# Patient Record
Sex: MALE | Race: BLACK OR AFRICAN AMERICAN | NOT HISPANIC OR LATINO | Employment: OTHER | URBAN - METROPOLITAN AREA
[De-identification: names, ages, dates, MRNs, and addresses within clinical notes are randomized per-mention and may not be internally consistent; named-entity substitution may affect disease eponyms.]

---

## 2017-01-05 ENCOUNTER — OFFICE VISIT (OUTPATIENT)
Dept: INTERNAL MEDICINE | Facility: CLINIC | Age: 62
End: 2017-01-05
Payer: COMMERCIAL

## 2017-01-05 VITALS
SYSTOLIC BLOOD PRESSURE: 140 MMHG | OXYGEN SATURATION: 99 % | TEMPERATURE: 98 F | BODY MASS INDEX: 31.34 KG/M2 | HEART RATE: 75 BPM | HEIGHT: 70 IN | WEIGHT: 218.94 LBS | DIASTOLIC BLOOD PRESSURE: 77 MMHG

## 2017-01-05 DIAGNOSIS — G40.409 GENERALIZED TONIC-CLONIC SEIZURE: Primary | ICD-10-CM

## 2017-01-05 DIAGNOSIS — E08.22 DIABETES MELLITUS DUE TO UNDERLYING CONDITION WITH STAGE 3 CHRONIC KIDNEY DISEASE, WITH LONG-TERM CURRENT USE OF INSULIN: ICD-10-CM

## 2017-01-05 DIAGNOSIS — I48.0 PAROXYSMAL ATRIAL FIBRILLATION: Chronic | ICD-10-CM

## 2017-01-05 DIAGNOSIS — Z79.4 DIABETES MELLITUS DUE TO UNDERLYING CONDITION WITH STAGE 3 CHRONIC KIDNEY DISEASE, WITH LONG-TERM CURRENT USE OF INSULIN: ICD-10-CM

## 2017-01-05 DIAGNOSIS — Z86.73 HISTORY OF STROKE: ICD-10-CM

## 2017-01-05 DIAGNOSIS — I50.32 CHRONIC DIASTOLIC HEART FAILURE: ICD-10-CM

## 2017-01-05 DIAGNOSIS — N18.30 DIABETES MELLITUS DUE TO UNDERLYING CONDITION WITH STAGE 3 CHRONIC KIDNEY DISEASE, WITH LONG-TERM CURRENT USE OF INSULIN: ICD-10-CM

## 2017-01-05 DIAGNOSIS — N18.30 CKD (CHRONIC KIDNEY DISEASE), STAGE III: Chronic | ICD-10-CM

## 2017-01-05 DIAGNOSIS — I10 HTN (HYPERTENSION), BENIGN: ICD-10-CM

## 2017-01-05 DIAGNOSIS — E89.0 POSTSURGICAL HYPOTHYROIDISM: ICD-10-CM

## 2017-01-05 PROCEDURE — 99999 PR PBB SHADOW E&M-EST. PATIENT-LVL III: CPT | Mod: PBBFAC,,, | Performed by: INTERNAL MEDICINE

## 2017-01-05 PROCEDURE — 1159F MED LIST DOCD IN RCRD: CPT | Mod: S$GLB,,, | Performed by: INTERNAL MEDICINE

## 2017-01-05 PROCEDURE — 3078F DIAST BP <80 MM HG: CPT | Mod: S$GLB,,, | Performed by: INTERNAL MEDICINE

## 2017-01-05 PROCEDURE — 99214 OFFICE O/P EST MOD 30 MIN: CPT | Mod: S$GLB,,, | Performed by: INTERNAL MEDICINE

## 2017-01-05 PROCEDURE — 3077F SYST BP >= 140 MM HG: CPT | Mod: S$GLB,,, | Performed by: INTERNAL MEDICINE

## 2017-01-05 NOTE — PROGRESS NOTES
Subjective:       Patient ID: Alfa Christy III is a 61 y.o. male.    Chief Complaint: Follow-up; Hypertension; and Diabetes    HPI  60 y/o man with h/o CVA (1/2016), DM2, CKD, HTN, hypothyroidism, multiple other medical issues here for follow-up. Last seen by me 10/4/16; prior to this followed with Dr Salas.    Recently hospitalized 11/2016, seizure - today says that he tapered down and stopped taking pradaxa just before this happened. Presented to outside hospital 11/5 with headache, dizziness, and expressive aphasia, +elevated BP as high as 232/100; transferred to [facility] after having seizure. Seen by Neurology while inpatient, started on keppra with loading dose of phenytoin.  CTA of head and neck showed no acute ischemic changes except for old stroke of his left PCA and small, incidental BICA aneursysms.     Seen in [facility] for follow up in November; keppra was decreased to 750mg BID.  At visit with Dr Santiago (Neuro), recommended to continue this dose of keppra. No driving for 6 months.   Has now been discharged from home health; had HH RN as well as PT.   Balance has improved with time / home health PT; tries to walk slowly. Endurance decreased. No chest pain or dyspnea.    Atorvastatin increased to 80mg for HLD and risk modification given h/o CVA.    HFpEF, HTN, paroxysmal atrial fibrillation - Follows with Dr Camacho for Cardiology. On pradaxa.  During hospitalization in [place] for chest pain in 10/2016, switched from lisinopril to telmisartan; now has switched to losartan due to cost. Was also started on lasix at that time.   He feels that he had back pain due to low potassium (though this was not checked) -- seen in [facility] 11/30, recommended to use muscle relaxant, tylenol. Didn't feel that this helped much, but started eating 1-2 bananas daily and feels that this helped his back and legs more than taking methocarbamol.     Has been having more leg swelling in past 3 weeks, noted to have edema to knees bilaterally.  This has improved recently - watching salt in diet more carefully, now using compression stockings but not daily. (OTC, not prescription.)     HTN - currently taking losartan 100mg, carvedilol 12.5mg, lasix 40mg.   Had home health since his admission - reports SBP in the 120s yesterday.      DM2 - diagnosed around 2002.   Medications - lantus 12u qHS, humalog 8u qAC with correction scale with meals  Glucose checks at home - checking 3 times daily, brings in log.   Lowest fasting is 114, ranging up to 202. No lows during the day.   Has f/u with Dr Massey next week.    H/o thyroid cancer in 2009, s/p thyroidectomy, with postsurgical hypothyroidism - on synthroid 200mcg daily. No temperature intolerance, diarrhea/constipation, hair/skin changes, tremor, palpitations, or weight change. TSH slightly elevated while in hospital.    Rec'd prevnar vaccine in [facility] clinic.    Review of Systems   Constitutional: Negative for activity change and fever.   HENT: Negative for congestion and sore throat.    Respiratory: Negative for cough and shortness of breath.    Cardiovascular: Positive for leg swelling. Negative for chest pain and palpitations.   Gastrointestinal: Negative for abdominal pain, diarrhea and nausea.   Endocrine: Negative for cold intolerance and heat intolerance.   Genitourinary: Negative for decreased urine volume, difficulty urinating and frequency.   Musculoskeletal: Positive for back pain (not currently). Negative for arthralgias and gait problem.   Skin: Negative for rash.   Neurological: Negative for weakness and headaches.        Balance problem - improving   Psychiatric/Behavioral: Negative for dysphoric mood.         Past medical history, surgical history, and family medical history reviewed and updated as appropriate.    Medications and allergies reviewed.     Objective:          Vitals:    01/05/17 0902   BP: (!) 140/77   BP Location: Right arm   Patient Position: Sitting   Pulse: 75   Temp: 97.7  "°F (36.5 °C)   TempSrc: Oral   SpO2: 99%   Weight: 99.3 kg (218 lb 14.7 oz)   Height: 5' 10" (1.778 m)     Body mass index is 31.41 kg/(m^2).  Physical Exam   Constitutional: He is oriented to person, place, and time. He appears well-developed and well-nourished. No distress.   HENT:   Head: Normocephalic and atraumatic.   Nose: Nose normal.   Mouth/Throat: Oropharynx is clear and moist.   Eyes: Conjunctivae and EOM are normal. Pupils are equal, round, and reactive to light.   Neck: Normal range of motion. Neck supple.   Cardiovascular: Normal rate, regular rhythm and normal heart sounds.    No murmur heard.  Pulmonary/Chest: Effort normal and breath sounds normal.   Abdominal: Soft. Bowel sounds are normal. He exhibits no distension. There is no tenderness.   Musculoskeletal: Normal range of motion. He exhibits edema (2+ pitting edema to mid-shin bilaterally). He exhibits no tenderness.   Identifies paraspinal muscles in mid-back as location of his usual back pain. +muscle tension, no tenderness.    Lymphadenopathy:     He has no cervical adenopathy.   Neurological: He is alert and oriented to person, place, and time. No cranial nerve deficit.   R peripheral vision deficit   Skin: Skin is warm and dry.   Psychiatric: He has a normal mood and affect.   Vitals reviewed.      Lab Results   Component Value Date    WBC 11.36 2016    HGB 11.1 (L) 2016    HCT 31.0 (L) 2016     2016    CHOL 195 2016    TRIG 99 2016    HDL 52 2016    ALT 32 2016    AST 50 (H) 2016     2016    K 4.3 2016     2016    CREATININE 2.0 (H) 2016    BUN 21 2016    CO2 22 (L) 2016    TSH 6.123 (H) 2016    PSA 7.2 (H) 10/28/2015    INR 1.1 2016    GLUF 106 2008    HGBA1C text 2012     EE) R-EEG, 2016:   IMPRESSION: Normal awake and asleep EEG.  2) R-EEG, 16  IMPRESSION:   This is an abnormal awake and " asleep EEG due to mild generalized slowing seen, suggestive of mild diffuse or multifocal cerebral dysfunction.  No epileptiform activity or electrographic seizures were seen      Assessment:       1. Generalized tonic-clonic seizure    2. HTN (hypertension), benign    3. Chronic diastolic heart failure    4. Paroxysmal atrial fibrillation    5. CKD (chronic kidney disease), stage III    6. Diabetes mellitus due to underlying condition with stage 3 chronic kidney disease, with long-term current use of insulin    7. Sickle cell trait    8. Postsurgical hypothyroidism    9. History of stroke    10. Chronic anticoagulation - pradaxa        Plan:   Alfa was seen today for follow-up, hypertension and diabetes.    Diagnoses and all orders for this visit:    Generalized tonic-clonic seizure - continue current medication, continue to follow with Neurology    HTN (hypertension), benign - above goal today but making diet changes; continue watching salt in diet.     Chronic diastolic heart failure - by his report, symptoms overall improving. Continue working on low-salt diet, continue current meds, and make f/u appt with cardiology    Paroxysmal atrial fibrillation - continue pradaxa; follow with cardiology    CKD (chronic kidney disease), stage III  -     Magnesium; Future    Diabetes mellitus due to underlying condition with stage 3 chronic kidney disease, with long-term current use of insulin - standard A1c not reliable due to anemia related to sickle cell trait    Postsurgical hypothyroidism - TSH already ordered by Dr Massey; reviewed labs, will have TSH rechecked with next labs. No symptoms of hypothyroidism currently.    History of stroke - continue to follow with Neurology, continue pradaxa, statin, risk factor modification    Health maintenance reviewed with patient.   Due for eye exam; will discuss at next visit.    Return in about 2 months (around 3/5/2017) for follow up.    Tyler Jasmine MD  Internal  Medicine [facility]  1/5/2017

## 2017-01-05 NOTE — MR AVS SNAPSHOT
[facility] - Internal Medicine  [address]  [facility] [address]  Phone: [phone]  Fax: [phone]                  Alfa Christy III   2017 9:20 AM   Office Visit    Description:  Male : 1955   Provider:  Tyler Jasmine MD   Department:  [facility] - Internal Medicine           Reason for Visit     Follow-up     Hypertension     Diabetes           Diagnoses this Visit        Comments    Generalized tonic-clonic seizure    -  Primary     HTN (hypertension), benign         Chronic diastolic heart failure         Paroxysmal atrial fibrillation         CKD (chronic kidney disease), stage III         Diabetes mellitus due to underlying condition with stage 3 chronic kidney disease, with long-term current use of insulin         Sickle cell trait         Postsurgical hypothyroidism         History of stroke         Chronic anticoagulation                To Do List           Future Appointments        Provider Department Dept Phone    2017 11:00 AM LAB, [facility] [phone]    2017 2:30 PM Jorge Camacho MD [facility] Cardiology [phone]    2017 8:30 AM Carlitos Massey MD [facility] - Endo/Diab/Metab [phone]    2017 4:30 PM Rachel Gold MD [facility] Nephrology [phone]    3/8/2017 10:40 AM Tyler Jasmine MD [facility] Internal Medicine [phone]      Goals (5 Years of Data)     None      Follow-Up and Disposition     Return in about 2 months (around 3/5/2017) for follow up.      [facility]     [facility] -  Assistance  Registered nurses in the [facility] provide clinical advisement, health education, appointment booking, and other advisory services.  Call for this free service at [phone].             Medications           Message regarding Medications     Verify the changes and/or additions to your medication regime listed below are the same as discussed with your  "clinician today.  If any of these changes or additions are incorrect, please notify your healthcare provider.        STOP taking these medications     methocarbamol (ROBAXIN) 500 MG Tab Take 1 tablet (500 mg total) by mouth every 6 (six) hours as needed.           Verify that the below list of medications is an accurate representation of the medications you are currently taking.  If none reported, the list may be blank. If incorrect, please contact your healthcare provider. Carry this list with you in case of emergency.           Current Medications     atorvastatin (LIPITOR) 80 MG tablet Take 1 tablet (80 mg total) by mouth once daily.    blood sugar diagnostic Strp 1 strip by Misc.(Non-Drug; Combo Route) route 3 (three) times daily. One Touch Verio    carvedilol (COREG) 12.5 MG tablet Take 1 tablet (12.5 mg total) by mouth 2 (two) times daily.    coenzyme Q10 200 mg capsule Take 200 mg by mouth once daily.    dabigatran etexilate (PRADAXA) 150 mg Cap Take 150 mg by mouth 2 (two) times daily. "Do NOT break, chew, or open capsules."    ERGOCALCIFEROL, VITAMIN D2, (VITAMIN D ORAL) Take 1 tablet by mouth every Mon, Wed, Fri.    folic acid (FOLVITE) 1 MG tablet Take 1 mg by mouth once daily.     furosemide (LASIX) 40 MG tablet Take 1 tablet (40 mg total) by mouth once daily.    insulin glargine (LANTUS SOLOSTAR) 100 unit/mL (3 mL) InPn pen Inject 12 Units into the skin every evening.    insulin lispro (HUMALOG KWIKPEN) 100 unit/mL InPn pen Inject 8 Units into the skin 3 (three) times daily before meals. With sliding scale.    levetiracetam (KEPPRA) 750 MG Tab Take 1 tablet (750 mg total) by mouth 2 (two) times daily.    levothyroxine (SYNTHROID) 200 MCG tablet Take 1 tablet (200 mcg total) by mouth once daily.    losartan (COZAAR) 100 MG tablet Take 1 tablet (100 mg total) by mouth once daily.    acetaminophen (TYLENOL) 500 MG tablet Take 1 tablet (500 mg total) by mouth every 6 (six) hours as needed for Pain.    " "EYEBRIGHT ORAL Take 1 tablet by mouth once daily.    SAW/VIT E/SOD JILL/LYC/BETA/PYG (PROSTATE HEALTH ORAL) Take 1 tablet by mouth once daily.            Clinical Reference Information           Vital Signs - Last Recorded  Most recent update: 1/5/2017  9:05 AM by Laura Cobos MA    BP Pulse Temp Ht Wt SpO2    (!) 140/77 (BP Location: Right arm, Patient Position: Sitting) 75 97.7 °F (36.5 °C) (Oral) 5' 10" (1.778 m) 99.3 kg (218 lb 14.7 oz) 99%    BMI                31.41 kg/m2          Blood Pressure          Most Recent Value    BP  (!)  140/77      Allergies as of 1/5/2017     No Known Allergies      Immunizations Administered on Date of Encounter - 1/5/2017     None      Orders Placed During Today's Visit     Future Labs/Procedures Expected by Expires    Magnesium  1/5/2017 (Approximate) 3/6/2018      "

## 2017-01-05 NOTE — Clinical Note
Charis - I saw Mr Christy back last week for follow up after his recent hospitalization and just wanted to check in with you about when he should come in for Urology follow-up next. Looks like his PSA improved but is still elevated on last check. Could you or your staff reach out to let him know when to follow up with your clinic? Thanks! Tyler Jasmine MD

## 2017-01-06 ENCOUNTER — LAB VISIT (OUTPATIENT)
Dept: LAB | Facility: HOSPITAL | Age: 62
End: 2017-01-06
Attending: INTERNAL MEDICINE
Payer: COMMERCIAL

## 2017-01-06 DIAGNOSIS — C73 THYROID CANCER: ICD-10-CM

## 2017-01-06 DIAGNOSIS — E66.9 DIABETES MELLITUS TYPE 2 IN OBESE: ICD-10-CM

## 2017-01-06 DIAGNOSIS — E11.69 DIABETES MELLITUS TYPE 2 IN OBESE: ICD-10-CM

## 2017-01-06 DIAGNOSIS — N18.30 CKD (CHRONIC KIDNEY DISEASE), STAGE III: Chronic | ICD-10-CM

## 2017-01-06 LAB
ALBUMIN SERPL BCP-MCNC: 3.5 G/DL
ALP SERPL-CCNC: 132 U/L
ALT SERPL W/O P-5'-P-CCNC: 20 U/L
ANION GAP SERPL CALC-SCNC: 9 MMOL/L
AST SERPL-CCNC: 32 U/L
BILIRUB SERPL-MCNC: 1.4 MG/DL
BUN SERPL-MCNC: 21 MG/DL
CALCIUM SERPL-MCNC: 8.7 MG/DL
CHLORIDE SERPL-SCNC: 109 MMOL/L
CO2 SERPL-SCNC: 22 MMOL/L
CREAT SERPL-MCNC: 2.1 MG/DL
EST. GFR  (AFRICAN AMERICAN): 37.6 ML/MIN/1.73 M^2
EST. GFR  (NON AFRICAN AMERICAN): 32.5 ML/MIN/1.73 M^2
GLUCOSE SERPL-MCNC: 159 MG/DL
MAGNESIUM SERPL-MCNC: 2.4 MG/DL
POTASSIUM SERPL-SCNC: 5.2 MMOL/L
PROT SERPL-MCNC: 7.5 G/DL
SODIUM SERPL-SCNC: 140 MMOL/L
T4 FREE SERPL-MCNC: 0.9 NG/DL
TSH SERPL DL<=0.005 MIU/L-ACNC: 8.24 UIU/ML

## 2017-01-06 PROCEDURE — 82985 ASSAY OF GLYCATED PROTEIN: CPT

## 2017-01-06 PROCEDURE — 84432 ASSAY OF THYROGLOBULIN: CPT

## 2017-01-06 PROCEDURE — 83036 HEMOGLOBIN GLYCOSYLATED A1C: CPT

## 2017-01-06 PROCEDURE — 84443 ASSAY THYROID STIM HORMONE: CPT

## 2017-01-06 PROCEDURE — 80053 COMPREHEN METABOLIC PANEL: CPT

## 2017-01-06 PROCEDURE — 83735 ASSAY OF MAGNESIUM: CPT

## 2017-01-06 PROCEDURE — 36415 COLL VENOUS BLD VENIPUNCTURE: CPT | Mod: PO

## 2017-01-06 PROCEDURE — 84439 ASSAY OF FREE THYROXINE: CPT

## 2017-01-09 LAB
THRYOGLOBULIN INTERPRETATION: ABNORMAL
THYROGLOB AB SERPL-ACNC: <1.8 IU/ML
THYROGLOB SERPL-MCNC: 0.2 NG/ML

## 2017-01-10 LAB
FRUCTOSAMINE SERPL-SCNC: 302 UMOL /L (ref 0–285)
HEMOGLOBIN A1C REFERRAL TEST: NORMAL

## 2017-01-12 ENCOUNTER — TELEPHONE (OUTPATIENT)
Dept: UROLOGY | Facility: CLINIC | Age: 62
End: 2017-01-12

## 2017-01-13 ENCOUNTER — OFFICE VISIT (OUTPATIENT)
Dept: ENDOCRINOLOGY | Facility: CLINIC | Age: 62
End: 2017-01-13
Payer: COMMERCIAL

## 2017-01-13 VITALS
SYSTOLIC BLOOD PRESSURE: 135 MMHG | WEIGHT: 220.44 LBS | BODY MASS INDEX: 31.56 KG/M2 | HEIGHT: 70 IN | DIASTOLIC BLOOD PRESSURE: 74 MMHG | HEART RATE: 64 BPM

## 2017-01-13 DIAGNOSIS — E08.22 DIABETES MELLITUS DUE TO UNDERLYING CONDITION WITH STAGE 3 CHRONIC KIDNEY DISEASE, WITH LONG-TERM CURRENT USE OF INSULIN: ICD-10-CM

## 2017-01-13 DIAGNOSIS — C73 THYROID CANCER: ICD-10-CM

## 2017-01-13 DIAGNOSIS — D57.3 SICKLE CELL TRAIT: ICD-10-CM

## 2017-01-13 DIAGNOSIS — G40.409 GENERALIZED TONIC-CLONIC SEIZURE: ICD-10-CM

## 2017-01-13 DIAGNOSIS — Z79.4 DIABETES MELLITUS DUE TO UNDERLYING CONDITION WITH STAGE 3 CHRONIC KIDNEY DISEASE, WITH LONG-TERM CURRENT USE OF INSULIN: ICD-10-CM

## 2017-01-13 DIAGNOSIS — E89.0 POSTSURGICAL HYPOTHYROIDISM: Primary | ICD-10-CM

## 2017-01-13 DIAGNOSIS — Z86.73 HISTORY OF STROKE: ICD-10-CM

## 2017-01-13 DIAGNOSIS — E78.00 HYPERCHOLESTEROLEMIA: ICD-10-CM

## 2017-01-13 DIAGNOSIS — I10 HTN (HYPERTENSION), BENIGN: ICD-10-CM

## 2017-01-13 DIAGNOSIS — N18.30 DIABETES MELLITUS DUE TO UNDERLYING CONDITION WITH STAGE 3 CHRONIC KIDNEY DISEASE, WITH LONG-TERM CURRENT USE OF INSULIN: ICD-10-CM

## 2017-01-13 PROCEDURE — 1159F MED LIST DOCD IN RCRD: CPT | Mod: S$GLB,,, | Performed by: INTERNAL MEDICINE

## 2017-01-13 PROCEDURE — 3078F DIAST BP <80 MM HG: CPT | Mod: S$GLB,,, | Performed by: INTERNAL MEDICINE

## 2017-01-13 PROCEDURE — 99999 PR PBB SHADOW E&M-EST. PATIENT-LVL III: CPT | Mod: PBBFAC,,, | Performed by: INTERNAL MEDICINE

## 2017-01-13 PROCEDURE — 3075F SYST BP GE 130 - 139MM HG: CPT | Mod: S$GLB,,, | Performed by: INTERNAL MEDICINE

## 2017-01-13 PROCEDURE — 99214 OFFICE O/P EST MOD 30 MIN: CPT | Mod: S$GLB,,, | Performed by: INTERNAL MEDICINE

## 2017-01-13 NOTE — MR AVS SNAPSHOT
[facility] - Endo/Diab/Metab  [address]  [facility] [address]  Phone: [phone]  Fax: [phone]                  Alfa Christy III   2017 8:30 AM   Office Visit    Description:  Male : 1955   Provider:  Carlitos Massey MD   Department:  [facility] - Endo/Diab/Metab           Reason for Visit     Follow-up           Diagnoses this Visit        Comments    Postsurgical hypothyroidism    -  Primary     Diabetes mellitus due to underlying condition with stage 3 chronic kidney disease, with long-term current use of insulin         HTN (hypertension), benign         Hypercholesterolemia         Sickle cell trait         History of stroke         Generalized tonic-clonic seizure         Thyroid cancer                To Do List           Future Appointments        Provider Department Dept Phone    2017 11:30 AM DIABETES EDUCATOR, INT MED 2 [facility] -  Diabetes Program [phone]    2017 8:00 AM LAB, [facility] [phone]    2017 8:15 AM SPECIMEN, [facility] [phone]    2017 8:00 AM MD [facility] Nephrology [phone]    2017 4:30 PM Rachel Gold MD [facility] Nephrology [phone]      Goals (5 Years of Data)     None      Follow-Up and Disposition     Follow-up and Disposition History      [facility]     [facility] -  Assistance  Registered nurses in the [facility] provide clinical advisement, health education, appointment booking, and other advisory services.  Call for this free service at [phone].             Medications           Message regarding Medications     Verify the changes and/or additions to your medication regime listed below are the same as discussed with your clinician today.  If any of these changes or additions are incorrect, please notify your healthcare provider.             Verify that the below list of  "medications is an accurate representation of the medications you are currently taking.  If none reported, the list may be blank. If incorrect, please contact your healthcare provider. Carry this list with you in case of emergency.           Current Medications     acetaminophen (TYLENOL) 500 MG tablet Take 1 tablet (500 mg total) by mouth every 6 (six) hours as needed for Pain.    atorvastatin (LIPITOR) 80 MG tablet Take 1 tablet (80 mg total) by mouth once daily.    blood sugar diagnostic Strp 1 strip by Misc.(Non-Drug; Combo Route) route 3 (three) times daily. One Touch Verio    carvedilol (COREG) 12.5 MG tablet Take 1 tablet (12.5 mg total) by mouth 2 (two) times daily.    coenzyme Q10 200 mg capsule Take 200 mg by mouth once daily.    dabigatran etexilate (PRADAXA) 150 mg Cap Take 150 mg by mouth 2 (two) times daily. "Do NOT break, chew, or open capsules."    ERGOCALCIFEROL, VITAMIN D2, (VITAMIN D ORAL) Take 1 tablet by mouth every Mon, Wed, Fri.    EYEBRIGHT ORAL Take 1 tablet by mouth once daily.    folic acid (FOLVITE) 1 MG tablet Take 1 mg by mouth once daily.     furosemide (LASIX) 40 MG tablet Take 1 tablet (40 mg total) by mouth once daily.    insulin glargine (LANTUS SOLOSTAR) 100 unit/mL (3 mL) InPn pen Inject 12 Units into the skin every evening.    insulin lispro (HUMALOG KWIKPEN) 100 unit/mL InPn pen Inject 8 Units into the skin 3 (three) times daily before meals. With sliding scale.    levetiracetam (KEPPRA) 750 MG Tab Take 1 tablet (750 mg total) by mouth 2 (two) times daily.    levothyroxine (SYNTHROID) 200 MCG tablet Take 1 tablet (200 mcg total) by mouth once daily.    losartan (COZAAR) 100 MG tablet Take 1 tablet (100 mg total) by mouth once daily.    SAW/VIT E/SOD JILL/LYC/BETA/PYG (PROSTATE HEALTH ORAL) Take 1 tablet by mouth once daily.            Clinical Reference Information           Vital Signs - Last Recorded  Most recent update: 1/13/2017  9:03 AM by Olivia Alicea LPN    BP Pulse Ht " "Wt BMI    135/74 64 5' 10" (1.778 m) 100 kg (220 lb 7.4 oz) 31.63 kg/m2      Blood Pressure          Most Recent Value    BP  135/74      Allergies as of 1/13/2017     No Known Allergies      Immunizations Administered on Date of Encounter - 1/13/2017     None      Orders Placed During Today's Visit      Normal Orders This Visit    Ambulatory consult to Diabetic Education     Future Labs/Procedures Expected by Expires    FRUCTOSAMINE  1/13/2017 3/14/2018    Lipid panel  1/13/2017 1/13/2018    TSH  1/13/2017 1/13/2018      Instructions    Thyroid cancer and multifocal small and treated with surg and I131  TSH high and TG 0.2  Want to lower the TSH but think not waiting sufficient period between taking thyroid and taking food and other pills  Wait one hour  Repeat TSH in 2 month    Diabetes type 2   Lantus increased to 13  Nov 10 8 8 plus correction  Needs more activity  Diabetes education    CVA and loss of vision looking to the right  CVA    HTN controlled   Recheck chol         "

## 2017-01-13 NOTE — PATIENT INSTRUCTIONS
Thyroid cancer and multifocal small and treated with surg and I131  TSH high and TG 0.2  Want to lower the TSH but think not waiting sufficient period between taking thyroid and taking food and other pills  Wait one hour  Repeat TSH in 2 month    Diabetes type 2   Lantus increased to 13  Nov 10 8 8 plus correction  Needs more activity  Diabetes education    CVA and loss of vision looking to the right  CVA    HTN controlled   Recheck chol

## 2017-01-13 NOTE — PROGRESS NOTES
Subjective:      Patient ID: Alfa Christy III is a 61 y.o. male.    Chief Complaint:  Follow-up      History of Present Illness  Last seen Jan 2016  Since that time CVA with peripheral vison loss right  Then had seizure and now on meds. Speech worse and memory worse but now back to normal  Had chest pain in [place] after eating  Changed meds    The patient has the following problems:   1. 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 TG low Levothyroxine 200mcg  2. Diabetes type 2, on multiple daily injection program with good control.Sickle cell trait Checking 3x/day     3. Sickle cell trait, complicating the interpretation of the A1c. Has had sickle crisis  4. Erectile dysfunction, not taking Cialis  5. Elevated PSA with prostate biopsy, benign, followed in Urology.   6. Chronic kidney disease,   7. Hypertension, well controlled. BP high today  8. Cholesterol, statin begun because of risk factors.   9. New onset atrial fibrillation after stress test.   The patient has the history of thyroid cancer. He had surgery 08/26/2009.   This was a multifocal lesion. The pathology was reviewed again. The   patient was treated with surgery, radioactive iodine 100 mci. There is some   remaining tissue on the left. A biopsy of this was done 11/03/2011,   demonstrating an insufficient material to make a diagnosis. The patient is   on thyroid suppression with a dose of 200 mcg per day of Synthroid plus 50   mcg. The patient has developed atrial fibrillation on at least one   occasion when he had the stress test. TG has been as high as 0.7 and now 0.1  An iodine scan in 2011 showed some remaining uptake.   Recent US from radiology negative and in Endocrinology questionable   Alfa Christy with diabetes for 14 years. Symptoms:Opthalmology exam 1 months ago. No parasthesias of feet and CTS symptoms.   Glucose meter Type; Verio new   Frequency of  measurements/day 3 times/day   Diet: Number of meals 3 Frequency eating out/week 1 Snacks/day   Insulin See medication list Lantus 20 and Nov 10 with meals.   Oral agents See orders   Hypoglycemia: None recently  Diabetic complications None     Atrial fibrillation on treatment     Gets into trouble when drinking see A1C =14    Review of Systems   Constitutional: Positive for fatigue.   Eyes: Positive for visual disturbance.   Respiratory: Negative for cough and shortness of breath.    Cardiovascular: Negative for chest pain, palpitations and leg swelling.   Gastrointestinal: Negative for constipation and diarrhea.   Musculoskeletal: Negative for arthralgias and back pain.   Neurological: Negative for headaches.   Psychiatric/Behavioral: The patient is nervous/anxious.        Objective:   Physical Exam   Constitutional: He is oriented to person, place, and time. He appears well-developed and well-nourished.   Eyes: EOM are normal. Pupils are equal, round, and reactive to light.   Neck: No thyromegaly present.   Cardiovascular: Normal rate, regular rhythm and normal heart sounds.    No murmur heard.  Pulmonary/Chest: Effort normal and breath sounds normal.   Musculoskeletal: Normal range of motion.   Lymphadenopathy:     He has no cervical adenopathy.   Neurological: He is alert and oriented to person, place, and time. He has normal reflexes.   Comprehensive foot exam  Left and Right Feet  No ulcers  Normal temperature  Vibration mild deficit  Pulses intact     Psychiatric: His behavior is normal. Thought content normal.   Vitals reviewed.      Lab Review:   Results for orders placed or performed in visit on 01/06/17   Comprehensive metabolic panel   Result Value Ref Range    Sodium 140 136 - 145 mmol/L    Potassium 5.2 (H) 3.5 - 5.1 mmol/L    Chloride 109 95 - 110 mmol/L    CO2 22 (L) 23 - 29 mmol/L    Glucose 159 (H) 70 - 110 mg/dL    BUN, Bld 21 8 - 23 mg/dL    Creatinine 2.1 (H) 0.5 - 1.4 mg/dL    Calcium 8.7 8.7  - 10.5 mg/dL    Total Protein 7.5 6.0 - 8.4 g/dL    Albumin 3.5 3.5 - 5.2 g/dL    Total Bilirubin 1.4 (H) 0.1 - 1.0 mg/dL    Alkaline Phosphatase 132 55 - 135 U/L    AST 32 10 - 40 U/L    ALT 20 10 - 44 U/L    Anion Gap 9 8 - 16 mmol/L    eGFR if African American 37.6 (A) >60 mL/min/1.73 m^2    eGFR if non  32.5 (A) >60 mL/min/1.73 m^2   TSH   Result Value Ref Range    TSH 8.237 (H) 0.400 - 4.000 uIU/mL   Thyroglobulin   Result Value Ref Range    Thyroglobulin, Tumor Marker 0.2 (H) ng/mL    Thyroglobulin Antibody Screen <1.8 <4.0 IU/mL    Thyroglobulin Interpretation SEE BELOW    FRUCTOSAMINE   Result Value Ref Range    Fructosamine 302 (H) 0 - 285 umol /L   Magnesium   Result Value Ref Range    Magnesium 2.4 1.6 - 2.6 mg/dL   T4, free   Result Value Ref Range    Free T4 0.90 0.71 - 1.51 ng/dL   Hemoglobin A1C referral test   Result Value Ref Range    Hemoglobin A1c referral test Test Not Performed          Assessment:     Thyroid cancer and multifocal small and treated with surg and I131  TSH high and TG 0.2  Want to lower the TSH but think not waiting sufficient period between taking thyroid and taking food and other pills  Wait one hour  Repeat TSH in 2 month    Diabetes type 2   Lantus increased to 13  Nov 10 8 8 plus correction  Needs more activity  Diabetes education    CVA and loss of vision looking to the right  CVA    HTN controlled   Recheck chol    Plan:     Orders Placed This Encounter   Procedures    Lipid panel     Standing Status:   Future     Standing Expiration Date:   1/13/2018    TSH     Standing Status:   Future     Standing Expiration Date:   1/13/2018    FRUCTOSAMINE     Standing Status:   Future     Standing Expiration Date:   3/14/2018    Ambulatory consult to Diabetic Education     Referral Priority:   Routine     Referral Type:   Consultation     Referral Reason:   Specialty Services Required     Requested Specialty:   Diabetes     Number of Visits Requested:   1

## 2017-01-16 ENCOUNTER — CLINICAL SUPPORT (OUTPATIENT)
Dept: DIABETES | Facility: CLINIC | Age: 62
End: 2017-01-16
Payer: COMMERCIAL

## 2017-01-16 DIAGNOSIS — Z79.4 DIABETES MELLITUS DUE TO UNDERLYING CONDITION WITH STAGE 3 CHRONIC KIDNEY DISEASE, WITH LONG-TERM CURRENT USE OF INSULIN: ICD-10-CM

## 2017-01-16 DIAGNOSIS — E08.22 DIABETES MELLITUS DUE TO UNDERLYING CONDITION WITH STAGE 3 CHRONIC KIDNEY DISEASE, WITH LONG-TERM CURRENT USE OF INSULIN: ICD-10-CM

## 2017-01-16 DIAGNOSIS — N18.30 DIABETES MELLITUS DUE TO UNDERLYING CONDITION WITH STAGE 3 CHRONIC KIDNEY DISEASE, WITH LONG-TERM CURRENT USE OF INSULIN: ICD-10-CM

## 2017-01-16 PROCEDURE — G0109 DIAB MANAGE TRN IND/GROUP: HCPCS | Mod: S$GLB,,, | Performed by: DIETITIAN, REGISTERED

## 2017-01-16 NOTE — PROGRESS NOTES
Diabetes Assessment Group Class     01/16/17 0000   Diabetes Type   Diabetes Type  Type II   Diabetes History   Diabetes Diagnosis >10 years   Nutrition   Meal Planning skipping meals;water;eats out often  (2 large meals daily)   Monitoring    Monitoring (One Touch)   Self Monitoring  SMBG 3 times daily, AC/HS - 132-190 am, 133-296   Blood Glucose Logs No   Exercise    Exercise Type use exercise equipment   Frequency (2 times weekly)   Duration 1 hour   Current Diabetes Treatment    Current Treatment Insulin  (Humalog 10/8/8, Lantus 13 units every night)   Social History   Preferred Learning Method Face to Face;Reading Materials   Primary Support Self;Spouse   Occupation retired   Smoking Status Never a Smoker   Alcohol Use Weekly  (2 beers with football game)   Barriers to Change   Barriers to Change None   Learning Challenges  None   Readiness to Learn    Readiness to Learn  Acceptance   Diabetes Education Visit   Diabetes Education Record Assessment/Progress Post Program/Follow-up  (Verbal pre-test conducted at start of class)   Diabetes Education Assessment/Progress   Acute Complications (preventing, detecting, and treating acute complications) DC;5;CL;W   Chronic Complications (preventing, detecting, and treating chronic complications) DC;5;CL;W   Diabetes Disease Process (diabetes disease process and treatment options) DC;5;CL;W   Nutrition (Incorporating nutritional management into one's lifestyle) DC;CL;5;W   Physical Activity (incorporating physical activity into one's lifestyle) DC;5;CL;W   Medications (states correct name, dose, onset, peak, duration, side effects & timing of meds) DC;5;CL;W   Monitoring (monitoring blood glucose/other parameters &using results) DC;5;CL;W   Goal Setting and Problem Solving (verbalizes behavior change strategies & sets realistic goals) DC;CL   Behavior Change (developing personal strategies to health & behavior change) DC;CL   Psychosocial Issues (deveopling personal  srategies to address psychosocial concerns) DC;CL   Goals   Other Set  (Keep next DM appt)   Start Date 01/16/17   Diabetes Care Plan/Intervention   Education Plan/Intervention In F/U DSMT   Diabetes Self-Management Support Plan F/U Prov;F/U DE   Diabetes Meal Plan   Carbohydrate Per Meal 45-60g   Carbohydrate Per Snack  15-20g   Education Units of Time    Time Spent 90 min     Class presented by Yaa Orr MBA, RD, LDN, CDE  Post test score:100

## 2017-01-24 ENCOUNTER — LAB VISIT (OUTPATIENT)
Dept: LAB | Facility: HOSPITAL | Age: 62
End: 2017-01-24
Attending: INTERNAL MEDICINE
Payer: COMMERCIAL

## 2017-01-24 DIAGNOSIS — N18.30 CKD (CHRONIC KIDNEY DISEASE), STAGE III: Chronic | ICD-10-CM

## 2017-01-24 LAB
25(OH)D3+25(OH)D2 SERPL-MCNC: 30 NG/ML
ALBUMIN SERPL BCP-MCNC: 3.3 G/DL
ANION GAP SERPL CALC-SCNC: 5 MMOL/L
BUN SERPL-MCNC: 19 MG/DL
CALCIUM SERPL-MCNC: 8.5 MG/DL
CHLORIDE SERPL-SCNC: 107 MMOL/L
CO2 SERPL-SCNC: 25 MMOL/L
CREAT SERPL-MCNC: 1.9 MG/DL
EST. GFR  (AFRICAN AMERICAN): 42.4 ML/MIN/1.73 M^2
EST. GFR  (NON AFRICAN AMERICAN): 36.7 ML/MIN/1.73 M^2
GLUCOSE SERPL-MCNC: 177 MG/DL
PHOSPHATE SERPL-MCNC: 3.7 MG/DL
POTASSIUM SERPL-SCNC: 4.7 MMOL/L
PTH-INTACT SERPL-MCNC: 87 PG/ML
SODIUM SERPL-SCNC: 137 MMOL/L

## 2017-01-24 PROCEDURE — 36415 COLL VENOUS BLD VENIPUNCTURE: CPT | Mod: PO

## 2017-01-24 PROCEDURE — 80069 RENAL FUNCTION PANEL: CPT

## 2017-01-24 PROCEDURE — 82306 VITAMIN D 25 HYDROXY: CPT

## 2017-01-24 PROCEDURE — 83970 ASSAY OF PARATHORMONE: CPT

## 2017-01-25 ENCOUNTER — TELEPHONE (OUTPATIENT)
Dept: CARDIOLOGY | Facility: CLINIC | Age: 62
End: 2017-01-25

## 2017-01-25 NOTE — TELEPHONE ENCOUNTER
----- Message from Sunita Rosario MA sent at 1/25/2017 12:01 PM CST -----  Contact: self  Pt needs a refill for Pradaxa,cannot refill  until feb.15. with [facility] . pt. Last visit 7-25-16. Please call [phone].

## 2017-01-25 NOTE — TELEPHONE ENCOUNTER
Left a detailed VM for the pt - does he want a prescription called to a local pharmacy ? May have to pay out of pocket if it is too early. Unable to leave a message on his home phone -

## 2017-01-30 RX ORDER — DABIGATRAN ETEXILATE 150 MG/1
150 CAPSULE ORAL 2 TIMES DAILY
Qty: 180 CAPSULE | Refills: 1 | Status: SHIPPED | OUTPATIENT
Start: 2017-01-30 | End: 2017-02-01 | Stop reason: SDUPTHER

## 2017-01-30 NOTE — TELEPHONE ENCOUNTER
----- Message from Mary Villanueva sent at 1/30/2017 10:42 AM CST -----  Contact: pt [phone]  Pt need a refill on his Pradaxa 150 mg  LOV 7/25/16    Thanks

## 2017-02-01 ENCOUNTER — OFFICE VISIT (OUTPATIENT)
Dept: CARDIOLOGY | Facility: CLINIC | Age: 62
End: 2017-02-01
Payer: COMMERCIAL

## 2017-02-01 ENCOUNTER — OFFICE VISIT (OUTPATIENT)
Dept: NEPHROLOGY | Facility: CLINIC | Age: 62
End: 2017-02-01
Payer: COMMERCIAL

## 2017-02-01 VITALS
WEIGHT: 221.56 LBS | DIASTOLIC BLOOD PRESSURE: 70 MMHG | HEIGHT: 70 IN | SYSTOLIC BLOOD PRESSURE: 171 MMHG | HEART RATE: 54 BPM | BODY MASS INDEX: 31.72 KG/M2

## 2017-02-01 VITALS
SYSTOLIC BLOOD PRESSURE: 134 MMHG | BODY MASS INDEX: 31.21 KG/M2 | OXYGEN SATURATION: 99 % | HEIGHT: 70 IN | WEIGHT: 218 LBS | HEART RATE: 47 BPM | DIASTOLIC BLOOD PRESSURE: 80 MMHG

## 2017-02-01 DIAGNOSIS — Z86.73 HISTORY OF STROKE: ICD-10-CM

## 2017-02-01 DIAGNOSIS — R00.1 BRADYCARDIA: ICD-10-CM

## 2017-02-01 DIAGNOSIS — R60.0 BILATERAL LEG EDEMA: ICD-10-CM

## 2017-02-01 DIAGNOSIS — I50.32 CHRONIC DIASTOLIC HEART FAILURE: ICD-10-CM

## 2017-02-01 DIAGNOSIS — N18.30 DIABETES MELLITUS DUE TO UNDERLYING CONDITION WITH STAGE 3 CHRONIC KIDNEY DISEASE, WITH LONG-TERM CURRENT USE OF INSULIN: ICD-10-CM

## 2017-02-01 DIAGNOSIS — N18.30 CKD (CHRONIC KIDNEY DISEASE), STAGE III: Primary | Chronic | ICD-10-CM

## 2017-02-01 DIAGNOSIS — N18.30 CKD (CHRONIC KIDNEY DISEASE), STAGE III: Chronic | ICD-10-CM

## 2017-02-01 DIAGNOSIS — I10 HTN (HYPERTENSION), BENIGN: ICD-10-CM

## 2017-02-01 DIAGNOSIS — E08.22 DIABETES MELLITUS DUE TO UNDERLYING CONDITION WITH STAGE 3 CHRONIC KIDNEY DISEASE, WITH LONG-TERM CURRENT USE OF INSULIN: ICD-10-CM

## 2017-02-01 DIAGNOSIS — Z79.01 CHRONIC ANTICOAGULATION: Chronic | ICD-10-CM

## 2017-02-01 DIAGNOSIS — I48.0 PAROXYSMAL ATRIAL FIBRILLATION: Primary | Chronic | ICD-10-CM

## 2017-02-01 DIAGNOSIS — Z79.4 DIABETES MELLITUS DUE TO UNDERLYING CONDITION WITH STAGE 3 CHRONIC KIDNEY DISEASE, WITH LONG-TERM CURRENT USE OF INSULIN: ICD-10-CM

## 2017-02-01 DIAGNOSIS — R80.9 PROTEINURIA, UNSPECIFIED TYPE: ICD-10-CM

## 2017-02-01 DIAGNOSIS — E11.40 CONTROLLED TYPE 2 DIABETES WITH NEUROPATHY: ICD-10-CM

## 2017-02-01 DIAGNOSIS — E78.00 HYPERCHOLESTEROLEMIA: ICD-10-CM

## 2017-02-01 PROCEDURE — 3075F SYST BP GE 130 - 139MM HG: CPT | Mod: S$GLB,,, | Performed by: INTERNAL MEDICINE

## 2017-02-01 PROCEDURE — 3079F DIAST BP 80-89 MM HG: CPT | Mod: S$GLB,,, | Performed by: INTERNAL MEDICINE

## 2017-02-01 PROCEDURE — 99214 OFFICE O/P EST MOD 30 MIN: CPT | Mod: S$GLB,,, | Performed by: INTERNAL MEDICINE

## 2017-02-01 PROCEDURE — 3066F NEPHROPATHY DOC TX: CPT | Mod: S$GLB,,, | Performed by: INTERNAL MEDICINE

## 2017-02-01 PROCEDURE — 2022F DILAT RTA XM EVC RTNOPTHY: CPT | Mod: S$GLB,,, | Performed by: INTERNAL MEDICINE

## 2017-02-01 PROCEDURE — 3078F DIAST BP <80 MM HG: CPT | Mod: S$GLB,,, | Performed by: INTERNAL MEDICINE

## 2017-02-01 PROCEDURE — 3046F HEMOGLOBIN A1C LEVEL >9.0%: CPT | Mod: S$GLB,,, | Performed by: INTERNAL MEDICINE

## 2017-02-01 PROCEDURE — 3074F SYST BP LT 130 MM HG: CPT | Mod: S$GLB,,, | Performed by: INTERNAL MEDICINE

## 2017-02-01 PROCEDURE — 93000 ELECTROCARDIOGRAM COMPLETE: CPT | Mod: S$GLB,,, | Performed by: INTERNAL MEDICINE

## 2017-02-01 PROCEDURE — 99999 PR PBB SHADOW E&M-EST. PATIENT-LVL III: CPT | Mod: PBBFAC,,, | Performed by: INTERNAL MEDICINE

## 2017-02-01 RX ORDER — DABIGATRAN ETEXILATE 150 MG/1
150 CAPSULE ORAL 2 TIMES DAILY
Qty: 60 CAPSULE | Refills: 1 | Status: SHIPPED | OUTPATIENT
Start: 2017-02-01 | End: 2017-07-13 | Stop reason: ALTCHOICE

## 2017-02-01 NOTE — MR AVS SNAPSHOT
[facility]  [address]  [facility] [address]  Phone: [phone]  Fax: [phone]                  Alfa Christy III   2017 8:00 AM   Office Visit    Description:  Male : 1955   Provider:  Rachel Gold MD   Department:  [facility]                To Do List           Future Appointments        Provider Department Dept Phone    2017 11:00 AM Josue Galan MD [facility] - Cardiology [phone]    2017 4:30 PM MD Vin Bates [facility] Nephrology [phone]    3/8/2017 10:40 AM Tyler Jasmine MD [facility] - Internal Medicine [phone]    4/10/2017 8:00 AM LAB, [facility] [phone]    2017 11:00 AM DIABETES EDUCATOR, INT MED 1 [facility] [phone]      Goals (5 Years of Data)     None      [facility]     [facility] Nurse Care Line -  Assistance  Registered nurses in the [facility] provide clinical advisement, health education, appointment booking, and other advisory services.  Call for this free service at [phone].             Medications           Message regarding Medications     Verify the changes and/or additions to your medication regime listed below are the same as discussed with your clinician today.  If any of these changes or additions are incorrect, please notify your healthcare provider.             Verify that the below list of medications is an accurate representation of the medications you are currently taking.  If none reported, the list may be blank. If incorrect, please contact your healthcare provider. Carry this list with you in case of emergency.           Current Medications     acetaminophen (TYLENOL) 500 MG tablet Take 1 tablet (500 mg total) by mouth every 6 (six) hours as needed for Pain.    atorvastatin (LIPITOR) 80 MG tablet Take 1 tablet (80 mg total) by mouth once daily.    blood sugar diagnostic Strp 1 strip by  "Misc.(Non-Drug; Combo Route) route 3 (three) times daily. One Touch Verio    carvedilol (COREG) 12.5 MG tablet Take 1 tablet (12.5 mg total) by mouth 2 (two) times daily.    coenzyme Q10 200 mg capsule Take 200 mg by mouth once daily.    dabigatran etexilate (PRADAXA) 150 mg Cap Take 1 capsule (150 mg total) by mouth 2 (two) times daily. "Do NOT break, chew, or open capsules."    ERGOCALCIFEROL, VITAMIN D2, (VITAMIN D ORAL) Take 1 tablet by mouth every Mon, Wed, Fri.    EYEBRIGHT ORAL Take 1 tablet by mouth once daily.    folic acid (FOLVITE) 1 MG tablet Take 1 mg by mouth once daily.     furosemide (LASIX) 40 MG tablet Take 1 tablet (40 mg total) by mouth once daily.    insulin glargine (LANTUS SOLOSTAR) 100 unit/mL (3 mL) InPn pen Inject 12 Units into the skin every evening.    insulin lispro (HUMALOG KWIKPEN) 100 unit/mL InPn pen Inject 8 Units into the skin 3 (three) times daily before meals. With sliding scale.    levetiracetam (KEPPRA) 750 MG Tab Take 1 tablet (750 mg total) by mouth 2 (two) times daily.    levothyroxine (SYNTHROID) 200 MCG tablet Take 1 tablet (200 mcg total) by mouth once daily.    losartan (COZAAR) 100 MG tablet Take 1 tablet (100 mg total) by mouth once daily.    SAW/VIT E/SOD JILL/LYC/BETA/PYG (PROSTATE HEALTH ORAL) Take 1 tablet by mouth once daily.            Clinical Reference Information           Vital Signs - Last Recorded  Most recent update: 2/1/2017  8:19 AM by Randell Mejias MA    BP Pulse Ht Wt SpO2 BMI    134/80 (!) 47 5' 10" (1.778 m) 98.9 kg (218 lb) 99% 31.28 kg/m2      Blood Pressure          Most Recent Value    BP  134/80      Allergies as of 2/1/2017     No Known Allergies      Immunizations Administered on Date of Encounter - 2/1/2017     None      "

## 2017-02-01 NOTE — PROGRESS NOTES
Subjective:       Patient ID: Alfa Christy III is a 61 y.o. Black or  male who presents for follow upof CKD.      HPI     Mr. Christy was seen in follow up of CKD today. He was last seen on 11/2/16. He has CKD III.     He has underlying longstanding diabetes, hypertension, recent CVA, paroxysmal atrial fibrillation, chronic anticoagulation use, hyperlipidemia, sickle cell trait, thyroid cancer, hyperlipidemiaen along with CKD III. He admits to prior uncontrolled nature of his diabetes. He also at some point was taking NSAID like Advil. His prior labs were reviewed for baseline creatinine of around 1.7 to 1.9. Per available labs since 2005 to 2007 he has had elevated creatinine with some episodes of JAELYN.     Pt denies any nausea/ vomiting/ diarrhea/ fever. He has stable appetite. He denies any urinary symptoms at present. Noted his heart rate to be in upper 40's during this visit. On recheck it was 49 per minute. Of note he has not taken his morning dose of Coreg yet. Prior clinic visits noted and occasionally he has heart rate in mid to upper 50's but in the range of 40's. Pt and his wife do not recollect if they check his heart rate at home. He denies any dizziness/ fatigue/ syncope.     Labs from 1/24/17 noted for Na 137, K 4.7, bicarbonate 25, BUN 19, creatinine 1.9, eGFR 42.4, Ca 8.5, phos 3.7, albumin 3.3, vit D 30, PTH 87. Urine PC ratio 0.52, 1 RBC.     Review of Systems   Constitutional: Negative for activity change, appetite change, chills, fatigue and fever.   HENT: Negative for hearing loss and nosebleeds.    Eyes: Negative for pain and discharge.   Respiratory: Negative for cough, shortness of breath and wheezing.    Cardiovascular: Negative for chest pain and leg swelling.   Gastrointestinal: Negative for abdominal pain, diarrhea, nausea and vomiting.   Endocrine: Negative for polydipsia and polyuria.   Genitourinary: Negative for decreased urine volume, dysuria, flank pain, frequency  and hematuria.   Musculoskeletal: Negative for back pain and myalgias.   Skin: Negative for pallor and rash.   Allergic/Immunologic: Negative for environmental allergies.   Neurological: Negative for dizziness and headaches.   Psychiatric/Behavioral: Negative for behavioral problems. The patient is not nervous/anxious.        Objective:      Physical Exam   Constitutional: He is oriented to person, place, and time. He appears well-developed and well-nourished. No distress.   HENT:   Head: Normocephalic and atraumatic.   Mouth/Throat: Oropharynx is clear and moist. No oropharyngeal exudate.   Neck: Normal range of motion. Neck supple. No JVD present.   Cardiovascular: Normal rate and normal heart sounds.    Pulmonary/Chest: Effort normal and breath sounds normal. He has no wheezes. He has no rales.   Abdominal: Soft. Bowel sounds are normal. There is no tenderness.   Musculoskeletal: He exhibits no edema.   Neurological: He is alert and oriented to person, place, and time.   Skin: Skin is warm and dry.   Psychiatric: He has a normal mood and affect.   Vitals reviewed.      Assessment:       1. CKD (chronic kidney disease), stage III    2. Diabetes mellitus due to underlying condition with stage 3 chronic kidney disease, with long-term current use of insulin    3. HTN (hypertension), benign    4. Proteinuria, unspecified type      Plan:     Mr. Christy has longstanding diabetes, hypertension, CVA, paroxysmal atrial fibrillation, chronic anticoagulation use, hyperlipidemia, sickle cell trait, thyroid cancer, hyperlipidemia and has CKD III. CKD is most likely due to longstanding and poorly controlled diabetes, likely also hypertension and prior NSAID use. He has mild proteinuria which could be from diabetic nephropathy.     CKD III  - kidney function at baseline for now  - periodically monitor renal function and assess if any rapid decline  - avoid NSAID/ bactrim/ IV contrast/ gadolinium/ aminoglycoside where  possible  - continue to screen for CKD MBD, anemia of CKD, acid base disorder  - continue losartan  - Lobulation of kidney noted on US  - Continue follow up with urology given enlarged prostate as seen on US    CKD MBD  - continue to trend PTH, phos, Ca, vit D    Proteinuria  - see above  - likely diabetic nephropathy and +/- hypertensive glomerulosclerosis  - continue RAAS blockade     Diabetes type 2 with renal manifestations  - management per endocrine  - avoid metformin since creatinine is above 1.4    Hypertension  - see above, strict low salt diet, home BP monitoring    Sickle cell trait, thyroid cancer, hyperlipidemia, atrial fibrillation management per PCP/ other MDs    His heart rate is low. Pt is asymptomatic. He has stable BP. I discussed this with him and his wife. They are directed to see his PCP or Cardiologist for prompt evaluation of this. Clinic staff is getting in touch with cardiology clinic so that he can be seen shortly. Of note he has paroxysmal A fib, has been on Coreg but denies having taken dose this morning. Most recent TSH is in the range of 8, free T4 WNL.     RTC 4 months  Plan, labs, recommendations were discussed with patient, his questions were answered to his satisfaction.

## 2017-02-01 NOTE — MR AVS SNAPSHOT
"    [facility] - Cardiology  [address]  [facility] [address]  Phone: [phone]                  Alfa Christy III   2017 11:00 AM   Office Visit    Description:  Male : 1955   Provider:  Josue Galan MD   Department:  [facility]           Reason for Visit     Bradycardia     Foot Swelling           Diagnoses this Visit        Comments    Paroxysmal atrial fibrillation    -  Primary     Bradycardia         HTN (hypertension), benign         Chronic diastolic heart failure         Bilateral leg edema         Hypercholesterolemia         Controlled type 2 diabetes with neuropathy         CKD (chronic kidney disease), stage III         Chronic anticoagulation         History of stroke                To Do List           Future Appointments        Provider Department Dept Phone    3/8/2017 10:40 AM Tyler Jasmine MD [facility] - Internal Medicine [phone]    4/10/2017 8:00 AM LAB, [facility] [phone]    2017 11:00 AM DIABETES EDUCATOR, INT MED 1 [facility] -  Diabetes Program [phone]    2017 1:30 PM RODNEY Ventura,ANP-C [facility] - Endocrinology [phone]      Goals (5 Years of Data)     None      Follow-Up and Disposition     Return in about 3 months (around 2017).    Follow-up and Disposition History       These Medications        Disp Refills Start End    dabigatran etexilate (PRADAXA) 150 mg Cap 60 capsule 1 2017     Take 1 capsule (150 mg total) by mouth 2 (two) times daily. "Do NOT break, chew, or open capsules." - Oral    Pharmacy: [id number] - [place] [address] Ph #: [phone]         [facility]     [facility] Care [facility] -  Assistance  Registered nurses in the [facility] provide clinical advisement, health education, appointment booking, and other advisory services.  Call for this free service at " [phone].             Medications           Message regarding Medications     Verify the changes and/or additions to your medication regime listed below are the same as discussed with your clinician today.  If any of these changes or additions are incorrect, please notify your healthcare provider.        STOP taking these medications     SAW/VIT E/SOD JILL/LYC/BETA/PYG (PROSTATE HEALTH ORAL) Take 1 tablet by mouth once daily.     EYEBRIGHT ORAL Take 1 tablet by mouth once daily.           Verify that the below list of medications is an accurate representation of the medications you are currently taking.  If none reported, the list may be blank. If incorrect, please contact your healthcare provider. Carry this list with you in case of emergency.           Current Medications     acetaminophen (TYLENOL) 500 MG tablet Take 1 tablet (500 mg total) by mouth every 6 (six) hours as needed for Pain.    atorvastatin (LIPITOR) 80 MG tablet Take 1 tablet (80 mg total) by mouth once daily.    blood sugar diagnostic Strp 1 strip by Misc.(Non-Drug; Combo Route) route 3 (three) times daily. One Touch Verio    carvedilol (COREG) 12.5 MG tablet Take 1 tablet (12.5 mg total) by mouth 2 (two) times daily.    coenzyme Q10 200 mg capsule Take 200 mg by mouth once daily.    ERGOCALCIFEROL, VITAMIN D2, (VITAMIN D ORAL) Take 1 tablet by mouth every Mon, Wed, Fri.    folic acid (FOLVITE) 1 MG tablet Take 1 mg by mouth once daily.     furosemide (LASIX) 40 MG tablet Take 1 tablet (40 mg total) by mouth once daily.    insulin glargine (LANTUS SOLOSTAR) 100 unit/mL (3 mL) InPn pen Inject 12 Units into the skin every evening.    insulin lispro (HUMALOG KWIKPEN) 100 unit/mL InPn pen Inject 8 Units into the skin 3 (three) times daily before meals. With sliding scale.    levetiracetam (KEPPRA) 750 MG Tab Take 1 tablet (750 mg total) by mouth 2 (two) times daily.    levothyroxine (SYNTHROID) 200 MCG tablet Take 1 tablet (200 mcg total) by  "mouth once daily.    losartan (COZAAR) 100 MG tablet Take 1 tablet (100 mg total) by mouth once daily.    dabigatran etexilate (PRADAXA) 150 mg Cap Take 1 capsule (150 mg total) by mouth 2 (two) times daily. "Do NOT break, chew, or open capsules."    multivitamin capsule Take 1 capsule by mouth once daily. Pt taken every mon, wed, fri           Clinical Reference Information           Vital Signs - Last Recorded  Most recent update: 2/1/2017 10:58 AM by July Dowell MA    BP Pulse Ht Wt BMI    (!) 171/70 (BP Location: Left arm, Patient Position: Sitting, BP Method: Automatic) (!) 54 5' 10" (1.778 m) 100.5 kg (221 lb 9 oz) 31.79 kg/m2      Blood Pressure          Most Recent Value    Right Arm BP - Sitting  177/80    Left Arm BP - Sitting  171/70    BP  (!)  171/70      Allergies as of 2/1/2017     No Known Allergies      Immunizations Administered on Date of Encounter - 2/1/2017     None      Orders Placed During Today's Visit     Future Labs/Procedures Expected by Expires    EKG 12-lead  As directed 2/1/2018      Instructions    Suggest daily use of support hose  .Mediteranian diet recommended in addition to the diabetic diet restrictions  Graded exercise for 30 minutes a day at least 5 days a week suggested.       "

## 2017-02-01 NOTE — PATIENT INSTRUCTIONS
Suggest daily use of support hose  .Mediteranian diet recommended in addition to the diabetic diet restrictions  Graded exercise for 30 minutes a day at least 5 days a week suggested.

## 2017-02-10 ENCOUNTER — HOSPITAL ENCOUNTER (EMERGENCY)
Facility: HOSPITAL | Age: 62
Discharge: HOME | End: 2017-02-10
Attending: EMERGENCY MEDICINE
Payer: COMMERCIAL

## 2017-02-10 VITALS
RESPIRATION RATE: 18 BRPM | HEIGHT: 70 IN | OXYGEN SATURATION: 97 % | WEIGHT: 211 LBS | HEART RATE: 42 BPM | SYSTOLIC BLOOD PRESSURE: 165 MMHG | DIASTOLIC BLOOD PRESSURE: 67 MMHG | BODY MASS INDEX: 30.21 KG/M2 | TEMPERATURE: 98 F

## 2017-02-10 DIAGNOSIS — I10 ASYMPTOMATIC HYPERTENSION: Primary | ICD-10-CM

## 2017-02-10 PROCEDURE — 99283 EMERGENCY DEPT VISIT LOW MDM: CPT

## 2017-02-10 PROCEDURE — 99283 EMERGENCY DEPT VISIT LOW MDM: CPT | Mod: ,,, | Performed by: EMERGENCY MEDICINE

## 2017-02-10 RX ORDER — CARVEDILOL 12.5 MG/1
12.5 TABLET ORAL
Status: DISCONTINUED | OUTPATIENT
Start: 2017-02-10 | End: 2017-02-10

## 2017-02-10 RX ORDER — LOSARTAN POTASSIUM 50 MG/1
100 TABLET ORAL
Status: DISCONTINUED | OUTPATIENT
Start: 2017-02-10 | End: 2017-02-10

## 2017-02-10 NOTE — ED NOTES
Pt took his blood pressure today at home on his machine and got high readings. Pt took his home medications and ate breakfast. Hx of stroke in 2016 and seizure in October 2016. Pt took blood pressure around 11:15am. Pt denies any symptoms. Denies dizziness, weakness, headache, or fatigue

## 2017-02-10 NOTE — DISCHARGE INSTRUCTIONS
Monitor blood pressure daily, keep log for primary care doctor   Return to ED if you develop symptoms  like headache, visual changes or chest pain        Discharge Instructions: Taking Your Blood Pressure  Blood pressure is the force of blood as it moves from the heart through the blood vessels. You can take your own blood pressure reading using a digital monitor. Take readings as often as your healthcare provider instructs. Take your readings each time in the same way, using the same arm. Here are guidelines for taking your blood pressure.  The American Heart Association (AHA) recommends purchasing a blood pressure monitor that is validated and approved by the Association for the Advancement of Medical Instrumentation, the [place] Hypertension Society, and the International Protocol for the Validation of Automated BP Measuring Devices. If the blood pressure monitor is for a senior adult, a pregnant woman, or a child, make certain it is validated for use with such a population. For the most reliable readings, the AHA recommends an automatic, cuff-style, upper arm (bicep) monitor. The readings from finger and wrist monitors are not as reliable as the upper arm monitor.        Step 1. Relax    · Wait at least a half hour after smoking, eating, or exercising. Do not drink coffee, tea, soda, or other caffeinated beverages before checking your blood pressure.   · Sit comfortably at a table. Place the monitor near you.  · Rest for a few minutes before you begin.        Step 2. Wrap the cuff    · Place your arm on the table, palm up. Put your arm in a position that is level with your heart. Wrap the cuff around your upper arm, about an inch above your elbow. Its best to wrap the cuff on bare skin, not over clothing.  · Make sure your cuff fits. If it doesnt wrap around your upper arm, order a larger cuff. A cuff that is too large or too small can result in an inaccurate blood pressure reading.           Step 3. Inflate  the cuff    · Pump the cuff until the scale reads 200. If you have a self-inflating cuff, push the button that starts the pump.  · The cuff will tighten, then loosen.  · The numbers will change. When they stop changing, your blood pressure reading will appear.  · If you get a reading that is too high or too low for you, relax for a few minutes. Then do the test again.    Step 4. Write down the results  · Write down your blood pressure numbers. Trell the date and time. Keep your results in one place, such as a notebook.  · Remove the cuff from your arm. Turn off the machine.  · Take the readings with you to your medical appointments.  · If you start a new blood pressure medicine, or change a blood pressure medicine dose, note the day you started the new drug or dosage on your blood pressure recording sheet. This will help your healthcare provider monitor the effect of medication changes.     Date Last Reviewed: 4/27/2016  © 4503-0511 [employer]. [address] [place] [address], [place] [address]. All rights reserved. This information is not intended as a substitute for professional medical care. Always follow your healthcare professional's instructions.

## 2017-02-10 NOTE — ED NOTES
Spoke with Dr. Ramos regarding patient's repeat BP of 165/67. Cancelled BP meds. HR checked radially, 42 BPM.  MD aware. No new orders.

## 2017-02-10 NOTE — ED PROVIDER NOTES
Encounter Date: 2/10/2017    SCRIBE #1 NOTE: I, Duncan Campbell, am scribing for, and in the presence of,  Mer Ramos MD. I have scribed the entire note.       History     Chief Complaint   Patient presents with    Hypertension     elevated this AM. On BP medications. Denies any current complaints. Took meds this AM     Review of patient's allergies indicates:  No Known Allergies  HPI Comments: Time seen by provider: 12:03 PM    This is a 61 y.o. Male with PMHx of HTN who presents with complaint of elevated BP reading this morning. Pt states he checks his pressure every morning. Yesterday it was around 127/60 and typically runs in the 140-145/60-70 range. Pt is currently asymptomatic denying any fever, chills, HA, CP, SOB, blurry vision, dizziness, light headedness or any other pain. Pt felt fine when going to sleep last night. He has been trying to diet appropriately to manage his weight. Reports compliance with his BP medication at home including this morning. Pt not on dialysis. No further complaints or concerns at this time.       The history is provided by the patient, the spouse and medical records.     Past Medical History   Diagnosis Date    Allergy     BMI 31.0-31.9,adult 11/25/2014    Cerebrovascular disease 7/25/2016    Chronic anticoagulation - pradaxa 11/10/2016    Chronic diastolic heart failure 11/20/2016    CKD (chronic kidney disease), stage III 9/23/2013    Controlled type 2 diabetes with neuropathy 12/16/2014    Diabetes mellitus due to underlying condition with stage 3 chronic kidney disease, without long-term current use of insulin 11/2/2016    Elevated alkaline phosphatase level 8/1/2012    Elevated PSA      negative prostate biopsy 4/11    Erectile dysfunction associated with type 2 diabetes mellitus     Gallstones 3/20/2013    Generalized tonic-clonic seizure 11/2016    HTN (hypertension), benign 8/1/2012    Hypercholesterolemia 8/1/2012    Microcytic anemia 11/27/2013     Paroxysmal atrial fibrillation 9/23/2013    Postsurgical hypothyroidism 11/27/2013    Right homonymous hemianopsia 2/5/2016    Sickle cell trait     Stroke 1/27/2016    Thyroid cancer      multifocal with six lesions, largest 5mm, treated with surgery and radioactive iodine    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/2016     Past Medical History Pertinent Negatives   Diagnosis Date Noted    Arthritis 11/26/2013    Asthma 7/3/2012    Blood transfusion 11/26/2013    Colon polyp 7/3/2012     Past Surgical History   Procedure Laterality Date    Thyroid surgery  8/26/09    Prostate biopsy  4/27/11    Cyst removal       chest    Colonoscopy      Vascular surgery      Skin biopsy      Breast surgery       cyst removal     Family History   Problem Relation Age of Onset    Heart disease Father     Diabetes Father     Hypertension Father     Diabetes Mother     Hypertension Mother     Heart disease Mother     Diabetes Brother     Cancer Sister     Thyroid disease Maternal Aunt     Clotting disorder Neg Hx      Social History   Substance Use Topics    Smoking status: Never Smoker    Smokeless tobacco: Never Used    Alcohol use 0.6 oz/week     1 Shots of liquor per week      Comment: occasional     Review of Systems   Constitutional: Negative for chills and fever.   HENT: Negative for sore throat.    Eyes: Negative for visual disturbance (including blurry vision).   Respiratory: Negative for shortness of breath.    Cardiovascular: Negative for chest pain.   Gastrointestinal: Negative for nausea.   Genitourinary: Negative for dysuria.   Musculoskeletal: Negative for back pain.   Skin: Negative for rash.   Neurological: Negative for dizziness, light-headedness, numbness and headaches.   Hematological: Does not bruise/bleed easily.       Physical Exam   Initial Vitals   BP Pulse Resp Temp SpO2   02/10/17 1144 02/10/17 1144 02/10/17 1144 02/10/17 1144 02/10/17 1144   182/81 72 18 98 °F (36.7 °C) 97 %      Physical Exam    Nursing note and vitals reviewed.  Constitutional: He appears well-developed and well-nourished. He is not diaphoretic. No distress.   HENT:   Head: Normocephalic and atraumatic.   Mouth/Throat: Oropharynx is clear and moist.   Eyes: Conjunctivae are normal.   Neck: Normal range of motion.   Cardiovascular: Normal rate, regular rhythm and normal heart sounds.   No murmur heard.  Pulmonary/Chest: Breath sounds normal. No respiratory distress. He has no wheezes. He has no rales.   Abdominal: He exhibits no distension.   Musculoskeletal: Normal range of motion.   Neurological: He is alert and oriented to person, place, and time. He has normal strength. No cranial nerve deficit or sensory deficit.   Skin: Skin is warm.         ED Course   Procedures  Labs Reviewed - No data to display          Medical Decision Making:   History:   Old Medical Records: I decided to obtain old medical records.  Initial Assessment:   Pt presents for emergent evaluation of asymptomatic hypertension. Pt does have multiple comorbidities but has no sx at this time. He had one elevated BP reading at home this morning.             Scribe Attestation:   Scribe #1: I performed the above scribed service and the documentation accurately describes the services I performed. I attest to the accuracy of the note.    Attending Attestation:           Physician Attestation for Scribe:  Physician Attestation Statement for Scribe #1: I, Mer Ramos MD, reviewed documentation, as scribed by Duncan Campbell in my presence, and it is both accurate and complete.         Attending ED Notes:   12:50 PM: Pt's initial BP reading was slightly elevated in the ER, but repeat pressure was normal. Advised pt and wife to return if he becomes asymptomatic. Otherwise he should continue BP log and f/u with primary care physician.           ED Course     Clinical Impression:   The encounter diagnosis was Asymptomatic hypertension.    Disposition:    Disposition: Discharged  Condition: Stable       Mer Ramos MD  02/10/17 [id number]

## 2017-02-10 NOTE — ED AVS SNAPSHOT
[facility]  [address]  [facility] [address]               Alfa Christy III   2/10/2017 11:53 AM   ED    Description:  Male : 1955   Department:  [facility]           Your Care was Coordinated By:     Provider Role From To    Mer Ramos MD Attending Provider 02/10/17 1154 --      Reason for Visit     Hypertension           Diagnoses this Visit        Comments    Asymptomatic hypertension    -  Primary       ED Disposition     None           To Do List           Follow-up Information     Schedule an appointment as soon as possible for a visit with Tyler Jasmine MD.    Specialty:  Internal Medicine    Why:  For re-evaluation of your symptoms    Contact information:    [address]  [facility] [address]  [phone]        [facility]     [facility] -  Assistance  Registered nurses in the [facility] provide clinical advisement, health education, appointment booking, and other advisory services.  Call for this free service at [phone].             Medications           Message regarding Medications     Verify the changes and/or additions to your medication regime listed below are the same as discussed with your clinician today.  If any of these changes or additions are incorrect, please notify your healthcare provider.             Verify that the below list of medications is an accurate representation of the medications you are currently taking.  If none reported, the list may be blank. If incorrect, please contact your healthcare provider. Carry this list with you in case of emergency.           Current Medications     acetaminophen (TYLENOL) 500 MG tablet Take 1 tablet (500 mg total) by mouth every 6 (six) hours as needed for Pain.    atorvastatin (LIPITOR) 80 MG tablet Take 1 tablet (80 mg total) by mouth once daily.    blood sugar diagnostic Strp 1 strip by Misc.(Non-Drug; Combo  "Route) route 3 (three) times daily. One Touch Verio    carvedilol (COREG) 12.5 MG tablet Take 1 tablet (12.5 mg total) by mouth 2 (two) times daily.    coenzyme Q10 200 mg capsule Take 200 mg by mouth once daily.    dabigatran etexilate (PRADAXA) 150 mg Cap Take 1 capsule (150 mg total) by mouth 2 (two) times daily. "Do NOT break, chew, or open capsules."    ERGOCALCIFEROL, VITAMIN D2, (VITAMIN D ORAL) Take 1 tablet by mouth every Mon, Wed, Fri.    folic acid (FOLVITE) 1 MG tablet Take 1 mg by mouth once daily.     furosemide (LASIX) 40 MG tablet Take 1 tablet (40 mg total) by mouth once daily.    insulin glargine (LANTUS SOLOSTAR) 100 unit/mL (3 mL) InPn pen Inject 12 Units into the skin every evening.    insulin lispro (HUMALOG KWIKPEN) 100 unit/mL InPn pen Inject 8 Units into the skin 3 (three) times daily before meals. With sliding scale.    levetiracetam (KEPPRA) 750 MG Tab Take 1 tablet (750 mg total) by mouth 2 (two) times daily.    levothyroxine (SYNTHROID) 200 MCG tablet Take 1 tablet (200 mcg total) by mouth once daily.    losartan (COZAAR) 100 MG tablet Take 1 tablet (100 mg total) by mouth once daily.    multivitamin capsule Take 1 capsule by mouth once daily. Pt taken every mon, wed, fri           Clinical Reference Information           Your Vitals Were     BP Pulse Temp Resp Height Weight    165/67 42 98 °F (36.7 °C) (Oral) 18 5' 10" (1.778 m) 95.7 kg (211 lb)    SpO2 BMI             97% 30.28 kg/m2         Allergies as of 2/10/2017     No Known Allergies      Immunizations Administered on Date of Encounter - 2/10/2017     None      ED Micro, Lab, POCT     None      ED Imaging Orders     None        Discharge Instructions       Monitor blood pressure daily, keep log for primary care doctor   Return to ED if you develop symptoms  like headache, visual changes or chest pain        Discharge Instructions: Taking Your Blood Pressure  Blood pressure is the force of blood as it moves from the heart through " the blood vessels. You can take your own blood pressure reading using a digital monitor. Take readings as often as your healthcare provider instructs. Take your readings each time in the same way, using the same arm. Here are guidelines for taking your blood pressure.  The American Heart Association (AHA) recommends purchasing a blood pressure monitor that is validated and approved by the Association for the Advancement of Medical Instrumentation, the [place] Hypertension Society, and the International Protocol for the Validation of Automated BP Measuring Devices. If the blood pressure monitor is for a senior adult, a pregnant woman, or a child, make certain it is validated for use with such a population. For the most reliable readings, the AHA recommends an automatic, cuff-style, upper arm (bicep) monitor. The readings from finger and wrist monitors are not as reliable as the upper arm monitor.        Step 1. Relax    · Wait at least a half hour after smoking, eating, or exercising. Do not drink coffee, tea, soda, or other caffeinated beverages before checking your blood pressure.   · Sit comfortably at a table. Place the monitor near you.  · Rest for a few minutes before you begin.        Step 2. Wrap the cuff    · Place your arm on the table, palm up. Put your arm in a position that is level with your heart. Wrap the cuff around your upper arm, about an inch above your elbow. Its best to wrap the cuff on bare skin, not over clothing.  · Make sure your cuff fits. If it doesnt wrap around your upper arm, order a larger cuff. A cuff that is too large or too small can result in an inaccurate blood pressure reading.           Step 3. Inflate the cuff    · Pump the cuff until the scale reads 200. If you have a self-inflating cuff, push the button that starts the pump.  · The cuff will tighten, then loosen.  · The numbers will change. When they stop changing, your blood pressure reading will appear.  · If you get a  reading that is too high or too low for you, relax for a few minutes. Then do the test again.    Step 4. Write down the results  · Write down your blood pressure numbers. Trell the date and time. Keep your results in one place, such as a notebook.  · Remove the cuff from your arm. Turn off the machine.  · Take the readings with you to your medical appointments.  · If you start a new blood pressure medicine, or change a blood pressure medicine dose, note the day you started the new drug or dosage on your blood pressure recording sheet. This will help your healthcare provider monitor the effect of medication changes.     Date Last Reviewed: 4/27/2016 © 2000-2016 [employer]. [address] [place] [address], [place] [address]. All rights reserved. This information is not intended as a substitute for professional medical care. Always follow your healthcare professional's instructions.            Your Scheduled Appointments     Mar 08, 2017 10:40 AM CST   Established Patient Visit with MD [facility] - Internal Medicine ([facility])    [address]  [place] [address]   [phone]            Apr 10, 2017  8:00 AM CDT   Fasting Lab with LAB, [facility] [place] [facility])    [address] [place] [address] [phone]            Apr 17, 2017 11:00 AM CDT   Diabetes Education with DIABETES EDUCATOR, INT MED 1   [facility] IM Diabetes [facility] ([facility])    [address]  [place] [address]   [phone]            Apr 17, 2017  1:30 PM CDT   Established Patient with Navdeep Carroll, APRN,ANP-C   [facility] - Endocrinology ([facility] )    [address] [facility] [address]   [phone]               [facility] complies with applicable Federal civil rights laws and does not discriminate on the basis of race, color, national origin, age, disability, or  sex.        Language Assistance Services     ATTENTION: Language assistance services are available, free of charge. Please call [phone].      ATENCIÓN: Si habla español, tiene a minor disposición servicios gratuitos de asistencia lingüística. Llame al [phone].     CHÚ Ý: N?u b?n nói Ti?ng Vi?t, có các d?ch v? h? tr? ngôn ng? mi?n phí dành cho b?n. G?i s? [phone].

## 2017-03-08 ENCOUNTER — OFFICE VISIT (OUTPATIENT)
Dept: INTERNAL MEDICINE | Facility: CLINIC | Age: 62
End: 2017-03-08
Payer: COMMERCIAL

## 2017-03-08 VITALS
HEIGHT: 70 IN | HEART RATE: 63 BPM | BODY MASS INDEX: 32.22 KG/M2 | TEMPERATURE: 98 F | DIASTOLIC BLOOD PRESSURE: 78 MMHG | SYSTOLIC BLOOD PRESSURE: 125 MMHG | OXYGEN SATURATION: 99 % | WEIGHT: 225.06 LBS

## 2017-03-08 DIAGNOSIS — R60.0 BILATERAL LEG EDEMA: Primary | ICD-10-CM

## 2017-03-08 DIAGNOSIS — E08.22 DIABETES MELLITUS DUE TO UNDERLYING CONDITION WITH STAGE 3 CHRONIC KIDNEY DISEASE, WITH LONG-TERM CURRENT USE OF INSULIN: ICD-10-CM

## 2017-03-08 DIAGNOSIS — I48.0 PAROXYSMAL ATRIAL FIBRILLATION: Chronic | ICD-10-CM

## 2017-03-08 DIAGNOSIS — I10 HTN (HYPERTENSION), BENIGN: ICD-10-CM

## 2017-03-08 DIAGNOSIS — Z85.850 HISTORY OF THYROID CANCER: ICD-10-CM

## 2017-03-08 DIAGNOSIS — N18.30 DIABETES MELLITUS DUE TO UNDERLYING CONDITION WITH STAGE 3 CHRONIC KIDNEY DISEASE, WITH LONG-TERM CURRENT USE OF INSULIN: ICD-10-CM

## 2017-03-08 DIAGNOSIS — Z86.73 HISTORY OF STROKE: ICD-10-CM

## 2017-03-08 DIAGNOSIS — Z79.4 DIABETES MELLITUS DUE TO UNDERLYING CONDITION WITH STAGE 3 CHRONIC KIDNEY DISEASE, WITH LONG-TERM CURRENT USE OF INSULIN: ICD-10-CM

## 2017-03-08 DIAGNOSIS — E89.0 POSTSURGICAL HYPOTHYROIDISM: ICD-10-CM

## 2017-03-08 PROCEDURE — 99999 PR PBB SHADOW E&M-EST. PATIENT-LVL III: CPT | Mod: PBBFAC,,, | Performed by: INTERNAL MEDICINE

## 2017-03-08 PROCEDURE — 3074F SYST BP LT 130 MM HG: CPT | Mod: S$GLB,,, | Performed by: INTERNAL MEDICINE

## 2017-03-08 PROCEDURE — 1160F RVW MEDS BY RX/DR IN RCRD: CPT | Mod: S$GLB,,, | Performed by: INTERNAL MEDICINE

## 2017-03-08 PROCEDURE — 99214 OFFICE O/P EST MOD 30 MIN: CPT | Mod: S$GLB,,, | Performed by: INTERNAL MEDICINE

## 2017-03-08 PROCEDURE — 3078F DIAST BP <80 MM HG: CPT | Mod: S$GLB,,, | Performed by: INTERNAL MEDICINE

## 2017-03-08 NOTE — MR AVS SNAPSHOT
[facility] - Internal Medicine  [address]  [facility] [address]  Phone: [phone]  Fax: [phone]                  Alfa Christy III   3/8/2017 10:40 AM   Office Visit    Description:  Male : 1955   Provider:  Tyler Jasmine MD   Department:  [facility] - Internal Medicine           Reason for Visit     Foot Swelling           Diagnoses this Visit        Comments    Bilateral leg edema    -  Primary            To Do List           Future Appointments        Provider Department Dept Phone    4/10/2017 8:00 AM LAB, [facility] [place] [phone]    2017 11:00 AM DIABETES EDUCATOR, INT MED 1 [facility] Diabetes [facility] [phone]    2017 1:30 PM Navdeep Carroll APRN,ANP-C [facility] Endocrinology [phone]    2017 10:40 AM Tyler Jasmine MD [facility] Internal Medicine [phone]      Goals (5 Years of Data)     None      Follow-Up and Disposition     Return in about 4 months (around 2017) for follow up, coordination of care.      [facility]     [facility] -  Assistance  Registered nurses in the [facility] provide clinical advisement, health education, appointment booking, and other advisory services.  Call for this free service at [phone].             Medications           Message regarding Medications     Verify the changes and/or additions to your medication regime listed below are the same as discussed with your clinician today.  If any of these changes or additions are incorrect, please notify your healthcare provider.             Verify that the below list of medications is an accurate representation of the medications you are currently taking.  If none reported, the list may be blank. If incorrect, please contact your healthcare provider. Carry this list with you in case of emergency.           Current Medications     acetaminophen (TYLENOL) 500 MG tablet Take 1 tablet (500 mg  "total) by mouth every 6 (six) hours as needed for Pain.    atorvastatin (LIPITOR) 80 MG tablet Take 1 tablet (80 mg total) by mouth once daily.    blood sugar diagnostic Strp 1 strip by Misc.(Non-Drug; Combo Route) route 3 (three) times daily. One Touch Verio    carvedilol (COREG) 12.5 MG tablet Take 1 tablet (12.5 mg total) by mouth 2 (two) times daily.    coenzyme Q10 200 mg capsule Take 200 mg by mouth once daily.    dabigatran etexilate (PRADAXA) 150 mg Cap Take 1 capsule (150 mg total) by mouth 2 (two) times daily. "Do NOT break, chew, or open capsules."    ERGOCALCIFEROL, VITAMIN D2, (VITAMIN D ORAL) Take 1 tablet by mouth every Mon, Wed, Fri.    folic acid (FOLVITE) 1 MG tablet Take 1 mg by mouth once daily.     furosemide (LASIX) 40 MG tablet Take 1 tablet (40 mg total) by mouth once daily.    insulin glargine (LANTUS SOLOSTAR) 100 unit/mL (3 mL) InPn pen Inject 12 Units into the skin every evening.    insulin lispro (HUMALOG KWIKPEN) 100 unit/mL InPn pen Inject 8 Units into the skin 3 (three) times daily before meals. With sliding scale.    levetiracetam (KEPPRA) 750 MG Tab Take 1 tablet (750 mg total) by mouth 2 (two) times daily.    levothyroxine (SYNTHROID) 200 MCG tablet Take 1 tablet (200 mcg total) by mouth once daily.    losartan (COZAAR) 100 MG tablet Take 1 tablet (100 mg total) by mouth once daily.    multivitamin capsule Take 1 capsule by mouth once daily. Pt taken every mon, wed, fri           Clinical Reference Information           Your Vitals Were     BP Pulse Temp Height Weight SpO2    125/78 (BP Location: Right arm, Patient Position: Sitting) 63 98.2 °F (36.8 °C) (Oral) 5' 10" (1.778 m) 102.1 kg (225 lb 1.4 oz) 99%    BMI                32.3 kg/m2          Blood Pressure          Most Recent Value    BP  125/78      Allergies as of 3/8/2017     No Known Allergies      Immunizations Administered on Date of Encounter - 3/8/2017     None      Orders Placed During Today's Visit      Normal " Orders This Visit    COMPRESSION STOCKINGS       Language Assistance Services     ATTENTION: Language assistance services are available, free of charge. Please call [phone].      ATENCIÓN: Si habla martyañol, tiene a minor disposición servicios gratuitos de asistencia lingüística. Llame al [phone].     CHÚ Ý: N?u b?n nói Ti?ng Vi?t, có các d?ch v? h? tr? ngôn ng? mi?n phí dành cho b?n. G?i s? [phone].         [facility] complies with applicable Federal civil rights laws and does not discriminate on the basis of race, color, national origin, age, disability, or sex.

## 2017-03-08 NOTE — PROGRESS NOTES
"Subjective:       Patient ID: Alfa Christy III is a 61 y.o. male.    Chief Complaint: Foot Swelling (both)    HPI  62 y/o man with h/o CVA (1/2016), DM2, CKD, HTN, hypothyroidism, paroxysmal atrial fibrillation, chronic diastolic HF, multiple other medical issues here for follow-up.    Primary concern today is foot swelling; this was also noted at his recent visit with Dr Galan and his previous visit with me -- chronic issue, no recent change. He was recommended to use compression stockings regularly; has compression socks at home but isn't wearing them often. Does have reclining chair at home to put feet up.     CVA 1/2016 with seizure 11/2016 - no seizures since last visit. Taking keppra as prescribed.   Feels his balance has improved, able to walk without problem. "I'm not playing basketball or anything, but I feel better."   Continues to have partial loss of vision in R eye.   Due for follow up with Dr Santiago at 6 months.     HFpEF, HTN, paroxysmal atrial fibrillation - had episode of bradycardia recently, now tracking HR at home - this has been in normal range. No dyspnea, chest pain, or palpitations.     DM2 - following with Dr Massey  Fasting -180s, mid-day higher, sometime in low 200s  Has follow up next month with Endocrine.  Follows with Dr Man for eye exam (not [facility] provider) - has this scheduled for next month.     H/o thyroid cancer in 2009, s/p thyroidectomy, with postsurgical hypothyroidism - on synthroid 200mcg daily. No temperature intolerance, diarrhea/constipation, hair/skin changes, tremor, palpitations, or weight change.   Now waiting full hour in between taking thyroid medication and eating.     Review of Systems   Constitutional: Negative for activity change and fever.   HENT: Negative for congestion and sore throat.    Respiratory: Negative for cough and shortness of breath.    Cardiovascular: Positive for leg swelling. Negative for chest pain and palpitations. " "  Gastrointestinal: Negative for abdominal pain, diarrhea and nausea.   Endocrine: Negative for cold intolerance and heat intolerance.   Genitourinary: Negative for decreased urine volume, difficulty urinating and frequency.   Musculoskeletal: Positive for back pain (not currently). Negative for arthralgias and gait problem.   Skin: Negative for rash.   Neurological: Negative for weakness and headaches.        Balance problem - improving   Psychiatric/Behavioral: Negative for dysphoric mood.         Past medical history, surgical history, and family medical history reviewed and updated as appropriate.    Medications and allergies reviewed.     Objective:          Vitals:    03/08/17 1051   BP: 125/78   BP Location: Right arm   Patient Position: Sitting   Pulse: 63   Temp: 98.2 °F (36.8 °C)   TempSrc: Oral   SpO2: 99%   Weight: 102.1 kg (225 lb 1.4 oz)   Height: 5' 10" (1.778 m)     Body mass index is 32.3 kg/(m^2).  Physical Exam   Constitutional: He is oriented to person, place, and time. He appears well-developed and well-nourished. No distress.   HENT:   Head: Normocephalic and atraumatic.   Nose: Nose normal.   Mouth/Throat: Oropharynx is clear and moist.   Eyes: Conjunctivae and EOM are normal. Pupils are equal, round, and reactive to light.   Neck: Normal range of motion. Neck supple.   Cardiovascular: Normal rate, regular rhythm and normal heart sounds.    No murmur heard.  Pulmonary/Chest: Effort normal and breath sounds normal.   Abdominal: Soft. Bowel sounds are normal. He exhibits no distension. There is no tenderness.   Musculoskeletal: Normal range of motion. He exhibits edema (2+ pitting edema to mid-shin bilaterally). He exhibits no tenderness.   Identifies paraspinal muscles in mid-back as location of his usual back pain. +muscle tension, no tenderness.    Lymphadenopathy:     He has no cervical adenopathy.   Neurological: He is alert and oriented to person, place, and time. No cranial nerve deficit. "   R peripheral vision deficit   Skin: Skin is warm and dry.   Loss of hair at lower legs  +hyperpigmentation of skin at anterior shins bilaterally   Psychiatric: He has a normal mood and affect.   Vitals reviewed.      Lab Results   Component Value Date    WBC 11.36 11/07/2016    HGB 11.1 (L) 11/07/2016    HCT 31.0 (L) 11/07/2016     11/07/2016    CHOL 195 11/05/2016    TRIG 99 11/05/2016    HDL 52 11/05/2016    ALT 20 01/06/2017    AST 32 01/06/2017     01/24/2017    K 4.7 01/24/2017     01/24/2017    CREATININE 1.9 (H) 01/24/2017    BUN 19 01/24/2017    CO2 25 01/24/2017    TSH 8.237 (H) 01/06/2017    PSA 7.2 (H) 10/28/2015    INR 1.1 11/05/2016    GLUF 106 11/05/2008    HGBA1C text 07/27/2012       Assessment:       1. Bilateral leg edema    2. History of thyroid cancer    3. Postsurgical hypothyroidism    4. Diabetes mellitus due to underlying condition with stage 3 chronic kidney disease, with long-term current use of insulin    5. HTN (hypertension), benign    6. Paroxysmal atrial fibrillation    7. History of stroke        Plan:   Alfa was seen today for foot swelling.    Diagnoses and all orders for this visit:    Bilateral leg edema - some skin changes consistent with venous stasis. Strongly recommended using compression stockings regularly, get device to help with putting these on if needed. Follow up with cardiology as planned.  -     COMPRESSION STOCKINGS    History of thyroid cancer  Postsurgical hypothyroidism - stable, continue to follow with Endocrine, continue current meds    Diabetes mellitus due to underlying condition with stage 3 chronic kidney disease, with long-term current use of insulin - following with Endocrine, overall stable, no changes to meds today. Reminded him to send in BG logs to Endocrine.  Plan for foot exam next visit.     HTN (hypertension), benign - at goal, continue current meds    Paroxysmal atrial fibrillation - rhythm regular today. On anticoagulation.      History of stroke - on statin, also on pradaxa. Continue to follow with neurology.    Health maintenance reviewed with patient.   He is planning to get eye exam with Dr Man - requested he have this report faxed over to clinic, or bring it with him to next visit.    Return in about 4 months (around 7/8/2017) for follow up, coordination of care.    Tyler Jasmine MD  Internal Medicine  [facility]  3/8/2017

## 2017-03-19 RX ORDER — PEN NEEDLE, DIABETIC 31 GX5/16"
NEEDLE, DISPOSABLE MISCELLANEOUS
Qty: 360 EACH | Refills: 3 | Status: SHIPPED | OUTPATIENT
Start: 2017-03-19 | End: 2018-06-11 | Stop reason: SDUPTHER

## 2017-03-20 PROBLEM — Z85.850 HISTORY OF THYROID CANCER: Status: ACTIVE | Noted: 2017-01-13

## 2017-03-31 ENCOUNTER — OFFICE VISIT (OUTPATIENT)
Dept: FAMILY MEDICINE | Facility: CLINIC | Age: 62
End: 2017-03-31
Payer: COMMERCIAL

## 2017-03-31 ENCOUNTER — HOSPITAL ENCOUNTER (OUTPATIENT)
Dept: RADIOLOGY | Facility: HOSPITAL | Age: 62
Discharge: HOME | End: 2017-03-31
Attending: NURSE PRACTITIONER
Payer: COMMERCIAL

## 2017-03-31 VITALS
BODY MASS INDEX: 31.89 KG/M2 | OXYGEN SATURATION: 97 % | TEMPERATURE: 98 F | DIASTOLIC BLOOD PRESSURE: 78 MMHG | SYSTOLIC BLOOD PRESSURE: 130 MMHG | HEART RATE: 56 BPM | RESPIRATION RATE: 16 BRPM | HEIGHT: 70 IN | WEIGHT: 222.75 LBS

## 2017-03-31 DIAGNOSIS — Z79.4 DIABETES MELLITUS DUE TO UNDERLYING CONDITION WITH STAGE 3 CHRONIC KIDNEY DISEASE, WITH LONG-TERM CURRENT USE OF INSULIN: ICD-10-CM

## 2017-03-31 DIAGNOSIS — J20.9 ACUTE BRONCHITIS, UNSPECIFIED ORGANISM: ICD-10-CM

## 2017-03-31 DIAGNOSIS — R60.9 PITTING EDEMA: ICD-10-CM

## 2017-03-31 DIAGNOSIS — N18.30 DIABETES MELLITUS DUE TO UNDERLYING CONDITION WITH STAGE 3 CHRONIC KIDNEY DISEASE, WITH LONG-TERM CURRENT USE OF INSULIN: ICD-10-CM

## 2017-03-31 DIAGNOSIS — J06.9 UPPER RESPIRATORY TRACT INFECTION, UNSPECIFIED TYPE: ICD-10-CM

## 2017-03-31 DIAGNOSIS — R05.9 COUGH: Primary | ICD-10-CM

## 2017-03-31 DIAGNOSIS — E08.22 DIABETES MELLITUS DUE TO UNDERLYING CONDITION WITH STAGE 3 CHRONIC KIDNEY DISEASE, WITH LONG-TERM CURRENT USE OF INSULIN: ICD-10-CM

## 2017-03-31 DIAGNOSIS — R05.9 COUGH: ICD-10-CM

## 2017-03-31 PROCEDURE — 1160F RVW MEDS BY RX/DR IN RCRD: CPT | Mod: S$GLB,,, | Performed by: NURSE PRACTITIONER

## 2017-03-31 PROCEDURE — 3078F DIAST BP <80 MM HG: CPT | Mod: S$GLB,,, | Performed by: NURSE PRACTITIONER

## 2017-03-31 PROCEDURE — 71020 XR CHEST PA AND LATERAL: CPT | Mod: 26,,, | Performed by: RADIOLOGY

## 2017-03-31 PROCEDURE — 99999 PR PBB SHADOW E&M-EST. PATIENT-LVL V: CPT | Mod: PBBFAC,,, | Performed by: NURSE PRACTITIONER

## 2017-03-31 PROCEDURE — 71020 XR CHEST PA AND LATERAL: CPT | Mod: TC,PO

## 2017-03-31 PROCEDURE — 3075F SYST BP GE 130 - 139MM HG: CPT | Mod: S$GLB,,, | Performed by: NURSE PRACTITIONER

## 2017-03-31 PROCEDURE — 99214 OFFICE O/P EST MOD 30 MIN: CPT | Mod: S$GLB,,, | Performed by: NURSE PRACTITIONER

## 2017-03-31 RX ORDER — DOXYCYCLINE 100 MG/1
100 CAPSULE ORAL EVERY 12 HOURS
Qty: 20 CAPSULE | Refills: 0 | Status: SHIPPED | OUTPATIENT
Start: 2017-03-31 | End: 2017-04-10

## 2017-03-31 RX ORDER — PROMETHAZINE HYDROCHLORIDE AND DEXTROMETHORPHAN HYDROBROMIDE 6.25; 15 MG/5ML; MG/5ML
5 SYRUP ORAL
Qty: 180 ML | Refills: 0 | Status: SHIPPED | OUTPATIENT
Start: 2017-03-31 | End: 2017-04-02

## 2017-03-31 NOTE — PATIENT INSTRUCTIONS
Bronchitis, Antibiotic Treatment (Adult)    Bronchitis is an infection of the air passages (bronchial tubes) in your lungs. It often occurs when you have a cold. This illness is contagious during the first few days and is spread through the air by coughing and sneezing, or by direct contact (touching the sick person and then touching your own eyes, nose, or mouth).  Symptoms of bronchitis include cough with mucus (phlegm) and low-grade fever. Bronchitis usually lasts 7 to 14 days. Mild cases can be treated with simple home remedies. More severe infection is treated with an antibiotic.  Home care  Follow these guidelines when caring for yourself at home:  · If your symptoms are severe, rest at home for the first 2 to 3 days. When you go back to your usual activities, don't let yourself get too tired.  · Do not smoke. Also avoid being exposed to secondhand smoke.  · You may use over-the-counter medicines to control fever or pain, unless another medicine was prescribed. (Note: If you have chronic liver or kidney disease or have ever had a stomach ulcer or gastrointestinal bleeding, talk with your healthcare provider before using these medicines. Also talk to your provider if you are taking medicine to prevent blood clots.) Aspirin should never be given to anyone younger than 18 years of age who is ill with a viral infection or fever. It may cause severe liver or brain damage.  · Your appetite may be poor, so a light diet is fine. Avoid dehydration by drinking 6 to 8 glasses of fluids per day (such as water, soft drinks, sports drinks, juices, tea, or soup). Extra fluids will help loosen secretions in the nose and lungs.  · Over-the-counter cough, cold, and sore-throat medicines will not shorten the length of the illness, but they may be helpful to reduce symptoms. (Note: Do not use decongestants if you have high blood pressure.)  · Finish all antibiotic medicine. Do this even if you are feeling better after only a  few days.  Follow-up care  Follow up with your healthcare provider, or as advised. If you had an X-ray or ECG (electrocardiogram), a specialist will review it. You will be notified of any new findings that may affect your care.  Note: If you are age 65 or older, or if you have a chronic lung disease or condition that affects your immune system, or you smoke, talk to your healthcare provider about having pneumococcal vaccinations and a yearly influenza vaccination (flu shot).  When to seek medical advice  Call your healthcare provider right away if any of these occur:  · Fever of 100.4°F (38°C) or higher  · Coughing up increased amounts of colored sputum  · Weakness, drowsiness, headache, facial pain, ear pain, or a stiff neck  Call 911, or get immediate medical care  Contact emergency services right away if any of these occur.  · Coughing up blood  · Worsening weakness, drowsiness, headache, or stiff neck  · Trouble breathing, wheezing, or pain with breathing  Date Last Reviewed: 9/13/2015 © 2000-2016 [employer]. [address] [place] [address], [place] [address]. All rights reserved. This information is not intended as a substitute for professional medical care. Always follow your healthcare professional's instructions.

## 2017-03-31 NOTE — PROGRESS NOTES
"Subjective:       Patient ID: Alfa Christy III is a 61 y.o. male.    Chief Complaint: Cough (x's 1 week) and Nasal Congestion (x's 1 week)    Cough   This is a new problem. Episode onset: 1 week. The problem has been gradually worsening. The problem occurs every few hours. The cough is productive of brown sputum, productive of purulent sputum and productive of blood-tinged sputum. Associated symptoms include chills, nasal congestion, postnasal drip and rhinorrhea. Pertinent negatives include no chest pain, ear congestion, ear pain, eye redness, fever, headaches, heartburn, hemoptysis, myalgias, rash, sore throat, shortness of breath, sweats, weight loss or wheezing. The symptoms are aggravated by lying down. Treatments tried: cod liver oil. The treatment provided mild relief.     Review of Systems   Constitutional: Positive for chills and fatigue. Negative for activity change, appetite change, fever and weight loss.   HENT: Positive for congestion, postnasal drip, rhinorrhea and sneezing. Negative for ear pain, hearing loss, sinus pressure, sore throat and voice change.    Eyes: Negative for photophobia, pain, redness and itching.   Respiratory: Positive for cough. Negative for hemoptysis, chest tightness, shortness of breath and wheezing.    Cardiovascular: Positive for leg swelling. Negative for chest pain and palpitations.   Gastrointestinal: Negative for abdominal pain, diarrhea, heartburn, nausea and vomiting.   Genitourinary: Negative for dysuria.   Musculoskeletal: Negative for myalgias and neck pain.   Skin: Negative for rash.   Neurological: Negative for dizziness, light-headedness, numbness and headaches.       Objective:       /78 (BP Location: Left arm, Patient Position: Sitting, BP Method: Manual)  Pulse (!) 56  Temp 98.3 °F (36.8 °C) (Oral)   Resp 16  Ht 5' 10" (1.778 m)  Wt 101.1 kg (222 lb 12.4 oz)  SpO2 97%  BMI 31.96 kg/m2    Physical Exam   Constitutional: He is oriented to person, " place, and time. Vital signs are normal. He appears well-developed and well-nourished.   HENT:   Head: Normocephalic and atraumatic.   Right Ear: Tympanic membrane, external ear and ear canal normal.   Left Ear: Tympanic membrane, external ear and ear canal normal.   Nose: Mucosal edema and rhinorrhea present. Right sinus exhibits no maxillary sinus tenderness and no frontal sinus tenderness. Left sinus exhibits no maxillary sinus tenderness and no frontal sinus tenderness.   Mouth/Throat: Uvula is midline, oropharynx is clear and moist and mucous membranes are normal. No oropharyngeal exudate or posterior oropharyngeal erythema.   Eyes: Conjunctivae are normal. Pupils are equal, round, and reactive to light.   Cardiovascular: Normal rate, regular rhythm and normal heart sounds.    Pulmonary/Chest: Effort normal. No respiratory distress. He has no decreased breath sounds. He has no wheezes. He has rhonchi. He has no rales.   Musculoskeletal: Normal range of motion.        Right lower leg: He exhibits edema (pitting edema). He exhibits no tenderness, no bony tenderness, no swelling, no deformity and no laceration.        Left lower leg: He exhibits edema (pitting edema). He exhibits no swelling, no deformity and no laceration.   Lymphadenopathy:     He has cervical adenopathy.   Neurological: He is alert and oriented to person, place, and time.   Skin: Skin is warm, dry and intact. No rash noted.   Psychiatric: He has a normal mood and affect. His behavior is normal.   Nursing note and vitals reviewed.      Assessment:       1. Cough    2. Pitting edema    3. Acute bronchitis, unspecified organism    4. Upper respiratory tract infection, unspecified type    5. Diabetes mellitus due to underlying condition with stage 3 chronic kidney disease, with long-term current use of insulin      Plan:       Alfa was seen today for cough and nasal congestion.    Diagnoses and all orders for this visit:    Cough  -     X-Ray Chest  PA And Lateral; Future  -     doxycycline (VIBRAMYCIN) 100 MG Cap; Take 1 capsule (100 mg total) by mouth every 12 (twelve) hours.  -     promethazine-dextromethorphan (PROMETHAZINE-DM) 6.25-15 mg/5 mL Syrp; Take 5 mLs by mouth every 4 to 6 hours as needed.    Pitting edema  -     X-Ray Chest PA And Lateral; Future    Acute bronchitis, unspecified organism  -     doxycycline (VIBRAMYCIN) 100 MG Cap; Take 1 capsule (100 mg total) by mouth every 12 (twelve) hours.  -     promethazine-dextromethorphan (PROMETHAZINE-DM) 6.25-15 mg/5 mL Syrp; Take 5 mLs by mouth every 4 to 6 hours as needed.    Upper respiratory tract infection, unspecified type  -     doxycycline (VIBRAMYCIN) 100 MG Cap; Take 1 capsule (100 mg total) by mouth every 12 (twelve) hours.  -     promethazine-dextromethorphan (PROMETHAZINE-DM) 6.25-15 mg/5 mL Syrp; Take 5 mLs by mouth every 4 to 6 hours as needed.    Diabetes mellitus due to underlying condition with stage 3 chronic kidney disease, with long-term current use of insulin  Monitor your blood glucose and call if your glucose is remaining elevated above 250.   Continue current medications.     Pt has been given instructions populated from [employer] database and has verbalized understanding of the after visit summary and information contained wherein.     Follow up with a primary care provider. May go to ER for acute shortness of breath, lightheadedness, fever, or any other emergent complaints or changes in condition.    Return if symptoms worsen or fail to improve.

## 2017-03-31 NOTE — MEDICAL/APP STUDENT
"Subjective:       Patient ID: Alfa Christy III is a 61 y.o. male.    Chief Complaint: Cough (x's 1 week) and Nasal Congestion (x's 1 week)    Cough   This is a new problem. Episode onset: 1 week. The problem has been gradually worsening. The problem occurs every few hours. The cough is productive of brown sputum, productive of purulent sputum and productive of blood-tinged sputum. Associated symptoms include chills, nasal congestion, postnasal drip and rhinorrhea. Pertinent negatives include no chest pain, ear congestion, ear pain, eye redness, fever, headaches, heartburn, hemoptysis, myalgias, rash, sore throat, shortness of breath, sweats, weight loss or wheezing. The symptoms are aggravated by lying down. Treatments tried: cod liver oil. The treatment provided mild relief.     Review of Systems   Constitutional: Positive for chills and fatigue. Negative for activity change, appetite change, fever and weight loss.   HENT: Positive for congestion, postnasal drip, rhinorrhea and sneezing. Negative for ear pain, hearing loss, sinus pressure, sore throat and voice change.    Eyes: Negative for photophobia, pain, redness and itching.   Respiratory: Positive for cough. Negative for hemoptysis, chest tightness, shortness of breath and wheezing.    Cardiovascular: Positive for leg swelling. Negative for chest pain and palpitations.   Gastrointestinal: Negative for abdominal pain, diarrhea, heartburn, nausea and vomiting.   Genitourinary: Negative for dysuria.   Musculoskeletal: Negative for myalgias and neck pain.   Skin: Negative for rash.   Neurological: Negative for dizziness, light-headedness, numbness and headaches.       Objective:       /78 (BP Location: Left arm, Patient Position: Sitting, BP Method: Manual)  Pulse (!) 56  Temp 98.3 °F (36.8 °C) (Oral)   Resp 16  Ht 5' 10" (1.778 m)  Wt 101.1 kg (222 lb 12.4 oz)  SpO2 97%  BMI 31.96 kg/m2    Physical Exam   Constitutional: He is oriented to person, " place, and time. Vital signs are normal. He appears well-developed and well-nourished.   HENT:   Head: Normocephalic and atraumatic.   Right Ear: Tympanic membrane, external ear and ear canal normal.   Left Ear: Tympanic membrane, external ear and ear canal normal.   Nose: Mucosal edema and rhinorrhea present. Right sinus exhibits no maxillary sinus tenderness and no frontal sinus tenderness. Left sinus exhibits no maxillary sinus tenderness and no frontal sinus tenderness.   Mouth/Throat: Uvula is midline, oropharynx is clear and moist and mucous membranes are normal. No oropharyngeal exudate or posterior oropharyngeal erythema.   Eyes: Conjunctivae are normal. Pupils are equal, round, and reactive to light.   Cardiovascular: Normal rate, regular rhythm and normal heart sounds.    Pulmonary/Chest: Effort normal. No respiratory distress. He has no decreased breath sounds. He has no wheezes. He has rhonchi. He has no rales.   Musculoskeletal: Normal range of motion.        Right lower leg: He exhibits edema (pitting edema). He exhibits no tenderness, no bony tenderness, no swelling, no deformity and no laceration.        Left lower leg: He exhibits edema (pitting edema). He exhibits no swelling, no deformity and no laceration.   Lymphadenopathy:     He has cervical adenopathy.   Neurological: He is alert and oriented to person, place, and time.   Skin: Skin is warm, dry and intact. No rash noted.   Psychiatric: He has a normal mood and affect. His behavior is normal.   Nursing note and vitals reviewed.      Assessment:       1. Cough    2. Pitting edema    3. Acute bronchitis, unspecified organism    4. Upper respiratory tract infection, unspecified type    5. Diabetes mellitus due to underlying condition with stage 3 chronic kidney disease, with long-term current use of insulin      Plan:       Alfa was seen today for cough and nasal congestion.    Diagnoses and all orders for this visit:    Cough  -     X-Ray Chest  PA And Lateral; Future  -     doxycycline (VIBRAMYCIN) 100 MG Cap; Take 1 capsule (100 mg total) by mouth every 12 (twelve) hours.  -     promethazine-dextromethorphan (PROMETHAZINE-DM) 6.25-15 mg/5 mL Syrp; Take 5 mLs by mouth every 4 to 6 hours as needed.    Pitting edema  -     X-Ray Chest PA And Lateral; Future    Acute bronchitis, unspecified organism  -     doxycycline (VIBRAMYCIN) 100 MG Cap; Take 1 capsule (100 mg total) by mouth every 12 (twelve) hours.  -     promethazine-dextromethorphan (PROMETHAZINE-DM) 6.25-15 mg/5 mL Syrp; Take 5 mLs by mouth every 4 to 6 hours as needed.    Upper respiratory tract infection, unspecified type  -     doxycycline (VIBRAMYCIN) 100 MG Cap; Take 1 capsule (100 mg total) by mouth every 12 (twelve) hours.  -     promethazine-dextromethorphan (PROMETHAZINE-DM) 6.25-15 mg/5 mL Syrp; Take 5 mLs by mouth every 4 to 6 hours as needed.    Diabetes mellitus due to underlying condition with stage 3 chronic kidney disease, with long-term current use of insulin  Monitor your blood glucose and call if your glucose is remaining elevated above 250.   Continue current medications.     Pt has been given instructions populated from [employer] database and has verbalized understanding of the after visit summary and information contained wherein.     Follow up with a primary care provider. May go to ER for acute shortness of breath, lightheadedness, fever, or any other emergent complaints or changes in condition.    Return if symptoms worsen or fail to improve.

## 2017-03-31 NOTE — MR AVS SNAPSHOT
[facility]  [address]  [place] [address]  Phone: [phone]  Fax: [phone]                  Alfa Christy III   3/31/2017 9:30 AM   Office Visit    Description:  Male : 1955   Provider:  Carlie Linder NP   Department:  [facility]           Reason for Visit     Cough     Nasal Congestion           Diagnoses this Visit        Comments    Cough    -  Primary     Pitting edema         Acute bronchitis, unspecified organism         Upper respiratory tract infection, unspecified type         Diabetes mellitus due to underlying condition with stage 3 chronic kidney disease, with long-term current use of insulin                To Do List           Future Appointments        Provider Department Dept Phone    4/10/2017 8:00 AM LAB, [facility] [phone]    2017 11:00 AM DIABETES EDUCATOR, INT MED 1 [facility] - IM Diabetes Program [phone]    2017 1:30 PM RODNEY Ventura,ANP-C [facility] - Endocrinology [phone]    2017 10:40 AM MD Vin Patton [facility] - Internal Medicine [phone]      Goals (5 Years of Data)     None      Follow-Up and Disposition     Return if symptoms worsen or fail to improve.       These Medications        Disp Refills Start End    doxycycline (VIBRAMYCIN) 100 MG Cap 20 capsule 0 3/31/2017 4/10/2017    Take 1 capsule (100 mg total) by mouth every 12 (twelve) hours. - Oral    Pharmacy: [facility] [address] AT [facility] Ph #: [phone]       promethazine-dextromethorphan (PROMETHAZINE-DM) 6.25-15 mg/5 mL Syrp 180 mL 0 3/31/2017 2017    Take 5 mLs by mouth every 4 to 6 hours as needed. - Oral    Pharmacy: [facility] [address] [place], [place] - [address] AT [facility] Ph #: [phone]         [facility]     [facility] On Call Nurse Care Line -   "Assistance  Unless otherwise directed by your provider, please contact [facility], our nurse care line that is available for 24/7 assistance.     Registered nurses in the [facility] provide: appointment scheduling, clinical advisement, health education, and other advisory services.  Call: [phone] (toll free)               Medications           Message regarding Medications     Verify the changes and/or additions to your medication regime listed below are the same as discussed with your clinician today.  If any of these changes or additions are incorrect, please notify your healthcare provider.        START taking these NEW medications        Refills    doxycycline (VIBRAMYCIN) 100 MG Cap 0    Sig: Take 1 capsule (100 mg total) by mouth every 12 (twelve) hours.    Class: Normal    Route: Oral    promethazine-dextromethorphan (PROMETHAZINE-DM) 6.25-15 mg/5 mL Syrp 0    Sig: Take 5 mLs by mouth every 4 to 6 hours as needed.    Class: Normal    Route: Oral           Verify that the below list of medications is an accurate representation of the medications you are currently taking.  If none reported, the list may be blank. If incorrect, please contact your healthcare provider. Carry this list with you in case of emergency.           Current Medications     acetaminophen (TYLENOL) 500 MG tablet Take 1 tablet (500 mg total) by mouth every 6 (six) hours as needed for Pain.    atorvastatin (LIPITOR) 80 MG tablet Take 1 tablet (80 mg total) by mouth once daily.    BD INSULIN PEN NEEDLE UF MINI 31 gauge x 3/16" Ndle To use with insulin pens 4  times daily    blood sugar diagnostic Strp 1 strip by Misc.(Non-Drug; Combo Route) route 3 (three) times daily. One Touch Verio    carvedilol (COREG) 12.5 MG tablet Take 1 tablet (12.5 mg total) by mouth 2 (two) times daily.    coenzyme Q10 200 mg capsule Take 200 mg by mouth once daily.    dabigatran etexilate (PRADAXA) 150 mg Cap Take 1 capsule (150 mg total) by " "mouth 2 (two) times daily. "Do NOT break, chew, or open capsules."    ERGOCALCIFEROL, VITAMIN D2, (VITAMIN D ORAL) Take 1 tablet by mouth every Mon, Wed, Fri.    folic acid (FOLVITE) 1 MG tablet Take 1 mg by mouth once daily.     furosemide (LASIX) 40 MG tablet Take 1 tablet (40 mg total) by mouth once daily.    insulin glargine (LANTUS SOLOSTAR) 100 unit/mL (3 mL) InPn pen Inject 12 Units into the skin every evening.    insulin lispro (HUMALOG KWIKPEN) 100 unit/mL InPn pen Inject 8 Units into the skin 3 (three) times daily before meals. With sliding scale.    levetiracetam (KEPPRA) 750 MG Tab Take 1 tablet (750 mg total) by mouth 2 (two) times daily.    levothyroxine (SYNTHROID) 200 MCG tablet Take 1 tablet (200 mcg total) by mouth once daily.    losartan (COZAAR) 100 MG tablet Take 1 tablet (100 mg total) by mouth once daily.    multivitamin capsule Take 1 capsule by mouth once daily. Pt taken every mon, wed, fri    doxycycline (VIBRAMYCIN) 100 MG Cap Take 1 capsule (100 mg total) by mouth every 12 (twelve) hours.    promethazine-dextromethorphan (PROMETHAZINE-DM) 6.25-15 mg/5 mL Syrp Take 5 mLs by mouth every 4 to 6 hours as needed.           Clinical Reference Information           Your Vitals Were     BP Pulse Temp Resp    130/78 (BP Location: Left arm, Patient Position: Sitting, BP Method: Manual) 56 98.3 °F (36.8 °C) (Oral) 16    Height Weight SpO2 BMI    5' 10" (1.778 m) 101.1 kg (222 lb 12.4 oz) 97% 31.96 kg/m2      Blood Pressure          Most Recent Value    BP  130/78      Allergies as of 3/31/2017     No Known Allergies      Immunizations Administered on Date of Encounter - 3/31/2017     None      Orders Placed During Today's Visit     Future Labs/Procedures Expected by Expires    X-Ray Chest PA And Lateral  3/31/2017 3/31/2018      Instructions      Bronchitis, Antibiotic Treatment (Adult)    Bronchitis is an infection of the air passages (bronchial tubes) in your lungs. It often occurs when you have a " cold. This illness is contagious during the first few days and is spread through the air by coughing and sneezing, or by direct contact (touching the sick person and then touching your own eyes, nose, or mouth).  Symptoms of bronchitis include cough with mucus (phlegm) and low-grade fever. Bronchitis usually lasts 7 to 14 days. Mild cases can be treated with simple home remedies. More severe infection is treated with an antibiotic.  Home care  Follow these guidelines when caring for yourself at home:  · If your symptoms are severe, rest at home for the first 2 to 3 days. When you go back to your usual activities, don't let yourself get too tired.  · Do not smoke. Also avoid being exposed to secondhand smoke.  · You may use over-the-counter medicines to control fever or pain, unless another medicine was prescribed. (Note: If you have chronic liver or kidney disease or have ever had a stomach ulcer or gastrointestinal bleeding, talk with your healthcare provider before using these medicines. Also talk to your provider if you are taking medicine to prevent blood clots.) Aspirin should never be given to anyone younger than 18 years of age who is ill with a viral infection or fever. It may cause severe liver or brain damage.  · Your appetite may be poor, so a light diet is fine. Avoid dehydration by drinking 6 to 8 glasses of fluids per day (such as water, soft drinks, sports drinks, juices, tea, or soup). Extra fluids will help loosen secretions in the nose and lungs.  · Over-the-counter cough, cold, and sore-throat medicines will not shorten the length of the illness, but they may be helpful to reduce symptoms. (Note: Do not use decongestants if you have high blood pressure.)  · Finish all antibiotic medicine. Do this even if you are feeling better after only a few days.  Follow-up care  Follow up with your healthcare provider, or as advised. If you had an X-ray or ECG (electrocardiogram), a specialist will review it.  You will be notified of any new findings that may affect your care.  Note: If you are age 65 or older, or if you have a chronic lung disease or condition that affects your immune system, or you smoke, talk to your healthcare provider about having pneumococcal vaccinations and a yearly influenza vaccination (flu shot).  When to seek medical advice  Call your healthcare provider right away if any of these occur:  · Fever of 100.4°F (38°C) or higher  · Coughing up increased amounts of colored sputum  · Weakness, drowsiness, headache, facial pain, ear pain, or a stiff neck  Call 911, or get immediate medical care  Contact emergency services right away if any of these occur.  · Coughing up blood  · Worsening weakness, drowsiness, headache, or stiff neck  · Trouble breathing, wheezing, or pain with breathing  Date Last Reviewed: 9/13/2015  © 5073-4925 [employer]. [address] [place] [address]. All rights reserved. This information is not intended as a substitute for professional medical care. Always follow your healthcare professional's instructions.             Language Assistance Services     ATTENTION: Language assistance services are available, free of charge. Please call [phone].      ATENCIÓN: Si habla español, tiene a minor disposición servicios gratuitos de asistencia lingüística. Llame al [phone].     CHÚ Ý: N?u b?n nói Ti?ng Vi?t, có các d?ch v? h? tr? ngôn ng? mi?n phí dành cho b?n. G?i s? [phone].         [facility] complies with applicable Federal civil rights laws and does not discriminate on the basis of race, color, national origin, age, disability, or sex.

## 2017-04-02 ENCOUNTER — HOSPITAL ENCOUNTER (EMERGENCY)
Facility: HOSPITAL | Age: 62
Discharge: HOME | End: 2017-04-02
Attending: EMERGENCY MEDICINE
Payer: COMMERCIAL

## 2017-04-02 VITALS
OXYGEN SATURATION: 95 % | BODY MASS INDEX: 31.5 KG/M2 | WEIGHT: 220 LBS | SYSTOLIC BLOOD PRESSURE: 135 MMHG | DIASTOLIC BLOOD PRESSURE: 60 MMHG | TEMPERATURE: 99 F | RESPIRATION RATE: 18 BRPM | HEART RATE: 75 BPM | HEIGHT: 70 IN

## 2017-04-02 DIAGNOSIS — R05.9 COUGH: Primary | ICD-10-CM

## 2017-04-02 LAB — POCT GLUCOSE: 203 MG/DL (ref 70–110)

## 2017-04-02 PROCEDURE — 82962 GLUCOSE BLOOD TEST: CPT

## 2017-04-02 PROCEDURE — 99284 EMERGENCY DEPT VISIT MOD MDM: CPT | Mod: 25

## 2017-04-02 PROCEDURE — 93005 ELECTROCARDIOGRAM TRACING: CPT

## 2017-04-02 PROCEDURE — 93010 ELECTROCARDIOGRAM REPORT: CPT | Mod: ,,, | Performed by: INTERNAL MEDICINE

## 2017-04-02 PROCEDURE — 99285 EMERGENCY DEPT VISIT HI MDM: CPT | Mod: ,,, | Performed by: EMERGENCY MEDICINE

## 2017-04-02 RX ORDER — BENZONATATE 100 MG/1
100 CAPSULE ORAL 3 TIMES DAILY PRN
Qty: 20 CAPSULE | Refills: 0 | Status: SHIPPED | OUTPATIENT
Start: 2017-04-02 | End: 2017-04-12

## 2017-04-02 NOTE — DISCHARGE INSTRUCTIONS
Bronchitis, Viral (Adult)    You have a viral bronchitis. Bronchitis is inflammation and swelling of the lining of the lungs. This is often caused by an infection. Symptoms include a dry, hacking cough that is worse at night. The cough may bring up yellow-green mucus. You may also feel short of breath or wheeze. Other symptoms may include tiredness, chest discomfort, and chills.  Bronchitis that is caused by a virus is not treated with antibiotics. Instead, medicines may be given to help relieve symptoms. Symptoms can last up to 2 weeks, although the cough may last much longer.  This illness is contagious during the first few days and is spread through the air by coughing and sneezing, or by direct contact (touching the sick person and then touching your own eyes, nose, or mouth).  Most viral illnesses resolve within 10 to 14 days with rest and simple home remedies, although they may sometimes last for several weeks.  Home care  · If symptoms are severe, rest at home for the first 2 to 3 days. When you go back to your usual activities, don't let yourself get too tired.  · Do not smoke. Also avoid being exposed to secondhand smoke.  · You may use over-the-counter medicine to control fever or pain, unless another pain medicine was prescribed. (Note: If you have chronic liver or kidney disease or have ever had a stomach ulcer or gastrointestinal bleeding, talk with your healthcare provider before using these medicines. Also talk to your provider if you are taking medicine to prevent blood clots.) Aspirin should never be given to anyone younger than 18 years of age who is ill with a viral infection or fever. It may cause severe liver or brain damage.  · Your appetite may be poor, so a light diet is fine. Avoid dehydration by drinking 6 to 8 glasses of fluids per day (such as water, soft drinks, sports drinks, juices, tea, or soup). Extra fluids will help loosen secretions in the nose and lungs.  · Over-the-counter  cough, cold, and sore-throat medicines will not shorten the length of the illness, but they may help to reduce symptoms. (Note: Do not use decongestants if you have high blood pressure.)  Follow-up care  Follow up with your healthcare provider, or as advised. If you had an X-ray or ECG (electrocardiogram), a specialist will review it. You will be notified of any new findings that may affect your care.  Note: If you are age 65 or older, or if you have a chronic lung disease or condition that affects your immune system, or you smoke, talk to your healthcare provider about having pneumococcal vaccinations and a yearly influenza vaccination (flu shot).  When to seek medical advice  Call your healthcare provider right away if any of these occur:  · Fever of 100.4°F (38°C) or higher  · Coughing up increased amounts of colored sputum  · Weakness, drowsiness, headache, facial pain, ear pain, or a stiff neck  Call 911, or get immediate medical care  Contact emergency services right away if any of these occur:  · Coughing up blood  · Worsening weakness, drowsiness, headache, or stiff neck  · Trouble breathing, wheezing, or pain with breathing  Date Last Reviewed: 9/13/2015  © 3713-7266 [employer]. [address] [place] [address], [place] [address]. All rights reserved. This information is not intended as a substitute for professional medical care. Always follow your healthcare professional's instructions.

## 2017-04-02 NOTE — ED PROVIDER NOTES
Encounter Date: 4/2/2017    SCRIBE #1 NOTE: I, Corrine Carty, am scribing for, and in the presence of,  Dr. Carolina. I have scribed the following portions of the note - the Resident attestation.       History     Chief Complaint   Patient presents with    Weakness     pt presents to ed c/o weakness after starting on a new cough medication. caregiver reprots that he was seen at pcp and xrays were done and  pt was dx with bronchitis,.      Review of patient's allergies indicates:  No Known Allergies  HPI Comments: Mr Christy presents with fatigue. He notes he has had a cough recently and was seen at his PCP a few days ago and diagnosed with acute bronchitis. They started him on doxy and a cough syrup. The first day he took the cough syrup, he had some sweating and he felt tired. He slept 12 hours that day. He did not take the cough syrup today and he felt better today. He denies chest pain, GI symptoms. He endorses cough and SOB with the cough.     The history is provided by the patient.     Past Medical History:   Diagnosis Date    Allergy     BMI 31.0-31.9,adult 11/25/2014    Cerebrovascular disease 7/25/2016    Chronic anticoagulation - pradaxa 11/10/2016    Chronic diastolic heart failure 11/20/2016    CKD (chronic kidney disease), stage III 9/23/2013    Controlled type 2 diabetes with neuropathy 12/16/2014    Diabetes mellitus due to underlying condition with stage 3 chronic kidney disease, without long-term current use of insulin 11/2/2016    Elevated alkaline phosphatase level 8/1/2012    Elevated PSA     negative prostate biopsy 4/11    Erectile dysfunction associated with type 2 diabetes mellitus     Gallstones 3/20/2013    Generalized tonic-clonic seizure 11/2016    HTN (hypertension), benign 8/1/2012    Hypercholesterolemia 8/1/2012    Microcytic anemia 11/27/2013    Paroxysmal atrial fibrillation 9/23/2013    Postsurgical hypothyroidism 11/27/2013    Right homonymous hemianopsia 2/5/2016     Sickle cell trait     Stroke 1/27/2016    Thyroid cancer     multifocal with six lesions, largest 5mm, treated with surgery and radioactive iodine    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/2016     Past Surgical History:   Procedure Laterality Date    BREAST SURGERY      cyst removal    COLONOSCOPY      CYST REMOVAL      chest    PROSTATE BIOPSY  4/27/11    SKIN BIOPSY      THYROID SURGERY  8/26/09    VASCULAR SURGERY       Family History   Problem Relation Age of Onset    Heart disease Father     Diabetes Father     Hypertension Father     Diabetes Mother     Hypertension Mother     Heart disease Mother     Diabetes Brother     Cancer Sister     Thyroid disease Maternal Aunt     Clotting disorder Neg Hx      Social History   Substance Use Topics    Smoking status: Never Smoker    Smokeless tobacco: Never Used    Alcohol use No     Review of Systems   Constitutional: Positive for fatigue. Negative for fever.   HENT: Negative for sore throat.    Respiratory: Positive for cough.    Cardiovascular: Negative for chest pain.   Gastrointestinal: Negative for nausea.   Genitourinary: Negative for dysuria.   Musculoskeletal: Negative for back pain.   Skin: Negative for rash.   Neurological: Negative for weakness.   Hematological: Does not bruise/bleed easily.       Physical Exam   Initial Vitals   BP Pulse Resp Temp SpO2   04/02/17 0126 04/02/17 0126 04/02/17 0126 04/02/17 0126 04/02/17 0126   135/60 75 18 99.3 °F (37.4 °C) 95 %     Physical Exam    Vitals reviewed.  Constitutional: Vital signs are normal. He appears well-developed and well-nourished.   HENT:   Head: Normocephalic and atraumatic.   Eyes: EOM are normal. Pupils are equal, round, and reactive to light. No scleral icterus.   Neck: Normal range of motion. Neck supple. No thyromegaly present. No tracheal deviation present.   Cardiovascular: Normal rate and regular rhythm.   Pulmonary/Chest: Breath sounds normal. No respiratory  distress. He has no wheezes. He has no rhonchi. He has no rales.   Abdominal: Soft.   Musculoskeletal: He exhibits no edema.   Neurological: He is alert and oriented to person, place, and time.   Skin: Skin is warm and dry.         ED Course   Procedures  Labs Reviewed   POCT GLUCOSE - Abnormal; Notable for the following:        Result Value    POCT Glucose 203 (*)     All other components within normal limits   POCT GLUCOSE MONITORING CONTINUOUS     EKG Readings: (Independently Interpreted)   Sinus bradycardia. Otherwise normal. Similar to previous from June 2016.           Medical Decision Making:   History:   Old Medical Records: I decided to obtain old medical records.  Independently Interpreted Test(s):   I have ordered and independently interpreted EKG Reading(s) - see prior notes  Clinical Tests:   Lab Tests: Ordered and Reviewed  Medical Tests: Ordered and Reviewed       APC / Resident Notes:   61M with cough and recent dx of acute bronchitis with normal physical exam, slightly elevated POCT glucose and nl EKG. Differential includes viral syndrome v medication reaction. Will DC cough syrup and start tessalon perles.          Attending Attestation:   Physician Attestation Statement for Resident:  As the supervising MD   Physician Attestation Statement: I have personally seen and examined this patient.   I agree with the above history. -:   As the supervising MD I agree with the above PE.    As the supervising MD I agree with the above treatment, course, plan, and disposition.   -: Pt diagnosed with URI a couple of days ago. Prescribed doxycycline and dextromethorphan. After taking dextromethorphan earlier today he states he slept all day and experienced increased weakness.   I have reviewed and agree with the residents interpretation of the following: lab data and EKG.  I have reviewed the following: old records at this facility and EKG reports.                    ED Course     Clinical Impression:   The  encounter diagnosis was Cough.    Disposition:   Disposition: Discharged  Condition: Stable       Jennifer Arreaga MD  Resident  04/02/17 9783

## 2017-04-02 NOTE — ED TRIAGE NOTES
+ Weakness (pt presents to ed c/o weakness after starting on a new cough medication. caregiver reports that he was seen at pcp and xrays were done and pt was dx with bronchitis,. ) - he report loss of vision to right eye following his cva and memory loss with no other deficits

## 2017-04-02 NOTE — ED AVS SNAPSHOT
[facility]  [address]  [facility] [address]               Alfa Christy III   2017  2:58 AM   ED    Description:  Male : 1955   Department:  [facility]           Your Care was Coordinated By:     Provider Role From To    Eric Carolina MD Attending Provider 17 --    Jennifer Arreaga MD Resident 17 --      Reason for Visit     Weakness           Diagnoses this Visit        Comments    Cough    -  Primary       ED Disposition     None           To Do List           Follow-up Information     Follow up with Tyler Jasmine MD. Call in 2 days.    Specialty:  Internal Medicine    Contact information:    [address]  [facility] [address]  [phone]         These Medications        Disp Refills Start End    benzonatate (TESSALON) 100 MG capsule 20 capsule 0 2017    Take 1 capsule (100 mg total) by mouth 3 (three) times daily as needed for Cough. - Oral    Pharmacy: [facility] - [address] #: [phone]         [facility]     [facility] -  Assistance  Unless otherwise directed by your provider, please contact [facility], our nurse care line that is available for  assistance.     Registered nurses in the [facility] provide: appointment scheduling, clinical advisement, health education, and other advisory services.  Call: [phone] (toll free)               Medications           Message regarding Medications     Verify the changes and/or additions to your medication regime listed below are the same as discussed with your clinician today.  If any of these changes or additions are incorrect, please notify your healthcare provider.        START taking these NEW medications        Refills    benzonatate (TESSALON) 100 MG capsule 0    Sig: Take 1 capsule (100 mg total) by mouth 3 (three) times daily as  "needed for Cough.    Class: Print    Route: Oral      STOP taking these medications     promethazine-dextromethorphan (PROMETHAZINE-DM) 6.25-15 mg/5 mL Syrp Take 5 mLs by mouth every 4 to 6 hours as needed.           Verify that the below list of medications is an accurate representation of the medications you are currently taking.  If none reported, the list may be blank. If incorrect, please contact your healthcare provider. Carry this list with you in case of emergency.           Current Medications     doxycycline (VIBRAMYCIN) 100 MG Cap Take 1 capsule (100 mg total) by mouth every 12 (twelve) hours.    acetaminophen (TYLENOL) 500 MG tablet Take 1 tablet (500 mg total) by mouth every 6 (six) hours as needed for Pain.    atorvastatin (LIPITOR) 80 MG tablet Take 1 tablet (80 mg total) by mouth once daily.    BD INSULIN PEN NEEDLE UF MINI 31 gauge x 3/16" Ndle To use with insulin pens 4  times daily    benzonatate (TESSALON) 100 MG capsule Take 1 capsule (100 mg total) by mouth 3 (three) times daily as needed for Cough.    blood sugar diagnostic Strp 1 strip by Misc.(Non-Drug; Combo Route) route 3 (three) times daily. One Touch Verio    carvedilol (COREG) 12.5 MG tablet Take 1 tablet (12.5 mg total) by mouth 2 (two) times daily.    coenzyme Q10 200 mg capsule Take 200 mg by mouth once daily.    dabigatran etexilate (PRADAXA) 150 mg Cap Take 1 capsule (150 mg total) by mouth 2 (two) times daily. "Do NOT break, chew, or open capsules."    ERGOCALCIFEROL, VITAMIN D2, (VITAMIN D ORAL) Take 1 tablet by mouth every Mon, Wed, Fri.    folic acid (FOLVITE) 1 MG tablet Take 1 mg by mouth once daily.     furosemide (LASIX) 40 MG tablet Take 1 tablet (40 mg total) by mouth once daily.    insulin glargine (LANTUS SOLOSTAR) 100 unit/mL (3 mL) InPn pen Inject 12 Units into the skin every evening.    insulin lispro (HUMALOG KWIKPEN) 100 unit/mL InPn pen Inject 8 Units into the skin 3 (three) times daily before meals. With sliding " "scale.    levetiracetam (KEPPRA) 750 MG Tab Take 1 tablet (750 mg total) by mouth 2 (two) times daily.    levothyroxine (SYNTHROID) 200 MCG tablet Take 1 tablet (200 mcg total) by mouth once daily.    losartan (COZAAR) 100 MG tablet Take 1 tablet (100 mg total) by mouth once daily.    multivitamin capsule Take 1 capsule by mouth once daily. Pt taken every mon, wed, fri           Clinical Reference Information           Your Vitals Were     BP Pulse Temp Resp Height Weight    135/60 (BP Location: Left arm, Patient Position: Sitting) 75 99.3 °F (37.4 °C) (Oral) 18 5' 10" (1.778 m) 99.8 kg (220 lb)    SpO2 BMI             95% 31.57 kg/m2         Allergies as of 4/2/2017     No Known Allergies      Immunizations Administered on Date of Encounter - 4/2/2017     None      ED Micro, Lab, POCT     Start Ordered       Status Ordering Provider    04/02/17 0406 04/02/17 0406  POCT glucose  Once      Final result     04/02/17 0327 04/02/17 0326  POCT glucose  Once      Acknowledged       ED Imaging Orders     None        Discharge Instructions         Bronchitis, Viral (Adult)    You have a viral bronchitis. Bronchitis is inflammation and swelling of the lining of the lungs. This is often caused by an infection. Symptoms include a dry, hacking cough that is worse at night. The cough may bring up yellow-green mucus. You may also feel short of breath or wheeze. Other symptoms may include tiredness, chest discomfort, and chills.  Bronchitis that is caused by a virus is not treated with antibiotics. Instead, medicines may be given to help relieve symptoms. Symptoms can last up to 2 weeks, although the cough may last much longer.  This illness is contagious during the first few days and is spread through the air by coughing and sneezing, or by direct contact (touching the sick person and then touching your own eyes, nose, or mouth).  Most viral illnesses resolve within 10 to 14 days with rest and simple home remedies, although they " may sometimes last for several weeks.  Home care  · If symptoms are severe, rest at home for the first 2 to 3 days. When you go back to your usual activities, don't let yourself get too tired.  · Do not smoke. Also avoid being exposed to secondhand smoke.  · You may use over-the-counter medicine to control fever or pain, unless another pain medicine was prescribed. (Note: If you have chronic liver or kidney disease or have ever had a stomach ulcer or gastrointestinal bleeding, talk with your healthcare provider before using these medicines. Also talk to your provider if you are taking medicine to prevent blood clots.) Aspirin should never be given to anyone younger than 18 years of age who is ill with a viral infection or fever. It may cause severe liver or brain damage.  · Your appetite may be poor, so a light diet is fine. Avoid dehydration by drinking 6 to 8 glasses of fluids per day (such as water, soft drinks, sports drinks, juices, tea, or soup). Extra fluids will help loosen secretions in the nose and lungs.  · Over-the-counter cough, cold, and sore-throat medicines will not shorten the length of the illness, but they may help to reduce symptoms. (Note: Do not use decongestants if you have high blood pressure.)  Follow-up care  Follow up with your healthcare provider, or as advised. If you had an X-ray or ECG (electrocardiogram), a specialist will review it. You will be notified of any new findings that may affect your care.  Note: If you are age 65 or older, or if you have a chronic lung disease or condition that affects your immune system, or you smoke, talk to your healthcare provider about having pneumococcal vaccinations and a yearly influenza vaccination (flu shot).  When to seek medical advice  Call your healthcare provider right away if any of these occur:  · Fever of 100.4°F (38°C) or higher  · Coughing up increased amounts of colored sputum  · Weakness, drowsiness, headache, facial pain, ear pain,  or a stiff neck  Call 911, or get immediate medical care  Contact emergency services right away if any of these occur:  · Coughing up blood  · Worsening weakness, drowsiness, headache, or stiff neck  · Trouble breathing, wheezing, or pain with breathing  Date Last Reviewed: 9/13/2015  © 9509-9437 [employer]. [address] [place] [address]. All rights reserved. This information is not intended as a substitute for professional medical care. Always follow your healthcare professional's instructions.          Your Scheduled Appointments     Apr 10, 2017  8:00 AM CDT   Fasting Lab with JENNY CRANDALL   [facility]    [address]  [place] [id number] [phone]            Apr 17, 2017 11:00 AM CDT   Diabetes Education with DIABETES EDUCATOR, INT MED 1   [facility] ([facility])    [address]  [place] [id number] [phone]            Apr 17, 2017  1:30 PM CDT   Established Patient with Navdeep Carroll, APRN,ANP-C   [facility] ([facility] )    [address] [facility] [id number] [phone]            Jul 11, 2017 10:40 AM CDT   Established Patient Visit with MD Vin Patton - Internal Medicine ([facility])    [address]  [place] [id number] [phone]               [facility] complies with applicable Federal civil rights laws and does not discriminate on the basis of race, color, national origin, age, disability, or sex.        Language Assistance Services     ATTENTION: Language assistance services are available, free of charge. Please call [phone].      ATENCIÓN: Si habla español, tiene a minor disposición servicios gratuitos de asistencia lingüística. Llame al [phone].     CHÚ Ý: N?u b?n nói Ti?ng Vi?t, có các d?ch v? h? tr? ngôn ng?  mi?n phí dành cho b?n. G?i s? [phone].

## 2017-04-10 ENCOUNTER — LAB VISIT (OUTPATIENT)
Dept: LAB | Facility: HOSPITAL | Age: 62
End: 2017-04-10
Attending: INTERNAL MEDICINE
Payer: COMMERCIAL

## 2017-04-10 DIAGNOSIS — E78.00 HYPERCHOLESTEROLEMIA: ICD-10-CM

## 2017-04-10 DIAGNOSIS — E08.22 DIABETES MELLITUS DUE TO UNDERLYING CONDITION WITH STAGE 3 CHRONIC KIDNEY DISEASE, WITH LONG-TERM CURRENT USE OF INSULIN: ICD-10-CM

## 2017-04-10 DIAGNOSIS — N18.30 DIABETES MELLITUS DUE TO UNDERLYING CONDITION WITH STAGE 3 CHRONIC KIDNEY DISEASE, WITH LONG-TERM CURRENT USE OF INSULIN: ICD-10-CM

## 2017-04-10 DIAGNOSIS — E89.0 POSTSURGICAL HYPOTHYROIDISM: ICD-10-CM

## 2017-04-10 DIAGNOSIS — Z79.4 DIABETES MELLITUS DUE TO UNDERLYING CONDITION WITH STAGE 3 CHRONIC KIDNEY DISEASE, WITH LONG-TERM CURRENT USE OF INSULIN: ICD-10-CM

## 2017-04-10 LAB
CHOLEST/HDLC SERPL: 3.7 {RATIO}
HDL/CHOLESTEROL RATIO: 27.2 %
HDLC SERPL-MCNC: 25 MG/DL
HDLC SERPL-MCNC: 92 MG/DL
LDLC SERPL CALC-MCNC: 17.4 MG/DL
NONHDLC SERPL-MCNC: 67 MG/DL
TRIGL SERPL-MCNC: 248 MG/DL
TSH SERPL DL<=0.005 MIU/L-ACNC: 1.33 UIU/ML

## 2017-04-10 PROCEDURE — 82985 ASSAY OF GLYCATED PROTEIN: CPT

## 2017-04-10 PROCEDURE — 84443 ASSAY THYROID STIM HORMONE: CPT

## 2017-04-10 PROCEDURE — 80061 LIPID PANEL: CPT

## 2017-04-14 LAB — FRUCTOSAMINE SERPL-SCNC: 290 UMOL /L (ref 0–285)

## 2017-04-17 ENCOUNTER — CLINICAL SUPPORT (OUTPATIENT)
Dept: DIABETES | Facility: CLINIC | Age: 62
End: 2017-04-17
Payer: COMMERCIAL

## 2017-04-17 ENCOUNTER — OFFICE VISIT (OUTPATIENT)
Dept: ENDOCRINOLOGY | Facility: CLINIC | Age: 62
End: 2017-04-17
Payer: COMMERCIAL

## 2017-04-17 VITALS
HEART RATE: 78 BPM | WEIGHT: 224.13 LBS | BODY MASS INDEX: 32.09 KG/M2 | HEIGHT: 70 IN | DIASTOLIC BLOOD PRESSURE: 72 MMHG | SYSTOLIC BLOOD PRESSURE: 146 MMHG

## 2017-04-17 DIAGNOSIS — Z79.4 DIABETES MELLITUS DUE TO UNDERLYING CONDITION WITH STAGE 3 CHRONIC KIDNEY DISEASE, WITH LONG-TERM CURRENT USE OF INSULIN: ICD-10-CM

## 2017-04-17 DIAGNOSIS — I48.0 PAROXYSMAL ATRIAL FIBRILLATION: Chronic | ICD-10-CM

## 2017-04-17 DIAGNOSIS — D57.3 SICKLE CELL TRAIT: ICD-10-CM

## 2017-04-17 DIAGNOSIS — E89.0 POSTSURGICAL HYPOTHYROIDISM: ICD-10-CM

## 2017-04-17 DIAGNOSIS — N18.30 CKD STAGE 3 DUE TO TYPE 2 DIABETES MELLITUS: Primary | ICD-10-CM

## 2017-04-17 DIAGNOSIS — N18.30 DIABETES MELLITUS DUE TO UNDERLYING CONDITION WITH STAGE 3 CHRONIC KIDNEY DISEASE, WITH LONG-TERM CURRENT USE OF INSULIN: ICD-10-CM

## 2017-04-17 DIAGNOSIS — E08.22 DIABETES MELLITUS DUE TO UNDERLYING CONDITION WITH STAGE 3 CHRONIC KIDNEY DISEASE, WITH LONG-TERM CURRENT USE OF INSULIN: ICD-10-CM

## 2017-04-17 DIAGNOSIS — E78.00 HYPERCHOLESTEROLEMIA: ICD-10-CM

## 2017-04-17 DIAGNOSIS — I10 HTN (HYPERTENSION), BENIGN: ICD-10-CM

## 2017-04-17 DIAGNOSIS — E66.9 OBESITY (BMI 30.0-34.9): ICD-10-CM

## 2017-04-17 DIAGNOSIS — E11.22 CKD STAGE 3 DUE TO TYPE 2 DIABETES MELLITUS: Primary | ICD-10-CM

## 2017-04-17 DIAGNOSIS — N52.1 ERECTILE DYSFUNCTION ASSOCIATED WITH TYPE 2 DIABETES MELLITUS: ICD-10-CM

## 2017-04-17 DIAGNOSIS — E11.69 ERECTILE DYSFUNCTION ASSOCIATED WITH TYPE 2 DIABETES MELLITUS: ICD-10-CM

## 2017-04-17 DIAGNOSIS — E11.40 CONTROLLED TYPE 2 DIABETES WITH NEUROPATHY: ICD-10-CM

## 2017-04-17 PROCEDURE — 99999 PR PBB SHADOW E&M-EST. PATIENT-LVL IV: CPT | Mod: PBBFAC,,, | Performed by: NURSE PRACTITIONER

## 2017-04-17 PROCEDURE — 99214 OFFICE O/P EST MOD 30 MIN: CPT | Mod: S$GLB,,, | Performed by: NURSE PRACTITIONER

## 2017-04-17 PROCEDURE — G0108 DIAB MANAGE TRN  PER INDIV: HCPCS | Mod: S$GLB,,, | Performed by: DIETITIAN, REGISTERED

## 2017-04-17 PROCEDURE — 3077F SYST BP >= 140 MM HG: CPT | Mod: S$GLB,,, | Performed by: NURSE PRACTITIONER

## 2017-04-17 PROCEDURE — 1160F RVW MEDS BY RX/DR IN RCRD: CPT | Mod: S$GLB,,, | Performed by: NURSE PRACTITIONER

## 2017-04-17 PROCEDURE — 3066F NEPHROPATHY DOC TX: CPT | Mod: S$GLB,,, | Performed by: NURSE PRACTITIONER

## 2017-04-17 PROCEDURE — 3078F DIAST BP <80 MM HG: CPT | Mod: S$GLB,,, | Performed by: NURSE PRACTITIONER

## 2017-04-17 RX ORDER — INSULIN LISPRO 100 [IU]/ML
INJECTION, SOLUTION INTRAVENOUS; SUBCUTANEOUS
Qty: 1 BOX | Refills: 4
Start: 2017-04-17 | End: 2017-06-28 | Stop reason: SDUPTHER

## 2017-04-17 RX ORDER — INSULIN GLARGINE 100 [IU]/ML
14 INJECTION, SOLUTION SUBCUTANEOUS NIGHTLY
Start: 2017-04-17 | End: 2017-06-28 | Stop reason: SDUPTHER

## 2017-04-17 NOTE — PROGRESS NOTES
"Diabetes Education  Author: Lois Riojas RD  Date: 4/17/2017    Diabetes Education Visit  Diabetes Education Record Assessment/Progress: Post Program/Follow-up    Diabetes Type  Diabetes Type : Type II         Nutrition  Meal Planning: 3 meals per day, snacks between meal, water (sometimes drinking juice d/t "potassium is low," eats 3 meals and controls portions at meals, but each day eats chocolate after lunch around 2 and after dinner around 6)    Monitoring   Self Monitoring : SMBG AC/HS - did not bring log - reports typically around 150-160s  Blood Glucose Logs: No         Current Diabetes Treatment   Current Treatment: Insulin    Social History  Preferred Learning Method: Face to Face, Reading Materials  Primary Support: Self, Spouse  Smoking Status: Never a Smoker      Barriers to Change  Barriers to Change: Functional limitation (residual limitations d/t stroke)  Learning Challenges : None    Readiness to Learn   Readiness to Learn : Acceptance    Cultural Influences  Cultural Influences: No    Diabetes Education Assessment/Progress    Nutrition (Incorporating nutritional management into one's lifestyle): Discussion, Instructed, Written Materials Provided, Individual Session, Requires Assistance (Shows good understanding of portion sizes at meals but does snack on sweets between meals. Describes himself as a "pierre-holic." Discussed choosing mostly 0-5g CHO snacks for between meals d/t insulin regimen and explained pre-prandial readings may be effected d/t eating CHO between meals. Reviewed appropriate snack choices. Pt also reports sometimes feels hungry 2 hours after eating meal such as baked potato and apple juice. Encouraged balancing meal with source of lean protein, appropriate amounts of CHO, non-starchy vegetables and heart-healthy fats. Provided additional [place] sample meal plan. Is drinking juice d/t "low potassium" - discussed many non-starchy vegetables are high in K+ and are much lower in " CHO than juice. Noted pt had elevated potassium level of 5.2 in January - pt states he has since been placed on a potassium-depleting medication and was instructed by MD to incorporate foods containing K+. Repeat potassium level on 1/24/17 was 4.7.)    Physical Activity (incorporating physical activity into one's lifestyle): Discussion, Instructed, Competent (verbalizes/demonstrates), Written Materials Provided, Individual Session (Reviewed reduced insulin resistance and improvement in BG with physical activity as well as goals. )    Medications (states correct name, dose, onset, peak, duration, side effects & timing of meds): Discussion, Instructed, Written Materials Provided, Individual Session, Requires Assistance (Reviewed Humalog and Lantus dosage and timing - per Dr. Massey's last note, pt to take Humalog 10/8/8 + correction scale. Wife identified the 1:50 scale with target 150. However, pt stated he does not add correction if <200. Reviewed appropriate use of correction scale. Pt has appt with endocrine NP later today. )    Monitoring (monitoring blood glucose/other parameters & using results): Discussion, Instructed, Competent (verbalizes/demonstrates), Written Materials Provided, Individual Session (Did not bring meter or log today. Reviewed SMBG AC/HS, goal BG ranges, keeping log and stressed importance of bringing log for NP to review. )    Goal Setting and Problem Solving (verbalizes behavior change strategies & sets realistic goals): Discussion, Individual Session    Behavior Change (developing personal strategies to health & behavior change): Discussion, Individual Session    Goals  Other: % Met  Met Percentage : 100%    Diabetes Self-Management Support Plan  Diabetes Learning: DM websites  Exercise/Nutrition: websites  Review Status: Patient has selected and agrees to support plan.    Diabetes Care Plan/Intervention  Education Plan/Intervention: Other (Discussed options for follow-up - 1:1 appt with  educator or diabetes support group. Pt did not schedule follow-up at this time. Provided contact information to call when ready.)    Diabetes Meal Plan  Carbohydrate Per Meal: 45-60g  Carbohydrate Per Snack :  (mostly 0-5g CHO )    Education Units of Time   Time Spent: 45 min      Health Maintenance Topics with due status: Not Due       Topic Last Completion Date    Colonoscopy 03/03/2015    Hemoglobin A1c 01/06/2017    Lipid Panel 04/10/2017     Health Maintenance Due   Topic Date Due    Eye Exam  07/22/1963    TETANUS VACCINE  07/22/1971    Pneumococcal PPSV23 (High Risk) (1) 01/06/2017    Foot Exam  05/12/2017

## 2017-04-17 NOTE — MR AVS SNAPSHOT
[facility]  [address]  [facility] [address]  Phone: [phone]                  Alfa Christy III   2017 1:30 PM   Office Visit    Description:  Male : 1955   Provider:  RODNEY Ventura,ANP-C   Department:  [facility]           Reason for Visit     Diabetes Mellitus           Diagnoses this Visit        Comments    CKD stage 3 due to type 2 diabetes mellitus    -  Primary     Erectile dysfunction associated with type 2 diabetes mellitus         HTN (hypertension), benign         Hypercholesterolemia         Postsurgical hypothyroidism         Paroxysmal atrial fibrillation         Obesity (BMI 30.0-34.9)         Sickle cell trait         Controlled type 2 diabetes with neuropathy                To Do List           Future Appointments        Provider Department Dept Phone    5/3/2017 10:20 AM Silva Stout NP [facility] - Cardiology [phone]    2017 9:45 AM SPECIMEN, [facility] [phone]    2017 10:00 AM LAB, [facility] [phone]    2017 10:30 AM MD [facility] - Nephrology [phone]    2017 10:40 AM Tyler Jasmine MD [facility] - Internal Medicine [phone]      Goals (5 Years of Data)     None      Follow-Up and Disposition     Return in about 4 months (around 2017).       These Medications        Disp Refills Start End    insulin glargine (LANTUS SOLOSTAR) 100 unit/mL (3 mL) InPn pen   2017    Inject 14 Units into the skin every evening. - Subcutaneous    Pharmacy: [facility] - [address] Ph #: [phone]       insulin lispro (HUMALOG KWIKPEN) 100 unit/mL InPn pen 1 Box 4 2017     12 units with breakfast, 9 units with lunch, 8 units with dinner plus sliding scale.    Pharmacy: [facility] - [address] Ph #: [phone]        "  [facility] On Call     [facility] - 24/7 Assistance  Unless otherwise directed by your provider, please contact [facility], our nurse care line that is available for 24/7 assistance.     Registered nurses in the [facility] provide: appointment scheduling, clinical advisement, health education, and other advisory services.  Call: [phone] (toll free)               Medications           Message regarding Medications     Verify the changes and/or additions to your medication regime listed below are the same as discussed with your clinician today.  If any of these changes or additions are incorrect, please notify your healthcare provider.        CHANGE how you are taking these medications     Start Taking Instead of    insulin glargine (LANTUS SOLOSTAR) 100 unit/mL (3 mL) InPn pen insulin glargine (LANTUS SOLOSTAR) 100 unit/mL (3 mL) InPn pen    Dosage:  Inject 14 Units into the skin every evening. Dosage:  Inject 12 Units into the skin every evening.    Reason for Change:  Reorder     insulin lispro (HUMALOG KWIKPEN) 100 unit/mL InPn pen insulin lispro (HUMALOG KWIKPEN) 100 unit/mL InPn pen    Dosage:  12 units with breakfast, 9 units with lunch, 8 units with dinner plus sliding scale. Dosage:  Inject 8 Units into the skin 3 (three) times daily before meals. With sliding scale.    Reason for Change:  Reorder            Verify that the below list of medications is an accurate representation of the medications you are currently taking.  If none reported, the list may be blank. If incorrect, please contact your healthcare provider. Carry this list with you in case of emergency.           Current Medications     atorvastatin (LIPITOR) 80 MG tablet Take 1 tablet (80 mg total) by mouth once daily.    BD INSULIN PEN NEEDLE UF MINI 31 gauge x 3/16" Ndle To use with insulin pens 4  times daily    blood sugar diagnostic Strp 1 strip by Misc.(Non-Drug; Combo Route) route 3 (three) times " "daily. One Touch Verio    carvedilol (COREG) 12.5 MG tablet Take 1 tablet (12.5 mg total) by mouth 2 (two) times daily.    coenzyme Q10 200 mg capsule Take 200 mg by mouth once daily.    dabigatran etexilate (PRADAXA) 150 mg Cap Take 1 capsule (150 mg total) by mouth 2 (two) times daily. "Do NOT break, chew, or open capsules."    ERGOCALCIFEROL, VITAMIN D2, (VITAMIN D ORAL) Take 1 tablet by mouth every Mon, Wed, Fri.    folic acid (FOLVITE) 1 MG tablet Take 1 mg by mouth once daily.     furosemide (LASIX) 40 MG tablet Take 1 tablet (40 mg total) by mouth once daily.    insulin glargine (LANTUS SOLOSTAR) 100 unit/mL (3 mL) InPn pen Inject 14 Units into the skin every evening.    insulin lispro (HUMALOG KWIKPEN) 100 unit/mL InPn pen 12 units with breakfast, 9 units with lunch, 8 units with dinner plus sliding scale.    levetiracetam (KEPPRA) 750 MG Tab Take 1 tablet (750 mg total) by mouth 2 (two) times daily.    levothyroxine (SYNTHROID) 200 MCG tablet Take 1 tablet (200 mcg total) by mouth once daily.    losartan (COZAAR) 100 MG tablet Take 1 tablet (100 mg total) by mouth once daily.    multivitamin capsule Take 1 capsule by mouth once daily. Pt taken every mon, wed, fri    acetaminophen (TYLENOL) 500 MG tablet Take 1 tablet (500 mg total) by mouth every 6 (six) hours as needed for Pain.           Clinical Reference Information           Your Vitals Were     BP Pulse Height Weight BMI    146/72 78 5' 10" (1.778 m) 101.6 kg (224 lb 1.6 oz) 32.15 kg/m2      Blood Pressure          Most Recent Value    BP  (!)  146/72      Allergies as of 4/17/2017     Cough Syrup [Guaifenesin]      Immunizations Administered on Date of Encounter - 4/17/2017     None      Orders Placed During Today's Visit     Future Labs/Procedures Expected by Expires    Fructosamine  4/17/2017 4/17/2018    Triglycerides  4/17/2017 4/17/2018      Language Assistance Services     ATTENTION: Language assistance services are available, free of charge. " Please call [phone].      ATENCIÓN: Si habla español, tiene a minor disposición servicios gratuitos de asistencia lingüística. Llame al [phone].     CHÚ Ý: N?u b?n nói Ti?ng Vi?t, có các d?ch v? h? tr? ngôn ng? mi?n phí dành cho b?n. G?i s? [phone].         [facility] complies with applicable Federal civil rights laws and does not discriminate on the basis of race, color, national origin, age, disability, or sex.

## 2017-04-17 NOTE — PROGRESS NOTES
"CC: Patient is here for management of Type 2 diabetes and review of medical conditions as listed in visit diagnosis.      HPI: Mr. Alfa Christy III is a 61 y.o. Black or  male who was diagnosed with Type 2 DM in ~. He was originally on orals, but started insulin in . He was seen in ER for hyperglycemia in ~. (+) FH of DM in multiple family members. Has a history of CVA with diminished peripheral vision and also thyroid cancer, which was treated with surgery and radioactive iodine. Has sickle cell and last crisis in 2016.     Prior patient of Dr. Massey who is new to me.     Arrives today accompanied by spouse.     Monitoring BG 3x/day.  AM: 105-130s with one reading of 166  Lunch: 180s-200s  Evenins    Reports one episode of fatigue, feeling as though he would pass out. Treated and resolved after drinking regular soda, but did not check his BG at that time.     Denies missing any doses of insulin.     Takes Humalog in tandem with meals.     Walks for exercise, in 30 minute intervals.     Rarely skips meals. Has salad at night for dinner. Biggest meals are breakfast and lunch.     PREVIOUS DIABETES MEDICATIONS  Novolog  Metformin    CURRENT DIABETIC MEDS: Lantus 14 units QHS, Humalog 10/9/8 plus correction scale    Last Podiatry Exam: N/A    REVIEW OF SYSTEMS  General: no weakness, fatigue, or weight changes.   Eyes: decreased peripheral vision in right eye; no eye pain or redness; last eye exam=2016 (Dr. Tejas Man)  Cardiovascular: no chest pain, palpitations, edema, or murmurs.   Respiratory: no cough or dyspnea.   GI: no heartburn, nausea, or changes in bowel patterns; good appetite.   Skin: no rashes, dryness, itching, or reactions at insulin injection sites; rotates insulin injection sites.   Neuro: no c/o numbness, tingling, or dizziness.   Endocrine: no polyuria, polydipsia, polyphagia, heat or cold intolerance.     Vital Signs  BP (!) 146/72  Pulse 78  Ht 5' 10" " (1.778 m)  Wt 101.6 kg (224 lb 1.6 oz)  BMI 32.15 kg/m2     Component      Latest Ref Rng & Units 4/10/2017   Fructosamine      0 - 285 umol /L 290 (H)     A1C based on fructosamine ~7.1%     Lab Results   Component Value Date    CREATININE 1.9 (H) 01/24/2017      Lab Results   Component Value Date    TSH 1.334 04/10/2017      Lab Results   Component Value Date    CHOL 92 (L) 04/10/2017    CHOL 195 11/05/2016    CHOL 110 (L) 08/09/2016     Lab Results   Component Value Date    HDL 25 (L) 04/10/2017    HDL 52 11/05/2016    HDL 46 08/09/2016     Lab Results   Component Value Date    LDLCALC 17.4 (L) 04/10/2017    LDLCALC 123.2 11/05/2016    LDLCALC 50.8 (L) 08/09/2016     Lab Results   Component Value Date    TRIG 248 (H) 04/10/2017    TRIG 99 11/05/2016    TRIG 66 08/09/2016     Lab Results   Component Value Date    CHOLHDL 27.2 04/10/2017    CHOLHDL 26.7 11/05/2016    CHOLHDL 41.8 08/09/2016      PHYSICAL EXAMINATION  Constitutional: Appears well, no distress  Neck: Supple, trachea midline.   Respiratory: CTA without wheezes, even and unlabored.  Cardiovascular: RRR; no carotid bruits or murmurs.   Lymph: DP pulses  2+ bilaterally; no edema.   Skin: warm and dry; no injection site reactions, no acanthosis nigracans observed.  Neuro:patient alert and cooperative, normal affect.    Diabetes Foot Exam:   Decreased vibratory response to 128 Hz tuning fork bilaterally.   Protective Sensation (w/ 10 gram monofilament):  Right: Decreased  Left: Decreased    Visual Inspection:  no open wounds, amputations, or deformities. slight interdigital maceration between digits 3-5 on right and left toes.     Pedal Pulses:   Right: Present  Left: Present    Assessment/Plan    1. CKD stage 3 due to type 2 diabetes mellitus   DM stable, but noted with hyperglycemia at lunchtime.   Increase breakfast dose to 12 units, continue 9/8 with lunch plus correction scale starting at 200 (ISF 50)  Continue to monitor at least 3x/day and bring  logs to next visit.   CKD managed by Nephrology--avoid hypoglycemia.    2. Erectile dysfunction associated with type 2 diabetes mellitus: Continue to keep BG stable. Was previously on Cialis at one time.    3. HTN (hypertension), benign: Stable. Continue current medications. On ARB.    4. Hypercholesterolemia: Continue Atorvastatin. Triglycerides elevated. Recheck with next visit.    5. Postsurgical hypothyroidism: history of thyroid cancer with iodine and surgery treatment. Continue Levothyroxine 200 mcg tablet daily.     6. Paroxysmal atrial fibrillation: Can worsen insulin resistance. On Pradaxa.    7. Obesity (BMI 30.0-34.9): Body mass index is 32.15 kg/(m^2). Can worsen insulin resistance. Weight loss can help.    8. Sickle cell trait: Fructosamine versus A1C as A1C is not reliable in sickle cell. He has had a crisis in the past.        FOLLOW UP  Return in about 4 months (around 8/17/2017).     Orders Placed This Encounter   Procedures    Hemoglobin A1c     Standing Status:   Future     Standing Expiration Date:   4/17/2018    Triglycerides     Standing Status:   Future     Standing Expiration Date:   4/17/2018

## 2017-04-17 NOTE — Clinical Note
Yobani Sethi,   Just wanted to give you a heads up about Mr. Christy. He is seeing you today for the first time (was following with Dr. Massey). Dr. Massey instructed him to use correction scale back in Jan - I couldn't find in his note which scale he wanted him to use, but wife pointed out the 1:50 starting at 150. However, Mr. Christy stated he only adds correction if BG is >200. He seems to have a good understanding of using a scale but a little confusion on which scale he was supposed to use.   Thanks, Lois

## 2017-05-03 ENCOUNTER — OFFICE VISIT (OUTPATIENT)
Dept: CARDIOLOGY | Facility: CLINIC | Age: 62
End: 2017-05-03
Payer: COMMERCIAL

## 2017-05-03 VITALS
BODY MASS INDEX: 32.38 KG/M2 | WEIGHT: 226.19 LBS | DIASTOLIC BLOOD PRESSURE: 75 MMHG | OXYGEN SATURATION: 97 % | SYSTOLIC BLOOD PRESSURE: 159 MMHG | HEART RATE: 52 BPM | HEIGHT: 70 IN

## 2017-05-03 DIAGNOSIS — I50.32 CHRONIC DIASTOLIC HEART FAILURE: Primary | ICD-10-CM

## 2017-05-03 DIAGNOSIS — I10 HTN (HYPERTENSION), BENIGN: ICD-10-CM

## 2017-05-03 DIAGNOSIS — R31.9 HEMATURIA: ICD-10-CM

## 2017-05-03 DIAGNOSIS — I67.9 CEREBROVASCULAR DISEASE: ICD-10-CM

## 2017-05-03 DIAGNOSIS — N18.30 CKD (CHRONIC KIDNEY DISEASE), STAGE III: Chronic | ICD-10-CM

## 2017-05-03 DIAGNOSIS — I48.0 PAROXYSMAL ATRIAL FIBRILLATION: Chronic | ICD-10-CM

## 2017-05-03 DIAGNOSIS — E78.00 HYPERCHOLESTEROLEMIA: ICD-10-CM

## 2017-05-03 PROCEDURE — 3077F SYST BP >= 140 MM HG: CPT | Mod: S$GLB,,, | Performed by: NURSE PRACTITIONER

## 2017-05-03 PROCEDURE — 1160F RVW MEDS BY RX/DR IN RCRD: CPT | Mod: S$GLB,,, | Performed by: NURSE PRACTITIONER

## 2017-05-03 PROCEDURE — 99214 OFFICE O/P EST MOD 30 MIN: CPT | Mod: S$GLB,,, | Performed by: NURSE PRACTITIONER

## 2017-05-03 PROCEDURE — 99999 PR PBB SHADOW E&M-EST. PATIENT-LVL III: CPT | Mod: PBBFAC,,, | Performed by: NURSE PRACTITIONER

## 2017-05-03 PROCEDURE — 3078F DIAST BP <80 MM HG: CPT | Mod: S$GLB,,, | Performed by: NURSE PRACTITIONER

## 2017-05-03 RX ORDER — FUROSEMIDE 40 MG/1
40 TABLET ORAL 2 TIMES DAILY
Qty: 90 TABLET | Refills: 4 | Status: SHIPPED | OUTPATIENT
Start: 2017-05-03 | End: 2017-07-13 | Stop reason: SDUPTHER

## 2017-05-03 NOTE — PROGRESS NOTES
"Mr. Christy is a patient of Dr. Camacho and was last seen in [facility] 2/1/2017 with Dr. Galan.      Subjective:   Patient ID:  Alfa Christy III is a 61 y.o. male who presents for follow-up of Atrial Fibrillation (3 month f/u )  .   HPI:    Mr. Christy is a 62yo male with a PMHx of paroxysmal atrial fibrillation, CVA (stroke January 2016), HTN, HLD, DM II, and diastolic dysfunction here for follow-up. He reports that bilateral leg edema he has had for years but has been worsening significantly over the past 4 months that is not relieved with 40mg furosemide daily. He experiences some relief when he uses compression socks but the edema persists. He also says that he does not experience as much relief from elevating his legs as he used to. His weight is up about 8lbs since his last clinic appointment.  Mr. Christy denies chest pain with exertion or at rest, palpitations, SOB, dizziness, syncope, leg edema, claudication, PND, or orthopnea. He has AYALA with significant exertion, but says he can climb two flights of stairs without stopping to catch his breath. He reports that he has not been exercising frequently (once "in a blue moon").  He currently takes pradaxa. He reports hematuria in the mornings for approximately two months. He was seeing urology for elevated PSA but has not reported this symptom to them. In addition to his lasix and pradaxa, he is taking atorvastatin 80mg (LDL 17.4 on 4/10/2017), carvedilol 12.5 BID, and losartan 100mg. He says that his BPs at home range from 120s to 150 systolic. He did visit the ED on 2/10/2017 for asymptomatic hypertension (one reading).  He has DM and is on insulin. He has sickle cell trait and has microcytic anemia.      Recent Cardiac Tests:    2D Echo (1/28/2016):  CONCLUSIONS     1 - Normal left ventricular systolic function (EF 55-60%).     2 - No regional wall motion abnormalities.     3 - Concentric hypertrophy.     4 - Left ventricular diastolic dysfunction.     5 - " "Trivial aortic regurgitation.     6 - Trivial tricuspid regurgitation, PASP 19 mmHg.     7 - No intracardiac source of embolus noted on this exam.  If high clinical suspicion, consider ADA +/- bubble study.     Carotid U/S (1/27/2016):  1. Findings consistent with less than 50% stenosis in the Right internal carotid artery.  2. Findings consistent with less than 50% stenosis in the Left internal carotid artery.  3.  Incidentally noted irregular cardiac rhythm.  Correlate clinically.    Current Outpatient Prescriptions   Medication Sig    acetaminophen (TYLENOL) 500 MG tablet Take 1 tablet (500 mg total) by mouth every 6 (six) hours as needed for Pain.    atorvastatin (LIPITOR) 80 MG tablet Take 1 tablet (80 mg total) by mouth once daily.    BD INSULIN PEN NEEDLE UF MINI 31 gauge x 3/16" Ndle To use with insulin pens 4  times daily    blood sugar diagnostic Strp 1 strip by Misc.(Non-Drug; Combo Route) route 3 (three) times daily. One Touch Verio    carvedilol (COREG) 12.5 MG tablet Take 1 tablet (12.5 mg total) by mouth 2 (two) times daily.    coenzyme Q10 200 mg capsule Take 200 mg by mouth once daily. (Patient taking differently: Take 200 mg by mouth once daily. PT TAKEN EVERY MON, WED, FRI)    dabigatran etexilate (PRADAXA) 150 mg Cap Take 1 capsule (150 mg total) by mouth 2 (two) times daily. "Do NOT break, chew, or open capsules."    ERGOCALCIFEROL, VITAMIN D2, (VITAMIN D ORAL) Take 1 tablet by mouth every Mon, Wed, Fri.    folic acid (FOLVITE) 1 MG tablet Take 1 mg by mouth once daily.     furosemide (LASIX) 40 MG tablet Take 1 tablet (40 mg total) by mouth 2 (two) times daily.    insulin glargine (LANTUS SOLOSTAR) 100 unit/mL (3 mL) InPn pen Inject 14 Units into the skin every evening.    insulin lispro (HUMALOG KWIKPEN) 100 unit/mL InPn pen 12 units with breakfast, 9 units with lunch, 8 units with dinner plus sliding scale.    levetiracetam (KEPPRA) 750 MG Tab Take 1 tablet (750 mg total) by " "mouth 2 (two) times daily.    levothyroxine (SYNTHROID) 200 MCG tablet Take 1 tablet (200 mcg total) by mouth once daily.    losartan (COZAAR) 100 MG tablet Take 1 tablet (100 mg total) by mouth once daily.    multivitamin capsule Take 1 capsule by mouth once daily. Pt taken every mon, wed, fri     No current facility-administered medications for this visit.        Review of Systems   Constitution: Negative for diaphoresis, weakness and malaise/fatigue.   HENT: Negative for headaches.    Eyes: Positive for vision loss in right eye (peripheral vision loss on right side since stroke). Negative for blurred vision.   Cardiovascular: Positive for dyspnea on exertion (with significant exertion) and leg swelling. Negative for chest pain, claudication, irregular heartbeat, orthopnea, palpitations, paroxysmal nocturnal dyspnea and syncope.   Respiratory: Negative for shortness of breath and snoring.    Hematologic/Lymphatic: Negative for adenopathy.   Skin: Negative for rash.   Musculoskeletal: Negative for back pain, muscle weakness and myalgias.   Gastrointestinal: Negative for abdominal pain, constipation, diarrhea, dysphagia and nausea.   Genitourinary: Positive for hematuria and nocturia. Negative for dysuria.   Neurological: Positive for loss of balance and seizures (2016). Negative for numbness.        Followed by neurology   Psychiatric/Behavioral: Negative for altered mental status.   Allergic/Immunologic: Negative for persistent infections.         Objective:     Right Arm BP - Sittin/75 (17 1023)  Left Arm BP - Sittin/75 (17 1023)    BP (!) 159/75 (BP Location: Left arm, Patient Position: Sitting, BP Method: Automatic)  Pulse (!) 52  Ht 5' 10" (1.778 m)  Wt 102.6 kg (226 lb 3.1 oz)  SpO2 97%  BMI 32.46 kg/m2    Physical Exam   Constitutional: He is oriented to person, place, and time. He appears well-developed and well-nourished.   HENT:   Head: Normocephalic.   Nose: Nose " normal.   Eyes: Pupils are equal, round, and reactive to light.   Neck: No JVD present. Carotid bruit is not present.   Cardiovascular: S1 normal, S2 normal and intact distal pulses.  A regularly irregular rhythm present.  No extrasystoles are present. Bradycardia present.  PMI is not displaced.  Exam reveals no gallop and no friction rub.    No murmur heard.  Pulses:       Carotid pulses are 2+ on the right side, and 2+ on the left side.       Radial pulses are 2+ on the right side, and 2+ on the left side.        Dorsalis pedis pulses are 1+ on the right side, and 1+ on the left side.   Trigeminy noted   Pulmonary/Chest: Breath sounds normal. No respiratory distress.   Abdominal: Soft. Bowel sounds are normal. He exhibits no distension. There is no tenderness.   Musculoskeletal: Normal range of motion. He exhibits edema (2 pitting edema).   Neurological: He is alert and oriented to person, place, and time. He is not disoriented.   Skin: Skin is warm and dry. No rash noted.   Psychiatric: He has a normal mood and affect. His speech is normal and behavior is normal.   Nursing note and vitals reviewed.    Lab Results   Component Value Date     01/24/2017    K 4.7 01/24/2017     01/24/2017    CO2 25 01/24/2017    BUN 19 01/24/2017    CREATININE 1.9 (H) 01/24/2017     (H) 01/24/2017    HGBA1C text 07/27/2012    MG 2.4 01/06/2017    AST 32 01/06/2017    ALT 20 01/06/2017    ALBUMIN 3.3 (L) 01/24/2017    PROT 7.5 01/06/2017    BILITOT 1.4 (H) 01/06/2017    WBC 11.36 11/07/2016    HGB 11.1 (L) 11/07/2016    HCT 31.0 (L) 11/07/2016    MCV 78 (L) 11/07/2016     11/07/2016    TSH 1.334 04/10/2017    CHOL 92 (L) 04/10/2017    HDL 25 (L) 04/10/2017    LDLCALC 17.4 (L) 04/10/2017    TRIG 248 (H) 04/10/2017         Recent Labs  Lab 01/27/16 2011 11/05/16  0853   INR 1.1 1.1        Test(s) Reviewed  I have reviewed the following in detail:  [] Stress test   [] Angiography   [] Echocardiogram   [x] Labs    [] Other:         Assessment:         1. Chronic diastolic heart failure. Patient experiencing increasing leg swelling.  Pt on BB, ARB, and lasix. Will double lasix dose. Patient instructed to reduce salt intake.      2. Paroxysmal atrial fibrillation. On pradaxa.     3. HTN (hypertension), benign. Controlled per reports of home pressures.     4. Hypercholesterolemia. On high intensity statin. LDL 17.4 on 4/10/2017.     5. Cerebrovascular disease. Followed by neurology.     6. CKD (chronic kidney disease), stage III. GFR 42.4 and creatinine 1.9 on 1/24/2017.     7. Hematuria. In AM. Will refer to urology.     Plan:     Alfa was seen today for atrial fibrillation.    Diagnoses and all orders for this visit:    Chronic diastolic heart failure  -     Basic metabolic panel; Future; Expected date: 5/31/17    Paroxysmal atrial fibrillation    HTN (hypertension), benign  -     furosemide (LASIX) 40 MG tablet; Take 1 tablet (40 mg total) by mouth 2 (two) times daily.    Hypercholesterolemia    Cerebrovascular disease    CKD (chronic kidney disease), stage III    Hematuria  -     Ambulatory Referral to Urology      Return in about 4 weeks (around 5/31/2017).       Copy of this note is sent to Dr. Camacho.

## 2017-05-03 NOTE — MR AVS SNAPSHOT
[facility]  [address]  [facility] [address]  Phone: [phone]                  Alfa Christy III   5/3/2017 10:20 AM   Office Visit    Description:  Male : 1955   Provider:  Silva Stout NP   Department:  [facility]           Reason for Visit     Atrial Fibrillation           Diagnoses this Visit        Comments    Paroxysmal atrial fibrillation    -  Primary     HTN (hypertension), benign         Hypercholesterolemia         Cerebrovascular disease         Chronic diastolic heart failure         CKD (chronic kidney disease), stage III                To Do List           Future Appointments        Provider Department Dept Phone    2017 9:45 AM SPECIMEN, [facility] [phone]    2017 10:00 AM LAB, [facility] [phone]    2017 10:30 AM Rachel Gold MD [facility] - Nephrology [phone]    2017 10:40 AM Tyler Jasmine MD [facility] - Internal Medicine [phone]      Goals (5 Years of Data)     None      Follow-Up and Disposition     Return in about 4 weeks (around 2017).       These Medications        Disp Refills Start End    furosemide (LASIX) 40 MG tablet 90 tablet 4 5/3/2017     Take 1 tablet (40 mg total) by mouth 2 (two) times daily. - Oral    Pharmacy: [facility] - [address] #: [phone]         [facility]     [facility] Nurse Care Line -  Assistance  Unless otherwise directed by your provider, please contact [facility], our nurse care line that is available for  assistance.     Registered nurses in the [facility] provide: appointment scheduling, clinical advisement, health education, and other advisory services.  Call: [phone] (toll free)               Medications           Message regarding Medications     Verify the changes and/or additions to your medication regime  "listed below are the same as discussed with your clinician today.  If any of these changes or additions are incorrect, please notify your healthcare provider.        CHANGE how you are taking these medications     Start Taking Instead of    furosemide (LASIX) 40 MG tablet furosemide (LASIX) 40 MG tablet    Dosage:  Take 1 tablet (40 mg total) by mouth 2 (two) times daily. Dosage:  Take 1 tablet (40 mg total) by mouth once daily.    Reason for Change:  Reorder            Verify that the below list of medications is an accurate representation of the medications you are currently taking.  If none reported, the list may be blank. If incorrect, please contact your healthcare provider. Carry this list with you in case of emergency.           Current Medications     acetaminophen (TYLENOL) 500 MG tablet Take 1 tablet (500 mg total) by mouth every 6 (six) hours as needed for Pain.    atorvastatin (LIPITOR) 80 MG tablet Take 1 tablet (80 mg total) by mouth once daily.    BD INSULIN PEN NEEDLE UF MINI 31 gauge x 3/16" Ndle To use with insulin pens 4  times daily    blood sugar diagnostic Strp 1 strip by Misc.(Non-Drug; Combo Route) route 3 (three) times daily. One Touch Verio    carvedilol (COREG) 12.5 MG tablet Take 1 tablet (12.5 mg total) by mouth 2 (two) times daily.    coenzyme Q10 200 mg capsule Take 200 mg by mouth once daily.    dabigatran etexilate (PRADAXA) 150 mg Cap Take 1 capsule (150 mg total) by mouth 2 (two) times daily. "Do NOT break, chew, or open capsules."    ERGOCALCIFEROL, VITAMIN D2, (VITAMIN D ORAL) Take 1 tablet by mouth every Mon, Wed, Fri.    folic acid (FOLVITE) 1 MG tablet Take 1 mg by mouth once daily.     furosemide (LASIX) 40 MG tablet Take 1 tablet (40 mg total) by mouth 2 (two) times daily.    insulin glargine (LANTUS SOLOSTAR) 100 unit/mL (3 mL) InPn pen Inject 14 Units into the skin every evening.    insulin lispro (HUMALOG KWIKPEN) 100 unit/mL InPn pen 12 units with breakfast, 9 units with " "lunch, 8 units with dinner plus sliding scale.    levetiracetam (KEPPRA) 750 MG Tab Take 1 tablet (750 mg total) by mouth 2 (two) times daily.    levothyroxine (SYNTHROID) 200 MCG tablet Take 1 tablet (200 mcg total) by mouth once daily.    losartan (COZAAR) 100 MG tablet Take 1 tablet (100 mg total) by mouth once daily.    multivitamin capsule Take 1 capsule by mouth once daily. Pt taken every mon, wed, fri           Clinical Reference Information           Your Vitals Were     BP Pulse Height Weight SpO2 BMI    159/75 (BP Location: Left arm, Patient Position: Sitting, BP Method: Automatic) 52 5' 10" (1.778 m) 102.6 kg (226 lb 3.1 oz) 97% 32.46 kg/m2      Blood Pressure          Most Recent Value    Right Arm BP - Sitting  159/75    Left Arm BP - Sitting  159/75    BP  (!)  159/75      Allergies as of 5/3/2017     Cough Syrup [Guaifenesin]      Immunizations Administered on Date of Encounter - 5/3/2017     None      Orders Placed During Today's Visit     Future Labs/Procedures Expected by Expires    Basic metabolic panel  5/31/2017 7/2/2018      Instructions    Double furosemide dose.  Weigh self daily.  Call clinic with daily weights after 1 week.  Blood test in one month.  Return to clinic in one month.  Low salt diet.  Follow up with urology (Nurys) and neurology (Pamela).  Low-Salt Diet  This diet removes foods that are high in salt. It also limits the amount of salt you use when cooking. It is most often used for people with high blood pressure, edema (fluid retention), and kidney, liver, or heart disease.  Table salt contains the mineral sodium. Your body needs sodium to work normally. But too much sodium can make your health problems worse. Your healthcare provider is recommending a low-salt (also called low-sodium) diet for you. Your total daily allowance of salt is 1,500 to 2,300 milligrams (mg). It is less than 1 teaspoon of table salt. This means you can have only about 500 to 700 mg of sodium at each " meal. People with certain health problems should limit salt intake to the lower end of the recommended range.    When you cook, dont add much salt. If you can cook without using salt, even better. Dont add salt to your food at the table.  When shopping, read food labels. Salt is often called sodium on the label. Choose foods that are salt-free, low salt, or very low salt. Note that foods with reduced salt may not lower your salt intake enough.    Beans, potatoes, and pasta  Ok: Dry beans, split peas, lentils, potatoes, rice, macaroni, pasta, spaghetti without added salt  Avoid: Potato chips, tortilla chips, and similar products  Breads and cereals  Ok: Low-sodium breads, rolls, cereals, and cakes; low-salt crackers, matzo crackers  Avoid: Salted crackers, pretzels, popcorn, Ni[place]an toast, pancakes, muffins  Dairy  Ok: Milk, chocolate milk, hot chocolate mix, low-salt cheeses, and yogurt  Avoid: Processed cheese and cheese spreads; Roquefort, Camembert, and cottage cheese; buttermilk, instant breakfast drink  Desserts  Ok: Ice cream, frozen yogurt, juice bars, gelatin, cookies and pies, sugar, honey, jelly, hard candy  Avoid: Most pies, cakes and cookies prepared or processed with salt; instant pudding  Drinks  Ok: Tea, coffee, fizzy (carbonated) drinks, juices  Avoid: Flavored coffees, electrolyte replacement drinks, sports drinks  Meats  Ok: All fresh meat, fish, poultry, low-salt tuna, eggs, egg substitute  Avoid: Smoked, pickled, brine-cured, or salted meats and fish. This includes pandey, chipped beef, corned beef, hot dogs, deli meats, ham, kosher meats, salt pork, sausage, canned tuna, salted codfish, smoked salmon, herring, sardines, or anchovies.  Seasonings and spices  Ok: Most seasonings are okay. Good substitutes for salt include: fresh herb blends, hot sauce, lemon, garlic, rodriguez, vinegar, dry mustard, parsley, cilantro, horseradish, tomato paste, regular margarine, mayonnaise, unsalted butter, cream  cheese, vegetable oil, cream, low-salt salad dressing and gravy.  Avoid: Regular ketchup, relishes, pickles, soy sauce, teriyaki sauce, Worcestershire sauce, BBQ sauce, tartar sauce, meat tenderizer, chili sauce, regular gravy, regular salad dressing, salted butter  Soups  Ok: Low-salt soups and broths made with allowed foods  Avoid: Bouillon cubes, soups with smoked or salted meats, regular soup and broth  Vegetables  Ok: Most vegetables are okay; also low-salt tomato and vegetable juices  Avoid: Sauerkraut and other brine-soaked vegetables; pickles and other pickled vegetables; tomato juice, olives  Date Last Reviewed: 8/1/2016  © 9283-6063 [email or web address]. [address] [place] [address]. All rights reserved. This information is not intended as a substitute for professional medical care. Always follow your healthcare professional's instructions.             Language Assistance Services     ATTENTION: Language assistance services are available, free of charge. Please call [phone].      ATENCIÓN: Si habla español, tiene a minor disposición servicios gratuitos de asistencia lingüística. Llame al [phone].     HUMBERTO Ý: N?u b?n nói Ti?ng Vi?t, có các d?ch v? h? tr? ngôn ng? mi?n phí dành cho b?n. G?i s? [phone].         [facility] complies with applicable Federal civil rights laws and does not discriminate on the basis of race, color, national origin, age, disability, or sex.

## 2017-05-09 ENCOUNTER — OFFICE VISIT (OUTPATIENT)
Dept: UROLOGY | Facility: CLINIC | Age: 62
End: 2017-05-09
Payer: COMMERCIAL

## 2017-05-09 VITALS
WEIGHT: 222.69 LBS | DIASTOLIC BLOOD PRESSURE: 81 MMHG | HEART RATE: 70 BPM | SYSTOLIC BLOOD PRESSURE: 161 MMHG | BODY MASS INDEX: 31.95 KG/M2

## 2017-05-09 DIAGNOSIS — N40.1 BENIGN NON-NODULAR PROSTATIC HYPERPLASIA WITH LOWER URINARY TRACT SYMPTOMS: ICD-10-CM

## 2017-05-09 DIAGNOSIS — R31.0 GROSS HEMATURIA: Primary | ICD-10-CM

## 2017-05-09 LAB
BILIRUB SERPL-MCNC: NORMAL MG/DL
BLOOD URINE, POC: 50
COLOR, POC UA: YELLOW
GLUCOSE UR QL STRIP: NORMAL
KETONES UR QL STRIP: NORMAL
LEUKOCYTE ESTERASE URINE, POC: NORMAL
NITRITE, POC UA: NORMAL
PH, POC UA: 7
POC RESIDUAL URINE VOLUME: 4 ML (ref 0–100)
PROTEIN, POC: NORMAL
SPECIFIC GRAVITY, POC UA: 1
UROBILINOGEN, POC UA: NORMAL

## 2017-05-09 PROCEDURE — 1160F RVW MEDS BY RX/DR IN RCRD: CPT | Mod: S$GLB,,, | Performed by: NURSE PRACTITIONER

## 2017-05-09 PROCEDURE — 3079F DIAST BP 80-89 MM HG: CPT | Mod: S$GLB,,, | Performed by: NURSE PRACTITIONER

## 2017-05-09 PROCEDURE — 88112 CYTOPATH CELL ENHANCE TECH: CPT | Mod: 26,,, | Performed by: PATHOLOGY

## 2017-05-09 PROCEDURE — 99999 PR PBB SHADOW E&M-EST. PATIENT-LVL V: CPT | Mod: PBBFAC,,, | Performed by: NURSE PRACTITIONER

## 2017-05-09 PROCEDURE — 99214 OFFICE O/P EST MOD 30 MIN: CPT | Mod: 25,S$GLB,, | Performed by: NURSE PRACTITIONER

## 2017-05-09 PROCEDURE — 3077F SYST BP >= 140 MM HG: CPT | Mod: S$GLB,,, | Performed by: NURSE PRACTITIONER

## 2017-05-09 PROCEDURE — 81002 URINALYSIS NONAUTO W/O SCOPE: CPT | Mod: S$GLB,,, | Performed by: NURSE PRACTITIONER

## 2017-05-09 PROCEDURE — 88112 CYTOPATH CELL ENHANCE TECH: CPT | Performed by: PATHOLOGY

## 2017-05-09 PROCEDURE — 51798 US URINE CAPACITY MEASURE: CPT | Mod: S$GLB,,, | Performed by: NURSE PRACTITIONER

## 2017-05-09 RX ORDER — TAMSULOSIN HYDROCHLORIDE 0.4 MG/1
0.4 CAPSULE ORAL DAILY
Qty: 30 CAPSULE | Refills: 11 | Status: SHIPPED | OUTPATIENT
Start: 2017-05-09 | End: 2017-06-01

## 2017-05-09 NOTE — PATIENT INSTRUCTIONS
Hematuria: Possible Causes     Many things can lead to blood in the urine (hematuria). The blood may be seen with the eye. Or it may only be seen when the urine is looked at under a microscope. Some of the most common causes of blood in the urine are listed below. Often, no cause for the blood can be found. This is called idiopathic hematuria.  · Kidney or bladder stones are collections of crystals. They form in the urine. Stones may be found anywhere in the urinary tract. But they form most often in the kidneys or bladder. In addition to blood in the urine, they can cause severe pain.  · BPH stands for benign prostatic hyperplasia. It is enlargement of the prostate gland. It happens as men age. BPH often causes problems with urination. It sometimes causes blood in the urine.  · A urinary tract infection (UTI) is due to bacteria growing in the urinary tract. It can cause blood in the urine. Other symptoms include burning or pain with urination. You may need to urinate often or urgently. You may also have a fever.  · Damage to the urinary tract may cause blood in the urine. This damage may be due to a blow or accident. It may also result from the use of a urinary catheter. Very hard exercise may sometimes irritate the urinary tract and cause bleeding.  · Cancer may occur anywhere in the urinary tract. A tumor may sometimes cause no symptoms other than bleeding.     Other possible causes of bleeding include:  · Prostatitis (infection of the prostate gland)  · Taking anticoagulant medications  · Blockage in the urinary tract  · Disease or inflammation of the kidney  · Cystic diseases of the kidneys  · Sickle cell anemia  · Tumors of the urinary tract  Date Last Reviewed: 9/12/2014  © 8351-7940 [employer]. [address] [place] [address], [place] [address]. All rights reserved. This information is not intended as a substitute for professional medical care. Always follow your healthcare professional's  instructions.

## 2017-05-09 NOTE — MR AVS SNAPSHOT
[facility] 4th Floor  [address]  [facility] [address]  Phone: [phone]                  Alfa Christy III   2017 9:20 AM   Office Visit    Description:  Male : 1955   Provider:  Rosa Andujar NP   Department:  [facility] 4th Floor           Reason for Visit     Hematuria           Diagnoses this Visit        Comments    Gross hematuria    -  Primary            To Do List           Future Appointments        Provider Department Dept Phone    2017 8:00 AM LAB, [facility] [phone]    2017 9:45 AM SPECIMEN, [facility] [phone]    2017 10:00 AM LAB, [facility] [phone]    2017 10:30 AM Rachel Gold MD [facility] [phone]    2017 10:40 AM Tyler Jasmine MD [facility] [phone]      Goals (5 Years of Data)     None      [facility]     [facility] -  Assistance  Unless otherwise directed by your provider, please contact [facility], our nurse care line that is available for  assistance.     Registered nurses in the [facility] provide: appointment scheduling, clinical advisement, health education, and other advisory services.  Call: [phone] (toll free)               Medications           Message regarding Medications     Verify the changes and/or additions to your medication regime listed below are the same as discussed with your clinician today.  If any of these changes or additions are incorrect, please notify your healthcare provider.             Verify that the below list of medications is an accurate representation of the medications you are currently taking.  If none reported, the list may be blank. If incorrect, please contact your healthcare provider. Carry this list with you in case of emergency.           Current Medications      "acetaminophen (TYLENOL) 500 MG tablet Take 1 tablet (500 mg total) by mouth every 6 (six) hours as needed for Pain.    atorvastatin (LIPITOR) 80 MG tablet Take 1 tablet (80 mg total) by mouth once daily.    BD INSULIN PEN NEEDLE UF MINI 31 gauge x 3/16" Ndle To use with insulin pens 4  times daily    blood sugar diagnostic Strp 1 strip by Misc.(Non-Drug; Combo Route) route 3 (three) times daily. One Touch Verio    carvedilol (COREG) 12.5 MG tablet Take 1 tablet (12.5 mg total) by mouth 2 (two) times daily.    coenzyme Q10 200 mg capsule Take 200 mg by mouth once daily.    dabigatran etexilate (PRADAXA) 150 mg Cap Take 1 capsule (150 mg total) by mouth 2 (two) times daily. "Do NOT break, chew, or open capsules."    ERGOCALCIFEROL, VITAMIN D2, (VITAMIN D ORAL) Take 1 tablet by mouth every Mon, Wed, Fri.    folic acid (FOLVITE) 1 MG tablet Take 1 mg by mouth once daily.     furosemide (LASIX) 40 MG tablet Take 1 tablet (40 mg total) by mouth 2 (two) times daily.    insulin glargine (LANTUS SOLOSTAR) 100 unit/mL (3 mL) InPn pen Inject 14 Units into the skin every evening.    insulin lispro (HUMALOG KWIKPEN) 100 unit/mL InPn pen 12 units with breakfast, 9 units with lunch, 8 units with dinner plus sliding scale.    levetiracetam (KEPPRA) 750 MG Tab Take 1 tablet (750 mg total) by mouth 2 (two) times daily.    levothyroxine (SYNTHROID) 200 MCG tablet Take 1 tablet (200 mcg total) by mouth once daily.    losartan (COZAAR) 100 MG tablet Take 1 tablet (100 mg total) by mouth once daily.    multivitamin capsule Take 1 capsule by mouth once daily. Pt taken every mon, wed, fri           Clinical Reference Information           Your Vitals Were     BP Pulse Weight BMI       161/81 70 101 kg (222 lb 10.6 oz) 31.95 kg/m2       Blood Pressure          Most Recent Value    BP  (!)  161/81 [didn ot takebp medication today]      Allergies as of 5/9/2017     Cough Syrup [Guaifenesin]      Immunizations Administered on Date of Encounter " - 5/9/2017     None      Orders Placed During Today's Visit      Normal Orders This Visit    Cytology, urine     POCT Bladder Scan     POCT urine dipstick without microscope     Future Labs/Procedures Expected by Expires    US Retroperitoneal Complete (Kidney and  5/9/2017 5/9/2018      Instructions      Hematuria: Possible Causes     Many things can lead to blood in the urine (hematuria). The blood may be seen with the eye. Or it may only be seen when the urine is looked at under a microscope. Some of the most common causes of blood in the urine are listed below. Often, no cause for the blood can be found. This is called idiopathic hematuria.  · Kidney or bladder stones are collections of crystals. They form in the urine. Stones may be found anywhere in the urinary tract. But they form most often in the kidneys or bladder. In addition to blood in the urine, they can cause severe pain.  · BPH stands for benign prostatic hyperplasia. It is enlargement of the prostate gland. It happens as men age. BPH often causes problems with urination. It sometimes causes blood in the urine.  · A urinary tract infection (UTI) is due to bacteria growing in the urinary tract. It can cause blood in the urine. Other symptoms include burning or pain with urination. You may need to urinate often or urgently. You may also have a fever.  · Damage to the urinary tract may cause blood in the urine. This damage may be due to a blow or accident. It may also result from the use of a urinary catheter. Very hard exercise may sometimes irritate the urinary tract and cause bleeding.  · Cancer may occur anywhere in the urinary tract. A tumor may sometimes cause no symptoms other than bleeding.     Other possible causes of bleeding include:  · Prostatitis (infection of the prostate gland)  · Taking anticoagulant medications  · Blockage in the urinary tract  · Disease or inflammation of the kidney  · Cystic diseases of the kidneys  · Sickle cell  anemia  · Tumors of the urinary tract  Date Last Reviewed: 9/12/2014  © 0110-2235 [employer]. [address] [place] [address], [place] [address]. All rights reserved. This information is not intended as a substitute for professional medical care. Always follow your healthcare professional's instructions.             Language Assistance Services     ATTENTION: Language assistance services are available, free of charge. Please call [phone].      ATENCIÓN: Si habla español, tiene a minor disposición servicios gratuitos de asistencia lingüística. Llame al [phone].     CHÚ Ý: N?u b?n nói Ti?ng Vi?t, có các d?ch v? h? tr? ngôn ng? mi?n phí dành cho b?n. G?i s? [phone].         [facility] 4[facility] complies with applicable Federal civil rights laws and does not discriminate on the basis of race, color, national origin, age, disability, or sex.

## 2017-05-09 NOTE — PROGRESS NOTES
Subjective:       Patient ID: Alfa Christy III is a 61 y.o. male.    Chief Complaint: Hematuria (past couple of weeks. Last seen this morning)      HPI: Alfa Christy III is a 61 y.o. Black or  male with a hx of elevated psa and erectile dysfunction who presents today for evaluation and management of hematuria.  Last clinic visit was with SHARRON Nuno on 10/17/16.  Last seen with Dr. Mejia in 2015.     The patient reports yesterday having one episode of gross hematuria. He noticed it at the end of his stream. Good FOS (weak FOS at night) and complete emptying. No abdominal or flank pain. No hematuria today.  He also saw blood 2 weeks before. It stopped and now returned.   Reports frequency and nocturia 2-3 x. No increase in symptoms since hematuria.   Denies hesitancy, intermittency, or dysuria.   Takes pradaxa for a fib. Noticed frequency since starting pradaxa in 2015. He reports urinating every 45 min -1 hr.   He is no longer taking saw palmetto or urinozinc.   He does not take the viagra bc he is scared of effects.      He has a history of a negative prostate biopsy done in 04/2011 showing chronic inflammation. PSA was 5.4 at that time.     Previous/Current Smoker: no   Radiation therapy to pelvis: no  Chemotherapy: no   Personal/ family history of prostate/ bladder/ kidney cancer: no   Exposure to harmful chemicals: no  History of kidney stones/ prostatitis/ UTI: no       CTRSS 2016- No evidence of nephrolithiasis or obstructive uropathy. Prostatomegaly.    Review of patient's allergies indicates:   Allergen Reactions    Cough syrup [guaifenesin] Other (See Comments)       Current Outpatient Prescriptions   Medication Sig Dispense Refill    acetaminophen (TYLENOL) 500 MG tablet Take 1 tablet (500 mg total) by mouth every 6 (six) hours as needed for Pain.  0    atorvastatin (LIPITOR) 80 MG tablet Take 1 tablet (80 mg total) by mouth once daily. 90 tablet 4    BD INSULIN PEN NEEDLE UF MINI 31  "gauge x 3/16" Ndle To use with insulin pens 4  times daily 360 each 3    blood sugar diagnostic Strp 1 strip by Misc.(Non-Drug; Combo Route) route 3 (three) times daily. One Touch Verio 100 strip 11    carvedilol (COREG) 12.5 MG tablet Take 1 tablet (12.5 mg total) by mouth 2 (two) times daily. 180 tablet 4    coenzyme Q10 200 mg capsule Take 200 mg by mouth once daily. (Patient taking differently: Take 200 mg by mouth once daily. PT TAKEN EVERY MON, WED, FRI)      dabigatran etexilate (PRADAXA) 150 mg Cap Take 1 capsule (150 mg total) by mouth 2 (two) times daily. "Do NOT break, chew, or open capsules." 60 capsule 1    ERGOCALCIFEROL, VITAMIN D2, (VITAMIN D ORAL) Take 1 tablet by mouth every Mon, Wed, Fri.      folic acid (FOLVITE) 1 MG tablet Take 1 mg by mouth once daily.       furosemide (LASIX) 40 MG tablet Take 1 tablet (40 mg total) by mouth 2 (two) times daily. 90 tablet 4    insulin glargine (LANTUS SOLOSTAR) 100 unit/mL (3 mL) InPn pen Inject 14 Units into the skin every evening.      insulin lispro (HUMALOG KWIKPEN) 100 unit/mL InPn pen 12 units with breakfast, 9 units with lunch, 8 units with dinner plus sliding scale. 1 Box 4    levetiracetam (KEPPRA) 750 MG Tab Take 1 tablet (750 mg total) by mouth 2 (two) times daily. 180 tablet 4    levothyroxine (SYNTHROID) 200 MCG tablet Take 1 tablet (200 mcg total) by mouth once daily. 90 tablet 3    losartan (COZAAR) 100 MG tablet Take 1 tablet (100 mg total) by mouth once daily. 90 tablet 3    multivitamin capsule Take 1 capsule by mouth once daily. Pt taken every mon, wed, fri      tamsulosin (FLOMAX) 0.4 mg Cp24 Take 1 capsule (0.4 mg total) by mouth once daily. 30 capsule 11     No current facility-administered medications for this visit.        Past Medical History:   Diagnosis Date    Allergy     BMI 31.0-31.9,adult 11/25/2014    Cerebrovascular disease 7/25/2016    Chronic anticoagulation - pradaxa 11/10/2016    Chronic diastolic heart " failure 11/20/2016    CKD (chronic kidney disease), stage III 9/23/2013    Controlled type 2 diabetes with neuropathy 12/16/2014    Diabetes mellitus due to underlying condition with stage 3 chronic kidney disease, without long-term current use of insulin 11/2/2016    Elevated alkaline phosphatase level 8/1/2012    Elevated PSA     negative prostate biopsy 4/11    Erectile dysfunction associated with type 2 diabetes mellitus     Gallstones 3/20/2013    Generalized tonic-clonic seizure 11/2016    HTN (hypertension), benign 8/1/2012    Hypercholesterolemia 8/1/2012    Microcytic anemia 11/27/2013    Paroxysmal atrial fibrillation 9/23/2013    Postsurgical hypothyroidism 11/27/2013    Right homonymous hemianopsia 2/5/2016    Sickle cell trait     Stroke 1/27/2016    Thyroid cancer     multifocal with six lesions, largest 5mm, treated with surgery and radioactive iodine    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/2016       Past Surgical History:   Procedure Laterality Date    BREAST SURGERY      cyst removal    COLONOSCOPY      CYST REMOVAL      chest    PROSTATE BIOPSY  4/27/11    SKIN BIOPSY      THYROID SURGERY  8/26/09    VASCULAR SURGERY         Family History   Problem Relation Age of Onset    Heart disease Father     Diabetes Father     Hypertension Father     Diabetes Mother     Hypertension Mother     Heart disease Mother     Diabetes Brother     Cancer Sister     Thyroid disease Maternal Aunt     Clotting disorder Neg Hx          Review of Systems     Review of Systems   Constitutional: Negative for chills and fever.   HENT: Negative for facial swelling.    Eyes: Negative for visual disturbance.   Respiratory: Negative for chest tightness and shortness of breath.    Cardiovascular: Negative for chest pain and leg swelling.   Gastrointestinal: Negative for abdominal pain, constipation, nausea and vomiting.   Genitourinary: Positive for frequency and hematuria. Negative for  decreased urine volume, difficulty urinating, discharge, dysuria, enuresis, flank pain, genital sores, penile pain, penile swelling, scrotal swelling, testicular pain and urgency.   Musculoskeletal: Negative for back pain.   Neurological: Negative for dizziness, weakness and headaches.   Psychiatric/Behavioral: Negative for agitation and confusion.           Objective:     Vitals:    05/09/17 0933   BP: (!) 161/81   Pulse: 70     Physical Exam     Physical Exam   Nursing note and vitals reviewed.  Constitutional: He is oriented to person, place, and time. He appears well-developed and well-nourished.   HENT:   Head: Normocephalic.   Eyes: Right eye exhibits no discharge. Left eye exhibits no discharge.   Neck: Normal range of motion. Neck supple.   Cardiovascular: Normal rate.    Pulmonary/Chest: Effort normal.   Abdominal: Soft. He exhibits no distension and no mass. There is no tenderness. There is no rebound and no guarding.   Genitourinary: Rectum normal, testes normal and penis normal. Prostate is enlarged. Prostate is not tender.       Musculoskeletal: Normal range of motion.   Neurological: He is alert and oriented to person, place, and time.   Skin: Skin is warm and dry.     Psychiatric: He has a normal mood and affect. His behavior is normal. Judgment and thought content normal.     PVR today was 4 cc with bladder scanner.  UA today was + blood and negative. Spec grav 1.005 and ph 7.     Lab Results   Component Value Date    CREATININE 1.9 (H) 01/24/2017     Lab Results   Component Value Date    EGFRNONAA 36.7 (A) 01/24/2017     Lab Results   Component Value Date    ESTGFRAFRICA 42.4 (A) 01/24/2017         Assessment:       1. Gross hematuria    2. Benign non-nodular prostatic hyperplasia with lower urinary tract symptoms        Plan:     Alfa was seen today for hematuria.    Diagnoses and all orders for this visit:    Gross hematuria  -     POCT urine dipstick without microscope  -     US Retroperitoneal  Complete (Kidney and; Future  -     Cytology, urine  -     POCT Bladder Scan    Benign non-nodular prostatic hyperplasia with lower urinary tract symptoms  -     tamsulosin (FLOMAX) 0.4 mg Cp24; Take 1 capsule (0.4 mg total) by mouth once daily.      I explained to the patient that the causes of hematuria, whether it be gross hematuria or microhematuria, are many. Fortunately, for patients with microhematuria, the likelihood of finding an underlying  malignancy as the cause of the hematuria is very low at 1-2%. In patients with gross hematuria, the chances of an underlying  malignancy are higher but still low at 15-20%.    Nevertheless, I explained to the patient that the evaluation in both cases consists of upper tract imaging followed by flexible cystoscopy. I described the rationale and procedure for both and answered all questions.    Hematuria work up discussed in detail. We will proceed with Urinalysis, Urine cytology, renal US, RPG, (dt elevated creat) and Cystoscopy. Reviewed CTRSS from 2016.    Discussed side effects, indications, and MOA for flomax. Prescription sent to the pharmacy. Pt verbalized understanding.    I encouraged him or any of him family members to call or email me with questions and/or concerns.    Rosa Andujar NP

## 2017-05-10 DIAGNOSIS — R31.9 HEMATURIA: Primary | ICD-10-CM

## 2017-05-10 RX ORDER — CIPROFLOXACIN 250 MG/1
500 TABLET, FILM COATED ORAL ONCE
Status: CANCELLED | OUTPATIENT
Start: 2017-05-10 | End: 2017-05-10

## 2017-05-10 RX ORDER — LIDOCAINE HYDROCHLORIDE 20 MG/ML
JELLY TOPICAL ONCE
Status: CANCELLED | OUTPATIENT
Start: 2017-05-10 | End: 2017-05-10

## 2017-05-10 NOTE — PATIENT INSTRUCTIONS

## 2017-05-17 ENCOUNTER — HOSPITAL ENCOUNTER (OUTPATIENT)
Dept: RADIOLOGY | Facility: HOSPITAL | Age: 62
Discharge: HOME | End: 2017-05-17
Attending: NURSE PRACTITIONER
Payer: COMMERCIAL

## 2017-05-17 DIAGNOSIS — R31.0 GROSS HEMATURIA: ICD-10-CM

## 2017-05-17 PROCEDURE — 76770 US EXAM ABDO BACK WALL COMP: CPT | Mod: TC

## 2017-05-17 PROCEDURE — 76770 US EXAM ABDO BACK WALL COMP: CPT | Mod: 26,,, | Performed by: RADIOLOGY

## 2017-05-31 ENCOUNTER — LAB VISIT (OUTPATIENT)
Dept: LAB | Facility: HOSPITAL | Age: 62
End: 2017-05-31
Attending: NURSE PRACTITIONER
Payer: COMMERCIAL

## 2017-05-31 DIAGNOSIS — I50.32 CHRONIC DIASTOLIC HEART FAILURE: ICD-10-CM

## 2017-05-31 LAB
ANION GAP SERPL CALC-SCNC: 5 MMOL/L
BUN SERPL-MCNC: 17 MG/DL
CALCIUM SERPL-MCNC: 8.2 MG/DL
CHLORIDE SERPL-SCNC: 108 MMOL/L
CO2 SERPL-SCNC: 24 MMOL/L
CREAT SERPL-MCNC: 1.9 MG/DL
EST. GFR  (AFRICAN AMERICAN): 42.4 ML/MIN/1.73 M^2
EST. GFR  (NON AFRICAN AMERICAN): 36.7 ML/MIN/1.73 M^2
GLUCOSE SERPL-MCNC: 187 MG/DL
POTASSIUM SERPL-SCNC: 4.6 MMOL/L
SODIUM SERPL-SCNC: 137 MMOL/L

## 2017-05-31 PROCEDURE — 80048 BASIC METABOLIC PNL TOTAL CA: CPT

## 2017-05-31 PROCEDURE — 36415 COLL VENOUS BLD VENIPUNCTURE: CPT | Mod: PO

## 2017-05-31 NOTE — PROGRESS NOTES
Mr. Christy is a patient of Dr. Camacho and was last seen in [facility] 5/3/2017.      Subjective:   Patient ID:  Alfa Christy III is a 61 y.o. male who presents for follow-up of Chronic diastolic heart failure (4 weeks fu)  .   HPI:    Mr. Christy is a 62yo male with a PMHx of HFpEF (EF 55% on 1/28/2016), paroxysmal atrial fibrillation, CVA (stroke January 2016), HTN, HLD, and DM II here for follow-up. He reports that his legs are still swollen. He had doubled his lasix dose for one week and then returned to 40mg daily when he did not see a significant weight decrease. He also stopped weighing himself every day, and says his weight has not decreased. He says he does not consistently eat a low-salt diet. Mr. Christy denies chest pain with exertion or at rest, palpitations, SOB, AYALA, dizziness, syncope, claudication, PND, or orthopnea. He goes to the gym twice a week and experiences no limitations in his activity. He currently takes pradaxa. He was referred to urology for hematuria. He is also taking atorvastatin 80mg (LDL 17.4 on 4/10/2017), carvedilol 12.5 BID, and losartan 100mg. He has not taken his BP meds yet today. He says that his BPs at home range from 140s-150s systolic. He has DM and is on insulin. He has sickle cell trait and has microcytic anemia.      Blood pressure:    BP Readings from Last 5 Encounters:   06/01/17 (!) 163/107   05/09/17 (!) 161/81   05/03/17 (!) 159/75   04/17/17 (!) 146/72   04/02/17 135/60     Recent Cardiac Tests:    2D Echo (1/28/2016):  CONCLUSIONS     1 - Normal left ventricular systolic function (EF 55-60%).     2 - No regional wall motion abnormalities.     3 - Concentric hypertrophy.     4 - Left ventricular diastolic dysfunction.     5 - Trivial aortic regurgitation.     6 - Trivial tricuspid regurgitation, PASP 19 mmHg.     7 - No intracardiac source of embolus noted on this exam.  If high clinical suspicion, consider ADA +/- bubble study.     Current Outpatient  "Prescriptions   Medication Sig    acetaminophen (TYLENOL) 500 MG tablet Take 1 tablet (500 mg total) by mouth every 6 (six) hours as needed for Pain.    BD INSULIN PEN NEEDLE UF MINI 31 gauge x 3/16" Ndle To use with insulin pens 4  times daily    blood sugar diagnostic Strp 1 strip by Misc.(Non-Drug; Combo Route) route 3 (three) times daily. One Touch Verio    carvedilol (COREG) 12.5 MG tablet Take 1 tablet (12.5 mg total) by mouth 2 (two) times daily.    coenzyme Q10 200 mg capsule Take 200 mg by mouth once daily. (Patient taking differently: Take 200 mg by mouth once daily. PT TAKEN EVERY MON, WED, FRI)    dabigatran etexilate (PRADAXA) 150 mg Cap Take 1 capsule (150 mg total) by mouth 2 (two) times daily. "Do NOT break, chew, or open capsules."    ERGOCALCIFEROL, VITAMIN D2, (VITAMIN D ORAL) Take 1 tablet by mouth every Mon, Wed, Fri.    folic acid (FOLVITE) 1 MG tablet Take 1 mg by mouth once daily.     furosemide (LASIX) 40 MG tablet Take 1 tablet (40 mg total) by mouth 2 (two) times daily. (Patient taking differently: Take 40 mg by mouth once daily. )    insulin glargine (LANTUS SOLOSTAR) 100 unit/mL (3 mL) InPn pen Inject 14 Units into the skin every evening.    insulin lispro (HUMALOG KWIKPEN) 100 unit/mL InPn pen 12 units with breakfast, 9 units with lunch, 8 units with dinner plus sliding scale.    levetiracetam (KEPPRA) 750 MG Tab Take 1 tablet (750 mg total) by mouth 2 (two) times daily.    levothyroxine (SYNTHROID) 200 MCG tablet Take 1 tablet (200 mcg total) by mouth once daily.    losartan (COZAAR) 100 MG tablet Take 1 tablet (100 mg total) by mouth once daily.    multivitamin capsule Take 1 capsule by mouth once daily. Pt taken every mon, wed, fri    amlodipine (NORVASC) 5 MG tablet Take 1 tablet (5 mg total) by mouth once daily.    atorvastatin (LIPITOR) 40 MG tablet Take 1 tablet (40 mg total) by mouth once daily.     No current facility-administered medications for this visit.  " "      Review of Systems   Constitution: Negative for malaise/fatigue.   HENT: Negative for headaches.    Eyes: Negative for blurred vision.   Cardiovascular: Positive for leg swelling. Negative for chest pain, claudication, dyspnea on exertion, irregular heartbeat, orthopnea, palpitations, paroxysmal nocturnal dyspnea and syncope.   Respiratory: Negative for shortness of breath.    Hematologic/Lymphatic: Positive for bleeding problem.   Skin: Negative for rash.   Musculoskeletal: Negative for myalgias.   Gastrointestinal: Negative for abdominal pain, constipation, diarrhea and nausea.   Genitourinary: Positive for hematuria.   Neurological: Negative for loss of balance and numbness.        Peripheral neuropathy   Psychiatric/Behavioral: Negative for altered mental status.   Allergic/Immunologic: Negative for persistent infections.     Objective:     Right Arm BP - Sittin/77 (17)  Left Arm BP - Sittin/107 (17)    BP (!) 163/107 (BP Location: Left arm, Patient Position: Sitting, BP Method: Automatic)   Pulse (!) 48   Ht 5' 10" (1.778 m)   Wt 101.6 kg (223 lb 15.8 oz)   BMI 32.14 kg/m²     Physical Exam   Constitutional: He is oriented to person, place, and time. He appears well-developed and well-nourished.   HENT:   Head: Normocephalic.   Nose: Nose normal.   Eyes: Pupils are equal, round, and reactive to light.   Neck: Hepatojugular reflux and JVD present. Carotid bruit is not present.   Cardiovascular: Regular rhythm, S1 normal and S2 normal.  Frequent extrasystoles are present. Bradycardia present.  Exam reveals no gallop.    No murmur heard.  Pulses:       Carotid pulses are 2+ on the right side, and 2+ on the left side.       Radial pulses are 2+ on the right side, and 2+ on the left side.        Dorsalis pedis pulses are 1+ on the right side, and 1+ on the left side.   Pulmonary/Chest: Breath sounds normal. No respiratory distress.   Abdominal: Soft. Bowel sounds are " normal. He exhibits no distension. There is no tenderness.   Musculoskeletal: Normal range of motion. He exhibits edema (pitting LLE +2).   Neurological: He is alert and oriented to person, place, and time.   Skin: Skin is warm and dry. No erythema.   Psychiatric: He has a normal mood and affect. His speech is normal and behavior is normal.   Nursing note and vitals reviewed.    Lab Results   Component Value Date     05/31/2017    K 4.6 05/31/2017    MG 2.4 01/06/2017     05/31/2017    CO2 24 05/31/2017    BUN 17 05/31/2017    CREATININE 1.9 (H) 05/31/2017     (H) 05/31/2017    HGBA1C text 07/27/2012    AST 32 01/06/2017    ALT 20 01/06/2017    ALBUMIN 3.3 (L) 01/24/2017    PROT 7.5 01/06/2017    BILITOT 1.4 (H) 01/06/2017    WBC 11.36 11/07/2016    HGB 11.1 (L) 11/07/2016    HCT 31.0 (L) 11/07/2016    MCV 78 (L) 11/07/2016     11/07/2016    TSH 1.334 04/10/2017    CHOL 92 (L) 04/10/2017    HDL 25 (L) 04/10/2017    LDLCALC 17.4 (L) 04/10/2017    TRIG 248 (H) 04/10/2017         Recent Labs  Lab 01/27/16 2011 11/05/16  0853   INR 1.1 1.1        Test(s) Reviewed  I have reviewed the following in detail:  [] Stress test   [] Angiography   [] Echocardiogram   [x] Labs   [] Other:         Assessment:         1. Chronic diastolic heart failure. Patient somewhat volume overloaded on clinical exam. JVD. +2 pitting edema. Discussed increasing lasix dose. On BB, ARB, lasix.     2. Paroxysmal atrial fibrillation. On pradaxa. Discussed importance of medication compliance.     3. HTN (hypertension), benign. Uncontrolled.     4. Hypercholesterolemia. LDL 17.4 on 80mg atorvastatin. Can go down on this dose.     5. History of stroke. On pradaxa and statin.     6. CKD stage 3 due to type 2 diabetes mellitus. Creatinine 1.9 which is stable at this time. On insulin. Follow-up with PCP for DM management.       Plan:     Alfa was seen today for chronic diastolic heart failure.    Diagnoses and all orders for  this visit:    Chronic diastolic heart failure - Increase lasix to 80mg (2 tablets) in morning and 40mg (1 tablet) in the afternoon for 3 days.  THEN reduce to 1 40mg tablet in the morning and 1 40mg tablet in the afternoon.    Paroxysmal atrial fibrillation - Continue current medications.    HTN (hypertension), benign  -     amlodipine (NORVASC) 5 MG tablet; Take 1 tablet (5 mg total) by mouth once daily.  -     Basic metabolic panel; Future; Expected date: 06/08/2017    Hypercholesterolemia  -     atorvastatin (LIPITOR) 40 MG tablet; Take 1 tablet (40 mg total) by mouth once daily.  -     Lipid panel; Future; Expected date: 07/13/2017    History of stroke - Continue current medications.    CKD stage 3 due to type 2 diabetes mellitus - Follow up with PCP/endocrinologist for glucose management.    Return in about 6 weeks (around 7/13/2017).    --------------------------------------------------------------------------    RODNEY Leo, NP-C  Consult Cardiology

## 2017-06-01 ENCOUNTER — OFFICE VISIT (OUTPATIENT)
Dept: CARDIOLOGY | Facility: CLINIC | Age: 62
End: 2017-06-01
Payer: COMMERCIAL

## 2017-06-01 VITALS
HEIGHT: 70 IN | HEART RATE: 48 BPM | DIASTOLIC BLOOD PRESSURE: 107 MMHG | WEIGHT: 224 LBS | BODY MASS INDEX: 32.07 KG/M2 | SYSTOLIC BLOOD PRESSURE: 163 MMHG

## 2017-06-01 DIAGNOSIS — I48.0 PAROXYSMAL ATRIAL FIBRILLATION: Chronic | ICD-10-CM

## 2017-06-01 DIAGNOSIS — Z86.73 HISTORY OF STROKE: ICD-10-CM

## 2017-06-01 DIAGNOSIS — I10 HTN (HYPERTENSION), BENIGN: ICD-10-CM

## 2017-06-01 DIAGNOSIS — E11.22 CKD STAGE 3 DUE TO TYPE 2 DIABETES MELLITUS: ICD-10-CM

## 2017-06-01 DIAGNOSIS — I50.32 CHRONIC DIASTOLIC HEART FAILURE: Primary | ICD-10-CM

## 2017-06-01 DIAGNOSIS — E78.00 HYPERCHOLESTEROLEMIA: ICD-10-CM

## 2017-06-01 DIAGNOSIS — N18.30 CKD STAGE 3 DUE TO TYPE 2 DIABETES MELLITUS: ICD-10-CM

## 2017-06-01 PROCEDURE — 99999 PR PBB SHADOW E&M-EST. PATIENT-LVL III: CPT | Mod: PBBFAC,,, | Performed by: NURSE PRACTITIONER

## 2017-06-01 PROCEDURE — 99214 OFFICE O/P EST MOD 30 MIN: CPT | Mod: S$GLB,,, | Performed by: NURSE PRACTITIONER

## 2017-06-01 PROCEDURE — 3066F NEPHROPATHY DOC TX: CPT | Mod: S$GLB,,, | Performed by: NURSE PRACTITIONER

## 2017-06-01 RX ORDER — AMLODIPINE BESYLATE 5 MG/1
5 TABLET ORAL DAILY
Qty: 30 TABLET | Refills: 11 | Status: ON HOLD | OUTPATIENT
Start: 2017-06-01 | End: 2017-10-11 | Stop reason: DRUGHIGH

## 2017-06-01 RX ORDER — ATORVASTATIN CALCIUM 40 MG/1
40 TABLET, FILM COATED ORAL DAILY
Qty: 90 TABLET | Refills: 3 | Status: SHIPPED | OUTPATIENT
Start: 2017-06-01 | End: 2018-08-06 | Stop reason: SDUPTHER

## 2017-06-01 NOTE — PATIENT INSTRUCTIONS
Increase lasix to 80mg (2 tablets) in morning and 40mg (1 tablet) in the afternoon for 3 days.  THEN reduce to 1 40mg tablet in the morning and 1 40mg tablet in the afternoon.  Start amlodipine 5mg.  Reduce atorvastatin from 80mg daily to 40mg daily.  Low salt diet.  Patient has been encouraged to exercise a minimum of 30 minutes daily, 5 times per week.   Blood test in one week.  Refer to primary care for diabetes management.  Blood test in 6 weeks and return to clinic.

## 2017-06-09 ENCOUNTER — TELEPHONE (OUTPATIENT)
Dept: INTERNAL MEDICINE | Facility: CLINIC | Age: 62
End: 2017-06-09

## 2017-06-09 NOTE — TELEPHONE ENCOUNTER
----- Message from Elayne Valdez sent at 6/9/2017  9:23 AM CDT -----  Contact: self/[phone]  Patient called in regards need to talk with Dr Jasmine, patient was recommend by Cardiology to talk with his PCP. Please call and advise.         Thank you!!!

## 2017-06-16 ENCOUNTER — LAB VISIT (OUTPATIENT)
Dept: LAB | Facility: HOSPITAL | Age: 62
End: 2017-06-16
Attending: INTERNAL MEDICINE
Payer: COMMERCIAL

## 2017-06-16 DIAGNOSIS — E89.0 POSTOPERATIVE HYPOTHYROIDISM: ICD-10-CM

## 2017-06-16 DIAGNOSIS — N18.30 CKD (CHRONIC KIDNEY DISEASE), STAGE III: Chronic | ICD-10-CM

## 2017-06-16 LAB
BACTERIA #/AREA URNS AUTO: ABNORMAL /HPF
BILIRUB UR QL STRIP: NEGATIVE
CLARITY UR REFRACT.AUTO: CLEAR
COLOR UR AUTO: YELLOW
CREAT UR-MCNC: 107 MG/DL
GLUCOSE UR QL STRIP: ABNORMAL
HGB UR QL STRIP: ABNORMAL
HYALINE CASTS UR QL AUTO: 7 /LPF
KETONES UR QL STRIP: NEGATIVE
LEUKOCYTE ESTERASE UR QL STRIP: NEGATIVE
MICROSCOPIC COMMENT: ABNORMAL
NITRITE UR QL STRIP: NEGATIVE
PH UR STRIP: 6 [PH] (ref 5–8)
PROT UR QL STRIP: ABNORMAL
PROT UR-MCNC: 96 MG/DL
PROT/CREAT RATIO, UR: 0.9
RBC #/AREA URNS AUTO: 1 /HPF (ref 0–4)
SP GR UR STRIP: 1.01 (ref 1–1.03)
URN SPEC COLLECT METH UR: ABNORMAL
UROBILINOGEN UR STRIP-ACNC: NEGATIVE EU/DL
WBC #/AREA URNS AUTO: 1 /HPF (ref 0–5)
YEAST UR QL AUTO: ABNORMAL

## 2017-06-16 PROCEDURE — 81001 URINALYSIS AUTO W/SCOPE: CPT

## 2017-06-16 PROCEDURE — 82570 ASSAY OF URINE CREATININE: CPT

## 2017-06-16 RX ORDER — LEVOTHYROXINE SODIUM 200 UG/1
200 TABLET ORAL
Qty: 90 TABLET | Refills: 0 | Status: SHIPPED | OUTPATIENT
Start: 2017-06-16 | End: 2017-08-31 | Stop reason: SDUPTHER

## 2017-06-16 NOTE — TELEPHONE ENCOUNTER
----- Message from Polina Soriano sent at 6/16/2017  9:18 AM CDT -----  Contact: self/[phone]  Type: Orders Request    What orders/ testing are being requested? AC    Is there a future appointment scheduled for the patient with PCP?Y    When?7/11    Comments:Pt sees his cardiologist on 7/13 and was told to contact PCP about getting A1C completed before he sees the cardiologist.    Pls advise thanks

## 2017-06-17 ENCOUNTER — HOSPITAL ENCOUNTER (EMERGENCY)
Facility: HOSPITAL | Age: 62
Discharge: HOME | End: 2017-06-17
Attending: EMERGENCY MEDICINE
Payer: COMMERCIAL

## 2017-06-17 VITALS
HEART RATE: 63 BPM | OXYGEN SATURATION: 99 % | HEIGHT: 70 IN | WEIGHT: 220 LBS | RESPIRATION RATE: 16 BRPM | SYSTOLIC BLOOD PRESSURE: 177 MMHG | DIASTOLIC BLOOD PRESSURE: 80 MMHG | BODY MASS INDEX: 31.5 KG/M2 | TEMPERATURE: 98 F

## 2017-06-17 DIAGNOSIS — M54.2 NECK PAIN, ACUTE: ICD-10-CM

## 2017-06-17 DIAGNOSIS — D57.3 SICKLE-CELL TRAIT: Primary | ICD-10-CM

## 2017-06-17 LAB
ANION GAP SERPL CALC-SCNC: 10 MMOL/L
ANISOCYTOSIS BLD QL SMEAR: ABNORMAL
BASO STIPL BLD QL SMEAR: ABNORMAL
BASOPHILS # BLD AUTO: ABNORMAL K/UL
BASOPHILS NFR BLD: 1 %
BUN SERPL-MCNC: 25 MG/DL
CALCIUM SERPL-MCNC: 9.1 MG/DL
CHLORIDE SERPL-SCNC: 103 MMOL/L
CO2 SERPL-SCNC: 22 MMOL/L
CREAT SERPL-MCNC: 2.2 MG/DL
DACRYOCYTES BLD QL SMEAR: ABNORMAL
DIFFERENTIAL METHOD: ABNORMAL
EOSINOPHIL # BLD AUTO: ABNORMAL K/UL
EOSINOPHIL NFR BLD: 1 %
ERYTHROCYTE [DISTWIDTH] IN BLOOD BY AUTOMATED COUNT: 17.9 %
EST. GFR  (AFRICAN AMERICAN): 35.5 ML/MIN/1.73 M^2
EST. GFR  (NON AFRICAN AMERICAN): 30.7 ML/MIN/1.73 M^2
GIANT PLATELETS BLD QL SMEAR: PRESENT
GLUCOSE SERPL-MCNC: 308 MG/DL
HCT VFR BLD AUTO: 29.9 %
HGB BLD-MCNC: 10.7 G/DL
HYPOCHROMIA BLD QL SMEAR: ABNORMAL
LYMPHOCYTES # BLD AUTO: ABNORMAL K/UL
LYMPHOCYTES NFR BLD: 17 %
MCH RBC QN AUTO: 28.3 PG
MCHC RBC AUTO-ENTMCNC: 35.8 %
MCV RBC AUTO: 79 FL
MONOCYTES # BLD AUTO: ABNORMAL K/UL
MONOCYTES NFR BLD: 14 %
NEUTROPHILS NFR BLD: 66 %
NEUTS BAND NFR BLD MANUAL: 1 %
NRBC BLD-RTO: ABNORMAL /100 WBC
OVALOCYTES BLD QL SMEAR: ABNORMAL
PAPPENHEIMER BOD BLD QL SMEAR: PRESENT
PLATELET # BLD AUTO: 233 K/UL
PLATELET BLD QL SMEAR: ABNORMAL
PMV BLD AUTO: 12.3 FL
POIKILOCYTOSIS BLD QL SMEAR: SLIGHT
POLYCHROMASIA BLD QL SMEAR: ABNORMAL
POTASSIUM SERPL-SCNC: 4.7 MMOL/L
RBC # BLD AUTO: 3.78 M/UL
RETICS/RBC NFR AUTO: 5.9 %
SCHISTOCYTES BLD QL SMEAR: PRESENT
SODIUM SERPL-SCNC: 135 MMOL/L
TARGETS BLD QL SMEAR: ABNORMAL
WBC # BLD AUTO: 14.34 K/UL

## 2017-06-17 PROCEDURE — 80048 BASIC METABOLIC PNL TOTAL CA: CPT

## 2017-06-17 PROCEDURE — 85027 COMPLETE CBC AUTOMATED: CPT

## 2017-06-17 PROCEDURE — 99283 EMERGENCY DEPT VISIT LOW MDM: CPT

## 2017-06-17 PROCEDURE — 99284 EMERGENCY DEPT VISIT MOD MDM: CPT | Mod: ,,, | Performed by: EMERGENCY MEDICINE

## 2017-06-17 PROCEDURE — 85007 BL SMEAR W/DIFF WBC COUNT: CPT

## 2017-06-17 PROCEDURE — 85045 AUTOMATED RETICULOCYTE COUNT: CPT

## 2017-06-17 PROCEDURE — 25000003 PHARM REV CODE 250: Performed by: GENERAL PRACTICE

## 2017-06-17 RX ORDER — HYDROCODONE BITARTRATE AND ACETAMINOPHEN 5; 325 MG/1; MG/1
1 TABLET ORAL
Status: COMPLETED | OUTPATIENT
Start: 2017-06-17 | End: 2017-06-17

## 2017-06-17 RX ADMIN — HYDROCODONE BITARTRATE AND ACETAMINOPHEN 1 TABLET: 5; 325 TABLET ORAL at 05:06

## 2017-06-17 NOTE — ED TRIAGE NOTES
Alfa Christy III, a 61 y.o. male presents to the ED with c/o neck pain. Pt states that he was mowing his grass earlier today, and two hours later he started to have neck pain that radiated to back, arms and legs. Pt denies SoB, chest pain and vomiting. Pt endorses nausea and belching,.     Chief Complaint   Patient presents with    Neck Pain     Right sided neck pain that radiates down into shoulder and arm     Review of patient's allergies indicates:   Allergen Reactions    Cough syrup [guaifenesin] Other (See Comments)     Past Medical History:   Diagnosis Date    Allergy     BMI 31.0-31.9,adult 11/25/2014    Cerebrovascular disease 7/25/2016    Chronic anticoagulation - pradaxa 11/10/2016    Chronic diastolic heart failure 11/20/2016    CKD (chronic kidney disease), stage III 9/23/2013    Controlled type 2 diabetes with neuropathy 12/16/2014    Diabetes mellitus due to underlying condition with stage 3 chronic kidney disease, without long-term current use of insulin 11/2/2016    Elevated alkaline phosphatase level 8/1/2012    Elevated PSA     negative prostate biopsy 4/11    Erectile dysfunction associated with type 2 diabetes mellitus     Gallstones 3/20/2013    Generalized tonic-clonic seizure 11/2016    HTN (hypertension), benign 8/1/2012    Hypercholesterolemia 8/1/2012    Microcytic anemia 11/27/2013    Paroxysmal atrial fibrillation 9/23/2013    Postsurgical hypothyroidism 11/27/2013    Right homonymous hemianopsia 2/5/2016    Sickle cell trait     Stroke 1/27/2016    Thyroid cancer     multifocal with six lesions, largest 5mm, treated with surgery and radioactive iodine    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/2016

## 2017-06-17 NOTE — ED PROVIDER NOTES
Encounter Date: 6/17/2017       History     Chief Complaint   Patient presents with    Neck Pain     Right sided neck pain that radiates down into shoulder and arm     Review of patient's allergies indicates:   Allergen Reactions    Cough syrup [guaifenesin] Other (See Comments)     Mr Christy presents with shoulder and BLE pain. He states he has a history of sickle cell trait and that he gets pain crises every few years. He states he mowed his lawn today and starting at 7 this evening he began to have pain in his bilateral shoulder radiating across the neck and also in the bilateral thighs. This is consistent with previous crises he has had. He did not try any pain medication at home. He does not have any chest pain or SOB. He does not follow with a hematologist, does not have home pain medications, has never been hospitalized, and last had a crises last March. He has a history of CKD, IDDM, HTN.           Past Medical History:   Diagnosis Date    Allergy     BMI 31.0-31.9,adult 11/25/2014    Cerebrovascular disease 7/25/2016    Chronic anticoagulation - pradaxa 11/10/2016    Chronic diastolic heart failure 11/20/2016    CKD (chronic kidney disease), stage III 9/23/2013    Controlled type 2 diabetes with neuropathy 12/16/2014    Diabetes mellitus due to underlying condition with stage 3 chronic kidney disease, without long-term current use of insulin 11/2/2016    Elevated alkaline phosphatase level 8/1/2012    Elevated PSA     negative prostate biopsy 4/11    Erectile dysfunction associated with type 2 diabetes mellitus     Gallstones 3/20/2013    Generalized tonic-clonic seizure 11/2016    HTN (hypertension), benign 8/1/2012    Hypercholesterolemia 8/1/2012    Microcytic anemia 11/27/2013    Paroxysmal atrial fibrillation 9/23/2013    Postsurgical hypothyroidism 11/27/2013    Right homonymous hemianopsia 2/5/2016    Sickle cell trait     Stroke 1/27/2016    Thyroid cancer     multifocal  with six lesions, largest 5mm, treated with surgery and radioactive iodine    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/2016     Past Surgical History:   Procedure Laterality Date    BREAST SURGERY      cyst removal    COLONOSCOPY      CYST REMOVAL      chest    PROSTATE BIOPSY  4/27/11    SKIN BIOPSY      THYROID SURGERY  8/26/09    VASCULAR SURGERY       Family History   Problem Relation Age of Onset    Heart disease Father     Diabetes Father     Hypertension Father     Diabetes Mother     Hypertension Mother     Heart disease Mother     Diabetes Brother     Cancer Sister     Thyroid disease Maternal Aunt     Clotting disorder Neg Hx      Social History   Substance Use Topics    Smoking status: Never Smoker    Smokeless tobacco: Never Used    Alcohol use Yes     Review of Systems   Constitutional: Negative for chills and fatigue.   HENT: Negative for congestion and rhinorrhea.    Eyes: Negative for photophobia.   Respiratory: Negative for cough, choking and shortness of breath.    Cardiovascular: Negative for chest pain.   Gastrointestinal: Negative for abdominal pain and nausea.   Genitourinary: Negative for dysuria and hematuria.   Musculoskeletal: Positive for arthralgias and neck pain.   Skin: Negative for color change.       Physical Exam     Initial Vitals [06/17/17 0248]   BP Pulse Resp Temp SpO2   (!) 147/71 82 16 98 °F (36.7 °C) 98 %     Physical Exam    Nursing note and vitals reviewed.  Constitutional: He appears well-developed and well-nourished. No distress.   HENT:   Head: Normocephalic and atraumatic.   Eyes: EOM are normal. Pupils are equal, round, and reactive to light.   Neck: Normal range of motion.   Cardiovascular: Normal rate and regular rhythm.   Pulmonary/Chest: Breath sounds normal.   Abdominal: Soft. He exhibits no distension.   Musculoskeletal: Normal range of motion.        Right shoulder: He exhibits tenderness. He exhibits normal range of motion, no swelling, no  effusion and no deformity.        Left shoulder: He exhibits tenderness. He exhibits normal range of motion, no swelling and no deformity.        Cervical back: He exhibits tenderness. He exhibits normal range of motion, no bony tenderness, no swelling and no edema.        Right upper leg: He exhibits tenderness. He exhibits no bony tenderness.        Left upper leg: He exhibits tenderness. He exhibits no bony tenderness.   Neurological: He is alert and oriented to person, place, and time.   Skin: Skin is warm and dry. Capillary refill takes less than 2 seconds.         ED Course   Procedures  Labs Reviewed   CBC W/ AUTO DIFFERENTIAL - Abnormal; Notable for the following:        Result Value    WBC 14.34 (*)     RBC 3.78 (*)     Hemoglobin 10.7 (*)     Hematocrit 29.9 (*)     MCV 79 (*)     RDW 17.9 (*)     nRBC 5@L=100 (*)     Lymph% 17.0 (*)     All other components within normal limits   BASIC METABOLIC PANEL - Abnormal; Notable for the following:     Sodium 135 (*)     CO2 22 (*)     Glucose 308 (*)     BUN, Bld 25 (*)     Creatinine 2.2 (*)     eGFR if  35.5 (*)     eGFR if non  30.7 (*)     All other components within normal limits   RETICULOCYTES - Abnormal; Notable for the following:     Retic 5.9 (*)     All other components within normal limits                   APC / Resident Notes:   61M with sickle cell trait presents with sickle cell pain crisis in his shoulders and bilateral thighs since 7pm this evening. No home meds. On exam, with tenderness in bilateral thighs and bilateral shoulders. Differential includes sickle cell crisis, msk pain, acute chest syndrome (although no chest symptoms). Labs shows stable H/H and elevated reticulocytes. Patient reports relief of throbbing pain with norco and is amenable to discharge. Return precautions given.     Jennifer Arreaga MD  PGY-2, Emergency Medicine  5:36 AM 6/17/2017           Attending Attestation:   Physician Attestation  Statement for Resident:  As the supervising MD   Physician Attestation Statement: I have personally seen and examined this patient.   I agree with the above history. -:   As the supervising MD I agree with the above PE.    As the supervising MD I agree with the above treatment, course, plan, and disposition.  I have reviewed and agree with the residents interpretation of the following: lab data.  I have reviewed the following: old records at this facility.                    ED Course     Clinical Impression:   The primary encounter diagnosis was Sickle-cell trait. A diagnosis of Neck pain, acute was also pertinent to this visit.          Jennifer Arreaga MD  Resident  06/17/17 [id number]       Jorge Rae MD  06/22/17 [id number]

## 2017-06-19 ENCOUNTER — HOSPITAL ENCOUNTER (EMERGENCY)
Facility: HOSPITAL | Age: 62
Discharge: HOME | End: 2017-06-19
Attending: EMERGENCY MEDICINE
Payer: COMMERCIAL

## 2017-06-19 VITALS
HEART RATE: 68 BPM | SYSTOLIC BLOOD PRESSURE: 156 MMHG | RESPIRATION RATE: 18 BRPM | TEMPERATURE: 98 F | HEIGHT: 70 IN | WEIGHT: 215 LBS | OXYGEN SATURATION: 97 % | BODY MASS INDEX: 30.78 KG/M2 | DIASTOLIC BLOOD PRESSURE: 87 MMHG

## 2017-06-19 DIAGNOSIS — M54.2 NECK PAIN: Primary | ICD-10-CM

## 2017-06-19 PROCEDURE — 25000003 PHARM REV CODE 250: Performed by: PHYSICIAN ASSISTANT

## 2017-06-19 PROCEDURE — 99283 EMERGENCY DEPT VISIT LOW MDM: CPT | Mod: ,,, | Performed by: PHYSICIAN ASSISTANT

## 2017-06-19 PROCEDURE — 99283 EMERGENCY DEPT VISIT LOW MDM: CPT

## 2017-06-19 RX ORDER — ACETAMINOPHEN 325 MG/1
650 TABLET ORAL
Status: COMPLETED | OUTPATIENT
Start: 2017-06-19 | End: 2017-06-19

## 2017-06-19 RX ADMIN — ACETAMINOPHEN 650 MG: 325 TABLET ORAL at 10:06

## 2017-06-20 NOTE — ED TRIAGE NOTES
"Pt presents to the ED c c/o anterior aspect of his medial neck. Pt states that "feel like someone is in my neck hitting me with a rock in two locations and I cannot turn my head without hurting. If I stay still its manageable".     Pt states that he is a sickle cell carrier and then when he has a crisis, that the after effects are the feelings that he has now. Pt recently treated int he ED  Last week for a crisis.     Pt denies CP/SOA/N/V/D/chills/fever.   "

## 2017-06-20 NOTE — ED PROVIDER NOTES
Encounter Date: 6/19/2017    SCRIBE #1 NOTE: Janneth LÓPEZ PA-C, am scribing for, and in the presence of, Rosalind Contreras.       History     Chief Complaint   Patient presents with    Neck Pain     Was seen on Friday for neck pain. it has not improved.     Review of patient's allergies indicates:   Allergen Reactions    Cough syrup [guaifenesin] Other (See Comments)     Time seen by provider: 10:04 PM    This is a 61 y.o. male with a PMHx of thyroid ca with thyroidectomy, stroke, sickle cell trait, CKD, DM, HTN, and seizures who presents with complaint of neck pain. The patient states he has neck and back pain and neck stiffness. The patient believes this is part of his sickle cell crisis even though his crisis usually presents as pain in his extremities. The patient was seen in our ED 2 days ago for his usual sickle cell pain and presents today for pain that has traveled to his anterior neck.. He states overal, his pains have improved. He denies fever, chills, trouble swallowing, cough, abdominal pain. He took a tylenol this morning with relief in his symptoms until lunch time; he did not continue use. He reports some transient nausea and dizziness today that is now resolved.      The history is provided by the patient.     Past Medical History:   Diagnosis Date    Allergy     BMI 31.0-31.9,adult 11/25/2014    Cerebrovascular disease 7/25/2016    Chronic anticoagulation - pradaxa 11/10/2016    Chronic diastolic heart failure 11/20/2016    CKD (chronic kidney disease), stage III 9/23/2013    Controlled type 2 diabetes with neuropathy 12/16/2014    Diabetes mellitus due to underlying condition with stage 3 chronic kidney disease, without long-term current use of insulin 11/2/2016    Elevated alkaline phosphatase level 8/1/2012    Elevated PSA     negative prostate biopsy 4/11    Erectile dysfunction associated with type 2 diabetes mellitus     Gallstones 3/20/2013    Generalized tonic-clonic seizure  11/2016    HTN (hypertension), benign 8/1/2012    Hypercholesterolemia 8/1/2012    Microcytic anemia 11/27/2013    Paroxysmal atrial fibrillation 9/23/2013    Postsurgical hypothyroidism 11/27/2013    Right homonymous hemianopsia 2/5/2016    Sickle cell trait     Stroke 1/27/2016    Thyroid cancer     multifocal with six lesions, largest 5mm, treated with surgery and radioactive iodine    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/2016     Past Surgical History:   Procedure Laterality Date    BREAST SURGERY      cyst removal    COLONOSCOPY      CYST REMOVAL      chest    PROSTATE BIOPSY  4/27/11    SKIN BIOPSY      THYROID SURGERY  8/26/09    VASCULAR SURGERY       Family History   Problem Relation Age of Onset    Heart disease Father     Diabetes Father     Hypertension Father     Diabetes Mother     Hypertension Mother     Heart disease Mother     Diabetes Brother     Cancer Sister     Thyroid disease Maternal Aunt     Clotting disorder Neg Hx      Social History   Substance Use Topics    Smoking status: Never Smoker    Smokeless tobacco: Never Used    Alcohol use Yes     Review of Systems   Constitutional: Negative for chills and fever.   HENT: Negative for facial swelling, nosebleeds, sore throat and trouble swallowing.    Eyes: Negative for visual disturbance.   Respiratory: Negative for cough and shortness of breath.    Cardiovascular: Negative for chest pain and palpitations.   Gastrointestinal: Positive for nausea. Negative for abdominal distention, abdominal pain, diarrhea and vomiting.   Genitourinary: Negative for difficulty urinating, dysuria, frequency and hematuria.   Musculoskeletal: Positive for back pain, myalgias, neck pain and neck stiffness.   Skin: Negative for rash.   Neurological: Positive for dizziness. Negative for seizures, syncope and speech difficulty.       Physical Exam     Initial Vitals [06/19/17 1850]   BP Pulse Resp Temp SpO2   (!) 200/88 76 18 98.2 °F  "(36.8 °C) 98 %     Physical Exam    Vitals reviewed.  Constitutional: He appears well-developed and well-nourished.   HENT:   Head: Normocephalic and atraumatic.   Nose: Nose normal.   No thyroid or masses palpable (he is s/p thyroidectomy)   Eyes: Conjunctivae and EOM are normal.   Neck: Normal range of motion.   Cardiovascular: Normal rate, regular rhythm and normal heart sounds. Exam reveals no friction rub.    No murmur heard.  Pulmonary/Chest: Breath sounds normal. No respiratory distress. He has no wheezes. He has no rales.   Abdominal: Soft. Bowel sounds are normal. He exhibits no distension. There is no tenderness.   Musculoskeletal: Normal range of motion.        Arms:  Passive ROM and strength intact of neck. No cervical bony tenderness. +generalized TTP of anterior neck, bilateral medial clavicle, and upper sternum without focal bony tenderness. Skin without abnormalities.  FROM and strength of upper extremities.    Neurological: He is alert and oriented to person, place, and time. He has normal strength. No sensory deficit.   Skin: Skin is warm and dry. No erythema.   Psychiatric: He has a normal mood and affect. Thought content normal.         ED Course   Procedures  Labs Reviewed - No data to display          Medical Decision Making:   History:   Old Medical Records: I decided to obtain old medical records.       APC / Resident Notes:   Currently, patient states he has 0/10 pain.  His pain increases to 10/10 with palpation of the affected area.  I do not appreciate any bony tenderness.  I have considered but do not suspect strep throat, cervical fractures/abnormalities, cardiothoracic pathology, meningitis.  His symptoms today are most likely musculoskeletal in nature.  He does not need any labs or imaging at this time.  He has a history of sickle cell trait. He states that he has been having "sickle cell pain crisis" since the age of 5; I will refer to hematology.  He is to continue acetaminophen " since it improves his pain.  Return precaution given.  All questions answered.  He is comfortable with plan and stable for discharge.  I reviewed patient's chart and discussed this case with my supervising JAGJIT Das Attestation:   Scribe #1: I performed the above scribed service and the documentation accurately describes the services I performed. I attest to the accuracy of the note.    Attending Attestation:           Physician Attestation for Scribe:  Physician Attestation Statement for Scribe #1: I, Janneth Obrien PA-C, reviewed documentation, as scribed by Rosalind Contreras in my presence, and it is both accurate and complete.                 ED Course     Clinical Impression:   The encounter diagnosis was Neck pain.    Disposition:   Disposition: Discharged  Condition: Stable       Janneth Obrien PA-C  06/19/17 0208

## 2017-06-20 NOTE — DISCHARGE INSTRUCTIONS
Take acetaminophen/Tylenol as directed for pain relief until your symptoms improve.  Follow up with hematology/oncology.  Follow up with primary care physician.    Future Appointments  Date Time Provider Department Center   6/29/2017 10:30 AM Rachel Gold MD [facility] NEPHRO [place]   7/5/2017 8:30 AM HORACIO, UROLOGY [facility] CYSTO4 [facility]   7/11/2017 10:40 AM Tyler Jasmine MD [facility] IM [place] PCW   7/13/2017 8:30 AM LAB, APPOINTMENT [facility] LAB VNP [facility]   7/13/2017 9:00 AM Silva Stout NP [facility] CARDIO [place]

## 2017-06-21 ENCOUNTER — HOSPITAL ENCOUNTER (EMERGENCY)
Facility: HOSPITAL | Age: 62
Discharge: HOME | End: 2017-06-22
Attending: EMERGENCY MEDICINE
Payer: COMMERCIAL

## 2017-06-21 ENCOUNTER — NURSE TRIAGE (OUTPATIENT)
Dept: ADMINISTRATIVE | Facility: CLINIC | Age: 62
End: 2017-06-21

## 2017-06-21 DIAGNOSIS — E11.65 TYPE 2 DIABETES MELLITUS WITH HYPERGLYCEMIA, WITH LONG-TERM CURRENT USE OF INSULIN: Primary | ICD-10-CM

## 2017-06-21 DIAGNOSIS — Z79.4 TYPE 2 DIABETES MELLITUS WITH HYPERGLYCEMIA, WITH LONG-TERM CURRENT USE OF INSULIN: Primary | ICD-10-CM

## 2017-06-21 DIAGNOSIS — I10 BENIGN ESSENTIAL HYPERTENSION: ICD-10-CM

## 2017-06-21 DIAGNOSIS — E11.10 DKA (DIABETIC KETOACIDOSES): ICD-10-CM

## 2017-06-21 LAB
ALLENS TEST: ABNORMAL
B-OH-BUTYR BLD STRIP-SCNC: 0.1 MMOL/L
HCO3 UR-SCNC: 27.6 MMOL/L (ref 24–28)
PCO2 BLDA: 52 MMHG (ref 35–45)
PH SMN: 7.33 [PH] (ref 7.35–7.45)
PO2 BLDA: 37 MMHG (ref 40–60)
POC BE: 2 MMOL/L
POC SATURATED O2: 65 % (ref 95–100)
POC TCO2: 29 MMOL/L (ref 24–29)
SAMPLE: ABNORMAL
SITE: ABNORMAL

## 2017-06-21 PROCEDURE — 80053 COMPREHEN METABOLIC PANEL: CPT

## 2017-06-21 PROCEDURE — 85027 COMPLETE CBC AUTOMATED: CPT

## 2017-06-21 PROCEDURE — 99285 EMERGENCY DEPT VISIT HI MDM: CPT | Mod: ,,, | Performed by: EMERGENCY MEDICINE

## 2017-06-21 PROCEDURE — 82010 KETONE BODYS QUAN: CPT

## 2017-06-21 PROCEDURE — 82962 GLUCOSE BLOOD TEST: CPT

## 2017-06-21 PROCEDURE — 99284 EMERGENCY DEPT VISIT MOD MDM: CPT

## 2017-06-21 PROCEDURE — 93010 ELECTROCARDIOGRAM REPORT: CPT | Mod: S$GLB,,, | Performed by: INTERNAL MEDICINE

## 2017-06-21 PROCEDURE — 85007 BL SMEAR W/DIFF WBC COUNT: CPT

## 2017-06-21 PROCEDURE — 82803 BLOOD GASES ANY COMBINATION: CPT

## 2017-06-21 PROCEDURE — 93005 ELECTROCARDIOGRAM TRACING: CPT

## 2017-06-21 PROCEDURE — 96361 HYDRATE IV INFUSION ADD-ON: CPT

## 2017-06-21 PROCEDURE — 83605 ASSAY OF LACTIC ACID: CPT

## 2017-06-21 PROCEDURE — 96374 THER/PROPH/DIAG INJ IV PUSH: CPT

## 2017-06-21 PROCEDURE — 99900035 HC TECH TIME PER 15 MIN (STAT)

## 2017-06-21 PROCEDURE — 25000003 PHARM REV CODE 250: Performed by: STUDENT IN AN ORGANIZED HEALTH CARE EDUCATION/TRAINING PROGRAM

## 2017-06-21 RX ADMIN — SODIUM CHLORIDE 1000 ML: 0.9 INJECTION, SOLUTION INTRAVENOUS at 11:06

## 2017-06-22 ENCOUNTER — TELEPHONE (OUTPATIENT)
Dept: INTERNAL MEDICINE | Facility: CLINIC | Age: 62
End: 2017-06-22

## 2017-06-22 VITALS
OXYGEN SATURATION: 100 % | BODY MASS INDEX: 32.21 KG/M2 | HEART RATE: 61 BPM | WEIGHT: 225 LBS | DIASTOLIC BLOOD PRESSURE: 67 MMHG | TEMPERATURE: 98 F | RESPIRATION RATE: 14 BRPM | HEIGHT: 70 IN | SYSTOLIC BLOOD PRESSURE: 149 MMHG

## 2017-06-22 LAB
ALBUMIN SERPL BCP-MCNC: 3.3 G/DL
ALP SERPL-CCNC: 163 U/L
ALT SERPL W/O P-5'-P-CCNC: 25 U/L
ANION GAP SERPL CALC-SCNC: 7 MMOL/L
ANISOCYTOSIS BLD QL SMEAR: ABNORMAL
AST SERPL-CCNC: 26 U/L
BACTERIA #/AREA URNS AUTO: NORMAL /HPF
BASOPHILS # BLD AUTO: 0.04 K/UL
BASOPHILS NFR BLD: 0.4 %
BILIRUB SERPL-MCNC: 1.5 MG/DL
BILIRUB UR QL STRIP: NEGATIVE
BUN SERPL-MCNC: 26 MG/DL
CALCIUM SERPL-MCNC: 8.8 MG/DL
CHLORIDE SERPL-SCNC: 99 MMOL/L
CLARITY UR REFRACT.AUTO: CLEAR
CO2 SERPL-SCNC: 26 MMOL/L
COLOR UR AUTO: ABNORMAL
CREAT SERPL-MCNC: 2.3 MG/DL
DACRYOCYTES BLD QL SMEAR: ABNORMAL
DIFFERENTIAL METHOD: ABNORMAL
EOSINOPHIL # BLD AUTO: 0.3 K/UL
EOSINOPHIL NFR BLD: 3.4 %
ERYTHROCYTE [DISTWIDTH] IN BLOOD BY AUTOMATED COUNT: 17.5 %
EST. GFR  (AFRICAN AMERICAN): 33.7 ML/MIN/1.73 M^2
EST. GFR  (NON AFRICAN AMERICAN): 29.1 ML/MIN/1.73 M^2
GIANT PLATELETS BLD QL SMEAR: PRESENT
GLUCOSE SERPL-MCNC: 594 MG/DL
GLUCOSE UR QL STRIP: ABNORMAL
HCT VFR BLD AUTO: 30 %
HGB BLD-MCNC: 10.9 G/DL
HGB UR QL STRIP: NEGATIVE
HYPOCHROMIA BLD QL SMEAR: ABNORMAL
KETONES UR QL STRIP: NEGATIVE
LACTATE SERPL-SCNC: 1.6 MMOL/L
LEUKOCYTE ESTERASE UR QL STRIP: NEGATIVE
LYMPHOCYTES # BLD AUTO: 2.3 K/UL
LYMPHOCYTES NFR BLD: 23.3 %
MCH RBC QN AUTO: 28.2 PG
MCHC RBC AUTO-ENTMCNC: 36.3 %
MCV RBC AUTO: 78 FL
MICROSCOPIC COMMENT: NORMAL
MONOCYTES # BLD AUTO: 1.3 K/UL
MONOCYTES NFR BLD: 12.9 %
NEUTROPHILS # BLD AUTO: 5.9 K/UL
NEUTROPHILS NFR BLD: 59.7 %
NITRITE UR QL STRIP: NEGATIVE
OVALOCYTES BLD QL SMEAR: ABNORMAL
PAPPENHEIMER BOD BLD QL SMEAR: PRESENT
PH UR STRIP: 6 [PH] (ref 5–8)
PLATELET # BLD AUTO: 253 K/UL
PLATELET BLD QL SMEAR: ABNORMAL
PMV BLD AUTO: 11.7 FL
POCT GLUCOSE: 238 MG/DL (ref 70–110)
POCT GLUCOSE: 263 MG/DL (ref 70–110)
POCT GLUCOSE: >500 MG/DL (ref 70–110)
POIKILOCYTOSIS BLD QL SMEAR: SLIGHT
POLYCHROMASIA BLD QL SMEAR: ABNORMAL
POTASSIUM SERPL-SCNC: 4.7 MMOL/L
PROT SERPL-MCNC: 7.6 G/DL
PROT UR QL STRIP: NEGATIVE
RBC # BLD AUTO: 3.86 M/UL
SCHISTOCYTES BLD QL SMEAR: PRESENT
SODIUM SERPL-SCNC: 132 MMOL/L
SP GR UR STRIP: 1.01 (ref 1–1.03)
TARGETS BLD QL SMEAR: ABNORMAL
URN SPEC COLLECT METH UR: ABNORMAL
UROBILINOGEN UR STRIP-ACNC: NEGATIVE EU/DL
WBC # BLD AUTO: 9.81 K/UL
YEAST UR QL AUTO: NORMAL

## 2017-06-22 PROCEDURE — 25000003 PHARM REV CODE 250: Performed by: STUDENT IN AN ORGANIZED HEALTH CARE EDUCATION/TRAINING PROGRAM

## 2017-06-22 PROCEDURE — 81001 URINALYSIS AUTO W/SCOPE: CPT

## 2017-06-22 PROCEDURE — 63600175 PHARM REV CODE 636 W HCPCS: Performed by: STUDENT IN AN ORGANIZED HEALTH CARE EDUCATION/TRAINING PROGRAM

## 2017-06-22 RX ADMIN — SODIUM CHLORIDE 1000 ML: 0.9 INJECTION, SOLUTION INTRAVENOUS at 01:06

## 2017-06-22 RX ADMIN — INSULIN HUMAN 10 UNITS: 100 INJECTION, SOLUTION PARENTERAL at 12:06

## 2017-06-22 NOTE — TELEPHONE ENCOUNTER
"    Reason for Disposition   Blood glucose > 500 mg/dl (27.5 mmol/l)    Answer Assessment - Initial Assessment Questions  1. BLOOD GLUCOSE: "What is your blood glucose level?"       600     2. ONSET: "When did you check the blood glucose?"      2210 tonight.    3. USUAL RANGE: "What is your glucose level usually?" (e.g., usual fasting morning value, usual evening value)      Usually 200 at night, 180 in the AM    4. KETONES: "Do you check for ketones (urine or blood test strips)?" If yes, ask: "What does the test show now?"       No.    5. TYPE 1 or 2:  "Do you know what type of diabetes you have?"  (e.g., Type 1, Type 2, Gestational; doesn't know)       Type 2    6. INSULIN: "Do you take insulin?" If yes, ask: "Have you missed any shots recently?"      Yes.  He missed shots when he went to the hospital on Saturday and Monday. No missed shots today.    7. DIABETES PILLS: "Do you take any pills for your diabetes?" If yes, ask: "Have you missed taking any pills recently?"      No.    8. OTHER SYMPTOMS: "Do you have any symptoms?" (e.g., fever, frequent urination, difficulty breathing, dizziness, weakness, vomiting)      I feel fine.    9. PREGNANCY: "Is there any chance you are pregnant?" "When was your last menstrual period?"      N/a    Protocols used:  DIABETES - HIGH BLOOD SUGAR-[facility]      Recommended ED now to [facility] for her , Alfa, as his CBG is over 600 by glucometer at home.  He states he feels fine. She will bring him to [facility] on [address], which is where he wants to be taken, per [facility].  Message to RODNEY Solano in endocrine, and Tyler Jasmine MD , pcp.  Please contact caller directly with any additional care advice.      "

## 2017-06-22 NOTE — ED PROVIDER NOTES
"Encounter Date: 6/21/2017    SCRIBE #1 NOTE: I, Rosalind Contreras, am scribing for, and in the presence of,  Dr. Rowell. I have scribed the following portions of the note - the EKG reading and the Resident attestation. Other sections scribed: imaging.       History     Chief Complaint   Patient presents with    Hyperglycemia     Pt reports BG over 600 at home. Pt compliant with meds. BG over 500 in triage.     Patient is a 60yo M with history of Afib on Pradaxa, Chronic Diastolic HF, DM2 with CKD3, who presents with hyperglycemia since this evening. Patient was in their usual state of health until this evening, when he notes he routinely checked his blood sugar and found it to read "elevated". He notes no new symptoms, and notes adherence to his insulin regimen, which includes nightly basal insulin and mealtime insulin.          Review of patient's allergies indicates:   Allergen Reactions    Cough syrup [guaifenesin] Other (See Comments)     Past Medical History:   Diagnosis Date    Allergy     BMI 31.0-31.9,adult 11/25/2014    Cerebrovascular disease 7/25/2016    Chronic anticoagulation - pradaxa 11/10/2016    Chronic diastolic heart failure 11/20/2016    CKD (chronic kidney disease), stage III 9/23/2013    Controlled type 2 diabetes with neuropathy 12/16/2014    Diabetes mellitus due to underlying condition with stage 3 chronic kidney disease, without long-term current use of insulin 11/2/2016    Elevated alkaline phosphatase level 8/1/2012    Elevated PSA     negative prostate biopsy 4/11    Erectile dysfunction associated with type 2 diabetes mellitus     Gallstones 3/20/2013    Generalized tonic-clonic seizure 11/2016    HTN (hypertension), benign 8/1/2012    Hypercholesterolemia 8/1/2012    Microcytic anemia 11/27/2013    Paroxysmal atrial fibrillation 9/23/2013    Postsurgical hypothyroidism 11/27/2013    Right homonymous hemianopsia 2/5/2016    Sickle cell trait     Stroke 1/27/2016    " Thyroid cancer     multifocal with six lesions, largest 5mm, treated with surgery and radioactive iodine    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/2016     Past Surgical History:   Procedure Laterality Date    BREAST SURGERY      cyst removal    COLONOSCOPY      CYST REMOVAL      chest    PROSTATE BIOPSY  4/27/11    SKIN BIOPSY      THYROID SURGERY  8/26/09    VASCULAR SURGERY       Family History   Problem Relation Age of Onset    Heart disease Father     Diabetes Father     Hypertension Father     Diabetes Mother     Hypertension Mother     Heart disease Mother     Diabetes Brother     Cancer Sister     Thyroid disease Maternal Aunt     Clotting disorder Neg Hx      Social History   Substance Use Topics    Smoking status: Never Smoker    Smokeless tobacco: Never Used    Alcohol use Yes     Review of Systems   Constitutional: Negative for chills and fever.   HENT: Negative for congestion, rhinorrhea and sore throat.    Eyes: Negative for visual disturbance.   Respiratory: Negative for cough and shortness of breath.    Cardiovascular: Negative for chest pain.   Gastrointestinal: Negative for abdominal pain, constipation, diarrhea, nausea and vomiting.   Genitourinary: Negative for dysuria and hematuria.   Musculoskeletal: Negative for back pain.   Skin: Negative for rash.   Neurological: Negative for dizziness, syncope and headaches.       Physical Exam     Initial Vitals [06/21/17 2322]   BP Pulse Resp Temp SpO2   (!) 172/79 64 18 97.9 °F (36.6 °C) 100 %     Physical Exam    Nursing note and vitals reviewed.  Constitutional: He appears well-developed and well-nourished. He is not diaphoretic. No distress.   Family at bedside   HENT:   Head: Normocephalic and atraumatic.   Mouth/Throat: No oropharyngeal exudate.   Eyes: Pupils are equal, round, and reactive to light. Right eye exhibits no discharge. Left eye exhibits no discharge. No scleral icterus.   Neck: No tracheal deviation present. No  JVD present.   Cardiovascular: Normal rate, regular rhythm, normal heart sounds and intact distal pulses. Exam reveals no friction rub.    No murmur heard.  Pulmonary/Chest: Breath sounds normal. No respiratory distress. He has no wheezes. He has no rhonchi. He has no rales.   Abdominal: Soft. Bowel sounds are normal. He exhibits no distension. There is no tenderness. There is no guarding.   Obese abdomen     Musculoskeletal: He exhibits no edema or tenderness.   Lymphadenopathy:     He has no cervical adenopathy.   Neurological: He is alert and oriented to person, place, and time.   Moves all extremities and carries on conversation.      Skin: Skin is warm and dry. Capillary refill takes less than 2 seconds.   Psychiatric: He has a normal mood and affect.         ED Course   Procedures  Labs Reviewed   CBC W/ AUTO DIFFERENTIAL - Abnormal; Notable for the following:        Result Value    RBC 3.86 (*)     Hemoglobin 10.9 (*)     Hematocrit 30.0 (*)     MCV 78 (*)     MCHC 36.3 (*)     RDW 17.5 (*)     Mono # 1.3 (*)     All other components within normal limits   COMPREHENSIVE METABOLIC PANEL - Abnormal; Notable for the following:     Sodium 132 (*)     Glucose 594 (*)     BUN, Bld 26 (*)     Creatinine 2.3 (*)     Albumin 3.3 (*)     Total Bilirubin 1.5 (*)     Alkaline Phosphatase 163 (*)     Anion Gap 7 (*)     eGFR if  33.7 (*)     eGFR if non  29.1 (*)     All other components within normal limits   URINALYSIS - Abnormal; Notable for the following:     Glucose, UA 3+ (*)     All other components within normal limits   ISTAT PROCEDURE - Abnormal; Notable for the following:     POC PH 7.332 (*)     POC PCO2 52.0 (*)     POC PO2 37 (*)     POC SATURATED O2 65 (*)     All other components within normal limits   BETA - HYDROXYBUTYRATE, SERUM   LACTIC ACID, PLASMA   URINALYSIS MICROSCOPIC   POCT GLUCOSE MONITORING CONTINUOUS     EKG Readings: (Independently Interpreted)   Normal sinus  rhythm. 70 bpm, no ischemic changes       X-Rays:   Independently Interpreted Readings:   Chest X-Ray: Normal heart, normal lungs     Medical Decision Making:   History:   Old Medical Records: I decided to obtain old medical records.  Independently Interpreted Test(s):   I have ordered and independently interpreted X-rays - see prior notes.  I have ordered and independently interpreted EKG Reading(s) - see prior notes  Clinical Tests:   Lab Tests: Ordered and Reviewed  Radiological Study: Ordered and Reviewed  Medical Tests: Ordered and Reviewed       APC / Resident Notes:   PGY-2 MDM:   -Patient is a 61 y.o. presenting with a chief complaint of hyperglycemia since this evening. Vital signs stable, patient afebrile, non-tachycardic and non-hypoxic.   -I have low suspicion for DKA based on the patient having no other symptoms at this time. However, with sugar that elevated we will check VBG, UA, CMP for evidence of acidosis, ketonuria and AG. Will check UA/CXR for evidence of infection, and EKG for cardiac screening.   -Patient will require blood pressure re-check in 1 week with primary care physician.   -Hydration for hyperglycemia at this time.     Workup ongoing, will continue to re-assess patient and update management as needed.       Luis M Quinteros DO  [facility] EM/IM PGY-2  6/21/2017 11:38 PM       Patient Care Update:  -EKG shows Normal Sinus Rhythm, rate 70. Normal Axis. No acute ST segment changes.   -CXR shows no acute process.   -pH 7.33 and Betahydroxybutyrate normal.   -CMP pending.     Luis M Quinteros DO  [facility] EM/IM PGY-2  6/22/2017 12:19 AM            Scribe Attestation:   Scribe #1: I performed the above scribed service and the documentation accurately describes the services I performed. I attest to the accuracy of the note.    Attending Attestation:   Physician Attestation Statement for Resident:  As the supervising MD   Physician Attestation Statement: I have personally seen and examined this patient.   I  agree with the above history. -: Patient with anjali blood sugar. On exam his abdomen is negative, neuro intact. He is asymptomatic. He has hyperglycemia with no significant signs of acidosis. Will give fluids, insulin, and reassess.    As the supervising MD I agree with the above PE.    As the supervising MD I agree with the above treatment, course, plan, and disposition.          Physician Attestation for Scribe:  Physician Attestation Statement for Scribe #1: I, Dr. Rowell, reviewed documentation, as scribed by Rosalind Contreras in my presence, and it is both accurate and complete.                 ED Course     Clinical Impression:   Hyperglycemia                         Luis M Quinteros MD  Resident  06/22/17 [id number]       Brandon Rowell MD  06/22/17 0571

## 2017-06-22 NOTE — TELEPHONE ENCOUNTER
----- Message from Luz Pastora sent at 6/22/2017 12:16 PM CDT -----  Contact: pt [phone]  Pt need an appointment with the Hematology Dept pt went to the ER on yesterday and was told to call his PCP ,please advise

## 2017-06-28 ENCOUNTER — OFFICE VISIT (OUTPATIENT)
Dept: ENDOCRINOLOGY | Facility: CLINIC | Age: 62
End: 2017-06-28
Payer: COMMERCIAL

## 2017-06-28 VITALS
HEIGHT: 70 IN | SYSTOLIC BLOOD PRESSURE: 122 MMHG | WEIGHT: 221.69 LBS | DIASTOLIC BLOOD PRESSURE: 78 MMHG | BODY MASS INDEX: 31.74 KG/M2 | HEART RATE: 64 BPM

## 2017-06-28 DIAGNOSIS — D57.3 SICKLE CELL TRAIT: ICD-10-CM

## 2017-06-28 DIAGNOSIS — I10 HTN (HYPERTENSION), BENIGN: ICD-10-CM

## 2017-06-28 DIAGNOSIS — E89.0 POSTSURGICAL HYPOTHYROIDISM: ICD-10-CM

## 2017-06-28 DIAGNOSIS — E11.22 CKD STAGE 3 DUE TO TYPE 2 DIABETES MELLITUS: ICD-10-CM

## 2017-06-28 DIAGNOSIS — E66.9 OBESITY (BMI 30.0-34.9): ICD-10-CM

## 2017-06-28 DIAGNOSIS — E11.65 TYPE 2 DIABETES MELLITUS WITH HYPERGLYCEMIA, WITH LONG-TERM CURRENT USE OF INSULIN: Primary | ICD-10-CM

## 2017-06-28 DIAGNOSIS — Z85.850 HISTORY OF THYROID CANCER: ICD-10-CM

## 2017-06-28 DIAGNOSIS — E11.40 CONTROLLED TYPE 2 DIABETES WITH NEUROPATHY: ICD-10-CM

## 2017-06-28 DIAGNOSIS — E16.2 HYPOGLYCEMIA: ICD-10-CM

## 2017-06-28 DIAGNOSIS — I50.32 CHRONIC DIASTOLIC HEART FAILURE: ICD-10-CM

## 2017-06-28 DIAGNOSIS — Z79.4 TYPE 2 DIABETES MELLITUS WITH HYPERGLYCEMIA, WITH LONG-TERM CURRENT USE OF INSULIN: Primary | ICD-10-CM

## 2017-06-28 DIAGNOSIS — N18.30 CKD STAGE 3 DUE TO TYPE 2 DIABETES MELLITUS: ICD-10-CM

## 2017-06-28 DIAGNOSIS — E11.69 ERECTILE DYSFUNCTION ASSOCIATED WITH TYPE 2 DIABETES MELLITUS: ICD-10-CM

## 2017-06-28 DIAGNOSIS — Z79.01 CHRONIC ANTICOAGULATION: Chronic | ICD-10-CM

## 2017-06-28 DIAGNOSIS — N52.1 ERECTILE DYSFUNCTION ASSOCIATED WITH TYPE 2 DIABETES MELLITUS: ICD-10-CM

## 2017-06-28 DIAGNOSIS — R60.0 BILATERAL LEG EDEMA: ICD-10-CM

## 2017-06-28 PROCEDURE — 99999 PR PBB SHADOW E&M-EST. PATIENT-LVL IV: CPT | Mod: PBBFAC,,, | Performed by: NURSE PRACTITIONER

## 2017-06-28 PROCEDURE — 99214 OFFICE O/P EST MOD 30 MIN: CPT | Mod: S$GLB,,, | Performed by: NURSE PRACTITIONER

## 2017-06-28 PROCEDURE — 3066F NEPHROPATHY DOC TX: CPT | Mod: S$GLB,,, | Performed by: NURSE PRACTITIONER

## 2017-06-28 RX ORDER — INSULIN LISPRO 100 [IU]/ML
INJECTION, SOLUTION INTRAVENOUS; SUBCUTANEOUS
Qty: 3 BOX | Refills: 3
Start: 2017-06-28 | End: 2018-01-09 | Stop reason: SDUPTHER

## 2017-06-28 RX ORDER — INSULIN GLARGINE 100 [IU]/ML
16 INJECTION, SOLUTION SUBCUTANEOUS NIGHTLY
Qty: 3 BOX | Refills: 3
Start: 2017-06-28 | End: 2017-10-03

## 2017-06-28 NOTE — PROGRESS NOTES
CC: Patient is here for management of Type 2 diabetes and review of medical conditions as listed in visit diagnosis.      HPI: Mr. Alfa Christy III is a 61 y.o. Black or  male who was diagnosed with Type 2 DM in ~1990s. He was originally on orals, but started insulin in 2010. He was seen in ER for hyperglycemia in ~2010. (+) FH of DM in multiple family members. Has a history of CVA with diminished peripheral vision and also thyroid cancer, which was treated with surgery and radioactive iodine. Has sickle cell and last crisis in 2016.     Prior patient of Dr. Massey, seen by JAYANT Carroll NP early this year.  And is now being seen by me for the first time.  Pt has had frequent ED visits for sickle cell crisis/hypoglycemia, recent episode of hyperglycemia 600s.  Episodes occur after working in the yard, running errands.  Presents with logs today.    Arrives today accompanied by spouse.     Monitoring BG 3x/day.  AM: 181-368  Weeks of 6/2-6/14: 146-313  Lunch not avail   Dinner  Not avail  Lowest reading captured 55 mg/dl     Reports one episode of fatigue, feeling as though he would pass out. Treated and resolved after drinking regular soda, but did not check his BG at that time.     Denies missing any doses of insulin.     Takes Humalog in tandem with meals.     Walks for exercise, in 30 minute intervals.     Rarely skips meals. Has salad at night for dinner. Biggest meals are breakfast and lunch.     PREVIOUS DIABETES MEDICATIONS  Novolog  Metformin    CURRENT DIABETIC MEDS: Lantus 14 units QHS, Humalog 11-13 units plus correction scale    Last Podiatry Exam: N/A    REVIEW OF SYSTEMS  General: no weakness, fatigue, or weight changes.   Eyes: decreased peripheral vision in right eye; no eye pain or redness; last eye exam=june 2017 (mild cataracts in (L) eye- (Dr. Tejas Man)  Cardiovascular: no chest pain, palpitations, +edema, or murmurs.   Respiratory: no cough or + dyspnea upon exertion-improving.  "  GI: no heartburn, nausea, or changes in bowel patterns; good appetite.   Skin: no rashes, dryness, itching, or reactions at insulin injection sites; rotates insulin injection sites.   Neuro: no c/o numbness, tingling, or dizziness.   Endocrine: no polyuria, polydipsia, polyphagia, heat or cold intolerance.     Vital Signs  /78   Pulse 64   Ht 5' 10" (1.778 m)   Wt 100.5 kg (221 lb 10.8 oz)   BMI 31.81 kg/m²        Lab Results   Component Value Date    CREATININE 2.3 (H) 06/21/2017      Lab Results   Component Value Date    TSH 1.334 04/10/2017      Lab Results   Component Value Date    CHOL 92 (L) 04/10/2017    CHOL 195 11/05/2016    CHOL 110 (L) 08/09/2016     Lab Results   Component Value Date    HDL 25 (L) 04/10/2017    HDL 52 11/05/2016    HDL 46 08/09/2016     Lab Results   Component Value Date    LDLCALC 17.4 (L) 04/10/2017    LDLCALC 123.2 11/05/2016    LDLCALC 50.8 (L) 08/09/2016     Lab Results   Component Value Date    TRIG 248 (H) 04/10/2017    TRIG 99 11/05/2016    TRIG 66 08/09/2016     Lab Results   Component Value Date    CHOLHDL 27.2 04/10/2017    CHOLHDL 26.7 11/05/2016    CHOLHDL 41.8 08/09/2016      PHYSICAL EXAMINATION  Constitutional: Appears well, no distress  Neck: Supple, trachea midline.   Respiratory: no wheezes, even and unlabored.  Cardiovascular: RRR; no carotid bruits or murmurs.   Lymph: DP pulses  2+ bilaterally; +trace +1 BLE edema.   Skin: warm and dry; no injection site reactions, no acanthosis nigracans observed.  Neuro:patient alert and cooperative, normal affect.    Diabetes Foot Exam:   Done last visit per JAYANT Carroll DNP    Assessment/Plan  1. Type 2 diabetes mellitus with hyperglycemia, with long-term current use of insulin  Hemoglobin A1c next time  F/u in 3 mos  DE needed for dexcom, nutrition/carbs    needed in 1-2 weeks  Needs new meter > 4 years old  Discussed toujeo/tresiba start, d/c lantus after cgm  Discussed [id number] til after DE/cgm  Change humalog to " 12 units ac w/ low dose correction scale 180-230 +2, etc  If bg is less than 120 or anticipating yard work/activitiy (high)-only give humalog 6 units ac w/ lighter meals as well.    Basal adjustments will be needed after cgm/DE. As of now, change lantus to 16 units qhs, goal for fbg < 150 mg/dl    Issue new meter kit, glucagon kit  Counseling >35 mins        2. HTN (hypertension), benign  Continue med(s), controlled   3. CKD stage 3 due to type 2 diabetes mellitus  Estimated Creatinine Clearance: 39.1 mL/min (based on Cr of 2.3).  stable   4. Obesity (BMI 30.0-34.9)  Body mass index is 31.81 kg/m². may increase insulin resistance.   5. Erectile dysfunction associated with type 2 diabetes mellitus  See above, may need prn med   6. Postsurgical hypothyroidism  Lab Results   Component Value Date    TSH 1.334 04/10/2017     stable   7. History of thyroid cancer  See above    8. Chronic diastolic heart failure  Avoid hypoglycemia   9. Bilateral leg edema  F/u with cards q 3-6 mos    10. Hypoglycemia  Glucose Monitoring Continuous Min 72 Hours needs/ home use-brochure given.       FOLLOW UP  Return in about 3 months (around 9/28/2017).     Orders Placed This Encounter   Procedures    Hemoglobin A1c     Standing Status:   Future     Standing Expiration Date:   8/27/2018    Glucose Monitoring Continuous Min 72 Hours     Standing Status:   Future     Standing Expiration Date:   6/28/2018

## 2017-06-28 NOTE — PATIENT INSTRUCTIONS
Snacks can be an important part of a balanced, healthy meal plan. They allow you to eat more frequently, feeling full and satisfied throughout the day. Also, they allow you to spread carbohydrates evenly, which may stabilize blood sugars.  Plus, snacks are enjoyable!     The amount of carbohydrate needed at snacks varies. Generally, about 15-30 grams of carbohydrate per snack is recommended.  Below you will find some tasty treats.       0-5 gm carb   Crystal Light   Vitamin Water Zero   Herbal tea, unsweetened   2 tsp peanut butter on celery   1./2 cup sugar-free jell-o   1 sugar-free popsicle   ¼ cup blueberries   8oz Blue Demi unsweetened almond milk   5 baby carrots & celery sticks, cucumbers, bell peppers dipped in ¼ cup salsa, 2Tbsp light ranch dressing or 2Tbsp plain Greek yogurt   10 Goldfish crackers   ½ oz low-fat cheese or string cheese   1 closed handful of nuts, unsalted   1 Tbsp of sunflower seeds, unsalted   1 cup Smart Pop popcorn   1 whole grain brown rice cake        15 gm carb   1 small piece of fruit or ½ banana or 1/2 cup lite canned fruit   3 jakob cracker squares   3 cups Smart Pop popcorn, top spray butter, [place]irre lite salt or cinnamon and Truvia   5 Vanilla Wafers   ½ cup low fat, no added sugar ice cream or frozen yogurt (Blue bell, Blue Bunny, Weight Watchers, Skinny Cow)   ½ turkey, ham, or chicken sandwich   ½ c fruit with ½ c Cottage cheese   4-6 unsalted wheat crackers with 1 oz low fat cheese or 1 tbsp peanut butter    30-45 goldfish crackers (depending on flavor)    7-8 Jehovah's witness mini brown rice cakes (caramel, apple cinnamon, chocolate)    12 Jehovah's witness mini brown rice cakes (cheddar, bbq, ranch)    1/3 cup hummus dip with raw veg   1/2 whole wheat syeda, 1Tbsp hummus   Mini Pizza (1/2 whole wheat English muffin, low-fat  cheese, tomato sauce)   100 calorie snack pack (Oreo, Chips Ahoy, Ritz Mix, Baked Cheetos)   4-6 oz. light or Greek Style yogurt  (Staci Butler, Matt, [facility])   ½ cup sugar-free pudding     6 in. wheat tortilla or syeda oven toasted chips (topped with spray butter flavoring, cinnamon, Truvia OR spray butter, garlic powder, chili powder)    18 BBQ Popchips (available at [employer], Whole Foods, Fresh Market)                    Diabetes Support Group Meetings    Date Topics   February 9 Health Promotion/Nutrition   March 9 Taking Care of Your Kidneys   April 13 Taking Care of Your Feet   May 11 Ease Your Mind with Diabetes   June 8 Hurricane and Emergency Preparedness   July 13 Family & Caregiver Support for Diabetes   *August 17  Taking Care of Your Eyes   September 14 Devices & Technology   October 12 Recipes & Treats   November 9 Getting Pumped Up for Diabetes   December 14 Year-End Close Out            Meetings are held in the [facility] (A) of the [facility] located at [address] [place] [address]. Please call [phone] for additional information.    Free service, offered every 2nd Thursday of every month, except in August! Family members and/or friends are welcome as well!  Support group is for patients with type 1 or type 2 diabetes.    From 3:30p to 4:30p

## 2017-06-29 ENCOUNTER — OFFICE VISIT (OUTPATIENT)
Dept: NEPHROLOGY | Facility: CLINIC | Age: 62
End: 2017-06-29
Payer: COMMERCIAL

## 2017-06-29 VITALS
SYSTOLIC BLOOD PRESSURE: 140 MMHG | OXYGEN SATURATION: 98 % | WEIGHT: 223.56 LBS | BODY MASS INDEX: 32.01 KG/M2 | DIASTOLIC BLOOD PRESSURE: 70 MMHG | HEART RATE: 69 BPM | HEIGHT: 70 IN

## 2017-06-29 DIAGNOSIS — E11.22 CKD STAGE 3 DUE TO TYPE 2 DIABETES MELLITUS: Primary | ICD-10-CM

## 2017-06-29 DIAGNOSIS — N18.30 CKD STAGE 3 DUE TO TYPE 2 DIABETES MELLITUS: Primary | ICD-10-CM

## 2017-06-29 DIAGNOSIS — R80.9 PROTEINURIA, UNSPECIFIED TYPE: ICD-10-CM

## 2017-06-29 DIAGNOSIS — E11.65 TYPE 2 DIABETES MELLITUS WITH HYPERGLYCEMIA, UNSPECIFIED LONG TERM INSULIN USE STATUS: ICD-10-CM

## 2017-06-29 DIAGNOSIS — I50.32 CHRONIC DIASTOLIC HEART FAILURE: ICD-10-CM

## 2017-06-29 DIAGNOSIS — N25.81 SECONDARY HYPERPARATHYROIDISM: ICD-10-CM

## 2017-06-29 DIAGNOSIS — I10 HTN (HYPERTENSION), BENIGN: ICD-10-CM

## 2017-06-29 PROCEDURE — 3066F NEPHROPATHY DOC TX: CPT | Mod: S$GLB,,, | Performed by: INTERNAL MEDICINE

## 2017-06-29 PROCEDURE — 99215 OFFICE O/P EST HI 40 MIN: CPT | Mod: S$GLB,,, | Performed by: INTERNAL MEDICINE

## 2017-06-29 PROCEDURE — 99999 PR PBB SHADOW E&M-EST. PATIENT-LVL III: CPT | Mod: PBBFAC,,, | Performed by: INTERNAL MEDICINE

## 2017-06-29 NOTE — PROGRESS NOTES
Subjective:       Patient ID: Alfa Christy III is a 61 y.o. Black or  male who presents for follow upof CKD.      HPI     Mr. Christy was seen in follow up of CKD today. He was last seen on 2/1/17. He has CKD III due to poorly controlled diabetes, hypertension.     He has underlying longstanding diabetes, hypertension, recent CVA, paroxysmal atrial fibrillation, chronic anticoagulation use, hyperlipidemia, sickle cell trait, thyroid cancer, hyperlipidemiaen along with CKD III. He admits to prior uncontrolled nature of his diabetes. He also at some point was taking NSAID like Advil. His prior labs were reviewed for baseline creatinine of around 1.7 to 1.9. Per available labs since 2005 to 2007 he has had elevated creatinine with some episodes of JAELYN.     Most recently issues have been related to his poorly controlled diabetes and episodes of hypoglycemia if he tries to increase his physical activity. His blood sugars were in 300 to 500 range on most recent labs. He has been working closely with endocrine clinic for control his diabetes effectively.     Pt denies any nausea/ vomiting/ diarrhea/ fever. He has stable appetite. He denies any urinary symptoms at present.     Labs from 6/21/17 noted for Na 132, K 4.7, bicarbonate 26, BUN 26, creatinine 2.3, eGFR 33.7, Ca 8.8, albumin 3.3, phos 3.5, PTH 86 recently. Recent urine PC ratio was 0.9, urinalysis showed 3+ glucose, 1 RBC. US kidneys from 5/17 noted for 10.9, 10.4 cm kidney size, enlarged prostate, peripelvic cyst. This US was obtained by urology who has been following him for gross hematuria, BPH. Most recently his cardiologist has increased his diuretic regimen given worsening fluid overload.     Review of Systems   Constitutional: Negative for activity change, appetite change, chills, fatigue and fever.   HENT: Negative for hearing loss and nosebleeds.    Eyes: Negative for pain and discharge.   Respiratory: Positive for shortness of breath.  Negative for cough and wheezing.    Cardiovascular: Positive for leg swelling. Negative for chest pain.   Gastrointestinal: Negative for abdominal pain, diarrhea, nausea and vomiting.   Endocrine: Negative for polydipsia and polyuria.   Genitourinary: Negative for decreased urine volume, dysuria, flank pain, frequency and hematuria.   Musculoskeletal: Negative for back pain and myalgias.   Skin: Negative for pallor and rash.   Allergic/Immunologic: Negative for environmental allergies.   Neurological: Negative for dizziness and headaches.   Psychiatric/Behavioral: Negative for behavioral problems. The patient is not nervous/anxious.        Objective:      Physical Exam   Constitutional: He is oriented to person, place, and time. He appears well-developed and well-nourished. No distress.   HENT:   Head: Normocephalic and atraumatic.   Mouth/Throat: Oropharynx is clear and moist.   Neck: Neck supple.   Cardiovascular: Normal rate and normal heart sounds.    Pulmonary/Chest: Effort normal and breath sounds normal. He has no wheezes. He has no rales.   Abdominal: Soft. Bowel sounds are normal. There is no tenderness.   Musculoskeletal: He exhibits edema.   Neurological: He is alert and oriented to person, place, and time.   Skin: Skin is warm and dry.   Psychiatric: He has a normal mood and affect.   Vitals reviewed.      Assessment:       1. CKD stage 3 due to type 2 diabetes mellitus    2. Proteinuria, unspecified type    3. Type 2 diabetes mellitus with hyperglycemia, unspecified long term insulin use status    4. HTN (hypertension), benign    5. Chronic diastolic heart failure    6. Secondary hyperparathyroidism      Plan:     Mr. Christy has longstanding diabetes, hypertension, CVA, paroxysmal atrial fibrillation, chronic anticoagulation use, hyperlipidemia, sickle cell trait, thyroid cancer, hyperlipidemia and has CKD III. CKD is most likely due to longstanding and poorly controlled diabetes, likely also  hypertension and prior NSAID use. He has proteinuria of less than a gram which could be from diabetic nephropathy.     Pt has very poorly controlled diabetes with recent hyperglycemia crisis with blood glucose of 500, has fluid overload due to which his cardiologist has increased diuretic regimen. Given this his risk of further progression of CKD which is already IIIB is really significant. I had a detailed discussion about this with pt and his wife. Better glycemic control, strict low salt diet, BP goal of less than 130/80 are all very crucial for his CKD. His risk of progression to higher levels of CKD is very high.     CKD III  - labs reviewed and d/w them, mild hyponatremia seen on recent labs was likely due to uncontrolled hyperglycemia  - repeat labs in 6 weeks   - periodically monitor renal function and assess if any rapid decline  - avoid NSAID/ bactrim/ IV contrast/ gadolinium/ aminoglycoside/fleet enema/ PPI where possible  - continue to screen for CKD MBD, anemia of CKD, acid base disorder  - continue losartan for RAAS blockade   - Lobulation of kidney noted on US  - Continue follow up with urology given enlarged prostate and hematuria work up     CKD MBD  - continue to trend PTH, phos, Ca, vit D  - he has mild sec hyperpara    Proteinuria  - see above  - likely diabetic nephropathy and +/- hypertensive glomerulosclerosis  - continue RAAS blockade   - strict low salt diet     Fluid overload  - diuretic regimen per cardiology     Diabetes type 2 with renal manifestations  - management per endocrine  - avoid metformin since creatinine is above 1.4    Hypertension  - see above, strict low salt diet, home BP monitoring    Sickle cell trait, thyroid cancer, hyperlipidemia, atrial fibrillation management per PCP/ other MDs      RTC 6 weeks  Plan, labs, recommendations were discussed with patient, his questions were answered to his satisfaction.

## 2017-07-05 ENCOUNTER — HOSPITAL ENCOUNTER (OUTPATIENT)
Dept: UROLOGY | Facility: HOSPITAL | Age: 62
Discharge: HOME | End: 2017-07-05
Attending: UROLOGY
Payer: COMMERCIAL

## 2017-07-05 VITALS
HEIGHT: 70 IN | WEIGHT: 223.31 LBS | RESPIRATION RATE: 18 BRPM | BODY MASS INDEX: 31.97 KG/M2 | HEART RATE: 69 BPM | SYSTOLIC BLOOD PRESSURE: 193 MMHG | DIASTOLIC BLOOD PRESSURE: 96 MMHG | TEMPERATURE: 98 F

## 2017-07-05 DIAGNOSIS — R97.20 ELEVATED PSA: Primary | ICD-10-CM

## 2017-07-05 DIAGNOSIS — R31.9 HEMATURIA: ICD-10-CM

## 2017-07-05 PROCEDURE — 52000 CYSTOURETHROSCOPY: CPT | Mod: ,,, | Performed by: UROLOGY

## 2017-07-05 PROCEDURE — 52000 CYSTOURETHROSCOPY: CPT

## 2017-07-05 PROCEDURE — 88112 CYTOPATH CELL ENHANCE TECH: CPT | Mod: 26,,, | Performed by: PATHOLOGY

## 2017-07-05 PROCEDURE — 88112 CYTOPATH CELL ENHANCE TECH: CPT | Performed by: PATHOLOGY

## 2017-07-05 RX ORDER — LIDOCAINE HYDROCHLORIDE 20 MG/ML
10 JELLY TOPICAL
Status: DISCONTINUED | OUTPATIENT
Start: 2017-07-05 | End: 2017-10-06

## 2017-07-05 RX ORDER — LIDOCAINE HYDROCHLORIDE 20 MG/ML
JELLY TOPICAL ONCE
Status: COMPLETED | OUTPATIENT
Start: 2017-07-05 | End: 2017-07-05

## 2017-07-05 RX ORDER — CIPROFLOXACIN 500 MG/1
500 TABLET ORAL ONCE
Status: DISCONTINUED | OUTPATIENT
Start: 2017-07-05 | End: 2017-07-05

## 2017-07-05 RX ADMIN — LIDOCAINE HYDROCHLORIDE: 20 JELLY TOPICAL at 08:07

## 2017-07-05 NOTE — PATIENT INSTRUCTIONS
What to Expect After a Cystoscopy  For the next 24-48 hours, you may feel a mild burning when you urinate. This burning is normal and expected. Drink plenty of water to dilute the urine to help relieve the burning sensation. You may also see a small amount of blood in your urine after the procedure.    Unless you are already taking antibiotics, you may be given an antibiotic after the test to prevent infection.    Signs and Symptoms to Report  Call the [facility] at [phone] if you develop any of the following:  · Fever of 101 degrees or higher  · Chills or persistent bleeding  · Inability to urinate

## 2017-07-05 NOTE — PROCEDURES
Procedures: Flexible cystourethroscopy    Pre Procedure Diagnosis:gross hematuria    Post Procedure Diagnosis:BPH, large median lobe    Surgeon: Anahi Mejia MD    Anesthesia: 2% uro-jet lidocaine jelly for local analgesia    Flexible cysto-urethroscopy was performed after consent was obtained.  The risks and benefits were explained.    2% lidocaine urojet was used for local analgesia.  The genitalia was prepped and draped in the sterile fashion with betadine.    The flexible scope was advanced into the urethra and into the bladder.  Bilateral ureteral orifice were difficult to see.   The bladder was completely surveyed in a systematic fashion.   No bladder tumors or lesions were seen.  No strictures were noted.  The prostate showed Significant hypertrophy.  There was Significant median lobe present. 6cm in length.    The patient tolerated the procedure well without complication.    They will follow up in 2 months with psa prior. If still high, will recommend MRI as opposed to biopsy.   His gross hematuria is likely attributed to his BPH on twice daily pradaxa.  He is otherwise asymptomatic and without gross hematuria on once a day pradaxa.   He is having nocturia and urinary frequency, but is on lasix BID. His last dose of lasix is at 5:30pm. I asked him to speak with his cardiologist to see if he can take last lasix dose before 3pm.  Recommended stopping all fluids 2 hours before bedtime.   No obstructive symptoms at all, so therefore I do not recommend meds for BPH.   No post procedure antibiotics given according to guidelines.

## 2017-07-05 NOTE — H&P
Patient ID: Alfa Christy III is a 61 y.o. male.     Chief Complaint: Hematuria gross        HPI: Alfa Christy III is a 61 y.o. Black or  male with a hx of elevated psa and erectile dysfunction who presents today for evaluation and management of hematuria.  Last clinic visit was with SHARRON Nuno on 10/17/16.  Last seen with Dr. Mejia in 2015.     Lab Results   Component Value Date    PSA 7.2 (H) 10/28/2015    PSA 5.62 (H) 12/27/2012    PSA 8.08 (H) 06/26/2012    PSA 9.32 (H) 02/06/2012    PSA 9.4 (H) 10/28/2011    PSA 5.4 (H) 12/09/2010    PSA 6.3 (H) 02/10/2009    PSA 8.5 (H) 11/05/2008    PSA 4.1 (H) 10/13/2008    PSA 2.0 11/22/2005    PSADIAG 9.9 (H) 11/04/2016    PSADIAG 12.2 (H) 10/04/2016    PSADIAG 6.4 (H) 04/18/2016    PSADIAG 5.9 (H) 02/11/2015    PSADIAG 6.8 (H) 12/30/2014    PSADIAG 6.0 (H) 02/06/2014    PSADIAG 5.6 (H) 01/30/2014    PSADIAG 4.7 (H) 12/30/2013    PSATOTAL 8.2 (H) 10/22/2014    PSAFREE 2.55 (H) 10/22/2014    PSAFREEPCT 31.10 10/22/2014          Patient reported gross hematuria on pradaxa. . He noticed it at the end of his stream. Good FOS (weak FOS at night) and complete emptying. No abdominal or flank pain. No hematuria today.  He also saw blood 2 weeks before.   Reports frequency and nocturia 2-3 x. No increase in symptoms since hematuria.   Denies hesitancy, intermittency, or dysuria.   Takes pradaxa for a fib. Noticed frequency since starting pradaxa in 2015. He reports urinating every 45 min -1 hr.   He is no longer taking saw palmetto or urinozinc.   He does not take the viagra bc he is scared of effects.      He has a history of a negative prostate biopsy done in 04/2011 showing chronic inflammation. PSA was 5.4 at that time.      Previous/Current Smoker: no   Radiation therapy to pelvis: no  Chemotherapy: no   Personal/ family history of prostate/ bladder/ kidney cancer: no   Exposure to harmful chemicals: no  History of kidney stones/ prostatitis/ UTI: no  "        CTRSS 2016- No evidence of nephrolithiasis or obstructive uropathy. Prostatomegaly.          Review of patient's allergies indicates:   Allergen Reactions    Cough syrup [guaifenesin] Other (See Comments)         Current Medications          Current Outpatient Prescriptions   Medication Sig Dispense Refill    acetaminophen (TYLENOL) 500 MG tablet Take 1 tablet (500 mg total) by mouth every 6 (six) hours as needed for Pain.   0    atorvastatin (LIPITOR) 80 MG tablet Take 1 tablet (80 mg total) by mouth once daily. 90 tablet 4    BD INSULIN PEN NEEDLE UF MINI 31 gauge x 3/16" Ndle To use with insulin pens 4  times daily 360 each 3    blood sugar diagnostic Strp 1 strip by Misc.(Non-Drug; Combo Route) route 3 (three) times daily. One Touch Verio 100 strip 11    carvedilol (COREG) 12.5 MG tablet Take 1 tablet (12.5 mg total) by mouth 2 (two) times daily. 180 tablet 4    coenzyme Q10 200 mg capsule Take 200 mg by mouth once daily. (Patient taking differently: Take 200 mg by mouth once daily. PT TAKEN EVERY MON, WED, FRI)        dabigatran etexilate (PRADAXA) 150 mg Cap Take 1 capsule (150 mg total) by mouth 2 (two) times daily. "Do NOT break, chew, or open capsules." 60 capsule 1    ERGOCALCIFEROL, VITAMIN D2, (VITAMIN D ORAL) Take 1 tablet by mouth every Mon, Wed, Fri.        folic acid (FOLVITE) 1 MG tablet Take 1 mg by mouth once daily.         furosemide (LASIX) 40 MG tablet Take 1 tablet (40 mg total) by mouth 2 (two) times daily. 90 tablet 4    insulin glargine (LANTUS SOLOSTAR) 100 unit/mL (3 mL) InPn pen Inject 14 Units into the skin every evening.        insulin lispro (HUMALOG KWIKPEN) 100 unit/mL InPn pen 12 units with breakfast, 9 units with lunch, 8 units with dinner plus sliding scale. 1 Box 4    levetiracetam (KEPPRA) 750 MG Tab Take 1 tablet (750 mg total) by mouth 2 (two) times daily. 180 tablet 4    levothyroxine (SYNTHROID) 200 MCG tablet Take 1 tablet (200 mcg total) by mouth " once daily. 90 tablet 3    losartan (COZAAR) 100 MG tablet Take 1 tablet (100 mg total) by mouth once daily. 90 tablet 3    multivitamin capsule Take 1 capsule by mouth once daily. Pt taken every mon, wed, fri        tamsulosin (FLOMAX) 0.4 mg Cp24 Take 1 capsule (0.4 mg total) by mouth once daily. 30 capsule 11      No current facility-administered medications for this visit.                  Past Medical History:   Diagnosis Date    Allergy      BMI 31.0-31.9,adult 11/25/2014    Cerebrovascular disease 7/25/2016    Chronic anticoagulation - pradaxa 11/10/2016    Chronic diastolic heart failure 11/20/2016    CKD (chronic kidney disease), stage III 9/23/2013    Controlled type 2 diabetes with neuropathy 12/16/2014    Diabetes mellitus due to underlying condition with stage 3 chronic kidney disease, without long-term current use of insulin 11/2/2016    Elevated alkaline phosphatase level 8/1/2012    Elevated PSA       negative prostate biopsy 4/11    Erectile dysfunction associated with type 2 diabetes mellitus      Gallstones 3/20/2013    Generalized tonic-clonic seizure 11/2016    HTN (hypertension), benign 8/1/2012    Hypercholesterolemia 8/1/2012    Microcytic anemia 11/27/2013    Paroxysmal atrial fibrillation 9/23/2013    Postsurgical hypothyroidism 11/27/2013    Right homonymous hemianopsia 2/5/2016    Sickle cell trait      Stroke 1/27/2016    Thyroid cancer       multifocal with six lesions, largest 5mm, treated with surgery and radioactive iodine    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/2016               Past Surgical History:   Procedure Laterality Date    BREAST SURGERY         cyst removal    COLONOSCOPY        CYST REMOVAL         chest    PROSTATE BIOPSY   4/27/11    SKIN BIOPSY        THYROID SURGERY   8/26/09    VASCULAR SURGERY                   Family History   Problem Relation Age of Onset    Heart disease Father      Diabetes Father      Hypertension Father       Diabetes Mother      Hypertension Mother      Heart disease Mother      Diabetes Brother      Cancer Sister      Thyroid disease Maternal Aunt      Clotting disorder Neg Hx              Review of Systems      Review of Systems   Constitutional: Negative for chills and fever.   HENT: Negative for facial swelling.    Eyes: Negative for visual disturbance.   Respiratory: Negative for chest tightness and shortness of breath.    Cardiovascular: Negative for chest pain and leg swelling.   Gastrointestinal: Negative for abdominal pain, constipation, nausea and vomiting.   Genitourinary: Positive for frequency and hematuria. Negative for decreased urine volume, difficulty urinating, discharge, dysuria, enuresis, flank pain, genital sores, penile pain, penile swelling, scrotal swelling, testicular pain and urgency.   Musculoskeletal: Negative for back pain.   Neurological: Negative for dizziness, weakness and headaches.   Psychiatric/Behavioral: Negative for agitation and confusion.               Objective:          Vitals:     05/09/17 0933   BP: (!) 161/81   Pulse: 70      Physical Exam      Physical Exam   Nursing note and vitals reviewed.  Constitutional: He is oriented to person, place, and time. He appears well-developed and well-nourished.   HENT:   Head: Normocephalic.   Eyes: Right eye exhibits no discharge. Left eye exhibits no discharge.   Neck: Normal range of motion. Neck supple.   Cardiovascular: Normal rate.    Pulmonary/Chest: Effort normal.   Abdominal: Soft. He exhibits no distension and no mass. There is no tenderness. There is no rebound and no guarding.   Musculoskeletal: Normal range of motion.   Neurological: He is alert and oriented to person, place, and time.   Skin: Skin is warm and dry.   Psychiatric: He has a normal mood and affect. His behavior is normal. Judgment and thought content normal.             Lab Results   Component Value Date     CREATININE 1.9 (H) 01/24/2017             Lab Results   Component Value Date     EGFRNONAA 36.7 (A) 01/24/2017            Lab Results   Component Value Date     ESTGFRAFRICA 42.4 (A) 01/24/2017            Assessment:       1. Gross hematuria    2. Benign non-nodular prostatic hyperplasia with lower urinary tract symptoms        Plan:    cysto today.

## 2017-07-11 ENCOUNTER — OFFICE VISIT (OUTPATIENT)
Dept: INTERNAL MEDICINE | Facility: CLINIC | Age: 62
End: 2017-07-11
Payer: COMMERCIAL

## 2017-07-11 VITALS
BODY MASS INDEX: 32.26 KG/M2 | DIASTOLIC BLOOD PRESSURE: 68 MMHG | HEART RATE: 60 BPM | HEIGHT: 70 IN | TEMPERATURE: 98 F | WEIGHT: 225.31 LBS | SYSTOLIC BLOOD PRESSURE: 126 MMHG | OXYGEN SATURATION: 96 %

## 2017-07-11 DIAGNOSIS — D64.9 MILD ANEMIA: ICD-10-CM

## 2017-07-11 DIAGNOSIS — E89.0 POSTSURGICAL HYPOTHYROIDISM: ICD-10-CM

## 2017-07-11 DIAGNOSIS — I10 HTN (HYPERTENSION), BENIGN: ICD-10-CM

## 2017-07-11 DIAGNOSIS — H53.9 CHANGE IN VISION: ICD-10-CM

## 2017-07-11 DIAGNOSIS — D57.3 SICKLE CELL TRAIT: Primary | ICD-10-CM

## 2017-07-11 DIAGNOSIS — Z79.4 TYPE 2 DIABETES MELLITUS WITH HYPERGLYCEMIA, WITH LONG-TERM CURRENT USE OF INSULIN: ICD-10-CM

## 2017-07-11 DIAGNOSIS — G40.409 GENERALIZED TONIC-CLONIC SEIZURE: ICD-10-CM

## 2017-07-11 DIAGNOSIS — I67.9 CEREBROVASCULAR DISEASE: ICD-10-CM

## 2017-07-11 DIAGNOSIS — N18.30 CKD STAGE 3 DUE TO TYPE 2 DIABETES MELLITUS: ICD-10-CM

## 2017-07-11 DIAGNOSIS — E11.22 CKD STAGE 3 DUE TO TYPE 2 DIABETES MELLITUS: ICD-10-CM

## 2017-07-11 DIAGNOSIS — I50.32 CHRONIC DIASTOLIC HEART FAILURE: ICD-10-CM

## 2017-07-11 DIAGNOSIS — E11.65 TYPE 2 DIABETES MELLITUS WITH HYPERGLYCEMIA, WITH LONG-TERM CURRENT USE OF INSULIN: ICD-10-CM

## 2017-07-11 PROCEDURE — 90732 PPSV23 VACC 2 YRS+ SUBQ/IM: CPT | Mod: S$GLB,,, | Performed by: INTERNAL MEDICINE

## 2017-07-11 PROCEDURE — 99214 OFFICE O/P EST MOD 30 MIN: CPT | Mod: 25,S$GLB,, | Performed by: INTERNAL MEDICINE

## 2017-07-11 PROCEDURE — 99999 PR PBB SHADOW E&M-EST. PATIENT-LVL IV: CPT | Mod: PBBFAC,,, | Performed by: INTERNAL MEDICINE

## 2017-07-11 PROCEDURE — 90471 IMMUNIZATION ADMIN: CPT | Mod: S$GLB,,, | Performed by: INTERNAL MEDICINE

## 2017-07-11 PROCEDURE — 3066F NEPHROPATHY DOC TX: CPT | Mod: S$GLB,,, | Performed by: INTERNAL MEDICINE

## 2017-07-11 RX ORDER — FOLIC ACID 1 MG/1
1 TABLET ORAL DAILY
Qty: 90 TABLET | Refills: 1 | Status: ON HOLD | OUTPATIENT
Start: 2017-07-11 | End: 2017-10-05

## 2017-07-11 NOTE — PROGRESS NOTES
"Subjective:       Patient ID: Alfa Christy III is a 62 y.o. male.    Chief Complaint: Diabetes    HPI  60 y/o man with h/o CVA (1/2016), DM2, CKD, HTN, hypothyroidism, paroxysmal atrial fibrillation, chronic diastolic HF, multiple other medical issues here for follow-up.  Has been to ER multiple times since last visit.    Multiple ER visits in past month for pain, attributes to pain crises related to sickle cell trait, happens more often if he gets dehydrated, especially if sugars are high.   Hasn't seen hematologist, requests referral.    CVA 1/2016 with seizure 11/2016 - no seizures since last visit. Taking keppra as prescribed.   Feels his balance has improved, able to walk without problem. "I'm not playing basketball or anything, but I feel better."   Continues to have partial loss of vision in R eye.   Due for follow up with Dr Santiago at 6 months.      HFpEF, HTN, paroxysmal atrial fibrillation -   No dyspnea, chest pain, or palpitations.   Saw Dr Stout with cardiology 5/2017     DM2 - was following with Dr Massey, now following with Endocrine clinic NP, went for DM Education after last visit, and then saw Azra Martínez on 6/28/17. Reports BG somewhat better controlled but still sometimes in 200s; better when he is exercising regularly.   Follows with Dr Man for eye exam ([facility]) - brings in report which recommends that he see a neuro-ophthalmologist if possible.   Sodium low and glucose high on last labs 6/21/17 from ER.    CKD stage 3 - now following with Dr Gold    H/o thyroid cancer in 2009, s/p thyroidectomy, with postsurgical hypothyroidism - on synthroid 200mcg daily. No temperature intolerance, diarrhea/constipation, hair/skin changes, tremor, palpitations, or weight change.   Now waiting full hour in between taking thyroid medication and eating.     Hematuria, elevated PSA, ED - follows with Urology. Cystoscopy done earlier this month, normal. Urology recommended " "repeat PSA in 2 months.   No blood in urine on most recent check.    Mild microcytic anemia - Hgb stable at 10.7. MCV 79    Scheduled for labs 7/13/17.    Review of Systems   Constitutional: Negative for activity change and fever.   HENT: Negative for congestion and sore throat.    Respiratory: Negative for cough and shortness of breath.    Cardiovascular: Positive for leg swelling. Negative for chest pain and palpitations.   Gastrointestinal: Negative for abdominal pain, diarrhea and nausea.   Endocrine: Negative for cold intolerance and heat intolerance.   Genitourinary: Negative for decreased urine volume, difficulty urinating and frequency.   Musculoskeletal: Negative for arthralgias, back pain (not currently), gait problem and neck pain (not currently).   Skin: Negative for rash.   Neurological: Negative for weakness and headaches.        Balance problem - improving   Psychiatric/Behavioral: Negative for dysphoric mood.         Past medical history, surgical history, and family medical history reviewed and updated as appropriate.    Medications and allergies reviewed.     Objective:          Vitals:    07/11/17 1100   BP: 126/68   BP Location: Left arm   Patient Position: Sitting   Pulse: 60   Temp: 98 °F (36.7 °C)   TempSrc: Oral   SpO2: 96%   Weight: 102.2 kg (225 lb 5 oz)   Height: 5' 10" (1.778 m)     Body mass index is 32.33 kg/m².  Physical Exam   Constitutional: He is oriented to person, place, and time. He appears well-developed and well-nourished. No distress.   HENT:   Head: Normocephalic and atraumatic.   Mouth/Throat: Oropharynx is clear and moist.   Eyes: Conjunctivae and EOM are normal. Pupils are equal, round, and reactive to light.   Neck: Normal range of motion. Neck supple.   Cardiovascular: Normal rate, regular rhythm and normal heart sounds.    No murmur heard.  Pulmonary/Chest: Effort normal and breath sounds normal.   Abdominal: Soft. Bowel sounds are normal. He exhibits no distension. " There is no tenderness.   Musculoskeletal: Normal range of motion. He exhibits edema (2+ pitting edema to mid-shin bilaterally). He exhibits no tenderness.   Lymphadenopathy:     He has no cervical adenopathy.   Neurological: He is alert and oriented to person, place, and time. No cranial nerve deficit.   R peripheral vision deficit   Skin: Skin is warm and dry.   Loss of hair at lower legs  +hyperpigmentation of skin at anterior shins bilaterally   Psychiatric: He has a normal mood and affect.   Vitals reviewed.      Lab Results   Component Value Date    WBC 9.81 06/21/2017    HGB 10.9 (L) 06/21/2017    HCT 30.0 (L) 06/21/2017     06/21/2017    CHOL 92 (L) 04/10/2017    TRIG 248 (H) 04/10/2017    HDL 25 (L) 04/10/2017    ALT 25 06/21/2017    AST 26 06/21/2017     (L) 06/21/2017    K 4.7 06/21/2017    CL 99 06/21/2017    CREATININE 2.3 (H) 06/21/2017    BUN 26 (H) 06/21/2017    CO2 26 06/21/2017    TSH 1.334 04/10/2017    PSA 7.2 (H) 10/28/2015    INR 1.1 11/05/2016    GLUF 106 11/05/2008    HGBA1C text 07/27/2012       Assessment:       1. Sickle cell trait    2. Mild anemia    3. Type 2 diabetes mellitus with hyperglycemia, with long-term current use of insulin    4. Postsurgical hypothyroidism    5. CKD stage 3 due to type 2 diabetes mellitus    6. Chronic diastolic heart failure    7. HTN (hypertension), benign    8. Cerebrovascular disease    9. Generalized tonic-clonic seizure    10. Change in vision        Plan:   Alfa was seen today for diabetes.    Diagnoses and all orders for this visit:    Sickle cell trait - having pain that he attributes to this. Will give new rx for folate. See hematologist.  -     Ambulatory referral to Hematology / Oncology  -     folic acid (FOLVITE) 1 MG tablet; Take 1 tablet (1 mg total) by mouth once daily.    Mild anemia  -     Ambulatory referral to Hematology / Oncology  -     folic acid (FOLVITE) 1 MG tablet; Take 1 tablet (1 mg total) by mouth once  daily.    Type 2 diabetes mellitus with hyperglycemia, with long-term current use of insulin - above goal; encouraged him to check in with DM team for review of glucose logs. Encouraged to work on regular exercise.  -     Pneumococcal Polysaccharide Vaccine (23 Valent) (SQ/IM)    Postsurgical hypothyroidism - now taking medication appropriately. Clinically euthyroid; will check TSH with next labs    CKD stage 3 due to type 2 diabetes mellitus - continue to follow with Dr Gold    Chronic diastolic heart failure  HTN (hypertension), benign - BP at goal today, continue current meds, follow up with Cardiology as planned.    Cerebrovascular disease  -     Ambulatory consult to Ophthalmology  Generalized tonic-clonic seizure  -     Ambulatory consult to Ophthalmology  Change in vision  -     Ambulatory consult to Ophthalmology  Referral placed a recommended by his optometrist. Outside report will be scanned in to [facility].    Health maintenance reviewed with patient. Pneumovax today.  Repeat A1c already ordered by his DM team.    Return in about 4 months (around 11/11/2017) for sickle cell trait, DM2, CKD.    Tyler Jasmine MD  Internal Medicine  [facility]  7/11/2017

## 2017-07-12 ENCOUNTER — TELEPHONE (OUTPATIENT)
Dept: HEMATOLOGY/ONCOLOGY | Facility: CLINIC | Age: 62
End: 2017-07-12

## 2017-07-12 NOTE — TELEPHONE ENCOUNTER
Called patient to schedule referral for hematologist (anemia).  Called both numbers.  Left message to callback.

## 2017-07-13 ENCOUNTER — TELEPHONE (OUTPATIENT)
Dept: CARDIOLOGY | Facility: CLINIC | Age: 62
End: 2017-07-13

## 2017-07-13 ENCOUNTER — LAB VISIT (OUTPATIENT)
Dept: LAB | Facility: HOSPITAL | Age: 62
End: 2017-07-13
Payer: COMMERCIAL

## 2017-07-13 ENCOUNTER — TELEPHONE (OUTPATIENT)
Dept: HEMATOLOGY/ONCOLOGY | Facility: CLINIC | Age: 62
End: 2017-07-13

## 2017-07-13 ENCOUNTER — OFFICE VISIT (OUTPATIENT)
Dept: CARDIOLOGY | Facility: CLINIC | Age: 62
End: 2017-07-13
Payer: COMMERCIAL

## 2017-07-13 VITALS
SYSTOLIC BLOOD PRESSURE: 153 MMHG | HEIGHT: 70 IN | HEART RATE: 52 BPM | DIASTOLIC BLOOD PRESSURE: 79 MMHG | OXYGEN SATURATION: 100 % | WEIGHT: 224.19 LBS | BODY MASS INDEX: 32.1 KG/M2

## 2017-07-13 DIAGNOSIS — I50.32 CHRONIC DIASTOLIC HEART FAILURE: Primary | ICD-10-CM

## 2017-07-13 DIAGNOSIS — I67.9 CEREBROVASCULAR DISEASE: ICD-10-CM

## 2017-07-13 DIAGNOSIS — E78.00 HYPERCHOLESTEROLEMIA: ICD-10-CM

## 2017-07-13 DIAGNOSIS — I10 HTN (HYPERTENSION), BENIGN: ICD-10-CM

## 2017-07-13 DIAGNOSIS — Z79.4 TYPE 2 DIABETES MELLITUS WITH HYPERGLYCEMIA, WITH LONG-TERM CURRENT USE OF INSULIN: ICD-10-CM

## 2017-07-13 DIAGNOSIS — I48.0 PAROXYSMAL ATRIAL FIBRILLATION: Chronic | ICD-10-CM

## 2017-07-13 DIAGNOSIS — E11.22 CKD STAGE 3 DUE TO TYPE 2 DIABETES MELLITUS: ICD-10-CM

## 2017-07-13 DIAGNOSIS — E11.65 TYPE 2 DIABETES MELLITUS WITH HYPERGLYCEMIA, WITH LONG-TERM CURRENT USE OF INSULIN: ICD-10-CM

## 2017-07-13 DIAGNOSIS — N18.30 CKD STAGE 3 DUE TO TYPE 2 DIABETES MELLITUS: ICD-10-CM

## 2017-07-13 LAB
CHOLEST/HDLC SERPL: 2.6 {RATIO}
HDL/CHOLESTEROL RATIO: 38.4 %
HDLC SERPL-MCNC: 112 MG/DL
HDLC SERPL-MCNC: 43 MG/DL
LDLC SERPL CALC-MCNC: 51.8 MG/DL
NONHDLC SERPL-MCNC: 69 MG/DL
TRIGL SERPL-MCNC: 86 MG/DL

## 2017-07-13 PROCEDURE — 3066F NEPHROPATHY DOC TX: CPT | Mod: S$GLB,,, | Performed by: NURSE PRACTITIONER

## 2017-07-13 PROCEDURE — 99214 OFFICE O/P EST MOD 30 MIN: CPT | Mod: S$GLB,,, | Performed by: NURSE PRACTITIONER

## 2017-07-13 PROCEDURE — 80061 LIPID PANEL: CPT

## 2017-07-13 PROCEDURE — 36415 COLL VENOUS BLD VENIPUNCTURE: CPT

## 2017-07-13 PROCEDURE — 99999 PR PBB SHADOW E&M-EST. PATIENT-LVL III: CPT | Mod: PBBFAC,,, | Performed by: NURSE PRACTITIONER

## 2017-07-13 RX ORDER — FUROSEMIDE 40 MG/1
80 TABLET ORAL 2 TIMES DAILY
Qty: 90 TABLET | Refills: 4 | Status: SHIPPED | OUTPATIENT
Start: 2017-07-13 | End: 2017-10-25 | Stop reason: SDUPTHER

## 2017-07-13 NOTE — PROGRESS NOTES
Mr. Christy is a patient of Dr. Camacho and was last seen in [facility] 6/1/2017.      Subjective:   Patient ID:  Alfa Christy III is a 61 y.o. male who presents for follow-up of Atrial Fibrillation  .   HPI:    Mr. Christy is a 60yo male with a PMHx of HFpEF (EF 55% on 1/28/2016), paroxysmal atrial fibrillation, CVA (stroke January 2016), HTN, HLD, and DM II here for follow-up. Since his last visit, he had three visits to the ED:  sickle cell crisis, neck pain, and DKA. Today he feels improved but his legs are still swollen. He denies SOB but says he is not very active. He used to go to the gym and walk on the treadmill 3 days a week but hasn't gone lately because he has been too busy. Mr. Christy denies chest pain with exertion or at rest, palpitations, dizziness, syncope, claudication, PND, or orthopnea.  He still does not weigh himself daily or eat a low salt diet. He is also taking atorvastatin 40mg (LDL 51.8 on 7/13/2017), carvedilol 12.5 BID, and losartan 100mg. He has not taken his BP meds yet today. He says that his BPs average 140/80 at home. He has uncontrolled DM and is on insulin. He has sickle cell trait and has microcytic anemia. His weight is up 1lb from last visit to clinic.     Recent Cardiac Tests:    2D Echo (1/28/2016):  CONCLUSIONS     1 - Normal left ventricular systolic function (EF 55-60%).     2 - No regional wall motion abnormalities.     3 - Concentric hypertrophy.     4 - Left ventricular diastolic dysfunction.     5 - Trivial aortic regurgitation.     6 - Trivial tricuspid regurgitation, PASP 19 mmHg.     7 - No intracardiac source of embolus noted on this exam.  If high clinical suspicion, consider ADA +/- bubble study.     Current Outpatient Prescriptions   Medication Sig    acetaminophen (TYLENOL) 500 MG tablet Take 1 tablet (500 mg total) by mouth every 6 (six) hours as needed for Pain.    amlodipine (NORVASC) 5 MG tablet Take 1 tablet (5 mg total) by mouth once daily.     "atorvastatin (LIPITOR) 40 MG tablet Take 1 tablet (40 mg total) by mouth once daily.    BD INSULIN PEN NEEDLE UF MINI 31 gauge x 3/16" Ndle To use with insulin pens 4  times daily    blood sugar diagnostic Strp 1 strip by Misc.(Non-Drug; Combo Route) route 3 (three) times daily. One Touch Verio    carvedilol (COREG) 12.5 MG tablet Take 1 tablet (12.5 mg total) by mouth 2 (two) times daily.    coenzyme Q10 200 mg capsule Take 200 mg by mouth once daily. (Patient taking differently: Take 200 mg by mouth once daily. PT TAKEN EVERY MON, WED, FRI)    ERGOCALCIFEROL, VITAMIN D2, (VITAMIN D ORAL) Take 1 tablet by mouth every Mon, Wed, Fri.    folic acid (FOLVITE) 1 MG tablet Take 1 tablet (1 mg total) by mouth once daily.    furosemide (LASIX) 40 MG tablet Take 2 tablets (80 mg total) by mouth 2 (two) times daily.    glucagon (human recombinant) inj 1mg/mL kit Inject 1 mL (1 mg total) into the muscle as needed.    insulin detemir (LEVEMIR FLEXPEN) 100 unit/mL (3 mL) SubQ InPn pen Inject 16 Units into the skin every evening.    insulin glargine (LANTUS SOLOSTAR) 100 unit/mL (3 mL) InPn pen Inject 16 Units into the skin every evening.    insulin lispro (HUMALOG KWIKPEN) 100 unit/mL InPn pen Inject 12 units w/ meals, 6 units if eating lighter meal or bg less than 120, scale 180-230 +2, 231-280 +4, 281-330 +6, 331-380 +8.    levetiracetam (KEPPRA) 750 MG Tab Take 1 tablet (750 mg total) by mouth 2 (two) times daily.    levothyroxine (SYNTHROID) 200 MCG tablet Take 1 tablet (200 mcg total) by mouth before breakfast.    losartan (COZAAR) 100 MG tablet Take 1 tablet (100 mg total) by mouth once daily.    multivitamin capsule Take 1 capsule by mouth once daily. Pt taken every mon, wed, fri    apixaban 5 mg Tab Take 1 tablet (5 mg total) by mouth 2 (two) times daily.     Current Facility-Administered Medications   Medication    lidocaine HCl 2% urojet 10 mL       Review of Systems   Constitution: Negative for " "malaise/fatigue.   HENT: Negative for headaches.    Eyes: Negative for blurred vision.   Cardiovascular: Positive for leg swelling. Negative for chest pain, claudication, dyspnea on exertion, irregular heartbeat, orthopnea, palpitations, paroxysmal nocturnal dyspnea and syncope.   Respiratory: Negative for shortness of breath.    Hematologic/Lymphatic: Negative for bleeding problem.   Skin: Negative for rash.   Musculoskeletal: Negative for myalgias.   Gastrointestinal: Negative for abdominal pain, constipation, diarrhea and nausea.   Genitourinary: Negative for hematuria.   Neurological: Negative for loss of balance and numbness.        Peripheral neuropathy   Psychiatric/Behavioral: Negative for altered mental status.   Allergic/Immunologic: Negative for persistent infections.     Objective:     Right Arm BP - Sittin/71 (17 08)  Left Arm BP - Sittin/79 (17 08)    BP (!) 153/79   Pulse (!) 52   Ht 5' 10" (1.778 m)   Wt 101.7 kg (224 lb 3.3 oz)   SpO2 100% Comment: RA  BMI 32.17 kg/m²     Physical Exam   Constitutional: He is oriented to person, place, and time. He appears well-developed and well-nourished.   HENT:   Head: Normocephalic.   Nose: Nose normal.   Eyes: Pupils are equal, round, and reactive to light.   Neck: Hepatojugular reflux and JVD present. Carotid bruit is not present.   Cardiovascular: Regular rhythm, S1 normal and S2 normal.  Frequent extrasystoles are present. Bradycardia present.  Exam reveals no gallop.    No murmur heard.  Pulses:       Carotid pulses are 2+ on the right side, and 2+ on the left side.       Radial pulses are 2+ on the right side, and 2+ on the left side.        Dorsalis pedis pulses are 1+ on the right side, and 1+ on the left side.   Pulmonary/Chest: Breath sounds normal. No respiratory distress.   Abdominal: Soft. Bowel sounds are normal. He exhibits no distension. There is no tenderness.   Musculoskeletal: Normal range of motion. He " exhibits edema (pitting to lower legs +2).   Neurological: He is alert and oriented to person, place, and time.   Skin: Skin is warm and dry. No erythema.   Psychiatric: He has a normal mood and affect. His speech is normal and behavior is normal.   Nursing note and vitals reviewed.    Lab Results   Component Value Date     (L) 06/21/2017    K 4.7 06/21/2017    MG 2.4 01/06/2017    CL 99 06/21/2017    CO2 26 06/21/2017    BUN 26 (H) 06/21/2017    CREATININE 2.3 (H) 06/21/2017     (HH) 06/21/2017    HGBA1C text 07/27/2012    AST 26 06/21/2017    ALT 25 06/21/2017    ALBUMIN 3.3 (L) 06/21/2017    PROT 7.6 06/21/2017    BILITOT 1.5 (H) 06/21/2017    WBC 9.81 06/21/2017    HGB 10.9 (L) 06/21/2017    HCT 30.0 (L) 06/21/2017    MCV 78 (L) 06/21/2017     06/21/2017    TSH 1.334 04/10/2017    CHOL 112 (L) 07/13/2017    HDL 43 07/13/2017    LDLCALC 51.8 (L) 07/13/2017    TRIG 86 07/13/2017         Recent Labs  Lab 01/27/16 2011 11/05/16  0853   INR 1.1 1.1        Test(s) Reviewed  I have reviewed the following in detail:  [] Stress test   [] Angiography   [] Echocardiogram   [x] Labs   [] Other:       Assessment:         1. Chronic diastolic heart failure. Patient remains volume overloaded. Not compliant with low salt diet. Will increase lasix for 3 weeks and strongly emphasize importance of low salt diet and daily weights.      2. Paroxysmal atrial fibrillation. Patient on pradaxa. Will switch to eliquis in context of chronic kidney disease. 5mg BID dose appropriate with creatinine 2.3 but age under 80 and weight > 60kg.     3. Hypercholesterolemia. LDL 51.8 on atorvastatin 40mg. Will continue at current dose.     4. HTN (hypertension), benign. Borderline but patient has not taken his meds today. More controlled per patient report of home measures. Will not make med changes at this time.     5. Cerebrovascular disease. Asymptomatic.     6. Type 2 diabetes mellitus with hyperglycemia, with long-term  current use of insulin. Uncontrolled. Discussed importance of sugar control.      7. CKD stage 3 due to type 2 diabetes mellitus. Creatinine 2.3. Managed by nephrology.       Plan:     Alfa was seen today for atrial fibrillation.    Diagnoses and all orders for this visit:    Chronic diastolic heart failure  -     furosemide (LASIX) 40 MG tablet; Take 2 tablets (80 mg total) by mouth 2 (two) times daily.  -     Basic metabolic panel; Future; Expected date: 08/03/2017    Paroxysmal atrial fibrillation  -     apixaban 5 mg Tab; Take 1 tablet (5 mg total) by mouth 2 (two) times daily.    Hypercholesterolemia    HTN (hypertension), benign    Cerebrovascular disease    Type 2 diabetes mellitus with hyperglycemia, with long-term current use of insulin    CKD stage 3 due to type 2 diabetes mellitus  -     Basic metabolic panel; Future; Expected date: 08/03/2017      Stop pradaxa. Start eliquis 5mg twice daily.   Increase lasix to 80mg (2 tablets) twice daily for 3 weeks.  Low salt diet!!!  Blood test and return to clinic in 3 weeks.    Return in about 3 weeks (around 8/3/2017).    ------------------------------------------------------------------    RODNEY Leo, NP-C  Consult Cardiology

## 2017-07-13 NOTE — PATIENT INSTRUCTIONS
Stop pradaxa. Start eliquis 5mg twice daily.   Increase lasix to 80mg (2 tablets) twice daily for 3 weeks.  Low salt diet!!!  Blood test and return to clinic in 3 weeks.    Low-Salt Diet  This diet removes foods that are high in salt. It also limits the amount of salt you use when cooking. It is most often used for people with high blood pressure, edema (fluid retention), and kidney, liver, or heart disease.  Table salt contains the mineral sodium. Your body needs sodium to work normally. But too much sodium can make your health problems worse. Your healthcare provider is recommending a low-salt (also called low-sodium) diet for you. Your total daily allowance of salt is 1,500 to 2,300 milligrams (mg). It is less than 1 teaspoon of table salt. This means you can have only about 500 to 700 mg of sodium at each meal. People with certain health problems should limit salt intake to the lower end of the recommended range.    When you cook, dont add much salt. If you can cook without using salt, even better. Dont add salt to your food at the table.  When shopping, read food labels. Salt is often called sodium on the label. Choose foods that are salt-free, low salt, or very low salt. Note that foods with reduced salt may not lower your salt intake enough.    Beans, potatoes, and pasta  Ok: Dry beans, split peas, lentils, potatoes, rice, macaroni, pasta, spaghetti without added salt  Avoid: Potato chips, tortilla chips, and similar products  Breads and cereals  Ok: Low-sodium breads, rolls, cereals, and cakes; low-salt crackers, matzo crackers  Avoid: Salted crackers, pretzels, popcorn, Sudanese toast, pancakes, muffins  Dairy  Ok: Milk, chocolate milk, hot chocolate mix, low-salt cheeses, and yogurt  Avoid: Processed cheese and cheese spreads; Roquefort, Camembert, and cottage cheese; buttermilk, instant breakfast drink  Desserts  Ok: Ice cream, frozen yogurt, juice bars, gelatin, cookies and pies, sugar, honey, jelly,  hard candy  Avoid: Most pies, cakes and cookies prepared or processed with salt; instant pudding  Drinks  Ok: Tea, coffee, fizzy (carbonated) drinks, juices  Avoid: Flavored coffees, electrolyte replacement drinks, sports drinks  Meats  Ok: All fresh meat, fish, poultry, low-salt tuna, eggs, egg substitute  Avoid: Smoked, pickled, brine-cured, or salted meats and fish. This includes pandey, chipped beef, corned beef, hot dogs, deli meats, ham, kosher meats, salt pork, sausage, canned tuna, salted codfish, smoked salmon, herring, sardines, or anchovies.  Seasonings and spices  Ok: Most seasonings are okay. Good substitutes for salt include: fresh herb blends, hot sauce, lemon, garlic, rodriguez, vinegar, dry mustard, parsley, cilantro, horseradish, tomato paste, regular margarine, mayonnaise, unsalted butter, cream cheese, vegetable oil, cream, low-salt salad dressing and gravy.  Avoid: Regular ketchup, relishes, pickles, soy sauce, teriyaki sauce, Worcestershire sauce, BBQ sauce, tartar sauce, meat tenderizer, chili sauce, regular gravy, regular salad dressing, salted butter  Soups  Ok: Low-salt soups and broths made with allowed foods  Avoid: Bouillon cubes, soups with smoked or salted meats, regular soup and broth  Vegetables  Ok: Most vegetables are okay; also low-salt tomato and vegetable juices  Avoid: Sauerkraut and other brine-soaked vegetables; pickles and other pickled vegetables; tomato juice, olives  Date Last Reviewed: 8/1/2016 © 2000-2016 [employer]. [address] [place] [address], [place] [address]. All rights reserved. This information is not intended as a substitute for professional medical care. Always follow your healthcare professional's instructions.        Tips for Using Less Salt    Most people with heart problems need to eat less salt (sodium). Reducing the amount of salt you eat may help control your blood pressure. The higher your blood pressure, the greater your risk for heart disease,  stroke, blindness, and kidney problems.  At the store  · Make low-salt choices by reading labels carefully. Look for the total amount of sodium per serving.  · Use more fresh food. Buy more fruits and vegetables. Select lean meats, fish, and poultry.  · Use fewer frozen, canned, and packaged foods which often contain a lot of sodium.  · Use plain frozen vegetables without sauces or toppings. These products are often low- or no-sodium.  · Opt for reduced-sodium or no-salt-added versions of canned vegetables and soups.  In the kitchen  · Don't add salt to food when you're cooking. Season with flavorings such as onion, garlic, pepper, salt-free herbal blends, and lemon or lime juice.  · Use a cookbook containing low-salt recipes. It can give you ideas for tasty meals that are healthy for your heart.  · Sprinkle salt-free herbal blends on vegetables and meat.  · Drain and rinse canned foods, such as canned beans and vegetables, before cooking or eating.  Eating out  · Tell the  you're on a low-salt diet. Ask questions about the menu.  · Order fish, chicken, and meat broiled, baked, poached, or grilled without salt, butter, or breading.  · Use lemon, pepper, and salt-free herb mixes to add flavor.  · Choose plain steamed rice, boiled noodles, and baked or boiled potatoes. Top potatoes with chives and a little sour cream.     Beware! Salt goes by many other names. Limit foods with these words listed as ingredients: salt, sodium, soy sauce, baking soda, baking powder, MSG, monosodium, Na (the chemical symbol for sodium). Some antacids are also high in salt.   Date Last Reviewed: 6/19/2015  © 8477-7639 [employer]. [address] [place] [address], [place] [address]. All rights reserved. This information is not intended as a substitute for professional medical care. Always follow your healthcare professional's instructions.

## 2017-07-13 NOTE — TELEPHONE ENCOUNTER
----- Message from Clarence Babcock sent at 7/13/2017  3:55 PM CDT -----  Contact: [facility]  Please call [facility] at [phone] #1 #2. Ref# [id number]. Need a clarification for Pradaxa 150 mg and Eliquis 5 mg (which medication should patient currently be taking)? Last seen Silva Stout today 07/13/17    Thank you

## 2017-07-14 ENCOUNTER — TELEPHONE (OUTPATIENT)
Dept: HEMATOLOGY/ONCOLOGY | Facility: CLINIC | Age: 62
End: 2017-07-14

## 2017-07-14 ENCOUNTER — TELEPHONE (OUTPATIENT)
Dept: UROLOGY | Facility: CLINIC | Age: 62
End: 2017-07-14

## 2017-07-14 ENCOUNTER — OFFICE VISIT (OUTPATIENT)
Dept: UROLOGY | Facility: CLINIC | Age: 62
End: 2017-07-14
Payer: COMMERCIAL

## 2017-07-14 VITALS
HEIGHT: 70 IN | WEIGHT: 224.88 LBS | DIASTOLIC BLOOD PRESSURE: 72 MMHG | HEART RATE: 59 BPM | SYSTOLIC BLOOD PRESSURE: 135 MMHG | BODY MASS INDEX: 32.19 KG/M2

## 2017-07-14 DIAGNOSIS — I50.32 CHRONIC DIASTOLIC HEART FAILURE: ICD-10-CM

## 2017-07-14 DIAGNOSIS — Z79.4 TYPE 2 DIABETES MELLITUS WITH HYPERGLYCEMIA, WITH LONG-TERM CURRENT USE OF INSULIN: ICD-10-CM

## 2017-07-14 DIAGNOSIS — R89.6 ABNORMAL CYTOLOGY: ICD-10-CM

## 2017-07-14 DIAGNOSIS — E66.9 OBESITY (BMI 30.0-34.9): ICD-10-CM

## 2017-07-14 DIAGNOSIS — R97.20 ELEVATED PSA: ICD-10-CM

## 2017-07-14 DIAGNOSIS — N18.30 CKD STAGE 3 DUE TO TYPE 2 DIABETES MELLITUS: ICD-10-CM

## 2017-07-14 DIAGNOSIS — R31.0 GROSS HEMATURIA: Primary | ICD-10-CM

## 2017-07-14 DIAGNOSIS — E11.69 ERECTILE DYSFUNCTION ASSOCIATED WITH TYPE 2 DIABETES MELLITUS: Primary | ICD-10-CM

## 2017-07-14 DIAGNOSIS — R31.0 GROSS HEMATURIA: ICD-10-CM

## 2017-07-14 DIAGNOSIS — N52.1 ERECTILE DYSFUNCTION ASSOCIATED WITH TYPE 2 DIABETES MELLITUS: Primary | ICD-10-CM

## 2017-07-14 DIAGNOSIS — E11.22 CKD STAGE 3 DUE TO TYPE 2 DIABETES MELLITUS: ICD-10-CM

## 2017-07-14 DIAGNOSIS — E11.65 TYPE 2 DIABETES MELLITUS WITH HYPERGLYCEMIA, WITH LONG-TERM CURRENT USE OF INSULIN: ICD-10-CM

## 2017-07-14 DIAGNOSIS — I48.0 PAROXYSMAL ATRIAL FIBRILLATION: Chronic | ICD-10-CM

## 2017-07-14 LAB
BILIRUB SERPL-MCNC: NORMAL MG/DL
BLOOD URINE, POC: NORMAL
COLOR, POC UA: NORMAL
GLUCOSE UR QL STRIP: NORMAL
KETONES UR QL STRIP: NORMAL
LEUKOCYTE ESTERASE URINE, POC: NORMAL
NITRITE, POC UA: NORMAL
PH, POC UA: 5
PROTEIN, POC: NORMAL
SPECIFIC GRAVITY, POC UA: 1.01
UROBILINOGEN, POC UA: NORMAL

## 2017-07-14 PROCEDURE — 81001 URINALYSIS AUTO W/SCOPE: CPT | Mod: S$GLB,,, | Performed by: UROLOGY

## 2017-07-14 PROCEDURE — 87088 URINE BACTERIA CULTURE: CPT

## 2017-07-14 PROCEDURE — 3066F NEPHROPATHY DOC TX: CPT | Mod: S$GLB,,, | Performed by: UROLOGY

## 2017-07-14 PROCEDURE — 88112 CYTOPATH CELL ENHANCE TECH: CPT | Performed by: PATHOLOGY

## 2017-07-14 PROCEDURE — 99999 PR PBB SHADOW E&M-EST. PATIENT-LVL III: CPT | Mod: PBBFAC,,, | Performed by: UROLOGY

## 2017-07-14 PROCEDURE — 88112 CYTOPATH CELL ENHANCE TECH: CPT | Mod: 26,,, | Performed by: PATHOLOGY

## 2017-07-14 PROCEDURE — 87086 URINE CULTURE/COLONY COUNT: CPT

## 2017-07-14 PROCEDURE — 87077 CULTURE AEROBIC IDENTIFY: CPT

## 2017-07-14 PROCEDURE — 87186 SC STD MICRODIL/AGAR DIL: CPT

## 2017-07-14 PROCEDURE — 99215 OFFICE O/P EST HI 40 MIN: CPT | Mod: 25,S$GLB,, | Performed by: UROLOGY

## 2017-07-14 NOTE — TELEPHONE ENCOUNTER
Hi. I have set up Mr. Christy for a cysto, retrograde procedure. If it is abnormal and I need to do a biopsy, he would need to be off the blood thinners.   Can he come off? Does he need to be bridged?    Thanks for the help.     Anahi Mejia

## 2017-07-14 NOTE — TELEPHONE ENCOUNTER
Rec'd referral from PCP to establish care for sickle cell dx.  Left messages for patient x 3 days but no callback.  Scheduled with Dr Johansen for 8/2.  Mailed appointment notice to pt with message to call if need reschedule.

## 2017-07-14 NOTE — PROGRESS NOTES
Subjective:       Patient ID: Alfa Christy III is a 61 y.o. male.    Chief Complaint: Follow-up      HPI: Alfa Christy III is a 61 y.o. Black or  male with a hx of elevated psa and erectile dysfunction who presents today for evaluation and management of hematuria.  Last clinic visit was with SHARRON Nuno on 10/17/16.  Last seen with Dr. Mejia in 2015.     The patient reports yesterday having one episode of gross hematuria. He noticed it at the end of his stream. Good FOS (weak FOS at night) and complete emptying. No abdominal or flank pain. No hematuria today.  He also saw blood 2 weeks before. It stopped and now returned.   Reports frequency and nocturia 2-3 x. No increase in symptoms since hematuria.   Denies hesitancy, intermittency, or dysuria.   Takes pradaxa for a fib. Noticed frequency since starting pradaxa in 2015. He reports urinating every 45 min -1 hr.   He is no longer taking saw palmetto or urinozinc.   He does not take the viagra bc he is scared of effects.      He has a history of a negative prostate biopsy done in 04/2011 showing chronic inflammation. PSA was 5.4 at that time.     Previous/Current Smoker: no   Radiation therapy to pelvis: no  Chemotherapy: no   Personal/ family history of prostate/ bladder/ kidney cancer: no   Exposure to harmful chemicals: no  History of kidney stones/ prostatitis/ UTI: no       CTRSS 2016- No evidence of nephrolithiasis or obstructive uropathy. Prostatomegaly.    Review of patient's allergies indicates:   Allergen Reactions    Cough syrup [guaifenesin] Other (See Comments)       Current Outpatient Prescriptions   Medication Sig Dispense Refill    acetaminophen (TYLENOL) 500 MG tablet Take 1 tablet (500 mg total) by mouth every 6 (six) hours as needed for Pain.  0    amlodipine (NORVASC) 5 MG tablet Take 1 tablet (5 mg total) by mouth once daily. 30 tablet 11    atorvastatin (LIPITOR) 40 MG tablet Take 1 tablet (40 mg total) by mouth once  "daily. 90 tablet 3    carvedilol (COREG) 12.5 MG tablet Take 1 tablet (12.5 mg total) by mouth 2 (two) times daily. 180 tablet 4    coenzyme Q10 200 mg capsule Take 200 mg by mouth once daily. (Patient taking differently: Take 200 mg by mouth once daily. PT TAKEN EVERY MON, WED, FRI)      ERGOCALCIFEROL, VITAMIN D2, (VITAMIN D ORAL) Take 1 tablet by mouth every Mon, Wed, Fri.      folic acid (FOLVITE) 1 MG tablet Take 1 tablet (1 mg total) by mouth once daily. 90 tablet 1    furosemide (LASIX) 40 MG tablet Take 2 tablets (80 mg total) by mouth 2 (two) times daily. 90 tablet 4    glucagon (human recombinant) inj 1mg/mL kit Inject 1 mL (1 mg total) into the muscle as needed. 1 kit 1    insulin detemir (LEVEMIR FLEXPEN) 100 unit/mL (3 mL) SubQ InPn pen Inject 16 Units into the skin every evening. 3 Box 3    insulin glargine (LANTUS SOLOSTAR) 100 unit/mL (3 mL) InPn pen Inject 16 Units into the skin every evening. 3 Box 3    levetiracetam (KEPPRA) 750 MG Tab Take 1 tablet (750 mg total) by mouth 2 (two) times daily. 180 tablet 4    levothyroxine (SYNTHROID) 200 MCG tablet Take 1 tablet (200 mcg total) by mouth before breakfast. 90 tablet 0    losartan (COZAAR) 100 MG tablet Take 1 tablet (100 mg total) by mouth once daily. 90 tablet 3    multivitamin capsule Take 1 capsule by mouth once daily. Pt taken every mon, wed, fri      apixaban 5 mg Tab Take 1 tablet (5 mg total) by mouth 2 (two) times daily. 60 tablet 11    BD INSULIN PEN NEEDLE UF MINI 31 gauge x 3/16" Ndle To use with insulin pens 4  times daily 360 each 3    blood sugar diagnostic Strp 1 strip by Misc.(Non-Drug; Combo Route) route 3 (three) times daily. One Touch Verio 100 strip 11    insulin lispro (HUMALOG KWIKPEN) 100 unit/mL InPn pen Inject 12 units w/ meals, 6 units if eating lighter meal or bg less than 120, scale 180-230 +2, 231-280 +4, 281-330 +6, 331-380 +8. 3 Box 3     Current Facility-Administered Medications   Medication Dose " Route Frequency Provider Last Rate Last Dose    lidocaine HCl 2% urojet 10 mL  10 mL Urethral 1 time in Clinic/HOD Anahi Mejia MD           Past Medical History:   Diagnosis Date    Allergy     BMI 31.0-31.9,adult 11/25/2014    Cerebrovascular disease 7/25/2016    Chronic anticoagulation - pradaxa 11/10/2016    Chronic diastolic heart failure 11/20/2016    CKD (chronic kidney disease), stage III 9/23/2013    Controlled type 2 diabetes with neuropathy 12/16/2014    Diabetes mellitus due to underlying condition with stage 3 chronic kidney disease, without long-term current use of insulin 11/2/2016    Elevated alkaline phosphatase level 8/1/2012    Elevated PSA     negative prostate biopsy 4/11    Erectile dysfunction associated with type 2 diabetes mellitus     Gallstones 3/20/2013    Generalized tonic-clonic seizure 11/2016    HTN (hypertension), benign 8/1/2012    Hypercholesterolemia 8/1/2012    Microcytic anemia 11/27/2013    Paroxysmal atrial fibrillation 9/23/2013    Postsurgical hypothyroidism 11/27/2013    Right homonymous hemianopsia 2/5/2016    Sickle cell trait     Stroke 1/27/2016    Thyroid cancer     multifocal with six lesions, largest 5mm, treated with surgery and radioactive iodine    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/2016       Past Surgical History:   Procedure Laterality Date    BREAST SURGERY      cyst removal    COLONOSCOPY      CYST REMOVAL      chest    PROSTATE BIOPSY  4/27/11    SKIN BIOPSY      THYROID SURGERY  8/26/09    VASCULAR SURGERY         Family History   Problem Relation Age of Onset    Heart disease Father     Diabetes Father     Hypertension Father     Diabetes Mother     Hypertension Mother     Heart disease Mother     Diabetes Brother     Cancer Sister     Thyroid disease Maternal Aunt     Clotting disorder Neg Hx          Review of Systems     Review of Systems   Constitutional: Negative for chills and fever.   HENT:  Negative for facial swelling.    Eyes: Negative for visual disturbance.   Respiratory: Negative for chest tightness and shortness of breath.    Cardiovascular: Negative for chest pain and leg swelling.   Gastrointestinal: Negative for abdominal pain, constipation, nausea and vomiting.   Genitourinary: Positive for frequency. Negative for decreased urine volume, difficulty urinating, discharge, dysuria, enuresis, flank pain, genital sores, hematuria, penile pain, penile swelling, scrotal swelling, testicular pain and urgency.   Musculoskeletal: Negative for back pain.   Neurological: Negative for dizziness, weakness and headaches.   Psychiatric/Behavioral: Negative for agitation and confusion.           Objective:     Vitals:    07/14/17 0912   BP: 135/72   Pulse: (!) 59     Physical Exam     Physical Exam   Nursing note and vitals reviewed.  Constitutional: He is oriented to person, place, and time. He appears well-developed and well-nourished.   HENT:   Head: Normocephalic.   Eyes: Right eye exhibits no discharge. Left eye exhibits no discharge.   Neck: Normal range of motion. Neck supple.   Cardiovascular: Normal rate.    Pulmonary/Chest: Effort normal. No stridor.   Abdominal: Soft.   Genitourinary: Prostate is enlarged. Prostate is not tender.       Musculoskeletal: Normal range of motion.   Neurological: He is alert and oriented to person, place, and time.   Skin: Skin is warm and dry.     Psychiatric: He has a normal mood and affect. His behavior is normal. Judgment and thought content normal.         Lab Results   Component Value Date    CREATININE 2.3 (H) 06/21/2017     Lab Results   Component Value Date    EGFRNONAA 29.1 (A) 06/21/2017     Lab Results   Component Value Date    ESTGFRAFRICA 33.7 (A) 06/21/2017         Assessment:       1. Erectile dysfunction associated with type 2 diabetes mellitus    2. Elevated PSA    3. CKD stage 3 due to type 2 diabetes mellitus    4. Type 2 diabetes mellitus with  hyperglycemia, with long-term current use of insulin    5. Obesity (BMI 30.0-34.9)    6. Gross hematuria    7. Paroxysmal atrial fibrillation    8. Chronic diastolic heart failure        Plan:   Cystoscopy, bilateral RPG, possible ureteroscopy and biopsy to evaluate hematuria and abnormal cytology (cysto, renal ultrasound fine, but ureters not evaluated). Has CKD, so will not do IV contrast.   Will see if patient can come off of Eliquis or if he needs to be bridged if we had to do a biopsy.  Consent obtained today.     I spent 40 minutes with the patient of which more than half was spent in direct consultation with the patient in regards to our treatment and plan.

## 2017-07-17 ENCOUNTER — PATIENT MESSAGE (OUTPATIENT)
Dept: UROLOGY | Facility: CLINIC | Age: 62
End: 2017-07-17

## 2017-07-17 DIAGNOSIS — N39.0 RECURRENT UTI: Primary | ICD-10-CM

## 2017-07-17 LAB — BACTERIA UR CULT: NORMAL

## 2017-07-17 RX ORDER — CEPHALEXIN 500 MG/1
500 CAPSULE ORAL EVERY 12 HOURS
Qty: 20 CAPSULE | Refills: 0 | Status: SHIPPED | OUTPATIENT
Start: 2017-07-17 | End: 2017-07-27

## 2017-07-17 RX ORDER — CEPHALEXIN 500 MG/1
500 CAPSULE ORAL EVERY 12 HOURS
Qty: 20 CAPSULE | Refills: 0 | Status: SHIPPED | OUTPATIENT
Start: 2017-07-17 | End: 2017-07-17

## 2017-07-17 NOTE — TELEPHONE ENCOUNTER
Can you let patient know I sent keflex for UTI and repeat culture 1 week prior to surgery?    done

## 2017-07-18 ENCOUNTER — PATIENT MESSAGE (OUTPATIENT)
Dept: UROLOGY | Facility: CLINIC | Age: 62
End: 2017-07-18

## 2017-07-18 ENCOUNTER — TELEPHONE (OUTPATIENT)
Dept: UROLOGY | Facility: CLINIC | Age: 62
End: 2017-07-18

## 2017-07-18 NOTE — TELEPHONE ENCOUNTER
Message left for pt and I sent pt a [facility] message about stopping the medication 2 days prior to surgery.

## 2017-07-18 NOTE — TELEPHONE ENCOUNTER
Left message about stopping eliquis 2 days before surgery per his cardiologist.  Cytology still suspicious

## 2017-07-19 ENCOUNTER — CLINICAL SUPPORT (OUTPATIENT)
Dept: DIABETES | Facility: CLINIC | Age: 62
End: 2017-07-19
Payer: COMMERCIAL

## 2017-07-19 DIAGNOSIS — E11.65 TYPE 2 DIABETES MELLITUS WITH HYPERGLYCEMIA, WITH LONG-TERM CURRENT USE OF INSULIN: ICD-10-CM

## 2017-07-19 DIAGNOSIS — Z79.4 TYPE 2 DIABETES MELLITUS WITH HYPERGLYCEMIA, WITH LONG-TERM CURRENT USE OF INSULIN: ICD-10-CM

## 2017-07-19 DIAGNOSIS — E16.2 HYPOGLYCEMIA: ICD-10-CM

## 2017-07-19 PROCEDURE — G0108 DIAB MANAGE TRN  PER INDIV: HCPCS | Mod: S$GLB,,, | Performed by: DIETITIAN, REGISTERED

## 2017-07-19 NOTE — PROGRESS NOTES
"Diabetes Education  Author: Varsha Mckeon RD, CDE  Date: 7/19/2017    Diabetes Education Visit  Diabetes Education Record Assessment/Progress: Initial    Diabetes Type  Diabetes Type : Type II    Nutrition  Meal Planning: 3 meals per day, water, eats out seldom, snacks between meal  Meal Plan 24 Hour Recall - Breakfast: cereal with milk, banana, 6-7 oz OJ, and scrambled eggs  Meal Plan 24 Hour Recall - Lunch:  wife has been cooking - typical [place] hot meal - beans and rice with salad OR meat, starch, veg  Meal Plan 24 Hour Recall - Dinner: similar to lunch  Meal Plan 24 Hour Recall - Snack: sometimes snacks on cookies or small chocolates between meals    Monitoring   Blood Glucose Logs: No (No logs present at visit. Reports SMBG ac/hs. Will fax logs when returns home)              Exercise   Frequency: Never    Current Diabetes Treatment   Current Treatment: Insulin (Taking Lanuts 16 units nightly and Humalog 12 units ac. Pt denies missed doses of meds)    Social History  Preferred Learning Method: Face to Face, Demonstration, Hands On, Reading Materials  Primary Support: Self, Spouse (Wife is very supportive. Does all of the cooking)  Occupation: retired  Smoking Status: Never a Smoker  Alcohol Use: Monthly    Barriers to Change  Barriers to Change: None  Learning Challenges : None    Readiness to Learn   Readiness to Learn : Acceptance    Diabetes Education Assessment/Progress  Acute Complications (preventing, detecting, and treating acute complications): Discussion, Individual Session, Written Materials Provided, Instructed, Competent (verbalizes/demonstrates) (Discussed causes, s/s and proper tx of hypo with "rule of 15". Stressed importance of always SMBG before treating sx of hypo)    Chronic Complications (preventing, detecting, and treating chronic complications): Discussion, Individual Session, Written Materials Provided, Competent (verbalizes/demonstrates), Competent Family/SO (Discussed standards of " care)    Diabetes Disease Process (diabetes disease process and treatment options): Discussion, Individual Session, Written Materials Provided    Nutrition (Incorporating nutritional management into one's lifestyle): Discussion, Individual Session, Written Materials Provided, Instructed, Competent (verbalizes/demonstrates), Competent Family/SO (Instructed on carb sources, timing and spacing of meals and snacks, appropriate carb svgs for meals and snacks, and rec to d/c fruit juice)    Physical Activity (incorporating physical activity into one's lifestyle): Discussion, Individual Session, Written Materials Provided, Instructed, Competent (verbalizes/demonstrates), Competent Family/SO (Discussed goals and benefits. Rec 150 min per wk)    Medications (states correct name, dose, onset, peak, duration, side effects & timing of meds): Discussion, Individual Session, Written Materials Provided, Competent (verbalizes/demonstrates), Competent Family/SO (Discussed timing and MOA of insulin and importance of not skipping meal time doses)    Monitoring (monitoring blood glucose/other parameters & using results): Discussion, Individual Session, Written Materials Provided, Instructed, Competent (verbalizes/demonstrates), Competent Family/SO (Provided BG logs and instructed to continue SMBG ac/hs and to send logs for review)    Goal Setting and Problem Solving (verbalizes behavior change strategies & sets realistic goals): Discussion, Individual Session, Written Materials Provided, Competent (verbalizes/demonstrates), Competent Family/SO    Behavior Change (developing personal strategies to health & behavior change): Discussion, Individual Session, Written Materials Provided, Comnpetent (verbalizes/demonstrates), Competent Family/SO    Psychosocial Issues (developing personal srategies to address psychosocial concerns): Discussion, Individual Session, Written Materials Provided, Competent (verbalizes/demonstrates), Competent  Family/SO    Goals  Healthy Eating: Set (Will limit carbs at meals to rec amts)  Start Date: 07/19/17  Target Date: 10/18/17    Diabetes Care Plan/Intervention  Education Plan/Intervention: Individual Follow-Up DSMT, Endocrine Provider Visit Set Up    Education Units of Time   Time Spent: 60 min      Health Maintenance Topics with due status: Not Due       Topic Last Completion Date    Colonoscopy 03/03/2015    Influenza Vaccine 10/04/2016    Foot Exam 04/17/2017    Eye Exam 07/10/2017    Pneumococcal PPSV23 (High Risk) 07/11/2017    Lipid Panel 07/13/2017     Health Maintenance Due   Topic Date Due    TETANUS VACCINE  07/22/1973    Hemoglobin A1c  07/06/2017

## 2017-07-21 ENCOUNTER — TELEPHONE (OUTPATIENT)
Dept: UROLOGY | Facility: CLINIC | Age: 62
End: 2017-07-21

## 2017-07-21 NOTE — TELEPHONE ENCOUNTER
----- Message from Ara Bui LPN sent at 7/21/2017  9:29 AM CDT -----  Contact: Betty from [facility] pre-service [phone]      ----- Message -----  From: Gela Ulrich  Sent: 7/21/2017   9:17 AM  To: Roberto RUFF Staff    Betty states one of the cpt codes isn't coming up for [facility]. Betty is asking to speak with the nurse to get another code. Please call Betty

## 2017-07-26 ENCOUNTER — TELEPHONE (OUTPATIENT)
Dept: UROLOGY | Facility: CLINIC | Age: 62
End: 2017-07-26

## 2017-07-26 NOTE — TELEPHONE ENCOUNTER
----- Message from Anahi Mejia MD sent at 7/26/2017  1:52 PM CDT -----  Contact: self - [phone]  done  ----- Message -----  From: Ladonna Adams LPN  Sent: 7/26/2017   1:36 PM  To: Anahi Mejia MD        ----- Message -----  From: Ofe Harpreet  Sent: 7/26/2017   1:17 PM  To: Roberto RUFF Staff    Roberto - [facility] pharmacy did not have rx for his procedure - please call patient at [phone]

## 2017-08-01 ENCOUNTER — TELEPHONE (OUTPATIENT)
Dept: HEMATOLOGY/ONCOLOGY | Facility: CLINIC | Age: 62
End: 2017-08-01

## 2017-08-01 NOTE — TELEPHONE ENCOUNTER
LVM for patient regarding concerns of appointment that needs to be reschedule due to the fact of the provider will not be in the clinic. Offered via voicemail a new appointment date and time for Thursday at 1120am. I moved the appointment but will await for patient to call to confirm that he received the message.    ---Sherrie Dover

## 2017-08-01 NOTE — PROGRESS NOTES
CC: Sickle cell anemia    HPI: ,62 is here for hematology consultation for sickle cell trait. He has been diagnosed of the condition when he was 7 years old. He has not been transfusion dependent. He has had several vascular events, including CAD/CHF, CVA, erectile dysfunction, mostly after age 48. It is not clear if these are age related or attributable, in part, to sickle cell trait.He also has DM, HTn and atrial fibrillation.       Review of Systems   Constitutional: Negative for chills, fever, malaise/fatigue and weight loss.   HENT: Negative for hearing loss.    Eyes: Positive for blurred vision. Negative for double vision and photophobia.   Respiratory: Negative for cough, hemoptysis, sputum production and shortness of breath.    Cardiovascular: Negative for chest pain, palpitations, orthopnea and leg swelling.   Gastrointestinal: Negative for abdominal pain, constipation, diarrhea, heartburn, nausea and vomiting.   Genitourinary: Negative for dysuria, hematuria and urgency.   Musculoskeletal: Positive for myalgias.   Skin: Negative for rash.   Neurological: Negative for dizziness, tingling, tremors, speech change, focal weakness and headaches.   Endo/Heme/Allergies: Does not bruise/bleed easily.   Psychiatric/Behavioral: Negative for depression.   All other systems reviewed and are negative.        Past Medical History:   Diagnosis Date    Allergy     BMI 31.0-31.9,adult 11/25/2014    Cerebrovascular disease 7/25/2016    Chronic anticoagulation - pradaxa 11/10/2016    Chronic diastolic heart failure 11/20/2016    CKD (chronic kidney disease), stage III 9/23/2013    Controlled type 2 diabetes with neuropathy 12/16/2014    Diabetes mellitus due to underlying condition with stage 3 chronic kidney disease, without long-term current use of insulin 11/2/2016    Elevated alkaline phosphatase level 8/1/2012    Elevated PSA     negative prostate biopsy 4/11    Erectile dysfunction associated with  type 2 diabetes mellitus     Gallstones 3/20/2013    Generalized tonic-clonic seizure 11/2016    HTN (hypertension), benign 8/1/2012    Hypercholesterolemia 8/1/2012    Microcytic anemia 11/27/2013    Paroxysmal atrial fibrillation 9/23/2013    Postsurgical hypothyroidism 11/27/2013    Right homonymous hemianopsia 2/5/2016    Sickle cell trait     Stroke 1/27/2016    Thyroid cancer     multifocal with six lesions, largest 5mm, treated with surgery and radioactive iodine    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/2016       Past Surgical History:   Procedure Laterality Date    BREAST SURGERY      cyst removal    COLONOSCOPY      CYST REMOVAL      chest    PROSTATE BIOPSY  4/27/11    SKIN BIOPSY      THYROID SURGERY  8/26/09    VASCULAR SURGERY           Family History   Problem Relation Age of Onset    Heart disease Father     Diabetes Father     Hypertension Father     Diabetes Mother     Hypertension Mother     Heart disease Mother     Diabetes Brother     Cancer Sister     Thyroid disease Maternal Aunt     Clotting disorder Neg Hx          Social History     Social History    Marital status:      Spouse name: Rut    Number of children: N/A    Years of education: N/A     Occupational History    Not on file.     Social History Main Topics    Smoking status: Never Smoker    Smokeless tobacco: Never Used    Alcohol use Yes    Drug use: No    Sexual activity: Yes     Partners: Female      Comment:  with 3 kids     Other Topics Concern    Not on file     Social History Narrative     with 3 kids     Not driving due to vision problem from stroke.        Vitals:    08/03/17 1101   BP: 132/62   Pulse: 60   Resp: 18   Temp: 98.2 °F (36.8 °C)   Pain 0    Physical Exam   Constitutional: He is oriented to person, place, and time. He appears well-developed and well-nourished.   HENT:   Head: Normocephalic and atraumatic.   Eyes: Pupils are equal, round, and reactive to  light.   Neck: Normal range of motion.   Cardiovascular: Normal rate, regular rhythm and normal heart sounds.    Pulmonary/Chest: Effort normal and breath sounds normal. No respiratory distress. He has no wheezes.   Abdominal: Soft. He exhibits no distension. There is no tenderness. There is no guarding.   Obese   Musculoskeletal: He exhibits no edema.   Lymphadenopathy:     He has no cervical adenopathy.   Neurological: He is alert and oriented to person, place, and time. Coordination normal.   Skin: Skin is warm.   Psychiatric: He has a normal mood and affect.       Component      Latest Ref Rng & Units 6/21/2017   WBC      3.90 - 12.70 K/uL 9.81   RBC      4.60 - 6.20 M/uL 3.86 (L)   Hemoglobin      14.0 - 18.0 g/dL 10.9 (L)   Hematocrit      40.0 - 54.0 % 30.0 (L)   MCV      82 - 98 fL 78 (L)   MCH      27.0 - 31.0 pg 28.2   MCHC      32.0 - 36.0 % 36.3 (H)   RDW      11.5 - 14.5 % 17.5 (H)   Platelets      150 - 350 K/uL 253   MPV      9.2 - 12.9 fL 11.7   Gran #      1.8 - 7.7 K/uL 5.9   Lymph #      1.0 - 4.8 K/uL 2.3   Mono #      0.3 - 1.0 K/uL 1.3 (H)   Eos #      0.0 - 0.5 K/uL 0.3   Baso #      0.00 - 0.20 K/uL 0.04   Gran%      38.0 - 73.0 % 59.7   Lymph%      18.0 - 48.0 % 23.3   Mono%      4.0 - 15.0 % 12.9   Eosinophil%      0.0 - 8.0 % 3.4   Basophil%      0.0 - 1.9 % 0.4   Platelet Estimate       Appears normal   Aniso       Moderate   Poik       Slight   Poly       Occasional   Hypo       Occasional   Ovalocytes       Occasional   Target Cells       Occasional   Tear Drop Cells       Occasional   Schistocytes       Present   Large/Giant Platelets       Present   Pappenheimer Bodies       Present   Differential Method       Automated   Sodium      136 - 145 mmol/L 132 (L)   Potassium      3.5 - 5.1 mmol/L 4.7   Chloride      95 - 110 mmol/L 99   CO2      23 - 29 mmol/L 26   Glucose      70 - 110 mg/dL 594 (HH)   BUN, Bld      8 - 23 mg/dL 26 (H)   Creatinine      0.5 - 1.4 mg/dL 2.3 (H)   Calcium       8.7 - 10.5 mg/dL 8.8   Total Protein      6.0 - 8.4 g/dL 7.6   Albumin      3.5 - 5.2 g/dL 3.3 (L)   Total Bilirubin      0.1 - 1.0 mg/dL 1.5 (H)   Alkaline Phosphatase      55 - 135 U/L 163 (H)   AST      10 - 40 U/L 26   ALT      10 - 44 U/L 25   Anion Gap      8 - 16 mmol/L 7 (L)   eGFR if African American      >60 mL/min/1.73 m:2 33.7 (A)   eGFR if non African American      >60 mL/min/1.73 m:2 29.1 (A)     5/17/17 US retroperitoneum      Comparison study: Ultrasound 3/24/2016.    The right kidney measures approximately 10.9 x 6.4 x 5.7 cm with a resistive index of 0.74.  Slight increased echogenicity of the renal parenchyma.  Lobulation to the right kidney suggestive of fetal lobulations.    Left kidney measures 10.4 x 6.5 x 6 cm with resistive index of 0.75.  There is a sonolucent structure, possibly representing a peripelvic cyst or a dilated single calyx in the midpole measuring approximately 1.7 cm x 0.9 cm.  Slight increased echogenicity to the renal parenchyma.  There is no hydronephrosis or pelviectasis.    The wall of the urinary bladder is normal.  There is a significantly enlarged prostate that measures 8.3 x 5.9 x 7.1 cm.  This finding was also seen on the previous study with a prostatic volume of 180 cc.    Incidentally within the gallbladder there is an echogenic focus with shadowing representing a gallstone.   Impression         1.  Findings suggestive of medical renal disease with mild elevation of the resistive indices and increased echogenicity of the renal parenchyma.  2.  Enlarged prostate that indents the neck of the urinary bladder.  3.  Findings suggestive of a small peripelvic renal cyst or a mildly dilated single midpole renal calyx.  4.  Cholelithiasis.         Assessment/Plan:    1. Sickle cell trait: He has been diagnosed when he ws a kid. He is on Folic acid. He has mild hyperbilirubinemia and mild microcytic , normo/hyperchromic anemia as of 6/211/7. He has not been transfusion  dependent. He has had several vascular events, including CAD/CHF, CVA, erectile dysfunction, mostly after age 48. It is not clear if these are age related or attributable, in part, to sickle cell trait.He also has DM, HTn and atrial fibrillation.   He has 2 daughters who are disease free. No special precautions/screenings are warranted at this time.  He aguilera snot have hematuria.Medullary carcinoma of kidney is a rare complication in sickle cell trait patients, but usually seen in younger population. He has upcoming cystoscopy as there were a few abnormal cells note don urine cytology in July 2017.   He is anti-coagulated for atrial fibrillation. He follows with urology, endocrinology( for DM and thyroid cancer).         Plan:    1. Follow with urology for cystoscopy  2. Daily Folic acid  3. Avoid strenuous exercise, especially in heat, due to higher risk of rhabdomyolysis  4. Continue anti-coagulation as appropriate; if going off of anti-coagulation, suggest Aspirin 81/162 mg daily (if no bleeding)  5. Follow in hematology clinic in 1 yr

## 2017-08-02 ENCOUNTER — CLINICAL SUPPORT (OUTPATIENT)
Dept: ENDOCRINOLOGY | Facility: CLINIC | Age: 62
End: 2017-08-02
Payer: COMMERCIAL

## 2017-08-02 ENCOUNTER — PATIENT MESSAGE (OUTPATIENT)
Dept: HEMATOLOGY/ONCOLOGY | Facility: CLINIC | Age: 62
End: 2017-08-02

## 2017-08-02 NOTE — PROGRESS NOTES
"DIABETES EDUCATOR NOTE   PLACEMENT OF FREESTYLE ANGELLA PRO SENSOR  CONTINOUS GLUCOSE MONITORING SYSTEM (CGMS)    Patient is here in clinic today for placement of continuous glucose monitoring sensor.      Each patient verified that they were here for CGMS procedure ordered by their provider and that they have a working glucose meter and supplies at home.   Patient will be provided with a Freestyle Angella Sensor and a copy of the Continuous Glucose Monitoring Patient Log to fill out during the study.   A detailed  explanation of Continuous Glucose Monitoring was  provided. Patient informed that this is a blind procedure and that they will not actually see the blood sugar tracing in real time.  Reviewed with patient the [facility] patient education handout called "Your Freestyle Angella Pro sensor: What you need to know" to review self-care during the study to avoid sensor loosening or removal ie... Bathing, Swimming, dressing, and exercising.   Instructed patient to check blood sugar using home glucometer and to record the following on provided patient log sheets:Blood sugar taken at home, Meals and snacks, Activity, and Diabetes medications taken and dosage    Patient was brought to a private location.  Arm for insertion was selected and prepared and allowed to dry. Glucose Sensor Serial Number [id number]  was inserted to back of patient's right upper arm.    The following forms  were given and reviewed in detail with patient and all questions answered.   · Continuous Glucose Monitoring Patient Log #[id number]  · Freestyle [employer] Patient Handout "Your Freestyle Angella Pro Sensor: What you need to know"     Instructions held in a group: Time: 15 min   Insertion of sensor done individually in private:  Time: 5 minutes       "

## 2017-08-03 ENCOUNTER — LAB VISIT (OUTPATIENT)
Dept: LAB | Facility: HOSPITAL | Age: 62
End: 2017-08-03
Attending: NURSE PRACTITIONER
Payer: COMMERCIAL

## 2017-08-03 ENCOUNTER — INITIAL CONSULT (OUTPATIENT)
Dept: HEMATOLOGY/ONCOLOGY | Facility: CLINIC | Age: 62
End: 2017-08-03
Payer: COMMERCIAL

## 2017-08-03 VITALS
DIASTOLIC BLOOD PRESSURE: 62 MMHG | BODY MASS INDEX: 32.22 KG/M2 | RESPIRATION RATE: 18 BRPM | HEIGHT: 70 IN | WEIGHT: 225.06 LBS | HEART RATE: 60 BPM | SYSTOLIC BLOOD PRESSURE: 132 MMHG | OXYGEN SATURATION: 97 % | TEMPERATURE: 98 F

## 2017-08-03 DIAGNOSIS — N18.30 CKD STAGE 3 DUE TO TYPE 2 DIABETES MELLITUS: ICD-10-CM

## 2017-08-03 DIAGNOSIS — E11.22 CKD STAGE 3 DUE TO TYPE 2 DIABETES MELLITUS: ICD-10-CM

## 2017-08-03 DIAGNOSIS — I50.32 CHRONIC DIASTOLIC HEART FAILURE: ICD-10-CM

## 2017-08-03 DIAGNOSIS — Z86.73 HISTORY OF STROKE: ICD-10-CM

## 2017-08-03 DIAGNOSIS — Z85.850 HISTORY OF THYROID CANCER: ICD-10-CM

## 2017-08-03 DIAGNOSIS — D57.3 SICKLE CELL TRAIT: Primary | ICD-10-CM

## 2017-08-03 DIAGNOSIS — D50.9 MICROCYTIC ANEMIA: ICD-10-CM

## 2017-08-03 DIAGNOSIS — N18.30 CKD (CHRONIC KIDNEY DISEASE), STAGE III: Chronic | ICD-10-CM

## 2017-08-03 LAB
ANION GAP SERPL CALC-SCNC: 8 MMOL/L
BUN SERPL-MCNC: 33 MG/DL
CALCIUM SERPL-MCNC: 8.9 MG/DL
CHLORIDE SERPL-SCNC: 103 MMOL/L
CO2 SERPL-SCNC: 26 MMOL/L
CREAT SERPL-MCNC: 2.4 MG/DL
EST. GFR  (AFRICAN AMERICAN): 31.8 ML/MIN/1.73 M^2
EST. GFR  (NON AFRICAN AMERICAN): 27.5 ML/MIN/1.73 M^2
GLUCOSE SERPL-MCNC: 276 MG/DL
POTASSIUM SERPL-SCNC: 4.9 MMOL/L
SODIUM SERPL-SCNC: 137 MMOL/L

## 2017-08-03 PROCEDURE — 36415 COLL VENOUS BLD VENIPUNCTURE: CPT

## 2017-08-03 PROCEDURE — 99999 PR PBB SHADOW E&M-EST. PATIENT-LVL IV: CPT | Mod: PBBFAC,,, | Performed by: INTERNAL MEDICINE

## 2017-08-03 PROCEDURE — 3008F BODY MASS INDEX DOCD: CPT | Mod: S$GLB,,, | Performed by: INTERNAL MEDICINE

## 2017-08-03 PROCEDURE — 80048 BASIC METABOLIC PNL TOTAL CA: CPT

## 2017-08-03 PROCEDURE — 99205 OFFICE O/P NEW HI 60 MIN: CPT | Mod: S$GLB,,, | Performed by: INTERNAL MEDICINE

## 2017-08-03 NOTE — LETTER
August 3, 2017      Tyler Jasmine MD  [address]  [facility] [address]           [facility]ology On[facility]  [address]  [facility] [address]  Phone: [phone]          Patient: Alfa Christy III   MR Number: [id number]   YOB: 1955   Date of Visit: 8/3/2017       Dear Dr. Tyler Jasmine:    Thank you for referring Alfa Christy to me for evaluation. Attached you will find relevant portions of my assessment and plan of care.    If you have questions, please do not hesitate to call me. I look forward to following Alfa Christy along with you.    Sincerely,    Amina Johansen MD    Enclosure  CC:  No Recipients    If you would like to receive this communication electronically, please contact [email or web address] or [phone] to request more information on [facility] [employer] access.    For providers and/or their staff who would like to refer a patient to [facility], please contact us through our one-stop-shop provider referral line, [facility] , at [phone].    If you feel you have received this communication in error or would no longer like to receive these types of communications, please e-mail [email or web address]

## 2017-08-07 ENCOUNTER — OFFICE VISIT (OUTPATIENT)
Dept: CARDIOLOGY | Facility: CLINIC | Age: 62
End: 2017-08-07
Payer: COMMERCIAL

## 2017-08-07 ENCOUNTER — ANESTHESIA EVENT (OUTPATIENT)
Dept: SURGERY | Facility: HOSPITAL | Age: 62
End: 2017-08-07
Payer: COMMERCIAL

## 2017-08-07 VITALS
WEIGHT: 225.06 LBS | DIASTOLIC BLOOD PRESSURE: 64 MMHG | SYSTOLIC BLOOD PRESSURE: 112 MMHG | HEIGHT: 70 IN | HEART RATE: 56 BPM | BODY MASS INDEX: 32.22 KG/M2 | OXYGEN SATURATION: 99 %

## 2017-08-07 DIAGNOSIS — Z79.4 TYPE 2 DIABETES MELLITUS WITH HYPERGLYCEMIA, WITH LONG-TERM CURRENT USE OF INSULIN: ICD-10-CM

## 2017-08-07 DIAGNOSIS — E78.00 HYPERCHOLESTEROLEMIA: ICD-10-CM

## 2017-08-07 DIAGNOSIS — I67.9 CEREBROVASCULAR DISEASE: ICD-10-CM

## 2017-08-07 DIAGNOSIS — I10 HTN (HYPERTENSION), BENIGN: ICD-10-CM

## 2017-08-07 DIAGNOSIS — I48.0 PAROXYSMAL ATRIAL FIBRILLATION: Chronic | ICD-10-CM

## 2017-08-07 DIAGNOSIS — N18.30 CKD (CHRONIC KIDNEY DISEASE), STAGE III: Chronic | ICD-10-CM

## 2017-08-07 DIAGNOSIS — E11.65 TYPE 2 DIABETES MELLITUS WITH HYPERGLYCEMIA, WITH LONG-TERM CURRENT USE OF INSULIN: ICD-10-CM

## 2017-08-07 DIAGNOSIS — Z01.818 PREOPERATIVE TESTING: Primary | ICD-10-CM

## 2017-08-07 DIAGNOSIS — I50.32 CHRONIC DIASTOLIC HEART FAILURE: Primary | ICD-10-CM

## 2017-08-07 PROCEDURE — 99214 OFFICE O/P EST MOD 30 MIN: CPT | Mod: S$GLB,,, | Performed by: NURSE PRACTITIONER

## 2017-08-07 PROCEDURE — 3008F BODY MASS INDEX DOCD: CPT | Mod: S$GLB,,, | Performed by: NURSE PRACTITIONER

## 2017-08-07 PROCEDURE — 99999 PR PBB SHADOW E&M-EST. PATIENT-LVL IV: CPT | Mod: PBBFAC,,, | Performed by: NURSE PRACTITIONER

## 2017-08-07 PROCEDURE — 3066F NEPHROPATHY DOC TX: CPT | Mod: S$GLB,,, | Performed by: NURSE PRACTITIONER

## 2017-08-07 RX ORDER — MAGNESIUM 30 MG
1 TABLET ORAL WEEKLY
Status: ON HOLD | COMMUNITY
End: 2021-01-01

## 2017-08-07 RX ORDER — ERGOCALCIFEROL 1.25 MG/1
50000 CAPSULE ORAL
Status: ON HOLD | COMMUNITY
End: 2017-10-05

## 2017-08-07 NOTE — PROGRESS NOTES
Mr. Christy is a patient of Dr. Camacho and was last seen in [facility] 7/13/2017.      Subjective:   Patient ID:  Alfa Christy III is a 62 y.o. male who presents for follow-up of Chronic diastolic heart failure (3 weeks fu)  .   HPI:    Mr. Christy is a 62yo male with a PMHx of HFpEF (EF 55% on 1/28/2016), paroxysmal atrial fibrillation, CVA (stroke January 2016), HTN, HLD, and DM II here for follow-up. At his last clinic visit, his lasix was increased to 80mg BID for a 3 week diuresis period. His weight is the same but he says that his bilateral leg edema has significantly improved. Mr. Christy denies chest pain with exertion or at rest, palpitations, SOB, AYALA, dizziness, syncope, claudication, PND, or orthopnea. He says he eats a low salt diet.  He is taking amlodipine 5mg, atorvastatin 40mg (LDL 51.8 on 7/13/2017), carvedilol 12.5 BID, and losartan 100mg. He takes eliquis for stroke prophylaxis. He still has hematuria but he says it is intermittent, not significant and he is still being followed by urology. He continues to walk for exercise but has not gone recently due to weather. The patient experiences no limitations in activity tolerance. Creatinine is 2.4 and his baseline is 1.8/1.9. He is followed by nephrology. DM is uncontrolled.     Recent Cardiac Tests:    2D Echo (1/28/2016):  CONCLUSIONS     1 - Normal left ventricular systolic function (EF 55-60%).     2 - No regional wall motion abnormalities.     3 - Concentric hypertrophy.     4 - Left ventricular diastolic dysfunction.     5 - Trivial aortic regurgitation.     6 - Trivial tricuspid regurgitation, PASP 19 mmHg.     7 - No intracardiac source of embolus noted on this exam.  If high clinical suspicion, consider ADA +/- bubble study.     Current Outpatient Prescriptions   Medication Sig    acetaminophen (TYLENOL) 500 MG tablet Take 1 tablet (500 mg total) by mouth every 6 (six) hours as needed for Pain.    amlodipine (NORVASC) 5 MG tablet Take 1  "tablet (5 mg total) by mouth once daily.    apixaban 5 mg Tab Take 1 tablet (5 mg total) by mouth 2 (two) times daily.    atorvastatin (LIPITOR) 40 MG tablet Take 1 tablet (40 mg total) by mouth once daily.    BD INSULIN PEN NEEDLE UF MINI 31 gauge x 3/16" Ndle To use with insulin pens 4  times daily    blood sugar diagnostic Strp 1 strip by Misc.(Non-Drug; Combo Route) route 3 (three) times daily. One Touch Verio    carvedilol (COREG) 12.5 MG tablet Take 1 tablet (12.5 mg total) by mouth 2 (two) times daily.    coenzyme Q10 200 mg capsule Take 200 mg by mouth once daily. (Patient taking differently: Take 200 mg by mouth once daily. PT TAKEN EVERY MON, WED, FRI)    ERGOCALCIFEROL, VITAMIN D2, (VITAMIN D ORAL) Take 1 tablet by mouth every Mon, Wed, Fri.    folic acid (FOLVITE) 1 MG tablet Take 1 tablet (1 mg total) by mouth once daily.    furosemide (LASIX) 40 MG tablet Take 2 tablets (80 mg total) by mouth 2 (two) times daily.    glucagon (human recombinant) inj 1mg/mL kit Inject 1 mL (1 mg total) into the muscle as needed.    insulin detemir (LEVEMIR FLEXPEN) 100 unit/mL (3 mL) SubQ InPn pen Inject 16 Units into the skin every evening.    insulin glargine (LANTUS SOLOSTAR) 100 unit/mL (3 mL) InPn pen Inject 16 Units into the skin every evening.    insulin lispro (HUMALOG KWIKPEN) 100 unit/mL InPn pen Inject 12 units w/ meals, 6 units if eating lighter meal or bg less than 120, scale 180-230 +2, 231-280 +4, 281-330 +6, 331-380 +8.    levetiracetam (KEPPRA) 750 MG Tab Take 1 tablet (750 mg total) by mouth 2 (two) times daily.    levothyroxine (SYNTHROID) 200 MCG tablet Take 1 tablet (200 mcg total) by mouth before breakfast.    losartan (COZAAR) 100 MG tablet Take 1 tablet (100 mg total) by mouth once daily.    multivitamin capsule Take 1 capsule by mouth once daily. Pt taken every mon, wed, fri     Current Facility-Administered Medications   Medication    lidocaine HCl 2% urojet 10 mL       Review " "of Systems   Constitution: Negative for malaise/fatigue.   HENT: Negative for headaches.    Eyes: Negative for blurred vision.   Cardiovascular: Positive for leg swelling. Negative for chest pain, claudication, dyspnea on exertion, irregular heartbeat, orthopnea, palpitations, paroxysmal nocturnal dyspnea and syncope.   Respiratory: Negative for shortness of breath.    Hematologic/Lymphatic: Negative for bleeding problem.   Skin: Negative for rash.   Musculoskeletal: Negative for myalgias.   Gastrointestinal: Negative for abdominal pain, constipation, diarrhea and nausea.   Genitourinary: Positive for hematuria.   Neurological: Negative for loss of balance and numbness.   Psychiatric/Behavioral: Negative for altered mental status.   Allergic/Immunologic: Negative for persistent infections.     Objective:     Right Arm BP - Sittin/57 (17)  Left Arm BP - Sittin/64 (17)    /64 (BP Location: Left arm, Patient Position: Sitting, BP Method: Automatic)   Pulse (!) 56   Ht 5' 10" (1.778 m)   Wt 102.1 kg (225 lb 1.4 oz)   SpO2 99%   BMI 32.30 kg/m²     Physical Exam   Constitutional: He is oriented to person, place, and time. He appears well-developed and well-nourished.   HENT:   Head: Normocephalic.   Nose: Nose normal.   Eyes: Pupils are equal, round, and reactive to light.   Neck: Hepatojugular reflux present. Carotid bruit is not present.   Cardiovascular: Regular rhythm, S1 normal and S2 normal.  Bradycardia present.  Exam reveals no gallop.    No murmur heard.  Pulses:       Carotid pulses are 2+ on the right side, and 2+ on the left side.       Radial pulses are 2+ on the right side, and 2+ on the left side.        Dorsalis pedis pulses are 1+ on the right side, and 1+ on the left side.   Pulmonary/Chest: Breath sounds normal. No respiratory distress.   Abdominal: Soft. Bowel sounds are normal. He exhibits no distension. There is no tenderness.   Musculoskeletal: Normal " range of motion. He exhibits edema (pitting edema to lower legs +1).   Neurological: He is alert and oriented to person, place, and time.   Skin: Skin is warm and dry. No erythema.   Psychiatric: He has a normal mood and affect. His speech is normal and behavior is normal.   Nursing note and vitals reviewed.    Lab Results   Component Value Date     08/03/2017    K 4.9 08/03/2017    MG 2.4 01/06/2017     08/03/2017    CO2 26 08/03/2017    BUN 33 (H) 08/03/2017    CREATININE 2.4 (H) 08/03/2017     (H) 08/03/2017    HGBA1C text 07/27/2012    AST 26 06/21/2017    ALT 25 06/21/2017    ALBUMIN 3.3 (L) 06/21/2017    PROT 7.6 06/21/2017    BILITOT 1.5 (H) 06/21/2017    WBC 9.81 06/21/2017    HGB 10.9 (L) 06/21/2017    HCT 30.0 (L) 06/21/2017    MCV 78 (L) 06/21/2017     06/21/2017    TSH 1.334 04/10/2017    CHOL 112 (L) 07/13/2017    HDL 43 07/13/2017    LDLCALC 51.8 (L) 07/13/2017    TRIG 86 07/13/2017         Recent Labs  Lab 01/27/16 2011 11/05/16  0853   INR 1.1 1.1        Test(s) Reviewed  I have reviewed the following in detail:  [] Stress test   [] Angiography   [] Echocardiogram   [x] Labs   [] Other:         Assessment:         1. Chronic diastolic heart failure. Patient still has fluid on clinical exam. Will keep patient on current diuretic regimen. Creatinine elevated but stable at 2.4. Will recheck labs in 4 weeks to ensure kidney function remains stable. Emphasized importance of a very low salt diet and daily weights. Compression stockings for leg edema.     2. Paroxysmal atrial fibrillation. On apixaban and carvedilol. Denies palpitations.     3. HTN (hypertension), benign. Controlled on current medications.     4. Hypercholesterolemia. On atorvastatin 40mg (LDL 51.8 on 7/13/2017).     5. Cerebrovascular disease. On apixaban for stroke prophylaxis.     6. CKD (chronic kidney disease), stage III. Creatinine 2.4.      7. Type 2 diabetes mellitus with hyperglycemia, with long-term  current use of insulin. Uncontrolled on insulin. Patient followed by nephrology and PCP.     Plan:     Alfa was seen today for chronic diastolic heart failure.    Diagnoses and all orders for this visit:    Chronic diastolic heart failure  -     Basic metabolic panel; Future; Expected date: 09/07/2017    Paroxysmal atrial fibrillation    HTN (hypertension), benign    Hypercholesterolemia    Cerebrovascular disease    CKD (chronic kidney disease), stage III    Type 2 diabetes mellitus with hyperglycemia, with long-term current use of insulin      Continue current medications.  Wear compression stockings.  Low salt diet.  Patient has been encouraged to exercise a minimum of 30 minutes daily, 5 times per week.   Blood test in 4 weeks.  Follow up in 3 months.    Return in about 3 months (around 11/7/2017).    ------------------------------------------------------------------    RODNEY Leo, NP-C  Consult Cardiology

## 2017-08-07 NOTE — ANESTHESIA PREPROCEDURE EVALUATION
Pre Admission Screening  Lily Edwards RN      []Hide copied text  Anesthesia Assessment: Preoperative EQUATION     Planned Procedure: Procedure(s) (LRB):  CYSTOSCOPY WITH RETROGRADE PYELOGRAM (Bilateral)  URETEROSCOPY (Bilateral)  CYSTOSCOPY W/BLADDER BIOPSY,POSSIBLE FULGURATION-BLADDER (N/A)  LITHOTRIPSY-LASER (Bilateral)  BIOPSY-URETER (Bilateral)  DILATATION-BALLOON-URETER (Bilateral)  NEPHROSTOMY (Bilateral)  Requested Anesthesia Type:Monitor Anesthesia Care  Surgeon: Anahi Mejia MD  Service: Urology  Known or anticipated Date of Surgery:8/14/2017     Surgeon notes: reviewed     Electronic QUestionnaire Assessment completed via nurse interview with patient.         NO AQ        Triage considerations:      The patient has no apparent active cardiac condition (No unstable coronary Syndrome such as severe unstable angina or recent [<1 month] myocardial infarction, decompensated CHF, severe valvular   disease or significant arrhythmia)     Previous anesthesia records:GETA, MAC and No problems 3/2015 Airway/Jaw/Neck:  Airway Findings: Mouth Opening: Normal Tongue: Normal  General Airway Assessment: Adult  Mallampati: II        Last PCP note: within 1 month 7/11/17   Subspecialty notes: Cardiology: General, Endocrinology, Hematology/Oncology, Nephrology      Other important co-morbidities: HTN/HLP, A fib, DM2, CKD stage 3, CVA/seizurex1 2016, HX thyroid cancer     Tests already available:  Available tests,  within 3 months . 6/21/17 CBC, CMP and EKG. 8/2016 A1c. 1/2016 echo.                                                Instructions given. (See in Nurse's note)     Optimization:  Anesthesia Preop Clinic Assessment  Indicated-not required for this procedure    Medical Opinion Indicated-will ask cards for Eliquis instr and clearance                                                                          Plan:              Testing:  BMP                                               Consultation:will  ask cards for eliquis and clearance                                               Patient  has previously scheduled Medical Appointment:8/8 endocrine     Navigation: Tests Scheduled. Am of surgery                                  Consults scheduled.                                  Results will be tracked by Preop Clinic.                                         Electronically signed by Lily Edwards RN at 8/7/2017  4:20 PM        Pre-admit on 8/14/2017            Detailed Report        8/8/17-  TOBY Leo RN             Yes he can hold the eliquis 48 hours prior to the procedure.   Thanks,   TOBY Miles RN             I am concerned about his creatinine of 2.4. I put in a message to Dr. Peralta who also saw him yesterday and will get back to you when I hear from her.   Thanks,   Silva      I spoke with Dr. Peralta and she believes he can proceed with the surgery. His eliquis should be held 48 hour prior to the procedure and restarted immediately after. Given his history of stroke, he should be bridged with lovenox or heparin  postoperatively if they dont want to restart his eliquis right away. Because of his diastolic dysfunction his fluid status should be carefully monitored during the procedure.     Thanks and let me know if you need anything else,   Silva                                                                                                              08/07/2017  Alfa Christy III is a 62 y.o., male.    Anesthesia Evaluation    I have reviewed the Patient Summary Reports.    I have reviewed the Nursing Notes.      Review of Systems  Anesthesia Hx:  No problems with previous Anesthesia  History of prior surgery of interest to airway management or planning: Previous anesthesia: MAC  3/2015 scope with MAC.  Procedure performed at an [facility] Facility. Denies Family Hx of Anesthesia complications.   Denies Personal Hx of Anesthesia  complications.   Hematology/Oncology:         -- Anemia: Hematology Comments: H/H 30 & 10  --  Cancer in past history:  Oncology Comments: thyroid     EENT/Dental:EENT/Dental Normal   Cardiovascular:   Hypertension  Denies Angina. hyperlipidemia  Functional Capacity low / < 4 METS  Hypertension , Well Controlled on Rx  Disorder of Cardiac Rhythm, Atrial Fibrillation (on eliquis)    Pulmonary:  Pulmonary Normal  Denies Shortness of breath.  Denies Recent URI.    Renal/:   Chronic Renal Disease  Renal Symptoms/Infections/Stones: hematuria.  Kidney Function/Disease, Chronic Kidney Disease (CKD) , CKD Stage III (GFR 30-59)    Neurological:   CVA Seizures  Seizure Disorder , Most recent seizure occurred >1 year ago  CVA - Cerebrovasular Accident , Most recent CVA was on 1/2016 , has had 1 stroke    Endocrine:   Diabetes Hypothyroidism  Diabetes, Type 2 Diabetes , controlled by oral hypoglycemics, insulin. Typical AM glucose range: 200 , most recent HgA1c value was 5.5 on 8/2016.  Thyroid Disease Hypothyroidism, Thyroid Cancer, past history, no recurrence , s/p previous thyroid surgery, s/p total thyroidectomy.   Metabolic Disorders, Obesity / BMI > 30      Physical Exam  General:  Well nourished, Obesity    Airway/Jaw/Neck:  Airway Findings: Mouth Opening: Normal Tongue: Normal  General Airway Assessment: Adult  Mallampati: IV  Improves to III with phonation.  TM Distance: Normal, at least 6 cm  Jaw/Neck Findings:  Neck ROM: Normal ROM     Eyes/Ears/Nose:  Eyes/Ears/Nose Findings:    Dental:  Dental Findings: In tact   Chest/Lungs:  Chest/Lungs Findings: Normal Respiratory Rate     Heart/Vascular:  Heart Findings: Rate: Normal  Rhythm: Regular Rhythm        Mental Status:  Mental Status Findings:  Cooperative, Alert and Oriented         Anesthesia Plan  Type of Anesthesia, risks & benefits discussed:  Anesthesia Type:  MAC  Patient's Preference: MAC  Intra-op Monitoring Plan:   Intra-op Monitoring Plan Comments:   Post  Op Pain Control Plan:   Post Op Pain Control Plan Comments:   Induction:    Beta Blocker:  Patient is on a Beta-Blocker and has received one dose within the past 24 hours (No further documentation required).       Informed Consent: Patient understands risks and agrees with Anesthesia plan.  Questions answered. Anesthesia consent signed with patient.  ASA Score: 2     Day of Surgery Review of History & Physical:    H&P update referred to the surgeon.         Ready For Surgery From Anesthesia Perspective.

## 2017-08-07 NOTE — PATIENT INSTRUCTIONS
Continue current medications.  Wear compression stockings.  Low salt diet.  Patient has been encouraged to exercise a minimum of 30 minutes daily, 5 times per week.   Blood test in 4 weeks.  Follow up in 3 months.

## 2017-08-07 NOTE — PRE ADMISSION SCREENING
Anesthesia Assessment: Preoperative EQUATION    Planned Procedure: Procedure(s) (LRB):  CYSTOSCOPY WITH RETROGRADE PYELOGRAM (Bilateral)  URETEROSCOPY (Bilateral)  CYSTOSCOPY W/BLADDER BIOPSY,POSSIBLE FULGURATION-BLADDER (N/A)  LITHOTRIPSY-LASER (Bilateral)  BIOPSY-URETER (Bilateral)  DILATATION-BALLOON-URETER (Bilateral)  NEPHROSTOMY (Bilateral)  Requested Anesthesia Type:Monitor Anesthesia Care  Surgeon: Anahi Mejia MD  Service: Urology  Known or anticipated Date of Surgery:8/14/2017    Surgeon notes: reviewed    Electronic QUestionnaire Assessment completed via nurse interview with patient.        NO AQ      Triage considerations:     The patient has no apparent active cardiac condition (No unstable coronary Syndrome such as severe unstable angina or recent [<1 month] myocardial infarction, decompensated CHF, severe valvular   disease or significant arrhythmia)    Previous anesthesia records:GETA, MAC and No problems 3/2015 Airway/Jaw/Neck:  Airway Findings: Mouth Opening: Normal Tongue: Normal  General Airway Assessment: Adult  Mallampati: II       Last PCP note: within 1 month 7/11/17   Subspecialty notes: Cardiology: General, Endocrinology, Hematology/Oncology, Nephrology     Other important co-morbidities: HTN/HLP, A fib, DM2, CKD stage 3, CVA/seizurex1 2016, HX thyroid cancer     Tests already available:  Available tests,  within 3 months . 6/21/17 CBC, CMP and EKG. 8/2016 A1c. 1/2016 echo.            Instructions given. (See in Nurse's note)    Optimization:  Anesthesia Preop Clinic Assessment  Indicated-not required for this procedure    Medical Opinion Indicated-will ask cards for Eliquis instr and clearance             Plan:    Testing:  BMP     Consultation:will ask cards for eliquis and clearance     Patient  has previously scheduled Medical Appointment:8/8 endocrine    Navigation: Tests Scheduled. Am of surgery             Consults scheduled.             Results will be tracked by Preop  Clinic.

## 2017-08-08 ENCOUNTER — CLINICAL SUPPORT (OUTPATIENT)
Dept: ENDOCRINOLOGY | Facility: CLINIC | Age: 62
End: 2017-08-08
Payer: COMMERCIAL

## 2017-08-08 ENCOUNTER — TELEPHONE (OUTPATIENT)
Dept: UROLOGY | Facility: CLINIC | Age: 62
End: 2017-08-08

## 2017-08-08 DIAGNOSIS — R82.71 BACTERIA IN URINE: Primary | ICD-10-CM

## 2017-08-08 DIAGNOSIS — Z79.4 TYPE 2 DIABETES MELLITUS WITH HYPERGLYCEMIA, WITH LONG-TERM CURRENT USE OF INSULIN: ICD-10-CM

## 2017-08-08 DIAGNOSIS — E16.2 HYPOGLYCEMIA: ICD-10-CM

## 2017-08-08 DIAGNOSIS — E11.65 TYPE 2 DIABETES MELLITUS WITH HYPERGLYCEMIA, WITH LONG-TERM CURRENT USE OF INSULIN: ICD-10-CM

## 2017-08-08 PROCEDURE — 95250 CONT GLUC MNTR PHYS/QHP EQP: CPT | Mod: S$GLB,,, | Performed by: INTERNAL MEDICINE

## 2017-08-08 RX ORDER — CEPHALEXIN 500 MG/1
500 CAPSULE ORAL EVERY 12 HOURS
Qty: 14 CAPSULE | Refills: 0 | Status: SHIPPED | OUTPATIENT
Start: 2017-08-08 | End: 2017-08-08 | Stop reason: SDUPTHER

## 2017-08-08 RX ORDER — CEPHALEXIN 500 MG/1
500 CAPSULE ORAL EVERY 12 HOURS
Qty: 14 CAPSULE | Refills: 0 | Status: SHIPPED | OUTPATIENT
Start: 2017-08-08 | End: 2017-08-15

## 2017-08-08 NOTE — TELEPHONE ENCOUNTER
"----- Message from Ladonna Adams LPN sent at 8/8/2017  9:08 AM CDT -----  Contact: self  [phone]  Please advise. I know one was sent on the 17 th. Pt states the pharmacy did not get it. A message was sent to MM on the 27 th about this and she sent it back to me stating "done" I can not find it. Can you tell? If not does he need one ?   ----- Message -----  From: Kisha Carney MA  Sent: 8/7/2017   4:35 PM  To: Roberto RUFF Staff    Knows you will call Tuesday, 8-8-17    States he is calling for the antibiotic that he needs to take before his procedure on Monday, 8-14-17.    Call to Tani in his profile and please call when done.    "

## 2017-08-08 NOTE — TELEPHONE ENCOUNTER
----- Message from Thi Ulrich NP sent at 8/8/2017 11:49 AM CDT -----  I sent to Tani    ----- Message -----  From: Ladonna Adams LPN  Sent: 8/8/2017  11:45 AM  To: TOBY Linda or [facility] ?  ----- Message -----  From: Thi Ulrich NP  Sent: 8/8/2017  11:43 AM  To: GUY Pacheco was resent to Tani.

## 2017-08-09 NOTE — PROGRESS NOTES
DIABETES EDUCATOR NOTE   Return of the Freestyle Giles Pro Sensor and Patient Log.     Patient returned to clinic today to return Glucose Sensor and signed patient log form used in [facility] procedure.     The CGMS Sensor will be scanned and downloaded. All reports will be imported into the patient's electronic medical record.     Endocrine Nurse Practitioner will complete data interpretation and make recommendations; will forward recommendations to the ordering provider for follow up with patient.

## 2017-08-11 ENCOUNTER — LAB VISIT (OUTPATIENT)
Dept: LAB | Facility: HOSPITAL | Age: 62
End: 2017-08-11
Attending: UROLOGY
Payer: COMMERCIAL

## 2017-08-11 ENCOUNTER — TELEPHONE (OUTPATIENT)
Dept: UROLOGY | Facility: CLINIC | Age: 62
End: 2017-08-11

## 2017-08-11 DIAGNOSIS — N39.0 RECURRENT UTI: ICD-10-CM

## 2017-08-11 PROCEDURE — 87086 URINE CULTURE/COLONY COUNT: CPT

## 2017-08-11 NOTE — TELEPHONE ENCOUNTER
Called pt to confirm 10:15am arrival time for procedure. Gave pt NPO instructions and gave pt opportunity to ask questions. Pt verbalized understanding.

## 2017-08-12 LAB — BACTERIA UR CULT: NO GROWTH

## 2017-08-14 ENCOUNTER — ANESTHESIA (OUTPATIENT)
Dept: SURGERY | Facility: HOSPITAL | Age: 62
End: 2017-08-14
Payer: COMMERCIAL

## 2017-08-14 ENCOUNTER — HOSPITAL ENCOUNTER (OUTPATIENT)
Facility: HOSPITAL | Age: 62
Discharge: HOME | End: 2017-08-14
Attending: UROLOGY | Admitting: UROLOGY
Payer: COMMERCIAL

## 2017-08-14 VITALS
HEART RATE: 59 BPM | WEIGHT: 225 LBS | TEMPERATURE: 98 F | RESPIRATION RATE: 16 BRPM | HEIGHT: 70 IN | OXYGEN SATURATION: 100 % | DIASTOLIC BLOOD PRESSURE: 67 MMHG | SYSTOLIC BLOOD PRESSURE: 132 MMHG | BODY MASS INDEX: 32.21 KG/M2

## 2017-08-14 DIAGNOSIS — N13.5 URETERAL OBSTRUCTION: ICD-10-CM

## 2017-08-14 DIAGNOSIS — Z01.818 PREOPERATIVE TESTING: ICD-10-CM

## 2017-08-14 DIAGNOSIS — R31.9 HEMATURIA: ICD-10-CM

## 2017-08-14 LAB
POCT GLUCOSE: 145 MG/DL (ref 70–110)
POCT GLUCOSE: 222 MG/DL (ref 70–110)

## 2017-08-14 PROCEDURE — 25500020 PHARM REV CODE 255: Performed by: UROLOGY

## 2017-08-14 PROCEDURE — D9220A PRA ANESTHESIA: Mod: ANES,,, | Performed by: ANESTHESIOLOGY

## 2017-08-14 PROCEDURE — 74420 UROGRAPHY RTRGR +-KUB: CPT | Mod: 26,,, | Performed by: UROLOGY

## 2017-08-14 PROCEDURE — 71000015 HC POSTOP RECOV 1ST HR: Performed by: UROLOGY

## 2017-08-14 PROCEDURE — 71000039 HC RECOVERY, EACH ADD'L HOUR: Performed by: UROLOGY

## 2017-08-14 PROCEDURE — 82962 GLUCOSE BLOOD TEST: CPT | Mod: 91 | Performed by: UROLOGY

## 2017-08-14 PROCEDURE — 37000008 HC ANESTHESIA 1ST 15 MINUTES: Performed by: UROLOGY

## 2017-08-14 PROCEDURE — 25000003 PHARM REV CODE 250: Performed by: NURSE ANESTHETIST, CERTIFIED REGISTERED

## 2017-08-14 PROCEDURE — 82962 GLUCOSE BLOOD TEST: CPT | Performed by: UROLOGY

## 2017-08-14 PROCEDURE — 63600175 PHARM REV CODE 636 W HCPCS: Performed by: ANESTHESIOLOGY

## 2017-08-14 PROCEDURE — 37000009 HC ANESTHESIA EA ADD 15 MINS: Performed by: UROLOGY

## 2017-08-14 PROCEDURE — 25000003 PHARM REV CODE 250: Performed by: ANESTHESIOLOGY

## 2017-08-14 PROCEDURE — D9220A PRA ANESTHESIA: Mod: CRNA,,, | Performed by: NURSE ANESTHETIST, CERTIFIED REGISTERED

## 2017-08-14 PROCEDURE — C1769 GUIDE WIRE: HCPCS | Performed by: UROLOGY

## 2017-08-14 PROCEDURE — C1758 CATHETER, URETERAL: HCPCS | Performed by: UROLOGY

## 2017-08-14 PROCEDURE — 52005 CYSTO W/URTRL CATHJ: CPT | Mod: ,,, | Performed by: UROLOGY

## 2017-08-14 PROCEDURE — 63600175 PHARM REV CODE 636 W HCPCS: Performed by: NURSE ANESTHETIST, CERTIFIED REGISTERED

## 2017-08-14 PROCEDURE — 71000033 HC RECOVERY, INTIAL HOUR: Performed by: UROLOGY

## 2017-08-14 PROCEDURE — 36000706: Performed by: UROLOGY

## 2017-08-14 PROCEDURE — 36000707: Performed by: UROLOGY

## 2017-08-14 RX ORDER — KETAMINE HCL IN 0.9 % NACL 50 MG/5 ML
SYRINGE (ML) INTRAVENOUS
Status: DISCONTINUED | OUTPATIENT
Start: 2017-08-14 | End: 2017-08-14

## 2017-08-14 RX ORDER — BLOOD-GLUCOSE METER
EACH MISCELLANEOUS
Qty: 100 STRIP | Refills: 11 | Status: SHIPPED | OUTPATIENT
Start: 2017-08-14 | End: 2018-08-28 | Stop reason: SDUPTHER

## 2017-08-14 RX ORDER — LIDOCAINE HCL/PF 100 MG/5ML
SYRINGE (ML) INTRAVENOUS
Status: DISCONTINUED | OUTPATIENT
Start: 2017-08-14 | End: 2017-08-14

## 2017-08-14 RX ORDER — LIDOCAINE HYDROCHLORIDE 10 MG/ML
1 INJECTION, SOLUTION EPIDURAL; INFILTRATION; INTRACAUDAL; PERINEURAL ONCE
Status: COMPLETED | OUTPATIENT
Start: 2017-08-14 | End: 2017-08-14

## 2017-08-14 RX ORDER — HYDROCODONE BITARTRATE AND ACETAMINOPHEN 5; 325 MG/1; MG/1
1 TABLET ORAL EVERY 4 HOURS PRN
Status: DISCONTINUED | OUTPATIENT
Start: 2017-08-14 | End: 2017-08-14 | Stop reason: HOSPADM

## 2017-08-14 RX ORDER — SODIUM CHLORIDE 9 MG/ML
INJECTION, SOLUTION INTRAVENOUS CONTINUOUS
Status: DISCONTINUED | OUTPATIENT
Start: 2017-08-14 | End: 2017-08-14 | Stop reason: HOSPADM

## 2017-08-14 RX ORDER — FENTANYL CITRATE 50 UG/ML
INJECTION, SOLUTION INTRAMUSCULAR; INTRAVENOUS
Status: DISCONTINUED | OUTPATIENT
Start: 2017-08-14 | End: 2017-08-14

## 2017-08-14 RX ORDER — OXYCODONE AND ACETAMINOPHEN 5; 325 MG/1; MG/1
1 TABLET ORAL EVERY 4 HOURS PRN
Qty: 31 TABLET | Refills: 0 | Status: ON HOLD | OUTPATIENT
Start: 2017-08-14 | End: 2017-10-05

## 2017-08-14 RX ORDER — PROPOFOL 10 MG/ML
VIAL (ML) INTRAVENOUS CONTINUOUS PRN
Status: DISCONTINUED | OUTPATIENT
Start: 2017-08-14 | End: 2017-08-14

## 2017-08-14 RX ORDER — MIDAZOLAM HYDROCHLORIDE 1 MG/ML
INJECTION, SOLUTION INTRAMUSCULAR; INTRAVENOUS
Status: DISCONTINUED | OUTPATIENT
Start: 2017-08-14 | End: 2017-08-14

## 2017-08-14 RX ADMIN — Medication 10 MG: at 12:08

## 2017-08-14 RX ADMIN — SODIUM CHLORIDE: 0.9 INJECTION, SOLUTION INTRAVENOUS at 11:08

## 2017-08-14 RX ADMIN — LIDOCAINE HYDROCHLORIDE 40 MG: 20 INJECTION, SOLUTION INTRAVENOUS at 12:08

## 2017-08-14 RX ADMIN — LIDOCAINE HYDROCHLORIDE 10 MG: 10 INJECTION, SOLUTION EPIDURAL; INFILTRATION; INTRACAUDAL; PERINEURAL at 11:08

## 2017-08-14 RX ADMIN — MIDAZOLAM HYDROCHLORIDE 2 MG: 1 INJECTION, SOLUTION INTRAMUSCULAR; INTRAVENOUS at 11:08

## 2017-08-14 RX ADMIN — PROPOFOL 100 MCG/KG/MIN: 10 INJECTION, EMULSION INTRAVENOUS at 12:08

## 2017-08-14 RX ADMIN — FENTANYL CITRATE 50 MCG: 50 INJECTION, SOLUTION INTRAMUSCULAR; INTRAVENOUS at 12:08

## 2017-08-14 RX ADMIN — CEFTRIAXONE 1 G: 1 INJECTION, SOLUTION INTRAVENOUS at 12:08

## 2017-08-14 NOTE — OP NOTE
[facility]  Operative Note    Date: 08/14/2017    Pre-Op Diagnosis:   Patient Active Problem List   Diagnosis    HTN (hypertension), benign    Hypercholesterolemia    Sickle cell trait    Erectile dysfunction associated with type 2 diabetes mellitus    Gallstones    Paroxysmal atrial fibrillation    CKD (chronic kidney disease), stage III    Postsurgical hypothyroidism    Microcytic anemia    Obesity (BMI 30.0-34.9)    History of stroke    Cerebrovascular disease    CKD stage 3 due to type 2 diabetes mellitus    Proteinuria    Generalized tonic-clonic seizure    Chronic anticoagulation - pradaxa    Chronic diastolic heart failure    History of thyroid cancer    Bradycardia    Bilateral leg edema    Type 2 diabetes mellitus with hyperglycemia    Secondary hyperparathyroidism    Elevated PSA    Hematuria       Post-Op Diagnosis: same    Procedure(s) Performed:   1.  Cystoscopy with bilateral retrograde pyelograms  2.  Fluoroscopy < 1 hour    Specimen(s): None    Staff Surgeon: Anahi Mejia MD    Assistant Surgeon: Dorothy Daugherty MD    Anesthesia: Monitored Local Anesthesia with Sedation    Indications: Alfa Christy III is a 62 y.o. male with hematuria and abnormal urine cytology.    Findings:   1.) Large, friable prostate  2.) Right ureter with distal J hooking, no filling defects  3.) Left ureter with tortuosity, no filling defects     Estimated Blood Loss: min    Drains: none    Procedure in Detail:  After risks, benefits and possible complications of cystoscopy were explained, the patient elected to undergo the procedure and informed consent was obtained. All questions were answered in the marian-operative area. The patient was transferred to the cystoscopy suite and placed in the supine position.  SCDs were applied and working.  MAC anesthesia was administered.  Once the patient was adequately sedated, he was placed in the dorsal lithotomy position and prepped and  draped in the usual sterile fashion.  Time out was performed, marian-procedural antibiotics were confirmed.     A rigid cystoscope in a 22 Fr sheath was introduced into the bladder per urethra. This passed easily.  The entire urethra was visualized which showed no masses or strictures.  The right and left ureteral orifices were identified in the normal anatomic position and were seen effluxing clear urine.  Cystoscopy was performed, but was limited by visibility due to blood coming from the friable prostate. No masses or lesions suspicious for malignancy, trabeculations, bladder stones, bladder diverticuli were appreciated.      The right UO was identified and cannulated with a Rutner tip catheter.  A RGP was performed which showed J hooking distally.  This was then repeated on the left side, revealing tortuosity distally.  Filling defects were not appreciated in either ureter.    The bladder was drained, and the patient was removed from lithotomy.     The patient tolerated the procedure well and was transferred to recovery in stable condition.    Disposition:  The patient will follow up with Dr. Mejia in 2 weeks.  The patient was given prescriptions for Percocet.      Dorothy Daugherty MD

## 2017-08-14 NOTE — TRANSFER OF CARE
"Anesthesia Transfer of Care Note    Patient: Alfa Christy III    Procedure(s) Performed: Procedure(s) (LRB):  CYSTOSCOPY WITH RETROGRADE PYELOGRAM (Bilateral)    Patient location: PACU    Anesthesia Type: general    Transport from OR: Transported from OR on 6-10 L/min O2 by face mask with adequate spontaneous ventilation    Post pain: adequate analgesia    Post assessment: no apparent anesthetic complications    Post vital signs: stable    Level of consciousness: sedated    Nausea/Vomiting: no nausea/vomiting    Complications: none    Transfer of care protocol was followed      Last vitals:   Visit Vitals  BP (!) 146/76 (BP Location: Left arm, Patient Position: Lying)   Pulse 96   Temp 36.5 °C (97.7 °F) (Oral)   Resp 16   Ht 5' 10" (1.778 m)   Wt 102.1 kg (225 lb)   SpO2 100%   BMI 32.28 kg/m²     "

## 2017-08-14 NOTE — PROGRESS NOTES
Dr. Simmons with urology stated that ordered BMP was not needed for today and may be discontinued.

## 2017-08-14 NOTE — DISCHARGE SUMMARY
[facility]  Discharge Note  Short Stay    Admit Date: 8/14/2017    Discharge Date and Time: 08/14/2017, 12:51 PM    Attending Physician: Dr. Anahi Mejia MD     Discharge Provider: Dorothy Daugherty MD    Diagnoses:  Active Hospital Problems    Diagnosis  POA    *Hematuria [R31.9]  Yes      Resolved Hospital Problems    Diagnosis Date Resolved POA   No resolved problems to display.       Discharged Condition: good    Hospital Course: Patient was admitted for an outpatient procedure, cystoscopy with retrograde pyelograms, and tolerated the procedure well with no complications.    Final Diagnoses: Same as principal problem.    Disposition: Home or Self Care    Follow up/Patient Instructions:    Medications:  Reconciled Home Medications:   Current Discharge Medication List      START taking these medications    Details   oxycodone-acetaminophen (PERCOCET) 5-325 mg per tablet Take 1 tablet by mouth every 4 (four) hours as needed for Pain.  Qty: 31 tablet, Refills: 0         CONTINUE these medications which have NOT CHANGED    Details   acetaminophen (TYLENOL) 500 MG tablet Take 1 tablet (500 mg total) by mouth every 6 (six) hours as needed for Pain.  Refills: 0    Associated Diagnoses: Lumbar sprain, initial encounter      amlodipine (NORVASC) 5 MG tablet Take 1 tablet (5 mg total) by mouth once daily.  Qty: 30 tablet, Refills: 11    Associated Diagnoses: HTN (hypertension), benign      apixaban 5 mg Tab Take 1 tablet (5 mg total) by mouth 2 (two) times daily.  Qty: 60 tablet, Refills: 11    Associated Diagnoses: Paroxysmal atrial fibrillation      atorvastatin (LIPITOR) 40 MG tablet Take 1 tablet (40 mg total) by mouth once daily.  Qty: 90 tablet, Refills: 3    Associated Diagnoses: Hypercholesterolemia      carvedilol (COREG) 12.5 MG tablet Take 1 tablet (12.5 mg total) by mouth 2 (two) times daily.  Qty: 180 tablet, Refills: 4    Associated Diagnoses: HTN (hypertension), benign      cephALEXin  (KEFLEX) 500 MG capsule Take 1 capsule (500 mg total) by mouth every 12 (twelve) hours.  Qty: 14 capsule, Refills: 0    Associated Diagnoses: Bacteria in urine      coenzyme Q10 200 mg capsule Take 200 mg by mouth once daily.    Associated Diagnoses: Lumbar sprain, initial encounter      !! ergocalciferol (VITAMIN D2) 50,000 unit Cap Take 50,000 Units by mouth every 7 days.      !! ERGOCALCIFEROL, VITAMIN D2, (VITAMIN D ORAL) Take 1 tablet by mouth every Mon, Wed, Fri.      folic acid (FOLVITE) 1 MG tablet Take 1 tablet (1 mg total) by mouth once daily.  Qty: 90 tablet, Refills: 1    Associated Diagnoses: Sickle cell trait; Mild anemia      furosemide (LASIX) 40 MG tablet Take 2 tablets (80 mg total) by mouth 2 (two) times daily.  Qty: 90 tablet, Refills: 4    Associated Diagnoses: Chronic diastolic heart failure      insulin glargine (LANTUS SOLOSTAR) 100 unit/mL (3 mL) InPn pen Inject 16 Units into the skin every evening.  Qty: 3 Box, Refills: 3    Associated Diagnoses: Controlled type 2 diabetes with neuropathy      insulin lispro (HUMALOG KWIKPEN) 100 unit/mL InPn pen Inject 12 units w/ meals, 6 units if eating lighter meal or bg less than 120, scale 180-230 +2, 231-280 +4, 281-330 +6, 331-380 +8.  Qty: 3 Box, Refills: 3    Associated Diagnoses: Controlled type 2 diabetes with neuropathy      levetiracetam (KEPPRA) 750 MG Tab Take 1 tablet (750 mg total) by mouth 2 (two) times daily.  Qty: 180 tablet, Refills: 4    Associated Diagnoses: Generalized tonic-clonic seizure      levothyroxine (SYNTHROID) 200 MCG tablet Take 1 tablet (200 mcg total) by mouth before breakfast.  Qty: 90 tablet, Refills: 0    Associated Diagnoses: Postoperative hypothyroidism      losartan (COZAAR) 100 MG tablet Take 1 tablet (100 mg total) by mouth once daily.  Qty: 90 tablet, Refills: 3    Associated Diagnoses: CKD (chronic kidney disease), stage III      magnesium 30 mg Tab Take by mouth.      multivitamin capsule Take 1 capsule by  "mouth once daily. Pt taken every mon, wed, fri      saw palmetto 80 MG capsule Take 80 mg by mouth once daily.      BD INSULIN PEN NEEDLE UF MINI 31 gauge x 3/16" Ndle To use with insulin pens 4  times daily  Qty: 360 each, Refills: 3      glucagon (human recombinant) inj 1mg/mL kit Inject 1 mL (1 mg total) into the muscle as needed.  Qty: 1 kit, Refills: 1      insulin detemir (LEVEMIR FLEXPEN) 100 unit/mL (3 mL) SubQ InPn pen Inject 16 Units into the skin every evening.  Qty: 3 Box, Refills: 3      ONETOUCH VERIO Strp USE TO TEST BLOOD SUGAR THREE TIMES DAILY  Qty: 100 strip, Refills: 11       !! - Potential duplicate medications found. Please discuss with provider.          Discharge Procedure Orders  Diet general     Activity as tolerated   Order Comments: Do not drive while on pain medication.     Call MD for:  temperature >100.4     Call MD for:  persistent nausea and vomiting     Call MD for:  severe uncontrolled pain     No dressing needed       Follow-up Information     Anahi Mejia MD On 11/3/2017.    Specialty:  Urology  Contact information:  [address]  [facility] [address]  [phone]                   Dorothy Daugherty MD  PGY-2  Urology    "

## 2017-08-14 NOTE — PROGRESS NOTES
Discharge instructions given, patient verbalized understanding. Consents in chart, Vitals stable, no complaints. Pt urinated 250 ml clear dark pink urine spontaneously into urinal.

## 2017-08-14 NOTE — ANESTHESIA RELEASE NOTE
"Anesthesia Release from PACU Note    Patient: Alfa Christy III    Procedure(s) Performed: Procedure(s) (LRB):  CYSTOSCOPY WITH RETROGRADE PYELOGRAM (Bilateral)    Anesthesia type: general    Post pain: Adequate analgesia    Post assessment: no apparent anesthetic complications, tolerated procedure well and no evidence of recall    Last Vitals:   Visit Vitals  BP (!) 152/64 (BP Location: Right arm, Patient Position: Sitting)   Pulse (!) 51   Temp 36.4 °C (97.5 °F) (Skin)   Resp 16   Ht 5' 10" (1.778 m)   Wt 102.1 kg (225 lb)   SpO2 100%   BMI 32.28 kg/m²       Post vital signs: stable    Level of consciousness: awake, alert  and oriented    Nausea/Vomiting: no nausea/no vomiting    Complications: none    Airway Patency: patent    Respiratory: unassisted    Cardiovascular: stable and blood pressure at baseline    Hydration: euvolemic  "

## 2017-08-14 NOTE — DISCHARGE INSTRUCTIONS
ACTIVITY LEVEL:  If you received sedation and/or an anesthetic, you may feel sleepy for several hours. Rest until you feel more  awake. Gradually resume your normal activities.  DIET:  At home, continue with liquids. If there is no nausea, you may eat a soft diet and gradually resume your normal  diet. Drink a lot of liquids until you see your doctor.    DISCOMFORT:  You may experience a small amount of discomfort, burning on urination, and/or slight bleeding into the urine.  Sitting in a warm tub of water will relieve many of these symptoms. If needed, you may take Tylenol (normal  recommended dose) every 4 hours, for discomfort.  MEDICATION:  You will receive instructions for any pain and/or antibiotic prescriptions. Pain medication should be taken only  if needed, and as directed. Antibiotics should be taken as directed until the entire prescription is completed.  ADDITIONAL INFORMATION:____________________________________________________________  _________________________________________________________________________________________  WHEN TO CALL THE DOCTOR:   If you are unable to urinate within six hours after procedure.   Fever over 101ºF (38.4ºC).  RETURN APPOINTMENT:________________________________________________________________  FOR EMERGENCIES:  If any unusual problems or difficulties occur, contact the resident at  [phone] (page ) or at the Clinic office, [phone].

## 2017-08-14 NOTE — H&P
Urology   H&P    Subjective:       Patient ID: Alfa Christy III is a 61 y.o. male.     Chief Complaint: hematuria    HPI: Alfa Christy III is a 61 y.o. Black or  male with a hx of elevated psa and erectile dysfunction who presents for evaluation and management of hematuria.    Last seen with Dr. Mejia 1 mo ago.      He has had 2 episodes of gross hematuria since switching to Eliquis 2 weeks ago.     He noticed it at the end of his stream. Good FOS (weak FOS at night) and complete emptying. No abdominal or flank pain. No hematuria today.    Reports frequency and nocturia has increased from 2-3 x - 3-4x, started Lasix. No increase in symptoms since hematuria. Denies hesitancy, intermittency, or dysuria.   Switched from Pradaxa to Eliquis for a fib 2 weeks ago.     He reports urinating every 45 min -1 hr  He is no longer taking saw palmetto or urinozinc.   He does not take the viagra bc he is scared of effects.      He has a history of a negative prostate biopsy done in 04/2011 showing chronic inflammation. PSA was 5.4 at that time.      Previous/Current Smoker: no   Radiation therapy to pelvis: no  Chemotherapy: no   Personal/ family history of prostate/ bladder/ kidney cancer: no   Exposure to harmful chemicals: no  History of kidney stones/ prostatitis/ UTI: no         CTRSS 2016- No evidence of nephrolithiasis or obstructive uropathy. Prostatomegaly.          Review of patient's allergies indicates:   Allergen Reactions    Cough syrup [guaifenesin] Other (See Comments)         Current Medications          Current Outpatient Prescriptions   Medication Sig Dispense Refill    acetaminophen (TYLENOL) 500 MG tablet Take 1 tablet (500 mg total) by mouth every 6 (six) hours as needed for Pain.   0    amlodipine (NORVASC) 5 MG tablet Take 1 tablet (5 mg total) by mouth once daily. 30 tablet 11    atorvastatin (LIPITOR) 40 MG tablet Take 1 tablet (40 mg total) by mouth once daily. 90 tablet 3     "carvedilol (COREG) 12.5 MG tablet Take 1 tablet (12.5 mg total) by mouth 2 (two) times daily. 180 tablet 4    coenzyme Q10 200 mg capsule Take 200 mg by mouth once daily. (Patient taking differently: Take 200 mg by mouth once daily. PT TAKEN EVERY MON, WED, FRI)        ERGOCALCIFEROL, VITAMIN D2, (VITAMIN D ORAL) Take 1 tablet by mouth every Mon, Wed, Fri.        folic acid (FOLVITE) 1 MG tablet Take 1 tablet (1 mg total) by mouth once daily. 90 tablet 1    furosemide (LASIX) 40 MG tablet Take 2 tablets (80 mg total) by mouth 2 (two) times daily. 90 tablet 4    glucagon (human recombinant) inj 1mg/mL kit Inject 1 mL (1 mg total) into the muscle as needed. 1 kit 1    insulin detemir (LEVEMIR FLEXPEN) 100 unit/mL (3 mL) SubQ InPn pen Inject 16 Units into the skin every evening. 3 Box 3    insulin glargine (LANTUS SOLOSTAR) 100 unit/mL (3 mL) InPn pen Inject 16 Units into the skin every evening. 3 Box 3    levetiracetam (KEPPRA) 750 MG Tab Take 1 tablet (750 mg total) by mouth 2 (two) times daily. 180 tablet 4    levothyroxine (SYNTHROID) 200 MCG tablet Take 1 tablet (200 mcg total) by mouth before breakfast. 90 tablet 0    losartan (COZAAR) 100 MG tablet Take 1 tablet (100 mg total) by mouth once daily. 90 tablet 3    multivitamin capsule Take 1 capsule by mouth once daily. Pt taken every mon, wed, fri        apixaban 5 mg Tab Take 1 tablet (5 mg total) by mouth 2 (two) times daily. 60 tablet 11    BD INSULIN PEN NEEDLE UF MINI 31 gauge x 3/16" Ndle To use with insulin pens 4  times daily 360 each 3    blood sugar diagnostic Strp 1 strip by Misc.(Non-Drug; Combo Route) route 3 (three) times daily. One Touch Verio 100 strip 11    insulin lispro (HUMALOG KWIKPEN) 100 unit/mL InPn pen Inject 12 units w/ meals, 6 units if eating lighter meal or bg less than 120, scale 180-230 +2, 231-280 +4, 281-330 +6, 331-380 +8. 3 Box 3                Current Facility-Administered Medications   Medication Dose Route " Frequency Provider Last Rate Last Dose    lidocaine HCl 2% urojet 10 mL  10 mL Urethral 1 time in Clinic/HOD Anahi Mejia MD                     Past Medical History:   Diagnosis Date    Allergy      BMI 31.0-31.9,adult 11/25/2014    Cerebrovascular disease 7/25/2016    Chronic anticoagulation - pradaxa 11/10/2016    Chronic diastolic heart failure 11/20/2016    CKD (chronic kidney disease), stage III 9/23/2013    Controlled type 2 diabetes with neuropathy 12/16/2014    Diabetes mellitus due to underlying condition with stage 3 chronic kidney disease, without long-term current use of insulin 11/2/2016    Elevated alkaline phosphatase level 8/1/2012    Elevated PSA       negative prostate biopsy 4/11    Erectile dysfunction associated with type 2 diabetes mellitus      Gallstones 3/20/2013    Generalized tonic-clonic seizure 11/2016    HTN (hypertension), benign 8/1/2012    Hypercholesterolemia 8/1/2012    Microcytic anemia 11/27/2013    Paroxysmal atrial fibrillation 9/23/2013    Postsurgical hypothyroidism 11/27/2013    Right homonymous hemianopsia 2/5/2016    Sickle cell trait      Stroke 1/27/2016    Thyroid cancer       multifocal with six lesions, largest 5mm, treated with surgery and radioactive iodine    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/2016               Past Surgical History:   Procedure Laterality Date    BREAST SURGERY         cyst removal    COLONOSCOPY        CYST REMOVAL         chest    PROSTATE BIOPSY   4/27/11    SKIN BIOPSY        THYROID SURGERY   8/26/09    VASCULAR SURGERY                   Family History   Problem Relation Age of Onset    Heart disease Father      Diabetes Father      Hypertension Father      Diabetes Mother      Hypertension Mother      Heart disease Mother      Diabetes Brother      Cancer Sister      Thyroid disease Maternal Aunt      Clotting disorder Neg Hx              Review of Systems      Review of Systems    Constitutional: Negative for chills and fever.   HENT: Negative for facial swelling.    Eyes: Negative for visual disturbance.   Respiratory: Negative for chest tightness and shortness of breath.    Cardiovascular: Negative for chest pain and leg swelling.   Gastrointestinal: Negative for abdominal pain, constipation, nausea and vomiting.   Genitourinary: Positive for frequency. Negative for decreased urine volume, difficulty urinating, discharge, dysuria, enuresis, flank pain, genital sores, hematuria, penile pain, penile swelling, scrotal swelling, testicular pain and urgency.   Musculoskeletal: Negative for back pain.   Neurological: Negative for dizziness, weakness and headaches.   Psychiatric/Behavioral: Negative for agitation and confusion.           Objective:          Vitals:     07/14/17 0912   BP: 135/72   Pulse: (!) 59      Physical Exam      Physical Exam   Nursing note and vitals reviewed.  Constitutional: He is oriented to person, place, and time. He appears well-developed and well-nourished.   HENT:   Head: Normocephalic.   Eyes: Right eye exhibits no discharge. Left eye exhibits no discharge.   Neck: Normal range of motion. Neck supple.   Cardiovascular: Normal rate.    Pulmonary/Chest: Effort normal. No stridor.   Abdominal: Soft.   Musculoskeletal: Normal range of motion.   Neurological: He is alert and oriented to person, place, and time.   Skin: Skin is warm and dry.   Psychiatric: He has a normal mood and affect. His behavior is normal. Judgment and thought content normal.                  Lab Results   Component Value Date     CREATININE 2.3 (H) 06/21/2017            Lab Results   Component Value Date     EGFRNONAA 29.1 (A) 06/21/2017            Lab Results   Component Value Date     ESTGFRAFRICA 33.7 (A) 06/21/2017      UA today: trace RBCs, otherwise negative       Assessment:       1. Erectile dysfunction associated with type 2 diabetes mellitus    2. Elevated PSA    3. CKD stage 3 due to  type 2 diabetes mellitus    4. Type 2 diabetes mellitus with hyperglycemia, with long-term current use of insulin    5. Obesity (BMI 30.0-34.9)    6. Gross hematuria    7. Paroxysmal atrial fibrillation    8. Chronic diastolic heart failure            Plan:   Cystoscopy, bilateral RPG, possible ureteroscopy and biopsy to evaluate hematuria and abnormal cytology (cysto, renal ultrasound fine, but ureters not evaluated). Has CKD, so will not do IV contrast.     Eliquis last dose was Friday, 8/11/2017    Proceed to OR as planned.     Dorothy Daugherty MD  PGY-2  Urology

## 2017-08-14 NOTE — ANESTHESIA POSTPROCEDURE EVALUATION
"Anesthesia Post Evaluation    Patient: Alfa Christy III    Procedure(s) Performed: Procedure(s) (LRB):  CYSTOSCOPY WITH RETROGRADE PYELOGRAM (Bilateral)    Final Anesthesia Type: general  Patient location during evaluation: PACU  Patient participation: Yes- Able to Participate  Level of consciousness: awake and alert  Post-procedure vital signs: reviewed and stable  Pain management: adequate  Airway patency: patent  PONV status at discharge: No PONV  Anesthetic complications: no      Cardiovascular status: stable  Respiratory status: unassisted and spontaneous ventilation  Hydration status: euvolemic  Follow-up not needed.        Visit Vitals  BP (!) 152/64 (BP Location: Right arm, Patient Position: Sitting)   Pulse (!) 51   Temp 36.4 °C (97.5 °F) (Skin)   Resp 16   Ht 5' 10" (1.778 m)   Wt 102.1 kg (225 lb)   SpO2 100%   BMI 32.28 kg/m²       Pain/Lenin Score: Pain Assessment Performed: Yes (8/14/2017 12:53 PM)  Presence of Pain: denies (8/14/2017 12:53 PM)  Lenin Score: 9 (8/14/2017 12:53 PM)      "

## 2017-08-15 ENCOUNTER — PATIENT MESSAGE (OUTPATIENT)
Dept: ENDOCRINOLOGY | Facility: CLINIC | Age: 62
End: 2017-08-15

## 2017-08-30 ENCOUNTER — INITIAL CONSULT (OUTPATIENT)
Dept: OPHTHALMOLOGY | Facility: CLINIC | Age: 62
End: 2017-08-30
Payer: COMMERCIAL

## 2017-08-30 DIAGNOSIS — E11.9 DM TYPE 2 WITHOUT RETINOPATHY: ICD-10-CM

## 2017-08-30 DIAGNOSIS — H53.461 RIGHT HOMONYMOUS HEMIANOPSIA: ICD-10-CM

## 2017-08-30 DIAGNOSIS — I10 ESSENTIAL HYPERTENSION: ICD-10-CM

## 2017-08-30 DIAGNOSIS — H52.7 REFRACTIVE ERROR: ICD-10-CM

## 2017-08-30 DIAGNOSIS — H25.13 NUCLEAR SCLEROSIS, BILATERAL: Primary | ICD-10-CM

## 2017-08-30 DIAGNOSIS — I63.9 CEREBROVASCULAR ACCIDENT (CVA), UNSPECIFIED MECHANISM: ICD-10-CM

## 2017-08-30 PROCEDURE — 99999 PR PBB SHADOW E&M-EST. PATIENT-LVL IV: CPT | Mod: PBBFAC,,, | Performed by: OPHTHALMOLOGY

## 2017-08-30 PROCEDURE — 92004 COMPRE OPH EXAM NEW PT 1/>: CPT | Mod: S$GLB,,, | Performed by: OPHTHALMOLOGY

## 2017-08-30 NOTE — LETTER
August 30, 2017      Tyler Jasmine MD  [address]  [facility] [address]           [place] [facility]  [address]  [place] [address]  Phone: [phone]  Fax: [phone]          Patient: Alfa Christy III   MR Number: [id number]   YOB: 1955   Date of Visit: 8/30/2017       Dear Dr. Tyler Jasmine:    Thank you for referring Alfa Chritsy to me for evaluation. Attached you will find relevant portions of my assessment and plan of care.    If you have questions, please do not hesitate to call me. I look forward to following Alfa Christy along with you.    Sincerely,    Reagan Sanz MD    Enclosure  CC:  No Recipients    If you would like to receive this communication electronically, please contact [email or web address] or [phone] to request more information on [employer] Link access.    For providers and/or their staff who would like to refer a patient to [facility], please contact us through our one-stop-shop provider referral line, [facility] [place], at [phone].    If you feel you have received this communication in error or would no longer like to receive these types of communications, please e-mail [email or web address]

## 2017-08-30 NOTE — PROGRESS NOTES
Subjective:       Patient ID: Alfa Christy III is a 62 y.o. male.    Chief Complaint: Cerebrovascular Accident    HPI  Review of Systems    Objective:      Physical Exam    Assessment:       1. Nuclear sclerosis, bilateral    2. DM type 2 without retinopathy    3. Essential hypertension    4. Cerebrovascular accident (CVA), unspecified mechanism    5. Right homonymous hemianopsia    6. Refractive error        Plan:       Cataracts- Not visually significant.  DM-No NPDR OU.  HTN-No retinopathy OU.  H/o CVA with residual right homonymous hemianopsia-Stable.  RE-Pt does not want MRx.      Control DM & HTN.  RTC 1 yr.

## 2017-08-31 DIAGNOSIS — E89.0 POSTOPERATIVE HYPOTHYROIDISM: ICD-10-CM

## 2017-08-31 RX ORDER — LEVOTHYROXINE SODIUM 200 UG/1
200 TABLET ORAL
Qty: 90 TABLET | Refills: 2 | Status: SHIPPED | OUTPATIENT
Start: 2017-08-31 | End: 2018-06-21 | Stop reason: SDUPTHER

## 2017-09-01 ENCOUNTER — LAB VISIT (OUTPATIENT)
Dept: LAB | Facility: HOSPITAL | Age: 62
End: 2017-09-01
Attending: INTERNAL MEDICINE
Payer: COMMERCIAL

## 2017-09-01 DIAGNOSIS — N18.30 CKD STAGE 3 DUE TO TYPE 2 DIABETES MELLITUS: ICD-10-CM

## 2017-09-01 DIAGNOSIS — I50.32 CHRONIC DIASTOLIC HEART FAILURE: ICD-10-CM

## 2017-09-01 DIAGNOSIS — E11.22 CKD STAGE 3 DUE TO TYPE 2 DIABETES MELLITUS: ICD-10-CM

## 2017-09-01 LAB
ALBUMIN SERPL BCP-MCNC: 3.1 G/DL
ANION GAP SERPL CALC-SCNC: 7 MMOL/L
ANION GAP SERPL CALC-SCNC: 7 MMOL/L
BUN SERPL-MCNC: 25 MG/DL
BUN SERPL-MCNC: 25 MG/DL
CALCIUM SERPL-MCNC: 8.5 MG/DL
CALCIUM SERPL-MCNC: 8.5 MG/DL
CHLORIDE SERPL-SCNC: 107 MMOL/L
CHLORIDE SERPL-SCNC: 107 MMOL/L
CO2 SERPL-SCNC: 24 MMOL/L
CO2 SERPL-SCNC: 24 MMOL/L
CREAT SERPL-MCNC: 2.1 MG/DL
CREAT SERPL-MCNC: 2.1 MG/DL
EST. GFR  (AFRICAN AMERICAN): 37.3 ML/MIN/1.73 M^2
EST. GFR  (AFRICAN AMERICAN): 37.3 ML/MIN/1.73 M^2
EST. GFR  (NON AFRICAN AMERICAN): 32.3 ML/MIN/1.73 M^2
EST. GFR  (NON AFRICAN AMERICAN): 32.3 ML/MIN/1.73 M^2
GLUCOSE SERPL-MCNC: 134 MG/DL
GLUCOSE SERPL-MCNC: 134 MG/DL
PHOSPHATE SERPL-MCNC: 3.6 MG/DL
POTASSIUM SERPL-SCNC: 4.6 MMOL/L
POTASSIUM SERPL-SCNC: 4.6 MMOL/L
PTH-INTACT SERPL-MCNC: 99 PG/ML
SODIUM SERPL-SCNC: 138 MMOL/L
SODIUM SERPL-SCNC: 138 MMOL/L

## 2017-09-01 PROCEDURE — 80069 RENAL FUNCTION PANEL: CPT

## 2017-09-01 PROCEDURE — 83970 ASSAY OF PARATHORMONE: CPT

## 2017-09-01 PROCEDURE — 36415 COLL VENOUS BLD VENIPUNCTURE: CPT | Mod: PO

## 2017-09-14 ENCOUNTER — OFFICE VISIT (OUTPATIENT)
Dept: NEPHROLOGY | Facility: CLINIC | Age: 62
End: 2017-09-14
Payer: COMMERCIAL

## 2017-09-14 VITALS
SYSTOLIC BLOOD PRESSURE: 140 MMHG | WEIGHT: 229.5 LBS | BODY MASS INDEX: 32.86 KG/M2 | HEIGHT: 70 IN | HEART RATE: 54 BPM | DIASTOLIC BLOOD PRESSURE: 80 MMHG | OXYGEN SATURATION: 98 %

## 2017-09-14 DIAGNOSIS — R31.9 HEMATURIA: ICD-10-CM

## 2017-09-14 DIAGNOSIS — N40.0 PROSTATE ENLARGEMENT: ICD-10-CM

## 2017-09-14 DIAGNOSIS — N18.30 CKD STAGE 3 DUE TO TYPE 2 DIABETES MELLITUS: Primary | ICD-10-CM

## 2017-09-14 DIAGNOSIS — N25.81 SECONDARY HYPERPARATHYROIDISM: ICD-10-CM

## 2017-09-14 DIAGNOSIS — E11.22 CKD STAGE 3 DUE TO TYPE 2 DIABETES MELLITUS: Primary | ICD-10-CM

## 2017-09-14 DIAGNOSIS — I10 ESSENTIAL HYPERTENSION: ICD-10-CM

## 2017-09-14 DIAGNOSIS — R80.1 PERSISTENT PROTEINURIA: ICD-10-CM

## 2017-09-14 PROCEDURE — 3079F DIAST BP 80-89 MM HG: CPT | Mod: S$GLB,,, | Performed by: INTERNAL MEDICINE

## 2017-09-14 PROCEDURE — 99999 PR PBB SHADOW E&M-EST. PATIENT-LVL III: CPT | Mod: PBBFAC,,, | Performed by: INTERNAL MEDICINE

## 2017-09-14 PROCEDURE — 3066F NEPHROPATHY DOC TX: CPT | Mod: S$GLB,,, | Performed by: INTERNAL MEDICINE

## 2017-09-14 PROCEDURE — 3077F SYST BP >= 140 MM HG: CPT | Mod: S$GLB,,, | Performed by: INTERNAL MEDICINE

## 2017-09-14 PROCEDURE — 99214 OFFICE O/P EST MOD 30 MIN: CPT | Mod: S$GLB,,, | Performed by: INTERNAL MEDICINE

## 2017-09-14 PROCEDURE — 3008F BODY MASS INDEX DOCD: CPT | Mod: S$GLB,,, | Performed by: INTERNAL MEDICINE

## 2017-09-14 NOTE — PROGRESS NOTES
Subjective:       Patient ID: Alfa Christy III is a 62 y.o. Black or  male who presents for follow upof CKD.      HPI     Mr. Christy was seen in follow up of CKD today. He was last seen on 6/29/17. He has CKD III due to poorly controlled diabetes, hypertension.     He has underlying longstanding diabetes, hypertension, recent CVA, paroxysmal atrial fibrillation, chronic anticoagulation use, hyperlipidemia, sickle cell trait, thyroid cancer, hyperlipidemiaen along with CKD III. He admits to prior uncontrolled nature of his diabetes. He also at some point was taking NSAID like Advil. His prior labs were reviewed for baseline creatinine of around 1.7 to 1.9. Per available labs since 2005 to 2007 he has had elevated creatinine with some episodes of JAELYN.     Most recently issues have been related to his hematuria. He had cystoscopy done by urology on 8/14/17. He has large prostate. He had abnormal cytology per urology. He states he was treated with antibiotic prior to the procedure. He feels now he does not have any blood in the urine. He feels he has been able to urinate and empty bladder without any problems. He denies any dysuria/ cloudy urine/ flank pain.      Pt denies any nausea/ vomiting/ diarrhea/ fever. He has stable appetite.     Labs from 9/1/17 noted for Na 138, K 4.6, bicarbonate 24, BUN 25, creatinine 2.1, eGFR 37.3, Ca 8.5, albumin 3.1, phos 3.6, PTH 99. Recent urine PC ratio was 0.7, 4 RBC. US kidneys from 5/17 noted for 10.9, 10.4 cm kidney size, enlarged prostate, peripelvic cyst. Most recently his cardiologist has increased his diuretic regimen given worsening fluid overload. He has diastolic dysfunction on echo.    Review of Systems   Constitutional: Negative for activity change, appetite change, chills, fatigue and fever.   HENT: Negative for hearing loss and nosebleeds.    Eyes: Negative for pain and discharge.   Respiratory: Negative for cough, shortness of breath and wheezing.     Cardiovascular: Positive for leg swelling. Negative for chest pain.   Gastrointestinal: Negative for abdominal pain, diarrhea, nausea and vomiting.   Endocrine: Negative for polydipsia and polyuria.   Genitourinary: Negative for decreased urine volume, dysuria, flank pain and frequency.   Musculoskeletal: Negative for back pain and myalgias.   Skin: Negative for pallor and rash.   Allergic/Immunologic: Negative for environmental allergies.   Neurological: Negative for dizziness and headaches.   Psychiatric/Behavioral: Negative for behavioral problems. The patient is not nervous/anxious.        Objective:      Physical Exam   Constitutional: He is oriented to person, place, and time. He appears well-developed and well-nourished. No distress.   HENT:   Head: Normocephalic and atraumatic.   Neck: Neck supple.   Cardiovascular: Normal rate and normal heart sounds.    Pulmonary/Chest: Effort normal and breath sounds normal. He has no wheezes. He has no rales.   Abdominal: Soft. There is no tenderness.   Musculoskeletal: He exhibits edema.   Neurological: He is alert and oriented to person, place, and time.   Skin: Skin is warm and dry.   Psychiatric: He has a normal mood and affect.   Vitals reviewed.      Assessment:       1. CKD stage 3 due to type 2 diabetes mellitus    2. Persistent proteinuria    3. Secondary hyperparathyroidism    4. Hematuria    5. Essential hypertension    6. Prostate enlargement      Plan:     Mr. Christy has longstanding diabetes, hypertension, CVA, paroxysmal atrial fibrillation, chronic anticoagulation use, hyperlipidemia, sickle cell trait, thyroid cancer, hyperlipidemia and has CKD III. CKD is most likely due to longstanding and poorly controlled diabetes, likely also hypertension and prior NSAID use. He has proteinuria of less than a gram which could be from diabetic nephropathy.     Pt has very poorly controlled diabetes with episodes of hyperglycemia crisis with blood glucose of 500,  has had fluid overload due to which his cardiologist has increased diuretic regimen. Given this his risk of further progression of CKD which is already IIIB is really significant. I had a detailed discussion about this with pt and his wife. Better glycemic control, strict low salt diet, BP goal of less than 130/80 are all very crucial for his CKD. His risk of progression to higher levels of CKD is very high.     Management of BPH, hematuria and abnormal cytology per urology. Given enlarged prostate, watch for any symptoms of urinary retention. D/w them about avoiding any sleep medicine/ antihistaminic as it can precipitate urinary retention.     CKD III  - labs reviewed and d/w them  - periodically monitor renal function and assess if any rapid decline  - avoid NSAID/ bactrim/ IV contrast/ gadolinium/ aminoglycoside/fleet enema/ PPI where possible  - continue to screen for CKD MBD, anemia of CKD, acid base disorder  - continue losartan for RAAS blockade   - Continue follow up with urology given enlarged prostate and hematuria work up     Follow Mg levels next visit as he has been taking Mg supplements    CKD MBD  - continue to trend PTH, phos, Ca, vit D  - he has mild sec hyperpara    Proteinuria  - see above  - likely diabetic nephropathy and +/- hypertensive glomerulosclerosis  - continue RAAS blockade   - strict low salt diet     Fluid overload  - diuretic regimen per cardiology   - stressed importance of strict low salt diet. Offered referral to see dietician but his wife stated they would like to defer it for now     Diabetes type 2 with renal manifestations  - management per endocrine  - avoid metformin since creatinine is above 1.4    Hypertension  - see above, strict low salt diet, home BP monitoring    Sickle cell trait, thyroid cancer, hyperlipidemia, atrial fibrillation management per PCP/ other MDs      RTC 3 months   Plan, labs, recommendations were discussed with patient and his wife, their questions  were answered to their satisfaction.

## 2017-09-26 ENCOUNTER — LAB VISIT (OUTPATIENT)
Dept: LAB | Facility: HOSPITAL | Age: 62
End: 2017-09-26
Attending: NURSE PRACTITIONER
Payer: COMMERCIAL

## 2017-09-26 DIAGNOSIS — Z79.4 TYPE 2 DIABETES MELLITUS WITH HYPERGLYCEMIA, WITH LONG-TERM CURRENT USE OF INSULIN: ICD-10-CM

## 2017-09-26 DIAGNOSIS — E11.65 TYPE 2 DIABETES MELLITUS WITH HYPERGLYCEMIA, WITH LONG-TERM CURRENT USE OF INSULIN: ICD-10-CM

## 2017-09-26 LAB
C PEPTIDE SERPL-MCNC: 0.76 NG/ML
ESTIMATED AVG GLUCOSE: 114 MG/DL
HBA1C MFR BLD HPLC: 5.6 %

## 2017-09-26 PROCEDURE — 86341 ISLET CELL ANTIBODY: CPT

## 2017-09-26 PROCEDURE — 82985 ASSAY OF GLYCATED PROTEIN: CPT

## 2017-09-26 PROCEDURE — 86341 ISLET CELL ANTIBODY: CPT | Mod: 91

## 2017-09-26 PROCEDURE — 84681 ASSAY OF C-PEPTIDE: CPT

## 2017-09-26 PROCEDURE — 83036 HEMOGLOBIN GLYCOSYLATED A1C: CPT

## 2017-09-26 PROCEDURE — 36415 COLL VENOUS BLD VENIPUNCTURE: CPT | Mod: PO

## 2017-09-29 LAB
FRUCTOSAMINE SERPL-SCNC: 342 UMOL /L (ref 0–285)
GAD65 AB SER-SCNC: 0 NMOL/L
PANC ISLET CELL IGG SER-ACNC: NORMAL

## 2017-10-03 ENCOUNTER — OFFICE VISIT (OUTPATIENT)
Dept: ENDOCRINOLOGY | Facility: CLINIC | Age: 62
DRG: 101 | End: 2017-10-03
Payer: COMMERCIAL

## 2017-10-03 VITALS
WEIGHT: 231 LBS | BODY MASS INDEX: 33.07 KG/M2 | HEIGHT: 70 IN | HEART RATE: 74 BPM | DIASTOLIC BLOOD PRESSURE: 60 MMHG | SYSTOLIC BLOOD PRESSURE: 108 MMHG

## 2017-10-03 DIAGNOSIS — Z86.73 HISTORY OF STROKE: ICD-10-CM

## 2017-10-03 DIAGNOSIS — I10 HTN (HYPERTENSION), BENIGN: ICD-10-CM

## 2017-10-03 DIAGNOSIS — E11.9 DM TYPE 2 WITHOUT RETINOPATHY: ICD-10-CM

## 2017-10-03 DIAGNOSIS — Z79.4 TYPE 2 DIABETES MELLITUS WITH HYPERGLYCEMIA, WITH LONG-TERM CURRENT USE OF INSULIN: Primary | ICD-10-CM

## 2017-10-03 DIAGNOSIS — E11.22 CKD STAGE 3 DUE TO TYPE 2 DIABETES MELLITUS: ICD-10-CM

## 2017-10-03 DIAGNOSIS — E11.69 ERECTILE DYSFUNCTION ASSOCIATED WITH TYPE 2 DIABETES MELLITUS: ICD-10-CM

## 2017-10-03 DIAGNOSIS — E11.65 TYPE 2 DIABETES MELLITUS WITH HYPERGLYCEMIA, WITH LONG-TERM CURRENT USE OF INSULIN: Primary | ICD-10-CM

## 2017-10-03 DIAGNOSIS — Z85.850 HISTORY OF THYROID CANCER: ICD-10-CM

## 2017-10-03 DIAGNOSIS — N18.30 CKD STAGE 3 DUE TO TYPE 2 DIABETES MELLITUS: ICD-10-CM

## 2017-10-03 DIAGNOSIS — I10 ESSENTIAL HYPERTENSION: ICD-10-CM

## 2017-10-03 DIAGNOSIS — N52.1 ERECTILE DYSFUNCTION ASSOCIATED WITH TYPE 2 DIABETES MELLITUS: ICD-10-CM

## 2017-10-03 DIAGNOSIS — E66.9 OBESITY (BMI 30.0-34.9): ICD-10-CM

## 2017-10-03 PROCEDURE — 99999 PR PBB SHADOW E&M-EST. PATIENT-LVL V: CPT | Mod: PBBFAC,,, | Performed by: NURSE PRACTITIONER

## 2017-10-03 PROCEDURE — 99215 OFFICE O/P EST HI 40 MIN: CPT | Mod: S$GLB,,, | Performed by: NURSE PRACTITIONER

## 2017-10-03 NOTE — PROGRESS NOTES
CC: Patient is here for management of Type 2 diabetes and review of medical conditions as listed in visit diagnosis.      HPI: Mr. Alfa Christy III is a 62 y.o. Black or  male who was diagnosed with Type 2 DM in ~1990s. He was originally on orals, but started insulin in 2010. He was seen in ER for hyperglycemia in ~2010. (+) FH of DM in multiple family members. Has a history of CVA with diminished peripheral vision and also thyroid cancer, which was treated with surgery and radioactive iodine. Has sickle cell and last crisis in 2016.     Prior patient of Dr. Massey, seen by JAYANT Carrlol NP this year and now being seen by me for the second time.  Pt was last seen by me for the second time.  Pt has had frequent ED visits for sickle cell crisis (born w/ afib)/hypoglycemia, hyperglycemia 600s (in summer 2017).  Presents with logs today.  Pt reports BLE edema.  Had CGM done in August 2017, a1c 10.6% on download.  Recent fructosamine drawn 342, cpeptide 0.76.   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     Takes Humalog in tandem with meals.     Walks for exercise, in 30 minute intervals.     Rarely skips meals. Has salad at night for dinner. Biggest meals are breakfast and lunch.     PREVIOUS DIABETES MEDICATIONS  Novolog  Metformin    CURRENT DIABETIC MEDS: Lantus 16 units QHS, Humalog 12 units plus correction scale    Last Podiatry Exam: N/A    REVIEW OF SYSTEMS  General: no weakness, fatigue, or weight changes.   Eyes: decreased peripheral vision in right eye; no eye pain or redness; last eye exam=8/2017 (Dr. Tejas Man)  Cardiovascular: no chest pain, palpitations, +edema, or murmurs.   Respiratory: no cough or + dyspnea upon exertion-improving.   GI: no heartburn, nausea, or changes in bowel patterns; good appetite.   Skin: no rashes, dryness, itching, or reactions at insulin injection sites; rotates insulin injection sites.  "  Neuro: no c/o numbness, tingling, or dizziness.   Endocrine: no polyuria, polydipsia, polyphagia, heat or cold intolerance.     Vital Signs  /60 (BP Location: Right arm, Patient Position: Sitting)   Pulse 74   Ht 5' 10" (1.778 m)   Wt 104.8 kg (231 lb)   BMI 33.15 kg/m²        Lab Results   Component Value Date    CREATININE 2.1 (H) 09/01/2017    CREATININE 2.1 (H) 09/01/2017      Lab Results   Component Value Date    TSH 1.334 04/10/2017      Lab Results   Component Value Date    CHOL 112 (L) 07/13/2017    CHOL 92 (L) 04/10/2017    CHOL 195 11/05/2016     Lab Results   Component Value Date    HDL 43 07/13/2017    HDL 25 (L) 04/10/2017    HDL 52 11/05/2016     Lab Results   Component Value Date    LDLCALC 51.8 (L) 07/13/2017    LDLCALC 17.4 (L) 04/10/2017    LDLCALC 123.2 11/05/2016     Lab Results   Component Value Date    TRIG 86 07/13/2017    TRIG 248 (H) 04/10/2017    TRIG 99 11/05/2016     Lab Results   Component Value Date    CHOLHDL 38.4 07/13/2017    CHOLHDL 27.2 04/10/2017    CHOLHDL 26.7 11/05/2016      PHYSICAL EXAMINATION  Constitutional: Appears well, no distress  Neck: Supple, trachea midline.   Respiratory: no wheezes, even and unlabored.  Cardiovascular: RRR; no carotid bruits or murmurs.   Lymph: DP pulses  2+ bilaterally; +trace +1 BLE edema.   Skin: warm and dry; no injection site reactions, no acanthosis nigracans observed.  Neuro:patient alert and cooperative, normal affect.    Diabetes Foot Exam: 2017  Assessment/Plan  1. Type 2 diabetes mellitus with hyperglycemia, with long-term current use of insulin     2. CKD stage 3 due to type 2 diabetes mellitus     3. DM type 2 without retinopathy     4. Essential hypertension     5. Erectile dysfunction associated with type 2 diabetes mellitus     6. HTN (hypertension), benign     7. Obesity (BMI 30.0-34.9)     8. History of stroke     9. History of thyroid cancer       1. F/u with DE on 10/18  Reviewed CGM during visit  Change novolog to " 12/14/14  levemir 16 units at night- discussed boost supplement (how many carbs w/ no insulin coverage)-using this to avoid hypolglycemia.  Offered to switch basal from nightly to daily, pt declined for now  No h/o pancreatitis -start trulicity 0.75 mg weekly  Counseling > 35 mins  2. Stable  3. See above, f/u with retinal specialist, opthalmology  4. Controlled   5. Currently no meds  6. See above  7. Body mass index is 33.15 kg/m². discussed exercise at least 4x a week for 30 mins.  Goal lose 5-10% in next 3-6 mos.  8. Avoid hypoglycemia  9. Had f/u with endocrinology , continue levothyroxine 200 mcg daily    FOLLOW UP  Return in about 3 months (around 1/3/2018).     Orders Placed This Encounter   Procedures    Hemoglobin A1c     Standing Status:   Future     Standing Expiration Date:   12/2/2018

## 2017-10-03 NOTE — PATIENT INSTRUCTIONS
Humalog 12-14-12 w/ scale 180-230 +2, 231-280+4, 281-330+6, 331-380 +8  Levemir 16 units at bedtime  trulicity 0.75 mg weekly    Check before or 2 hours after meal    Next appt 10/18 with dietitian     Snacks can be an important part of a balanced, healthy meal plan. They allow you to eat more frequently, feeling full and satisfied throughout the day. Also, they allow you to spread carbohydrates evenly, which may stabilize blood sugars.  Plus, snacks are enjoyable!     The amount of carbohydrate needed at snacks varies. Generally, about 15-30 grams of carbohydrate per snack is recommended.  Below you will find some tasty treats.       0-5 gm carb   Crystal Light   Vitamin Water Zero   Herbal tea, unsweetened   2 tsp peanut butter on celery   1./2 cup sugar-free jell-o   1 sugar-free popsicle   ¼ cup blueberries   8oz Blue Demi unsweetened almond milk   5 baby carrots & celery sticks, cucumbers, bell peppers dipped in ¼ cup salsa, 2Tbsp light ranch dressing or 2Tbsp plain Greek yogurt   10 Goldfish crackers   ½ oz low-fat cheese or string cheese   1 closed handful of nuts, unsalted   1 Tbsp of sunflower seeds, unsalted   1 cup Smart Pop popcorn   1 whole grain brown rice cake        15 gm carb   1 small piece of fruit or ½ banana or 1/2 cup lite canned fruit   3 jakob cracker squares   3 cups Smart Pop popcorn, top spray butter, [place]re lite salt or cinnamon and Truvia   5 Vanilla Wafers   ½ cup low fat, no added sugar ice cream or frozen yogurt (Blue bell, Blue Bunny, Weight Watchers, Skinny Cow)   ½ turkey, ham, or chicken sandwich   ½ c fruit with ½ c Cottage cheese   4-6 unsalted wheat crackers with 1 oz low fat cheese or 1 tbsp peanut butter    30-45 goldfish crackers (depending on flavor)    7-8 Sikh mini brown rice cakes (caramel, apple cinnamon, chocolate)    12 Sikh mini brown rice cakes (cheddar, bbq, ranch)    1/3 cup hummus dip with raw veg   1/2 whole wheat syeda, 1Tbsp  hummus   Mini Pizza (1/2 whole wheat English muffin, low-fat  cheese, tomato sauce)   100 calorie snack pack (Oreo, Chips Ahoy, Ritz Mix, Baked Cheetos)   4-6 oz. light or Greek Style yogurt (Chobani, Yoplait, Okios, [place]oneyfield)   ½ cup sugar-free pudding     6 in. wheat tortilla or syeda oven toasted chips (topped with spray butter flavoring, cinnamon, Truvia OR spray butter, garlic powder, chili powder)    18 BBQ Popchips (available at [employer], Whole Foods, Fresh Market)                   Diabetes Support Group Meetings    Date Topics   February 9 Health Promotion/Nutrition   March 9 Taking Care of Your Kidneys   April 13 Taking Care of Your Feet   May 11 Ease Your Mind with Diabetes   June 8 Hurricane and Emergency Preparedness   July 13 Family & Caregiver Support for Diabetes   *August 17  Taking Care of Your Eyes   September 14 Devices & Technology   October 12 Recipes & Treats   November 9 Getting Pumped Up for Diabetes   December 14 Year-End Close Out            Meetings are held in the [facility] (A) of the [facility] located at [address] [place] [address]. Please call [phone] for additional information.    Free service, offered every 2nd Thursday of every month, except in August! Family members and/or friends are welcome as well!  Support group is for patients with type 1 or type 2 diabetes.    From 3:30p to 4:30p        Dulaglutide injection  What is this medicine?  DULAGLUTIDE (DOO la GLOO tide) is used to improve blood sugar control in adults with type 2 diabetes. This medicine may be used with other oral diabetes medicines.  How should I use this medicine?  This medicine is for injection under the skin of your upper leg (thigh), stomach area, or upper arm. It is usually given once every week (every 7 days). You will be taught how to prepare and give this medicine. Use exactly as directed. Take your medicine at regular intervals. Do not  take it more often than directed.  If you use this medicine with insulin, you should inject this medicine and the insulin separately. Do not mix them together. Do not give the injections right next to each other. Change (rotate) injection sites with each injection.  It is important that you put your used needles and syringes in a special sharps container. Do not put them in a trash can. If you do not have a sharps container, call your pharmacist or healthcare provider to get one.  A special MedGuide will be given to you by the pharmacist with each prescription and refill. Be sure to read this information carefully each time.  Talk to your pediatrician regarding the use of this medicine in children. Special care may be needed.  What side effects may I notice from receiving this medicine?  Side effects that you should report to your doctor or health care professional as soon as possible:  · allergic reactions like skin rash, itching or hives, swelling of the face, lips, or tongue  · breathing problems  · signs and symptoms of low blood sugar such as feeling anxious, confusion, dizziness, increased hunger, unusually weak or tired, sweating, shakiness, cold, irritable, headache, blurred vision, fast heartbeat, loss of consciousness  · unusual stomach upset or pain  · vomiting  Side effects that usually do not require medical attention (Report these to your doctor or health care professional if they continue or are bothersome.):diarrhea  · heartburn  · loss of appetite  · nausea  · pain, redness, or irritation at site where injected  What may interact with this medicine?  Do not take this medicine with any of the following medications:  · gatifloxacin  Many medications may cause changes in blood sugar, these include:  · alcohol containing beverages  · aspirin and aspirin-like drugs  · chloramphenicol  · chromium  · diuretics  · female hormones, such as estrogens or progestins, birth control pills  · heart  medicines  · isoniazid  · male hormones or anabolic steroids  · medications for weight loss  · medicines for allergies, asthma, cold, or cough  · medicines for mental problems  · medicines called MAO inhibitors - Nardil, Parnate, Marplan, Eldepryl  · niacin  · NSAIDS, such as ibuprofen  · pentamidine  · phenytoin  · probenecid  · quinolone antibiotics such as ciprofloxacin, levofloxacin, ofloxacin  · some herbal dietary supplements  · steroid medicines such as prednisone or cortisone  · thyroid hormonesSome medications can hide the warning symptoms of low blood sugar (hypoglycemia). You may need to monitor your blood sugar more closely if you are taking one of these medications. These include:  · beta-blockers, often used for high blood pressure or heart problems (examples include atenolol, metoprolol, propranolol)  · clonidine  · guanethidine  · reserpine  What if I miss a dose?  If you miss a dose, take it as soon as you can within 3 days after the missed dose. Then take your next dose at your regular weekly time. If it has been longer than 3 days after the missed dose, do not take the missed dose. Take the next dose at your regular time. Do not take double or extra doses. If you have questions about a missed dose, contact your health care provider for advice.  Where should I keep my medicine?  Keep out of the reach of children.  Store this medicine in a refrigerator between 2 and 8 degrees C (36 and 46 degrees F). Do not freeze or use if the medicine has been frozen. Protect from light and excessive heat. Each single-dose pen or prefilled syringe can be kept at room temperature, not to exceed 30 degrees C (86 degrees F) for a total of 14 days, if needed. Store in the carton until use. Throw away any unused medicine after the expiration date.  What should I tell my health care provider before I take this medicine?  They need to know if you have any of these conditions:  · endocrine tumors (MEN 2) or if someone in  your family had these tumors  · history of pancreatitis  · kidney disease  · liver disease  · stomach problems  · thyroid cancer or if someone in your family had thyroid cancer  · an unusual or allergic reaction to dulaglutide, other medicines, foods, dyes, or preservatives  · pregnant or trying to get pregnant  · breast-feeding  What should I watch for while using this medicine?  Visit your doctor or health care professional for regular checks on your progress.  A test called the HbA1C (A1C) will be monitored. This is a simple blood test. It measures your blood sugar control over the last 2 to 3 months. You will receive this test every 3 to 6 months.  Learn how to check your blood sugar. Learn the symptoms of low and high blood sugar and how to manage them.  Always carry a quick-source of sugar with you in case you have symptoms of low blood sugar. Examples include hard sugar candy or glucose tablets. Make sure others know that you can choke if you eat or drink when you develop serious symptoms of low blood sugar, such as seizures or unconsciousness. They must get medical help at once.  Tell your doctor or health care professional if you have high blood sugar. You might need to change the dose of your medicine. If you are sick or exercising more than usual, you might need to change the dose of your medicine.  Do not skip meals. Ask your doctor or health care professional if you should avoid alcohol. Many nonprescription cough and cold products contain sugar or alcohol. These can affect blood sugar.  Wear a medical ID bracelet or chain, and carry a card that describes your disease and details of your medicine and dosage times.  NOTE:This sheet is a summary. It may not cover all possible information. If you have questions about this medicine, talk to your doctor, pharmacist, or health care provider. Copyright© 2017 [employer]

## 2017-10-04 ENCOUNTER — CLINICAL SUPPORT (OUTPATIENT)
Dept: FAMILY MEDICINE | Facility: CLINIC | Age: 62
DRG: 101 | End: 2017-10-04
Payer: COMMERCIAL

## 2017-10-04 ENCOUNTER — HOSPITAL ENCOUNTER (INPATIENT)
Facility: HOSPITAL | Age: 62
LOS: 6 days | Discharge: HOME HEALTH | DRG: 101 | End: 2017-10-11
Attending: EMERGENCY MEDICINE | Admitting: HOSPITALIST
Payer: COMMERCIAL

## 2017-10-04 DIAGNOSIS — Z79.4 TYPE 2 DIABETES MELLITUS WITH STAGE 3 CHRONIC KIDNEY DISEASE, WITH LONG-TERM CURRENT USE OF INSULIN: ICD-10-CM

## 2017-10-04 DIAGNOSIS — R47.01 APHASIA: ICD-10-CM

## 2017-10-04 DIAGNOSIS — N18.30 CKD (CHRONIC KIDNEY DISEASE), STAGE III: Chronic | ICD-10-CM

## 2017-10-04 DIAGNOSIS — I50.32 CHRONIC DIASTOLIC HEART FAILURE: ICD-10-CM

## 2017-10-04 DIAGNOSIS — E78.00 HYPERCHOLESTEROLEMIA: ICD-10-CM

## 2017-10-04 DIAGNOSIS — N18.30 TYPE 2 DIABETES MELLITUS WITH STAGE 3 CHRONIC KIDNEY DISEASE, WITH LONG-TERM CURRENT USE OF INSULIN: ICD-10-CM

## 2017-10-04 DIAGNOSIS — R41.82 ALTERED MENTAL STATUS, UNSPECIFIED ALTERED MENTAL STATUS TYPE: ICD-10-CM

## 2017-10-04 DIAGNOSIS — Z23 FLU VACCINE NEED: Primary | ICD-10-CM

## 2017-10-04 DIAGNOSIS — I10 ESSENTIAL HYPERTENSION: ICD-10-CM

## 2017-10-04 DIAGNOSIS — I63.9 CEREBROVASCULAR ACCIDENT (CVA), UNSPECIFIED MECHANISM: Primary | ICD-10-CM

## 2017-10-04 DIAGNOSIS — G40.109: ICD-10-CM

## 2017-10-04 DIAGNOSIS — E11.22 TYPE 2 DIABETES MELLITUS WITH STAGE 3 CHRONIC KIDNEY DISEASE, WITH LONG-TERM CURRENT USE OF INSULIN: ICD-10-CM

## 2017-10-04 DIAGNOSIS — R41.82 ALTERED MENTAL STATUS: ICD-10-CM

## 2017-10-04 DIAGNOSIS — G40.409 GENERALIZED TONIC-CLONIC SEIZURE: ICD-10-CM

## 2017-10-04 LAB
ALBUMIN SERPL BCP-MCNC: 3.3 G/DL
ALP SERPL-CCNC: 123 U/L
ALT SERPL W/O P-5'-P-CCNC: 19 U/L
AMPHET+METHAMPHET UR QL: NEGATIVE
ANION GAP SERPL CALC-SCNC: 8 MMOL/L
ANISOCYTOSIS BLD QL SMEAR: SLIGHT
AST SERPL-CCNC: 33 U/L
BACTERIA #/AREA URNS AUTO: NORMAL /HPF
BARBITURATES UR QL SCN>200 NG/ML: NEGATIVE
BASOPHILS # BLD AUTO: 0.06 K/UL
BASOPHILS NFR BLD: 0.5 %
BENZODIAZ UR QL SCN>200 NG/ML: NEGATIVE
BILIRUB SERPL-MCNC: 1.1 MG/DL
BILIRUB UR QL STRIP: NEGATIVE
BUN SERPL-MCNC: 21 MG/DL
BZE UR QL SCN: NEGATIVE
CALCIUM SERPL-MCNC: 8.9 MG/DL
CANNABINOIDS UR QL SCN: NEGATIVE
CHLORIDE SERPL-SCNC: 107 MMOL/L
CHOLEST SERPL-MCNC: 118 MG/DL
CHOLEST/HDLC SERPL: 3.2 {RATIO}
CLARITY UR REFRACT.AUTO: CLEAR
CO2 SERPL-SCNC: 25 MMOL/L
COLOR UR AUTO: YELLOW
CREAT SERPL-MCNC: 2 MG/DL (ref 0.5–1.4)
CREAT SERPL-MCNC: 2.1 MG/DL
CREAT UR-MCNC: 112 MG/DL
DACRYOCYTES BLD QL SMEAR: ABNORMAL
DIFFERENTIAL METHOD: ABNORMAL
EOSINOPHIL # BLD AUTO: 0.5 K/UL
EOSINOPHIL NFR BLD: 4.1 %
ERYTHROCYTE [DISTWIDTH] IN BLOOD BY AUTOMATED COUNT: 17.7 %
EST. GFR  (AFRICAN AMERICAN): 37.3 ML/MIN/1.73 M^2
EST. GFR  (NON AFRICAN AMERICAN): 32.3 ML/MIN/1.73 M^2
GIANT PLATELETS BLD QL SMEAR: PRESENT
GLUCOSE SERPL-MCNC: 84 MG/DL
GLUCOSE UR QL STRIP: NEGATIVE
HCT VFR BLD AUTO: 31.5 %
HDLC SERPL-MCNC: 37 MG/DL
HDLC SERPL: 31.4 %
HGB BLD-MCNC: 11.2 G/DL
HGB UR QL STRIP: ABNORMAL
HYALINE CASTS UR QL AUTO: 0 /LPF
HYPOCHROMIA BLD QL SMEAR: ABNORMAL
INR PPP: 1
KETONES UR QL STRIP: NEGATIVE
LDLC SERPL CALC-MCNC: 52.6 MG/DL
LEUKOCYTE ESTERASE UR QL STRIP: NEGATIVE
LYMPHOCYTES # BLD AUTO: 4.2 K/UL
LYMPHOCYTES NFR BLD: 34.6 %
MCH RBC QN AUTO: 28.5 PG
MCHC RBC AUTO-ENTMCNC: 35.6 G/DL
MCV RBC AUTO: 80 FL
METHADONE UR QL SCN>300 NG/ML: NEGATIVE
MICROSCOPIC COMMENT: NORMAL
MONOCYTES # BLD AUTO: 1.5 K/UL
MONOCYTES NFR BLD: 12.6 %
NEUTROPHILS # BLD AUTO: 5.8 K/UL
NEUTROPHILS NFR BLD: 48.2 %
NITRITE UR QL STRIP: NEGATIVE
NONHDLC SERPL-MCNC: 81 MG/DL
OPIATES UR QL SCN: NEGATIVE
OVALOCYTES BLD QL SMEAR: ABNORMAL
PCP UR QL SCN>25 NG/ML: NEGATIVE
PH UR STRIP: 7 [PH] (ref 5–8)
PLATELET # BLD AUTO: 260 K/UL
PLATELET BLD QL SMEAR: ABNORMAL
PMV BLD AUTO: ABNORMAL FL
POC PTINR: 1 (ref 0.9–1.2)
POC PTWBT: 12.5 SEC (ref 9.7–14.3)
POIKILOCYTOSIS BLD QL SMEAR: ABNORMAL
POLYCHROMASIA BLD QL SMEAR: ABNORMAL
POTASSIUM SERPL-SCNC: 3.8 MMOL/L
PROT SERPL-MCNC: 7.2 G/DL
PROT UR QL STRIP: ABNORMAL
PROTHROMBIN TIME: 10.8 SEC
RBC # BLD AUTO: 3.93 M/UL
RBC #/AREA URNS AUTO: 4 /HPF (ref 0–4)
SAMPLE: ABNORMAL
SAMPLE: NORMAL
SCHISTOCYTES BLD QL SMEAR: ABNORMAL
SCHISTOCYTES BLD QL SMEAR: PRESENT
SODIUM SERPL-SCNC: 140 MMOL/L
SP GR UR STRIP: 1.01 (ref 1–1.03)
SPHEROCYTES BLD QL SMEAR: ABNORMAL
TARGETS BLD QL SMEAR: ABNORMAL
TOXICOLOGY INFORMATION: NORMAL
TRIGL SERPL-MCNC: 142 MG/DL
TSH SERPL DL<=0.005 MIU/L-ACNC: 7.91 UIU/ML
URN SPEC COLLECT METH UR: ABNORMAL
UROBILINOGEN UR STRIP-ACNC: NEGATIVE EU/DL
WBC # BLD AUTO: 12 K/UL
WBC #/AREA URNS AUTO: 1 /HPF (ref 0–5)

## 2017-10-04 PROCEDURE — 80061 LIPID PANEL: CPT

## 2017-10-04 PROCEDURE — 90471 IMMUNIZATION ADMIN: CPT | Mod: S$GLB,,, | Performed by: FAMILY MEDICINE

## 2017-10-04 PROCEDURE — 93005 ELECTROCARDIOGRAM TRACING: CPT

## 2017-10-04 PROCEDURE — 82803 BLOOD GASES ANY COMBINATION: CPT

## 2017-10-04 PROCEDURE — 99285 EMERGENCY DEPT VISIT HI MDM: CPT | Mod: ,,, | Performed by: EMERGENCY MEDICINE

## 2017-10-04 PROCEDURE — 84439 ASSAY OF FREE THYROXINE: CPT

## 2017-10-04 PROCEDURE — 82550 ASSAY OF CK (CPK): CPT

## 2017-10-04 PROCEDURE — 93010 ELECTROCARDIOGRAM REPORT: CPT | Mod: ,,, | Performed by: INTERNAL MEDICINE

## 2017-10-04 PROCEDURE — 96360 HYDRATION IV INFUSION INIT: CPT

## 2017-10-04 PROCEDURE — 85610 PROTHROMBIN TIME: CPT

## 2017-10-04 PROCEDURE — 99285 EMERGENCY DEPT VISIT HI MDM: CPT | Mod: 25

## 2017-10-04 PROCEDURE — 99223 1ST HOSP IP/OBS HIGH 75: CPT | Mod: ,,, | Performed by: PSYCHIATRY & NEUROLOGY

## 2017-10-04 PROCEDURE — 90686 IIV4 VACC NO PRSV 0.5 ML IM: CPT | Mod: S$GLB,,, | Performed by: FAMILY MEDICINE

## 2017-10-04 PROCEDURE — 81001 URINALYSIS AUTO W/SCOPE: CPT

## 2017-10-04 PROCEDURE — 84443 ASSAY THYROID STIM HORMONE: CPT

## 2017-10-04 PROCEDURE — 96361 HYDRATE IV INFUSION ADD-ON: CPT

## 2017-10-04 PROCEDURE — 99900035 HC TECH TIME PER 15 MIN (STAT)

## 2017-10-04 PROCEDURE — 80307 DRUG TEST PRSMV CHEM ANLYZR: CPT

## 2017-10-04 PROCEDURE — 82565 ASSAY OF CREATININE: CPT

## 2017-10-04 PROCEDURE — 82962 GLUCOSE BLOOD TEST: CPT

## 2017-10-04 PROCEDURE — 80053 COMPREHEN METABOLIC PANEL: CPT

## 2017-10-04 PROCEDURE — 25000003 PHARM REV CODE 250: Performed by: EMERGENCY MEDICINE

## 2017-10-04 PROCEDURE — 3E0234Z INTRODUCTION OF SERUM, TOXOID AND VACCINE INTO MUSCLE, PERCUTANEOUS APPROACH: ICD-10-PCS | Performed by: FAMILY MEDICINE

## 2017-10-04 PROCEDURE — 80320 DRUG SCREEN QUANTALCOHOLS: CPT

## 2017-10-04 PROCEDURE — 94761 N-INVAS EAR/PLS OXIMETRY MLT: CPT

## 2017-10-04 PROCEDURE — 85025 COMPLETE CBC W/AUTO DIFF WBC: CPT

## 2017-10-04 RX ORDER — MIDAZOLAM HYDROCHLORIDE 1 MG/ML
1 INJECTION INTRAMUSCULAR; INTRAVENOUS ONCE AS NEEDED
Status: ACTIVE | OUTPATIENT
Start: 2017-10-04 | End: 2017-10-04

## 2017-10-04 RX ORDER — HYDRALAZINE HYDROCHLORIDE 20 MG/ML
10 INJECTION INTRAMUSCULAR; INTRAVENOUS
Status: DISCONTINUED | OUTPATIENT
Start: 2017-10-04 | End: 2017-10-05

## 2017-10-04 RX ORDER — SODIUM CHLORIDE 9 MG/ML
1000 INJECTION, SOLUTION INTRAVENOUS
Status: COMPLETED | OUTPATIENT
Start: 2017-10-04 | End: 2017-10-05

## 2017-10-04 RX ORDER — LABETALOL HYDROCHLORIDE 5 MG/ML
20 INJECTION, SOLUTION INTRAVENOUS
Status: DISCONTINUED | OUTPATIENT
Start: 2017-10-04 | End: 2017-10-04

## 2017-10-04 RX ADMIN — SODIUM CHLORIDE 1000 ML: 0.9 INJECTION, SOLUTION INTRAVENOUS at 09:10

## 2017-10-04 NOTE — PROGRESS NOTES
Pt tolerated flu vaccine to right deltoid without difficulty; no adverse reaction noted; VIS given

## 2017-10-05 PROBLEM — R10.9 ABDOMINAL PAIN: Status: ACTIVE | Noted: 2017-10-05

## 2017-10-05 PROBLEM — G40.109: Status: ACTIVE | Noted: 2017-10-05

## 2017-10-05 PROBLEM — E11.9 DM2 (DIABETES MELLITUS, TYPE 2): Status: ACTIVE | Noted: 2017-10-05

## 2017-10-05 PROBLEM — R41.82 ALTERED MENTAL STATUS: Status: ACTIVE | Noted: 2017-10-05

## 2017-10-05 LAB
ALBUMIN SERPL BCP-MCNC: 3.1 G/DL
ALP SERPL-CCNC: 120 U/L
ALT SERPL W/O P-5'-P-CCNC: 21 U/L
ANION GAP SERPL CALC-SCNC: 7 MMOL/L
AST SERPL-CCNC: 34 U/L
BILIRUB SERPL-MCNC: 1.2 MG/DL
BUN SERPL-MCNC: 17 MG/DL
CALCIUM SERPL-MCNC: 8.8 MG/DL
CHLORIDE SERPL-SCNC: 109 MMOL/L
CK SERPL-CCNC: 199 U/L
CO2 SERPL-SCNC: 22 MMOL/L
CREAT SERPL-MCNC: 1.8 MG/DL
EST. GFR  (AFRICAN AMERICAN): 45 ML/MIN/1.73 M^2
EST. GFR  (NON AFRICAN AMERICAN): 38.9 ML/MIN/1.73 M^2
ETHANOL SERPL-MCNC: <10 MG/DL
GLUCOSE SERPL-MCNC: 177 MG/DL
MAGNESIUM SERPL-MCNC: 2.1 MG/DL
PHOSPHATE SERPL-MCNC: 2.8 MG/DL
POCT GLUCOSE: 192 MG/DL (ref 70–110)
POCT GLUCOSE: 192 MG/DL (ref 70–110)
POTASSIUM SERPL-SCNC: 4.7 MMOL/L
PROT SERPL-MCNC: 7.1 G/DL
SODIUM SERPL-SCNC: 138 MMOL/L
T4 FREE SERPL-MCNC: 0.93 NG/DL

## 2017-10-05 PROCEDURE — 99223 1ST HOSP IP/OBS HIGH 75: CPT | Mod: ,,, | Performed by: PSYCHIATRY & NEUROLOGY

## 2017-10-05 PROCEDURE — 25000003 PHARM REV CODE 250: Performed by: EMERGENCY MEDICINE

## 2017-10-05 PROCEDURE — G8996 SWALLOW CURRENT STATUS: HCPCS | Mod: CI

## 2017-10-05 PROCEDURE — 99223 1ST HOSP IP/OBS HIGH 75: CPT | Mod: ,,, | Performed by: HOSPITALIST

## 2017-10-05 PROCEDURE — 11000001 HC ACUTE MED/SURG PRIVATE ROOM

## 2017-10-05 PROCEDURE — 92610 EVALUATE SWALLOWING FUNCTION: CPT

## 2017-10-05 PROCEDURE — G8997 SWALLOW GOAL STATUS: HCPCS | Mod: CH

## 2017-10-05 PROCEDURE — 63600175 PHARM REV CODE 636 W HCPCS

## 2017-10-05 PROCEDURE — 80053 COMPREHEN METABOLIC PANEL: CPT

## 2017-10-05 PROCEDURE — 97162 PT EVAL MOD COMPLEX 30 MIN: CPT

## 2017-10-05 PROCEDURE — 36415 COLL VENOUS BLD VENIPUNCTURE: CPT

## 2017-10-05 PROCEDURE — 97166 OT EVAL MOD COMPLEX 45 MIN: CPT

## 2017-10-05 PROCEDURE — 63600175 PHARM REV CODE 636 W HCPCS: Performed by: HOSPITALIST

## 2017-10-05 PROCEDURE — 25000003 PHARM REV CODE 250: Performed by: STUDENT IN AN ORGANIZED HEALTH CARE EDUCATION/TRAINING PROGRAM

## 2017-10-05 PROCEDURE — 94761 N-INVAS EAR/PLS OXIMETRY MLT: CPT

## 2017-10-05 PROCEDURE — 83735 ASSAY OF MAGNESIUM: CPT

## 2017-10-05 PROCEDURE — 84100 ASSAY OF PHOSPHORUS: CPT

## 2017-10-05 PROCEDURE — 25000003 PHARM REV CODE 250: Performed by: INTERNAL MEDICINE

## 2017-10-05 RX ORDER — LEVETIRACETAM 15 MG/ML
1500 INJECTION INTRAVASCULAR ONCE
Status: DISCONTINUED | OUTPATIENT
Start: 2017-10-05 | End: 2017-10-05

## 2017-10-05 RX ORDER — IBUPROFEN 200 MG
24 TABLET ORAL
Status: DISCONTINUED | OUTPATIENT
Start: 2017-10-05 | End: 2017-10-11 | Stop reason: HOSPADM

## 2017-10-05 RX ORDER — AMLODIPINE BESYLATE 10 MG/1
10 TABLET ORAL DAILY
Status: DISCONTINUED | OUTPATIENT
Start: 2017-10-06 | End: 2017-10-06

## 2017-10-05 RX ORDER — LOSARTAN POTASSIUM 25 MG/1
100 TABLET ORAL DAILY
Status: DISCONTINUED | OUTPATIENT
Start: 2017-10-05 | End: 2017-10-11 | Stop reason: HOSPADM

## 2017-10-05 RX ORDER — LEVETIRACETAM 500 MG/1
1000 TABLET ORAL 2 TIMES DAILY
Status: DISCONTINUED | OUTPATIENT
Start: 2017-10-05 | End: 2017-10-05

## 2017-10-05 RX ORDER — IBUPROFEN 200 MG
16 TABLET ORAL
Status: DISCONTINUED | OUTPATIENT
Start: 2017-10-05 | End: 2017-10-11 | Stop reason: HOSPADM

## 2017-10-05 RX ORDER — AMLODIPINE BESYLATE 5 MG/1
5 TABLET ORAL ONCE
Status: COMPLETED | OUTPATIENT
Start: 2017-10-05 | End: 2017-10-05

## 2017-10-05 RX ORDER — LEVETIRACETAM 750 MG/1
750 TABLET ORAL 2 TIMES DAILY
Status: DISCONTINUED | OUTPATIENT
Start: 2017-10-05 | End: 2017-10-05

## 2017-10-05 RX ORDER — LORAZEPAM 2 MG/ML
2 INJECTION INTRAMUSCULAR ONCE
Status: COMPLETED | OUTPATIENT
Start: 2017-10-05 | End: 2017-10-05

## 2017-10-05 RX ORDER — INSULIN ASPART 100 [IU]/ML
0-5 INJECTION, SOLUTION INTRAVENOUS; SUBCUTANEOUS
Status: DISCONTINUED | OUTPATIENT
Start: 2017-10-05 | End: 2017-10-05

## 2017-10-05 RX ORDER — CARVEDILOL 12.5 MG/1
12.5 TABLET ORAL 2 TIMES DAILY
Status: DISCONTINUED | OUTPATIENT
Start: 2017-10-05 | End: 2017-10-05

## 2017-10-05 RX ORDER — ATORVASTATIN CALCIUM 20 MG/1
40 TABLET, FILM COATED ORAL DAILY
Status: DISCONTINUED | OUTPATIENT
Start: 2017-10-05 | End: 2017-10-11 | Stop reason: HOSPADM

## 2017-10-05 RX ORDER — GLUCAGON 1 MG
1 KIT INJECTION
Status: DISCONTINUED | OUTPATIENT
Start: 2017-10-05 | End: 2017-10-11 | Stop reason: HOSPADM

## 2017-10-05 RX ORDER — INSULIN ASPART 100 [IU]/ML
1-5 INJECTION, SOLUTION INTRAVENOUS; SUBCUTANEOUS
Status: DISCONTINUED | OUTPATIENT
Start: 2017-10-05 | End: 2017-10-11 | Stop reason: HOSPADM

## 2017-10-05 RX ORDER — AMLODIPINE BESYLATE 5 MG/1
5 TABLET ORAL DAILY
Status: DISCONTINUED | OUTPATIENT
Start: 2017-10-05 | End: 2017-10-05

## 2017-10-05 RX ORDER — LEVETIRACETAM 15 MG/ML
1500 INJECTION INTRAVASCULAR ONCE
Status: COMPLETED | OUTPATIENT
Start: 2017-10-05 | End: 2017-10-05

## 2017-10-05 RX ORDER — FOLIC ACID 0.4 MG
1000 TABLET ORAL DAILY
COMMUNITY

## 2017-10-05 RX ORDER — LORAZEPAM 2 MG/ML
INJECTION INTRAMUSCULAR
Status: COMPLETED
Start: 2017-10-05 | End: 2017-10-05

## 2017-10-05 RX ORDER — LEVOTHYROXINE SODIUM 100 UG/1
200 TABLET ORAL
Status: DISCONTINUED | OUTPATIENT
Start: 2017-10-05 | End: 2017-10-11 | Stop reason: HOSPADM

## 2017-10-05 RX ORDER — INSULIN ASPART 100 [IU]/ML
1-10 INJECTION, SOLUTION INTRAVENOUS; SUBCUTANEOUS
Status: DISCONTINUED | OUTPATIENT
Start: 2017-10-05 | End: 2017-10-05

## 2017-10-05 RX ORDER — CHOLECALCIFEROL (VITAMIN D3) 25 MCG
1000 TABLET ORAL DAILY
COMMUNITY

## 2017-10-05 RX ORDER — FUROSEMIDE 40 MG/1
80 TABLET ORAL 2 TIMES DAILY
Status: DISCONTINUED | OUTPATIENT
Start: 2017-10-05 | End: 2017-10-11 | Stop reason: HOSPADM

## 2017-10-05 RX ORDER — AMOXICILLIN 250 MG
1 CAPSULE ORAL 2 TIMES DAILY
Status: DISCONTINUED | OUTPATIENT
Start: 2017-10-05 | End: 2017-10-11 | Stop reason: HOSPADM

## 2017-10-05 RX ORDER — LEVETIRACETAM 10 MG/ML
1000 INJECTION INTRAVASCULAR EVERY 12 HOURS
Status: DISCONTINUED | OUTPATIENT
Start: 2017-10-05 | End: 2017-10-05

## 2017-10-05 RX ORDER — LEVETIRACETAM 500 MG/1
1500 TABLET ORAL 2 TIMES DAILY
Status: DISCONTINUED | OUTPATIENT
Start: 2017-10-05 | End: 2017-10-11 | Stop reason: HOSPADM

## 2017-10-05 RX ORDER — CARVEDILOL 12.5 MG/1
12.5 TABLET ORAL 2 TIMES DAILY
Status: DISCONTINUED | OUTPATIENT
Start: 2017-10-05 | End: 2017-10-07

## 2017-10-05 RX ADMIN — CARVEDILOL 12.5 MG: 12.5 TABLET, FILM COATED ORAL at 09:10

## 2017-10-05 RX ADMIN — APIXABAN 5 MG: 5 TABLET, FILM COATED ORAL at 09:10

## 2017-10-05 RX ADMIN — CARVEDILOL 12.5 MG: 12.5 TABLET, FILM COATED ORAL at 01:10

## 2017-10-05 RX ADMIN — AMLODIPINE BESYLATE 5 MG: 5 TABLET ORAL at 10:10

## 2017-10-05 RX ADMIN — LORAZEPAM 2 MG: 2 INJECTION INTRAMUSCULAR at 05:10

## 2017-10-05 RX ADMIN — STANDARDIZED SENNA CONCENTRATE AND DOCUSATE SODIUM 1 TABLET: 8.6; 5 TABLET, FILM COATED ORAL at 09:10

## 2017-10-05 RX ADMIN — LEVETIRACETAM 1500 MG: 500 TABLET, FILM COATED ORAL at 09:10

## 2017-10-05 RX ADMIN — AMLODIPINE BESYLATE 5 MG: 5 TABLET ORAL at 04:10

## 2017-10-05 RX ADMIN — LOSARTAN POTASSIUM 100 MG: 25 TABLET, FILM COATED ORAL at 01:10

## 2017-10-05 RX ADMIN — LORAZEPAM 2 MG: 2 INJECTION, SOLUTION INTRAMUSCULAR; INTRAVENOUS at 05:10

## 2017-10-05 RX ADMIN — FUROSEMIDE 80 MG: 40 TABLET ORAL at 09:10

## 2017-10-05 RX ADMIN — APIXABAN 5 MG: 5 TABLET, FILM COATED ORAL at 01:10

## 2017-10-05 RX ADMIN — FUROSEMIDE 80 MG: 40 TABLET ORAL at 01:10

## 2017-10-05 RX ADMIN — ATORVASTATIN CALCIUM 40 MG: 20 TABLET, FILM COATED ORAL at 01:10

## 2017-10-05 RX ADMIN — LEVETIRACETAM 1500 MG: 15 INJECTION INTRAVENOUS at 06:10

## 2017-10-05 NOTE — ASSESSMENT & PLAN NOTE
No JVD, and LE edema at baseline per wife  Will continue home coreg 12.5 BID, losartan 100 QD, and lasix 80 BID

## 2017-10-05 NOTE — PT/OT/SLP EVAL
Occupational Therapy  Evaluation    Alfa Christy III   MRN: [id number]   Admitting Diagnosis: Simple partial seizure, consciousness not impaired    OT Date of Treatment: 10/05/17   OT Start Time: 1411  OT Stop Time: 1434  OT Total Time (min): 23 min    Billable Minutes:  Evaluation 23   Co-eval with PT    Diagnosis: Simple partial seizure, consciousness not impaired       Past Medical History:   Diagnosis Date    Allergy     BMI 31.0-31.9,adult 11/25/2014    Cerebrovascular disease 7/25/2016    Chronic anticoagulation - pradaxa 11/10/2016    Chronic diastolic heart failure 11/20/2016    CKD (chronic kidney disease), stage III 9/23/2013    Controlled type 2 diabetes with neuropathy 12/16/2014    Diabetes mellitus due to underlying condition with stage 3 chronic kidney disease, without long-term current use of insulin 11/2/2016    Elevated alkaline phosphatase level 8/1/2012    Elevated PSA     negative prostate biopsy 4/11    Erectile dysfunction associated with type 2 diabetes mellitus     Gallstones 3/20/2013    Generalized tonic-clonic seizure 11/2016    HTN (hypertension), benign 8/1/2012    Hypercholesterolemia 8/1/2012    Microcytic anemia 11/27/2013    Paroxysmal atrial fibrillation 9/23/2013    Postsurgical hypothyroidism 11/27/2013    Right homonymous hemianopsia 2/5/2016    Sickle cell trait     Stroke 1/27/2016    Thyroid cancer     multifocal with six lesions, largest 5mm, treated with surgery and radioactive iodine    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/2016      Past Surgical History:   Procedure Laterality Date    BREAST SURGERY      cyst removal    COLONOSCOPY      CYST REMOVAL      chest    PROSTATE BIOPSY  4/27/11    SKIN BIOPSY      THYROID SURGERY  8/26/09    VASCULAR SURGERY         Referring physician: Evy  Date referred to OT: 10/05/17    General Precautions: Standard, aspiration, fall  Orthopedic Precautions: N/A  Braces: N/A          Patient History:  Living  "Environment  Lives With: child(ron), adult, spouse  Living Arrangements: house  Home Accessibility: stairs within home  Home Layout: Bedroom on 2nd floor  Transportation Available: family or friend will provide  Living Environment Comment:  (Pt reports living with wife and daughter in 2 story house - pt confused and unable to recall information regarding PLOF or DME used except for RW - pt states BR on 2nd floor but has bath on first - wife and daughter able to assist)  Equipment Currently Used at Home: walker, rolling    Prior level of function:   Pt is poor historian - noted with difficulty verbally expressing his history and recalling information.  Pt confirms he had a stroke before but states it only affected his R eye.  Wife not in room to verify information obtained.       Driving License: Yes  Mode of Transportation: Car, Family     Dominant hand: left    Subjective:  Communicated with nurse prior to session.  "I can't remember."  Referring to today's date, his birth date or his daughter's age  Chief Complaint: confusion  Patient/Family stated goals: return to PLOF    Pain/Comfort  Pain Rating 1: 0/10  Pain Rating Post-Intervention 1: 0/10    Objective:  Patient found with: peripheral IV, telemetry    Cognitive Exam:  Oriented to: Person, Place and unable to recall today's date or his birthday or verbally express why he was in the hospital  Follows Commands/attention: Easily distracted, Follows one-step commands with cues and increased time  Communication: expressive aphasia and delayed verbal response to questions  Memory:  Pt noted with difficulty remembering dates and daughter's age  Safety awareness/insight to disability: impaired  Coping skills/emotional control: Appropriate to situation    Visual/perceptual:  Pt reports visual deficit with R eye    Physical Exam:  Postural examination/scapula alignment: Rounded shoulder and Head forward  Skin integrity: Visible skin intact  Edema: None noted in " UEs    Sensation:   Intact    Upper Extremity Range of Motion:  Right Upper Extremity: WFL  Left Upper Extremity: WFL    Upper Extremity Strength:  Right Upper Extremity: WFL  Left Upper Extremity: WFL   Strength: fair    Fine motor coordination:   Pt noted with difficulty managing socks using FM grasps    Gross motor coordination: WFL in range but delayed and uncoordinated movements     Functional Mobility:  Bed Mobility:  Rolling/Turning Right: Stand by assistance, With side rail  Scooting/Bridging: Stand by Assistance  Supine to Sit: Stand by Assistance, WIth side rail (Increased time to coordinate movements - HOB raised)  Sit to Supine: Stand by Assistance, With side rail (increased time - pt requested HOB raised high before laying down)    Transfers:  Sit <> Stand Assistance: Contact Guard Assistance  Sit <> Stand Assistive Device: No Assistive Device    Functional Ambulation: pt took steps to his R with CGA -  Refer to PT eval for further information    Activities of Daily Living:  Feeding Level of Assistance: Supervision, Set-up Assistance    UE Dressing Level of Assistance: Moderate assistance ([facility] gown as robe - pt needed cues to insert correct arm into sleeve and to bring around back)    LE Dressing Level of Assistance: Moderate assistance (donned/doffed socks - increased time to doff socks with multiple attempts - pt needed assist to anthony socks)    Balance:   Static Sit: GOOD-: Takes MODERATE challenges from all directions but inconsistently  Dynamic Sit: FAIR+: Maintains balance through MINIMAL excursions of active trunk motion  Static Stand: POOR+: Needs MINIMAL assist to maintain  Dynamic stand: FAIR: Needs CONTACT GUARD during gait    Therapeutic Activities and Exercises:  · Pt completed ADLs and func mobility activities for tx session as noted above  · Whiteboard updated  · Pt educated on role of OT and POC      AM-PAC 6 CLICK ADL  How much help from another person does this patient  "currently need?  1 = Unable, Total/Dependent Assistance  2 = A lot, Maximum/Moderate Assistance  3 = A little, Minimum/Contact Guard/Supervision  4 = None, Modified [place]/Independent    Putting on and taking off regular lower body clothing? : 2  Bathing (including washing, rinsing, drying)?: 2  Toileting, which includes using toilet, bedpan, or urinal? : 2  Putting on and taking off regular upper body clothing?: 2  Taking care of personal grooming such as brushing teeth?: 3  Eating meals?: 3  Total Score: 14    AM-PAC Raw Score CMS "G-Code Modifier Level of Impairment Assistance   6 % Total / Unable   7 - 9 CM 80 - 100% Maximal Assist   10-14 CL 60 - 80% Moderate Assist   15 - 19 CK 40 - 60% Moderate Assist   20 - 22 CJ 20 - 40% Minimal Assist   23 CI 1-20% SBA / CGA   24 CH 0% Independent/ Mod I       Patient left HOB elevated with all lines intact and call button in reach    Assessment:  Alfa Christy III is a 62 y.o. male with a medical diagnosis of Simple partial seizure, consciousness not impaired and presents with decreased coordination of movements and confusion which effected his ability to complete ADL tasks assigned during his evaluation.  Pt states he was able to perform ADLs prior to this hospital stay but was unsure of equipment used and home environment regarding whether or not he had steps to enter his home.  Pt was agreeable to OT evaluation and will benefit from OT services in order to increase his safety and level of independence with his ADLs and func mobility.  At this time, pt's historical information and PLOF is unclear, but OT recommends SNF in order to meet pt's post acute therapy needs prior to discharge home.  DME needs to be determined upon his progress with therapy prior to discharge home.    Pt evaluation falls under moderate complexity for evaluation coding due to identification of 3-5 performance deficits noted as stated above. Eval required Min/Mod assistance to complete " on this date and detailed assessment(s) were utilized. Moreover, an expanded review of history and occupational profile obtained with additional review of cognitive, physical and psychosocial hx.       Rehab identified problem list/impairments: Rehab identified problem list/impairments: weakness, impaired endurance, impaired self care skills, impaired functional mobilty, gait instability, impaired balance, visual deficits, decreased coordination, decreased safety awareness, impaired coordination    Rehab potential is fair.    Activity tolerance: Fair    Discharge recommendations: Discharge Facility/Level Of Care Needs: nursing facility, skilled     Barriers to discharge: Barriers to Discharge: Inaccessible home environment    Equipment recommendations:  (TBD upon further information obtained and progress)     GOALS:    Occupational Therapy Goals        Problem: Occupational Therapy Goal    Goal Priority Disciplines Outcome Interventions   Occupational Therapy Goal     OT, PT/OT Ongoing (interventions implemented as appropriate)    Description:  Goals to be met by: 10/19/17     Patient will increase functional independence with ADLs by performing:    Feeding with Modified [place].  UE Dressing with Modified [place].  LE Dressing with Minimal Assistance.  Grooming while seated with Modified [place].  Toileting from bedside commode with Contact Guard Assistance for hygiene and clothing management.   Toilet transfer to bedside commode with Contact Guard Assistance.                      PLAN:  Patient to be seen 4 x/week to address the above listed problems via self-care/home management, therapeutic activities, therapeutic exercises, neuromuscular re-education  Plan of Care expires: 11/04/17  Plan of Care reviewed with: patient         Ofe RUFF Denise, OT  10/05/2017

## 2017-10-05 NOTE — HPI
Patient is a 62 y.o. male with significant past medical history of DM II, HTN,  presented to hospital complaining of acute onset aphasia.  The patient was LKN at ~1852.  His wife states she left home and returned at 2000 to find the patient agitated with word finding difficulty.  He had no weakness and was ambulatory at home.  The patient's wife states earlier tonight the patient c/o feeling dizzy with head discomfort that radiated from right to left.  He took 2 tylenol and reported relief of his symptoms at that time.  The patient had a flu shot today and has been having URI symptoms for a few days.  He hasn't taken his losartan since the URI symptoms began, per his wife.  On arrival to the ED, at ~2100, the patient remained w/expressive and receptive aphasia.  He was ambulatory w/assistance.  His blood pressure was  220s/90s.  A CTH was obtained and was negative for acute findings.  On exam the patient is noted to have aphasia, right hemianopsia (baseline from previous stroke), and LLE weakness.  Due to his renal function the patient went for stat MRI/MRA brain.  Images were reviewed as acquired by Elizabeth Recinos, Rosie, and Abhishek who determined the study to be negative for acute findings.  Of note, the patient was able to ambulate without assistance from the ED bed to the MRI bed and was answering questions in MRI.  His aphasia quickly waxes and wanes.  Given negative imaging and the waxing and waning nature of this patient's symptoms he may be better evaluated with a medicine admission.

## 2017-10-05 NOTE — MEDICAL/APP STUDENT
HISTORY & PHYSICAL  Hospital Medicine    [place] [facility]    PRESENTING HISTORY     Chief Complaint/Reason for Admission:  ***    History of Present Illness:   62yoM with PMHx of CVA (with right hemianopsia1/2016), previous seizure (11/2016),DM2, CKD Stage 3, HTN, HLD, pAfib, thyroid cancer (s/p surgery and rad iodine 2009) presents with receptive and expressive aphasia. The patient was LKN at 7:00 pm.  His wife states she left home and returned at 8:00 to find the patient agitated with word finding difficulty not capable of naming objects and progressive difficulty making complete words. He had no weakness and was ambulatory at home.  The patient's wife states earlier tonight the patient c/o feeling dizziness holding on to walls for balance. He took 2 tylenol and reported relief of his symptoms at that time.  The patient had a flu shot today and has been having URI symptoms recently within the past 2 weeks.    Review of Systems:  ROS    Review of Systems   Unable to perform ROS: Mental status change   Constitutional: Negative for chills and fever.   Eyes: Positive for visual disturbance (chronic r. hemianopsia from CVA).   Respiratory: Negative for cough and shortness of breath.    Cardiovascular: Positive for leg swelling (chronic). Negative for chest pain.   Gastrointestinal: Negative for abdominal distention, abdominal pain, diarrhea, nausea and vomiting.   Genitourinary: Positive for hematuria. Negative for dysuria.   Neurological: Positive for dizziness and speech difficulty. Negative for tremors, weakness, numbness and headaches.   Psychiatric/Behavioral: Positive for confusion.     PAST HISTORY:     Past Medical History:   Diagnosis Date    Allergy     BMI 31.0-31.9,adult 11/25/2014    Cerebrovascular disease 7/25/2016    Chronic anticoagulation - pradaxa 11/10/2016    Chronic diastolic heart failure 11/20/2016    CKD (chronic kidney disease), stage III 9/23/2013    Controlled type 2 diabetes with  neuropathy 12/16/2014    Diabetes mellitus due to underlying condition with stage 3 chronic kidney disease, without long-term current use of insulin 11/2/2016    Elevated alkaline phosphatase level 8/1/2012    Elevated PSA     negative prostate biopsy 4/11    Erectile dysfunction associated with type 2 diabetes mellitus     Gallstones 3/20/2013    Generalized tonic-clonic seizure 11/2016    HTN (hypertension), benign 8/1/2012    Hypercholesterolemia 8/1/2012    Microcytic anemia 11/27/2013    Paroxysmal atrial fibrillation 9/23/2013    Postsurgical hypothyroidism 11/27/2013    Right homonymous hemianopsia 2/5/2016    Sickle cell trait     Stroke 1/27/2016    Thyroid cancer     multifocal with six lesions, largest 5mm, treated with surgery and radioactive iodine    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/2016       Past Surgical History:   Procedure Laterality Date    BREAST SURGERY      cyst removal    COLONOSCOPY      CYST REMOVAL      chest    PROSTATE BIOPSY  4/27/11    SKIN BIOPSY      THYROID SURGERY  8/26/09    VASCULAR SURGERY         Family History   Problem Relation Age of Onset    Heart disease Father     Diabetes Father     Hypertension Father     Diabetes Mother     Hypertension Mother     Heart disease Mother     Diabetes Brother     Cancer Sister     Thyroid disease Maternal Aunt     Clotting disorder Neg Hx        Social History     Social History    Marital status:      Spouse name: Rut    Number of children: N/A    Years of education: N/A     Social History Main Topics    Smoking status: Never Smoker    Smokeless tobacco: Never Used    Alcohol use Yes      Comment: occasionally    Drug use: No    Sexual activity: Yes     Partners: Female      Comment:  with 3 kids     Other Topics Concern    None     Social History Narrative     with 3 kids     Not driving due to vision problem from stroke.        MEDICATIONS & ALLERGIES:     No current  "facility-administered medications on file prior to encounter.      Current Outpatient Prescriptions on File Prior to Encounter   Medication Sig Dispense Refill    acetaminophen (TYLENOL) 500 MG tablet Take 1 tablet (500 mg total) by mouth every 6 (six) hours as needed for Pain.  0    amlodipine (NORVASC) 5 MG tablet Take 1 tablet (5 mg total) by mouth once daily. 30 tablet 11    apixaban 5 mg Tab Take 1 tablet (5 mg total) by mouth 2 (two) times daily. 60 tablet 11    atorvastatin (LIPITOR) 40 MG tablet Take 1 tablet (40 mg total) by mouth once daily. 90 tablet 3    BD INSULIN PEN NEEDLE UF MINI 31 gauge x 3/16" Ndle To use with insulin pens 4  times daily 360 each 3    carvedilol (COREG) 12.5 MG tablet Take 1 tablet (12.5 mg total) by mouth 2 (two) times daily. 180 tablet 4    coenzyme Q10 200 mg capsule Take 200 mg by mouth once daily. (Patient taking differently: Take 200 mg by mouth once daily. PT TAKEN EVERY MON, WED, FRI)      ergocalciferol (VITAMIN D2) 50,000 unit Cap Take 50,000 Units by mouth every 7 days.      folic acid (FOLVITE) 1 MG tablet Take 1 tablet (1 mg total) by mouth once daily. 90 tablet 1    furosemide (LASIX) 40 MG tablet Take 2 tablets (80 mg total) by mouth 2 (two) times daily. 90 tablet 4    insulin detemir (LEVEMIR FLEXPEN) 100 unit/mL (3 mL) SubQ InPn pen Inject 16 Units into the skin every evening. 3 Box 3    levetiracetam (KEPPRA) 750 MG Tab Take 1 tablet (750 mg total) by mouth 2 (two) times daily. 180 tablet 4    levothyroxine (SYNTHROID) 200 MCG tablet Take 1 tablet (200 mcg total) by mouth before breakfast. 90 tablet 2    losartan (COZAAR) 100 MG tablet Take 1 tablet (100 mg total) by mouth once daily. 90 tablet 3    multivitamin capsule Take 1 capsule by mouth once daily. Pt taken every mon, wed, fri      dulaglutide (TRULICITY) 0.75 mg/0.5 mL PnIj Inject 0.5 mLs (0.75 mg total) into the skin every 7 days. 4 Syringe 6    glucagon (human recombinant) inj 1mg/mL " kit Inject 1 mL (1 mg total) into the muscle as needed. 1 kit 1    insulin lispro (HUMALOG KWIKPEN) 100 unit/mL InPn pen Inject 12 units w/ meals, 6 units if eating lighter meal or bg less than 120, scale 180-230 +2, 231-280 +4, 281-330 +6, 331-380 +8. 3 Box 3    magnesium 30 mg Tab Take by mouth.      ONETOUCH VERIO Strp USE TO TEST BLOOD SUGAR THREE TIMES DAILY 100 strip 11    oxycodone-acetaminophen (PERCOCET) 5-325 mg per tablet Take 1 tablet by mouth every 4 (four) hours as needed for Pain. 31 tablet 0    saw palmetto 80 MG capsule Take 80 mg by mouth once daily.          Review of patient's allergies indicates:   Allergen Reactions    Cough syrup [guaifenesin] Other (See Comments)       OBJECTIVE:     Vital Signs:  Temp:  [97.7 °F (36.5 °C)-98.5 °F (36.9 °C)] 98.5 °F (36.9 °C)  Pulse:  [50-71] 69  Resp:  [18-19] 18  SpO2:  [98 %-100 %] 98 %  BP: (158-226)/() 158/74  Body mass index is 33 kg/m².     Physical Exam:  Physical Exam   Constitutional: He appears well-developed and well-nourished.   HENT:   Head: Normocephalic and atraumatic.   Eyes: Pupils are equal, round, and reactive to light.   Neck: Normal range of motion. Neck supple.   Cardiovascular: Normal rate, regular rhythm and normal heart sounds.    No murmur heard.  Pulmonary/Chest: Effort normal and breath sounds normal. No stridor. No respiratory distress. He has no wheezes.   Abdominal: He exhibits distension. There is tenderness. There is guarding.   Neurological:   Nasolabial fold flattening on left   Skin: Skin is warm and dry. No rash noted. No erythema.          Patient has difficulty finding words with inappropriate response and intermittent difficulty following simple commands. Some agitation during interview.    Laboratory  Lab Results   Component Value Date    WBC 12.00 10/04/2017    HGB 11.2 (L) 10/04/2017    HCT 31.5 (L) 10/04/2017    MCV 80 (L) 10/04/2017     10/04/2017       Recent Labs  Lab 10/04/17  2227   GLU 84  "     K 3.8      CO2 25   BUN 21   CREATININE 2.1*   CALCIUM 8.9     Lab Results   Component Value Date    INR 1.0 10/04/2017    INR 1.1 11/05/2016    INR 1.1 01/27/2016     Lab Results   Component Value Date    HGBA1C 5.6 09/26/2017     Recent Labs      10/05/17   0451   POCTGLUCOSE  192*       Diagnostic Results:  {Results; Diagnostics:[id number]::"***"}    Imaging  MRI Neck without contrast  No evidence of acute or major vascular distribution infarct or intracranial hemorrhage.    MRI Brain without contrast  Remote left PCA distribution infarct within the left occipital lobe.  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    Xray abdomen   No small bowel obstruction    ASSESSMENT & PLAN:     Current Problems List:  Active Hospital Problems    Diagnosis  POA    *Simple partial seizure, consciousness not impaired [G40.109]  Yes    DM2 (diabetes mellitus, type 2) [E11.9]  Yes    Altered mental status [R41.82]  Yes    DM type 2 without retinopathy [E11.9]  Yes    Essential hypertension [I10]  Yes    Chronic diastolic heart failure [I50.32]  Yes     11/26/2013      Aphasia [R47.01]  Yes    Cerebrovascular accident (CVA) [I63.9]  Yes    CKD (chronic kidney disease), stage III [N18.3]  Yes     Chronic    Hypercholesterolemia [E78.00]  Yes      Resolved Hospital Problems    Diagnosis Date Resolved POA   No resolved problems to display.       HIGH RISK CONDITION(S):  {HIGH RISK CONDITIONS:27680}    Problem Assessment & Treatment Plan:  Aphasia  2mg IV ativan  24hr EEG  Neurology consult  Seizure and Aspiration precautions    Abdominal pain     Patient with generalized involuntary guarding, hernia midline, and distension     DM type 2 without retinopathy     Glucose 82 on admission  NPO until speech eval, then diabetic diet     Stroke  Continue statin and Eliquis per neurology for stroke ppx    Hypercholesterolemia     -Resume home artovastatin 40mg             Signing Physician:  Rosa Hardy    "

## 2017-10-05 NOTE — PLAN OF CARE
10/05/17 1149   Discharge Assessment   Assessment Type Discharge Planning Assessment   Confirmed/corrected address and phone number on facesheet? Yes   Assessment information obtained from? Patient;Caregiver   Communicated expected length of stay with patient/caregiver no   Prior to hospitilization cognitive status: Alert/Oriented   Prior to hospitalization functional status: Independent   Current cognitive status: Risk of Harm to Self/Others   Current Functional Status: Needs Assistance   Facility Arrived From: Home   Lives With spouse;child(ron), adult   Able to Return to Prior Arrangements unable to determine at this time (comments)   Is patient able to care for self after discharge? Unable to determine at this time (comments)   Who are your caregiver(s) and their phone number(s)? Rut Christy (wife) [phone], Jada Christy (daughter) [phone]   Patient's perception of discharge disposition home or selfcare   Readmission Within The Last 30 Days no previous admission in last 30 days   Patient currently being followed by outpatient case management? No   Patient currently receives any other outside agency services? No   Equipment Currently Used at Home glucometer   Do you have any problems affording any of your prescribed medications? No   Is the patient taking medications as prescribed? yes   Does the patient have transportation home? Yes   Transportation Available family or friend will provide;car   Does the patient receive services at the [facility]? No   Discharge Plan A Home with family   Discharge Plan B Home;Home Health   Patient/Family In Agreement With Plan yes       PCP:Tyler Jasmine MD      [facility] - [address]  [address]  [facility] [address]  Phone: [phone] Fax: [phone]    [facility] [address] [place], [place] [address] [address]  [address]  [place] [address]  Phone: [phone] Fax: [phone]    Extended Emergency Contact Information  Primary Emergency Contact: Rut Christy  Address: [address] [place] [address] [facility]  Home Phone: [phone]  Mobile Phone: [phone]  Relation: Spouse  Preferred language: English  Secondary Emergency Contact: Jada Christy   [facility]  Home Phone: [phone]  Mobile Phone: [phone]  Relation: Daughter    Payor: [employer] / Plan: [employer] CHOICE / Product Type: Commercial /     Pt was admitted to the hospital for seizures. Pt is not yet at baseline mentally. No discharge needs at this time CM will continue to monitor.

## 2017-10-05 NOTE — ASSESSMENT & PLAN NOTE
Less likely; patient with expressive and receptive aphasia waxing and waning  Patient with charted evidence of word finding difficulty since CVA in 1/2016  CTH/MRI/MRA without acute findings,showed questionable subtle cortical diffusion restriction throughout the supratentorial brain. Short-term followup is suggested.Remote left PCA distribution infarct within the left occipital lobe.Stable 4 mm right supraclinoid ICA aneurysm.   Evaluated by Vascular Neurology, deferred to medicine  Continue statin and Eliquis per neurology for stroke ppx

## 2017-10-05 NOTE — MEDICAL/APP STUDENT
"[facility]  Neurology  Consult Note    Patient Name: Alfa Christy III  MRN: [id number]  Admission Date: 10/4/2017  Hospital Length of Stay: 0 days  Code Status: Full Code   Attending Provider: James Brooks MD   Consulting Provider: Olegario Phillips  Primary Care Physician: Tyler Jasmine MD  Principal Problem:Simple partial seizure, consciousness not impaired    Consults  Subjective:     Chief Complaint:  Expressive aphasia, consulted due to concern etiology 2/2 to seizures     HPI:   Alfa Christy III is a 62 y.o. male with a past medical history of CVA (with R hemianopsia 1/2016), seizure (11/2016), DM2, CKD stage III, HTN, HLD, paroxysmal atrial fibrillation, thyroid cancer (s/p surgery + radioactive iodine 2009) who initially presented for evaluation of expressive aphasia with associated confusion.    Per patient's wife patient was last at baseline at 1900 last night. Around 2000 wife noticed that the patient was agitated, confused and having progressively worsening difficulty with word finding. At the time the patient was ambulatory and not experiencing any associated weakness or numbness. He was ultimately brought to [facility] [place] evaluation.    While being interviewed by hospital medicine early this morning the patient began experiencing right-sided seizure-like spasms in his face, arm, abdominal muscles and leg with associated muscle rigidity and twitching. Episode lasted a few minutes. No LOC, bowel/bladder incontinence, tongue-biting. Ativan 2mg IV and Keppra 1500mg IV was started after the seizure resolved.    Per the patient's wife today's episode was similar to the seizure he had in 11/2016. During that time the patient experienced a seizure that only lasted "a minute or two." During that episode the patient did not LOC and did not have any bowel/bladder incontinence or tongue biting. He was evaluated at [facility] and subsequently started on Keppra 750mg BID. Per wife the patient has been compliant " with his Keppra and took his regular dose last night.     Currently the patient denies any headache, weakness, numbness, dizziness or lightheadedness. No new seizures. With regards to his aphasia, patient's wife reports some improvement from yesterday but he is still having difficulties expressing himself, following simple commands and is still a little confused.     Past Medical History:   Diagnosis Date    Allergy     BMI 31.0-31.9,adult 11/25/2014    Cerebrovascular disease 7/25/2016    Chronic anticoagulation - pradaxa 11/10/2016    Chronic diastolic heart failure 11/20/2016    CKD (chronic kidney disease), stage III 9/23/2013    Controlled type 2 diabetes with neuropathy 12/16/2014    Diabetes mellitus due to underlying condition with stage 3 chronic kidney disease, without long-term current use of insulin 11/2/2016    Elevated alkaline phosphatase level 8/1/2012    Elevated PSA     negative prostate biopsy 4/11    Erectile dysfunction associated with type 2 diabetes mellitus     Gallstones 3/20/2013    Generalized tonic-clonic seizure 11/2016    HTN (hypertension), benign 8/1/2012    Hypercholesterolemia 8/1/2012    Microcytic anemia 11/27/2013    Paroxysmal atrial fibrillation 9/23/2013    Postsurgical hypothyroidism 11/27/2013    Right homonymous hemianopsia 2/5/2016    Sickle cell trait     Stroke 1/27/2016    Thyroid cancer     multifocal with six lesions, largest 5mm, treated with surgery and radioactive iodine    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/2016       Past Surgical History:   Procedure Laterality Date    BREAST SURGERY      cyst removal    COLONOSCOPY      CYST REMOVAL      chest    PROSTATE BIOPSY  4/27/11    SKIN BIOPSY      THYROID SURGERY  8/26/09    VASCULAR SURGERY         Review of patient's allergies indicates:   Allergen Reactions    Cough syrup [guaifenesin] Other (See Comments)       Current Neurological Medications:   Keppra 750mg BID PO    No  "current facility-administered medications on file prior to encounter.      Current Outpatient Prescriptions on File Prior to Encounter   Medication Sig    acetaminophen (TYLENOL) 500 MG tablet Take 1 tablet (500 mg total) by mouth every 6 (six) hours as needed for Pain.    amlodipine (NORVASC) 5 MG tablet Take 1 tablet (5 mg total) by mouth once daily.    apixaban 5 mg Tab Take 1 tablet (5 mg total) by mouth 2 (two) times daily.    atorvastatin (LIPITOR) 40 MG tablet Take 1 tablet (40 mg total) by mouth once daily.    BD INSULIN PEN NEEDLE UF MINI 31 gauge x 3/16" Ndle To use with insulin pens 4  times daily    carvedilol (COREG) 12.5 MG tablet Take 1 tablet (12.5 mg total) by mouth 2 (two) times daily.    coenzyme Q10 200 mg capsule Take 200 mg by mouth once daily. (Patient taking differently: Take 200 mg by mouth once daily. PT TAKEN EVERY MON, WED, FRI)    ergocalciferol (VITAMIN D2) 50,000 unit Cap Take 50,000 Units by mouth every 7 days.    folic acid (FOLVITE) 1 MG tablet Take 1 tablet (1 mg total) by mouth once daily.    furosemide (LASIX) 40 MG tablet Take 2 tablets (80 mg total) by mouth 2 (two) times daily.    insulin detemir (LEVEMIR FLEXPEN) 100 unit/mL (3 mL) SubQ InPn pen Inject 16 Units into the skin every evening.    levetiracetam (KEPPRA) 750 MG Tab Take 1 tablet (750 mg total) by mouth 2 (two) times daily.    levothyroxine (SYNTHROID) 200 MCG tablet Take 1 tablet (200 mcg total) by mouth before breakfast.    losartan (COZAAR) 100 MG tablet Take 1 tablet (100 mg total) by mouth once daily.    multivitamin capsule Take 1 capsule by mouth once daily. Pt taken every mon, wed, fri    dulaglutide (TRULICITY) 0.75 mg/0.5 mL PnIj Inject 0.5 mLs (0.75 mg total) into the skin every 7 days.    glucagon (human recombinant) inj 1mg/mL kit Inject 1 mL (1 mg total) into the muscle as needed.    insulin lispro (HUMALOG KWIKPEN) 100 unit/mL InPn pen Inject 12 units w/ meals, 6 units if eating " lighter meal or bg less than 120, scale 180-230 +2, 231-280 +4, 281-330 +6, 331-380 +8.    magnesium 30 mg Tab Take by mouth.    ONETOUCH VERIO Strp USE TO TEST BLOOD SUGAR THREE TIMES DAILY    oxycodone-acetaminophen (PERCOCET) 5-325 mg per tablet Take 1 tablet by mouth every 4 (four) hours as needed for Pain.    saw palmetto 80 MG capsule Take 80 mg by mouth once daily.      Family History     Problem Relation (Age of Onset)    Cancer Sister    Diabetes Father, Mother, Brother    Heart disease Father, Mother    Hypertension Father, Mother    Thyroid disease Maternal Aunt        Social History Main Topics    Smoking status: Never Smoker    Smokeless tobacco: Never Used    Alcohol use Yes      Comment: occasionally    Drug use: No    Sexual activity: Yes     Partners: Female      Comment:  with 3 kids     Review of Systems   Constitutional: Negative for chills and fever.   HENT: Negative for congestion, rhinorrhea and sore throat.    Eyes: Negative for photophobia and visual disturbance.   Respiratory: Negative for cough and shortness of breath.    Cardiovascular: Negative for chest pain and palpitations.   Gastrointestinal: Negative for abdominal pain, diarrhea, nausea and vomiting.   Genitourinary: Negative for dysuria.   Musculoskeletal: Negative for myalgias.   Neurological: Positive for seizures (1x, few minutes) and speech difficulty (Difficulty with word finding). Negative for dizziness, tremors, weakness, light-headedness, numbness and headaches.   Psychiatric/Behavioral: Positive for confusion. Negative for agitation, hallucinations and sleep disturbance.     Objective:     Vital Signs (Most Recent):  Temp: 98 °F (36.7 °C) (10/05/17 0800)  Pulse: (!) 53 (10/05/17 1117)  Resp: 20 (10/05/17 0800)  BP: (!) 155/75 (10/05/17 0800)  SpO2: 96 % (10/05/17 0800) Vital Signs (24h Range):  Temp:  [97.7 °F (36.5 °C)-98.5 °F (36.9 °C)] 98 °F (36.7 °C)  Pulse:  [50-71] 53  Resp:  [18-20] 20  SpO2:  [96  %-100 %] 96 %  BP: (155-226)/() 155/75     Weight: 104.3 kg (230 lb)  Body mass index is 33 kg/m².    Physical Exam   Constitutional: He appears well-developed. No distress.   HENT:   Head: Normocephalic.   Mouth/Throat: Oropharynx is clear and moist and mucous membranes are normal.   Neck: Neck supple.   Cardiovascular: Normal rate, regular rhythm and normal heart sounds.    Pulmonary/Chest: Effort normal and breath sounds normal.   Abdominal: Soft. Bowel sounds are normal. There is no tenderness.     Neurological Exam  Mental Status: patient is alert and orient to person and place but not to day, month or year; unable to spell WORLD; exhibits difficulty with word finding words and following commands during exam  Cranial Nerves: grossly in tact; EOMI; PERRL; no facial asymmetry; facial sensation in tact; symmetrical rise of palate; tongue is midline   Motor: 5/5 strength in bilateral UE and LE  Sensory: Grossly in tact in bilateral UE and LE  Reflexes: 2+ bilateral triceps, biceps and brachioradialis; unable to elicit reflexes in LE  Coordination: bilateral ataxia and difficulty with finger-to-nose; difficulty following instructs for heal-to-shin and dysdiadochokinesia   Gait: no gross abnormality observed while patient was getting up to use the restroom        Significant Labs:   CBC:   Recent Labs  Lab 10/04/17  2118   WBC 12.00   HGB 11.2*   HCT 31.5*        CMP:   Recent Labs  Lab 10/04/17  2227 10/05/17  1001   GLU 84 177*    138   K 3.8 4.7    109   CO2 25 22*   BUN 21 17   CREATININE 2.1* 1.8*   CALCIUM 8.9 8.8   MG  --  2.1   PROT 7.2 7.1   ALBUMIN 3.3* 3.1*   BILITOT 1.1* 1.2*   ALKPHOS 123 120   AST 33 34   ALT 19 21   ANIONGAP 8 7*   EGFRNONAA 32.3* 38.9*     TSH: 7.907 (H)  Free T4: 0.93    Urinalysis: Negative    Drug screen panel: Negative     Significant Imaging:     CT head without contrast (10/4/2017) impression:  -No evidence of acute major vascular distribution infarct or  intracranial hemorrhage.   -Stable, remote infarct in the left occipital lobe.  -Chronic microvascular ischemic disease.    MRI/MRA brain, MRA neck without contrast (10/4/2017) impression:  -No evidence of acute or major vascular distribution infarct or intracranial hemorrhage.  -Questionable subtle cortical diffusion restriction throughout the supratentorial brain.  -Remote left PCA distribution infarct within the left occipital lobe.  -Stable 4 mm right supraclinoid ICA aneurysm.    Assessment and Plan:     Active Diagnoses:    Diagnosis Date Noted POA    PRINCIPAL PROBLEM:  Simple partial seizure, consciousness not impaired [G40.109] 10/05/2017 Yes    DM2 (diabetes mellitus, type 2) [E11.9] 10/05/2017 Yes    Altered mental status [R41.82] 10/05/2017 Yes    DM type 2 without retinopathy [E11.9] 08/30/2017 Yes    Essential hypertension [I10] 08/30/2017 Yes    Chronic diastolic heart failure [I50.32] 11/20/2016 Yes    Aphasia [R47.01]  Yes    Cerebrovascular accident (CVA) [I63.9] 07/25/2016 Yes    CKD (chronic kidney disease), stage III [N18.3] 09/23/2013 Yes     Chronic    Hypercholesterolemia [E78.00] 08/01/2012 Yes      Problems Resolved During this Admission:    Diagnosis Date Noted Date Resolved POA       VTE Risk Mitigation         Ordered     apixaban tablet 5 mg  2 times daily     Route:  Oral        10/05/17 0338     High Risk of VTE  Once      10/05/17 0247     Place sequential compression device  Until discontinued      10/05/17 0247     Place DARIAN hose  Until discontinued      10/05/17 0247        Seizure  -1 seizure episode early this AM while being evaluated by medicine team, lasted a few minutes, no LOC,   -1x previous history in November 2016, per wife episode today was similar to previous  -Patient has been on Keppra 750mg since November 2016, per wife has been compliant with medication and took his regular dose yesterday  -Brain imaging shows subtle cortical diffusion restriction  throughout the supratentorial brain, indicating recent seizure  -Electrolytes WNL, no evidence of infection, drug screen panel negative  -Today's episode likely a breakthrough seizure  -EEG not indicated at this time  -Will recommend increasing Keppra to 1500mg  -Continue Neuro checks Q2H  -Continue seizure precautions     Aphasia  -Brain imaging shows no evidence of acute or major vascular distribution infarct or intracranial hemorrhage  -Aphasia is primarily expressive in nature  -No other FND observed on examination  -Per wife this morning patient is better than yesterday  -Recent episode is likely transient and will continue to improve    Olegario Phillips  Medical Student Year 3  Neurology  [facility]

## 2017-10-05 NOTE — SUBJECTIVE & OBJECTIVE
Past Medical History:   Diagnosis Date    Allergy     BMI 31.0-31.9,adult 11/25/2014    Cerebrovascular disease 7/25/2016    Chronic anticoagulation - pradaxa 11/10/2016    Chronic diastolic heart failure 11/20/2016    CKD (chronic kidney disease), stage III 9/23/2013    Controlled type 2 diabetes with neuropathy 12/16/2014    Diabetes mellitus due to underlying condition with stage 3 chronic kidney disease, without long-term current use of insulin 11/2/2016    Elevated alkaline phosphatase level 8/1/2012    Elevated PSA     negative prostate biopsy 4/11    Erectile dysfunction associated with type 2 diabetes mellitus     Gallstones 3/20/2013    Generalized tonic-clonic seizure 11/2016    HTN (hypertension), benign 8/1/2012    Hypercholesterolemia 8/1/2012    Microcytic anemia 11/27/2013    Paroxysmal atrial fibrillation 9/23/2013    Postsurgical hypothyroidism 11/27/2013    Right homonymous hemianopsia 2/5/2016    Sickle cell trait     Stroke 1/27/2016    Thyroid cancer     multifocal with six lesions, largest 5mm, treated with surgery and radioactive iodine    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/2016     Past Surgical History:   Procedure Laterality Date    BREAST SURGERY      cyst removal    COLONOSCOPY      CYST REMOVAL      chest    PROSTATE BIOPSY  4/27/11    SKIN BIOPSY      THYROID SURGERY  8/26/09    VASCULAR SURGERY       Family History   Problem Relation Age of Onset    Heart disease Father     Diabetes Father     Hypertension Father     Diabetes Mother     Hypertension Mother     Heart disease Mother     Diabetes Brother     Cancer Sister     Thyroid disease Maternal Aunt     Clotting disorder Neg Hx      Social History   Substance Use Topics    Smoking status: Never Smoker    Smokeless tobacco: Never Used    Alcohol use Yes      Comment: occasionally     Review of patient's allergies indicates:   Allergen Reactions    Cough syrup [guaifenesin] Other  "(See Comments)     Medications: I have reviewed the current medication administration record.    Current Outpatient Prescriptions:     acetaminophen (TYLENOL) 500 MG tablet, Take 1 tablet (500 mg total) by mouth every 6 (six) hours as needed for Pain., Disp: , Rfl: 0    amlodipine (NORVASC) 5 MG tablet, Take 1 tablet (5 mg total) by mouth once daily., Disp: 30 tablet, Rfl: 11    apixaban 5 mg Tab, Take 1 tablet (5 mg total) by mouth 2 (two) times daily., Disp: 60 tablet, Rfl: 11    atorvastatin (LIPITOR) 40 MG tablet, Take 1 tablet (40 mg total) by mouth once daily., Disp: 90 tablet, Rfl: 3    BD INSULIN PEN NEEDLE UF MINI 31 gauge x 3/16" Ndle, To use with insulin pens 4  times daily, Disp: 360 each, Rfl: 3    carvedilol (COREG) 12.5 MG tablet, Take 1 tablet (12.5 mg total) by mouth 2 (two) times daily., Disp: 180 tablet, Rfl: 4    coenzyme Q10 200 mg capsule, Take 200 mg by mouth once daily. (Patient taking differently: Take 200 mg by mouth once daily. PT TAKEN EVERY MON, WED, FRI), Disp: , Rfl:     ergocalciferol (VITAMIN D2) 50,000 unit Cap, Take 50,000 Units by mouth every 7 days., Disp: , Rfl:     folic acid (FOLVITE) 1 MG tablet, Take 1 tablet (1 mg total) by mouth once daily., Disp: 90 tablet, Rfl: 1    furosemide (LASIX) 40 MG tablet, Take 2 tablets (80 mg total) by mouth 2 (two) times daily., Disp: 90 tablet, Rfl: 4    insulin detemir (LEVEMIR FLEXPEN) 100 unit/mL (3 mL) SubQ InPn pen, Inject 16 Units into the skin every evening., Disp: 3 Box, Rfl: 3    levetiracetam (KEPPRA) 750 MG Tab, Take 1 tablet (750 mg total) by mouth 2 (two) times daily., Disp: 180 tablet, Rfl: 4    levothyroxine (SYNTHROID) 200 MCG tablet, Take 1 tablet (200 mcg total) by mouth before breakfast., Disp: 90 tablet, Rfl: 2    losartan (COZAAR) 100 MG tablet, Take 1 tablet (100 mg total) by mouth once daily., Disp: 90 tablet, Rfl: 3    multivitamin capsule, Take 1 capsule by mouth once daily. Pt taken every mon, wed, " fri, Disp: , Rfl:     dulaglutide (TRULICITY) 0.75 mg/0.5 mL PnIj, Inject 0.5 mLs (0.75 mg total) into the skin every 7 days., Disp: 4 Syringe, Rfl: 6    glucagon (human recombinant) inj 1mg/mL kit, Inject 1 mL (1 mg total) into the muscle as needed., Disp: 1 kit, Rfl: 1    insulin lispro (HUMALOG KWIKPEN) 100 unit/mL InPn pen, Inject 12 units w/ meals, 6 units if eating lighter meal or bg less than 120, scale 180-230 +2, 231-280 +4, 281-330 +6, 331-380 +8., Disp: 3 Box, Rfl: 3    magnesium 30 mg Tab, Take by mouth., Disp: , Rfl:     ONETOUCH VERIO Strp, USE TO TEST BLOOD SUGAR THREE TIMES DAILY, Disp: 100 strip, Rfl: 11    oxycodone-acetaminophen (PERCOCET) 5-325 mg per tablet, Take 1 tablet by mouth every 4 (four) hours as needed for Pain., Disp: 31 tablet, Rfl: 0    saw palmetto 80 MG capsule, Take 80 mg by mouth once daily., Disp: , Rfl:       Review of Systems   Unable to perform ROS: Mental status change   Constitutional: Negative for chills and fever.   HENT: Positive for congestion and hearing loss. Negative for drooling and trouble swallowing.    Eyes: Positive for visual disturbance (baseline right hemianopsia). Negative for discharge and redness.   Respiratory: Positive for cough. Negative for shortness of breath.    Gastrointestinal: Negative for vomiting.   Endocrine: Negative for cold intolerance and heat intolerance.   Genitourinary: Negative for hematuria.   Skin: Negative for rash.   Allergic/Immunologic: Negative for environmental allergies and food allergies.   Neurological: Positive for dizziness, speech difficulty and headaches. Negative for seizures and facial asymmetry.   Hematological: Negative for adenopathy. Does not bruise/bleed easily.   Psychiatric/Behavioral: Positive for agitation.     Objective:     Vital Signs (Most Recent):  Temp: 97.7 °F (36.5 °C) (10/04/17 2055)  Pulse: (!) 53 (10/05/17 0016)  Resp: 19 (10/04/17 2055)  BP: (!) 176/81 (10/05/17 0016)  SpO2: 100 % (10/05/17  0016)    Vital Signs Range (Last 24H):  Temp:  [97.7 °F (36.5 °C)]   Pulse:  [52-63]   Resp:  [19]   BP: (167-226)/()   SpO2:  [98 %-100 %]     Physical Exam   Constitutional: He appears well-developed and well-nourished. No distress.   HENT:   Head: Normocephalic and atraumatic.   Right Ear: External ear normal.   Left Ear: External ear normal.   Nose: Nose normal.   Mouth/Throat: Oropharynx is clear and moist. No oropharyngeal exudate.   Eyes: Conjunctivae and EOM are normal. Pupils are equal, round, and reactive to light. Right eye exhibits no discharge. Left eye exhibits no discharge. No scleral icterus.   Neck: Normal range of motion. Neck supple. No thyromegaly present.   Cardiovascular: Normal rate, regular rhythm and intact distal pulses.    Pulmonary/Chest: Effort normal. No respiratory distress.   Abdominal: Soft. Bowel sounds are normal. He exhibits no distension.   Musculoskeletal: He exhibits edema (+3 pitting BLE).   Lymphadenopathy:     He has no cervical adenopathy.   Neurological: He is alert. GCS eye subscore is 4 - spontaneous. GCS verbal subscore is 4 - confused. GCS motor subscore is 6 - obeys commands.   Skin: Skin is warm and dry. He is not diaphoretic.   Nursing note and vitals reviewed.      Neurological Exam:   LOC: alert, follows requests and mostly  Language: Global aphasia  Speech: No dysarthria  EOM (CN III, IV, VI): Full/intact  Pupils (CN III, IV, VI): PERRL  Facial Movement (CN VII): symmetric facial expression  Tongue (CN XII): to midline    Stroke Team Times:   Date and Time Taken: 10/4/2017 10:35 PM  Last Known Normal Date and Time : 10/4/943831:52  Symptom Onset Date and Time:10/4/2017  Stroke Team Called Date and Time: 10/4/110804:30  Stroke Team Arrived Date and Time: 10/4/857329:35  CT Interpretation Time: 22:05    NIH Stroke Scale:  Interval: baseline (upon arrival/admit)  Level of Consciousness: 0 - alert  LOC Questions: 1 - answers one correctly  LOC Commands: 1 -  performs one correctly  Best Gaze: 0 - normal  Visual: 1 - partial hemianopia (right side, baseline)  Facial Palsy: 0 - normal  Motor Left Arm: 0 - no drift  Motor Right Arm: 0 - no drift  Motor Left Le - drift  Motor Right Le - no drift  Limb Ataxia: 0 - absent  Sensory: 0 - normal  Best Language: 1 - mild to moderate aphasia  Dysarthria: 0 - normal articulation  Extinction and Inattention: 0 - no neglect  NIH Stroke Scale Total: 5  [place] Coma Scale:  Best Eye Response: 4 - spontaneous  Best Motor Response: 6 - obeys commands  Best Verbal Response: 4 - confused  [place] Coma Scale Total: 14  Modified [place] Scale:   Timeline: Prior to symptoms onset  Modified [place] Score: 1 - no significant disability        Laboratory:  CMP:   Recent Labs  Lab 10/04/17  2227   CALCIUM 8.9   ALBUMIN 3.3*   PROT 7.2      K 3.8   CO2 25      BUN 21   CREATININE 2.1*   ALKPHOS 123   ALT 19   AST 33   BILITOT 1.1*     CBC:   Recent Labs  Lab 10/04/17  2118   WBC 12.00   RBC 3.93*   HGB 11.2*   HCT 31.5*      MCV 80*   MCH 28.5   MCHC 35.6     Lipid Panel:   Recent Labs  Lab 10/04/17  2227   CHOL 118*   LDLCALC 52.6*   HDL 37*   TRIG 142     Coagulation:   Recent Labs  Lab 10/04/17  2118   INR 1.0     Hgb A1C: No results for input(s): HGBA1C in the last 168 hours.  TSH:   Recent Labs  Lab 10/04/17  2227   TSH 7.907*       Diagnostic Results:  Brain Imaging: CT Head. Date: 10/4/17  Acute Pathology: None  Location: N/A  Old Vascular Pathology: Infarct  Location: Occipital: left    Cerebrovascular Imaging: MRA Head. Date: 10/05/2017   Intracranial Pathology: None  Location: N/A    Cervical Vascular Imaging: MRA Neck. Date: 10/05/2017   Cervical Vascular Pathology: None  Location: N/A    Cardiac Evaluation: None  EKG/Telemetry: Sinus rhythm, bradycardia

## 2017-10-05 NOTE — ED NOTES
Dr. Rae notified by this charge nurse and aware about floor nurse concern for pt's BP medication in case pt's SBP >180. Dr. Rae states she will look over pt's chart

## 2017-10-05 NOTE — ED PROVIDER NOTES
Encounter Date: 10/4/2017    SCRIBE #1 NOTE: I, Brock Toney, am scribing for, and in the presence of,  Dr. Ritchie. I have scribed the following portions of the note - the EKG reading. Other sections scribed: HPI, ROS, and PE.       History     Chief Complaint   Patient presents with    Dysphagia     Pts family member reports dysphagia. Last seen normal at 1800. Pt also has delayed respones.     Time patient was seen by the provider: 9:12 PM      The patient is a 62 y.o. male with hx of: HTN. DM, CKD, sickle cell trait, and stroke(1/27/16) that presents to the ED with a complaint of acute altered mental status and problems speaking at 18:52 today. Pt's relative reports he began complaining of dizziness and headache 3 hours ago. He then took 2 tylenol with relief of dizziness. Relative states she then left and upon returning found him complaining of short term memory loss 2 hours ago. No nausea, vomiting, chills, or fever.       The history is provided by a relative. The history is limited by the condition of the patient.     Review of patient's allergies indicates:   Allergen Reactions    Cough syrup [guaifenesin] Other (See Comments)     Past Medical History:   Diagnosis Date    Allergy     BMI 31.0-31.9,adult 11/25/2014    Cerebrovascular disease 7/25/2016    Chronic anticoagulation - pradaxa 11/10/2016    Chronic diastolic heart failure 11/20/2016    CKD (chronic kidney disease), stage III 9/23/2013    Controlled type 2 diabetes with neuropathy 12/16/2014    Diabetes mellitus due to underlying condition with stage 3 chronic kidney disease, without long-term current use of insulin 11/2/2016    Elevated alkaline phosphatase level 8/1/2012    Elevated PSA     negative prostate biopsy 4/11    Erectile dysfunction associated with type 2 diabetes mellitus     Gallstones 3/20/2013    Generalized tonic-clonic seizure 11/2016    HTN (hypertension), benign 8/1/2012    Hypercholesterolemia 8/1/2012     Microcytic anemia 11/27/2013    Paroxysmal atrial fibrillation 9/23/2013    Postsurgical hypothyroidism 11/27/2013    Right homonymous hemianopsia 2/5/2016    Sickle cell trait     Stroke 1/27/2016    Thyroid cancer     multifocal with six lesions, largest 5mm, treated with surgery and radioactive iodine    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/2016     Past Surgical History:   Procedure Laterality Date    BREAST SURGERY      cyst removal    COLONOSCOPY      CYST REMOVAL      chest    PROSTATE BIOPSY  4/27/11    SKIN BIOPSY      THYROID SURGERY  8/26/09    VASCULAR SURGERY       Family History   Problem Relation Age of Onset    Heart disease Father     Diabetes Father     Hypertension Father     Diabetes Mother     Hypertension Mother     Heart disease Mother     Diabetes Brother     Cancer Sister     Thyroid disease Maternal Aunt     Clotting disorder Neg Hx      Social History   Substance Use Topics    Smoking status: Never Smoker    Smokeless tobacco: Never Used    Alcohol use Yes      Comment: occasionally     Review of Systems   Unable to perform ROS: Mental status change   Constitutional: Negative for chills and fever.   Gastrointestinal: Negative for nausea and vomiting.   Neurological: Positive for dizziness (resolved).       Physical Exam     Initial Vitals   BP Pulse Resp Temp SpO2   -- -- -- -- --      MAP       --       184/94     53  19  99%RA  36.5   Physical Exam  PHYSICAL EXAM:  Vital signs and nursing assessment noted:    GEN:   NAD, atraumatic, well appearing, nontoxic appearing  NEURO:   Alert, GCS 15, CN II-XII grossly intact, normal gaze, normal vision, no facial palsy, no motor deficits,  No sensory deficits, No limb ataxia, + aphasia, slow  articulation, no neglect, no tremor, abnl gait/coordination, no nystagmus  PSYCH:   no SI/HI, no anxiety, nl mood/affect, nl judgement/thought process  HEENT:  PERRLA, EOMI, moist membranes, nl conjunctiva, no scleral icterus,  no nystagmus, no nodes/nodules, soft, supple, FROM, no trachial deviation  CV:   bradycardia no m/r/g, 2+ radial pulses, <2sec cap refill  RESP:  CTA B, no w/r/r, equal and bilateral chest rise, no respiratory distress  ABD:   soft, Nontender, Nondistended, +BS, no guarding/rebound  BACK:  FROM, no midline tenderness, no paraspinal tenderness  :   Deferred  EXT:   FROM, PATTERSON x 4, no edema, no calf tenderness, no swelling  LYMPH:  no gross adenopathy  SKIN:  Warm, dry, intact, no rashes/lesions or masses, nl color, no pallor    ED Course   Procedures  Labs Reviewed   CBC W/ AUTO DIFFERENTIAL - Abnormal; Notable for the following:        Result Value    RBC 3.93 (*)     Hemoglobin 11.2 (*)     Hematocrit 31.5 (*)     MCV 80 (*)     RDW 17.7 (*)     Mono # 1.5 (*)     All other components within normal limits   URINALYSIS, REFLEX TO URINE CULTURE - Abnormal; Notable for the following:     Protein, UA 2+ (*)     Occult Blood UA 1+ (*)     All other components within normal limits    Narrative:     Preferred Collection Type->Urine, Clean Catch   COMPREHENSIVE METABOLIC PANEL - Abnormal; Notable for the following:     Creatinine 2.1 (*)     Albumin 3.3 (*)     Total Bilirubin 1.1 (*)     eGFR if  37.3 (*)     eGFR if non  32.3 (*)     All other components within normal limits   LIPID PANEL - Abnormal; Notable for the following:     Cholesterol 118 (*)     HDL 37 (*)     LDL Cholesterol 52.6 (*)     All other components within normal limits   ISTAT CREATININE - Abnormal; Notable for the following:     POC Creatinine 2.0 (*)     All other components within normal limits   PROTIME-INR   DRUG SCREEN PANEL, URINE EMERGENCY    Narrative:     Preferred Collection Type->Urine, Clean Catch   URINALYSIS MICROSCOPIC    Narrative:     Preferred Collection Type->Urine, Clean Catch   TSH   POCT GLUCOSE   ISTAT PROCEDURE     EKG Readings: (Independently Interpreted)   2150  Sinus bradycardia heart rate  51, first degree AV block, normal axis, no ST elevations, Q waves in the septal LEADs. Compared to EKG on June 21, 2017 which does not have a first degree AV block           Medical Decision Making:   History:   Old Medical Records: I decided to obtain old medical records.  Initial Assessment:   62M PMHx CVA, HTN, Dm, PAF on ?pradaxa vs eliquis presents with acute dizziness, confusion and difficulty speaking that occurred at 18:52. Dizziness improved at other sx persistent  Differential Diagnosis:   Altered mental status includes but not exclusive to: substance abuse, hyperammonemia, metabolic disturbances,  poisoning, medication side effect, UTI,  dehydration, ICH, closed head injury, unknown psych disorder, Malingering    Independently Interpreted Test(s):   I have ordered and independently interpreted X-rays - see summary below.       <> Summary of X-Ray Reading(s): CTH no acute stroke, no ICH  +left occipital encephalomalacia  I have ordered and independently interpreted EKG Reading(s) - see prior notes  Clinical Tests:   Lab Tests: Ordered and Reviewed  The following lab test(s) were unremarkable: CBC       <> Summary of Lab: Elevated creatinine  Radiological Study: Ordered and Reviewed  Medical Tests: Ordered and Reviewed  ED Management:  Treatment plan includes physical exam, cardiac monitoring, labs, imaging studies, IVF and supportive care.    Patient mildly improved after passage of time.  Discussed tPA with family and patient.  Will hold as patient on anticoagulant  Antihypertensives ordered.  Consider nicardipine  2200 Neurology consulted    Patient and family updated on care    Further plan per inpatient team    Other:   I have discussed this case with another health care provider.       <> Summary of the Discussion: Neurology to evaluate patient bedside      0120 neurology states that do not believe sx are explained by CVA.  Would best be serviced on medicine admission  0130 medicine consult for  admission  Critical care time 31 min    Time spent at the bedside, reviewing test results, discussing the case with staff and/or consult(s), documenting the medical record and time spent with family members discussing treatment and management decisions.  The time involved in the performance of separately reportable procedures was not counted toward critical care time.      Additional MDM:   Stroke: Stroke Code: The patient was felt to be a stroke code and the stroke team was consulted. The stroke consultant recommended: MRI of Brain and MRA of Brain. Consultants: Neurology. guarded The stroke consultant recommended that TPA not be given to this patient.      NIH Stroke Scale:   Level of consciousness = 0 - alert  LOC questions = 0 - answers both correctly  LOC commands = 0 - performs both correctly  Best gaze = 0 - normal  Visual = 0 - no visual loss  Facial palsy = 0 - normal  Motor left arm =  0 - no drift  Motor right arm =  0 - no drift  Motor left leg = 0 - no drift  Motor right leg =  0 - no drift  Limb ataxia = 0 - absent  Sensory = 0 - normal  Best language = 1 - mild to moderate aphasia  Dysarthria = 1 - mild to moderate dysarthria  Extinction and inattention = 0 - no neglect  NIH Stroke Scale Total = 2         Scribe Attestation:   Scribe #1: I performed the above scribed service and the documentation accurately describes the services I performed. I attest to the accuracy of the note.    Attending Attestation:           Physician Attestation for Scribe:  Physician Attestation Statement for Scribe #1: I, DAMIEN Ritchie, reviewed documentation, as scribed by Brock Toney in my presence, and it is both accurate and complete.     Comments:               ED Course      Clinical Impression:   The primary encounter diagnosis was Cerebrovascular accident (CVA), unspecified mechanism. A diagnosis of Altered mental status, unspecified altered mental status type was also pertinent to this visit.    Disposition:    Disposition: Admitted  Condition: Serious                        Terry Ritchie MD  10/04/17 2326       Terry Ritchie MD  10/05/17 0137

## 2017-10-05 NOTE — ASSESSMENT & PLAN NOTE
Patient with generalized involuntary guarding, hernia midline, and distension  Afebrile, no leukocytosis WBC 12 less likely inflammatory  KUB ordered r/o ileus  Consider CT abdomen as patient has vasculopathy risk factors and hernia to r/o ischemia  Monitoring VS

## 2017-10-05 NOTE — PT/OT/SLP EVAL
Physical Therapy  Evaluation    Alfa Christy III   MRN: [id number]   Admitting Diagnosis: Simple partial seizure, consciousness not impaired    PT Received On: 10/05/17  PT Start Time: 1411     PT Stop Time: 1434    PT Total Time (min): 23 min       Billable Minutes:  Evaluation 23 min (Co-eval with OT)    Diagnosis: Simple partial seizure, consciousness not impaired  Expressive aphasia    Past Medical History:   Diagnosis Date    Allergy     BMI 31.0-31.9,adult 11/25/2014    Cerebrovascular disease 7/25/2016    Chronic anticoagulation - pradaxa 11/10/2016    Chronic diastolic heart failure 11/20/2016    CKD (chronic kidney disease), stage III 9/23/2013    Controlled type 2 diabetes with neuropathy 12/16/2014    Diabetes mellitus due to underlying condition with stage 3 chronic kidney disease, without long-term current use of insulin 11/2/2016    Elevated alkaline phosphatase level 8/1/2012    Elevated PSA     negative prostate biopsy 4/11    Erectile dysfunction associated with type 2 diabetes mellitus     Gallstones 3/20/2013    Generalized tonic-clonic seizure 11/2016    HTN (hypertension), benign 8/1/2012    Hypercholesterolemia 8/1/2012    Microcytic anemia 11/27/2013    Paroxysmal atrial fibrillation 9/23/2013    Postsurgical hypothyroidism 11/27/2013    Right homonymous hemianopsia 2/5/2016    Sickle cell trait     Stroke 1/27/2016    Thyroid cancer     multifocal with six lesions, largest 5mm, treated with surgery and radioactive iodine    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/2016      Past Surgical History:   Procedure Laterality Date    BREAST SURGERY      cyst removal    COLONOSCOPY      CYST REMOVAL      chest    PROSTATE BIOPSY  4/27/11    SKIN BIOPSY      THYROID SURGERY  8/26/09    VASCULAR SURGERY         Referring physician: Trell Ratliff MD  Date referred to PT: 10/5/17    General Precautions: Standard, fall, aspiration  Orthopedic Precautions: N/A   Braces: N/A        Do you have any cultural, spiritual, Shinto conflicts, given your current situation?: Per chart pt is Latter-day     Patient History:  Lives With: child(ron), adult, spouse  Living Arrangements: house  Home Accessibility: stairs within home  Living Environment Comment: Pt is unreliable historian 2/2 expressive aphasia and AMS; per pt: pt lives with wife and daughter in [address] with Br/Ba on 2nd floor. Will need to confirm social history and living environement with pt's spouse   DME owned (not currently used): pt reported owning RW (will need to confirm 2/2 pt unreliable historian)     Previous Level of Function:   Per pt: ambulating with RW prior to admit- will need to confirm.     Subjective:  Communicated with RN prior to session. Pt with expressive aphasia and flat affect. Pt agreeable to participate in therapy evaluation.     Chief Complaint: decreased mobility   Patient goals: none stated; pt unable to state     Pain/Comfort  Pain Rating 1: 0/10  Pain Rating Post-Intervention 1: 0/10      Objective:   Patient found with: peripheral IV     Cognitive Exam:  Oriented to: Person and Place    Follows Commands/attention: Follows one-step commands  Communication: expressive aphasia  Safety awareness/insight to disability: impaired    Physical Exam:  Postural examination/scapula alignment: Rounded shoulder    Skin integrity: Visible skin intact  Edema: None noted BLEs    Sensation:   Grossly intact light touch BLEs; pt with delayed reponses and expressive aphasia limiting reliability of exam     Lower Extremity Range of Motion:  Right Lower Extremity: WFL  Left Lower Extremity: WFL    Lower Extremity Strength:  Right Lower Extremity: Deficits: hip flexion 3/5  Left Lower Extremity: Deficits: hip flexion 3+/5     Gross motor coordination: WFL    Functional Mobility:  Bed Mobility:  Scooting/Bridging: Stand by Assistance  Supine to Sit: Stand by Assistance  Sit to Supine: Stand by Assistance (with HOB  elevated)    Transfers:  Sit <> Stand Assistance: Contact Guard Assistance (from EOB x 1 trial)  Sit <> Stand Assistive Device: No Assistive Device    Gait:   Gait Distance: marching in place x 5 reps with min A for balance and weight shifting; 1 step forward and back, 2 side steps to R  with min A; posterior lean in stance   Assistance 1: Minimum assistance  Gait Assistive Device: Hand held assist  Gait Deviation(s): decreased velocity of limb motion, decreased toe-to-floor clearance, decreased weight-shifting ability, backward lean     Balance:   Static Sit: GOOD: Takes MODERATE challenges from all directions  Dynamic Sit: GOOD: Maintains balance through MODERATE excursions of active trunk movement  Static Stand: POOR+: Needs MINIMAL assist to maintain  Dynamic stand: POOR    Therapeutic Activities and Exercises:  Pt educated on role of PT and POC/goals for therapy as well as safety with mobility. Unable to determine pt understanding 2/2 pt with expressive aphasia and AMS. No family present for caregiver education.     AM-PAC 6 CLICK MOBILITY  How much help from another person does this patient currently need?   1 = Unable, Total/Dependent Assistance  2 = A lot, Maximum/Moderate Assistance  3 = A little, Minimum/Contact Guard/Supervision  4 = None, Modified [place]/Independent    Turning over in bed (including adjusting bedclothes, sheets and blankets)?: 3  Sitting down on and standing up from a chair with arms (e.g., wheelchair, bedside commode, etc.): 3  Moving from lying on back to sitting on the side of the bed?: 3  Moving to and from a bed to a chair (including a wheelchair)?: 3  Need to walk in hospital room?: 2  Climbing 3-5 steps with a railing?: 1  Total Score: 15     AM-PAC Raw Score CMS G-Code Modifier Level of Impairment Assistance   6 % Total / Unable   7 - 9 CM 80 - 100% Maximal Assist   10 - 14 CL 60 - 80% Moderate Assist   15 - 19 CK 40 - 60% Moderate Assist   20 - 22 CJ 20 - 40%  Minimal Assist   23 CI 1-20% SBA / CGA   24 CH 0% Independent/ Mod I     Patient left HOB elevated with all lines intact, call button in reach and RN notified.    Assessment:   Alfa Christy III is a 62 y.o. male with a medical diagnosis of Simple partial seizure, consciousness not impaired. Pt presents with expressive aphasia, flat affect, delayed processing of simple commands, impaired standing balance, and gait instability. Will need to confirm pt's PLOF and living environment when caregiver is present/available. Pt would benefit from skilled PT intervention to address below listed deficits, reduce fall risk, and maximize (I) and safety with functional mobility. Recommending pt discharge to SNF for continued therapy pending progress.     Rehab identified problem list/impairments: Rehab identified problem list/impairments: weakness, impaired endurance, impaired functional mobilty, gait instability, impaired self care skills, impaired balance, impaired cognition, decreased safety awareness    Rehab potential is good.    Activity tolerance: Good    Discharge recommendations: Discharge Facility/Level Of Care Needs: nursing facility, skilled     Barriers to discharge: Barriers to Discharge:  (unknown)    Equipment recommendations: Equipment Needed After Discharge:  (TBD)     GOALS:    Physical Therapy Goals        Problem: Physical Therapy Goal    Goal Priority Disciplines Outcome Goal Variances Interventions   Physical Therapy Goal     PT/OT, PT Ongoing (interventions implemented as appropriate)     Description:  Goals to be met by: 10/12/17     Patient will increase functional independence with mobility by performin. Supine <> sit with Modified [place]  2. Sit to stand transfer with Supervision  3. Gait  x 100 feet with Contact Guard Assistance with or without appropriate AD.   4. Ascend/descend 5 stairs with right Handrails Contact Guard Assistance .    5. Lower extremity exercise program x20 reps per  handout, with assistance as needed                      PLAN:    Patient to be seen 4 x/week to address the above listed problems via gait training, therapeutic activities, therapeutic exercises, neuromuscular re-education  Plan of Care expires: 11/04/17  Plan of Care reviewed with: patient        Silvia Roberto PT, DPT   10/5/2017  Pager: [phone]

## 2017-10-05 NOTE — CONSULTS
[facility]  Neurology  Consult Note    Patient Name: Alfa Christy III  MRN: [id number]  Admission Date: 10/4/2017  Hospital Length of Stay: 0 days  Code Status: Full Code   Attending Provider: James Brooks MD   Consulting Provider: Morro Bolanos MD  Primary Care Physician: Tyler Jasmine MD  Principal Problem:Simple partial seizure, consciousness not impaired    Inpatient consult to neurology  Consult performed by: MORRO BOLANOS  Consult ordered by: CECILIA MEJIA         Subjective:     Chief Complaint:  Witnessed simple partial seizure, concern for recent seizures     HPI:   61 yo M with PMHx stroke with residual R hemianopia 01/2016 as well as seizure 11/2016, DM II, CKD III, HTN, HLD, paroxysmal A Fib on apixaban presents to [facility] 10/04/17 due to wife coming home and finding  confused with agitation, word-finding difficulty, and imbalance.  Noted no weakness.  States that he was last seen normal at 7 pm, she went out to run an errand and returned at 8 pm.  Otherwise notes no signs of head trauma, no recent weakness, no medication changes.  He takes levetiracetam 750 mg po bid (started on 1500 mg po bid right after 11/2016 seizure) and is compliant.  Of note, she does mention that patient received a flu shot a few weeks ago and had some congestion/runny nose/cough for past 2 weeks.  No fever/chills.  No recent sleep deprivation, excessive caffeine use, illicit use, EtOH withdrawal, OTC anti-cholinergics.  Imaging negative for acute infarction, but MRI with subtle cortical diffusion restriction throughout supratentorial brain possibly consistent with post-ictal, or seizure-related, restriction diffusion.     Past Medical History:   Diagnosis Date    Allergy     BMI 31.0-31.9,adult 11/25/2014    Cerebrovascular disease 7/25/2016    Chronic anticoagulation - pradaxa 11/10/2016    Chronic diastolic heart failure 11/20/2016    CKD (chronic kidney disease), stage III  "9/23/2013    Controlled type 2 diabetes with neuropathy 12/16/2014    Diabetes mellitus due to underlying condition with stage 3 chronic kidney disease, without long-term current use of insulin 11/2/2016    Elevated alkaline phosphatase level 8/1/2012    Elevated PSA     negative prostate biopsy 4/11    Erectile dysfunction associated with type 2 diabetes mellitus     Gallstones 3/20/2013    Generalized tonic-clonic seizure 11/2016    HTN (hypertension), benign 8/1/2012    Hypercholesterolemia 8/1/2012    Microcytic anemia 11/27/2013    Paroxysmal atrial fibrillation 9/23/2013    Postsurgical hypothyroidism 11/27/2013    Right homonymous hemianopsia 2/5/2016    Sickle cell trait     Stroke 1/27/2016    Thyroid cancer     multifocal with six lesions, largest 5mm, treated with surgery and radioactive iodine    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/2016     Past Surgical History:   Procedure Laterality Date    BREAST SURGERY      cyst removal    COLONOSCOPY      CYST REMOVAL      chest    PROSTATE BIOPSY  4/27/11    SKIN BIOPSY      THYROID SURGERY  8/26/09    VASCULAR SURGERY       Review of patient's allergies indicates:   Allergen Reactions    Cough syrup [guaifenesin] Other (See Comments)     Current Neurological Medications: Levetiracetam 750 mg po bid, apixaban 5 mg po qd    No current facility-administered medications on file prior to encounter.      Current Outpatient Prescriptions on File Prior to Encounter   Medication Sig    acetaminophen (TYLENOL) 500 MG tablet Take 1 tablet (500 mg total) by mouth every 6 (six) hours as needed for Pain.    amlodipine (NORVASC) 5 MG tablet Take 1 tablet (5 mg total) by mouth once daily.    apixaban 5 mg Tab Take 1 tablet (5 mg total) by mouth 2 (two) times daily.    atorvastatin (LIPITOR) 40 MG tablet Take 1 tablet (40 mg total) by mouth once daily.    BD INSULIN PEN NEEDLE UF MINI 31 gauge x 3/16" Ndle To use with insulin pens 4  times daily "    carvedilol (COREG) 12.5 MG tablet Take 1 tablet (12.5 mg total) by mouth 2 (two) times daily.    coenzyme Q10 200 mg capsule Take 200 mg by mouth once daily. (Patient taking differently: Take 200 mg by mouth once daily. PT TAKEN EVERY MON, WED, FRI)    furosemide (LASIX) 40 MG tablet Take 2 tablets (80 mg total) by mouth 2 (two) times daily. (Patient taking differently: Take 80 mg every morning and 40 mg every evening)    insulin detemir (LEVEMIR FLEXPEN) 100 unit/mL (3 mL) SubQ InPn pen Inject 16 Units into the skin every evening.    insulin lispro (HUMALOG KWIKPEN) 100 unit/mL InPn pen Inject 12 units w/ meals, 6 units if eating lighter meal or bg less than 120, scale 180-230 +2, 231-280 +4, 281-330 +6, 331-380 +8.    levetiracetam (KEPPRA) 750 MG Tab Take 1 tablet (750 mg total) by mouth 2 (two) times daily.    levothyroxine (SYNTHROID) 200 MCG tablet Take 1 tablet (200 mcg total) by mouth before breakfast.    losartan (COZAAR) 100 MG tablet Take 1 tablet (100 mg total) by mouth once daily.    magnesium 30 mg Tab Take 1 tablet by mouth every Mon, Wed, Fri.     multivitamin capsule Take 1 capsule by mouth every Mon, Wed, Fri.     ONETOUCH VERIO Strp USE TO TEST BLOOD SUGAR THREE TIMES DAILY    [DISCONTINUED] ergocalciferol (VITAMIN D2) 50,000 unit Cap Take 50,000 Units by mouth every 7 days.    [DISCONTINUED] folic acid (FOLVITE) 1 MG tablet Take 1 tablet (1 mg total) by mouth once daily.    dulaglutide (TRULICITY) 0.75 mg/0.5 mL PnIj Inject 0.5 mLs (0.75 mg total) into the skin every 7 days.    glucagon (human recombinant) inj 1mg/mL kit Inject 1 mL (1 mg total) into the muscle as needed.    [DISCONTINUED] oxycodone-acetaminophen (PERCOCET) 5-325 mg per tablet Take 1 tablet by mouth every 4 (four) hours as needed for Pain.    [DISCONTINUED] saw palmetto 80 MG capsule Take 80 mg by mouth once daily.     Family History     Problem Relation (Age of Onset)    Cancer Sister    Diabetes Father,  Mother, Brother    Heart disease Father, Mother    Hypertension Father, Mother    Thyroid disease Maternal Aunt        Social History Main Topics    Smoking status: Never Smoker    Smokeless tobacco: Never Used    Alcohol use Yes      Comment: occasionally    Drug use: No    Sexual activity: Yes     Partners: Female      Comment:  with 3 kids     Review of Systems   Unable to perform ROS: Mental status change     Objective:     Vital Signs (Most Recent):  Temp: 97.7 °F (36.5 °C) (10/05/17 1241)  Pulse: 78 (10/05/17 1420)  Resp: 16 (10/05/17 1241)  BP: (!) 199/76 (10/05/17 1304)  SpO2: 97 % (10/05/17 1241) Vital Signs (24h Range):  Temp:  [97.7 °F (36.5 °C)-98.5 °F (36.9 °C)] 97.7 °F (36.5 °C)  Pulse:  [50-78] 78  Resp:  [16-20] 16  SpO2:  [96 %-100 %] 97 %  BP: (155-226)/() 199/76     Weight: 104.3 kg (230 lb)  Body mass index is 33 kg/m².    Physical Exam  General:  Well-developed, well-nourished, nad  HEENT:  NCAT, PERRLA, EOMI.  Oropharyngeal membranes non-erythematous/without exudate  Neck:  Supple, no palpable lad, no nuchal rigidity  Resp:  Symmetric expansion, no increased wob, CTAB  CVS:  RRR, no m/r/g.  No LE edema.  Extremities warm, well-perfused.  GI:  Abd soft, non-distended, non-tender to palpation, +BS     Neurologic Exam:  Mental Status:  Awake, alert, oriented to self, location, but not to date.  Perseverates on day of the week when asked about month/year.  Recent, remote recall poor but difficult to evaluate in setting of word-finding difficulty.  Some dysnomia.  Cranial Nerves:  VF deficit in R hemisphere, has to adjust head to see people standing on his R.  PERRLA, EOMI.  Facial movement, sensation intact and symmetric.  Palate raises symmetrically, tongue protrudes midline.  SCM 5/5 bilaterally.  Trapezius 4/5 R, 5/5 L.  Motor:  Normal bulk and tone.  R shoulder abduction 3/5, biceps/triceps 4/5.  R  strength 5/5, L shoulder abduction/biceps/triceps/ strength 5/5.  BLE  hip flexion, knee flexion/extension, dorsiflexion/plantarflexion 5/5.  Sensory:  Intact to light touch, temperature at all extremities without inattention.  Reflexes:  Biceps, brachioradialis 2+ bilaterally.  Patellar 1+ L, areflexic R.  No ankle clonus.  Downgoing toe bilaterally.  Coordination:  Difficulty with commands for FNF, MARIO, HTS--possible component of apraxia.  No resting tremor, no myoclonus.  Some additional difficulty on FNF possibly due to hemianopia.  Gait:  Deferred 2/2 seizure and fall precautions    Significant Labs:     Recent Labs  Lab 10/04/17  2118   WBC 12.00   RBC 3.93*   HGB 11.2*   HCT 31.5*      MCV 80*   MCH 28.5   MCHC 35.6       Recent Labs  Lab 10/05/17  1001   CALCIUM 8.8   PROT 7.1      K 4.7   CO2 22*      BUN 17   CREATININE 1.8*   ALKPHOS 120   ALT 21   AST 34   BILITOT 1.2*     Significant Imaging:   Imaging Results     X-Ray Abdomen AP 1 View (Final result)  Result time 10/05/17 04:05:22    Impression:    No evidence of small bowel obstruction or ileus.             Narrative:    FINDINGS:  Monitoring leads project over the abdomen wall. No dilated loops of small bowel. Mild to moderate fecal loading in the right hemicolon. No supine evidence of free intraperitoneal air. Partially visualized lung bases are unremarkable. Degenerative changes of the lumbar spine noted.                MRA Brain without contrast (Final result)     Abnormal  Result time 10/05/17 01:19:42       MRA Neck without contrast (Final result)     Abnormal  Result time 10/05/17 01:19:19       MRI Brain Without Contrast (Edited Result - FINAL)  Result time 10/05/17 01:19:02    Impression:    No evidence of acute or major vascular distribution infarct or intracranial hemorrhage.  Questionable subtle cortical diffusion restriction throughout the supratentorial brain.  Short-term followup is suggested.  Remote left PCA distribution infarct within the left occipital lobe.  Stable 4 mm right  supraclinoid ICA aneurysm.             Narrative:    MRI BRAIN, MRA HEAD, MRA NECK  CLINICAL INDICATION: 62 year old M with stroke, severe expressive aphasia, LLE weakness.   TECHNIQUE: Multiplanar, multisequence images were obtained of the brain. Three-dimensional time-of-flight technique was used in assessment of the neck and intracranial vasculature.  COMPARISON: CT head 10/4/2017.    FINDINGS:  MRI BRAIN:  The ventricles are normal in size for age, without evidence of hydrocephalus.  There is suggestion of subtle diffuse cortical diffusion restriction in the supratentorial brain.    There is a stable region of encephalomalacia within the left occipital lobe. There is relatively isointense diffusion signal in this region with corresponding hyperintensity on ADC map and T2/FLAIR hyperintensity, compatible with remote infarct. No areas of restricted diffusion signal are seen to suggest new/acute infarct. No areas of susceptibility to suggest hemorrhage. A punctate focus of T1 hyperintensity in the left aspect of the anterior interhemispheric fissure likely represents a dural calcification. Supratentorial patchy T2/FLAIR hyperintensities compatible with chronic microvascular ischemic disease.    No extra-axial blood or fluid collections.  The T2 skull base flow voids are preserved. Bone marrow signal intensity is unremarkable.    MRA HEAD AND NECK:  Common and internal carotid arteries are normal in caliber.  No significant stenosis at either carotid bifurcation. There is a small saccular aneurysm with a 0.4 cm neck arising from the supraclinoid right ICA.  Vertebral arteries are normal in caliber. The vertebrobasilar system appears within normal limits.   The ACAs, MCAs and PCAs demonstrate no evidence of  high-grade stenosis, focal occlusion or intracranial aneurysm.                CT Head Without Contrast (Final result)  Result time 10/04/17 22:05:38    Impression:    No evidence of acute major vascular  distribution infarct or intracranial hemorrhage.   Stable, remote infarct in the left occipital lobe.  Chronic microvascular ischemic disease.             Narrative:    CT HEAD WITHOUT CONTRAST  CLINICAL INDICATION: 62 year old M with possible stroke.  TECHNIQUE: Axial CT images obtained throughout the region of the head without the use of intravenous contrast. Axial, sagittal and coronal reconstructions were performed.  COMPARISON: MRI brain 11/6/2016, CT stroke multiphase 11/5/2016, CT head 11/5/2016.    FINDINGS:  The ventricles are stable. The brain appears unchanged.  The basal cisterns are patent.  There is a stable region of encephalomalacia within the left occipital lobe, compatible with remote left PCA infarct. No evidence of acute major vascular distribution infarct or intracranial hemorrhage. Patchy hypoattenuation within the supratentorial white matter is compatible with chronic microvascular ischemic change.   No new extra-axial blood or fluid collections.  The cranium appears intact.  There is nonspecific calcifications in the subcutaneous tissues of the head.   Mastoid air cells and paranasal sinuses are essentially clear.  The orbits and intraorbital contents are unremarkable.                 Assessment and Plan:     * Simple partial seizure, consciousness not impaired    -Hx, MRI with subtle extensive diffusion restriction concerning for recent seizure  -EEG in progress, will follow up results  -Increase levetiracetam to 1500 mg po bid   -Follow up with Dr. Santiago in clinic on discharge     VTE Risk Mitigation         Ordered     apixaban tablet 5 mg  2 times daily     Route:  Oral        10/05/17 0338     High Risk of VTE  Once      10/05/17 0247     Place sequential compression device  Until discontinued      10/05/17 0247     Place DARIAN hose  Until discontinued      10/05/17 0247        Thank you for your consult. I will follow-up with patient. Please contact us if you have any additional  questions.    Shanti Costello MD  Neurology  [facility]

## 2017-10-05 NOTE — ASSESSMENT & PLAN NOTE
BP elevated to 220s in ED resolved on its on   No acute stroke or MI; EKG without ST changes with sinus bradycardia, 1st degree AV block AR interval 212ms  Re-started home norvasc, coreg, and losartan

## 2017-10-05 NOTE — ASSESSMENT & PLAN NOTE
TIA vs seizures vs Encephalopathy 2/2 to metabolic disturbance   Waxing waning, perhaps worsening of previous baseline   Spastic on R. On eval with team and abdomen tense  Electrolytes wnl  No evidence of UTI  TSH elevated to 7 (hx of sgy and rad iodine), T4 wnl  UDS negative; ordered ethanol level  KUB ordered  2mg IV ativan  24hr EEG  Neurology consult  NPO until swallow evaluation by speech

## 2017-10-05 NOTE — ED TRIAGE NOTES
"Alfa Christy III, a 62 y.o. male presents to the ED with c/o dysphagia. Pts wife reports that pt was last seen normal at 7pm, and returned home at 8pm to find pt confused, and "trying to find his words". Pt has delayed responses, and reported dizziness PTA. Pts wife reports that pt stated, "something is going on in my head". pt took 2 tylenols PTA. Pts wife endorses that pt had flu shot today as well.      Chief Complaint   Patient presents with    Dysphagia     Pts family member reports dysphagia. Last seen normal at 1800. Pt also has delayed respones.     Review of patient's allergies indicates:   Allergen Reactions    Cough syrup [guaifenesin] Other (See Comments)     Past Medical History:   Diagnosis Date    Allergy     BMI 31.0-31.9,adult 11/25/2014    Cerebrovascular disease 7/25/2016    Chronic anticoagulation - pradaxa 11/10/2016    Chronic diastolic heart failure 11/20/2016    CKD (chronic kidney disease), stage III 9/23/2013    Controlled type 2 diabetes with neuropathy 12/16/2014    Diabetes mellitus due to underlying condition with stage 3 chronic kidney disease, without long-term current use of insulin 11/2/2016    Elevated alkaline phosphatase level 8/1/2012    Elevated PSA     negative prostate biopsy 4/11    Erectile dysfunction associated with type 2 diabetes mellitus     Gallstones 3/20/2013    Generalized tonic-clonic seizure 11/2016    HTN (hypertension), benign 8/1/2012    Hypercholesterolemia 8/1/2012    Microcytic anemia 11/27/2013    Paroxysmal atrial fibrillation 9/23/2013    Postsurgical hypothyroidism 11/27/2013    Right homonymous hemianopsia 2/5/2016    Sickle cell trait     Stroke 1/27/2016    Thyroid cancer     multifocal with six lesions, largest 5mm, treated with surgery and radioactive iodine    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/2016     LOC: Patient name and date of birth verified.  The patient is awake, alert, Pt unable to speak in complete " sentences.    APPEARANCE: Patient resting comfortably and in no acute distress, patient is clean and well groomed, patient's clothing is properly fastened.  SKIN: The skin is warm and dry, color consistent with ethnicity, patient has normal skin turgor and moist mucus membranes, skin intact, no breakdown or brusing noted.  MUSCULOSKELETAL: Patient moving all extremities well, no obvious swelling or deformities noted.  RESPIRATORY: Airway is open and patent, respirations are spontaneous, patient has a normal effort and rate, no accessory muscle use noted.  CARDIAC: Patient has a normal rate and rhythm, +2 Peripheral edema noted to lower bilateral extremities.  ABDOMEN: Soft and non tender to palpation, no distention noted. Bowel sounds present in all four quadrants.  NEUROLOGIC: Eyes open spontaneously, behavior appropriate to situation, follows commands, facial expression symmetrical, bilateral hand grasp equal and even, purposeful motor response noted, normal sensation in all extremities when touched with a finger.

## 2017-10-05 NOTE — HPI
62yoM with PMHx of CVA (with right hemianopsia1/2016), previous seizure (11/2016),DM2, CKD Stage 3, HTN, HLD, pAfib, thyroid cancer (s/p surgery and rad iodine 2009) presents with receptive and expressive aphasia. The patient was LKN at 7:00 pm.  His wife states she left home and returned at 8:00 to find the patient agitated with word finding difficulty not capable of naming objects and progressive difficulty making complete words. He had no weakness and was ambulatory at home.  The patient's wife states earlier tonight the patient c/o feeling dizziness holding on to walls for balance. He took 2 tylenol and reported relief of his symptoms at that time.  The patient had a flu shot today and has been having URI symptoms recently within the past 2 weeks.  He hasn't taken his losartan since the URI symptoms began, per his wife. Patient wife states this happened before for 2 hours after seizure like activity last November. She claims patient has no difficulty finding words at home since. However, per chart review neurology patient has had some word finding difficulty since CVA last January. Per wife has not had f/c, sob, chest pain, abdominal pain, diarrhea, or new medicine, sick contacts. Patient was stressed today at noon with the doubling of the water bill at home per wife.     Patient has had intermittent gross hematuria since July and is followed by urology. Hematuria attributed to BPH in setting of chronic anticoagulation. Patient had recent cystoscopy and bilateral RPG, possible ureteroscopy and biopsy to evaluate hematuria and abnormal cytology, no recent note has been left per urology.    Upon arrival to Ed, pt blood pressure systolics up to 220s, CTH was done which was consistent with temporal evolution of remote left PCA territory infarction; No evidence for acute intracranial hemorrhages. Stroke code was called. During this time patient had waxing and waning of symptoms. Neurology recs MRI/MRA which showed  questionable subtle cortical diffusion restriction throughout the supratentorial brain.  Short-term followup is suggested.Remote left PCA distribution infarct within the left occipital lobe.  Stable 4 mm right supraclinoid ICA aneurysm.Given negative imaging and the waxing and waning nature of the patient's symptoms Neurology thought it to be better evaluated with a medicine admission.

## 2017-10-05 NOTE — PLAN OF CARE
Problem: Physical Therapy Goal  Goal: Physical Therapy Goal  Goals to be met by: 10/12/17     Patient will increase functional independence with mobility by performin. Supine <> sit with Modified [place]  2. Sit to stand transfer with Supervision  3. Gait  x 100 feet with Contact Guard Assistance with or without appropriate AD.   4. Ascend/descend 5 stairs with right Handrails Contact Guard Assistance .    5. Lower extremity exercise program x20 reps per handout, with assistance as needed    Outcome: Ongoing (interventions implemented as appropriate)  Pt chart reviewed and PT evaluation completed- see note for details. POC initiated.     Silvia Mejia, PT, DPT   10/5/2017  Pager: [phone]

## 2017-10-05 NOTE — H&P
[facility] Hospital Medicine  History & Physical    Patient Name: Alfa Christy III  MRN: [id number]  Admission Date: 10/4/2017  Attending Physician: Terry Ritchie MD   Primary Care Provider: Tyler Jasmine MD    [facility] Medicine Team: [facility] 2 Nadira Rae MD     Patient information was obtained from spouse/SO, past medical records and ER records.     Subjective:     Principal Problem:Aphasia    Chief Complaint:   Chief Complaint   Patient presents with    Dysphagia     Pts family member reports dysphagia. Last seen normal at 1800. Pt also has delayed respones.        HPI: 62yoM with PMHx of CVA (with right hemianopsia1/2016), previous seizure (11/2016),DM2, CKD Stage 3, HTN, HLD, pAfib, thyroid cancer (s/p surgery and rad iodine 2009) presents with receptive and expressive aphasia. The patient was LKN at 7:00 pm.  His wife states she left home and returned at 8:00 to find the patient agitated with word finding difficulty not capable of naming objects and progressive difficulty making complete words. He had no weakness and was ambulatory at home.  The patient's wife states earlier tonight the patient c/o feeling dizziness holding on to walls for balance. He took 2 tylenol and reported relief of his symptoms at that time.  The patient had a flu shot today and has been having URI symptoms recently within the past 2 weeks.  He hasn't taken his losartan since the URI symptoms began, per his wife. Patient wife states this happened before for 2 hours after seizure like activity last November. She claims patient has no difficulty finding words at home since. However, per chart review neurology patient has had some word finding difficulty since CVA last January. Per wife has not had f/c, sob, chest pain, abdominal pain, diarrhea, or new medicine, sick contacts. Patient was stressed today at noon with the doubling of the water bill at home per wife.     Patient has had intermittent  gross hematuria since July and is followed by urology. Hematuria attributed to BPH in setting of chronic anticoagulation. Patient had recent cystoscopy and bilateral RPG, possible ureteroscopy and biopsy to evaluate hematuria and abnormal cytology, no recent note has been left per urology.    Upon arrival to Ed, pt blood pressure systolics up to 220s, CTH was done which was consistent with temporal evolution of remote left PCA territory infarction; No evidence for acute intracranial hemorrhages. Stroke code was called. During this time patient had waxing and waning of symptoms. Neurology recs MRI/MRA which showed questionable subtle cortical diffusion restriction throughout the supratentorial brain.  Short-term followup is suggested.Remote left PCA distribution infarct within the left occipital lobe.  Stable 4 mm right supraclinoid ICA aneurysm.Given negative imaging and the waxing and waning nature of the patient's symptoms Neurology thought it to be better evaluated with a medicine admission.        Past Medical History:   Diagnosis Date    Allergy     BMI 31.0-31.9,adult 11/25/2014    Cerebrovascular disease 7/25/2016    Chronic anticoagulation - pradaxa 11/10/2016    Chronic diastolic heart failure 11/20/2016    CKD (chronic kidney disease), stage III 9/23/2013    Controlled type 2 diabetes with neuropathy 12/16/2014    Diabetes mellitus due to underlying condition with stage 3 chronic kidney disease, without long-term current use of insulin 11/2/2016    Elevated alkaline phosphatase level 8/1/2012    Elevated PSA     negative prostate biopsy 4/11    Erectile dysfunction associated with type 2 diabetes mellitus     Gallstones 3/20/2013    Generalized tonic-clonic seizure 11/2016    HTN (hypertension), benign 8/1/2012    Hypercholesterolemia 8/1/2012    Microcytic anemia 11/27/2013    Paroxysmal atrial fibrillation 9/23/2013    Postsurgical hypothyroidism 11/27/2013    Right homonymous  "hemianopsia 2/5/2016    Sickle cell trait     Stroke 1/27/2016    Thyroid cancer     multifocal with six lesions, largest 5mm, treated with surgery and radioactive iodine    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/2016       Past Surgical History:   Procedure Laterality Date    BREAST SURGERY      cyst removal    COLONOSCOPY      CYST REMOVAL      chest    PROSTATE BIOPSY  4/27/11    SKIN BIOPSY      THYROID SURGERY  8/26/09    VASCULAR SURGERY         Review of patient's allergies indicates:   Allergen Reactions    Cough syrup [guaifenesin] Other (See Comments)       Current Facility-Administered Medications on File Prior to Encounter   Medication    lidocaine HCl 2% urojet 10 mL     Current Outpatient Prescriptions on File Prior to Encounter   Medication Sig    acetaminophen (TYLENOL) 500 MG tablet Take 1 tablet (500 mg total) by mouth every 6 (six) hours as needed for Pain.    amlodipine (NORVASC) 5 MG tablet Take 1 tablet (5 mg total) by mouth once daily.    apixaban 5 mg Tab Take 1 tablet (5 mg total) by mouth 2 (two) times daily.    atorvastatin (LIPITOR) 40 MG tablet Take 1 tablet (40 mg total) by mouth once daily.    BD INSULIN PEN NEEDLE UF MINI 31 gauge x 3/16" Ndle To use with insulin pens 4  times daily    carvedilol (COREG) 12.5 MG tablet Take 1 tablet (12.5 mg total) by mouth 2 (two) times daily.    coenzyme Q10 200 mg capsule Take 200 mg by mouth once daily. (Patient taking differently: Take 200 mg by mouth once daily. PT TAKEN EVERY MON, WED, FRI)    ergocalciferol (VITAMIN D2) 50,000 unit Cap Take 50,000 Units by mouth every 7 days.    folic acid (FOLVITE) 1 MG tablet Take 1 tablet (1 mg total) by mouth once daily.    furosemide (LASIX) 40 MG tablet Take 2 tablets (80 mg total) by mouth 2 (two) times daily.    insulin detemir (LEVEMIR FLEXPEN) 100 unit/mL (3 mL) SubQ InPn pen Inject 16 Units into the skin every evening.    levetiracetam (KEPPRA) 750 MG Tab Take 1 tablet (750 " mg total) by mouth 2 (two) times daily.    levothyroxine (SYNTHROID) 200 MCG tablet Take 1 tablet (200 mcg total) by mouth before breakfast.    losartan (COZAAR) 100 MG tablet Take 1 tablet (100 mg total) by mouth once daily.    multivitamin capsule Take 1 capsule by mouth once daily. Pt taken every mon, wed, fri    dulaglutide (TRULICITY) 0.75 mg/0.5 mL PnIj Inject 0.5 mLs (0.75 mg total) into the skin every 7 days.    glucagon (human recombinant) inj 1mg/mL kit Inject 1 mL (1 mg total) into the muscle as needed.    insulin lispro (HUMALOG KWIKPEN) 100 unit/mL InPn pen Inject 12 units w/ meals, 6 units if eating lighter meal or bg less than 120, scale 180-230 +2, 231-280 +4, 281-330 +6, 331-380 +8.    magnesium 30 mg Tab Take by mouth.    ONETOUCH VERIO Strp USE TO TEST BLOOD SUGAR THREE TIMES DAILY    oxycodone-acetaminophen (PERCOCET) 5-325 mg per tablet Take 1 tablet by mouth every 4 (four) hours as needed for Pain.    saw palmetto 80 MG capsule Take 80 mg by mouth once daily.     Family History     Problem Relation (Age of Onset)    Cancer Sister    Diabetes Father, Mother, Brother    Heart disease Father, Mother    Hypertension Father, Mother    Thyroid disease Maternal Aunt        Social History Main Topics    Smoking status: Never Smoker    Smokeless tobacco: Never Used    Alcohol use Yes      Comment: occasionally    Drug use: No    Sexual activity: Yes     Partners: Female      Comment:  with 3 kids     Review of Systems   Unable to perform ROS: Mental status change   Constitutional: Negative for chills and fever.   Eyes: Positive for visual disturbance (chronic r. hemianopsia from CVA).   Respiratory: Negative for cough and shortness of breath.    Cardiovascular: Positive for leg swelling (chronic). Negative for chest pain.   Gastrointestinal: Negative for abdominal distention, abdominal pain, diarrhea, nausea and vomiting.   Genitourinary: Positive for hematuria. Negative for  dysuria.   Neurological: Positive for dizziness and speech difficulty. Negative for tremors, weakness, numbness and headaches.   Psychiatric/Behavioral: Positive for confusion.     Objective:     Vital Signs (Most Recent):  Temp: 97.7 °F (36.5 °C) (10/04/17 2055)  Pulse: (!) 52 (10/05/17 0231)  Resp: 19 (10/04/17 2055)  BP: (!) 177/90 (10/05/17 0231)  SpO2: 100 % (10/05/17 0231) Vital Signs (24h Range):  Temp:  [97.7 °F (36.5 °C)] 97.7 °F (36.5 °C)  Pulse:  [51-71] 52  Resp:  [19] 19  SpO2:  [98 %-100 %] 100 %  BP: (159-226)/() 177/90     Weight: 104.3 kg (230 lb)  Body mass index is 33 kg/m².    Physical Exam   HENT:   Head: Normocephalic and atraumatic.   Eyes: Conjunctivae and EOM are normal. Pupils are equal, round, and reactive to light. No scleral icterus.   Neck: No JVD present.   Cardiovascular: Normal rate, regular rhythm, normal heart sounds and intact distal pulses.    Pulmonary/Chest: Effort normal and breath sounds normal. No respiratory distress. He has no wheezes. He has no rales. He exhibits no tenderness.   Abdominal: He exhibits distension. There is guarding.   Distended with midline hernia. Diffuse involuntary guarding most pronounced midline, epigastric area. Patient denies tenderness but appears in pain   Musculoskeletal: He exhibits edema.   2+ pitting edema up to knee   Neurological: He is alert. No sensory deficit.   Patient has difficulty finding words with inappropriate response and intermittent difficulty following simple commands. Some agitation during interview. Strength appears 5/5 throughout   Skin: Skin is warm and dry. He is not diaphoretic.        Significant Labs:   CBC:   Recent Labs  Lab 10/04/17  2118   WBC 12.00   HGB 11.2*   HCT 31.5*        CMP:   Recent Labs  Lab 10/04/17  2227      K 3.8      CO2 25   GLU 84   BUN 21   CREATININE 2.1*   CALCIUM 8.9   PROT 7.2   ALBUMIN 3.3*   BILITOT 1.1*   ALKPHOS 123   AST 33   ALT 19   ANIONGAP 8   EGFRNONAA  32.3*     Lactic Acid: No results for input(s): LACTATE in the last 48 hours.  Lipid Panel:   Recent Labs  Lab 10/04/17  2227   CHOL 118*   HDL 37*   LDLCALC 52.6*   TRIG 142   CHOLHDL 31.4     TSH:   Recent Labs  Lab 10/04/17  2227   TSH 7.907*       Significant Imaging: I have reviewed all pertinent imaging results/findings within the past 24 hours.    Assessment/Plan:     * Aphasia    TIA vs seizures vs Encephalopathy 2/2 to metabolic disturbance   Waxing waning, perhaps worsening of previous baseline   Spastic on R. On eval with team and abdomen tense  Electrolytes wnl  No evidence of UTI  TSH elevated to 7 (hx of sgy and rad iodine), T4 wnl  UDS negative; ordered ethanol level  KUB ordered  2mg IV ativan  24hr EEG  Neuro checks q2 hr  Neurology consult  NPO until swallow evaluation by speech  Seizure and Aspiration precautions          Abdominal pain    Patient with generalized involuntary guarding, hernia midline, and distension  Afebrile, no leukocytosis WBC 12 less likely inflammatory  KUB ordered r/o ileus  Consider CT abdomen as patient has vasculopathy risk factors and hernia to r/o ischemia  Monitoring VS          DM type 2 without retinopathy    Glucose 82 on admission  NPO until speech eval, then diabetic diet  POCT q6hr while NPO then AC/QHS  LDSSI for now            Essential hypertension    BP elevated to 220s in ED resolved on its on   No acute stroke or MI; EKG without ST changes with sinus bradycardia, 1st degree AV block FL interval 212ms  Re-started home norvasc, coreg, and losartan          Chronic diastolic heart failure    No JVD, and LE edema at baseline per wife  Will continue home coreg 12.5 BID, losartan 100 QD, and lasix 80 BID          Cerebrovascular accident (CVA)    Less likely; patient with expressive and receptive aphasia waxing and waning  Patient with charted evidence of word finding difficulty since CVA in 1/2016  CTH/MRI/MRA without acute findings,showed questionable  subtle cortical diffusion restriction throughout the supratentorial brain. Short-term followup is suggested.Remote left PCA distribution infarct within the left occipital lobe.Stable 4 mm right supraclinoid ICA aneurysm.   Evaluated by Vascular Neurology, deferred to medicine  Continue statin and Eliquis per neurology for stroke ppx          Hypercholesterolemia    -Resume home artovastatin 40mg            VTE Risk Mitigation         Ordered     apixaban tablet 5 mg  2 times daily     Route:  Oral        10/05/17 0338     High Risk of VTE  Once      10/05/17 0247     Place sequential compression device  Until discontinued      10/05/17 0247     Place DARIAN hose  Until discontinued      10/05/17 0247             Nadira Rae MD  Department of Hospital Medicine   [facility]

## 2017-10-05 NOTE — SUBJECTIVE & OBJECTIVE
Past Medical History:   Diagnosis Date    Allergy     BMI 31.0-31.9,adult 11/25/2014    Cerebrovascular disease 7/25/2016    Chronic anticoagulation - pradaxa 11/10/2016    Chronic diastolic heart failure 11/20/2016    CKD (chronic kidney disease), stage III 9/23/2013    Controlled type 2 diabetes with neuropathy 12/16/2014    Diabetes mellitus due to underlying condition with stage 3 chronic kidney disease, without long-term current use of insulin 11/2/2016    Elevated alkaline phosphatase level 8/1/2012    Elevated PSA     negative prostate biopsy 4/11    Erectile dysfunction associated with type 2 diabetes mellitus     Gallstones 3/20/2013    Generalized tonic-clonic seizure 11/2016    HTN (hypertension), benign 8/1/2012    Hypercholesterolemia 8/1/2012    Microcytic anemia 11/27/2013    Paroxysmal atrial fibrillation 9/23/2013    Postsurgical hypothyroidism 11/27/2013    Right homonymous hemianopsia 2/5/2016    Sickle cell trait     Stroke 1/27/2016    Thyroid cancer     multifocal with six lesions, largest 5mm, treated with surgery and radioactive iodine    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/2016     Past Surgical History:   Procedure Laterality Date    BREAST SURGERY      cyst removal    COLONOSCOPY      CYST REMOVAL      chest    PROSTATE BIOPSY  4/27/11    SKIN BIOPSY      THYROID SURGERY  8/26/09    VASCULAR SURGERY       Review of patient's allergies indicates:   Allergen Reactions    Cough syrup [guaifenesin] Other (See Comments)     Current Neurological Medications: Levetiracetam 750 mg po bid, apixaban 5 mg po qd    No current facility-administered medications on file prior to encounter.      Current Outpatient Prescriptions on File Prior to Encounter   Medication Sig    acetaminophen (TYLENOL) 500 MG tablet Take 1 tablet (500 mg total) by mouth every 6 (six) hours as needed for Pain.    amlodipine (NORVASC) 5 MG tablet Take 1 tablet (5 mg total) by mouth once  "daily.    apixaban 5 mg Tab Take 1 tablet (5 mg total) by mouth 2 (two) times daily.    atorvastatin (LIPITOR) 40 MG tablet Take 1 tablet (40 mg total) by mouth once daily.    BD INSULIN PEN NEEDLE UF MINI 31 gauge x 3/16" Ndle To use with insulin pens 4  times daily    carvedilol (COREG) 12.5 MG tablet Take 1 tablet (12.5 mg total) by mouth 2 (two) times daily.    coenzyme Q10 200 mg capsule Take 200 mg by mouth once daily. (Patient taking differently: Take 200 mg by mouth once daily. PT TAKEN EVERY MON, WED, FRI)    furosemide (LASIX) 40 MG tablet Take 2 tablets (80 mg total) by mouth 2 (two) times daily. (Patient taking differently: Take 80 mg every morning and 40 mg every evening)    insulin detemir (LEVEMIR FLEXPEN) 100 unit/mL (3 mL) SubQ InPn pen Inject 16 Units into the skin every evening.    insulin lispro (HUMALOG KWIKPEN) 100 unit/mL InPn pen Inject 12 units w/ meals, 6 units if eating lighter meal or bg less than 120, scale 180-230 +2, 231-280 +4, 281-330 +6, 331-380 +8.    levetiracetam (KEPPRA) 750 MG Tab Take 1 tablet (750 mg total) by mouth 2 (two) times daily.    levothyroxine (SYNTHROID) 200 MCG tablet Take 1 tablet (200 mcg total) by mouth before breakfast.    losartan (COZAAR) 100 MG tablet Take 1 tablet (100 mg total) by mouth once daily.    magnesium 30 mg Tab Take 1 tablet by mouth every Mon, Wed, Fri.     multivitamin capsule Take 1 capsule by mouth every Mon, Wed, Fri.     ONETOUCH VERIO Strp USE TO TEST BLOOD SUGAR THREE TIMES DAILY    [DISCONTINUED] ergocalciferol (VITAMIN D2) 50,000 unit Cap Take 50,000 Units by mouth every 7 days.    [DISCONTINUED] folic acid (FOLVITE) 1 MG tablet Take 1 tablet (1 mg total) by mouth once daily.    dulaglutide (TRULICITY) 0.75 mg/0.5 mL PnIj Inject 0.5 mLs (0.75 mg total) into the skin every 7 days.    glucagon (human recombinant) inj 1mg/mL kit Inject 1 mL (1 mg total) into the muscle as needed.    [DISCONTINUED] " oxycodone-acetaminophen (PERCOCET) 5-325 mg per tablet Take 1 tablet by mouth every 4 (four) hours as needed for Pain.    [DISCONTINUED] saw palmetto 80 MG capsule Take 80 mg by mouth once daily.     Family History     Problem Relation (Age of Onset)    Cancer Sister    Diabetes Father, Mother, Brother    Heart disease Father, Mother    Hypertension Father, Mother    Thyroid disease Maternal Aunt        Social History Main Topics    Smoking status: Never Smoker    Smokeless tobacco: Never Used    Alcohol use Yes      Comment: occasionally    Drug use: No    Sexual activity: Yes     Partners: Female      Comment:  with 3 kids     Review of Systems   Unable to perform ROS: Mental status change     Objective:     Vital Signs (Most Recent):  Temp: 97.7 °F (36.5 °C) (10/05/17 1241)  Pulse: 78 (10/05/17 1420)  Resp: 16 (10/05/17 1241)  BP: (!) 199/76 (10/05/17 1304)  SpO2: 97 % (10/05/17 1241) Vital Signs (24h Range):  Temp:  [97.7 °F (36.5 °C)-98.5 °F (36.9 °C)] 97.7 °F (36.5 °C)  Pulse:  [50-78] 78  Resp:  [16-20] 16  SpO2:  [96 %-100 %] 97 %  BP: (155-226)/() 199/76     Weight: 104.3 kg (230 lb)  Body mass index is 33 kg/m².    Physical Exam  General:  Well-developed, well-nourished, nad  HEENT:  NCAT, PERRLA, EOMI.  Oropharyngeal membranes non-erythematous/without exudate  Neck:  Supple, no palpable lad, no nuchal rigidity  Resp:  Symmetric expansion, no increased wob, CTAB  CVS:  RRR, no m/r/g.  No LE edema.  Extremities warm, well-perfused.  GI:  Abd soft, non-distended, non-tender to palpation, +BS     Neurologic Exam:  Mental Status:  Awake, alert, oriented to self, location, but not to date.  Perseverates on day of the week when asked about month/year.  Recent, remote recall poor but difficult to evaluate in setting of word-finding difficulty.  Some dysnomia.  Cranial Nerves:  VF deficit in R hemisphere, has to adjust head to see people standing on his RAMARILIS MAR.  Facial movement,  sensation intact and symmetric.  Palate raises symmetrically, tongue protrudes midline.  SCM 5/5 bilaterally.  Trapezius 4/5 R, 5/5 L.  Motor:  Normal bulk and tone.  R shoulder abduction 3/5, biceps/triceps 4/5.  R  strength 5/5, L shoulder abduction/biceps/triceps/ strength 5/5.  BLE hip flexion, knee flexion/extension, dorsiflexion/plantarflexion 5/5.  Sensory:  Intact to light touch, temperature at all extremities without inattention.  Reflexes:  Biceps, brachioradialis 2+ bilaterally.  Patellar 1+ L, areflexic R.  No ankle clonus.  Downgoing toe bilaterally.  Coordination:  Difficulty with commands for FNF, MARIO, HTS--possible component of apraxia.  No resting tremor, no myoclonus.  Some additional difficulty on FNF possibly due to hemianopia.  Gait:  Deferred 2/2 seizure and fall precautions    Significant Labs:     Recent Labs  Lab 10/04/17  2118   WBC 12.00   RBC 3.93*   HGB 11.2*   HCT 31.5*      MCV 80*   MCH 28.5   MCHC 35.6       Recent Labs  Lab 10/05/17  1001   CALCIUM 8.8   PROT 7.1      K 4.7   CO2 22*      BUN 17   CREATININE 1.8*   ALKPHOS 120   ALT 21   AST 34   BILITOT 1.2*     Significant Imaging:   Imaging Results     X-Ray Abdomen AP 1 View (Final result)  Result time 10/05/17 04:05:22    Impression:    No evidence of small bowel obstruction or ileus.             Narrative:    FINDINGS:  Monitoring leads project over the abdomen wall. No dilated loops of small bowel. Mild to moderate fecal loading in the right hemicolon. No supine evidence of free intraperitoneal air. Partially visualized lung bases are unremarkable. Degenerative changes of the lumbar spine noted.                MRA Brain without contrast (Final result)     Abnormal  Result time 10/05/17 01:19:42       MRA Neck without contrast (Final result)     Abnormal  Result time 10/05/17 01:19:19       MRI Brain Without Contrast (Edited Result - FINAL)  Result time 10/05/17 01:19:02    Impression:    No evidence  of acute or major vascular distribution infarct or intracranial hemorrhage.  Questionable subtle cortical diffusion restriction throughout the supratentorial brain.  Short-term followup is suggested.  Remote left PCA distribution infarct within the left occipital lobe.  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             Narrative:    MRI BRAIN, MRA HEAD, MRA NECK  CLINICAL INDICATION: 62 year old M with stroke, severe expressive aphasia, LLE weakness.   TECHNIQUE: Multiplanar, multisequence images were obtained of the brain. Three-dimensional time-of-flight technique was used in assessment of the neck and intracranial vasculature.  COMPARISON: CT head 10/4/2017.    FINDINGS:  MRI BRAIN:  The ventricles are normal in size for age, without evidence of hydrocephalus.  There is suggestion of subtle diffuse cortical diffusion restriction in the supratentorial brain.    There is a stable region of encephalomalacia within the left occipital lobe. There is relatively isointense diffusion signal in this region with corresponding hyperintensity on ADC map and T2/FLAIR hyperintensity, compatible with remote infarct. No areas of restricted diffusion signal are seen to suggest new/acute infarct. No areas of susceptibility to suggest hemorrhage. A punctate focus of T1 hyperintensity in the left aspect of the anterior interhemispheric fissure likely represents a dural calcification. Supratentorial patchy T2/FLAIR hyperintensities compatible with chronic microvascular ischemic disease.    No extra-axial blood or fluid collections.  The T2 skull base flow voids are preserved. Bone marrow signal intensity is unremarkable.    MRA HEAD AND NECK:  Common and internal carotid arteries are normal in caliber.  No significant stenosis at either carotid bifurcation. There is a small saccular aneurysm with a 0.4 cm neck arising from the supraclinoid right ICA.  Vertebral arteries are normal in caliber. The vertebrobasilar system appears  within normal limits.   The ACAs, MCAs and PCAs demonstrate no evidence of  high-grade stenosis, focal occlusion or intracranial aneurysm.                CT Head Without Contrast (Final result)  Result time 10/04/17 22:05:38    Impression:    No evidence of acute major vascular distribution infarct or intracranial hemorrhage.   Stable, remote infarct in the left occipital lobe.  Chronic microvascular ischemic disease.             Narrative:    CT HEAD WITHOUT CONTRAST  CLINICAL INDICATION: 62 year old M with possible stroke.  TECHNIQUE: Axial CT images obtained throughout the region of the head without the use of intravenous contrast. Axial, sagittal and coronal reconstructions were performed.  COMPARISON: MRI brain 11/6/2016, CT stroke multiphase 11/5/2016, CT head 11/5/2016.    FINDINGS:  The ventricles are stable. The brain appears unchanged.  The basal cisterns are patent.  There is a stable region of encephalomalacia within the left occipital lobe, compatible with remote left PCA infarct. No evidence of acute major vascular distribution infarct or intracranial hemorrhage. Patchy hypoattenuation within the supratentorial white matter is compatible with chronic microvascular ischemic change.   No new extra-axial blood or fluid collections.  The cranium appears intact.  There is nonspecific calcifications in the subcutaneous tissues of the head.   Mastoid air cells and paranasal sinuses are essentially clear.  The orbits and intraorbital contents are unremarkable.

## 2017-10-05 NOTE — PLAN OF CARE
Problem: SLP Goal  Goal: SLP Goal  Speech Language Pathology  Goal expected to be met by 10/12:  1. Pt will tolerate regular consistency diet and thin liquids with no overt s/s aspiration.   Outcome: Ongoing (interventions implemented as appropriate)  Clinical evaluation of swallow complete. Recommend regular consistency diet and thin liquids with strict aspiration problems. Pt appears as if he would benefit from speech/language/cognitive evaluation, second sign orders placed.    MARCELINO Anderson, CCC-SLP  [phone]  10/5/2017

## 2017-10-05 NOTE — HPI
63 yo M with PMHx stroke with residual R hemianopia 01/2016 as well as seizure 11/2016, DM II, CKD III, HTN, HLD, paroxysmal A Fib on apixaban [place] [facility] due to wife coming home and finding  confused with agitation, word-finding difficulty, and imbalance.  Noted no weakness.  States that he was last seen normal at 7 pm, she went out to run an errand and returned at 8 pm.  Otherwise notes no signs of head trauma, no recent weakness, no medication changes.  He takes levetiracetam 750 mg po bid (started on 1500 mg po bid right after 11/2016 seizure) and is compliant.  Of note, she does mention that patient received a flu shot a few weeks ago and had some congestion/runny nose/cough for past 2 weeks.  No fever/chills.  No recent sleep deprivation, excessive caffeine use, illicit use, EtOH withdrawal, OTC anti-cholinergics.  Imaging negative for acute infarction, but MRI with subtle cortical diffusion restriction throughout supratentorial brain possibly consistent with post-ictal, or seizure-related, restriction diffusion.

## 2017-10-05 NOTE — CONSULTS
[facility]  Vascular Neurology  Comprehensive Stroke Center  Consult Note    Inpatient consult to Vascular (Stroke) Neurology  Consult performed by: KATHI CHILDS  Consult ordered by: WADE LUNA  Reason for consult: aphasia        Assessment/Plan:     Aphasia    62 y.o. male with significant past medical history of DM II, HTN, recent hx of URI symptoms and recently switched from Pradaxa to Eliquis presented to hospital complaining of acute onset aphasia.  The patient's aphasia seems to fluctuate.  Imaging negative for acute stroke.    Antiplatelets: On Eliquis  Statins: Continue Atorvastatin            Essential hypertension    -Stroke risk factor.  Maximize medical management.        Hypercholesterolemia    -Stroke risk factor.  Continue Atorvastatin.        DM type 2 without retinopathy    -Stroke risk factor.  Maximize medical management.        Chronic diastolic heart failure    -Stroke risk factor.  Last Echo 2016.            Thrombolysis Candidate? No  1. Contraindications: Current use of direct thrombin inhibitors or direct factor XA inhibitors with elevated sensitive laboratory tests  2. Warnings: Prior anticoagulant use prior to admission (even if INR < 1.7) (3-4.5 hours contraindication)     Interventional Revascularization Candidate?  No; No large vessel occlusion    Research Candidate? No    Discussed with Dr. Recinos  Subjective:     History of Present Illness:  Patient is a 62 y.o. male with significant past medical history of DM II, HTN,  presented to hospital complaining of acute onset aphasia.  The patient was LKN at ~1852.  His wife states she left home and returned at 2000 to find the patient agitated with word finding difficulty.  He had no weakness and was ambulatory at home.  The patient's wife states earlier tonight the patient c/o feeling dizzy with head discomfort that radiated from right to left.  He took 2 tylenol and reported relief of his symptoms at that  time.  The patient had a flu shot today and has been having URI symptoms for a few days.  He hasn't taken his losartan since the URI symptoms began, per his wife.  On arrival to the ED, at ~2100, the patient remained w/expressive and receptive aphasia.  He was ambulatory w/assistance.  His blood pressure was  220s/90s.  A CTH was obtained and was negative for acute findings.  On exam the patient is noted to have aphasia, right hemianopsia (baseline from previous stroke), and LLE weakness.  Due to his renal function the patient went for stat MRI/MRA brain.  Images were reviewed as acquired by Elizabeth Recinos, Rosie, and Abhishek who determined the study to be negative for acute findings.  Of note, the patient was able to ambulate without assistance from the ED bed to the MRI bed and was answering questions in MRI.  His aphasia quickly waxes and wanes.  Given negative imaging and the waxing and waning nature of this patient's symptoms he may be better evaluated with a medicine admission.           Past Medical History:   Diagnosis Date    Allergy     BMI 31.0-31.9,adult 11/25/2014    Cerebrovascular disease 7/25/2016    Chronic anticoagulation - pradaxa 11/10/2016    Chronic diastolic heart failure 11/20/2016    CKD (chronic kidney disease), stage III 9/23/2013    Controlled type 2 diabetes with neuropathy 12/16/2014    Diabetes mellitus due to underlying condition with stage 3 chronic kidney disease, without long-term current use of insulin 11/2/2016    Elevated alkaline phosphatase level 8/1/2012    Elevated PSA     negative prostate biopsy 4/11    Erectile dysfunction associated with type 2 diabetes mellitus     Gallstones 3/20/2013    Generalized tonic-clonic seizure 11/2016    HTN (hypertension), benign 8/1/2012    Hypercholesterolemia 8/1/2012    Microcytic anemia 11/27/2013    Paroxysmal atrial fibrillation 9/23/2013    Postsurgical hypothyroidism 11/27/2013    Right homonymous hemianopsia  "2/5/2016    Sickle cell trait     Stroke 1/27/2016    Thyroid cancer     multifocal with six lesions, largest 5mm, treated with surgery and radioactive iodine    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/2016     Past Surgical History:   Procedure Laterality Date    BREAST SURGERY      cyst removal    COLONOSCOPY      CYST REMOVAL      chest    PROSTATE BIOPSY  4/27/11    SKIN BIOPSY      THYROID SURGERY  8/26/09    VASCULAR SURGERY       Family History   Problem Relation Age of Onset    Heart disease Father     Diabetes Father     Hypertension Father     Diabetes Mother     Hypertension Mother     Heart disease Mother     Diabetes Brother     Cancer Sister     Thyroid disease Maternal Aunt     Clotting disorder Neg Hx      Social History   Substance Use Topics    Smoking status: Never Smoker    Smokeless tobacco: Never Used    Alcohol use Yes      Comment: occasionally     Review of patient's allergies indicates:   Allergen Reactions    Cough syrup [guaifenesin] Other (See Comments)     Medications: I have reviewed the current medication administration record.    Current Outpatient Prescriptions:     acetaminophen (TYLENOL) 500 MG tablet, Take 1 tablet (500 mg total) by mouth every 6 (six) hours as needed for Pain., Disp: , Rfl: 0    amlodipine (NORVASC) 5 MG tablet, Take 1 tablet (5 mg total) by mouth once daily., Disp: 30 tablet, Rfl: 11    apixaban 5 mg Tab, Take 1 tablet (5 mg total) by mouth 2 (two) times daily., Disp: 60 tablet, Rfl: 11    atorvastatin (LIPITOR) 40 MG tablet, Take 1 tablet (40 mg total) by mouth once daily., Disp: 90 tablet, Rfl: 3    BD INSULIN PEN NEEDLE UF MINI 31 gauge x 3/16" Ndle, To use with insulin pens 4  times daily, Disp: 360 each, Rfl: 3    carvedilol (COREG) 12.5 MG tablet, Take 1 tablet (12.5 mg total) by mouth 2 (two) times daily., Disp: 180 tablet, Rfl: 4    coenzyme Q10 200 mg capsule, Take 200 mg by mouth once daily. (Patient taking differently: " Take 200 mg by mouth once daily. PT TAKEN EVERY MON, WED, FRI), Disp: , Rfl:     ergocalciferol (VITAMIN D2) 50,000 unit Cap, Take 50,000 Units by mouth every 7 days., Disp: , Rfl:     folic acid (FOLVITE) 1 MG tablet, Take 1 tablet (1 mg total) by mouth once daily., Disp: 90 tablet, Rfl: 1    furosemide (LASIX) 40 MG tablet, Take 2 tablets (80 mg total) by mouth 2 (two) times daily., Disp: 90 tablet, Rfl: 4    insulin detemir (LEVEMIR FLEXPEN) 100 unit/mL (3 mL) SubQ InPn pen, Inject 16 Units into the skin every evening., Disp: 3 Box, Rfl: 3    levetiracetam (KEPPRA) 750 MG Tab, Take 1 tablet (750 mg total) by mouth 2 (two) times daily., Disp: 180 tablet, Rfl: 4    levothyroxine (SYNTHROID) 200 MCG tablet, Take 1 tablet (200 mcg total) by mouth before breakfast., Disp: 90 tablet, Rfl: 2    losartan (COZAAR) 100 MG tablet, Take 1 tablet (100 mg total) by mouth once daily., Disp: 90 tablet, Rfl: 3    multivitamin capsule, Take 1 capsule by mouth once daily. Pt taken every mon, wed, fri, Disp: , Rfl:     dulaglutide (TRULICITY) 0.75 mg/0.5 mL PnIj, Inject 0.5 mLs (0.75 mg total) into the skin every 7 days., Disp: 4 Syringe, Rfl: 6    glucagon (human recombinant) inj 1mg/mL kit, Inject 1 mL (1 mg total) into the muscle as needed., Disp: 1 kit, Rfl: 1    insulin lispro (HUMALOG KWIKPEN) 100 unit/mL InPn pen, Inject 12 units w/ meals, 6 units if eating lighter meal or bg less than 120, scale 180-230 +2, 231-280 +4, 281-330 +6, 331-380 +8., Disp: 3 Box, Rfl: 3    magnesium 30 mg Tab, Take by mouth., Disp: , Rfl:     ONETOUCH VERIO Strp, USE TO TEST BLOOD SUGAR THREE TIMES DAILY, Disp: 100 strip, Rfl: 11    oxycodone-acetaminophen (PERCOCET) 5-325 mg per tablet, Take 1 tablet by mouth every 4 (four) hours as needed for Pain., Disp: 31 tablet, Rfl: 0    saw palmetto 80 MG capsule, Take 80 mg by mouth once daily., Disp: , Rfl:       Review of Systems   Unable to perform ROS: Mental status change    Constitutional: Negative for chills and fever.   HENT: Positive for congestion and hearing loss. Negative for drooling and trouble swallowing.    Eyes: Positive for visual disturbance (baseline right hemianopsia). Negative for discharge and redness.   Respiratory: Positive for cough. Negative for shortness of breath.    Gastrointestinal: Negative for vomiting.   Endocrine: Negative for cold intolerance and heat intolerance.   Genitourinary: Negative for hematuria.   Skin: Negative for rash.   Allergic/Immunologic: Negative for environmental allergies and food allergies.   Neurological: Positive for dizziness, speech difficulty and headaches. Negative for seizures and facial asymmetry.   Hematological: Negative for adenopathy. Does not bruise/bleed easily.   Psychiatric/Behavioral: Positive for agitation.     Objective:     Vital Signs (Most Recent):  Temp: 97.7 °F (36.5 °C) (10/04/17 2055)  Pulse: (!) 53 (10/05/17 0016)  Resp: 19 (10/04/17 2055)  BP: (!) 176/81 (10/05/17 0016)  SpO2: 100 % (10/05/17 0016)    Vital Signs Range (Last 24H):  Temp:  [97.7 °F (36.5 °C)]   Pulse:  [52-63]   Resp:  [19]   BP: (167-226)/()   SpO2:  [98 %-100 %]     Physical Exam   Constitutional: He appears well-developed and well-nourished. No distress.   HENT:   Head: Normocephalic and atraumatic.   Right Ear: External ear normal.   Left Ear: External ear normal.   Nose: Nose normal.   Mouth/Throat: Oropharynx is clear and moist. No oropharyngeal exudate.   Eyes: Conjunctivae and EOM are normal. Pupils are equal, round, and reactive to light. Right eye exhibits no discharge. Left eye exhibits no discharge. No scleral icterus.   Neck: Normal range of motion. Neck supple. No thyromegaly present.   Cardiovascular: Normal rate, regular rhythm and intact distal pulses.    Pulmonary/Chest: Effort normal. No respiratory distress.   Abdominal: Soft. Bowel sounds are normal. He exhibits no distension.   Musculoskeletal: He exhibits edema  (+3 pitting BLE).   Lymphadenopathy:     He has no cervical adenopathy.   Neurological: He is alert. GCS eye subscore is 4 - spontaneous. GCS verbal subscore is 4 - confused. GCS motor subscore is 6 - obeys commands.   Skin: Skin is warm and dry. He is not diaphoretic.   Nursing note and vitals reviewed.      Neurological Exam:   LOC: alert, follows requests and mostly  Language: Global aphasia  Speech: No dysarthria  EOM (CN III, IV, VI): Full/intact  Pupils (CN III, IV, VI): PERRL  Facial Movement (CN VII): symmetric facial expression  Tongue (CN XII): to midline    Stroke Team Times:   Date and Time Taken: 10/4/2017 10:35 PM  Last Known Normal Date and Time : 10/4/170624:52  Symptom Onset Date and Time:10/4/2017  Stroke Team Called Date and Time: 10/4/451667:30  Stroke Team Arrived Date and Time: 10/4/290199:35  CT Interpretation Time: 22:05    NIH Stroke Scale:  Interval: baseline (upon arrival/admit)  Level of Consciousness: 0 - alert  LOC Questions: 1 - answers one correctly  LOC Commands: 1 - performs one correctly  Best Gaze: 0 - normal  Visual: 1 - partial hemianopia (right side, baseline)  Facial Palsy: 0 - normal  Motor Left Arm: 0 - no drift  Motor Right Arm: 0 - no drift  Motor Left Le - drift  Motor Right Le - no drift  Limb Ataxia: 0 - absent  Sensory: 0 - normal  Best Language: 1 - mild to moderate aphasia  Dysarthria: 0 - normal articulation  Extinction and Inattention: 0 - no neglect  NIH Stroke Scale Total: 5  Brianda Coma Scale:  Best Eye Response: 4 - spontaneous  Best Motor Response: 6 - obeys commands  Best Verbal Response: 4 - confused  Transfer Coma Scale Total: 14  Modified Travis Scale:   Timeline: Prior to symptoms onset  Modified [place] Score: 1 - no significant disability        Laboratory:  CMP:   Recent Labs  Lab 10/04/17  2227   CALCIUM 8.9   ALBUMIN 3.3*   PROT 7.2      K 3.8   CO2 25      BUN 21   CREATININE 2.1*   ALKPHOS 123   ALT 19   AST 33   BILITOT 1.1*      CBC:   Recent Labs  Lab 10/04/17  2118   WBC 12.00   RBC 3.93*   HGB 11.2*   HCT 31.5*      MCV 80*   MCH 28.5   MCHC 35.6     Lipid Panel:   Recent Labs  Lab 10/04/17  2227   CHOL 118*   LDLCALC 52.6*   HDL 37*   TRIG 142     Coagulation:   Recent Labs  Lab 10/04/17  2118   INR 1.0     Hgb A1C: No results for input(s): HGBA1C in the last 168 hours.  TSH:   Recent Labs  Lab 10/04/17  2227   TSH 7.907*       Diagnostic Results:  Brain Imaging: CT Head. Date: 10/4/17  Acute Pathology: None  Location: N/A  Old Vascular Pathology: Infarct  Location: Occipital: left    Cerebrovascular Imaging: MRA Head. Date: 10/05/2017   Intracranial Pathology: None  Location: N/A    Cervical Vascular Imaging: MRA Neck. Date: 10/05/2017   Cervical Vascular Pathology: None  Location: N/A    Cardiac Evaluation: None  EKG/Telemetry: Sinus rhythm, bradycardia      Rajni Ojeda NP  [facility]  Department of Vascular Neurology   [facility]

## 2017-10-05 NOTE — PLAN OF CARE
62yoM with PMHx of CVA (with right hemianopsia1/2016), previous seizure (11/2016),DM2, CKD Stage 3, HTN, HLD, pAfib, thyroid cancer (s/p surgery and rad iodine 2009) presents with receptive and expressive aphasia.    CT and MRI head showed no evidence of acute major vascular distribution infarct or intracranial hemorrhage.     Appreciate PT/OT/ST consult:  PT: 4x/week  OT 4x/week  ST:5x/week. Pt will tolerate regular consistency diet and thin liquids with no overt s/s aspiration.    Appreciate Neurology Consult:  Breakthrough seizure in setting of recent respiratory infection and financial stressors, wife endorses compliance and patient not yet back to baseline.    Recommend load 1g LEV iv followed by increase to 1500mg bid.        Pt was Keppra loaded and increased to 1500mg bid.  Pt on Apixaban and Statin.   Monitoring neurological presentation closely.     Trell Ratliff MD  Internal Medicine PGY-1  [phone]

## 2017-10-05 NOTE — SUBJECTIVE & OBJECTIVE
Past Medical History:   Diagnosis Date    Allergy     BMI 31.0-31.9,adult 11/25/2014    Cerebrovascular disease 7/25/2016    Chronic anticoagulation - pradaxa 11/10/2016    Chronic diastolic heart failure 11/20/2016    CKD (chronic kidney disease), stage III 9/23/2013    Controlled type 2 diabetes with neuropathy 12/16/2014    Diabetes mellitus due to underlying condition with stage 3 chronic kidney disease, without long-term current use of insulin 11/2/2016    Elevated alkaline phosphatase level 8/1/2012    Elevated PSA     negative prostate biopsy 4/11    Erectile dysfunction associated with type 2 diabetes mellitus     Gallstones 3/20/2013    Generalized tonic-clonic seizure 11/2016    HTN (hypertension), benign 8/1/2012    Hypercholesterolemia 8/1/2012    Microcytic anemia 11/27/2013    Paroxysmal atrial fibrillation 9/23/2013    Postsurgical hypothyroidism 11/27/2013    Right homonymous hemianopsia 2/5/2016    Sickle cell trait     Stroke 1/27/2016    Thyroid cancer     multifocal with six lesions, largest 5mm, treated with surgery and radioactive iodine    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/2016       Past Surgical History:   Procedure Laterality Date    BREAST SURGERY      cyst removal    COLONOSCOPY      CYST REMOVAL      chest    PROSTATE BIOPSY  4/27/11    SKIN BIOPSY      THYROID SURGERY  8/26/09    VASCULAR SURGERY         Review of patient's allergies indicates:   Allergen Reactions    Cough syrup [guaifenesin] Other (See Comments)       Current Facility-Administered Medications on File Prior to Encounter   Medication    lidocaine HCl 2% urojet 10 mL     Current Outpatient Prescriptions on File Prior to Encounter   Medication Sig    acetaminophen (TYLENOL) 500 MG tablet Take 1 tablet (500 mg total) by mouth every 6 (six) hours as needed for Pain.    amlodipine (NORVASC) 5 MG tablet Take 1 tablet (5 mg total) by mouth once daily.    apixaban 5 mg Tab Take 1 tablet  "(5 mg total) by mouth 2 (two) times daily.    atorvastatin (LIPITOR) 40 MG tablet Take 1 tablet (40 mg total) by mouth once daily.    BD INSULIN PEN NEEDLE UF MINI 31 gauge x 3/16" Ndle To use with insulin pens 4  times daily    carvedilol (COREG) 12.5 MG tablet Take 1 tablet (12.5 mg total) by mouth 2 (two) times daily.    coenzyme Q10 200 mg capsule Take 200 mg by mouth once daily. (Patient taking differently: Take 200 mg by mouth once daily. PT TAKEN EVERY MON, WED, FRI)    ergocalciferol (VITAMIN D2) 50,000 unit Cap Take 50,000 Units by mouth every 7 days.    folic acid (FOLVITE) 1 MG tablet Take 1 tablet (1 mg total) by mouth once daily.    furosemide (LASIX) 40 MG tablet Take 2 tablets (80 mg total) by mouth 2 (two) times daily.    insulin detemir (LEVEMIR FLEXPEN) 100 unit/mL (3 mL) SubQ InPn pen Inject 16 Units into the skin every evening.    levetiracetam (KEPPRA) 750 MG Tab Take 1 tablet (750 mg total) by mouth 2 (two) times daily.    levothyroxine (SYNTHROID) 200 MCG tablet Take 1 tablet (200 mcg total) by mouth before breakfast.    losartan (COZAAR) 100 MG tablet Take 1 tablet (100 mg total) by mouth once daily.    multivitamin capsule Take 1 capsule by mouth once daily. Pt taken every mon, wed, fri    dulaglutide (TRULICITY) 0.75 mg/0.5 mL PnIj Inject 0.5 mLs (0.75 mg total) into the skin every 7 days.    glucagon (human recombinant) inj 1mg/mL kit Inject 1 mL (1 mg total) into the muscle as needed.    insulin lispro (HUMALOG KWIKPEN) 100 unit/mL InPn pen Inject 12 units w/ meals, 6 units if eating lighter meal or bg less than 120, scale 180-230 +2, 231-280 +4, 281-330 +6, 331-380 +8.    magnesium 30 mg Tab Take by mouth.    ONETOUCH VERIO Strp USE TO TEST BLOOD SUGAR THREE TIMES DAILY    oxycodone-acetaminophen (PERCOCET) 5-325 mg per tablet Take 1 tablet by mouth every 4 (four) hours as needed for Pain.    saw palmetto 80 MG capsule Take 80 mg by mouth once daily.     Family History "     Problem Relation (Age of Onset)    Cancer Sister    Diabetes Father, Mother, Brother    Heart disease Father, Mother    Hypertension Father, Mother    Thyroid disease Maternal Aunt        Social History Main Topics    Smoking status: Never Smoker    Smokeless tobacco: Never Used    Alcohol use Yes      Comment: occasionally    Drug use: No    Sexual activity: Yes     Partners: Female      Comment:  with 3 kids     Review of Systems   Unable to perform ROS: Mental status change   Constitutional: Negative for chills and fever.   Eyes: Positive for visual disturbance (chronic r. hemianopsia from CVA).   Respiratory: Negative for cough and shortness of breath.    Cardiovascular: Positive for leg swelling (chronic). Negative for chest pain.   Gastrointestinal: Negative for abdominal distention, abdominal pain, diarrhea, nausea and vomiting.   Genitourinary: Positive for hematuria. Negative for dysuria.   Neurological: Positive for dizziness and speech difficulty. Negative for tremors, weakness, numbness and headaches.   Psychiatric/Behavioral: Positive for confusion.     Objective:     Vital Signs (Most Recent):  Temp: 97.7 °F (36.5 °C) (10/04/17 2055)  Pulse: (!) 52 (10/05/17 0231)  Resp: 19 (10/04/17 2055)  BP: (!) 177/90 (10/05/17 0231)  SpO2: 100 % (10/05/17 0231) Vital Signs (24h Range):  Temp:  [97.7 °F (36.5 °C)] 97.7 °F (36.5 °C)  Pulse:  [51-71] 52  Resp:  [19] 19  SpO2:  [98 %-100 %] 100 %  BP: (159-226)/() 177/90     Weight: 104.3 kg (230 lb)  Body mass index is 33 kg/m².    Physical Exam   HENT:   Head: Normocephalic and atraumatic.   Eyes: Conjunctivae and EOM are normal. Pupils are equal, round, and reactive to light. No scleral icterus.   Neck: No JVD present.   Cardiovascular: Normal rate, regular rhythm, normal heart sounds and intact distal pulses.    Pulmonary/Chest: Effort normal and breath sounds normal. No respiratory distress. He has no wheezes. He has no rales. He exhibits no  tenderness.   Abdominal: He exhibits distension. There is guarding.   Distended with midline hernia. Diffuse involuntary guarding most pronounced midline, epigastric area. Patient denies tenderness but appears in pain   Musculoskeletal: He exhibits edema.   2+ pitting edema up to knee   Neurological: He is alert. No sensory deficit.   Patient has difficulty finding words with inappropriate response and intermittent difficulty following simple commands. Some agitation during interview. Strength appears 5/5 throughout   Skin: Skin is warm and dry. He is not diaphoretic.        Significant Labs:   CBC:   Recent Labs  Lab 10/04/17  2118   WBC 12.00   HGB 11.2*   HCT 31.5*        CMP:   Recent Labs  Lab 10/04/17  2227      K 3.8      CO2 25   GLU 84   BUN 21   CREATININE 2.1*   CALCIUM 8.9   PROT 7.2   ALBUMIN 3.3*   BILITOT 1.1*   ALKPHOS 123   AST 33   ALT 19   ANIONGAP 8   EGFRNONAA 32.3*     Lactic Acid: No results for input(s): LACTATE in the last 48 hours.  Lipid Panel:   Recent Labs  Lab 10/04/17  2227   CHOL 118*   HDL 37*   LDLCALC 52.6*   TRIG 142   CHOLHDL 31.4     TSH:   Recent Labs  Lab 10/04/17  2227   TSH 7.907*       Significant Imaging: I have reviewed all pertinent imaging results/findings within the past 24 hours.

## 2017-10-05 NOTE — PLAN OF CARE
Problem: Occupational Therapy Goal  Goal: Occupational Therapy Goal  Goals to be met by: 10/19/17     Patient will increase functional independence with ADLs by performing:    Feeding with Modified [place].  UE Dressing with Modified [place].  LE Dressing with Minimal Assistance.  Grooming while seated with Modified [place].  Toileting from bedside commode with Contact Guard Assistance for hygiene and clothing management.   Toilet transfer to bedside commode with Contact Guard Assistance.    Outcome: Ongoing (interventions implemented as appropriate)    Pt was agreeable to OT/PT evaluation.  Goals established to assist pt with returning to OF regarding ADLs and func mobility.  Pt will benefit from skilled OT services in order to increase his level of safety and independence with ADLs and mobility.      Ofe Walker, OT  10/5/2017

## 2017-10-05 NOTE — PT/OT/SLP EVAL
Speech Language Pathology  Clinical Evaluation of Swallow    Alfa Christy III   MRN: [id number]   529/529 B    Admitting Diagnosis: Simple partial seizure, consciousness not impaired    Diet recommendations: Solid Diet Level: Regular  Liquid Diet Level: Thin   · Fully upright position for all PO intake  · Meds whole 1 @ a time with cup sip water  · NO straws  · Slow rate of eating/ drinking  · Small bites/ sips  · 1 bite/ sip @ a time  · Refrain from talking prior to swallow completion     SLP Treatment Date: 10/05/17  Speech Start Time: 1032     Speech Stop Time: 1105     Speech Total (min): 33 min       TREATMENT BILLABLE MINUTES:  Eval Swallow and Oral Function 33    Diagnosis: Simple partial seizure, consciousness not impaired    Past Medical History:   Diagnosis Date    Allergy     BMI 31.0-31.9,adult 11/25/2014    Cerebrovascular disease 7/25/2016    Chronic anticoagulation - pradaxa 11/10/2016    Chronic diastolic heart failure 11/20/2016    CKD (chronic kidney disease), stage III 9/23/2013    Controlled type 2 diabetes with neuropathy 12/16/2014    Diabetes mellitus due to underlying condition with stage 3 chronic kidney disease, without long-term current use of insulin 11/2/2016    Elevated alkaline phosphatase level 8/1/2012    Elevated PSA     negative prostate biopsy 4/11    Erectile dysfunction associated with type 2 diabetes mellitus     Gallstones 3/20/2013    Generalized tonic-clonic seizure 11/2016    HTN (hypertension), benign 8/1/2012    Hypercholesterolemia 8/1/2012    Microcytic anemia 11/27/2013    Paroxysmal atrial fibrillation 9/23/2013    Postsurgical hypothyroidism 11/27/2013    Right homonymous hemianopsia 2/5/2016    Sickle cell trait     Stroke 1/27/2016    Thyroid cancer     multifocal with six lesions, largest 5mm, treated with surgery and radioactive iodine    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/2016     Past Surgical History:   Procedure Laterality Date     "BREAST SURGERY      cyst removal    COLONOSCOPY      CYST REMOVAL      chest    PROSTATE BIOPSY  4/27/11    SKIN BIOPSY      THYROID SURGERY  8/26/09    VASCULAR SURGERY         Has the patient been evaluated by SLP for swallowing? : Yes  Keep patient NPO?: No   General Precautions: Standard, aspiration, fall    Current Respiratory Status:  (Room air)     Prior diet: Per pt's wife, regular consistency diet and thin liquids prior to this hospitalization.     Subjective:  "What are we gonna eat?"    Pain/Comfort  Pain Rating 1: 0/10  Pain Rating Post-Intervention 1: 0/10    Objective:        Oral Musculature Evaluation  Oral Musculature: WFL  Dentition: present and adequate  Mucosal Quality: good  Mandibular Strength and Mobility: WFL  Oral Labial Strength and Mobility: WFL  Lingual Strength and Mobility: WFL  Buccal Strength and Mobility: WFL  Volitional Cough: WFL  Volitional Swallow: Dec'd hyolaryngeal elevation  Voice Prior to PO Intake: Clear, dry     Bedside Swallow Eval:  Consistencies Assessed: tsp water x1, ~8oz cup sips water, nectar thick cup sips water x2 in isolation and x1 as liquid wash, 1/2 tsp pureed vanilla pudding x3, bites of 2 jakob crackers  Oral Phase: WFL  Pharyngeal Phase: Subtle throat clear and delayed cough x1 with cup sip thin water, however unable to determine if related to PO intake vs spontaneous. In addition, cough elicited x1 with nectar thick cup sip x1, yet remain unable to determine if related to PO intake.     Additional Treatment:    Pt asleep upon entry with wife at b/s. Easily awakened with verbal stimuli. HOB raised. Pt appearing to have word finding difficulty throughout session. Per pt's wife, pt with hx of spontaneous coughing/ throat clearing s/p thyroid surgery regardless of PO intake. Upon observation of subtle throat clear and delayed cough with initial cup sips thin water x3, nectar thick cup sups water provided. Additional cough elicited post nectar thick cup " sip as liquid wash following extremely large bite jakob cracker, however felt to be unrelated to PO. Therefore thin cup sips water in isolation re-initiated w/no overt s/s aspiration. Upon observation of tolerance of thin cup sips water as liquid wash following large bites of additional jakob cracker without overt s/s aspiration, previously observed cough/throat clear thought to be unrelated to PO intake. Straw sips not trialed this evaluation 2/2 initial thought of dec'd tolerance of thin cup sips. Pt and wife educated re: SLP POC and aspiration precautions listed above. White board updated. No further questions.     Assessment:  Alfa Christy III is a 62 y.o. male with a medical diagnosis of Simple partial seizure, consciousness not impaired and presents with risk of aspiration and appearance of word finding difficulty.           Discharge recommendations: Discharge Facility/Level Of Care Needs:  (Pending)     Goals:    SLP Goals        Problem: SLP Goal    Goal Priority Disciplines Outcome   SLP Goal     SLP Ongoing (interventions implemented as appropriate)   Description:  Speech Language Pathology  Goal expected to be met by 10/12:  1. Pt will tolerate regular consistency diet and thin liquids with no overt s/s aspiration.                      Plan:   Patient to be seen Therapy Frequency: 5 x/week   Plan of Care expires: 11/03/17  Plan of Care reviewed with: patient, spouse  SLP Follow-up?: Yes            MARCELINO Anderson, CCC-SLP  [phone]  10/5/2017

## 2017-10-05 NOTE — ASSESSMENT & PLAN NOTE
-Hx, MRI with subtle extensive diffusion restriction concerning for recent seizure  -EEG in progress, will follow up results  -Increase levetiracetam to 1500 mg po bid   -Follow up with Dr. Santiago in clinic

## 2017-10-05 NOTE — ASSESSMENT & PLAN NOTE
Glucose 82 on admission  NPO until speech eval, then diabetic diet  POCT q6hr while NPO then AC/QHS  LDSSI for now

## 2017-10-06 LAB
ALBUMIN SERPL BCP-MCNC: 3.2 G/DL
ALP SERPL-CCNC: 124 U/L
ALT SERPL W/O P-5'-P-CCNC: 22 U/L
ANION GAP SERPL CALC-SCNC: 9 MMOL/L
ANISOCYTOSIS BLD QL SMEAR: SLIGHT
AST SERPL-CCNC: 34 U/L
BASOPHILS # BLD AUTO: 0.05 K/UL
BASOPHILS NFR BLD: 0.5 %
BILIRUB SERPL-MCNC: 1.3 MG/DL
BUN SERPL-MCNC: 20 MG/DL
CALCIUM SERPL-MCNC: 8.9 MG/DL
CHLORIDE SERPL-SCNC: 106 MMOL/L
CO2 SERPL-SCNC: 22 MMOL/L
CREAT SERPL-MCNC: 1.8 MG/DL
DIFFERENTIAL METHOD: ABNORMAL
EOSINOPHIL # BLD AUTO: 0.4 K/UL
EOSINOPHIL NFR BLD: 4.3 %
ERYTHROCYTE [DISTWIDTH] IN BLOOD BY AUTOMATED COUNT: 17.6 %
EST. GFR  (AFRICAN AMERICAN): 45 ML/MIN/1.73 M^2
EST. GFR  (NON AFRICAN AMERICAN): 38.9 ML/MIN/1.73 M^2
GLUCOSE SERPL-MCNC: 201 MG/DL
HCT VFR BLD AUTO: 32.7 %
HGB BLD-MCNC: 11.6 G/DL
LYMPHOCYTES # BLD AUTO: 2.6 K/UL
LYMPHOCYTES NFR BLD: 26.8 %
MAGNESIUM SERPL-MCNC: 2 MG/DL
MCH RBC QN AUTO: 27.8 PG
MCHC RBC AUTO-ENTMCNC: 35.5 G/DL
MCV RBC AUTO: 78 FL
MONOCYTES # BLD AUTO: 1.5 K/UL
MONOCYTES NFR BLD: 15.4 %
NEUTROPHILS # BLD AUTO: 5.2 K/UL
NEUTROPHILS NFR BLD: 53 %
PHOSPHATE SERPL-MCNC: 3 MG/DL
PLATELET # BLD AUTO: 210 K/UL
PLATELET BLD QL SMEAR: ABNORMAL
PMV BLD AUTO: ABNORMAL FL
POCT GLUCOSE: 207 MG/DL (ref 70–110)
POCT GLUCOSE: 225 MG/DL (ref 70–110)
POCT GLUCOSE: 230 MG/DL (ref 70–110)
POCT GLUCOSE: 240 MG/DL (ref 70–110)
POLYCHROMASIA BLD QL SMEAR: ABNORMAL
POTASSIUM SERPL-SCNC: 4.2 MMOL/L
PROT SERPL-MCNC: 7.4 G/DL
RBC # BLD AUTO: 4.18 M/UL
SODIUM SERPL-SCNC: 137 MMOL/L
WBC # BLD AUTO: 9.74 K/UL

## 2017-10-06 PROCEDURE — 11000001 HC ACUTE MED/SURG PRIVATE ROOM

## 2017-10-06 PROCEDURE — 25000003 PHARM REV CODE 250: Performed by: EMERGENCY MEDICINE

## 2017-10-06 PROCEDURE — 25000003 PHARM REV CODE 250: Performed by: STUDENT IN AN ORGANIZED HEALTH CARE EDUCATION/TRAINING PROGRAM

## 2017-10-06 PROCEDURE — 84100 ASSAY OF PHOSPHORUS: CPT

## 2017-10-06 PROCEDURE — 63600175 PHARM REV CODE 636 W HCPCS: Performed by: HOSPITALIST

## 2017-10-06 PROCEDURE — 86580 TB INTRADERMAL TEST: CPT | Performed by: HOSPITALIST

## 2017-10-06 PROCEDURE — 85025 COMPLETE CBC W/AUTO DIFF WBC: CPT

## 2017-10-06 PROCEDURE — 92523 SPEECH SOUND LANG COMPREHEN: CPT

## 2017-10-06 PROCEDURE — 63600175 PHARM REV CODE 636 W HCPCS: Performed by: INTERNAL MEDICINE

## 2017-10-06 PROCEDURE — 80053 COMPREHEN METABOLIC PANEL: CPT

## 2017-10-06 PROCEDURE — 99232 SBSQ HOSP IP/OBS MODERATE 35: CPT | Mod: ,,, | Performed by: HOSPITALIST

## 2017-10-06 PROCEDURE — 83735 ASSAY OF MAGNESIUM: CPT

## 2017-10-06 PROCEDURE — 25000003 PHARM REV CODE 250: Performed by: INTERNAL MEDICINE

## 2017-10-06 RX ORDER — AMLODIPINE BESYLATE 10 MG/1
10 TABLET ORAL DAILY
Status: DISCONTINUED | OUTPATIENT
Start: 2017-10-06 | End: 2017-10-11 | Stop reason: HOSPADM

## 2017-10-06 RX ADMIN — LEVETIRACETAM 1500 MG: 500 TABLET, FILM COATED ORAL at 09:10

## 2017-10-06 RX ADMIN — APIXABAN 5 MG: 5 TABLET, FILM COATED ORAL at 10:10

## 2017-10-06 RX ADMIN — CARVEDILOL 12.5 MG: 12.5 TABLET, FILM COATED ORAL at 09:10

## 2017-10-06 RX ADMIN — APIXABAN 5 MG: 5 TABLET, FILM COATED ORAL at 09:10

## 2017-10-06 RX ADMIN — FUROSEMIDE 80 MG: 40 TABLET ORAL at 10:10

## 2017-10-06 RX ADMIN — Medication 5 UNITS: at 02:10

## 2017-10-06 RX ADMIN — LOSARTAN POTASSIUM 100 MG: 25 TABLET, FILM COATED ORAL at 09:10

## 2017-10-06 RX ADMIN — AMLODIPINE BESYLATE 10 MG: 10 TABLET ORAL at 05:10

## 2017-10-06 RX ADMIN — CARVEDILOL 12.5 MG: 12.5 TABLET, FILM COATED ORAL at 10:10

## 2017-10-06 RX ADMIN — STANDARDIZED SENNA CONCENTRATE AND DOCUSATE SODIUM 1 TABLET: 8.6; 5 TABLET, FILM COATED ORAL at 09:10

## 2017-10-06 RX ADMIN — INSULIN ASPART 1 UNITS: 100 INJECTION, SOLUTION INTRAVENOUS; SUBCUTANEOUS at 12:10

## 2017-10-06 RX ADMIN — LEVOTHYROXINE SODIUM 200 MCG: 100 TABLET ORAL at 05:10

## 2017-10-06 RX ADMIN — FUROSEMIDE 80 MG: 40 TABLET ORAL at 09:10

## 2017-10-06 RX ADMIN — STANDARDIZED SENNA CONCENTRATE AND DOCUSATE SODIUM 1 TABLET: 8.6; 5 TABLET, FILM COATED ORAL at 10:10

## 2017-10-06 RX ADMIN — INSULIN ASPART 2 UNITS: 100 INJECTION, SOLUTION INTRAVENOUS; SUBCUTANEOUS at 05:10

## 2017-10-06 RX ADMIN — ATORVASTATIN CALCIUM 40 MG: 20 TABLET, FILM COATED ORAL at 09:10

## 2017-10-06 RX ADMIN — LEVETIRACETAM 1500 MG: 500 TABLET, FILM COATED ORAL at 10:10

## 2017-10-06 RX ADMIN — INSULIN DETEMIR 12 UNITS: 100 INJECTION, SOLUTION SUBCUTANEOUS at 11:10

## 2017-10-06 RX ADMIN — INSULIN ASPART 1 UNITS: 100 INJECTION, SOLUTION INTRAVENOUS; SUBCUTANEOUS at 11:10

## 2017-10-06 RX ADMIN — INSULIN ASPART 2 UNITS: 100 INJECTION, SOLUTION INTRAVENOUS; SUBCUTANEOUS at 08:10

## 2017-10-06 NOTE — HOSPITAL COURSE
Continue neurological workup for suspected seizures as underlying cause of aphasia. Awaiting official vEEG report.

## 2017-10-06 NOTE — ASSESSMENT & PLAN NOTE
Hx of left PCA distribution stroke with right hemianopsia  Evaluated by Vascular Neurology in ED and ruled out acute ishcemia  Continue Eliquis and statin for secondary prevention

## 2017-10-06 NOTE — ASSESSMENT & PLAN NOTE
No symptoms or signs of ADHF  Will continue home coreg 12.5mg po BID, losartan 100mg po QD, and lasix 80mg po BID

## 2017-10-06 NOTE — MEDICAL/APP STUDENT
Progress Note  Hospital Medicine                                                              [place] [facility]    Patient Name: Alfa Christy III  YOB: 1955    Admit Date: 10/4/2017                     LOS: 1    SUBJECTIVE:     Reason for Admission: Witnessed simple partial seizure, concern for recent seizures     HPI:   Mr Alfa Christy is a 62 y.o. male with past medical history of stroke with residual R hemianopia 01/2016 as well as seizure 11/2016, DM II, CKD III, HTN, HLD, paroxysmal A Fib presented to hospital complaining of acute onset aphasia.    Interval history: Patient is oriented to person, place, but not time  Patient is alert and awake, he is able to a complete sentence with some delay. Still struggling to find words. He is able to follow commends and cooperative. He is able to stand with assistance. Denies h/n/v. No b/m      OBJECTIVE:     Vital Signs Range (Last 24H):  Temp:  [96.7 °F (35.9 °C)-98.4 °F (36.9 °C)]   Pulse:  [49-78]   Resp:  [16-20]   BP: (155-199)/(70-92)   SpO2:  [96 %-99 %] Body mass index is 33 kg/m².  Wt Readings from Last 1 Encounters:   10/05/17 2107 104.3 kg (230 lb)   10/04/17 2055 104.3 kg (230 lb)       I & O (Last 24H):  Intake/Output Summary (Last 24 hours) at 10/06/17 0740  Last data filed at 10/06/17 0400   Gross per 24 hour   Intake              910 ml   Output             1450 ml   Net             -540 ml       Physical Exam:  Physical Exam   Constitutional: He appears well-developed and well-nourished. No distress.   HENT:   Head: Normocephalic and atraumatic.   Eyes: Conjunctivae are normal. Pupils are equal, round, and reactive to light. Right eye exhibits no discharge. Left eye exhibits no discharge.   Neck: Normal range of motion.   Cardiovascular: Normal rate, regular rhythm and normal heart sounds.  Exam reveals no gallop and no friction rub.    No murmur heard.  Pulmonary/Chest: Effort normal and breath sounds normal. No stridor. No  respiratory distress. He has no wheezes.   Abdominal: Soft. Bowel sounds are normal. He exhibits distension. He exhibits no mass. There is no tenderness. There is no rebound and no guarding.   Musculoskeletal: He exhibits no tenderness.   MRC 4/5 Bilateral lower extremities    Neurological: He is alert. No cranial nerve deficit.   Not oriented to time  Facial movement, sensation intact and symmetric   Skin: Skin is warm and dry. No rash noted. No erythema.       Diagnostic Results:  Lab Results   Component Value Date    WBC 9.74 10/06/2017    HGB 11.6 (L) 10/06/2017    HCT 32.7 (L) 10/06/2017    MCV 78 (L) 10/06/2017     10/04/2017       Recent Labs  Lab 10/06/17  0532   *      K 4.2      CO2 22*   BUN 20   CREATININE 1.8*   CALCIUM 8.9   MG 2.0     Lab Results   Component Value Date    INR 1.0 10/04/2017    INR 1.1 11/05/2016    INR 1.1 01/27/2016     Lab Results   Component Value Date    HGBA1C 5.6 09/26/2017     Recent Labs      10/05/17   0451  10/05/17   1239  10/06/17   0052   POCTGLUCOSE  192*  192*  240*       Current Facility-Administered Medications:     amlodipine tablet 10 mg, 10 mg, Oral, Daily, Betsy Weldon MD, 10 mg at 10/06/17 0524    apixaban tablet 5 mg, 5 mg, Oral, BID, Terry Ritchie MD, 5 mg at 10/05/17 2100    atorvastatin tablet 40 mg, 40 mg, Oral, Daily, Terry Ritchie MD, 40 mg at 10/05/17 1306    carvedilol tablet 12.5 mg, 12.5 mg, Oral, BID, Trell Ratliff MD, 12.5 mg at 10/05/17 2100    dextrose 50% injection 12.5 g, 12.5 g, Intravenous, PRN, Terry Ritchie MD    dextrose 50% injection 12.5 g, 12.5 g, Intravenous, PRN, Nadira Rae MD    dextrose 50% injection 25 g, 25 g, Intravenous, PRN, Terry Ritchie MD    dextrose 50% injection 25 g, 25 g, Intravenous, PRN, Nadira Rae MD    furosemide tablet 80 mg, 80 mg, Oral, BID, Terry Ritchie MD, 80 mg at 10/05/17 2100    glucagon (human recombinant) injection 1 mg, 1 mg,  Intramuscular, PRN, Terry Ritchie MD    glucagon (human recombinant) injection 1 mg, 1 mg, Intramuscular, PRN, Nadira Rae MD    glucose chewable tablet 16 g, 16 g, Oral, PRN, Terry Ritchie MD    glucose chewable tablet 16 g, 16 g, Oral, PRN, Nadira Rae MD    glucose chewable tablet 24 g, 24 g, Oral, PRN, Terry Ritchie MD    glucose chewable tablet 24 g, 24 g, Oral, PRN, Nadira Rae MD    insulin aspart pen 1-5 Units, 1-5 Units, Subcutaneous, QID (AC + HS) PRN, CATHY Prasad, 1 Units at 10/06/17 0053    levetiracetam tablet 1,500 mg, 1,500 mg, Oral, BID, CATHY Prasad, 1,500 mg at 10/05/17 2100    levothyroxine tablet 200 mcg, 200 mcg, Oral, Before breakfast, Terry Ritchie MD, 200 mcg at 10/06/17 0524    losartan tablet 100 mg, 100 mg, Oral, Daily, Terry Ritchie MD, 100 mg at 10/05/17 1307    senna-docusate 8.6-50 mg per tablet 1 tablet, 1 tablet, Oral, BID, CATHY Prasad, 1 tablet at 10/05/17 2100    ASSESSMENT/PLAN:     Current Problems List:  Active Hospital Problems    Diagnosis  POA    *Aphasia [R47.01]  Yes    Simple partial seizure, consciousness not impaired [G40.109]  Yes    DM2 (diabetes mellitus, type 2) [E11.9]  Yes    Altered mental status [R41.82]  Yes    DM type 2 without retinopathy [E11.9]  Yes    Essential hypertension [I10]  Yes    Chronic diastolic heart failure [I50.32]  Yes     11/26/2013      Cerebrovascular accident (CVA) [I63.9]  Yes    CKD (chronic kidney disease), stage III [N18.3]  Yes     Chronic    Hypercholesterolemia [E78.00]  Yes      Resolved Hospital Problems    Diagnosis Date Resolved POA   No resolved problems to display.       Active Problems, Status & Treatment Plan Addressed Today:    Simple partial seizure, consciousness not impaired  Neurology consult   Recommend load 1g levetiracetam(Keppra) iv followed by increase to 1500mg bid.    Cerebrovascular accident (CVA)  Hx  of left PCA distribution stroke with right hemianopsia  Evaluated by Vascular Neurology in ED and ruled out acute ishcemia  Continue Eliquis and statin for secondary prevention    Essential hypertension  BP elevated on presentation and during hospital stay with SBP in the 200's  Re-started home norvasc, coreg(carvedilol), and losartan  Increase norvasc to 10mg daily    Chronic diastolic heart failure     No symptoms or signs of ADHF  Will continue home coreg 12.5mg po BID, losartan 100mg po QD, and lasix 80mg po BID       Hypercholesterolemia     -Resume home artovastatin 40mg     Thyroid  Levothyroxine 200 mcg    Signing Physician:  Rosa Hardy

## 2017-10-06 NOTE — PHARMACY MED REC
"Admission Medication Reconciliation - Pharmacy Consult Note    The home medication history was taken by Qi Dominguez, Pharmacy Tech.     Based on information gathered and subsequent review by the clinical pharmacist, the items below may need attention.    You may go to "Admission" then "Reconcile Home Medications" tabs to review and/or act upon these items.    No issues noted with the medication reconciliation.      Darvin Lynn, PharmD  [id number]      Patient's prior to admission medication regimen was as follows:  Facility-Administered Medications Prior to Admission   Medication Dose Route Frequency Provider Last Rate Last Dose    [DISCONTINUED] lidocaine HCl 2% urojet 10 mL  10 mL Urethral 1 time in Clinic/HOD Anahi Mejia MD         Prescriptions Prior to Admission   Medication Sig Dispense Refill Last Dose    acetaminophen (TYLENOL) 500 MG tablet Take 1 tablet (500 mg total) by mouth every 6 (six) hours as needed for Pain.  0 10/4/2017    amlodipine (NORVASC) 5 MG tablet Take 1 tablet (5 mg total) by mouth once daily. 30 tablet 11 10/4/2017    apixaban 5 mg Tab Take 1 tablet (5 mg total) by mouth 2 (two) times daily. 60 tablet 11 10/4/2017    atorvastatin (LIPITOR) 40 MG tablet Take 1 tablet (40 mg total) by mouth once daily. 90 tablet 3 10/4/2017    BD INSULIN PEN NEEDLE UF MINI 31 gauge x 3/16" Ndle To use with insulin pens 4  times daily 360 each 3 10/4/2017    carvedilol (COREG) 12.5 MG tablet Take 1 tablet (12.5 mg total) by mouth 2 (two) times daily. 180 tablet 4 10/4/2017    coenzyme Q10 200 mg capsule Take 200 mg by mouth once daily. (Patient taking differently: Take 200 mg by mouth once daily. PT TAKEN EVERY MON, WED, FRI)   10/4/2017    folic acid (FOLVITE) 400 MCG tablet Take 400 mcg by mouth once daily.       furosemide (LASIX) 40 MG tablet Take 2 tablets (80 mg total) by mouth 2 (two) times daily. (Patient taking differently: Take 80 mg every morning and 40 mg every evening) 90 tablet " 4 10/4/2017    insulin detemir (LEVEMIR FLEXPEN) 100 unit/mL (3 mL) SubQ InPn pen Inject 16 Units into the skin every evening. 3 Box 3 10/4/2017    insulin lispro (HUMALOG KWIKPEN) 100 unit/mL InPn pen Inject 12 units w/ meals, 6 units if eating lighter meal or bg less than 120, scale 180-230 +2, 231-280 +4, 281-330 +6, 331-380 +8. 3 Box 3 Taking    levetiracetam (KEPPRA) 750 MG Tab Take 1 tablet (750 mg total) by mouth 2 (two) times daily. 180 tablet 4 10/4/2017    levothyroxine (SYNTHROID) 200 MCG tablet Take 1 tablet (200 mcg total) by mouth before breakfast. 90 tablet 2 10/4/2017    losartan (COZAAR) 100 MG tablet Take 1 tablet (100 mg total) by mouth once daily. 90 tablet 3 10/4/2017    magnesium 30 mg Tab Take 1 tablet by mouth every Mon, Wed, Fri.        multivitamin capsule Take 1 capsule by mouth every Mon, Wed, Fri.    10/4/2017    ONETOUCH VERIO Strp USE TO TEST BLOOD SUGAR THREE TIMES DAILY 100 strip 11 Taking    vitamin D 1000 units Tab Take 1,000 Units by mouth every Mon, Wed, Fri.       dulaglutide (TRULICITY) 0.75 mg/0.5 mL PnIj Inject 0.5 mLs (0.75 mg total) into the skin every 7 days. 4 Syringe 6 Unknown    glucagon (human recombinant) inj 1mg/mL kit Inject 1 mL (1 mg total) into the muscle as needed. 1 kit 1 Unknown         Please add appropriate    SmartPhrase below:

## 2017-10-06 NOTE — PLAN OF CARE
Problem: Patient Care Overview  Goal: Plan of Care Review  Outcome: Ongoing (interventions implemented as appropriate)  Pt AAOX4, BP hypertensive. family at bedside. Pt is resting quietly. No s/s infection, skin breakdown/pressure ulcers - pt moves independently well with assist. Denies pain. IS education complete. SCD's on and functioning. Neuro checks complete. Fall precautions maintained. Will resume care.

## 2017-10-06 NOTE — PROCEDURES
DATE OF SERVICE:  10/06/2017    EEG NUMBER:  [id number]    REFERRING PHYSICIAN:  Dr. Da Silva    This EEG was performed to assess for evidence of underlying epilepsy.    ELECTROENCEPHALOGRAM REPORT    METHODOLOGY:  Electroencephalographic (EEG) recording is recorded with   electrodes placed according to the International 10-20 placement system.  Thirty   two (32) channels of digital signal (sampling rate of 512/sec), including T1   and T2, were simultaneously recorded from the scalp and may include EKG, EMG,   and/or eye monitors.  Recording band pass was 0.1 to 512 Hz.  Digital video   recording of the patient is simultaneously recorded with the EEG.  The patient   is instructed to report clinical symptoms which may occur during the recording   session.  EEG and video recording are stored and archived in digital format.    Activation procedures, which include photic stimulation, hyperventilation and   instructing patients to perform simple tasks, are done in selected patients.    The EEG is displayed on a monitor screen and can be reviewed using different   montages.  Computer assisted-analysis is employed to detect spike and   electrographic seizure activity.   The entire record is submitted for computer   analysis.  The entire recording is visually reviewed, and the times identified   by computer analysis as being spikes or seizures are reviewed again.    Compressed spectral analysis (CSA) is also performed on the activity recorded   from each individual channel.  This is displayed as a power display of   frequencies from 0 to 30 Hz over time.   The CSA is reviewed looking for   asymmetries in power between homologous areas of the scalp, then compared with   the original EEG recording.    [employer] software was also utilized in the review of this study.  This software   suite analyzes the EEG recording in multiple domains.  Coherence and rhythmicity   are computed to identify EEG sections which may contain organized  seizures.    Each channel undergoes analysis to detect the presence of spike and sharp waves   which have special and morphological characteristics of epileptic activity.  The   routine EEG recording is converted from special into frequency domain.  This is   then displayed comparing homologous areas to identify areas of significant   asymmetry.  Algorithm to identify non-cortically generated artifact is used to   separate artifact from the EEG.    EEG FINDINGS:  The recording was obtained with a number of standard bipolar and   referential montages during wakefulness, drowsiness and sleep.  In the alert   state, the posterior background rhythm was a symmetric, well-modulated 9 to 10   Hz alpha rhythm, which reacted symmetrically to eye opening.  Nearly continuous   irregular mixed delta and theta range slowing was noted in the left posterior   temporal as well as center parietooccipital region.  Activation procedures were   not performed.  During drowsiness, the background rhythm waxed and waned and   there were periods of slowing.  During stage II sleep, symmetric V waves and   sleep spindles were noted.  There were no interictal epileptiform abnormalities   and no clinical or electrographic seizures were recorded.    The EKG channel revealed an irregular rhythm.    IMPRESSION:  This is an abnormal EEG during wakefulness, drowsiness and sleep.    Nearly continuous irregular left hemisphere slowing was noted.  The EKG revealed   an irregular rhythm.    CLINICAL CORRELATION:  The patient is a 62-year-old male with a history of   hemianopsia and atrial fibrillation, who is currently maintained on Keppra.    This is an abnormal EEG during wakefulness, drowsiness and sleep.  The presence   of continuous irregular slowing in the left hemisphere, predominantly in the   posterior region, is suggestive of a structural abnormality in this hemisphere   and correlates with history of signal abnormality on brain imaging.   There is no   evidence of an epileptic process on this recording.  No seizures were recorded   during this study.  Of note, the EKG channel revealed an irregular rhythm.      FAK/LEBRON  dd: 10/06/2017 15:19:27 (CDT)  td: 10/06/2017 15:56:02 (CDT)  Doc ID   #[id number]  Job ID #[id number]    CC:

## 2017-10-06 NOTE — SUBJECTIVE & OBJECTIVE
Interval History:   Patient with elevated BP overnight. No recurrent seizures. Still with word finding.    Review of Systems   Constitutional: no fever or chills  ENT: no nasal congestion or sore throat  Respiratory: no cough or shortness of breath  Cardiovascular: no chest pain or palpitations  Gastrointestinal: no nausea or vomiting, no abdominal pain or abdominal distention   Genitourinary: no hematuria or dysuria  Integument/Breast: no rash or pruritis  Hematologic/Lymphatic: no easy bruising or lymphadenopathy  Musculoskeletal: no arthralgias or myalgias  Neurological: no seizures or tremors  Endocrine: no heat or cold intolerance    Objective:     Vital Signs (Most Recent):  Temp: 96.7 °F (35.9 °C) (10/05/17 2107)  Pulse: 64 (10/05/17 2107)  Resp: 16 (10/05/17 2107)  BP: (!) 193/92 (10/05/17 2107)  SpO2: 96 % (10/05/17 2107) Vital Signs (24h Range):  Temp:  [96.7 °F (35.9 °C)-98.5 °F (36.9 °C)] 96.7 °F (35.9 °C)  Pulse:  [50-78] 64  Resp:  [16-20] 16  SpO2:  [96 %-100 %] 96 %  BP: (155-226)/(71-97) 193/92     Weight: 104.3 kg (230 lb)  Body mass index is 33 kg/m².    Intake/Output Summary (Last 24 hours) at 10/05/17 2120  Last data filed at 10/05/17 0606   Gross per 24 hour   Intake                0 ml   Output                0 ml   Net                0 ml      Physical Exam  General: well developed, well nourished, appears to be in NAD  Eyes: conjunctivae/corneas clear. PERRL. EOMI.  Neck: supple, symmetrical, trachea midline, no JVD  Cardiovascular: Heart: regular rate and rhythm, S1, S2 normal, no murmur, click, rub or gallop.  Lungs: clear to auscultation bilaterally and normal respiratory effort  Chest Wall: no tenderness  Abdomen/Rectal: Abdomen: Non distended. + BS. No masses. No TTP.   Extremities: no edema, redness or tenderness in the calves or thighs. Pulses: 2+ and symmetric  Skin: Skin color, texture, turgor normal. No rashes or lesions  Musculoskeletal: full range of motion of joints  Lymph  Nodes: No cervical or supraclavicular adenopathy  Psych/Behavioral: Normal. Alert and oriented    Significant Labs:   BMP:   Recent Labs  Lab 10/05/17  1001   *      K 4.7      CO2 22*   BUN 17   CREATININE 1.8*   CALCIUM 8.8   MG 2.1     CBC:   Recent Labs  Lab 10/04/17  2118   WBC 12.00   HGB 11.2*   HCT 31.5*

## 2017-10-06 NOTE — PROGRESS NOTES
[facility] Hospital Medicine  Progress Note    Patient Name: Alfa Christy III  MRN: [id number]  Patient Class: IP- Inpatient   Admission Date: 10/4/2017  Length of Stay: 1 days  Attending Physician: James Brooks MD  Primary Care Provider: Tyler Jasmine MD    Hospital Medicine Team: [facility] HOSP MED 2 CATHY Durand    Subjective:     Principal Problem:Aphasia    HPI:  62yoM with PMHx of CVA (with right hemianopsia1/2016), previous seizure (11/2016),DM2, CKD Stage 3, HTN, HLD, pAfib, thyroid cancer (s/p surgery and rad iodine 2009) presents with receptive and expressive aphasia. The patient was LKN at 7:00 pm.  His wife states she left home and returned at 8:00 to find the patient agitated with word finding difficulty not capable of naming objects and progressive difficulty making complete words. He had no weakness and was ambulatory at home.  The patient's wife states earlier tonight the patient c/o feeling dizziness holding on to walls for balance. He took 2 tylenol and reported relief of his symptoms at that time.  The patient had a flu shot today and has been having URI symptoms recently within the past 2 weeks.  He hasn't taken his losartan since the URI symptoms began, per his wife. Patient wife states this happened before for 2 hours after seizure like activity last November. She claims patient has no difficulty finding words at home since. However, per chart review neurology patient has had some word finding difficulty since CVA last January. Per wife has not had f/c, sob, chest pain, abdominal pain, diarrhea, or new medicine, sick contacts. Patient was stressed today at noon with the doubling of the water bill at home per wife.     Patient has had intermittent gross hematuria since July and is followed by urology. Hematuria attributed to BPH in setting of chronic anticoagulation. Patient had recent cystoscopy and bilateral RPG, possible ureteroscopy and biopsy to evaluate  hematuria and abnormal cytology, no recent note has been left per urology.    Upon arrival to Ed, pt blood pressure systolics up to 220s, CTH was done which was consistent with temporal evolution of remote left PCA territory infarction; No evidence for acute intracranial hemorrhages. Stroke code was called. During this time patient had waxing and waning of symptoms. Neurology recs MRI/MRA which showed questionable subtle cortical diffusion restriction throughout the supratentorial brain.  Short-term followup is suggested.Remote left PCA distribution infarct within the left occipital lobe.  Stable 4 mm right supraclinoid ICA aneurysm.Given negative imaging and the waxing and waning nature of the patient's symptoms Neurology thought it to be better evaluated with a medicine admission.        Hospital Course:  Neurology recommended increasing home AED. Determine dispo and plan for discharge today.     Interval History:   Patient with elevated BP overnight. No recurrent seizures. Still with word finding.    Review of Systems   Constitutional: no fever or chills  ENT: no nasal congestion or sore throat  Respiratory: no cough or shortness of breath  Cardiovascular: no chest pain or palpitations  Gastrointestinal: no nausea or vomiting, no abdominal pain or abdominal distention   Genitourinary: no hematuria or dysuria  Integument/Breast: no rash or pruritis  Hematologic/Lymphatic: no easy bruising or lymphadenopathy  Musculoskeletal: no arthralgias or myalgias  Neurological: no seizures or tremors  Endocrine: no heat or cold intolerance    Objective:     Vital Signs (Most Recent):  Temp: 96.7 °F (35.9 °C) (10/05/17 2107)  Pulse: 64 (10/05/17 2107)  Resp: 16 (10/05/17 2107)  BP: (!) 193/92 (10/05/17 2107)  SpO2: 96 % (10/05/17 2107) Vital Signs (24h Range):  Temp:  [96.7 °F (35.9 °C)-98.5 °F (36.9 °C)] 96.7 °F (35.9 °C)  Pulse:  [50-78] 64  Resp:  [16-20] 16  SpO2:  [96 %-100 %] 96 %  BP: (155-226)/(71-97) 193/92      Weight: 104.3 kg (230 lb)  Body mass index is 33 kg/m².    Intake/Output Summary (Last 24 hours) at 10/05/17 2120  Last data filed at 10/05/17 0606   Gross per 24 hour   Intake                0 ml   Output                0 ml   Net                0 ml      Physical Exam  General: well developed, well nourished, appears to be in NAD  Eyes: conjunctivae/corneas clear. PERRL. EOMI.  Neck: supple, symmetrical, trachea midline, no JVD  Cardiovascular: Heart: regular rate and rhythm, S1, S2 normal, no murmur, click, rub or gallop.  Lungs: clear to auscultation bilaterally and normal respiratory effort  Chest Wall: no tenderness  Abdomen/Rectal: Abdomen: Non distended. + BS. No masses. No TTP.   Extremities: no edema, redness or tenderness in the calves or thighs. Pulses: 2+ and symmetric  Skin: Skin color, texture, turgor normal. No rashes or lesions  Musculoskeletal: full range of motion of joints  Lymph Nodes: No cervical or supraclavicular adenopathy  Psych/Behavioral: Normal. Alert and oriented    Significant Labs:   BMP:   Recent Labs  Lab 10/05/17  1001   *      K 4.7      CO2 22*   BUN 17   CREATININE 1.8*   CALCIUM 8.8   MG 2.1     CBC:   Recent Labs  Lab 10/04/17  2118   WBC 12.00   HGB 11.2*   HCT 31.5*              Assessment/Plan:      * Aphasia    ddx includes: TIA vs seizures vs metabolic encephalopathy  CT head, MRI and MRA brain with on signs of acute ischemia  TSH elevated to 7 (hx of thyroid ca surgery and rad iodine), T4 wnl  Pending 24hr EEG  Neurology following appreciate assistance.   keppra dosing doubled to 1500mg bid po          Cerebrovascular accident (CVA)    Hx of left PCA distribution stroke with right hemianopsia  Evaluated by Vascular Neurology in ED and ruled out acute ishcemia  Continue Eliquis and statin for secondary prevention        Essential hypertension    BP elevated on presentation and during hospital stay with SBP in the 200's  Re-started home  norvasc, coreg, and losartan  Increase norvasc to 10mg daily        Chronic diastolic heart failure    No symptoms or signs of ADHF  Will continue home coreg 12.5mg po BID, losartan 100mg po QD, and lasix 80mg po BID          CKD (chronic kidney disease), stage III    At Cr baseline of 1.8  Renally dose medications and avoid nephrotoxins          DM type 2 without retinopathy    Glucose 200 this am but was low on admission, hgb a1c was 5.6 in 9/2017  Does detemir 16u qhs will do dose adjusted insulin at 12u qhs  Started on diabetic diet  LDSSI for now            Hypercholesterolemia    -Resume home artovastatin 40mg          Abdominal pain    Patient with generalized involuntary guarding, hernia midline, and distension  Afebrile, no leukocytosis WBC 12 less likely inflammatory  KUB ordered r/o ileus  Consider CT abdomen as patient has vasculopathy risk factors and hernia to r/o ischemia  Monitoring VS            VTE Risk Mitigation         Ordered     apixaban tablet 5 mg  2 times daily     Route:  Oral        10/05/17 0338     High Risk of VTE  Once      10/05/17 0247     Place sequential compression device  Until discontinued      10/05/17 0247     Place DARIAN hose  Until discontinued      10/05/17 0247              CATHY Durand  Department of Hospital Medicine   [facility]

## 2017-10-06 NOTE — PLAN OF CARE
Problem: SLP Goal  Goal: SLP Goal  Updated Speech Language Pathology Goals  Goals expected to be met by 10/13  1. Pt will tolerate regular consistency diet and thin liquids with no overt s/s aspiration.  2. Pt will name common objects/body parts w/ 50% acc given min cues.  3. Pt will complete responsive naming w/ 50% acc given min cues.  4. Pt will follow 2 step directives w/ 60% acc given min cues.  5. Pt will complete further evaluation of writing, visual spatial skills and cognitive-linguistic skills to determine the need for additional goals.      Speech Language Pathology  Goal expected to be met by 10/12:  1. Pt will tolerate regular consistency diet and thin liquids with no overt s/s aspiration.  -Goal ongoing  Outcome: Ongoing (interventions implemented as appropriate)  Speech-Language Cognitive evaluation completed.  Goals updated.  Cont ST per POC.    Shanti Manning CCC-SLP  10/6/2017

## 2017-10-06 NOTE — PT/OT/SLP EVAL
Speech Language Pathology Evaluation    Alfa Christy III   MRN: [id number]   Admitting Diagnosis: Aphasia    Diet recommendations: Solid Diet Level: Regular  Liquid Diet Level: Thin Aspiration precautions    SLP Treatment Date: 10/06/17  Speech Start Time: 1055     Speech Stop Time: 1120     Speech Total (min): 25 min       TREATMENT BILLABLE MINUTES:  Eval 25     Diagnosis: Aphasia      Past Medical History:   Diagnosis Date    Allergy     BMI 31.0-31.9,adult 11/25/2014    Cerebrovascular disease 7/25/2016    Chronic anticoagulation - pradaxa 11/10/2016    Chronic diastolic heart failure 11/20/2016    CKD (chronic kidney disease), stage III 9/23/2013    Controlled type 2 diabetes with neuropathy 12/16/2014    Diabetes mellitus due to underlying condition with stage 3 chronic kidney disease, without long-term current use of insulin 11/2/2016    Elevated alkaline phosphatase level 8/1/2012    Elevated PSA     negative prostate biopsy 4/11    Erectile dysfunction associated with type 2 diabetes mellitus     Gallstones 3/20/2013    Generalized tonic-clonic seizure 11/2016    HTN (hypertension), benign 8/1/2012    Hypercholesterolemia 8/1/2012    Microcytic anemia 11/27/2013    Paroxysmal atrial fibrillation 9/23/2013    Postsurgical hypothyroidism 11/27/2013    Right homonymous hemianopsia 2/5/2016    Sickle cell trait     Stroke 1/27/2016    Thyroid cancer     multifocal with six lesions, largest 5mm, treated with surgery and radioactive iodine    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/2016     Past Surgical History:   Procedure Laterality Date    BREAST SURGERY      cyst removal    COLONOSCOPY      CYST REMOVAL      chest    PROSTATE BIOPSY  4/27/11    SKIN BIOPSY      THYROID SURGERY  8/26/09    VASCULAR SURGERY         Has the patient been evaluated by SLP for swallowing? : Yes  Keep patient NPO?: No   General Precautions: Standard, aspiration, fall    Current Respiratory Status:  (Room  "air)     Social Hx: Patient lives with his .    Occupational/hobbies/homemaking: none stated.    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  I need to talk."  Pt stated  Patient goals: to talk.    Pain/Comfort  Pain Rating 1: 0/10  Pain Rating Post-Intervention 1: 0/10     Objective:   Patient found with: peripheral IV, telemetry    Oral Musculature Evaluation  Oral Musculature: WFL  Dentition: present and adequate  Mucosal Quality: good  Mandibular Strength and Mobility: WFL  Oral Labial Strength and Mobility: WFL  Lingual Strength and Mobility: WFL  Buccal Strength and Mobility: WFL  Volitional Cough: WFL  Volitional Swallow: Dec'd hyolaryngeal elevation  Voice Prior to PO Intake: Clear, dry     Cognitive Status:  Behavioral Observations: alert, appropriate and cooperative-  Memory and Orientation: DNT d/t aphasia  Attention: Decreased for self monitoring.  Problem Solving: DNT d/t aphasia  Pragmatics: flat affect, poor eye contact and initiation problems  Executive Function: mental flexibility, self-correction and self-monitoring    Language: perseveration, delayed and circumlocutious    Auditory Comprehension: Receptive aphasia.  Moderate impairment w/ complex comprehension.  Pt completed simple and complex yes/no questions w/ 100% acc and followed 1 step directives w/ 100% acc.  He followed 2 step directives w/ 50% acc and 3 step w/ 0% acc    Verbal Expression: Expressive aphasia.  Moderate impairment seen w/ naming tasks and in conversation.  Pt completed auto speech tasks w/ 100% acc, body part and object naming w/ 0% acc up to 100% acc given mod phonemic and semantic cues.  He completed responsive naming tasks w/ 25% acc indep up to 50% given mod cues.  He completed concrete convergent naming tasks w/ 0% acc and divergent naming tasks w/ 100% acc given extra time.  Circumlocution and perseveration seen w/ tasks and in conversation.    Motor Speech: WFL    Voice: " WFL    Augmentative Alternative Communication: no    Reading: Severe deficits seen.  Pt was unable to read at the word level.    Writing: DNT    Visual-Spatial: DNT    Additional Treatment:  Education was provided to pt and spouse re: SLP role, eval results, and SLP POC.  They indicated fair understanding and agreed w/ established POC.    FIM:  Social Interaction: 5 Supervision--The patient requires supervision (e.g., monitoring, verbal control, cueing, or coaxing) only under stressful or unfamiliar conditions, but less than 10% of the time.  The patient may require encouragement to initiate participation.   Problem Solvin Maximal Direction--The patient solves routine problems 25 to 49% of the time.  The patient needs direction more than half the time to iniate, plan, or complete simple daily activities, and may need restraint for safety.   Comprehension: 3 Moderate Prompting--The patient understands directions and conversation about basid daily needs 50 to 74% of the time.   Expression: 2 Maximal Prompting--The patient expresses basic daily needs and ideas 25 to 49% of the time.  The patient uses only single words or gestures, and (s)he needs prompting more than half the time.   Memory: 2 Maximal Prompting--The patien recognizes and remembers 25 to 49% of the time, and needs prompting more than half the time.     Assessment:  Alfa Christy III is a 62 y.o. male with a SLP diagnosis of Aphasia. He present with moderate receptive and expressive language deficits at this time.    Do you have any cultural, spiritual, Lutheran conflicts, given your current situation?: none    Discharge recommendations: Discharge Facility/Level Of Care Needs: nursing facility, skilled     Goals:    SLP Goals        Problem: SLP Goal    Goal Priority Disciplines Outcome   SLP Goal     SLP Ongoing (interventions implemented as appropriate)   Description:  Updated Speech Language Pathology Goals  Goals expected to be met by 10/13  1. Pt  will tolerate regular consistency diet and thin liquids with no overt s/s aspiration.  2. Pt will name common objects/body parts w/ 50% acc given min cues.  3. Pt will complete responsive naming w/ 50% acc given min cues.  4. Pt will follow 2 step directives w/ 60% acc given min cues.  5. Pt will complete further evaluation of writing, visual spatial skills and cognitive-linguistic skills to determine the need for additional goals.      Speech Language Pathology  Goal expected to be met by 10/12:  1. Pt will tolerate regular consistency diet and thin liquids with no overt s/s aspiration.  -Goal ongoing                     Plan:   Patient to be seen Therapy Frequency: 5 x/week   Plan of Care expires: 11/03/17  Plan of Care reviewed with: patient, spouse  SLP Follow-up?: Yes              Shanti Manning CCC-SLP  10/06/2017

## 2017-10-07 LAB
ALBUMIN SERPL BCP-MCNC: 3 G/DL
ALP SERPL-CCNC: 120 U/L
ALT SERPL W/O P-5'-P-CCNC: 26 U/L
ANION GAP SERPL CALC-SCNC: 9 MMOL/L
AST SERPL-CCNC: 37 U/L
BASOPHILS # BLD AUTO: 0.05 K/UL
BASOPHILS NFR BLD: 0.6 %
BILIRUB SERPL-MCNC: 1.2 MG/DL
BUN SERPL-MCNC: 25 MG/DL
CALCIUM SERPL-MCNC: 8.6 MG/DL
CHLORIDE SERPL-SCNC: 104 MMOL/L
CO2 SERPL-SCNC: 23 MMOL/L
CREAT SERPL-MCNC: 2 MG/DL
DIFFERENTIAL METHOD: ABNORMAL
EOSINOPHIL # BLD AUTO: 0.3 K/UL
EOSINOPHIL NFR BLD: 3.7 %
ERYTHROCYTE [DISTWIDTH] IN BLOOD BY AUTOMATED COUNT: 17.4 %
EST. GFR  (AFRICAN AMERICAN): 39.6 ML/MIN/1.73 M^2
EST. GFR  (NON AFRICAN AMERICAN): 34.3 ML/MIN/1.73 M^2
GLUCOSE SERPL-MCNC: 201 MG/DL
HCT VFR BLD AUTO: 31.1 %
HGB BLD-MCNC: 11.2 G/DL
LYMPHOCYTES # BLD AUTO: 2.6 K/UL
LYMPHOCYTES NFR BLD: 30.1 %
MAGNESIUM SERPL-MCNC: 1.8 MG/DL
MCH RBC QN AUTO: 27.9 PG
MCHC RBC AUTO-ENTMCNC: 36 G/DL
MCV RBC AUTO: 77 FL
MONOCYTES # BLD AUTO: 1.6 K/UL
MONOCYTES NFR BLD: 17.8 %
NEUTROPHILS # BLD AUTO: 4.1 K/UL
NEUTROPHILS NFR BLD: 47.7 %
PHOSPHATE SERPL-MCNC: 3.7 MG/DL
PLATELET # BLD AUTO: 218 K/UL
PMV BLD AUTO: 12.2 FL
POCT GLUCOSE: 110 MG/DL (ref 70–110)
POCT GLUCOSE: 168 MG/DL (ref 70–110)
POCT GLUCOSE: 199 MG/DL (ref 70–110)
POCT GLUCOSE: 214 MG/DL (ref 70–110)
POTASSIUM SERPL-SCNC: 3.7 MMOL/L
PROT SERPL-MCNC: 7 G/DL
RBC # BLD AUTO: 4.02 M/UL
SODIUM SERPL-SCNC: 136 MMOL/L
WBC # BLD AUTO: 8.69 K/UL

## 2017-10-07 PROCEDURE — 83735 ASSAY OF MAGNESIUM: CPT

## 2017-10-07 PROCEDURE — 97110 THERAPEUTIC EXERCISES: CPT

## 2017-10-07 PROCEDURE — 25000003 PHARM REV CODE 250: Performed by: STUDENT IN AN ORGANIZED HEALTH CARE EDUCATION/TRAINING PROGRAM

## 2017-10-07 PROCEDURE — 97116 GAIT TRAINING THERAPY: CPT

## 2017-10-07 PROCEDURE — 84100 ASSAY OF PHOSPHORUS: CPT

## 2017-10-07 PROCEDURE — 85025 COMPLETE CBC W/AUTO DIFF WBC: CPT

## 2017-10-07 PROCEDURE — 99231 SBSQ HOSP IP/OBS SF/LOW 25: CPT | Mod: ,,, | Performed by: HOSPITALIST

## 2017-10-07 PROCEDURE — 25000003 PHARM REV CODE 250: Performed by: EMERGENCY MEDICINE

## 2017-10-07 PROCEDURE — 11000001 HC ACUTE MED/SURG PRIVATE ROOM

## 2017-10-07 PROCEDURE — 25000003 PHARM REV CODE 250: Performed by: INTERNAL MEDICINE

## 2017-10-07 PROCEDURE — 80053 COMPREHEN METABOLIC PANEL: CPT

## 2017-10-07 PROCEDURE — 97530 THERAPEUTIC ACTIVITIES: CPT

## 2017-10-07 RX ORDER — CARVEDILOL 6.25 MG/1
6.25 TABLET ORAL 2 TIMES DAILY
Status: DISCONTINUED | OUTPATIENT
Start: 2017-10-07 | End: 2017-10-08

## 2017-10-07 RX ORDER — HYDRALAZINE HYDROCHLORIDE 25 MG/1
25 TABLET, FILM COATED ORAL EVERY 8 HOURS
Status: DISCONTINUED | OUTPATIENT
Start: 2017-10-07 | End: 2017-10-11 | Stop reason: HOSPADM

## 2017-10-07 RX ADMIN — FUROSEMIDE 80 MG: 40 TABLET ORAL at 09:10

## 2017-10-07 RX ADMIN — LEVETIRACETAM 1500 MG: 500 TABLET, FILM COATED ORAL at 08:10

## 2017-10-07 RX ADMIN — HYDRALAZINE HYDROCHLORIDE 25 MG: 25 TABLET, FILM COATED ORAL at 09:10

## 2017-10-07 RX ADMIN — AMLODIPINE BESYLATE 10 MG: 10 TABLET ORAL at 08:10

## 2017-10-07 RX ADMIN — ATORVASTATIN CALCIUM 40 MG: 20 TABLET, FILM COATED ORAL at 08:10

## 2017-10-07 RX ADMIN — LOSARTAN POTASSIUM 100 MG: 25 TABLET, FILM COATED ORAL at 08:10

## 2017-10-07 RX ADMIN — APIXABAN 5 MG: 5 TABLET, FILM COATED ORAL at 08:10

## 2017-10-07 RX ADMIN — CARVEDILOL 6.25 MG: 6.25 TABLET, FILM COATED ORAL at 09:10

## 2017-10-07 RX ADMIN — STANDARDIZED SENNA CONCENTRATE AND DOCUSATE SODIUM 1 TABLET: 8.6; 5 TABLET, FILM COATED ORAL at 08:10

## 2017-10-07 RX ADMIN — STANDARDIZED SENNA CONCENTRATE AND DOCUSATE SODIUM 1 TABLET: 8.6; 5 TABLET, FILM COATED ORAL at 09:10

## 2017-10-07 RX ADMIN — APIXABAN 5 MG: 5 TABLET, FILM COATED ORAL at 09:10

## 2017-10-07 RX ADMIN — CARVEDILOL 12.5 MG: 12.5 TABLET, FILM COATED ORAL at 08:10

## 2017-10-07 RX ADMIN — LEVETIRACETAM 1500 MG: 500 TABLET, FILM COATED ORAL at 09:10

## 2017-10-07 RX ADMIN — FUROSEMIDE 80 MG: 40 TABLET ORAL at 08:10

## 2017-10-07 RX ADMIN — LEVOTHYROXINE SODIUM 200 MCG: 100 TABLET ORAL at 06:10

## 2017-10-07 RX ADMIN — INSULIN ASPART 2 UNITS: 100 INJECTION, SOLUTION INTRAVENOUS; SUBCUTANEOUS at 08:10

## 2017-10-07 NOTE — SUBJECTIVE & OBJECTIVE
Interval History:   NAEON. Patient with elevated BP and bradycardia overnight. Will start on Hydralazine today, while decreasing Coreg. No recurrent seizures. Still with word finding. Sitting at the edge of bed eating breakfast. Patient responses are more direct and decernable than when admitted. Muscles of R extremities appeared more relaxed as well.     Review of Systems     Constitutional: no fever or chills  ENT: no nasal congestion or sore throat  Respiratory: no cough or shortness of breath  Cardiovascular: no chest pain or palpitations  Gastrointestinal: no nausea or vomiting, no abdominal pain or abdominal distention   Genitourinary: no hematuria or dysuria  Integument/Breast: no rash or pruritis  Hematologic/Lymphatic: no easy bruising or lymphadenopathy  Musculoskeletal: no arthralgias or myalgias  Neurological: no seizures or tremors  Endocrine: no heat or cold intolerance    Objective:     Vital Signs (Most Recent):  Temp: 97.1 °F (36.2 °C) (10/07/17 1302)  Pulse: 61 (10/07/17 1600)  Resp: 18 (10/07/17 1600)  BP: 124/69 (10/07/17 1600)  SpO2: 98 % (10/07/17 1302) Vital Signs (24h Range):  Temp:  [96 °F (35.6 °C)-98.4 °F (36.9 °C)] 97.1 °F (36.2 °C)  Pulse:  [46-73] 61  Resp:  [16-18] 18  SpO2:  [95 %-98 %] 98 %  BP: (122-182)/(69-89) 124/69     Weight: 104.3 kg (230 lb)  Body mass index is 33 kg/m².  No intake or output data in the 24 hours ending 10/07/17 1843   Physical Exam    General: well developed, well nourished, appears to be in NAD  Eyes: conjunctivae/corneas clear. PERRL. EOMI.  Neck: supple, symmetrical, trachea midline, no JVD  Cardiovascular: Heart: regular rate and rhythm, S1, S2 normal, no murmur, click, rub or gallop.  Lungs: clear to auscultation bilaterally and normal respiratory effort  Chest Wall: no tenderness  Abdomen/Rectal: Abdomen: Non distended. + BS. No masses. No TTP.   Extremities: no edema, redness or tenderness in the calves or thighs. Pulses: 2+ and symmetric  Skin: Skin  color, texture, turgor normal. No rashes or lesions  Musculoskeletal: full range of motion of joints  Lymph Nodes: No cervical or supraclavicular adenopathy  Psych/Behavioral: Normal. Alert and oriented      Neuro Exam (Performed by Dr. Shanti Thakur 10/5/17):  Mental Status:  Awake, alert, oriented to self, location, but not to date.  Perseverates on day of the week when asked about month/year.  Recent, remote recall poor but difficult to evaluate in setting of word-finding difficulty.  Some dysnomia.  Cranial Nerves:  VF deficit in R hemisphere, has to adjust head to see people standing on his R.  PERRLA, EOMI.  Facial movement, sensation intact and symmetric.  Palate raises symmetrically, tongue protrudes midline.  SCM 5/5 bilaterally.  Trapezius 4/5 R, 5/5 L.  Motor:  Normal bulk and tone.  R shoulder abduction 3/5, biceps/triceps 4/5.  R  strength 5/5, L shoulder abduction/biceps/triceps/ strength 5/5.  BLE hip flexion, knee flexion/extension, dorsiflexion/plantarflexion 5/5.  Sensory:  Intact to light touch, temperature at all extremities without inattention.  Reflexes:  Biceps, brachioradialis 2+ bilaterally.  Patellar 1+ L, areflexic R.  No ankle clonus.  Downgoing toe bilaterally.  Coordination:  Difficulty with commands for FNF, MARIO, HTS--possible component of apraxia.  No resting tremor, no myoclonus.  Some additional difficulty on FNF possibly due to hemianopia.  Gait:  Deferred 2/2 seizure and fall precautions           Significant Labs:     Recent Results (from the past 24 hour(s))   POCT glucose    Collection Time: 10/06/17 11:02 PM   Result Value Ref Range    POCT Glucose 230 (H) 70 - 110 mg/dL   CBC with Automated Differential    Collection Time: 10/07/17  4:28 AM   Result Value Ref Range    WBC 8.69 3.90 - 12.70 K/uL    RBC 4.02 (L) 4.60 - 6.20 M/uL    Hemoglobin 11.2 (L) 14.0 - 18.0 g/dL    Hematocrit 31.1 (L) 40.0 - 54.0 %    MCV 77 (L) 82 - 98 fL    MCH 27.9 27.0 - 31.0 pg    MCHC 36.0 32.0  - 36.0 g/dL    RDW 17.4 (H) 11.5 - 14.5 %    Platelets 218 150 - 350 K/uL    MPV 12.2 9.2 - 12.9 fL    Gran # 4.1 1.8 - 7.7 K/uL    Lymph # 2.6 1.0 - 4.8 K/uL    Mono # 1.6 (H) 0.3 - 1.0 K/uL    Eos # 0.3 0.0 - 0.5 K/uL    Baso # 0.05 0.00 - 0.20 K/uL    Gran% 47.7 38.0 - 73.0 %    Lymph% 30.1 18.0 - 48.0 %    Mono% 17.8 (H) 4.0 - 15.0 %    Eosinophil% 3.7 0.0 - 8.0 %    Basophil% 0.6 0.0 - 1.9 %    Differential Method Automated    Comprehensive Metabolic Panel (CMP)    Collection Time: 10/07/17  4:28 AM   Result Value Ref Range    Sodium 136 136 - 145 mmol/L    Potassium 3.7 3.5 - 5.1 mmol/L    Chloride 104 95 - 110 mmol/L    CO2 23 23 - 29 mmol/L    Glucose 201 (H) 70 - 110 mg/dL    BUN, Bld 25 (H) 8 - 23 mg/dL    Creatinine 2.0 (H) 0.5 - 1.4 mg/dL    Calcium 8.6 (L) 8.7 - 10.5 mg/dL    Total Protein 7.0 6.0 - 8.4 g/dL    Albumin 3.0 (L) 3.5 - 5.2 g/dL    Total Bilirubin 1.2 (H) 0.1 - 1.0 mg/dL    Alkaline Phosphatase 120 55 - 135 U/L    AST 37 10 - 40 U/L    ALT 26 10 - 44 U/L    Anion Gap 9 8 - 16 mmol/L    eGFR if African American 39.6 (A) >60 mL/min/1.73 m^2    eGFR if non  34.3 (A) >60 mL/min/1.73 m^2   Magnesium    Collection Time: 10/07/17  4:28 AM   Result Value Ref Range    Magnesium 1.8 1.6 - 2.6 mg/dL   Phosphorus    Collection Time: 10/07/17  4:28 AM   Result Value Ref Range    Phosphorus 3.7 2.7 - 4.5 mg/dL   POCT glucose    Collection Time: 10/07/17  8:13 AM   Result Value Ref Range    POCT Glucose 214 (H) 70 - 110 mg/dL   POCT glucose    Collection Time: 10/07/17  2:54 PM   Result Value Ref Range    POCT Glucose 168 (H) 70 - 110 mg/dL       Significant Imaging:       Imaging Results           X-Ray Abdomen AP 1 View (Final result)  Result time 10/05/17 04:05:22         Impression:     No evidence of small bowel obstruction or ileus.                    Narrative:     FINDINGS:  Monitoring leads project over the abdomen wall. No dilated loops of small bowel. Mild to moderate fecal  loading in the right hemicolon. No supine evidence of free intraperitoneal air. Partially visualized lung bases are unremarkable. Degenerative changes of the lumbar spine noted.                    MRA Brain without contrast (Final result)     Abnormal  Result time 10/05/17 01:19:42         MRA Neck without contrast (Final result)     Abnormal  Result time 10/05/17 01:19:19              MRI Brain Without Contrast (Edited Result - FINAL)  Result time 10/05/17 01:19:02         Impression:     No evidence of acute or major vascular distribution infarct or intracranial hemorrhage.  Questionable subtle cortical diffusion restriction throughout the supratentorial brain.  Short-term followup is suggested.  Remote left PCA distribution infarct within the left occipital lobe.  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                Narrative:     MRI BRAIN, MRA HEAD, MRA NECK  CLINICAL INDICATION: 62 year old M with stroke, severe expressive aphasia, LLE weakness.   TECHNIQUE: Multiplanar, multisequence images were obtained of the brain. Three-dimensional time-of-flight technique was used in assessment of the neck and intracranial vasculature.  COMPARISON: CT head 10/4/2017.     FINDINGS:  MRI BRAIN:  The ventricles are normal in size for age, without evidence of hydrocephalus.  There is suggestion of subtle diffuse cortical diffusion restriction in the supratentorial brain.     There is a stable region of encephalomalacia within the left occipital lobe. There is relatively isointense diffusion signal in this region with corresponding hyperintensity on ADC map and T2/FLAIR hyperintensity, compatible with remote infarct. No areas of restricted diffusion signal are seen to suggest new/acute infarct. No areas of susceptibility to suggest hemorrhage. A punctate focus of T1 hyperintensity in the left aspect of the anterior interhemispheric fissure likely represents a dural calcification. Supratentorial patchy T2/FLAIR  hyperintensities compatible with chronic microvascular ischemic disease.     No extra-axial blood or fluid collections.  The T2 skull base flow voids are preserved. Bone marrow signal intensity is unremarkable.     MRA HEAD AND NECK:  Common and internal carotid arteries are normal in caliber.  No significant stenosis at either carotid bifurcation. There is a small saccular aneurysm with a 0.4 cm neck arising from the supraclinoid right ICA.  Vertebral arteries are normal in caliber. The vertebrobasilar system appears within normal limits.   The ACAs, MCAs and PCAs demonstrate no evidence of  high-grade stenosis, focal occlusion or intracranial aneurysm.                         CT Head Without Contrast (Final result)  Result time 10/04/17 22:05:38         Impression:     No evidence of acute major vascular distribution infarct or intracranial hemorrhage.   Stable, remote infarct in the left occipital lobe.  Chronic microvascular ischemic disease.                    Narrative:     CT HEAD WITHOUT CONTRAST  CLINICAL INDICATION: 62 year old M with possible stroke.  TECHNIQUE: Axial CT images obtained throughout the region of the head without the use of intravenous contrast. Axial, sagittal and coronal reconstructions were performed.  COMPARISON: MRI brain 11/6/2016, CT stroke multiphase 11/5/2016, CT head 11/5/2016.     FINDINGS:  The ventricles are stable. The brain appears unchanged.  The basal cisterns are patent.  There is a stable region of encephalomalacia within the left occipital lobe, compatible with remote left PCA infarct. No evidence of acute major vascular distribution infarct or intracranial hemorrhage. Patchy hypoattenuation within the supratentorial white matter is compatible with chronic microvascular ischemic change.   No new extra-axial blood or fluid collections.  The cranium appears intact.  There is nonspecific calcifications in the subcutaneous tissues of the head.   Mastoid air cells and  paranasal sinuses are essentially clear.  The orbits and intraorbital contents are unremarkable.

## 2017-10-07 NOTE — PROGRESS NOTES
[facility] Hospital Medicine  Progress Note    Patient Name: Alfa Christy III  MRN: [id number]  Patient Class: IP- Inpatient   Admission Date: 10/4/2017  Length of Stay: 2 days  Attending Physician: James Brooks MD  Primary Care Provider: Tyler Jasmine MD    Hospital Medicine Team: [facility] HOSP MED 2 Trell Ratliff MD    Subjective:     Principal Problem:Aphasia    HPI:  62yoM with PMHx of CVA (with right hemianopsia1/2016), previous seizure (11/2016),DM2, CKD Stage 3, HTN, HLD, pAfib, thyroid cancer (s/p surgery and rad iodine 2009) presents with receptive and expressive aphasia. The patient was LKN at 7:00 pm.  His wife states she left home and returned at 8:00 to find the patient agitated with word finding difficulty not capable of naming objects and progressive difficulty making complete words. He had no weakness and was ambulatory at home.  The patient's wife states earlier tonight the patient c/o feeling dizziness holding on to walls for balance. He took 2 tylenol and reported relief of his symptoms at that time.  The patient had a flu shot today and has been having URI symptoms recently within the past 2 weeks.  He hasn't taken his losartan since the URI symptoms began, per his wife. Patient wife states this happened before for 2 hours after seizure like activity last November. She claims patient has no difficulty finding words at home since. However, per chart review neurology patient has had some word finding difficulty since CVA last January. Per wife has not had f/c, sob, chest pain, abdominal pain, diarrhea, or new medicine, sick contacts. Patient was stressed today at noon with the doubling of the water bill at home per wife.     Patient has had intermittent gross hematuria since July and is followed by urology. Hematuria attributed to BPH in setting of chronic anticoagulation. Patient had recent cystoscopy and bilateral RPG, possible ureteroscopy and biopsy to evaluate hematuria and  abnormal cytology, no recent note has been left per urology.    Upon arrival to [facility], pt blood pressure systolics up to 220s, CTH was done which was consistent with temporal evolution of remote left PCA territory infarction; No evidence for acute intracranial hemorrhages. Stroke code was called. During this time patient had waxing and waning of symptoms. Neurology recs MRI/MRA which showed questionable subtle cortical diffusion restriction throughout the supratentorial brain.  Short-term followup is suggested.Remote left PCA distribution infarct within the left occipital lobe.  Stable 4 mm right supraclinoid ICA aneurysm.Given negative imaging and the waxing and waning nature of the patient's symptoms Neurology thought it to be better evaluated with a medicine admission.        Hospital Course:  Continue neurological workup for suspected seizures as underlying cause of aphasia. Awaiting official vEEG report.    Interval History:   NAEON. Patient with elevated BP and bradycardia overnight. Will start on Hydralazine today, while decreasing Coreg. No recurrent seizures. Still with word finding. Sitting at the edge of bed eating breakfast. Patient responses are more direct and decernable than when admitted. Muscles of R extremities appeared more relaxed as well.     Review of Systems     Constitutional: no fever or chills  ENT: no nasal congestion or sore throat  Respiratory: no cough or shortness of breath  Cardiovascular: no chest pain or palpitations  Gastrointestinal: no nausea or vomiting, no abdominal pain or abdominal distention   Genitourinary: no hematuria or dysuria  Integument/Breast: no rash or pruritis  Hematologic/Lymphatic: no easy bruising or lymphadenopathy  Musculoskeletal: no arthralgias or myalgias  Neurological: no seizures or tremors  Endocrine: no heat or cold intolerance    Objective:     Vital Signs (Most Recent):  Temp: 97.1 °F (36.2 °C) (10/07/17 1302)  Pulse: 61 (10/07/17 1600)  Resp: 18  (10/07/17 1600)  BP: 124/69 (10/07/17 1600)  SpO2: 98 % (10/07/17 1302) Vital Signs (24h Range):  Temp:  [96 °F (35.6 °C)-98.4 °F (36.9 °C)] 97.1 °F (36.2 °C)  Pulse:  [46-73] 61  Resp:  [16-18] 18  SpO2:  [95 %-98 %] 98 %  BP: (122-182)/(69-89) 124/69     Weight: 104.3 kg (230 lb)  Body mass index is 33 kg/m².  No intake or output data in the 24 hours ending 10/07/17 1843   Physical Exam    General: well developed, well nourished, appears to be in NAD  Eyes: conjunctivae/corneas clear. PERRL. EOMI.  Neck: supple, symmetrical, trachea midline, no JVD  Cardiovascular: Heart: regular rate and rhythm, S1, S2 normal, no murmur, click, rub or gallop.  Lungs: clear to auscultation bilaterally and normal respiratory effort  Chest Wall: no tenderness  Abdomen/Rectal: Abdomen: Non distended. + BS. No masses. No TTP.   Extremities: no edema, redness or tenderness in the calves or thighs. Pulses: 2+ and symmetric  Skin: Skin color, texture, turgor normal. No rashes or lesions  Musculoskeletal: full range of motion of joints  Lymph Nodes: No cervical or supraclavicular adenopathy  Psych/Behavioral: Normal. Alert and oriented      Neuro Exam (Performed by Dr. Shanti Thakur 10/5/17):  Mental Status:  Awake, alert, oriented to self, location, but not to date.  Perseverates on day of the week when asked about month/year.  Recent, remote recall poor but difficult to evaluate in setting of word-finding difficulty.  Some dysnomia.  Cranial Nerves:  VF deficit in R hemisphere, has to adjust head to see people standing on his R.  PERRLA, EOMI.  Facial movement, sensation intact and symmetric.  Palate raises symmetrically, tongue protrudes midline.  SCM 5/5 bilaterally.  Trapezius 4/5 R, 5/5 L.  Motor:  Normal bulk and tone.  R shoulder abduction 3/5, biceps/triceps 4/5.  R  strength 5/5, L shoulder abduction/biceps/triceps/ strength 5/5.  BLE hip flexion, knee flexion/extension, dorsiflexion/plantarflexion 5/5.  Sensory:  Intact to  light touch, temperature at all extremities without inattention.  Reflexes:  Biceps, brachioradialis 2+ bilaterally.  Patellar 1+ L, areflexic R.  No ankle clonus.  Downgoing toe bilaterally.  Coordination:  Difficulty with commands for FNF, MARIO, HTS--possible component of apraxia.  No resting tremor, no myoclonus.  Some additional difficulty on FNF possibly due to hemianopia.  Gait:  Deferred 2/2 seizure and fall precautions           Significant Labs:     Recent Results (from the past 24 hour(s))   POCT glucose    Collection Time: 10/06/17 11:02 PM   Result Value Ref Range    POCT Glucose 230 (H) 70 - 110 mg/dL   CBC with Automated Differential    Collection Time: 10/07/17  4:28 AM   Result Value Ref Range    WBC 8.69 3.90 - 12.70 K/uL    RBC 4.02 (L) 4.60 - 6.20 M/uL    Hemoglobin 11.2 (L) 14.0 - 18.0 g/dL    Hematocrit 31.1 (L) 40.0 - 54.0 %    MCV 77 (L) 82 - 98 fL    MCH 27.9 27.0 - 31.0 pg    MCHC 36.0 32.0 - 36.0 g/dL    RDW 17.4 (H) 11.5 - 14.5 %    Platelets 218 150 - 350 K/uL    MPV 12.2 9.2 - 12.9 fL    Gran # 4.1 1.8 - 7.7 K/uL    Lymph # 2.6 1.0 - 4.8 K/uL    Mono # 1.6 (H) 0.3 - 1.0 K/uL    Eos # 0.3 0.0 - 0.5 K/uL    Baso # 0.05 0.00 - 0.20 K/uL    Gran% 47.7 38.0 - 73.0 %    Lymph% 30.1 18.0 - 48.0 %    Mono% 17.8 (H) 4.0 - 15.0 %    Eosinophil% 3.7 0.0 - 8.0 %    Basophil% 0.6 0.0 - 1.9 %    Differential Method Automated    Comprehensive Metabolic Panel (CMP)    Collection Time: 10/07/17  4:28 AM   Result Value Ref Range    Sodium 136 136 - 145 mmol/L    Potassium 3.7 3.5 - 5.1 mmol/L    Chloride 104 95 - 110 mmol/L    CO2 23 23 - 29 mmol/L    Glucose 201 (H) 70 - 110 mg/dL    BUN, Bld 25 (H) 8 - 23 mg/dL    Creatinine 2.0 (H) 0.5 - 1.4 mg/dL    Calcium 8.6 (L) 8.7 - 10.5 mg/dL    Total Protein 7.0 6.0 - 8.4 g/dL    Albumin 3.0 (L) 3.5 - 5.2 g/dL    Total Bilirubin 1.2 (H) 0.1 - 1.0 mg/dL    Alkaline Phosphatase 120 55 - 135 U/L    AST 37 10 - 40 U/L    ALT 26 10 - 44 U/L    Anion Gap 9 8 - 16  mmol/L    eGFR if  39.6 (A) >60 mL/min/1.73 m^2    eGFR if non  34.3 (A) >60 mL/min/1.73 m^2   Magnesium    Collection Time: 10/07/17  4:28 AM   Result Value Ref Range    Magnesium 1.8 1.6 - 2.6 mg/dL   Phosphorus    Collection Time: 10/07/17  4:28 AM   Result Value Ref Range    Phosphorus 3.7 2.7 - 4.5 mg/dL   POCT glucose    Collection Time: 10/07/17  8:13 AM   Result Value Ref Range    POCT Glucose 214 (H) 70 - 110 mg/dL   POCT glucose    Collection Time: 10/07/17  2:54 PM   Result Value Ref Range    POCT Glucose 168 (H) 70 - 110 mg/dL       Significant Imaging:       Imaging Results           X-Ray Abdomen AP 1 View (Final result)  Result time 10/05/17 04:05:22         Impression:     No evidence of small bowel obstruction or ileus.                    Narrative:     FINDINGS:  Monitoring leads project over the abdomen wall. No dilated loops of small bowel. Mild to moderate fecal loading in the right hemicolon. No supine evidence of free intraperitoneal air. Partially visualized lung bases are unremarkable. Degenerative changes of the lumbar spine noted.                    MRA Brain without contrast (Final result)     Abnormal  Result time 10/05/17 01:19:42         MRA Neck without contrast (Final result)     Abnormal  Result time 10/05/17 01:19:19              MRI Brain Without Contrast (Edited Result - FINAL)  Result time 10/05/17 01:19:02         Impression:     No evidence of acute or major vascular distribution infarct or intracranial hemorrhage.  Questionable subtle cortical diffusion restriction throughout the supratentorial brain.  Short-term followup is suggested.  Remote left PCA distribution infarct within the left occipital lobe.  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                    Narrative:     MRI BRAIN, MRA HEAD, MRA NECK  CLINICAL INDICATION: 62 year old M with stroke, severe expressive aphasia, LLE weakness.   TECHNIQUE: Multiplanar, multisequence  images were obtained of the brain. Three-dimensional time-of-flight technique was used in assessment of the neck and intracranial vasculature.  COMPARISON: CT head 10/4/2017.     FINDINGS:  MRI BRAIN:  The ventricles are normal in size for age, without evidence of hydrocephalus.  There is suggestion of subtle diffuse cortical diffusion restriction in the supratentorial brain.     There is a stable region of encephalomalacia within the left occipital lobe. There is relatively isointense diffusion signal in this region with corresponding hyperintensity on ADC map and T2/FLAIR hyperintensity, compatible with remote infarct. No areas of restricted diffusion signal are seen to suggest new/acute infarct. No areas of susceptibility to suggest hemorrhage. A punctate focus of T1 hyperintensity in the left aspect of the anterior interhemispheric fissure likely represents a dural calcification. Supratentorial patchy T2/FLAIR hyperintensities compatible with chronic microvascular ischemic disease.     No extra-axial blood or fluid collections.  The T2 skull base flow voids are preserved. Bone marrow signal intensity is unremarkable.     MRA HEAD AND NECK:  Common and internal carotid arteries are normal in caliber.  No significant stenosis at either carotid bifurcation. There is a small saccular aneurysm with a 0.4 cm neck arising from the supraclinoid right ICA.  Vertebral arteries are normal in caliber. The vertebrobasilar system appears within normal limits.   The ACAs, MCAs and PCAs demonstrate no evidence of  high-grade stenosis, focal occlusion or intracranial aneurysm.                         CT Head Without Contrast (Final result)  Result time 10/04/17 22:05:38         Impression:     No evidence of acute major vascular distribution infarct or intracranial hemorrhage.   Stable, remote infarct in the left occipital lobe.  Chronic microvascular ischemic disease.                    Narrative:     CT HEAD WITHOUT  CONTRAST  CLINICAL INDICATION: 62 year old M with possible stroke.  TECHNIQUE: Axial CT images obtained throughout the region of the head without the use of intravenous contrast. Axial, sagittal and coronal reconstructions were performed.  COMPARISON: MRI brain 11/6/2016, CT stroke multiphase 11/5/2016, CT head 11/5/2016.     FINDINGS:  The ventricles are stable. The brain appears unchanged.  The basal cisterns are patent.  There is a stable region of encephalomalacia within the left occipital lobe, compatible with remote left PCA infarct. No evidence of acute major vascular distribution infarct or intracranial hemorrhage. Patchy hypoattenuation within the supratentorial white matter is compatible with chronic microvascular ischemic change.   No new extra-axial blood or fluid collections.  The cranium appears intact.  There is nonspecific calcifications in the subcutaneous tissues of the head.   Mastoid air cells and paranasal sinuses are essentially clear.  The orbits and intraorbital contents are unremarkable.                     24h EEG 10/7/17:  Showed abnormalities associated with previous stroke, however,   no current seizure activity was appreciated.    CLINICAL CORRELATION:    This is an abnormal EEG during wakefulness, drowsiness and sleep.  The presence of continuous irregular slowing in the left hemisphere, predominantly in the posterior region, is suggestive of a structural abnormality in this hemisphere   and correlates with history of signal abnormality on brain imaging.  There is no evidence of an epileptic process on this recording.  No seizures were recorded during this study.  Of note, the EKG channel revealed an irregular rhythm.    Assessment/Plan:      * Aphasia    ddx includes: TIA vs seizures vs metabolic encephalopathy  CT head, MRI and MRA brain with on signs of acute ischemia  TSH elevated to 7 (hx of thyroid ca surgery and rad iodine), T4 wnl  Neurology following appreciate assistance.    keppra dosing doubled to 1500mg bid po  -24h EEG showed abnormalities associated with previous stroke, however, no current seizure activity was appreciated.          Abdominal pain    Patient with generalized involuntary guarding, hernia midline, and distension  Afebrile, no leukocytosis WBC 12 less likely inflammatory  KUB ordered r/o ileus  Consider CT abdomen as patient has vasculopathy risk factors and hernia to r/o ischemia  Monitoring VS          DM type 2 without retinopathy    Glucose 82 on admission, hgb a1c was 5.6 in 9/2017  Started on diabetic diet  LDSSI for now            Essential hypertension    BP elevated on presentation and during hospital stay with SBP in the 200's  Re-started home norvasc, coreg, and losartan and increase norvasc to 10mg daily  Patients BP still high with brandycardia. Pt maxed out on Norvac and Losartan. And on Diuretic already (furosemide).    -Starting PO Hydralazine 25 mg BID   -Decreasing Coreg 12.5 to 6.25 mg          Chronic diastolic heart failure    No symptoms or signs of ADHF  Will continue home coreg 6.25 mg po BID, losartan 100mg po QD, and lasix 80mg po BID          Cerebrovascular accident (CVA)    Hx of left PCA distribution stroke with right hemianopsia  Evaluated by Vascular Neurology in ED and ruled out acute ishcemia  Continue Eliquis and statin for secondary prevention        CKD (chronic kidney disease), stage III    At Cr baseline of 1.8  Renally dose medications and avoid nephrotoxins          Hypercholesterolemia    -Resume home artovastatin 40mg            VTE Risk Mitigation         Ordered     apixaban tablet 5 mg  2 times daily     Route:  Oral        10/05/17 0338     High Risk of VTE  Once      10/05/17 0247     Place sequential compression device  Until discontinued      10/05/17 0247     Place DARIAN hose  Until discontinued      10/05/17 0247              Trell Ratliff MD  Department of Hospital Medicine   [facility]

## 2017-10-07 NOTE — PT/OT/SLP PROGRESS
"Physical Therapy  Treatment    Alfa Christy III   MRN: [id number]   Admitting Diagnosis: Aphasia    PT Received On: 10/07/17  PT Start Time: 0845     PT Stop Time: 0923    PT Total Time (min): 38 min       Billable Minutes:  Gait Training 15 minutes , Therapeutic Activity 13 minutes  and Therapeutic Exercise 10 minutes    Treatment Type: Treatment  PT/PTA: PT     PTA Visit Number: 0       General Precautions: Standard, aspiration, fall  Orthopedic Precautions: N/A   Braces: N/A    Do you have any cultural, spiritual, Jewish conflicts, given your current situation?: Per chart pt is Yarsanism     Subjective:  Communicated with RN prior to session.  Pt agreeable to PT session. Pt's wife present throughout therapy session assisting with chair follow. Pt's wife reports "he's doing better with his memory but still seems foggy."     Pain/Comfort  Pain Rating 1: 0/10  Pain Rating Post-Intervention 1: 0/10    Objective:   Patient found with: telemetry, peripheral IV (IV not connected to monitor)    Functional Mobility:  Bed Mobility: Pt found sitting EOB with wife present     Transfers:  Sit <> Stand Assistance:   · 2 trials of sit to stand from EOB: Minimum Assistance via support of therapist   · 2 trials of sit to stand from BS chair: Contact Guard Assistance using RW   Bed <> Chair Technique: Stand Pivot  Bed <> Chair Assistance: Minimum Assistance  Bed <> Chair Assistive Device:  (support of therapist)    Gait:   Gait Distance: 80'. Pt required verbal cues and assistanec for AD management, upright posture, and balance.   Assistance 1:  (Initially required Min A but progressed to CGA. Chair following for safety)  Gait Assistive Device: Rolling walker  Gait Pattern: swing-to gait  Gait Deviation(s): decreased ryanne, increased time in double stance, decreased velocity of limb motion, decreased step length, decreased stride length    Balance:   Static Sit: FAIR+: Able to take MINIMAL challenges from all " directions  Dynamic Sit: FAIR: Cannot move trunk without losing balance  Static Stand: POOR+: Needs MINIMAL assist to maintain  Dynamic stand: FAIR: Needs CONTACT GUARD during gait     Therapeutic Activities and Exercises:  · RN notified to order RW and BS commode for patient's room   Pt educated pt on incr OOB activity including sitting in bedside chair majority of day. Educated pt on being appropriate to transfer with nsg and PCT; safe to transfer with 1 person assistance and RW; advised to use bedside commode for toileting needs  Updated white board with appropriate PT information; notified nsg     Gait training performed to increase pt's endurance, gait stability, safety, and independence. Pt provided with verbal cueing to improve AD management and postural awareness.     Pt completed the following seated BLE therapeutic exercises x 10 reps: GS, hip marching, LAQ, and AP.  · PT provided demonstration on proper technique to perform exercise. Pt completed exercises appropriately. Pt educated on performing hospital exercise program 3x/day. Written copy provided to patient. Pt verbalized understanding     OSEGUERA BALANCE SCALE     1.SITTING TO STANDING:  (1) Pt needs minimal aid to stand or to stabilize     2. STANDING UNSUPPORTED: (2) Pt able to stand 30 seconds unsupported     3. SITTING WITH BACK UNSUPPORTED BUT FEET SUPPORTED ON FLOOR: (4) Pt able to sit safely and securely 2 minutes     4. STANDING TO SITTING:(2) Pt uses back of legs against chair to control descent     5. TRANSFERS:(1) Pt needs one person to assist    6. STANDING UNSUPPORTED WITH EYES CLOSED:(2) Pt able to stand 3 seconds    7. STANDING UNSUPPORTED WITH FEET TOGETHER:(1) Pt needs help to attain position but able to stand 15 seconds feet together    8. REACHING FORWARD WITH OUTSTRETCHED ARM WHILE STANDING:(1) Pt reaches forward but needs supervision    9.  OBJECT FROM THE FLOOR FROM A STANDING POSITION:(1) Pt unable to  and needs  supervision while trying    10. TURNING TO LOOK BEHIND OVER LEFT AND RIGHT SHOULDERS WHILE STANDING:(1) Pt needs supervision when turning    11. TURN 360 DEGREES:(0) Pt needs assistance while turning    12. PLACING ALTERNATE FOOT ON STEP OR STOOL WHILE STANDING UNSUPPORTED:(0) Pt needs assistance to keep from falling/unable to try    13. STANDING UNSUPPORTED ONE FOOT IN FRONT: (1) Pt aneeds help to step but can hold 15 seconds     14. STANDING ON ONE LEG:(0) Pt unable to try or needs assist to prevent fall    Total Score 17/56    41-56 = low fall risk  21-40 = medium fall risk  0 -20 = high fall risk      AM-PAC 6 CLICK MOBILITY  How much help from another person does this patient currently need?   1 = Unable, Total/Dependent Assistance  2 = A lot, Maximum/Moderate Assistance  3 = A little, Minimum/Contact Guard/Supervision  4 = None, Modified [place]/Independent    Turning over in bed (including adjusting bedclothes, sheets and blankets)?: 3  Sitting down on and standing up from a chair with arms (e.g., wheelchair, bedside commode, etc.): 3  Moving from lying on back to sitting on the side of the bed?: 3  Moving to and from a bed to a chair (including a wheelchair)?: 3  Need to walk in hospital room?: 3  Climbing 3-5 steps with a railing?: 2  Total Score: 17    AM-PAC Raw Score CMS G-Code Modifier Level of Impairment Assistance   6 % Total / Unable   7 - 9 CM 80 - 100% Maximal Assist   10 - 14 CL 60 - 80% Moderate Assist   15 - 19 CK 40 - 60% Moderate Assist   20 - 22 CJ 20 - 40% Minimal Assist   23 CI 1-20% SBA / CGA   24 CH 0% Independent/ Mod I     Patient left up in chair with all lines intact, call button in reach, RN notified and wife present.    Assessment:  Alfa Christy III is a 62 y.o. male with a medical diagnosis of Aphasia and presents with problems listed below. Pt progressing towards goals, but not at PLOF. Pt tolerated session well with no increase in pain/discomfort.  Pt is improving  with therapy evidenced by ambulating 80' with Min A to CGA using RW. OSEGUERA Balance test completed and places patient in high fall risk category. Pt would benefit from continued PT services to improve overall functional mobility. Recommend d/c to Skilled nursing facility to maximize functional independence.    Rehab identified problem list/impairments: Rehab identified problem list/impairments: weakness, impaired endurance, impaired self care skills, impaired functional mobilty, gait instability, impaired balance, decreased coordination, decreased lower extremity function, decreased upper extremity function, decreased safety awareness    Rehab potential is good.    Activity tolerance: Good    Discharge recommendations: Discharge Facility/Level Of Care Needs: nursing facility, skilled (short stay)     Barriers to discharge: Barriers to Discharge: Inaccessible home environment (Increased assistance at this time)    Equipment recommendations: Equipment Needed After Discharge:  (TBD at next level of care)     GOALS:    Physical Therapy Goals        Problem: Physical Therapy Goal    Goal Priority Disciplines Outcome Goal Variances Interventions   Physical Therapy Goal     PT/OT, PT Ongoing (interventions implemented as appropriate)     Description:  Goals to be met by: 10/12/17     Patient will increase functional independence with mobility by performin. Supine <> sit with Modified [place]  2. Sit to stand transfer with Supervision  3. Gait  x 100 feet with Contact Guard Assistance with or without appropriate AD.   4. Ascend/descend 5 stairs with right Handrails Contact Guard Assistance .    5. Lower extremity exercise program x20 reps per handout, with assistance as needed                      PLAN:    Patient to be seen 4 x/week  to address the above listed problems via gait training, therapeutic activities, therapeutic exercises, neuromuscular re-education  Plan of Care expires: 17  Plan of Care  reviewed with: patient, spouse    Maria T Adair, PT  10/07/2017

## 2017-10-07 NOTE — PLAN OF CARE
Problem: Patient Care Overview  Goal: Plan of Care Review  Outcome: Ongoing (interventions implemented as appropriate)  Patient progressing with POC. Pain denied throughout shift. Vital signs stable. Afebrile. Neuro checks done throughout shift. Safety and fall precautions active. SCDs in place. Spouse at bedside. Call light in reach. Will continue to monitor per frequent rounding.

## 2017-10-07 NOTE — PLAN OF CARE
Problem: Physical Therapy Goal  Goal: Physical Therapy Goal  Goals to be met by: 10/12/17     Patient will increase functional independence with mobility by performin. Supine <> sit with Modified [place]  2. Sit to stand transfer with Supervision  3. Gait  x 100 feet with Contact Guard Assistance with or without appropriate AD.   4. Ascend/descend 5 stairs with right Handrails Contact Guard Assistance .    5. Lower extremity exercise program x20 reps per handout, with assistance as needed     Outcome: Ongoing (interventions implemented as appropriate)  Pt progressing towards goals. All goals remain appropriate at this time.    Maria T Adair, SHAHAB, PT  10/7/2017

## 2017-10-07 NOTE — ASSESSMENT & PLAN NOTE
BP elevated on presentation and during hospital stay with SBP in the 200's  Re-started home norvasc, coreg, and losartan and increase norvasc to 10mg daily  Patients BP still high with brandycardia. Pt maxed out on Norvac and Losartan. And on Diuretic already (furosemide).    -Starting PO Hydralazine 25 mg BID   -Decreasing Coreg 12.5 to 6.25 mg

## 2017-10-08 LAB
ANION GAP SERPL CALC-SCNC: 8 MMOL/L
BASOPHILS # BLD AUTO: 0.04 K/UL
BASOPHILS NFR BLD: 0.5 %
BUN SERPL-MCNC: 29 MG/DL
CALCIUM SERPL-MCNC: 8.8 MG/DL
CHLORIDE SERPL-SCNC: 105 MMOL/L
CO2 SERPL-SCNC: 25 MMOL/L
CREAT SERPL-MCNC: 2 MG/DL
DIFFERENTIAL METHOD: ABNORMAL
EOSINOPHIL # BLD AUTO: 0.4 K/UL
EOSINOPHIL NFR BLD: 4.4 %
ERYTHROCYTE [DISTWIDTH] IN BLOOD BY AUTOMATED COUNT: 17.3 %
EST. GFR  (AFRICAN AMERICAN): 39.6 ML/MIN/1.73 M^2
EST. GFR  (NON AFRICAN AMERICAN): 34.3 ML/MIN/1.73 M^2
GLUCOSE SERPL-MCNC: 186 MG/DL
HCT VFR BLD AUTO: 30.5 %
HGB BLD-MCNC: 10.9 G/DL
LYMPHOCYTES # BLD AUTO: 3.3 K/UL
LYMPHOCYTES NFR BLD: 40.1 %
MAGNESIUM SERPL-MCNC: 2 MG/DL
MCH RBC QN AUTO: 27.7 PG
MCHC RBC AUTO-ENTMCNC: 35.7 G/DL
MCV RBC AUTO: 78 FL
MONOCYTES # BLD AUTO: 1.1 K/UL
MONOCYTES NFR BLD: 13.7 %
NEUTROPHILS # BLD AUTO: 3.4 K/UL
NEUTROPHILS NFR BLD: 41.3 %
PHOSPHATE SERPL-MCNC: 3.9 MG/DL
PLATELET # BLD AUTO: 225 K/UL
PMV BLD AUTO: 12.9 FL
POCT GLUCOSE: 177 MG/DL (ref 70–110)
POCT GLUCOSE: 251 MG/DL (ref 70–110)
POCT GLUCOSE: 256 MG/DL (ref 70–110)
POTASSIUM SERPL-SCNC: 3.9 MMOL/L
RBC # BLD AUTO: 3.93 M/UL
SODIUM SERPL-SCNC: 138 MMOL/L
WBC # BLD AUTO: 8.25 K/UL

## 2017-10-08 PROCEDURE — 80048 BASIC METABOLIC PNL TOTAL CA: CPT

## 2017-10-08 PROCEDURE — 83735 ASSAY OF MAGNESIUM: CPT

## 2017-10-08 PROCEDURE — 11000001 HC ACUTE MED/SURG PRIVATE ROOM

## 2017-10-08 PROCEDURE — 84100 ASSAY OF PHOSPHORUS: CPT

## 2017-10-08 PROCEDURE — 25000003 PHARM REV CODE 250: Performed by: STUDENT IN AN ORGANIZED HEALTH CARE EDUCATION/TRAINING PROGRAM

## 2017-10-08 PROCEDURE — 99231 SBSQ HOSP IP/OBS SF/LOW 25: CPT | Mod: ,,, | Performed by: HOSPITALIST

## 2017-10-08 PROCEDURE — 85025 COMPLETE CBC W/AUTO DIFF WBC: CPT

## 2017-10-08 PROCEDURE — 25000003 PHARM REV CODE 250: Performed by: EMERGENCY MEDICINE

## 2017-10-08 PROCEDURE — 25000003 PHARM REV CODE 250: Performed by: INTERNAL MEDICINE

## 2017-10-08 RX ORDER — CARVEDILOL 3.12 MG/1
3.12 TABLET ORAL 2 TIMES DAILY
Status: DISCONTINUED | OUTPATIENT
Start: 2017-10-08 | End: 2017-10-11 | Stop reason: HOSPADM

## 2017-10-08 RX ADMIN — APIXABAN 5 MG: 5 TABLET, FILM COATED ORAL at 08:10

## 2017-10-08 RX ADMIN — STANDARDIZED SENNA CONCENTRATE AND DOCUSATE SODIUM 1 TABLET: 8.6; 5 TABLET, FILM COATED ORAL at 09:10

## 2017-10-08 RX ADMIN — ATORVASTATIN CALCIUM 40 MG: 20 TABLET, FILM COATED ORAL at 08:10

## 2017-10-08 RX ADMIN — AMLODIPINE BESYLATE 10 MG: 10 TABLET ORAL at 08:10

## 2017-10-08 RX ADMIN — HYDRALAZINE HYDROCHLORIDE 25 MG: 25 TABLET, FILM COATED ORAL at 02:10

## 2017-10-08 RX ADMIN — LOSARTAN POTASSIUM 100 MG: 25 TABLET, FILM COATED ORAL at 08:10

## 2017-10-08 RX ADMIN — FUROSEMIDE 80 MG: 40 TABLET ORAL at 09:10

## 2017-10-08 RX ADMIN — APIXABAN 5 MG: 5 TABLET, FILM COATED ORAL at 09:10

## 2017-10-08 RX ADMIN — INSULIN ASPART 3 UNITS: 100 INJECTION, SOLUTION INTRAVENOUS; SUBCUTANEOUS at 05:10

## 2017-10-08 RX ADMIN — FUROSEMIDE 80 MG: 40 TABLET ORAL at 08:10

## 2017-10-08 RX ADMIN — LEVOTHYROXINE SODIUM 200 MCG: 100 TABLET ORAL at 06:10

## 2017-10-08 RX ADMIN — LEVETIRACETAM 1500 MG: 500 TABLET, FILM COATED ORAL at 09:10

## 2017-10-08 RX ADMIN — CARVEDILOL 3.12 MG: 3.12 TABLET, FILM COATED ORAL at 09:10

## 2017-10-08 RX ADMIN — LEVETIRACETAM 1500 MG: 500 TABLET, FILM COATED ORAL at 08:10

## 2017-10-08 RX ADMIN — STANDARDIZED SENNA CONCENTRATE AND DOCUSATE SODIUM 1 TABLET: 8.6; 5 TABLET, FILM COATED ORAL at 08:10

## 2017-10-08 RX ADMIN — CARVEDILOL 6.25 MG: 6.25 TABLET, FILM COATED ORAL at 08:10

## 2017-10-08 RX ADMIN — INSULIN ASPART 3 UNITS: 100 INJECTION, SOLUTION INTRAVENOUS; SUBCUTANEOUS at 11:10

## 2017-10-08 RX ADMIN — HYDRALAZINE HYDROCHLORIDE 25 MG: 25 TABLET, FILM COATED ORAL at 09:10

## 2017-10-08 RX ADMIN — HYDRALAZINE HYDROCHLORIDE 25 MG: 25 TABLET, FILM COATED ORAL at 06:10

## 2017-10-08 NOTE — PROGRESS NOTES
[facility] Hospital Medicine  Progress Note    Patient Name: Alfa Christy III  MRN: [id number]  Patient Class: IP- Inpatient   Admission Date: 10/4/2017  Length of Stay: 3 days  Attending Physician: James Brooks MD  Primary Care Provider: Tyler Jasmine MD    Hospital Medicine Team: [facility] Trell Ratliff MD    Subjective:     Principal Problem:Aphasia    HPI:  62yoM with PMHx of CVA (with right hemianopsia1/2016), previous seizure (11/2016),DM2, CKD Stage 3, HTN, HLD, pAfib, thyroid cancer (s/p surgery and rad iodine 2009) presents with receptive and expressive aphasia. The patient was LKN at 7:00 pm.  His wife states she left home and returned at 8:00 to find the patient agitated with word finding difficulty not capable of naming objects and progressive difficulty making complete words. He had no weakness and was ambulatory at home.  The patient's wife states earlier tonight the patient c/o feeling dizziness holding on to walls for balance. He took 2 tylenol and reported relief of his symptoms at that time.  The patient had a flu shot today and has been having URI symptoms recently within the past 2 weeks.  He hasn't taken his losartan since the URI symptoms began, per his wife. Patient wife states this happened before for 2 hours after seizure like activity last November. She claims patient has no difficulty finding words at home since. However, per chart review neurology patient has had some word finding difficulty since CVA last January. Per wife has not had f/c, sob, chest pain, abdominal pain, diarrhea, or new medicine, sick contacts. Patient was stressed today at noon with the doubling of the water bill at home per wife.     Patient has had intermittent gross hematuria since July and is followed by urology. Hematuria attributed to BPH in setting of chronic anticoagulation. Patient had recent cystoscopy and bilateral RPG, possible ureteroscopy and biopsy to evaluate hematuria and  abnormal cytology, no recent note has been left per urology.    Upon arrival to [facility], pt blood pressure systolics up to 220s, CTH was done which was consistent with temporal evolution of remote left PCA territory infarction; No evidence for acute intracranial hemorrhages. Stroke code was called. During this time patient had waxing and waning of symptoms. Neurology recs MRI/MRA which showed questionable subtle cortical diffusion restriction throughout the supratentorial brain.  Short-term followup is suggested.Remote left PCA distribution infarct within the left occipital lobe.  Stable 4 mm right supraclinoid ICA aneurysm.Given negative imaging and the waxing and waning nature of the patient's symptoms Neurology thought it to be better evaluated with a medicine admission.        Hospital Course:  Continue neurological workup for suspected seizures as underlying cause of aphasia. Awaiting official vEEG report.    Interval History:   NAEON. Patient showing marked improvement. Spoke for 5 minutes and patient spoke fluidly and linearly. Only word searched twice. Pt coordination with R side is also improving. Pt able to tap all fingers to thumb with both hands). Suspect proper PT/OT/ST will improve patients QOL.       Review of Systems     Constitutional: no fever or chills  ENT: no nasal congestion or sore throat  Respiratory: no cough or shortness of breath  Cardiovascular: no chest pain or palpitations  Gastrointestinal: no nausea or vomiting, no abdominal pain or abdominal distention   Genitourinary: no hematuria or dysuria  Integument/Breast: no rash or pruritis  Hematologic/Lymphatic: no easy bruising or lymphadenopathy  Musculoskeletal: no arthralgias or myalgias  Neurological: no seizures or tremors  Endocrine: no heat or cold intolerance    Objective:     Vital Signs (Most Recent):  Temp: 98.6 °F (37 °C) (10/08/17 0453)  Pulse: (!) 56 (10/08/17 0700)  Resp: 16 (10/08/17 0453)  BP: 125/62 (10/08/17 0453)  SpO2: 97 %  (10/08/17 4223) Vital Signs (24h Range):  Temp:  [97.1 °F (36.2 °C)-98.6 °F (37 °C)] 98.6 °F (37 °C)  Pulse:  [50-73] 56  Resp:  [16-18] 16  SpO2:  [97 %-98 %] 97 %  BP: (122-161)/(62-74) 125/62     Weight: 104.3 kg (230 lb)  Body mass index is 33 kg/m².  No intake or output data in the 24 hours ending 10/08/17 0855   Physical Exam    General: well developed, well nourished, appears to be in NAD  Eyes: conjunctivae/corneas clear. PERRL. EOMI.  Neck: supple, symmetrical, trachea midline, no JVD  Cardiovascular: Heart: regular rate and rhythm, S1, S2 normal, no murmur, click, rub or gallop.  Lungs: clear to auscultation bilaterally and normal respiratory effort  Chest Wall: no tenderness  Abdomen/Rectal: Abdomen: Non distended. + BS. No masses. No TTP.   Extremities: no edema, redness or tenderness in the calves or thighs. Pulses: 2+ and symmetric  Skin: Skin color, texture, turgor normal. No rashes or lesions  Musculoskeletal: full range of motion of joints  Lymph Nodes: No cervical or supraclavicular adenopathy  Psych/Behavioral: Normal. Alert and oriented      Neuro Exam (Performed by Dr. Shanti Thakur 10/5/17):  Mental Status:  Awake, alert, oriented to self, location, but not to date.  Perseverates on day of the week when asked about month/year.  Recent, remote recall poor but difficult to evaluate in setting of word-finding difficulty.  Some dysnomia.  Cranial Nerves:  VF deficit in R hemisphere, has to adjust head to see people standing on his R.  PERRLA, EOMI.  Facial movement, sensation intact and symmetric.  Palate raises symmetrically, tongue protrudes midline.  SCM 5/5 bilaterally.  Trapezius 4/5 R, 5/5 L.  Motor:  Normal bulk and tone.  R shoulder abduction 3/5, biceps/triceps 4/5.  R  strength 5/5, L shoulder abduction/biceps/triceps/ strength 5/5.  BLE hip flexion, knee flexion/extension, dorsiflexion/plantarflexion 5/5.  Sensory:  Intact to light touch, temperature at all extremities without  inattention.  Reflexes:  Biceps, brachioradialis 2+ bilaterally.  Patellar 1+ L, areflexic R.  No ankle clonus.  Downgoing toe bilaterally.  Coordination:  Difficulty with commands for FNF, MARIO, HTS--possible component of apraxia.  No resting tremor, no myoclonus.  Some additional difficulty on FNF possibly due to hemianopia.  Gait:  Deferred 2/2 seizure and fall precautions           Significant Labs:     Recent Results (from the past 24 hour(s))   POCT glucose    Collection Time: 10/07/17  2:54 PM   Result Value Ref Range    POCT Glucose 168 (H) 70 - 110 mg/dL   POCT glucose    Collection Time: 10/07/17  9:55 PM   Result Value Ref Range    POCT Glucose 199 (H) 70 - 110 mg/dL   CBC with Automated Differential    Collection Time: 10/08/17  3:53 AM   Result Value Ref Range    WBC 8.25 3.90 - 12.70 K/uL    RBC 3.93 (L) 4.60 - 6.20 M/uL    Hemoglobin 10.9 (L) 14.0 - 18.0 g/dL    Hematocrit 30.5 (L) 40.0 - 54.0 %    MCV 78 (L) 82 - 98 fL    MCH 27.7 27.0 - 31.0 pg    MCHC 35.7 32.0 - 36.0 g/dL    RDW 17.3 (H) 11.5 - 14.5 %    Platelets 225 150 - 350 K/uL    MPV 12.9 9.2 - 12.9 fL    Gran # 3.4 1.8 - 7.7 K/uL    Lymph # 3.3 1.0 - 4.8 K/uL    Mono # 1.1 (H) 0.3 - 1.0 K/uL    Eos # 0.4 0.0 - 0.5 K/uL    Baso # 0.04 0.00 - 0.20 K/uL    Gran% 41.3 38.0 - 73.0 %    Lymph% 40.1 18.0 - 48.0 %    Mono% 13.7 4.0 - 15.0 %    Eosinophil% 4.4 0.0 - 8.0 %    Basophil% 0.5 0.0 - 1.9 %    Differential Method Automated    Magnesium    Collection Time: 10/08/17  3:53 AM   Result Value Ref Range    Magnesium 2.0 1.6 - 2.6 mg/dL   Phosphorus    Collection Time: 10/08/17  3:53 AM   Result Value Ref Range    Phosphorus 3.9 2.7 - 4.5 mg/dL   Basic metabolic panel    Collection Time: 10/08/17  3:53 AM   Result Value Ref Range    Sodium 138 136 - 145 mmol/L    Potassium 3.9 3.5 - 5.1 mmol/L    Chloride 105 95 - 110 mmol/L    CO2 25 23 - 29 mmol/L    Glucose 186 (H) 70 - 110 mg/dL    BUN, Bld 29 (H) 8 - 23 mg/dL    Creatinine 2.0 (H) 0.5 - 1.4  mg/dL    Calcium 8.8 8.7 - 10.5 mg/dL    Anion Gap 8 8 - 16 mmol/L    eGFR if African American 39.6 (A) >60 mL/min/1.73 m^2    eGFR if non  34.3 (A) >60 mL/min/1.73 m^2       Significant Imaging:       Imaging Results           X-Ray Abdomen AP 1 View (Final result)  Result time 10/05/17 04:05:22         Impression:     No evidence of small bowel obstruction or ileus.                    Narrative:     FINDINGS:  Monitoring leads project over the abdomen wall. No dilated loops of small bowel. Mild to moderate fecal loading in the right hemicolon. No supine evidence of free intraperitoneal air. Partially visualized lung bases are unremarkable. Degenerative changes of the lumbar spine noted.                    MRA Brain without contrast (Final result)     Abnormal  Result time 10/05/17 01:19:42         MRA Neck without contrast (Final result)     Abnormal  Result time 10/05/17 01:19:19              MRI Brain Without Contrast (Edited Result - FINAL)  Result time 10/05/17 01:19:02         Impression:     No evidence of acute or major vascular distribution infarct or intracranial hemorrhage.  Questionable subtle cortical diffusion restriction throughout the supratentorial brain.  Short-term followup is suggested.  Remote left PCA distribution infarct within the left occipital lobe.  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                    Narrative:     MRI BRAIN, MRA HEAD, MRA NECK  CLINICAL INDICATION: 62 year old M with stroke, severe expressive aphasia, LLE weakness.   TECHNIQUE: Multiplanar, multisequence images were obtained of the brain. Three-dimensional time-of-flight technique was used in assessment of the neck and intracranial vasculature.  COMPARISON: CT head 10/4/2017.     FINDINGS:  MRI BRAIN:  The ventricles are normal in size for age, without evidence of hydrocephalus.  There is suggestion of subtle diffuse cortical diffusion restriction in the supratentorial brain.     There is a  stable region of encephalomalacia within the left occipital lobe. There is relatively isointense diffusion signal in this region with corresponding hyperintensity on ADC map and T2/FLAIR hyperintensity, compatible with remote infarct. No areas of restricted diffusion signal are seen to suggest new/acute infarct. No areas of susceptibility to suggest hemorrhage. A punctate focus of T1 hyperintensity in the left aspect of the anterior interhemispheric fissure likely represents a dural calcification. Supratentorial patchy T2/FLAIR hyperintensities compatible with chronic microvascular ischemic disease.     No extra-axial blood or fluid collections.  The T2 skull base flow voids are preserved. Bone marrow signal intensity is unremarkable.     MRA HEAD AND NECK:  Common and internal carotid arteries are normal in caliber.  No significant stenosis at either carotid bifurcation. There is a small saccular aneurysm with a 0.4 cm neck arising from the supraclinoid right ICA.  Vertebral arteries are normal in caliber. The vertebrobasilar system appears within normal limits.   The ACAs, MCAs and PCAs demonstrate no evidence of  high-grade stenosis, focal occlusion or intracranial aneurysm.                         CT Head Without Contrast (Final result)  Result time 10/04/17 22:05:38         Impression:     No evidence of acute major vascular distribution infarct or intracranial hemorrhage.   Stable, remote infarct in the left occipital lobe.  Chronic microvascular ischemic disease.                    Narrative:     CT HEAD WITHOUT CONTRAST  CLINICAL INDICATION: 62 year old M with possible stroke.  TECHNIQUE: Axial CT images obtained throughout the region of the head without the use of intravenous contrast. Axial, sagittal and coronal reconstructions were performed.  COMPARISON: MRI brain 11/6/2016, CT stroke multiphase 11/5/2016, CT head 11/5/2016.     FINDINGS:  The ventricles are stable. The brain appears unchanged.  The  basal cisterns are patent.  There is a stable region of encephalomalacia within the left occipital lobe, compatible with remote left PCA infarct. No evidence of acute major vascular distribution infarct or intracranial hemorrhage. Patchy hypoattenuation within the supratentorial white matter is compatible with chronic microvascular ischemic change.   No new extra-axial blood or fluid collections.  The cranium appears intact.  There is nonspecific calcifications in the subcutaneous tissues of the head.   Mastoid air cells and paranasal sinuses are essentially clear.  The orbits and intraorbital contents are unremarkable.                       Assessment/Plan:      * Aphasia    ddx includes: TIA vs seizures vs metabolic encephalopathy  CT head, MRI and MRA brain with on signs of acute ischemia  TSH elevated to 7 (hx of thyroid ca surgery and rad iodine), T4 wnl  Pending 24hr EEG  Neurology following appreciate assistance.   keppra dosing doubled to 1500mg bid po  Patient improving, near baseline  Work with CM tomorrow for SNF placement          Abdominal pain    -Resolved          DM type 2 without retinopathy    Glucose 82 on admission, hgb a1c was 5.6 in 9/2017  Started on diabetic diet  LDSSI for now            Essential hypertension    BP elevated on presentation and during hospital stay with SBP in the 200's  Re-started home norvasc, coreg, and losartan and increase norvasc to 10mg daily  Patients BP still high with brandycardia. Pt maxed out on Norvac and Losartan. And on Diuretic already (furosemide).    -Starting PO Hydralazine 25 mg BID   -Pt still Bradycardic (50), decreasing Coreg again from 6.25 to 3.125 mg   -If BP is high tomorrow, will increase Hydralazine        Chronic diastolic heart failure    No symptoms or signs of ADHF  Will continue home coreg 12.5mg po BID, losartan 100mg po QD, and lasix 80mg po BID          Cerebrovascular accident (CVA)    Hx of left PCA distribution stroke with right  hemianopsia  Evaluated by Vascular Neurology in ED and ruled out acute ishcemia  Continue Eliquis and statin for secondary prevention        CKD (chronic kidney disease), stage III    At Cr baseline of 1.8  Renally dose medications and avoid nephrotoxins          Hypercholesterolemia    -Resume home artovastatin 40mg            VTE Risk Mitigation         Ordered     apixaban tablet 5 mg  2 times daily     Route:  Oral        10/05/17 0338     High Risk of VTE  Once      10/05/17 0247     Place sequential compression device  Until discontinued      10/05/17 0247     Place DARIAN hose  Until discontinued      10/05/17 0247              Trell Ratliff MD  Department of Hospital Medicine   [facility]

## 2017-10-08 NOTE — PLAN OF CARE
Problem: Patient Care Overview  Goal: Plan of Care Review   10/08/17 1827   Coping/Psychosocial   Plan Of Care Reviewed With patient;spouse   Frequent rounds made to assess pain and safety. Fall and seizure precautions maintained. Spouse at bedside, encouraged to call for assistance as needed.

## 2017-10-08 NOTE — SUBJECTIVE & OBJECTIVE
Interval History:   NAEON. Patient showing marked improvement. Spoke for 5 minutes and patient spoke fluidly and linearly. Only word searched twice. Pt coordination with R side is also improving. Pt able to tap all fingers to thumb with both hands). Suspect proper PT/OT/ST will improve patients QOL.       Review of Systems     Constitutional: no fever or chills  ENT: no nasal congestion or sore throat  Respiratory: no cough or shortness of breath  Cardiovascular: no chest pain or palpitations  Gastrointestinal: no nausea or vomiting, no abdominal pain or abdominal distention   Genitourinary: no hematuria or dysuria  Integument/Breast: no rash or pruritis  Hematologic/Lymphatic: no easy bruising or lymphadenopathy  Musculoskeletal: no arthralgias or myalgias  Neurological: no seizures or tremors  Endocrine: no heat or cold intolerance    Objective:     Vital Signs (Most Recent):  Temp: 98.6 °F (37 °C) (10/08/17 0453)  Pulse: (!) 56 (10/08/17 0700)  Resp: 16 (10/08/17 0453)  BP: 125/62 (10/08/17 0453)  SpO2: 97 % (10/08/17 0453) Vital Signs (24h Range):  Temp:  [97.1 °F (36.2 °C)-98.6 °F (37 °C)] 98.6 °F (37 °C)  Pulse:  [50-73] 56  Resp:  [16-18] 16  SpO2:  [97 %-98 %] 97 %  BP: (122-161)/(62-74) 125/62     Weight: 104.3 kg (230 lb)  Body mass index is 33 kg/m².  No intake or output data in the 24 hours ending 10/08/17 0855   Physical Exam    General: well developed, well nourished, appears to be in NAD  Eyes: conjunctivae/corneas clear. PERRL. EOMI.  Neck: supple, symmetrical, trachea midline, no JVD  Cardiovascular: Heart: regular rate and rhythm, S1, S2 normal, no murmur, click, rub or gallop.  Lungs: clear to auscultation bilaterally and normal respiratory effort  Chest Wall: no tenderness  Abdomen/Rectal: Abdomen: Non distended. + BS. No masses. No TTP.   Extremities: no edema, redness or tenderness in the calves or thighs. Pulses: 2+ and symmetric  Skin: Skin color, texture, turgor normal. No rashes or  lesions  Musculoskeletal: full range of motion of joints  Lymph Nodes: No cervical or supraclavicular adenopathy  Psych/Behavioral: Normal. Alert and oriented      Neuro Exam (Performed by Dr. Shanti Thakur 10/5/17):  Mental Status:  Awake, alert, oriented to self, location, but not to date.  Perseverates on day of the week when asked about month/year.  Recent, remote recall poor but difficult to evaluate in setting of word-finding difficulty.  Some dysnomia.  Cranial Nerves:  VF deficit in R hemisphere, has to adjust head to see people standing on his R.  PERRLA, EOMI.  Facial movement, sensation intact and symmetric.  Palate raises symmetrically, tongue protrudes midline.  SCM 5/5 bilaterally.  Trapezius 4/5 R, 5/5 L.  Motor:  Normal bulk and tone.  R shoulder abduction 3/5, biceps/triceps 4/5.  R  strength 5/5, L shoulder abduction/biceps/triceps/ strength 5/5.  BLE hip flexion, knee flexion/extension, dorsiflexion/plantarflexion 5/5.  Sensory:  Intact to light touch, temperature at all extremities without inattention.  Reflexes:  Biceps, brachioradialis 2+ bilaterally.  Patellar 1+ L, areflexic R.  No ankle clonus.  Downgoing toe bilaterally.  Coordination:  Difficulty with commands for FNF, MARIO, HTS--possible component of apraxia.  No resting tremor, no myoclonus.  Some additional difficulty on FNF possibly due to hemianopia.  Gait:  Deferred 2/2 seizure and fall precautions           Significant Labs:     Recent Results (from the past 24 hour(s))   POCT glucose    Collection Time: 10/07/17  2:54 PM   Result Value Ref Range    POCT Glucose 168 (H) 70 - 110 mg/dL   POCT glucose    Collection Time: 10/07/17  9:55 PM   Result Value Ref Range    POCT Glucose 199 (H) 70 - 110 mg/dL   CBC with Automated Differential    Collection Time: 10/08/17  3:53 AM   Result Value Ref Range    WBC 8.25 3.90 - 12.70 K/uL    RBC 3.93 (L) 4.60 - 6.20 M/uL    Hemoglobin 10.9 (L) 14.0 - 18.0 g/dL    Hematocrit 30.5 (L) 40.0 -  54.0 %    MCV 78 (L) 82 - 98 fL    MCH 27.7 27.0 - 31.0 pg    MCHC 35.7 32.0 - 36.0 g/dL    RDW 17.3 (H) 11.5 - 14.5 %    Platelets 225 150 - 350 K/uL    MPV 12.9 9.2 - 12.9 fL    Gran # 3.4 1.8 - 7.7 K/uL    Lymph # 3.3 1.0 - 4.8 K/uL    Mono # 1.1 (H) 0.3 - 1.0 K/uL    Eos # 0.4 0.0 - 0.5 K/uL    Baso # 0.04 0.00 - 0.20 K/uL    Gran% 41.3 38.0 - 73.0 %    Lymph% 40.1 18.0 - 48.0 %    Mono% 13.7 4.0 - 15.0 %    Eosinophil% 4.4 0.0 - 8.0 %    Basophil% 0.5 0.0 - 1.9 %    Differential Method Automated    Magnesium    Collection Time: 10/08/17  3:53 AM   Result Value Ref Range    Magnesium 2.0 1.6 - 2.6 mg/dL   Phosphorus    Collection Time: 10/08/17  3:53 AM   Result Value Ref Range    Phosphorus 3.9 2.7 - 4.5 mg/dL   Basic metabolic panel    Collection Time: 10/08/17  3:53 AM   Result Value Ref Range    Sodium 138 136 - 145 mmol/L    Potassium 3.9 3.5 - 5.1 mmol/L    Chloride 105 95 - 110 mmol/L    CO2 25 23 - 29 mmol/L    Glucose 186 (H) 70 - 110 mg/dL    BUN, Bld 29 (H) 8 - 23 mg/dL    Creatinine 2.0 (H) 0.5 - 1.4 mg/dL    Calcium 8.8 8.7 - 10.5 mg/dL    Anion Gap 8 8 - 16 mmol/L    eGFR if African American 39.6 (A) >60 mL/min/1.73 m^2    eGFR if non  34.3 (A) >60 mL/min/1.73 m^2       Significant Imaging:       Imaging Results           X-Ray Abdomen AP 1 View (Final result)  Result time 10/05/17 04:05:22         Impression:     No evidence of small bowel obstruction or ileus.                    Narrative:     FINDINGS:  Monitoring leads project over the abdomen wall. No dilated loops of small bowel. Mild to moderate fecal loading in the right hemicolon. No supine evidence of free intraperitoneal air. Partially visualized lung bases are unremarkable. Degenerative changes of the lumbar spine noted.                    MRA Brain without contrast (Final result)     Abnormal  Result time 10/05/17 01:19:42         MRA Neck without contrast (Final result)     Abnormal  Result time 10/05/17 01:19:19               MRI Brain Without Contrast (Edited Result - FINAL)  Result time 10/05/17 01:19:02         Impression:     No evidence of acute or major vascular distribution infarct or intracranial hemorrhage.  Questionable subtle cortical diffusion restriction throughout the supratentorial brain.  Short-term followup is suggested.  Remote left PCA distribution infarct within the left occipital lobe.  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                    Narrative:     MRI BRAIN, MRA HEAD, MRA NECK  CLINICAL INDICATION: 62 year old M with stroke, severe expressive aphasia, LLE weakness.   TECHNIQUE: Multiplanar, multisequence images were obtained of the brain. Three-dimensional time-of-flight technique was used in assessment of the neck and intracranial vasculature.  COMPARISON: CT head 10/4/2017.     FINDINGS:  MRI BRAIN:  The ventricles are normal in size for age, without evidence of hydrocephalus.  There is suggestion of subtle diffuse cortical diffusion restriction in the supratentorial brain.     There is a stable region of encephalomalacia within the left occipital lobe. There is relatively isointense diffusion signal in this region with corresponding hyperintensity on ADC map and T2/FLAIR hyperintensity, compatible with remote infarct. No areas of restricted diffusion signal are seen to suggest new/acute infarct. No areas of susceptibility to suggest hemorrhage. A punctate focus of T1 hyperintensity in the left aspect of the anterior interhemispheric fissure likely represents a dural calcification. Supratentorial patchy T2/FLAIR hyperintensities compatible with chronic microvascular ischemic disease.     No extra-axial blood or fluid collections.  The T2 skull base flow voids are preserved. Bone marrow signal intensity is unremarkable.     MRA HEAD AND NECK:  Common and internal carotid arteries are normal in caliber.  No significant stenosis at either carotid bifurcation. There is a small saccular  aneurysm with a 0.4 cm neck arising from the supraclinoid right ICA.  Vertebral arteries are normal in caliber. The vertebrobasilar system appears within normal limits.   The ACAs, MCAs and PCAs demonstrate no evidence of  high-grade stenosis, focal occlusion or intracranial aneurysm.                         CT Head Without Contrast (Final result)  Result time 10/04/17 22:05:38         Impression:     No evidence of acute major vascular distribution infarct or intracranial hemorrhage.   Stable, remote infarct in the left occipital lobe.  Chronic microvascular ischemic disease.                    Narrative:     CT HEAD WITHOUT CONTRAST  CLINICAL INDICATION: 62 year old M with possible stroke.  TECHNIQUE: Axial CT images obtained throughout the region of the head without the use of intravenous contrast. Axial, sagittal and coronal reconstructions were performed.  COMPARISON: MRI brain 11/6/2016, CT stroke multiphase 11/5/2016, CT head 11/5/2016.     FINDINGS:  The ventricles are stable. The brain appears unchanged.  The basal cisterns are patent.  There is a stable region of encephalomalacia within the left occipital lobe, compatible with remote left PCA infarct. No evidence of acute major vascular distribution infarct or intracranial hemorrhage. Patchy hypoattenuation within the supratentorial white matter is compatible with chronic microvascular ischemic change.   No new extra-axial blood or fluid collections.  The cranium appears intact.  There is nonspecific calcifications in the subcutaneous tissues of the head.   Mastoid air cells and paranasal sinuses are essentially clear.  The orbits and intraorbital contents are unremarkable.

## 2017-10-08 NOTE — PLAN OF CARE
Problem: Patient Care Overview  Goal: Plan of Care Review  Outcome: Ongoing (interventions implemented as appropriate)  Patient progressing with POC. Pain denied throughout shift. Vital signs stable. Afebrile. Safety and fall precautions active. SCDs in place. Spouse at bedside. Call light in reach. Will continue to monitor per frequent rounding.

## 2017-10-08 NOTE — ASSESSMENT & PLAN NOTE
ddx includes: TIA vs seizures vs metabolic encephalopathy  CT head, MRI and MRA brain with on signs of acute ischemia  TSH elevated to 7 (hx of thyroid ca surgery and rad iodine), T4 wnl  Pending 24hr EEG  Neurology following appreciate assistance.   keppra dosing doubled to 1500mg bid po  Patient improving, near baseline  Work with CM tomorrow for SNF placement

## 2017-10-08 NOTE — ASSESSMENT & PLAN NOTE
BP elevated on presentation and during hospital stay with SBP in the 200's  Re-started home norvasc, coreg, and losartan and increase norvasc to 10mg daily  Patients BP still high with brandycardia. Pt maxed out on Norvac and Losartan. And on Diuretic already (furosemide).    -Starting PO Hydralazine 25 mg BID   -Pt still Bradycardic (50), decreasing Coreg again from 6.25 to 3.125 mg   -If BP is high tomorrow, will increase Hydralazine

## 2017-10-09 PROBLEM — R10.9 ABDOMINAL PAIN: Status: RESOLVED | Noted: 2017-10-05 | Resolved: 2017-10-09

## 2017-10-09 LAB
POCT GLUCOSE: 124 MG/DL (ref 70–110)
POCT GLUCOSE: 174 MG/DL (ref 70–110)
POCT GLUCOSE: 199 MG/DL (ref 70–110)
POCT GLUCOSE: 212 MG/DL (ref 70–110)

## 2017-10-09 PROCEDURE — 25000003 PHARM REV CODE 250: Performed by: INTERNAL MEDICINE

## 2017-10-09 PROCEDURE — 63600175 PHARM REV CODE 636 W HCPCS: Performed by: INTERNAL MEDICINE

## 2017-10-09 PROCEDURE — 99231 SBSQ HOSP IP/OBS SF/LOW 25: CPT | Mod: ,,, | Performed by: HOSPITALIST

## 2017-10-09 PROCEDURE — 97530 THERAPEUTIC ACTIVITIES: CPT

## 2017-10-09 PROCEDURE — 11000001 HC ACUTE MED/SURG PRIVATE ROOM

## 2017-10-09 PROCEDURE — 25000003 PHARM REV CODE 250: Performed by: STUDENT IN AN ORGANIZED HEALTH CARE EDUCATION/TRAINING PROGRAM

## 2017-10-09 PROCEDURE — 97116 GAIT TRAINING THERAPY: CPT

## 2017-10-09 PROCEDURE — 25000003 PHARM REV CODE 250: Performed by: EMERGENCY MEDICINE

## 2017-10-09 PROCEDURE — 92507 TX SP LANG VOICE COMM INDIV: CPT

## 2017-10-09 PROCEDURE — 97535 SELF CARE MNGMENT TRAINING: CPT

## 2017-10-09 PROCEDURE — 97110 THERAPEUTIC EXERCISES: CPT

## 2017-10-09 RX ORDER — INSULIN ASPART 100 [IU]/ML
5 INJECTION, SOLUTION INTRAVENOUS; SUBCUTANEOUS
Status: DISCONTINUED | OUTPATIENT
Start: 2017-10-09 | End: 2017-10-11 | Stop reason: HOSPADM

## 2017-10-09 RX ADMIN — APIXABAN 5 MG: 5 TABLET, FILM COATED ORAL at 08:10

## 2017-10-09 RX ADMIN — CARVEDILOL 3.12 MG: 3.12 TABLET, FILM COATED ORAL at 08:10

## 2017-10-09 RX ADMIN — HYDRALAZINE HYDROCHLORIDE 25 MG: 25 TABLET, FILM COATED ORAL at 05:10

## 2017-10-09 RX ADMIN — INSULIN ASPART 5 UNITS: 100 INJECTION, SOLUTION INTRAVENOUS; SUBCUTANEOUS at 11:10

## 2017-10-09 RX ADMIN — ATORVASTATIN CALCIUM 40 MG: 20 TABLET, FILM COATED ORAL at 08:10

## 2017-10-09 RX ADMIN — STANDARDIZED SENNA CONCENTRATE AND DOCUSATE SODIUM 1 TABLET: 8.6; 5 TABLET, FILM COATED ORAL at 08:10

## 2017-10-09 RX ADMIN — FUROSEMIDE 80 MG: 40 TABLET ORAL at 08:10

## 2017-10-09 RX ADMIN — HYDRALAZINE HYDROCHLORIDE 25 MG: 25 TABLET, FILM COATED ORAL at 02:10

## 2017-10-09 RX ADMIN — LEVOTHYROXINE SODIUM 200 MCG: 100 TABLET ORAL at 05:10

## 2017-10-09 RX ADMIN — FUROSEMIDE 80 MG: 40 TABLET ORAL at 09:10

## 2017-10-09 RX ADMIN — AMLODIPINE BESYLATE 10 MG: 10 TABLET ORAL at 08:10

## 2017-10-09 RX ADMIN — STANDARDIZED SENNA CONCENTRATE AND DOCUSATE SODIUM 1 TABLET: 8.6; 5 TABLET, FILM COATED ORAL at 09:10

## 2017-10-09 RX ADMIN — LEVETIRACETAM 1500 MG: 500 TABLET, FILM COATED ORAL at 08:10

## 2017-10-09 RX ADMIN — INSULIN ASPART 5 UNITS: 100 INJECTION, SOLUTION INTRAVENOUS; SUBCUTANEOUS at 05:10

## 2017-10-09 RX ADMIN — LOSARTAN POTASSIUM 100 MG: 25 TABLET, FILM COATED ORAL at 08:10

## 2017-10-09 RX ADMIN — APIXABAN 5 MG: 5 TABLET, FILM COATED ORAL at 09:10

## 2017-10-09 RX ADMIN — LEVETIRACETAM 1500 MG: 500 TABLET, FILM COATED ORAL at 09:10

## 2017-10-09 NOTE — PT/OT/SLP PROGRESS
Physical Therapy  Treatment    Alfa Christy III   MRN: [id number]   Admitting Diagnosis: Aphasia    PT Received On: 10/09/17  PT Total Time (min): 39 min  Billable Minutes:  Gait [id number], Therapeutic Activity 9 and Therapeutic Exercise 10    Treatment Type: Treatment  PT/PTA: PTA     PTA Visit Number: 1       General Precautions: Standard, aspiration, aphasia, fall  Orthopedic Precautions: N/A   Braces:      Do you have any cultural, spiritual, Yarsanism conflicts, given your current situation?: Per chart pt is Sikhism     Subjective:  Communicated with RN prior to session.  My vision is blurry at times    Pain/Comfort  Pain Rating 1: 0/10  Pain Rating Post-Intervention 1: 0/10    Objective:   Patient found with: peripheral IV    Functional Mobility:  Bed Mobility:    N/T 2* to pt UIC  Transfers:  Sit <> Stand Assistance: Minimum Assistance, Contact Guard Assistance  Sit <> Stand Assistive Device: Rolling Walker, No Assistive Device  Toilet Transfer Assistance:  (pt urinated while standing with SBA/CGA for safety)    Gait:   Gait Distance: 12 ft x 2 and 90 ft x 2 with rest break between trials. vcs for upright posture,safety, and  AD management  Gait Assistive Device: Rolling walker  Gait Pattern: swing-through gait  Gait Deviation(s): decreased ryanne, increased time in double stance, decreased step length, decreased stride length, decreased velocity of limb motion  Therapeutic Activities and Exercises:   Discussed/educated patient on progress, safety,d/c, PT POC   Patient performed therex X 15 reps seated in bedside chair B LE AROM AP, LAQ, Hip Flexion, Hip Abd/Add   White board updated   Donned an extra gown   AM-PAC 6 CLICK MOBILITY  How much help from another person does this patient currently need?   1 = Unable, Total/Dependent Assistance  2 = A lot, Maximum/Moderate Assistance  3 = A little, Minimum/Contact Guard/Supervision  4 = None, Modified [place]/Independent    Turning over in bed  (including adjusting bedclothes, sheets and blankets)?: 3  Sitting down on and standing up from a chair with arms (e.g., wheelchair, bedside commode, etc.): 3  Moving from lying on back to sitting on the side of the bed?: 3  Moving to and from a bed to a chair (including a wheelchair)?: 3  Need to walk in hospital room?: 3  Climbing 3-5 steps with a railing?: 2  Total Score: 17    AM-PAC Raw Score CMS G-Code Modifier Level of Impairment Assistance   6 % Total / Unable   7 - 9 CM 80 - 100% Maximal Assist   10 - 14 CL 60 - 80% Moderate Assist   15 - 19 CK 40 - 60% Moderate Assist   20 - 22 CJ 20 - 40% Minimal Assist   23 CI 1-20% SBA / CGA   24 CH 0% Independent/ Mod I     Patient left up in chair with all lines intact, call button in reach, nsg notified and family present.    Assessment:  Alfa Christy III is a 62 y.o. male with a medical diagnosis of Aphasia and presents with continued deficits as listed below. Pt  motivated and cooperative with treatment session. Pt Progressing with PT Intervention. Pt Progressing with improving gait distance. Pt would continue to benefit from skilled PT to address overall functional mobility and goals. Goals remain appropriate    Rehab identified problem list/impairments: Rehab identified problem list/impairments: weakness, gait instability, impaired functional mobilty, impaired balance, visual deficits, impaired cognition, decreased safety awareness, decreased lower extremity function, impaired endurance    Rehab potential is good.    Activity tolerance: Good    Discharge recommendations: Discharge Facility/Level Of Care Needs: nursing facility, skilled     Barriers to discharge: Barriers to Discharge: Inaccessible home environment    Equipment recommendations: Equipment Needed After Discharge:  (TBD)     GOALS:    Physical Therapy Goals        Problem: Physical Therapy Goal    Goal Priority Disciplines Outcome Goal Variances Interventions   Physical Therapy Goal      PT/OT, PT Ongoing (interventions implemented as appropriate)     Description:  Goals to be met by: 10/12/17     Patient will increase functional independence with mobility by performin. Supine <> sit with Modified [place]  2. Sit to stand transfer with Supervision  3. Gait  x 100 feet with Contact Guard Assistance with or without appropriate AD.   4. Ascend/descend 5 stairs with right Handrails Contact Guard Assistance .    5. Lower extremity exercise program x20 reps per handout, with assistance as needed                      PLAN:    Patient to be seen 4 x/week  to address the above listed problems via gait training, therapeutic activities, therapeutic exercises, neuromuscular re-education  Plan of Care expires: 17  Plan of Care reviewed with: patient, spouse         Toro Rodriguez, PTA  10/09/2017

## 2017-10-09 NOTE — PLAN OF CARE
Problem: Occupational Therapy Goal  Goal: Occupational Therapy Goal  Goals to be met by: 10/19/17     Patient will increase functional independence with ADLs by performing:    Feeding with Modified [place].  UE Dressing with Modified [place].  LE Dressing with Minimal Assistance.  Grooming while seated with Modified [place].  Toileting from bedside commode with Contact Guard Assistance for hygiene and clothing management.   Toilet transfer to bedside commode with Contact Guard Assistance.  Pt will verbally name 3/3 ADL items with <2 verbal cues in prep for safe engagement with self-care task  Pt will follow 2 step command to complete ADL task with <2 verbal cues or redirection.      Outcome: Ongoing (interventions implemented as appropriate)  Goals reviewed and updated.

## 2017-10-09 NOTE — PLAN OF CARE
Problem: SLP Goal  Goal: SLP Goal  Updated Speech Language Pathology Goals  Goals expected to be met by 10/13  1. Pt will tolerate regular consistency diet and thin liquids with no overt s/s aspiration.  2. Pt will name common objects/body parts w/ 50% acc given min cues.  3. Pt will complete responsive naming w/ 50% acc given min cues.  4. Pt will follow 2 step directives w/ 60% acc given min cues.  5. Pt will complete further evaluation of writing, visual spatial skills and cognitive-linguistic skills to determine the need for additional goals.      Speech Language Pathology  Goal expected to be met by 10/12:  1. Pt will tolerate regular consistency diet and thin liquids with no overt s/s aspiration.  -Goal ongoing   Outcome: Ongoing (interventions implemented as appropriate)  Pt participated well w/ tx tasks.  Cont ST per POC.    Shanti Manning CCC-SLP  10/9/2017

## 2017-10-09 NOTE — SUBJECTIVE & OBJECTIVE
Interval History: NAEON. Pt has no trouble finding words today, but still complains of lack of coordination in his right hand that has changed from this present event.    Review of Systems   Constitutional: Negative for unexpected weight change.   HENT: Negative for hearing loss.    Eyes: Positive for visual disturbance.   Respiratory: Negative for shortness of breath.    Cardiovascular: Negative for chest pain.   Gastrointestinal: Negative for abdominal pain.   Genitourinary: Negative for dysuria.   Musculoskeletal: Negative for arthralgias.   Skin: Negative for rash.   Neurological: Negative for seizures (No current.).   Hematological: Negative for adenopathy.     Objective:     Vital Signs (Most Recent):  Temp: 96.4 °F (35.8 °C) (10/09/17 0734)  Pulse: (!) 59 (10/09/17 0734)  Resp: 16 (10/09/17 0734)  BP: 129/76 (10/09/17 0734)  SpO2: (!) 94 % (10/09/17 0734) Vital Signs (24h Range):  Temp:  [96.4 °F (35.8 °C)-98.9 °F (37.2 °C)] 96.4 °F (35.8 °C)  Pulse:  [51-80] 59  Resp:  [14-20] 16  SpO2:  [94 %-98 %] 94 %  BP: (124-142)/(61-76) 129/76     Weight: 104.3 kg (230 lb)  Body mass index is 33 kg/m².    Intake/Output Summary (Last 24 hours) at 10/09/17 0827  Last data filed at 10/09/17 0422   Gross per 24 hour   Intake             1598 ml   Output             1850 ml   Net             -252 ml      Physical Exam   Constitutional: He appears well-developed and well-nourished.   HENT:   Head: Normocephalic and atraumatic.   Eyes: EOM are normal. Pupils are equal, round, and reactive to light.   Neck: No tracheal deviation present. No thyromegaly present.   Cardiovascular: Normal rate.    No murmur heard.  Pulmonary/Chest: Effort normal and breath sounds normal.   Abdominal: There is no tenderness. No hernia.   Musculoskeletal: He exhibits no edema or tenderness.   Neurological: No cranial nerve deficit. He exhibits normal muscle tone.   Skin: Skin is warm. Rash (Raised dark lesions on pt's back, dark spots diffusely on  pt's back) noted.   Psychiatric: He has a normal mood and affect.   Vitals reviewed.      Significant Labs:   CBC:   Recent Labs  Lab 10/08/17  0353   WBC 8.25   HGB 10.9*   HCT 30.5*        CMP:   Recent Labs  Lab 10/08/17  0353      K 3.9      CO2 25   *   BUN 29*   CREATININE 2.0*   CALCIUM 8.8   ANIONGAP 8   EGFRNONAA 34.3*       Significant Imaging: I have reviewed all pertinent imaging results/findings within the past 24 hours.

## 2017-10-09 NOTE — ASSESSMENT & PLAN NOTE
Patient with generalized involuntary guarding, hernia midline, and distension  Afebrile, no leukocytosis WBC 12 less likely inflammatory  Monitoring VS

## 2017-10-09 NOTE — PT/OT/SLP PROGRESS
"Occupational Therapy  Treatment    Alfa Christy III   MRN: [id number]   Admitting Diagnosis: Aphasia    OT Date of Treatment: 10/09/17   OT Start Time: 1333  OT Stop Time: 1356  OT Total Time (min): 23 min    Billable Minutes:  Therapeutic Activity 23 minutes    General Precautions: Standard, aspiration, aphasia, fall  Orthopedic Precautions: N/A  Braces: N/A     Subjective:  Communicated with nursing prior to session.  "I have trouble saying what I want to say."  Pain/Comfort  Pain Rating 1: 0/10    Objective:  Patient found with: peripheral IV, telemetry (IV clamped during session)     Functional Mobility:  Transfers:   Sit <> Stand Assistance: Contact Guard Assistance  Sit <> Stand Assistive Device: No Assistive Device    -from bedside chair; additional time required     Functional Ambulation: Pt ambulating functional household distances within the room with CGA utilizing no AD however noted with instability during mobility.     Balance:   Static Sit: GOOD-: Takes MODERATE challenges from all directions but inconsistently  Dynamic Sit: GOOD-: Maintains balance through MODERATE excursions of active trunk movement,     Static Stand: FAIR: Maintains without assist but unable to take challenges  Dynamic stand: FAIR: Needs CONTACT GUARD during gait    Therapeutic Activities and Exercises:  Pt found UIC at the start of session and left in chair at the end of the session with the pt's wife in the room during treatment session   Pt engaged in balance activity, retrieving 3/3 ADL items placed throughout the room, strategically requiring pt to follow direction, use trunk flexion, knee flexion, trunk rotation, and compensatory strategies for the R visual loss. Pt noted with poor recall as evidenced by inability to verbalize task command and requesting further explanation of task. After explaining task at hand a second time, pt requiring max vc's to locate 3/3 items. Pt requiring max verbal cues and additional time to read time " "on clock and verbalize. Pt noted with fair balance during retrieval of items with CGA required throughout task.   Pt perseverating throughout session, verbalizing "I have trouble saying what I want to say."   Educated on role of Ot, safety during mobility  Communicated OT POC    AM-PAC 6 CLICK ADL   How much help from another person does this patient currently need?   1 = Unable, Total/Dependent Assistance  2 = A lot, Maximum/Moderate Assistance  3 = A little, Minimum/Contact Guard/Supervision  4 = None, Modified [place]/Independent    Putting on and taking off regular lower body clothing? : 2  Bathing (including washing, rinsing, drying)?: 2  Toileting, which includes using toilet, bedpan, or urinal? : 2  Putting on and taking off regular upper body clothing?: 2  Taking care of personal grooming such as brushing teeth?: 3  Eating meals?: 3  Total Score: 14     AM-PAC Raw Score CMS "G-Code Modifier Level of Impairment Assistance   6 % Total / Unable   7 - 8 CM 80 - 100% Maximal Assist   9-13 CL 60 - 80% Moderate Assist   14 - 19 CK 40 - 60% Moderate Assist   20 - 22 CJ 20 - 40% Minimal Assist   23 CI 1-20% SBA / CGA   24 CH 0% Independent/ Mod I       Patient left up in chair with all lines intact, call button in reach, nursing notified and spouse present    ASSESSMENT:  Alfa Christy III is a 62 y.o. male with a medical diagnosis of Aphasia. Pt with good motivation to engage in therapy at this time and with far insight into condition. Pt p/w impaired cognition (difficulty with recall, decreased problem solving, max vc's for command following), expressive aphasia, impaired functional activity tolerance, impaired standing dynamic balance, R visual deficits, all limiting independence and safety with self-care and functional mobility. Pt would continue to benefit from skilled OT services at this time to ensure increased independence with self-care/mobility.      Rehab identified problem list/impairments: " Rehab identified problem list/impairments: weakness, impaired endurance, impaired self care skills, impaired functional mobilty, impaired balance, visual deficits, impaired cognition, decreased lower extremity function, decreased safety awareness    Rehab potential is fair.    Activity tolerance: Fair    Discharge recommendations: Discharge Facility/Level Of Care Needs: nursing facility, skilled     Barriers to discharge: Barriers to Discharge: Inaccessible home environment    Equipment recommendations:  (TBD at next level of care)     GOALS:    Occupational Therapy Goals        Problem: Occupational Therapy Goal    Goal Priority Disciplines Outcome Interventions   Occupational Therapy Goal     OT, PT/OT Ongoing (interventions implemented as appropriate)    Description:  Goals to be met by: 10/19/17     Patient will increase functional independence with ADLs by performing:    Feeding with Modified [place].  UE Dressing with Modified [place].  LE Dressing with Minimal Assistance.  Grooming while seated with Modified [place].  Toileting from bedside commode with Contact Guard Assistance for hygiene and clothing management.   Toilet transfer to bedside commode with Contact Guard Assistance.  Pt will verbally name 3/3 ADL items with <2 verbal cues in prep for safe engagement with self-care task  Pt will follow 2 step command to complete ADL task with <2 verbal cues or redirection.                        Plan:  Patient to be seen 3 x/week (primarily addressing cognition and balance; overlap with speech and PT, thus appropriate to decreased frequency slightly) to address the above listed problems via self-care/home management, therapeutic activities, therapeutic exercises  Plan of Care expires: 11/04/17  Plan of Care reviewed with: patient, spouse         Pretty Abdiel, OT  10/09/2017

## 2017-10-09 NOTE — PT/OT/SLP PROGRESS
"Speech Language Pathology  Treatment    Alfa Christy III   MRN: [id number]   Admitting Diagnosis: Aphasia    Diet recommendations: Solid Diet Level: Regular  Liquid Diet Level: Thin aspiration precautions    SLP Treatment Date: 10/09/17  Speech Start Time: 1039     Speech Stop Time: 1101     Speech Total (min): 22 min       TREATMENT BILLABLE MINUTES:  Speech Therapy Individual 12 and Seld Care/Home Management Training 10    Has the patient been evaluated by SLP for swallowing? : Yes  Keep patient NPO?: No   General Precautions: Standard, aspiration, aphasia, fall  Current Respiratory Status:  (Room air)       Subjective:  "I know what it is but I just can;t say it."  Pt stated re: object naming    Pain/Comfort  Pain Rating 1: 0/10  Pain Rating Post-Intervention 1: 0/10    Objective:   Patient found with: telemetry, peripheral IV    Pt was awake and alert in NAD upon SLP presentation.  He completed body part naming w/ 70% acc indep up to 100% acc given min-mod cues.  He completed object naming tasks w/ 50% acc indep up to 90% acc given mod cues.  He was unable to read single letters or digits but could id from a F:3 w/ 100% acc indep.  He was able to read single words w/ 66% acc indep up to 100% acc given min cues.  He and his wife were provided education re: SLP role, aphasia, word-finding strategies and SLP POC.  They indicated good understanding of the information provided and agreed w/ established POC.    FIM:                                 Assessment:  Alfa Christy III is a 62 y.o. male with a medical diagnosis of Aphasia and presents with aphasia.    Discharge recommendations: Discharge Facility/Level Of Care Needs: nursing facility, skilled     Goals:    SLP Goals        Problem: SLP Goal    Goal Priority Disciplines Outcome   SLP Goal     SLP Ongoing (interventions implemented as appropriate)   Description:  Updated Speech Language Pathology Goals  Goals expected to be met by 10/13  1. Pt will tolerate regular " consistency diet and thin liquids with no overt s/s aspiration.  2. Pt will name common objects/body parts w/ 50% acc given min cues.  3. Pt will complete responsive naming w/ 50% acc given min cues.  4. Pt will follow 2 step directives w/ 60% acc given min cues.  5. Pt will complete further evaluation of writing, visual spatial skills and cognitive-linguistic skills to determine the need for additional goals.      Speech Language Pathology  Goal expected to be met by 10/12:  1. Pt will tolerate regular consistency diet and thin liquids with no overt s/s aspiration.  -Goal ongoing                     Plan:   Patient to be seen Therapy Frequency: 5 x/week   Plan of Care expires: 11/03/17  Plan of Care reviewed with: patient, spouse  SLP Follow-up?: Yes              Shanti Manning CCC-SLP  10/09/2017

## 2017-10-09 NOTE — PLAN OF CARE
Amber did speak with Shanti at [facility], did fax information to , Amber to follow and call in locet and fax completed pasrr.

## 2017-10-09 NOTE — ASSESSMENT & PLAN NOTE
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

## 2017-10-09 NOTE — PLAN OF CARE
Problem: Patient Care Overview  Goal: Plan of Care Review  Outcome: Ongoing (interventions implemented as appropriate)  Pt alert and oriented. Can follow verbal commands. Tele monitor on. VSS. Neuro checks complete. Seizure and aspiration precautions maintained. No complaints of pain. Wife at bedside. POC reviewed with pt and wife. Call light in reach. Bed in low position. Will monitor.

## 2017-10-09 NOTE — PLAN OF CARE
Sw did upload referrals to [facility] and [facility] for SNF level of care per Pt request. Sw to follow, PPD placed and pasrr completed.

## 2017-10-09 NOTE — PLAN OF CARE
Problem: Patient Care Overview  Goal: Plan of Care Review  Outcome: Ongoing (interventions implemented as appropriate)  Pt alert, oriented - follows verbal commands and is more verbal tonight than previously. BP normotensive. Skin intact, ambulates with walker/stand by assist. IS education complete. SCD's on and functioning. Blood glucose monitoring ordered and carried out. Tele monitor on. Neuro checks complete. Seizure, fall, aspiration precautions maintained. No complaints of pain/n/v. Wife at bedside. Will monitor.

## 2017-10-09 NOTE — ASSESSMENT & PLAN NOTE
- Secondary to seizure activity as seen on MRI, no active Seizure on EEG   - Treat worsening seizure with PO Keppra 1500mg bid  - Patient improving and near baseline function  - Pt still without full coordination, PT/OT/speech is recommending skilled nursing to improve his coordination/strength/speech  - SNF placement in process  - Pt will follow up with neurology to ensure

## 2017-10-09 NOTE — ASSESSMENT & PLAN NOTE
- Pt having labile high BP's and bradycardia- Currently stable  - Pt on Coreg 3.125 mg, losartan 100mg qd, norvasc 10mg and hydralazine 25mg q8  - Will need follow up with PCP

## 2017-10-09 NOTE — ASSESSMENT & PLAN NOTE
- Asymptomatic and stable currently  - Continue to monitor fluid status  - Treat home coreg 3.125mg po BID, losartan 100mg po QD, and lasix 80mg po BID

## 2017-10-09 NOTE — ASSESSMENT & PLAN NOTE
- Glucose stable, hgb a1c was 5.6 in 9/2017  - Started on diabetic diet and detemir 15u qhs, LDSSI TID with meals and qhs  - Continue to monitor

## 2017-10-09 NOTE — MEDICAL/APP STUDENT
Progress Note  Hospital Medicine                                                              Team: [facility]    Patient Name: Alfa Christy III  YOB: 1955    Admit Date: 10/4/2017                     LOS: 4    SUBJECTIVE:     Reason for Admission:  Aphasia after a witnessed simple partial seizure    Mr Alfa Christy is a 62 y.o. male with past medical history of stroke with residual R hemianopia 01/2016 as well as seizure 11/2016, DM II, CKD III, HTN, HLD, paroxysmal A Fib presented to hospital complaining of acute onset aphasia.    Interval history: Patient is oriented to person, place, and time.  Pt is still experiencing difficulty finding words. Patient response and speech delay has improved.  Pt reports improvement on Aphasia and motor weakness. Pt's wife states he is at 62% from baseline. Pt reports having some leg swelling. Pt denies h/n/v/d.       OBJECTIVE:     Vital Signs Range (Last 24H):  Temp:  [96.4 °F (35.8 °C)-98.9 °F (37.2 °C)]   Pulse:  [51-80]   Resp:  [14-20]   BP: (124-142)/(61-76)   SpO2:  [94 %-98 %] Body mass index is 33 kg/m².  Wt Readings from Last 1 Encounters:   10/05/17 2107 104.3 kg (230 lb)   10/04/17 2055 104.3 kg (230 lb)       I & O (Last 24H):  Intake/Output Summary (Last 24 hours) at 10/09/17 0716  Last data filed at 10/09/17 0422   Gross per 24 hour   Intake             1598 ml   Output             1850 ml   Net             -252 ml       Physical Exam:  Physical Exam   Constitutional: He is oriented to person, place, and time. He appears well-developed and well-nourished. No distress.   HENT:   Head: Normocephalic and atraumatic.   Eyes: Conjunctivae are normal. Pupils are equal, round, and reactive to light.   Neck: Normal range of motion. Neck supple.   Cardiovascular: Normal rate, regular rhythm and normal heart sounds.    No murmur heard.  Pulmonary/Chest: Effort normal and breath sounds normal. No respiratory distress. He has no wheezes.   Abdominal: Soft.  Bowel sounds are normal. He exhibits distension. There is no tenderness. There is no rebound and no guarding.   Musculoskeletal: Normal range of motion.   No pitting edema   Neurological: He is alert and oriented to person, place, and time.   Skin: Skin is warm and dry.   Psychiatric: He has a normal mood and affect. Judgment and thought content normal.       Diagnostic Results:  Lab Results   Component Value Date    WBC 8.25 10/08/2017    HGB 10.9 (L) 10/08/2017    HCT 30.5 (L) 10/08/2017    MCV 78 (L) 10/08/2017     10/08/2017     No results for input(s): GLU, NA, K, CL, CO2, BUN, CREATININE, CALCIUM, MG in the last 24 hours.  Lab Results   Component Value Date    INR 1.0 10/04/2017    INR 1.1 11/05/2016    INR 1.1 01/27/2016     Lab Results   Component Value Date    HGBA1C 5.6 09/26/2017     Recent Labs      10/06/17   1713  10/06/17   2302  10/07/17   0813  10/07/17   1454  10/07/17   2155  10/08/17   1148  10/08/17   1704  10/08/17   2259   POCTGLUCOSE  207*  230*  214*  168*  199*  256*  251*  177*       Current Facility-Administered Medications:     amlodipine tablet 10 mg, 10 mg, Oral, Daily, Betsy Weldon MD, 10 mg at 10/08/17 0835    apixaban tablet 5 mg, 5 mg, Oral, BID, Terry Ritchie MD, 5 mg at 10/08/17 2118    atorvastatin tablet 40 mg, 40 mg, Oral, Daily, Terry Ritchie MD, 40 mg at 10/08/17 0834    carvedilol tablet 3.125 mg, 3.125 mg, Oral, BID, Trell Ratliff MD, 3.125 mg at 10/08/17 2118    dextrose 50% injection 12.5 g, 12.5 g, Intravenous, PRN, Terry Ritchie MD    dextrose 50% injection 12.5 g, 12.5 g, Intravenous, PRN, Nadira Rae MD    dextrose 50% injection 25 g, 25 g, Intravenous, PRN, Terry Ritchie MD    dextrose 50% injection 25 g, 25 g, Intravenous, PRN, Nadira Rae MD    furosemide tablet 80 mg, 80 mg, Oral, BID, Terry Ritchie MD, 80 mg at 10/08/17 2118    glucagon (human recombinant) injection 1 mg, 1 mg, Intramuscular, PRN,  Terry Ritchie MD    glucagon (human recombinant) injection 1 mg, 1 mg, Intramuscular, PRN, Nadira Rae MD    glucose chewable tablet 16 g, 16 g, Oral, PRN, Terry Ritchie MD    glucose chewable tablet 16 g, 16 g, Oral, PRN, Nadira Rae MD    glucose chewable tablet 24 g, 24 g, Oral, PRN, Terry Ritchie MD    glucose chewable tablet 24 g, 24 g, Oral, PRN, Nadira Rae MD    hydrALAZINE tablet 25 mg, 25 mg, Oral, Q8H, Trell Ratliff MD, 25 mg at 10/09/17 0534    insulin aspart pen 1-5 Units, 1-5 Units, Subcutaneous, QID (AC + HS) PRN, CATHY Prasad, 3 Units at 10/08/17 1708    insulin detemir pen 15 Units, 15 Units, Subcutaneous, QHS, CATHY Prasad, 15 Units at 10/08/17 2257    levetiracetam tablet 1,500 mg, 1,500 mg, Oral, BID, CATHY Prasad, 1,500 mg at 10/08/17 2118    levothyroxine tablet 200 mcg, 200 mcg, Oral, Before breakfast, Terry Ritchie MD, 200 mcg at 10/09/17 0534    losartan tablet 100 mg, 100 mg, Oral, Daily, Terry Ritchie MD, 100 mg at 10/08/17 0835    senna-docusate 8.6-50 mg per tablet 1 tablet, 1 tablet, Oral, BID, CATHY Prasad, 1 tablet at 10/08/17 2118     ASSESSMENT/PLAN:     Current Problems List:  Active Hospital Problems    Diagnosis  POA    *Aphasia [R47.01]  Yes    Simple partial seizure, consciousness not impaired [G40.109]  Yes    Altered mental status [R41.82]  Yes    Abdominal pain [R10.9]  Yes    DM type 2 without retinopathy [E11.9]  Yes    Essential hypertension [I10]  Yes    Chronic diastolic heart failure [I50.32]  Yes     11/26/2013      Cerebrovascular accident (CVA) [I63.9]  Yes    CKD (chronic kidney disease), stage III [N18.3]  Yes     Chronic    Hypercholesterolemia [E78.00]  Yes      Resolved Hospital Problems    Diagnosis Date Resolved POA   No resolved problems to display.       Active Problems, Status & Treatment Plan Addressed Today:    Aphasia    keppra  dosing doubled to 1500mg bid po  -24h EEG showed abnormalities associated with previous stroke, however, no current seizure activity was appreciated. (10/6)    DM type 2 without retinopathy  Glucose 82 on admission, hgb a1c was 5.6 in 9/2017  Started on diabetic diet  LDSSI for now    HTN              -Starting PO Hydralazine 25 mg BID (10/7)              -Decreasing Coreg 12.5 to 6.25 mg to 3.125 BID (10/8)    Chronic diastolic heart failure     No symptoms or signs of ADHF  Will continue home coreg 6.25 mg po BID, losartan 100mg po QD, and lasix 80mg po BID     Hypercholesterolemia     -Resume home artovastatin 40mg         Signing Physician:  Rosa Hardy

## 2017-10-09 NOTE — PLAN OF CARE
Problem: Physical Therapy Goal  Goal: Physical Therapy Goal  Goals to be met by: 10/12/17     Patient will increase functional independence with mobility by performin. Supine <> sit with Modified [place]  2. Sit to stand transfer with Supervision  3. Gait  x 100 feet with Contact Guard Assistance with or without appropriate AD.   4. Ascend/descend 5 stairs with right Handrails Contact Guard Assistance .    5. Lower extremity exercise program x20 reps per handout, with assistance as needed     Pt progressing towards goals. continue with PT POC.Goals remain appropriate.   Toro Rodriguez PTA  10/9/2017

## 2017-10-09 NOTE — PROGRESS NOTES
[facility] Hospital Medicine  Progress Note    Patient Name: Alfa Christy III  MRN: [id number]  Patient Class: IP- Inpatient   Admission Date: 10/4/2017  Length of Stay: 4 days  Attending Physician: James Brooks MD  Primary Care Provider: Tyler Jasmine MD    Hospital Medicine Team: [facility] [place] [facility] HOSP MED 2 Nixon Gallegos MD    Subjective:     Principal Problem:Aphasia    HPI:  62yoM with PMHx of CVA (with right hemianopsia1/2016), previous seizure (11/2016),DM2, CKD Stage 3, HTN, HLD, pAfib, thyroid cancer (s/p surgery and rad iodine 2009) presents with receptive and expressive aphasia. The patient was LKN at 7:00 pm.  His wife states she left home and returned at 8:00 to find the patient agitated with word finding difficulty not capable of naming objects and progressive difficulty making complete words. He had no weakness and was ambulatory at home.  The patient's wife states earlier tonight the patient c/o feeling dizziness holding on to walls for balance. He took 2 tylenol and reported relief of his symptoms at that time.  The patient had a flu shot today and has been having URI symptoms recently within the past 2 weeks.  He hasn't taken his losartan since the URI symptoms began, per his wife. Patient wife states this happened before for 2 hours after seizure like activity last November. She claims patient has no difficulty finding words at home since. However, per chart review neurology patient has had some word finding difficulty since CVA last January. Per wife has not had f/c, sob, chest pain, abdominal pain, diarrhea, or new medicine, sick contacts. Patient was stressed today at noon with the doubling of the water bill at home per wife.     Patient has had intermittent gross hematuria since July and is followed by urology. Hematuria attributed to BPH in setting of chronic anticoagulation. Patient had recent cystoscopy and bilateral RPG, possible ureteroscopy and biopsy to evaluate hematuria  and abnormal cytology, no recent note has been left per urology.    Upon arrival to Ed, pt blood pressure systolics up to 220s, CTH was done which was consistent with temporal evolution of remote left PCA territory infarction; No evidence for acute intracranial hemorrhages. Stroke code was called. During this time patient had waxing and waning of symptoms. Neurology recs MRI/MRA which showed questionable subtle cortical diffusion restriction throughout the supratentorial brain.  Short-term followup is suggested.Remote left PCA distribution infarct within the left occipital lobe.  Stable 4 mm right supraclinoid ICA aneurysm.Given negative imaging and the waxing and waning nature of the patient's symptoms Neurology thought it to be better evaluated with a medicine admission.        Hospital Course:  Continue neurological workup for suspected seizures as underlying cause of aphasia. Awaiting official vEEG report.    Interval History: NAEON. Pt has no trouble finding words today, but still complains of lack of coordination in his right hand that has changed from this present event.    Review of Systems   Constitutional: Negative for unexpected weight change.   HENT: Negative for hearing loss.    Eyes: Positive for visual disturbance.   Respiratory: Negative for shortness of breath.    Cardiovascular: Negative for chest pain.   Gastrointestinal: Negative for abdominal pain.   Genitourinary: Negative for dysuria.   Musculoskeletal: Negative for arthralgias.   Skin: Negative for rash.   Neurological: Negative for seizures (No current.).   Hematological: Negative for adenopathy.     Objective:     Vital Signs (Most Recent):  Temp: 96.4 °F (35.8 °C) (10/09/17 0734)  Pulse: (!) 59 (10/09/17 0734)  Resp: 16 (10/09/17 0734)  BP: 129/76 (10/09/17 0734)  SpO2: (!) 94 % (10/09/17 0734) Vital Signs (24h Range):  Temp:  [96.4 °F (35.8 °C)-98.9 °F (37.2 °C)] 96.4 °F (35.8 °C)  Pulse:  [51-80] 59  Resp:  [14-20] 16  SpO2:  [94 %-98 %]  94 %  BP: (124-142)/(61-76) 129/76     Weight: 104.3 kg (230 lb)  Body mass index is 33 kg/m².    Intake/Output Summary (Last 24 hours) at 10/09/17 0827  Last data filed at 10/09/17 0422   Gross per 24 hour   Intake             1598 ml   Output             1850 ml   Net             -252 ml      Physical Exam   Constitutional: He appears well-developed and well-nourished.   HENT:   Head: Normocephalic and atraumatic.   Eyes: EOM are normal. Pupils are equal, round, and reactive to light.   Neck: No tracheal deviation present. No thyromegaly present.   Cardiovascular: Normal rate.    No murmur heard.  Pulmonary/Chest: Effort normal and breath sounds normal.   Abdominal: There is no tenderness. No hernia.   Musculoskeletal: He exhibits no edema or tenderness.   Neurological: No cranial nerve deficit. He exhibits normal muscle tone.   Skin: Skin is warm. Rash (Raised dark lesions on pt's back, dark spots diffusely on pt's back) noted.   Psychiatric: He has a normal mood and affect.   Vitals reviewed.      Significant Labs:   CBC:   Recent Labs  Lab 10/08/17  0353   WBC 8.25   HGB 10.9*   HCT 30.5*        CMP:   Recent Labs  Lab 10/08/17  0353      K 3.9      CO2 25   *   BUN 29*   CREATININE 2.0*   CALCIUM 8.8   ANIONGAP 8   EGFRNONAA 34.3*       Significant Imaging: I have reviewed all pertinent imaging results/findings within the past 24 hours.    Assessment/Plan:      * Aphasia    - Secondary to seizure activity as seen on MRI, no active Seizure on EEG   - Treat worsening seizure with PO Keppra 1500mg bid  - Patient improving and near baseline function  - Pt still without full coordination, PT/OT/speech is recommending skilled nursing to improve his coordination/strength/speech  - SNF placement in process  - Pt will follow up with neurology to ensure         Simple partial seizure, consciousness not impaired    - Seizure seen on MRI  - Treated as above (Keppra 1500 BID)          Altered  mental status    - pt had post ictal state after seizure and exhibited confusion  - improving           DM type 2 without retinopathy    - Glucose stable, hgb a1c was 5.6 in 9/2017  - Started on diabetic diet and detemir 15u qhs, LDSSI TID with meals and qhs  - Continue to monitor            Essential hypertension    - Pt having labile high BP's and bradycardia- Currently stable  - Pt on Coreg 3.125 mg, losartan 100mg qd, norvasc 10mg and hydralazine 25mg q8  - Will need follow up with PCP        Chronic diastolic heart failure    - Asymptomatic and stable currently  - Continue to monitor fluid status  - Treat home coreg 3.125mg po BID, losartan 100mg po QD, and lasix 80mg po BID          Cerebrovascular accident (CVA)    - Hx of left PCA distribution stroke with right hemianopsia  - Evaluated by Vascular Neurology in ED and ruled out acute ischemia  - Residual right sided hemianopsia- continue to monitor  - Continue Eliquis and statin for secondary prevention        CKD (chronic kidney disease), stage III    - Cr around baseline 1.8-2.1  - eGFR= 39.6  - Renally dose medications and avoid nephrotoxins          Hypercholesterolemia    -Resume home artovastatin 40mg            VTE Risk Mitigation         Ordered     apixaban tablet 5 mg  2 times daily     Route:  Oral        10/05/17 0338     High Risk of VTE  Once      10/05/17 0247     Place sequential compression device  Until discontinued      10/05/17 0247     Place DARIAN hose  Until discontinued      10/05/17 0247              Nixon Gallegos MD  Department of Hospital Medicine   [facility]

## 2017-10-10 LAB
POCT GLUCOSE: 157 MG/DL (ref 70–110)
POCT GLUCOSE: 157 MG/DL (ref 70–110)
POCT GLUCOSE: 179 MG/DL (ref 70–110)

## 2017-10-10 PROCEDURE — 99232 SBSQ HOSP IP/OBS MODERATE 35: CPT | Mod: ,,, | Performed by: HOSPITALIST

## 2017-10-10 PROCEDURE — 25000003 PHARM REV CODE 250: Performed by: INTERNAL MEDICINE

## 2017-10-10 PROCEDURE — 92507 TX SP LANG VOICE COMM INDIV: CPT

## 2017-10-10 PROCEDURE — 25000003 PHARM REV CODE 250: Performed by: STUDENT IN AN ORGANIZED HEALTH CARE EDUCATION/TRAINING PROGRAM

## 2017-10-10 PROCEDURE — 97116 GAIT TRAINING THERAPY: CPT

## 2017-10-10 PROCEDURE — 25000003 PHARM REV CODE 250: Performed by: EMERGENCY MEDICINE

## 2017-10-10 PROCEDURE — 97530 THERAPEUTIC ACTIVITIES: CPT

## 2017-10-10 PROCEDURE — 11000001 HC ACUTE MED/SURG PRIVATE ROOM

## 2017-10-10 RX ADMIN — STANDARDIZED SENNA CONCENTRATE AND DOCUSATE SODIUM 1 TABLET: 8.6; 5 TABLET, FILM COATED ORAL at 09:10

## 2017-10-10 RX ADMIN — INSULIN ASPART 5 UNITS: 100 INJECTION, SOLUTION INTRAVENOUS; SUBCUTANEOUS at 08:10

## 2017-10-10 RX ADMIN — HYDRALAZINE HYDROCHLORIDE 25 MG: 25 TABLET, FILM COATED ORAL at 02:10

## 2017-10-10 RX ADMIN — HYDRALAZINE HYDROCHLORIDE 25 MG: 25 TABLET, FILM COATED ORAL at 05:10

## 2017-10-10 RX ADMIN — AMLODIPINE BESYLATE 10 MG: 10 TABLET ORAL at 08:10

## 2017-10-10 RX ADMIN — CARVEDILOL 3.12 MG: 3.12 TABLET, FILM COATED ORAL at 09:10

## 2017-10-10 RX ADMIN — INSULIN ASPART 5 UNITS: 100 INJECTION, SOLUTION INTRAVENOUS; SUBCUTANEOUS at 05:10

## 2017-10-10 RX ADMIN — APIXABAN 5 MG: 5 TABLET, FILM COATED ORAL at 09:10

## 2017-10-10 RX ADMIN — LEVETIRACETAM 1500 MG: 500 TABLET, FILM COATED ORAL at 08:10

## 2017-10-10 RX ADMIN — CARVEDILOL 3.12 MG: 3.12 TABLET, FILM COATED ORAL at 08:10

## 2017-10-10 RX ADMIN — LEVOTHYROXINE SODIUM 200 MCG: 100 TABLET ORAL at 05:10

## 2017-10-10 RX ADMIN — STANDARDIZED SENNA CONCENTRATE AND DOCUSATE SODIUM 1 TABLET: 8.6; 5 TABLET, FILM COATED ORAL at 08:10

## 2017-10-10 RX ADMIN — LOSARTAN POTASSIUM 100 MG: 25 TABLET, FILM COATED ORAL at 08:10

## 2017-10-10 RX ADMIN — INSULIN ASPART 5 UNITS: 100 INJECTION, SOLUTION INTRAVENOUS; SUBCUTANEOUS at 11:10

## 2017-10-10 RX ADMIN — FUROSEMIDE 80 MG: 40 TABLET ORAL at 09:10

## 2017-10-10 RX ADMIN — FUROSEMIDE 80 MG: 40 TABLET ORAL at 08:10

## 2017-10-10 RX ADMIN — LEVETIRACETAM 1500 MG: 500 TABLET, FILM COATED ORAL at 09:10

## 2017-10-10 RX ADMIN — ATORVASTATIN CALCIUM 40 MG: 20 TABLET, FILM COATED ORAL at 08:10

## 2017-10-10 RX ADMIN — APIXABAN 5 MG: 5 TABLET, FILM COATED ORAL at 08:10

## 2017-10-10 NOTE — PLAN OF CARE
Sw called [facility]. Spoke with Elizabeth; she believes pt looks good for possible acceptance but they had questions related to insurance that she was uncertain of. SW explained that IM 2 SW would follow up tomorrow.    LB called [facility], but was unable to speak with someone in admissions.    Ana Jung, LITO [id number]

## 2017-10-10 NOTE — ASSESSMENT & PLAN NOTE
- Secondary to seizure activity as seen on MRI, no active Seizure on EEG   - Treat worsening seizure with PO Keppra 1500mg bid  - Patient improving and near baseline function  - Pt still without full coordination, PT/OT/speech is recommending skilled nursing to improve his coordination/strength/speech  - SNF placement in process  - Pt will follow up with neurology to ensure seizure treatment prophylaxis

## 2017-10-10 NOTE — PROGRESS NOTES
[facility] Hospital Medicine  Progress Note    Patient Name: Alfa Christy III  MRN: [id number]  Patient Class: IP- Inpatient   Admission Date: 10/4/2017  Length of Stay: 5 days  Attending Physician: James Brooks MD  Primary Care Provider: Tyler Jasmine MD    Hospital Medicine Team: [facility] MED 2 Nixon Gallegos MD    Subjective:     Principal Problem:Aphasia    HPI:  62yoM with PMHx of CVA (with right hemianopsia1/2016), previous seizure (11/2016),DM2, CKD Stage 3, HTN, HLD, pAfib, thyroid cancer (s/p surgery and rad iodine 2009) presents with receptive and expressive aphasia. The patient was LKN at 7:00 pm.  His wife states she left home and returned at 8:00 to find the patient agitated with word finding difficulty not capable of naming objects and progressive difficulty making complete words. He had no weakness and was ambulatory at home.  The patient's wife states earlier tonight the patient c/o feeling dizziness holding on to walls for balance. He took 2 tylenol and reported relief of his symptoms at that time.  The patient had a flu shot today and has been having URI symptoms recently within the past 2 weeks.  He hasn't taken his losartan since the URI symptoms began, per his wife. Patient wife states this happened before for 2 hours after seizure like activity last November. She claims patient has no difficulty finding words at home since. However, per chart review neurology patient has had some word finding difficulty since CVA last January. Per wife has not had f/c, sob, chest pain, abdominal pain, diarrhea, or new medicine, sick contacts. Patient was stressed today at noon with the doubling of the water bill at home per wife.     Patient has had intermittent gross hematuria since July and is followed by urology. Hematuria attributed to BPH in setting of chronic anticoagulation. Patient had recent cystoscopy and bilateral RPG, possible ureteroscopy and biopsy to evaluate hematuria  and abnormal cytology, no recent note has been left per urology.    Upon arrival to Ed, pt blood pressure systolics up to 220s, CTH was done which was consistent with temporal evolution of remote left PCA territory infarction; No evidence for acute intracranial hemorrhages. Stroke code was called. During this time patient had waxing and waning of symptoms. Neurology recs MRI/MRA which showed questionable subtle cortical diffusion restriction throughout the supratentorial brain.  Short-term followup is suggested.Remote left PCA distribution infarct within the left occipital lobe.  Stable 4 mm right supraclinoid ICA aneurysm.Given negative imaging and the waxing and waning nature of the patient's symptoms Neurology thought it to be better evaluated with a medicine admission.        Hospital Course:  Continue neurological workup for suspected seizures as underlying cause of aphasia. Awaiting official vEEG report.    Interval History: NAEON. Pt has no trouble finding words today, but still complains of lack of coordination in his right hand that has changed from this present event.    Review of Systems   Constitutional: Negative for unexpected weight change.   HENT: Negative for hearing loss.    Eyes: Positive for visual disturbance.   Respiratory: Negative for shortness of breath.    Cardiovascular: Negative for chest pain.   Gastrointestinal: Negative for abdominal pain.   Genitourinary: Negative for dysuria.   Musculoskeletal: Negative for arthralgias.   Skin: Negative for rash.   Neurological: Negative for seizures (No current.).   Hematological: Negative for adenopathy.     Objective:     Vital Signs (Most Recent):  Temp: 96 °F (35.6 °C) (10/10/17 0800)  Pulse: 62 (10/10/17 0800)  Resp: 16 (10/10/17 0800)  BP: 133/71 (10/10/17 0800)  SpO2: 99 % (10/10/17 0800) Vital Signs (24h Range):  Temp:  [96 °F (35.6 °C)-98 °F (36.7 °C)] 96 °F (35.6 °C)  Pulse:  [50-70] 62  Resp:  [16-18] 16  SpO2:  [95 %-99 %] 99 %  BP:  (131-147)/(67-74) 133/71     Weight: 104.3 kg (230 lb)  Body mass index is 33 kg/m².    Intake/Output Summary (Last 24 hours) at 10/10/17 0831  Last data filed at 10/10/17 0200   Gross per 24 hour   Intake              300 ml   Output                0 ml   Net              300 ml      Physical Exam   Constitutional: He appears well-developed and well-nourished.   HENT:   Head: Normocephalic and atraumatic.   Eyes: EOM are normal. Pupils are equal, round, and reactive to light.   Neck: No tracheal deviation present. No thyromegaly present.   Cardiovascular: Normal rate.    No murmur heard.  Pulmonary/Chest: Effort normal and breath sounds normal.   Abdominal: There is no tenderness. No hernia.   Musculoskeletal: He exhibits no edema or tenderness.   Neurological: No cranial nerve deficit. He exhibits normal muscle tone.   Slow with nose to finger touch and alternating hand palm   Skin: Skin is warm. Rash (Raised dark lesions on pt's back, dark spots diffusely on pt's back) noted.   Psychiatric: He has a normal mood and affect.   Vitals reviewed.      Significant Labs:   CBC: No results for input(s): WBC, HGB, HCT, PLT in the last 48 hours.  CMP: No results for input(s): NA, K, CL, CO2, GLU, BUN, CREATININE, CALCIUM, PROT, ALBUMIN, BILITOT, ALKPHOS, AST, ALT, ANIONGAP, EGFRNONAA in the last 48 hours.    Invalid input(s): [place]    Significant Imaging: I have reviewed all pertinent imaging results/findings within the past 24 hours.    Assessment/Plan:      * Aphasia    - Secondary to seizure activity as seen on MRI, no active Seizure on EEG   - Treat worsening seizure with PO Keppra 1500mg bid  - Patient improving and near baseline function  - Pt still without full coordination, PT/OT/speech is recommending skilled nursing to improve his coordination/strength/speech  - SNF placement in process  - Pt will follow up with neurology to ensure seizure treatment prophylaxis        Simple partial seizure, consciousness  not impaired    - Seizure seen on MRI  - Treated as above (Keppra 1500 BID)          Altered mental status    - pt had post ictal state after seizure and exhibited confusion  - improving and stable now          DM type 2 without retinopathy    - Glucose stable, hgb a1c was 5.6 in 9/2017  - Started on diabetic diet and detemir 15u qhs, LDSSI TID with meals and qhs  - Continue to monitor            Essential hypertension    - Pt having labile high BP's and bradycardia- Currently stable  - Pt on Coreg 3.125 mg, losartan 100mg qd, norvasc 10mg and hydralazine 25mg q8  - Will need follow up with PCP        Chronic diastolic heart failure    - Asymptomatic and stable currently  - Continue to monitor fluid status  - Treat home coreg 3.125mg po BID, losartan 100mg po QD, and lasix 80mg po BID          Cerebrovascular accident (CVA)    - Hx of left PCA distribution stroke with right hemianopsia  - Evaluated by Vascular Neurology in ED and ruled out acute ischemia  - Residual right sided hemianopsia- continue to monitor  - Continue Eliquis and statin for secondary prevention        CKD (chronic kidney disease), stage III    - Cr around baseline 1.8-2.1  - eGFR= 39.6  - Renally dose medications and avoid nephrotoxins          Hypercholesterolemia    -Resume home artovastatin 40mg            VTE Risk Mitigation         Ordered     apixaban tablet 5 mg  2 times daily     Route:  Oral        10/05/17 0338     High Risk of VTE  Once      10/05/17 0247     Place sequential compression device  Until discontinued      10/05/17 0247     Place DARIAN hose  Until discontinued      10/05/17 0247              Nixon Gallegos MD  Department of Hospital Medicine   [facility]

## 2017-10-10 NOTE — PT/OT/SLP PROGRESS
Occupational Therapy  Treatment    Alfa Christy III   MRN: [id number]   Admitting Diagnosis: Aphasia    OT Date of Treatment: 10/10/17   OT Start Time: 1402  OT Stop Time: 1447  OT Total Time (min): 45 min    Billable Minutes:  Therapeutic Activity 45 minutes    General Precautions: Standard, aspiration, aphasia, fall  Orthopedic Precautions: N/A  Braces: N/A     Subjective:  Communicated with nursing prior to session. As per nursing, pt ok to be seen for therapy at this time. Pt agreeable to OT treatment session.   Pain/Comfort  Pain Rating 1: 0/10    Objective:  Patient found with: peripheral IV (Iv clamped throughout session)     Functional Mobility:  Bed Mobility:  Supine to Sit:  (with HOb approx at 80 degrees; additional time needed)  Sit to Supine:  (pt left in standing with family )    Transfers:   Sit <> Stand Assistance: Contact Guard Assistance (from EOB with bed in lowest position )  Sit <> Stand Assistive Device: No Assistive Device    Balance:   Static Sit: GOOD-: Takes MODERATE challenges from all directions but inconsistently  Dynamic Sit: GOOD-: Maintains balance through MODERATE excursions of active trunk movement,     Static Stand: FAIR+: Takes MINIMAL challenges from all directions  Dynamic stand: FAIR: Needs CONTACT GUARD during gait    Therapeutic Activities and Exercises:  Pt noted with perseveration throughout session  Pt able to follow single step commands, however requiring max vc's and repetition for task completion  Pt noted with good recall with ability to repeat 3/3 items after approx 10 minutes   Pt noted with difficulty concentrating and dividing attention between conversation with family and task at hand  Pt requiring silence to focus on task in order to complete and process command   Pt noted with good recognition of objects with ability to name toothbrush, toothpaste, and socks without any verbal cues, however with additional time     AM-PAC 6 CLICK ADL   How much help from another  "person does this patient currently need?   1 = Unable, Total/Dependent Assistance  2 = A lot, Maximum/Moderate Assistance  3 = A little, Minimum/Contact Guard/Supervision  4 = None, Modified [place]/Independent    Putting on and taking off regular lower body clothing? : 3  Bathing (including washing, rinsing, drying)?: 3  Toileting, which includes using toilet, bedpan, or urinal? : 3  Putting on and taking off regular upper body clothing?: 4  Taking care of personal grooming such as brushing teeth?: 4  Eating meals?: 4  Total Score: 21     AM-PAC Raw Score CMS "G-Code Modifier Level of Impairment Assistance   6 % Total / Unable   7 - 8 CM 80 - 100% Maximal Assist   9-13 CL 60 - 80% Moderate Assist   14 - 19 CK 40 - 60% Moderate Assist   20 - 22 CJ 20 - 40% Minimal Assist   23 CI 1-20% SBA / CGA   24 CH 0% Independent/ Mod I       Patient left standing in room with family with nursing notified and family and speech therapist present    ASSESSMENT:  Alfa Christy III is a 62 y.o. male with a medical diagnosis of Aphasia. Pt with good motivation to engage in therapy, however with poor insight into condition. Pt p/w impaired sustained attention, perseveration, impaired divided attention, inability to follow 2 step/multi-step commands, inability to effectively communication, all limiting independence and safety with self-care and functional mobility.     Rehab identified problem list/impairments: Rehab identified problem list/impairments: weakness, impaired self care skills, impaired functional mobilty, impaired endurance, impaired balance, impaired cognition, decreased coordination, decreased safety awareness, impaired coordination    Rehab potential is good.    Activity tolerance: Good    Discharge recommendations: Discharge Facility/Level Of Care Needs: nursing facility, skilled     Barriers to discharge: Barriers to Discharge: Inaccessible home environment    Equipment recommendations:  (TBD)     GOALS:    " Occupational Therapy Goals        Problem: Occupational Therapy Goal    Goal Priority Disciplines Outcome Interventions   Occupational Therapy Goal     OT, PT/OT Ongoing (interventions implemented as appropriate)    Description:  Goals to be met by: 10/19/17     Patient will increase functional independence with ADLs by performing:    Feeding with Modified [place].  UE Dressing with Modified [place].  LE Dressing with Minimal Assistance.  Grooming while seated with Modified [place].  Toileting from bedside commode with Contact Guard Assistance for hygiene and clothing management.   Toilet transfer to bedside commode with Contact Guard Assistance.  Pt will verbally name 3/3 ADL items with <2 verbal cues in prep for safe engagement with self-care task  Pt will follow 2 step command to complete ADL task with <2 verbal cues or redirection.                        Plan:  Patient to be seen 3 x/week to address the above listed problems via self-care/home management, therapeutic activities, therapeutic exercises  Plan of Care expires: 11/04/17  Plan of Care reviewed with: patient, spouse, family         [place] OT  10/10/2017

## 2017-10-10 NOTE — SUBJECTIVE & OBJECTIVE
Interval History: NAEON. Pt has no trouble finding words today, but still complains of lack of coordination in his right hand that has changed from this present event.    Review of Systems   Constitutional: Negative for unexpected weight change.   HENT: Negative for hearing loss.    Eyes: Positive for visual disturbance.   Respiratory: Negative for shortness of breath.    Cardiovascular: Negative for chest pain.   Gastrointestinal: Negative for abdominal pain.   Genitourinary: Negative for dysuria.   Musculoskeletal: Negative for arthralgias.   Skin: Negative for rash.   Neurological: Negative for seizures (No current.).   Hematological: Negative for adenopathy.     Objective:     Vital Signs (Most Recent):  Temp: 96 °F (35.6 °C) (10/10/17 0800)  Pulse: 62 (10/10/17 0800)  Resp: 16 (10/10/17 0800)  BP: 133/71 (10/10/17 0800)  SpO2: 99 % (10/10/17 0800) Vital Signs (24h Range):  Temp:  [96 °F (35.6 °C)-98 °F (36.7 °C)] 96 °F (35.6 °C)  Pulse:  [50-70] 62  Resp:  [16-18] 16  SpO2:  [95 %-99 %] 99 %  BP: (131-147)/(67-74) 133/71     Weight: 104.3 kg (230 lb)  Body mass index is 33 kg/m².    Intake/Output Summary (Last 24 hours) at 10/10/17 0831  Last data filed at 10/10/17 0200   Gross per 24 hour   Intake              300 ml   Output                0 ml   Net              300 ml      Physical Exam   Constitutional: He appears well-developed and well-nourished.   HENT:   Head: Normocephalic and atraumatic.   Eyes: EOM are normal. Pupils are equal, round, and reactive to light.   Neck: No tracheal deviation present. No thyromegaly present.   Cardiovascular: Normal rate.    No murmur heard.  Pulmonary/Chest: Effort normal and breath sounds normal.   Abdominal: There is no tenderness. No hernia.   Musculoskeletal: He exhibits no edema or tenderness.   Neurological: No cranial nerve deficit. He exhibits normal muscle tone.   Slow with nose to finger touch and alternating hand palm   Skin: Skin is warm. Rash (Raised dark  lesions on pt's back, dark spots diffusely on pt's back) noted.   Psychiatric: He has a normal mood and affect.   Vitals reviewed.      Significant Labs:   CBC: No results for input(s): WBC, HGB, HCT, PLT in the last 48 hours.  CMP: No results for input(s): NA, K, CL, CO2, GLU, BUN, CREATININE, CALCIUM, PROT, ALBUMIN, BILITOT, ALKPHOS, AST, ALT, ANIONGAP, EGFRNONAA in the last 48 hours.    Invalid input(s): ESTGF[place]    Significant Imaging: I have reviewed all pertinent imaging results/findings within the past 24 hours.

## 2017-10-10 NOTE — PLAN OF CARE
Problem: Occupational Therapy Goal  Goal: Occupational Therapy Goal  Goals to be met by: 10/19/17     Patient will increase functional independence with ADLs by performing:    Feeding with Modified [place].  UE Dressing with Modified [place].  LE Dressing with Minimal Assistance.  Grooming while seated with Modified [place].  Toileting from bedside commode with Contact Guard Assistance for hygiene and clothing management.   Toilet transfer to bedside commode with Contact Guard Assistance.  Pt will verbally name 3/3 ADL items with <2 verbal cues in prep for safe engagement with self-care task  Pt will follow 2 step command to complete ADL task with <2 verbal cues or redirection.       Outcome: Ongoing (interventions implemented as appropriate)  Goals reviewed and remain appropriate.

## 2017-10-10 NOTE — PT/OT/SLP PROGRESS
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      Pain/Comfort  Pain Rating 1: 0/10  Pain Rating Post-Intervention 1: 0/10    Objective:   Patient found with: peripheral IV, telemetry    Functional Mobility:  Bed Mobility:   Rolling/Turning to Left:  (pt. found standing leaning against wall in room with ST)    Transfers:  Sit <> Stand Assistance: Contact Guard Assistance  Sit <> Stand Assistive Device: Rolling Walker  Bed <> Chair Technique: Stand Pivot  Bed <> Chair Assistance: Contact Guard Assistance  Bed <> Chair Assistive Device: Rolling Walker    Gait:   Gait Distance: 480'  Assistance 1: Contact Guard Assistance  Gait Assistive Device: Rolling walker  Gait Pattern: swing-through gait  Gait Deviation(s):  (required assist with RW management at times for safety)    Stairs:      Balance:   Static Sit: FAIR+: Able to take MINIMAL challenges from all directions  Dynamic Sit: FAIR+: Maintains balance through MINIMAL excursions of active trunk motion  Static Stand: FAIR+: Takes MINIMAL challenges from all directions  Dynamic stand: FAIR: Needs CONTACT GUARD during gait     Therapeutic Activities and Exercises:  Assisted pt. to/from bathroom and to/from sink. Pt. able to perform self care with set-up. Discussed pt.'s progress.    AM-PAC 6 CLICK MOBILITY  How much help from another person does this patient currently need?   1 = Unable, Total/Dependent  Assistance  2 = A lot, Maximum/Moderate Assistance  3 = A little, Minimum/Contact Guard/Supervision  4 = None, Modified [place]/Independent    Turning over in bed (including adjusting bedclothes, sheets and blankets)?: 3  Sitting down on and standing up from a chair with arms (e.g., wheelchair, bedside commode, etc.): 3  Moving from lying on back to sitting on the side of the bed?: 3  Moving to and from a bed to a chair (including a wheelchair)?: 3  Need to walk in hospital room?: 3  Climbing 3-5 steps with a railing?: 3  Total Score: 18    AM-PAC Raw Score CMS G-Code Modifier Level of Impairment Assistance   6 % Total / Unable   7 - 9 CM 80 - 100% Maximal Assist   10 - 14 CL 60 - 80% Moderate Assist   15 - 19 CK 40 - 60% Moderate Assist   20 - 22 CJ 20 - 40% Minimal Assist   23 CI 1-20% SBA / CGA   24 CH 0% Independent/ Mod I     Patient left up in chair with all lines intact and call button in reach.    Assessment:  Alfa Christy III is a 62 y.o. male with a medical diagnosis of Aphasia and presents with increased gait distance. Pt. cooperative and tolerated treatment well. Pt. progressing with mobility. Pt. would benefit from continued PT to address functional deficits.    Rehab identified problem list/impairments: Rehab identified problem list/impairments: weakness, impaired endurance, impaired self care skills, impaired functional mobilty, gait instability, impaired balance, decreased safety awareness, impaired cognition    Rehab potential is good.    Activity tolerance: Good    Discharge recommendations: Discharge Facility/Level Of Care Needs: nursing facility, skilled (short stay, may progress to HH/OP PT)     Barriers to discharge: Barriers to Discharge: Inaccessible home environment    Equipment recommendations:       GOALS:    Physical Therapy Goals        Problem: Physical Therapy Goal    Goal Priority Disciplines Outcome Goal Variances Interventions   Physical Therapy Goal     PT/OT, PT  Ongoing (interventions implemented as appropriate)     Description:  Goals to be met by: 10/12/17     Patient will increase functional independence with mobility by performin. Supine <> sit with Modified [place] - Not Met  2. Sit to stand transfer with Supervision - Not Met  3. Gait  x 100 feet with Contact Guard Assistance with or without appropriate AD. - Met       Revised:  Gait  x 300 feet with Supervision with or without AD. - Not Met  4. Ascend/descend 5 stairs with right Handrails Contact Guard Assistance . - Not Met  5. Lower extremity exercise program x20 reps per handout, with assistance as needed - Not Met                        PLAN:    Patient to be seen 4 x/week  to address the above listed problems via gait training, therapeutic activities, therapeutic exercises, neuromuscular re-education  Plan of Care expires: 17  Plan of Care reviewed with: patient, spouse         Eric GOLDBERG Salvador, PT  10/10/2017

## 2017-10-10 NOTE — PLAN OF CARE
Problem: SLP Goal  Goal: SLP Goal  Updated Speech Language Pathology Goals  Goals expected to be met by 10/13  1. Pt will tolerate regular consistency diet and thin liquids with no overt s/s aspiration.  2. Pt will name common objects/body parts w/ 50% acc given min cues.  3. Pt will complete responsive naming w/ 50% acc given min cues.  4. Pt will follow 2 step directives w/ 60% acc given min cues.  5. Pt will complete further evaluation of writing, visual spatial skills and cognitive-linguistic skills to determine the need for additional goals.      Speech Language Pathology  Goal expected to be met by 10/12:  1. Pt will tolerate regular consistency diet and thin liquids with no overt s/s aspiration.  -Goal ongoing   Outcome: Ongoing (interventions implemented as appropriate)  Pt w/ improving receptive and expressive language skills.  Good participation w/ all tasks.  Cont ST per POC.    Shanti Manning CCC-SLP  10/10/2017

## 2017-10-10 NOTE — PLAN OF CARE
10/10/17 1447   Discharge Reassessment   Assessment Type Discharge Planning Reassessment   Provided patient/caregiver education on the expected discharge date and the discharge plan Yes   Do you have any problems affording any of your prescribed medications? No   Discharge Plan A Skilled Nursing Facility   Discharge Plan B Home Health   Can the patient answer the patient profile reliably? Yes, cognitively intact   How does the patient rate their overall health at the present time? Fair   Describe the patient's ability to walk at the present time. Walks with the help of equipment   How often would a person be available to care for the patient? Often   Number of comorbid conditions (as recorded on the chart) Five or more   During the past month, has the patient often been bothered by feeling down, depressed or hopeless? No   During the past month, has the patient often been bothered by little interest or pleasure in doing things? No

## 2017-10-10 NOTE — PT/OT/SLP PROGRESS
"Speech Language Pathology  Treatment    Alfa Christy III   MRN: [id number]   Admitting Diagnosis: Aphasia    Diet recommendations: Solid Diet Level: Regular  Liquid Diet Level: Thin standard aspiration precautions    SLP Treatment Date: 10/10/17  Speech Start Time: 1447     Speech Stop Time: 1510     Speech Total (min): 23 min       TREATMENT BILLABLE MINUTES:  Speech Therapy Individual 23    Has the patient been evaluated by SLP for swallowing? : Yes  Keep patient NPO?: No   General Precautions: Standard, aspiration, aphasia, fall  Current Respiratory Status:  (Room air)       Subjective:  "I feel like it is getting better."  Pt stated re: speech and language skills    Pain/Comfort  Pain Rating 1: 0/10  Pain Rating Post-Intervention 1: 0/10    Objective:   Patient found with: telemetry, peripheral IV    Pt completed responsive naming tasks w/ 100% acc given min cues.  He completed concrete divergent naming tasks w/ 100% acc and abstract divergent naming tasks w/ 83% indep up to 100% acc given cued.  He followed 2 step directives w/ 100% acc indep.  He was able to read single words and short sentences w/ 100% acc given extra time and min cues.  Education was provided to pt and family re: pt progress and SLP poc.  They indicated good understanding.    FIM:                                 Assessment:  Alfa Christy III is a 62 y.o. male with a medical diagnosis of Aphasia and presents with aphasia.    Discharge recommendations: Discharge Facility/Level Of Care Needs: nursing facility, skilled     Goals:    SLP Goals        Problem: SLP Goal    Goal Priority Disciplines Outcome   SLP Goal     SLP Ongoing (interventions implemented as appropriate)   Description:  Updated Speech Language Pathology Goals  Goals expected to be met by 10/13  1. Pt will tolerate regular consistency diet and thin liquids with no overt s/s aspiration.  2. Pt will name common objects/body parts w/ 50% acc given min cues.  3. Pt will complete " responsive naming w/ 50% acc given min cues.  4. Pt will follow 2 step directives w/ 60% acc given min cues.  5. Pt will complete further evaluation of writing, visual spatial skills and cognitive-linguistic skills to determine the need for additional goals.      Speech Language Pathology  Goal expected to be met by 10/12:  1. Pt will tolerate regular consistency diet and thin liquids with no overt s/s aspiration.  -Goal ongoing                     Plan:   Patient to be seen Therapy Frequency: 5 x/week   Plan of Care expires: 11/03/17  Plan of Care reviewed with: patient, spouse, friend  SLP Follow-up?: Yes              Shanti Manning CCC-SLP  10/10/2017

## 2017-10-10 NOTE — PLAN OF CARE
Problem: Physical Therapy Goal  Goal: Physical Therapy Goal  Goals to be met by: 10/12/17     Patient will increase functional independence with mobility by performin. Supine <> sit with Modified [place] - Not Met  2. Sit to stand transfer with Supervision - Not Met  3. Gait  x 100 feet with Contact Guard Assistance with or without appropriate AD. - Met       Revised:  Gait  x 300 feet with Supervision with or without AD. - Not Met  4. Ascend/descend 5 stairs with right Handrails Contact Guard Assistance . - Not Met  5. Lower extremity exercise program x20 reps per handout, with assistance as needed - Not Met      Outcome: Ongoing (interventions implemented as appropriate)  Goal #3 met and revised

## 2017-10-11 VITALS
WEIGHT: 230 LBS | OXYGEN SATURATION: 98 % | HEART RATE: 77 BPM | RESPIRATION RATE: 16 BRPM | DIASTOLIC BLOOD PRESSURE: 79 MMHG | SYSTOLIC BLOOD PRESSURE: 168 MMHG | BODY MASS INDEX: 32.93 KG/M2 | HEIGHT: 70 IN | TEMPERATURE: 98 F

## 2017-10-11 LAB
POCT GLUCOSE: 161 MG/DL (ref 70–110)
POCT GLUCOSE: 184 MG/DL (ref 70–110)
POCT GLUCOSE: <20 MG/DL (ref 70–110)

## 2017-10-11 PROCEDURE — 99239 HOSP IP/OBS DSCHRG MGMT >30: CPT | Mod: ,,, | Performed by: HOSPITALIST

## 2017-10-11 PROCEDURE — 92507 TX SP LANG VOICE COMM INDIV: CPT

## 2017-10-11 PROCEDURE — 25000003 PHARM REV CODE 250: Performed by: EMERGENCY MEDICINE

## 2017-10-11 PROCEDURE — 25000003 PHARM REV CODE 250: Performed by: INTERNAL MEDICINE

## 2017-10-11 PROCEDURE — 25000003 PHARM REV CODE 250: Performed by: STUDENT IN AN ORGANIZED HEALTH CARE EDUCATION/TRAINING PROGRAM

## 2017-10-11 RX ORDER — CARVEDILOL 3.12 MG/1
3.12 TABLET ORAL 2 TIMES DAILY
Qty: 60 TABLET | Refills: 11 | Status: ON HOLD | OUTPATIENT
Start: 2017-10-11 | End: 2018-04-30 | Stop reason: HOSPADM

## 2017-10-11 RX ORDER — NAPROXEN SODIUM 220 MG/1
81 TABLET, FILM COATED ORAL DAILY
Refills: 0 | COMMUNITY
Start: 2017-10-11 | End: 2020-01-01 | Stop reason: SDUPTHER

## 2017-10-11 RX ORDER — AMLODIPINE BESYLATE 10 MG/1
10 TABLET ORAL DAILY
Qty: 30 TABLET | Refills: 11 | Status: SHIPPED | OUTPATIENT
Start: 2017-10-11 | End: 2017-11-21 | Stop reason: SDUPTHER

## 2017-10-11 RX ORDER — LEVETIRACETAM 750 MG/1
1500 TABLET ORAL 2 TIMES DAILY
Qty: 120 TABLET | Refills: 11 | Status: SHIPPED | OUTPATIENT
Start: 2017-10-11 | End: 2019-02-20 | Stop reason: SDUPTHER

## 2017-10-11 RX ORDER — INSULIN ASPART 100 [IU]/ML
5 INJECTION, SOLUTION INTRAVENOUS; SUBCUTANEOUS 3 TIMES DAILY
Qty: 13.5 ML | Refills: 3 | Status: SHIPPED | OUTPATIENT
Start: 2017-10-11 | End: 2017-10-13 | Stop reason: SDUPTHER

## 2017-10-11 RX ORDER — HYDRALAZINE HYDROCHLORIDE 25 MG/1
25 TABLET, FILM COATED ORAL EVERY 8 HOURS
Qty: 90 TABLET | Refills: 11 | Status: SHIPPED | OUTPATIENT
Start: 2017-10-11 | End: 2018-05-31

## 2017-10-11 RX ADMIN — CARVEDILOL 3.12 MG: 3.12 TABLET, FILM COATED ORAL at 08:10

## 2017-10-11 RX ADMIN — APIXABAN 5 MG: 5 TABLET, FILM COATED ORAL at 08:10

## 2017-10-11 RX ADMIN — ATORVASTATIN CALCIUM 40 MG: 20 TABLET, FILM COATED ORAL at 08:10

## 2017-10-11 RX ADMIN — LOSARTAN POTASSIUM 100 MG: 25 TABLET, FILM COATED ORAL at 08:10

## 2017-10-11 RX ADMIN — STANDARDIZED SENNA CONCENTRATE AND DOCUSATE SODIUM 1 TABLET: 8.6; 5 TABLET, FILM COATED ORAL at 08:10

## 2017-10-11 RX ADMIN — LEVETIRACETAM 1500 MG: 500 TABLET, FILM COATED ORAL at 08:10

## 2017-10-11 RX ADMIN — AMLODIPINE BESYLATE 10 MG: 10 TABLET ORAL at 08:10

## 2017-10-11 RX ADMIN — INSULIN ASPART 5 UNITS: 100 INJECTION, SOLUTION INTRAVENOUS; SUBCUTANEOUS at 08:10

## 2017-10-11 RX ADMIN — LEVOTHYROXINE SODIUM 200 MCG: 100 TABLET ORAL at 05:10

## 2017-10-11 RX ADMIN — FUROSEMIDE 80 MG: 40 TABLET ORAL at 08:10

## 2017-10-11 RX ADMIN — HYDRALAZINE HYDROCHLORIDE 25 MG: 25 TABLET, FILM COATED ORAL at 01:10

## 2017-10-11 RX ADMIN — INSULIN ASPART 5 UNITS: 100 INJECTION, SOLUTION INTRAVENOUS; SUBCUTANEOUS at 01:10

## 2017-10-11 NOTE — ASSESSMENT & PLAN NOTE
- Secondary to seizure activity as seen on MRI, no active Seizure on EEG   - Continue PO Keppra 1500mg bid  - Patient improving and near baseline function  - Patient's wife feels comfortable with him going home w/ HH today given his significant improvement  - Pt will follow up with neurology to ensure seizure treatment prophylaxis  - Discharge today with home health

## 2017-10-11 NOTE — PLAN OF CARE
Future Appointments  Date Time Provider Department Center   10/18/2017 10:00 AM Lois Riojas RD [facility] DIABETE [address]   10/31/2017 1:40 PM Tyler Jasmine MD [facility] IM [address] [facility]   1/2/2018 8:00 AM LAB, [facility] LAB [place]   1/9/2018 9:30 AM RODNEY Dejesus, FNP [facility] ENDODIA [address]        10/11/17 1500   Final Note   Assessment Type Final Discharge Note   Discharge Disposition Home-Health   What phone number can be called within the next 1-3 days to see how you are doing after discharge? [phone]   Hospital Follow Up  Appt(s) scheduled? Yes   Right Care Referral Info   Post Acute Recommendation Home-care   Facility Name [facility]

## 2017-10-11 NOTE — PROGRESS NOTES
[facility] Hospital Medicine  Progress Note    Patient Name: Alfa Christy III  MRN: [id number]  Patient Class: IP- Inpatient   Admission Date: 10/4/2017  Length of Stay: 6 days  Attending Physician: James Brooks MD  Primary Care Provider: Tyler Jasmine MD    [facility] Medicine Team: [facility] HOSP MED 2 Teofilo Burch MD    Subjective:     Principal Problem:Aphasia    Interval History:   No acute events overnight.  Patient reports feeling better this morning and his wife states that given his improvement, she would now be able to take care of him at home.    Review of Systems   Constitutional: Negative for chills and fever.   Respiratory: Negative for cough and shortness of breath.    Cardiovascular: Negative for chest pain and palpitations.   Gastrointestinal: Negative for constipation, diarrhea, nausea and vomiting.     Objective:     Vital Signs (Most Recent):  Temp: 97.6 °F (36.4 °C) (10/11/17 1143)  Pulse: 77 (10/11/17 1200)  Resp: 16 (10/11/17 1143)  BP: (!) 168/79 (10/11/17 1143)  SpO2: 98 % (10/11/17 1143) Vital Signs (24h Range):  Temp:  [96.5 °F (35.8 °C)-98 °F (36.7 °C)] 97.6 °F (36.4 °C)  Pulse:  [51-93] 77  Resp:  [15-18] 16  SpO2:  [96 %-99 %] 98 %  BP: (117-168)/(59-79) 168/79     Weight: 104.3 kg (230 lb)  Body mass index is 33 kg/m².    Intake/Output Summary (Last 24 hours) at 10/11/17 1325  Last data filed at 10/11/17 0900   Gross per 24 hour   Intake              360 ml   Output                0 ml   Net              360 ml      Physical Exam   Constitutional: He is oriented to person, place, and time. No distress.   Overweight AAM sitting up in bed in NAD   Cardiovascular: Normal rate, regular rhythm and intact distal pulses.  Exam reveals no gallop and no friction rub.    No murmur heard.  Pulmonary/Chest: Breath sounds normal. No respiratory distress. He has no wheezes. He has no rhonchi. He has no rales.   Abdominal: Soft. Bowel sounds are normal. He exhibits no distension.  There is no tenderness.   Neurological: He is alert and oriented to person, place, and time.       Significant Labs: None    Significant Imaging: I have reviewed all pertinent imaging results/findings within the past 24 hours.    Assessment/Plan:      * Aphasia    - Secondary to seizure activity as seen on MRI, no active Seizure on EEG   - Continue PO Keppra 1500mg bid  - Patient improving and near baseline function  - Patient's wife feels comfortable with him going home w/ HH today given his significant improvement  - Pt will follow up with neurology to ensure seizure treatment prophylaxis  - Discharge today with home health      CKD (chronic kidney disease), stage III    - Cr around baseline 1.8-2.1  - Renally dose medications and avoid nephrotoxins      Altered mental status    - pt had post ictal state after seizure and exhibited confusion  - improving and stable now      Simple partial seizure, consciousness not impaired    - Seizure seen on MRI  - Treated as above (Keppra 1500 BID)      DM type 2 without retinopathy    - Glucose stable, hgb a1c was 5.6 in 9/2017  - Started on diabetic diet and detemir 15u qhs, LDSSI TID with meals and qhs  - Continue to monitor      Essential hypertension    - Pt having labile high BP's and bradycardia- Currently stable  - Pt on Coreg 3.125 mg, losartan 100mg qd, norvasc 10mg and hydralazine 25mg q8  - Will need follow up with PCP      Chronic diastolic heart failure    - Asymptomatic and stable currently  - Continue to monitor fluid status  - Treat home coreg 3.125mg po BID, losartan 100mg po QD, and lasix 80mg po BID      Cerebrovascular accident (CVA)    - Hx of left PCA distribution stroke with right hemianopsia  - Evaluated by Vascular Neurology in ED and ruled out acute ischemia  - Residual right sided hemianopsia- continue to monitor  - Continue Eliquis and statin for secondary prevention      Hypercholesterolemia    -Resume home artovastatin 40mg        VTE Risk  Mitigation         Ordered     apixaban tablet 5 mg  2 times daily     Route:  Oral        10/05/17 0338     High Risk of VTE  Once      10/05/17 0247     Place sequential compression device  Until discontinued      10/05/17 0247     Place DARIAN hose  Until discontinued      10/05/17 0247              Teofilo Burch MD  Department of Hospital Medicine   [facility]

## 2017-10-11 NOTE — PT/OT/SLP PROGRESS
"Speech Language Pathology  Treatment    Alfa Christy III   MRN: [id number]   526/526 A    Admitting Diagnosis: Aphasia    Diet recommendations: Solid Diet Level: Regular  Liquid Diet Level: Thin     SLP Treatment Date: 10/11/17  Speech Start Time: 1142     Speech Stop Time: 1210     Speech Total (min): 28 min       TREATMENT BILLABLE MINUTES:  Speech Therapy Individual 28    Has the patient been evaluated by SLP for swallowing? : Yes  Keep patient NPO?: No   General Precautions: Standard, fall, aphasia, aspiration  Current Respiratory Status:  (room air)       Subjective:  "Memory is messed up." Pt re: cognitive-linguistic changes he's observed s/p CVA.     Pain/Comfort  Pain Rating 1: 0/10  Pain Rating Post-Intervention 1: 0/10    Objective:     Pt awake and alert upon entry, sitting on EOB. Wife at b/s. Pt completed category exclusion task given f/o 3 with 100% ind'ly. He recalled series of 3 related words in reverse order with 100% ind'ly, and he sequenced series of 3 according to given attribute with 100% ind'ly. Upon success with tasks completed thus far, pt reported memory to be primary deficit s/p CVA. During subsequent conversation with SLP, word finding difficulty evident. Pt and wife provided with extensive education re: circumlocution. List of key concepts to aid circumlocution left at b/s. Of note, pt observed to ind'ly walk around room. White board updated. No further questions.   Assessment:  Alfa Christy III is a 62 y.o. male with a medical diagnosis of Aphasia and presents with aphasia.     Discharge recommendations: Discharge Facility/Level Of Care Needs: nursing facility, skilled     Goals:    SLP Goals        Problem: SLP Goal    Goal Priority Disciplines Outcome   SLP Goal     SLP Ongoing (interventions implemented as appropriate)   Description:  Updated Speech Language Pathology Goals  Goals expected to be met by 10/13  1. Pt will tolerate regular consistency diet and thin liquids with no overt s/s " aspiration.  2. Pt will name common objects/body parts w/ 50% acc given min cues.  3. Pt will complete responsive naming w/ 50% acc given min cues.  4. Pt will follow 2 step directives w/ 60% acc given min cues.  5. Pt will complete further evaluation of writing, visual spatial skills and cognitive-linguistic skills to determine the need for additional goals.      Speech Language Pathology  Goal expected to be met by 10/12:  1. Pt will tolerate regular consistency diet and thin liquids with no overt s/s aspiration.  -Goal ongoing                     Plan:   Patient to be seen Therapy Frequency: 5 x/week   Plan of Care expires: 11/03/17  Plan of Care reviewed with: patient, spouse  SLP Follow-up?: Yes            MARCELINO Anderson, CCC-SLP  [phone]  10/11/2017

## 2017-10-11 NOTE — SUBJECTIVE & OBJECTIVE
Interval History:   No acute events overnight.  Patient reports feeling better this morning and his wife states that given his improvement, she would now be able to take care of him at home.    Review of Systems   Constitutional: Negative for chills and fever.   Respiratory: Negative for cough and shortness of breath.    Cardiovascular: Negative for chest pain and palpitations.   Gastrointestinal: Negative for constipation, diarrhea, nausea and vomiting.     Objective:     Vital Signs (Most Recent):  Temp: 97.6 °F (36.4 °C) (10/11/17 1143)  Pulse: 77 (10/11/17 1200)  Resp: 16 (10/11/17 1143)  BP: (!) 168/79 (10/11/17 1143)  SpO2: 98 % (10/11/17 1143) Vital Signs (24h Range):  Temp:  [96.5 °F (35.8 °C)-98 °F (36.7 °C)] 97.6 °F (36.4 °C)  Pulse:  [51-93] 77  Resp:  [15-18] 16  SpO2:  [96 %-99 %] 98 %  BP: (117-168)/(59-79) 168/79     Weight: 104.3 kg (230 lb)  Body mass index is 33 kg/m².    Intake/Output Summary (Last 24 hours) at 10/11/17 1325  Last data filed at 10/11/17 0900   Gross per 24 hour   Intake              360 ml   Output                0 ml   Net              360 ml      Physical Exam   Constitutional: He is oriented to person, place, and time. No distress.   Overweight AAM sitting up in bed in NAD   Cardiovascular: Normal rate, regular rhythm and intact distal pulses.  Exam reveals no gallop and no friction rub.    No murmur heard.  Pulmonary/Chest: Breath sounds normal. No respiratory distress. He has no wheezes. He has no rhonchi. He has no rales.   Abdominal: Soft. Bowel sounds are normal. He exhibits no distension. There is no tenderness.   Neurological: He is alert and oriented to person, place, and time.       Significant Labs: None    Significant Imaging: I have reviewed all pertinent imaging results/findings within the past 24 hours.

## 2017-10-11 NOTE — PLAN OF CARE
Problem: SLP Goal  Goal: SLP Goal  Updated Speech Language Pathology Goals  Goals expected to be met by 10/13  1. Pt will tolerate regular consistency diet and thin liquids with no overt s/s aspiration.  2. Pt will name common objects/body parts w/ 50% acc given min cues.  3. Pt will complete responsive naming w/ 50% acc given min cues.  4. Pt will follow 2 step directives w/ 60% acc given min cues.  5. Pt will complete further evaluation of writing, visual spatial skills and cognitive-linguistic skills to determine the need for additional goals.      Speech Language Pathology  Goal expected to be met by 10/12:  1. Pt will tolerate regular consistency diet and thin liquids with no overt s/s aspiration.  -Goal ongoing   Outcome: Ongoing (interventions implemented as appropriate)  Continue current SLP POC. Goals remain appropriate.     MARCELINO Anderson, CCC-SLP  [phone]  10/11/2017

## 2017-10-11 NOTE — NURSING
Pt discharge. Pt verbalized understanding discharge instructions. Medications reviewed. Pt IV removed with catheter tip intact. No acute complaints at this time. Pt awaiting transport.

## 2017-10-11 NOTE — PLAN OF CARE
"Amber did fax updated PT/OT notes to Shanti at [facility] at . Amber did receive call from Shanti with [facility] stating "Pt's therapy looks fine but insurance process must be defined to accept Pt," per VM. Amber called Shanti at  and left message with Shanti to return Amber's call.   "

## 2017-10-11 NOTE — MEDICAL/APP STUDENT
Progress Note  Hospital Medicine                                                              Team: [facility]    Patient Name: Alfa Christy III  YOB: 1955    Admit Date: 10/4/2017                     LOS: 6    SUBJECTIVE:     Reason for Admission:  Aphasia after a witnessed simple partial seizure     Mr Alfa Christy is a 62 y.o. male with past medical history of stroke with residual R hemianopia 01/2016 as well as seizure 11/2016, DM II, CKD III, HTN, HLD, paroxysmal A Fib presented to hospital complaining of acute onset aphasia.    Interval history: Patient is oriented p/p/t. He is able to go to bathroom without assistance. Wife reports 72% from baseline. Improvement on word finding. Reports having watery eyes. No h/n/v/d/c/abdo pain      OBJECTIVE:     Vital Signs Range (Last 24H):  Temp:  [96 °F (35.6 °C)-98 °F (36.7 °C)]   Pulse:  [51-93]   Resp:  [15-18]   BP: (117-143)/(59-73)   SpO2:  [96 %-99 %] Body mass index is 33 kg/m².  Wt Readings from Last 1 Encounters:   10/05/17 2107 104.3 kg (230 lb)   10/04/17 2055 104.3 kg (230 lb)       I & O (Last 24H):No intake or output data in the 24 hours ending 10/11/17 0726    Physical Exam:  Physical Exam   Constitutional: He is oriented to person, place, and time. He appears well-developed and well-nourished. No distress.   HENT:   Head: Normocephalic and atraumatic.   Eyes: Conjunctivae are normal. Pupils are equal, round, and reactive to light.   Neck: Normal range of motion. Neck supple.   Cardiovascular: Normal rate, regular rhythm and normal heart sounds.    Pulmonary/Chest: Effort normal and breath sounds normal. No stridor. No respiratory distress. He has no wheezes.   Abdominal: Soft. Bowel sounds are normal. He exhibits no distension. There is no tenderness. There is no rebound and no guarding.   Musculoskeletal: Normal range of motion. He exhibits no edema.   Neurological: He is alert and oriented to person, place, and time. No cranial  nerve deficit.   Skin: Skin is warm and dry. No rash noted. No erythema.         Diagnostic Results:  Lab Results   Component Value Date    WBC 8.25 10/08/2017    HGB 10.9 (L) 10/08/2017    HCT 30.5 (L) 10/08/2017    MCV 78 (L) 10/08/2017     10/08/2017     No results for input(s): GLU, NA, K, CL, CO2, BUN, CREATININE, CALCIUM, MG in the last 24 hours.  Lab Results   Component Value Date    INR 1.0 10/04/2017    INR 1.1 11/05/2016    INR 1.1 01/27/2016     Lab Results   Component Value Date    HGBA1C 5.6 09/26/2017     Recent Labs      10/08/17   2259  10/09/17   0811  10/09/17   1151  10/09/17   1713  10/09/17   2147  10/10/17   0835  10/10/17   1145  10/10/17   1700   POCTGLUCOSE  177*  124*  174*  199*  212*  157*  179*  157*       Current Facility-Administered Medications:     amlodipine tablet 10 mg, 10 mg, Oral, Daily, Betsy Weldon MD, 10 mg at 10/10/17 0839    apixaban tablet 5 mg, 5 mg, Oral, BID, Terry Ritchie MD, 5 mg at 10/10/17 2124    atorvastatin tablet 40 mg, 40 mg, Oral, Daily, Terry Ritchie MD, 40 mg at 10/10/17 0838    carvedilol tablet 3.125 mg, 3.125 mg, Oral, BID, Trell Ratliff MD, 3.125 mg at 10/10/17 2123    dextrose 50% injection 12.5 g, 12.5 g, Intravenous, PRN, Terry Ritchie MD    dextrose 50% injection 12.5 g, 12.5 g, Intravenous, PRN, Nadira aRe MD    dextrose 50% injection 25 g, 25 g, Intravenous, PRN, Terry Ritchie MD    dextrose 50% injection 25 g, 25 g, Intravenous, PRN, Nadira Rae MD    furosemide tablet 80 mg, 80 mg, Oral, BID, Terry Ritchie MD, 80 mg at 10/10/17 2124    glucagon (human recombinant) injection 1 mg, 1 mg, Intramuscular, PRN, Terry Ritchie MD    glucagon (human recombinant) injection 1 mg, 1 mg, Intramuscular, PRN, Nadira Rae MD    glucose chewable tablet 16 g, 16 g, Oral, PRN, Terry Ritchie MD    glucose chewable tablet 16 g, 16 g, Oral, PRN, Nadira Rae MD    glucose  chewable tablet 24 g, 24 g, Oral, PRN, Terry Ritchie MD    glucose chewable tablet 24 g, 24 g, Oral, PRN, Nadira Rae MD    hydrALAZINE tablet 25 mg, 25 mg, Oral, Q8H, Trell Ratliff MD, Stopped at 10/10/17 2200    insulin aspart pen 1-5 Units, 1-5 Units, Subcutaneous, QID (AC + HS) PRN, CATHY Prasad, 3 Units at 10/08/17 1708    insulin aspart pen 5 Units, 5 Units, Subcutaneous, TIDWM, CATHY Prasad, 5 Units at 10/10/17 1700    insulin detemir pen 15 Units, 15 Units, Subcutaneous, QHS, CATHY Prasad, 15 Units at 10/10/17 2128    levetiracetam tablet 1,500 mg, 1,500 mg, Oral, BID, CATHY Prasad, 1,500 mg at 10/10/17 2123    levothyroxine tablet 200 mcg, 200 mcg, Oral, Before breakfast, Terry Ritchie MD, 200 mcg at 10/11/17 0542    losartan tablet 100 mg, 100 mg, Oral, Daily, Terry Ritchie MD, 100 mg at 10/10/17 0839    senna-docusate 8.6-50 mg per tablet 1 tablet, 1 tablet, Oral, BID, CATHY Prasad, 1 tablet at 10/10/17 2123    ASSESSMENT/PLAN:     Current Problems List:  Active Hospital Problems    Diagnosis  POA    *Aphasia [R47.01]  Yes    Simple partial seizure, consciousness not impaired [G40.109]  Yes    Altered mental status [R41.82]  Yes    DM type 2 without retinopathy [E11.9]  Yes    Essential hypertension [I10]  Yes    Chronic diastolic heart failure [I50.32]  Yes     11/26/2013      Cerebrovascular accident (CVA) [I63.9]  Yes    CKD (chronic kidney disease), stage III [N18.3]  Yes     Chronic    Hypercholesterolemia [E78.00]  Yes      Resolved Hospital Problems    Diagnosis Date Resolved POA   No resolved problems to display.       Active Problems, Status & Treatment Plan Addressed Today:      Signing Physician:  Rosa Quarles     keppra dosing doubled to 1500mg bid po  -24h EEG showed abnormalities associated with previous stroke, however, no current seizure activity was appreciated.  (10/6)     DM type 2 without retinopathy  Glucose 82 on admission, hgb a1c was 5.6 in 9/2017  Started on diabetic diet  LDSSI for now     HTN              -Starting PO Hydralazine 25 mg BID (10/7)              -Decreasing Coreg 12.5 to 6.25 mg to 3.125 BID (10/8)         Chronic diastolic heart failure     No symptoms or signs of ADHF  Will continue home coreg 6.25 mg po BID, losartan 100mg po QD, and lasix 80mg po BID          Hypercholesterolemia     -Resume home artovastatin 40mg

## 2017-10-11 NOTE — PLAN OF CARE
Call placed to patient's nurse Lelo to inquire if patient would like his wheelchair delivered to the room or to his house. Informed that Mr. Christy would like his wheelchair delivered to his home.    Bethany Koo RN  Ext [phone]

## 2017-10-11 NOTE — PLAN OF CARE
"Sw did receive call Mady with [facility], Pt accepted for admissions possible Friday as [facility] may have a staffing issue. CM referred message to resident of above, [facility]. Bel Dowd, nurse with [facility] informed of Friday's start date, "we are going to try to fit in Pt tomorrow but in case we do not."   "

## 2017-10-11 NOTE — PLAN OF CARE
[facility]    HOME HEALTH ORDERS  FACE TO FACE ENCOUNTER    Patient Name: Alfa Christy III  YOB: 1955    PCP: Tyler Jasmine MD   PCP Address: [address] [place] [address]  PCP Phone Number: [phone]  PCP Fax: [phone]    Encounter Date: 10/11/2017    Admit to Home Health    Diagnoses:  Active Hospital Problems    Diagnosis  POA    *Aphasia [R47.01]  Yes    Simple partial seizure, consciousness not impaired [G40.109]  Yes    Altered mental status [R41.82]  Yes    DM type 2 without retinopathy [E11.9]  Yes    Essential hypertension [I10]  Yes    Chronic diastolic heart failure [I50.32]  Yes     11/26/2013      Cerebrovascular accident (CVA) [I63.9]  Yes    CKD (chronic kidney disease), stage III [N18.3]  Yes     Chronic    Hypercholesterolemia [E78.00]  Yes      Resolved Hospital Problems    Diagnosis Date Resolved POA   No resolved problems to display.       Future Appointments  Date Time Provider Department Center   10/18/2017 10:00 AM Lois Riojas RD [facility] DIABETE [place]   11/7/2017 10:00 AM Tyler Jasmine MD [facility] IM [place] PCW   1/2/2018 8:00 AM LAB, [facility] [place]   1/9/2018 9:30 AM RODNEY Dejesus, FNP [facility] ENDODIA [place]     Follow-up Information     Keara Santiago MD In 2 weeks.    Specialty:  Neurology  Contact information:  [address]  [facility] [address]  [phone]                     I have seen and examined this patient face to face today. My clinical findings that support the need for the home health skilled services and home bound status are the following:  Weakness/numbness causing balance and gait disturbance due to seizure making it taxing to leave home.    Allergies:  Review of patient's allergies indicates:   Allergen Reactions    Cough syrup [guaifenesin] Other (See Comments)       Diet: regular diet    Activities: activity as tolerated    Nursing:   SN to complete comprehensive assessment  including routine vital signs. Instruct on disease process and s/s of complications to report to MD. Review/verify medication list sent home with the patient at time of discharge  and instruct patient/caregiver as needed. Frequency may be adjusted depending on start of care date.    Notify MD if SBP > 160 or < 90; DBP > 90 or < 50; HR > 120 or < 50; Temp > 101      CONSULTS:    Physical Therapy to evaluate and treat. Evaluate for home safety and equipment needs; Establish/upgrade home exercise program. Perform / instruct on therapeutic exercises, gait training, transfer training, and Range of Motion.  Occupational Therapy to evaluate and treat. Evaluate home environment for safety and equipment needs. Perform/Instruct on transfers, ADL training, ROM, and therapeutic exercises.   to evaluate for community resources/long-range planning.  Aide to provide assistance with personal care, ADLs, and vital signs.    MISCELLANEOUS CARE:  N/A    WOUND CARE ORDERS  n/a      Medications: Review discharge medications with patient and family and provide education.      Current Discharge Medication List      START taking these medications    Details   hydrALAZINE (APRESOLINE) 25 MG tablet Take 1 tablet (25 mg total) by mouth every 8 (eight) hours.  Qty: 90 tablet, Refills: 11      insulin aspart (NOVOLOG) 100 unit/mL InPn pen Inject 5 Units into the skin 3 (three) times daily.  Qty: 13.5 mL, Refills: 3      levetiracetam (KEPPRA) 750 MG Tab Take 2 tablets (1,500 mg total) by mouth 2 (two) times daily.  Qty: 120 tablet, Refills: 11         CONTINUE these medications which have CHANGED    Details   amlodipine (NORVASC) 10 MG tablet Take 1 tablet (10 mg total) by mouth once daily.  Qty: 30 tablet, Refills: 11      carvedilol (COREG) 3.125 MG tablet Take 1 tablet (3.125 mg total) by mouth 2 (two) times daily.  Qty: 60 tablet, Refills: 11         CONTINUE these medications which have NOT CHANGED    Details   acetaminophen  "(TYLENOL) 500 MG tablet Take 1 tablet (500 mg total) by mouth every 6 (six) hours as needed for Pain.  Refills: 0    Associated Diagnoses: Lumbar sprain, initial encounter      apixaban 5 mg Tab Take 1 tablet (5 mg total) by mouth 2 (two) times daily.  Qty: 60 tablet, Refills: 11    Associated Diagnoses: Paroxysmal atrial fibrillation      atorvastatin (LIPITOR) 40 MG tablet Take 1 tablet (40 mg total) by mouth once daily.  Qty: 90 tablet, Refills: 3    Associated Diagnoses: Hypercholesterolemia      BD INSULIN PEN NEEDLE UF MINI 31 gauge x 3/16" Ndle To use with insulin pens 4  times daily  Qty: 360 each, Refills: 3      coenzyme Q10 200 mg capsule Take 200 mg by mouth once daily.    Associated Diagnoses: Lumbar sprain, initial encounter      folic acid (FOLVITE) 400 MCG tablet Take 400 mcg by mouth once daily.      furosemide (LASIX) 40 MG tablet Take 2 tablets (80 mg total) by mouth 2 (two) times daily.  Qty: 90 tablet, Refills: 4    Associated Diagnoses: Chronic diastolic heart failure      insulin detemir (LEVEMIR FLEXPEN) 100 unit/mL (3 mL) SubQ InPn pen Inject 16 Units into the skin every evening.  Qty: 3 Box, Refills: 3      insulin lispro (HUMALOG KWIKPEN) 100 unit/mL InPn pen Inject 12 units w/ meals, 6 units if eating lighter meal or bg less than 120, scale 180-230 +2, 231-280 +4, 281-330 +6, 331-380 +8.  Qty: 3 Box, Refills: 3    Associated Diagnoses: Controlled type 2 diabetes with neuropathy      levothyroxine (SYNTHROID) 200 MCG tablet Take 1 tablet (200 mcg total) by mouth before breakfast.  Qty: 90 tablet, Refills: 2    Associated Diagnoses: Postoperative hypothyroidism      losartan (COZAAR) 100 MG tablet Take 1 tablet (100 mg total) by mouth once daily.  Qty: 90 tablet, Refills: 3    Associated Diagnoses: CKD (chronic kidney disease), stage III      magnesium 30 mg Tab Take 1 tablet by mouth every Mon, Wed, Fri.       multivitamin capsule Take 1 capsule by mouth every Mon, Wed, Fri.       ONETOUCH " VERIO Strp USE TO TEST BLOOD SUGAR THREE TIMES DAILY  Qty: 100 strip, Refills: 11      vitamin D 1000 units Tab Take 1,000 Units by mouth every Mon, Wed, Fri.      dulaglutide (TRULICITY) 0.75 mg/0.5 mL PnIj Inject 0.5 mLs (0.75 mg total) into the skin every 7 days.  Qty: 4 Syringe, Refills: 6      glucagon (human recombinant) inj 1mg/mL kit Inject 1 mL (1 mg total) into the muscle as needed.  Qty: 1 kit, Refills: 1             I certify that this patient is confined to his home and needs intermittent skilled nursing care, physical therapy and occupational therapy.

## 2017-10-12 ENCOUNTER — PATIENT OUTREACH (OUTPATIENT)
Dept: ADMINISTRATIVE | Facility: CLINIC | Age: 62
End: 2017-10-12

## 2017-10-12 NOTE — Clinical Note
Azra - see full TCC note in chart, looks like patient has lantus (or detemir per discharge summary?), humalog, and novolog and some confusion about his dosing after recent hospitalization for CVA. Could you reach out to him to help clarify? I'll also be seeing him for hospital follow up soon.  Tyler Jasmine MD

## 2017-10-12 NOTE — PATIENT INSTRUCTIONS
Stroke (Completed)    You have had a mild stroke, or cerebrovascular accident (CVA). This is caused by a loss of blood flow to part of your brain. This can occur when a blood clot forms inside the carotid artery (main artery from the heart to the brain) or inside the heart. When the clot travels to the brain, it can lodge in a blood vessel and block blood flow. The other common cause of stroke is a gradual narrowing of the arteries in the brain due to buildup of fatty deposits (plaque).  Symptoms  Blocked blood flow in different areas of the brain can cause different symptoms. If you have had a stroke before, a new one may be different. A memory aid for the basic signs of a stroke is F.A.S.T.  F.A.S.T.  · F: Face drooping, or numbness on one side. This may be more noticeable when you ask the affected person to smile.  · A: Arm weakness or numbness. The affected person may have trouble using or lifting one side.  · S: Speech difficulty. Speech may be slurred or hard to understand. The affected person may also use the wrong words.  · T: Time to call 911. Time is critical in treating a stroke. Call 911 as soon as you suspect a stroke has happened--even a small one. The sooner treatment is started the better, even if the symptoms go away.  Other common symptoms of a stroke include:  · Having difficulty getting the right words to come out  · Weakness in one leg  · Numbness on one side  · Difficulty walking  · Trouble with coordination  · Trouble with vision  · Headache  · Confusion  · Dizziness  Treatment  After you have had a stroke, you are at risk of having another. Be sure to follow up with your healthcare provider for further evaluation and treatment. If problems are found, your healthcare provider will recommend treatment with medicines and/or procedures.  To reduce your chance of having another stroke, you may be prescribed medicines. These include medicines to prevent blood clots, such as antiplatelet or  anticoagulant medicines.  Home care  · Rest at home and avoid exertion for the next few days.  · If your healthcare provider has prescribed medicines, take them as directed.  Follow-up care  Follow up with your healthcare provider, or as advised. Additional tests may be needed. If you had an X-ray, CT scan, MRI, or ECG (electrocardiogram), it will be reviewed by a specialist. You will be notified of any new findings that will affect your care.  Call 911  Contact emergency services right away if any of these occur:  · Any of your stroke symptoms worsen  · New problems with speech, confusion, vision, walking, coordination, facial droop, or weakness or numbness on one side of your body  · Severe headache, fainting spell, dizziness, or seizure  · Chest pain or shortness of breath  Remember F.A.S.T. (described above). If you notice warning signs and symptoms of stroke, CALL 911 without delay.  Date Last Reviewed: 9/21/2015  © 7201-4183 [employer]. [address] [place] [address], [place] [address]. All rights reserved. This information is not intended as a substitute for professional medical care. Always follow your healthcare professional's instructions.

## 2017-10-12 NOTE — PT/OT/SLP DISCHARGE
Occupational Therapy Discharge Summary    Alfa Christy III  MRN: [id number]   Aphasia   Patient Discharged from acute Occupational Therapy on 10/11/2017.  Please refer to prior OT note dated on 10/10/2017 for functional status.     Assessment:   Patient appropriate for care in another setting.  GOALS:    Occupational Therapy Goals        Problem: Occupational Therapy Goal    Goal Priority Disciplines Outcome Interventions   Occupational Therapy Goal     OT, PT/OT Ongoing (interventions implemented as appropriate)    Description:  Goals to be met by: 10/19/17     Patient will increase functional independence with ADLs by performing:    Feeding with Modified [place].  UE Dressing with Modified [place].  LE Dressing with Minimal Assistance.  Grooming while seated with Modified [place].  Toileting from bedside commode with Contact Guard Assistance for hygiene and clothing management.   Toilet transfer to bedside commode with Contact Guard Assistance.  Pt will verbally name 3/3 ADL items with <2 verbal cues in prep for safe engagement with self-care task  Pt will follow 2 step command to complete ADL task with <2 verbal cues or redirection.                      Reasons for Discontinuation of Therapy Services  Transfer to alternate level of care.      Plan:  Patient Discharged to: Home with Home Health Service.

## 2017-10-13 ENCOUNTER — TELEPHONE (OUTPATIENT)
Dept: ENDOCRINOLOGY | Facility: HOSPITAL | Age: 62
End: 2017-10-13

## 2017-10-13 LAB — POCT GLUCOSE: 197 MG/DL (ref 70–110)

## 2017-10-13 RX ORDER — INSULIN ASPART 100 [IU]/ML
INJECTION, SOLUTION INTRAVENOUS; SUBCUTANEOUS
Qty: 1 BOX | Refills: 6
Start: 2017-10-13 | End: 2018-01-09 | Stop reason: SDUPTHER

## 2017-10-13 NOTE — PT/OT/SLP DISCHARGE
Physical Therapy Discharge Summary    Alfa Christy III  MRN: [id number]   Aphasia   Patient Discharged from acute Physical Therapy on 10/11/2017.  Please refer to prior PT noted date on 10/10/2017 for functional status.     Assessment:   Goals partially met.  GOALS:    Physical Therapy Goals        Problem: Physical Therapy Goal    Goal Priority Disciplines Outcome Goal Variances Interventions   Physical Therapy Goal     PT/OT, PT Ongoing (interventions implemented as appropriate)     Description:  Goals to be met by: 10/12/17     Patient will increase functional independence with mobility by performin. Supine <> sit with Modified [place] - Not Met  2. Sit to stand transfer with Supervision - Not Met  3. Gait  x 100 feet with Contact Guard Assistance with or without appropriate AD. - Met       Revised:  Gait  x 300 feet with Supervision with or without AD. - Not Met  4. Ascend/descend 5 stairs with right Handrails Contact Guard Assistance . - Not Met  5. Lower extremity exercise program x20 reps per handout, with assistance as needed - Not Met                      Reasons for Discontinuation of Therapy Services  Transfer to alternate level of care.      Plan:  Patient Discharged to: Home with Home Health Service.    Eric Franco, PT  10/11/2017

## 2017-10-13 NOTE — TELEPHONE ENCOUNTER
Spoke with pt's wife, clarification w/ MDI, instruction: novolog 5 units w/ breakfast, dinner, 7 units w/ lunch plus scale 180-230+1, 231-280 +2, etc. Change levemir 14 units at night.  Pt's wife verbalized understanding.

## 2017-10-13 NOTE — TELEPHONE ENCOUNTER
Per [facility] hospital call note, patient has some confusion about his insulin regimen after his hospital discharge. Will forward to his diabetes team for clarification.

## 2017-10-16 ENCOUNTER — TELEPHONE (OUTPATIENT)
Dept: INTERNAL MEDICINE | Facility: CLINIC | Age: 62
End: 2017-10-16

## 2017-10-16 NOTE — TELEPHONE ENCOUNTER
----- Message from Cinthya Brown sent at 10/16/2017  3:26 PM CDT -----  Contact: Westley Duarte/ Occupational Therapist [facility]/ [phone]  Pt has had a stroke recently. Westley feels that pt doesn't need occupational therapy but he does need speech therapy. Please call with verbal order and they will send the order over for her signature.

## 2017-10-18 ENCOUNTER — CLINICAL SUPPORT (OUTPATIENT)
Dept: DIABETES | Facility: CLINIC | Age: 62
End: 2017-10-18
Payer: COMMERCIAL

## 2017-10-18 ENCOUNTER — TELEPHONE (OUTPATIENT)
Dept: NEUROLOGY | Facility: CLINIC | Age: 62
End: 2017-10-18

## 2017-10-18 VITALS — BODY MASS INDEX: 31.91 KG/M2 | WEIGHT: 222.38 LBS

## 2017-10-18 PROCEDURE — 99999 PR PBB SHADOW E&M-EST. PATIENT-LVL I: CPT | Mod: PBBFAC,,, | Performed by: DIETITIAN, REGISTERED

## 2017-10-18 PROCEDURE — G0108 DIAB MANAGE TRN  PER INDIV: HCPCS | Mod: S$GLB,,, | Performed by: DIETITIAN, REGISTERED

## 2017-10-18 NOTE — TELEPHONE ENCOUNTER
----- Message from Rnadi Kuo sent at 10/18/2017  3:42 PM CDT -----  Contact: Rut wife [phone]  Rut is calling to schedule a follow up appt for  wife states patient is hearing a high pitched noise in his left ear. Please call

## 2017-10-18 NOTE — TELEPHONE ENCOUNTER
Callback attempted. Unable to leave  currently. Last seen by Dr. Santiago in Dec 2016 for Sz management.

## 2017-10-18 NOTE — PROGRESS NOTES
"Diabetes Education  Author: Lois Riojas RD  Date: 10/18/2017    Diabetes Education Visit  Diabetes Education Record Assessment/Progress: Comprehensive/Group    Diabetes Type  Diabetes Type : Type II         Nutrition  Meal Planning: water, artificial sweeteners, 3 meals per day, snacks between meal  Meal Plan 24 Hour Recall - Breakfast: eggs, turkey pandey/turkey sausage, fruit, cereal/oatmeal/wheat bread, milk, coffee  Meal Plan 24 Hour Recall - Lunch: fish/chicken/ground meat, brown rice/small amt potato/beans, vegetable  Meal Plan 24 Hour Recall - Dinner: similar to lunch  Meal Plan 24 Hour Recall - Snack: not as much, usually between lunch and dinner and sometimes between breakfast and lunch - few chips (handful), or cookies (4)    Monitoring   Self Monitoring : SMBG 3-4 times daily - brought log today, see attached - variability ranging 109-242 AC readings  Blood Glucose Logs: Yes    Exercise   Exercise Type:  (before recent hospitalization, was walking regularly at mall)    Current Diabetes Treatment   Current Treatment: Insulin (Novolog 5 units with B and D and 7 units with L + correction 1:50 target 180, Levemir 14 units every evening)    Social History  Preferred Learning Method: Face to Face, Reading Materials  Primary Support: Self, Spouse (wife attended visit today)    PHQ-2 Total Score: 1                           Barriers to Change  Barriers to Change: None  Learning Challenges : Hearing  Hearing - further explanation:  (Other - reports tinitus since discharge from hospital last week)  Hearing -  present?: No (N/A)    Readiness to Learn   Readiness to Learn : Acceptance    Cultural Influences  Cultural Influences: No    Diabetes Education Assessment/Progress    Diabetes Disease Process (diabetes disease process and treatment options): Discussion, Individual Session, Needs Review, Instructed (Wife asked "is it true diabetes is from the foods we eat?" and "when are they going to cure " "diabetes?" Discussed risk factors for developing diabetes, chronic disease, and diabetes disease progression.)    Nutrition (Incorporating nutritional management into one's lifestyle): Discussion, Instructed, Individual Session, Needs Review, Comprehends Key Points, Written Materials Provided (Reviewed sources of CHO, serving sizes, label reading, and plate method of meal planning. Rec'd 45-60g CHO/meal, 3 meals daily. Is eating high CHO snacks at times - explained this can be contributing to high pre-prandial BG readings and encouraged limiting to mostly 0-5g CHO snacks.)    Physical Activity (incorporating physical activity into one's lifestyle): Discussion, Individual Session, Demonstrates Understanding/Competency (verbalizes/demonstrates) (Wife verbalized not sure if okay to begin physical activity again. Discussed given recent hospitalization, rec'd discussing with his neurologist as may need physical therapy for safety. )    Medications (states correct name, dose, onset, peak, duration, side effects & timing of meds): Discussion, Individual Session, Needs Review, Comprehends Key Points, Instructed (Reviewed timing, dosage, and action time of insulins. Shows appropriate use of correction scale with Humalog and is taking insulin consistently without missing doses. Reports he continues to be able to self-administer insulin injections. Giving injections in top of thigh sometimes. Reviewed only injecting into fatty tissue areas, reviewed proper site selection and rotation.)    Monitoring (monitoring blood glucose/other parameters & using results): Discussion, Instructed, Needs Review, Individual Session, Comprehends Key Points, Written Materials Provided (Encouraged SMBG 4 times daily. Wife verbalized difficulty with SMBG so often and wants to know if there is alt device. Explained personal CGMS but stressed would still require Q12hr fingerstick. Pt verbalized interest in personal CGM. Will discuss w/ " NP.)    Clinical (diabetes and other pertinent medical history): Discussion, Individual Session, Demonstrates Understanding/Competency (verbalizes/demonstrates) (Recently admitted to hospital d/t aphasia, high BP and AMS. During hospitalization, was found to have seizures. Current condition impacting his self-care. Wife is his main care giver. )    Cognitive (knowledge of self-management skills, functional health literacy): Discussion, Individual Session, Instructed, Comprehends Key Points    Psychosocial (emotional response to diabetes): Discussion, Individual Session    Diabetes Distress and Support Systems: Discussion, Individual Session    Behavioral (readiness for change, lifestyle practices, self-care behaviors): Discussion, Individual Session    Goals  Patient has selected goal during today's session: Yes  Healthy Eating: % Met  Met Percentage : 100%         Diabetes Care Plan/Intervention  Education Plan/Intervention: Individual Follow-Up DSMT (~6 week follow-up scheduled)    Diabetes Meal Plan  Restrictions: Low Sodium, Restricted Carbohydrate  Carbohydrate Per Meal: 45-60g  Carbohydrate Per Snack :  (limit to mostly 0-5g CHO snacks)    Education Units of Time   Time Spent: 45 min      Health Maintenance Topics with due status: Not Due       Topic Last Completion Date    Colonoscopy 03/03/2015    Foot Exam 04/17/2017    Pneumococcal PPSV23 (High Risk) 07/11/2017    Eye Exam 08/30/2017    Hemoglobin A1c 09/26/2017    Lipid Panel 10/04/2017     Health Maintenance Due   Topic Date Due    TETANUS VACCINE  07/22/1973

## 2017-10-18 NOTE — Clinical Note
Yobani Oquendo,  Mr. Christy came for DE follow up today. He verbalized interest in Dexcom. Let me know your thoughts and if you are okay with it, I'll submit the paperwork.  Thanks! Lois

## 2017-10-20 ENCOUNTER — TELEPHONE (OUTPATIENT)
Dept: CARDIOLOGY | Facility: CLINIC | Age: 62
End: 2017-10-20

## 2017-10-20 NOTE — TELEPHONE ENCOUNTER
----- Message from Mary Villanueva sent at 10/20/2017  4:40 PM CDT -----  Contact: Rut [phone] pt wife  Pt wife says the pt takes Lasix 40 mg and he use to hear a low ringing, but since the increase the ringing in the left ear has gotten louder.  LOV 8/7/17 Elisha Arteaga

## 2017-10-20 NOTE — TELEPHONE ENCOUNTER
I attempted to speak with the pt's wife and left a vm with call back name and number, to find out the pt's dose of Lasix now. Last note from 8-7-17 had him on 80 mg twice a day for 3 weeks.

## 2017-10-24 ENCOUNTER — TELEPHONE (OUTPATIENT)
Dept: CARDIOLOGY | Facility: CLINIC | Age: 62
End: 2017-10-24

## 2017-10-24 NOTE — TELEPHONE ENCOUNTER
Left voice mail message with [facility] to schedule an appt for the first week of November.Asked pt to please call the office back to schedule an appt.

## 2017-10-24 NOTE — TELEPHONE ENCOUNTER
Silva Stout NP,          LOV:8/7/17.The pt's wife said that he has been taking the lasix 80 mg BID but he has been c/o a ringing to his left ear that seems to fluctuate from a low to a loud ring throughout the day. She did some research to see if any of his meds could be the cause and the lasix does have this as a side effect.Please advise.Thanks,Sahra

## 2017-10-25 ENCOUNTER — TELEPHONE (OUTPATIENT)
Dept: CARDIOLOGY | Facility: CLINIC | Age: 62
End: 2017-10-25

## 2017-10-25 DIAGNOSIS — I50.32 CHRONIC DIASTOLIC HEART FAILURE: ICD-10-CM

## 2017-10-25 RX ORDER — METOLAZONE 2.5 MG/1
2.5 TABLET ORAL DAILY
Qty: 30 TABLET | Refills: 11 | Status: SHIPPED | OUTPATIENT
Start: 2017-10-25 | End: 2017-10-30 | Stop reason: SDUPTHER

## 2017-10-25 RX ORDER — METOLAZONE 2.5 MG/1
2.5 TABLET ORAL DAILY
Qty: 30 TABLET | Refills: 11 | Status: SHIPPED | OUTPATIENT
Start: 2017-10-25 | End: 2017-10-25 | Stop reason: SDUPTHER

## 2017-10-25 RX ORDER — FUROSEMIDE 40 MG/1
40 TABLET ORAL 2 TIMES DAILY
Qty: 90 TABLET | Refills: 4 | Status: SHIPPED | OUTPATIENT
Start: 2017-10-25 | End: 2018-07-23 | Stop reason: SDUPTHER

## 2017-10-25 NOTE — TELEPHONE ENCOUNTER
Discussed tinnitus symptoms with patient. Instructed patient to cut lasix dose to 40mg BID with the addition of metolazone 2.5mg 30 minutes prior to the morning dose. The patient is to follow up in clinic with BMP in one week. Patient verbalized understanding. Case discussed with Dr. Camacho.

## 2017-10-26 ENCOUNTER — TELEPHONE (OUTPATIENT)
Dept: CARDIOLOGY | Facility: CLINIC | Age: 62
End: 2017-10-26

## 2017-10-26 NOTE — TELEPHONE ENCOUNTER
----- Message from Glory Blum sent at 10/26/2017  9:34 AM CDT -----  Contact: Wife of pt called(Rut)  Wife of pt called(Rut), states she was informed that RX Metoprolol will not be medically covered until 11/17. She will like to discuss matter. Also she states she is needing a supply of RX furosemide until mail order arrives. Ph for wife is [phone]. Pt last seen with TOBY Stout on 8/7/17. [facility] on [address].Pharmacy is listed on chart. Thank you

## 2017-10-26 NOTE — TELEPHONE ENCOUNTER
Spoke with the pt's wife - informed her that metoprolol was changed to carvedilol and the pt should not be taking both. Informed her that I would call in 15 day supply of Lasix - dose verified with wife  - 40 mg twice a day to [facility].

## 2017-10-27 ENCOUNTER — TELEPHONE (OUTPATIENT)
Dept: CARDIOLOGY | Facility: CLINIC | Age: 62
End: 2017-10-27

## 2017-10-27 NOTE — TELEPHONE ENCOUNTER
----- Message from Glory Blum sent at 10/27/2017 11:38 AM CDT -----  Contact: [facility](Brodie Roberts)   [facility](Maria M Roberts) called, requesting to discuss pt's recent medication and enrolling pt into a heart failure program. Ph for marina is [phone] ext [phone]. Pt was last seen by TOBY Stout on 8/7/17. Thank you

## 2017-10-27 NOTE — TELEPHONE ENCOUNTER
Pt's wife reports understanding of these instructions and she will bring him in for the BMP 2 hours b4 his appt on Monday.

## 2017-10-27 NOTE — TELEPHONE ENCOUNTER
Silva Stout NP,         LOV:8/7/17.[facility] nurse called to report that the pt quantifies for their heart failure program .They will be sending the pt a scale for daily weights and calling the office if there is weight gain.Also she asked that I call the pt's wife b/c she said that she is concerned about him taking metolazone.Spoke to the wife and she said that her Pharmacy said it can not be filled,not sure why.She also said that her  has lost weight and she did not feel he really needed to take the Metolazone.She requested a sooner appt.Scheduled appt to see you on Monday 10/30/17 at 10 AM.Do you still want him to have a BMP b4 if he has not started to take the metolazone?Please advise.Sahra Arteaga

## 2017-10-30 ENCOUNTER — OFFICE VISIT (OUTPATIENT)
Dept: CARDIOLOGY | Facility: CLINIC | Age: 62
End: 2017-10-30
Payer: COMMERCIAL

## 2017-10-30 ENCOUNTER — LAB VISIT (OUTPATIENT)
Dept: LAB | Facility: HOSPITAL | Age: 62
End: 2017-10-30
Payer: COMMERCIAL

## 2017-10-30 VITALS
WEIGHT: 227.75 LBS | OXYGEN SATURATION: 99 % | SYSTOLIC BLOOD PRESSURE: 137 MMHG | HEIGHT: 70 IN | BODY MASS INDEX: 32.61 KG/M2 | DIASTOLIC BLOOD PRESSURE: 67 MMHG | HEART RATE: 71 BPM

## 2017-10-30 DIAGNOSIS — I50.32 CHRONIC DIASTOLIC HEART FAILURE: ICD-10-CM

## 2017-10-30 DIAGNOSIS — I10 ESSENTIAL HYPERTENSION: ICD-10-CM

## 2017-10-30 DIAGNOSIS — E11.22 TYPE 2 DIABETES MELLITUS WITH STAGE 3 CHRONIC KIDNEY DISEASE, WITH LONG-TERM CURRENT USE OF INSULIN: ICD-10-CM

## 2017-10-30 DIAGNOSIS — Z79.4 TYPE 2 DIABETES MELLITUS WITH STAGE 3 CHRONIC KIDNEY DISEASE, WITH LONG-TERM CURRENT USE OF INSULIN: ICD-10-CM

## 2017-10-30 DIAGNOSIS — N18.30 CKD (CHRONIC KIDNEY DISEASE), STAGE III: Chronic | ICD-10-CM

## 2017-10-30 DIAGNOSIS — N18.30 TYPE 2 DIABETES MELLITUS WITH STAGE 3 CHRONIC KIDNEY DISEASE, WITH LONG-TERM CURRENT USE OF INSULIN: ICD-10-CM

## 2017-10-30 DIAGNOSIS — I50.32 CHRONIC DIASTOLIC HEART FAILURE: Primary | ICD-10-CM

## 2017-10-30 DIAGNOSIS — Z86.73 HISTORY OF STROKE: ICD-10-CM

## 2017-10-30 DIAGNOSIS — E78.00 HYPERCHOLESTEROLEMIA: ICD-10-CM

## 2017-10-30 DIAGNOSIS — I48.0 PAROXYSMAL ATRIAL FIBRILLATION: Chronic | ICD-10-CM

## 2017-10-30 LAB
ANION GAP SERPL CALC-SCNC: 7 MMOL/L
BUN SERPL-MCNC: 38 MG/DL
CALCIUM SERPL-MCNC: 8.8 MG/DL
CHLORIDE SERPL-SCNC: 108 MMOL/L
CO2 SERPL-SCNC: 25 MMOL/L
CREAT SERPL-MCNC: 2.3 MG/DL
EST. GFR  (AFRICAN AMERICAN): 33.4 ML/MIN/1.73 M^2
EST. GFR  (NON AFRICAN AMERICAN): 28.9 ML/MIN/1.73 M^2
GLUCOSE SERPL-MCNC: 187 MG/DL
POTASSIUM SERPL-SCNC: 4.9 MMOL/L
SODIUM SERPL-SCNC: 140 MMOL/L

## 2017-10-30 PROCEDURE — 36415 COLL VENOUS BLD VENIPUNCTURE: CPT

## 2017-10-30 PROCEDURE — 99214 OFFICE O/P EST MOD 30 MIN: CPT | Mod: S$GLB,,, | Performed by: NURSE PRACTITIONER

## 2017-10-30 PROCEDURE — 80048 BASIC METABOLIC PNL TOTAL CA: CPT

## 2017-10-30 PROCEDURE — 99999 PR PBB SHADOW E&M-EST. PATIENT-LVL IV: CPT | Mod: PBBFAC,,, | Performed by: NURSE PRACTITIONER

## 2017-10-30 RX ORDER — METOLAZONE 2.5 MG/1
2.5 TABLET ORAL
Qty: 30 TABLET | Refills: 11 | Status: SHIPPED | OUTPATIENT
Start: 2017-10-30 | End: 2018-03-23

## 2017-10-30 NOTE — PATIENT INSTRUCTIONS
Continue current medications.  Continue to weigh self daily. If you gain 3 lbs from your baseline in one day or 5lbs from baseline in one week, you should call the clinic.   Continue low salt diet.  Continue compression stockings.  Return to clinic in 3 months.

## 2017-10-30 NOTE — PROGRESS NOTES
Mr. Christy is a patient of Dr. Camacho and was last seen in [facility] 8/7/2017.      Subjective:   Patient ID:  Alfa Christy III is a 62 y.o. male who presents for follow-up of Chronic diastolic heart failure (3 months fu, discuss medication)  .   HPI:    Mr. Christy is a 61yo male with HFpEF (EF 55% on 1/28/2016), paroxysmal atrial fibrillation, CVA (stroke January 2016; episode of aphasia 10/2017 with no signs of acute stroke), HTN, HLD, and DM II here for follow-up. He was admitted in the hospital 10/4/2017-10/17/2017 for aphasia, but workup was negative for acute stroke. He has improved but continues to have some expressive aphasia and is in physical therapy.  A week ago he had called to report that left ear tinnitus that he has had since his stroke in January 2016 had worsened significantly. His lasix was decreased from 80mg BID to 40mg BID with metolazone 2.5mg added. He says has not needed the metolazone and his weights have been stable. Since reducing the lasix, his tinnitus has improved but is not yet back to baseline. He continues to have bilateral leg edema which improves overnight for which he uses compression stockings daily. He has some positional light-headedness. Mr. Christy denies chest pain with exertion or at rest, palpitations, SOB, AYALA, syncope, claudication, PND, or orthopnea. He says he eats a low salt diet.  He is taking amlodipine 10mg, atorvastatin 40mg (LDL 51.8 on 7/13/2017), lasix 40mg BID, ASA 81mg, carvedilol 3.125 BID, hydralazine 25mg TID, and losartan 100mg. He takes eliquis for stroke prophylaxis and denies bleeding episodes. His hematuria has resolved. Creatinine is 2.3 this morning. HA1C 5.6.    Recent Cardiac Tests:    2D Echo (1/28/2016):  CONCLUSIONS     1 - Normal left ventricular systolic function (EF 55-60%).     2 - No regional wall motion abnormalities.     3 - Concentric hypertrophy.     4 - Left ventricular diastolic dysfunction.     5 - Trivial aortic  "regurgitation.     6 - Trivial tricuspid regurgitation, PASP 19 mmHg.     7 - No intracardiac source of embolus noted on this exam.  If high clinical suspicion, consider ADA +/- bubble study.     Current Outpatient Prescriptions   Medication Sig    acetaminophen (TYLENOL) 500 MG tablet Take 1 tablet (500 mg total) by mouth every 6 (six) hours as needed for Pain.    amlodipine (NORVASC) 10 MG tablet Take 1 tablet (10 mg total) by mouth once daily.    apixaban 5 mg Tab Take 1 tablet (5 mg total) by mouth 2 (two) times daily.    aspirin 81 MG Chew Take 1 tablet (81 mg total) by mouth once daily.    atorvastatin (LIPITOR) 40 MG tablet Take 1 tablet (40 mg total) by mouth once daily.    BD INSULIN PEN NEEDLE UF MINI 31 gauge x 3/16" Ndle To use with insulin pens 4  times daily    carvedilol (COREG) 3.125 MG tablet Take 1 tablet (3.125 mg total) by mouth 2 (two) times daily.    coenzyme Q10 200 mg capsule Take 200 mg by mouth once daily. (Patient taking differently: Take 200 mg by mouth once daily. PT TAKEN EVERY MON, WED, FRI)    dulaglutide (TRULICITY) 0.75 mg/0.5 mL PnIj Inject 0.5 mLs (0.75 mg total) into the skin every 7 days.    folic acid (FOLVITE) 400 MCG tablet Take 400 mcg by mouth once daily.    furosemide (LASIX) 40 MG tablet Take 1 tablet (40 mg total) by mouth 2 (two) times daily.    glucagon (human recombinant) inj 1mg/mL kit Inject 1 mL (1 mg total) into the muscle as needed.    hydrALAZINE (APRESOLINE) 25 MG tablet Take 1 tablet (25 mg total) by mouth every 8 (eight) hours.    insulin aspart (NOVOLOG) 100 unit/mL InPn pen Inject 5 units w/ breakfast, 7 units w/ lunch, 5 units w/ dinner. Scale 180-230+1,231-280 +2, 281-330+3, 331-380 +4.    insulin detemir (LEVEMIR FLEXPEN) 100 unit/mL (3 mL) SubQ InPn pen Inject 14 Units into the skin every evening.    insulin lispro (HUMALOG KWIKPEN) 100 unit/mL InPn pen Inject 12 units w/ meals, 6 units if eating lighter meal or bg less than 120, scale " "180-230 +2, 231-280 +4, 281-330 +6, 331-380 +8.    levetiracetam (KEPPRA) 750 MG Tab Take 2 tablets (1,500 mg total) by mouth 2 (two) times daily.    levothyroxine (SYNTHROID) 200 MCG tablet Take 1 tablet (200 mcg total) by mouth before breakfast.    losartan (COZAAR) 100 MG tablet Take 1 tablet (100 mg total) by mouth once daily.    magnesium 30 mg Tab Take 1 tablet by mouth every Mon, Wed, Fri.     metOLazone (ZAROXOLYN) 2.5 MG tablet Take 1 tablet (2.5 mg total) by mouth once daily. 30 minutes prior to morning lasix dose only.    multivitamin capsule Take 1 capsule by mouth every Mon, Wed, Fri.     ONETOUCH VERIO Strp USE TO TEST BLOOD SUGAR THREE TIMES DAILY    vitamin D 1000 units Tab Take 1,000 Units by mouth every Mon, Wed, Fri.     No current facility-administered medications for this visit.        Review of Systems   Constitution: Negative for malaise/fatigue.   Eyes: Negative for blurred vision.   Cardiovascular: Positive for leg swelling. Negative for chest pain, claudication, dyspnea on exertion, irregular heartbeat, orthopnea, palpitations, paroxysmal nocturnal dyspnea and syncope.   Respiratory: Negative for shortness of breath.    Hematologic/Lymphatic: Negative for bleeding problem.   Skin: Negative for rash.   Musculoskeletal: Negative for myalgias.   Gastrointestinal: Negative for abdominal pain, constipation, diarrhea and nausea.   Genitourinary: Negative for hematuria.   Neurological: Positive for light-headedness. Negative for headaches, loss of balance and numbness.        Mild expressive aphasia - in PT   Psychiatric/Behavioral: Positive for memory loss. Negative for altered mental status.   Allergic/Immunologic: Negative for persistent infections.         Objective:     Right Arm BP - Sittin/70 (10/30/17 1015)  Left Arm BP - Sittin/67 (10/30/17 1015)    /67 (BP Location: Left arm, Patient Position: Sitting, BP Method: X-Large (Automatic))   Pulse 71   Ht 5' 10" " (1.778 m)   Wt 103.3 kg (227 lb 11.8 oz)   SpO2 99%   BMI 32.68 kg/m²     Physical Exam   Constitutional: He is oriented to person, place, and time. He appears well-developed and well-nourished.   HENT:   Head: Normocephalic.   Nose: Nose normal.   Eyes: Pupils are equal, round, and reactive to light.   Neck: No JVD present. Carotid bruit is not present.   Cardiovascular: Normal rate, regular rhythm, S1 normal and S2 normal.  Exam reveals no gallop.    No murmur heard.  Pulses:       Carotid pulses are 2+ on the right side, and 2+ on the left side.       Radial pulses are 2+ on the right side, and 2+ on the left side.        Dorsalis pedis pulses are 2+ on the right side, and 2+ on the left side.   Pulmonary/Chest: Breath sounds normal. No respiratory distress.   Abdominal: Soft. Bowel sounds are normal. He exhibits no distension. There is no tenderness.   Musculoskeletal: Normal range of motion. He exhibits edema (bilateral pitting edema +1/+2 under compression stockings).   Neurological: He is alert and oriented to person, place, and time.   Skin: Skin is warm and dry. No erythema.   Psychiatric: He has a normal mood and affect. His speech is normal and behavior is normal.   Nursing note and vitals reviewed.    Lab Results   Component Value Date     10/30/2017    K 4.9 10/30/2017    MG 2.0 10/08/2017     10/30/2017    CO2 25 10/30/2017    BUN 38 (H) 10/30/2017    CREATININE 2.3 (H) 10/30/2017     (H) 10/30/2017    HGBA1C 5.6 09/26/2017    AST 37 10/07/2017    ALT 26 10/07/2017    ALBUMIN 3.0 (L) 10/07/2017    PROT 7.0 10/07/2017    BILITOT 1.2 (H) 10/07/2017    WBC 8.25 10/08/2017    HGB 10.9 (L) 10/08/2017    HCT 30.5 (L) 10/08/2017    MCV 78 (L) 10/08/2017     10/08/2017    TSH 7.907 (H) 10/04/2017    CHOL 118 (L) 10/04/2017    HDL 37 (L) 10/04/2017    LDLCALC 52.6 (L) 10/04/2017    TRIG 142 10/04/2017         Recent Labs  Lab 01/27/16 2011 11/05/16  0853 10/04/17  2118   INR 1.1  1.1 1.0        Test(s) Reviewed  I have reviewed the following in detail:  [] Stress test   [] Angiography   [] Echocardiogram   [x] Labs   [] Other:         Assessment:         1. Chronic diastolic heart failure. Weight stable. Patient denies SOB. Leg edema chronic and unchanged. Will continue current lasix dose with emphasis on continued low salt diet and compression stockings for leg edema. Patient to call clinic for weight increases and will discuss taking metolazone PRN.     2. Essential hypertension. Controlled on current medications.      3. Paroxysmal atrial fibrillation. In SR today. On eliquis. No bleeding episodes. On carvedilol.      4. Hypercholesterolemia. LDL at goal on atorvastatin 40mg.      5. CKD (chronic kidney disease), stage III. Creatinine 2.3. Stable and followed by nephrology.     6. Type 2 diabetes mellitus with stage 3 chronic kidney disease, with long-term current use of insulin. Controlled. HA1C 5.8.      7. History of stroke. CVA January 2016 with additional hospital visit for aphasia in 10/2017. In PT. Now on ASA. Patient reports symptom improvement.      Plan:     Alfa was seen today for chronic diastolic heart failure.    Diagnoses and all orders for this visit:    Chronic diastolic heart failure  -     metOLazone (ZAROXOLYN) 2.5 MG tablet; Take 1 tablet (2.5 mg total) by mouth as needed. 30 minutes prior to morning lasix dose only for weight increase over 3lbs in a day or 5 in a week.    Essential hypertension    Paroxysmal atrial fibrillation    Hypercholesterolemia    CKD (chronic kidney disease), stage III    Type 2 diabetes mellitus with stage 3 chronic kidney disease, with long-term current use of insulin    History of stroke      Continue current medications.  Continue to weigh self daily. If you gain 3 lbs from your baseline in one day or 5lbs from baseline in one week, you should call the clinic.   Continue low salt diet.  Continue compression stockings.  Return to clinic in  3 months.     Return in about 3 months (around 1/30/2018).    ------------------------------------------------------------------    RODNEY Leo, NP-C  Consult Cardiology

## 2017-10-31 ENCOUNTER — OFFICE VISIT (OUTPATIENT)
Dept: INTERNAL MEDICINE | Facility: CLINIC | Age: 62
End: 2017-10-31
Payer: COMMERCIAL

## 2017-10-31 VITALS
HEART RATE: 69 BPM | HEIGHT: 70 IN | BODY MASS INDEX: 32.16 KG/M2 | DIASTOLIC BLOOD PRESSURE: 62 MMHG | SYSTOLIC BLOOD PRESSURE: 116 MMHG | WEIGHT: 224.63 LBS

## 2017-10-31 DIAGNOSIS — R00.1 BRADYCARDIA: ICD-10-CM

## 2017-10-31 DIAGNOSIS — E89.0 POSTSURGICAL HYPOTHYROIDISM: ICD-10-CM

## 2017-10-31 DIAGNOSIS — N18.30 CKD (CHRONIC KIDNEY DISEASE), STAGE III: Chronic | ICD-10-CM

## 2017-10-31 DIAGNOSIS — Z86.73 HISTORY OF STROKE: ICD-10-CM

## 2017-10-31 DIAGNOSIS — I48.0 PAROXYSMAL ATRIAL FIBRILLATION: Chronic | ICD-10-CM

## 2017-10-31 DIAGNOSIS — I50.32 CHRONIC DIASTOLIC HEART FAILURE: ICD-10-CM

## 2017-10-31 DIAGNOSIS — I10 ESSENTIAL HYPERTENSION: Primary | ICD-10-CM

## 2017-10-31 PROBLEM — R41.82 ALTERED MENTAL STATUS: Status: RESOLVED | Noted: 2017-10-05 | Resolved: 2017-10-31

## 2017-10-31 PROCEDURE — 99999 PR PBB SHADOW E&M-EST. PATIENT-LVL IV: CPT | Mod: PBBFAC,,, | Performed by: INTERNAL MEDICINE

## 2017-10-31 PROCEDURE — 99214 OFFICE O/P EST MOD 30 MIN: CPT | Mod: S$GLB,,, | Performed by: INTERNAL MEDICINE

## 2017-10-31 NOTE — PATIENT INSTRUCTIONS
Check in with Dr Mejia about when she wants to see you back.    Have your physical therapist contact Dr Jasmine with specific recommendations re: transport wheelchair and 4-footed cane.

## 2017-10-31 NOTE — Clinical Note
Patient lab appt was cancelled - please reschedule this (BMP, TSH) for same day as his upcoming neurology appt and let patient know. Also:  1. patient's DM team was trying to contact him because he refilled his glucagon kit -- they would like to know if he is having low blood sugar and if he used his glucagon kit for this.  2. Haven't heard yet from his physical therapist re: specific recommendations for transport wheelchair and type of 4-footed cane (wide or narrow base). Please find out contact info for his PT, call them to get this info.

## 2017-10-31 NOTE — PROGRESS NOTES
"Subjective:       Patient ID: Alfa Christy III is a 62 y.o. male.    Chief Complaint: Hospital Follow Up    HPI  62 y/o man with h/o CVA (1/2016), DM2, CKD, HTN, hypothyroidism, paroxysmal atrial fibrillation, chronic diastolic HF, multiple other medical issues here for hospital follow up.     Hospital follow up - admitted 10/4-10/11 for acute onset aphasia. Workup for stroke (CT head, MRI/MRA brain, vascular neurology consultation) was negative for acute stroke. During hospitalization, he developed seizure-type activity and was started on keppra. During hospital stay, aphasia improved but did not entirely resolve.   Had EEG done while inpatient  Had labile high blood pressure and bradycardia - coreg was decreased to 3.125mg BID, continued losartan 100mg, norvasc 10mg, hydralazine 25mg q8hr.   Also discharged on lasix 80mg BID for his heart failure - this has been decreased after discharge to 40mg BID after they called his cardiologist with concern for tinnitus. Hasn't needed metolazone recently. Still having some tinnitus though this has improved.   Continued on eliquis and statin given history of stroke in the past.  Recommended to follow up with Neurology as outpatient - has appt.  Was seen by PT/OT, recommended for SNF discharge. Today says they were not able to get placed at SNF for some reason; was sent home with home health. Wife says had PT/OT evaluation at home, was recommended to get a transportable wheelchair to help get from house to car. Would like to get 4-footed cane as well. Also recommended to start speech therapy; this has been ordered and has started.     H/o CVA, recent seizure - patient reports he feels like his speech is back to normal, and he is no longer having trouble with word-finding. Does feel he is having problems with memory still.   Back to keppra 2 tabs daily (dose increased during this hospitalization). Previously dose had been reduced "because it was slowing him down so much."  Has " "f/u with Dr Santiago scheduled 11/15.     DM2 - some confusion re: plan for insulin dosing after discharge, was referred back to endocrine for follow up.  Follows with Azra Martínez for this - levemir 14u qHS, 5u novolog with breakfast and dinner, 7u novolog with lunch + correction scale  Has another visit with DM team coming up soon.  No polyuria. No hypoglycemic episodes.    CKD - Cr up at 2.3, BUN 38 (up from Cr 2.0, BUN 25-29 earlier in the month) on check yesterday by Cardiology. Lasix dose changed after discharge.     Review of Systems   Constitutional: Negative for activity change, fatigue and fever.   HENT: Positive for tinnitus. Negative for congestion and sinus pressure.    Eyes: Negative for visual disturbance.   Respiratory: Negative for cough and shortness of breath.    Cardiovascular: Negative for chest pain, palpitations and leg swelling.   Gastrointestinal: Negative for abdominal pain, blood in stool, diarrhea and nausea.   Endocrine: Negative for polyuria.   Genitourinary: Negative.  Negative for difficulty urinating.   Musculoskeletal: Negative for gait problem.   Skin: Negative for rash.   Neurological: Negative for tremors, seizures, syncope and weakness. Speech difficulty: improved.        As noted   Psychiatric/Behavioral: Negative for confusion. The patient is not nervous/anxious.          Past medical history, surgical history, and family medical history reviewed and updated as appropriate.    Medications and allergies reviewed.     Objective:          Vitals:    10/31/17 1352 10/31/17 1441 10/31/17 1442   BP: 109/67 112/65 116/62   Pulse: 69     Weight: 101.9 kg (224 lb 10.4 oz)     Height: 5' 10" (1.778 m)       Body mass index is 32.23 kg/m².  Physical Exam   Constitutional: He is oriented to person, place, and time. He appears well-developed and well-nourished. No distress.   HENT:   Head: Normocephalic and atraumatic.   Mouth/Throat: Oropharynx is clear and moist.   Eyes: Conjunctivae and " EOM are normal.   Neck: Neck supple.   Cardiovascular: Normal rate, regular rhythm and normal heart sounds.    No murmur heard.  Pulmonary/Chest: Effort normal and breath sounds normal.   Abdominal: Soft. Bowel sounds are normal. He exhibits no distension. There is no tenderness.   Musculoskeletal: Normal range of motion. He exhibits no edema or tenderness.   Lymphadenopathy:     He has no cervical adenopathy.   Neurological: He is alert and oriented to person, place, and time. He has normal strength. No cranial nerve deficit or sensory deficit. Coordination and gait normal.   R peripheral vision deficit - not new  Speech slightly slow, no slurring.    Skin: Skin is warm and dry.   Loss of hair at lower legs  +hyperpigmentation of skin at anterior shins bilaterally   Psychiatric: He has a normal mood and affect.   Vitals reviewed.      Lab Results   Component Value Date    WBC 8.25 10/08/2017    HGB 10.9 (L) 10/08/2017    HCT 30.5 (L) 10/08/2017     10/08/2017    CHOL 118 (L) 10/04/2017    TRIG 142 10/04/2017    HDL 37 (L) 10/04/2017    ALT 26 10/07/2017    AST 37 10/07/2017     10/30/2017    K 4.9 10/30/2017     10/30/2017    CREATININE 2.3 (H) 10/30/2017    BUN 38 (H) 10/30/2017    CO2 25 10/30/2017    TSH 7.907 (H) 10/04/2017    PSA 7.2 (H) 10/28/2015    INR 1.0 10/04/2017    GLUF 106 11/05/2008    HGBA1C 5.6 09/26/2017       Assessment:       1. Essential hypertension    2. Chronic diastolic heart failure    3. Bradycardia    4. Paroxysmal atrial fibrillation    5. History of stroke    6. CKD (chronic kidney disease), stage III    7. Postsurgical hypothyroidism        Plan:   Alfa was seen today for hospital follow up.    Diagnoses and all orders for this visit:    Essential hypertension - low-normal on intake; goal range on recheck. Continue current medications. Follow up with cardiology  -     TSH; Future  -     Basic metabolic panel; Future    Chronic diastolic heart failure - currently  well-compensated, continue current medications, follow up with cardiology    Bradycardia - HR now normal on lower dose of coreg, continue this    Paroxysmal atrial fibrillation - on ASA, eliquis. Continue these, follow with cardiology    History of stroke; recent possible seizure - continue keppra, follow up with neurology as recommended.   Has home PT - recommended they have PT send request for type of cane and wheelchair that would be best for him    CKD (chronic kidney disease), stage III - Cr up; recheck with next labs  -     Basic metabolic panel; Future    Postsurgical hypothyroidism - recheck with next labs, clinically overall euthyroid  -     TSH; Future    Clinic # given for urology - patient / family to call to see when Dr Mejia would like him to follow up.    Health maintenance reviewed with patient.     Return in about 3 months (around 1/31/2018) for follow up.    Tyler Jasmine MD  Internal Medicine  [facility]  10/31/2017

## 2017-11-01 ENCOUNTER — TELEPHONE (OUTPATIENT)
Dept: ENDOCRINOLOGY | Facility: CLINIC | Age: 62
End: 2017-11-01

## 2017-11-01 RX ORDER — GLUCAGON 1 MG
VIAL (EA) INJECTION
Qty: 1 KIT | Refills: 1 | Status: SHIPPED | OUTPATIENT
Start: 2017-11-01 | End: 2020-01-01 | Stop reason: SDUPTHER

## 2017-11-01 NOTE — TELEPHONE ENCOUNTER
Please call patient and ask if he has used his glucagon kit. Rx was sent today for refill. If used, please ask why and what was his BG reading.

## 2017-11-07 ENCOUNTER — TELEPHONE (OUTPATIENT)
Dept: INTERNAL MEDICINE | Facility: CLINIC | Age: 62
End: 2017-11-07

## 2017-11-07 NOTE — TELEPHONE ENCOUNTER
----- Message from Tyler Jasmine MD sent at 11/6/2017  8:13 PM CST -----  Patient lab appt was cancelled - please reschedule this (BMP, TSH) for same day as his upcoming neurology appt and let patient know.  Also:   1. patient's DM team was trying to contact him because he refilled his glucagon kit -- they would like to know if he is having low blood sugar and if he used his glucagon kit for this.   2. Haven't heard yet from his physical therapist re: specific recommendations for transport wheelchair and type of 4-footed cane (wide or narrow base). Please find out contact info for his PT, call them to get this info.

## 2017-11-08 NOTE — TELEPHONE ENCOUNTER
Called pt left Vm requesting return call, pt scheduled for lab appt same day as neurology. Brief Vm left for pt.

## 2017-11-10 ENCOUNTER — TELEPHONE (OUTPATIENT)
Dept: DIABETES | Facility: CLINIC | Age: 62
End: 2017-11-10

## 2017-11-13 ENCOUNTER — TELEPHONE (OUTPATIENT)
Dept: NEUROLOGY | Facility: CLINIC | Age: 62
End: 2017-11-13

## 2017-11-13 ENCOUNTER — TELEPHONE (OUTPATIENT)
Dept: DIABETES | Facility: CLINIC | Age: 62
End: 2017-11-13

## 2017-11-13 ENCOUNTER — TELEPHONE (OUTPATIENT)
Dept: ENDOCRINOLOGY | Facility: CLINIC | Age: 62
End: 2017-11-13

## 2017-11-14 ENCOUNTER — TELEPHONE (OUTPATIENT)
Dept: ENDOCRINOLOGY | Facility: CLINIC | Age: 62
End: 2017-11-14

## 2017-11-14 NOTE — PROGRESS NOTES
[facility]:   FOLLOW UP VISIT    Name: Alfa Christy III  MRN:[id number]   CSN: [id number]  Date of service: 11/14/2017    Last (1st) clinic visit: 12/27/16    Age:62 y.o.   Gender:male     The patient is here today with his wife  History obtained from  The patient and his wife    CHIEF COMPLAINT:  - follow up of seizures    INTERVAL HISTORY (Since last visit):    This is a 62 y.o.  year old male who presents with a chief complaint of seizures, who was last seen by me on 12/27/2016        No hx of non-compliance    Recent admission: patient states that he had gone to exercise that day, followed by having donuts but did not miss any dose of meds.  No other triggers.  Possibly seizure? But no clinical activity seen by family except for word-finding difficulty/confusion.  Last recognized seizure was possibly Apr 2017    Recent admission to [facility] [place], 10/4-11/17:  Neurology notes, 10/5/17:  C 10/04/17 due to wife coming home and finding  confused with agitation, word-finding difficulty, and imbalance.  Noted no weakness.  States that he was last seen normal at 7 pm, she went out to run an errand and returned at 8 pm.  Otherwise notes no signs of head trauma, no recent weakness, no medication changes.  He takes levetiracetam 750 mg po bid (started on 1500 mg po bid right after 11/2016 seizure) and is compliant.  Recommendation:  Breakthrough seizure in setting of recent respiratory infection and financial stressors, wife endorses compliance and patient not yet back to baseline.  Recommend load 1g LEV iv followed by increase to 1500mg bid.  R-EEG, 10/7/17:    IMPRESSION:  This is an abnormal EEG during wakefulness, drowsiness and sleep.    Nearly continuous irregular left hemisphere slowing was noted.  The EKG revealed an irregular rhythm.    Hospital course (per Hospital Medicine notes, 10/11/17): in the ED on presentation, the patient was in keeping of abnormally elevated blood pressure in setting of acute onset  aphasia. Workup for an acute stroke included a CT head with no signs of acute stroke. MRI and MRA brain were negative for acute stroke. Vascular neurology was consulted and ruled out stroke and recommended admission to hospital medicine with improved BP control and a consult to general neurology. During admission to hospital medicine, the patient notably developed right sided jerking movement consistent with seizure. The patient was loaded with IV keppra 1500mg followed by oral twice daily dosing per neurology. The patient was observed on the hospital medicine for aphasia resolution that was gradual but incomplete per patient's wife. The patient had an EEG given personality changes and frequent self-repetition that was negative. PT/OT evaluated the patient and recommended SNF placement that occurred appropriately on 10/11/2017.    Any other relevant history since last visit:   - none    SEIZURE HISTORY:  Notes from last (16) visit:   Information from H&P on 16:  62yo M with multiple medical co-morbidities, presented to an outside facility on 16 with left-sided HA and dizziness, associated with expressive aphasia. He was being transferred to [facility] when had a GTC seizure with right-sided eye deviation, lasting ~ 4 min. Blood glucose was 200 at the time and seizure resolved without any intervention. No hx of associated tongue bite or b/b incontinence. Reports post-ictal confusion but no focal deficits.   CTA head and neck at the time showed no acute ischemic changes.  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  Patient was evaluated by Neurology service while in hospital - advised Levetiracetam 1000mg  q 12h  INTERVAL HISTORY:  since discharge from hospital, no hx of any seizures - compliant with Levetiracteam     Previous evaluation:   EE) R-EEG, 2016:   IMPRESSION: Normal awake and asleep EEG.  2) R-EEG, 16  IMPRESSION:   This is an abnormal  awake and asleep EEG due to mild generalized slowing seen, suggestive of mild diffuse or multifocal cerebral dysfunction.  No epileptiform activity or electrographic seizures were seen    MRI Brain:  Results for orders placed or performed during the hospital encounter of 10/04/17   MRI Brain Without Contrast    Addendum: 10/5/2017          Electronically signed by: DELL VITALE MD  Date:     10/05/17  Time:    01:19       Narrative    MRI BRAIN, MRA HEAD, MRA NECK    CLINICAL INDICATION: 62 year old M with stroke, severe expressive aphasia, LLE weakness.     TECHNIQUE: Multiplanar, multisequence images were obtained of the brain. Three-dimensional time-of-flight technique was used in assessment of the neck and intracranial vasculature.    COMPARISON: CT head 10/4/2017.    FINDINGS:    MRI BRAIN:  The ventricles are normal in size for age, without evidence of hydrocephalus.  There is suggestion of subtle diffuse cortical diffusion restriction in the supratentorial brain.    There is a stable region of encephalomalacia within the left occipital lobe. There is relatively isointense diffusion signal in this region with corresponding hyperintensity on ADC map and T2/FLAIR hyperintensity, compatible with remote infarct. No areas of restricted diffusion signal are seen to suggest new/acute infarct. No areas of susceptibility to suggest hemorrhage. A punctate focus of T1 hyperintensity in the left aspect of the anterior interhemispheric fissure likely represents a dural calcification. Supratentorial patchy T2/FLAIR hyperintensities compatible with chronic microvascular ischemic disease.    No extra-axial blood or fluid collections.    The T2 skull base flow voids are preserved. Bone marrow signal intensity is unremarkable.    MRA HEAD AND NECK:   Common and internal carotid arteries are normal in caliber.  No significant stenosis at either carotid bifurcation. There is a small saccular aneurysm with a 0.4 cm neck arising  from the supraclinoid right ICA.    Vertebral arteries are normal in caliber. The vertebrobasilar system appears within normal limits.     The ACAs, MCAs and PCAs demonstrate no evidence of  high-grade stenosis, focal occlusion or intracranial aneurysm.    Impression    No evidence of acute or major vascular distribution infarct or intracranial hemorrhage.    Questionable subtle cortical diffusion restriction throughout the supratentorial brain.  Short-term followup is suggested.    Remote left PCA distribution infarct within the left occipital lobe.    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  ___________________________________________________  This report has been flagged in the [facility] medical record.  ______________________________________     Electronically signed by resident: EFREM PENALOZA MD  Date:     10/05/17  Time:    00:24            As the supervising and teaching physician, I personally reviewed the images and resident's interpretation and I agree with the findings.          Electronically signed by: DELL VITALE MD  Date:     10/05/17  Time:    01:13    MRA Brain without contrast    Narrative    MRI BRAIN, MRA HEAD, MRA NECK    CLINICAL INDICATION: 62 year old M with stroke, severe expressive aphasia, LLE weakness.     TECHNIQUE: Multiplanar, multisequence images were obtained of the brain. Three-dimensional time-of-flight technique was used in assessment of the neck and intracranial vasculature.    COMPARISON: CT head 10/4/2017.    FINDINGS:    MRI BRAIN:  The ventricles are normal in size for age, without evidence of hydrocephalus.  There is suggestion of subtle diffuse cortical diffusion restriction in the supratentorial brain.    There is a stable region of encephalomalacia within the left occipital lobe. There is relatively isointense diffusion signal in this region with corresponding hyperintensity on ADC map and T2/FLAIR hyperintensity, compatible with remote infarct. No areas of restricted  diffusion signal are seen to suggest new/acute infarct. No areas of susceptibility to suggest hemorrhage. A punctate focus of T1 hyperintensity in the left aspect of the anterior interhemispheric fissure likely represents a dural calcification. Supratentorial patchy T2/FLAIR hyperintensities compatible with chronic microvascular ischemic disease.    No extra-axial blood or fluid collections.    The T2 skull base flow voids are preserved. Bone marrow signal intensity is unremarkable.    MRA HEAD AND NECK:   Common and internal carotid arteries are normal in caliber.  No significant stenosis at either carotid bifurcation. There is a small saccular aneurysm with a 0.4 cm neck arising from the supraclinoid right ICA.    Vertebral arteries are normal in caliber. The vertebrobasilar system appears within normal limits.     The ACAs, MCAs and PCAs demonstrate no evidence of  high-grade stenosis, focal occlusion or intracranial aneurysm.    Impression    No evidence of acute or major vascular distribution infarct or intracranial hemorrhage.    Questionable subtle cortical diffusion restriction throughout the supratentorial brain.  Short-term followup is suggested.    Remote left PCA distribution infarct within the left occipital lobe.    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  ___________________________________________________  This report has been flagged in the Epic medical record  ______________________________________     Electronically signed by resident: EFREM PENALOZA MD  Date:     10/05/17  Time:    00:24            As the supervising and teaching physician, I personally reviewed the images and resident's interpretation and I agree with the findings.          Electronically signed by: DELL VITALE MD  Date:     10/05/17  Time:    01:13         Electronically signed by: DELL VITALE MD  Date:     10/05/17  Time:    01:19    CT Head Without Contrast    Narrative    CT HEAD WITHOUT CONTRAST    CLINICAL INDICATION:  62 year old M with possible stroke.    TECHNIQUE: Axial CT images obtained throughout the region of the head without the use of intravenous contrast. Axial, sagittal and coronal reconstructions were performed.    COMPARISON: MRI brain 11/6/2016, CT stroke multiphase 11/5/2016, CT head 11/5/2016.    FINDINGS:  The ventricles are stable. The brain appears unchanged.  The basal cisterns are patent.  There is a stable region of encephalomalacia within the left occipital lobe, compatible with remote left PCA infarct. No evidence of acute major vascular distribution infarct or intracranial hemorrhage. Patchy hypoattenuation within the supratentorial white matter is compatible with chronic microvascular ischemic change.     No new extra-axial blood or fluid collections.    The cranium appears intact.  There is nonspecific calcifications in the subcutaneous tissues of the head.     Mastoid air cells and paranasal sinuses are essentially clear.  The orbits and intraorbital contents are unremarkable.    Impression       No evidence of acute major vascular distribution infarct or intracranial hemorrhage.     Stable, remote infarct in the left occipital lobe.    Chronic microvascular ischemic disease.        ______________________________________     Electronically signed by resident: EFREM PENALOZA MD  Date:     10/04/17  Time:    21:26            As the supervising and teaching physician, I personally reviewed the images and resident's interpretation and I agree with the findings.          Electronically signed by: DELL VITALE MD  Date:     10/04/17  Time:    22:05    Results for orders placed or performed during the hospital encounter of 11/05/16   MRI Brain W WO Contrast    Narrative    Comparison: CT 11/5/2016, MRI 6/11/2016    Clinical history: Seizure    Technique:    Multiplanar multi-sequence MRI images of the brain were obtained pre-and post the administration of IV Gadavist contrast.        Findings:    Examination is  limited secondary to patient motion, most significantly involving the flair axial and flair coronal sequences, as well as the postcontrast sequences.    There is encephalomalacia within the left PCA territory consistent with remote infarction.  Minimal postcontrast enhancement is noted within this region.  There may be a few punctate foci of T2/flair signal abnormality within the periventricular white matter, may reflect chronic microvascular ischemic change or represent artifact as there is extensive motion on the FLAIR axial images.  Additionally, there is high flair signal material involving the basal cisterns about the brainstem, not confirmed on other pulse sequences, suggesting that this is artifactual related to poor CSF signal saturation rather than proteinaceous or hemorrhagic material.  There is no evidence of intracranial mass, or acute infarction.  The ventricular system is within normal limits of size for age and shows no evidence of hydrocephalus.  There are no significant extra-axial or extracranial abnormalities detected.  The bilateral mesial temporal lobes are symmetric without abnormal signal, sclerosis, or enhancement.    The globes, orbits, pituitary gland, pineal gland and craniocervical junction are normal in configuration. The major vascular flow voids are patent.  Please see CTA performed earlier today for details of the intracranial vasculature.    Impression    Motion limited examination.    On the flair images, high attenuating material about the brainstem/basal cisterns is felt to represent artifact given extensive patient motion likely the result of poor saturation of the CSF signal in the region.  Of note, there is no associated enhancement or meningeal enhancement to suggest that this reflects proteinaceous material of infectious nature.  Blood products felt less likely.  If this finding would correlate with current patient symptomatology, consider either repeating the flair image  once patient is better able to tolerate the examination, or correlate this finding with lumbar puncture.    No evidence of acute infarct.  Remote PCA territory infarct with mild enhancement.    No abnormal temporal lobe enhancement or focal focus to suggest seizure nidus.            Electronically signed by: NERY LO MD  Date:     11/06/16  Time:    01:08       Results for orders placed or performed during the hospital encounter of 10/04/17   MRI Brain Without Contrast    Addendum: 10/5/2017          Electronically signed by: DELL VITALE MD  Date:     10/05/17  Time:    01:19       Narrative    MRI BRAIN, MRA HEAD, MRA NECK    CLINICAL INDICATION: 62 year old M with stroke, severe expressive aphasia, LLE weakness.     TECHNIQUE: Multiplanar, multisequence images were obtained of the brain. Three-dimensional time-of-flight technique was used in assessment of the neck and intracranial vasculature.    COMPARISON: CT head 10/4/2017.    FINDINGS:    MRI BRAIN:  The ventricles are normal in size for age, without evidence of hydrocephalus.  There is suggestion of subtle diffuse cortical diffusion restriction in the supratentorial brain.    There is a stable region of encephalomalacia within the left occipital lobe. There is relatively isointense diffusion signal in this region with corresponding hyperintensity on ADC map and T2/FLAIR hyperintensity, compatible with remote infarct. No areas of restricted diffusion signal are seen to suggest new/acute infarct. No areas of susceptibility to suggest hemorrhage. A punctate focus of T1 hyperintensity in the left aspect of the anterior interhemispheric fissure likely represents a dural calcification. Supratentorial patchy T2/FLAIR hyperintensities compatible with chronic microvascular ischemic disease.    No extra-axial blood or fluid collections.    The T2 skull base flow voids are preserved. Bone marrow signal intensity is unremarkable.    MRA HEAD AND NECK:   Common and  internal carotid arteries are normal in caliber.  No significant stenosis at either carotid bifurcation. There is a small saccular aneurysm with a 0.4 cm neck arising from the supraclinoid right ICA.    Vertebral arteries are normal in caliber. The vertebrobasilar system appears within normal limits.     The ACAs, MCAs and PCAs demonstrate no evidence of  high-grade stenosis, focal occlusion or intracranial aneurysm.    Impression    No evidence of acute or major vascular distribution infarct or intracranial hemorrhage.    Questionable subtle cortical diffusion restriction throughout the supratentorial brain.  Short-term followup is suggested.    Remote left PCA distribution infarct within the left occipital lobe.    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  ___________________________________________________  This report has been flagged in the [facility] medical record.  ______________________________________     Electronically signed by resident: EFREM PENALOZA MD  Date:     10/05/17  Time:    00:24            As the supervising and teaching physician, I personally reviewed the images and resident's interpretation and I agree with the findings.          Electronically signed by: DELL VITALE MD  Date:     10/05/17  Time:    01:13    MRA Brain without contrast    Narrative    MRI BRAIN, MRA HEAD, MRA NECK    CLINICAL INDICATION: 62 year old M with stroke, severe expressive aphasia, LLE weakness.     TECHNIQUE: Multiplanar, multisequence images were obtained of the brain. Three-dimensional time-of-flight technique was used in assessment of the neck and intracranial vasculature.    COMPARISON: CT head 10/4/2017.    FINDINGS:    MRI BRAIN:  The ventricles are normal in size for age, without evidence of hydrocephalus.  There is suggestion of subtle diffuse cortical diffusion restriction in the supratentorial brain.    There is a stable region of encephalomalacia within the left occipital lobe. There is relatively  isointense diffusion signal in this region with corresponding hyperintensity on ADC map and T2/FLAIR hyperintensity, compatible with remote infarct. No areas of restricted diffusion signal are seen to suggest new/acute infarct. No areas of susceptibility to suggest hemorrhage. A punctate focus of T1 hyperintensity in the left aspect of the anterior interhemispheric fissure likely represents a dural calcification. Supratentorial patchy T2/FLAIR hyperintensities compatible with chronic microvascular ischemic disease.    No extra-axial blood or fluid collections.    The T2 skull base flow voids are preserved. Bone marrow signal intensity is unremarkable.    MRA HEAD AND NECK:   Common and internal carotid arteries are normal in caliber.  No significant stenosis at either carotid bifurcation. There is a small saccular aneurysm with a 0.4 cm neck arising from the supraclinoid right ICA.    Vertebral arteries are normal in caliber. The vertebrobasilar system appears within normal limits.     The ACAs, MCAs and PCAs demonstrate no evidence of  high-grade stenosis, focal occlusion or intracranial aneurysm.    Impression    No evidence of acute or major vascular distribution infarct or intracranial hemorrhage.    Questionable subtle cortical diffusion restriction throughout the supratentorial brain.  Short-term followup is suggested.    Remote left PCA distribution infarct within the left occipital lobe.    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  ___________________________________________________  This report has been flagged in the [facility] medical record.  ______________________________________     Electronically signed by resident: EFREM PENALOZA MD  Date:     10/05/17  Time:    00:24            As the supervising and teaching physician, I personally reviewed the images and resident's interpretation and I agree with the findings.          Electronically signed by: DELL VITALE MD  Date:     10/05/17  Time:    01:13          Electronically signed by: DELL VITALE MD  Date:     10/05/17  Time:    01:19    CT Head Without Contrast    Narrative    CT HEAD WITHOUT CONTRAST    CLINICAL INDICATION: 62 year old M with possible stroke.    TECHNIQUE: Axial CT images obtained throughout the region of the head without the use of intravenous contrast. Axial, sagittal and coronal reconstructions were performed.    COMPARISON: MRI brain 11/6/2016, CT stroke multiphase 11/5/2016, CT head 11/5/2016.    FINDINGS:  The ventricles are stable. The brain appears unchanged.  The basal cisterns are patent.  There is a stable region of encephalomalacia within the left occipital lobe, compatible with remote left PCA infarct. No evidence of acute major vascular distribution infarct or intracranial hemorrhage. Patchy hypoattenuation within the supratentorial white matter is compatible with chronic microvascular ischemic change.     No new extra-axial blood or fluid collections.    The cranium appears intact.  There is nonspecific calcifications in the subcutaneous tissues of the head.     Mastoid air cells and paranasal sinuses are essentially clear.  The orbits and intraorbital contents are unremarkable.    Impression       No evidence of acute major vascular distribution infarct or intracranial hemorrhage.     Stable, remote infarct in the left occipital lobe.    Chronic microvascular ischemic disease.        ______________________________________     Electronically signed by resident: EFREM PENALOZA MD  Date:     10/04/17  Time:    21:26            As the supervising and teaching physician, I personally reviewed the images and resident's interpretation and I agree with the findings.          Electronically signed by: DELL VITALE MD  Date:     10/04/17  Time:    22:05    Results for orders placed or performed during the hospital encounter of 11/05/16   MRI Brain W WO Contrast    Narrative    Comparison: CT 11/5/2016, MRI 6/11/2016    Clinical history:  Seizure    Technique:    Multiplanar multi-sequence MRI images of the brain were obtained pre-and post the administration of IV Gadavist contrast.        Findings:    Examination is limited secondary to patient motion, most significantly involving the flair axial and flair coronal sequences, as well as the postcontrast sequences.    There is encephalomalacia within the left PCA territory consistent with remote infarction.  Minimal postcontrast enhancement is noted within this region.  There may be a few punctate foci of T2/flair signal abnormality within the periventricular white matter, may reflect chronic microvascular ischemic change or represent artifact as there is extensive motion on the FLAIR axial images.  Additionally, there is high flair signal material involving the basal cisterns about the brainstem, not confirmed on other pulse sequences, suggesting that this is artifactual related to poor CSF signal saturation rather than proteinaceous or hemorrhagic material.  There is no evidence of intracranial mass, or acute infarction.  The ventricular system is within normal limits of size for age and shows no evidence of hydrocephalus.  There are no significant extra-axial or extracranial abnormalities detected.  The bilateral mesial temporal lobes are symmetric without abnormal signal, sclerosis, or enhancement.    The globes, orbits, pituitary gland, pineal gland and craniocervical junction are normal in configuration. The major vascular flow voids are patent.  Please see CTA performed earlier today for details of the intracranial vasculature.    Impression    Motion limited examination.    On the flair images, high attenuating material about the brainstem/basal cisterns is felt to represent artifact given extensive patient motion likely the result of poor saturation of the CSF signal in the region.  Of note, there is no associated enhancement or meningeal enhancement to suggest that this reflects  "proteinaceous material of infectious nature.  Blood products felt less likely.  If this finding would correlate with current patient symptomatology, consider either repeating the flair image once patient is better able to tolerate the examination, or correlate this finding with lumbar puncture.    No evidence of acute infarct.  Remote PCA territory infarct with mild enhancement.    No abnormal temporal lobe enhancement or focal focus to suggest seizure nidus.            Electronically signed by: NERY LO MD  Date:     11/06/16  Time:    01:08       Additional tests:  1)CT Scan: as noted  2) EEG\Video Monitoring: no  3) PET Scan: no  4) Neuropsychological evaluation: no  5) DEXA Scan: no  6) Others: no     RISK FACTORS FOR SEIZURES:    1. Head Trauma:  No    2. CNS Infections:  No  3. CNS Tumors: No     4. CNS Vascular Disease: hx of prior CVA  5. Febrile Seizures: No    6. Developmental Delay: No       7. Family History of Seizures: No    8. Birth history: unremarkable     Pregnancy/Labor/Delivery: n/a    CURRENT MEDICATIONS:   Current Outpatient Prescriptions   Medication Sig Dispense Refill    acetaminophen (TYLENOL) 500 MG tablet Take 1 tablet (500 mg total) by mouth every 6 (six) hours as needed for Pain.  0    amlodipine (NORVASC) 10 MG tablet Take 1 tablet (10 mg total) by mouth once daily. 30 tablet 11    apixaban 5 mg Tab Take 1 tablet (5 mg total) by mouth 2 (two) times daily. 60 tablet 11    aspirin 81 MG Chew Take 1 tablet (81 mg total) by mouth once daily.  0    atorvastatin (LIPITOR) 40 MG tablet Take 1 tablet (40 mg total) by mouth once daily. 90 tablet 3    BD INSULIN PEN NEEDLE UF MINI 31 gauge x 3/16" Ndle To use with insulin pens 4  times daily 360 each 3    carvedilol (COREG) 3.125 MG tablet Take 1 tablet (3.125 mg total) by mouth 2 (two) times daily. 60 tablet 11    coenzyme Q10 200 mg capsule Take 200 mg by mouth once daily. (Patient taking differently: Take 200 mg by mouth once " daily. PT TAKEN EVERY MON, WED, FRI)      dulaglutide (TRULICITY) 0.75 mg/0.5 mL PnIj Inject 0.5 mLs (0.75 mg total) into the skin every 7 days. 4 Syringe 6    folic acid (FOLVITE) 400 MCG tablet Take 400 mcg by mouth once daily.      furosemide (LASIX) 40 MG tablet Take 1 tablet (40 mg total) by mouth 2 (two) times daily. 90 tablet 4    GLUCAGON EMERGENCY KIT, HUMAN, 1 mg injection INJECT 1 MG INTO THE MUSCLE AS NEEDED 1 kit 1    hydrALAZINE (APRESOLINE) 25 MG tablet Take 1 tablet (25 mg total) by mouth every 8 (eight) hours. 90 tablet 11    insulin aspart (NOVOLOG) 100 unit/mL InPn pen Inject 5 units w/ breakfast, 7 units w/ lunch, 5 units w/ dinner. Scale 180-230+1,231-280 +2, 281-330+3, 331-380 +4. 1 Box 6    insulin detemir (LEVEMIR FLEXPEN) 100 unit/mL (3 mL) SubQ InPn pen Inject 14 Units into the skin every evening. 3 Box 3    insulin lispro (HUMALOG KWIKPEN) 100 unit/mL InPn pen Inject 12 units w/ meals, 6 units if eating lighter meal or bg less than 120, scale 180-230 +2, 231-280 +4, 281-330 +6, 331-380 +8. 3 Box 3    levetiracetam (KEPPRA) 750 MG Tab Take 2 tablets (1,500 mg total) by mouth 2 (two) times daily. 120 tablet 11    levothyroxine (SYNTHROID) 200 MCG tablet Take 1 tablet (200 mcg total) by mouth before breakfast. 90 tablet 2    losartan (COZAAR) 100 MG tablet Take 1 tablet (100 mg total) by mouth once daily. 90 tablet 3    magnesium 30 mg Tab Take 1 tablet by mouth every Mon, Wed, Fri.       metOLazone (ZAROXOLYN) 2.5 MG tablet Take 1 tablet (2.5 mg total) by mouth as needed. 30 minutes prior to morning lasix dose only for weight increase over 3lbs in a day or 5 in a week. 30 tablet 11    multivitamin capsule Take 1 capsule by mouth every Mon, Wed, Fri.       ONEUCH VERPhelps Health USE TO TEST BLOOD SUGAR THREE TIMES DAILY 100 strip 11    vitamin D 1000 units Tab Take 1,000 Units by mouth every Mon, Wed, Fri.       No current facility-administered medications for this visit.          CURRENT ANTI EPILEPTIC MEDICATIONS:   - Levetiracetam 1500mg bid    VAGAL NERVE STIMULATOR: n/a     PRIOR ANTICONVULSANT HISTORY:  none      PAST MEDICAL HISTORY:   Active Ambulatory Problems     Diagnosis Date Noted    Hypercholesterolemia 08/01/2012    Sickle cell trait 08/01/2012    Erectile dysfunction associated with type 2 diabetes mellitus 01/29/2013    Gallstones 03/20/2013    Paroxysmal atrial fibrillation 09/23/2013    CKD (chronic kidney disease), stage III 09/23/2013    Postsurgical hypothyroidism 11/27/2013    Microcytic anemia 11/27/2013    Obesity (BMI 30.0-34.9) 11/25/2014    History of stroke 01/27/2016    Right homonymous hemianopsia 02/05/2016    Cerebrovascular accident (CVA) 07/25/2016    CKD stage 3 due to type 2 diabetes mellitus 11/02/2016    Proteinuria 11/02/2016    Aphasia     Generalized tonic-clonic seizure     Chronic anticoagulation - pradaxa 11/10/2016    Chronic diastolic heart failure 11/20/2016    History of thyroid cancer 01/13/2017    Bradycardia 02/01/2017    Bilateral leg edema 02/01/2017    Type 2 diabetes mellitus with hyperglycemia 06/28/2017    Secondary hyperparathyroidism 06/29/2017    Elevated PSA 07/05/2017    Hematuria 08/14/2017    Essential hypertension 08/30/2017    DM type 2 without retinopathy 08/30/2017    Nuclear sclerosis of both eyes 08/30/2017    Refractive error 08/30/2017    Prostate enlargement 09/14/2017    Simple partial seizure, consciousness not impaired 10/05/2017    DM2 (diabetes mellitus, type 2) 10/05/2017     Resolved Ambulatory Problems     Diagnosis Date Noted    HTN (hypertension), benign 08/01/2012    Elevated alkaline phosphatase level 08/01/2012    Flank pain 03/20/2013    Syncope 11/26/2013    Leukocytosis 11/27/2013    Acute on chronic renal failure 11/27/2013    Controlled type 2 diabetes with neuropathy 12/16/2014    Screening 03/03/2015    Screening 03/03/2015    Visual field loss following  stroke 01/28/2016    Headache 01/28/2016    Fall 06/10/2016    Abdominal pain 10/05/2017    Altered mental status 10/05/2017     Past Medical History:   Diagnosis Date    Allergy     BMI 31.0-31.9,adult 11/25/2014    Cerebrovascular disease 7/25/2016    Chronic anticoagulation - pradaxa 11/10/2016    Chronic diastolic heart failure 11/20/2016    CKD (chronic kidney disease), stage III 9/23/2013    Controlled type 2 diabetes with neuropathy 12/16/2014    Diabetes mellitus due to underlying condition with stage 3 chronic kidney disease, without long-term current use of insulin 11/2/2016    Elevated alkaline phosphatase level 8/1/2012    Elevated PSA     Erectile dysfunction associated with type 2 diabetes mellitus     Gallstones 3/20/2013    Generalized tonic-clonic seizure 11/2016    HTN (hypertension), benign 8/1/2012    Hypercholesterolemia 8/1/2012    Microcytic anemia 11/27/2013    Paroxysmal atrial fibrillation 9/23/2013    Postsurgical hypothyroidism 11/27/2013    Right homonymous hemianopsia 2/5/2016    Sickle cell trait     Stroke 1/27/2016    Thyroid cancer     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/2016      PAST PSYCHIATRIC HISTORY: none    PAST SURGICAL HISTORY including Epilepsy surgery:   Past Surgical History:   Procedure Laterality Date    BREAST SURGERY      cyst removal    COLONOSCOPY      CYST REMOVAL      chest    PROSTATE BIOPSY  4/27/11    SKIN BIOPSY      THYROID SURGERY  8/26/09    VASCULAR SURGERY          FAMILY HISTORY:   Family History   Problem Relation Age of Onset    Heart disease Father     Diabetes Father     Hypertension Father     Diabetes Mother     Hypertension Mother     Heart disease Mother     Diabetes Brother     Cancer Sister     Thyroid disease Maternal Aunt     Clotting disorder Neg Hx          SOCIAL HISTORY:   Social History     Social History    Marital status:      Spouse name: Rut    Number of children: N/A     Years of education: N/A     Occupational History    Not on file.     Social History Main Topics    Smoking status: Never Smoker    Smokeless tobacco: Never Used    Alcohol use Yes      Comment: occasionally    Drug use: No    Sexual activity: Yes     Partners: Female      Comment:  with 3 kids     Other Topics Concern    Not on file     Social History Narrative     with 3 kids     Not driving due to vision problem from stroke.       a) Marital status:                                                     b) Living situation: patient lives with his wife  c) Employed/Unemployed/Other: Disabled     DRIVING HISTORY:  Currently driving: No       LEVEL OF EDUCATION:  -     SUBSTANCE USE: none    ALLERGIES: Cough syrup [guaifenesin]     REVIEW OF SYSTEMS:  Review of Systems   Constitutional: Negative for appetite change and fatigue.   HENT: Negative for dental problem and sore throat.    Eyes: Negative for photophobia and visual disturbance.   Respiratory: Negative for cough and shortness of breath.    Cardiovascular: Negative for chest pain and palpitations.   Gastrointestinal: Negative for nausea and vomiting.   Endocrine: Negative for polydipsia and polyuria.   Genitourinary: Negative for frequency and urgency.   Musculoskeletal: Negative for arthralgias and joint swelling.   Skin: Negative for color change and rash.   Allergic/Immunologic: Negative for immunocompromised state.   All other systems reviewed and are negative.       GENERAL EXAMINATION:  There were no vitals taken for this visit.     GENERAL EXAMINATION:  General Appearance:    This is an average built male who appears well.  HEENT: There are no dysmorphic features  Skin: There are no obvious stigmata for neurocutaneous disorders.  Neck: supple   Cardiovascular: regular rate and rhythm  Lungs: clear  Abdomen: deferred  The spine is non-tender.  Kyphosis:  NoScoliosis: No   Extremities: Warm and well perfused    NEUROLOGICAL  EXAMINATION:  Mental status: Alert and oriented x 4; pleasant and cooperative with exam  Memory: Recent memory intact, Remote memory intact, Age correct, Month correct  Attention and concentration: intact  Fund of knowledge: adequate  Speech: normal  Dysarthria: No   Eyes: PERRL; EOM intact; No nystagmus.The visual pursuits  were smooth with normal saccadic eye movements.   Fundoscopic Exam: deferred  No facial asymmetry. Intact facial sensation bilaterally.  Hearing was intact bilaterally to finger rub  Tongue and palate was in the midline  Intact SCM and trapezii bilaterally     Motor examination:  Normal bulk and tone bilaterally. Power: no pronater drift; 5/5 bilaterally symmetric UE/LE  Abnormal movements: none  Deep tendon reflexes: 2+ bilaterally symmetric UE/LE with flexor plantars  Dysmetria: No     Sensory examination:   Normal, light touch, pin prick, and vibration bilaterally symmetric UE/LE    Gait:  Normal gait and station    IMPRESSION:  The patient's history is consistent with:  Complex partial seizures evolving to generalized tonic-clonic seizures  61yo M with hx of CVA in 1/16 and seizures x 11/16 - prior episodes c/w glucose level alterations  Recent episode suggestive of sz - likely related to hyperglycemia    - check Levetiracetam levels  - continue same dose of LEV for now    Monitor glucose levels closely  Return in 6 months or earlier prn    History of stroke  - CVA in 1/2016    The patient was asked to call me/the clinic 1 week after the test(s) are done to obtain results.    More than 50% of the time with the patient (as well as family/caregiver(s) was spent on face-to-face counseling about:  1. Diagnosis, plans, prognosis, medications and possible side-effects, risks and benefits of treatment, other alternatives to AEDs.  2. Risks related to continued seizures, status epilepticus, SUDEP, driving restrictions and seizure precautions ( no baths but showers are ok, no swimming unsupervised,  no use of heavy machinery, no use of sharp moving objects, avoid heights).   3. Issues related to pregnancy, OCP and breast feeding as it relates to epilepsy (in female patients).  4. The potential of teratogenicity (for female patients) and suicidal risks of anti-epileptic medications.  5.Avoid any activity that compromise patient safety related to seizures.    Questions and concerns raised by the patient and family/care-giver(s) were addressed and they indicated understanding of everything discussed and agreed to plans as above.    Return in 6 months or earlier prean Santiago MD, SONA(), FACNS.  Neurology-Epilepsy.

## 2017-11-15 ENCOUNTER — LAB VISIT (OUTPATIENT)
Dept: LAB | Facility: HOSPITAL | Age: 62
End: 2017-11-15
Attending: INTERNAL MEDICINE
Payer: COMMERCIAL

## 2017-11-15 ENCOUNTER — OFFICE VISIT (OUTPATIENT)
Dept: NEUROLOGY | Facility: CLINIC | Age: 62
End: 2017-11-15
Payer: COMMERCIAL

## 2017-11-15 VITALS
BODY MASS INDEX: 32.03 KG/M2 | SYSTOLIC BLOOD PRESSURE: 158 MMHG | DIASTOLIC BLOOD PRESSURE: 85 MMHG | WEIGHT: 223.75 LBS | HEART RATE: 66 BPM | HEIGHT: 70 IN

## 2017-11-15 DIAGNOSIS — I10 ESSENTIAL HYPERTENSION: ICD-10-CM

## 2017-11-15 DIAGNOSIS — Z86.73 HISTORY OF STROKE: ICD-10-CM

## 2017-11-15 DIAGNOSIS — E89.0 POSTSURGICAL HYPOTHYROIDISM: ICD-10-CM

## 2017-11-15 DIAGNOSIS — N18.30 CKD (CHRONIC KIDNEY DISEASE), STAGE III: Chronic | ICD-10-CM

## 2017-11-15 DIAGNOSIS — G40.209 COMPLEX PARTIAL SEIZURES EVOLVING TO GENERALIZED TONIC-CLONIC SEIZURES: Primary | ICD-10-CM

## 2017-11-15 LAB
ANION GAP SERPL CALC-SCNC: 10 MMOL/L
BUN SERPL-MCNC: 27 MG/DL
CALCIUM SERPL-MCNC: 8.8 MG/DL
CHLORIDE SERPL-SCNC: 108 MMOL/L
CO2 SERPL-SCNC: 23 MMOL/L
CREAT SERPL-MCNC: 2.1 MG/DL
EST. GFR  (AFRICAN AMERICAN): 37.3 ML/MIN/1.73 M^2
EST. GFR  (NON AFRICAN AMERICAN): 32.3 ML/MIN/1.73 M^2
GLUCOSE SERPL-MCNC: 205 MG/DL
POTASSIUM SERPL-SCNC: 4.9 MMOL/L
SODIUM SERPL-SCNC: 141 MMOL/L
TSH SERPL DL<=0.005 MIU/L-ACNC: 2.17 UIU/ML

## 2017-11-15 PROCEDURE — 84443 ASSAY THYROID STIM HORMONE: CPT

## 2017-11-15 PROCEDURE — 99215 OFFICE O/P EST HI 40 MIN: CPT | Mod: S$GLB,,, | Performed by: PSYCHIATRY & NEUROLOGY

## 2017-11-15 PROCEDURE — 36415 COLL VENOUS BLD VENIPUNCTURE: CPT

## 2017-11-15 PROCEDURE — 80048 BASIC METABOLIC PNL TOTAL CA: CPT

## 2017-11-15 PROCEDURE — 99999 PR PBB SHADOW E&M-EST. PATIENT-LVL II: CPT | Mod: PBBFAC,,, | Performed by: PSYCHIATRY & NEUROLOGY

## 2017-11-15 NOTE — ASSESSMENT & PLAN NOTE
61yo M with hx of CVA in 1/16 and seizures x 11/16 - prior episodes c/w glucose level alterations  Recent episode suggestive of sz - likely related to hyperglycemia    - check Levetiracetam levels  - continue same dose of LEV for now    Monitor glucose levels closely  Return in 6 months or earlier prn

## 2017-11-21 RX ORDER — AMLODIPINE BESYLATE 10 MG/1
10 TABLET ORAL DAILY
Qty: 90 TABLET | Refills: 3 | Status: SHIPPED | OUTPATIENT
Start: 2017-11-21 | End: 2018-12-28 | Stop reason: SDUPTHER

## 2017-11-21 NOTE — TELEPHONE ENCOUNTER
----- Message from Jen Mcduffie sent at 11/21/2017  9:45 AM CST -----  Contact: pt's wife  Pt's wife called because the  amlodipine (NORVASC) is the wrong dosage through the [facility] pharmacy.  She stated they have the 5mg dosage and not the 10mg dosage.  He is a pt of [facility] Stout and LOV 10/31/17.  She can be reached @ [phone].  Thanks!!

## 2017-11-27 ENCOUNTER — PATIENT MESSAGE (OUTPATIENT)
Dept: DIABETES | Facility: CLINIC | Age: 62
End: 2017-11-27

## 2017-11-28 ENCOUNTER — DOCUMENTATION ONLY (OUTPATIENT)
Dept: INTERNAL MEDICINE | Facility: CLINIC | Age: 62
End: 2017-11-28

## 2017-11-28 RX ORDER — HYDRALAZINE HYDROCHLORIDE 25 MG/1
25 TABLET, FILM COATED ORAL EVERY 8 HOURS
Qty: 90 TABLET | Refills: 11 | OUTPATIENT
Start: 2017-11-28 | End: 2018-11-28

## 2017-11-28 NOTE — TELEPHONE ENCOUNTER
"----- Message from Uzma Onur sent at 11/28/2017 12:17 PM CST -----  Contact: call py at [phone]  RX request - refill or new RX.  Is this a refill or new RX: refill   RX name and strength: hydrALAZINE (APRESOLINE) 25 MG tablet  Directions:   Is this a 30 day or 90 day RX:  90 day   Pharmacy name and phone # (DON'T enter "on file" or "in chart"): [facility] [id number] [address] AT [address] [phone] (Phone)   Comments:        "

## 2017-11-29 NOTE — PROGRESS NOTES
Rec'd home health summary. Patient discharged 11/16/17 from all home health services, 89% goals met.

## 2017-12-12 DIAGNOSIS — N18.30 CKD (CHRONIC KIDNEY DISEASE), STAGE III: Chronic | ICD-10-CM

## 2017-12-12 RX ORDER — LOSARTAN POTASSIUM 100 MG/1
100 TABLET ORAL DAILY
Qty: 90 TABLET | Refills: 3 | Status: SHIPPED | OUTPATIENT
Start: 2017-12-12 | End: 2019-01-09 | Stop reason: SDUPTHER

## 2017-12-15 ENCOUNTER — TELEPHONE (OUTPATIENT)
Dept: NEUROLOGY | Facility: CLINIC | Age: 62
End: 2017-12-15

## 2017-12-15 NOTE — TELEPHONE ENCOUNTER
Wife said they had to get an emergency fill because new dose of keppra was not sent to [facility]. I called in new order for keppra 750-2poBID w/11 refills per new script

## 2018-01-02 ENCOUNTER — LAB VISIT (OUTPATIENT)
Dept: LAB | Facility: HOSPITAL | Age: 63
End: 2018-01-02
Attending: INTERNAL MEDICINE
Payer: COMMERCIAL

## 2018-01-02 DIAGNOSIS — Z79.4 TYPE 2 DIABETES MELLITUS WITH HYPERGLYCEMIA, WITH LONG-TERM CURRENT USE OF INSULIN: ICD-10-CM

## 2018-01-02 DIAGNOSIS — E11.65 TYPE 2 DIABETES MELLITUS WITH HYPERGLYCEMIA, WITH LONG-TERM CURRENT USE OF INSULIN: ICD-10-CM

## 2018-01-02 DIAGNOSIS — N18.30 CKD STAGE 3 DUE TO TYPE 2 DIABETES MELLITUS: ICD-10-CM

## 2018-01-02 DIAGNOSIS — E11.22 CKD STAGE 3 DUE TO TYPE 2 DIABETES MELLITUS: ICD-10-CM

## 2018-01-02 LAB
25(OH)D3+25(OH)D2 SERPL-MCNC: 33 NG/ML
ALBUMIN SERPL BCP-MCNC: 3.5 G/DL
ANION GAP SERPL CALC-SCNC: 5 MMOL/L
BASOPHILS # BLD AUTO: 0.08 K/UL
BASOPHILS NFR BLD: 0.8 %
BUN SERPL-MCNC: 21 MG/DL
CALCIUM SERPL-MCNC: 8.7 MG/DL
CHLORIDE SERPL-SCNC: 109 MMOL/L
CO2 SERPL-SCNC: 26 MMOL/L
CREAT SERPL-MCNC: 2.1 MG/DL
DIFFERENTIAL METHOD: ABNORMAL
EOSINOPHIL # BLD AUTO: 0.7 K/UL
EOSINOPHIL NFR BLD: 7 %
ERYTHROCYTE [DISTWIDTH] IN BLOOD BY AUTOMATED COUNT: 16.7 %
EST. GFR  (AFRICAN AMERICAN): 37.3 ML/MIN/1.73 M^2
EST. GFR  (NON AFRICAN AMERICAN): 32.3 ML/MIN/1.73 M^2
ESTIMATED AVG GLUCOSE: 126 MG/DL
GLUCOSE SERPL-MCNC: 168 MG/DL
HBA1C MFR BLD HPLC: 6 %
HCT VFR BLD AUTO: 31 %
HGB BLD-MCNC: 10.9 G/DL
IMM GRANULOCYTES # BLD AUTO: 0.04 K/UL
IMM GRANULOCYTES NFR BLD AUTO: 0.4 %
LYMPHOCYTES # BLD AUTO: 2.2 K/UL
LYMPHOCYTES NFR BLD: 22.9 %
MAGNESIUM SERPL-MCNC: 2.2 MG/DL
MCH RBC QN AUTO: 27.4 PG
MCHC RBC AUTO-ENTMCNC: 35.2 G/DL
MCV RBC AUTO: 78 FL
MONOCYTES # BLD AUTO: 1.5 K/UL
MONOCYTES NFR BLD: 15.2 %
NEUTROPHILS # BLD AUTO: 5.2 K/UL
NEUTROPHILS NFR BLD: 53.7 %
NRBC BLD-RTO: 1 /100 WBC
PHOSPHATE SERPL-MCNC: 3.4 MG/DL
PLATELET # BLD AUTO: 211 K/UL
PMV BLD AUTO: 12.9 FL
POTASSIUM SERPL-SCNC: 4.7 MMOL/L
PTH-INTACT SERPL-MCNC: 110 PG/ML
RBC # BLD AUTO: 3.98 M/UL
SODIUM SERPL-SCNC: 140 MMOL/L
WBC # BLD AUTO: 9.63 K/UL

## 2018-01-02 PROCEDURE — 80069 RENAL FUNCTION PANEL: CPT

## 2018-01-02 PROCEDURE — 83036 HEMOGLOBIN GLYCOSYLATED A1C: CPT | Mod: 59

## 2018-01-02 PROCEDURE — 83735 ASSAY OF MAGNESIUM: CPT

## 2018-01-02 PROCEDURE — 82985 ASSAY OF GLYCATED PROTEIN: CPT

## 2018-01-02 PROCEDURE — 36415 COLL VENOUS BLD VENIPUNCTURE: CPT | Mod: PO

## 2018-01-02 PROCEDURE — 83970 ASSAY OF PARATHORMONE: CPT

## 2018-01-02 PROCEDURE — 85025 COMPLETE CBC W/AUTO DIFF WBC: CPT

## 2018-01-02 PROCEDURE — 82306 VITAMIN D 25 HYDROXY: CPT

## 2018-01-05 LAB — FRUCTOSAMINE SERPL-SCNC: 382 UMOL /L (ref 0–285)

## 2018-01-09 ENCOUNTER — OFFICE VISIT (OUTPATIENT)
Dept: ENDOCRINOLOGY | Facility: CLINIC | Age: 63
End: 2018-01-09
Payer: COMMERCIAL

## 2018-01-09 VITALS
HEART RATE: 70 BPM | HEIGHT: 70 IN | BODY MASS INDEX: 32.37 KG/M2 | WEIGHT: 226.13 LBS | DIASTOLIC BLOOD PRESSURE: 62 MMHG | SYSTOLIC BLOOD PRESSURE: 119 MMHG

## 2018-01-09 DIAGNOSIS — N18.30 CKD STAGE 3 DUE TO TYPE 2 DIABETES MELLITUS: ICD-10-CM

## 2018-01-09 DIAGNOSIS — Z79.4 TYPE 2 DIABETES MELLITUS WITH STAGE 3 CHRONIC KIDNEY DISEASE, WITH LONG-TERM CURRENT USE OF INSULIN: ICD-10-CM

## 2018-01-09 DIAGNOSIS — E11.65 TYPE 2 DIABETES MELLITUS WITH HYPERGLYCEMIA, WITH LONG-TERM CURRENT USE OF INSULIN: Primary | ICD-10-CM

## 2018-01-09 DIAGNOSIS — E11.22 TYPE 2 DIABETES MELLITUS WITH STAGE 3 CHRONIC KIDNEY DISEASE, WITH LONG-TERM CURRENT USE OF INSULIN: ICD-10-CM

## 2018-01-09 DIAGNOSIS — E66.9 OBESITY (BMI 30.0-34.9): ICD-10-CM

## 2018-01-09 DIAGNOSIS — H25.13 NUCLEAR SCLEROSIS OF BOTH EYES: ICD-10-CM

## 2018-01-09 DIAGNOSIS — I10 ESSENTIAL HYPERTENSION: ICD-10-CM

## 2018-01-09 DIAGNOSIS — N18.30 TYPE 2 DIABETES MELLITUS WITH STAGE 3 CHRONIC KIDNEY DISEASE, WITH LONG-TERM CURRENT USE OF INSULIN: ICD-10-CM

## 2018-01-09 DIAGNOSIS — E89.0 POSTSURGICAL HYPOTHYROIDISM: ICD-10-CM

## 2018-01-09 DIAGNOSIS — R60.0 BILATERAL LEG EDEMA: ICD-10-CM

## 2018-01-09 DIAGNOSIS — E11.22 CKD STAGE 3 DUE TO TYPE 2 DIABETES MELLITUS: ICD-10-CM

## 2018-01-09 DIAGNOSIS — Z86.73 HISTORY OF STROKE: ICD-10-CM

## 2018-01-09 DIAGNOSIS — Z79.4 TYPE 2 DIABETES MELLITUS WITH HYPERGLYCEMIA, WITH LONG-TERM CURRENT USE OF INSULIN: Primary | ICD-10-CM

## 2018-01-09 DIAGNOSIS — Z85.850 HISTORY OF THYROID CANCER: ICD-10-CM

## 2018-01-09 DIAGNOSIS — I50.32 CHRONIC DIASTOLIC HEART FAILURE: ICD-10-CM

## 2018-01-09 PROCEDURE — 99999 PR PBB SHADOW E&M-EST. PATIENT-LVL V: CPT | Mod: PBBFAC,,, | Performed by: NURSE PRACTITIONER

## 2018-01-09 PROCEDURE — 99214 OFFICE O/P EST MOD 30 MIN: CPT | Mod: S$GLB,,, | Performed by: NURSE PRACTITIONER

## 2018-01-09 RX ORDER — INSULIN ASPART 100 [IU]/ML
INJECTION, SOLUTION INTRAVENOUS; SUBCUTANEOUS
Qty: 1 BOX | Refills: 6
Start: 2018-01-09 | End: 2018-06-19 | Stop reason: SDUPTHER

## 2018-01-09 NOTE — PATIENT INSTRUCTIONS
Novolog 7 units w/ meals plus scale  150-200+1, 201-250+2, 251-300+3, etc  Levemir 14 units at night  trulicity 0.75 mg weekly-tuesdays       Snacks can be an important part of a balanced, healthy meal plan. They allow you to eat more frequently, feeling full and satisfied throughout the day. Also, they allow you to spread carbohydrates evenly, which may stabilize blood sugars.  Plus, snacks are enjoyable!     The amount of carbohydrate needed at snacks varies. Generally, about 15-30 grams of carbohydrate per snack is recommended.  Below you will find some tasty treats.       0-5 gm carb   Crystal Light   Vitamin Water Zero   Herbal tea, unsweetened   2 tsp peanut butter on celery   1./2 cup sugar-free jell-o   1 sugar-free popsicle   ¼ cup blueberries   8oz Blue Demi unsweetened almond milk   5 baby carrots & celery sticks, cucumbers, bell peppers dipped in ¼ cup salsa, 2Tbsp light ranch dressing or 2Tbsp plain Greek yogurt   10 Goldfish crackers   ½ oz low-fat cheese or string cheese   1 closed handful of nuts, unsalted   1 Tbsp of sunflower seeds, unsalted   1 cup Smart Pop popcorn   1 whole grain brown rice cake        15 gm carb   1 small piece of fruit or ½ banana or 1/2 cup lite canned fruit   3 jakob cracker squares   3 cups Smart Pop popcorn, top spray butter, [place] lite salt or cinnamon and Truvia   5 Vanilla Wafers   ½ cup low fat, no added sugar ice cream or frozen yogurt (Blue bell, Blue Bunny, Weight Watchers, Skinny Cow)   ½ turkey, ham, or chicken sandwich   ½ c fruit with ½ c Cottage cheese   4-6 unsalted wheat crackers with 1 oz low fat cheese or 1 tbsp peanut butter    30-45 goldfish crackers (depending on flavor)    7-8 Scientology mini brown rice cakes (caramel, apple cinnamon, chocolate)    12 Scientology mini brown rice cakes (cheddar, bbq, ranch)    1/3 cup hummus dip with raw veg   1/2 whole wheat syeda, 1Tbsp hummus   Mini Pizza (1/2 whole wheat English muffin,  low-fat  cheese, tomato sauce)   100 calorie snack pack (Oreo, Chips Ahoy, Ritz Mix, Baked Cheetos)   4-6 oz. light or Greek Style yogurt (Chobani, Yoplait, Okios, [place]oneyfield)   ½ cup sugar-free pudding     6 in. wheat tortilla or syeda oven toasted chips (topped with spray butter flavoring, cinnamon, Truvia OR spray butter, garlic powder, chili powder)    18 BBQ Popchips (available at [employer], [employer], Fresh Market)                    no weight-bearing restrictions

## 2018-01-09 NOTE — PROGRESS NOTES
CC: Patient is here for management of Type 2 diabetes and review of medical conditions as listed in visit diagnosis.      HPI: Mr. Alfa Christy III is a 62 y.o. Black or  male who was diagnosed with Type 2 DM in ~1990s. He was originally on orals, but started insulin in 2010. He was seen in ER for hyperglycemia in ~2010. (+) FH of DM in multiple family members. Has a history of CVA with diminished peripheral vision and also thyroid cancer, which was treated with surgery and radioactive iodine. Has sickle cell and last crisis in 2016.     Pt was last seen by me in the past 3 mos.  Pt has had frequent ED visits (summer) for sickle cell crisis (born w/ afib), CVA, hypoglycemia, hyperglycemia 600s (in summer 2017).  Since then, less severe hypoglycemia and hypoglycemia.  Fructosamine elevated, BG 100s-low 300s. Tends to eat donuts, higher carb content foods.  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     Takes Humalog in tandem with meals.     Walks for exercise, in 30 minute intervals (3-4 times a week)-----gym member    Rarely skips meals. Has salad at night for dinner. Biggest meals are breakfast and lunch.     PREVIOUS DIABETES MEDICATIONS  Novolog  Metformin    CURRENT DIABETIC MEDS: Lantus 14 units QHS, Novolog 5-7-5 units plus correction scale, has not start trulicity     Last Podiatry Exam: N/A    REVIEW OF SYSTEMS  General: no weakness, fatigue, or weight changes + 4 (gain)  Eyes: decreased peripheral vision in right eye; no eye pain or redness; last eye exam=8/2017 (Dr. Tejas Man)  Cardiovascular: no chest pain, palpitations, +edema, or murmurs.   Respiratory: no cough or no dyspnea    GI: no heartburn, nausea, or changes in bowel patterns; good appetite.   Skin: no rashes, dryness, itching, or reactions at insulin injection sites; rotates insulin injection sites.  "  Neuro: no c/o numbness, tingling, or dizziness.   Endocrine: no polyuria, polydipsia, polyphagia, heat or cold intolerance.     Vital Signs  /62 (BP Location: Right arm, Patient Position: Sitting)   Pulse 70   Ht 5' 10" (1.778 m)   Wt 102.6 kg (226 lb 1.6 oz)   BMI 32.44 kg/m²        Lab Results   Component Value Date    CREATININE 2.1 (H) 01/02/2018      Lab Results   Component Value Date    TSH 2.175 11/15/2017      Lab Results   Component Value Date    CHOL 118 (L) 10/04/2017    CHOL 112 (L) 07/13/2017    CHOL 92 (L) 04/10/2017     Lab Results   Component Value Date    HDL 37 (L) 10/04/2017    HDL 43 07/13/2017    HDL 25 (L) 04/10/2017     Lab Results   Component Value Date    LDLCALC 52.6 (L) 10/04/2017    LDLCALC 51.8 (L) 07/13/2017    LDLCALC 17.4 (L) 04/10/2017     Lab Results   Component Value Date    TRIG 142 10/04/2017    TRIG 86 07/13/2017    TRIG 248 (H) 04/10/2017     Lab Results   Component Value Date    CHOLHDL 31.4 10/04/2017    CHOLHDL 38.4 07/13/2017    CHOLHDL 27.2 04/10/2017      PHYSICAL EXAMINATION  Constitutional: Appears well, no distress  Neck: Supple, trachea midline.   Respiratory: no wheezes, even and unlabored.  Cardiovascular: RRR; no carotid bruits or murmurs.   Lymph: DP pulses  2+ bilaterally; +trace +1 BLE edema.   Skin: warm and dry; no injection site reactions, no acanthosis nigracans observed.  Neuro:patient alert and cooperative, normal affect.    Diabetes Foot Exam: 2017    Assessment/Plan  1. Type 2 diabetes mellitus with hyperglycemia, with long-term current use of insulin  Hemoglobin A1c next time   a1c goal less than 7.5%  a1c above goal, recent fructosamine above goal  Start trulicity 0.75 mg weekly-has rx now  Change novolog to 7 units ac w/ low dose 150-200+1, etc  Continue levemir 14 units at night.  Lab Results   Component Value Date    HGBA1C 6.0 (H) 01/02/2018        2. Type 2 diabetes mellitus with stage 3 chronic kidney disease, with long-term current " use of insulin  See above   3. Postsurgical hypothyroidism  Lab Results   Component Value Date    TSH 2.175 11/15/2017        4. Obesity (BMI 30.0-34.9)  Body mass index is 32.44 kg/m². may increase insulin resistance.    5. Essential hypertension  Controlled, continue med(s)   6. CKD stage 3 due to type 2 diabetes mellitus  stable    7. Nuclear sclerosis of both eyes  F/u with ophthalmology    8. Chronic diastolic heart failure  F/u with cards   9. Bilateral leg edema  See above   10. History of stroke  See above   11. History of thyroid cancer  F/u q 2 years      FOLLOW UP  Return in about 3 months (around 4/9/2018).     Orders Placed This Encounter   Procedures    Hemoglobin A1c     Standing Status:   Future     Standing Expiration Date:   3/10/2019

## 2018-01-30 NOTE — PROGRESS NOTES
Mr. Christy is a patient of Dr. Camacho and was last seen in [facility] Cardiology 10/30/2017.      Subjective:   Patient ID:  Alfa Christy III is a 62 y.o. male who presents for follow-up of Chronic diastolic heart failure (3 months fu)  .   HPI:    Mr. Christy is a 63yo male with HFpEF (EF 55% on 1/28/2016), paroxysmal atrial fibrillation, CVA (stroke January 2016; episode of aphasia 10/2017 with no signs of acute stroke), HTN, HLD, and DM II here for follow-up. He feels well. He continues to have chronic leg edema for which he uses compression stockings.  Mr. Christy denies chest pain with exertion or at rest, palpitations, SOB, AYALA, syncope, claudication, PND, or orthopnea. He says he eats a low salt diet. He says he knows when he eats a higher salt meal that his leg edema worsens but he subsequently controls this with additional exercise and a lower salt intake. He exercises on the treadmill for 30 minutes 4-5 times per week, weight lifting (light weights).     He is taking amlodipine 10mg, atorvastatin 40mg (LDL 51.8 on 7/13/2017), lasix 40mg BID, ASA 81mg, carvedilol 3.125 BID, hydralazine 25mg TID (he takes BID usually), and losartan 100mg. He takes eliquis 5mg BID for stroke prophylaxis and denies bleeding episodes. Creatinine is 2.1 on 1/2/2018 which is baseline. He is followed by Dr. Gold in nephrology. HA1C 6. He has chronic anemia which is stable. Weight is down 2lbs since last clinic visit. He says his BPs at home are under 120 systolic.    Recent Cardiac Tests:    2D Echo (1/28/2016):  CONCLUSIONS     1 - Normal left ventricular systolic function (EF 55-60%).     2 - No regional wall motion abnormalities.     3 - Concentric hypertrophy.     4 - Left ventricular diastolic dysfunction.     5 - Trivial aortic regurgitation.     6 - Trivial tricuspid regurgitation, PASP 19 mmHg.     7 - No intracardiac source of embolus noted on this exam.  If high clinical suspicion, consider ADA +/- bubble study.     Current  "Outpatient Prescriptions   Medication Sig    acetaminophen (TYLENOL) 500 MG tablet Take 1 tablet (500 mg total) by mouth every 6 (six) hours as needed for Pain.    amLODIPine (NORVASC) 10 MG tablet Take 1 tablet (10 mg total) by mouth once daily.    apixaban 5 mg Tab Take 1 tablet (5 mg total) by mouth 2 (two) times daily.    aspirin 81 MG Chew Take 1 tablet (81 mg total) by mouth once daily.    atorvastatin (LIPITOR) 40 MG tablet Take 1 tablet (40 mg total) by mouth once daily.    BD INSULIN PEN NEEDLE UF MINI 31 gauge x 3/16" Ndle To use with insulin pens 4  times daily    carvedilol (COREG) 3.125 MG tablet Take 1 tablet (3.125 mg total) by mouth 2 (two) times daily.    dulaglutide (TRULICITY) 0.75 mg/0.5 mL PnIj Inject 0.5 mLs (0.75 mg total) into the skin every 7 days.    folic acid (FOLVITE) 400 MCG tablet Take 400 mcg by mouth once daily.    furosemide (LASIX) 40 MG tablet Take 1 tablet (40 mg total) by mouth 2 (two) times daily.    GLUCAGON EMERGENCY KIT, HUMAN, 1 mg injection INJECT 1 MG INTO THE MUSCLE AS NEEDED    hydrALAZINE (APRESOLINE) 25 MG tablet Take 1 tablet (25 mg total) by mouth every 8 (eight) hours. (Patient taking differently: Take 25 mg by mouth every 12 (twelve) hours. )    insulin aspart (NOVOLOG) 100 unit/mL InPn pen Inject 7 units w/ meals plus scale 150-200+1, 201-250+2, 251-300+3, 301-350+4.    insulin detemir (LEVEMIR FLEXPEN) 100 unit/mL (3 mL) SubQ InPn pen Inject 14 Units into the skin every evening.    levetiracetam (KEPPRA) 750 MG Tab Take 2 tablets (1,500 mg total) by mouth 2 (two) times daily.    levothyroxine (SYNTHROID) 200 MCG tablet Take 1 tablet (200 mcg total) by mouth before breakfast.    losartan (COZAAR) 100 MG tablet Take 1 tablet (100 mg total) by mouth once daily.    magnesium 30 mg Tab Take 1 tablet by mouth twice a week.     metOLazone (ZAROXOLYN) 2.5 MG tablet Take 1 tablet (2.5 mg total) by mouth as needed. 30 minutes prior to morning lasix dose " "only for weight increase over 3lbs in a day or 5 in a week.    multivitamin capsule Take 1 capsule by mouth every Mon, Wed, Fri.     ONETOUCH VERIO Strp USE TO TEST BLOOD SUGAR THREE TIMES DAILY    vitamin D 1000 units Tab Take 1,000 Units by mouth every Mon, Wed, Fri.     No current facility-administered medications for this visit.        Review of Systems   Constitution: Negative for malaise/fatigue.   Eyes: Negative for blurred vision.   Cardiovascular: Positive for leg swelling. Negative for chest pain, claudication, dyspnea on exertion, irregular heartbeat, orthopnea, palpitations, paroxysmal nocturnal dyspnea and syncope.   Respiratory: Negative for shortness of breath.    Hematologic/Lymphatic: Negative for bleeding problem.   Skin: Negative for rash.   Musculoskeletal: Negative for myalgias.   Gastrointestinal: Negative for abdominal pain, constipation, diarrhea and nausea.   Genitourinary: Negative for hematuria.   Neurological: Negative for headaches, loss of balance and numbness.   Psychiatric/Behavioral: Negative for altered mental status.   Allergic/Immunologic: Negative for persistent infections.     Objective:     Right Arm BP - Sittin/67 (18)  Left Arm BP - Sittin/71 (18)    BP (!) 157/71 (BP Location: Left arm, Patient Position: Sitting, BP Method: X-Large (Automatic))   Pulse (!) 51   Ht 5' 10" (1.778 m)   Wt 102.2 kg (225 lb 5 oz)   SpO2 100%   BMI 32.33 kg/m²     Physical Exam   Constitutional: He is oriented to person, place, and time. He appears well-developed and well-nourished.   HENT:   Head: Normocephalic.   Nose: Nose normal.   Eyes: Pupils are equal, round, and reactive to light.   Neck: No JVD present. Carotid bruit is not present.   Cardiovascular: Normal rate, regular rhythm, S1 normal and S2 normal.   Occasional extrasystoles are present. Exam reveals no gallop.    No murmur heard.  Pulses:       Carotid pulses are 2+ on the right side, and 2+ " on the left side.       Radial pulses are 2+ on the right side, and 2+ on the left side.   Pulmonary/Chest: Breath sounds normal. No respiratory distress.   Abdominal: Soft. Bowel sounds are normal. He exhibits no distension. There is no tenderness.   Musculoskeletal: Normal range of motion. He exhibits edema (bilateral lower leg edema pitting +1/+2).   Neurological: He is alert and oriented to person, place, and time.   Skin: Skin is warm and dry. No erythema.   Psychiatric: He has a normal mood and affect. His speech is normal and behavior is normal.   Nursing note and vitals reviewed.    Lab Results   Component Value Date     01/02/2018    K 4.7 01/02/2018    MG 2.2 01/02/2018     01/02/2018    CO2 26 01/02/2018    BUN 21 01/02/2018    CREATININE 2.1 (H) 01/02/2018     (H) 01/02/2018    HGBA1C 6.0 (H) 01/02/2018    AST 37 10/07/2017    ALT 26 10/07/2017    ALBUMIN 3.5 01/02/2018    PROT 7.0 10/07/2017    BILITOT 1.2 (H) 10/07/2017    WBC 9.63 01/02/2018    HGB 10.9 (L) 01/02/2018    HCT 31.0 (L) 01/02/2018    MCV 78 (L) 01/02/2018     01/02/2018    TSH 2.175 11/15/2017    CHOL 118 (L) 10/04/2017    HDL 37 (L) 10/04/2017    LDLCALC 52.6 (L) 10/04/2017    TRIG 142 10/04/2017         Recent Labs  Lab 01/27/16 2011 11/05/16  0853 10/04/17  2118   INR 1.1 1.1 1.0        Test(s) Reviewed  I have reviewed the following in detail:  [] Stress test   [] Angiography   [] Echocardiogram   [x] Labs   [x] Other:  EKG - sinus bradycardia       Assessment:         1. Chronic diastolic heart failure. Weight stable. No SOB. Chronic leg edema is unchanged (and patient is on amlodipine).       2. Paroxysmal atrial fibrillation. On eliquis and carvedilol.      3. Essential hypertension. Elevated in clinic but patient says it is below 130 systolic at home. Encouraged to take hydralazine TID as prescribed.      4. Hypercholesterolemia. LDL 52 on atorvastatin 40mg.      5. Cerebrovascular accident (CVA),  unspecified mechanism. No new signs of stroke since October 2017 aphasia.      6. CKD (chronic kidney disease), stage III. Creatinine 2.1 at baseline. Followed by nephrology.     7. Type 2 diabetes mellitus with stage 3 chronic kidney disease, with long-term current use of insulin. HA1C 6.     8. History of stroke. On ASA.      9. Obesity (BMI 30.0-34.9). BMI 32.33.     Plan:     Alfa was seen today for chronic diastolic heart failure.    Diagnoses and all orders for this visit:    Chronic diastolic heart failure  -     Lipid panel; Future; Expected date: 08/02/2018  -     Comprehensive metabolic panel; Future; Expected date: 08/02/2018    Paroxysmal atrial fibrillation    Essential hypertension  -     Lipid panel; Future; Expected date: 08/02/2018  -     Comprehensive metabolic panel; Future; Expected date: 08/02/2018    Hypercholesterolemia    Cerebrovascular accident (CVA), unspecified mechanism    CKD (chronic kidney disease), stage III    Type 2 diabetes mellitus with stage 3 chronic kidney disease, with long-term current use of insulin    History of stroke    Obesity (BMI 30.0-34.9)      Continue current medications.  Continue exercise regimen.  Continue low salt diet.  Continue to weigh self daily. If you gain 3 lbs from your baseline in one day or 5lbs from baseline in one week, you should double furosemide (lasix) dose until that weight is lost. Call clinic if weight does not return to baseline after 2-3 days or if urine output decreases despite increased furosemide (lasix).   Fasting blood test and return to clinic in 6 months, or sooner if symptoms change.    Follow-up in about 6 months (around 7/31/2018).    ------------------------------------------------------------------    RODNEY Leo, NP-C  Consult Cardiology

## 2018-01-31 ENCOUNTER — TELEPHONE (OUTPATIENT)
Dept: CARDIOLOGY | Facility: CLINIC | Age: 63
End: 2018-01-31

## 2018-01-31 ENCOUNTER — HOSPITAL ENCOUNTER (OUTPATIENT)
Dept: CARDIOLOGY | Facility: CLINIC | Age: 63
Discharge: HOME | End: 2018-01-31
Payer: COMMERCIAL

## 2018-01-31 ENCOUNTER — OFFICE VISIT (OUTPATIENT)
Dept: CARDIOLOGY | Facility: CLINIC | Age: 63
End: 2018-01-31
Payer: COMMERCIAL

## 2018-01-31 ENCOUNTER — OFFICE VISIT (OUTPATIENT)
Dept: NEPHROLOGY | Facility: CLINIC | Age: 63
End: 2018-01-31
Payer: COMMERCIAL

## 2018-01-31 VITALS
WEIGHT: 225.31 LBS | HEIGHT: 70 IN | DIASTOLIC BLOOD PRESSURE: 71 MMHG | OXYGEN SATURATION: 100 % | HEART RATE: 51 BPM | SYSTOLIC BLOOD PRESSURE: 157 MMHG | BODY MASS INDEX: 32.26 KG/M2

## 2018-01-31 VITALS
BODY MASS INDEX: 32.26 KG/M2 | HEART RATE: 51 BPM | HEIGHT: 70 IN | SYSTOLIC BLOOD PRESSURE: 156 MMHG | OXYGEN SATURATION: 100 % | DIASTOLIC BLOOD PRESSURE: 72 MMHG | WEIGHT: 225.31 LBS

## 2018-01-31 DIAGNOSIS — E11.22 TYPE 2 DIABETES MELLITUS WITH STAGE 3 CHRONIC KIDNEY DISEASE, WITH LONG-TERM CURRENT USE OF INSULIN: ICD-10-CM

## 2018-01-31 DIAGNOSIS — I48.0 PAROXYSMAL ATRIAL FIBRILLATION: Chronic | ICD-10-CM

## 2018-01-31 DIAGNOSIS — N18.30 CKD (CHRONIC KIDNEY DISEASE), STAGE III: Chronic | ICD-10-CM

## 2018-01-31 DIAGNOSIS — Z79.4 TYPE 2 DIABETES MELLITUS WITH STAGE 3 CHRONIC KIDNEY DISEASE, WITH LONG-TERM CURRENT USE OF INSULIN: ICD-10-CM

## 2018-01-31 DIAGNOSIS — E78.00 HYPERCHOLESTEROLEMIA: ICD-10-CM

## 2018-01-31 DIAGNOSIS — E66.9 OBESITY (BMI 30.0-34.9): ICD-10-CM

## 2018-01-31 DIAGNOSIS — Z86.73 HISTORY OF STROKE: ICD-10-CM

## 2018-01-31 DIAGNOSIS — N40.0 PROSTATE ENLARGEMENT: ICD-10-CM

## 2018-01-31 DIAGNOSIS — I10 ESSENTIAL HYPERTENSION: ICD-10-CM

## 2018-01-31 DIAGNOSIS — R00.2 PALPITATION: Primary | ICD-10-CM

## 2018-01-31 DIAGNOSIS — N18.30 CKD STAGE 3 DUE TO TYPE 2 DIABETES MELLITUS: Primary | ICD-10-CM

## 2018-01-31 DIAGNOSIS — E11.22 CKD STAGE 3 DUE TO TYPE 2 DIABETES MELLITUS: Primary | ICD-10-CM

## 2018-01-31 DIAGNOSIS — N25.81 SECONDARY HYPERPARATHYROIDISM: ICD-10-CM

## 2018-01-31 DIAGNOSIS — N18.30 TYPE 2 DIABETES MELLITUS WITH STAGE 3 CHRONIC KIDNEY DISEASE, WITH LONG-TERM CURRENT USE OF INSULIN: ICD-10-CM

## 2018-01-31 DIAGNOSIS — I50.32 CHRONIC DIASTOLIC HEART FAILURE: Primary | ICD-10-CM

## 2018-01-31 DIAGNOSIS — I63.9 CEREBROVASCULAR ACCIDENT (CVA), UNSPECIFIED MECHANISM: ICD-10-CM

## 2018-01-31 DIAGNOSIS — R00.2 PALPITATION: ICD-10-CM

## 2018-01-31 DIAGNOSIS — I12.9 HYPERTENSIVE CKD (CHRONIC KIDNEY DISEASE): ICD-10-CM

## 2018-01-31 PROCEDURE — 99214 OFFICE O/P EST MOD 30 MIN: CPT | Mod: SA,S$GLB,, | Performed by: NURSE PRACTITIONER

## 2018-01-31 PROCEDURE — 99215 OFFICE O/P EST HI 40 MIN: CPT | Mod: S$GLB,,, | Performed by: INTERNAL MEDICINE

## 2018-01-31 PROCEDURE — 99999 PR PBB SHADOW E&M-EST. PATIENT-LVL IV: CPT | Mod: PBBFAC,,, | Performed by: NURSE PRACTITIONER

## 2018-01-31 PROCEDURE — 3008F BODY MASS INDEX DOCD: CPT | Mod: S$GLB,,, | Performed by: INTERNAL MEDICINE

## 2018-01-31 PROCEDURE — 99999 PR PBB SHADOW E&M-EST. PATIENT-LVL III: CPT | Mod: PBBFAC,,, | Performed by: INTERNAL MEDICINE

## 2018-01-31 PROCEDURE — 93000 ELECTROCARDIOGRAM COMPLETE: CPT | Mod: S$GLB,,, | Performed by: INTERNAL MEDICINE

## 2018-01-31 PROCEDURE — 3008F BODY MASS INDEX DOCD: CPT | Mod: S$GLB,,, | Performed by: NURSE PRACTITIONER

## 2018-01-31 NOTE — TELEPHONE ENCOUNTER
----- Message from Elena Bustillo sent at 1/31/2018 10:01 AM CST -----  Regarding: EKG ORDER  Please put in EKG order, thanks. Elena [phone]

## 2018-01-31 NOTE — PROGRESS NOTES
Subjective:       Patient ID: Alfa Christy III is a 62 y.o. Black or  male who presents for follow upof CKD.      HPI     Mr. Christy was seen in follow up of CKD today. He was last seen on 9/14/17. He has CKD III due to poorly controlled diabetes, hypertension.     He has underlying longstanding diabetes, hypertension, recent CVA, paroxysmal atrial fibrillation, chronic anticoagulation use, hyperlipidemia, sickle cell trait, thyroid cancer, hyperlipidemiaen along with CKD III. He admits to prior uncontrolled nature of his diabetes. He also at some point was taking NSAID like Advil. His prior labs were reviewed for baseline creatinine of around 1.7 to 1.9. Per available labs since 2005 to 2007 he has had elevated creatinine with some episodes of JAELYN.     He denies any dysuria/ cloudy urine/ flank pain. Pt denies any nausea/ vomiting/ diarrhea/ fever. He has stable appetite. He has BP in 150 range in the clinic today. But pt and his wife report his BP readings are better at home, mostly in the range of 120 range. Noted his BMI is around 32.    US kidneys from 5/17 noted for 10.9, 10.4 cm kidney size, enlarged prostate, peripelvic cyst. He has diastolic dysfunction on echo.    Renal Function:  Lab Results   Component Value Date     (H) 01/02/2018     (H) 11/15/2017     01/02/2018     11/15/2017    K 4.7 01/02/2018    K 4.9 11/15/2017     01/02/2018     11/15/2017    CO2 26 01/02/2018    CO2 23 11/15/2017    BUN 21 01/02/2018    BUN 27 (H) 11/15/2017    CALCIUM 8.7 01/02/2018    CALCIUM 8.8 11/15/2017    CREATININE 2.1 (H) 01/02/2018    CREATININE 2.1 (H) 11/15/2017    ALBUMIN 3.5 01/02/2018    ALBUMIN 3.0 (L) 10/07/2017    PHOS 3.4 01/02/2018    PHOS 3.9 10/08/2017    ESTGFRAFRICA 37.3 (A) 01/02/2018    ESTGFRAFRICA 37.3 (A) 11/15/2017    EGFRNONAA 32.3 (A) 01/02/2018    EGFRNONAA 32.3 (A) 11/15/2017       Urinalysis:  Lab Results   Component Value Date     APPEARANCEUA Clear 01/02/2018    PHUR 5.0 01/02/2018    SPECGRAV 1.015 01/02/2018    PROTEINUA 2+ (A) 01/02/2018    GLUCUA Negative 01/02/2018    OCCULTUA 1+ (A) 01/02/2018    NITRITE Negative 01/02/2018    LEUKOCYTESUR Negative 01/02/2018       Protein/Creatinine Ratio:  Lab Results   Component Value Date    PROTEINURINE 74 (H) 09/01/2017    CREATRANDUR 112.0 10/04/2017    UTPCR 0.70 (H) 09/01/2017       CBC:  Lab Results   Component Value Date    WBC 9.63 01/02/2018    HGB 10.9 (L) 01/02/2018    HCT 31.0 (L) 01/02/2018       Vit D 33      Review of Systems   Constitutional: Negative for activity change, appetite change, chills, fatigue and fever.   HENT: Negative for hearing loss and nosebleeds.    Eyes: Negative for pain and discharge.   Respiratory: Negative for cough, shortness of breath and wheezing.    Cardiovascular: Positive for leg swelling. Negative for chest pain.   Gastrointestinal: Negative for abdominal pain, diarrhea, nausea and vomiting.   Endocrine: Negative for polydipsia and polyuria.   Genitourinary: Negative for decreased urine volume, dysuria, flank pain and frequency.   Musculoskeletal: Negative for back pain and myalgias.   Skin: Negative for pallor and rash.   Allergic/Immunologic: Negative for environmental allergies.   Neurological: Negative for dizziness and headaches.   Psychiatric/Behavioral: Negative for behavioral problems. The patient is not nervous/anxious.        Objective:      Physical Exam   Constitutional: He is oriented to person, place, and time. He appears well-developed and well-nourished. No distress.   HENT:   Head: Normocephalic and atraumatic.   Neck: Neck supple.   Cardiovascular: Normal rate and normal heart sounds.    Pulmonary/Chest: Effort normal and breath sounds normal. He has no wheezes. He has no rales.   Abdominal: Soft. There is no tenderness.   Musculoskeletal: He exhibits edema.   Neurological: He is alert and oriented to person, place, and time.    Skin: Skin is warm and dry.   Psychiatric: He has a normal mood and affect.   Vitals reviewed.      Assessment:       1. CKD stage 3 due to type 2 diabetes mellitus    2. Secondary hyperparathyroidism    3. Prostate enlargement    4. CKD (chronic kidney disease), stage III    5. Hypertensive CKD (chronic kidney disease)      Plan:     Mr. Christy has longstanding diabetes, hypertension, CVA, paroxysmal atrial fibrillation, chronic anticoagulation use, hyperlipidemia, sickle cell trait, thyroid cancer, hyperlipidemia and has CKD III. CKD is due to longstanding and poorly controlled diabetes, also hypertension and prior NSAID use. He has proteinuria of less than a gram which could be from diabetic nephropathy.     Overall he has slow progression of CKD. This was brought to his attention again. Stressed need for weight loss, keeping ideal body weight given his persistent proteinuria and need for strict low salt diet and better BP control.    Pt has very poorly controlled diabetes with episodes of hyperglycemia crisis with blood glucose of 500, has had fluid overload due to which his cardiologist has increased diuretic regimen. Given this his risk of further progression of CKD which is already IIIB is really significant. I had a detailed discussion about this with pt and his wife. Better glycemic control, strict low salt diet, BP goal of less than 130/80 are all very crucial for his CKD. His risk of progression to higher levels of CKD is very high.     Management of BPH, hematuria and abnormal cytology per urology. Given enlarged prostate, watch for any symptoms of urinary retention. D/w them about avoiding any sleep medicine/ antihistaminic as it can precipitate urinary retention.     CKD IIIB with proteinuria, slow progression  - labs reviewed and d/w them  - periodically monitor renal function and assess if any rapid decline  - avoid NSAID/ bactrim/ IV contrast/ gadolinium/ aminoglycoside/fleet enema/ PPI where  possible  - continue to screen for CKD MBD, anemia of CKD, acid base disorder  - continue losartan for RAAS blockade   - Continue follow up with urology given enlarged prostate and hematuria work up     Follow Mg levels next visit as he has been taking Mg supplements    Secondary hyperparathyroidism  - continue to trend PTH, phos, Ca, vit D  - treat if and when PTH > 200    Proteinuria  - see above  - due to diabetic nephropathy and +/- hypertensive glomerulosclerosis  - continue RAAS blockade   - strict low salt diet   - weight loss by calorie control is advisable    Fluid overload  - diuretic regimen per cardiology   - stressed importance of strict low salt diet. Offered referral to see dietician but his wife stated they would like to defer it for now     Diabetes type 2 with renal manifestations  - management per endocrine  - avoid metformin since creatinine is above 1.4    Hypertension  - see above, strict low salt diet, home BP monitoring    Sickle cell trait, thyroid cancer, hyperlipidemia, atrial fibrillation management per PCP/ other MDs      RTC 3 months   Plan, labs, recommendations were discussed with patient and his wife, their questions were answered to their satisfaction.

## 2018-01-31 NOTE — PATIENT INSTRUCTIONS
Continue current medications.  Continue exercise regimen.  Continue low salt diet.  Continue to weigh self daily. If you gain 3 lbs from your baseline in one day or 5lbs from baseline in one week, you should double furosemide (lasix) dose until that weight is lost. Call clinic if weight does not return to baseline after 2-3 days or if urine output decreases despite increased furosemide (lasix).   Fasting blood test and return to clinic in 6 months, or sooner if symptoms change.

## 2018-02-06 ENCOUNTER — TELEPHONE (OUTPATIENT)
Dept: UROLOGY | Facility: CLINIC | Age: 63
End: 2018-02-06

## 2018-02-06 DIAGNOSIS — N52.01 ERECTILE DYSFUNCTION DUE TO ARTERIAL INSUFFICIENCY: Primary | ICD-10-CM

## 2018-02-06 RX ORDER — SILDENAFIL 100 MG/1
100 TABLET, FILM COATED ORAL
Qty: 6 TABLET | Refills: 12 | Status: SHIPPED | OUTPATIENT
Start: 2018-02-06 | End: 2018-08-23

## 2018-02-06 NOTE — TELEPHONE ENCOUNTER
----- Message from Ladonna Adams LPN sent at 2/6/2018  8:00 AM CST -----  Contact: self  [phone]  Did you get this? I did not .  ----- Message -----  From: Ara Bui LPN  Sent: 2/5/2018   2:41 PM  To: Ladonna Adams LPN        ----- Message -----  From: Kisha Carney MA  Sent: 2/5/2018   2:16 PM  To: Roberto RUFF Staff    States he is asking if [facility] sent a refill request for his Viagra and if they did please send it back.  States if they didn't can you call in a refill.

## 2018-02-06 NOTE — TELEPHONE ENCOUNTER
Patient of Dr. Mejia.   He was last seen by her 07/14/2017.  He requesting refill of Viagra.  Med cart has Viagra and Cialis.  Will refill

## 2018-02-21 ENCOUNTER — CLINICAL SUPPORT (OUTPATIENT)
Dept: DIABETES | Facility: CLINIC | Age: 63
End: 2018-02-21
Payer: COMMERCIAL

## 2018-02-21 DIAGNOSIS — Z79.4 TYPE 2 DIABETES MELLITUS WITH HYPERGLYCEMIA, WITH LONG-TERM CURRENT USE OF INSULIN: ICD-10-CM

## 2018-02-21 DIAGNOSIS — E11.65 TYPE 2 DIABETES MELLITUS WITH HYPERGLYCEMIA, WITH LONG-TERM CURRENT USE OF INSULIN: ICD-10-CM

## 2018-02-21 PROCEDURE — G0108 DIAB MANAGE TRN  PER INDIV: HCPCS | Mod: S$GLB,,, | Performed by: INTERNAL MEDICINE

## 2018-02-21 PROCEDURE — 99999 PR PBB SHADOW E&M-EST. PATIENT-LVL III: CPT | Mod: PBBFAC,,,

## 2018-02-21 NOTE — PROGRESS NOTES
Diabetes Education  Author: Yaa Orr RD, CDE  Date: 2/21/2018    Diabetes Education Visit  Diabetes Education Record Assessment/Progress: Comprehensive/Group (Patient is here for Trulicity training - has not yet started - has had it for a while)    Diabetes Type  Diabetes Type : Type II    Monitoring   Self Monitoring :  (Checks 3 times a day - Insurance did not cover the Dexcom - would like other options to SMBG)    Current Diabetes Treatment   Current Treatment: Injectable, Insulin (Novolog 7 AC plus SS, Levemir 14 HS, Trulicity 0.75 weekly)    Diabetes Education Assessment/Progress    Medications (states correct name, dose, onset, peak, duration, side effects & timing of meds): Instructed, Discussion, Individual Session, Written Materials Provided, Demonstration, Return Demonstration. The patient is here in clinic today to attend an individual appointment for training on once a week Trulicity Pen injections. Patient was given background information on Trulicity and how it works. Covered in detail how to use the Trulicity pen (timing - when to take it, meal planning, storage, and pen replacement). Discussed what to expect: side effects, possible hypoglycemia, and blood sugar control.   Instructed to remove gray base and then place the clear base flat and firmly against the skin, unlock the pen by turning the lock ring, press the green button and hold for 5-10 seconds. Patient advised that a click sound will be heard when the green button is pressed and will hear a second click sound when the dose is complete and the gray part of the pen inside of the pen will be visible. Patient performed successful return demonstration. Spoke to ALYSHA Yañez NP and she advised patient to decrease Novolog to 5 units AC when starting the Trulicity. Patient verbalized understanding.    Monitoring (monitoring blood glucose/other parameters & using results): Instructed, Discussion, Individual Session, Written Materials  Provided. Discussed the option of using the Freestyle Giles System to minimize fingersticks. Patient is interested and would like Rx sent to pharmacy. Rx request sent to ENDO.    Acute Complications (preventing, detecting, and treating acute complications): Instructed, Discussion, Individual Session, Written Materials Provided. Reviewed causes of hypoglycemia including signs, symptoms and treatment options.      Goals  Patient has selected/evaluated goals during today's session: No    Diabetes Care Plan/Intervention  Education Plan/Intervention: Individual Follow-Up DSMT    Education Units of Time   Time Spent: 60 min      Health Maintenance Topics with due status: Not Due       Topic Last Completion Date    Colonoscopy 03/03/2015    Pneumococcal PPSV23 (High Risk) 07/11/2017    Eye Exam 08/30/2017    Lipid Panel 10/04/2017    Hemoglobin A1c 01/02/2018    High Dose Statin 01/31/2018     Health Maintenance Due   Topic Date Due    Foot Exam  04/17/2018

## 2018-03-07 ENCOUNTER — CLINICAL SUPPORT (OUTPATIENT)
Dept: DIABETES | Facility: CLINIC | Age: 63
End: 2018-03-07
Payer: COMMERCIAL

## 2018-03-07 DIAGNOSIS — E11.9 DM TYPE 2 WITHOUT RETINOPATHY: ICD-10-CM

## 2018-03-07 PROCEDURE — G0108 DIAB MANAGE TRN  PER INDIV: HCPCS | Mod: S$GLB,,, | Performed by: INTERNAL MEDICINE

## 2018-03-07 NOTE — PROGRESS NOTES
Diabetes Education  Author: Yaa Orr RD, CDE  Date: 3/7/2018    Diabetes Education Visit  Diabetes Education Record Assessment/Progress: Comprehensive/Group (Patient is here for Freestyle Giles Start)    Diabetes Type  Diabetes Type : Type II    Diabetes Education Assessment/Progress  Monitoring (monitoring blood glucose/other parameters & using results): Instructed, Individual Session, Discussion, Demonstration, Return Demonstration. Instructed patient on use of the Freestyle Giles. Patient inserted sensor in the back of his right arm. Scanned sensor for new sensor start. Advised there is a 12 hour warm-up period before reader/sensor can be used. He should perform fingersticks during the warm-up period. Sensor should be changed out every 10 days.    Goals  Patient has selected/evaluated goals during today's session: No    Diabetes Care Plan/Intervention  Education Plan/Intervention: Individual Follow-Up DSMT    Education Units of Time   Time Spent: 60 min    Health Maintenance was reviewed today with patient. Discussed with patient importance of routine eye exams, foot exams/foot care, blood work (i.e.: A1c, microalbumin, and lipid), dental visits, yearly flu vaccine, and pneumonia vaccine as indicated by PCP. Patient verbalized understanding.     Health Maintenance Topics with due status: Not Due       Topic Last Completion Date    Colonoscopy 03/03/2015    Pneumococcal PPSV23 (High Risk) 07/11/2017    Eye Exam 08/30/2017    Lipid Panel 10/04/2017    Hemoglobin A1c 01/02/2018    High Dose Statin 01/31/2018     Health Maintenance Due   Topic Date Due    Foot Exam  04/17/2018

## 2018-03-17 ENCOUNTER — PATIENT OUTREACH (OUTPATIENT)
Dept: DIABETES | Facility: CLINIC | Age: 63
End: 2018-03-17

## 2018-03-17 NOTE — PROGRESS NOTES
Patient called - had some questions in regards to changing out his Freestyle Giles sensor. All of patient's questions were answered. He will call with further questions or concerns.

## 2018-03-19 ENCOUNTER — PATIENT OUTREACH (OUTPATIENT)
Dept: DIABETES | Facility: CLINIC | Age: 63
End: 2018-03-19

## 2018-03-19 NOTE — PROGRESS NOTES
Patient called regarding questions changing out his Freestyle Giles. Attempted to contact patient - left message to call back if still needs further assistance.

## 2018-03-21 ENCOUNTER — CLINICAL SUPPORT (OUTPATIENT)
Dept: DIABETES | Facility: CLINIC | Age: 63
End: 2018-03-21
Payer: COMMERCIAL

## 2018-03-21 DIAGNOSIS — E11.9 DM TYPE 2 WITHOUT RETINOPATHY: ICD-10-CM

## 2018-03-21 PROCEDURE — G0108 DIAB MANAGE TRN  PER INDIV: HCPCS | Mod: S$GLB,,, | Performed by: INTERNAL MEDICINE

## 2018-03-21 PROCEDURE — 99999 PR PBB SHADOW E&M-EST. PATIENT-LVL III: CPT | Mod: PBBFAC,,,

## 2018-03-21 NOTE — PROGRESS NOTES
Diabetes Education  Author: Yaa Orr RD, CDE  Date: 3/21/2018    Diabetes Education Visit  Diabetes Education Record Assessment/Progress: Comprehensive/Group (Patient came in to review Freestyle Giles insertion - he wanted his spouse to do the insertion so that she can assist at home - initial Freestyle Start was 3/7)    Diabetes Type  Diabetes Type : Type II    Diabetes Education Assessment/Progress  Monitoring (monitoring blood glucose/other parameters & using results): Instructed, Discussion, Individual Session, Written Materials Provided (Instructed spouse on insertion of the Freestyle Giles - she successfully inserted the Giles into patient's left arm. )    Goals  Patient has selected/evaluated goals during today's session: No    Diabetes Care Plan/Intervention  Education Plan/Intervention: Individual Follow-Up DSMT (Patient to call with further questions or concerns or if needs further assistance with the Freestyle Giles)    Education Units of Time   Time Spent: 30 min    Health Maintenance was reviewed today with patient. Discussed with patient importance of routine eye exams, foot exams/foot care, blood work (i.e.: A1c, microalbumin, and lipid), dental visits, yearly flu vaccine, and pneumonia vaccine as indicated by PCP. Patient verbalized understanding.     Health Maintenance Topics with due status: Not Due       Topic Last Completion Date    Colonoscopy 03/03/2015    Pneumococcal PPSV23 (High Risk) 07/11/2017    Eye Exam 08/30/2017    Lipid Panel 10/04/2017    Hemoglobin A1c 01/02/2018    High Dose Statin 01/31/2018     Health Maintenance Due   Topic Date Due    Foot Exam  04/17/2018

## 2018-03-23 ENCOUNTER — HOSPITAL ENCOUNTER (OUTPATIENT)
Facility: HOSPITAL | Age: 63
Discharge: HOME | End: 2018-03-25
Attending: EMERGENCY MEDICINE | Admitting: EMERGENCY MEDICINE
Payer: COMMERCIAL

## 2018-03-23 DIAGNOSIS — E89.0 POSTSURGICAL HYPOTHYROIDISM: ICD-10-CM

## 2018-03-23 DIAGNOSIS — I63.9 STROKE: ICD-10-CM

## 2018-03-23 DIAGNOSIS — N18.30 CKD (CHRONIC KIDNEY DISEASE), STAGE III: Chronic | ICD-10-CM

## 2018-03-23 DIAGNOSIS — G93.40 ENCEPHALOPATHY: ICD-10-CM

## 2018-03-23 DIAGNOSIS — I10 ESSENTIAL HYPERTENSION: ICD-10-CM

## 2018-03-23 DIAGNOSIS — E78.00 HYPERCHOLESTEROLEMIA: ICD-10-CM

## 2018-03-23 DIAGNOSIS — I50.32 CHRONIC DIASTOLIC HEART FAILURE: ICD-10-CM

## 2018-03-23 DIAGNOSIS — Z86.73 HISTORY OF CVA (CEREBROVASCULAR ACCIDENT): ICD-10-CM

## 2018-03-23 DIAGNOSIS — G40.209 COMPLEX PARTIAL SEIZURES EVOLVING TO GENERALIZED TONIC-CLONIC SEIZURES: ICD-10-CM

## 2018-03-23 DIAGNOSIS — N18.30 CKD STAGE 3 DUE TO TYPE 2 DIABETES MELLITUS: ICD-10-CM

## 2018-03-23 DIAGNOSIS — Z79.4 TYPE 2 DIABETES MELLITUS WITH HYPERGLYCEMIA, WITH LONG-TERM CURRENT USE OF INSULIN: ICD-10-CM

## 2018-03-23 DIAGNOSIS — E11.22 CKD STAGE 3 DUE TO TYPE 2 DIABETES MELLITUS: ICD-10-CM

## 2018-03-23 DIAGNOSIS — E11.9 DM2 (DIABETES MELLITUS, TYPE 2): ICD-10-CM

## 2018-03-23 DIAGNOSIS — R47.01 APHASIA: Primary | ICD-10-CM

## 2018-03-23 DIAGNOSIS — E11.65 TYPE 2 DIABETES MELLITUS WITH HYPERGLYCEMIA, WITH LONG-TERM CURRENT USE OF INSULIN: ICD-10-CM

## 2018-03-23 DIAGNOSIS — I48.0 PAROXYSMAL ATRIAL FIBRILLATION: Chronic | ICD-10-CM

## 2018-03-23 DIAGNOSIS — D57.3 SICKLE CELL TRAIT: ICD-10-CM

## 2018-03-23 DIAGNOSIS — R41.82 ALTERED MENTAL STATUS, UNSPECIFIED ALTERED MENTAL STATUS TYPE: ICD-10-CM

## 2018-03-23 DIAGNOSIS — D50.9 MICROCYTIC ANEMIA: ICD-10-CM

## 2018-03-23 PROBLEM — H53.461 HEMIANOPIA OF RIGHT EYE: Status: ACTIVE | Noted: 2018-03-23

## 2018-03-23 PROBLEM — I63.511 ACUTE ISCHEMIC RIGHT MCA STROKE: Status: ACTIVE | Noted: 2018-03-23

## 2018-03-23 LAB
ALBUMIN SERPL BCP-MCNC: 3.4 G/DL
ALP SERPL-CCNC: 112 U/L
ALT SERPL W/O P-5'-P-CCNC: 18 U/L
ANION GAP SERPL CALC-SCNC: 5 MMOL/L
AST SERPL-CCNC: 29 U/L
BACTERIA #/AREA URNS AUTO: NORMAL /HPF
BASOPHILS # BLD AUTO: 0.07 K/UL
BASOPHILS NFR BLD: 0.8 %
BILIRUB SERPL-MCNC: 1.1 MG/DL
BILIRUB UR QL STRIP: NEGATIVE
BUN SERPL-MCNC: 17 MG/DL
CALCIUM SERPL-MCNC: 8.9 MG/DL
CHLORIDE SERPL-SCNC: 109 MMOL/L
CHOLEST SERPL-MCNC: 104 MG/DL
CHOLEST/HDLC SERPL: 2.5 {RATIO}
CLARITY UR REFRACT.AUTO: CLEAR
CO2 SERPL-SCNC: 27 MMOL/L
COLOR UR AUTO: YELLOW
CREAT SERPL-MCNC: 1.8 MG/DL
CREAT SERPL-MCNC: 1.8 MG/DL (ref 0.5–1.4)
DIFFERENTIAL METHOD: ABNORMAL
EOSINOPHIL # BLD AUTO: 0.3 K/UL
EOSINOPHIL NFR BLD: 3.5 %
ERYTHROCYTE [DISTWIDTH] IN BLOOD BY AUTOMATED COUNT: 17.4 %
EST. GFR  (AFRICAN AMERICAN): 45 ML/MIN/1.73 M^2
EST. GFR  (NON AFRICAN AMERICAN): 38.9 ML/MIN/1.73 M^2
GLUCOSE SERPL-MCNC: 130 MG/DL
GLUCOSE UR QL STRIP: ABNORMAL
HCT VFR BLD AUTO: 32.9 %
HDLC SERPL-MCNC: 42 MG/DL
HDLC SERPL: 40.4 %
HGB BLD-MCNC: 11.8 G/DL
HGB UR QL STRIP: NEGATIVE
HYALINE CASTS UR QL AUTO: 0 /LPF
IMM GRANULOCYTES # BLD AUTO: 0.03 K/UL
IMM GRANULOCYTES NFR BLD AUTO: 0.3 %
INR PPP: 1
KETONES UR QL STRIP: NEGATIVE
LACTATE SERPL-SCNC: 1 MMOL/L
LDLC SERPL CALC-MCNC: 47.6 MG/DL
LEUKOCYTE ESTERASE UR QL STRIP: NEGATIVE
LYMPHOCYTES # BLD AUTO: 2.9 K/UL
LYMPHOCYTES NFR BLD: 31.3 %
MCH RBC QN AUTO: 28.6 PG
MCHC RBC AUTO-ENTMCNC: 35.9 G/DL
MCV RBC AUTO: 80 FL
MICROSCOPIC COMMENT: NORMAL
MONOCYTES # BLD AUTO: 1.4 K/UL
MONOCYTES NFR BLD: 15.6 %
NEUTROPHILS # BLD AUTO: 4.4 K/UL
NEUTROPHILS NFR BLD: 48.5 %
NITRITE UR QL STRIP: NEGATIVE
NONHDLC SERPL-MCNC: 62 MG/DL
NRBC BLD-RTO: 1 /100 WBC
PH UR STRIP: 6 [PH] (ref 5–8)
PLATELET # BLD AUTO: 233 K/UL
PMV BLD AUTO: 11.5 FL
POC PTINR: 1.2 (ref 0.9–1.2)
POC PTWBT: 13.9 SEC (ref 9.7–14.3)
POCT GLUCOSE: 108 MG/DL (ref 70–110)
POCT GLUCOSE: 110 MG/DL (ref 70–110)
POCT GLUCOSE: 148 MG/DL (ref 70–110)
POCT GLUCOSE: 97 MG/DL (ref 70–110)
POTASSIUM SERPL-SCNC: 4.8 MMOL/L
PROT SERPL-MCNC: 7 G/DL
PROT UR QL STRIP: ABNORMAL
PROTHROMBIN TIME: 10.6 SEC
RBC # BLD AUTO: 4.12 M/UL
RBC #/AREA URNS AUTO: 0 /HPF (ref 0–4)
SAMPLE: ABNORMAL
SAMPLE: NORMAL
SODIUM SERPL-SCNC: 141 MMOL/L
SP GR UR STRIP: 1.01 (ref 1–1.03)
TRIGL SERPL-MCNC: 72 MG/DL
TSH SERPL DL<=0.005 MIU/L-ACNC: 1.81 UIU/ML
URN SPEC COLLECT METH UR: ABNORMAL
UROBILINOGEN UR STRIP-ACNC: NEGATIVE EU/DL
WBC # BLD AUTO: 9.12 K/UL
WBC #/AREA URNS AUTO: 1 /HPF (ref 0–5)

## 2018-03-23 PROCEDURE — 99285 EMERGENCY DEPT VISIT HI MDM: CPT | Mod: 25

## 2018-03-23 PROCEDURE — G0378 HOSPITAL OBSERVATION PER HR: HCPCS

## 2018-03-23 PROCEDURE — 25000003 PHARM REV CODE 250: Performed by: PHYSICIAN ASSISTANT

## 2018-03-23 PROCEDURE — 80053 COMPREHEN METABOLIC PANEL: CPT

## 2018-03-23 PROCEDURE — 93005 ELECTROCARDIOGRAM TRACING: CPT

## 2018-03-23 PROCEDURE — 99285 EMERGENCY DEPT VISIT HI MDM: CPT | Mod: ,,, | Performed by: EMERGENCY MEDICINE

## 2018-03-23 PROCEDURE — 63600175 PHARM REV CODE 636 W HCPCS: Performed by: EMERGENCY MEDICINE

## 2018-03-23 PROCEDURE — 99284 EMERGENCY DEPT VISIT MOD MDM: CPT | Mod: ,,, | Performed by: PSYCHIATRY & NEUROLOGY

## 2018-03-23 PROCEDURE — 25000003 PHARM REV CODE 250: Performed by: EMERGENCY MEDICINE

## 2018-03-23 PROCEDURE — 81001 URINALYSIS AUTO W/SCOPE: CPT

## 2018-03-23 PROCEDURE — 84443 ASSAY THYROID STIM HORMONE: CPT

## 2018-03-23 PROCEDURE — 80061 LIPID PANEL: CPT

## 2018-03-23 PROCEDURE — 85610 PROTHROMBIN TIME: CPT

## 2018-03-23 PROCEDURE — 85025 COMPLETE CBC W/AUTO DIFF WBC: CPT

## 2018-03-23 PROCEDURE — 82962 GLUCOSE BLOOD TEST: CPT | Mod: 91

## 2018-03-23 PROCEDURE — 80177 DRUG SCRN QUAN LEVETIRACETAM: CPT

## 2018-03-23 PROCEDURE — 96375 TX/PRO/DX INJ NEW DRUG ADDON: CPT

## 2018-03-23 PROCEDURE — 96374 THER/PROPH/DIAG INJ IV PUSH: CPT

## 2018-03-23 PROCEDURE — 93010 ELECTROCARDIOGRAM REPORT: CPT | Mod: ,,, | Performed by: INTERNAL MEDICINE

## 2018-03-23 PROCEDURE — 99219 PR INITIAL OBSERVATION CARE,LEVL II: CPT | Mod: SA,,, | Performed by: PHYSICIAN ASSISTANT

## 2018-03-23 PROCEDURE — 83605 ASSAY OF LACTIC ACID: CPT

## 2018-03-23 RX ORDER — ATORVASTATIN CALCIUM 20 MG/1
40 TABLET, FILM COATED ORAL DAILY
Status: DISCONTINUED | OUTPATIENT
Start: 2018-03-24 | End: 2018-03-25 | Stop reason: HOSPADM

## 2018-03-23 RX ORDER — ACETAMINOPHEN 325 MG/1
650 TABLET ORAL EVERY 6 HOURS PRN
Status: DISCONTINUED | OUTPATIENT
Start: 2018-03-23 | End: 2018-03-25 | Stop reason: HOSPADM

## 2018-03-23 RX ORDER — ONDANSETRON 2 MG/ML
4 INJECTION INTRAMUSCULAR; INTRAVENOUS EVERY 8 HOURS PRN
Status: DISCONTINUED | OUTPATIENT
Start: 2018-03-23 | End: 2018-03-25 | Stop reason: HOSPADM

## 2018-03-23 RX ORDER — INSULIN ASPART 100 [IU]/ML
0-5 INJECTION, SOLUTION INTRAVENOUS; SUBCUTANEOUS
Status: DISCONTINUED | OUTPATIENT
Start: 2018-03-23 | End: 2018-03-25 | Stop reason: HOSPADM

## 2018-03-23 RX ORDER — ONDANSETRON 8 MG/1
8 TABLET, ORALLY DISINTEGRATING ORAL EVERY 8 HOURS PRN
Status: DISCONTINUED | OUTPATIENT
Start: 2018-03-23 | End: 2018-03-25 | Stop reason: HOSPADM

## 2018-03-23 RX ORDER — NAPROXEN SODIUM 220 MG/1
81 TABLET, FILM COATED ORAL DAILY
Status: DISCONTINUED | OUTPATIENT
Start: 2018-03-24 | End: 2018-03-25 | Stop reason: HOSPADM

## 2018-03-23 RX ORDER — AMLODIPINE BESYLATE 10 MG/1
10 TABLET ORAL DAILY
Status: DISCONTINUED | OUTPATIENT
Start: 2018-03-24 | End: 2018-03-25 | Stop reason: HOSPADM

## 2018-03-23 RX ORDER — CHOLECALCIFEROL (VITAMIN D3) 25 MCG
1000 TABLET ORAL
Status: DISCONTINUED | OUTPATIENT
Start: 2018-03-26 | End: 2018-03-25 | Stop reason: HOSPADM

## 2018-03-23 RX ORDER — LOSARTAN POTASSIUM 50 MG/1
100 TABLET ORAL DAILY
Status: DISCONTINUED | OUTPATIENT
Start: 2018-03-24 | End: 2018-03-25 | Stop reason: HOSPADM

## 2018-03-23 RX ORDER — GLUCAGON 1 MG
1 KIT INJECTION
Status: DISCONTINUED | OUTPATIENT
Start: 2018-03-23 | End: 2018-03-25 | Stop reason: HOSPADM

## 2018-03-23 RX ORDER — LORAZEPAM 2 MG/ML
1 INJECTION INTRAMUSCULAR
Status: COMPLETED | OUTPATIENT
Start: 2018-03-23 | End: 2018-03-23

## 2018-03-23 RX ORDER — IBUPROFEN 200 MG
16 TABLET ORAL
Status: DISCONTINUED | OUTPATIENT
Start: 2018-03-23 | End: 2018-03-25 | Stop reason: HOSPADM

## 2018-03-23 RX ORDER — HYDRALAZINE HYDROCHLORIDE 25 MG/1
25 TABLET, FILM COATED ORAL EVERY 8 HOURS
Status: DISCONTINUED | OUTPATIENT
Start: 2018-03-23 | End: 2018-03-25 | Stop reason: HOSPADM

## 2018-03-23 RX ORDER — IBUPROFEN 200 MG
24 TABLET ORAL
Status: DISCONTINUED | OUTPATIENT
Start: 2018-03-23 | End: 2018-03-25 | Stop reason: HOSPADM

## 2018-03-23 RX ORDER — LABETALOL HYDROCHLORIDE 5 MG/ML
20 INJECTION, SOLUTION INTRAVENOUS
Status: COMPLETED | OUTPATIENT
Start: 2018-03-23 | End: 2018-03-23

## 2018-03-23 RX ORDER — FOLIC ACID 1 MG/1
1000 TABLET ORAL DAILY
Status: DISCONTINUED | OUTPATIENT
Start: 2018-03-24 | End: 2018-03-25 | Stop reason: HOSPADM

## 2018-03-23 RX ORDER — LEVETIRACETAM 750 MG/1
1500 TABLET ORAL 2 TIMES DAILY
Status: DISCONTINUED | OUTPATIENT
Start: 2018-03-23 | End: 2018-03-25 | Stop reason: HOSPADM

## 2018-03-23 RX ORDER — LEVOTHYROXINE SODIUM 100 UG/1
200 TABLET ORAL
Status: DISCONTINUED | OUTPATIENT
Start: 2018-03-24 | End: 2018-03-25 | Stop reason: HOSPADM

## 2018-03-23 RX ORDER — SODIUM CHLORIDE 0.9 % (FLUSH) 0.9 %
3 SYRINGE (ML) INJECTION EVERY 8 HOURS
Status: DISCONTINUED | OUTPATIENT
Start: 2018-03-23 | End: 2018-03-25 | Stop reason: HOSPADM

## 2018-03-23 RX ORDER — FUROSEMIDE 40 MG/1
40 TABLET ORAL 2 TIMES DAILY
Status: DISCONTINUED | OUTPATIENT
Start: 2018-03-23 | End: 2018-03-25 | Stop reason: HOSPADM

## 2018-03-23 RX ORDER — CARVEDILOL 3.12 MG/1
3.12 TABLET ORAL 2 TIMES DAILY
Status: DISCONTINUED | OUTPATIENT
Start: 2018-03-23 | End: 2018-03-25 | Stop reason: HOSPADM

## 2018-03-23 RX ADMIN — LABETALOL HYDROCHLORIDE 20 MG: 5 INJECTION, SOLUTION INTRAVENOUS at 02:03

## 2018-03-23 RX ADMIN — APIXABAN 5 MG: 5 TABLET, FILM COATED ORAL at 08:03

## 2018-03-23 RX ADMIN — FUROSEMIDE 40 MG: 40 TABLET ORAL at 08:03

## 2018-03-23 RX ADMIN — LORAZEPAM 1 MG: 2 INJECTION, SOLUTION INTRAMUSCULAR; INTRAVENOUS at 05:03

## 2018-03-23 RX ADMIN — LEVETIRACETAM 1500 MG: 750 TABLET ORAL at 08:03

## 2018-03-23 RX ADMIN — CARVEDILOL 3.12 MG: 3.12 TABLET, FILM COATED ORAL at 08:03

## 2018-03-23 NOTE — SUBJECTIVE & OBJECTIVE
Past Medical History:   Diagnosis Date    Allergy     BMI 31.0-31.9,adult 11/25/2014    Cerebrovascular disease 7/25/2016    Chronic anticoagulation - pradaxa 11/10/2016    Chronic diastolic heart failure 11/20/2016    CKD (chronic kidney disease), stage III 9/23/2013    Controlled type 2 diabetes with neuropathy 12/16/2014    Diabetes mellitus due to underlying condition with stage 3 chronic kidney disease, without long-term current use of insulin 11/2/2016    Elevated alkaline phosphatase level 8/1/2012    Elevated PSA     negative prostate biopsy 4/11    Erectile dysfunction associated with type 2 diabetes mellitus     Gallstones 3/20/2013    Generalized tonic-clonic seizure 11/2016    HTN (hypertension), benign 8/1/2012    Hypercholesterolemia 8/1/2012    Microcytic anemia 11/27/2013    Paroxysmal atrial fibrillation 9/23/2013    Postsurgical hypothyroidism 11/27/2013    Right homonymous hemianopsia 2/5/2016    Sickle cell trait     Stroke 1/27/2016    Thyroid cancer     multifocal with six lesions, largest 5mm, treated with surgery and radioactive iodine    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/2016       Past Surgical History:   Procedure Laterality Date    BREAST SURGERY      cyst removal    COLONOSCOPY      CYST REMOVAL      chest    PROSTATE BIOPSY  4/27/11    SKIN BIOPSY      THYROID SURGERY  8/26/09    VASCULAR SURGERY         Review of patient's allergies indicates:   Allergen Reactions    Cough syrup [guaifenesin] Other (See Comments)       No current facility-administered medications on file prior to encounter.      Current Outpatient Prescriptions on File Prior to Encounter   Medication Sig    acetaminophen (TYLENOL) 500 MG tablet Take 1 tablet (500 mg total) by mouth every 6 (six) hours as needed for Pain.    amLODIPine (NORVASC) 10 MG tablet Take 1 tablet (10 mg total) by mouth once daily.    aspirin 81 MG Chew Take 1 tablet (81 mg total) by mouth once daily.     "atorvastatin (LIPITOR) 40 MG tablet Take 1 tablet (40 mg total) by mouth once daily.    BD INSULIN PEN NEEDLE UF MINI 31 gauge x 3/16" Ndle To use with insulin pens 4  times daily    carvedilol (COREG) 3.125 MG tablet Take 1 tablet (3.125 mg total) by mouth 2 (two) times daily.    dulaglutide (TRULICITY) 0.75 mg/0.5 mL PnIj Inject 0.5 mLs (0.75 mg total) into the skin every 7 days.    flash glucose scanning reader (FREESTYLE ANGELLA READER) Misc 1 each by Misc.(Non-Drug; Combo Route) route once daily.    flash glucose sensor (FREESTYLE ANGELLA SENSOR) Kit 3 each by Misc.(Non-Drug; Combo Route) route every 30 days.    folic acid (FOLVITE) 400 MCG tablet Take 400 mcg by mouth once daily.    furosemide (LASIX) 40 MG tablet Take 1 tablet (40 mg total) by mouth 2 (two) times daily.    GLUCAGON EMERGENCY KIT, HUMAN, 1 mg injection INJECT 1 MG INTO THE MUSCLE AS NEEDED    hydrALAZINE (APRESOLINE) 25 MG tablet Take 1 tablet (25 mg total) by mouth every 8 (eight) hours. (Patient taking differently: Take 25 mg by mouth every 12 (twelve) hours. )    insulin aspart (NOVOLOG) 100 unit/mL InPn pen Inject 7 units w/ meals plus scale 150-200+1, 201-250+2, 251-300+3, 301-350+4.    insulin detemir (LEVEMIR FLEXPEN) 100 unit/mL (3 mL) SubQ InPn pen Inject 14 Units into the skin every evening.    levetiracetam (KEPPRA) 750 MG Tab Take 2 tablets (1,500 mg total) by mouth 2 (two) times daily.    levothyroxine (SYNTHROID) 200 MCG tablet Take 1 tablet (200 mcg total) by mouth before breakfast.    losartan (COZAAR) 100 MG tablet Take 1 tablet (100 mg total) by mouth once daily.    magnesium 30 mg Tab Take 1 tablet by mouth twice a week.     multivitamin capsule Take 1 capsule by mouth every Mon, Wed, Fri.     ONETOUCH VERIO Strp USE TO TEST BLOOD SUGAR THREE TIMES DAILY    sildenafil (VIAGRA) 100 MG tablet Take 1 tablet (100 mg total) by mouth as needed for Erectile Dysfunction.    vitamin D 1000 units Tab Take 1,000 Units by " mouth every Mon, Wed, Fri.    apixaban 5 mg Tab Take 1 tablet (5 mg total) by mouth 2 (two) times daily.    [DISCONTINUED] metOLazone (ZAROXOLYN) 2.5 MG tablet Take 1 tablet (2.5 mg total) by mouth as needed. 30 minutes prior to morning lasix dose only for weight increase over 3lbs in a day or 5 in a week.     Family History     Problem Relation (Age of Onset)    Cancer Sister    Diabetes Father, Mother, Brother    Heart disease Father, Mother    Hypertension Father, Mother    Thyroid disease Maternal Aunt        Social History Main Topics    Smoking status: Never Smoker    Smokeless tobacco: Never Used    Alcohol use Yes      Comment: occasionally    Drug use: No    Sexual activity: Yes     Partners: Female      Comment:  with 3 kids     Review of Systems   Unable to perform ROS: Acuity of condition     Objective:     Vital Signs (Most Recent):  Temp: 98.2 °F (36.8 °C) (03/23/18 1248)  Pulse: 75 (03/23/18 1728)  Resp: 12 (03/23/18 1502)  BP: (!) 163/84 (03/23/18 1728)  SpO2: 98 % (03/23/18 1728) Vital Signs (24h Range):  Temp:  [98.2 °F (36.8 °C)] 98.2 °F (36.8 °C)  Pulse:  [58-81] 75  Resp:  [12-21] 12  SpO2:  [98 %-100 %] 98 %  BP: (156-196)/(71-99) 163/84        There is no height or weight on file to calculate BMI.    Physical Exam   Constitutional: He appears well-developed and well-nourished. No distress.   HENT:   Head: Normocephalic and atraumatic.   Eyes: Conjunctivae and EOM are normal. Right eye exhibits no discharge. Left eye exhibits no discharge. No scleral icterus.   Neck: Normal range of motion. Neck supple. No JVD present.   Cardiovascular: Normal rate, normal heart sounds, intact distal pulses and normal pulses.  An irregularly irregular rhythm present.   Pulmonary/Chest: Effort normal and breath sounds normal. No respiratory distress. He has no wheezes.   Abdominal: Soft. Bowel sounds are normal. He exhibits no distension. There is no tenderness. There is no rebound and no guarding.    Musculoskeletal: He exhibits edema (+1 pitting to BLE). He exhibits no tenderness.   Lymphadenopathy:     He has no cervical adenopathy.   Neurological: He is alert.   Inconsistent exam findings; slow to speak; stated first name with some difficulty; able to produce multiple, more complex words and sentences; able to communicate and move all extremities   Skin: Skin is warm and dry. He is not diaphoretic. No erythema.   Psychiatric: His speech is delayed. He is slowed. Cognition and memory are impaired.         CRANIAL NERVES     CN III, IV, VI   Extraocular motions are normal.        Significant Labs:   A1C:   Recent Labs  Lab 09/26/17  0849 01/02/18  0815   HGBA1C 5.6 6.0*     CBC:   Recent Labs  Lab 03/23/18  1317   WBC 9.12   HGB 11.8*   HCT 32.9*        CMP:   Recent Labs  Lab 03/23/18  1317      K 4.8      CO2 27   *   BUN 17   CREATININE 1.8*   CALCIUM 8.9   PROT 7.0   ALBUMIN 3.4*   BILITOT 1.1*   ALKPHOS 112   AST 29   ALT 18   ANIONGAP 5*   EGFRNONAA 38.9*     Cardiac Markers: No results for input(s): CKMB, MYOGLOBIN, BNP, TROPISTAT in the last 48 hours.  Coagulation:   Recent Labs  Lab 03/23/18  1317   INR 1.0     Lactic Acid:   Recent Labs  Lab 03/23/18  1317   LACTATE 1.0     Lipid Panel:   Recent Labs  Lab 03/23/18  1317   CHOL 104*   HDL 42   LDLCALC 47.6*   TRIG 72   CHOLHDL 40.4     POCT Glucose:   Recent Labs  Lab 03/23/18  1310 03/23/18  1649   POCTGLUCOSE 148* 108     Troponin: No results for input(s): TROPONINI in the last 48 hours.  TSH:   Recent Labs  Lab 03/23/18  1317   TSH 1.806     Urine Studies:   Recent Labs  Lab 03/23/18  1406   COLORU Yellow   APPEARANCEUA Clear   PHUR 6.0   SPECGRAV 1.010   PROTEINUA 2+*   GLUCUA 1+*   KETONESU Negative   BILIRUBINUA Negative   OCCULTUA Negative   NITRITE Negative   UROBILINOGEN Negative   LEUKOCYTESUR Negative   RBCUA 0   WBCUA 1   BACTERIA Rare   HYALINECASTS 0       Significant Imaging: I have reviewed all pertinent  imaging results/findings within the past 24 hours.

## 2018-03-23 NOTE — SUBJECTIVE & OBJECTIVE
Past Medical History:   Diagnosis Date    Allergy     BMI 31.0-31.9,adult 11/25/2014    Cerebrovascular disease 7/25/2016    Chronic anticoagulation - pradaxa 11/10/2016    Chronic diastolic heart failure 11/20/2016    CKD (chronic kidney disease), stage III 9/23/2013    Controlled type 2 diabetes with neuropathy 12/16/2014    Diabetes mellitus due to underlying condition with stage 3 chronic kidney disease, without long-term current use of insulin 11/2/2016    Elevated alkaline phosphatase level 8/1/2012    Elevated PSA     negative prostate biopsy 4/11    Erectile dysfunction associated with type 2 diabetes mellitus     Gallstones 3/20/2013    Generalized tonic-clonic seizure 11/2016    HTN (hypertension), benign 8/1/2012    Hypercholesterolemia 8/1/2012    Microcytic anemia 11/27/2013    Paroxysmal atrial fibrillation 9/23/2013    Postsurgical hypothyroidism 11/27/2013    Right homonymous hemianopsia 2/5/2016    Sickle cell trait     Stroke 1/27/2016    Thyroid cancer     multifocal with six lesions, largest 5mm, treated with surgery and radioactive iodine    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/2016     Past Surgical History:   Procedure Laterality Date    BREAST SURGERY      cyst removal    COLONOSCOPY      CYST REMOVAL      chest    PROSTATE BIOPSY  4/27/11    SKIN BIOPSY      THYROID SURGERY  8/26/09    VASCULAR SURGERY       Family History   Problem Relation Age of Onset    Heart disease Father     Diabetes Father     Hypertension Father     Diabetes Mother     Hypertension Mother     Heart disease Mother     Diabetes Brother     Cancer Sister     Thyroid disease Maternal Aunt     Clotting disorder Neg Hx      Social History   Substance Use Topics    Smoking status: Never Smoker    Smokeless tobacco: Never Used    Alcohol use Yes      Comment: occasionally     Review of patient's allergies indicates:   Allergen Reactions    Cough syrup [guaifenesin] Other  (See Comments)       Medications: I have reviewed the current medication administration record.      (Not in a hospital admission)    Review of Systems   Constitutional: Negative for chills and fever.   HENT: Negative for facial swelling and trouble swallowing.    Respiratory: Negative for shortness of breath.    Cardiovascular: Negative for chest pain.   Gastrointestinal: Negative for nausea and vomiting.   Genitourinary: Negative for difficulty urinating and dysuria.   Musculoskeletal: Negative for arthralgias.   Skin: Negative for color change and rash.   Neurological: Positive for speech difficulty. Negative for facial asymmetry and weakness.   Psychiatric/Behavioral: Negative for behavioral problems and confusion.     Objective:     Vital Signs (Most Recent):  Temp: 98.2 °F (36.8 °C) (03/23/18 1248)  Pulse: 68 (03/23/18 1248)  Resp: 16 (03/23/18 1248)  BP: (!) 156/71 (03/23/18 1248)  SpO2: 98 % (03/23/18 1248)    Vital Signs Range (Last 24H):  Temp:  [98.2 °F (36.8 °C)]   Pulse:  [68]   Resp:  [16]   BP: (156)/(71)   SpO2:  [98 %]     Physical Exam   Constitutional: He appears well-developed.   HENT:   Head: Normocephalic and atraumatic.   Eyes: EOM are normal. Pupils are equal, round, and reactive to light.   Cardiovascular: An irregularly irregular rhythm present.   Pulmonary/Chest: Effort normal.   Abdominal: Soft. There is no tenderness.   Musculoskeletal: Normal range of motion.   Neurological: He is alert.   Skin: Skin is warm.   Psychiatric: He has a normal mood and affect.   Vitals reviewed.      Neurological Exam:   LOC: alert  Attention Span: Good   Language: Mild aphasia  Articulation: Mild Dysarthria  Orientation: Person, Place, Time   Visual Fields: Full  Hemianopsia right (baseline)   EOM (CN III, IV, VI): Full/intact  Pupils (CN II, III): PERRL  Facial Movement (CN VII): Symmetric facial expression    Motor: Arm left  Normal 5/5  Leg left  Normal 5/5  Arm right  Normal 5/5  Leg right Normal  5/5  Cebellar: No evidence of appendicular or axial ataxia  Sensation: Intact to light touch, temperature and vibration      Laboratory:    Diagnostic Results:    Brain imaging:  CT 3/23 - Remote left occipital and posterior left thalamus infarctions are seen, but no acute infarction or hemorrhage identified on today's CT study

## 2018-03-23 NOTE — ASSESSMENT & PLAN NOTE
Alfa Christy III is a 62 year old male with a past medical history of DM, HTN, HLD, Afib anticoagulated with eliquis, previous stroke in 2015, and seizures presented to the ED today for confusion and inability to speak.  On examination the patient is moving all extremities, slow to speak but able to communicate, and has no gaze preference. He has a right hemianopia from his previous stroke. His movements are slow but are equal on both sides showing no signs of weakness. No acute hemorrhage and old L infarct seen on CT. Consider MRI even though neuro exam is unremarkable for any focal motor weakness or sensory deficits making an acute infarct less likely. Patient appears to be encephalopathic due to slow movements on exam and slow responsiveness but no upper or lower extremity weakness noticed. Consider encephalopathic work up. We will sign off. Please let us know if you have any questions.    Antithrombotics for secondary stroke prevention: Anticoagulants: Apixaban 5 mg BID   Statins for secondary stroke prevention and hyperlipidemia, if present:   Statins: Atorvastatin- 40 mg daily  Aggressive risk factor modification: HTN, DM, HLD, Diet, Exercise, Obesity, A-Fib   Rehab efforts: None: Reason: No deficits  Diagnostics ordered/pending: HgbA1C to assess blood glucose levels, Lipid Profile to assess cholesterol levels, MRI head without contrast to assess brain parenchyma  VTE prophylaxis: None: Reason for No Pharmacological VTE Prophylaxis: Currently on anticoagulation  BP parameters: Per primary team

## 2018-03-23 NOTE — ASSESSMENT & PLAN NOTE
- Stable and chronic  - No s/s exacerbation  - Follows with cardiology as outpatient  - Continue home BB, ARB, and diuretics  - Strict I/Os, daily weights

## 2018-03-23 NOTE — ASSESSMENT & PLAN NOTE
- Last A1c 6.0 in 1/2018  - A1c pending  - Cardiac/DM diet  - Low dose SSI given decreased PO intake  - Will monitor BGs and adjust PRN

## 2018-03-23 NOTE — HPI
"Mr. Christy is a 61 yo male with a PMHx of HTN, HLD, DM2, PAF on Eliquis, HFpEF, CKDIII, thyroid cancer s/p surgery and radiation (2009), previous L sided CVA (2016) resulting in R homonymous hemianopsia, and complex partial seizures presenting to the ED with wife at bedside c/o confusion and difficulty speaking. Wife at bedside to provide the history. She states this morning at ~8am patient was getting dressed and was c/o minor dizziness. She noted he had not taken his keppra from the previous night, in addition to all of his "night time medications." Pt was able to take all morning medications. At around 10am, pt was sitting down to "go over his finances and pay bills", she noted something was "off". She asked him his name, which he could provide for her but he could not remember the word for "shoes" when she asked him what he was wearing on his feet. She did not witness convulsions, jerking movements, LOC, tongue biting, or incontinence. Wife states patient was able to ambulate and continue to go about "normal" activities. Wife brought patient to the ED due to concern for aphasia. Of note, wife reports pt is non-compliant with hydralazine and ASA. Per wife, this was a similar presentation to his last admission for asphasia suspected to be secondary to seizures.     In the ED, HDS; noted to be hypertensive to 190s/90s. Stroke code was called; CTH and MRI without evidence of acute infarct. Vascular neurology assessed; low suspicion for acute CVA. Remainder of labs and imaging were unremarkable.  "

## 2018-03-23 NOTE — HPI
Alfa Christy III is a 62 year old male with a past medical history of DM, HTN, HLD, Afib anticoagulated with eliquis, previous stroke in 2015, and seizures presented to the ED today for confusion and inability to speak. The patients wife reports hx due to patients inabiltiy to communicate at this time. She states she found him his morning trying to pay bills and he was having a hard time communicating/completing task such as putting his shoes on. The patient did not take his seizure medication last night. On examination the patient is moving all extremities, slow to speak but able to communicate, and has no gaze preference. He has a right hemianopia from his previous stroke. His movements are slow but are equal on both sides showing no signs of weakness.

## 2018-03-23 NOTE — ED NOTES
Pt placed on continuous cardiac and pulse ox monitoring with blood pressure to cycle every 15 minutes. HTN noted.  VS stable; A fib noted.  Bed locked in lowest position; side rails up and locked x 2; call light, bedside table, and personal belongings within reach.  Pt denies needs or complaints at this time; will continue to monitor pt.

## 2018-03-23 NOTE — ASSESSMENT & PLAN NOTE
- Uncontrolled on admit  - Wife endorses pt noncompliant with some medications  - Resumed home regimen  - Will continue to monitor

## 2018-03-23 NOTE — ED NOTES
LOC: The patient is awake, and response to pain and voice. Pt only oriented to person. Pt is confused.  Speech is slurred and delayed with aphasia.     APPEARANCE: Patient resting comfortably in no acute distress.  Patient is clean and well groomed.    SKIN: The skin is warm and dry; color consistent with ethnicity.  Patient has normal skin turgor and moist mucus membranes.  Skin intact; no breakdown or bruising noted.     MUSCULOSKELETAL: Patient moving upper and lower extremities without difficulty.  Pt has weakened grasps bilaterally upper extremities.      RESPIRATORY: Airway is open and patent. Respirations spontaneous, even, easy, and non-labored.  Patient has a normal effort and rate.  No accessory muscle use noted. Denies cough.     CARDIAC:  A fib noted with a rate in the high 50's to low 60's.  No peripheral edema noted. No complaints of chest pain.      ABDOMEN: Soft and non tender to palpation.  No distention noted.     NEUROLOGIC: SEE NEURO FLOWSHEET

## 2018-03-23 NOTE — ED NOTES
Pt noted  To have HTN Dr. Reese called and made aware. States he will order antihypertension medication for pt.

## 2018-03-23 NOTE — ASSESSMENT & PLAN NOTE
S/p CVA  - Similar presentation for admission 10/2017 for aphasia; suspect to be 2/2 seizures  - Wife reports missed Keppra PM dose; ?noncompliance issues  - Keppra level pending  - Ordered EEG  - Ativan PRN  - Seizure precautions  - Neurology consulted and appreciate assistance

## 2018-03-23 NOTE — ASSESSMENT & PLAN NOTE
"- See above  - CTH and MRI negative for acute infarct  - On appropriate stroke prevention regimen  - Wife endorses noncompliance with ASA and "some" antihypertensives  - Educated on importance of medication compliance  "

## 2018-03-23 NOTE — ED PROVIDER NOTES
Encounter Date: 3/23/2018    SCRIBE #1 NOTE: I, Mauricio Knight, am scribing for, and in the presence of,  Dr. Reese. I have scribed the following portions of the note -       History     Chief Complaint   Patient presents with    Aphasia     Wife reports pt with aphagia and memory loss since 930am.  Pt with hx cva.     History is obtained from the patient's wife.  She said that around 10:30 today she noticed that he just was not acting like his usual self.  He's had a history of a stroke in 2016 and had aphasia at that time and she is saying that he is having some aphasia now.  Last night he did complain somewhat of a left occipital type of headache to her.  She brought him in for evaluation and I was asked to see him at triage.          Review of patient's allergies indicates:   Allergen Reactions    Cough syrup [guaifenesin] Other (See Comments)     Past Medical History:   Diagnosis Date    Allergy     BMI 31.0-31.9,adult 11/25/2014    Cerebrovascular disease 7/25/2016    Chronic anticoagulation - pradaxa 11/10/2016    Chronic diastolic heart failure 11/20/2016    CKD (chronic kidney disease), stage III 9/23/2013    Controlled type 2 diabetes with neuropathy 12/16/2014    Diabetes mellitus due to underlying condition with stage 3 chronic kidney disease, without long-term current use of insulin 11/2/2016    Elevated alkaline phosphatase level 8/1/2012    Elevated PSA     negative prostate biopsy 4/11    Erectile dysfunction associated with type 2 diabetes mellitus     Gallstones 3/20/2013    Generalized tonic-clonic seizure 11/2016    HTN (hypertension), benign 8/1/2012    Hypercholesterolemia 8/1/2012    Microcytic anemia 11/27/2013    Paroxysmal atrial fibrillation 9/23/2013    Postsurgical hypothyroidism 11/27/2013    Right homonymous hemianopsia 2/5/2016    Sickle cell trait     Stroke 1/27/2016    Thyroid cancer     multifocal with six lesions, largest 5mm, treated with surgery and  radioactive iodine    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/2016     Past Surgical History:   Procedure Laterality Date    BREAST SURGERY      cyst removal    COLONOSCOPY      CYST REMOVAL      chest    PROSTATE BIOPSY  4/27/11    SKIN BIOPSY      THYROID SURGERY  8/26/09    VASCULAR SURGERY       Family History   Problem Relation Age of Onset    Heart disease Father     Diabetes Father     Hypertension Father     Diabetes Mother     Hypertension Mother     Heart disease Mother     Diabetes Brother     Cancer Sister     Thyroid disease Maternal Aunt     Clotting disorder Neg Hx      Social History   Substance Use Topics    Smoking status: Never Smoker    Smokeless tobacco: Never Used    Alcohol use Yes      Comment: occasionally     Review of Systems   Unable to perform ROS: Patient nonverbal       Physical Exam     Initial Vitals [03/23/18 1248]   BP Pulse Resp Temp SpO2   (!) 156/71 68 16 98.2 °F (36.8 °C) 98 %      MAP       99.33         Physical Exam    Nursing note and vitals reviewed.  Constitutional: He appears well-developed and well-nourished. He is not diaphoretic. No distress.   HENT:   Head: Normocephalic and atraumatic.   Eyes: EOM are normal.   Neck: Normal range of motion. Neck supple. No JVD present.   Cardiovascular: Normal rate, normal heart sounds and intact distal pulses. An irregularly irregular rhythm present.   Pulmonary/Chest: Breath sounds normal. No stridor. No respiratory distress. He has no wheezes. He has no rhonchi. He has no rales.   Abdominal: Soft. Bowel sounds are normal. There is no tenderness.   Musculoskeletal: Normal range of motion. He exhibits no edema.   Neurological: He is alert.   Pt is aphasic   Skin: Skin is warm and dry. No rash noted.         ED Course   Procedures  Labs Reviewed   CBC W/ AUTO DIFFERENTIAL - Abnormal; Notable for the following:        Result Value    RBC 4.12 (*)     Hemoglobin 11.8 (*)     Hematocrit 32.9 (*)     MCV 80 (*)      RDW 17.4 (*)     Mono # 1.4 (*)     nRBC 1 (*)     Mono% 15.6 (*)     All other components within normal limits   COMPREHENSIVE METABOLIC PANEL - Abnormal; Notable for the following:     Glucose 130 (*)     Creatinine 1.8 (*)     Albumin 3.4 (*)     Total Bilirubin 1.1 (*)     Anion Gap 5 (*)     eGFR if  45.0 (*)     eGFR if non  38.9 (*)     All other components within normal limits   LIPID PANEL - Abnormal; Notable for the following:     Cholesterol 104 (*)     LDL Cholesterol 47.6 (*)     All other components within normal limits   URINALYSIS - Abnormal; Notable for the following:     Protein, UA 2+ (*)     Glucose, UA 1+ (*)     All other components within normal limits   POCT GLUCOSE - Abnormal; Notable for the following:     POCT Glucose 148 (*)     All other components within normal limits   ISTAT CREATININE - Abnormal; Notable for the following:     POC Creatinine 1.8 (*)     All other components within normal limits   PROTIME-INR   TSH   LACTIC ACID, PLASMA   URINALYSIS MICROSCOPIC   LEVETIRACETAM  (KEPPRA) LEVEL   POCT GLUCOSE   ISTAT PROCEDURE             Medical Decision Making:   History:   Old Medical Records: I decided to obtain old medical records.  Differential Diagnosis:   Differential diagnosis includes but is not limited to stroke, acute encephalopathy, postictal state for epilepsy  Independently Interpreted Test(s):   I have ordered and independently interpreted X-rays - see prior notes.  I have ordered and independently interpreted EKG Reading(s) - see prior notes  Clinical Tests:   Lab Tests: Ordered and Reviewed  Radiological Study: Ordered and Reviewed  Medical Tests: Ordered and Reviewed  ED Management:  The patient was seen and examined.  Stroke code was called.  CT as well as MRI of the brain are unremarkable.  More than likely cyst and a prolonged postictal state.  I did send out a Keppra level.  He will be admitted to observation.            Scribe  Attestation:   Scribe #1: I performed the above scribed service and the documentation accurately describes the services I performed. I attest to the accuracy of the note.    Attending Attestation:           Physician Attestation for Scribe:      Comments: I, Dr. Reese, personally performed the services described in this documentation. All medical record entries made by the scribe were at my direction and in my presence.  I have reviewed the chart and agree that the record reflects my personal performance and is accurate and complete. Sy Reese,   4:43 PM 03/23/2018                 Clinical Impression:   The primary encounter diagnosis was Encephalopathy. A diagnosis of Stroke was also pertinent to this visit.                           Sy Reese,   03/23/18 0301

## 2018-03-23 NOTE — CONSULTS
[facility]  Vascular Neurology  Comprehensive Stroke Center  Consult Note    Inpatient consult to Vascular (Stroke) Neurology  Consult performed by: KENN ADRIAN  Consult ordered by: POOJA WHITMORE  Reason for consult: Aphasia        Assessment/Plan:     Patient is a 62 y.o. year old male with:    * Aphasia    Alfa Christy III presented to the ED today for confusion and inability to speak.  On examination the patient is moving all extremities, slow to speak but able to communicate, and has no gaze preference. No acute hemorrhage and old L infarct seen on CT. Consider MRI even though neuro exam is unremarkable for any focal motor weakness or sensory deficits making an acute infarct less likely. Patient on appropriate stroke prevention medications as below. No further recommendations. Patient appears to be encephalopathic due to slow movements on exam and slow responsiveness but no upper or lower extremity weakness noticed.  Consider encephalopathic work up. We will sign off. Please let us know if you have any questions.     Antithrombotics for secondary stroke prevention: Anticoagulants: Apixaban 5 mg BID - home medication  Statins for secondary stroke prevention and hyperlipidemia, if present:   Statins: Atorvastatin- 40 mg daily - home medication  Aggressive risk factor modification: HTN, DM, HLD, Diet, Exercise, Obesity, A-Fib  Rehab efforts: Per primary team  Diagnostics ordered/pending: HgbA1C to assess blood glucose levels, Lipid Profile to assess cholesterol levels, MRI head without contrast to assess brain parenchyma  VTE prophylaxis: None: Reason for No Pharmacological VTE Prophylaxis: Currently on anticoagulation  BP parameters: Per primary team          Hemianopia of right eye    From previous stroke in 2015        Complex partial seizures evolving to generalized tonic-clonic seizures    Currently on Keppra 750 mg BID - patients wife states he did not take nightly dose on 3/22         Type 2 diabetes mellitus with hyperglycemia    Stroke risk factor  Last Hbg A1C 6.0        History of CVA (cerebrovascular accident)    Stroke risk factor          Paroxysmal atrial fibrillation    Stroke risk factor  On apixaban 5 mg BID        Hypercholesterolemia    Stroke risk factor  Atorvastatin 40 mg - home medication  If LDL >70 consider increasing dose              STROKE DOCUMENTATION     Acute Stroke Times   Last Known Normal Date: 03/23/18  Last Known Normal Time: 1030  Symptom Onset Date: 03/23/18  Symptom Onset Time: 1030  Stroke Team Called Date: 03/23/18  Stroke Team Called Time: 1255  Stroke Team Arrival Date: 03/23/18  Stroke Team Arrival Time: 1300  CT Interpretation Time: 1306    NIH Scale:  1a. Level Of Consciousness: 0-->Alert: keenly responsive  1b. LOC Questions: 0-->Answers both questions correctly  1c. LOC Commands: 0-->Performs both tasks correctly  2. Best Gaze: 0-->Normal  3. Visual: 1-->Partial hemianopia (from previous stroke)  4. Facial Palsy: 0-->Normal symmetrical movements  5a. Motor Arm, Left: 0-->No drift: limb holds 90 (or 45) degrees for full 10 secs  5b. Motor Arm, Right: 0-->No drift: limb holds 90 (or 45) degrees for full 10 secs  6a. Motor Leg, Left: 0-->No drift: leg holds 30 degree position for full 5 secs  6b. Motor Leg, Right: 0-->No drift: leg holds 30 degree position for full 5 secs  7. Limb Ataxia: 0-->Absent  8. Sensory: 0-->Normal: no sensory loss  9. Best Language: 1-->Mild-to-moderate aphasia: some obvious loss of fluency or facility of comprehension, without significant limitation on ideas expressed or form of expression. Reduction of speech and/or comprehension, however, makes conversation. . . (see row details)  10. Dysarthria: 1-->Mild-to-moderate dysarthria: patient slurs at least some words and, at worst, can be understood with some difficulty  11. Extinction and Inattention (formerly Neglect): 0-->No abnormality  Total (NIH Stroke Scale):  3    Modified Greer Score: 0  Lignum Coma Scale:15   ABCD2 Score:    [id number] Score:5  HAS -BLED Score:   ICH Score:   Hunt & Judd Classification:       Thrombolysis Candidate? No, Strong suspicion for stroke mimic or alternative diagnosis       Interventional Revascularization Candidate?   Is the patient eligible for mechanical endovascular reperfusion (REILLY)?  No; No large vessel occlusion      Hemorrhagic change of an Ischemic Stroke: Does this patient have an ischemic stroke with hemorrhagic changes? No     Subjective:     History of Present Illness:  Alfa Christy III is a 62 year old male with a past medical history of DM, HTN, HLD, Afib anticoagulated with eliquis, previous stroke in 2015, and seizures presented to the ED today for confusion and inability to speak. The patients wife reports hx due to patients inabiltiy to communicate at this time. She states she found him his morning trying to pay bills and he was having a hard time communicating/completing task such as putting his shoes on. The patient did not take his seizure medication last night. On examination the patient is moving all extremities, slow to speak but able to communicate, and has no gaze preference. He has a right hemianopia from his previous stroke. His movements are slow but are equal on both sides showing no signs of weakness.         Past Medical History:   Diagnosis Date    Allergy     BMI 31.0-31.9,adult 11/25/2014    Cerebrovascular disease 7/25/2016    Chronic anticoagulation - pradaxa 11/10/2016    Chronic diastolic heart failure 11/20/2016    CKD (chronic kidney disease), stage III 9/23/2013    Controlled type 2 diabetes with neuropathy 12/16/2014    Diabetes mellitus due to underlying condition with stage 3 chronic kidney disease, without long-term current use of insulin 11/2/2016    Elevated alkaline phosphatase level 8/1/2012    Elevated PSA     negative prostate biopsy 4/11    Erectile dysfunction associated  with type 2 diabetes mellitus     Gallstones 3/20/2013    Generalized tonic-clonic seizure 11/2016    HTN (hypertension), benign 8/1/2012    Hypercholesterolemia 8/1/2012    Microcytic anemia 11/27/2013    Paroxysmal atrial fibrillation 9/23/2013    Postsurgical hypothyroidism 11/27/2013    Right homonymous hemianopsia 2/5/2016    Sickle cell trait     Stroke 1/27/2016    Thyroid cancer     multifocal with six lesions, largest 5mm, treated with surgery and radioactive iodine    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/2016     Past Surgical History:   Procedure Laterality Date    BREAST SURGERY      cyst removal    COLONOSCOPY      CYST REMOVAL      chest    PROSTATE BIOPSY  4/27/11    SKIN BIOPSY      THYROID SURGERY  8/26/09    VASCULAR SURGERY       Family History   Problem Relation Age of Onset    Heart disease Father     Diabetes Father     Hypertension Father     Diabetes Mother     Hypertension Mother     Heart disease Mother     Diabetes Brother     Cancer Sister     Thyroid disease Maternal Aunt     Clotting disorder Neg Hx      Social History   Substance Use Topics    Smoking status: Never Smoker    Smokeless tobacco: Never Used    Alcohol use Yes      Comment: occasionally     Review of patient's allergies indicates:   Allergen Reactions    Cough syrup [guaifenesin] Other (See Comments)       Medications: I have reviewed the current medication administration record.      (Not in a hospital admission)    Review of Systems   Constitutional: Negative for chills and fever.   HENT: Negative for facial swelling and trouble swallowing.    Respiratory: Negative for shortness of breath.    Cardiovascular: Negative for chest pain.   Gastrointestinal: Negative for nausea and vomiting.   Genitourinary: Negative for difficulty urinating and dysuria.   Musculoskeletal: Negative for arthralgias.   Skin: Negative for color change and rash.   Neurological: Positive for speech difficulty.  Negative for facial asymmetry and weakness.   Psychiatric/Behavioral: Negative for behavioral problems and confusion.     Objective:     Vital Signs (Most Recent):  Temp: 98.2 °F (36.8 °C) (03/23/18 1248)  Pulse: 68 (03/23/18 1248)  Resp: 16 (03/23/18 1248)  BP: (!) 156/71 (03/23/18 1248)  SpO2: 98 % (03/23/18 1248)    Vital Signs Range (Last 24H):  Temp:  [98.2 °F (36.8 °C)]   Pulse:  [68]   Resp:  [16]   BP: (156)/(71)   SpO2:  [98 %]     Physical Exam   Constitutional: He appears well-developed.   HENT:   Head: Normocephalic and atraumatic.   Eyes: EOM are normal. Pupils are equal, round, and reactive to light.   Cardiovascular: An irregularly irregular rhythm present.   Pulmonary/Chest: Effort normal.   Abdominal: Soft. There is no tenderness.   Musculoskeletal: Normal range of motion.   Neurological: He is alert.   Skin: Skin is warm.   Psychiatric: He has a normal mood and affect.   Vitals reviewed.      Neurological Exam:   LOC: alert  Attention Span: Good   Language: Mild aphasia  Articulation: Mild Dysarthria  Orientation: Person, Place, Time   Visual Fields: Full  Hemianopsia right (baseline)   EOM (CN III, IV, VI): Full/intact  Pupils (CN II, III): PERRL  Facial Movement (CN VII): Symmetric facial expression    Motor: Arm left  Normal 5/5  Leg left  Normal 5/5  Arm right  Normal 5/5  Leg right Normal 5/5  Cebellar: No evidence of appendicular or axial ataxia  Sensation: Intact to light touch, temperature and vibration      Laboratory:    Diagnostic Results:    Brain imaging:  CT 3/23 - Remote left occipital and posterior left thalamus infarctions are seen, but no acute infarction or hemorrhage identified on today's CT study          Brie Loja NP  [facility]  Department of Vascular Neurology   [facility]

## 2018-03-23 NOTE — ASSESSMENT & PLAN NOTE
Anemia  - Cr 1.8 on admit; improved from baseline (~2.1)  - Hgb 11.8 on admit; consistent with baseline  - Follows with nephrology  - Trend daily

## 2018-03-23 NOTE — ED TRIAGE NOTES
"Pt was found today by his wife to be having a blank stare. Pt has limited to little to no speech ability currently. Pt is moving all extremities without difficulty. Pt answers questions with difficulty and also experiencing confusion and lack of concentration. Family states the pt was acting fine this morning until about 1030 this morning. At 1030 the family noticed the pt was acting confused when trying to "pay bills". Family states the pt could not correctly identify his shoes on his feet. Family states the last time they saw him acting normal was at 1010.   "

## 2018-03-23 NOTE — ASSESSMENT & PLAN NOTE
?etiology; seizures vs pseudoseizures vs psychogenic   - CTH and MRI negative for acute infarct  - HDS; hypertensive on admit; suspect 2/2 noncompliance  - Labs entirely unremarkable (TSH and lactate WNL)  - No s/s infection; afebrile without leukocytosis (CXR and UA without infection)  - Evaluated by [facility] neuro in the ED who have low suspicion for acute infarct; continue home stroke prevention regimen  - Inconsistent findings on exam with fluctuating aphasia  - Similar presentation for admission 10/2017 for aphasia, suspected to be 2/2 seizures (EEG did not capture seizure activity); of note, aphasia sx noted to fluctuate and in setting of financial stressors  - Wife reports missed Keppra PM dose; ?noncompliance issues  - Ordered EEG  - Ativan PRN  - Seizure precautions  - Neurology consulted and appreciate assistance

## 2018-03-24 PROBLEM — R41.82 ALTERED MENTAL STATE: Status: ACTIVE | Noted: 2018-03-24

## 2018-03-24 LAB
ALBUMIN SERPL BCP-MCNC: 3.1 G/DL
ALP SERPL-CCNC: 94 U/L
ALT SERPL W/O P-5'-P-CCNC: 15 U/L
ANION GAP SERPL CALC-SCNC: 6 MMOL/L
AST SERPL-CCNC: 28 U/L
BASOPHILS # BLD AUTO: 0.05 K/UL
BASOPHILS NFR BLD: 0.5 %
BILIRUB SERPL-MCNC: 1.4 MG/DL
BUN SERPL-MCNC: 16 MG/DL
CALCIUM SERPL-MCNC: 8.6 MG/DL
CHLORIDE SERPL-SCNC: 107 MMOL/L
CO2 SERPL-SCNC: 26 MMOL/L
CREAT SERPL-MCNC: 1.7 MG/DL
DIFFERENTIAL METHOD: ABNORMAL
EOSINOPHIL # BLD AUTO: 0.3 K/UL
EOSINOPHIL NFR BLD: 3.5 %
ERYTHROCYTE [DISTWIDTH] IN BLOOD BY AUTOMATED COUNT: 17.4 %
EST. GFR  (AFRICAN AMERICAN): 48.2 ML/MIN/1.73 M^2
EST. GFR  (NON AFRICAN AMERICAN): 41.7 ML/MIN/1.73 M^2
ESTIMATED AVG GLUCOSE: 94 MG/DL
GLUCOSE SERPL-MCNC: 116 MG/DL
HBA1C MFR BLD HPLC: 4.9 %
HCT VFR BLD AUTO: 31.6 %
HGB BLD-MCNC: 11.4 G/DL
IMM GRANULOCYTES # BLD AUTO: 0.01 K/UL
IMM GRANULOCYTES NFR BLD AUTO: 0.1 %
LYMPHOCYTES # BLD AUTO: 2.6 K/UL
LYMPHOCYTES NFR BLD: 28.3 %
MAGNESIUM SERPL-MCNC: 1.9 MG/DL
MCH RBC QN AUTO: 28.3 PG
MCHC RBC AUTO-ENTMCNC: 36.1 G/DL
MCV RBC AUTO: 78 FL
MONOCYTES # BLD AUTO: 1.3 K/UL
MONOCYTES NFR BLD: 14 %
NEUTROPHILS # BLD AUTO: 4.9 K/UL
NEUTROPHILS NFR BLD: 53.6 %
NRBC BLD-RTO: 1 /100 WBC
PHOSPHATE SERPL-MCNC: 3.3 MG/DL
PLATELET # BLD AUTO: 254 K/UL
PMV BLD AUTO: 11.4 FL
POCT GLUCOSE: 168 MG/DL (ref 70–110)
POCT GLUCOSE: 204 MG/DL (ref 70–110)
POCT GLUCOSE: 234 MG/DL (ref 70–110)
POTASSIUM SERPL-SCNC: 4.1 MMOL/L
PROT SERPL-MCNC: 6.4 G/DL
RBC # BLD AUTO: 4.03 M/UL
SODIUM SERPL-SCNC: 139 MMOL/L
WBC # BLD AUTO: 9.16 K/UL

## 2018-03-24 PROCEDURE — 36415 COLL VENOUS BLD VENIPUNCTURE: CPT

## 2018-03-24 PROCEDURE — G0378 HOSPITAL OBSERVATION PER HR: HCPCS

## 2018-03-24 PROCEDURE — 83036 HEMOGLOBIN GLYCOSYLATED A1C: CPT

## 2018-03-24 PROCEDURE — 84100 ASSAY OF PHOSPHORUS: CPT

## 2018-03-24 PROCEDURE — 99245 OFF/OP CONSLTJ NEW/EST HI 55: CPT | Mod: ,,, | Performed by: PSYCHIATRY & NEUROLOGY

## 2018-03-24 PROCEDURE — G8979 MOBILITY GOAL STATUS: HCPCS | Mod: CI

## 2018-03-24 PROCEDURE — 83735 ASSAY OF MAGNESIUM: CPT

## 2018-03-24 PROCEDURE — 95951 PR EEG MONITORING/VIDEORECORD: CPT | Mod: 26,,, | Performed by: PSYCHIATRY & NEUROLOGY

## 2018-03-24 PROCEDURE — 63600175 PHARM REV CODE 636 W HCPCS: Performed by: PHYSICIAN ASSISTANT

## 2018-03-24 PROCEDURE — 97162 PT EVAL MOD COMPLEX 30 MIN: CPT

## 2018-03-24 PROCEDURE — 85025 COMPLETE CBC W/AUTO DIFF WBC: CPT

## 2018-03-24 PROCEDURE — G8980 MOBILITY D/C STATUS: HCPCS | Mod: CJ

## 2018-03-24 PROCEDURE — A4216 STERILE WATER/SALINE, 10 ML: HCPCS | Performed by: PHYSICIAN ASSISTANT

## 2018-03-24 PROCEDURE — 95951 HC EEG MONITORING/VIDEO RECORD: CPT

## 2018-03-24 PROCEDURE — G8978 MOBILITY CURRENT STATUS: HCPCS | Mod: CJ

## 2018-03-24 PROCEDURE — 99225 PR SUBSEQUENT OBSERVATION CARE,LEVEL II: CPT | Mod: SA,,, | Performed by: PHYSICIAN ASSISTANT

## 2018-03-24 PROCEDURE — 80053 COMPREHEN METABOLIC PANEL: CPT

## 2018-03-24 PROCEDURE — 25000003 PHARM REV CODE 250: Performed by: PHYSICIAN ASSISTANT

## 2018-03-24 RX ADMIN — APIXABAN 5 MG: 5 TABLET, FILM COATED ORAL at 08:03

## 2018-03-24 RX ADMIN — FUROSEMIDE 40 MG: 40 TABLET ORAL at 08:03

## 2018-03-24 RX ADMIN — HYDRALAZINE HYDROCHLORIDE 25 MG: 25 TABLET, FILM COATED ORAL at 02:03

## 2018-03-24 RX ADMIN — LOSARTAN POTASSIUM 100 MG: 50 TABLET, FILM COATED ORAL at 09:03

## 2018-03-24 RX ADMIN — APIXABAN 5 MG: 5 TABLET, FILM COATED ORAL at 09:03

## 2018-03-24 RX ADMIN — AMLODIPINE BESYLATE 10 MG: 10 TABLET ORAL at 09:03

## 2018-03-24 RX ADMIN — Medication 3 ML: at 10:03

## 2018-03-24 RX ADMIN — Medication 3 ML: at 06:03

## 2018-03-24 RX ADMIN — LEVETIRACETAM 1500 MG: 750 TABLET ORAL at 09:03

## 2018-03-24 RX ADMIN — HYDRALAZINE HYDROCHLORIDE 25 MG: 25 TABLET, FILM COATED ORAL at 06:03

## 2018-03-24 RX ADMIN — ASPIRIN 81 MG CHEWABLE TABLET 81 MG: 81 TABLET CHEWABLE at 09:03

## 2018-03-24 RX ADMIN — CARVEDILOL 3.12 MG: 3.12 TABLET, FILM COATED ORAL at 08:03

## 2018-03-24 RX ADMIN — FUROSEMIDE 40 MG: 40 TABLET ORAL at 09:03

## 2018-03-24 RX ADMIN — LEVETIRACETAM 1500 MG: 750 TABLET ORAL at 08:03

## 2018-03-24 RX ADMIN — ATORVASTATIN CALCIUM 40 MG: 20 TABLET, FILM COATED ORAL at 09:03

## 2018-03-24 RX ADMIN — INSULIN ASPART 2 UNITS: 100 INJECTION, SOLUTION INTRAVENOUS; SUBCUTANEOUS at 05:03

## 2018-03-24 RX ADMIN — FOLIC ACID 1000 MCG: 1 TABLET ORAL at 09:03

## 2018-03-24 RX ADMIN — HYDRALAZINE HYDROCHLORIDE 25 MG: 25 TABLET, FILM COATED ORAL at 09:03

## 2018-03-24 RX ADMIN — CARVEDILOL 3.12 MG: 3.12 TABLET, FILM COATED ORAL at 09:03

## 2018-03-24 NOTE — CONSULTS
Full note to follow.      Sudden onset of confusion/aphasia and new cortical features of right sided apraxia in the setting of known old posterior circulation stroke - and slowly improving.  No s/s of infection or metabolic derangements other than sl elevated Cr (now improving).  Favor breakthrough seizure with prolonged post-ictus.  Will get EEG today to rule out subtle NCSE and increase LEV.  Will follow.        Eric Motley MD, MPH  Division of Movement and Memory Disorders  [facility]

## 2018-03-24 NOTE — PT/OT/SLP EVAL
Physical Therapy Evaluation    Patient Name:  Alfa Christy III   MRN:  [id number]    Recommendations:     Discharge Recommendations:  home (pending progress and hospital course)   Discharge Equipment Recommendations: none   Barriers to discharge: Inaccessible home (bedroom and bathroom on 2nd floor of home)    Assessment:     Alfa Christy III is a 62 y.o. male admitted with a medical diagnosis of Aphasia.  He presents with the following impairments/functional limitations:  impaired cognition, impaired functional mobilty, impaired endurance, gait instability, impaired balance, weakness, decreased safety awareness. Pt able to perform functional mobility without physical assist. Ambulating greater the household distance with mild instability noted, requiring SPC for balance assist. Difficulty noted with word-finding with pt often perseverating on his previous response. Pt also occasionally required repeated instructions and re-direction in order to complete tasks. Pt would benefit from skilled acute PT in order to address current deficits and progress functional mobility. Pt will require stair training prior to d/c in order to ensure safety upon return home, as pt's bedroom/bathroom are on 2nd floor of home. Pt declined stair training during evaluation, but was unable to clearly state reason due to word-finding difficulty. Anticipate pt will be able to return home with no further PT needs, pending progress and hospital course.     Rehab Prognosis:  good; patient would benefit from acute skilled PT services to address these deficits and reach maximum level of function.      Recent Surgery: * No surgery found *      Plan:     During this hospitalization, patient to be seen 3 x/week to address the above listed problems via gait training, therapeutic activities, neuromuscular re-education  · Plan of Care Expires:  04/22/18   Plan of Care Reviewed with: patient, spouse    Subjective     Communicated with RN prior to session.   Patient found seated EOB upon PT entry to room, agreeable to evaluation.      Chief Complaint: word-finding difficulty and memory impairments; pt recognizes that this is difficult for him  Patient comments/goals: improve word-finding and memory  Pain/Comfort:  · Pain Rating 1: 0/10    Patients cultural, spiritual, Yarsanism conflicts given the current situation: none noted     Living Environment:  Pt lives with his wife and daughter in a 2SH with 2 BOB; bedroom and bathroom on 2nd floor with 1 flight of stairs and B handrails to get to 2nd floor. PTA pt was mostly (I) with ambulation but occasionally used SPC and RW. Pt with mod-I with ADLs with use of shower chair and grab bars.   Prior to admission, patients level of function was mod-I.  Patient has the following equipment: shower chair, grab bar, cane, straight, walker, rolling.  Upon discharge, patient will have assistance from wife.    Objective:     Patient found with: telemetry     General Precautions: Standard, fall, aspiration, seizure   Orthopedic Precautions:N/A   Braces: N/A     Exams:  · Cognitive Exam:  Patient is oriented to person; difficulty assessing orientation further as pt had difficulty finding words; pt often perseverating on previous response to question; follows one-step commands, occasionally requiring repeated instructions and re-direction   · Communication: presents like expressive aphasia, as pt has difficulty finding words; recognizes that he is often saying the wrong word but has trouble correcting himself  · Gross motor coordination: WFL  · Postural Exam:  Patient presented with the following abnormalities:    · -       Rounded shoulders  · -       Forward head  · Sensation:    · -       Intact  · Skin Integrity/Edema:      · -       Skin integrity: Visible skin intact  · -       Edema: None noted    · RLE ROM: WFL  · RLE Strength: WFL  · LLE ROM: WFL  · LLE Strength: WFL    Functional Mobility:  · Transfers:     · Sit to Stand:   stand by assistance with no AD, x2 reps  · Gait: ~220 ft. with SPC and CGA  · Mild instability, decreased ryanne, decreased step length, impaired weight-shifting ability; no overt LOB   · Stairs: Pt declined stair training during evaluation; pt unable to clearly state reason 2* work-finding difficulty     AM-PAC 6 CLICK MOBILITY  Total Score:20       Therapeutic Activities and Exercises:   Pt and wife educated on role of PT and PT POC. Pt and wife verbalized understanding.     Patient left seated EOB with all lines intact, call button in reach and pt's wife present.    GOALS:    Physical Therapy Goals        Problem: Physical Therapy Goal    Goal Priority Disciplines Outcome Goal Variances Interventions   Physical Therapy Goal     PT/OT, PT Ongoing (interventions implemented as appropriate)     Description:  Goals to be met by: 3/31/18     Patient will increase functional independence with mobility by performin. Supine to sit with [place]  2. Sit to stand transfer with Supervision  3. Gait  x 200 feet with Supervision using LRAD as needed.   4. Ascend/descend 1 flight of stairs with bilateral Handrails Contact Guard Assistance.                       History:     Past Medical History:   Diagnosis Date    Allergy     BMI 31.0-31.9,adult 2014    Cerebrovascular disease 2016    Chronic anticoagulation - pradaxa 11/10/2016    Chronic diastolic heart failure 2016    CKD (chronic kidney disease), stage III 2013    Controlled type 2 diabetes with neuropathy 2014    Diabetes mellitus due to underlying condition with stage 3 chronic kidney disease, without long-term current use of insulin 2016    Elevated alkaline phosphatase level 2012    Elevated PSA     negative prostate biopsy     Erectile dysfunction associated with type 2 diabetes mellitus     Gallstones 3/20/2013    Generalized tonic-clonic seizure 2016    HTN (hypertension), benign 2012     Hypercholesterolemia 8/1/2012    Microcytic anemia 11/27/2013    Paroxysmal atrial fibrillation 9/23/2013    Postsurgical hypothyroidism 11/27/2013    Right homonymous hemianopsia 2/5/2016    Sickle cell trait     Stroke 1/27/2016    Thyroid cancer     multifocal with six lesions, largest 5mm, treated with surgery and radioactive iodine    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/2016       Past Surgical History:   Procedure Laterality Date    BREAST SURGERY      cyst removal    COLONOSCOPY      CYST REMOVAL      chest    PROSTATE BIOPSY  4/27/11    SKIN BIOPSY      THYROID SURGERY  8/26/09    VASCULAR SURGERY         Clinical Decision Making:     History  Co-morbidities and personal factors that may impact the plan of care Examination  Body Structures and Functions, activity limitations and participation restrictions that may impact the plan of care Clinical Presentation   Decision Making/ Complexity Score   Co-morbidities:   [] Time since onset of injury / illness / exacerbation  [x] Status of current condition  [x]Patient's cognitive status and safety concerns    [x] Multiple Medical Problems (see med hx)  Personal Factors:   [] Patient's age  [] Prior Level of function   [] Patient's home situation (environment and family support)  [] Patient's level of motivation  [] Expected progression of patient      HISTORY:(criteria)    [] 74690 - no personal factors/history    [] 05727 - has 1-2 personal factor/comorbidity     [x] 93451 - has >3 personal factor/comorbidity     Body Regions:  [] Objective examination findings  [] Head     []  Neck  [] Trunk   [] Upper Extremity  [] Lower Extremity    Body Systems:  [x] For communication ability, affect, cognition, language, and learning style: the assessment of the ability to make needs known, consciousness, orientation (person, place, and time), expected emotional /behavioral responses, and learning preferences (eg, learning barriers, education  needs)  [x]  For the neuromuscular system: a general assessment of gross coordinated movement (eg, balance, gait, locomotion, transfers, and transitions) and motor function  (motor control and motor learning)  [x] For the musculoskeletal system: the assessment of gross symmetry, gross range of motion, gross strength, height, and weight  [] For the integumentary system: the assessment of pliability(texture), presence of scar formation, skin color, and skin integrity  [] For cardiovascular/pulmonary system: the assessment of heart rate, respiratory rate, blood pressure, and edema     Activity limitations:    [x] Patient's cognitive status and saf ety concerns          [x] Status of current condition      [] Weight bearing restriction  [] Cardiopulmunary Restriction    Participation Restrictions:   [] Goals and goal agreement with the patient     [] Rehab potential (prognosis) and probable outcome      Examination of Body System: (criteria)    [] 21123 - addressing 1-2 elements    [] 74993 - addressing a total of 3 or more elements     [x] 50666 -  Addressing a total of 4 or more elements         Clinical Presentation: (criteria)  Evolving - 66671     On examination of body system using standardized tests and measures patient presents with 4 or more elements from any of the following: body structures and functions, activity limitations, and/or participation restrictions.  Leading to a clinical presentation that is considered evolving with changing characteristics                              Clinical Decision Making  (Eval Complexity):  Moderate - 22491     Time Tracking:     PT Received On: 03/24/18  PT Start Time: 1528     PT Stop Time: 1600  PT Total Time (min): 32 min     Billable Minutes: Evaluation 32      Rosa Aquino, PT, DPT   3/24/2018  [phone]

## 2018-03-24 NOTE — ASSESSMENT & PLAN NOTE
Mr. Alfa Christy is a 61 y/o M with a PMH of Left sided CVA (2016), Seizures, HTN, HLD, DM2, AF on Eliquis, HF, CKDIII, and Thyroid cancer s/p surgery who has been consulted for trouble finding words and confusion .     -- Favor breakthrough seizure with prolonged post-ictus in a setting of missed dose.    -- CTH and MRI negative for acute infarct   -- Hypertensive on admit, suspect 2/2 noncompliance    Plan:   -- Seizure precautions  -- Follow EEG   -- Currently on Keppra 1500 BID   -- No s/s of infection or metabolic derangements other than sl elevated Cr (now improving)  -- Case discussed with Dr Motley   -- Will follow

## 2018-03-24 NOTE — ASSESSMENT & PLAN NOTE
- Last A1c 6.0 in 1/2018  - A1c 4.9  - Cardiac/DM diet  - Low dose SSI given decreased PO intake  - Will monitor BGs and adjust PRN

## 2018-03-24 NOTE — CONSULTS
"[facility]  Neurology  Consult Note    Patient Name: Alfa Christy III  MRN: [id number]  Admission Date: 3/23/2018  Hospital Length of Stay: 0 days  Code Status: Full Code   Attending Provider: Bailee Drummond MD   Consulting Provider: Coco Saba II, MD  Primary Care Physician: Tyler Jasmine MD  Principal Problem:Aphasia    Consults   Subjective:     Chief Complaint:  AMS, trouble finding words      HPI:   Mr. Alfa Christy is a 63 y/o M with a PMH of Left sided CVA (2016), Seizures, HTN, HLD, DM2, AF on Eliquis, HF, CKDIII, and Thyroid cancer s/p surgery who has been consulted for aphasia and confusion. History gathered from Mr. Christy's wife at bedside as well as Mr. Christy. Wife reports that yesterday morning at 8am, Mr. Christy woke up and complained of dizziness. He went downstairs to eat breakfast at which point his wife realized he had not taken his night time medications (including his 750 dosage of Keppra, as well as hydralazine). He explained he was going to work on bills/finances but when she came to check on him, he reported he was unable to focus on numbers and seemed confused. She asked him his name, which he reported to her, but when she asked what he was wearing on his feet, he was unable to tell her "shoes." In the emergency department, he was not able to report his name or birthday. He was speaking slowly, and having difficulty finding words at all. Wife describes it looked as if he was thinking for a long time but unable to get the words out. She also reports that his right arm was shaking uncontrollably in the ED. Mrs. Christy reports that this episode is similar to a previous seizure episode he had in October of 2017. He was confused, unable to find words or speak, and had minor shaking. It was diagnosed as a partial seizure. After his stroke in 2016, later that year, Mr. Christy had his first seizure which was a generalized tonic clonic seizure. His wife reports this " involved full body shaking, and was not similar to this current episode. She denies any abnormal gaze or eye movements.       Today, Mr. Christy reports that he is having difficulty with coordination and movement. He repeatedly moves his right arm at a slow pace and finds it is difficult to initiate movement. He reports his left arm is much better than his right. His speech is slowed. His wife reports she believes he is about 40% of normal. She reports that he takes 750 mg of Keppra BID.      Past Medical History:   Diagnosis Date    Allergy     BMI 31.0-31.9,adult 11/25/2014    Cerebrovascular disease 7/25/2016    Chronic anticoagulation - pradaxa 11/10/2016    Chronic diastolic heart failure 11/20/2016    CKD (chronic kidney disease), stage III 9/23/2013    Controlled type 2 diabetes with neuropathy 12/16/2014    Diabetes mellitus due to underlying condition with stage 3 chronic kidney disease, without long-term current use of insulin 11/2/2016    Elevated alkaline phosphatase level 8/1/2012    Elevated PSA     negative prostate biopsy 4/11    Erectile dysfunction associated with type 2 diabetes mellitus     Gallstones 3/20/2013    Generalized tonic-clonic seizure 11/2016    HTN (hypertension), benign 8/1/2012    Hypercholesterolemia 8/1/2012    Microcytic anemia 11/27/2013    Paroxysmal atrial fibrillation 9/23/2013    Postsurgical hypothyroidism 11/27/2013    Right homonymous hemianopsia 2/5/2016    Sickle cell trait     Stroke 1/27/2016    Thyroid cancer     multifocal with six lesions, largest 5mm, treated with surgery and radioactive iodine    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/2016       Past Surgical History:   Procedure Laterality Date    BREAST SURGERY      cyst removal    COLONOSCOPY      CYST REMOVAL      chest    PROSTATE BIOPSY  4/27/11    SKIN BIOPSY      THYROID SURGERY  8/26/09    VASCULAR SURGERY         Review of patient's allergies indicates:   Allergen  "Reactions    Cough syrup [guaifenesin] Other (See Comments)       Current Neurological Medications:     No current facility-administered medications on file prior to encounter.      Current Outpatient Prescriptions on File Prior to Encounter   Medication Sig    acetaminophen (TYLENOL) 500 MG tablet Take 1 tablet (500 mg total) by mouth every 6 (six) hours as needed for Pain.    amLODIPine (NORVASC) 10 MG tablet Take 1 tablet (10 mg total) by mouth once daily.    aspirin 81 MG Chew Take 1 tablet (81 mg total) by mouth once daily.    atorvastatin (LIPITOR) 40 MG tablet Take 1 tablet (40 mg total) by mouth once daily.    BD INSULIN PEN NEEDLE UF MINI 31 gauge x 3/16" Ndle To use with insulin pens 4  times daily    carvedilol (COREG) 3.125 MG tablet Take 1 tablet (3.125 mg total) by mouth 2 (two) times daily.    dulaglutide (TRULICITY) 0.75 mg/0.5 mL PnIj Inject 0.5 mLs (0.75 mg total) into the skin every 7 days.    flash glucose scanning reader (FREESTYLE ANGELLA READER) Misc 1 each by Misc.(Non-Drug; Combo Route) route once daily.    flash glucose sensor (FREESTYLE ANGELLA SENSOR) Kit 3 each by Misc.(Non-Drug; Combo Route) route every 30 days.    folic acid (FOLVITE) 400 MCG tablet Take 400 mcg by mouth once daily.    furosemide (LASIX) 40 MG tablet Take 1 tablet (40 mg total) by mouth 2 (two) times daily.    GLUCAGON EMERGENCY KIT, HUMAN, 1 mg injection INJECT 1 MG INTO THE MUSCLE AS NEEDED    hydrALAZINE (APRESOLINE) 25 MG tablet Take 1 tablet (25 mg total) by mouth every 8 (eight) hours. (Patient taking differently: Take 25 mg by mouth every 12 (twelve) hours. )    insulin aspart (NOVOLOG) 100 unit/mL InPn pen Inject 7 units w/ meals plus scale 150-200+1, 201-250+2, 251-300+3, 301-350+4.    insulin detemir (LEVEMIR FLEXPEN) 100 unit/mL (3 mL) SubQ InPn pen Inject 14 Units into the skin every evening.    levetiracetam (KEPPRA) 750 MG Tab Take 2 tablets (1,500 mg total) by mouth 2 (two) times daily.    " levothyroxine (SYNTHROID) 200 MCG tablet Take 1 tablet (200 mcg total) by mouth before breakfast.    losartan (COZAAR) 100 MG tablet Take 1 tablet (100 mg total) by mouth once daily.    magnesium 30 mg Tab Take 1 tablet by mouth twice a week.     multivitamin capsule Take 1 capsule by mouth every Mon, Wed, Fri.     ONETOUCH VERIO Strp USE TO TEST BLOOD SUGAR THREE TIMES DAILY    sildenafil (VIAGRA) 100 MG tablet Take 1 tablet (100 mg total) by mouth as needed for Erectile Dysfunction.    vitamin D 1000 units Tab Take 1,000 Units by mouth every Mon, Wed, Fri.    apixaban 5 mg Tab Take 1 tablet (5 mg total) by mouth 2 (two) times daily.     Family History     Problem Relation (Age of Onset)    Cancer Sister    Diabetes Father, Mother, Brother    Heart disease Father, Mother    Hypertension Father, Mother    Thyroid disease Maternal Aunt        Social History Main Topics    Smoking status: Never Smoker    Smokeless tobacco: Never Used    Alcohol use Yes      Comment: occasionally    Drug use: No    Sexual activity: Yes     Partners: Female      Comment:  with 3 kids     Review of Systems   Constitutional: Positive for fatigue. Negative for unexpected weight change.   HENT: Negative for trouble swallowing.    Eyes: Negative for photophobia and pain.   Respiratory: Negative for cough and shortness of breath.    Cardiovascular: Negative for chest pain.   Gastrointestinal: Negative for abdominal pain, constipation, diarrhea, nausea and vomiting.   Genitourinary: Negative for frequency.   Musculoskeletal: Negative for neck pain and neck stiffness.   Neurological: Positive for seizures, speech difficulty and weakness. Negative for dizziness, facial asymmetry, numbness and headaches.   Psychiatric/Behavioral: Negative for agitation and behavioral problems.     Objective:     Vital Signs (Most Recent):  Temp: 97.6 °F (36.4 °C) (03/24/18 1548)  Pulse: 71 (03/24/18 1548)  Resp: 18 (03/24/18 1548)  BP: (!)  "166/79 (03/24/18 1548)  SpO2: (!) 94 % (03/24/18 1548) Vital Signs (24h Range):  Temp:  [97.6 °F (36.4 °C)-98.5 °F (36.9 °C)] 97.6 °F (36.4 °C)  Pulse:  [62-78] 71  Resp:  [17-20] 18  SpO2:  [94 %-99 %] 94 %  BP: (140-180)/(78-92) 166/79     Weight: 102.2 kg (225 lb 5 oz)  Body mass index is 32.33 kg/m².    Physical Exam   Constitutional: He appears well-developed and well-nourished.   HENT:   Head: Normocephalic and atraumatic.   Right Ear: External ear normal.   Eyes: Conjunctivae are normal. Pupils are equal, round, and reactive to light.   Neck: Normal range of motion. Neck supple.   Cardiovascular: Normal rate, regular rhythm and normal heart sounds.    Pulmonary/Chest: Effort normal and breath sounds normal.   Abdominal: Soft. Bowel sounds are normal.   Musculoskeletal: Normal range of motion.   Neurological:   Reflex Scores:       Tricep reflexes are 2+ on the right side and 2+ on the left side.       Bicep reflexes are 2+ on the right side and 2+ on the left side.       Brachioradialis reflexes are 2+ on the right side and 2+ on the left side.       Patellar reflexes are 2+ on the right side and 2+ on the left side.       Achilles reflexes are 2+ on the right side and 2+ on the left side.  Skin: Skin is warm.   Psychiatric: He has a normal mood and affect. His behavior is normal.       NEUROLOGICAL EXAMINATION:     MENTAL STATUS        Oriented to person, place and month/day of week, but not year.   Unable to follow 2 step command ("touch your right ear with your left hand" - he held his right arm outstretched to the side)   Unable to follow 3 step command ("close eyes, stick tongue out, point to ceiling" - needed it repeated 2x, did not point to ceiling).       CRANIAL NERVES     CN III, IV, VI   Pupils are equal, round, and reactive to light.  CN III: no CN III palsy  CN VI: no CN VI palsy  Nystagmus: none   Diplopia: none    CN V   Facial sensation intact.     CN VIII   CN VIII normal.     CN IX, X   CN " IX normal.   CN X normal.     CN XI   CN XI normal.     CN XII   CN XII normal.        Right sided facial weakness (evident when asked to smile). Right sided hemianopia (deficit from previous stroke).       MOTOR EXAM   Muscle bulk: normal  Overall muscle tone: normal    Strength   Right neck flexion: 5/5  Left neck flexion: 5/5  Right neck extension: 5/5  Left neck extension: 5/5  Right deltoid: 5/5  Left deltoid: 5/5  Right biceps: 5/5  Left biceps: 5/5  Right triceps: 5/5  Left triceps: 5/5  Right wrist flexion: 5/5  Left wrist flexion: 5/5  Right wrist extension: 5/5  Left wrist extension: 5/5  Right interossei: 5/5  Left interossei: 5/5  Right abdominals: 5/5  Left abdominals: 5/5  Right iliopsoas: 5/5  Left iliopsoas: 5/5  Right quadriceps: 5/5  Left quadriceps: 5/5  Right hamstrin/5  Left hamstrin/5  Right glutei: 5/5  Left glutei: 5/5  Right anterior tibial: 5/5  Left anterior tibial: 5/5  Right posterior tibial: 5/5  Left posterior tibial: 5/5  Right peroneal: 5/5  Left peroneal: 5/5  Right gastroc: 5/5  Left gastroc: 5/5       Right sided pronator drift.  Little difficulty in initiating movement in right upper extremity      REFLEXES     Reflexes   Right brachioradialis: 2+  Left brachioradialis: 2+  Right biceps: 2+  Left biceps: 2+  Right triceps: 2+  Left triceps: 2+  Right patellar: 2+  Left patellar: 2+  Right achilles: 2+  Left achilles: 2+  Right : 2+  Left : 2+    SENSORY EXAM   Light touch normal.     GAIT AND COORDINATION        Coordination decreased (finger to nose test was significantly slow, but mainly accurate)        Significant Labs:   CBC:   Recent Labs  Lab 18  1317 18  0502   WBC 9.12 9.16   HGB 11.8* 11.4*   HCT 32.9* 31.6*    254     CMP:   Recent Labs  Lab 18  1317 18  0502   * 116*    139   K 4.8 4.1    107   CO2 27 26   BUN 17 16   CREATININE 1.8* 1.7*   CALCIUM 8.9 8.6*   MG  --  1.9   PROT 7.0 6.4   ALBUMIN 3.4*  3.1*   BILITOT 1.1* 1.4*   ALKPHOS 112 94   AST 29 28   ALT 18 15   ANIONGAP 5* 6*   EGFRNONAA 38.9* 41.7*       Significant Imaging: I have reviewed and interpreted all pertinent imaging results/findings within the past 24 hours.       Assessment and Plan:     Altered mental state    Mr. Alfa Christy is a 63 y/o M with a PMH of Left sided CVA (2016), Seizures, HTN, HLD, DM2, AF on Eliquis, HF, CKDIII, and Thyroid cancer s/p surgery who has been consulted for trouble finding words and confusion .     -- Favor breakthrough seizure with prolonged post-ictus in a setting of missed dose.    -- CTH and MRI negative for acute infarct   -- Hypertensive on admit, suspect 2/2 noncompliance    Plan:   -- Seizure precautions  -- Follow EEG   -- Currently on Keppra 1500 BID   -- No s/s of infection or metabolic derangements other than sl elevated Cr (now improving)  -- Case discussed with Dr Motley   -- Will follow             VTE Risk Mitigation         Ordered     apixaban tablet 5 mg  2 times daily     Route:  Oral        03/23/18 1728     IP VTE HIGH RISK PATIENT  Once      03/23/18 1920     Place DARIAN hose  Until discontinued      03/23/18 1920     Place sequential compression device  Until discontinued      03/23/18 1920     Reason for No Pharmacological VTE Prophylaxis  Once      03/23/18 1920          Thank you for your consult. I will follow-up with patient. Please contact us if you have any additional questions.    Coco Saba II, MD  Neurology  [facility]

## 2018-03-24 NOTE — ASSESSMENT & PLAN NOTE
- Uncontrolled on admit  - Wife endorses pt noncompliant with some medications  - BP improved with resumed home regimen  - Will continue to monitor

## 2018-03-24 NOTE — SUBJECTIVE & OBJECTIVE
Past Medical History:   Diagnosis Date    Allergy     BMI 31.0-31.9,adult 11/25/2014    Cerebrovascular disease 7/25/2016    Chronic anticoagulation - pradaxa 11/10/2016    Chronic diastolic heart failure 11/20/2016    CKD (chronic kidney disease), stage III 9/23/2013    Controlled type 2 diabetes with neuropathy 12/16/2014    Diabetes mellitus due to underlying condition with stage 3 chronic kidney disease, without long-term current use of insulin 11/2/2016    Elevated alkaline phosphatase level 8/1/2012    Elevated PSA     negative prostate biopsy 4/11    Erectile dysfunction associated with type 2 diabetes mellitus     Gallstones 3/20/2013    Generalized tonic-clonic seizure 11/2016    HTN (hypertension), benign 8/1/2012    Hypercholesterolemia 8/1/2012    Microcytic anemia 11/27/2013    Paroxysmal atrial fibrillation 9/23/2013    Postsurgical hypothyroidism 11/27/2013    Right homonymous hemianopsia 2/5/2016    Sickle cell trait     Stroke 1/27/2016    Thyroid cancer     multifocal with six lesions, largest 5mm, treated with surgery and radioactive iodine    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/2016       Past Surgical History:   Procedure Laterality Date    BREAST SURGERY      cyst removal    COLONOSCOPY      CYST REMOVAL      chest    PROSTATE BIOPSY  4/27/11    SKIN BIOPSY      THYROID SURGERY  8/26/09    VASCULAR SURGERY         Review of patient's allergies indicates:   Allergen Reactions    Cough syrup [guaifenesin] Other (See Comments)       Current Neurological Medications:     No current facility-administered medications on file prior to encounter.      Current Outpatient Prescriptions on File Prior to Encounter   Medication Sig    acetaminophen (TYLENOL) 500 MG tablet Take 1 tablet (500 mg total) by mouth every 6 (six) hours as needed for Pain.    amLODIPine (NORVASC) 10 MG tablet Take 1 tablet (10 mg total) by mouth once daily.    aspirin 81 MG Chew Take 1 tablet  "(81 mg total) by mouth once daily.    atorvastatin (LIPITOR) 40 MG tablet Take 1 tablet (40 mg total) by mouth once daily.    BD INSULIN PEN NEEDLE UF MINI 31 gauge x 3/16" Ndle To use with insulin pens 4  times daily    carvedilol (COREG) 3.125 MG tablet Take 1 tablet (3.125 mg total) by mouth 2 (two) times daily.    dulaglutide (TRULICITY) 0.75 mg/0.5 mL PnIj Inject 0.5 mLs (0.75 mg total) into the skin every 7 days.    flash glucose scanning reader (FREESTYLE ANGELLA READER) Misc 1 each by Misc.(Non-Drug; Combo Route) route once daily.    flash glucose sensor (FREESTYLE ANGELLA SENSOR) Kit 3 each by Misc.(Non-Drug; Combo Route) route every 30 days.    folic acid (FOLVITE) 400 MCG tablet Take 400 mcg by mouth once daily.    furosemide (LASIX) 40 MG tablet Take 1 tablet (40 mg total) by mouth 2 (two) times daily.    GLUCAGON EMERGENCY KIT, HUMAN, 1 mg injection INJECT 1 MG INTO THE MUSCLE AS NEEDED    hydrALAZINE (APRESOLINE) 25 MG tablet Take 1 tablet (25 mg total) by mouth every 8 (eight) hours. (Patient taking differently: Take 25 mg by mouth every 12 (twelve) hours. )    insulin aspart (NOVOLOG) 100 unit/mL InPn pen Inject 7 units w/ meals plus scale 150-200+1, 201-250+2, 251-300+3, 301-350+4.    insulin detemir (LEVEMIR FLEXPEN) 100 unit/mL (3 mL) SubQ InPn pen Inject 14 Units into the skin every evening.    levetiracetam (KEPPRA) 750 MG Tab Take 2 tablets (1,500 mg total) by mouth 2 (two) times daily.    levothyroxine (SYNTHROID) 200 MCG tablet Take 1 tablet (200 mcg total) by mouth before breakfast.    losartan (COZAAR) 100 MG tablet Take 1 tablet (100 mg total) by mouth once daily.    magnesium 30 mg Tab Take 1 tablet by mouth twice a week.     multivitamin capsule Take 1 capsule by mouth every Mon, Wed, Fri.     ONETOUCH VERIO Strp USE TO TEST BLOOD SUGAR THREE TIMES DAILY    sildenafil (VIAGRA) 100 MG tablet Take 1 tablet (100 mg total) by mouth as needed for Erectile Dysfunction.    " vitamin D 1000 units Tab Take 1,000 Units by mouth every Mon, Wed, Fri.    apixaban 5 mg Tab Take 1 tablet (5 mg total) by mouth 2 (two) times daily.     Family History     Problem Relation (Age of Onset)    Cancer Sister    Diabetes Father, Mother, Brother    Heart disease Father, Mother    Hypertension Father, Mother    Thyroid disease Maternal Aunt        Social History Main Topics    Smoking status: Never Smoker    Smokeless tobacco: Never Used    Alcohol use Yes      Comment: occasionally    Drug use: No    Sexual activity: Yes     Partners: Female      Comment:  with 3 kids     Review of Systems   Constitutional: Positive for fatigue. Negative for unexpected weight change.   HENT: Negative for trouble swallowing.    Eyes: Negative for photophobia and pain.   Respiratory: Negative for cough and shortness of breath.    Cardiovascular: Negative for chest pain.   Gastrointestinal: Negative for abdominal pain, constipation, diarrhea, nausea and vomiting.   Genitourinary: Negative for frequency.   Musculoskeletal: Negative for neck pain and neck stiffness.   Neurological: Positive for seizures, speech difficulty and weakness. Negative for dizziness, facial asymmetry, numbness and headaches.   Psychiatric/Behavioral: Negative for agitation and behavioral problems.     Objective:     Vital Signs (Most Recent):  Temp: 97.6 °F (36.4 °C) (03/24/18 1548)  Pulse: 71 (03/24/18 1548)  Resp: 18 (03/24/18 1548)  BP: (!) 166/79 (03/24/18 1548)  SpO2: (!) 94 % (03/24/18 1548) Vital Signs (24h Range):  Temp:  [97.6 °F (36.4 °C)-98.5 °F (36.9 °C)] 97.6 °F (36.4 °C)  Pulse:  [62-78] 71  Resp:  [17-20] 18  SpO2:  [94 %-99 %] 94 %  BP: (140-180)/(78-92) 166/79     Weight: 102.2 kg (225 lb 5 oz)  Body mass index is 32.33 kg/m².    Physical Exam   Constitutional: He appears well-developed and well-nourished.   HENT:   Head: Normocephalic and atraumatic.   Right Ear: External ear normal.   Eyes: Conjunctivae are normal.  "Pupils are equal, round, and reactive to light.   Neck: Normal range of motion. Neck supple.   Cardiovascular: Normal rate, regular rhythm and normal heart sounds.    Pulmonary/Chest: Effort normal and breath sounds normal.   Abdominal: Soft. Bowel sounds are normal.   Musculoskeletal: Normal range of motion.   Neurological:   Reflex Scores:       Tricep reflexes are 2+ on the right side and 2+ on the left side.       Bicep reflexes are 2+ on the right side and 2+ on the left side.       Brachioradialis reflexes are 2+ on the right side and 2+ on the left side.       Patellar reflexes are 2+ on the right side and 2+ on the left side.       Achilles reflexes are 2+ on the right side and 2+ on the left side.  Skin: Skin is warm.   Psychiatric: He has a normal mood and affect. His behavior is normal.       NEUROLOGICAL EXAMINATION:     MENTAL STATUS        Oriented to person, place and month/day of week, but not year.   Unable to follow 2 step command ("touch your right ear with your left hand" - he held his right arm outstretched to the side)   Unable to follow 3 step command ("close eyes, stick tongue out, point to ceiling" - needed it repeated 2x, did not point to ceiling).       CRANIAL NERVES     CN III, IV, VI   Pupils are equal, round, and reactive to light.  CN III: no CN III palsy  CN VI: no CN VI palsy  Nystagmus: none   Diplopia: none    CN V   Facial sensation intact.     CN VIII   CN VIII normal.     CN IX, X   CN IX normal.   CN X normal.     CN XI   CN XI normal.     CN XII   CN XII normal.        Right sided facial weakness (evident when asked to smile). Right sided hemianopia (deficit from previous stroke).       MOTOR EXAM   Muscle bulk: normal  Overall muscle tone: normal    Strength   Right neck flexion: 5/5  Left neck flexion: 5/5  Right neck extension: 5/5  Left neck extension: 5/5  Right deltoid: 5/5  Left deltoid: 5/5  Right biceps: 5/5  Left biceps: 5/5  Right triceps: 5/5  Left triceps: " 5/5  Right wrist flexion: 5/5  Left wrist flexion: 5/5  Right wrist extension: 5/5  Left wrist extension: 5/5  Right interossei: 5/5  Left interossei: 5/5  Right abdominals: 5/5  Left abdominals: 5/5  Right iliopsoas: 5/5  Left iliopsoas: 5/5  Right quadriceps: 5/  Left quadriceps: 55  Right hamstrin  Left hamstrin  Right glutei: 5/5  Left glutei: 5/5  Right anterior tibial: 5/5  Left anterior tibial: 5/5  Right posterior tibial: 5/5  Left posterior tibial: 5  Right peroneal: 55  Left peroneal: 5  Right gastroc: 5  Left gastroc: 5/       Right sided pronator drift.  Little difficulty in initiating movement in right upper extremity      REFLEXES     Reflexes   Right brachioradialis: 2+  Left brachioradialis: 2+  Right biceps: 2+  Left biceps: 2+  Right triceps: 2+  Left triceps: 2+  Right patellar: 2+  Left patellar: 2+  Right achilles: 2+  Left achilles: 2+  Right : 2+  Left : 2+    SENSORY EXAM   Light touch normal.     GAIT AND COORDINATION        Coordination decreased (finger to nose test was significantly slow, but mainly accurate)        Significant Labs:   CBC:   Recent Labs  Lab 18  1317 18  0502   WBC 9.12 9.16   HGB 11.8* 11.4*   HCT 32.9* 31.6*    254     CMP:   Recent Labs  Lab 18  1317 18  0502   * 116*    139   K 4.8 4.1    107   CO2 27 26   BUN 17 16   CREATININE 1.8* 1.7*   CALCIUM 8.9 8.6*   MG  --  1.9   PROT 7.0 6.4   ALBUMIN 3.4* 3.1*   BILITOT 1.1* 1.4*   ALKPHOS 112 94   AST 29 28   ALT 18 15   ANIONGAP 5* 6*   EGFRNONAA 38.9* 41.7*       Significant Imaging: I have reviewed and interpreted all pertinent imaging results/findings within the past 24 hours.

## 2018-03-24 NOTE — MEDICAL/APP STUDENT
"Subjective:       Patient ID: Alfa Christy III is a 62 y.o. male.    Chief Complaint: Aphasia (Wife reports pt with aphagia and memory loss since 930am.  Pt with hx cva.)    Mr. lAfa Christy is a 63 y/o M with a PMH of Left sided CVA (2016), Seizures, HTN, HLD, DM2, AF on Eliquis, HF, CKDIII, and Thyroid cancer s/p surgery who has been consulted for aphasia and confusion. History gathered from Mr. Christy's wife at bedside as well as Mr. Christy. Wife reports that yesterday morning at 8am, Mr. Christy woke up and complained of dizziness. He went downstairs to eat breakfast at which point his wife realized he had not taken his night time medications (including his 750 dosage of Keppra, as well as hydralazine). He explained he was going to work on bills/finances but when she came to check on him, he reported he was unable to focus on numbers and seemed confused. She asked him his name, which he reported to her, but when she asked what he was wearing on his feet, he was unable to tell her "shoes." In the emergency department, he was not able to report his name or birthday. He was speaking slowly, and having difficulty finding words at all. Wife describes it looked as if he was thinking for a long time but unable to get the words out. She also reports that his right arm was shaking uncontrollably in the ED. Mrs. Christy reports that this episode is similar to a previous seizure episode he had in October of 2017. He was confused, unable to find words or speak, and had minor shaking. It was diagnosed as a partial seizure. After his stroke in 2016, later that year, Mr. Christy had his first seizure which was a generalized tonic clonic seizure. His wife reports this involved full body shaking, and was not similar to this current episode. She denies any abnormal gaze or eye movements.     Today, Mr. Christy reports that he is having difficulty with coordination and movement. He repeatedly moves his right arm at a slow pace " "and finds it is difficult to initiate movement. He reports his left arm is much better than his right. His speech is slowed. His wife reports she believes he is about 40% of normal. She reports that he takes 750 mg of Keppra BID.     Pertinent Physical Exam Findings:  1. Oriented to person, place and month/day of week, but not year.  2. Unable to follow 3 step command ("close eyes, stick tongue out, point to ceiling" - needed it repeated 2x, did not point to ceiling).   3. Unable to follow 2 step command ("touch your right ear with your left hand" - he held his right arm outstretched to the side)  4. Right sided facial weakness (evident when asked to smile). Right sided hemianopia (deficit from previous stroke).   5. Right sided pronator drift.  6. Coordination decreased (finger to nose test was significantly slow, but mainly accurate)  7. Power 5/5. Tone normal. Reflexes intact.       Review of Systems    Objective:      Physical Exam    Assessment:       1. Encephalopathy    2. Stroke        Plan:       ***    "

## 2018-03-24 NOTE — SUBJECTIVE & OBJECTIVE
Interval History: No acute events overnight. This AM, pt sitting upright in bed watching TV with wife at bedside. Improved aphasia. Able to answer all questions. Discussed plan of care.    Review of Systems   Constitutional: Negative for chills, diaphoresis, fatigue and fever.   Respiratory: Negative for cough, shortness of breath and wheezing.    Cardiovascular: Negative for chest pain, palpitations and leg swelling.   Gastrointestinal: Negative for abdominal distention, abdominal pain, diarrhea, nausea and vomiting.   Musculoskeletal: Negative for back pain, gait problem, myalgias and neck pain.   Neurological: Positive for seizures and speech difficulty. Negative for dizziness, syncope, weakness, light-headedness and headaches.   Psychiatric/Behavioral: Negative for agitation, confusion and dysphoric mood. The patient is not nervous/anxious.      Objective:     Vital Signs (Most Recent):  Temp: 98.2 °F (36.8 °C) (03/24/18 1149)  Pulse: 65 (03/24/18 1149)  Resp: 17 (03/24/18 1149)  BP: (!) 140/92 (03/24/18 1149)  SpO2: 96 % (03/24/18 1149) Vital Signs (24h Range):  Temp:  [97.8 °F (36.6 °C)-98.5 °F (36.9 °C)] 98.2 °F (36.8 °C)  Pulse:  [62-78] 65  Resp:  [17-20] 17  SpO2:  [95 %-99 %] 96 %  BP: (140-180)/(78-92) 140/92     Weight: 102.2 kg (225 lb 5 oz)  Body mass index is 32.33 kg/m².    Intake/Output Summary (Last 24 hours) at 03/24/18 1502  Last data filed at 03/24/18 0600   Gross per 24 hour   Intake                0 ml   Output             1300 ml   Net            -1300 ml      Physical Exam   Constitutional: He is oriented to person, place, and time. He appears well-developed and well-nourished. No distress.   Eyes: Right eye exhibits no discharge.   Cardiovascular: Normal rate, normal heart sounds, intact distal pulses and normal pulses.  An irregularly irregular rhythm present.   Pulmonary/Chest: Effort normal and breath sounds normal. No respiratory distress. He has no wheezes.   Abdominal: Soft. Bowel  sounds are normal. He exhibits no distension. There is no tenderness. There is no rebound and no guarding.   Musculoskeletal: He exhibits edema (+1 pitting to BLE). He exhibits no tenderness.   Neurological: He is alert and oriented to person, place, and time.   Minimal difficulty with speech today; improved from yesterday   Skin: Skin is warm and dry. He is not diaphoretic. No erythema.   Psychiatric: He has a normal mood and affect. His speech is normal and behavior is normal. Cognition and memory are normal.       Significant Labs: All pertinent labs within the past 24 hours have been reviewed.    Significant Imaging: I have reviewed all pertinent imaging results/findings within the past 24 hours.

## 2018-03-24 NOTE — HOSPITAL COURSE
Pt admitted to observation for aphasia in setting of hx of CVA and seizures. CTH and MRI negative for acute infarct. Labs entirely unremarkable (TSH and lactate WNL). No s/s infection; afebrile without leukocytosis (CXR and UA without infection). Neurology consulted and suspects breakthrough seizure with prolonged post-ictus. EEG shows slowing consistent with hx of L sided CVA; no epileptic activity. Pt discharged home to follow up with epilepsy as outpatient. Educated on importance of compliance with home medications.

## 2018-03-24 NOTE — PROGRESS NOTES
"[facility] Hospital Medicine  Progress Note    Patient Name: Alfa Christy III  MRN: [id number]  Patient Class: OP- Observation   Admission Date: 3/23/2018  Length of Stay: 0 days  Attending Physician: Bailee Drummond MD  Primary Care Provider: Tyler Jasmine MD    [facility] Medicine Team: [facility] F Dorothy Parks PA-C    Subjective:     Principal Problem:Aphasia    HPI:  Mr. Christy is a 61 yo male with a PMHx of HTN, HLD, DM2, PAF on Eliquis, HFpEF, CKDIII, thyroid cancer s/p surgery and radiation (2009), previous L sided CVA (2016) resulting in R homonymous hemianopsia, and complex partial seizures presenting to the ED with wife at bedside c/o confusion and difficulty speaking. Wife at bedside to provide the history. She states this morning at ~8am patient was getting dressed and was c/o minor dizziness. She noted he had not taken his keppra from the previous night, in addition to all of his "night time medications." Pt was able to take all morning medications. At around 10am, pt was sitting down to "go over his finances and pay bills", she noted something was "off". She asked him his name, which he could provide for her but he could not remember the word for "shoes" when she asked him what he was wearing on his feet. She did not witness convulsions, jerking movements, LOC, tongue biting, or incontinence. Wife states patient was able to ambulate and continue to go about "normal" activities. Wife brought patient to the ED due to concern for aphasia. Of note, wife reports pt is non-compliant with hydralazine and ASA. Per wife, this was a similar presentation to his last admission for asphasia suspected to be secondary to seizures.     In the ED, HDS; noted to be hypertensive to 190s/90s. Stroke code was called; CTH and MRI without evidence of acute infarct. Vascular neurology assessed; low suspicion for acute CVA. Remainder of labs and imaging were unremarkable.    Hospital Course:  Pt " admitted to observation for aphasia in setting of hx of CVA and seizures. CTH and MRI negative for acute infarct. Labs entirely unremarkable (TSH and lactate WNL). No s/s infection; afebrile without leukocytosis (CXR and UA without infection). Neurology consulted and suspects breakthrough seizure with prolonged post-ictus. EEG pending to rule out subtle NCSE and increase LEV.    Interval History: No acute events overnight. This AM, pt sitting upright in bed watching TV with wife at bedside. Improved aphasia. Able to answer all questions. Discussed plan of care.    Review of Systems   Constitutional: Negative for chills, diaphoresis, fatigue and fever.   Respiratory: Negative for cough, shortness of breath and wheezing.    Cardiovascular: Negative for chest pain, palpitations and leg swelling.   Gastrointestinal: Negative for abdominal distention, abdominal pain, diarrhea, nausea and vomiting.   Musculoskeletal: Negative for back pain, gait problem, myalgias and neck pain.   Neurological: Positive for seizures and speech difficulty. Negative for dizziness, syncope, weakness, light-headedness and headaches.   Psychiatric/Behavioral: Negative for agitation, confusion and dysphoric mood. The patient is not nervous/anxious.      Objective:     Vital Signs (Most Recent):  Temp: 98.2 °F (36.8 °C) (03/24/18 1149)  Pulse: 65 (03/24/18 1149)  Resp: 17 (03/24/18 1149)  BP: (!) 140/92 (03/24/18 1149)  SpO2: 96 % (03/24/18 1149) Vital Signs (24h Range):  Temp:  [97.8 °F (36.6 °C)-98.5 °F (36.9 °C)] 98.2 °F (36.8 °C)  Pulse:  [62-78] 65  Resp:  [17-20] 17  SpO2:  [95 %-99 %] 96 %  BP: (140-180)/(78-92) 140/92     Weight: 102.2 kg (225 lb 5 oz)  Body mass index is 32.33 kg/m².    Intake/Output Summary (Last 24 hours) at 03/24/18 1502  Last data filed at 03/24/18 0600   Gross per 24 hour   Intake                0 ml   Output             1300 ml   Net            -1300 ml      Physical Exam   Constitutional: He is oriented to person,  place, and time. He appears well-developed and well-nourished. No distress.   Eyes: Right eye exhibits no discharge.   Cardiovascular: Normal rate, normal heart sounds, intact distal pulses and normal pulses.  An irregularly irregular rhythm present.   Pulmonary/Chest: Effort normal and breath sounds normal. No respiratory distress. He has no wheezes.   Abdominal: Soft. Bowel sounds are normal. He exhibits no distension. There is no tenderness. There is no rebound and no guarding.   Musculoskeletal: He exhibits edema (+1 pitting to BLE). He exhibits no tenderness.   Neurological: He is alert and oriented to person, place, and time.   Minimal difficulty with speech today; improved from yesterday   Skin: Skin is warm and dry. He is not diaphoretic. No erythema.   Psychiatric: He has a normal mood and affect. His speech is normal and behavior is normal. Cognition and memory are normal.       Significant Labs: All pertinent labs within the past 24 hours have been reviewed.    Significant Imaging: I have reviewed all pertinent imaging results/findings within the past 24 hours.    Assessment/Plan:      * Aphasia    ?etiology; seizures vs pseudoseizures vs psychogenic   - CTH and MRI negative for acute infarct  - HDS; hypertensive on admit; suspect 2/2 noncompliance  - Labs entirely unremarkable (TSH and lactate WNL)  - No s/s infection; afebrile without leukocytosis (CXR and UA without infection)  - Evaluated by [facility] neuro in the ED who have low suspicion for acute infarct; continue home stroke prevention regimen  - Inconsistent findings on exam with fluctuating aphasia  - Similar presentation for admission 10/2017 for aphasia, suspected to be 2/2 seizures (EEG did not capture seizure activity); of note, aphasia sx noted to fluctuate and in setting of financial stressors  - Wife reports missed Keppra PM dose; ?noncompliance issues  - Ativan PRN  - Seizure precautions  - Neurology consulted and appreciate assistance  3/24:  "Improved aphasia. Neurology suspects breakthrough seizure with prolonged post-ictus. EEG pending to rule out subtle NCSE and increase LEV.        Complex partial seizures evolving to generalized tonic-clonic seizures    S/p CVA  - Similar presentation for admission 10/2017 for aphasia; suspect to be 2/2 seizures  - Wife reports missed Keppra PM dose; ?noncompliance issues  - Keppra level pending  - Ativan PRN  - Seizure precautions  - Neurology suspects breakthrough seizure with prolonged post-ictus. EEG pending to rule out subtle NCSE and increase LEV.        History of CVA (cerebrovascular accident)    - See above  - CTH and MRI negative for acute infarct  - On appropriate stroke prevention regimen  - Wife endorses noncompliance with ASA and "some" antihypertensives  - Educated on importance of medication compliance        Essential hypertension    - Uncontrolled on admit  - Wife endorses pt noncompliant with some medications  - BP improved with resumed home regimen  - Will continue to monitor        Type 2 diabetes mellitus with hyperglycemia    - Last A1c 6.0 in 1/2018  - A1c 4.9  - Cardiac/DM diet  - Low dose SSI given decreased PO intake  - Will monitor BGs and adjust PRN        CKD (chronic kidney disease), stage III    Anemia  - Cr 1.8 on admit; improved from baseline (~2.1)  - Hgb 11.8 on admit; consistent with baseline  - Follows with nephrology  - Trend daily        Chronic diastolic heart failure    - Stable and chronic  - No s/s exacerbation  - Follows with cardiology as outpatient  - Continue home BB, ARB, and diuretics  - Strict I/Os, daily weights        Paroxysmal atrial fibrillation    - Afib on admission; rate controlled  - Continue home BB and Eliquis  - Telemetry        Hypercholesterolemia    - Continue home ASA and lipitor        Postsurgical hypothyroidism    Hx of thyroid cancer s/p surgery and radiation in 2009  - TSH WNL  - Continue home synthroid           VTE Risk Mitigation         " Ordered     apixaban tablet 5 mg  2 times daily     Route:  Oral        03/23/18 1728     IP VTE HIGH RISK PATIENT  Once      03/23/18 1920     Place DARIAN hose  Until discontinued      03/23/18 1920     Place sequential compression device  Until discontinued      03/23/18 1920     Reason for No Pharmacological VTE Prophylaxis  Once      03/23/18 1920              Dorothy Parks PA-C  Department of Hospital Medicine   [facility]  Discussed with staff: Dr. Drummond

## 2018-03-24 NOTE — HPI
"Mr. Alfa Christy is a 61 y/o M with a PMH of Left sided CVA (2016), Seizures, HTN, HLD, DM2, AF on Eliquis, HF, CKDIII, and Thyroid cancer s/p surgery who has been consulted for aphasia and confusion. History gathered from Mr. Christy's wife at bedside as well as Mr. Christy. Wife reports that yesterday morning at 8am, Mr. Christy woke up and complained of dizziness. He went downstairs to eat breakfast at which point his wife realized he had not taken his night time medications (including his 750 dosage of Keppra, as well as hydralazine). He explained he was going to work on bills/finances but when she came to check on him, he reported he was unable to focus on numbers and seemed confused. She asked him his name, which he reported to her, but when she asked what he was wearing on his feet, he was unable to tell her "shoes." In the emergency department, he was not able to report his name or birthday. He was speaking slowly, and having difficulty finding words at all. Wife describes it looked as if he was thinking for a long time but unable to get the words out. She also reports that his right arm was shaking uncontrollably in the ED. Mrs. Christy reports that this episode is similar to a previous seizure episode he had in October of 2017. He was confused, unable to find words or speak, and had minor shaking. It was diagnosed as a partial seizure. After his stroke in 2016, later that year, Mr. Christy had his first seizure which was a generalized tonic clonic seizure. His wife reports this involved full body shaking, and was not similar to this current episode. She denies any abnormal gaze or eye movements.       Today, Mr. Christy reports that he is having difficulty with coordination and movement. He repeatedly moves his right arm at a slow pace and finds it is difficult to initiate movement. He reports his left arm is much better than his right. His speech is slowed. His wife reports she believes he is about 40% of " normal. She reports that he takes 750 mg of Keppra BID.

## 2018-03-24 NOTE — ASSESSMENT & PLAN NOTE
S/p CVA  - Similar presentation for admission 10/2017 for aphasia; suspect to be 2/2 seizures  - Wife reports missed Keppra PM dose; ?noncompliance issues  - Keppra level pending  - Ativan PRN  - Seizure precautions  - Neurology suspects breakthrough seizure with prolonged post-ictus. EEG pending to rule out subtle NCSE and increase LEV.

## 2018-03-24 NOTE — ASSESSMENT & PLAN NOTE
?etiology; seizures vs pseudoseizures vs psychogenic   - CTH and MRI negative for acute infarct  - HDS; hypertensive on admit; suspect 2/2 noncompliance  - Labs entirely unremarkable (TSH and lactate WNL)  - No s/s infection; afebrile without leukocytosis (CXR and UA without infection)  - Evaluated by [facility] neuro in the ED who have low suspicion for acute infarct; continue home stroke prevention regimen  - Inconsistent findings on exam with fluctuating aphasia  - Similar presentation for admission 10/2017 for aphasia, suspected to be 2/2 seizures (EEG did not capture seizure activity); of note, aphasia sx noted to fluctuate and in setting of financial stressors  - Wife reports missed Keppra PM dose; ?noncompliance issues  - Ativan PRN  - Seizure precautions  - Neurology consulted and appreciate assistance  3/24: Improved aphasia. Neurology suspects breakthrough seizure with prolonged post-ictus. EEG pending to rule out subtle NCSE and increase LEV.

## 2018-03-24 NOTE — PLAN OF CARE
Problem: Physical Therapy Goal  Goal: Physical Therapy Goal  Goals to be met by: 3/31/18     Patient will increase functional independence with mobility by performin. Supine to sit with [place]  2. Sit to stand transfer with Supervision  3. Gait  x 200 feet with Supervision using LRAD as needed.   4. Ascend/descend 1 flight of stairs with bilateral Handrails Contact Guard Assistance.     Outcome: Ongoing (interventions implemented as appropriate)  PT evaluation complete and appropriate goals established.    Rosa Aquino, PT, DPT   3/24/2018  [phone]

## 2018-03-25 VITALS
WEIGHT: 225.31 LBS | HEIGHT: 70 IN | SYSTOLIC BLOOD PRESSURE: 148 MMHG | RESPIRATION RATE: 17 BRPM | OXYGEN SATURATION: 96 % | HEART RATE: 62 BPM | DIASTOLIC BLOOD PRESSURE: 82 MMHG | BODY MASS INDEX: 32.26 KG/M2 | TEMPERATURE: 98 F

## 2018-03-25 LAB
ALBUMIN SERPL BCP-MCNC: 3 G/DL
ALP SERPL-CCNC: 97 U/L
ALT SERPL W/O P-5'-P-CCNC: 18 U/L
ANION GAP SERPL CALC-SCNC: 9 MMOL/L
AST SERPL-CCNC: 32 U/L
BASOPHILS # BLD AUTO: 0.06 K/UL
BASOPHILS NFR BLD: 0.8 %
BILIRUB SERPL-MCNC: 1.5 MG/DL
BUN SERPL-MCNC: 23 MG/DL
CALCIUM SERPL-MCNC: 8.3 MG/DL
CHLORIDE SERPL-SCNC: 107 MMOL/L
CO2 SERPL-SCNC: 22 MMOL/L
CREAT SERPL-MCNC: 1.9 MG/DL
DIFFERENTIAL METHOD: ABNORMAL
EOSINOPHIL # BLD AUTO: 0.4 K/UL
EOSINOPHIL NFR BLD: 4.6 %
ERYTHROCYTE [DISTWIDTH] IN BLOOD BY AUTOMATED COUNT: 16.8 %
EST. GFR  (AFRICAN AMERICAN): 42.1 ML/MIN/1.73 M^2
EST. GFR  (NON AFRICAN AMERICAN): 36.4 ML/MIN/1.73 M^2
GLUCOSE SERPL-MCNC: 137 MG/DL
HCT VFR BLD AUTO: 33 %
HGB BLD-MCNC: 11.6 G/DL
IMM GRANULOCYTES # BLD AUTO: 0.02 K/UL
IMM GRANULOCYTES NFR BLD AUTO: 0.3 %
LYMPHOCYTES # BLD AUTO: 2.6 K/UL
LYMPHOCYTES NFR BLD: 33.6 %
MCH RBC QN AUTO: 28.2 PG
MCHC RBC AUTO-ENTMCNC: 35.2 G/DL
MCV RBC AUTO: 80 FL
MONOCYTES # BLD AUTO: 1.1 K/UL
MONOCYTES NFR BLD: 14.8 %
NEUTROPHILS # BLD AUTO: 3.5 K/UL
NEUTROPHILS NFR BLD: 45.9 %
NRBC BLD-RTO: 1 /100 WBC
PLATELET # BLD AUTO: 247 K/UL
PMV BLD AUTO: 11.2 FL
POCT GLUCOSE: 197 MG/DL (ref 70–110)
POTASSIUM SERPL-SCNC: 4.1 MMOL/L
PROT SERPL-MCNC: 6.5 G/DL
RBC # BLD AUTO: 4.12 M/UL
SODIUM SERPL-SCNC: 138 MMOL/L
WBC # BLD AUTO: 7.62 K/UL

## 2018-03-25 PROCEDURE — G8989 SELF CARE D/C STATUS: HCPCS | Mod: CJ

## 2018-03-25 PROCEDURE — 36415 COLL VENOUS BLD VENIPUNCTURE: CPT

## 2018-03-25 PROCEDURE — G0378 HOSPITAL OBSERVATION PER HR: HCPCS

## 2018-03-25 PROCEDURE — A4216 STERILE WATER/SALINE, 10 ML: HCPCS | Performed by: PHYSICIAN ASSISTANT

## 2018-03-25 PROCEDURE — 97165 OT EVAL LOW COMPLEX 30 MIN: CPT

## 2018-03-25 PROCEDURE — 99217 PR OBSERVATION CARE DISCHARGE: CPT | Mod: SA,,, | Performed by: PHYSICIAN ASSISTANT

## 2018-03-25 PROCEDURE — 99225 PR SUBSEQUENT OBSERVATION CARE,LEVEL II: CPT | Mod: ,,, | Performed by: PSYCHIATRY & NEUROLOGY

## 2018-03-25 PROCEDURE — 85025 COMPLETE CBC W/AUTO DIFF WBC: CPT

## 2018-03-25 PROCEDURE — G8987 SELF CARE CURRENT STATUS: HCPCS | Mod: CJ

## 2018-03-25 PROCEDURE — 80053 COMPREHEN METABOLIC PANEL: CPT

## 2018-03-25 PROCEDURE — G8988 SELF CARE GOAL STATUS: HCPCS | Mod: CJ

## 2018-03-25 PROCEDURE — 25000003 PHARM REV CODE 250: Performed by: PHYSICIAN ASSISTANT

## 2018-03-25 RX ADMIN — Medication 3 ML: at 06:03

## 2018-03-25 RX ADMIN — CARVEDILOL 3.12 MG: 3.12 TABLET, FILM COATED ORAL at 10:03

## 2018-03-25 RX ADMIN — AMLODIPINE BESYLATE 10 MG: 10 TABLET ORAL at 10:03

## 2018-03-25 RX ADMIN — LOSARTAN POTASSIUM 100 MG: 50 TABLET, FILM COATED ORAL at 11:03

## 2018-03-25 RX ADMIN — LEVETIRACETAM 1500 MG: 750 TABLET ORAL at 11:03

## 2018-03-25 RX ADMIN — LEVOTHYROXINE SODIUM 200 MCG: 100 TABLET ORAL at 06:03

## 2018-03-25 RX ADMIN — ATORVASTATIN CALCIUM 40 MG: 20 TABLET, FILM COATED ORAL at 10:03

## 2018-03-25 RX ADMIN — FOLIC ACID 1000 MCG: 1 TABLET ORAL at 10:03

## 2018-03-25 RX ADMIN — FUROSEMIDE 40 MG: 40 TABLET ORAL at 10:03

## 2018-03-25 RX ADMIN — APIXABAN 5 MG: 5 TABLET, FILM COATED ORAL at 10:03

## 2018-03-25 RX ADMIN — ASPIRIN 81 MG CHEWABLE TABLET 81 MG: 81 TABLET CHEWABLE at 10:03

## 2018-03-25 RX ADMIN — HYDRALAZINE HYDROCHLORIDE 25 MG: 25 TABLET, FILM COATED ORAL at 05:03

## 2018-03-25 NOTE — ASSESSMENT & PLAN NOTE
Mr. Alfa Christy is a 61 y/o M with a PMH of Left sided CVA (2016), Seizures, HTN, HLD, DM2, AF on Eliquis, HF, CKDIII, and Thyroid cancer s/p surgery who has been consulted for trouble finding words and confusion . Favor breakthrough seizure with prolonged post-ictus in a setting of missed dose.      IMPROVED DRAMATICALLY X 24 HOURS.     -- CTH and MRI negative for acute infarct   -- Hypertensive on admit, suspect 2/2 noncompliance    Plan:   -- Seizure precautions  -- Follow EEG - if reassuring today, can be discharged on Keppra 1500 BID with follow-up

## 2018-03-25 NOTE — PROGRESS NOTES
"Patient yelling from hallway to "Call Security." (disrupting other patients) Refusing Bed Alarm/ Contact Luiz [phone]- advised en route/ Security arrived/ RTF/ OC- advised patient will be quiet "if close door."  Will continue to monitor.  "

## 2018-03-25 NOTE — ASSESSMENT & PLAN NOTE
- Stable and chronic  - No s/s exacerbation  - Follows with cardiology as outpatient  - Continue home BB, ARB, and diuretics

## 2018-03-25 NOTE — ASSESSMENT & PLAN NOTE
- CTH and MRI negative for acute infarct  - HDS; hypertensive on admit; suspect 2/2 noncompliance  - Labs entirely unremarkable (TSH and lactate WNL)  - No s/s infection; afebrile without leukocytosis (CXR and UA without infection)  - Evaluated by [facility] neuro in the ED who have low suspicion for acute infarct; continue home stroke prevention regimen  - Inconsistent findings on exam with fluctuating aphasia  - Similar presentation for admission 10/2017 for aphasia, suspected to be 2/2 seizures (EEG did not capture seizure activity); of note, aphasia sx noted to fluctuate and in setting of financial stressors  - Wife reports missed Keppra PM dose; ?noncompliance issues  3/24: Improved aphasia. Neurology suspects breakthrough seizure with prolonged post-ictus. EEG pending to rule out subtle NCSE and increase LEV.  3/25: EEG shows slowing consistent with hx of L sided CVA; no epileptic activity  - Pt discharged home to follow up with epilepsy as outpatient. Educated on importance of compliance with home medications.

## 2018-03-25 NOTE — PLAN OF CARE
Problem: Occupational Therapy Goal  Goal: Occupational Therapy Goal  Outcome: Outcome(s) achieved Date Met: 03/25/18  Pt is able to perform all necessary ADLs with little to no assistance and has adequate support at home from his wife. OT to mary/wandy.    LILO Null  3/25/2018  Pager: [phone]

## 2018-03-25 NOTE — SUBJECTIVE & OBJECTIVE
Interval History: No acute events overnight. This AM, pt sitting on couch with wife at bedside. Aphasia resolved. Able to answer all questions. Pt agitated and wants to be discharged. Discussed plan of care.    Review of Systems   Constitutional: Negative for chills, diaphoresis, fatigue and fever.   Respiratory: Negative for cough, shortness of breath and wheezing.    Cardiovascular: Negative for chest pain, palpitations and leg swelling.   Gastrointestinal: Negative for abdominal distention, abdominal pain, diarrhea, nausea and vomiting.   Musculoskeletal: Negative for back pain, gait problem, myalgias and neck pain.   Neurological: Positive for seizures and speech difficulty. Negative for dizziness, syncope, weakness, light-headedness and headaches.   Psychiatric/Behavioral: Negative for agitation, confusion and dysphoric mood. The patient is not nervous/anxious.      Objective:     Vital Signs (Most Recent):  Temp: 98.1 °F (36.7 °C) (03/25/18 0348)  Pulse: 62 (03/25/18 0730)  Resp: 18 (03/25/18 0348)  BP: (!) 159/85 (03/25/18 0348)  SpO2: 96 % (03/25/18 0730) Vital Signs (24h Range):  Temp:  [96.9 °F (36.1 °C)-99 °F (37.2 °C)] 98.1 °F (36.7 °C)  Pulse:  [58-83] 62  Resp:  [17-18] 18  SpO2:  [94 %-96 %] 96 %  BP: (140-159)/(73-92) 159/85     Weight: 102.2 kg (225 lb 5 oz)  Body mass index is 32.33 kg/m².  No intake or output data in the 24 hours ending 03/25/18 1048   Physical Exam   Constitutional: He is oriented to person, place, and time. He appears well-developed and well-nourished. No distress.   Eyes: Right eye exhibits no discharge.   Cardiovascular: Normal rate, normal heart sounds, intact distal pulses and normal pulses.  An irregularly irregular rhythm present.   Pulmonary/Chest: Effort normal and breath sounds normal. No respiratory distress. He has no wheezes.   Abdominal: Soft. Bowel sounds are normal. He exhibits no distension. There is no tenderness. There is no rebound and no guarding.    Musculoskeletal: He exhibits edema. He exhibits no tenderness.   Neurological: He is alert and oriented to person, place, and time.   Aphasia resolved   Skin: Skin is warm and dry. He is not diaphoretic. No erythema.   Psychiatric: He has a normal mood and affect. His speech is normal and behavior is normal. Cognition and memory are normal.       Significant Labs: All pertinent labs within the past 24 hours have been reviewed.    Significant Imaging: I have reviewed all pertinent imaging results/findings within the past 24 hours.

## 2018-03-25 NOTE — ASSESSMENT & PLAN NOTE
- Uncontrolled on admit  - Wife endorses pt noncompliant with some medications  - BP improved with resumed home regimen

## 2018-03-25 NOTE — ASSESSMENT & PLAN NOTE
S/p CVA  - Similar presentation for admission 10/2017 for aphasia; suspect to be 2/2 seizures  - Wife reports missed Keppra PM dose; ?noncompliance issues  - Keppra level pending  - See above

## 2018-03-25 NOTE — SUBJECTIVE & OBJECTIVE
Subjective:     Interval History: Near baseline, wife at the bedside.  Hooked up overnight with EEG.    Current Neurological Medications: keppra 1500 bid (home dose)    Current Facility-Administered Medications   Medication Dose Route Frequency Provider Last Rate Last Dose    acetaminophen tablet 650 mg  650 mg Oral Q6H PRN Dorothy Areaux, PA-C        amLODIPine tablet 10 mg  10 mg Oral Daily Dorothy Areaux, PA-C   10 mg at 03/24/18 0925    apixaban tablet 5 mg  5 mg Oral BID Dorothy Areaux, PA-C   5 mg at 03/24/18 2022    aspirin chewable tablet 81 mg  81 mg Oral Daily Dorothy Areaux, PA-C   81 mg at 03/24/18 0928    atorvastatin tablet 40 mg  40 mg Oral Daily Dorothy Areaux, PA-C   40 mg at 03/24/18 0929    carvedilol tablet 3.125 mg  3.125 mg Oral BID Dorothy Areaux, PA-C   3.125 mg at 03/24/18 2022    dextrose 50% injection 12.5 g  12.5 g Intravenous PRN Dorothy Areaux, PA-C        dextrose 50% injection 25 g  25 g Intravenous PRN Dorothy Areaux, PA-C        folic acid tablet 1,000 mcg  1,000 mcg Oral Daily Dorothy Areaux, PA-C   1,000 mcg at 03/24/18 0927    furosemide tablet 40 mg  40 mg Oral BID Dorothy Areaux, PA-C   40 mg at 03/24/18 2022    glucagon (human recombinant) injection 1 mg  1 mg Intramuscular PRN Dorothy Areaux, PA-C        glucose chewable tablet 16 g  16 g Oral PRN Dorothy Areaux, PA-C        glucose chewable tablet 24 g  24 g Oral PRN Dorothy Areaux, PA-C        hydrALAZINE tablet 25 mg  25 mg Oral Q8H Dorothy Areaux, PA-C   25 mg at 03/25/18 0557    insulin aspart U-100 pen 0-5 Units  0-5 Units Subcutaneous QID (AC + HS) PRN Dorothy Areaux, PA-C   2 Units at 03/24/18 1703    levETIRAcetam tablet 1,500 mg  1,500 mg Oral BID Dorothy Areaux, PA-C   1,500 mg at 03/24/18 2021    levothyroxine tablet 200 mcg  200 mcg Oral Before breakfast Dorothy Areaux, PA-C   200 mcg at 03/25/18 0600    losartan tablet 100 mg  100 mg Oral Daily Dorothy Parks PA-C   100 mg at 03/24/18 0926    ondansetron  disintegrating tablet 8 mg  8 mg Oral Q8H PRN Dorothy Parks PA-C        ondansetron injection 4 mg  4 mg Intravenous Q8H PRN Dorothy Parks PA-C        sodium chloride 0.9% flush 3 mL  3 mL Intravenous Q8H Dorothy Parks PA-C   3 mL at 03/25/18 0600    [START ON 3/26/2018] vitamin D 1000 units tablet 1,000 Units  1,000 Units Oral Every Mon, Wed, Fri Dorothy Parks PA-C           Review of Systems   Constitutional: Positive for fatigue. Negative for unexpected weight change.   HENT: Negative for trouble swallowing.    Eyes: Negative for photophobia and pain.   Respiratory: Negative for cough and shortness of breath.    Cardiovascular: Negative for chest pain.   Gastrointestinal: Negative for abdominal pain, constipation, diarrhea, nausea and vomiting.   Genitourinary: Negative for frequency.   Musculoskeletal: Negative for neck pain and neck stiffness.   Neurological: Positive for seizures, speech difficulty and weakness. Negative for dizziness, facial asymmetry, numbness and headaches.   Psychiatric/Behavioral: Negative for agitation and behavioral problems.     Objective:     Vital Signs (Most Recent):  Temp: 98.1 °F (36.7 °C) (03/25/18 0348)  Pulse: 62 (03/25/18 0730)  Resp: 18 (03/25/18 0348)  BP: (!) 159/85 (03/25/18 0348)  SpO2: 96 % (03/25/18 0730) Vital Signs (24h Range):  Temp:  [96.9 °F (36.1 °C)-99 °F (37.2 °C)] 98.1 °F (36.7 °C)  Pulse:  [58-83] 62  Resp:  [17-18] 18  SpO2:  [94 %-96 %] 96 %  BP: (140-159)/(73-92) 159/85     Weight: 102.2 kg (225 lb 5 oz)  Body mass index is 32.33 kg/m².    Physical Exam   Constitutional: He is oriented to person, place, and time.   Neurological: He is oriented to person, place, and time.       NEUROLOGICAL EXAMINATION:     MENTAL STATUS   Oriented to person, place, and time.        Sitting at bedside, wife in chair.  Frustrated that he is still here.  Mild word finding issues.  Follows commands, no evidence of apraxia.     CRANIAL NERVES   Cranial nerves II through  XII intact.     MOTOR EXAM     Strength   Strength 5/5 except as noted.        4+/5 RUE with mild drift and slow FFM     GAIT AND COORDINATION        Not OOB

## 2018-03-25 NOTE — PROGRESS NOTES
[facility]  Neurology  Progress Note    Patient Name: Alfa Christy III  MRN: [id number]  Admission Date: 3/23/2018  Hospital Length of Stay: 0 days  Code Status: Full Code   Attending Provider: Bailee Drummond MD  Primary Care Physician: Tyler Jasmine MD   Principal Problem:Aphasia      Subjective:     Interval History: Near baseline, wife at the bedside.  Hooked up overnight with EEG.    Current Neurological Medications: keppra 1500 bid (home dose)    Current Facility-Administered Medications   Medication Dose Route Frequency Provider Last Rate Last Dose    acetaminophen tablet 650 mg  650 mg Oral Q6H PRN Dorothy Areaux, PA-C        amLODIPine tablet 10 mg  10 mg Oral Daily Dorothy Areaux, PA-C   10 mg at 03/24/18 0925    apixaban tablet 5 mg  5 mg Oral BID Dorothy Areaux, PA-C   5 mg at 03/24/18 2022    aspirin chewable tablet 81 mg  81 mg Oral Daily Dorothy Areaux, PA-C   81 mg at 03/24/18 0928    atorvastatin tablet 40 mg  40 mg Oral Daily Dortohy Areaux, PA-C   40 mg at 03/24/18 0929    carvedilol tablet 3.125 mg  3.125 mg Oral BID Dorothy Areaux, PA-C   3.125 mg at 03/24/18 2022    dextrose 50% injection 12.5 g  12.5 g Intravenous PRN Dorothy Areaux, PA-C        dextrose 50% injection 25 g  25 g Intravenous PRN Dorothy Areaux, PA-C        folic acid tablet 1,000 mcg  1,000 mcg Oral Daily Dorothy Areaux, PA-C   1,000 mcg at 03/24/18 0927    furosemide tablet 40 mg  40 mg Oral BID Dorothy Areaux, PA-C   40 mg at 03/24/18 2022    glucagon (human recombinant) injection 1 mg  1 mg Intramuscular PRN Dorothy Areaux, PA-C        glucose chewable tablet 16 g  16 g Oral PRN Dorothy Areaux, PA-C        glucose chewable tablet 24 g  24 g Oral PRN Dorothy Areaux, PA-C        hydrALAZINE tablet 25 mg  25 mg Oral Q8H Dorothy Areaux, PA-C   25 mg at 03/25/18 0557    insulin aspart U-100 pen 0-5 Units  0-5 Units Subcutaneous QID (AC + HS) PRN Dorothy Areaux, PA-C   2 Units at 03/24/18 4220     levETIRAcetam tablet 1,500 mg  1,500 mg Oral BID Dorothy Parks PA-C   1,500 mg at 03/24/18 2021    levothyroxine tablet 200 mcg  200 mcg Oral Before breakfast Dorothy Parks PA-C   200 mcg at 03/25/18 0600    losartan tablet 100 mg  100 mg Oral Daily Dorothy Parks PA-C   100 mg at 03/24/18 0926    ondansetron disintegrating tablet 8 mg  8 mg Oral Q8H PRN Dorothy Parks PA-C        ondansetron injection 4 mg  4 mg Intravenous Q8H PRN TONYA Dexter-C        sodium chloride 0.9% flush 3 mL  3 mL Intravenous Q8H TONYA Dexter-C   3 mL at 03/25/18 0600    [START ON 3/26/2018] vitamin D 1000 units tablet 1,000 Units  1,000 Units Oral Every Mon, Wed, Fri Dorothy Parks PA-C           Review of Systems   Constitutional: Positive for fatigue. Negative for unexpected weight change.   HENT: Negative for trouble swallowing.    Eyes: Negative for photophobia and pain.   Respiratory: Negative for cough and shortness of breath.    Cardiovascular: Negative for chest pain.   Gastrointestinal: Negative for abdominal pain, constipation, diarrhea, nausea and vomiting.   Genitourinary: Negative for frequency.   Musculoskeletal: Negative for neck pain and neck stiffness.   Neurological: Positive for seizures, speech difficulty and weakness. Negative for dizziness, facial asymmetry, numbness and headaches.   Psychiatric/Behavioral: Negative for agitation and behavioral problems.     Objective:     Vital Signs (Most Recent):  Temp: 98.1 °F (36.7 °C) (03/25/18 0348)  Pulse: 62 (03/25/18 0730)  Resp: 18 (03/25/18 0348)  BP: (!) 159/85 (03/25/18 0348)  SpO2: 96 % (03/25/18 0730) Vital Signs (24h Range):  Temp:  [96.9 °F (36.1 °C)-99 °F (37.2 °C)] 98.1 °F (36.7 °C)  Pulse:  [58-83] 62  Resp:  [17-18] 18  SpO2:  [94 %-96 %] 96 %  BP: (140-159)/(73-92) 159/85     Weight: 102.2 kg (225 lb 5 oz)  Body mass index is 32.33 kg/m².    Physical Exam   Constitutional: He is oriented to person, place, and time.   Neurological: He is  oriented to person, place, and time.       NEUROLOGICAL EXAMINATION:     MENTAL STATUS   Oriented to person, place, and time.        Sitting at bedside, wife in chair.  Frustrated that he is still here.  Mild word finding issues.  Follows commands, no evidence of apraxia.     CRANIAL NERVES   Cranial nerves II through XII intact.     MOTOR EXAM     Strength   Strength 5/5 except as noted.        4+/5 RUE with mild drift and slow FFM     GAIT AND COORDINATION        Not OOB         Assessment and Plan:     Altered mental state    Mr. Alfa Christy is a 63 y/o M with a PMH of Left sided CVA (2016), Seizures, HTN, HLD, DM2, AF on Eliquis, HF, CKDIII, and Thyroid cancer s/p surgery who has been consulted for trouble finding words and confusion . Favor breakthrough seizure with prolonged post-ictus in a setting of missed dose.      IMPROVED DRAMATICALLY X 24 HOURS.     -- CTH and MRI negative for acute infarct   -- Hypertensive on admit, suspect 2/2 noncompliance    Plan:   -- Seizure precautions  -- Follow EEG - if reassuring today, can be discharged on Keppra 1500 BID with follow-up            VTE Risk Mitigation         Ordered     apixaban tablet 5 mg  2 times daily     Route:  Oral        03/23/18 1728     IP VTE HIGH RISK PATIENT  Once      03/23/18 1920     Place DARIAN hose  Until discontinued      03/23/18 1920     Place sequential compression device  Until discontinued      03/23/18 1920     Reason for No Pharmacological VTE Prophylaxis  Once      03/23/18 1920          Eric Motley MD  Neurology  [facility]

## 2018-03-25 NOTE — PT/OT/SLP EVAL
Occupational Therapy   Evaluation and Discharge Note    Name: Alfa Christy III  MRN: [id number]  Admitting Diagnosis:  Aphasia      Recommendations:     Discharge Recommendations: home  Discharge Equipment Recommendations:  none  Barriers to discharge:  None    History:     Occupational Profile:  Living Environment: Pt lives with wife in a 2SH with bedroom and bathroom upstairs.  Previous level of function: Min A PRN with ADLs; cane ambulation  Roles and Routines: , friend  Equipment Owned:   (all DME in place)  Assistance upon Discharge: available as needed    Past Medical History:   Diagnosis Date    Allergy     BMI 31.0-31.9,adult 11/25/2014    Cerebrovascular disease 7/25/2016    Chronic anticoagulation - pradaxa 11/10/2016    Chronic diastolic heart failure 11/20/2016    CKD (chronic kidney disease), stage III 9/23/2013    Controlled type 2 diabetes with neuropathy 12/16/2014    Diabetes mellitus due to underlying condition with stage 3 chronic kidney disease, without long-term current use of insulin 11/2/2016    Elevated alkaline phosphatase level 8/1/2012    Elevated PSA     negative prostate biopsy 4/11    Erectile dysfunction associated with type 2 diabetes mellitus     Gallstones 3/20/2013    Generalized tonic-clonic seizure 11/2016    HTN (hypertension), benign 8/1/2012    Hypercholesterolemia 8/1/2012    Microcytic anemia 11/27/2013    Paroxysmal atrial fibrillation 9/23/2013    Postsurgical hypothyroidism 11/27/2013    Right homonymous hemianopsia 2/5/2016    Sickle cell trait     Stroke 1/27/2016    Thyroid cancer     multifocal with six lesions, largest 5mm, treated with surgery and radioactive iodine    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/2016       Past Surgical History:   Procedure Laterality Date    BREAST SURGERY      cyst removal    COLONOSCOPY      CYST REMOVAL      chest    PROSTATE BIOPSY  4/27/11    SKIN BIOPSY      THYROID SURGERY  8/26/09    VASCULAR  SURGERY         Subjective     Chief Complaint: being here  Patient/Family stated goals: go home  Communicated with: RN prior to session.  Pain/Comfort:  · Pain Rating 1: 0/10  · Pain Rating Post-Intervention 1: 0/10    Patients cultural, spiritual, Congregational conflicts given the current situation: none stated    Objective:     Patient found with: telemetry    General Precautions: Standard, aspiration, fall, seizure   Orthopedic Precautions:N/A   Braces: N/A     Occupational Performance:    Bed Mobility:    · Not observed    Functional Mobility/Transfers:  · Patient completed Sit <> Stand Transfer with supervision  with  no assistive device   · Patient completed Bed <> Chair Transfer using Stand Pivot technique with supervision with straight cane  · Patient completed Toilet Transfer Stand Pivot technique with supervision with  straight cane  · Functional Mobility: SBA with SPC, occasionally, pt uses B UE support, such as cane and a hand on his wife's shoulder    Activities of Daily Living:  · Grooming: supervision washing hands at the sink  · Bathing: minimum assistance wife assisting with shower (pt using shower chair)  · LB Dressing: supervision doffed socks in stance; then donned flip flops  · Toileting: supervision at toilet    Cognitive/Visual Perceptual:  Cognitive/Psychosocial Skills:     -       Oriented to: Person, Place, Time and Situation   -       Follows Commands/attention:Follows multistep  commands  -       Communication: clear/fluent  -       Memory: No Deficits noted  -       Safety awareness/insight to disability: intact   -       Mood/Affect/Coping skills/emotional control: Agitated  Visual/Perceptual:      -Intact    Physical Exam:  Balance:    -       Good; increased time for tasks requiring increased balance  Postural examination/scapula alignment:    -       No postural abnormalities identified  Dominant hand:    -       R  Upper Extremity Range of Motion:     -       Right Upper Extremity:  "WFL  -       Left Upper Extremity: WFL  Upper Extremity Strength:    -       Right Upper Extremity: WFL  -       Left Upper Extremity: WFL   Strength:    -       Right Upper Extremity: WFL  -       Left Upper Extremity: WFL  Fine Motor Coordination:    -       Intact  Gross motor coordination:   WFL    Patient left on shower chair with all lines intact, call button in reach, RN notified and wife present    [facility] 6 Click:  [facility] Total Score: 21    Treatment & Education:  Pt ed re OT role and POC. Pt is able to perform all necessary ADLs and has support and assistance PRN from his wife.  Education:    Assessment:     Alfa Christy III is a 62 y.o. male with a medical diagnosis of Aphasia. At this time, patient is functioning at their prior level of function and does not require further acute OT services.     Clinical Decision Makin.  OT Low:  "Pt evaluation falls under low complexity for evaluation coding due to performance deficits noted in 1-3 areas as stated above and 0 co-morbities affecting current functional status. Data obtained from problem focused assessments. No modifications or assistance was required for completion of evaluation. Only brief occupational profile and history review completed."     Plan:     During this hospitalization, patient does not require further acute OT services.  Please re-consult if situation changes.    · Plan of Care Reviewed with: patient    This Plan of care has been discussed with the patient who was involved in its development and understands and is in agreement with the identified goals and treatment plan    GOALS:    Occupational Therapy Goals     Not on file          Multidisciplinary Problems (Resolved)        Problem: Occupational Therapy Goal    Goal Priority Disciplines Outcome Interventions   Occupational Therapy Goal   (Resolved)     OT, PT/OT Outcome(s) achieved                    Time Tracking:     OT Date of Treatment: 18  OT Start Time: 1153  OT " Stop Time: 1201  OT Total Time (min): 8 min    Billable Minutes:Evaluation 8 minutes    LILO Null  3/25/2018  Pager: [phone]

## 2018-03-25 NOTE — ASSESSMENT & PLAN NOTE
Anemia  - Cr 1.8 on admit; improved from baseline (~2.1)  - Hgb 11.8 on admit; consistent with baseline  - Follows with nephrology

## 2018-03-26 LAB — LEVETIRACETAM SERPL-MCNC: 43.3 UG/ML (ref 3–60)

## 2018-03-27 NOTE — PROCEDURES
DATE OF PROCEDURE:  03/24/2018      ICU EEG/VIDEO MONITORING REPORT     METHODOLOGY:  Electroencephalographic (EEG) is recorded with electrodes placed   according to the International 10-20 placement system.  Thirty two (32) channels   of digital signal (sampling rate of 512/sec), including T1 and T2, were   simultaneously recorded from the scalp and may include EKG, EMG, and/or eye   monitors.  Recording band pass was 0.1 to 512 Hz.  Digital video recording of   the patient is simultaneously recorded with the EEG.  The patient is instructed   to report clinical symptoms which may occur during the recording session.  EEG   and video recording are stored and archived in digital format.  Activation   procedures, which include photic stimulation, hyperventilation and instructing   patients to perform simple tasks, are done in selected patients.    The EEG is displayed on a monitor screen and can be reviewed using different   montages.  Computer-assisted analysis is employed to detect spike and   electrographic seizure activity.   The entire record is submitted for computer   analysis.  The entire recording is visually reviewed, and the times identified   by computer analysis as being spikes or seizures are reviewed again.      Compressed spectral analysis (CSA) is also performed on the activity recorded   from each individual channel.  This is displayed as a power display of   frequencies from 0 to 30 Hz over time.   The CSA is reviewed looking for   asymmetries in power between homologous areas of the scalp, then compared with   the original EEG recording.      [employer] software was also utilized in the review of this study.  This software   suite analyzes the EEG recording in multiple domains.  Coherence and rhythmicity   are computed to identify EEG sections which may contain organized seizures.    Each channel undergoes analysis to detect the presence of spike and sharp waves   which have special and morphological  characteristics of epileptic activity.  The   routine EEG recording is converted from special into frequency domain.  This is   then displayed comparing homologous areas to identify areas of significant   asymmetry.  Algorithm to identify non-cortically generated artifact is used to   separate artifact from the EEG.      RECORDING TIMES:  Start on 03/24/2018 at 16:52.  Stop on 03/25/2018 at 08:35.  A total of 15 hours and 45 minute of EEG were recorded.    EEG FINGINGS: At baseline, his study reveals a mixed frequency background with a   clear asymmetry with relative slowing seen in the left hemisphere.  The right   hemisphere background is more normal with predominantly alpha and beta activity   seen in a 9 to 10 Hz posterior dominant rhythm emerging often times.  The left   hemisphere does not contain well-formed posterior dominant rhythm and in general   contains slower activity predominantly in the theta range with some admixed   delta and some rhythmic delta at times.  Additionally, there are scattered   interictal epileptiform disturbances seen in the left hemisphere with a broad   frontotemporal distribution.  These are evident both during sleep and during   wakefulness.  The frontotemporal intermittent rhythmic delta activity is also   noted, which could also be an epileptiform disturbance.  Clear seizures were not   seen, however.  Clinical events were not noted.  Stage II sleep was captured in   abundance.    There were no other significant findings.    INTERPRETATION:  Abnormal EEG due to left hemispheric slowing, left   frontotemporal intermittent rhythmic slowing, and left frontotemporal interictal   epileptiform discharges.  No seizures were seen, but the focal abnormality   suggests a structural lesion and the potential for seizures from the left   hemisphere.          /duane [id number] blank(s)        NBB/HN  dd: 03/25/2018 13:07:39 (CDT)  td: 03/25/2018 13:47:26 (CDT)  Doc ID   #[id number]  Job ID  #[id number]    CC:

## 2018-03-28 NOTE — PT/OT/SLP DISCHARGE
Physical Therapy Discharge Summary    Name: Alfa Christy III  MRN: [id number]   Principal Problem: Aphasia     Patient Discharged from acute Physical Therapy on 3/24/18.  Please refer to prior PT noted date on 3/25/18 for functional status.     Assessment:     Patient was discharged unexpectedly.  Information required to complete an accurate discharge summary is unknown.  Refer to therapy initial evaluation and last progress note for initial and most recent functional status and goal achievement.  Recommendations made may be found in medical record.    Objective:     GOALS:    Physical Therapy Goals        Problem: Physical Therapy Goal    Goal Priority Disciplines Outcome Goal Variances Interventions   Physical Therapy Goal     PT/OT, PT Ongoing (interventions implemented as appropriate)     Description:  Goals to be met by: 3/31/18     Patient will increase functional independence with mobility by performin. Supine to sit with [place]  2. Sit to stand transfer with Supervision  3. Gait  x 200 feet with Supervision using LRAD as needed.   4. Ascend/descend 1 flight of stairs with bilateral Handrails Contact Guard Assistance.                       Reasons for Discontinuation of Therapy Services  Transfer to alternate level of care.      Plan:     Patient Discharged to: home.    Rosa Aquino, PT  3/28/2018

## 2018-04-07 ENCOUNTER — NURSE TRIAGE (OUTPATIENT)
Dept: ADMINISTRATIVE | Facility: CLINIC | Age: 63
End: 2018-04-07

## 2018-04-07 NOTE — TELEPHONE ENCOUNTER
"Spouse called re pt c/o headache/pain on left portion of head at 12o'clock. Pt on phone with friend   /76 HR=77     Reason for Disposition   [1] MILD-MODERATE headache AND [2] present > 72 hours    Answer Assessment - Initial Assessment Questions  1. LOCATION: "Where does it hurt?"      HINOJOSA started at 12noon, denies stroke symptoms. Dull HINOJOSA on Left side   Took two tylenols, took BP meds   2. ONSET: "When did the headache start?" (Minutes, hours or days)      12 noon   3. PATTERN: "Does the pain come and go, or has it been constant since it started?"      Steady, Weakening now, hearing ringing in ear- getting louder. CVA 1/2016.   4. SEVERITY: "How bad is the pain?" and "What does it keep you from doing?"  (e.g., Scale 1-10; mild, moderate, or severe)    - MILD (1-3): doesn't interfere with normal activities     - MODERATE (4-7): interferes with normal activities or awakens from sleep     - SEVERE (8-10): excruciating pain, unable to do any normal activities       3  5. RECURRENT SYMPTOM: "Have you ever had headaches before?" If so, ask: "When was the last time?" and "What happened that time?"      Yes, similar with stroke, denies CP, SOB  6. CAUSE: "What do you think is causing the headache?"     Unsure , ate second sandwich yest   7. MIGRAINE: "Have you been diagnosed with migraine headaches?" If so, ask: "Is this headache similar?"      No   8. HEAD INJURY: "Has there been any recent injury to the head?"       No   9. OTHER SYMPTOMS: "Do you have any other symptoms?" (fever, stiff neck, eye pain, sore throat, cold symptoms)     No rash, afeb. No other sx    Protocols used:  HEADACHE-A-  pt states that this headache is resolving. HA not like the one he had with stroke. Denies stroke symptoms. call back with questions.     "

## 2018-04-26 ENCOUNTER — HOSPITAL ENCOUNTER (OUTPATIENT)
Facility: HOSPITAL | Age: 63
Discharge: HOME | End: 2018-04-30
Attending: EMERGENCY MEDICINE | Admitting: HOSPITALIST
Payer: COMMERCIAL

## 2018-04-26 DIAGNOSIS — G40.209 COMPLEX PARTIAL SEIZURES EVOLVING TO GENERALIZED TONIC-CLONIC SEIZURES: ICD-10-CM

## 2018-04-26 DIAGNOSIS — E78.00 HYPERCHOLESTEROLEMIA: ICD-10-CM

## 2018-04-26 DIAGNOSIS — Z79.4 TYPE 2 DIABETES MELLITUS WITH HYPERGLYCEMIA, WITH LONG-TERM CURRENT USE OF INSULIN: ICD-10-CM

## 2018-04-26 DIAGNOSIS — I50.9 ACUTE ON CHRONIC CONGESTIVE HEART FAILURE, UNSPECIFIED HEART FAILURE TYPE: Primary | ICD-10-CM

## 2018-04-26 DIAGNOSIS — R47.1 DYSARTHRIA: ICD-10-CM

## 2018-04-26 DIAGNOSIS — I51.89 DIASTOLIC DYSFUNCTION: ICD-10-CM

## 2018-04-26 DIAGNOSIS — I10 ESSENTIAL HYPERTENSION: ICD-10-CM

## 2018-04-26 DIAGNOSIS — I63.9 STROKE: ICD-10-CM

## 2018-04-26 DIAGNOSIS — I63.9 CEREBROVASCULAR ACCIDENT (CVA), UNSPECIFIED MECHANISM: ICD-10-CM

## 2018-04-26 DIAGNOSIS — Z86.73 HISTORY OF CVA (CEREBROVASCULAR ACCIDENT): ICD-10-CM

## 2018-04-26 DIAGNOSIS — E11.65 TYPE 2 DIABETES MELLITUS WITH HYPERGLYCEMIA, WITH LONG-TERM CURRENT USE OF INSULIN: ICD-10-CM

## 2018-04-26 DIAGNOSIS — E89.0 POSTSURGICAL HYPOTHYROIDISM: ICD-10-CM

## 2018-04-26 DIAGNOSIS — I50.33 ACUTE ON CHRONIC DIASTOLIC CONGESTIVE HEART FAILURE: ICD-10-CM

## 2018-04-26 DIAGNOSIS — I48.0 PAROXYSMAL ATRIAL FIBRILLATION: Chronic | ICD-10-CM

## 2018-04-26 LAB — POCT GLUCOSE: 104 MG/DL (ref 70–110)

## 2018-04-26 PROCEDURE — 83880 ASSAY OF NATRIURETIC PEPTIDE: CPT

## 2018-04-26 PROCEDURE — 85025 COMPLETE CBC W/AUTO DIFF WBC: CPT

## 2018-04-26 PROCEDURE — 85610 PROTHROMBIN TIME: CPT

## 2018-04-26 PROCEDURE — 99285 EMERGENCY DEPT VISIT HI MDM: CPT | Mod: 25

## 2018-04-26 PROCEDURE — 99291 CRITICAL CARE FIRST HOUR: CPT | Mod: 25

## 2018-04-26 PROCEDURE — 84484 ASSAY OF TROPONIN QUANT: CPT

## 2018-04-26 PROCEDURE — 82962 GLUCOSE BLOOD TEST: CPT

## 2018-04-26 PROCEDURE — 80053 COMPREHEN METABOLIC PANEL: CPT

## 2018-04-26 PROCEDURE — 84443 ASSAY THYROID STIM HORMONE: CPT

## 2018-04-26 PROCEDURE — 80061 LIPID PANEL: CPT

## 2018-04-26 PROCEDURE — 80177 DRUG SCRN QUAN LEVETIRACETAM: CPT

## 2018-04-26 PROCEDURE — 99285 EMERGENCY DEPT VISIT HI MDM: CPT | Mod: ,,, | Performed by: EMERGENCY MEDICINE

## 2018-04-26 PROCEDURE — 93010 ELECTROCARDIOGRAM REPORT: CPT | Mod: ,,, | Performed by: INTERNAL MEDICINE

## 2018-04-26 PROCEDURE — 93005 ELECTROCARDIOGRAM TRACING: CPT

## 2018-04-27 ENCOUNTER — TELEPHONE (OUTPATIENT)
Dept: ENDOCRINOLOGY | Facility: CLINIC | Age: 63
End: 2018-04-27

## 2018-04-27 PROBLEM — I50.9 ACUTE ON CHRONIC CONGESTIVE HEART FAILURE: Status: ACTIVE | Noted: 2018-04-27

## 2018-04-27 LAB
ALBUMIN SERPL BCP-MCNC: 3.4 G/DL
ALP SERPL-CCNC: 113 U/L
ALT SERPL W/O P-5'-P-CCNC: 19 U/L
AMMONIA PLAS-SCNC: 45 UMOL/L
ANION GAP SERPL CALC-SCNC: 10 MMOL/L
ANION GAP SERPL CALC-SCNC: 13 MMOL/L
AORTIC VALVE REGURGITATION: NORMAL
AST SERPL-CCNC: 40 U/L
BASOPHILS # BLD AUTO: 0.06 K/UL
BASOPHILS # BLD AUTO: 0.08 K/UL
BASOPHILS NFR BLD: 0.7 %
BASOPHILS NFR BLD: 0.9 %
BILIRUB SERPL-MCNC: 1.1 MG/DL
BNP SERPL-MCNC: 395 PG/ML
BUN SERPL-MCNC: 22 MG/DL
BUN SERPL-MCNC: 24 MG/DL
CALCIUM SERPL-MCNC: 8.2 MG/DL
CALCIUM SERPL-MCNC: 8.7 MG/DL
CHLORIDE SERPL-SCNC: 110 MMOL/L
CHLORIDE SERPL-SCNC: 110 MMOL/L
CHOLEST SERPL-MCNC: 105 MG/DL
CHOLEST/HDLC SERPL: 2.8 {RATIO}
CO2 SERPL-SCNC: 17 MMOL/L
CO2 SERPL-SCNC: 19 MMOL/L
CREAT SERPL-MCNC: 1.8 MG/DL
CREAT SERPL-MCNC: 1.8 MG/DL
DIASTOLIC DYSFUNCTION: NO
DIFFERENTIAL METHOD: ABNORMAL
DIFFERENTIAL METHOD: ABNORMAL
EOSINOPHIL # BLD AUTO: 0.4 K/UL
EOSINOPHIL # BLD AUTO: 0.4 K/UL
EOSINOPHIL NFR BLD: 4.4 %
EOSINOPHIL NFR BLD: 4.7 %
ERYTHROCYTE [DISTWIDTH] IN BLOOD BY AUTOMATED COUNT: 17 %
ERYTHROCYTE [DISTWIDTH] IN BLOOD BY AUTOMATED COUNT: 17.3 %
EST. GFR  (AFRICAN AMERICAN): 45 ML/MIN/1.73 M^2
EST. GFR  (AFRICAN AMERICAN): 45 ML/MIN/1.73 M^2
EST. GFR  (NON AFRICAN AMERICAN): 38.9 ML/MIN/1.73 M^2
EST. GFR  (NON AFRICAN AMERICAN): 38.9 ML/MIN/1.73 M^2
GLUCOSE SERPL-MCNC: 107 MG/DL
GLUCOSE SERPL-MCNC: 142 MG/DL
HCT VFR BLD AUTO: 31.8 %
HCT VFR BLD AUTO: 34.4 %
HDLC SERPL-MCNC: 37 MG/DL
HDLC SERPL: 35.2 %
HGB BLD-MCNC: 11.3 G/DL
HGB BLD-MCNC: 12.2 G/DL
IMM GRANULOCYTES # BLD AUTO: 0.01 K/UL
IMM GRANULOCYTES # BLD AUTO: 0.02 K/UL
IMM GRANULOCYTES NFR BLD AUTO: 0.1 %
IMM GRANULOCYTES NFR BLD AUTO: 0.2 %
INR PPP: 1
LDLC SERPL CALC-MCNC: 48.4 MG/DL
LYMPHOCYTES # BLD AUTO: 2.1 K/UL
LYMPHOCYTES # BLD AUTO: 3.1 K/UL
LYMPHOCYTES NFR BLD: 23.2 %
LYMPHOCYTES NFR BLD: 33.8 %
MAGNESIUM SERPL-MCNC: 2.4 MG/DL
MCH RBC QN AUTO: 28 PG
MCH RBC QN AUTO: 28.3 PG
MCHC RBC AUTO-ENTMCNC: 35.5 G/DL
MCHC RBC AUTO-ENTMCNC: 35.5 G/DL
MCV RBC AUTO: 79 FL
MCV RBC AUTO: 80 FL
MITRAL VALVE REGURGITATION: NORMAL
MONOCYTES # BLD AUTO: 1.4 K/UL
MONOCYTES # BLD AUTO: 1.6 K/UL
MONOCYTES NFR BLD: 15.7 %
MONOCYTES NFR BLD: 17.2 %
NEUTROPHILS # BLD AUTO: 4 K/UL
NEUTROPHILS # BLD AUTO: 5.1 K/UL
NEUTROPHILS NFR BLD: 43.5 %
NEUTROPHILS NFR BLD: 55.6 %
NONHDLC SERPL-MCNC: 68 MG/DL
NRBC BLD-RTO: 1 /100 WBC
NRBC BLD-RTO: 1 /100 WBC
PHOSPHATE SERPL-MCNC: 4.2 MG/DL
PLATELET # BLD AUTO: 189 K/UL
PLATELET # BLD AUTO: 226 K/UL
PMV BLD AUTO: 11.6 FL
PMV BLD AUTO: 11.9 FL
POCT GLUCOSE: 110 MG/DL (ref 70–110)
POCT GLUCOSE: 138 MG/DL (ref 70–110)
POCT GLUCOSE: 141 MG/DL (ref 70–110)
POCT GLUCOSE: 156 MG/DL (ref 70–110)
POCT GLUCOSE: 181 MG/DL (ref 70–110)
POTASSIUM SERPL-SCNC: 4.3 MMOL/L
POTASSIUM SERPL-SCNC: 4.4 MMOL/L
PROT SERPL-MCNC: 7.3 G/DL
PROTHROMBIN TIME: 10.4 SEC
RBC # BLD AUTO: 3.99 M/UL
RBC # BLD AUTO: 4.36 M/UL
RETIRED EF AND QEF - SEE NOTES: 65 (ref 55–65)
SODIUM SERPL-SCNC: 139 MMOL/L
SODIUM SERPL-SCNC: 140 MMOL/L
TRIGL SERPL-MCNC: 98 MG/DL
TROPONIN I SERPL DL<=0.01 NG/ML-MCNC: 0.01 NG/ML
TSH SERPL DL<=0.005 MIU/L-ACNC: 2.15 UIU/ML
WBC # BLD AUTO: 9.09 K/UL
WBC # BLD AUTO: 9.11 K/UL

## 2018-04-27 PROCEDURE — 93306 TTE W/DOPPLER COMPLETE: CPT | Mod: 26,,, | Performed by: INTERNAL MEDICINE

## 2018-04-27 PROCEDURE — 99226 PR SUBSEQUENT OBSERVATION CARE,LEVEL III: CPT | Mod: SA,,, | Performed by: PHYSICIAN ASSISTANT

## 2018-04-27 PROCEDURE — 36415 COLL VENOUS BLD VENIPUNCTURE: CPT

## 2018-04-27 PROCEDURE — 84100 ASSAY OF PHOSPHORUS: CPT

## 2018-04-27 PROCEDURE — 82962 GLUCOSE BLOOD TEST: CPT

## 2018-04-27 PROCEDURE — 83735 ASSAY OF MAGNESIUM: CPT

## 2018-04-27 PROCEDURE — G0378 HOSPITAL OBSERVATION PER HR: HCPCS

## 2018-04-27 PROCEDURE — 63600175 PHARM REV CODE 636 W HCPCS: Performed by: EMERGENCY MEDICINE

## 2018-04-27 PROCEDURE — 80048 BASIC METABOLIC PNL TOTAL CA: CPT

## 2018-04-27 PROCEDURE — 99220 PR INITIAL OBSERVATION CARE,LEVL III: CPT | Mod: SA,,, | Performed by: PHYSICIAN ASSISTANT

## 2018-04-27 PROCEDURE — 82140 ASSAY OF AMMONIA: CPT

## 2018-04-27 PROCEDURE — 63600175 PHARM REV CODE 636 W HCPCS: Performed by: PHYSICIAN ASSISTANT

## 2018-04-27 PROCEDURE — 99220 PR INITIAL OBSERVATION CARE,LEVL III: CPT | Mod: ,,, | Performed by: PSYCHIATRY & NEUROLOGY

## 2018-04-27 PROCEDURE — 25000003 PHARM REV CODE 250: Performed by: PHYSICIAN ASSISTANT

## 2018-04-27 PROCEDURE — 85025 COMPLETE CBC W/AUTO DIFF WBC: CPT

## 2018-04-27 PROCEDURE — 95813 EEG EXTND MNTR 61-119 MIN: CPT | Mod: 26,,, | Performed by: PSYCHIATRY & NEUROLOGY

## 2018-04-27 PROCEDURE — 96374 THER/PROPH/DIAG INJ IV PUSH: CPT | Mod: 59

## 2018-04-27 PROCEDURE — 95813 EEG EXTND MNTR 61-119 MIN: CPT

## 2018-04-27 PROCEDURE — 93306 TTE W/DOPPLER COMPLETE: CPT

## 2018-04-27 RX ORDER — NAPROXEN SODIUM 220 MG/1
81 TABLET, FILM COATED ORAL DAILY
Status: DISCONTINUED | OUTPATIENT
Start: 2018-04-27 | End: 2018-04-30 | Stop reason: HOSPADM

## 2018-04-27 RX ORDER — FUROSEMIDE 10 MG/ML
60 INJECTION INTRAMUSCULAR; INTRAVENOUS 2 TIMES DAILY
Status: DISCONTINUED | OUTPATIENT
Start: 2018-04-27 | End: 2018-04-28

## 2018-04-27 RX ORDER — CARVEDILOL 3.12 MG/1
3.12 TABLET ORAL 2 TIMES DAILY
Status: DISCONTINUED | OUTPATIENT
Start: 2018-04-27 | End: 2018-04-28

## 2018-04-27 RX ORDER — KETOROLAC TROMETHAMINE 30 MG/ML
15 INJECTION, SOLUTION INTRAMUSCULAR; INTRAVENOUS EVERY 6 HOURS PRN
Status: ACTIVE | OUTPATIENT
Start: 2018-04-27 | End: 2018-04-30

## 2018-04-27 RX ORDER — IBUPROFEN 200 MG
16 TABLET ORAL
Status: DISCONTINUED | OUTPATIENT
Start: 2018-04-27 | End: 2018-04-30 | Stop reason: HOSPADM

## 2018-04-27 RX ORDER — LEVETIRACETAM 750 MG/1
1500 TABLET ORAL 2 TIMES DAILY
Status: DISCONTINUED | OUTPATIENT
Start: 2018-04-27 | End: 2018-04-30 | Stop reason: HOSPADM

## 2018-04-27 RX ORDER — AMOXICILLIN 250 MG
1 CAPSULE ORAL 2 TIMES DAILY PRN
Status: DISCONTINUED | OUTPATIENT
Start: 2018-04-27 | End: 2018-04-30 | Stop reason: HOSPADM

## 2018-04-27 RX ORDER — ACETAMINOPHEN 325 MG/1
650 TABLET ORAL EVERY 4 HOURS PRN
Status: DISCONTINUED | OUTPATIENT
Start: 2018-04-27 | End: 2018-04-30 | Stop reason: HOSPADM

## 2018-04-27 RX ORDER — INSULIN ASPART 100 [IU]/ML
5 INJECTION, SOLUTION INTRAVENOUS; SUBCUTANEOUS
Status: DISCONTINUED | OUTPATIENT
Start: 2018-04-27 | End: 2018-04-30 | Stop reason: HOSPADM

## 2018-04-27 RX ORDER — GLUCAGON 1 MG
1 KIT INJECTION
Status: DISCONTINUED | OUTPATIENT
Start: 2018-04-27 | End: 2018-04-30 | Stop reason: HOSPADM

## 2018-04-27 RX ORDER — INSULIN ASPART 100 [IU]/ML
0-5 INJECTION, SOLUTION INTRAVENOUS; SUBCUTANEOUS
Status: DISCONTINUED | OUTPATIENT
Start: 2018-04-27 | End: 2018-04-30 | Stop reason: HOSPADM

## 2018-04-27 RX ORDER — SODIUM CHLORIDE 0.9 % (FLUSH) 0.9 %
5 SYRINGE (ML) INJECTION
Status: DISCONTINUED | OUTPATIENT
Start: 2018-04-27 | End: 2018-04-30 | Stop reason: HOSPADM

## 2018-04-27 RX ORDER — RAMELTEON 8 MG/1
8 TABLET ORAL NIGHTLY PRN
Status: DISCONTINUED | OUTPATIENT
Start: 2018-04-27 | End: 2018-04-30 | Stop reason: HOSPADM

## 2018-04-27 RX ORDER — LEVOTHYROXINE SODIUM 100 UG/1
200 TABLET ORAL
Status: DISCONTINUED | OUTPATIENT
Start: 2018-04-27 | End: 2018-04-30 | Stop reason: HOSPADM

## 2018-04-27 RX ORDER — PROMETHAZINE HYDROCHLORIDE 12.5 MG/1
25 TABLET ORAL EVERY 6 HOURS PRN
Status: DISCONTINUED | OUTPATIENT
Start: 2018-04-27 | End: 2018-04-30 | Stop reason: HOSPADM

## 2018-04-27 RX ORDER — IBUPROFEN 200 MG
24 TABLET ORAL
Status: DISCONTINUED | OUTPATIENT
Start: 2018-04-27 | End: 2018-04-30 | Stop reason: HOSPADM

## 2018-04-27 RX ORDER — LOSARTAN POTASSIUM 50 MG/1
100 TABLET ORAL DAILY
Status: DISCONTINUED | OUTPATIENT
Start: 2018-04-27 | End: 2018-04-30 | Stop reason: HOSPADM

## 2018-04-27 RX ORDER — ONDANSETRON 4 MG/1
4 TABLET, ORALLY DISINTEGRATING ORAL EVERY 8 HOURS PRN
Status: DISCONTINUED | OUTPATIENT
Start: 2018-04-27 | End: 2018-04-30 | Stop reason: HOSPADM

## 2018-04-27 RX ORDER — FOLIC ACID 1 MG/1
1000 TABLET ORAL DAILY
Status: DISCONTINUED | OUTPATIENT
Start: 2018-04-27 | End: 2018-04-30 | Stop reason: HOSPADM

## 2018-04-27 RX ORDER — CHOLECALCIFEROL (VITAMIN D3) 25 MCG
1000 TABLET ORAL
Status: DISCONTINUED | OUTPATIENT
Start: 2018-04-27 | End: 2018-04-30 | Stop reason: HOSPADM

## 2018-04-27 RX ORDER — IPRATROPIUM BROMIDE AND ALBUTEROL SULFATE 2.5; .5 MG/3ML; MG/3ML
3 SOLUTION RESPIRATORY (INHALATION) EVERY 4 HOURS PRN
Status: DISCONTINUED | OUTPATIENT
Start: 2018-04-27 | End: 2018-04-30 | Stop reason: HOSPADM

## 2018-04-27 RX ORDER — FUROSEMIDE 10 MG/ML
80 INJECTION INTRAMUSCULAR; INTRAVENOUS
Status: COMPLETED | OUTPATIENT
Start: 2018-04-27 | End: 2018-04-27

## 2018-04-27 RX ORDER — ATORVASTATIN CALCIUM 20 MG/1
40 TABLET, FILM COATED ORAL DAILY
Status: DISCONTINUED | OUTPATIENT
Start: 2018-04-27 | End: 2018-04-30 | Stop reason: HOSPADM

## 2018-04-27 RX ORDER — FUROSEMIDE 10 MG/ML
40 INJECTION INTRAMUSCULAR; INTRAVENOUS 2 TIMES DAILY
Status: DISCONTINUED | OUTPATIENT
Start: 2018-04-27 | End: 2018-04-27

## 2018-04-27 RX ORDER — AMLODIPINE BESYLATE 10 MG/1
10 TABLET ORAL DAILY
Status: DISCONTINUED | OUTPATIENT
Start: 2018-04-27 | End: 2018-04-30 | Stop reason: HOSPADM

## 2018-04-27 RX ADMIN — LOSARTAN POTASSIUM 100 MG: 50 TABLET, FILM COATED ORAL at 08:04

## 2018-04-27 RX ADMIN — ATORVASTATIN CALCIUM 40 MG: 20 TABLET, FILM COATED ORAL at 08:04

## 2018-04-27 RX ADMIN — LEVETIRACETAM 1500 MG: 750 TABLET ORAL at 08:04

## 2018-04-27 RX ADMIN — INSULIN ASPART 5 UNITS: 100 INJECTION, SOLUTION INTRAVENOUS; SUBCUTANEOUS at 12:04

## 2018-04-27 RX ADMIN — INSULIN ASPART 5 UNITS: 100 INJECTION, SOLUTION INTRAVENOUS; SUBCUTANEOUS at 05:04

## 2018-04-27 RX ADMIN — THERA TABS 1 TABLET: TAB at 08:04

## 2018-04-27 RX ADMIN — CARVEDILOL 3.12 MG: 3.12 TABLET, FILM COATED ORAL at 09:04

## 2018-04-27 RX ADMIN — LEVOTHYROXINE SODIUM 200 MCG: 100 TABLET ORAL at 08:04

## 2018-04-27 RX ADMIN — INSULIN ASPART 5 UNITS: 100 INJECTION, SOLUTION INTRAVENOUS; SUBCUTANEOUS at 08:04

## 2018-04-27 RX ADMIN — CARVEDILOL 3.12 MG: 3.12 TABLET, FILM COATED ORAL at 08:04

## 2018-04-27 RX ADMIN — INSULIN DETEMIR 10 UNITS: 100 INJECTION, SOLUTION SUBCUTANEOUS at 09:04

## 2018-04-27 RX ADMIN — APIXABAN 5 MG: 5 TABLET, FILM COATED ORAL at 08:04

## 2018-04-27 RX ADMIN — AMLODIPINE BESYLATE 10 MG: 10 TABLET ORAL at 08:04

## 2018-04-27 RX ADMIN — ASPIRIN 81 MG 81 MG: 81 TABLET ORAL at 08:04

## 2018-04-27 RX ADMIN — FUROSEMIDE 80 MG: 10 INJECTION, SOLUTION INTRAMUSCULAR; INTRAVENOUS at 03:04

## 2018-04-27 RX ADMIN — FOLIC ACID 1000 MCG: 1 TABLET ORAL at 08:04

## 2018-04-27 RX ADMIN — LEVETIRACETAM 1500 MG: 750 TABLET ORAL at 09:04

## 2018-04-27 RX ADMIN — APIXABAN 5 MG: 5 TABLET, FILM COATED ORAL at 09:04

## 2018-04-27 RX ADMIN — FUROSEMIDE 60 MG: 10 INJECTION, SOLUTION INTRAMUSCULAR; INTRAVENOUS at 09:04

## 2018-04-27 NOTE — ASSESSMENT & PLAN NOTE
2/2 seizure  Typical of patient's normal postictal period, per patient's wife    F/u with Dr. Santiago.  Continue keppra 1500mg bid for now.

## 2018-04-27 NOTE — HPI
63y/o M with pmhx stroke, seizure, afib (on eliquis), DM2, HLD, HTN presents w/ aphasia. Patient was last normal at 10:30 pm.  Per his wife he was doing well then started complaining of feeling dizzy after taking a shower. Denied fever, pains, vomiting.  She noticed he then started having trouble speaking and say words that did not make sense, so came to ED.  Wife denies any B/B incontinence, unilateral weakness, syncope, or tongue-biting.  Patient had similar presentation end of March; at that time TPA was deferred and subsequent neuroimaging negative for CVA. He does have history of seizures on keppra, and his seizures are atypical. Says wasn't having seizures until recently. Patient has had a previous stroke 1/2016 w/ residual L vision deficits only. EEG then abnormal w/ 1/18 CVA as likely foci for seizures. Currently on eliquis for a-fib. Further hisotry limited by patient's mental status.      In ED, [facility] neurology consulted for concern of stroke d/t aphasia.  Sxs likely 2/2 postictal period of complex seizure.   (baseline 100s-200s). CXR shows CM with findings suggestive of pulmonary edema. Gvien one time dose of lasix 80 mg IV.  EKG shows afib HR 69.  CTH with no significant change from previous. Remote left occipital lobe and left thalamic infarctions.

## 2018-04-27 NOTE — HPI
Mr. Christy 61 yo M with afib on Eliquis, complex partial seizures on Keppra 1500mg bid (followed by Dr. Santiago), HTN, HLD, DMII, CKD, CHF, h/o thyroid CA and L PCA AIS 1/2016 who presented with aphasia and no other deficits.  Patient's wife reports typical seizure event this evening, in which patient became progressively confused, demonstrated language perseveration, and then stopped speaking altogether.  He is currently aphasic (nearly mute, but ability to mimic but unable to follow verbal commands); his wife reports that this generally resolves over 1-2 days.  His last event was a few weeks ago, and before that, about six months prior.  She denies any missed doses, and reports recent mild cough, without any other recent symptoms of infection.

## 2018-04-27 NOTE — ASSESSMENT & PLAN NOTE
Presents to ED with aphasia.  Similar presentation to 3/23/18 admission.  Stroke workup with EEG (no finding) and MRI stroke protocol (no acute infarct/hemorrhage). Susepct postictal state.  Last admission took 1.5 days to return to baseline mentation.  - Vascular neurology consulted in ED.  CTH no acute changes.  No acute stroke suspected.  - f/w Dr. Santiago   - No B/B incontinence, no tongue biting, no LOC, no convulsions  - UA ordered.  - seizure precautions  - Continue keppra 1500 mg PO BID  - EEG ordered

## 2018-04-27 NOTE — ASSESSMENT & PLAN NOTE
CVA 1/2016  - residual L eye deficit  - continue ASA, statin  - First seizure 11/2016  - PT/OT consult

## 2018-04-27 NOTE — ASSESSMENT & PLAN NOTE
CVA 1/2016  - residual L eye deficit  - continue ASA, statin  - First seizure 11/2016  - PT/OT consulted

## 2018-04-27 NOTE — TELEPHONE ENCOUNTER
Spoke with the pt wife and ask about the pt that . Pt wife stated that he have a Saurabh but he is under treatment right now. Pt wife will call back in 2 weeks to reschedule appointment.

## 2018-04-27 NOTE — NURSING TRANSFER
Nursing Transfer Note      4/27/2018     Transfer To: [address]    Transfer via stretcher    Transfer with cardiac monitoring    Transported by Transport    Medicines sent: none    Chart send with patient: Yes    Notified: spouse    Patient reassessed at: 04/27/18, 0530 (date, time)    Upon arrival to floor: cardiac monitor applied, patient oriented to room, call bell in reach and bed in lowest position

## 2018-04-27 NOTE — SUBJECTIVE & OBJECTIVE
Interval History:  no acute events overnight, no new complaints.  Wife at bedside and states pt improving.  Wife reports compliance with meds (issue past admissions)    Review of Systems   Unable to perform ROS: Mental status change     Objective:     Vital Signs (Most Recent):  Temp: 97.6 °F (36.4 °C) (04/27/18 0814)  Pulse: 64 (04/27/18 1200)  Resp: 18 (04/27/18 1159)  BP: (!) 159/82 (04/27/18 1159)  SpO2: 98 % (04/27/18 1159) Vital Signs (24h Range):  Temp:  [97.6 °F (36.4 °C)-97.8 °F (36.6 °C)] 97.6 °F (36.4 °C)  Pulse:  [60-92] 64  Resp:  [12-18] 18  SpO2:  [94 %-100 %] 98 %  BP: (148-190)/(79-99) 159/82     Weight: 100.4 kg (221 lb 5.5 oz)  Body mass index is 31.76 kg/m².    Intake/Output Summary (Last 24 hours) at 04/27/18 1503  Last data filed at 04/27/18 0835   Gross per 24 hour   Intake                0 ml   Output              725 ml   Net             -725 ml      Physical Exam   Constitutional: He appears well-developed and well-nourished.   HENT:   Head: Normocephalic and atraumatic.   Eyes: Conjunctivae and EOM are normal.   Neck: Normal range of motion. Neck supple.   Cardiovascular: Normal rate, normal heart sounds and intact distal pulses.  An irregularly irregular rhythm present.   + JVD   Pulmonary/Chest: Effort normal and breath sounds normal.   Abdominal: Soft. Bowel sounds are normal. He exhibits no distension. There is no tenderness.   Musculoskeletal: Normal range of motion. He exhibits edema (2+ BLE edema). He exhibits no tenderness.   Neurological: He is alert.   Aphasia improving  Oriented to person, place and month not year.  Slow to respond.  No pronator drift, no facial droop   Skin: Skin is warm and dry.   Psychiatric: His speech is delayed. He is slowed.   Nursing note and vitals reviewed.      Significant Labs: All pertinent labs within the past 24 hours have been reviewed.    Significant Imaging: I have reviewed all pertinent imaging results/findings within the past 24 hours.

## 2018-04-27 NOTE — ASSESSMENT & PLAN NOTE
Stable  - Repeat Hg A1c  - Home regimen: aspart 7 U TIDWM, Levemir 14 U QHS  - Inpatient regimen: aspart 5 U TID WM, Levemir 10 U QHS  - low dose SSI, acchuchecks ACHS  - diabetic diet  4/27 no hypoglycemia, avoid with PMH

## 2018-04-27 NOTE — ASSESSMENT & PLAN NOTE
Hypertensive on admission  - Continue amlodipine 10 mg PO daily, carvedilol, losartan 100 mg PO daily  - Patient has been taking 1/2 tablet of amlodipine.  Will start back on amlodipine 10 mg in AM.  May need to increase carvedilol or losartan.    4/27 elevated but improving; hold off on further adjustments until 4/28

## 2018-04-27 NOTE — PROCEDURES
EXTENDED  ELECTROENCEPHALOGRAM  REPORT    Alfa Christy III  [id number]  1955    DATE OF SERVICE: 4/27/18    DATE OF ADMISSION: 4/26/2018 11:32 PM    ADMITTING/REQUESTING PROVIDER:  Summer Connelly MD    REASON FOR CONSULT: 61yo M with hx of epilepsy, admitted with seizures    MEDICATIONS:   Current Facility-Administered Medications   Medication    acetaminophen tablet 650 mg    albuterol-ipratropium 2.5mg-0.5mg/3mL nebulizer solution 3 mL    amLODIPine tablet 10 mg    apixaban tablet 5 mg    aspirin chewable tablet 81 mg    atorvastatin tablet 40 mg    carvedilol tablet 3.125 mg    dextrose 50% injection 12.5 g    dextrose 50% injection 25 g    folic acid tablet 1,000 mcg    furosemide injection 60 mg    glucagon (human recombinant) injection 1 mg    glucose chewable tablet 16 g    glucose chewable tablet 24 g    insulin aspart U-100 pen 0-5 Units    insulin aspart U-100 pen 5 Units    insulin detemir U-100 pen 10 Units    ketorolac injection 15 mg    levETIRAcetam tablet 1,500 mg    levothyroxine tablet 200 mcg    losartan tablet 100 mg    multivitamin tablet 1 tablet    ondansetron disintegrating tablet 4 mg    promethazine tablet 25 mg    ramelteon tablet 8 mg    senna-docusate 8.6-50 mg per tablet 1 tablet    sodium chloride 0.9% flush 5 mL    vitamin D 1000 units tablet 1,000 Units     METHODOLOGY   Electroencephalographic (EEG) recording is with electrodes placed according to the International 10-20 placement system.  Thirty two (32) channels of digital signal (sampling rate of 512/sec) including T1 and T2 was simultaneously recorded from the scalp and may include  EKG, EMG, and/or eye monitors.  Recording band pass was 0.1 to 512 hz.  Digital video recording of the patient is simultaneously recorded with the EEG.  The patient is instructed report clinical symptoms which may occur during the recording session.  EEG and video recording is stored and archived in digital format.   Activation procedures which include photic stimulation, hyperventilation and instructing patients to perform simple task are done in selected patients.   The EEG is displayed on a monitor screen and can be reviewed using different montages.  Computer assisted analysis is employed to detect spike and electrographic seizure activity.   The entire record is submitted for computer analysis.  The entire recording is visually reviewed and the times identified by computer analysis as being spikes or seizures are reviewed again.  Compresses spectral analysis (CSA) is also performed on the activity recorded from each individual channel.  This is displayed as a power display of frequencies from 0 to 30 Hz over time.   The CSA is reviewed looking for asymmetries in power between homologous areas of the scalp and then compared with the original EEG recording.     Stoke software was also utilized in the review of this study.  This software suite analyzes the EEG recording in multiple domains.  Coherence and rhythmicity is computed to identify EEG sections which may contain organized seizures.  Each channel undergoes analysis to detect presence of spike and sharp waves which have special and morphological characteristic of epileptic activity.  The routine EEG recording is converted from spacial into frequency domain.  This is then displayed comparing homologous areas to identify areas of significant asymmetry.  Algorithm to identify non-cortically generated artifact is used to separate eye movement, EMG and other artifact from the EEG.      RECORDING TIMES  Start on 4/27/18, 15:12  Stop on 4/27/18, 16:41    A total of 1 hour and 29 minutes of EEG recording was obtained.    EEG FINGINGS  Background activity:   The background rhythm was characterized by alpha (8-9 Hz) activity with a 9 Hz posterior dominant alpha rhythm on the right.   Symmetry and continuity: the background was continuous; asymmetry with continuous left sided  slowing (delta-theta: 4-6 Hz) was seen.    Sleep:    Normal sleep transients including sleep spindles, K complexes, vertex waves and POSTS were seen.    Activation procedures:   Hyperventilation and photic stimulation were not performed   Appropriately responsive to verbal stimulation.    Abnormal activity:   Rare left sided spike/wave activity noted        No electrographic seizures were seen.    Irregular heart rate noted on EKG    IMPRESSION:   This is an abnormal extended (1 hour and 29 minutes) EEG due to:  1) rare left sided epileptiform activity seen, suggestive of underlying epileptiform activity  2) continuous left sided slowing seen, suggestive of underlying structural lesion    Irregular heart rate noted on EKG    CLINICAL CORRELATION IS RECOMMENDED    Keara Santiago MD, SONA(), ASHKAN, SAMIRA.  Neurology-Epilepsy.  [facility].

## 2018-04-27 NOTE — ED PROVIDER NOTES
Encounter Date: 4/26/2018    SCRIBE #1 NOTE: I, Marisela Brown, am scribing for, and in the presence of,  Dr. Ritchie. I have scribed the following portions of the note - the Resident attestation.       History     Chief Complaint   Patient presents with    Aphasia     Patient's wife states that patient started having aphasia at approximently 2230. Patient has history of stroke in Jan 2016. Patient is not answering questions or responding to questions at this time. Dr CARMELO lew to triage.      61y/o M presents w/ aphasia. Patient was last normal at 10:30pm approx 1hr prior to evaluation. Per his wife he was doing well then started complaining of feeling dizzy. Denied fever, pains, vomiting. She noticed he then started having trouble speaking so came to ED. Patient had similar presentation end of March; at that time TPA was deferred and subsequent neuroimaging negative for CVA. He does have history of seizures on keppra, and his seizures are atypical. Says wasn't having seizures until recently. Patient has had a previous stroke 1/2018 w/ residual L vision deficits only. EEG then abnormal w/ 1/18 CVA as likely foci for seizures. Currently on eliquis for a-fib.       The history is provided by the spouse and medical records. The history is limited by the condition of the patient.     Review of patient's allergies indicates:   Allergen Reactions    Cough syrup [guaifenesin] Other (See Comments)     Past Medical History:   Diagnosis Date    Allergy     BMI 31.0-31.9,adult 11/25/2014    Cerebrovascular disease 7/25/2016    Chronic anticoagulation - pradaxa 11/10/2016    Chronic diastolic heart failure 11/20/2016    CKD (chronic kidney disease), stage III 9/23/2013    Controlled type 2 diabetes with neuropathy 12/16/2014    Diabetes mellitus due to underlying condition with stage 3 chronic kidney disease, without long-term current use of insulin 11/2/2016    Elevated alkaline phosphatase level 8/1/2012     Elevated PSA     negative prostate biopsy 4/11    Erectile dysfunction associated with type 2 diabetes mellitus     Gallstones 3/20/2013    Generalized tonic-clonic seizure 11/2016    HTN (hypertension), benign 8/1/2012    Hypercholesterolemia 8/1/2012    Microcytic anemia 11/27/2013    Paroxysmal atrial fibrillation 9/23/2013    Postsurgical hypothyroidism 11/27/2013    Right homonymous hemianopsia 2/5/2016    Sickle cell trait     Stroke 1/27/2016    Thyroid cancer     multifocal with six lesions, largest 5mm, treated with surgery and radioactive iodine    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/2016     Past Surgical History:   Procedure Laterality Date    BREAST SURGERY      cyst removal    COLONOSCOPY      CYST REMOVAL      chest    PROSTATE BIOPSY  4/27/11    SKIN BIOPSY      THYROID SURGERY  8/26/09    VASCULAR SURGERY       Family History   Problem Relation Age of Onset    Heart disease Father     Diabetes Father     Hypertension Father     Diabetes Mother     Hypertension Mother     Heart disease Mother     Diabetes Brother     Cancer Sister     Thyroid disease Maternal Aunt     Clotting disorder Neg Hx      Social History   Substance Use Topics    Smoking status: Never Smoker    Smokeless tobacco: Never Used    Alcohol use Yes      Comment: occasionally     Review of Systems   Unable to perform ROS: Patient nonverbal   Cardiovascular: Negative for chest pain.   Gastrointestinal: Negative for vomiting.   Neurological: Positive for speech difficulty. Negative for syncope.       Physical Exam     Initial Vitals [04/26/18 2326]   BP Pulse Resp Temp SpO2   (!) 173/98 64 18 97.8 °F (36.6 °C) 96 %      MAP       123         Physical Exam    Nursing note and vitals reviewed.  Constitutional: He appears well-developed and well-nourished. He is not diaphoretic. No distress.   HENT:   Head: Normocephalic and atraumatic.   Eyes: EOM are normal. Pupils are equal, round, and reactive to  light.   Neck: Normal range of motion. JVD present.   Cardiovascular: Normal rate, regular rhythm, normal heart sounds and intact distal pulses.   Pulmonary/Chest: No respiratory distress.   Diminished at bases likely 2nd habitus  Doesn't want to lie flat   Abdominal: Soft.   Enlarged, midline defect likely hernia easily reducible   Musculoskeletal: Normal range of motion. He exhibits edema (symmetric edema in BLE). He exhibits no tenderness.   Neurological:   See NIHSS   Skin: Skin is warm and dry.         ED Course   Procedures  Labs Reviewed   CBC W/ AUTO DIFFERENTIAL - Abnormal; Notable for the following:        Result Value    RBC 3.99 (*)     Hemoglobin 11.3 (*)     Hematocrit 31.8 (*)     MCV 80 (*)     RDW 17.0 (*)     Mono # 1.6 (*)     nRBC 1 (*)     Mono% 17.2 (*)     All other components within normal limits   COMPREHENSIVE METABOLIC PANEL - Abnormal; Notable for the following:     CO2 19 (*)     BUN, Bld 24 (*)     Creatinine 1.8 (*)     Calcium 8.2 (*)     Albumin 3.4 (*)     Total Bilirubin 1.1 (*)     eGFR if  45.0 (*)     eGFR if non  38.9 (*)     All other components within normal limits   LIPID PANEL - Abnormal; Notable for the following:     Cholesterol 105 (*)     HDL 37 (*)     LDL Cholesterol 48.4 (*)     All other components within normal limits   PROTIME-INR   TSH   TROPONIN I   LEVETIRACETAM  (KEPPRA) LEVEL   B-TYPE NATRIURETIC PEPTIDE   LEVETIRACETAM  (KEPPRA) LEVEL   B-TYPE NATRIURETIC PEPTIDE   POCT GLUCOSE   POCT GLUCOSE     EKG Readings: (Independently Interpreted)   Initial Reading: No STEMI. Rhythm: Atrial Fibrillation. Heart Rate: 65. ST Segments: Normal ST Segments. T Waves: Normal. Axis: Normal.          Medical Decision Making:   History:   Old Medical Records: I decided to obtain old medical records.  Clinical Tests:   Lab Tests: Ordered and Reviewed  Radiological Study: Ordered and Reviewed  Medical Tests: Ordered and Reviewed  Other:   I have  discussed this case with another health care provider.       <> Summary of the Discussion: Vascular Neurology  PGY-4 MDM: Patient initially presenting as stroke code due to onset of aphasia 1hr PTA. No clear other deficits although NIHSS difficult to perform as patient not always following directions. Appeared more postictal and slowly clearing w/ ED stay. CT w/o new acute findings or bleed. Neuro evaluated and no TPA at this time. Recent similar workup with negative MRI. During evaluation patient while unable to verbalize found to not want to lie flat; wife says he has been taking his diuretics but needs to sleep on 2 pillows. Echo 2 years ago w/ diastolic dysfunction, workup today consistent w/ CHF exacerbation. Not in acute respiratory distress. Given presentation consulted to medicine for observation, monitor for return to baseline, manage CHF symptoms. Pt admitted for further evaluation and treatment.    Jaci Bullock MD  PGY-4 EM 3:05 AM 4/27/2018        Additional MDM:     NIH Stroke Scale:   Interval = baseline (upon arrival/admit)  Level of consciousness = 0 - alert  LOC questions = 2 - answers none correctly  LOC commands = 2 - performs neither correctly  Best gaze = 0 - normal  Visual = 0 - no visual loss (unable to assess)  Facial palsy = 0 - normal (unable to follow command to smile but no asymmetry noted)  Motor left arm =  0 - no drift  Motor right arm =  0 - no drift  Motor left leg = 0 - no drift  Motor right leg =  0 - no drift  Limb ataxia = 0 - absent (unable to assess)  Sensory = 0 - normal (unable to assess)  Best language = 2 - severe aphasia  Dysarthria = 2 - near to unintelligible  Extinction and inattention = 0 - no neglect  NIH Stroke Scale Total = 8         Scribe Attestation:   Scribe #1: I performed the above scribed service and the documentation accurately describes the services I performed. I attest to the accuracy of the note.    Attending Attestation:           Physician  Attestation for Anushkaibe:      Comments: I, Dr. Terry Ritchie, personally performed the services described in this documentation. All medical record entries made by the scribe were at my direction and in my presence.  I have reviewed the chart and agree that the record reflects my personal performance and is accurate and complete. Terry Ritchie MD.  1:50 AM 04/27/2018        As the supervising MD   I have reviewed the case with my resident and agree with the history, review of systems, physical exam, assessment and plan of care as documented by my resident.  ROS as above and as addressed on resident documentation.  All other systems reviewed and are negative.    Physical Exam:  Vital signs reviewed.  Noted elevated BP.  No acute distress.  Dry mucous membranes.  Expressive aphasia prognosis.    Labs and imaging studies reviewed and independently Interpreted:  Unremarkable CBC, elevated creatinine, low bicarb, elevated total bilirubin, unremarkable coags, unremarkable TSH.         ED Course as of Apr 27 0151 Fri Apr 27, 2018   0017 CT head:  Encephalomalacia without evidence of acute stroke.  [NO]   0021 Neurology is cancelling the stroke code as they do not believe it is a CVA.  The most likely diagnosis is a seizure with a postictal state.  [NO]      ED Course User Index  [NO] Terry Ritchie MD   Patient provided a quiet room in which to rest. This may produce a fall in blood pressure =20/10 mmHg in approximately one-third of adults. If this is not effective, antihypertensive drugs may be given.    Goal is to lower the blood pressure to <160/<100 mmHg or to a level that is no more than 25 to 30 percent lower than the baseline blood pressure.  Thus, the short-term blood pressure target may need to be above 160/100 mmHg in patients who present with very high pressures, because cerebral or myocardial ischemia or infarction, or acute kidney injury, can be induced by rapid and aggressive antihypertensive  therapy if the blood pressure falls below the range at which tissue perfusion can be maintained by autoregulation.  In the long-term as an outpatient, the blood pressure can then be reduced further.     Critical care was necessary to treat or prevent imminent or life-threatening deterioration.   Critical care time: 31 min  Time spent at the bedside, reviewing test results, discussing the case with staff and/or consult(s), documenting the medical record and time spent with family members discussing treatment and management decisions.    The time involved in the performance of separately reportable procedures was not counted toward critical care time.      The results and physical exam findings were reviewed with the patient. Pt agrees with assessment, disposition and treatment plan and has no further questions or complaints at this time.    CHERELLE Ritchie M.D. 12:02 AM 4/27/2018  Clinical Impression:   The primary encounter diagnosis was Acute on chronic congestive heart failure, unspecified heart failure type. Diagnoses of Stroke and Dysarthria were also pertinent to this visit.  Remove stroke from clinical impression    Disposition:   Disposition: Admitted  Condition: Fair                        Terry Ritchie MD  04/27/18 0229       Jaci Bullock MD  Resident  04/27/18 3833

## 2018-04-27 NOTE — CONSULTS
[facility]  Vascular Neurology  Comprehensive Stroke Center  Consult Note    Inpatient consult to Vascular (Stroke) Neurology  Consult performed by: MIAH HERRERA  Consult ordered by: RUMA BERNSTEIN        Assessment/Plan:     Patient is a 62 y.o. year old male with:    * Complex partial seizures evolving to generalized tonic-clonic seizures    Patient reports episode today is consistent with patient's typical seizure.  No acute stroke suspected.    Recommend:  - routine infectious workup (UA, CXR, CBC)  - close f/u with Dr. Santiago, given two events typical of patient's seizures within one month    Ok to DC from stroke perspective.  RTC with new/worsening Sx.  Maintain seizure precautions        DM2 (diabetes mellitus, type 2)    Stroke RF  Continue home regimen        Essential hypertension    Stroke RF  Continue home antihypertensive regimen        Aphasia    2/2 seizure  Typical of patient's normal postictal period, per patient's wife    F/u with Dr. Santiago.  Continue keppra 1500mg bid for now.        Postsurgical hypothyroidism    Continue synthroid        Paroxysmal atrial fibrillation    Stroke RF  Continue Eliquis        Hypercholesterolemia    Stroke RF  Continue atorvastatin            STROKE DOCUMENTATION     Acute Stroke Times   Last Known Normal Date: 04/26/18  Symptom Onset Date: 04/26/18  Symptom Onset Time: 2230  Stroke Team Called Date: 04/27/18  Stroke Team Called Time: 0005  Stroke Team Arrival Date: 04/27/18  Stroke Team Arrival Time: 0007    NIH Scale:  1a. Level Of Consciousness: 0-->Alert: keenly responsive  1b. LOC Questions: 0-->Answers both questions correctly  1c. LOC Commands: 0-->Performs both tasks correctly  2. Best Gaze: 0-->Normal  3. Visual: 0-->No visual loss  4. Facial Palsy: 0-->Normal symmetrical movements  5a. Motor Arm, Left: 0-->No drift: limb holds 90 (or 45) degrees for full 10 secs  5b. Motor Arm, Right: 0-->No drift: limb holds 90 (or 45) degrees  for full 10 secs  6a. Motor Leg, Left: 0-->No drift: leg holds 30 degree position for full 5 secs  6b. Motor Leg, Right: 0-->No drift: leg holds 30 degree position for full 5 secs  7. Limb Ataxia: 0-->Absent  8. Sensory: 0-->Normal: no sensory loss  9. Best Language: 0-->No aphasia: normal  10. Dysarthria: 2-->Severe dysarthria: patients speech is so slurred as to be unintelligible in the absence of or out of proportion to any dysphasia, or is mute/anarthric  11. Extinction and Inattention (formerly Neglect): 0-->No abnormality  Total (NIH Stroke Scale): 2    Modified Travis Score: 0  Brianda Coma Scale:11   ABCD2 Score:    [id number] Score:5  HAS -BLED Score:   ICH Score:   Hunt & Judd Classification:       Thrombolysis Candidate? No, Strong suspicion for stroke mimic or alternative diagnosis       Interventional Revascularization Candidate?   Is the patient eligible for mechanical endovascular reperfusion (REILLY)?  No.  Stroke mimic suspected      Hemorrhagic change of an Ischemic Stroke: Does this patient have an ischemic stroke with hemorrhagic changes? No     Subjective:     History of Present Illness:  Mr. Christy 61 yo M with afib on Eliquis, complex partial seizures on Keppra 1500mg bid (followed by Dr. Santiago), HTN, HLD, DMII, CKD, CHF, h/o thyroid CA and L PCA AIS 1/2016 who presented with aphasia and no other deficits.  Patient's wife reports typical seizure event this evening, in which patient became progressively confused, demonstrated language perseveration, and then stopped speaking altogether.  He is currently aphasic (nearly mute, but ability to mimic but unable to follow verbal commands); his wife reports that this generally resolves over 1-2 days.  His last event was a few weeks ago, and before that, about six months prior.  She denies any missed doses, and reports recent mild cough, without any other recent symptoms of infection.          Past Medical History:   Diagnosis Date    Allergy     BMI  31.0-31.9,adult 11/25/2014    Cerebrovascular disease 7/25/2016    Chronic anticoagulation - pradaxa 11/10/2016    Chronic diastolic heart failure 11/20/2016    CKD (chronic kidney disease), stage III 9/23/2013    Controlled type 2 diabetes with neuropathy 12/16/2014    Diabetes mellitus due to underlying condition with stage 3 chronic kidney disease, without long-term current use of insulin 11/2/2016    Elevated alkaline phosphatase level 8/1/2012    Elevated PSA     negative prostate biopsy 4/11    Erectile dysfunction associated with type 2 diabetes mellitus     Gallstones 3/20/2013    Generalized tonic-clonic seizure 11/2016    HTN (hypertension), benign 8/1/2012    Hypercholesterolemia 8/1/2012    Microcytic anemia 11/27/2013    Paroxysmal atrial fibrillation 9/23/2013    Postsurgical hypothyroidism 11/27/2013    Right homonymous hemianopsia 2/5/2016    Sickle cell trait     Stroke 1/27/2016    Thyroid cancer     multifocal with six lesions, largest 5mm, treated with surgery and radioactive iodine    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/2016     Past Surgical History:   Procedure Laterality Date    BREAST SURGERY      cyst removal    COLONOSCOPY      CYST REMOVAL      chest    PROSTATE BIOPSY  4/27/11    SKIN BIOPSY      THYROID SURGERY  8/26/09    VASCULAR SURGERY       Family History   Problem Relation Age of Onset    Heart disease Father     Diabetes Father     Hypertension Father     Diabetes Mother     Hypertension Mother     Heart disease Mother     Diabetes Brother     Cancer Sister     Thyroid disease Maternal Aunt     Clotting disorder Neg Hx      Social History   Substance Use Topics    Smoking status: Never Smoker    Smokeless tobacco: Never Used    Alcohol use Yes      Comment: occasionally     Review of patient's allergies indicates:   Allergen Reactions    Cough syrup [guaifenesin] Other (See Comments)       Medications: I have reviewed the current  medication administration record.      (Not in a hospital admission)    Review of Systems   Respiratory: Positive for cough. Negative for shortness of breath.    Cardiovascular: Negative for chest pain.   Gastrointestinal: Negative for constipation, diarrhea, nausea and vomiting.   Genitourinary: Negative for difficulty urinating, dysuria, frequency and urgency.   Skin: Negative for wound.   Neurological: Positive for speech difficulty. Negative for weakness.   Psychiatric/Behavioral: Negative for agitation and behavioral problems.     Objective:     Vital Signs (Most Recent):  Temp: 97.8 °F (36.6 °C) (04/26/18 2326)  Pulse: 64 (04/26/18 2326)  Resp: 18 (04/26/18 2326)  BP: (!) 173/98 (04/26/18 2326)  SpO2: 96 % (04/26/18 2326)    Vital Signs Range (Last 24H):  Temp:  [97.8 °F (36.6 °C)]   Pulse:  [64]   Resp:  [18]   BP: (173)/(98)   SpO2:  [96 %]     Physical Exam   Constitutional: He appears well-developed and well-nourished. No distress.   HENT:   Head: Normocephalic and atraumatic.   Eyes: EOM are normal.   Pulmonary/Chest: Effort normal.   Abdominal: There is tenderness. There is guarding.   Musculoskeletal: Normal range of motion.   Neurological: He is alert.   Skin: Skin is warm and dry.   Psychiatric: His behavior is normal.   Nursing note and vitals reviewed.      Neurological Exam:   LOC: alert  Attention Span: Good   Language: Expressive aphasia, Receptive aphasia  Articulation: Mute/Anarthric  Orientation: Untestable due to severe aphasia   Visual Fields: Full  EOM (CN III, IV, VI): Full/intact  Tone: Normal tone throughout      Laboratory:  CMP:   Recent Labs  Lab 04/26/18  2355   CALCIUM 8.2*   ALBUMIN 3.4*   PROT 7.3      K 4.4   CO2 19*      BUN 24*   CREATININE 1.8*   ALKPHOS 113   ALT 19   AST 40   BILITOT 1.1*     CBC:   Recent Labs  Lab 04/26/18  2355   WBC 9.11   RBC 3.99*   HGB 11.3*   HCT 31.8*      MCV 80*   MCH 28.3   MCHC 35.5     Lipid Panel:   Recent Labs  Lab  04/26/18  2355   CHOL 105*   LDLCALC 48.4*   HDL 37*   TRIG 98     Coagulation:   Recent Labs  Lab 04/26/18  2355   INR 1.0     Hgb A1C: No results for input(s): HGBA1C in the last 168 hours.  TSH: No results for input(s): TSH in the last 168 hours.    Diagnostic Results:    Brain imaging:  CTH 4/27/18: encephalomalacia in the region of L PCA from prior AIS.  No acute bleed or early ischemic changes seen    Vessel Imaging:  MRA Brain/Neck 10/4/18:  No evidence of acute or major vascular distribution infarct or intracranial hemorrhage.    Questionable subtle cortical diffusion restriction throughout the supratentorial brain.  Short-term followup is suggested.    Remote left PCA distribution infarct within the left occipital lobe.    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      Cardiac Evaluation:   TTE 1/2016:  1 - Normal left ventricular systolic function (EF 55-60%).     2 - No regional wall motion abnormalities.     3 - Concentric hypertrophy.     4 - Left ventricular diastolic dysfunction.     5 - Trivial aortic regurgitation.     6 - Trivial tricuspid regurgitation, PASP 19 mmHg.     7 - No intracardiac source of embolus noted on this exam.  If high clinical suspicion, consider ADA +/- bubble study.       Saloni Neslon MD  Comprehensive Stroke Center  Department of Vascular Neurology   [facility]

## 2018-04-27 NOTE — ASSESSMENT & PLAN NOTE
Hypertensive on admission  - Continue amlodipine 10 mg PO daily, carvedilol, losartan 100 mg PO daily  - Patient has been taking 1/2 tablet of amlodipine.  Will start back on amlodipine 10 mg in AM.  May need to increase carvedilol or losartan.

## 2018-04-27 NOTE — ASSESSMENT & PLAN NOTE
Stable  - Repeat Hg A1c  - Home regimen: aspart 7 U TIDWM, Levemir 14 U QHS  - Inpatient regimen: aspart 5 U TID WM, Levemir 10 U QHS  - low dose SSI, acchuchecks ACHS  - diabetic diet

## 2018-04-27 NOTE — ASSESSMENT & PLAN NOTE
Presents to ED with aphasia.  Similar presentation to 3/23/18 admission.  Stroke workup with EEG (no finding) and MRI stroke protocol (no acute infarct/hemorrhage). Suspect postictal state.  Last admission took 1.5 days to return to baseline mentation.  - Vascular neurology consulted in ED.  CTH no acute changes.  No acute stroke suspected.  - f/w Dr. Santiago   - No B/B incontinence, no tongue biting, no LOC, no convulsions  - UA ordered and pending collection  - seizure precautions  - Continue keppra 1500 mg PO BID; keppra level in process (wife reports compliance).  3rd admission with similar presentation.  Pt did follow up with Dr. Santiago after admission 11/2017 and no change in keppra was made.  - EEG ordered

## 2018-04-27 NOTE — ASSESSMENT & PLAN NOTE
Patient reports episode today is consistent with patient's typical seizure.  No acute stroke suspected.    Recommend:  - routine infectious workup (UA, CXR, CBC)  - close f/u with Dr. Santiago, given two events typical of patient's seizures within one month    Ok to DC from stroke perspective.  RTC with new/worsening Sx.  Maintain seizure precautions

## 2018-04-27 NOTE — TELEPHONE ENCOUNTER
----- Message from Nicole Nunez sent at 4/27/2018  9:54 AM CDT -----  Contact: wife / [phone]  PT is admitted in hospital. He will need to reschedule appointment for today at 11 AM.

## 2018-04-27 NOTE — ASSESSMENT & PLAN NOTE
Last ECHO 1/28/16 shows +DD  CXR shows CM, pulmonary edema.  100% on RA  - hypervolemic on exam  - Home dose lasix 40 mg PO BID.  - Continue BB, ARB  - Lasix 80 mg IV given in ED.  Will start 60 mg IV BID.  - Strict I/O, daily weights, tele

## 2018-04-27 NOTE — PROGRESS NOTES
[facility] Hospital Medicine  Progress Note    Patient Name: Alfa Christy III  MRN: [id number]  Patient Class: OP- Observation   Admission Date: 4/26/2018  Length of Stay: 0 days  Attending Physician: Summer Connelly MD  Primary Care Provider: Tyler Jasmine MD    [facility] Medicine Team: [facility] HOSP MED F Michelle Blankenship PA-C    Subjective:     Principal Problem:Acute on chronic congestive heart failure    HPI:  61y/o M with pmhx stroke, seizure, afib (on eliquis), DM2, HLD, HTN presents w/ aphasia. Patient was last normal at 10:30 pm.  Per his wife he was doing well then started complaining of feeling dizzy after taking a shower. Denied fever, pains, vomiting.  She noticed he then started having trouble speaking and say words that did not make sense, so came to ED.  Wife denies any B/B incontinence, unilateral weakness, syncope, or tongue-biting.  Patient had similar presentation end of March; at that time TPA was deferred and subsequent neuroimaging negative for CVA. He does have history of seizures on keppra, and his seizures are atypical. Says wasn't having seizures until recently. Patient has had a previous stroke 1/2016 w/ residual L vision deficits only. EEG then abnormal w/ 1/18 CVA as likely foci for seizures. Currently on eliquis for a-fib. Further hisotry limited by patient's mental status.      In ED, [facility] neurology consulted for concern of stroke d/t aphasia.  Sxs likely 2/2 postictal period of complex seizure.   (baseline 100s-200s). CXR shows CM with findings suggestive of pulmonary edema.  Gvien one time dose of lasix 80 mg IV.  EKG shows afib HR 69.  CTH with no significant change from previous. Remote left occipital lobe and left thalamic infarctions.     Hospital Course:  Pt admitted due to aphasia.  Similar presentation/admissions 11/2017 and 03/2018 (medication noncompliance an issue).  Vascular neurology consulted in ED.  CTH no acute changes.  No acute stroke  suspected.  Keppra level ordered.  EEG ordered.    Interval History:  no acute events overnight, no new complaints.  Wife at bedside and states pt improving.  Wife reports compliance with meds (issue past admissions)    Review of Systems   Unable to perform ROS: Mental status change     Objective:     Vital Signs (Most Recent):  Temp: 97.6 °F (36.4 °C) (04/27/18 0814)  Pulse: 64 (04/27/18 1200)  Resp: 18 (04/27/18 1159)  BP: (!) 159/82 (04/27/18 1159)  SpO2: 98 % (04/27/18 1159) Vital Signs (24h Range):  Temp:  [97.6 °F (36.4 °C)-97.8 °F (36.6 °C)] 97.6 °F (36.4 °C)  Pulse:  [60-92] 64  Resp:  [12-18] 18  SpO2:  [94 %-100 %] 98 %  BP: (148-190)/(79-99) 159/82     Weight: 100.4 kg (221 lb 5.5 oz)  Body mass index is 31.76 kg/m².    Intake/Output Summary (Last 24 hours) at 04/27/18 1503  Last data filed at 04/27/18 0835   Gross per 24 hour   Intake                0 ml   Output              725 ml   Net             -725 ml      Physical Exam   Constitutional: He appears well-developed and well-nourished.   HENT:   Head: Normocephalic and atraumatic.   Eyes: Conjunctivae and EOM are normal.   Neck: Normal range of motion. Neck supple.   Cardiovascular: Normal rate, normal heart sounds and intact distal pulses.  An irregularly irregular rhythm present.   + JVD   Pulmonary/Chest: Effort normal and breath sounds normal.   Abdominal: Soft. Bowel sounds are normal. He exhibits no distension. There is no tenderness.   Musculoskeletal: Normal range of motion. He exhibits edema (2+ BLE edema). He exhibits no tenderness.   Neurological: He is alert.   Aphasia improving  Oriented to person, place and month not year.  Slow to respond.  No pronator drift, no facial droop   Skin: Skin is warm and dry.   Psychiatric: His speech is delayed. He is slowed.   Nursing note and vitals reviewed.      Significant Labs: All pertinent labs within the past 24 hours have been reviewed.    Significant Imaging: I have reviewed all pertinent  imaging results/findings within the past 24 hours.    Assessment/Plan:      * Acute on chronic congestive heart failure    Last ECHO 1/28/16 shows +DD  CXR shows CM, pulmonary edema.  100% on RA  - hypervolemic on exam  - Home dose lasix 40 mg PO BID.  - Continue BB, ARB  - Lasix 80 mg IV given in ED.  Will start 60 mg IV BID.  4/27 continue furosemide 60 mg IV BID.  - Strict I/O, daily weights, tele        Complex partial seizures evolving to generalized tonic-clonic seizures    Presents to ED with aphasia.  Similar presentation to 3/23/18 admission.  Stroke workup with EEG (no finding) and MRI stroke protocol (no acute infarct/hemorrhage). Suspect postictal state.  Last admission took 1.5 days to return to baseline mentation.  - Vascular neurology consulted in ED.  CTH no acute changes.  No acute stroke suspected.  - f/w Dr. Santiago   - No B/B incontinence, no tongue biting, no LOC, no convulsions  - UA ordered and pending collection  - seizure precautions  - Continue keppra 1500 mg PO BID; keppra level in process (wife reports compliance).  3rd admission with similar presentation.  Pt did follow up with Dr. Santiago after admission 11/2017 and no change in keppra was made.  - EEG ordered        Paroxysmal atrial fibrillation    Stable  - Continue apixaban 5 mg PO BID, coreg 3.125 mg PO BID  - CHADSVASC 5  - on tele        History of CVA (cerebrovascular accident)    CVA 1/2016  - residual L eye deficit  - continue ASA, statin  - First seizure 11/2016  - PT/OT consulted        Type 2 diabetes mellitus with hyperglycemia    Stable  - Repeat Hg A1c  - Home regimen: aspart 7 U TIDWM, Levemir 14 U QHS  - Inpatient regimen: aspart 5 U TID WM, Levemir 10 U QHS  - low dose SSI, acchuchecks ACHS  - diabetic diet  4/27 no hypoglycemia, avoid with PMH        Essential hypertension    Hypertensive on admission  - Continue amlodipine 10 mg PO daily, carvedilol, losartan 100 mg PO daily  - Patient has been taking 1/2 tablet of  amlodipine.  Will start back on amlodipine 10 mg in AM.  May need to increase carvedilol or losartan.    4/27 elevated but improving; hold off on further adjustments until 4/28        Postsurgical hypothyroidism    Stable  - TSH 2.145  - Continue levothyroxine 200 mg PO daily        Hypercholesterolemia    Stable  - Continue atorvastatin 40 mg PO daily          VTE Risk Mitigation         Ordered     apixaban tablet 5 mg  2 times daily      04/27/18 0307     IP VTE HIGH RISK PATIENT  Once      04/27/18 0316     Place sequential compression device  Until discontinued      04/27/18 0316     Place DARIAN hose  Until discontinued      04/27/18 0316     Reason for No Pharmacological VTE Prophylaxis  Once      04/27/18 0316              Michelle Blankenship PA-C  Department of Hospital Medicine   [facility]

## 2018-04-27 NOTE — PLAN OF CARE
04/27/18 1455   Discharge Assessment   Assessment Type Discharge Planning Assessment   Confirmed/corrected address and phone number on facesheet? Yes   Assessment information obtained from? Patient;Caregiver  (spouse, Rut Christy ([phone]))   Expected Length of Stay (days) 3   Communicated expected length of stay with patient/caregiver yes   Prior to hospitilization cognitive status: Alert/Oriented   Prior to hospitalization functional status: Assistive Equipment   Current cognitive status: Alert/Oriented  (hx: aphasia)   Current Functional Status: Needs Assistance   Lives With spouse;child(ron), adult  (spouse, Rut Christy, & adult daughter, Briseida Christy ([phone]))   Able to Return to Prior Arrangements unable to determine at this time (comments)   Is patient able to care for self after discharge? Unable to determine at this time (comments)   Patient's perception of discharge disposition home or selfcare   Readmission Within The Last 30 Days previous discharge plan unsuccessful   If yes, most recent facility name: [facility]   Patient currently being followed by outpatient case management? No   Patient currently receives any other outside agency services? No   Equipment Currently Used at Home glucometer;grab bar;walker, rolling;other (see comments)  (BP machine)   Do you have any problems affording any of your prescribed medications? No   Is the patient taking medications as prescribed? yes   Does the patient have transportation home? Yes   Transportation Available family or friend will provide  (spouse)   Does the patient receive services at the [facility]? No   Discharge Plan A Home with family   Discharge Plan B Home Health   Patient/Family In Agreement With Plan yes   Readmission Questionnaire   At the time of your discharge, did someone talk to you about what your health problems were? Yes   At the time of discharge, did someone talk to you about what to watch out for regarding worsening of  your health problem? Yes   At the time of discharge, did someone talk to you about what to do if you experienced worsening of your health problem? Yes   At the time of discharge, did someone talk to you about which medication to take when you left the hospital and which ones to stop taking? Yes   At the time of discharge, did someone talk to you about when and where to follow up with a doctor after you left the hospital? Yes   What do you believe caused you to be sick enough to be re-admitted? aphasia   How often do you need to have someone help you when you read instructions, pamphlets, or other written material from your doctor or pharmacy? Sometimes   Do you have problems taking your medications as prescribed? No   Do you have any problems affording any of  your prescribed medications? No   Do you have problems obtaining/receiving your medications? No   Does the patient have transportation to healthcare appointments? Yes   Living Arrangements house   Does the patient have family/friends to help with healtcare needs after discharge? yes   Does your caregiver provide all the help you need? Yes   Are you currently feeling confused? Yes   Are you currently having problems thinking? Yes   Are you currently having memory problems? Yes     Dx: CHF, aphasia, & sz  [facility]  Pharm: [facility]  Hosp f/u appts: Dr. Phillips (endo) on 5/7/18 at 0900 & Dr. Santiago (neuro) on 5/15/18 at 1400    EEG in progress

## 2018-04-27 NOTE — H&P
[facility] Hospital Medicine  History & Physical    Patient Name: Alfa Christy III  MRN: [id number]  Admission Date: 4/26/2018  Attending Physician: Summer Connelly MD   Primary Care Provider: Tyler Jasmine MD    [facility] Medicine Team: [facility]P MED F Salinas Fisher PA-C     Patient information was obtained from patient, past medical records and ER records.     Subjective:     Principal Problem:Acute on chronic congestive heart failure    Chief Complaint:   Chief Complaint   Patient presents with    Aphasia     Patient's wife states that patient started having aphasia at approximently 2230. Patient has history of stroke in Jan 2016. Patient is not answering questions or responding to questions at this time. Dr CARMELO lew to triage.         HPI: 63y/o M with pmhx stroke, seizure, afib (on eliquis), DM2, HLD, HTN presents w/ aphasia. Patient was last normal at 10:30 pm.  Per his wife he was doing well then started complaining of feeling dizzy after taking a shower. Denied fever, pains, vomiting.  She noticed he then started having trouble speaking and say words that did not make sense, so came to ED.  Wife denies any B/B incontinence, unilateral weakness, syncope, or tongue-biting.  Patient had similar presentation end of March; at that time TPA was deferred and subsequent neuroimaging negative for CVA. He does have history of seizures on keppra, and his seizures are atypical. Says wasn't having seizures until recently. Patient has had a previous stroke 1/2016 w/ residual L vision deficits only. EEG then abnormal w/ 1/18 CVA as likely foci for seizures. Currently on eliquis for a-fib. Further hisotry limited by patient's mental status.      In ED, [facility] neurology consulted for concern of stroke d/t aphasia.  Sxs likely 2/2 postictal period of complex seizure.   (baseline 100s-200s). CXR shows CM with findings suggestive of pulmonary edema.  Gvien one time dose of lasix 80 mg IV.  EKG shows  afib HR 69.  CTH with no significant change from previous. Remote left occipital lobe and left thalamic infarctions.     Past Medical History:   Diagnosis Date    Allergy     BMI 31.0-31.9,adult 11/25/2014    Cerebrovascular disease 7/25/2016    Chronic anticoagulation - pradaxa 11/10/2016    Chronic diastolic heart failure 11/20/2016    CKD (chronic kidney disease), stage III 9/23/2013    Controlled type 2 diabetes with neuropathy 12/16/2014    Diabetes mellitus due to underlying condition with stage 3 chronic kidney disease, without long-term current use of insulin 11/2/2016    Elevated alkaline phosphatase level 8/1/2012    Elevated PSA     negative prostate biopsy 4/11    Erectile dysfunction associated with type 2 diabetes mellitus     Gallstones 3/20/2013    Generalized tonic-clonic seizure 11/2016    HTN (hypertension), benign 8/1/2012    Hypercholesterolemia 8/1/2012    Microcytic anemia 11/27/2013    Paroxysmal atrial fibrillation 9/23/2013    Postsurgical hypothyroidism 11/27/2013    Right homonymous hemianopsia 2/5/2016    Sickle cell trait     Stroke 1/27/2016    Thyroid cancer     multifocal with six lesions, largest 5mm, treated with surgery and radioactive iodine    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/2016       Past Surgical History:   Procedure Laterality Date    BREAST SURGERY      cyst removal    COLONOSCOPY      CYST REMOVAL      chest    PROSTATE BIOPSY  4/27/11    SKIN BIOPSY      THYROID SURGERY  8/26/09    VASCULAR SURGERY         Review of patient's allergies indicates:   Allergen Reactions    Cough syrup [guaifenesin] Other (See Comments)       No current facility-administered medications on file prior to encounter.      Current Outpatient Prescriptions on File Prior to Encounter   Medication Sig    acetaminophen (TYLENOL) 500 MG tablet Take 1 tablet (500 mg total) by mouth every 6 (six) hours as needed for Pain.    amLODIPine (NORVASC) 10 MG tablet Take  "1 tablet (10 mg total) by mouth once daily.    apixaban 5 mg Tab Take 1 tablet (5 mg total) by mouth 2 (two) times daily.    aspirin 81 MG Chew Take 1 tablet (81 mg total) by mouth once daily.    atorvastatin (LIPITOR) 40 MG tablet Take 1 tablet (40 mg total) by mouth once daily.    BD INSULIN PEN NEEDLE UF MINI 31 gauge x 3/16" Ndle To use with insulin pens 4  times daily    carvedilol (COREG) 3.125 MG tablet Take 1 tablet (3.125 mg total) by mouth 2 (two) times daily.    dulaglutide (TRULICITY) 0.75 mg/0.5 mL PnIj Inject 0.5 mLs (0.75 mg total) into the skin every 7 days.    flash glucose scanning reader (FREESTYLE ANGELLA READER) Misc 1 each by Misc.(Non-Drug; Combo Route) route once daily.    flash glucose sensor (FREESTYLE ANGELLA SENSOR) Kit 3 each by Misc.(Non-Drug; Combo Route) route every 30 days.    folic acid (FOLVITE) 400 MCG tablet Take 400 mcg by mouth once daily.    furosemide (LASIX) 40 MG tablet Take 1 tablet (40 mg total) by mouth 2 (two) times daily.    GLUCAGON EMERGENCY KIT, HUMAN, 1 mg injection INJECT 1 MG INTO THE MUSCLE AS NEEDED    hydrALAZINE (APRESOLINE) 25 MG tablet Take 1 tablet (25 mg total) by mouth every 8 (eight) hours. (Patient taking differently: Take 25 mg by mouth every 12 (twelve) hours. )    insulin aspart (NOVOLOG) 100 unit/mL InPn pen Inject 7 units w/ meals plus scale 150-200+1, 201-250+2, 251-300+3, 301-350+4.    insulin detemir (LEVEMIR FLEXPEN) 100 unit/mL (3 mL) SubQ InPn pen Inject 14 Units into the skin every evening.    levetiracetam (KEPPRA) 750 MG Tab Take 2 tablets (1,500 mg total) by mouth 2 (two) times daily.    levothyroxine (SYNTHROID) 200 MCG tablet Take 1 tablet (200 mcg total) by mouth before breakfast.    losartan (COZAAR) 100 MG tablet Take 1 tablet (100 mg total) by mouth once daily.    magnesium 30 mg Tab Take 1 tablet by mouth twice a week.     multivitamin capsule Take 1 capsule by mouth every Mon, Wed, Fri.     ONETOUCH VERIO Strp USE " "TO TEST BLOOD SUGAR THREE TIMES DAILY    sildenafil (VIAGRA) 100 MG tablet Take 1 tablet (100 mg total) by mouth as needed for Erectile Dysfunction.    vitamin D 1000 units Tab Take 1,000 Units by mouth every Mon, Wed, Fri.     Family History     Problem Relation (Age of Onset)    Cancer Sister    Diabetes Father, Mother, Brother    Heart disease Father, Mother    Hypertension Father, Mother    Thyroid disease Maternal Aunt        Social History Main Topics    Smoking status: Never Smoker    Smokeless tobacco: Never Used    Alcohol use Yes      Comment: occasionally    Drug use: No    Sexual activity: Yes     Partners: Female      Comment:  with 3 kids     Review of Systems   Unable to perform ROS: Mental status change     Objective:     Vital Signs (Most Recent):  Temp: 97.8 °F (36.6 °C) (04/26/18 2326)  Pulse: 65 (04/27/18 0321)  Resp: 15 (04/27/18 0321)  BP: (!) 148/79 (04/27/18 0321)  SpO2: 100 % (04/27/18 0334) Vital Signs (24h Range):  Temp:  [97.8 °F (36.6 °C)] 97.8 °F (36.6 °C)  Pulse:  [64-76] 65  Resp:  [12-18] 15  SpO2:  [94 %-100 %] 100 %  BP: (148-177)/(79-98) 148/79     Weight: 102.2 kg (225 lb 5 oz)  Body mass index is 32.33 kg/m².    Physical Exam   Constitutional: He appears well-developed and well-nourished.   HENT:   Head: Normocephalic and atraumatic.   Eyes: Conjunctivae and EOM are normal.   Neck: Normal range of motion. Neck supple.   Cardiovascular: Normal rate, normal heart sounds and intact distal pulses.  An irregularly irregular rhythm present.   No murmur heard.  + JVD   Pulmonary/Chest: Effort normal. No respiratory distress. He has no wheezes. He has rales (Mild LLL).   Abdominal: Soft. Bowel sounds are normal. He exhibits distension (protuberant belly). There is no tenderness.   Musculoskeletal: Normal range of motion. He exhibits edema (2+ BLE edema). He exhibits no tenderness.   Neurological: He is alert.   Aphasic  Unable to state name- says " I know what it is"  Mild " RUE tremors  Unable to follow certain commands  Can sit up and stand up with some assistance  Difficulty assessing bilateral strength with current mental status  No pronator drift, no facial droop   Skin: Skin is warm and dry.   Psychiatric: His speech is delayed. He is slowed.   Nursing note and vitals reviewed.        CRANIAL NERVES     CN III, IV, VI   Extraocular motions are normal.        Significant Labs:   CBC:   Recent Labs  Lab 04/26/18  2355   WBC 9.11   HGB 11.3*   HCT 31.8*        CMP:   Recent Labs  Lab 04/26/18  2355      K 4.4      CO2 19*      BUN 24*   CREATININE 1.8*   CALCIUM 8.2*   PROT 7.3   ALBUMIN 3.4*   BILITOT 1.1*   ALKPHOS 113   AST 40   ALT 19   ANIONGAP 10   EGFRNONAA 38.9*     TSH:   Recent Labs  Lab 04/26/18  2355   TSH 2.145       Significant Imaging: I have reviewed all pertinent imaging results/findings within the past 24 hours.     Ct Head Without Contrast    Result Date: 4/27/2018  EXAMINATION: CT HEAD WITHOUT CONTRAST CLINICAL HISTORY: Stroke; TECHNIQUE: Low dose axial CT images obtained throughout the head without intravenous contrast. Sagittal and coronal reconstructions were performed. COMPARISON: MRI brain without contrast 03/23/2018; CT head without contrast 03/23/2018 FINDINGS: Intracranial compartment: Ventricles and sulci are stable in size and configuration when compared to prior examinations without evidence of hydrocephalus.  Note is made of mild ex vacuo dilatation of the occipital horn of the left lateral ventricle secondary to adjacent encephalomalacia.  No extra-axial blood or fluid collections. The brain parenchyma demonstrates focal encephalomalacia in the left occipital lobe and thalamus consistent with remote infarction.  No parenchymal mass, hemorrhage, edema or new major vascular distribution infarct. Skull/extracranial contents (limited evaluation): No fracture. Mastoid air cells and paranasal sinuses are essentially clear.     No  acute intracranial abnormality or significant interval detrimental change when compared to examinations 03/23/2018. Remote left occipital lobe and left thalamic infarctions.     X-ray Chest Ap Portable    Result Date: 4/27/2018  EXAMINATION: XR CHEST AP PORTABLE CLINICAL HISTORY: Stroke; TECHNIQUE: Single frontal view of the chest was performed. COMPARISON: Chest radiograph 03/23/2018 FINDINGS: Cardiac monitoring leads overlie the chest.  Cardiomediastinal silhouette is prominent, similar to prior examination.  The lungs are symmetrically expanded bilaterally with increased interstitial and parenchymal attenuation, suggestive of pulmonary edema.  No evidence of focal consolidation, significant pleural fluid or pneumothorax.  Visualized osseous structures demonstrate degenerative change without acute abnormality.     Cardiomegaly with findings suggestive of pulmonary edema.       Assessment/Plan:     * Acute on chronic congestive heart failure    Last ECHO 1/28/16 shows +DD  CXR shows CM, pulmonary edema.  100% on RA  - hypervolemic on exam  - Home dose lasix 40 mg PO BID.  - Continue BB, ARB  - Lasix 80 mg IV given in ED.  Will start 60 mg IV BID.  - Strict I/O, daily weights, tele        Complex partial seizures evolving to generalized tonic-clonic seizures    Presents to ED with aphasia.  Similar presentation to 3/23/18 admission.  Stroke workup with EEG (no finding) and MRI stroke protocol (no acute infarct/hemorrhage). Susepct postictal state.  Last admission took 1.5 days to return to baseline mentation.  - Vascular neurology consulted in ED.  CTH no acute changes.  No acute stroke suspected.  - f/w Dr. Santiago   - No B/B incontinence, no tongue biting, no LOC, no convulsions  - UA ordered.  - seizure precautions  - Continue keppra 1500 mg PO BID  - EEG ordered        Paroxysmal atrial fibrillation    Stable  - Continue apixaban 5 mg PO BID, 3.125 mg PO BID  - CHADSVASC 5  - on tele        History of CVA  (cerebrovascular accident)    CVA 1/2016  - residual L eye deficit  - continue ASA, statin  - First seizure 11/2016  - PT/OT consult        Type 2 diabetes mellitus with hyperglycemia    Stable  - Repeat Hg A1c  - Home regimen: aspart 7 U TIDWM, Levemir 14 U QHS  - Inpatient regimen: aspart 5 U TID WM, Levemir 10 U QHS  - low dose SSI, acchuchecks ACHS  - diabetic diet        Essential hypertension    Hypertensive on admission  - Continue amlodipine 10 mg PO daily, carvedilol, losartan 100 mg PO daily  - Patient has been taking 1/2 tablet of amlodipine.  Will start back on amlodipine 10 mg in AM.  May need to increase carvedilol or losartan.          Postsurgical hypothyroidism    Stable  - TSH 2.145  - Continue levothyroxine 200 mg PO daily        Hypercholesterolemia    Stable  - Continue atorvastatin 40 mg PO daily          VTE Risk Mitigation         Ordered     apixaban tablet 5 mg  2 times daily      04/27/18 0307     IP VTE HIGH RISK PATIENT  Once      04/27/18 0316     Place sequential compression device  Until discontinued      04/27/18 0316     Place DARIAN hose  Until discontinued      04/27/18 0316     Reason for No Pharmacological VTE Prophylaxis  Once      04/27/18 0316             Salinas Fisher PA-C  Department of Hospital Medicine   [facility]

## 2018-04-27 NOTE — SUBJECTIVE & OBJECTIVE
Past Medical History:   Diagnosis Date    Allergy     BMI 31.0-31.9,adult 11/25/2014    Cerebrovascular disease 7/25/2016    Chronic anticoagulation - pradaxa 11/10/2016    Chronic diastolic heart failure 11/20/2016    CKD (chronic kidney disease), stage III 9/23/2013    Controlled type 2 diabetes with neuropathy 12/16/2014    Diabetes mellitus due to underlying condition with stage 3 chronic kidney disease, without long-term current use of insulin 11/2/2016    Elevated alkaline phosphatase level 8/1/2012    Elevated PSA     negative prostate biopsy 4/11    Erectile dysfunction associated with type 2 diabetes mellitus     Gallstones 3/20/2013    Generalized tonic-clonic seizure 11/2016    HTN (hypertension), benign 8/1/2012    Hypercholesterolemia 8/1/2012    Microcytic anemia 11/27/2013    Paroxysmal atrial fibrillation 9/23/2013    Postsurgical hypothyroidism 11/27/2013    Right homonymous hemianopsia 2/5/2016    Sickle cell trait     Stroke 1/27/2016    Thyroid cancer     multifocal with six lesions, largest 5mm, treated with surgery and radioactive iodine    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/2016       Past Surgical History:   Procedure Laterality Date    BREAST SURGERY      cyst removal    COLONOSCOPY      CYST REMOVAL      chest    PROSTATE BIOPSY  4/27/11    SKIN BIOPSY      THYROID SURGERY  8/26/09    VASCULAR SURGERY         Review of patient's allergies indicates:   Allergen Reactions    Cough syrup [guaifenesin] Other (See Comments)       No current facility-administered medications on file prior to encounter.      Current Outpatient Prescriptions on File Prior to Encounter   Medication Sig    acetaminophen (TYLENOL) 500 MG tablet Take 1 tablet (500 mg total) by mouth every 6 (six) hours as needed for Pain.    amLODIPine (NORVASC) 10 MG tablet Take 1 tablet (10 mg total) by mouth once daily.    apixaban 5 mg Tab Take 1 tablet (5 mg total) by mouth 2 (two) times  "daily.    aspirin 81 MG Chew Take 1 tablet (81 mg total) by mouth once daily.    atorvastatin (LIPITOR) 40 MG tablet Take 1 tablet (40 mg total) by mouth once daily.    BD INSULIN PEN NEEDLE UF MINI 31 gauge x 3/16" Ndle To use with insulin pens 4  times daily    carvedilol (COREG) 3.125 MG tablet Take 1 tablet (3.125 mg total) by mouth 2 (two) times daily.    dulaglutide (TRULICITY) 0.75 mg/0.5 mL PnIj Inject 0.5 mLs (0.75 mg total) into the skin every 7 days.    flash glucose scanning reader (FREESTYLE ANGELLA READER) Misc 1 each by Misc.(Non-Drug; Combo Route) route once daily.    flash glucose sensor (FREESTYLE ANGELLA SENSOR) Kit 3 each by Misc.(Non-Drug; Combo Route) route every 30 days.    folic acid (FOLVITE) 400 MCG tablet Take 400 mcg by mouth once daily.    furosemide (LASIX) 40 MG tablet Take 1 tablet (40 mg total) by mouth 2 (two) times daily.    GLUCAGON EMERGENCY KIT, HUMAN, 1 mg injection INJECT 1 MG INTO THE MUSCLE AS NEEDED    hydrALAZINE (APRESOLINE) 25 MG tablet Take 1 tablet (25 mg total) by mouth every 8 (eight) hours. (Patient taking differently: Take 25 mg by mouth every 12 (twelve) hours. )    insulin aspart (NOVOLOG) 100 unit/mL InPn pen Inject 7 units w/ meals plus scale 150-200+1, 201-250+2, 251-300+3, 301-350+4.    insulin detemir (LEVEMIR FLEXPEN) 100 unit/mL (3 mL) SubQ InPn pen Inject 14 Units into the skin every evening.    levetiracetam (KEPPRA) 750 MG Tab Take 2 tablets (1,500 mg total) by mouth 2 (two) times daily.    levothyroxine (SYNTHROID) 200 MCG tablet Take 1 tablet (200 mcg total) by mouth before breakfast.    losartan (COZAAR) 100 MG tablet Take 1 tablet (100 mg total) by mouth once daily.    magnesium 30 mg Tab Take 1 tablet by mouth twice a week.     multivitamin capsule Take 1 capsule by mouth every Mon, Wed, Fri.     ONEUCH VER Str USE TO TEST BLOOD SUGAR THREE TIMES DAILY    sildenafil (VIAGRA) 100 MG tablet Take 1 tablet (100 mg total) by mouth " "as needed for Erectile Dysfunction.    vitamin D 1000 units Tab Take 1,000 Units by mouth every Mon, Wed, Fri.     Family History     Problem Relation (Age of Onset)    Cancer Sister    Diabetes Father, Mother, Brother    Heart disease Father, Mother    Hypertension Father, Mother    Thyroid disease Maternal Aunt        Social History Main Topics    Smoking status: Never Smoker    Smokeless tobacco: Never Used    Alcohol use Yes      Comment: occasionally    Drug use: No    Sexual activity: Yes     Partners: Female      Comment:  with 3 kids     Review of Systems   Unable to perform ROS: Mental status change     Objective:     Vital Signs (Most Recent):  Temp: 97.8 °F (36.6 °C) (04/26/18 2326)  Pulse: 65 (04/27/18 0321)  Resp: 15 (04/27/18 0321)  BP: (!) 148/79 (04/27/18 0321)  SpO2: 100 % (04/27/18 0334) Vital Signs (24h Range):  Temp:  [97.8 °F (36.6 °C)] 97.8 °F (36.6 °C)  Pulse:  [64-76] 65  Resp:  [12-18] 15  SpO2:  [94 %-100 %] 100 %  BP: (148-177)/(79-98) 148/79     Weight: 102.2 kg (225 lb 5 oz)  Body mass index is 32.33 kg/m².    Physical Exam   Constitutional: He appears well-developed and well-nourished.   HENT:   Head: Normocephalic and atraumatic.   Eyes: Conjunctivae and EOM are normal.   Neck: Normal range of motion. Neck supple.   Cardiovascular: Normal rate, normal heart sounds and intact distal pulses.  An irregularly irregular rhythm present.   No murmur heard.  + JVD   Pulmonary/Chest: Effort normal. No respiratory distress. He has no wheezes. He has rales (Mild LLL).   Abdominal: Soft. Bowel sounds are normal. He exhibits distension (protuberant belly). There is no tenderness.   Musculoskeletal: Normal range of motion. He exhibits edema (2+ BLE edema). He exhibits no tenderness.   Neurological: He is alert.   Aphasic  Unable to state name- says " I know what it is"  Mild RUE tremors  Unable to follow certain commands  Can sit up and stand up with some assistance  Difficulty " assessing bilateral strength with current mental status  No pronator drift, no facial droop   Skin: Skin is warm and dry.   Psychiatric: His speech is delayed. He is slowed.   Nursing note and vitals reviewed.        CRANIAL NERVES     CN III, IV, VI   Extraocular motions are normal.        Significant Labs:   CBC:   Recent Labs  Lab 04/26/18  2355   WBC 9.11   HGB 11.3*   HCT 31.8*        CMP:   Recent Labs  Lab 04/26/18  2355      K 4.4      CO2 19*      BUN 24*   CREATININE 1.8*   CALCIUM 8.2*   PROT 7.3   ALBUMIN 3.4*   BILITOT 1.1*   ALKPHOS 113   AST 40   ALT 19   ANIONGAP 10   EGFRNONAA 38.9*     TSH:   Recent Labs  Lab 04/26/18  2355   TSH 2.145       Significant Imaging: I have reviewed all pertinent imaging results/findings within the past 24 hours.     Ct Head Without Contrast    Result Date: 4/27/2018  EXAMINATION: CT HEAD WITHOUT CONTRAST CLINICAL HISTORY: Stroke; TECHNIQUE: Low dose axial CT images obtained throughout the head without intravenous contrast. Sagittal and coronal reconstructions were performed. COMPARISON: MRI brain without contrast 03/23/2018; CT head without contrast 03/23/2018 FINDINGS: Intracranial compartment: Ventricles and sulci are stable in size and configuration when compared to prior examinations without evidence of hydrocephalus.  Note is made of mild ex vacuo dilatation of the occipital horn of the left lateral ventricle secondary to adjacent encephalomalacia.  No extra-axial blood or fluid collections. The brain parenchyma demonstrates focal encephalomalacia in the left occipital lobe and thalamus consistent with remote infarction.  No parenchymal mass, hemorrhage, edema or new major vascular distribution infarct. Skull/extracranial contents (limited evaluation): No fracture. Mastoid air cells and paranasal sinuses are essentially clear.     No acute intracranial abnormality or significant interval detrimental change when compared to examinations  03/23/2018. Remote left occipital lobe and left thalamic infarctions.     X-ray Chest Ap Portable    Result Date: 4/27/2018  EXAMINATION: XR CHEST AP PORTABLE CLINICAL HISTORY: Stroke; TECHNIQUE: Single frontal view of the chest was performed. COMPARISON: Chest radiograph 03/23/2018 FINDINGS: Cardiac monitoring leads overlie the chest.  Cardiomediastinal silhouette is prominent, similar to prior examination.  The lungs are symmetrically expanded bilaterally with increased interstitial and parenchymal attenuation, suggestive of pulmonary edema.  No evidence of focal consolidation, significant pleural fluid or pneumothorax.  Visualized osseous structures demonstrate degenerative change without acute abnormality.     Cardiomegaly with findings suggestive of pulmonary edema.

## 2018-04-27 NOTE — SUBJECTIVE & OBJECTIVE
Past Medical History:   Diagnosis Date    Allergy     BMI 31.0-31.9,adult 11/25/2014    Cerebrovascular disease 7/25/2016    Chronic anticoagulation - pradaxa 11/10/2016    Chronic diastolic heart failure 11/20/2016    CKD (chronic kidney disease), stage III 9/23/2013    Controlled type 2 diabetes with neuropathy 12/16/2014    Diabetes mellitus due to underlying condition with stage 3 chronic kidney disease, without long-term current use of insulin 11/2/2016    Elevated alkaline phosphatase level 8/1/2012    Elevated PSA     negative prostate biopsy 4/11    Erectile dysfunction associated with type 2 diabetes mellitus     Gallstones 3/20/2013    Generalized tonic-clonic seizure 11/2016    HTN (hypertension), benign 8/1/2012    Hypercholesterolemia 8/1/2012    Microcytic anemia 11/27/2013    Paroxysmal atrial fibrillation 9/23/2013    Postsurgical hypothyroidism 11/27/2013    Right homonymous hemianopsia 2/5/2016    Sickle cell trait     Stroke 1/27/2016    Thyroid cancer     multifocal with six lesions, largest 5mm, treated with surgery and radioactive iodine    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/2016     Past Surgical History:   Procedure Laterality Date    BREAST SURGERY      cyst removal    COLONOSCOPY      CYST REMOVAL      chest    PROSTATE BIOPSY  4/27/11    SKIN BIOPSY      THYROID SURGERY  8/26/09    VASCULAR SURGERY       Family History   Problem Relation Age of Onset    Heart disease Father     Diabetes Father     Hypertension Father     Diabetes Mother     Hypertension Mother     Heart disease Mother     Diabetes Brother     Cancer Sister     Thyroid disease Maternal Aunt     Clotting disorder Neg Hx      Social History   Substance Use Topics    Smoking status: Never Smoker    Smokeless tobacco: Never Used    Alcohol use Yes      Comment: occasionally     Review of patient's allergies indicates:   Allergen Reactions    Cough syrup [guaifenesin] Other  (See Comments)       Medications: I have reviewed the current medication administration record.      (Not in a hospital admission)    Review of Systems   Respiratory: Positive for cough. Negative for shortness of breath.    Cardiovascular: Negative for chest pain.   Gastrointestinal: Negative for constipation, diarrhea, nausea and vomiting.   Genitourinary: Negative for difficulty urinating, dysuria, frequency and urgency.   Skin: Negative for wound.   Neurological: Positive for speech difficulty. Negative for weakness.   Psychiatric/Behavioral: Negative for agitation and behavioral problems.     Objective:     Vital Signs (Most Recent):  Temp: 97.8 °F (36.6 °C) (04/26/18 2326)  Pulse: 64 (04/26/18 2326)  Resp: 18 (04/26/18 2326)  BP: (!) 173/98 (04/26/18 2326)  SpO2: 96 % (04/26/18 2326)    Vital Signs Range (Last 24H):  Temp:  [97.8 °F (36.6 °C)]   Pulse:  [64]   Resp:  [18]   BP: (173)/(98)   SpO2:  [96 %]     Physical Exam   Constitutional: He appears well-developed and well-nourished. No distress.   HENT:   Head: Normocephalic and atraumatic.   Eyes: EOM are normal.   Pulmonary/Chest: Effort normal.   Abdominal: There is tenderness. There is guarding.   Musculoskeletal: Normal range of motion.   Neurological: He is alert.   Skin: Skin is warm and dry.   Psychiatric: His behavior is normal.   Nursing note and vitals reviewed.      Neurological Exam:   LOC: alert  Attention Span: Good   Language: Expressive aphasia, Receptive aphasia  Articulation: Mute/Anarthric  Orientation: Untestable due to severe aphasia   Visual Fields: Full  EOM (CN III, IV, VI): Full/intact  Tone: Normal tone throughout      Laboratory:  CMP:   Recent Labs  Lab 04/26/18  2355   CALCIUM 8.2*   ALBUMIN 3.4*   PROT 7.3      K 4.4   CO2 19*      BUN 24*   CREATININE 1.8*   ALKPHOS 113   ALT 19   AST 40   BILITOT 1.1*     CBC:   Recent Labs  Lab 04/26/18  2355   WBC 9.11   RBC 3.99*   HGB 11.3*   HCT 31.8*      MCV 80*   MCH  28.3   MCHC 35.5     Lipid Panel:   Recent Labs  Lab 04/26/18  2355   CHOL 105*   LDLCALC 48.4*   HDL 37*   TRIG 98     Coagulation:   Recent Labs  Lab 04/26/18  2355   INR 1.0     Hgb A1C: No results for input(s): HGBA1C in the last 168 hours.  TSH: No results for input(s): TSH in the last 168 hours.    Diagnostic Results:    Brain imaging:  CTH 4/27/18: encephalomalacia in the region of L PCA from prior AIS.  No acute bleed or early ischemic changes seen    Vessel Imaging:  MRA Brain/Neck 10/4/18:  No evidence of acute or major vascular distribution infarct or intracranial hemorrhage.    Questionable subtle cortical diffusion restriction throughout the supratentorial brain.  Short-term followup is suggested.    Remote left PCA distribution infarct within the left occipital lobe.    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      Cardiac Evaluation:   TTE 1/2016:  1 - Normal left ventricular systolic function (EF 55-60%).     2 - No regional wall motion abnormalities.     3 - Concentric hypertrophy.     4 - Left ventricular diastolic dysfunction.     5 - Trivial aortic regurgitation.     6 - Trivial tricuspid regurgitation, PASP 19 mmHg.     7 - No intracardiac source of embolus noted on this exam.  If high clinical suspicion, consider ADA +/- bubble study.

## 2018-04-27 NOTE — ED TRIAGE NOTES
62 yr old male pt presents to the ed with wife for aphasia x 1  Hr. Pt has a hx cva x 2 years prior to today. Pt is aox 4.pt denies chest pain sob or nausea. Pt is awake alert and oriented x4.

## 2018-04-27 NOTE — ASSESSMENT & PLAN NOTE
Last ECHO 1/28/16 shows +DD  CXR shows CM, pulmonary edema.  100% on RA  - hypervolemic on exam  - Home dose lasix 40 mg PO BID.  - Continue BB, ARB  - Lasix 80 mg IV given in ED.  Will start 60 mg IV BID.  4/27 continue furosemide 60 mg IV BID.  - Strict I/O, daily weights, tele

## 2018-04-27 NOTE — HOSPITAL COURSE
Pt admitted due to aphasia.  Similar presentation/admissions 11/2017 and 03/2018 (medication noncompliance an issue).  Vascular neurology consulted in ED.  CTH no acute changes.  No acute stroke suspected.  Keppra level ordered.  EEG ordered: abnormal extended (1 hour and 29 minutes) EEG due to rare left sided epileptiform activity seen, suggestive of underlying epileptiform activity; continuous left sided slowing seen, suggestive of underlying structural lesion.  Neurology consulted and recommended slow titration on lacosamide--50 mg qd x 1 week, increase lacosamide to 100 mg for 2nd week if pt tolerates.  Continue levetiracetam 1500 mg bid, level 69.4.  Follow up with Dr. Santiago in 2 weeks.  Pt also with acute CHF exacerbation (->395).  Pt treated with IV lasix bid and wt decreased 2 kg.  Day 3-4 transitioned back to home dosing furosemide 40 mg bid.  Seizure precautions at discharge.

## 2018-04-28 LAB
ANION GAP SERPL CALC-SCNC: 6 MMOL/L
BASOPHILS # BLD AUTO: 0.07 K/UL
BASOPHILS NFR BLD: 0.8 %
BUN SERPL-MCNC: 25 MG/DL
CALCIUM SERPL-MCNC: 8.6 MG/DL
CHLORIDE SERPL-SCNC: 109 MMOL/L
CO2 SERPL-SCNC: 24 MMOL/L
CREAT SERPL-MCNC: 2 MG/DL
DIFFERENTIAL METHOD: ABNORMAL
EOSINOPHIL # BLD AUTO: 0.3 K/UL
EOSINOPHIL NFR BLD: 3.7 %
ERYTHROCYTE [DISTWIDTH] IN BLOOD BY AUTOMATED COUNT: 17 %
EST. GFR  (AFRICAN AMERICAN): 39.6 ML/MIN/1.73 M^2
EST. GFR  (NON AFRICAN AMERICAN): 34.3 ML/MIN/1.73 M^2
GLUCOSE SERPL-MCNC: 142 MG/DL
HCT VFR BLD AUTO: 33.4 %
HGB BLD-MCNC: 12.1 G/DL
IMM GRANULOCYTES # BLD AUTO: 0.02 K/UL
IMM GRANULOCYTES NFR BLD AUTO: 0.2 %
LEVETIRACETAM SERPL-MCNC: 69.4 UG/ML (ref 3–60)
LYMPHOCYTES # BLD AUTO: 2 K/UL
LYMPHOCYTES NFR BLD: 23.3 %
MCH RBC QN AUTO: 28.7 PG
MCHC RBC AUTO-ENTMCNC: 36.2 G/DL
MCV RBC AUTO: 79 FL
MONOCYTES # BLD AUTO: 1.5 K/UL
MONOCYTES NFR BLD: 17.4 %
NEUTROPHILS # BLD AUTO: 4.7 K/UL
NEUTROPHILS NFR BLD: 54.6 %
NRBC BLD-RTO: 1 /100 WBC
PLATELET # BLD AUTO: 211 K/UL
PMV BLD AUTO: 12.1 FL
POCT GLUCOSE: 143 MG/DL (ref 70–110)
POCT GLUCOSE: 149 MG/DL (ref 70–110)
POCT GLUCOSE: 150 MG/DL (ref 70–110)
POCT GLUCOSE: 169 MG/DL (ref 70–110)
POTASSIUM SERPL-SCNC: 4 MMOL/L
RBC # BLD AUTO: 4.22 M/UL
SODIUM SERPL-SCNC: 139 MMOL/L
WBC # BLD AUTO: 8.66 K/UL

## 2018-04-28 PROCEDURE — 25000003 PHARM REV CODE 250: Performed by: PHYSICIAN ASSISTANT

## 2018-04-28 PROCEDURE — 63600175 PHARM REV CODE 636 W HCPCS: Performed by: PHYSICIAN ASSISTANT

## 2018-04-28 PROCEDURE — 99226 PR SUBSEQUENT OBSERVATION CARE,LEVEL III: CPT | Mod: SA,,, | Performed by: PHYSICIAN ASSISTANT

## 2018-04-28 PROCEDURE — 36415 COLL VENOUS BLD VENIPUNCTURE: CPT

## 2018-04-28 PROCEDURE — 99219 PR INITIAL OBSERVATION CARE,LEVL II: CPT | Mod: ,,, | Performed by: PSYCHIATRY & NEUROLOGY

## 2018-04-28 PROCEDURE — 80048 BASIC METABOLIC PNL TOTAL CA: CPT

## 2018-04-28 PROCEDURE — 85025 COMPLETE CBC W/AUTO DIFF WBC: CPT

## 2018-04-28 PROCEDURE — G0378 HOSPITAL OBSERVATION PER HR: HCPCS

## 2018-04-28 RX ORDER — CARVEDILOL 6.25 MG/1
6.25 TABLET ORAL 2 TIMES DAILY
Status: DISCONTINUED | OUTPATIENT
Start: 2018-04-28 | End: 2018-04-30 | Stop reason: HOSPADM

## 2018-04-28 RX ORDER — HYDRALAZINE HYDROCHLORIDE 25 MG/1
25 TABLET, FILM COATED ORAL EVERY 6 HOURS PRN
Status: DISCONTINUED | OUTPATIENT
Start: 2018-04-28 | End: 2018-04-30 | Stop reason: HOSPADM

## 2018-04-28 RX ORDER — LACOSAMIDE 50 MG/1
50 TABLET ORAL EVERY 24 HOURS
Status: DISCONTINUED | OUTPATIENT
Start: 2018-04-28 | End: 2018-04-29

## 2018-04-28 RX ORDER — FUROSEMIDE 40 MG/1
40 TABLET ORAL 2 TIMES DAILY
Status: DISCONTINUED | OUTPATIENT
Start: 2018-04-28 | End: 2018-04-30 | Stop reason: HOSPADM

## 2018-04-28 RX ADMIN — INSULIN DETEMIR 10 UNITS: 100 INJECTION, SOLUTION SUBCUTANEOUS at 09:04

## 2018-04-28 RX ADMIN — LEVETIRACETAM 1500 MG: 750 TABLET ORAL at 09:04

## 2018-04-28 RX ADMIN — CARVEDILOL 3.12 MG: 3.12 TABLET, FILM COATED ORAL at 09:04

## 2018-04-28 RX ADMIN — CARVEDILOL 6.25 MG: 6.25 TABLET, FILM COATED ORAL at 09:04

## 2018-04-28 RX ADMIN — LOSARTAN POTASSIUM 100 MG: 50 TABLET, FILM COATED ORAL at 09:04

## 2018-04-28 RX ADMIN — THERA TABS 1 TABLET: TAB at 09:04

## 2018-04-28 RX ADMIN — APIXABAN 5 MG: 5 TABLET, FILM COATED ORAL at 09:04

## 2018-04-28 RX ADMIN — LACOSAMIDE 50 MG: 50 TABLET, FILM COATED ORAL at 06:04

## 2018-04-28 RX ADMIN — INSULIN ASPART 5 UNITS: 100 INJECTION, SOLUTION INTRAVENOUS; SUBCUTANEOUS at 01:04

## 2018-04-28 RX ADMIN — INSULIN ASPART 5 UNITS: 100 INJECTION, SOLUTION INTRAVENOUS; SUBCUTANEOUS at 09:04

## 2018-04-28 RX ADMIN — FOLIC ACID 1000 MCG: 1 TABLET ORAL at 09:04

## 2018-04-28 RX ADMIN — ATORVASTATIN CALCIUM 40 MG: 20 TABLET, FILM COATED ORAL at 09:04

## 2018-04-28 RX ADMIN — FUROSEMIDE 60 MG: 10 INJECTION, SOLUTION INTRAMUSCULAR; INTRAVENOUS at 09:04

## 2018-04-28 RX ADMIN — ASPIRIN 81 MG 81 MG: 81 TABLET ORAL at 09:04

## 2018-04-28 RX ADMIN — INSULIN ASPART 5 UNITS: 100 INJECTION, SOLUTION INTRAVENOUS; SUBCUTANEOUS at 06:04

## 2018-04-28 RX ADMIN — AMLODIPINE BESYLATE 10 MG: 10 TABLET ORAL at 09:04

## 2018-04-28 RX ADMIN — LEVOTHYROXINE SODIUM 200 MCG: 100 TABLET ORAL at 05:04

## 2018-04-28 RX ADMIN — FUROSEMIDE 40 MG: 40 TABLET ORAL at 06:04

## 2018-04-28 NOTE — SUBJECTIVE & OBJECTIVE
Past Medical History:   Diagnosis Date    Allergy     BMI 31.0-31.9,adult 11/25/2014    Cerebrovascular disease 7/25/2016    Chronic anticoagulation - pradaxa 11/10/2016    Chronic diastolic heart failure 11/20/2016    CKD (chronic kidney disease), stage III 9/23/2013    Controlled type 2 diabetes with neuropathy 12/16/2014    Diabetes mellitus due to underlying condition with stage 3 chronic kidney disease, without long-term current use of insulin 11/2/2016    Elevated alkaline phosphatase level 8/1/2012    Elevated PSA     negative prostate biopsy 4/11    Erectile dysfunction associated with type 2 diabetes mellitus     Gallstones 3/20/2013    Generalized tonic-clonic seizure 11/2016    HTN (hypertension), benign 8/1/2012    Hypercholesterolemia 8/1/2012    Microcytic anemia 11/27/2013    Paroxysmal atrial fibrillation 9/23/2013    Postsurgical hypothyroidism 11/27/2013    Right homonymous hemianopsia 2/5/2016    Sickle cell trait     Stroke 1/27/2016    Thyroid cancer     multifocal with six lesions, largest 5mm, treated with surgery and radioactive iodine    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/2016     Past Surgical History:   Procedure Laterality Date    BREAST SURGERY      cyst removal    COLONOSCOPY      CYST REMOVAL      chest    PROSTATE BIOPSY  4/27/11    SKIN BIOPSY      THYROID SURGERY  8/26/09    VASCULAR SURGERY       Review of patient's allergies indicates:   Allergen Reactions    Cough syrup [guaifenesin] Other (See Comments)     Current Neurological Medications: Levetiracetam    No current facility-administered medications on file prior to encounter.      Current Outpatient Prescriptions on File Prior to Encounter   Medication Sig    acetaminophen (TYLENOL) 500 MG tablet Take 1 tablet (500 mg total) by mouth every 6 (six) hours as needed for Pain.    amLODIPine (NORVASC) 10 MG tablet Take 1 tablet (10 mg total) by mouth once daily.    apixaban 5 mg Tab Take 1  "tablet (5 mg total) by mouth 2 (two) times daily.    aspirin 81 MG Chew Take 1 tablet (81 mg total) by mouth once daily.    atorvastatin (LIPITOR) 40 MG tablet Take 1 tablet (40 mg total) by mouth once daily.    BD INSULIN PEN NEEDLE UF MINI 31 gauge x 3/16" Ndle To use with insulin pens 4  times daily    carvedilol (COREG) 3.125 MG tablet Take 1 tablet (3.125 mg total) by mouth 2 (two) times daily.    dulaglutide (TRULICITY) 0.75 mg/0.5 mL PnIj Inject 0.5 mLs (0.75 mg total) into the skin every 7 days.    flash glucose scanning reader (FREESTYLE ANGELLA READER) Misc 1 each by Misc.(Non-Drug; Combo Route) route once daily.    flash glucose sensor (FREESTYLE ANGELLA SENSOR) Kit 3 each by Misc.(Non-Drug; Combo Route) route every 30 days.    folic acid (FOLVITE) 400 MCG tablet Take 400 mcg by mouth once daily.    furosemide (LASIX) 40 MG tablet Take 1 tablet (40 mg total) by mouth 2 (two) times daily.    GLUCAGON EMERGENCY KIT, HUMAN, 1 mg injection INJECT 1 MG INTO THE MUSCLE AS NEEDED    hydrALAZINE (APRESOLINE) 25 MG tablet Take 1 tablet (25 mg total) by mouth every 8 (eight) hours. (Patient taking differently: Take 25 mg by mouth every 12 (twelve) hours. )    insulin aspart (NOVOLOG) 100 unit/mL InPn pen Inject 7 units w/ meals plus scale 150-200+1, 201-250+2, 251-300+3, 301-350+4.    insulin detemir (LEVEMIR FLEXPEN) 100 unit/mL (3 mL) SubQ InPn pen Inject 14 Units into the skin every evening.    levetiracetam (KEPPRA) 750 MG Tab Take 2 tablets (1,500 mg total) by mouth 2 (two) times daily.    levothyroxine (SYNTHROID) 200 MCG tablet Take 1 tablet (200 mcg total) by mouth before breakfast.    losartan (COZAAR) 100 MG tablet Take 1 tablet (100 mg total) by mouth once daily.    magnesium 30 mg Tab Take 1 tablet by mouth twice a week.     multivitamin capsule Take 1 capsule by mouth every Mon, Wed, Fri.     ONEUCH VERIO Strp USE TO TEST BLOOD SUGAR THREE TIMES DAILY    sildenafil (VIAGRA) 100 MG " tablet Take 1 tablet (100 mg total) by mouth as needed for Erectile Dysfunction.    vitamin D 1000 units Tab Take 1,000 Units by mouth every Mon, Wed, Fri.     Family History     Problem Relation (Age of Onset)    Cancer Sister    Diabetes Father, Mother, Brother    Heart disease Father, Mother    Hypertension Father, Mother    Thyroid disease Maternal Aunt        Social History Main Topics    Smoking status: Never Smoker    Smokeless tobacco: Never Used    Alcohol use Yes      Comment: occasionally    Drug use: No    Sexual activity: Yes     Partners: Female      Comment:  with 3 kids     Review of Systems   Constitutional: Negative for chills, fatigue and fever.   HENT: Negative for rhinorrhea and sore throat.    Eyes: Positive for visual disturbance (Baseline R VF deficit).   Respiratory: Negative for cough and shortness of breath.    Gastrointestinal: Negative for diarrhea and nausea.   Genitourinary: Negative for dysuria and frequency.   Musculoskeletal: Negative for arthralgias and myalgias.   Skin: Negative for rash and wound.   Neurological: Positive for speech difficulty. Negative for weakness and numbness.   Psychiatric/Behavioral: Positive for confusion (mild, improving). Negative for agitation.     Objective:     Vital Signs (Most Recent):  Temp: 98.5 °F (36.9 °C) (04/28/18 1133)  Pulse: 68 (04/28/18 1500)  Resp: 18 (04/28/18 1133)  BP: (!) 156/80 (04/28/18 1133)  SpO2: 96 % (04/28/18 1133) Vital Signs (24h Range):  Temp:  [96 °F (35.6 °C)-98.5 °F (36.9 °C)] 98.5 °F (36.9 °C)  Pulse:  [58-85] 68  Resp:  [15-18] 18  SpO2:  [92 %-96 %] 96 %  BP: (145-182)/(68-93) 156/80     Weight: 98.3 kg (216 lb 11.4 oz)  Body mass index is 31.09 kg/m².    Physical Exam  General:  Well-developed, well-nourished, nad  HEENT:  NCAT, PERRLA, EOMI, oropharyngeal membranes non-erythematous/without exudate  Neck:  Supple, normal ROM without nuchal rigidity  Resp:  Symmetric expansion, no increased wob  CVS:  No LE  edema, extremities warm/well-perfused  GI:  Abd soft, non-distended  Psych:  Initially irritable, becomes more pleasant with talking and exam, joking at end of exam  Neurologic Exam:  Mental Status:  AAOx3.  Speech, thought content appropriate.  Able to spell 'world' forward and backward w/ 1 error.   Cranial Nerves:  VFs notable for R VF deficit.  PERRLA, EOMI.  Facial movement intact and symmetric.  Palate raises symmetrically, tongue protrudes midline.  Trapezius 5/5 bilaterally.  Motor:  Normal bulk and tone.  BUE shoulder abduction, biceps/triceps,  strength 5/5.  BLE hip flexors, knee flexion/extension, plantarflexion/dorsiflexion 5/5.  No pronator drift.  Sensory:  Intact to light touch at all extremities without inattention.  Vibratory sensation intact at BUE/BLE digits.  Reflexes:  Biceps, brachioradialis, patellar 2+ and symmetric.  No ankle clonus.  Downgoing toe bilaterally.  Coordination:  FNF, MARIO, HTS intact with no dysmetria/ataxia/dysdiadochokinesia.  No resting tremor or myoclonus.  Gait:  Normal balance with appropriate arm swing, normal posture, no shuffling, no magnetic gait.       Significant Labs:    Recent Labs  Lab 18  0618   WBC 8.66   RBC 4.22*   HGB 12.1*   HCT 33.4*      MCV 79*   MCH 28.7   MCHC 36.2*       Recent Labs  Lab 18  0618   CALCIUM 8.6*      K 4.0   CO2 24      BUN 25*   CREATININE 2.0*     Significant Imagin18 CT head w/o contrast:  No acute intracranial abnormality or significant interval detrimental change when compared to examinations 2018.  Remote left occipital lobe and left thalamic infarctions.

## 2018-04-28 NOTE — HPI
61 yo M with PMHx of remote L occipital and L thalamic strokes w/ residual R VF deficit, seizure disorder followed by Dr. Santiago, A Fib on apixaban, HTN, HLD, DM II (last Hgb A1c 4.9% 03/24/18) [place] [facility] [place] to wife noticing patient having trouble with speech.  She notes that he also had some RUE movement that was involuntary and erratic prior to his speech difficulty. Patient had felt somewhat dizzy prior to onset but had no other aura that he is aware of.  He does note that it usually takes him a couple days to get back to normal and he typically has trouble with speech and confusion for up to 2-3 days after an event.  No incontinence, no post-ictal weakness/soreness, no tongue lacerations.  Patient denies any recent infectious symptoms, no sick contacts, wife confirms medication compliance has been good, and he denies any new medications including products containing benadryl. Labs on admission reassuring--no leukocytosis, no hyponatremia, no hypoglycemia.  Levetiracetam supratherapeutic at 69.4 (resulted today).  Discussed case with Dr. Santiago in addition to staffing with Dr. London.

## 2018-04-28 NOTE — PLAN OF CARE
Problem: Patient Care Overview  Goal: Plan of Care Review  Outcome: Ongoing (interventions implemented as appropriate)  Patient progressing toward all goals. Plan of care discussed with patient, no questions at this time. Oriented patient to room and call bell. Pt still slow to respond verbally but is oriented; No questions/problems noted at this time. Wife at bedside. VS numbers WNL. Will monitor.

## 2018-04-28 NOTE — ASSESSMENT & PLAN NOTE
Hypertensive on admission  - Continue amlodipine 10 mg PO daily, carvedilol, losartan 100 mg PO daily  - Patient has been taking 1/2 tablet of amlodipine.  Will start back on amlodipine 10 mg in AM.  May need to increase carvedilol or losartan.    4/27 elevated but improving; hold off on further adjustments until 4/28 4/28 continue amlodipine 10 mg, losartan 100 mg, increase carvedilol to 6.25 mg bid.  Hydralazine prn.

## 2018-04-28 NOTE — PLAN OF CARE
Problem: Patient Care Overview  Goal: Plan of Care Review  Outcome: Ongoing (interventions implemented as appropriate)  No adverse events during shift, patient sleeping comfortably, continue to monitor

## 2018-04-28 NOTE — PROGRESS NOTES
[facility] Hospital Medicine  Progress Note    Patient Name: Alfa Christy III  MRN: [id number]  Patient Class: OP- Observation   Admission Date: 4/26/2018  Length of Stay: 0 days  Attending Physician: Summer Connelly MD  Primary Care Provider: Tyler Jasmine MD    [facility] Medicine Team: [facility] MED F Michelle Blankenship PA-C    Subjective:     Principal Problem:Acute on chronic congestive heart failure    HPI:  61y/o M with pmhx stroke, seizure, afib (on eliquis), DM2, HLD, HTN presents w/ aphasia. Patient was last normal at 10:30 pm.  Per his wife he was doing well then started complaining of feeling dizzy after taking a shower. Denied fever, pains, vomiting.  She noticed he then started having trouble speaking and say words that did not make sense, so came to ED.  Wife denies any B/B incontinence, unilateral weakness, syncope, or tongue-biting.  Patient had similar presentation end of March; at that time TPA was deferred and subsequent neuroimaging negative for CVA. He does have history of seizures on keppra, and his seizures are atypical. Says wasn't having seizures until recently. Patient has had a previous stroke 1/2016 w/ residual L vision deficits only. EEG then abnormal w/ 1/18 CVA as likely foci for seizures. Currently on eliquis for a-fib. Further hisotry limited by patient's mental status.      In ED, [facility] neurology consulted for concern of stroke d/t aphasia.  Sxs likely 2/2 postictal period of complex seizure.   (baseline 100s-200s). CXR shows CM with findings suggestive of pulmonary edema.  Gvien one time dose of lasix 80 mg IV.  EKG shows afib HR 69.  CTH with no significant change from previous. Remote left occipital lobe and left thalamic infarctions.     Hospital Course:  Pt admitted due to aphasia.  Similar presentation/admissions 11/2017 and 03/2018 (medication noncompliance an issue).  Vascular neurology consulted in ED.  CTH no acute changes.  No acute stroke  suspected.  Keppra level ordered.  EEG ordered: abnormal extended (1 hour and 29 minutes) EEG due to rare left sided epileptiform activity seen, suggestive of underlying epileptiform activity; continuous left sided slowing seen, suggestive of underlying structural lesion.  Neurology consulted.  Pt also with acute CHF exacerbation (->395).  Pt treated with IV lasix bid and wt decreased 2 kg.  Day 3 transitioned back to home dosing furosemide 40 mg bid.    Interval History:  no acute events overnight, no new complaints.  Pt not back to baseline per wife, still a little slow and confused.  Neurology consulted and at the bedside.    Review of Systems   Unable to perform ROS: Mental status change     Objective:     Vital Signs (Most Recent):  Temp: 96 °F (35.6 °C) (04/28/18 0735)  Pulse: 60 (04/28/18 0745)  Resp: 16 (04/28/18 0735)  BP: (!) 182/93 (04/28/18 0735)  SpO2: 95 % (04/28/18 0735) Vital Signs (24h Range):  Temp:  [96 °F (35.6 °C)-98.3 °F (36.8 °C)] 96 °F (35.6 °C)  Pulse:  [58-85] 60  Resp:  [15-18] 16  SpO2:  [92 %-98 %] 95 %  BP: (145-182)/(68-93) 182/93     Weight: 98.3 kg (216 lb 11.4 oz)  Body mass index is 31.09 kg/m².    Intake/Output Summary (Last 24 hours) at 04/28/18 1110  Last data filed at 04/28/18 0700   Gross per 24 hour   Intake              960 ml   Output             1425 ml   Net             -465 ml      Physical Exam   Constitutional: He appears well-developed and well-nourished.   HENT:   Head: Normocephalic and atraumatic.   Eyes: Conjunctivae and EOM are normal.   Neck: Normal range of motion. Neck supple.   Cardiovascular: Normal rate, normal heart sounds and intact distal pulses.  An irregularly irregular rhythm present.   Pulmonary/Chest: Effort normal and breath sounds normal.   Abdominal: Soft. Bowel sounds are normal. He exhibits no distension. There is no tenderness.   Musculoskeletal: Normal range of motion. He exhibits no edema or tenderness.   Neurological: He is alert.    Aphasia improving  Oriented to person, place and month not year.  Slow to respond.  No pronator drift, no facial droop   Skin: Skin is warm and dry.   Psychiatric: His speech is delayed. He is slowed.   Nursing note and vitals reviewed.      Significant Labs: All pertinent labs within the past 24 hours have been reviewed.    Significant Imaging: I have reviewed all pertinent imaging results/findings within the past 24 hours.    Assessment/Plan:      * Acute on chronic congestive heart failure    Last ECHO 1/28/16 shows +DD  CXR shows CM, pulmonary edema.  100% on RA  - hypervolemic on exam  - Home dose lasix 40 mg PO BID.  - Continue BB, ARB  - Lasix 80 mg IV given in ED.  Will start 60 mg IV BID.  4/27 continue furosemide 60 mg IV BID.  - Strict I/O, daily weights, tele  -4/28 down 2 kg will transition back to furosemide 40 mg po bid        Complex partial seizures evolving to generalized tonic-clonic seizures    Presents to ED with aphasia.  Similar presentation to 3/23/18 admission.  Stroke workup with EEG (no finding) and MRI stroke protocol (no acute infarct/hemorrhage). Suspect postictal state.  Last admission took 1.5 days to return to baseline mentation.  - Vascular neurology consulted in ED.  CTH no acute changes.  No acute stroke suspected.  - f/w Dr. Santiago   - No B/B incontinence, no tongue biting, no LOC, no convulsions  - UA ordered and pending collection  - seizure precautions  - Continue keppra 1500 mg PO BID; keppra level in process (wife reports compliance).  3rd admission with similar presentation.  Pt did follow up with Dr. Santiago after admission 11/2017 and no change in keppra was made.  - EEG ordered This is an abnormal extended (1 hour and 29 minutes) EEG due to:  1) rare left sided epileptiform activity seen, suggestive of underlying epileptiform activity  2) continuous left sided slowing seen, suggestive of underlying structural lesion  4/28 Neurology consulted and at the bedside         Paroxysmal atrial fibrillation    Stable  - Continue apixaban 5 mg PO BID, coreg 3.125 mg PO BID  - CHADSVASC 5  - on tele        History of CVA (cerebrovascular accident)    CVA 1/2016  - residual L eye deficit  - continue ASA, statin  - First seizure 11/2016  - PT/OT consulted        Type 2 diabetes mellitus with hyperglycemia    Stable  - Repeat Hg A1c  - Home regimen: aspart 7 U TIDWM, Levemir 14 U QHS  - Inpatient regimen: aspart 5 U TID WM, Levemir 10 U QHS  - low dose SSI, acchuchecks ACHS  - diabetic diet  4/27-4/28 no hypoglycemia, avoid with PMH        Essential hypertension    Hypertensive on admission  - Continue amlodipine 10 mg PO daily, carvedilol, losartan 100 mg PO daily  - Patient has been taking 1/2 tablet of amlodipine.  Will start back on amlodipine 10 mg in AM.  May need to increase carvedilol or losartan.    4/27 elevated but improving; hold off on further adjustments until 4/28 4/28 continue amlodipine 10 mg, losartan 100 mg, increase carvedilol to 6.25 mg bid.  Hydralazine prn.        Postsurgical hypothyroidism    Stable  - TSH 2.145  - Continue levothyroxine 200 mg PO daily        Hypercholesterolemia    Stable  - Continue atorvastatin 40 mg PO daily          VTE Risk Mitigation         Ordered     apixaban tablet 5 mg  2 times daily      04/27/18 0307     IP VTE HIGH RISK PATIENT  Once      04/27/18 0316     Place sequential compression device  Until discontinued      04/27/18 0316     Place DARIAN hose  Until discontinued      04/27/18 0316     Reason for No Pharmacological VTE Prophylaxis  Once      04/27/18 0316              Michelle Blankenship PA-C  Department of Hospital Medicine   [facility]

## 2018-04-28 NOTE — SUBJECTIVE & OBJECTIVE
Interval History:  no acute events overnight, no new complaints.  Pt not back to baseline per wife, still a little slow and confused.  Neurology consulted and at the bedside.    Review of Systems   Unable to perform ROS: Mental status change     Objective:     Vital Signs (Most Recent):  Temp: 96 °F (35.6 °C) (04/28/18 0735)  Pulse: 60 (04/28/18 0745)  Resp: 16 (04/28/18 0735)  BP: (!) 182/93 (04/28/18 0735)  SpO2: 95 % (04/28/18 0735) Vital Signs (24h Range):  Temp:  [96 °F (35.6 °C)-98.3 °F (36.8 °C)] 96 °F (35.6 °C)  Pulse:  [58-85] 60  Resp:  [15-18] 16  SpO2:  [92 %-98 %] 95 %  BP: (145-182)/(68-93) 182/93     Weight: 98.3 kg (216 lb 11.4 oz)  Body mass index is 31.09 kg/m².    Intake/Output Summary (Last 24 hours) at 04/28/18 1110  Last data filed at 04/28/18 0700   Gross per 24 hour   Intake              960 ml   Output             1425 ml   Net             -465 ml      Physical Exam   Constitutional: He appears well-developed and well-nourished.   HENT:   Head: Normocephalic and atraumatic.   Eyes: Conjunctivae and EOM are normal.   Neck: Normal range of motion. Neck supple.   Cardiovascular: Normal rate, normal heart sounds and intact distal pulses.  An irregularly irregular rhythm present.   Pulmonary/Chest: Effort normal and breath sounds normal.   Abdominal: Soft. Bowel sounds are normal. He exhibits no distension. There is no tenderness.   Musculoskeletal: Normal range of motion. He exhibits no edema or tenderness.   Neurological: He is alert.   Aphasia improving  Oriented to person, place and month not year.  Slow to respond.  No pronator drift, no facial droop   Skin: Skin is warm and dry.   Psychiatric: His speech is delayed. He is slowed.   Nursing note and vitals reviewed.      Significant Labs: All pertinent labs within the past 24 hours have been reviewed.    Significant Imaging: I have reviewed all pertinent imaging results/findings within the past 24 hours.

## 2018-04-28 NOTE — CONSULTS
[facility]  Neurology  Consult Note    Patient Name: Alfa Christy III  MRN: [id number]  Admission Date: 4/26/2018  Hospital Length of Stay: 0 days  Code Status: Full Code   Attending Provider: Summer Connelly MD   Consulting Provider: Morro Bolanos MD  Primary Care Physician: Tyler Jasmine MD  Principal Problem:Acute on chronic congestive heart failure    Inpatient consult to Neurology  Consult performed by: MORRO BOLANOS  Consult ordered by: ROCIO RICH         Subjective:     Chief Complaint:  Breakthrough seizure with hx of complex partial seizures after stroke     HPI:   63 yo M with PMHx of remote L occipital and L thalamic strokes w/ residual R VF deficit, seizure disorder followed by Dr. Santiago, A Fib on apixaban, HTN, HLD, DM II (last Hgb A1c 4.9% 03/24/18) [place] [facility] [place] due to wife noticing patient having trouble with speech.  She notes that he also had some RUE movement that was involuntary and erratic prior to his speech difficulty. Patient had felt somewhat dizzy prior to onset but had no other aura that he is aware of.  He does note that it usually takes him a couple days to get back to normal and he typically has trouble with speech and confusion for up to 2-3 days after an event.  No incontinence, no post-ictal weakness/soreness, no tongue lacerations.  Patient denies any recent infectious symptoms, no sick contacts, wife confirms medication compliance has been good, and he denies any new medications including products containing benadryl. Labs on admission reassuring--no leukocytosis, no hyponatremia, no hypoglycemia.  Levetiracetam supratherapeutic at 69.4 (resulted today).  Discussed case with Dr. Santiago in addition to staffing with Dr. London.     Past Medical History:   Diagnosis Date    Allergy     BMI 31.0-31.9,adult 11/25/2014    Cerebrovascular disease 7/25/2016    Chronic anticoagulation - pradaxa 11/10/2016    Chronic diastolic  heart failure 11/20/2016    CKD (chronic kidney disease), stage III 9/23/2013    Controlled type 2 diabetes with neuropathy 12/16/2014    Diabetes mellitus due to underlying condition with stage 3 chronic kidney disease, without long-term current use of insulin 11/2/2016    Elevated alkaline phosphatase level 8/1/2012    Elevated PSA     negative prostate biopsy 4/11    Erectile dysfunction associated with type 2 diabetes mellitus     Gallstones 3/20/2013    Generalized tonic-clonic seizure 11/2016    HTN (hypertension), benign 8/1/2012    Hypercholesterolemia 8/1/2012    Microcytic anemia 11/27/2013    Paroxysmal atrial fibrillation 9/23/2013    Postsurgical hypothyroidism 11/27/2013    Right homonymous hemianopsia 2/5/2016    Sickle cell trait     Stroke 1/27/2016    Thyroid cancer     multifocal with six lesions, largest 5mm, treated with surgery and radioactive iodine    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/2016     Past Surgical History:   Procedure Laterality Date    BREAST SURGERY      cyst removal    COLONOSCOPY      CYST REMOVAL      chest    PROSTATE BIOPSY  4/27/11    SKIN BIOPSY      THYROID SURGERY  8/26/09    VASCULAR SURGERY       Review of patient's allergies indicates:   Allergen Reactions    Cough syrup [guaifenesin] Other (See Comments)     Current Neurological Medications: Levetiracetam    No current facility-administered medications on file prior to encounter.      Current Outpatient Prescriptions on File Prior to Encounter   Medication Sig    acetaminophen (TYLENOL) 500 MG tablet Take 1 tablet (500 mg total) by mouth every 6 (six) hours as needed for Pain.    amLODIPine (NORVASC) 10 MG tablet Take 1 tablet (10 mg total) by mouth once daily.    apixaban 5 mg Tab Take 1 tablet (5 mg total) by mouth 2 (two) times daily.    aspirin 81 MG Chew Take 1 tablet (81 mg total) by mouth once daily.    atorvastatin (LIPITOR) 40 MG tablet Take 1 tablet (40 mg total) by mouth  "once daily.    BD INSULIN PEN NEEDLE UF MINI 31 gauge x 3/16" Ndle To use with insulin pens 4  times daily    carvedilol (COREG) 3.125 MG tablet Take 1 tablet (3.125 mg total) by mouth 2 (two) times daily.    dulaglutide (TRULICITY) 0.75 mg/0.5 mL PnIj Inject 0.5 mLs (0.75 mg total) into the skin every 7 days.    flash glucose scanning reader (FREESTYLE ANGELLA READER) Misc 1 each by Misc.(Non-Drug; Combo Route) route once daily.    flash glucose sensor (FREESTYLE ANGELLA SENSOR) Kit 3 each by Misc.(Non-Drug; Combo Route) route every 30 days.    folic acid (FOLVITE) 400 MCG tablet Take 400 mcg by mouth once daily.    furosemide (LASIX) 40 MG tablet Take 1 tablet (40 mg total) by mouth 2 (two) times daily.    GLUCAGON EMERGENCY KIT, HUMAN, 1 mg injection INJECT 1 MG INTO THE MUSCLE AS NEEDED    hydrALAZINE (APRESOLINE) 25 MG tablet Take 1 tablet (25 mg total) by mouth every 8 (eight) hours. (Patient taking differently: Take 25 mg by mouth every 12 (twelve) hours. )    insulin aspart (NOVOLOG) 100 unit/mL InPn pen Inject 7 units w/ meals plus scale 150-200+1, 201-250+2, 251-300+3, 301-350+4.    insulin detemir (LEVEMIR FLEXPEN) 100 unit/mL (3 mL) SubQ InPn pen Inject 14 Units into the skin every evening.    levetiracetam (KEPPRA) 750 MG Tab Take 2 tablets (1,500 mg total) by mouth 2 (two) times daily.    levothyroxine (SYNTHROID) 200 MCG tablet Take 1 tablet (200 mcg total) by mouth before breakfast.    losartan (COZAAR) 100 MG tablet Take 1 tablet (100 mg total) by mouth once daily.    magnesium 30 mg Tab Take 1 tablet by mouth twice a week.     multivitamin capsule Take 1 capsule by mouth every Mon, Wed, Fri.     ONETOUCH VERIO Strp USE TO TEST BLOOD SUGAR THREE TIMES DAILY    sildenafil (VIAGRA) 100 MG tablet Take 1 tablet (100 mg total) by mouth as needed for Erectile Dysfunction.    vitamin D 1000 units Tab Take 1,000 Units by mouth every Mon, Wed, Fri.     Family History     Problem Relation (Age " of Onset)    Cancer Sister    Diabetes Father, Mother, Brother    Heart disease Father, Mother    Hypertension Father, Mother    Thyroid disease Maternal Aunt        Social History Main Topics    Smoking status: Never Smoker    Smokeless tobacco: Never Used    Alcohol use Yes      Comment: occasionally    Drug use: No    Sexual activity: Yes     Partners: Female      Comment:  with 3 kids     Review of Systems   Constitutional: Negative for chills, fatigue and fever.   HENT: Negative for rhinorrhea and sore throat.    Eyes: Positive for visual disturbance (Baseline R VF deficit).   Respiratory: Negative for cough and shortness of breath.    Gastrointestinal: Negative for diarrhea and nausea.   Genitourinary: Negative for dysuria and frequency.   Musculoskeletal: Negative for arthralgias and myalgias.   Skin: Negative for rash and wound.   Neurological: Positive for speech difficulty. Negative for weakness and numbness.   Psychiatric/Behavioral: Positive for confusion (mild, improving). Negative for agitation.     Objective:     Vital Signs (Most Recent):  Temp: 98.5 °F (36.9 °C) (04/28/18 1133)  Pulse: 68 (04/28/18 1500)  Resp: 18 (04/28/18 1133)  BP: (!) 156/80 (04/28/18 1133)  SpO2: 96 % (04/28/18 1133) Vital Signs (24h Range):  Temp:  [96 °F (35.6 °C)-98.5 °F (36.9 °C)] 98.5 °F (36.9 °C)  Pulse:  [58-85] 68  Resp:  [15-18] 18  SpO2:  [92 %-96 %] 96 %  BP: (145-182)/(68-93) 156/80     Weight: 98.3 kg (216 lb 11.4 oz)  Body mass index is 31.09 kg/m².    Physical Exam  General:  Well-developed, well-nourished, nad  HEENT:  NCAT, PERRLA, EOMI, oropharyngeal membranes non-erythematous/without exudate  Neck:  Supple, normal ROM without nuchal rigidity  Resp:  Symmetric expansion, no increased wob  CVS:  No LE edema, extremities warm/well-perfused  GI:  Abd soft, non-distended  Psych:  Initially irritable, becomes more pleasant with talking and exam, joking at end of exam  Neurologic Exam:  Mental Status:   AAOx3.  Speech, thought content appropriate.  Able to spell 'world' forward and backward w/ 1 error.   Cranial Nerves:  VFs notable for R VF deficit.  PERRLA, EOMI.  Facial movement intact and symmetric.  Palate raises symmetrically, tongue protrudes midline.  Trapezius 5/5 bilaterally.  Motor:  Normal bulk and tone.  BUE shoulder abduction, biceps/triceps,  strength 5/5.  BLE hip flexors, knee flexion/extension, plantarflexion/dorsiflexion 5/5.  No pronator drift.  Sensory:  Intact to light touch at all extremities without inattention.  Vibratory sensation intact at BUE/BLE digits.  Reflexes:  Biceps, brachioradialis, patellar 2+ and symmetric.  No ankle clonus.  Downgoing toe bilaterally.  Coordination:  FNF, MARIO, HTS intact with no dysmetria/ataxia/dysdiadochokinesia.  No resting tremor or myoclonus.  Gait:  Normal balance with appropriate arm swing, normal posture, no shuffling, no magnetic gait.       Significant Labs:    Recent Labs  Lab 18  0618   WBC 8.66   RBC 4.22*   HGB 12.1*   HCT 33.4*      MCV 79*   MCH 28.7   MCHC 36.2*       Recent Labs  Lab 18  0618   CALCIUM 8.6*      K 4.0   CO2 24      BUN 25*   CREATININE 2.0*     Significant Imagin18 CT head w/o contrast:  No acute intracranial abnormality or significant interval detrimental change when compared to examinations 2018.  Remote left occipital lobe and left thalamic infarctions.      Assessment and Plan:     Complex partial seizures evolving to generalized tonic-clonic seizures    -No clear provoking factor leading to breakthrough seizure  -Continue levetiracetam 1500 mg bid, level 69.4   -Watch for oversedation on levetiracetam with CKD   -Levels per Dr. Santiago, currently pt not symptomatic from supratherapeutic level  -Slow titration on lacosamide--50 mg qd x 1 week  -Increase lacosamide to 100 mg for 2nd week if pt tolerates  -Follow up with Dr. Santiago in 2 weeks     VTE Risk Mitigation          Ordered     apixaban tablet 5 mg  2 times daily      04/27/18 0307     IP VTE HIGH RISK PATIENT  Once      04/27/18 0316     Place sequential compression device  Until discontinued      04/27/18 0316     Place DARIAN hose  Until discontinued      04/27/18 0316     Reason for No Pharmacological VTE Prophylaxis  Once      04/27/18 0316        Thank you for your consult. I will follow-up with patient. Please contact us if you have any additional questions.    Shanti Costello MD  Neurology  [facility]

## 2018-04-28 NOTE — ASSESSMENT & PLAN NOTE
Stable  - Repeat Hg A1c  - Home regimen: aspart 7 U TIDWM, Levemir 14 U QHS  - Inpatient regimen: aspart 5 U TID WM, Levemir 10 U QHS  - low dose SSI, acchuchecks ACHS  - diabetic diet  4/27-4/28 no hypoglycemia, avoid with PMH

## 2018-04-28 NOTE — PLAN OF CARE
Problem: Patient Care Overview  Goal: Plan of Care Review  Outcome: Ongoing (interventions implemented as appropriate)  Patient went for ECHO today.BS checked AC, managed with insulin. Plan of care discussed with patient, no questions at this time. Fall pre-cautions in place. Will continue to monitor.

## 2018-04-28 NOTE — ASSESSMENT & PLAN NOTE
-No clear provoking factor leading to breakthrough seizure  -Continue levetiracetam 1500 mg bid, level 69.4   -Watch for oversedation on levetiracetam with CKD   -Levels per Dr. Santiago, currently pt not symptomatic from supratherapeutic level  -Slow titration on lacosamide--50 mg qd x 1 week  -Increase lacosamide to 100 mg for 2nd week if pt tolerates  -Follow up with Dr. Santiago in 2 weeks

## 2018-04-28 NOTE — ASSESSMENT & PLAN NOTE
Last ECHO 1/28/16 shows +DD  CXR shows CM, pulmonary edema.  100% on RA  - hypervolemic on exam  - Home dose lasix 40 mg PO BID.  - Continue BB, ARB  - Lasix 80 mg IV given in ED.  Will start 60 mg IV BID.  4/27 continue furosemide 60 mg IV BID.  - Strict I/O, daily weights, tele  -4/28 down 2 kg will transition back to furosemide 40 mg po bid

## 2018-04-28 NOTE — ASSESSMENT & PLAN NOTE
Presents to ED with aphasia.  Similar presentation to 3/23/18 admission.  Stroke workup with EEG (no finding) and MRI stroke protocol (no acute infarct/hemorrhage). Suspect postictal state.  Last admission took 1.5 days to return to baseline mentation.  - Vascular neurology consulted in ED.  CTH no acute changes.  No acute stroke suspected.  - f/w Dr. Santiago   - No B/B incontinence, no tongue biting, no LOC, no convulsions  - UA ordered and pending collection  - seizure precautions  - Continue keppra 1500 mg PO BID; keppra level in process (wife reports compliance).  3rd admission with similar presentation.  Pt did follow up with Dr. Santiago after admission 11/2017 and no change in keppra was made.  - EEG ordered This is an abnormal extended (1 hour and 29 minutes) EEG due to:  1) rare left sided epileptiform activity seen, suggestive of underlying epileptiform activity  2) continuous left sided slowing seen, suggestive of underlying structural lesion  4/28 Neurology consulted and at the bedside

## 2018-04-29 LAB
ANION GAP SERPL CALC-SCNC: 8 MMOL/L
BASOPHILS # BLD AUTO: 0.08 K/UL
BASOPHILS NFR BLD: 0.9 %
BUN SERPL-MCNC: 27 MG/DL
CALCIUM SERPL-MCNC: 8.9 MG/DL
CHLORIDE SERPL-SCNC: 109 MMOL/L
CO2 SERPL-SCNC: 24 MMOL/L
CREAT SERPL-MCNC: 1.9 MG/DL
DIFFERENTIAL METHOD: ABNORMAL
EOSINOPHIL # BLD AUTO: 0.3 K/UL
EOSINOPHIL NFR BLD: 3.5 %
ERYTHROCYTE [DISTWIDTH] IN BLOOD BY AUTOMATED COUNT: 16.9 %
EST. GFR  (AFRICAN AMERICAN): 42.1 ML/MIN/1.73 M^2
EST. GFR  (NON AFRICAN AMERICAN): 36.4 ML/MIN/1.73 M^2
GLUCOSE SERPL-MCNC: 131 MG/DL
HCT VFR BLD AUTO: 35.5 %
HGB BLD-MCNC: 12.4 G/DL
IMM GRANULOCYTES # BLD AUTO: 0.02 K/UL
IMM GRANULOCYTES NFR BLD AUTO: 0.2 %
LYMPHOCYTES # BLD AUTO: 2.2 K/UL
LYMPHOCYTES NFR BLD: 24.7 %
MCH RBC QN AUTO: 27.6 PG
MCHC RBC AUTO-ENTMCNC: 34.9 G/DL
MCV RBC AUTO: 79 FL
MONOCYTES # BLD AUTO: 1.3 K/UL
MONOCYTES NFR BLD: 14 %
NEUTROPHILS # BLD AUTO: 5.1 K/UL
NEUTROPHILS NFR BLD: 56.7 %
NRBC BLD-RTO: 1 /100 WBC
PLATELET # BLD AUTO: 266 K/UL
PMV BLD AUTO: 12 FL
POCT GLUCOSE: 129 MG/DL (ref 70–110)
POCT GLUCOSE: 133 MG/DL (ref 70–110)
POCT GLUCOSE: 157 MG/DL (ref 70–110)
POCT GLUCOSE: 174 MG/DL (ref 70–110)
POTASSIUM SERPL-SCNC: 4.4 MMOL/L
RBC # BLD AUTO: 4.49 M/UL
SODIUM SERPL-SCNC: 141 MMOL/L
WBC # BLD AUTO: 8.92 K/UL

## 2018-04-29 PROCEDURE — 80048 BASIC METABOLIC PNL TOTAL CA: CPT

## 2018-04-29 PROCEDURE — 99226 PR SUBSEQUENT OBSERVATION CARE,LEVEL III: CPT | Mod: SA,,, | Performed by: PHYSICIAN ASSISTANT

## 2018-04-29 PROCEDURE — 36415 COLL VENOUS BLD VENIPUNCTURE: CPT

## 2018-04-29 PROCEDURE — 85025 COMPLETE CBC W/AUTO DIFF WBC: CPT

## 2018-04-29 PROCEDURE — G0378 HOSPITAL OBSERVATION PER HR: HCPCS

## 2018-04-29 PROCEDURE — 25000003 PHARM REV CODE 250: Performed by: PHYSICIAN ASSISTANT

## 2018-04-29 PROCEDURE — 94761 N-INVAS EAR/PLS OXIMETRY MLT: CPT

## 2018-04-29 RX ORDER — LACOSAMIDE 50 MG/1
50 TABLET ORAL EVERY 12 HOURS
Status: DISCONTINUED | OUTPATIENT
Start: 2018-04-29 | End: 2018-04-30 | Stop reason: HOSPADM

## 2018-04-29 RX ADMIN — CARVEDILOL 6.25 MG: 6.25 TABLET, FILM COATED ORAL at 08:04

## 2018-04-29 RX ADMIN — ATORVASTATIN CALCIUM 40 MG: 20 TABLET, FILM COATED ORAL at 08:04

## 2018-04-29 RX ADMIN — LEVETIRACETAM 1500 MG: 750 TABLET ORAL at 09:04

## 2018-04-29 RX ADMIN — FOLIC ACID 1000 MCG: 1 TABLET ORAL at 09:04

## 2018-04-29 RX ADMIN — THERA TABS 1 TABLET: TAB at 08:04

## 2018-04-29 RX ADMIN — LACOSAMIDE 50 MG: 50 TABLET, FILM COATED ORAL at 09:04

## 2018-04-29 RX ADMIN — FUROSEMIDE 40 MG: 40 TABLET ORAL at 08:04

## 2018-04-29 RX ADMIN — FUROSEMIDE 40 MG: 40 TABLET ORAL at 05:04

## 2018-04-29 RX ADMIN — ASPIRIN 81 MG 81 MG: 81 TABLET ORAL at 08:04

## 2018-04-29 RX ADMIN — LEVETIRACETAM 1500 MG: 750 TABLET ORAL at 08:04

## 2018-04-29 RX ADMIN — INSULIN ASPART 5 UNITS: 100 INJECTION, SOLUTION INTRAVENOUS; SUBCUTANEOUS at 08:04

## 2018-04-29 RX ADMIN — INSULIN ASPART 5 UNITS: 100 INJECTION, SOLUTION INTRAVENOUS; SUBCUTANEOUS at 06:04

## 2018-04-29 RX ADMIN — LACOSAMIDE 50 MG: 50 TABLET, FILM COATED ORAL at 08:04

## 2018-04-29 RX ADMIN — APIXABAN 5 MG: 5 TABLET, FILM COATED ORAL at 09:04

## 2018-04-29 RX ADMIN — LEVOTHYROXINE SODIUM 200 MCG: 100 TABLET ORAL at 06:04

## 2018-04-29 RX ADMIN — INSULIN DETEMIR 10 UNITS: 100 INJECTION, SOLUTION SUBCUTANEOUS at 09:04

## 2018-04-29 RX ADMIN — APIXABAN 5 MG: 5 TABLET, FILM COATED ORAL at 08:04

## 2018-04-29 RX ADMIN — AMLODIPINE BESYLATE 10 MG: 10 TABLET ORAL at 08:04

## 2018-04-29 RX ADMIN — CARVEDILOL 6.25 MG: 6.25 TABLET, FILM COATED ORAL at 09:04

## 2018-04-29 RX ADMIN — LOSARTAN POTASSIUM 100 MG: 50 TABLET, FILM COATED ORAL at 08:04

## 2018-04-29 RX ADMIN — INSULIN ASPART 5 UNITS: 100 INJECTION, SOLUTION INTRAVENOUS; SUBCUTANEOUS at 01:04

## 2018-04-29 NOTE — SUBJECTIVE & OBJECTIVE
"Interval History:  no acute events overnight, no new complaints.  Pt states "I'm 70% better. I'm still having trouble with my memory and recalling events."    Review of Systems   Unable to perform ROS: Mental status change     Objective:     Vital Signs (Most Recent):  Temp: 97.8 °F (36.6 °C) (04/29/18 0815)  Pulse: 75 (04/29/18 0815)  Resp: 13 (04/29/18 0815)  BP: (!) 147/94 (04/29/18 0815)  SpO2: 97 % (04/29/18 0815) Vital Signs (24h Range):  Temp:  [97.8 °F (36.6 °C)-98.5 °F (36.9 °C)] 97.8 °F (36.6 °C)  Pulse:  [58-81] 75  Resp:  [13-18] 13  SpO2:  [93 %-97 %] 97 %  BP: (135-158)/(75-94) 147/94     Weight: 98.3 kg (216 lb 11.4 oz)  Body mass index is 31.09 kg/m².    Intake/Output Summary (Last 24 hours) at 04/29/18 1121  Last data filed at 04/29/18 0830   Gross per 24 hour   Intake              240 ml   Output              525 ml   Net             -285 ml      Physical Exam   Constitutional: He appears well-developed and well-nourished.   HENT:   Head: Normocephalic and atraumatic.   Eyes: Conjunctivae and EOM are normal.   Neck: Normal range of motion. Neck supple.   Cardiovascular: Normal rate, normal heart sounds and intact distal pulses.  An irregularly irregular rhythm present.   Pulmonary/Chest: Effort normal and breath sounds normal.   Abdominal: Soft. Bowel sounds are normal. He exhibits no distension. There is no tenderness.   Musculoskeletal: Normal range of motion. He exhibits no edema or tenderness.   Neurological: He is alert.   Aphasia solved  Oriented to person, place and month not year.  Slow to respond but improved.  Recall 0/3 objects.  No pronator drift, no facial droop   Skin: Skin is warm and dry.   Psychiatric: His speech is delayed. He is slowed.   Nursing note and vitals reviewed.      Significant Labs: All pertinent labs within the past 24 hours have been reviewed.    Significant Imaging: I have reviewed all pertinent imaging results/findings within the past 24 hours.  "

## 2018-04-29 NOTE — ASSESSMENT & PLAN NOTE
Hypertensive on admission  - Continue amlodipine 10 mg PO daily, carvedilol, losartan 100 mg PO daily  - Patient has been taking 1/2 tablet of amlodipine.  Will start back on amlodipine 10 mg in AM.  May need to increase carvedilol or losartan.    4/27 elevated but improving; hold off on further adjustments until 4/28 4/28-4/29 continue amlodipine 10 mg, losartan 100 mg, increase carvedilol to 6.25 mg bid.  Hydralazine prn.

## 2018-04-29 NOTE — ASSESSMENT & PLAN NOTE
Last ECHO 1/28/16 shows +DD  CXR shows CM, pulmonary edema.  100% on RA  - hypervolemic on exam  - Home dose lasix 40 mg PO BID.  - Continue BB, ARB  - Lasix 80 mg IV given in ED.  Will start 60 mg IV BID.  4/27 continue furosemide 60 mg IV BID.  - Strict I/O, daily weights, tele  -4/28-4/29 down 2 kg will transition back to furosemide 40 mg po bid

## 2018-04-29 NOTE — PROGRESS NOTES
[facility] Hospital Medicine  Progress Note    Patient Name: Alfa Christy III  MRN: [id number]  Patient Class: OP- Observation   Admission Date: 4/26/2018  Length of Stay: 0 days  Attending Physician: Summer Connelly MD  Primary Care Provider: Tyler Jasmine MD    [facility] Team: [facility] Michelle Blankenship PA-C    Subjective:     Principal Problem:Complex partial seizures evolving to generalized tonic-clonic seizures    HPI:  63y/o M with pmhx stroke, seizure, afib (on eliquis), DM2, HLD, HTN presents w/ aphasia. Patient was last normal at 10:30 pm.  Per his wife he was doing well then started complaining of feeling dizzy after taking a shower. Denied fever, pains, vomiting.  She noticed he then started having trouble speaking and say words that did not make sense, so came to ED.  Wife denies any B/B incontinence, unilateral weakness, syncope, or tongue-biting.  Patient had similar presentation end of March; at that time TPA was deferred and subsequent neuroimaging negative for CVA. He does have history of seizures on keppra, and his seizures are atypical. Says wasn't having seizures until recently. Patient has had a previous stroke 1/2016 w/ residual L vision deficits only. EEG then abnormal w/ 1/18 CVA as likely foci for seizures. Currently on eliquis for a-fib. Further hisotry limited by patient's mental status.      In ED, [facility] neurology consulted for concern of stroke d/t aphasia.  Sxs likely 2/2 postictal period of complex seizure.   (baseline 100s-200s). CXR shows CM with findings suggestive of pulmonary edema.  Gvien one time dose of lasix 80 mg IV.  EKG shows afib HR 69.  CTH with no significant change from previous. Remote left occipital lobe and left thalamic infarctions.     Hospital Course:  Pt admitted due to aphasia.  Similar presentation/admissions 11/2017 and 03/2018 (medication noncompliance an issue).  Vascular neurology consulted in ED.  CTH no acute  "changes.  No acute stroke suspected.  Keppra level ordered.  EEG ordered: abnormal extended (1 hour and 29 minutes) EEG due to rare left sided epileptiform activity seen, suggestive of underlying epileptiform activity; continuous left sided slowing seen, suggestive of underlying structural lesion.  Neurology consulted and recommended slow titration on lacosamide--50 mg qd x 1 week, increase lacosamide to 100 mg for 2nd week if pt tolerates.  Follow up with Dr. Santiago in 2 weeks.  Pt also with acute CHF exacerbation (->395).  Pt treated with IV lasix bid and wt decreased 2 kg.  Day 3 transitioned back to home dosing furosemide 40 mg bid.    Interval History:  no acute events overnight, no new complaints.  Pt states "I'm 70% better. I'm still having trouble with my memory and recalling events."    Review of Systems   Unable to perform ROS: Mental status change     Objective:     Vital Signs (Most Recent):  Temp: 97.8 °F (36.6 °C) (04/29/18 0815)  Pulse: 75 (04/29/18 0815)  Resp: 13 (04/29/18 0815)  BP: (!) 147/94 (04/29/18 0815)  SpO2: 97 % (04/29/18 0815) Vital Signs (24h Range):  Temp:  [97.8 °F (36.6 °C)-98.5 °F (36.9 °C)] 97.8 °F (36.6 °C)  Pulse:  [58-81] 75  Resp:  [13-18] 13  SpO2:  [93 %-97 %] 97 %  BP: (135-158)/(75-94) 147/94     Weight: 98.3 kg (216 lb 11.4 oz)  Body mass index is 31.09 kg/m².    Intake/Output Summary (Last 24 hours) at 04/29/18 1121  Last data filed at 04/29/18 0830   Gross per 24 hour   Intake              240 ml   Output              525 ml   Net             -285 ml      Physical Exam   Constitutional: He appears well-developed and well-nourished.   HENT:   Head: Normocephalic and atraumatic.   Eyes: Conjunctivae and EOM are normal.   Neck: Normal range of motion. Neck supple.   Cardiovascular: Normal rate, normal heart sounds and intact distal pulses.  An irregularly irregular rhythm present.   Pulmonary/Chest: Effort normal and breath sounds normal.   Abdominal: Soft. Bowel " sounds are normal. He exhibits no distension. There is no tenderness.   Musculoskeletal: Normal range of motion. He exhibits no edema or tenderness.   Neurological: He is alert.   Aphasia solved  Oriented to person, place and month not year.  Slow to respond but improved.  Recall 0/3 objects.  No pronator drift, no facial droop   Skin: Skin is warm and dry.   Psychiatric: His speech is delayed. He is slowed.   Nursing note and vitals reviewed.      Significant Labs: All pertinent labs within the past 24 hours have been reviewed.    Significant Imaging: I have reviewed all pertinent imaging results/findings within the past 24 hours.    Assessment/Plan:      * Complex partial seizures evolving to generalized tonic-clonic seizures    Presents to ED with aphasia.  Similar presentation to 3/23/18 admission.  Stroke workup with EEG (no finding) and MRI stroke protocol (no acute infarct/hemorrhage). Suspect postictal state.  Last admission took 1.5 days to return to baseline mentation.  - Vascular neurology consulted in ED.  CTH no acute changes.  No acute stroke suspected.  - f/w Dr. Santiago   - No B/B incontinence, no tongue biting, no LOC, no convulsions  - UA ordered and pending collection  - seizure precautions  - Continue keppra 1500 mg PO BID; keppra level in process (wife reports compliance).  3rd admission with similar presentation.  Pt did follow up with Dr. Santiago after admission 11/2017 and no change in keppra was made.  - EEG ordered This is an abnormal extended (1 hour and 29 minutes) EEG due to:  1) rare left sided epileptiform activity seen, suggestive of underlying epileptiform activity  2) continuous left sided slowing seen, suggestive of underlying structural lesion  4/28 Neurology consulted and at the bedside  Slow titration on lacosamide--50 mg qd x 1 week  -Increase lacosamide to 100 mg for 2nd week if pt tolerates  -Follow up with Dr. Santiago in 2 weeks        Acute on chronic congestive heart failure     Last ECHO 1/28/16 shows +DD  CXR shows CM, pulmonary edema.  100% on RA  - hypervolemic on exam  - Home dose lasix 40 mg PO BID.  - Continue BB, ARB  - Lasix 80 mg IV given in ED.  Will start 60 mg IV BID.  4/27 continue furosemide 60 mg IV BID.  - Strict I/O, daily weights, tele  -4/28-4/29 down 2 kg will transition back to furosemide 40 mg po bid        Paroxysmal atrial fibrillation    Stable  - Continue apixaban 5 mg PO BID, coreg 3.125 mg PO BID  - CHADSVASC 5  - on tele        History of CVA (cerebrovascular accident)    CVA 1/2016  - residual L eye deficit  - continue ASA, statin  - First seizure 11/2016  - PT/OT consulted        Type 2 diabetes mellitus with hyperglycemia    Stable  - Repeat Hg A1c  - Home regimen: aspart 7 U TIDWM, Levemir 14 U QHS  - Inpatient regimen: aspart 5 U TID WM, Levemir 10 U QHS  - low dose SSI, acchuchecks ACHS  - diabetic diet  4/27-4/29 no hypoglycemia, avoid with PMH        Essential hypertension    Hypertensive on admission  - Continue amlodipine 10 mg PO daily, carvedilol, losartan 100 mg PO daily  - Patient has been taking 1/2 tablet of amlodipine.  Will start back on amlodipine 10 mg in AM.  May need to increase carvedilol or losartan.    4/27 elevated but improving; hold off on further adjustments until 4/28 4/28-4/29 continue amlodipine 10 mg, losartan 100 mg, increase carvedilol to 6.25 mg bid.  Hydralazine prn.        Postsurgical hypothyroidism    Stable  - TSH 2.145  - Continue levothyroxine 200 mg PO daily        Hypercholesterolemia    Stable  - Continue atorvastatin 40 mg PO daily          VTE Risk Mitigation         Ordered     apixaban tablet 5 mg  2 times daily      04/27/18 0307     IP VTE HIGH RISK PATIENT  Once      04/27/18 0316     Place sequential compression device  Until discontinued      04/27/18 0316     Place DARIAN hose  Until discontinued      04/27/18 0316     Reason for No Pharmacological VTE Prophylaxis  Once      04/27/18 0316              Michelle  KIM Blankenship PA-C  Department of [facility]   [facility]

## 2018-04-29 NOTE — PLAN OF CARE
Problem: Patient Care Overview  Goal: Plan of Care Review  Outcome: Ongoing (interventions implemented as appropriate)  Pt AA0x3 and VSS.  Two side rails up, bed locked, call light within reach, No falls noted, Pt free of skin breakdown as the pt moves well independently.  Hourly rounds made and no complaints at this time, will resume with Plan of Care and continue to monitor

## 2018-04-29 NOTE — PLAN OF CARE
Problem: Patient Care Overview  Goal: Individualization & Mutuality  Outcome: Ongoing (interventions implemented as appropriate)  Patient will remain free of injuries and falls.BS checked AC, managed with insulin. Plan of care discussed with patient, wife at bedside . no questions at this time. Fall pre-cautions in place. Will continue to monitor.

## 2018-04-29 NOTE — ASSESSMENT & PLAN NOTE
Presents to ED with aphasia.  Similar presentation to 3/23/18 admission.  Stroke workup with EEG (no finding) and MRI stroke protocol (no acute infarct/hemorrhage). Suspect postictal state.  Last admission took 1.5 days to return to baseline mentation.  - Vascular neurology consulted in ED.  CTH no acute changes.  No acute stroke suspected.  - f/w Dr. Santiago   - No B/B incontinence, no tongue biting, no LOC, no convulsions  - UA ordered and pending collection  - seizure precautions  - Continue keppra 1500 mg PO BID; keppra level in process (wife reports compliance).  3rd admission with similar presentation.  Pt did follow up with Dr. Santiago after admission 11/2017 and no change in keppra was made.  - EEG ordered This is an abnormal extended (1 hour and 29 minutes) EEG due to:  1) rare left sided epileptiform activity seen, suggestive of underlying epileptiform activity  2) continuous left sided slowing seen, suggestive of underlying structural lesion  4/28 Neurology consulted and at the bedside  Slow titration on lacosamide--50 mg qd x 1 week  -Increase lacosamide to 100 mg for 2nd week if pt tolerates  -Follow up with Dr. Santiago in 2 weeks

## 2018-04-30 ENCOUNTER — TELEPHONE (OUTPATIENT)
Dept: NEUROLOGY | Facility: CLINIC | Age: 63
End: 2018-04-30

## 2018-04-30 VITALS
BODY MASS INDEX: 30.83 KG/M2 | SYSTOLIC BLOOD PRESSURE: 128 MMHG | TEMPERATURE: 98 F | OXYGEN SATURATION: 99 % | RESPIRATION RATE: 11 BRPM | WEIGHT: 215.38 LBS | HEART RATE: 75 BPM | DIASTOLIC BLOOD PRESSURE: 76 MMHG | HEIGHT: 70 IN

## 2018-04-30 LAB
ANION GAP SERPL CALC-SCNC: 10 MMOL/L
BASOPHILS # BLD AUTO: 0.08 K/UL
BASOPHILS NFR BLD: 1 %
BUN SERPL-MCNC: 32 MG/DL
CALCIUM SERPL-MCNC: 8.9 MG/DL
CHLORIDE SERPL-SCNC: 109 MMOL/L
CO2 SERPL-SCNC: 22 MMOL/L
CREAT SERPL-MCNC: 2 MG/DL
DIFFERENTIAL METHOD: ABNORMAL
EOSINOPHIL # BLD AUTO: 0.3 K/UL
EOSINOPHIL NFR BLD: 3.6 %
ERYTHROCYTE [DISTWIDTH] IN BLOOD BY AUTOMATED COUNT: 16.7 %
EST. GFR  (AFRICAN AMERICAN): 39.6 ML/MIN/1.73 M^2
EST. GFR  (NON AFRICAN AMERICAN): 34.3 ML/MIN/1.73 M^2
GLUCOSE SERPL-MCNC: 145 MG/DL
HCT VFR BLD AUTO: 35 %
HGB BLD-MCNC: 12.5 G/DL
IMM GRANULOCYTES # BLD AUTO: 0.01 K/UL
IMM GRANULOCYTES NFR BLD AUTO: 0.1 %
LYMPHOCYTES # BLD AUTO: 2.3 K/UL
LYMPHOCYTES NFR BLD: 27.3 %
MCH RBC QN AUTO: 28.5 PG
MCHC RBC AUTO-ENTMCNC: 35.7 G/DL
MCV RBC AUTO: 80 FL
MONOCYTES # BLD AUTO: 1.3 K/UL
MONOCYTES NFR BLD: 15.2 %
NEUTROPHILS # BLD AUTO: 4.4 K/UL
NEUTROPHILS NFR BLD: 52.8 %
NRBC BLD-RTO: 1 /100 WBC
PLATELET # BLD AUTO: 234 K/UL
PMV BLD AUTO: 11.9 FL
POCT GLUCOSE: 121 MG/DL (ref 70–110)
POCT GLUCOSE: 139 MG/DL (ref 70–110)
POTASSIUM SERPL-SCNC: 4.3 MMOL/L
RBC # BLD AUTO: 4.39 M/UL
SODIUM SERPL-SCNC: 141 MMOL/L
WBC # BLD AUTO: 8.34 K/UL

## 2018-04-30 PROCEDURE — 99226 PR SUBSEQUENT OBSERVATION CARE,LEVEL III: CPT | Mod: ,,, | Performed by: PSYCHIATRY & NEUROLOGY

## 2018-04-30 PROCEDURE — 99217 PR OBSERVATION CARE DISCHARGE: CPT | Mod: SA,,, | Performed by: PHYSICIAN ASSISTANT

## 2018-04-30 PROCEDURE — 25000003 PHARM REV CODE 250: Performed by: PHYSICIAN ASSISTANT

## 2018-04-30 PROCEDURE — 36415 COLL VENOUS BLD VENIPUNCTURE: CPT

## 2018-04-30 PROCEDURE — 85025 COMPLETE CBC W/AUTO DIFF WBC: CPT

## 2018-04-30 PROCEDURE — 80048 BASIC METABOLIC PNL TOTAL CA: CPT

## 2018-04-30 PROCEDURE — G0378 HOSPITAL OBSERVATION PER HR: HCPCS

## 2018-04-30 PROCEDURE — 97161 PT EVAL LOW COMPLEX 20 MIN: CPT

## 2018-04-30 RX ORDER — CARVEDILOL 6.25 MG/1
6.25 TABLET ORAL 2 TIMES DAILY
Qty: 180 TABLET | Refills: 0 | Status: SHIPPED | OUTPATIENT
Start: 2018-04-30 | End: 2018-08-06 | Stop reason: SDUPTHER

## 2018-04-30 RX ORDER — LACOSAMIDE 50 MG/1
50 TABLET ORAL EVERY 12 HOURS
Qty: 60 TABLET | Refills: 11 | Status: ON HOLD
Start: 2018-04-30 | End: 2018-06-28

## 2018-04-30 RX ADMIN — LACOSAMIDE 50 MG: 50 TABLET, FILM COATED ORAL at 09:04

## 2018-04-30 RX ADMIN — LEVETIRACETAM 1500 MG: 750 TABLET ORAL at 09:04

## 2018-04-30 RX ADMIN — CARVEDILOL 6.25 MG: 6.25 TABLET, FILM COATED ORAL at 09:04

## 2018-04-30 RX ADMIN — AMLODIPINE BESYLATE 10 MG: 10 TABLET ORAL at 09:04

## 2018-04-30 RX ADMIN — LOSARTAN POTASSIUM 100 MG: 50 TABLET, FILM COATED ORAL at 09:04

## 2018-04-30 RX ADMIN — LEVOTHYROXINE SODIUM 200 MCG: 100 TABLET ORAL at 05:04

## 2018-04-30 RX ADMIN — INSULIN ASPART 5 UNITS: 100 INJECTION, SOLUTION INTRAVENOUS; SUBCUTANEOUS at 08:04

## 2018-04-30 RX ADMIN — FUROSEMIDE 40 MG: 40 TABLET ORAL at 09:04

## 2018-04-30 RX ADMIN — THERA TABS 1 TABLET: TAB at 09:04

## 2018-04-30 RX ADMIN — INSULIN ASPART 5 UNITS: 100 INJECTION, SOLUTION INTRAVENOUS; SUBCUTANEOUS at 12:04

## 2018-04-30 RX ADMIN — APIXABAN 5 MG: 5 TABLET, FILM COATED ORAL at 09:04

## 2018-04-30 RX ADMIN — ATORVASTATIN CALCIUM 40 MG: 20 TABLET, FILM COATED ORAL at 09:04

## 2018-04-30 RX ADMIN — ASPIRIN 81 MG 81 MG: 81 TABLET ORAL at 09:04

## 2018-04-30 RX ADMIN — FOLIC ACID 1000 MCG: 1 TABLET ORAL at 09:04

## 2018-04-30 NOTE — TELEPHONE ENCOUNTER
I called in vimpat 100mg daily per Shanti Costello/Pamela w/5 refills to downstairs pharmacy to bedside deliver today for discharge

## 2018-04-30 NOTE — PT/OT/SLP EVAL
Physical Therapy Evaluation and Discharge Note    Patient Name:  Alfa Christy III   MRN:  [id number]    Recommendations:     Discharge Recommendations:  home   Discharge Equipment Recommendations: none   Barriers to discharge: None    Assessment:     Alfa Christy III is a 62 y.o. male admitted with a medical diagnosis of Complex partial seizures evolving to generalized tonic-clonic seizures. .  At this time, patient is functioning at their prior level of function and does not require further acute PT services. Pt does demonstrate slight instability with ambulation but is able to stabilize independently. Pt is appropriate to return home with assistance from spouse and return to exercise classes when medically appropriate upon acute discharge.    Recent Surgery: * No surgery found *      Plan:     During this hospitalization, patient does not require further acute PT services.  Please re-consult if situation changes.     Plan of Care Reviewed with: patient, spouse    Subjective     Communicated with pt, spouse, and nurse prior to session.  Patient found standing in room with spouse present upon PT entry to room, agreeable to evaluation.      Chief Complaint: slight unsteadiness  Patient comments/goals: to return home  Pain/Comfort:  · Pain Rating 1: 0/10  · Pain Rating Post-Intervention 1: 0/10    Patients cultural, spiritual, Yarsani conflicts given the current situation: none    Living Environment:  Pt lives with spouse in 2SH with BR/BA on 2nd floor with approx 14 steps with BHR. Threshold BOB and tub shower with grab bars.  Prior to admission, patients level of function was independent with use of walker to amb to bathroom at night. Pt attends exercise classes M-F.   Patient has the following equipment: grab bar.  DME owned (not currently used): rolling walker, shower chair and quad cane.  Upon discharge, patient will have assistance from spouse.    Objective:     Patient found with: pulse ox (continuous),  telemetry     General Precautions: Standard, fall   Orthopedic Precautions:N/A   Braces: N/A     Exams:  · Cognitive Exam:  Patient is oriented to Person, Place, Time and Situation and follows 100% of multi step commands   · Fine Motor Coordination:    · -       Intact  · Gross Motor Coordination:  WFL  · Postural Exam:  Patient presented with the following abnormalities:    · -       Rounded shoulders  · -       Forward head  · Sensation:    · -       Intact  · Skin Integrity/Edema:      · -       Skin integrity: Visible skin intact  · -       Edema: None noted BLE  · RUE ROM: WFL  · RUE Strength: WFL  · LUE ROM: WFL  · LUE Strength: WFL  · RLE ROM: WFL  · RLE Strength: WFL   · LLE ROM: WFL  · LLE Strength: WFL    Functional Mobility:  · Transfers:     · Sit to Stand:  independence with no AD  · Gait: 110 feet without AD with supervision with pt demonstrating slight unsteady gait with occasional reaching for handrail in hallway but able to stabilize independently and modifiy by slowing ryanne  · Balance: pt is able to accept mod challenges to standing balance without LOB  · Stairs:  Pt ascended/descended 10 stair(s) with No Assistive Device with right handrail with Supervision using reciprocal step pattern    AM-PAC 6 CLICK MOBILITY  Total Score:23       Therapeutic Activities and Exercises:   PT provided pt and spouse education on:   -role of PT and POC/DC   -importance of OOB activity   -LE exercises to perform independently   -encourage amb in hallway with assistance from spouse/staff      Patient left sitting EOB with call button in reach and spouse present.    GOALS:    Physical Therapy Goals     Not on file          Multidisciplinary Problems (Resolved)        Problem: Physical Therapy Goal    Goal Priority Disciplines Outcome Goal Variances Interventions   Physical Therapy Goal   (Resolved)     PT/OT, PT Outcome(s) achieved     Description:  PT eval complete, no goals set, no skilled need for PT at this  time                    History:     Past Medical History:   Diagnosis Date    Allergy     BMI 31.0-31.9,adult 11/25/2014    Cerebrovascular disease 7/25/2016    Chronic anticoagulation - pradaxa 11/10/2016    Chronic diastolic heart failure 11/20/2016    CKD (chronic kidney disease), stage III 9/23/2013    Controlled type 2 diabetes with neuropathy 12/16/2014    Diabetes mellitus due to underlying condition with stage 3 chronic kidney disease, without long-term current use of insulin 11/2/2016    Elevated alkaline phosphatase level 8/1/2012    Elevated PSA     negative prostate biopsy 4/11    Erectile dysfunction associated with type 2 diabetes mellitus     Gallstones 3/20/2013    Generalized tonic-clonic seizure 11/2016    HTN (hypertension), benign 8/1/2012    Hypercholesterolemia 8/1/2012    Microcytic anemia 11/27/2013    Paroxysmal atrial fibrillation 9/23/2013    Postsurgical hypothyroidism 11/27/2013    Right homonymous hemianopsia 2/5/2016    Sickle cell trait     Stroke 1/27/2016    Thyroid cancer     multifocal with six lesions, largest 5mm, treated with surgery and radioactive iodine    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/2016       Past Surgical History:   Procedure Laterality Date    BREAST SURGERY      cyst removal    COLONOSCOPY      CYST REMOVAL      chest    PROSTATE BIOPSY  4/27/11    SKIN BIOPSY      THYROID SURGERY  8/26/09    VASCULAR SURGERY         Clinical Decision Making:     History  Co-morbidities and personal factors that may impact the plan of care Examination  Body Structures and Functions, activity limitations and participation restrictions that may impact the plan of care Clinical Presentation   Decision Making/ Complexity Score   Co-morbidities:   [] Time since onset of injury / illness / exacerbation  [x] Status of current condition  []Patient's cognitive status and safety concerns    [x] Multiple Medical Problems (see med hx)  Personal Factors:    [] Patient's age  [] Prior Level of function   [] Patient's home situation (environment and family support)  [] Patient's level of motivation  [] Expected progression of patient      HISTORY:(criteria)    [] 38845 - no personal factors/history    [x] 10862 - has 1-2 personal factor/comorbidity     [] 69246 - has >3 personal factor/comorbidity     Body Regions:  [] Objective examination findings  [] Head     []  Neck  [x] Trunk   [x] Upper Extremity  [x] Lower Extremity    Body Systems:  [] For communication ability, affect, cognition, language, and learning style: the assessment of the ability to make needs known, consciousness, orientation (person, place, and time), expected emotional /behavioral responses, and learning preferences (eg, learning barriers, education  needs)  [x] For the neuromuscular system: a general assessment of gross coordinated movement (eg, balance, gait, locomotion, transfers, and transitions) and motor function  (motor control and motor learning)  [] For the musculoskeletal system: the assessment of gross symmetry, gross range of motion, gross strength, height, and weight  [] For the integumentary system: the assessment of pliability(texture), presence of scar formation, skin color, and skin integrity  [] For cardiovascular/pulmonary system: the assessment of heart rate, respiratory rate, blood pressure, and edema     Activity limitations:    [] Patient's cognitive status and saf ety concerns          [] Status of current condition      [] Weight bearing restriction  [] Cardiopulmunary Restriction    Participation Restrictions:   [] Goals and goal agreement with the patient     [] Rehab potential (prognosis) and probable outcome      Examination of Body System: (criteria)    [x] 32213 - addressing 1-2 elements    [] 33794 - addressing a total of 3 or more elements     [] 40585 -  Addressing a total of 4 or more elements         Clinical Presentation: (criteria)  Stable - 05565     On  examination of body system using standardized tests and measures patient presents with 1-2 elements from any of the following: body structures and functions, activity limitations, and/or participation restrictions.  Leading to a clinical presentation that is considered stable and/or uncomplicated                              Clinical Decision Making  (Eval Complexity):  Low- 00611     Time Tracking:     PT Received On: 04/30/18  PT Start Time: 0912     PT Stop Time: 0923  PT Total Time (min): 11 min     Billable Minutes: Evaluation 11      Lois Moon, PT  04/30/2018

## 2018-04-30 NOTE — PROGRESS NOTES
Patient is ready for discharge. Patient stable alert and oriented. IVs removed. No complaints of pain. Discussed discharge plan. Reviewed medications and side effects, appointments, and answered questions with patient and family. Pt is to  Vimpat medication downstairs upon leaving hospital.

## 2018-04-30 NOTE — MEDICAL/APP STUDENT
Subjective:       Patient ID: Alfa Christy III is a 62 y.o. male.    Chief Complaint: Aphasia    HPI   63y/o M with pmhx stroke, seizure, afib (on eliquis), DM2, HLD, HTN presents w/ aphasia. Patient was last normal at 10:30 pm.  Per his wife he was doing well then started complaining of feeling dizzy after taking a shower. Denied fever, pains, vomiting.  She noticed he then started having trouble speaking and say words that did not make sense, so came to ED.  Wife denies any B/B incontinence, unilateral weakness, syncope, or tongue-biting.  Patient had similar presentation end of March; at that time TPA was deferred and subsequent neuroimaging negative for CVA. He does have history of seizures on keppra, and his seizures are atypical. Says wasn't having seizures until recently. Patient has had a previous stroke 1/2016 w/ residual L vision deficits only. EEG then abnormal w/ 1/18 CVA as likely foci for seizures. Currently on eliquis for a-fib. Further hisotry limited by patient's mental status.       NAEON. Patient has been tolerating new medication well. Patient requesting to be discharged.    Past Medical History:   Diagnosis Date    Allergy     BMI 31.0-31.9,adult 11/25/2014    Cerebrovascular disease 7/25/2016    Chronic anticoagulation - pradaxa 11/10/2016    Chronic diastolic heart failure 11/20/2016    CKD (chronic kidney disease), stage III 9/23/2013    Controlled type 2 diabetes with neuropathy 12/16/2014    Diabetes mellitus due to underlying condition with stage 3 chronic kidney disease, without long-term current use of insulin 11/2/2016    Elevated alkaline phosphatase level 8/1/2012    Elevated PSA     negative prostate biopsy 4/11    Erectile dysfunction associated with type 2 diabetes mellitus     Gallstones 3/20/2013    Generalized tonic-clonic seizure 11/2016    HTN (hypertension), benign 8/1/2012    Hypercholesterolemia 8/1/2012    Microcytic anemia 11/27/2013    Paroxysmal atrial  fibrillation 9/23/2013    Postsurgical hypothyroidism 11/27/2013    Right homonymous hemianopsia 2/5/2016    Sickle cell trait     Stroke 1/27/2016    Thyroid cancer     multifocal with six lesions, largest 5mm, treated with surgery and radioactive iodine    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/2016     Past Surgical History:   Procedure Laterality Date    BREAST SURGERY      cyst removal    COLONOSCOPY      CYST REMOVAL      chest    PROSTATE BIOPSY  4/27/11    SKIN BIOPSY      THYROID SURGERY  8/26/09    VASCULAR SURGERY       Social History     Social History    Marital status:      Spouse name: Rut    Number of children: N/A    Years of education: N/A     Occupational History    Not on file.     Social History Main Topics    Smoking status: Never Smoker    Smokeless tobacco: Never Used    Alcohol use Yes      Comment: occasionally    Drug use: No    Sexual activity: Yes     Partners: Female      Comment:  with 3 kids     Other Topics Concern    Not on file     Social History Narrative     with 3 kids     Not driving due to vision problem from stroke.      Review of patient's allergies indicates:   Allergen Reactions    Cough syrup [guaifenesin] Other (See Comments)     Current Neurological Medications: Lacosamide, Keppra    Review of Systems   Constitutional: Negative for chills and fever.   Eyes: Positive for visual disturbance (right visual field (since last stroke)). Negative for photophobia.   Neurological: Positive for seizures and speech difficulty. Negative for headaches.        Tinnitus b/l left worse than right         Objective:       Vitals:    04/30/18 0840 04/30/18 1110   BP: 137/82 117/70   Pulse: 79 65   Resp: 14 13   Temp: 98 °F (36.7 °C)    TempSrc: Oral    SpO2: 95% 98%     Physical Exam   Constitutional: He is oriented to person, place, and time.   Eyes: EOM are normal. Pupils are equal, round, and reactive to light.   Neurological: He is  oriented to person, place, and time. He has normal strength. He has a normal Finger-Nose-Finger Test.   Reflex Scores:       Bicep reflexes are 2+ on the right side and 2+ on the left side.       Brachioradialis reflexes are 2+ on the right side and 2+ on the left side.       Patellar reflexes are 2+ on the right side and 2+ on the left side.       Achilles reflexes are 2+ on the right side and 2+ on the left side.      NEUROLOGICAL EXAMINATION:     MENTAL STATUS   Oriented to person, place, and time.   Registration: recalls 3 of 3 objects. Recall at 5 minutes: recalls 3 of 3 objects.   Attention: normal. Concentration: normal.   Level of consciousness: alert    CRANIAL NERVES     CN II   Right visual field deficit: right homonymous hemianopsia deficit from last stroke.    CN III, IV, VI   Pupils are equal, round, and reactive to light.  Extraocular motions are normal.     CN V   Facial sensation intact.     CN VII   Facial expression full, symmetric.     CN VIII   Hearing: impaired (pt reports trouble hearing, tinnitus B/L with L worse than R)    CN IX, X   CN IX normal.     CN XI   CN XI normal.     CN XII   CN XII normal.     MOTOR EXAM     Strength   Strength 5/5 throughout.     REFLEXES     Reflexes   Right brachioradialis: 2+  Left brachioradialis: 2+  Right biceps: 2+  Left biceps: 2+  Right patellar: 2+  Left patellar: 2+  Right achilles: 2+  Left achilles: 2+  Right plantar: normal  Left plantar: normal  Right ankle clonus: absent  Left ankle clonus: absent    GAIT AND COORDINATION      Coordination   Finger to nose coordination: normal      Assessment:       1. Acute on chronic congestive heart failure, unspecified heart failure type    2. Stroke    3. Dysarthria    4. Diastolic dysfunction    5. Acute on chronic diastolic congestive heart failure    6. Complex partial seizures evolving to generalized tonic-clonic seizures    7. Essential hypertension    8. History of CVA (cerebrovascular accident)    9.  Hypercholesterolemia    10. Paroxysmal atrial fibrillation    11. Postsurgical hypothyroidism    12. Cerebrovascular accident (CVA), unspecified mechanism    13. Type 2 diabetes mellitus with hyperglycemia, with long-term current use of insulin        Plan:       Complex partial seizures evolving to generalized tonic-clonic seizures     -Unclear precipitating factor  -Pt is on Keppra 1500 mg BID  -Pt also started on Lacosamide 50 mg BID, tolerating well, will increase to 100 mg BID  -Pt will follow up in epilepsy clinic

## 2018-04-30 NOTE — ASSESSMENT & PLAN NOTE
Stable  - Repeat Hg A1c  - Home regimen: aspart 7 U TIDWM, Levemir 14 U QHS  - Inpatient regimen: aspart 5 U TID WM, Levemir 10 U QHS  - low dose SSI, acchuchecks ACHS  - diabetic diet  4/27-4/30 no hypoglycemia, avoid with PMH

## 2018-04-30 NOTE — DISCHARGE SUMMARY
[facility] Hospital Medicine  Discharge Summary      Patient Name: Alfa Christy III  MRN: [id number]  Admission Date: 4/26/2018  Hospital Length of Stay: 0 days  Discharge Date and Time:  04/30/2018 12:31 PM  Attending Physician: Summer Connelly MD   Discharging Provider: Michelle Blankenship PA-C  Primary Care Provider: Tyler Jasmine MD  Hospital Medicine Team: [facility] F Michelle Blankenship PA-C    HPI:   61y/o M with pmhx stroke, seizure, afib (on eliquis), DM2, HLD, HTN presents w/ aphasia. Patient was last normal at 10:30 pm.  Per his wife he was doing well then started complaining of feeling dizzy after taking a shower. Denied fever, pains, vomiting.  She noticed he then started having trouble speaking and say words that did not make sense, so came to ED.  Wife denies any B/B incontinence, unilateral weakness, syncope, or tongue-biting.  Patient had similar presentation end of March; at that time TPA was deferred and subsequent neuroimaging negative for CVA. He does have history of seizures on keppra, and his seizures are atypical. Says wasn't having seizures until recently. Patient has had a previous stroke 1/2016 w/ residual L vision deficits only. EEG then abnormal w/ 1/18 CVA as likely foci for seizures. Currently on eliquis for a-fib. Further hisotry limited by patient's mental status.      In ED, [facility] neurology consulted for concern of stroke d/t aphasia.  Sxs likely 2/2 postictal period of complex seizure.   (baseline 100s-200s). CXR shows CM with findings suggestive of pulmonary edema.  Gvien one time dose of lasix 80 mg IV.  EKG shows afib HR 69.  CTH with no significant change from previous. Remote left occipital lobe and left thalamic infarctions.     * No surgery found *      Hospital Course:   Pt admitted due to aphasia.  Similar presentation/admissions 11/2017 and 03/2018 (medication noncompliance an issue).  Vascular neurology consulted in ED.  CTH no acute changes.  No  acute stroke suspected.  Keppra level ordered.  EEG ordered: abnormal extended (1 hour and 29 minutes) EEG due to rare left sided epileptiform activity seen, suggestive of underlying epileptiform activity; continuous left sided slowing seen, suggestive of underlying structural lesion.  Neurology consulted and recommended slow titration on lacosamide--50 mg qd x 1 week, increase lacosamide to 100 mg for 2nd week if pt tolerates.  Continue levetiracetam 1500 mg bid, level 69.4.  Follow up with Dr. Santiago in 2 weeks.  Pt also with acute CHF exacerbation (->395).  Pt treated with IV lasix bid and wt decreased 2 kg.  Day 3-4 transitioned back to home dosing furosemide 40 mg bid.  Seizure precautions at discharge.     Consults:   Consults         Status Ordering Provider     Inpatient consult to Neurology  Once     Provider:  (Not yet assigned)    Completed ROCIO RICH     Inpatient consult to Vascular (Stroke) Neurology  Once     Provider:  (Not yet assigned)    Completed RUMA BERNSTEIN          * Complex partial seizures evolving to generalized tonic-clonic seizures    Presents to ED with aphasia.  Similar presentation to 3/23/18 admission.  Stroke workup with EEG (no finding) and MRI stroke protocol (no acute infarct/hemorrhage). Suspect postictal state.  Last admission took 1.5 days to return to baseline mentation.  - Vascular neurology consulted in ED.  CTH no acute changes.  No acute stroke suspected.  - f/w Dr. Santiago   - No B/B incontinence, no tongue biting, no LOC, no convulsions  - UA ordered and pending collection  - seizure precautions  - Continue keppra 1500 mg PO BID; keppra level in process (wife reports compliance).  3rd admission with similar presentation.  Pt did follow up with Dr. Santiago after admission 11/2017 and no change in keppra was made.  - EEG ordered This is an abnormal extended (1 hour and 29 minutes) EEG due to:  1) rare left sided epileptiform activity seen, suggestive of  underlying epileptiform activity  2) continuous left sided slowing seen, suggestive of underlying structural lesion  4/28 Neurology consulted and at the bedside  Will discharge on 100 mg qd--2 week supply coming from [facility]  -Follow up with Dr. Santiago in 2 weeks        Acute on chronic congestive heart failure    Last ECHO 1/28/16 shows +DD  CXR shows CM, pulmonary edema.  100% on RA  - hypervolemic on exam  - Home dose lasix 40 mg PO BID.  - Continue BB, ARB  - Lasix 80 mg IV given in ED.  Will start 60 mg IV BID.  4/27 continue furosemide 60 mg IV BID.  - Strict I/O, daily weights, tele  -4/28-4/30 down 2 kg will transition back to furosemide 40 mg po bid        Paroxysmal atrial fibrillation    Stable  - Continue apixaban 5 mg PO BID, coreg 3.125 mg PO BID  - CHADSVASC 5  - on tele        History of CVA (cerebrovascular accident)    CVA 1/2016  - residual L eye deficit  - continue ASA, statin  - First seizure 11/2016  - PT/OT consulted        Type 2 diabetes mellitus with hyperglycemia    Stable  - Repeat Hg A1c  - Home regimen: aspart 7 U TIDWM, Levemir 14 U QHS  - Inpatient regimen: aspart 5 U TID WM, Levemir 10 U QHS  - low dose SSI, acchuchecks ACHS  - diabetic diet  4/27-4/30 no hypoglycemia, avoid with PMH        Essential hypertension    Hypertensive on admission  - Continue amlodipine 10 mg PO daily, carvedilol, losartan 100 mg PO daily  - Patient has been taking 1/2 tablet of amlodipine.  Will start back on amlodipine 10 mg in AM.  May need to increase carvedilol or losartan.    4/27 elevated but improving; hold off on further adjustments until 4/28 4/28-4/30 continue amlodipine 10 mg, losartan 100 mg, increase carvedilol to 6.25 mg bid.  Hydralazine prn.        Postsurgical hypothyroidism    Stable  - TSH 2.145  - Continue levothyroxine 200 mg PO daily        Hypercholesterolemia    Stable  - Continue atorvastatin 40 mg PO daily        Dysarthria                Final Active Diagnoses:     Diagnosis Date Noted POA    PRINCIPAL PROBLEM:  Complex partial seizures evolving to generalized tonic-clonic seizures [G40.209] 11/15/2017 Yes    Acute on chronic congestive heart failure [I50.9] 04/27/2018 Yes    Paroxysmal atrial fibrillation [I48.0] 09/23/2013 Yes     Chronic    History of CVA (cerebrovascular accident) [Z86.73] 07/25/2016 Not Applicable    Type 2 diabetes mellitus with hyperglycemia [E11.65] 06/28/2017 Yes    Essential hypertension [I10] 08/30/2017 Yes    Postsurgical hypothyroidism [E89.0] 11/27/2013 Yes    Hypercholesterolemia [E78.00] 08/01/2012 Yes    Dysarthria [R47.1]  Unknown    Stroke [I63.9]  Unknown      Problems Resolved During this Admission:    Diagnosis Date Noted Date Resolved POA       Discharged Condition: good    Disposition: Home or Self Care    Follow Up:  Follow-up Information     Kim Phillips MD On 5/7/2018.    Specialty:  Endocrinology  Why:  at 9:00 AM; Dr. Martínez not available  Contact information:  [address] [address]  [phone]             Keara Santiago MD On 5/15/2018.    Specialty:  Neurology  Why:  at 2:00 pm  Contact information:  [address] [address]  [phone]                 Patient Instructions:     Other restrictions (specify):   Order Comments: No driving         Significant Diagnostic Studies: Labs: All labs within the past 24 hours have been reviewed    Pending Diagnostic Studies:     None         Medications:  Reconciled Home Medications:      Medication List      START taking these medications    lacosamide 50 mg Tab  Commonly known as:  VIMPAT  Take 1 tablet (50 mg total) by mouth every 12 (twelve) hours.        CHANGE how you take these medications    carvedilol 6.25 MG tablet  Commonly known as:  COREG  Take 1 tablet (6.25 mg total) by mouth 2 (two) times daily.  What changed:  · medication strength  · how much to take     hydrALAZINE 25 MG tablet  Commonly known as:  APRESOLINE  Take 1 tablet (25  "mg total) by mouth every 8 (eight) hours.  What changed:  when to take this        CONTINUE taking these medications    acetaminophen 500 MG tablet  Commonly known as:  TYLENOL  Take 1 tablet (500 mg total) by mouth every 6 (six) hours as needed for Pain.     amLODIPine 10 MG tablet  Commonly known as:  NORVASC  Take 1 tablet (10 mg total) by mouth once daily.     apixaban 5 mg Tab  Take 1 tablet (5 mg total) by mouth 2 (two) times daily.     aspirin 81 MG Chew  Take 1 tablet (81 mg total) by mouth once daily.     atorvastatin 40 MG tablet  Commonly known as:  LIPITOR  Take 1 tablet (40 mg total) by mouth once daily.     BD ULTRA-FINE MINI PEN NEEDLE 31 gauge x 3/16" Ndle  Generic drug:  pen needle, diabetic  To use with insulin pens 4  times daily     dulaglutide 0.75 mg/0.5 mL Pnij  Commonly known as:  TRULICITY  Inject 0.5 mLs (0.75 mg total) into the skin every 7 days.     flash glucose scanning reader Misc  Commonly known as:  FREESTYLE ANGELLA READER  1 each by Misc.(Non-Drug; Combo Route) route once daily.     flash glucose sensor Kit  Commonly known as:  FREESTYLE ANGELLA SENSOR  3 each by Misc.(Non-Drug; Combo Route) route every 30 days.     folic acid 400 MCG tablet  Commonly known as:  FOLVITE  Take 400 mcg by mouth once daily.     furosemide 40 MG tablet  Commonly known as:  LASIX  Take 1 tablet (40 mg total) by mouth 2 (two) times daily.     GLUCAGON EMERGENCY KIT (HUMAN) 1 mg Kit  Generic drug:  glucagon (human recombinant)  INJECT 1 MG INTO THE MUSCLE AS NEEDED     insulin aspart U-100 100 unit/mL Inpn pen  Commonly known as:  NovoLOG  Inject 7 units w/ meals plus scale 150-200+1, 201-250+2, 251-300+3, 301-350+4.     insulin detemir U-100 100 unit/mL (3 mL) Inpn pen  Commonly known as:  LEVEMIR FLEXPEN  Inject 14 Units into the skin every evening.     levETIRAcetam 750 MG Tab  Commonly known as:  KEPPRA  Take 2 tablets (1,500 mg total) by mouth 2 (two) times daily.     levothyroxine 200 MCG " tablet  Commonly known as:  SYNTHROID  Take 1 tablet (200 mcg total) by mouth before breakfast.     losartan 100 MG tablet  Commonly known as:  COZAAR  Take 1 tablet (100 mg total) by mouth once daily.     magnesium 30 mg Tab  Take 1 tablet by mouth twice a week.     multivitamin capsule  Take 1 capsule by mouth every Mon, Wed, Fri.     ONETOUCH VERIO Strp  Generic drug:  blood sugar diagnostic  USE TO TEST BLOOD SUGAR THREE TIMES DAILY     sildenafil 100 MG tablet  Commonly known as:  VIAGRA  Take 1 tablet (100 mg total) by mouth as needed for Erectile Dysfunction.     vitamin D 1000 units Tab  Take 1,000 Units by mouth every Mon, Wed, Fri.            Indwelling Lines/Drains at time of discharge:   Lines/Drains/Airways          No matching active lines, drains, or airways          Time spent on the discharge of patient: >30 minutes  Patient was seen and examined on the date of discharge and determined to be suitable for discharge.         Michelle Blankenship PA-C  Department of Hospital Medicine  [facility]

## 2018-04-30 NOTE — ASSESSMENT & PLAN NOTE
Hypertensive on admission  - Continue amlodipine 10 mg PO daily, carvedilol, losartan 100 mg PO daily  - Patient has been taking 1/2 tablet of amlodipine.  Will start back on amlodipine 10 mg in AM.  May need to increase carvedilol or losartan.    4/27 elevated but improving; hold off on further adjustments until 4/28 4/28-4/30 continue amlodipine 10 mg, losartan 100 mg, increase carvedilol to 6.25 mg bid.  Hydralazine prn.

## 2018-04-30 NOTE — PROGRESS NOTES
Called Neuro team, notified that pt is awaiting D/C for prescription and coupons for lacosamide medication. Team to notify the resident on the case.

## 2018-04-30 NOTE — ASSESSMENT & PLAN NOTE
Last ECHO 1/28/16 shows +DD  CXR shows CM, pulmonary edema.  100% on RA  - hypervolemic on exam  - Home dose lasix 40 mg PO BID.  - Continue BB, ARB  - Lasix 80 mg IV given in ED.  Will start 60 mg IV BID.  4/27 continue furosemide 60 mg IV BID.  - Strict I/O, daily weights, tele  -4/28-4/30 down 2 kg will transition back to furosemide 40 mg po bid

## 2018-04-30 NOTE — PROGRESS NOTES
[facility]  Neurology  Progress Note    Patient Name: Alfa Christy III  MRN: [id number]  Admission Date: 4/26/2018  Hospital Length of Stay: 0 days  Code Status: Full Code   Attending Provider: Summer Connelly MD  Primary Care Physician: Tyler Jasmine MD   Principal Problem:Complex partial seizures evolving to generalized tonic-clonic seizures      Subjective:     Interval History: Patient has been doing well, no new events.  He and his wife are agreeable to starting lacosamide, note no side effects apparent since starting the medication in the hospital.  Talked to Epilepsy RN Rut regarding the medication, will arrange for 2 week supply to be ready down at the pharmacy.    Current Neurological Medications: Lacosamide, Levetiracetam.    Current Facility-Administered Medications   Medication Dose Route Frequency Provider Last Rate Last Dose    acetaminophen tablet 650 mg  650 mg Oral Q4H PRN Salinas Fisher PA-C        albuterol-ipratropium 2.5mg-0.5mg/3mL nebulizer solution 3 mL  3 mL Nebulization Q4H PRN Salinas Fisher PA-C        amLODIPine tablet 10 mg  10 mg Oral Daily Salinas Fisher PA-C   10 mg at 04/30/18 0936    apixaban tablet 5 mg  5 mg Oral BID Salinas Fisher PA-C   5 mg at 04/30/18 0936    aspirin chewable tablet 81 mg  81 mg Oral Daily ANDRES CurryC   81 mg at 04/30/18 0936    atorvastatin tablet 40 mg  40 mg Oral Daily Salinas Fisher PA-C   40 mg at 04/30/18 0936    carvedilol tablet 6.25 mg  6.25 mg Oral BID Michelle Blankenship PA-C   6.25 mg at 04/30/18 0936    dextrose 50% injection 12.5 g  12.5 g Intravenous PRN Salinas Fisher PA-C        dextrose 50% injection 25 g  25 g Intravenous PRN Salinas Fisher PA-C        folic acid tablet 1,000 mcg  1,000 mcg Oral Daily Salinas Fisher PA-C   1,000 mcg at 04/30/18 0936    furosemide tablet 40 mg  40 mg Oral BID Michelle Blankenship PA-C   40 mg at 04/30/18 0936    glucagon (human recombinant)  injection 1 mg  1 mg Intramuscular PRN Salinas Fisher PA-C        glucose chewable tablet 16 g  16 g Oral PRN Salinas Fisher PA-C        glucose chewable tablet 24 g  24 g Oral PRN Salinas Fisher PA-C        hydrALAZINE tablet 25 mg  25 mg Oral Q6H PRN Michelle Blankenship PA-C        insulin aspart U-100 pen 0-5 Units  0-5 Units Subcutaneous QID (AC + HS) PRN Salinas Fisher PA-C        insulin aspart U-100 pen 5 Units  5 Units Subcutaneous TIDWM Salinas Fisher PA-C   5 Units at 04/30/18 0839    insulin detemir U-100 pen 10 Units  10 Units Subcutaneous QHS Salinas Fisher PA-C   10 Units at 04/29/18 2142    lacosamide tablet 50 mg  50 mg Oral Q12H Michelle Blankenship PA-C   50 mg at 04/30/18 0936    levETIRAcetam tablet 1,500 mg  1,500 mg Oral BID Salinas Fisher PA-C   1,500 mg at 04/30/18 0936    levothyroxine tablet 200 mcg  200 mcg Oral Before breakfast Salinas Fisher PA-C   200 mcg at 04/30/18 0556    losartan tablet 100 mg  100 mg Oral Daily Salinas Fisher PA-C   100 mg at 04/30/18 0936    multivitamin tablet 1 tablet  1 tablet Oral Daily Salinas Fisher PA-C   1 tablet at 04/30/18 0936    ondansetron disintegrating tablet 4 mg  4 mg Oral Q8H PRN Salinas Fisher PA-C        promethazine tablet 25 mg  25 mg Oral Q6H PRN Salinas Fisher PA-C        ramelteon tablet 8 mg  8 mg Oral Nightly PRN Salinas Fisher PA-C        senna-docusate 8.6-50 mg per tablet 1 tablet  1 tablet Oral BID PRN Salinas Fisher PA-C        sodium chloride 0.9% flush 5 mL  5 mL Intravenous PRN Salinas Fisher PA-C        vitamin D 1000 units tablet 1,000 Units  1,000 Units Oral Every Mon, Wed, Fri Salinas Fisher PA-C         Review of Systems   Constitutional: Negative for chills, fatigue and fever.   HENT: Negative for rhinorrhea and sore throat.    Eyes: Positive for visual disturbance (Baseline R VF deficit).   Respiratory: Negative for cough and shortness of breath.     Gastrointestinal: Negative for diarrhea and nausea.   Genitourinary: Negative for dysuria and frequency.   Musculoskeletal: Negative for arthralgias and myalgias.   Skin: Negative for rash and wound.   Neurological: Positive for speech difficulty. Negative for weakness and numbness.   Psychiatric/Behavioral: Positive for confusion (mild, improving). Negative for agitation.     Objective:     Vital Signs (Most Recent):  Temp: 98 °F (36.7 °C) (04/30/18 0840)  Pulse: 65 (04/30/18 1110)  Resp: 13 (04/30/18 1110)  BP: 117/70 (04/30/18 1110)  SpO2: 98 % (04/30/18 1110) Vital Signs (24h Range):  Temp:  [97.5 °F (36.4 °C)-98 °F (36.7 °C)] 98 °F (36.7 °C)  Pulse:  [58-99] 65  Resp:  [13-20] 13  SpO2:  [95 %-98 %] 98 %  BP: (117-157)/(70-86) 117/70     Weight: 97.7 kg (215 lb 6.2 oz)  Body mass index is 30.91 kg/m².    Physical Exam  General:  Well-developed, well-nourished, nad  HEENT:  NCAT, PERRLA, EOMI, oropharyngeal membranes non-erythematous/without exudate  Neck:  Supple, normal ROM without nuchal rigidity  Resp:  Symmetric expansion, no increased wob  CVS:  No LE edema, extremities warm/well-perfused  GI:  Abd soft, non-distended  Psych:  Initially irritable, becomes more pleasant with talking and exam, joking at end of exam  Neurologic Exam:  Mental Status:  AAOx3.  Speech, thought content appropriate--less word finding difficulty than when seen initially soon after admission.  Cranial Nerves:  VFs notable for R VF deficit. PERRLA, EOMI. Facial movement intact and symmetric. Palate raises symmetrically, tongue protrudes midline. Trapezius 5/5 bilaterally.  Motor:  Normal bulk and tone.  BUE shoulder abduction, biceps/triceps,  strength 5/5.  BLE hip flexors, knee flexion/extension, plantarflexion/dorsiflexion 5/5.  No pronator drift.  Sensory:  Intact to light touch at all extremities without inattention.  Vibratory sensation intact at BUE/BLE digits.  Reflexes:  Biceps, brachioradialis, patellar 2+ and  symmetric.  No ankle clonus.  Downgoing toe bilaterally.  Coordination:  FNF, MARIO, HTS intact with no dysmetria/ataxia/dysdiadochokinesia.  No resting tremor or myoclonus.  Gait:  Normal balance with appropriate arm swing, normal posture, no shuffling, no magnetic gait.       Significant Labs:    Recent Labs  Lab 18  0641   WBC 8.34   RBC 4.39*   HGB 12.5*   HCT 35.0*      MCV 80*   MCH 28.5   MCHC 35.7       Recent Labs  Lab 18  0641   CALCIUM 8.9      K 4.3   CO2 22*      BUN 32*   CREATININE 2.0*     Significant Imagin18 CT head w/o contrast:  No acute intracranial abnormality or significant interval detrimental change when compared to examinations 2018.  Remote left occipital lobe and left thalamic infarctions.      Assessment and Plan:     * Complex partial seizures evolving to generalized tonic-clonic seizures    -No clear provoking factor leading to breakthrough seizure  -Continue levetiracetam 1500 mg bid, level 69.4   -Watch for oversedation on levetiracetam with CKD   -Levels per Dr. Santiago, currently pt not symptomatic from supratherapeutic level  -Will discharge on 100 mg qd--2 week supply coming from [facility]  -Follow up with Dr. Santiago in 2 weeks  -Counseled on seizure precautions, wife's questions answered  -Please call [phone] if any new questions         VTE Risk Mitigation         Ordered     apixaban tablet 5 mg  2 times daily      18 0307     IP VTE HIGH RISK PATIENT  Once      18 0316     Place sequential compression device  Until discontinued      18 0316     Place DARIAN hose  Until discontinued      18 0316     Reason for No Pharmacological VTE Prophylaxis  Once      18 0316        Shanti Costello MD  Neurology  [facility]

## 2018-04-30 NOTE — SUBJECTIVE & OBJECTIVE
Subjective:     Interval History: Patient has been doing well, no new events.  He and his wife are agreeable to starting lacosamide, note no side effects apparent since starting the medication in the hospital.  Talked to Epilepsy RN Rut regarding the medication, will arrange for 2 week supply to be ready down at the pharmacy.    Current Neurological Medications: Lacosamide, Levetiracetam.    Current Facility-Administered Medications   Medication Dose Route Frequency Provider Last Rate Last Dose    acetaminophen tablet 650 mg  650 mg Oral Q4H PRN Salinas Fisher PA-C        albuterol-ipratropium 2.5mg-0.5mg/3mL nebulizer solution 3 mL  3 mL Nebulization Q4H PRN Salinas Fisher PA-C        amLODIPine tablet 10 mg  10 mg Oral Daily Salinas Fisher PA-C   10 mg at 04/30/18 0936    apixaban tablet 5 mg  5 mg Oral BID Salinas Fisher PA-C   5 mg at 04/30/18 0936    aspirin chewable tablet 81 mg  81 mg Oral Daily Salinas Fisher PA-C   81 mg at 04/30/18 0936    atorvastatin tablet 40 mg  40 mg Oral Daily Salinas Fisher PA-C   40 mg at 04/30/18 0936    carvedilol tablet 6.25 mg  6.25 mg Oral BID Michelle Blaknenship PA-C   6.25 mg at 04/30/18 0936    dextrose 50% injection 12.5 g  12.5 g Intravenous PRN Salinas Fisher PA-C        dextrose 50% injection 25 g  25 g Intravenous PRN Salinas Fisher PA-C        folic acid tablet 1,000 mcg  1,000 mcg Oral Daily Salinas Fisher PA-C   1,000 mcg at 04/30/18 0936    furosemide tablet 40 mg  40 mg Oral BID Michelle Blankenship PA-C   40 mg at 04/30/18 0936    glucagon (human recombinant) injection 1 mg  1 mg Intramuscular PRN Salinas Fisher PA-C        glucose chewable tablet 16 g  16 g Oral PRN Salinas Fisher PA-C        glucose chewable tablet 24 g  24 g Oral PRN Salinas Fisher PA-C        hydrALAZINE tablet 25 mg  25 mg Oral Q6H PRN Michelle Blankenship PA-C        insulin aspart U-100 pen 0-5 Units  0-5 Units Subcutaneous QID (AC + HS) PRN  Salinas Fisher PA-C        insulin aspart U-100 pen 5 Units  5 Units Subcutaneous TIDWM Salinas Fisher PA-C   5 Units at 04/30/18 0839    insulin detemir U-100 pen 10 Units  10 Units Subcutaneous QHS Salinas Fisher PA-C   10 Units at 04/29/18 2142    lacosamide tablet 50 mg  50 mg Oral Q12H Michelle Blankenship PA-C   50 mg at 04/30/18 0936    levETIRAcetam tablet 1,500 mg  1,500 mg Oral BID Salinas Fisher PA-C   1,500 mg at 04/30/18 0936    levothyroxine tablet 200 mcg  200 mcg Oral Before breakfast Salinas Fisher PA-C   200 mcg at 04/30/18 0556    losartan tablet 100 mg  100 mg Oral Daily Salinas Fisher PA-C   100 mg at 04/30/18 0936    multivitamin tablet 1 tablet  1 tablet Oral Daily Salinas Fisher PA-C   1 tablet at 04/30/18 0936    ondansetron disintegrating tablet 4 mg  4 mg Oral Q8H PRN Salinas Fisher PA-C        promethazine tablet 25 mg  25 mg Oral Q6H PRN Salinas Fisher PA-C        ramelteon tablet 8 mg  8 mg Oral Nightly PRN Salinas Fisher PA-C        senna-docusate 8.6-50 mg per tablet 1 tablet  1 tablet Oral BID PRN Salinas Fisher PA-C        sodium chloride 0.9% flush 5 mL  5 mL Intravenous PRN Salinas Fisher PA-C        vitamin D 1000 units tablet 1,000 Units  1,000 Units Oral Every Mon, Wed, Fri Salinas Fisher PA-C         Review of Systems   Constitutional: Negative for chills, fatigue and fever.   HENT: Negative for rhinorrhea and sore throat.    Eyes: Positive for visual disturbance (Baseline R VF deficit).   Respiratory: Negative for cough and shortness of breath.    Gastrointestinal: Negative for diarrhea and nausea.   Genitourinary: Negative for dysuria and frequency.   Musculoskeletal: Negative for arthralgias and myalgias.   Skin: Negative for rash and wound.   Neurological: Positive for speech difficulty. Negative for weakness and numbness.   Psychiatric/Behavioral: Positive for confusion (mild, improving). Negative for agitation.      Objective:     Vital Signs (Most Recent):  Temp: 98 °F (36.7 °C) (04/30/18 0840)  Pulse: 65 (04/30/18 1110)  Resp: 13 (04/30/18 1110)  BP: 117/70 (04/30/18 1110)  SpO2: 98 % (04/30/18 1110) Vital Signs (24h Range):  Temp:  [97.5 °F (36.4 °C)-98 °F (36.7 °C)] 98 °F (36.7 °C)  Pulse:  [58-99] 65  Resp:  [13-20] 13  SpO2:  [95 %-98 %] 98 %  BP: (117-157)/(70-86) 117/70     Weight: 97.7 kg (215 lb 6.2 oz)  Body mass index is 30.91 kg/m².    Physical Exam  General:  Well-developed, well-nourished, nad  HEENT:  NCAT, PERRLA, EOMI, oropharyngeal membranes non-erythematous/without exudate  Neck:  Supple, normal ROM without nuchal rigidity  Resp:  Symmetric expansion, no increased wob  CVS:  No LE edema, extremities warm/well-perfused  GI:  Abd soft, non-distended  Psych:  Initially irritable, becomes more pleasant with talking and exam, joking at end of exam  Neurologic Exam:  Mental Status:  AAOx3.  Speech, thought content appropriate--less word finding difficulty than when seen initially soon after admission.  Cranial Nerves:  VFs notable for R VF deficit. PERRLA, EOMI. Facial movement intact and symmetric. Palate raises symmetrically, tongue protrudes midline. Trapezius 5/5 bilaterally.  Motor:  Normal bulk and tone.  BUE shoulder abduction, biceps/triceps,  strength 5/5.  BLE hip flexors, knee flexion/extension, plantarflexion/dorsiflexion 5/5.  No pronator drift.  Sensory:  Intact to light touch at all extremities without inattention.  Vibratory sensation intact at BUE/BLE digits.  Reflexes:  Biceps, brachioradialis, patellar 2+ and symmetric.  No ankle clonus.  Downgoing toe bilaterally.  Coordination:  FNF, MARIO, HTS intact with no dysmetria/ataxia/dysdiadochokinesia.  No resting tremor or myoclonus.  Gait:  Normal balance with appropriate arm swing, normal posture, no shuffling, no magnetic gait.       Significant Labs:    Recent Labs  Lab 04/30/18  0641   WBC 8.34   RBC 4.39*   HGB 12.5*   HCT 35.0*   PLT  234   MCV 80*   MCH 28.5   MCHC 35.7       Recent Labs  Lab 18  0641   CALCIUM 8.9      K 4.3   CO2 22*      BUN 32*   CREATININE 2.0*     Significant Imagin18 CT head w/o contrast:  No acute intracranial abnormality or significant interval detrimental change when compared to examinations 2018.  Remote left occipital lobe and left thalamic infarctions.

## 2018-04-30 NOTE — ASSESSMENT & PLAN NOTE
Presents to ED with aphasia.  Similar presentation to 3/23/18 admission.  Stroke workup with EEG (no finding) and MRI stroke protocol (no acute infarct/hemorrhage). Suspect postictal state.  Last admission took 1.5 days to return to baseline mentation.  - Vascular neurology consulted in ED.  CTH no acute changes.  No acute stroke suspected.  - f/w Dr. Santiago   - No B/B incontinence, no tongue biting, no LOC, no convulsions  - UA ordered and pending collection  - seizure precautions  - Continue keppra 1500 mg PO BID; keppra level in process (wife reports compliance).  3rd admission with similar presentation.  Pt did follow up with Dr. Santiago after admission 11/2017 and no change in keppra was made.  - EEG ordered This is an abnormal extended (1 hour and 29 minutes) EEG due to:  1) rare left sided epileptiform activity seen, suggestive of underlying epileptiform activity  2) continuous left sided slowing seen, suggestive of underlying structural lesion  4/28 Neurology consulted and at the bedside  Will discharge on 100 mg qd--2 week supply coming from [facility]  -Follow up with Dr. Santiago in 2 weeks

## 2018-04-30 NOTE — ASSESSMENT & PLAN NOTE
-No clear provoking factor leading to breakthrough seizure  -Continue levetiracetam 1500 mg bid, level 69.4   -Watch for oversedation on levetiracetam with CKD   -Levels per Dr. Santiago, currently pt not symptomatic from supratherapeutic level  -Will discharge on 100 mg qd--2 week supply coming from [facility]  -Follow up with Dr. Santiago in 2 weeks  -Counseled on seizure precautions, wife's questions answered  -Please call [phone] if any new questions

## 2018-04-30 NOTE — PLAN OF CARE
Problem: Patient Care Overview  Goal: Individualization & Mutuality  Outcome: Ongoing (interventions implemented as appropriate)  Plan of care discussed with patient. Patient is free of fall/trauma/injury. Denies CP, SOB, or pain/discomfort. VSS. AAOx4. BG monitored, supplemental insulin given as per order. All questions addressed. Will continue to monitor

## 2018-05-01 NOTE — PLAN OF CARE
05/01/18 0805   Final Note   Assessment Type Final Discharge Note     Patient discharged home with no needs on 4/30/18.

## 2018-05-07 ENCOUNTER — HOME CARE VISIT (OUTPATIENT)
Dept: NEUROLOGY | Facility: HOSPITAL | Age: 63
End: 2018-05-07

## 2018-05-07 ENCOUNTER — OFFICE VISIT (OUTPATIENT)
Dept: ENDOCRINOLOGY | Facility: CLINIC | Age: 63
End: 2018-05-07
Payer: COMMERCIAL

## 2018-05-07 VITALS
DIASTOLIC BLOOD PRESSURE: 80 MMHG | HEART RATE: 72 BPM | HEIGHT: 70 IN | BODY MASS INDEX: 31.94 KG/M2 | WEIGHT: 223.13 LBS | SYSTOLIC BLOOD PRESSURE: 134 MMHG

## 2018-05-07 DIAGNOSIS — E89.0 POSTSURGICAL HYPOTHYROIDISM: ICD-10-CM

## 2018-05-07 DIAGNOSIS — I10 ESSENTIAL HYPERTENSION: ICD-10-CM

## 2018-05-07 DIAGNOSIS — Z85.850 HISTORY OF THYROID CANCER: Primary | ICD-10-CM

## 2018-05-07 PROCEDURE — 3079F DIAST BP 80-89 MM HG: CPT | Mod: CPTII,S$GLB,, | Performed by: INTERNAL MEDICINE

## 2018-05-07 PROCEDURE — 3008F BODY MASS INDEX DOCD: CPT | Mod: CPTII,S$GLB,, | Performed by: INTERNAL MEDICINE

## 2018-05-07 PROCEDURE — 3075F SYST BP GE 130 - 139MM HG: CPT | Mod: CPTII,S$GLB,, | Performed by: INTERNAL MEDICINE

## 2018-05-07 PROCEDURE — 99214 OFFICE O/P EST MOD 30 MIN: CPT | Mod: S$GLB,,, | Performed by: INTERNAL MEDICINE

## 2018-05-07 PROCEDURE — 99999 PR PBB SHADOW E&M-EST. PATIENT-LVL III: CPT | Mod: PBBFAC,,, | Performed by: INTERNAL MEDICINE

## 2018-05-07 NOTE — PROGRESS NOTES
Subjective:      Patient ID: Alfa Christy III is a 62 y.o. male.    Chief Complaint:  Type 2 Diabetes Mellitus with hyperglycemia      History of Present Illness      The patient has the following problems:   1. 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 TG low Levothyroxine 200mcg  2. Diabetes type 2, on multiple daily injection program with good control.Sickle cell trait Checking 3x/day     3. Sickle cell trait, complicating the interpretation of the A1c. Has had sickle crisis  4. Erectile dysfunction, not taking Cialis  5. Elevated PSA with prostate biopsy, benign, followed in Urology.   6. Chronic kidney disease,   7. Hypertension, well controlled. BP high today  8. Cholesterol, statin begun because of risk factors.   9. New onset atrial fibrillation after stress test.       The patient has the history of thyroid cancer. He had surgery 08/26/2009.   This was a multifocal lesion. The pathology was reviewed again. The   patient was treated with surgery, radioactive iodine 100 mci. There is some   remaining tissue on the left. A biopsy of this was done 11/03/2011,   demonstrating an insufficient material to make a diagnosis. The patient is   on thyroid suppression with a dose of 200 mcg per day of Synthroid    The patient has developed atrial fibrillation on at least one   occasion when he had the stress test. TG has been as high as 0.7       An iodine scan in 2011 showed some remaining uptake.       Alfa Christy with diabetes for >15 years. Symptoms:Opthalmology exam 1 months ago. No parasthesias of feet and CTS symptoms.   Glucose meter Type; Verio new   Frequency of measurements/day 3 times/day   Diet: Number of meals 3 Frequency eating out/week 1 Snacks/day       trulicity weekly   levemir 14 units qhs  novolog 5 units with meals     3 times a check daily     He was admitted in the hospital recently     Hypoglycemia:yes   No  hypoglycemia recently   Lowest 80     He had freestyle but was taken off at hospitalization   Highest 160     Diabetic complications None     Atrial fibrillation on treatment     Has sickle cell trait         Review of Systems   Eyes: Positive for visual disturbance (since first stroke ).   Cardiovascular: Positive for leg swelling.   Endocrine: Negative for polydipsia and polyuria.        + nocturia      Psychiatric/Behavioral: Positive for sleep disturbance.     + numbness     Objective:   Physical Exam   Constitutional: No distress.   Musculoskeletal: He exhibits edema (b/l ).   Skin: He is not diaphoretic.   Vitals reviewed.    No injection site lipodystrophy   Right toe fungus   Sensations intact   Lab Review:   Results for orders placed or performed during the hospital encounter of 04/26/18   CBC W/ AUTO DIFFERENTIAL   Result Value Ref Range    WBC 9.11 3.90 - 12.70 K/uL    RBC 3.99 (L) 4.60 - 6.20 M/uL    Hemoglobin 11.3 (L) 14.0 - 18.0 g/dL    Hematocrit 31.8 (L) 40.0 - 54.0 %    MCV 80 (L) 82 - 98 fL    MCH 28.3 27.0 - 31.0 pg    MCHC 35.5 32.0 - 36.0 g/dL    RDW 17.0 (H) 11.5 - 14.5 %    Platelets 226 150 - 350 K/uL    MPV 11.9 9.2 - 12.9 fL    Immature Granulocytes 0.1 0.0 - 0.5 %    Gran # (ANC) 4.0 1.8 - 7.7 K/uL    Immature Grans (Abs) 0.01 0.00 - 0.04 K/uL    Lymph # 3.1 1.0 - 4.8 K/uL    Mono # 1.6 (H) 0.3 - 1.0 K/uL    Eos # 0.4 0.0 - 0.5 K/uL    Baso # 0.06 0.00 - 0.20 K/uL    nRBC 1 (A) 0 /100 WBC    Gran% 43.5 38.0 - 73.0 %    Lymph% 33.8 18.0 - 48.0 %    Mono% 17.2 (H) 4.0 - 15.0 %    Eosinophil% 4.7 0.0 - 8.0 %    Basophil% 0.7 0.0 - 1.9 %    Differential Method Automated    Comprehensive metabolic panel   Result Value Ref Range    Sodium 139 136 - 145 mmol/L    Potassium 4.4 3.5 - 5.1 mmol/L    Chloride 110 95 - 110 mmol/L    CO2 19 (L) 23 - 29 mmol/L    Glucose 107 70 - 110 mg/dL    BUN, Bld 24 (H) 8 - 23 mg/dL    Creatinine 1.8 (H) 0.5 - 1.4 mg/dL    Calcium 8.2 (L) 8.7 - 10.5 mg/dL    Total  Protein 7.3 6.0 - 8.4 g/dL    Albumin 3.4 (L) 3.5 - 5.2 g/dL    Total Bilirubin 1.1 (H) 0.1 - 1.0 mg/dL    Alkaline Phosphatase 113 55 - 135 U/L    AST 40 10 - 40 U/L    ALT 19 10 - 44 U/L    Anion Gap 10 8 - 16 mmol/L    eGFR if African American 45.0 (A) >60 mL/min/1.73 m^2    eGFR if non  38.9 (A) >60 mL/min/1.73 m^2   Protime-INR   Result Value Ref Range    Prothrombin Time 10.4 9.0 - 12.5 sec    INR 1.0 0.8 - 1.2   TSH   Result Value Ref Range    TSH 2.145 0.400 - 4.000 uIU/mL   LDL - Lipid Panel   Result Value Ref Range    Cholesterol 105 (L) 120 - 199 mg/dL    Triglycerides 98 30 - 150 mg/dL    HDL 37 (L) 40 - 75 mg/dL    LDL Cholesterol 48.4 (L) 63.0 - 159.0 mg/dL    HDL/Chol Ratio 35.2 20.0 - 50.0 %    Total Cholesterol/HDL Ratio 2.8 2.0 - 5.0    Non-HDL Cholesterol 68 mg/dL   Troponin I   Result Value Ref Range    Troponin I 0.011 0.000 - 0.026 ng/mL   Levetiracetam level   Result Value Ref Range    Levetiracetam Lvl 69.4 (H) 3.0 - 60.0 ug/mL   Brain natriuretic peptide   Result Value Ref Range     (H) 0 - 99 pg/mL   Ammonia   Result Value Ref Range    Ammonia 45 10 - 50 umol/L   Basic Metabolic Panel (BMP)   Result Value Ref Range    Sodium 140 136 - 145 mmol/L    Potassium 4.3 3.5 - 5.1 mmol/L    Chloride 110 95 - 110 mmol/L    CO2 17 (L) 23 - 29 mmol/L    Glucose 142 (H) 70 - 110 mg/dL    BUN, Bld 22 8 - 23 mg/dL    Creatinine 1.8 (H) 0.5 - 1.4 mg/dL    Calcium 8.7 8.7 - 10.5 mg/dL    Anion Gap 13 8 - 16 mmol/L    eGFR if African American 45.0 (A) >60 mL/min/1.73 m^2    eGFR if non  38.9 (A) >60 mL/min/1.73 m^2   CBC with Automated Differential   Result Value Ref Range    WBC 9.09 3.90 - 12.70 K/uL    RBC 4.36 (L) 4.60 - 6.20 M/uL    Hemoglobin 12.2 (L) 14.0 - 18.0 g/dL    Hematocrit 34.4 (L) 40.0 - 54.0 %    MCV 79 (L) 82 - 98 fL    MCH 28.0 27.0 - 31.0 pg    MCHC 35.5 32.0 - 36.0 g/dL    RDW 17.3 (H) 11.5 - 14.5 %    Platelets 189 150 - 350 K/uL    MPV 11.6 9.2 -  12.9 fL    Immature Granulocytes 0.2 0.0 - 0.5 %    Gran # (ANC) 5.1 1.8 - 7.7 K/uL    Immature Grans (Abs) 0.02 0.00 - 0.04 K/uL    Lymph # 2.1 1.0 - 4.8 K/uL    Mono # 1.4 (H) 0.3 - 1.0 K/uL    Eos # 0.4 0.0 - 0.5 K/uL    Baso # 0.08 0.00 - 0.20 K/uL    nRBC 1 (A) 0 /100 WBC    Gran% 55.6 38.0 - 73.0 %    Lymph% 23.2 18.0 - 48.0 %    Mono% 15.7 (H) 4.0 - 15.0 %    Eosinophil% 4.4 0.0 - 8.0 %    Basophil% 0.9 0.0 - 1.9 %    Differential Method Automated    Phosphorus   Result Value Ref Range    Phosphorus 4.2 2.7 - 4.5 mg/dL   Magnesium   Result Value Ref Range    Magnesium 2.4 1.6 - 2.6 mg/dL   Basic Metabolic Panel (BMP)   Result Value Ref Range    Sodium 139 136 - 145 mmol/L    Potassium 4.0 3.5 - 5.1 mmol/L    Chloride 109 95 - 110 mmol/L    CO2 24 23 - 29 mmol/L    Glucose 142 (H) 70 - 110 mg/dL    BUN, Bld 25 (H) 8 - 23 mg/dL    Creatinine 2.0 (H) 0.5 - 1.4 mg/dL    Calcium 8.6 (L) 8.7 - 10.5 mg/dL    Anion Gap 6 (L) 8 - 16 mmol/L    eGFR if African American 39.6 (A) >60 mL/min/1.73 m^2    eGFR if non  34.3 (A) >60 mL/min/1.73 m^2   CBC with Automated Differential   Result Value Ref Range    WBC 8.66 3.90 - 12.70 K/uL    RBC 4.22 (L) 4.60 - 6.20 M/uL    Hemoglobin 12.1 (L) 14.0 - 18.0 g/dL    Hematocrit 33.4 (L) 40.0 - 54.0 %    MCV 79 (L) 82 - 98 fL    MCH 28.7 27.0 - 31.0 pg    MCHC 36.2 (H) 32.0 - 36.0 g/dL    RDW 17.0 (H) 11.5 - 14.5 %    Platelets 211 150 - 350 K/uL    MPV 12.1 9.2 - 12.9 fL    Immature Granulocytes 0.2 0.0 - 0.5 %    Gran # (ANC) 4.7 1.8 - 7.7 K/uL    Immature Grans (Abs) 0.02 0.00 - 0.04 K/uL    Lymph # 2.0 1.0 - 4.8 K/uL    Mono # 1.5 (H) 0.3 - 1.0 K/uL    Eos # 0.3 0.0 - 0.5 K/uL    Baso # 0.07 0.00 - 0.20 K/uL    nRBC 1 (A) 0 /100 WBC    Gran% 54.6 38.0 - 73.0 %    Lymph% 23.3 18.0 - 48.0 %    Mono% 17.4 (H) 4.0 - 15.0 %    Eosinophil% 3.7 0.0 - 8.0 %    Basophil% 0.8 0.0 - 1.9 %    Differential Method Automated    Basic Metabolic Panel (BMP)   Result Value Ref Range     Sodium 141 136 - 145 mmol/L    Potassium 4.4 3.5 - 5.1 mmol/L    Chloride 109 95 - 110 mmol/L    CO2 24 23 - 29 mmol/L    Glucose 131 (H) 70 - 110 mg/dL    BUN, Bld 27 (H) 8 - 23 mg/dL    Creatinine 1.9 (H) 0.5 - 1.4 mg/dL    Calcium 8.9 8.7 - 10.5 mg/dL    Anion Gap 8 8 - 16 mmol/L    eGFR if African American 42.1 (A) >60 mL/min/1.73 m^2    eGFR if non  36.4 (A) >60 mL/min/1.73 m^2   CBC with Automated Differential   Result Value Ref Range    WBC 8.92 3.90 - 12.70 K/uL    RBC 4.49 (L) 4.60 - 6.20 M/uL    Hemoglobin 12.4 (L) 14.0 - 18.0 g/dL    Hematocrit 35.5 (L) 40.0 - 54.0 %    MCV 79 (L) 82 - 98 fL    MCH 27.6 27.0 - 31.0 pg    MCHC 34.9 32.0 - 36.0 g/dL    RDW 16.9 (H) 11.5 - 14.5 %    Platelets 266 150 - 350 K/uL    MPV 12.0 9.2 - 12.9 fL    Immature Granulocytes 0.2 0.0 - 0.5 %    Gran # (ANC) 5.1 1.8 - 7.7 K/uL    Immature Grans (Abs) 0.02 0.00 - 0.04 K/uL    Lymph # 2.2 1.0 - 4.8 K/uL    Mono # 1.3 (H) 0.3 - 1.0 K/uL    Eos # 0.3 0.0 - 0.5 K/uL    Baso # 0.08 0.00 - 0.20 K/uL    nRBC 1 (A) 0 /100 WBC    Gran% 56.7 38.0 - 73.0 %    Lymph% 24.7 18.0 - 48.0 %    Mono% 14.0 4.0 - 15.0 %    Eosinophil% 3.5 0.0 - 8.0 %    Basophil% 0.9 0.0 - 1.9 %    Differential Method Automated    Basic Metabolic Panel (BMP)   Result Value Ref Range    Sodium 141 136 - 145 mmol/L    Potassium 4.3 3.5 - 5.1 mmol/L    Chloride 109 95 - 110 mmol/L    CO2 22 (L) 23 - 29 mmol/L    Glucose 145 (H) 70 - 110 mg/dL    BUN, Bld 32 (H) 8 - 23 mg/dL    Creatinine 2.0 (H) 0.5 - 1.4 mg/dL    Calcium 8.9 8.7 - 10.5 mg/dL    Anion Gap 10 8 - 16 mmol/L    eGFR if African American 39.6 (A) >60 mL/min/1.73 m^2    eGFR if non  34.3 (A) >60 mL/min/1.73 m^2   CBC with Automated Differential   Result Value Ref Range    WBC 8.34 3.90 - 12.70 K/uL    RBC 4.39 (L) 4.60 - 6.20 M/uL    Hemoglobin 12.5 (L) 14.0 - 18.0 g/dL    Hematocrit 35.0 (L) 40.0 - 54.0 %    MCV 80 (L) 82 - 98 fL    MCH 28.5 27.0 - 31.0 pg    MCHC  35.7 32.0 - 36.0 g/dL    RDW 16.7 (H) 11.5 - 14.5 %    Platelets 234 150 - 350 K/uL    MPV 11.9 9.2 - 12.9 fL    Immature Granulocytes 0.1 0.0 - 0.5 %    Gran # (ANC) 4.4 1.8 - 7.7 K/uL    Immature Grans (Abs) 0.01 0.00 - 0.04 K/uL    Lymph # 2.3 1.0 - 4.8 K/uL    Mono # 1.3 (H) 0.3 - 1.0 K/uL    Eos # 0.3 0.0 - 0.5 K/uL    Baso # 0.08 0.00 - 0.20 K/uL    nRBC 1 (A) 0 /100 WBC    Gran% 52.8 38.0 - 73.0 %    Lymph% 27.3 18.0 - 48.0 %    Mono% 15.2 (H) 4.0 - 15.0 %    Eosinophil% 3.6 0.0 - 8.0 %    Basophil% 1.0 0.0 - 1.9 %    Differential Method Automated    2D echo with color flow doppler   Result Value Ref Range    EF 65 55 - 65    Mitral Valve Regurgitation TRIVIAL     Diastolic Dysfunction No     Aortic Valve Regurgitation TRIVIAL TO MILD    POCT glucose   Result Value Ref Range    POCT Glucose 104 70 - 110 mg/dL   POCT glucose   Result Value Ref Range    POCT Glucose 141 (H) 70 - 110 mg/dL   POCT glucose   Result Value Ref Range    POCT Glucose 138 (H) 70 - 110 mg/dL   POCT glucose   Result Value Ref Range    POCT Glucose 110 70 - 110 mg/dL   POCT glucose   Result Value Ref Range    POCT Glucose 156 (H) 70 - 110 mg/dL   POCT glucose   Result Value Ref Range    POCT Glucose 181 (H) 70 - 110 mg/dL   POCT glucose   Result Value Ref Range    POCT Glucose 149 (H) 70 - 110 mg/dL   POCT glucose   Result Value Ref Range    POCT Glucose 169 (H) 70 - 110 mg/dL   POCT glucose   Result Value Ref Range    POCT Glucose 143 (H) 70 - 110 mg/dL   POCT glucose   Result Value Ref Range    POCT Glucose 150 (H) 70 - 110 mg/dL   POCT glucose   Result Value Ref Range    POCT Glucose 129 (H) 70 - 110 mg/dL   POCT glucose   Result Value Ref Range    POCT Glucose 133 (H) 70 - 110 mg/dL   POCT glucose   Result Value Ref Range    POCT Glucose 174 (H) 70 - 110 mg/dL   POCT glucose   Result Value Ref Range    POCT Glucose 157 (H) 70 - 110 mg/dL   POCT glucose   Result Value Ref Range    POCT Glucose 139 (H) 70 - 110 mg/dL   POCT glucose    Result Value Ref Range    POCT Glucose 121 (H) 70 - 110 mg/dL         Assessment:     Thyroid cancer and multifocal small and treated with surg and I131  Get Tg today   TSH at goal     Diabetes type 2 well controlled without hypoglycemia   With sickle cell trait     Get fructosamine today - agree with getting CGMS if fructosamine( be carefull monitoring in view of CKD- affecting level of fructosamine) , A1c( sickle cell trait affecting )  and BG log do not match   podiatry referral     CVA and loss of vision looking to the right  CVA    HTN controlled       Plan:     Orders Placed This Encounter   Procedures    Thyroglobulin     Standing Status:   Future     Number of Occurrences:   1     Standing Expiration Date:   5/7/2019    Hemoglobin A1c     Standing Status:   Future     Standing Expiration Date:   5/7/2019    Comprehensive metabolic panel     Standing Status:   Future     Standing Expiration Date:   5/7/2019    FRUCTOSAMINE     Standing Status:   Future     Number of Occurrences:   1     Standing Expiration Date:   7/6/2019    FRUCTOSAMINE     Standing Status:   Future     Standing Expiration Date:   7/6/2019

## 2018-05-14 NOTE — PROGRESS NOTES
[facility]:   FOLLOW UP VISIT    Name: Alfa Christy III  MRN:[id number]   CSN: [id number]    Date of service: 5/15/18  Last clinic visit: 11/15/17  1st clinic visit: 12/27/16    Age:62 y.o.   Gender:male     The patient is here today with his wife   History obtained from the patient and his wife    CHIEF COMPLAINT:  - follow up of seizures    INTERVAL HISTORY (Since last visit):    This is a 62 y.o.  year old male who presents with a chief complaint of seizures, who was last seen by me on 11/15/17    - started on LAC 100mg bid 4/30/18  - also on LEV 1500mg bid  - no further episodes since 4/30  - no other/new complaints; doing well    Admitted during 4/28-30/18; notes from :  HPI:   61y/o M with pmhx stroke, seizure, afib (on eliquis), DM2, HLD, HTN presents w/ aphasia. Patient was last normal at 10:30 pm.  Per his wife he was doing well then started complaining of feeling dizzy after taking a shower. Denied fever, pains, vomiting.  She noticed he then started having trouble speaking and say words that did not make sense, so came to ED.  Wife denies any B/B incontinence, unilateral weakness, syncope, or tongue-biting.  Patient had similar presentation end of March; at that time TPA was deferred and subsequent neuroimaging negative for CVA. He does have history of seizures on keppra, and his seizures are atypical. Says wasn't having seizures until recently. Patient has had a previous stroke 1/2016 w/ residual L vision deficits only. EEG then abnormal w/ 1/18 CVA as likely foci for seizures. Currently on eliquis for a-fib. Further hisotry limited by patient's mental status.       In ED, [facility] neurology consulted for concern of stroke d/t aphasia.  Sxs likely 2/2 postictal period of complex seizure.   (baseline 100s-200s). CXR shows CM with findings suggestive of pulmonary edema.  Gvien one time dose of lasix 80 mg IV.  EKG shows afib HR 69.  CTH with no significant change from previous. Remote left occipital  "lobe and left thalamic infarctions.       Hospital Course:   Pt admitted due to aphasia.  Similar presentation/admissions 11/2017 and 03/2018 (medication noncompliance an issue).  Vascular neurology consulted in ED.  CTH no acute changes.  No acute stroke suspected.  Keppra level ordered.  EEG ordered: abnormal extended (1 hour and 29 minutes) EEG due to rare left sided epileptiform activity seen, suggestive of underlying epileptiform activity; continuous left sided slowing seen, suggestive of underlying structural lesion.  Neurology consulted and recommended slow titration on lacosamide--50 mg qd x 1 week, increase lacosamide to 100 mg for 2nd week if pt tolerates.  Continue levetiracetam 1500 mg bid, level 69.4.  Follow up with Dr. Santiago in 2 weeks.  Pt also with acute CHF exacerbation (->395).  Pt treated with IV lasix bid and wt decreased 2 kg.  Day 3-4 transitioned back to home dosing furosemide 40 mg bid.  Seizure precautions at discharge.      ED visit, 3/23/18: Notes from :  HPI:   Mr. Christy is a 61 yo male with a PMHx of HTN, HLD, DM2, PAF on Eliquis, HFpEF, CKDIII, thyroid cancer s/p surgery and radiation (2009), previous L sided CVA (2016) resulting in R homonymous hemianopsia, and complex partial seizures presenting to the ED with wife at bedside c/o confusion and difficulty speaking. Wife at bedside to provide the history. She states this morning at ~8am patient was getting dressed and was c/o minor dizziness. She noted he had not taken his keppra from the previous night, in addition to all of his "night time medications." Pt was able to take all morning medications. At around 10am, pt was sitting down to "go over his finances and pay bills", she noted something was "off". She asked him his name, which he could provide for her but he could not remember the word for "shoes" when she asked him what he was wearing on his feet. She did not witness convulsions, jerking movements, LOC, tongue " "biting, or incontinence. Wife states patient was able to ambulate and continue to go about "normal" activities. Wife brought patient to the ED due to concern for aphasia. Of note, wife reports pt is non-compliant with hydralazine and ASA. Per wife, this was a similar presentation to his last admission for asphasia suspected to be secondary to seizures.      In the ED, HDS; noted to be hypertensive to 190s/90s. Stroke code was called; CTH and MRI without evidence of acute infarct. Vascular neurology assessed; low suspicion for acute CVA. Remainder of labs and imaging were unremarkable.     Hospital Course:   Pt admitted to observation for aphasia in setting of hx of CVA and seizures. CTH and MRI negative for acute infarct. Labs entirely unremarkable (TSH and lactate WNL). No s/s infection; afebrile without leukocytosis (CXR and UA without infection). Neurology consulted and suspects breakthrough seizure with prolonged post-ictus. EEG shows slowing consistent with hx of L sided CVA; no epileptic activity. Pt discharged home to follow up with epilepsy as outpatient. Educated on importance of compliance with home medications.     A/P at last visit:  63yo M with hx of CVA in 1/16 and seizures x 11/16 - prior episodes c/w glucose level alterations  Recent episode suggestive of sz - likely related to hyperglycemia     - check Levetiracetam levels  - continue same dose of LEV for now     Monitor glucose levels closely  Return in 6 months or earlier prn    Notes from last clinic visit, 11/15/17:  No hx of non-compliance    Recent admission: patient states that he had gone to exercise that day, followed by having donuts but did not miss any dose of meds.  No other triggers.  Possibly seizure? But no clinical activity seen by family except for word-finding difficulty/confusion.  Last recognized seizure was possibly Apr 2017    [place] [facility]:  Neurology notes, 10/5/17:  [facility] [place] due to wife coming home " and finding  confused with agitation, word-finding difficulty, and imbalance.  Noted no weakness.  States that he was last seen normal at 7 pm, she went out to run an errand and returned at 8 pm.  Otherwise notes no signs of head trauma, no recent weakness, no medication changes.  He takes levetiracetam 750 mg po bid (started on 1500 mg po bid right after 11/2016 seizure) and is compliant.  Recommendation:  Breakthrough seizure in setting of recent respiratory infection and financial stressors, wife endorses compliance and patient not yet back to baseline.  Recommend load 1g LEV iv followed by increase to 1500mg bid.  R-EEG, 10/7/17:    IMPRESSION:  This is an abnormal EEG during wakefulness, drowsiness and sleep.    Nearly continuous irregular left hemisphere slowing was noted.  The EKG revealed an irregular rhythm.    Hospital course (per Hospital Medicine notes, 10/11/17): in the ED on presentation, the patient was in keeping of abnormally elevated blood pressure in setting of acute onset aphasia. Workup for an acute stroke included a CT head with no signs of acute stroke. MRI and MRA brain were negative for acute stroke. Vascular neurology was consulted and ruled out stroke and recommended admission to hospital medicine with improved BP control and a consult to general neurology. During admission to hospital medicine, the patient notably developed right sided jerking movement consistent with seizure. The patient was loaded with IV keppra 1500mg followed by oral twice daily dosing per neurology. The patient was observed on the hospital medicine for aphasia resolution that was gradual but incomplete per patient's wife. The patient had an EEG given personality changes and frequent self-repetition that was negative. PT/OT evaluated the patient and recommended SNF placement that occurred appropriately on 10/11/2017.    Any other relevant history since last visit:   - none    SEIZURE HISTORY:  Notes from last  (16) visit:   Information from H&P on 16:  60yo M with multiple medical co-morbidities, presented to an outside facility on 16 with left-sided HA and dizziness, associated with expressive aphasia. He was being [place] [facility] when had a GTC seizure with right-sided eye deviation, lasting ~ 4 min. Blood glucose was 200 at the time and seizure resolved without any intervention. No hx of associated tongue bite or b/b incontinence. Reports post-ictal confusion but no focal deficits.   CTA head and neck at the time showed no acute ischemic changes.  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  Patient was evaluated by Neurology service while in hospital - advised Levetiracetam 1000mg  q 12h  INTERVAL HISTORY:  since discharge from hospital, no hx of any seizures - compliant with Levetiracteam     Previous evaluation:   EE) R-EEG, 2016:   IMPRESSION: Normal awake and asleep EEG.  2) R-EEG, 16  IMPRESSION:   This is an abnormal awake and asleep EEG due to mild generalized slowing seen, suggestive of mild diffuse or multifocal cerebral dysfunction.  No epileptiform activity or electrographic seizures were seen    MRI Brain:  Results for orders placed or performed during the hospital encounter of 10/04/17   MRI Brain Without Contrast    Addendum: 10/5/2017          Electronically signed by: DELL VITALE MD  Date:     10/05/17  Time:    01:19       Narrative    MRI BRAIN, MRA HEAD, MRA NECK    CLINICAL INDICATION: 62 year old M with stroke, severe expressive aphasia, LLE weakness.     TECHNIQUE: Multiplanar, multisequence images were obtained of the brain. Three-dimensional time-of-flight technique was used in assessment of the neck and intracranial vasculature.    COMPARISON: CT head 10/4/2017.    FINDINGS:    MRI BRAIN:  The ventricles are normal in size for age, without evidence of hydrocephalus.  There is suggestion of subtle diffuse cortical  diffusion restriction in the supratentorial brain.    There is a stable region of encephalomalacia within the left occipital lobe. There is relatively isointense diffusion signal in this region with corresponding hyperintensity on ADC map and T2/FLAIR hyperintensity, compatible with remote infarct. No areas of restricted diffusion signal are seen to suggest new/acute infarct. No areas of susceptibility to suggest hemorrhage. A punctate focus of T1 hyperintensity in the left aspect of the anterior interhemispheric fissure likely represents a dural calcification. Supratentorial patchy T2/FLAIR hyperintensities compatible with chronic microvascular ischemic disease.    No extra-axial blood or fluid collections.    The T2 skull base flow voids are preserved. Bone marrow signal intensity is unremarkable.    MRA HEAD AND NECK:   Common and internal carotid arteries are normal in caliber.  No significant stenosis at either carotid bifurcation. There is a small saccular aneurysm with a 0.4 cm neck arising from the supraclinoid right ICA.    Vertebral arteries are normal in caliber. The vertebrobasilar system appears within normal limits.     The ACAs, MCAs and PCAs demonstrate no evidence of  high-grade stenosis, focal occlusion or intracranial aneurysm.    Impression    No evidence of acute or major vascular distribution infarct or intracranial hemorrhage.    Questionable subtle cortical diffusion restriction throughout the supratentorial brain.  Short-term followup is suggested.    Remote left PCA distribution infarct within the left occipital lobe.    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  ___________________________________________________  This report has been flagged in the [facility] medical record.  ______________________________________     Electronically signed by resident: ERFEM PENALOZA MD  Date:     10/05/17  Time:    00:24            As the supervising and teaching physician, I personally reviewed the images  and resident's interpretation and I agree with the findings.          Electronically signed by: DELL VITALE MD  Date:     10/05/17  Time:    01:13    MRA Brain without contrast    Narrative    MRI BRAIN, MRA HEAD, MRA NECK    CLINICAL INDICATION: 62 year old M with stroke, severe expressive aphasia, LLE weakness.     TECHNIQUE: Multiplanar, multisequence images were obtained of the brain. Three-dimensional time-of-flight technique was used in assessment of the neck and intracranial vasculature.    COMPARISON: CT head 10/4/2017.    FINDINGS:    MRI BRAIN:  The ventricles are normal in size for age, without evidence of hydrocephalus.  There is suggestion of subtle diffuse cortical diffusion restriction in the supratentorial brain.    There is a stable region of encephalomalacia within the left occipital lobe. There is relatively isointense diffusion signal in this region with corresponding hyperintensity on ADC map and T2/FLAIR hyperintensity, compatible with remote infarct. No areas of restricted diffusion signal are seen to suggest new/acute infarct. No areas of susceptibility to suggest hemorrhage. A punctate focus of T1 hyperintensity in the left aspect of the anterior interhemispheric fissure likely represents a dural calcification. Supratentorial patchy T2/FLAIR hyperintensities compatible with chronic microvascular ischemic disease.    No extra-axial blood or fluid collections.    The T2 skull base flow voids are preserved. Bone marrow signal intensity is unremarkable.    MRA HEAD AND NECK:   Common and internal carotid arteries are normal in caliber.  No significant stenosis at either carotid bifurcation. There is a small saccular aneurysm with a 0.4 cm neck arising from the supraclinoid right ICA.    Vertebral arteries are normal in caliber. The vertebrobasilar system appears within normal limits.     The ACAs, MCAs and PCAs demonstrate no evidence of  high-grade stenosis, focal occlusion or intracranial  aneurysm.    Impression    No evidence of acute or major vascular distribution infarct or intracranial hemorrhage.    Questionable subtle cortical diffusion restriction throughout the supratentorial brain.  Short-term followup is suggested.    Remote left PCA distribution infarct within the left occipital lobe.    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  ___________________________________________________  This report has been flagged in the [employer] medical record  ______________________________________     Electronically signed by resident: EFREM PENALOZA MD  Date:     10/05/17  Time:    00:24            As the supervising and teaching physician, I personally reviewed the images and resident's interpretation and I agree with the findings.          Electronically signed by: DELL VITALE MD  Date:     10/05/17  Time:    01:13         Electronically signed by: DELL VITALE MD  Date:     10/05/17  Time:    01:19    CT Head Without Contrast    Narrative    CT HEAD WITHOUT CONTRAST    CLINICAL INDICATION: 62 year old M with possible stroke.    TECHNIQUE: Axial CT images obtained throughout the region of the head without the use of intravenous contrast. Axial, sagittal and coronal reconstructions were performed.    COMPARISON: MRI brain 11/6/2016, CT stroke multiphase 11/5/2016, CT head 11/5/2016.    FINDINGS:  The ventricles are stable. The brain appears unchanged.  The basal cisterns are patent.  There is a stable region of encephalomalacia within the left occipital lobe, compatible with remote left PCA infarct. No evidence of acute major vascular distribution infarct or intracranial hemorrhage. Patchy hypoattenuation within the supratentorial white matter is compatible with chronic microvascular ischemic change.     No new extra-axial blood or fluid collections.    The cranium appears intact.  There is nonspecific calcifications in the subcutaneous tissues of the head.     Mastoid air cells and paranasal sinuses are  essentially clear.  The orbits and intraorbital contents are unremarkable.    Impression       No evidence of acute major vascular distribution infarct or intracranial hemorrhage.     Stable, remote infarct in the left occipital lobe.    Chronic microvascular ischemic disease.        ______________________________________     Electronically signed by resident: EFREM PENALOZA MD  Date:     10/04/17  Time:    21:26            As the supervising and teaching physician, I personally reviewed the images and resident's interpretation and I agree with the findings.          Electronically signed by: DELL VITALE MD  Date:     10/04/17  Time:    22:05    Results for orders placed or performed during the hospital encounter of 11/05/16   MRI Brain W WO Contrast    Narrative    Comparison: CT 11/5/2016, MRI 6/11/2016    Clinical history: Seizure    Technique:    Multiplanar multi-sequence MRI images of the brain were obtained pre-and post the administration of IV Gadavist contrast.        Findings:    Examination is limited secondary to patient motion, most significantly involving the flair axial and flair coronal sequences, as well as the postcontrast sequences.    There is encephalomalacia within the left PCA territory consistent with remote infarction.  Minimal postcontrast enhancement is noted within this region.  There may be a few punctate foci of T2/flair signal abnormality within the periventricular white matter, may reflect chronic microvascular ischemic change or represent artifact as there is extensive motion on the FLAIR axial images.  Additionally, there is high flair signal material involving the basal cisterns about the brainstem, not confirmed on other pulse sequences, suggesting that this is artifactual related to poor CSF signal saturation rather than proteinaceous or hemorrhagic material.  There is no evidence of intracranial mass, or acute infarction.  The ventricular system is within normal limits of size for  age and shows no evidence of hydrocephalus.  There are no significant extra-axial or extracranial abnormalities detected.  The bilateral mesial temporal lobes are symmetric without abnormal signal, sclerosis, or enhancement.    The globes, orbits, pituitary gland, pineal gland and craniocervical junction are normal in configuration. The major vascular flow voids are patent.  Please see CTA performed earlier today for details of the intracranial vasculature.    Impression    Motion limited examination.    On the flair images, high attenuating material about the brainstem/basal cisterns is felt to represent artifact given extensive patient motion likely the result of poor saturation of the CSF signal in the region.  Of note, there is no associated enhancement or meningeal enhancement to suggest that this reflects proteinaceous material of infectious nature.  Blood products felt less likely.  If this finding would correlate with current patient symptomatology, consider either repeating the flair image once patient is better able to tolerate the examination, or correlate this finding with lumbar puncture.    No evidence of acute infarct.  Remote PCA territory infarct with mild enhancement.    No abnormal temporal lobe enhancement or focal focus to suggest seizure nidus.            Electronically signed by: NERY LO MD  Date:     11/06/16  Time:    01:08       Results for orders placed or performed during the hospital encounter of 10/04/17   MRI Brain Without Contrast    Addendum: 10/5/2017          Electronically signed by: DELL VITALE MD  Date:     10/05/17  Time:    01:19       Narrative    MRI BRAIN, MRA HEAD, MRA NECK    CLINICAL INDICATION: 62 year old M with stroke, severe expressive aphasia, LLE weakness.     TECHNIQUE: Multiplanar, multisequence images were obtained of the brain. Three-dimensional time-of-flight technique was used in assessment of the neck and intracranial vasculature.    COMPARISON: CT  head 10/4/2017.    FINDINGS:    MRI BRAIN:  The ventricles are normal in size for age, without evidence of hydrocephalus.  There is suggestion of subtle diffuse cortical diffusion restriction in the supratentorial brain.    There is a stable region of encephalomalacia within the left occipital lobe. There is relatively isointense diffusion signal in this region with corresponding hyperintensity on ADC map and T2/FLAIR hyperintensity, compatible with remote infarct. No areas of restricted diffusion signal are seen to suggest new/acute infarct. No areas of susceptibility to suggest hemorrhage. A punctate focus of T1 hyperintensity in the left aspect of the anterior interhemispheric fissure likely represents a dural calcification. Supratentorial patchy T2/FLAIR hyperintensities compatible with chronic microvascular ischemic disease.    No extra-axial blood or fluid collections.    The T2 skull base flow voids are preserved. Bone marrow signal intensity is unremarkable.    MRA HEAD AND NECK:   Common and internal carotid arteries are normal in caliber.  No significant stenosis at either carotid bifurcation. There is a small saccular aneurysm with a 0.4 cm neck arising from the supraclinoid right ICA.    Vertebral arteries are normal in caliber. The vertebrobasilar system appears within normal limits.     The ACAs, MCAs and PCAs demonstrate no evidence of  high-grade stenosis, focal occlusion or intracranial aneurysm.    Impression    No evidence of acute or major vascular distribution infarct or intracranial hemorrhage.    Questionable subtle cortical diffusion restriction throughout the supratentorial brain.  Short-term followup is suggested.    Remote left PCA distribution infarct within the left occipital lobe.    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  ___________________________________________________  This report has been flagged in the [facility] medical record.  ______________________________________      Electronically signed by resident: EFREM PENALOZA MD  Date:     10/05/17  Time:    00:24            As the supervising and teaching physician, I personally reviewed the images and resident's interpretation and I agree with the findings.          Electronically signed by: DELL VITALE MD  Date:     10/05/17  Time:    01:13    MRA Brain without contrast    Narrative    MRI BRAIN, MRA HEAD, MRA NECK    CLINICAL INDICATION: 62 year old M with stroke, severe expressive aphasia, LLE weakness.     TECHNIQUE: Multiplanar, multisequence images were obtained of the brain. Three-dimensional time-of-flight technique was used in assessment of the neck and intracranial vasculature.    COMPARISON: CT head 10/4/2017.    FINDINGS:    MRI BRAIN:  The ventricles are normal in size for age, without evidence of hydrocephalus.  There is suggestion of subtle diffuse cortical diffusion restriction in the supratentorial brain.    There is a stable region of encephalomalacia within the left occipital lobe. There is relatively isointense diffusion signal in this region with corresponding hyperintensity on ADC map and T2/FLAIR hyperintensity, compatible with remote infarct. No areas of restricted diffusion signal are seen to suggest new/acute infarct. No areas of susceptibility to suggest hemorrhage. A punctate focus of T1 hyperintensity in the left aspect of the anterior interhemispheric fissure likely represents a dural calcification. Supratentorial patchy T2/FLAIR hyperintensities compatible with chronic microvascular ischemic disease.    No extra-axial blood or fluid collections.    The T2 skull base flow voids are preserved. Bone marrow signal intensity is unremarkable.    MRA HEAD AND NECK:   Common and internal carotid arteries are normal in caliber.  No significant stenosis at either carotid bifurcation. There is a small saccular aneurysm with a 0.4 cm neck arising from the supraclinoid right ICA.    Vertebral arteries are normal in  caliber. The vertebrobasilar system appears within normal limits.     The ACAs, MCAs and PCAs demonstrate no evidence of  high-grade stenosis, focal occlusion or intracranial aneurysm.    Impression    No evidence of acute or major vascular distribution infarct or intracranial hemorrhage.    Questionable subtle cortical diffusion restriction throughout the supratentorial brain.  Short-term followup is suggested.    Remote left PCA distribution infarct within the left occipital lobe.    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  ___________________________________________________  This report has been flagged in the [facility] medical record.  ______________________________________     Electronically signed by resident: EFREM PENALOZA MD  Date:     10/05/17  Time:    00:24            As the supervising and teaching physician, I personally reviewed the images and resident's interpretation and I agree with the findings.          Electronically signed by: DELL VITALE MD  Date:     10/05/17  Time:    01:13         Electronically signed by: DELL VITALE MD  Date:     10/05/17  Time:    01:19    CT Head Without Contrast    Narrative    CT HEAD WITHOUT CONTRAST    CLINICAL INDICATION: 62 year old M with possible stroke.    TECHNIQUE: Axial CT images obtained throughout the region of the head without the use of intravenous contrast. Axial, sagittal and coronal reconstructions were performed.    COMPARISON: MRI brain 11/6/2016, CT stroke multiphase 11/5/2016, CT head 11/5/2016.    FINDINGS:  The ventricles are stable. The brain appears unchanged.  The basal cisterns are patent.  There is a stable region of encephalomalacia within the left occipital lobe, compatible with remote left PCA infarct. No evidence of acute major vascular distribution infarct or intracranial hemorrhage. Patchy hypoattenuation within the supratentorial white matter is compatible with chronic microvascular ischemic change.     No new extra-axial blood or  fluid collections.    The cranium appears intact.  There is nonspecific calcifications in the subcutaneous tissues of the head.     Mastoid air cells and paranasal sinuses are essentially clear.  The orbits and intraorbital contents are unremarkable.    Impression       No evidence of acute major vascular distribution infarct or intracranial hemorrhage.     Stable, remote infarct in the left occipital lobe.    Chronic microvascular ischemic disease.        ______________________________________     Electronically signed by resident: EFREM PENALOZA MD  Date:     10/04/17  Time:    21:26            As the supervising and teaching physician, I personally reviewed the images and resident's interpretation and I agree with the findings.          Electronically signed by: DELL VITALE MD  Date:     10/04/17  Time:    22:05    Results for orders placed or performed during the hospital encounter of 11/05/16   MRI Brain W WO Contrast    Narrative    Comparison: CT 11/5/2016, MRI 6/11/2016    Clinical history: Seizure    Technique:    Multiplanar multi-sequence MRI images of the brain were obtained pre-and post the administration of IV Gadavist contrast.        Findings:    Examination is limited secondary to patient motion, most significantly involving the flair axial and flair coronal sequences, as well as the postcontrast sequences.    There is encephalomalacia within the left PCA territory consistent with remote infarction.  Minimal postcontrast enhancement is noted within this region.  There may be a few punctate foci of T2/flair signal abnormality within the periventricular white matter, may reflect chronic microvascular ischemic change or represent artifact as there is extensive motion on the FLAIR axial images.  Additionally, there is high flair signal material involving the basal cisterns about the brainstem, not confirmed on other pulse sequences, suggesting that this is artifactual related to poor CSF signal saturation  rather than proteinaceous or hemorrhagic material.  There is no evidence of intracranial mass, or acute infarction.  The ventricular system is within normal limits of size for age and shows no evidence of hydrocephalus.  There are no significant extra-axial or extracranial abnormalities detected.  The bilateral mesial temporal lobes are symmetric without abnormal signal, sclerosis, or enhancement.    The globes, orbits, pituitary gland, pineal gland and craniocervical junction are normal in configuration. The major vascular flow voids are patent.  Please see CTA performed earlier today for details of the intracranial vasculature.    Impression    Motion limited examination.    On the flair images, high attenuating material about the brainstem/basal cisterns is felt to represent artifact given extensive patient motion likely the result of poor saturation of the CSF signal in the region.  Of note, there is no associated enhancement or meningeal enhancement to suggest that this reflects proteinaceous material of infectious nature.  Blood products felt less likely.  If this finding would correlate with current patient symptomatology, consider either repeating the flair image once patient is better able to tolerate the examination, or correlate this finding with lumbar puncture.    No evidence of acute infarct.  Remote PCA territory infarct with mild enhancement.    No abnormal temporal lobe enhancement or focal focus to suggest seizure nidus.            Electronically signed by: NERY LO MD  Date:     11/06/16  Time:    01:08       Additional tests:  1)CT Scan: as noted  2) EEG\Video Monitoring: no  3) PET Scan: no  4) Neuropsychological evaluation: no  5) DEXA Scan: no  6) Others: no     RISK FACTORS FOR SEIZURES:    1. Head Trauma:  No    2. CNS Infections:  No  3. CNS Tumors: No     4. CNS Vascular Disease: hx of prior CVA  5. Febrile Seizures: No    6. Developmental Delay: No       7. Family History of  "Seizures: No    8. Birth history: unremarkable     Pregnancy/Labor/Delivery: n/a    CURRENT MEDICATIONS:   Current Outpatient Prescriptions   Medication Sig Dispense Refill    acetaminophen (TYLENOL) 500 MG tablet Take 1 tablet (500 mg total) by mouth every 6 (six) hours as needed for Pain.  0    amLODIPine (NORVASC) 10 MG tablet Take 1 tablet (10 mg total) by mouth once daily. 90 tablet 3    apixaban 5 mg Tab Take 1 tablet (5 mg total) by mouth 2 (two) times daily. 60 tablet 11    aspirin 81 MG Chew Take 1 tablet (81 mg total) by mouth once daily.  0    atorvastatin (LIPITOR) 40 MG tablet Take 1 tablet (40 mg total) by mouth once daily. 90 tablet 3    BD INSULIN PEN NEEDLE UF MINI 31 gauge x 3/16" Ndle To use with insulin pens 4  times daily 360 each 3    carvedilol (COREG) 6.25 MG tablet Take 1 tablet (6.25 mg total) by mouth 2 (two) times daily. 180 tablet 0    dulaglutide (TRULICITY) 0.75 mg/0.5 mL PnIj Inject 0.5 mLs (0.75 mg total) into the skin every 7 days. 4 Syringe 6    flash glucose scanning reader (FREESTYLE ANGELLA READER) Misc 1 each by Misc.(Non-Drug; Combo Route) route once daily. 1 each 0    flash glucose sensor (FREESTYLE ANGELLA SENSOR) Kit 3 each by Misc.(Non-Drug; Combo Route) route every 30 days. 3 kit 11    folic acid (FOLVITE) 400 MCG tablet Take 400 mcg by mouth once daily.      furosemide (LASIX) 40 MG tablet Take 1 tablet (40 mg total) by mouth 2 (two) times daily. 90 tablet 4    GLUCAGON EMERGENCY KIT, HUMAN, 1 mg injection INJECT 1 MG INTO THE MUSCLE AS NEEDED 1 kit 1    hydrALAZINE (APRESOLINE) 25 MG tablet Take 1 tablet (25 mg total) by mouth every 8 (eight) hours. (Patient taking differently: Take 25 mg by mouth every 12 (twelve) hours. ) 90 tablet 11    insulin aspart (NOVOLOG) 100 unit/mL InPn pen Inject 7 units w/ meals plus scale 150-200+1, 201-250+2, 251-300+3, 301-350+4. (Patient taking differently: 5 Units 3 (three) times daily with meals. Inject 7 units w/ meals " plus scale 150-200+1, 201-250+2, 251-300+3, 301-350+4.) 1 Box 6    insulin detemir (LEVEMIR FLEXPEN) 100 unit/mL (3 mL) SubQ InPn pen Inject 14 Units into the skin every evening. 3 Box 3    lacosamide (VIMPAT) 50 mg Tab Take 1 tablet (50 mg total) by mouth every 12 (twelve) hours. 60 tablet 11    levetiracetam (KEPPRA) 750 MG Tab Take 2 tablets (1,500 mg total) by mouth 2 (two) times daily. 120 tablet 11    levothyroxine (SYNTHROID) 200 MCG tablet Take 1 tablet (200 mcg total) by mouth before breakfast. 90 tablet 2    losartan (COZAAR) 100 MG tablet Take 1 tablet (100 mg total) by mouth once daily. 90 tablet 3    magnesium 30 mg Tab Take 1 tablet by mouth twice a week.       multivitamin capsule Take 1 capsule by mouth every Mon, Wed, Fri.       ONETOUCH VERIO Strp USE TO TEST BLOOD SUGAR THREE TIMES DAILY 100 strip 11    sildenafil (VIAGRA) 100 MG tablet Take 1 tablet (100 mg total) by mouth as needed for Erectile Dysfunction. 6 tablet 12    vitamin D 1000 units Tab Take 1,000 Units by mouth every Mon, Wed, Fri.       No current facility-administered medications for this visit.         CURRENT ANTI EPILEPTIC MEDICATIONS:   - Levetiracetam 1500mg bid    VAGAL NERVE STIMULATOR: n/a     PRIOR ANTICONVULSANT HISTORY:  none      PAST MEDICAL HISTORY:   Active Ambulatory Problems     Diagnosis Date Noted    Hypercholesterolemia 08/01/2012    Sickle cell trait 08/01/2012    Erectile dysfunction associated with type 2 diabetes mellitus 01/29/2013    Gallstones 03/20/2013    Paroxysmal atrial fibrillation 09/23/2013    Postsurgical hypothyroidism 11/27/2013    Microcytic anemia 11/27/2013    Obesity (BMI 30.0-34.9) 11/25/2014    Right homonymous hemianopsia 02/05/2016    History of CVA (cerebrovascular accident) 07/25/2016    CKD stage 3 due to type 2 diabetes mellitus 11/02/2016    Proteinuria 11/02/2016    Aphasia     Generalized tonic-clonic seizure     Chronic anticoagulation - pradaxa  11/10/2016    Chronic diastolic heart failure 11/20/2016    History of thyroid cancer 01/13/2017    Bradycardia 02/01/2017    Bilateral leg edema 02/01/2017    Type 2 diabetes mellitus with hyperglycemia 06/28/2017    Secondary hyperparathyroidism 06/29/2017    Elevated PSA 07/05/2017    Hematuria 08/14/2017    Essential hypertension 08/30/2017    DM type 2 without retinopathy 08/30/2017    Nuclear sclerosis of both eyes 08/30/2017    Refractive error 08/30/2017    Prostate enlargement 09/14/2017    Simple partial seizure, consciousness not impaired 10/05/2017    DM2 (diabetes mellitus, type 2) 10/05/2017    Complex partial seizures evolving to generalized tonic-clonic seizures 11/15/2017    Hypertensive CKD (chronic kidney disease) 01/31/2018    Hemianopia of right eye 03/23/2018    Encephalopathy 03/23/2018    Altered mental state 03/24/2018    Acute on chronic congestive heart failure 04/27/2018    Dysarthria     Stroke      Resolved Ambulatory Problems     Diagnosis Date Noted    HTN (hypertension), benign 08/01/2012    Elevated alkaline phosphatase level 08/01/2012    Flank pain 03/20/2013    Syncope 11/26/2013    Leukocytosis 11/27/2013    Acute on chronic renal failure 11/27/2013    Controlled type 2 diabetes with neuropathy 12/16/2014    Screening 03/03/2015    Screening 03/03/2015    Visual field loss following stroke 01/28/2016    Headache 01/28/2016    Fall 06/10/2016    Abdominal pain 10/05/2017    Altered mental status 10/05/2017     Past Medical History:   Diagnosis Date    Allergy     BMI 31.0-31.9,adult 11/25/2014    Cerebrovascular disease 7/25/2016    Chronic anticoagulation - pradaxa 11/10/2016    Chronic diastolic heart failure 11/20/2016    CKD (chronic kidney disease), stage III 9/23/2013    Controlled type 2 diabetes with neuropathy 12/16/2014    Diabetes mellitus due to underlying condition with stage 3 chronic kidney disease, without long-term  current use of insulin 11/2/2016    Elevated alkaline phosphatase level 8/1/2012    Elevated PSA     Erectile dysfunction associated with type 2 diabetes mellitus     Gallstones 3/20/2013    Generalized tonic-clonic seizure 11/2016    HTN (hypertension), benign 8/1/2012    Hypercholesterolemia 8/1/2012    Microcytic anemia 11/27/2013    Paroxysmal atrial fibrillation 9/23/2013    Postsurgical hypothyroidism 11/27/2013    Right homonymous hemianopsia 2/5/2016    Sickle cell trait     Stroke 1/27/2016    Thyroid cancer     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/2016      PAST PSYCHIATRIC HISTORY: none    PAST SURGICAL HISTORY including Epilepsy surgery:   Past Surgical History:   Procedure Laterality Date    BREAST SURGERY      cyst removal    COLONOSCOPY      CYST REMOVAL      chest    PROSTATE BIOPSY  4/27/11    SKIN BIOPSY      THYROID SURGERY  8/26/09    VASCULAR SURGERY          FAMILY HISTORY:   Family History   Problem Relation Age of Onset    Heart disease Father     Diabetes Father     Hypertension Father     Diabetes Mother     Hypertension Mother     Heart disease Mother     Diabetes Brother     Cancer Sister     Thyroid disease Maternal Aunt     Clotting disorder Neg Hx          SOCIAL HISTORY:   Social History     Social History    Marital status:      Spouse name: Rut    Number of children: N/A    Years of education: N/A     Occupational History    Not on file.     Social History Main Topics    Smoking status: Never Smoker    Smokeless tobacco: Never Used    Alcohol use Yes      Comment: occasionally    Drug use: No    Sexual activity: Yes     Partners: Female      Comment:  with 3 kids     Other Topics Concern    Not on file     Social History Narrative     with 3 kids     Not driving due to vision problem from stroke.       a) Marital status:                                                     b) Living situation: patient lives  with his wife  c) Employed/Unemployed/Other: Disabled     DRIVING HISTORY:  Currently driving: No       LEVEL OF EDUCATION:  -     SUBSTANCE USE: none    ALLERGIES: Cough syrup [guaifenesin]     REVIEW OF SYSTEMS:  Review of Systems   Constitutional: Negative for appetite change and fatigue.   HENT: Negative for dental problem and sore throat.    Eyes: Negative for photophobia and visual disturbance.   Respiratory: Negative for cough and shortness of breath.    Cardiovascular: Negative for chest pain and palpitations.   Gastrointestinal: Negative for nausea and vomiting.   Endocrine: Negative for polydipsia and polyuria.   Genitourinary: Negative for frequency and urgency.   Musculoskeletal: Negative for arthralgias and joint swelling.   Skin: Negative for color change and rash.   Allergic/Immunologic: Negative for immunocompromised state.   All other systems reviewed and are negative.       GENERAL EXAMINATION:  There were no vitals taken for this visit.     GENERAL EXAMINATION:  General Appearance:    This is an average built male who appears well.  HEENT: There are no dysmorphic features  Skin: There are no obvious stigmata for neurocutaneous disorders.  Neck: supple   Cardiovascular: regular rate and rhythm  Lungs: clear  Abdomen: deferred  The spine is non-tender.  Kyphosis:  NoScoliosis: No   Extremities: Warm and well perfused    NEUROLOGICAL EXAMINATION:  Mental status: Alert and oriented x 4; pleasant and cooperative with exam  Memory: Recent memory intact, Remote memory intact, Age correct, Month correct  Attention and concentration: intact  Fund of knowledge: adequate  Speech: normal  Dysarthria: No   Eyes: PERRL; EOM intact; No nystagmus.The visual pursuits  were smooth with normal saccadic eye movements.   Fundoscopic Exam: deferred  No facial asymmetry. Intact facial sensation bilaterally.  Hearing was intact bilaterally to finger rub  Tongue and palate was in the midline  Intact SCM and trapezii  bilaterally     Motor examination:  Normal bulk and tone bilaterally. Power: no pronater drift; 5/5 bilaterally symmetric UE/LE  Abnormal movements: none  Deep tendon reflexes: 2+ bilaterally symmetric UE/LE with flexor plantars  Dysmetria: No     Sensory examination:   Normal, light touch, pin prick, and vibration bilaterally symmetric UE/LE    Gait:  Normal gait and station    IMPRESSION:  The patient's history is consistent with:  Complex partial seizures evolving to generalized tonic-clonic seizures  61yo M with hx of CVA in 1/16 and seizures x 11/16   - some prior episodes c/w glucose level alterations  - recent admission (4/28-30/18) with likely post-ictal state  - stable with no episodes since discharge    Current AEDs:  - Lacosamide 50-100mg (started on 4/30)   - Levetiracetam 1500mg bid    Plan:  - continue titration of Lacosamide to goal 100mg bid  - continue LEV 1500mg bid    Seizure/glucose log  Seizure precautions/restrictions    Plan of care was discussed in detail with patient and his wife.    Encounter for medication titration  - continue titration of Lacosamide to 100mg bid  - continue LEV at current dose    The patient was asked to call me/the clinic 1 week after the test(s) are done to obtain results.    More than 50% of the time with the patient (as well as family/caregiver(s) was spent on face-to-face counseling about:  1. Diagnosis, plans, prognosis, medications and possible side-effects, risks and benefits of treatment, other alternatives to AEDs.  2. Risks related to continued seizures, status epilepticus, SUDEP, driving restrictions and seizure precautions ( no baths but showers are ok, no swimming unsupervised, no use of heavy machinery, no use of sharp moving objects, avoid heights).   3. Issues related to pregnancy, OCP and breast feeding as it relates to epilepsy (in female patients).  4. The potential of teratogenicity (for female patients) and suicidal risks of anti-epileptic  medications.  5.Avoid any activity that compromise patient safety related to seizures.    Questions and concerns raised by the patient and family/care-giver(s) were addressed and they indicated understanding of everything discussed and agreed to plans as above.    Return in 3 months or earlier prn    Keara Santiago MD, SONA(), FACNS.  Neurology-Epilepsy.

## 2018-05-15 ENCOUNTER — OFFICE VISIT (OUTPATIENT)
Dept: NEUROLOGY | Facility: CLINIC | Age: 63
End: 2018-05-15
Payer: COMMERCIAL

## 2018-05-15 VITALS
BODY MASS INDEX: 31.88 KG/M2 | HEIGHT: 70 IN | SYSTOLIC BLOOD PRESSURE: 140 MMHG | DIASTOLIC BLOOD PRESSURE: 83 MMHG | HEART RATE: 74 BPM | WEIGHT: 222.69 LBS

## 2018-05-15 DIAGNOSIS — Z51.81 ENCOUNTER FOR MEDICATION TITRATION: ICD-10-CM

## 2018-05-15 DIAGNOSIS — G40.209 COMPLEX PARTIAL SEIZURES EVOLVING TO GENERALIZED TONIC-CLONIC SEIZURES: Primary | ICD-10-CM

## 2018-05-15 DIAGNOSIS — I50.32 CHRONIC DIASTOLIC HEART FAILURE: ICD-10-CM

## 2018-05-15 PROCEDURE — 3008F BODY MASS INDEX DOCD: CPT | Mod: CPTII,S$GLB,, | Performed by: PSYCHIATRY & NEUROLOGY

## 2018-05-15 PROCEDURE — 99215 OFFICE O/P EST HI 40 MIN: CPT | Mod: S$GLB,,, | Performed by: PSYCHIATRY & NEUROLOGY

## 2018-05-15 PROCEDURE — 3077F SYST BP >= 140 MM HG: CPT | Mod: CPTII,S$GLB,, | Performed by: PSYCHIATRY & NEUROLOGY

## 2018-05-15 PROCEDURE — 3079F DIAST BP 80-89 MM HG: CPT | Mod: CPTII,S$GLB,, | Performed by: PSYCHIATRY & NEUROLOGY

## 2018-05-15 PROCEDURE — 99999 PR PBB SHADOW E&M-EST. PATIENT-LVL III: CPT | Mod: PBBFAC,,, | Performed by: PSYCHIATRY & NEUROLOGY

## 2018-05-15 RX ORDER — FUROSEMIDE 40 MG/1
TABLET ORAL
Qty: 90 TABLET | OUTPATIENT
Start: 2018-05-15

## 2018-05-15 NOTE — LETTER
May 22, 2018      Tyler Jasmine MD  [address]  [facility] [address]           [facility] - Neurology Epilepsy  [address]  [facility] [address]  Phone: [phone]  Fax: [phone]          Patient: Alfa Christy III   MR Number: [id number]   YOB: 1955   Date of Visit: 5/15/2018       Dear Dr. Tyler Jasmine:    Thank you for referring Alfa Christy to me for evaluation. Attached you will find relevant portions of my assessment and plan of care.    If you have questions, please do not hesitate to call me. I look forward to following Alfa Christy along with you.    Sincerely,    Keara Santiago MD    Enclosure  CC:  No Recipients    If you would like to receive this communication electronically, please contact [email or web address] or [phone] to request more information on [employer] access.    For providers and/or their staff who would like to refer a patient to [facility], please contact us through our one-stop-shop provider referral line, [facility], at [phone].    If you feel you have received this communication in error or would no longer like to receive these types of communications, please e-mail [email or web address]

## 2018-05-16 VITALS
SYSTOLIC BLOOD PRESSURE: 140 MMHG | WEIGHT: 222 LBS | BODY MASS INDEX: 31.85 KG/M2 | DIASTOLIC BLOOD PRESSURE: 78 MMHG | OXYGEN SATURATION: 99 % | HEART RATE: 69 BPM

## 2018-05-17 ENCOUNTER — TELEPHONE (OUTPATIENT)
Dept: NEUROLOGY | Facility: CLINIC | Age: 63
End: 2018-05-17

## 2018-05-17 NOTE — TELEPHONE ENCOUNTER
----- Message from Ava Whitley sent at 5/17/2018 11:04 AM CDT -----  Pt medication was not printed out. Pt was suppose to take medication on yesterday. Please send over an emergency medication prescription to [facility] on [address] and [facility]. Send button was not hit so prescription did go anywhere      Pt can be reached at [phone] ty

## 2018-05-17 NOTE — PROGRESS NOTES
MrZhou Christy VS are WNL on today's visit. He denies smoking tobacco or using ETOH Has PMH of stroke, HTN and DM family history of stroke. LHE educated patient and wife on Stroke RF, SM program, and how DM Afib, and HLP effects stroke reoccurance. Patient has seizures, and follows up with epilepsy clinic. Patient lives with wife and is retired.

## 2018-05-21 RX ORDER — LACOSAMIDE 50 MG/1
50 TABLET ORAL EVERY 12 HOURS
Qty: 60 TABLET | Refills: 11 | OUTPATIENT
Start: 2018-05-21 | End: 2019-05-21

## 2018-05-22 PROBLEM — Z51.81 ENCOUNTER FOR MEDICATION TITRATION: Status: ACTIVE | Noted: 2018-05-22

## 2018-05-31 ENCOUNTER — OFFICE VISIT (OUTPATIENT)
Dept: INTERNAL MEDICINE | Facility: CLINIC | Age: 63
End: 2018-05-31
Payer: COMMERCIAL

## 2018-05-31 ENCOUNTER — HOSPITAL ENCOUNTER (EMERGENCY)
Facility: HOSPITAL | Age: 63
Discharge: HOME | End: 2018-05-31
Attending: EMERGENCY MEDICINE
Payer: COMMERCIAL

## 2018-05-31 VITALS
HEIGHT: 70 IN | WEIGHT: 218 LBS | TEMPERATURE: 98 F | DIASTOLIC BLOOD PRESSURE: 88 MMHG | OXYGEN SATURATION: 96 % | RESPIRATION RATE: 18 BRPM | HEART RATE: 64 BPM | SYSTOLIC BLOOD PRESSURE: 151 MMHG | BODY MASS INDEX: 31.21 KG/M2

## 2018-05-31 VITALS
SYSTOLIC BLOOD PRESSURE: 110 MMHG | OXYGEN SATURATION: 97 % | BODY MASS INDEX: 31.41 KG/M2 | DIASTOLIC BLOOD PRESSURE: 82 MMHG | WEIGHT: 218.94 LBS | HEART RATE: 78 BPM

## 2018-05-31 DIAGNOSIS — M79.604 LEG PAIN, DIFFUSE, RIGHT: ICD-10-CM

## 2018-05-31 DIAGNOSIS — E11.22 CKD STAGE 3 DUE TO TYPE 2 DIABETES MELLITUS: Primary | ICD-10-CM

## 2018-05-31 DIAGNOSIS — R06.02 SHORTNESS OF BREATH: ICD-10-CM

## 2018-05-31 DIAGNOSIS — M79.604 RIGHT LEG PAIN: Primary | ICD-10-CM

## 2018-05-31 DIAGNOSIS — N18.30 STAGE 3 CHRONIC KIDNEY DISEASE: ICD-10-CM

## 2018-05-31 DIAGNOSIS — G40.209 COMPLEX PARTIAL SEIZURES EVOLVING TO GENERALIZED TONIC-CLONIC SEIZURES: ICD-10-CM

## 2018-05-31 DIAGNOSIS — N18.30 CKD STAGE 3 DUE TO TYPE 2 DIABETES MELLITUS: Primary | ICD-10-CM

## 2018-05-31 DIAGNOSIS — E11.00 TYPE 2 DIABETES MELLITUS WITH HYPEROSMOLARITY WITHOUT COMA, WITHOUT LONG-TERM CURRENT USE OF INSULIN: ICD-10-CM

## 2018-05-31 DIAGNOSIS — R47.1 DYSARTHRIA: ICD-10-CM

## 2018-05-31 DIAGNOSIS — D57.3 SICKLE CELL TRAIT: ICD-10-CM

## 2018-05-31 LAB
ALBUMIN SERPL BCP-MCNC: 3.4 G/DL
ALP SERPL-CCNC: 113 U/L
ALT SERPL W/O P-5'-P-CCNC: 20 U/L
ANION GAP SERPL CALC-SCNC: 8 MMOL/L
ANISOCYTOSIS BLD QL SMEAR: SLIGHT
APAP SERPL-MCNC: <3 UG/ML
AST SERPL-CCNC: 35 U/L
BASOPHILS # BLD AUTO: 0.06 K/UL
BASOPHILS NFR BLD: 0.6 %
BILIRUB SERPL-MCNC: 1.4 MG/DL
BNP SERPL-MCNC: 473 PG/ML
BUN SERPL-MCNC: 27 MG/DL
CALCIUM SERPL-MCNC: 9.2 MG/DL
CHLORIDE SERPL-SCNC: 105 MMOL/L
CO2 SERPL-SCNC: 22 MMOL/L
CREAT SERPL-MCNC: 2.1 MG/DL
DIFFERENTIAL METHOD: ABNORMAL
EOSINOPHIL # BLD AUTO: 0.1 K/UL
EOSINOPHIL NFR BLD: 1.1 %
ERYTHROCYTE [DISTWIDTH] IN BLOOD BY AUTOMATED COUNT: 17.1 %
EST. GFR  (AFRICAN AMERICAN): 37.3 ML/MIN/1.73 M^2
EST. GFR  (NON AFRICAN AMERICAN): 32.3 ML/MIN/1.73 M^2
GLUCOSE SERPL-MCNC: 284 MG/DL
HCT VFR BLD AUTO: 32.7 %
HGB BLD-MCNC: 11.7 G/DL
HYPOCHROMIA BLD QL SMEAR: ABNORMAL
IMM GRANULOCYTES # BLD AUTO: 0.02 K/UL
IMM GRANULOCYTES NFR BLD AUTO: 0.2 %
INR PPP: 1
LYMPHOCYTES # BLD AUTO: 1.4 K/UL
LYMPHOCYTES NFR BLD: 14.3 %
MCH RBC QN AUTO: 28.3 PG
MCHC RBC AUTO-ENTMCNC: 35.8 G/DL
MCV RBC AUTO: 79 FL
MONOCYTES # BLD AUTO: 1.4 K/UL
MONOCYTES NFR BLD: 14.4 %
NEUTROPHILS # BLD AUTO: 6.7 K/UL
NEUTROPHILS NFR BLD: 69.4 %
NRBC BLD-RTO: 0 /100 WBC
PLATELET # BLD AUTO: 209 K/UL
PLATELET BLD QL SMEAR: ABNORMAL
PMV BLD AUTO: 12.5 FL
POLYCHROMASIA BLD QL SMEAR: ABNORMAL
POTASSIUM SERPL-SCNC: 4.8 MMOL/L
PROT SERPL-MCNC: 7.4 G/DL
PROTHROMBIN TIME: 10.8 SEC
RBC # BLD AUTO: 4.13 M/UL
RETICS/RBC NFR AUTO: 3.2 %
SODIUM SERPL-SCNC: 135 MMOL/L
TARGETS BLD QL SMEAR: ABNORMAL
TROPONIN I SERPL DL<=0.01 NG/ML-MCNC: 0.01 NG/ML
WBC # BLD AUTO: 9.65 K/UL

## 2018-05-31 PROCEDURE — 80329 ANALGESICS NON-OPIOID 1 OR 2: CPT

## 2018-05-31 PROCEDURE — 85045 AUTOMATED RETICULOCYTE COUNT: CPT

## 2018-05-31 PROCEDURE — 3044F HG A1C LEVEL LT 7.0%: CPT | Mod: CPTII,S$GLB,, | Performed by: FAMILY MEDICINE

## 2018-05-31 PROCEDURE — 84484 ASSAY OF TROPONIN QUANT: CPT

## 2018-05-31 PROCEDURE — 85025 COMPLETE CBC W/AUTO DIFF WBC: CPT

## 2018-05-31 PROCEDURE — 3079F DIAST BP 80-89 MM HG: CPT | Mod: CPTII,S$GLB,, | Performed by: FAMILY MEDICINE

## 2018-05-31 PROCEDURE — 93005 ELECTROCARDIOGRAM TRACING: CPT

## 2018-05-31 PROCEDURE — 99284 EMERGENCY DEPT VISIT MOD MDM: CPT | Mod: ,,, | Performed by: PHYSICIAN ASSISTANT

## 2018-05-31 PROCEDURE — 99284 EMERGENCY DEPT VISIT MOD MDM: CPT | Mod: 25

## 2018-05-31 PROCEDURE — 93010 ELECTROCARDIOGRAM REPORT: CPT | Mod: ,,, | Performed by: INTERNAL MEDICINE

## 2018-05-31 PROCEDURE — 99999 PR PBB SHADOW E&M-EST. PATIENT-LVL IV: CPT | Mod: PBBFAC,,, | Performed by: FAMILY MEDICINE

## 2018-05-31 PROCEDURE — 3008F BODY MASS INDEX DOCD: CPT | Mod: CPTII,S$GLB,, | Performed by: FAMILY MEDICINE

## 2018-05-31 PROCEDURE — 83880 ASSAY OF NATRIURETIC PEPTIDE: CPT

## 2018-05-31 PROCEDURE — 3074F SYST BP LT 130 MM HG: CPT | Mod: CPTII,S$GLB,, | Performed by: FAMILY MEDICINE

## 2018-05-31 PROCEDURE — 85610 PROTHROMBIN TIME: CPT

## 2018-05-31 PROCEDURE — 96361 HYDRATE IV INFUSION ADD-ON: CPT

## 2018-05-31 PROCEDURE — 80053 COMPREHEN METABOLIC PANEL: CPT

## 2018-05-31 PROCEDURE — 96360 HYDRATION IV INFUSION INIT: CPT

## 2018-05-31 PROCEDURE — 25000003 PHARM REV CODE 250: Performed by: PHYSICIAN ASSISTANT

## 2018-05-31 PROCEDURE — 99215 OFFICE O/P EST HI 40 MIN: CPT | Mod: S$GLB,,, | Performed by: FAMILY MEDICINE

## 2018-05-31 RX ORDER — MORPHINE SULFATE 15 MG/1
15 TABLET ORAL
Status: COMPLETED | OUTPATIENT
Start: 2018-05-31 | End: 2018-05-31

## 2018-05-31 RX ORDER — MORPHINE SULFATE 4 MG/ML
4 INJECTION, SOLUTION INTRAMUSCULAR; INTRAVENOUS
Status: DISCONTINUED | OUTPATIENT
Start: 2018-05-31 | End: 2018-05-31

## 2018-05-31 RX ADMIN — MORPHINE SULFATE 15 MG: 15 TABLET ORAL at 01:05

## 2018-05-31 RX ADMIN — SODIUM CHLORIDE 1000 ML: 0.9 INJECTION, SOLUTION INTRAVENOUS at 01:05

## 2018-05-31 NOTE — ED PROVIDER NOTES
Encounter Date: 5/31/2018    SCRIBE #1 NOTE: I, Audrey Gao, am scribing for, and in the presence of, Dr. Carolina. the EKG reading and the APC attestation.       History     Chief Complaint   Patient presents with    Sickle Cell Pain Crisis     Pt sent from clinic for right leg pain.  Pt reports he is having pain from the right hip down to leg.  Pt has hx sickle cell.      Patient is a 62 year old male with PMHX of hx of thyroid cancer, CAD on Eliquis 5 mg, Afib, diastolic CHF, HTN, HLD, DM2, CKD stage 3, hypothyroidism, ED, seizure, and hx of CVA 2016. He presents to the ED for right leg pain. Patient referred to ED by primary care- Dr. Wilkerson for further evaluation. He reports having diffuse right leg pain for approximately 4 days. Describes pain as constant and throbbing. Rates pain 8/10. Reports pain controlled with tylenol OTC every 4 hours. Denies recent falls or trauma. Reports last pain crisis approximately 2 years ago. Also, reports increased SOB with exertion. He denies fever,chills, nausea, vomiting, chest pain, abd pain, dysuria, diarrhea, or constipation. He is a non smoker and reports alcohol use.           Review of patient's allergies indicates:   Allergen Reactions    Cough syrup [guaifenesin] Other (See Comments)     Past Medical History:   Diagnosis Date    Allergy     Atrial fibrillation     BMI 31.0-31.9,adult 11/25/2014    Cerebrovascular disease 7/25/2016    CHF (congestive heart failure)     CHF (congestive heart failure)     Chronic anticoagulation - pradaxa 11/10/2016    Chronic diastolic heart failure 11/20/2016    CKD (chronic kidney disease), stage III 9/23/2013    Controlled type 2 diabetes with neuropathy 12/16/2014    Coronary artery disease     Diabetes mellitus due to underlying condition with stage 3 chronic kidney disease, without long-term current use of insulin 11/2/2016    Elevated alkaline phosphatase level 8/1/2012    Elevated PSA     negative prostate  biopsy 4/11    Erectile dysfunction associated with type 2 diabetes mellitus     Gallstones 3/20/2013    Generalized tonic-clonic seizure 11/2016    HTN (hypertension), benign 8/1/2012    Hypercholesterolemia 8/1/2012    Microcytic anemia 11/27/2013    Paroxysmal atrial fibrillation 9/23/2013    Postsurgical hypothyroidism 11/27/2013    Right homonymous hemianopsia 2/5/2016    Sickle cell trait     Stroke 1/27/2016    Thyroid cancer     multifocal with six lesions, largest 5mm, treated with surgery and radioactive iodine    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/2016     Past Surgical History:   Procedure Laterality Date    BREAST SURGERY      cyst removal    COLONOSCOPY      CYST REMOVAL      chest    PROSTATE BIOPSY  4/27/11    SKIN BIOPSY      THYROID SURGERY  8/26/09    VASCULAR SURGERY       Family History   Problem Relation Age of Onset    Heart disease Father     Diabetes Father     Hypertension Father     Diabetes Mother     Hypertension Mother     Heart disease Mother     Diabetes Brother     Cancer Sister     Thyroid disease Maternal Aunt     Clotting disorder Neg Hx      Social History   Substance Use Topics    Smoking status: Never Smoker    Smokeless tobacco: Never Used    Alcohol use Yes      Comment: occasionally, none recently     Review of Systems   Constitutional: Negative for fever.   HENT: Negative for sore throat.    Respiratory: Positive for shortness of breath.    Cardiovascular: Negative for chest pain.   Gastrointestinal: Negative for abdominal pain, nausea and vomiting.   Genitourinary: Negative for dysuria.   Musculoskeletal: Positive for myalgias (right leg pain). Negative for back pain.   Skin: Negative for rash.   Neurological: Negative for weakness.   Hematological: Does not bruise/bleed easily.       Physical Exam     Initial Vitals [05/31/18 1157]   BP Pulse Resp Temp SpO2   (!) 149/82 68 18 98.1 °F (36.7 °C) 96 %      MAP       104.33          Physical Exam    Vitals reviewed.  Constitutional: He appears well-developed and well-nourished.   Patient resting comfortably in NAD on RA.    HENT:   Head: Normocephalic and atraumatic.   Nose: Nose normal.   Eyes: Conjunctivae and EOM are normal. Pupils are equal, round, and reactive to light.   Neck: Normal range of motion.   Cardiovascular: Normal rate and regular rhythm. Exam reveals no friction rub.    No murmur heard.  Pulmonary/Chest: Breath sounds normal. No respiratory distress. He has no wheezes. He has no rales.   Abdominal: Soft. Bowel sounds are normal. He exhibits no distension. There is no tenderness.   Musculoskeletal: Normal range of motion.        Right upper leg: He exhibits tenderness. He exhibits no bony tenderness, no swelling, no edema and no deformity.        Right lower leg: He exhibits no tenderness, no bony tenderness, no swelling and no edema.   Neurological: He is alert and oriented to person, place, and time. He has normal strength. No sensory deficit.   Skin: Skin is warm and dry. No erythema.   Psychiatric: He has a normal mood and affect. Thought content normal.         ED Course   Procedures  Labs Reviewed   RETICULOCYTES - Abnormal; Notable for the following:        Result Value    Retic 3.2 (*)     All other components within normal limits   CBC W/ AUTO DIFFERENTIAL - Abnormal; Notable for the following:     RBC 4.13 (*)     Hemoglobin 11.7 (*)     Hematocrit 32.7 (*)     MCV 79 (*)     RDW 17.1 (*)     Mono # 1.4 (*)     Lymph% 14.3 (*)     All other components within normal limits   COMPREHENSIVE METABOLIC PANEL - Abnormal; Notable for the following:     Sodium 135 (*)     CO2 22 (*)     Glucose 284 (*)     BUN, Bld 27 (*)     Creatinine 2.1 (*)     Albumin 3.4 (*)     Total Bilirubin 1.4 (*)     eGFR if  37.3 (*)     eGFR if non  32.3 (*)     All other components within normal limits   B-TYPE NATRIURETIC PEPTIDE - Abnormal; Notable for the  following:      (*)     All other components within normal limits   ACETAMINOPHEN LEVEL - Abnormal; Notable for the following:     Acetaminophen (Tylenol), Serum <3.0 (*)     All other components within normal limits   TROPONIN I   PROTIME-INR     EKG Readings: (Independently Interpreted)   Initial Reading: No STEMI.     Imaging Results          X-Ray Chest PA And Lateral (Final result)  Result time 05/31/18 13:13:59    Final result by Toro Garcia MD (05/31/18 13:13:59)                 Impression:      See above      Electronically signed by: Toro Garcia MD  Date:    05/31/2018  Time:    13:13             Narrative:    EXAMINATION:  XR CHEST PA AND LATERAL    TECHNIQUE:  PA and lateral views of the chest were performed.    COMPARISON:  Non 04/26/2018 e    FINDINGS:  Cardiomegaly.  The lungs are clear.  No pleural effusion                                   Medical Decision Making:   History:   Old Medical Records: I decided to obtain old medical records.  Clinical Tests:   Lab Tests: Ordered and Reviewed  Radiological Study: Ordered and Reviewed  Medical Tests: Ordered and Reviewed       APC / Resident Notes:   Patient is a 62 year old male with PMHX of hx of thyroid cancer, CAD on Eliquis 5 mg, Afib, diastolic CHF, HTN, HLD, DM2, CKD stage 3, hypothyroidism, ED, seizure, and hx of CVA 2016. He presents to the ED for right leg pain.     Will order labs and imaging. Will order IVF and pain medication. Will continue to monitor.     Differential diagnoses include, but are not limited to: pain crisis, CHF, electrolyte imbalance, sciatica, or muscle strain.     No leukocytosis. Hemodynamically stable. Elevated glucose 284. Elevated BUN 27 and Cr 2.1 (patient with known CKD stage 3) elevated total bili 1.4. retic count elevated 3.2, decreased when compared to previous. Troponin WNL. Minimally elevated . PT-INR 10.8-1.0. Acetaminophen WNL. CXR found to have Cardiomegaly.  The lungs are clear.  No pleural  effusion.     Patient reassessed, reports symptoms resolved with ED management. I have discussed emergency department findings, and plan with the patient. Will discharge home with F/U with hematologist and PCP. Patient verbalizes understanding of plan and agrees. Return precautions given.     I have discussed and reviewed with my supervising physician.           Scribe Attestation:   Scribe #1: I performed the above scribed service and the documentation accurately describes the services I performed. I attest to the accuracy of the note.    Attending Attestation:     Physician Attestation Statement for NP/PA:   I discussed this assessment and plan of this patient with the NP/PA, but I did not personally examine the patient. The face to face encounter was performed by the NP/PA.    Other NP/PA Attestation Additions:    History of Present Illness: I am in agreement with my physician assistant's assessment, treatment, and plan of care.             Clinical Impression:   The primary encounter diagnosis was Right leg pain. Diagnoses of Shortness of breath and Stage 3 chronic kidney disease were also pertinent to this visit.    Disposition:   Disposition: Discharged                        Shahnaz Nielsen PA-C  05/31/18 4100

## 2018-05-31 NOTE — ED TRIAGE NOTES
"Patient states he is here for "pain" Pain to right hip and right leg x 4 days, States sickle cell pain, new onset. Tylenol OTC every 4 hours. Last pain crisis 2016  "

## 2018-05-31 NOTE — ED NOTES
Patient identifiers verified and correct for Mr Christy  C/C: Right leg, right knee pain, sickle cell pain crisis  APPEARANCE: awake and alert in NAD.  SKIN: warm, dry and intact. No breakdown or bruising.  MUSCULOSKELETAL: Patient moving all extremities spontaneously, no obvious swelling or deformities noted. Ambulates independently.  RESPIRATORY:Positive shortness of breath with ambulationRespirations unlabored. No cough  CARDIAC: Denies CP, 2+ distal pulses; no peripheral edema  ABDOMEN: S/ND/NT, Denies nausea  : voids spontaneously, denies difficulty  Neurologic: AAO x 4; follows commands equal strength in all extremities; denies numbness/tingling. Denies dizziness Positive weakness

## 2018-05-31 NOTE — PROGRESS NOTES
Subjective:       Patient ID: Alfa Christy III is a 62 y.o. male.    Chief Complaint: Leg Pain (Right leg 4 days )  Alfa Christy III 62 y.o. male is here for office visit to review care and physical exam, here for acute right leg pain.  Scale of pain getting worse, now reported 8/10.  States has had sickle cell crisis past and this is veyy similar.  No joint pain, no injury reported.  Note dysarthria likely baseline?  Pt not seen by me before.  Apparently has sz d/o, not recently noted.  HPI  Review of Systems   Constitutional: Negative for activity change, appetite change, fatigue, fever and unexpected weight change.   HENT: Negative for congestion, hearing loss, postnasal drip and rhinorrhea.    Eyes: Negative for pain, discharge and visual disturbance.   Respiratory: Negative for cough, choking and shortness of breath.    Cardiovascular: Negative for chest pain, palpitations and leg swelling.   Gastrointestinal: Negative for abdominal pain, diarrhea and vomiting.   Genitourinary: Negative for dysuria, flank pain, hematuria and urgency.   Musculoskeletal: Negative for arthralgias, back pain, joint swelling and neck pain.   Skin: Negative for color change and rash.   Neurological: Negative for dizziness, tremors, syncope, weakness, numbness and headaches.   Psychiatric/Behavioral: Negative for agitation and confusion. The patient is not hyperactive.        Objective:      Physical Exam   Constitutional: He is oriented to person, place, and time. He appears well-developed and well-nourished.   HENT:   Head: Normocephalic.   Eyes: EOM are normal. Pupils are equal, round, and reactive to light.   Neck: Normal range of motion. Neck supple. No thyromegaly present.   Cardiovascular: Normal rate.  Exam reveals no gallop and no friction rub.    No murmur heard.  Pulmonary/Chest: Effort normal. No respiratory distress. He has no wheezes.   Abdominal: Soft. Bowel sounds are normal. He exhibits no mass. There is no  tenderness.   Musculoskeletal: He exhibits no edema or tenderness.   Lymphadenopathy:     He has no cervical adenopathy.   Neurological: He is alert and oriented to person, place, and time. He has normal reflexes. No cranial nerve deficit.   Skin: Skin is warm. No rash noted.   Psychiatric: He has a normal mood and affect. His behavior is normal.       Assessment:       No diagnosis found.    Plan:       Alfa was seen today for leg pain.    Diagnoses and all orders for this visit:    CKD stage 3 due to type 2 diabetes mellitus  - pt states may be dehydrated, folow  Leg pain, diffuse, right  - ER for dx and tx, may need fluids if having sickle cell crisis, called report  Complex partial seizures evolving to generalized tonic-clonic seizures  - Discussed  Dysarthria  - Chart reviewed  Type 2 diabetes mellitus with hyperosmolarity without coma, without long-term current use of insulin  - Chart reviewed  Sickle cell trait  - Duiscussed

## 2018-06-06 ENCOUNTER — HOME CARE VISIT (OUTPATIENT)
Dept: NEUROLOGY | Facility: HOSPITAL | Age: 63
End: 2018-06-06

## 2018-06-11 RX ORDER — PEN NEEDLE, DIABETIC 30 GX3/16"
NEEDLE, DISPOSABLE MISCELLANEOUS
Qty: 360 EACH | Refills: 3 | Status: SHIPPED | OUTPATIENT
Start: 2018-06-11 | End: 2019-01-02 | Stop reason: SDUPTHER

## 2018-06-12 NOTE — ASSESSMENT & PLAN NOTE
Stable  - Repeat Hg A1c  - Home regimen: aspart 7 U TIDWM, Levemir 14 U QHS  - Inpatient regimen: aspart 5 U TID WM, Levemir 10 U QHS  - low dose SSI, acchuchecks ACHS  - diabetic diet  4/27-4/29 no hypoglycemia, avoid with PMH   <<-----Click on this checkbox to enter Pre-Op Dx

## 2018-06-15 ENCOUNTER — HOSPITAL ENCOUNTER (EMERGENCY)
Facility: HOSPITAL | Age: 63
Discharge: HOME | End: 2018-06-15
Attending: EMERGENCY MEDICINE
Payer: COMMERCIAL

## 2018-06-15 VITALS
HEART RATE: 62 BPM | OXYGEN SATURATION: 99 % | WEIGHT: 224 LBS | TEMPERATURE: 98 F | BODY MASS INDEX: 32.07 KG/M2 | RESPIRATION RATE: 18 BRPM | SYSTOLIC BLOOD PRESSURE: 131 MMHG | DIASTOLIC BLOOD PRESSURE: 81 MMHG | HEIGHT: 70 IN

## 2018-06-15 DIAGNOSIS — R33.9 URINARY RETENTION: Primary | ICD-10-CM

## 2018-06-15 LAB
BACTERIA #/AREA URNS AUTO: NORMAL /HPF
BILIRUB UR QL STRIP: NEGATIVE
CLARITY UR REFRACT.AUTO: CLEAR
COLOR UR AUTO: YELLOW
GLUCOSE UR QL STRIP: NEGATIVE
HGB UR QL STRIP: ABNORMAL
HYALINE CASTS UR QL AUTO: 0 /LPF
KETONES UR QL STRIP: NEGATIVE
LEUKOCYTE ESTERASE UR QL STRIP: NEGATIVE
MICROSCOPIC COMMENT: NORMAL
NITRITE UR QL STRIP: NEGATIVE
PH UR STRIP: 6 [PH] (ref 5–8)
PROT UR QL STRIP: ABNORMAL
RBC #/AREA URNS AUTO: 3 /HPF (ref 0–4)
SP GR UR STRIP: 1.01 (ref 1–1.03)
URN SPEC COLLECT METH UR: ABNORMAL
UROBILINOGEN UR STRIP-ACNC: NEGATIVE EU/DL
WBC #/AREA URNS AUTO: 0 /HPF (ref 0–5)

## 2018-06-15 PROCEDURE — 99284 EMERGENCY DEPT VISIT MOD MDM: CPT | Mod: 25

## 2018-06-15 PROCEDURE — 51702 INSERT TEMP BLADDER CATH: CPT

## 2018-06-15 PROCEDURE — 81001 URINALYSIS AUTO W/SCOPE: CPT

## 2018-06-15 PROCEDURE — 99281 EMR DPT VST MAYX REQ PHY/QHP: CPT | Mod: ,,,

## 2018-06-15 NOTE — ED TRIAGE NOTES
Presents to Er with inability to empty his bladder since this am around 0300.  Patient's name and date of birth checked and is correct.  LOC: The patient is awake, alert and aware of environment with an appropriate affect, the patient is oriented x 3 and speaking appropriately.  APPEARANCE: Patient resting comfortably and in no acute distress, patient is clean and well groomed, patient's clothing is properly fastened.  CARDIOVASCULAR:  Heart rate regular and even with peripheral edema noted BLE.  SKIN: The skin is warm and dry, patient has normal skin turgor and moist mucus membranes, skin intact, no breakdown or brusing noted.   MUSKULOSKELETAL: Patient moving all extremities, no obvious swelling or deformities noted.  RESPIRATORY: Airway is open and patent, respirations are spontaneous, patient has a normal effort and rate.

## 2018-06-15 NOTE — ED NOTES
Patient voided about 30ml of urine just prior to coming into intake 2.  Bladder scan reveals 857ml of urine remaining in his bladder after voiding.

## 2018-06-15 NOTE — ED PROVIDER NOTES
Encounter Date: 6/15/2018    SCRIBE #1 NOTE: I, Mauricio Knight, am scribing for, and in the presence of,  Dr. Carolina. I have scribed the following portions of the note - the APC attestation.       History     Chief Complaint   Patient presents with    Male  Problem     dec in urine flow, pain to lower to abd, woke up at 3am with pain     62-year-old male with medical history of CKD, diabetes mellitus type 2, paroxysmal AFib, on long-term anticoagulation, CHF presents to the ED with urinary retention.  Patient reports symptoms have been present since 3:00 a.m. this morning.  He denies fever, chills, hematuria, flank pain, groin pain.  He does report suprapubic pain and distention.          Review of patient's allergies indicates:   Allergen Reactions    Cough syrup [guaifenesin] Other (See Comments)     Past Medical History:   Diagnosis Date    Allergy     Atrial fibrillation     BMI 31.0-31.9,adult 11/25/2014    Cerebrovascular disease 7/25/2016    CHF (congestive heart failure)     CHF (congestive heart failure)     Chronic anticoagulation - pradaxa 11/10/2016    Chronic diastolic heart failure 11/20/2016    CKD (chronic kidney disease), stage III 9/23/2013    Controlled type 2 diabetes with neuropathy 12/16/2014    Coronary artery disease     Diabetes mellitus due to underlying condition with stage 3 chronic kidney disease, without long-term current use of insulin 11/2/2016    Elevated alkaline phosphatase level 8/1/2012    Elevated PSA     negative prostate biopsy 4/11    Erectile dysfunction associated with type 2 diabetes mellitus     Gallstones 3/20/2013    Generalized tonic-clonic seizure 11/2016    HTN (hypertension), benign 8/1/2012    Hypercholesterolemia 8/1/2012    Microcytic anemia 11/27/2013    Paroxysmal atrial fibrillation 9/23/2013    Postsurgical hypothyroidism 11/27/2013    Right homonymous hemianopsia 2/5/2016    Sickle cell trait     Stroke 1/27/2016    Thyroid  cancer     multifocal with six lesions, largest 5mm, treated with surgery and radioactive iodine    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/2016     Past Surgical History:   Procedure Laterality Date    BREAST SURGERY      cyst removal    COLONOSCOPY      CYST REMOVAL      chest    PROSTATE BIOPSY  4/27/11    SKIN BIOPSY      THYROID SURGERY  8/26/09    VASCULAR SURGERY       Family History   Problem Relation Age of Onset    Heart disease Father     Diabetes Father     Hypertension Father     Diabetes Mother     Hypertension Mother     Heart disease Mother     Diabetes Brother     Cancer Sister     Thyroid disease Maternal Aunt     Clotting disorder Neg Hx      Social History   Substance Use Topics    Smoking status: Never Smoker    Smokeless tobacco: Never Used    Alcohol use Yes      Comment: occasionally, none recently     Review of Systems   Constitutional: Negative for chills, diaphoresis, fatigue and fever.   HENT: Negative for congestion.    Eyes: Negative for visual disturbance.   Respiratory: Negative for cough and shortness of breath.    Cardiovascular: Negative for chest pain and leg swelling.   Gastrointestinal: Negative for abdominal pain, nausea and vomiting.   Genitourinary: Positive for difficulty urinating. Negative for dysuria, flank pain, frequency and hematuria.   Musculoskeletal: Negative for myalgias.   Skin: Negative for rash.   Neurological: Negative for syncope, weakness and light-headedness.   Psychiatric/Behavioral: The patient is not nervous/anxious.        Physical Exam     Initial Vitals [06/15/18 0819]   BP Pulse Resp Temp SpO2   131/81 62 18 97.8 °F (36.6 °C) 99 %      MAP       --         Physical Exam    Vitals reviewed.  Constitutional: He appears well-developed and well-nourished. He is not diaphoretic. No distress.   HENT:   Head: Normocephalic and atraumatic.   Nose: Nose normal.   Eyes: Conjunctivae and EOM are normal. Pupils are equal, round, and reactive  to light.   Neck: Normal range of motion. Neck supple. No thyromegaly present.   Cardiovascular: Normal rate, regular rhythm and intact distal pulses.   Pulmonary/Chest: Breath sounds normal. No respiratory distress. He has no wheezes. He exhibits no tenderness.   Abdominal: Soft. Bowel sounds are normal. He exhibits distension. There is tenderness in the suprapubic area. There is no rigidity, no rebound, no guarding, no CVA tenderness, no tenderness at McBurney's point and negative Christiansen's sign. No hernia.       Musculoskeletal: Normal range of motion. He exhibits no edema.   Lymphadenopathy:     He has no cervical adenopathy.   Neurological: He is alert and oriented to person, place, and time. He has normal strength. No sensory deficit.   Skin: Skin is warm and dry. Capillary refill takes less than 2 seconds. No rash noted.   Psychiatric: He has a normal mood and affect.         ED Course   Procedures  Labs Reviewed   URINALYSIS, REFLEX TO URINE CULTURE - Abnormal; Notable for the following:        Result Value    Protein, UA 1+ (*)     Occult Blood UA 1+ (*)     All other components within normal limits    Narrative:     Preferred Collection Type->Urine, Clean Catch   URINALYSIS MICROSCOPIC    Narrative:     Preferred Collection Type->Urine, Clean Catch   URINALYSIS, REFLEX TO URINE CULTURE          No orders to display        Medical Decision Making:   History:   Old Medical Records: I decided to obtain old medical records.  Old Records Summarized: records from clinic visits.  Initial Assessment:   62-year-old male with medical history of CKD, diabetes mellitus type 2, paroxysmal AFib, on long-term anticoagulation, CHF presents to the ED with urinary retention since 3a. C/o suprapubic pain and distention. No guarding, no CvA tenderness. Post void showed 875ml. Immediate relief of pain after insertion of nieves cath.   Differential Diagnosis:   DDx includes but is not limited to UTI, BPH, renal stone,  pyelonephritis.   Clinical Tests:   Lab Tests: Ordered and Reviewed  ED Management:  Will get UA and leave nieves leg bag in place.     UA benign. Patient has much relief since nieves placed. On reassessment, abdomen is soft, non distended, non tender, with no guarding or rebound. Urinary retention most likely secondary to BPH. Will have patient f/u with Dr. Mejia in outpatient urology clinic. Discharged to home in stable condition, return to ED warnings given, follow up and patient care instructions given.      I have discussed the treatment and management of this patient with my supervisory physician, and we agree on the plan of care.               Scribe Attestation:   Scribe #1: I performed the above scribed service and the documentation accurately describes the services I performed. I attest to the accuracy of the note.    Attending Attestation:     Physician Attestation Statement for NP/PA:   I discussed this assessment and plan of this patient with the NP/PA, but I did not personally examine the patient. The face to face encounter was performed by the NP/PA.                     Clinical Impression:   The encounter diagnosis was Urinary retention.      Disposition:   Disposition: Discharged  Condition: Stable                        Pastora Duenas PA-C  06/15/18 1520

## 2018-06-15 NOTE — DISCHARGE INSTRUCTIONS
Please leave catheter in until you follow up with Dr. Roberto DONG.     Our goal in the emergency department is to always give you outstanding care and exceptional service. You may receive a survey by mail or e-mail in the next week regarding your experience in our ED. We would greatly appreciate your completing and returning the survey. Your feedback provides us with a way to recognize our staff who give very good care and it helps us learn how to improve when your experience was below our aspiration of excellence.

## 2018-06-16 ENCOUNTER — NURSE TRIAGE (OUTPATIENT)
Dept: ADMINISTRATIVE | Facility: CLINIC | Age: 63
End: 2018-06-16

## 2018-06-16 NOTE — TELEPHONE ENCOUNTER
"ED 6/15  Pt seen in ED yest- came home with catheter. still having blood in urine     Reason for Disposition   Tea-colored or red-tinged urine, present < 24 hours  (all triage questions negative)    Answer Assessment - Initial Assessment Questions  1. SYMPTOMS: "What symptoms are you concerned about?"     Discomfort with catheter in. Taking eliquis, pink lemonade urine - no clots. Drinking good amt of fluids   2. ONSET:  "When did the symptoms start?"     6/15   3. FEVER: "Is there a fever?" If so, ask: "What is the temperature, how was it measured, and when did it start?"     afeb   4. ABDOMINAL PAIN: "Is there any abdominal pain?" (e.g., Scale 1-10; or mild, moderate, severe)     Only bladder discomfort   5. URINE COLOR: "What color is the urine?"  "Is there blood present in the urine?" (e.g., clear, yellow, cloudy, tea-colored, blood streaks, bright red)     [place] lemonade   6. ONSET: "When was the catheter inserted?"     6/15 1030am   7. OTHER SYMPTOMS: "Do you have any other symptoms?" (e.g., back pain, bad urine odor)    no    Protocols used: ST URINARY CATHETER SYMPTOMS AND [id number]    rec push fluids. Tylenol OTC for discomfort. ED warnings given. call back with questions,   "

## 2018-06-19 NOTE — TELEPHONE ENCOUNTER
Refill request rec'd on insulin - patient follows with endocrinology, was seen recently, will forward to that team

## 2018-06-20 ENCOUNTER — OFFICE VISIT (OUTPATIENT)
Dept: UROLOGY | Facility: CLINIC | Age: 63
End: 2018-06-20
Payer: COMMERCIAL

## 2018-06-20 VITALS — HEART RATE: 76 BPM | HEIGHT: 70 IN | DIASTOLIC BLOOD PRESSURE: 79 MMHG | SYSTOLIC BLOOD PRESSURE: 139 MMHG

## 2018-06-20 DIAGNOSIS — Z46.6 ENCOUNTER FOR FOLEY CATHETER REMOVAL: ICD-10-CM

## 2018-06-20 DIAGNOSIS — Z97.8 FOLEY CATHETER PRESENT: ICD-10-CM

## 2018-06-20 DIAGNOSIS — R33.9 URINARY RETENTION: Primary | ICD-10-CM

## 2018-06-20 DIAGNOSIS — N40.1 BPH WITH OBSTRUCTION/LOWER URINARY TRACT SYMPTOMS: ICD-10-CM

## 2018-06-20 DIAGNOSIS — N13.8 BPH WITH OBSTRUCTION/LOWER URINARY TRACT SYMPTOMS: ICD-10-CM

## 2018-06-20 PROCEDURE — 3078F DIAST BP <80 MM HG: CPT | Mod: CPTII,S$GLB,, | Performed by: NURSE PRACTITIONER

## 2018-06-20 PROCEDURE — 99214 OFFICE O/P EST MOD 30 MIN: CPT | Mod: 25,S$GLB,, | Performed by: NURSE PRACTITIONER

## 2018-06-20 PROCEDURE — 3075F SYST BP GE 130 - 139MM HG: CPT | Mod: CPTII,S$GLB,, | Performed by: NURSE PRACTITIONER

## 2018-06-20 PROCEDURE — 99999 PR PBB SHADOW E&M-EST. PATIENT-LVL III: CPT | Mod: PBBFAC,,, | Performed by: NURSE PRACTITIONER

## 2018-06-20 PROCEDURE — 51700 IRRIGATION OF BLADDER: CPT | Mod: S$GLB,,, | Performed by: NURSE PRACTITIONER

## 2018-06-20 RX ORDER — INSULIN ASPART 100 [IU]/ML
INJECTION, SOLUTION INTRAVENOUS; SUBCUTANEOUS
Qty: 1 BOX | Refills: 6 | Status: SHIPPED | OUTPATIENT
Start: 2018-06-20 | End: 2018-08-07 | Stop reason: SDUPTHER

## 2018-06-20 RX ORDER — TAMSULOSIN HYDROCHLORIDE 0.4 MG/1
0.4 CAPSULE ORAL NIGHTLY
Qty: 30 CAPSULE | Refills: 1 | Status: SHIPPED | OUTPATIENT
Start: 2018-06-20 | End: 2018-06-20 | Stop reason: SDUPTHER

## 2018-06-20 RX ORDER — TAMSULOSIN HYDROCHLORIDE 0.4 MG/1
0.4 CAPSULE ORAL NIGHTLY
Qty: 30 CAPSULE | Refills: 1 | Status: SHIPPED | OUTPATIENT
Start: 2018-06-20 | End: 2018-07-18 | Stop reason: SDUPTHER

## 2018-06-20 NOTE — PATIENT INSTRUCTIONS
Patient was instructed to drink plenty of fluids today.  Instructed patient to call at 3 p.m. to give an update on urine output.  Informed patient to return to clinic or emergency department (if after clinic hours) to have nieves catheter put back in if unable to urinate within 5 hours of nieves catheter removal or starts to experience bladder pressure/pain, decrease flow, straining/difficulty urinating.     When to call your healthcare provider  Call the healthcare provider right away if:  · You have a fever of 101.4°F (38°C) or higher.  · You cant urinate within 5 hours of catheter removal.  · Your abdomen is painful or bloated.  · You have burning pain with urination that lasts for 24 hours.  · You see a lot of blood in the urine (light bleeding for 24 hours is normal).  · It feels like the bladder is not emptying.   Date Last Reviewed: 12/1/2016  © 7040-2308 [employer]. [address] [place] [address], [place] [address]. All rights reserved. This information is not intended as a substitute for professional medical care. Always follow your healthcare professional's instructions.

## 2018-06-20 NOTE — LETTER
June 20, 2018      Tyler Jasmine MD  [address]  [place] [address]           [facility] - Urology 4th Floor  [address]  [place] [address]  Phone: [phone]          Patient: Alfa Christy III   MR Number: [id number]   YOB: 1955   Date of Visit: 6/20/2018       Dear Dr. Tyler Jasmine:    Thank you for referring Alfa Christy to me for evaluation. Attached you will find relevant portions of my assessment and plan of care.    If you have questions, please do not hesitate to call me. I look forward to following Alfa Christy along with you.    Sincerely,    Roxanna Barclay, TOBY    Enclosure  CC:  No Recipients    If you would like to receive this communication electronically, please contact [email or web address] or [phone] to request more information on [employer] Link access.    For providers and/or their staff who would like to refer a patient to [facility], please contact us through our one-stop-shop provider referral line, [facility], at [phone].    If you feel you have received this communication in error or would no longer like to receive these types of communications, please e-mail [email or web address]

## 2018-06-20 NOTE — PROGRESS NOTES
Subjective:       Patient ID: Alfa Christy III is a 62 y.o. male.    Chief Complaint: Urinary Retention, voiding trial       HPI: Alfa Christy III is a 62 y.o. Black or  male who presents today for voiding trial and nieves catheter removal. He is an established patient with [facility] but a new patient to me. His last clinic visit was 7/14/17 with Dr. Mejia    Pt went to ED on 6/15/18 for decreased urine flow and suprapubic pain and distention.   16 fr nieves catheter was inserted and 800 ml of clear yellow urine out.  He was discharged home with catheter.  Urinary retention possibly d/t enlarged prostate.     On previous cysto performed by Dr. Mejia, patient did have BPH.    Today he presents to clinic for voiding trial and nieves catheter removal. He reports hematuria since catheter was inserted. He reports catheter has been draining without difficulty. Denies urgency, bladder spasms, or flank pain. He reports penile pain at tip of penis r/t catheter. Denies f/c/n/v.    Prior to ED visit, he reports nocturia x 5-6 and frequency. He does take lasix BID.   FOS is strong.  Denies intermittent stream, hesitancy, urgency or incontinence.         Review of patient's allergies indicates:   Allergen Reactions    Cough syrup [guaifenesin] Other (See Comments)       Current Outpatient Prescriptions   Medication Sig Dispense Refill    acetaminophen (TYLENOL) 500 MG tablet Take 1 tablet (500 mg total) by mouth every 6 (six) hours as needed for Pain.  0    amLODIPine (NORVASC) 10 MG tablet Take 1 tablet (10 mg total) by mouth once daily. 90 tablet 3    apixaban 5 mg Tab Take 1 tablet (5 mg total) by mouth 2 (two) times daily. 60 tablet 11    aspirin 81 MG Chew Take 1 tablet (81 mg total) by mouth once daily.  0    atorvastatin (LIPITOR) 40 MG tablet Take 1 tablet (40 mg total) by mouth once daily. 90 tablet 3    carvedilol (COREG) 6.25 MG tablet Take 1 tablet (6.25 mg total) by mouth 2 (two) times  "daily. 180 tablet 0    dulaglutide (TRULICITY) 0.75 mg/0.5 mL PnIj Inject 0.5 mLs (0.75 mg total) into the skin every 7 days. 4 Syringe 6    flash glucose sensor (FREESTYLE ANGELLA SENSOR) Kit 3 each by Misc.(Non-Drug; Combo Route) route every 30 days. 3 kit 11    folic acid (FOLVITE) 400 MCG tablet Take 400 mcg by mouth once daily.      FREESTYLE ANGELLA READER Misc USE EVERY DAY AS DIRECTED FOR TEST BLOOD GLUCOSE 1 each 0    furosemide (LASIX) 40 MG tablet Take 1 tablet (40 mg total) by mouth 2 (two) times daily. 90 tablet 4    GLUCAGON EMERGENCY KIT, HUMAN, 1 mg injection INJECT 1 MG INTO THE MUSCLE AS NEEDED 1 kit 1    insulin aspart U-100 (NOVOLOG) 100 unit/mL InPn pen Inject 7 units w/ meals plus scale 150-200+1, 201-250+2, 251-300+3, 301-350+4. 1 Box 6    insulin detemir (LEVEMIR FLEXPEN) 100 unit/mL (3 mL) SubQ InPn pen Inject 14 Units into the skin every evening. 3 Box 3    lacosamide (VIMPAT) 50 mg Tab Take 1 tablet (50 mg total) by mouth every 12 (twelve) hours. (Patient taking differently: Take 50 mg by mouth every 12 (twelve) hours. Vim pat 50mg in am, 100 mg at night) 60 tablet 11    levetiracetam (KEPPRA) 750 MG Tab Take 2 tablets (1,500 mg total) by mouth 2 (two) times daily. 120 tablet 11    levothyroxine (SYNTHROID) 200 MCG tablet Take 1 tablet (200 mcg total) by mouth before breakfast. 90 tablet 2    losartan (COZAAR) 100 MG tablet Take 1 tablet (100 mg total) by mouth once daily. 90 tablet 3    magnesium 30 mg Tab Take 1 tablet by mouth twice a week.       multivitamin capsule Take 1 capsule by mouth every Mon, Wed, Fri.       ONETOUCH VERIO Strp USE TO TEST BLOOD SUGAR THREE TIMES DAILY 100 strip 11    pen needle, diabetic (BD ULTRA-FINE MINI PEN NEEDLE) 31 gauge x 3/16" Ndle To use with insulin pens 4  times daily 360 each 3    sildenafil (VIAGRA) 100 MG tablet Take 1 tablet (100 mg total) by mouth as needed for Erectile Dysfunction. 6 tablet 12    tamsulosin (FLOMAX) 0.4 mg Cp24 " Take 1 capsule (0.4 mg total) by mouth every evening. 30 capsule 1    vitamin D 1000 units Tab Take 1,000 Units by mouth every Mon, Wed, Fri.       No current facility-administered medications for this visit.        Past Medical History:   Diagnosis Date    Allergy     Atrial fibrillation     BMI 31.0-31.9,adult 11/25/2014    Cerebrovascular disease 7/25/2016    CHF (congestive heart failure)     CHF (congestive heart failure)     Chronic anticoagulation - pradaxa 11/10/2016    Chronic diastolic heart failure 11/20/2016    CKD (chronic kidney disease), stage III 9/23/2013    Controlled type 2 diabetes with neuropathy 12/16/2014    Coronary artery disease     Diabetes mellitus due to underlying condition with stage 3 chronic kidney disease, without long-term current use of insulin 11/2/2016    Elevated alkaline phosphatase level 8/1/2012    Elevated PSA     negative prostate biopsy 4/11    Erectile dysfunction associated with type 2 diabetes mellitus     Gallstones 3/20/2013    Generalized tonic-clonic seizure 11/2016    HTN (hypertension), benign 8/1/2012    Hypercholesterolemia 8/1/2012    Microcytic anemia 11/27/2013    Paroxysmal atrial fibrillation 9/23/2013    Postsurgical hypothyroidism 11/27/2013    Right homonymous hemianopsia 2/5/2016    Sickle cell trait     Stroke 1/27/2016    Thyroid cancer     multifocal with six lesions, largest 5mm, treated with surgery and radioactive iodine    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/2016       Past Surgical History:   Procedure Laterality Date    BREAST SURGERY      cyst removal    COLONOSCOPY      CYST REMOVAL      chest    PROSTATE BIOPSY  4/27/11    SKIN BIOPSY      THYROID SURGERY  8/26/09    VASCULAR SURGERY         Family History   Problem Relation Age of Onset    Heart disease Father     Diabetes Father     Hypertension Father     Diabetes Mother     Hypertension Mother     Heart disease Mother     Diabetes Brother      Cancer Sister     Thyroid disease Maternal Aunt     Clotting disorder Neg Hx        Review of Systems   Constitutional: Negative for chills, diaphoresis and fever.   HENT: Negative for congestion and trouble swallowing.    Eyes: Negative for visual disturbance.   Respiratory: Negative for chest tightness and shortness of breath.    Cardiovascular: Negative for chest pain and palpitations.   Gastrointestinal: Negative for constipation, diarrhea, nausea and vomiting.   Genitourinary: Positive for difficulty urinating, hematuria and penile pain. Negative for decreased urine volume, discharge, flank pain, penile swelling, scrotal swelling, testicular pain and urgency.        Nieves catheter draining without difficulty   Musculoskeletal: Negative for gait problem.   Skin: Negative for rash.   Allergic/Immunologic: Negative for immunocompromised state.   Neurological: Negative for dizziness, seizures, syncope, light-headedness and headaches.   Hematological: Negative for adenopathy.   Psychiatric/Behavioral: Negative for confusion. The patient is not nervous/anxious.          All other systems were reviewed and were negative.    Objective:     Vitals:    06/20/18 0905   BP: 139/79   Pulse: 76        Physical Exam   Nursing note and vitals reviewed.  Constitutional: He is oriented to person, place, and time. He appears well-developed and well-nourished.  Non-toxic appearance. He does not have a sickly appearance. He does not appear ill. No distress.   HENT:   Head: Normocephalic and atraumatic.   Eyes: Conjunctivae and EOM are normal.   Neck: Normal range of motion.   Cardiovascular: Normal rate and regular rhythm.    Pulmonary/Chest: Effort normal. No respiratory distress.   Abdominal: Soft. He exhibits no distension.   Genitourinary:   Genitourinary Comments: Indwelling nieves catheter in place   Musculoskeletal: Normal range of motion.   Neurological: He is alert and oriented to person, place, and time.   Skin: Skin  is warm and dry.     Psychiatric: He has a normal mood and affect. His behavior is normal. Judgment and thought content normal.         Lab Results   Component Value Date    CREATININE 2.1 (H) 05/31/2018     Lab Results   Component Value Date    EGFRNONAA 32.3 (A) 05/31/2018     Lab Results   Component Value Date    ESTGFRAFRICA 37.3 (A) 05/31/2018         Assessment:       1. Urinary retention    2. Encounter for Nieves catheter removal    3. BPH with obstruction/lower urinary tract symptoms    4. Nieves catheter present        Plan:     Alfa was seen today for urinary retention.    Diagnoses and all orders for this visit:    Urinary retention  -     Voiding Trial    Encounter for Nieves catheter removal  -     Voiding Trial    BPH with obstruction/lower urinary tract symptoms  -     Voiding Trial  -     tamsulosin (FLOMAX) 0.4 mg Cp24; Take 1 capsule (0.4 mg total) by mouth every evening.    Nieves catheter present  -     Voiding Trial    -Discussed plan with patient and his wife  -Voiding trial performed by Nurse Thomas.  280 ml of sterile water was instilled into bladder. Balloon deflated and nieves catheter was removed. Patient urinated 310 ml without difficulty.  Voiding trial passed.  Patient was instructed to drink plenty of fluids today. Increase water intake.  Instructed patient to call at 3 p.m. to give an update on urine output.  Informed patient to return to clinic or emergency department (if after clinic hours) to have nieves catheter put back in if unable to urinate within 5 hours of nieves catheter removal or starts to experience bladder pressure/pain, decrease flow, straining/difficulty urinating.    Patient voiced understanding  -Start flomax. Discussed side effects, indications, and MOA for flomax. Prescription sent to the pharmacy. Pt verbalized understanding.  -RTC 4 weeks for PVR check, ISAI, assess med, and schedule PSA         I spent 25 minutes with the patient of which more than half was spent in  coordinating the patient's care as well as in direct consultation with the patient in regards to our treatment and plan.

## 2018-06-21 DIAGNOSIS — E89.0 POSTOPERATIVE HYPOTHYROIDISM: ICD-10-CM

## 2018-06-22 ENCOUNTER — NURSE TRIAGE (OUTPATIENT)
Dept: ADMINISTRATIVE | Facility: CLINIC | Age: 63
End: 2018-06-22

## 2018-06-22 ENCOUNTER — HOSPITAL ENCOUNTER (EMERGENCY)
Facility: OTHER | Age: 63
Discharge: HOME | End: 2018-06-22
Attending: EMERGENCY MEDICINE
Payer: COMMERCIAL

## 2018-06-22 VITALS
DIASTOLIC BLOOD PRESSURE: 93 MMHG | RESPIRATION RATE: 16 BRPM | SYSTOLIC BLOOD PRESSURE: 167 MMHG | HEIGHT: 70 IN | TEMPERATURE: 100 F | HEART RATE: 86 BPM | WEIGHT: 225 LBS | OXYGEN SATURATION: 99 % | BODY MASS INDEX: 32.21 KG/M2

## 2018-06-22 DIAGNOSIS — R30.0 DYSURIA: ICD-10-CM

## 2018-06-22 DIAGNOSIS — R31.0 GROSS HEMATURIA: Primary | ICD-10-CM

## 2018-06-22 LAB
BACTERIA #/AREA URNS HPF: ABNORMAL /HPF
BILIRUB UR QL STRIP: NEGATIVE
CLARITY UR: ABNORMAL
COLOR UR: ABNORMAL
GLUCOSE UR QL STRIP: NEGATIVE
HGB UR QL STRIP: ABNORMAL
HYALINE CASTS #/AREA URNS LPF: 0 /LPF
KETONES UR QL STRIP: ABNORMAL
LEUKOCYTE ESTERASE UR QL STRIP: ABNORMAL
MICROSCOPIC COMMENT: ABNORMAL
NITRITE UR QL STRIP: POSITIVE
PH UR STRIP: 7 [PH] (ref 5–8)
PROT UR QL STRIP: ABNORMAL
RBC #/AREA URNS HPF: ABNORMAL /HPF (ref 0–4)
SP GR UR STRIP: 1.01 (ref 1–1.03)
URN SPEC COLLECT METH UR: ABNORMAL
UROBILINOGEN UR STRIP-ACNC: 1 EU/DL
WBC #/AREA URNS HPF: 24 /HPF (ref 0–5)

## 2018-06-22 PROCEDURE — 99283 EMERGENCY DEPT VISIT LOW MDM: CPT

## 2018-06-22 PROCEDURE — 87086 URINE CULTURE/COLONY COUNT: CPT

## 2018-06-22 PROCEDURE — 81000 URINALYSIS NONAUTO W/SCOPE: CPT

## 2018-06-22 PROCEDURE — 25000003 PHARM REV CODE 250: Performed by: EMERGENCY MEDICINE

## 2018-06-22 RX ORDER — ACETAMINOPHEN 325 MG/1
650 TABLET ORAL
Status: COMPLETED | OUTPATIENT
Start: 2018-06-22 | End: 2018-06-22

## 2018-06-22 RX ORDER — LEVOTHYROXINE SODIUM 200 UG/1
TABLET ORAL
Qty: 90 TABLET | Refills: 2 | Status: SHIPPED | OUTPATIENT
Start: 2018-06-22 | End: 2019-04-10 | Stop reason: SDUPTHER

## 2018-06-22 RX ORDER — CEPHALEXIN 500 MG/1
500 CAPSULE ORAL 4 TIMES DAILY
Qty: 20 CAPSULE | Refills: 0 | Status: SHIPPED | OUTPATIENT
Start: 2018-06-22 | End: 2018-06-27

## 2018-06-22 RX ORDER — PHENAZOPYRIDINE HYDROCHLORIDE 100 MG/1
100 TABLET, FILM COATED ORAL
Status: COMPLETED | OUTPATIENT
Start: 2018-06-22 | End: 2018-06-22

## 2018-06-22 RX ORDER — CEPHALEXIN 500 MG/1
500 CAPSULE ORAL
Status: COMPLETED | OUTPATIENT
Start: 2018-06-22 | End: 2018-06-22

## 2018-06-22 RX ORDER — PHENAZOPYRIDINE HYDROCHLORIDE 200 MG/1
200 TABLET, FILM COATED ORAL 3 TIMES DAILY
Qty: 6 TABLET | Refills: 0 | Status: SHIPPED | OUTPATIENT
Start: 2018-06-22 | End: 2018-06-24

## 2018-06-22 RX ADMIN — CEPHALEXIN 500 MG: 500 CAPSULE ORAL at 10:06

## 2018-06-22 RX ADMIN — PHENAZOPYRIDINE HYDROCHLORIDE 100 MG: 100 TABLET ORAL at 10:06

## 2018-06-22 RX ADMIN — ACETAMINOPHEN 650 MG: 325 TABLET, FILM COATED ORAL at 10:06

## 2018-06-23 NOTE — ED NOTES
Lab called stating they dont have urine yet for the add on culture, informed lab UA has no been collected yet. Pt to hold on to add on lab order.

## 2018-06-23 NOTE — ED NOTES
Pt has urinal at bedside and is notified that we need a urine sample. Pt verbalizes understanding, call light is within reach and he is instructed to call when he has produced a sample.

## 2018-06-23 NOTE — ED NOTES
Pt presents to the ER with complaints of burning with urination and pressure to the lower middle part of his abdomen, possible bladder spasms. Pt states that he is also seeing blood in the urine with some clots.

## 2018-06-23 NOTE — ED NOTES
Pt is resting in bed with wife at bedside, pt has no complaints at this time. Pt is reminded of need for urine and urinal remains at bedside. Call light is within reach, will continue to monitor

## 2018-06-23 NOTE — ED PROVIDER NOTES
Encounter Date: 6/22/2018    SCRIBE #1 NOTE: I, Pradeep Quintero, am scribing for, and in the presence of, Dr. Fountain .       History     Chief Complaint   Patient presents with    Urinary Retention     since 2pm. nieves catheter removed monday.      Time seen by provider: 9:32 PM    This is a 62 y.o. male who presents with complaint of hematuria that began today. Per wife, he started noticing blood in his urine twice today, and there were clots. Per wife, he had a nieves catheter removed four days ago. His wife called the nurse's hotline and was advised to present to the ED. Patient states he feels a pressure in his lower abdomen, but he is able to void completely. The last time he urinated was prior to arrival and in the ED. Per wife, she picked up Flomax from the pharmacy but he has not complied with it. He admits chills and dysuria, but denies fevers, headaches, dizziness, cough, rhinorrhea, congestion, sore throat, SOB, chest pain, or N/V/D. Patient is on blood thinners.         The history is provided by the patient and the spouse (wife at bedside).     Review of patient's allergies indicates:   Allergen Reactions    Cough syrup [guaifenesin] Other (See Comments)     Past Medical History:   Diagnosis Date    Allergy     Atrial fibrillation     BMI 31.0-31.9,adult 11/25/2014    Cerebrovascular disease 7/25/2016    CHF (congestive heart failure)     CHF (congestive heart failure)     Chronic anticoagulation - pradaxa 11/10/2016    Chronic diastolic heart failure 11/20/2016    CKD (chronic kidney disease), stage III 9/23/2013    Controlled type 2 diabetes with neuropathy 12/16/2014    Coronary artery disease     Diabetes mellitus due to underlying condition with stage 3 chronic kidney disease, without long-term current use of insulin 11/2/2016    Elevated alkaline phosphatase level 8/1/2012    Elevated PSA     negative prostate biopsy 4/11    Erectile dysfunction associated with type 2 diabetes  mellitus     Gallstones 3/20/2013    Generalized tonic-clonic seizure 11/2016    HTN (hypertension), benign 8/1/2012    Hypercholesterolemia 8/1/2012    Microcytic anemia 11/27/2013    Paroxysmal atrial fibrillation 9/23/2013    Postsurgical hypothyroidism 11/27/2013    Right homonymous hemianopsia 2/5/2016    Sickle cell trait     Stroke 1/27/2016    Thyroid cancer     multifocal with six lesions, largest 5mm, treated with surgery and radioactive iodine    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/2016     Past Surgical History:   Procedure Laterality Date    BREAST SURGERY      cyst removal    COLONOSCOPY      CYST REMOVAL      chest    PROSTATE BIOPSY  4/27/11    SKIN BIOPSY      THYROID SURGERY  8/26/09    VASCULAR SURGERY       Family History   Problem Relation Age of Onset    Heart disease Father     Diabetes Father     Hypertension Father     Diabetes Mother     Hypertension Mother     Heart disease Mother     Diabetes Brother     Cancer Sister     Thyroid disease Maternal Aunt     Clotting disorder Neg Hx      Social History   Substance Use Topics    Smoking status: Never Smoker    Smokeless tobacco: Never Used    Alcohol use Yes      Comment: occasionally, none recently     Review of Systems   Constitutional: Positive for chills. Negative for activity change, appetite change, diaphoresis and fever.   HENT: Negative for congestion, rhinorrhea and sore throat.    Eyes: Negative for photophobia and visual disturbance.   Respiratory: Negative for cough, chest tightness and shortness of breath.    Cardiovascular: Negative for chest pain.   Gastrointestinal: Negative for abdominal pain, nausea and vomiting.   Endocrine: Negative for polydipsia, polyphagia and polyuria.   Genitourinary: Positive for dysuria and hematuria. Negative for difficulty urinating and flank pain.   Musculoskeletal: Negative for back pain and neck pain.   Skin: Negative for pallor.   Neurological: Negative for  dizziness, weakness and headaches.   Psychiatric/Behavioral: Negative for confusion.       Physical Exam     Initial Vitals [06/22/18 2125]   BP Pulse Resp Temp SpO2   (!) 147/75 94 16 (!) 100.7 °F (38.2 °C) 96 %      MAP       --         Physical Exam    Nursing note and vitals reviewed.  Constitutional: He appears well-developed and well-nourished. No distress.   Appear older than stated.   HENT:   Head: Normocephalic and atraumatic.   Eyes: EOM are normal. Pupils are equal, round, and reactive to light.   No conjunctival pallor.    Neck: Normal range of motion. Neck supple.   Cardiovascular: Normal rate, regular rhythm and normal heart sounds.   Pulmonary/Chest: Effort normal and breath sounds normal. No respiratory distress.   Abdominal: Soft. Normal appearance and bowel sounds are normal. There is no tenderness.   Obese abdomen. No suprapubic tenderness.    Genitourinary: Penis normal.   Genitourinary Comments: Dry blood at meatus. Normal, circumcised, male genitalia. No testicular tenderness.    Musculoskeletal: Normal range of motion.   Neurological: He is alert and oriented to person, place, and time. He has normal strength. No cranial nerve deficit or sensory deficit.   Skin: Skin is warm and dry.   Psychiatric: His behavior is normal. Judgment and thought content normal.   Flat affect         ED Course   Procedures  Labs Reviewed   URINALYSIS - Abnormal; Notable for the following:        Result Value    Color, UA Red (*)     Appearance, UA Cloudy (*)     Protein, UA 3+ (*)     Ketones, UA Trace (*)     Occult Blood UA 3+ (*)     Nitrite, UA Positive (*)     Leukocytes, UA 2+ (*)     All other components within normal limits   URINALYSIS MICROSCOPIC - Abnormal; Notable for the following:     WBC, UA 24 (*)     Bacteria, UA Many (*)     All other components within normal limits   CULTURE, URINE   CULTURE, URINE          Imaging Results    None          Medical Decision Making:   Initial Assessment:   Urgent  evaluation of 62-year-old gentleman with history of stroke, and atrial fibrillation-on Eliquis, hypertension, hyperlipidemia, and remote thyroid cancer here with concern for blood in his urine per wife.  Patient was seen at [facility] Emergency Department on the 15th of this month for urinary retention, Starr was placed after post void residual >800cc, ua without uti at that time.  Patient followed in Urology Clinic 2 days ago, with removal of Starr, and passed his voiding trial, sent home with Flomax.  Per epic review, patient did have BPH on prior cystoscopy in 2017.  Patient presents today with urinary hesitancy, dysuria, and clots in his urine.  Patient has been voiding without issue, despite triage complaints of possible urinary retention, but wife and patient were concerned about hesitancy, burning discomfort, and hematuria.  Patient denies fevers or chills, no abdominal pain, no vomiting, though low grade temp present.  Patient has no evidence of acute anemia, though I suspect hematuria is likely posttraumatic, or early UTI given recent instrumentation, exacerbated by anticoagulation use.  Clinical Tests:   Lab Tests: Ordered and Reviewed  ED Management:  Urine notable for 24 WBCs, nitrite positive, 2+ leukocytes, 3+ blood new since prio sample.  Given that patient is able to voluntary void, without abdominal distention to suggest retention, will plan to treat with antibiotics, with follow-up Urology.  Patient in with given strict return precautions if bleeding persists, worsens, or patient has persistent fevers, nausea, vomiting, worsening abdominal pain.            Scribe Attestation:   Scribe #1: I performed the above scribed service and the documentation accurately describes the services I performed. I attest to the accuracy of the note.    Attending Attestation:           Physician Attestation for Scribe:  Physician Attestation Statement for Scribe #1: I, Dr. Fountain, reviewed documentation, as  scribed by Pradeep Quintero  in my presence, and it is both accurate and complete.                    Clinical Impression:     1. Gross hematuria    2. Dysuria            Disposition:   Disposition: Discharged  Condition: Frank Fountain MD  06/22/18 7445

## 2018-06-23 NOTE — TELEPHONE ENCOUNTER
"    Reason for Disposition   [1] Unable to urinate (or only a few drops) > 4 hours AND [2] bladder feels very full (e.g., palpable bladder or strong urge to urinate)    Protocols used: ST URINE - BLOOD IN-A-    Alfa's wife, Rut called to say he has been passing dark red urine/blood when he tries to urinate.  For the last two times, it has been only dribbling, with no solid stream, and it is mostly red blood.  He said his urine was pinkish this morning.  He was seen for retention 06/15, catheter placed, then removed on 06/20 by urology in clinic, Roxanna Barclay.  He has not taken his first dose of Flomax yet.  It has been several hours since he actually voided; urine now is intermittent, and dribbling.  He says he tries to go frequently; his wife states "it dribbles blood and urine all the way to the bathroom, all over the house."  Recommended ED now for evaluation.  She will bring him to [facility], as no wait time.  Message to Tyler Jasmine MD and to Ms Barclay. Please contact caller directly with any additional care advice.    "

## 2018-06-24 LAB — BACTERIA UR CULT: NORMAL

## 2018-06-25 ENCOUNTER — TELEPHONE (OUTPATIENT)
Dept: INTERNAL MEDICINE | Facility: CLINIC | Age: 63
End: 2018-06-25

## 2018-06-25 NOTE — TELEPHONE ENCOUNTER
Spoke to patient's wife, tried to schedule an appointment but was unable to do so. I told the her that we would call her back when Laura returns to make an appt

## 2018-06-25 NOTE — TELEPHONE ENCOUNTER
----- Message from Barbara Edouard sent at 6/25/2018 10:10 AM CDT -----  Contact: PT Wife [phone]  Caller is requesting a sooner appointment. Caller declined first available appointment listed below. Caller will not accept being placed on the wait list and is requesting a message be sent to the provider.    When is the next available appointment:  9/5  Did you offer to schedule the next available appt and put the patient on the wait list?:     What visit type: EP/ ER Follow Up  Symptoms:    Patient preference of timeframe to be scheduled:    What is the reason the patient is requesting a sooner appointment? (insurance terminating, changing jobs):  Pt has been to ER 3 times this month and was told to make an appt with PCP soon.  Would you prefer an answer via [employer]?:  No  Comments:

## 2018-06-27 ENCOUNTER — HOSPITAL ENCOUNTER (OUTPATIENT)
Facility: HOSPITAL | Age: 63
Discharge: HOME | End: 2018-06-28
Attending: EMERGENCY MEDICINE | Admitting: EMERGENCY MEDICINE
Payer: COMMERCIAL

## 2018-06-27 DIAGNOSIS — E11.65 TYPE 2 DIABETES MELLITUS WITH HYPERGLYCEMIA, WITH LONG-TERM CURRENT USE OF INSULIN: ICD-10-CM

## 2018-06-27 DIAGNOSIS — Z86.73 HISTORY OF CVA (CEREBROVASCULAR ACCIDENT): ICD-10-CM

## 2018-06-27 DIAGNOSIS — E66.9 OBESITY (BMI 30.0-34.9): ICD-10-CM

## 2018-06-27 DIAGNOSIS — I48.20 CHRONIC ATRIAL FIBRILLATION: Primary | ICD-10-CM

## 2018-06-27 DIAGNOSIS — I10 ESSENTIAL HYPERTENSION: ICD-10-CM

## 2018-06-27 DIAGNOSIS — Z79.4 TYPE 2 DIABETES MELLITUS WITH HYPERGLYCEMIA, WITH LONG-TERM CURRENT USE OF INSULIN: ICD-10-CM

## 2018-06-27 DIAGNOSIS — E89.0 POSTSURGICAL HYPOTHYROIDISM: ICD-10-CM

## 2018-06-27 DIAGNOSIS — R79.89 ELEVATED D-DIMER: ICD-10-CM

## 2018-06-27 DIAGNOSIS — N18.30 CHRONIC KIDNEY DISEASE, STAGE III (MODERATE): ICD-10-CM

## 2018-06-27 DIAGNOSIS — R07.9 CHEST PAIN: ICD-10-CM

## 2018-06-27 DIAGNOSIS — I48.0 PAROXYSMAL ATRIAL FIBRILLATION: Chronic | ICD-10-CM

## 2018-06-27 LAB
ALBUMIN SERPL BCP-MCNC: 3.1 G/DL
ALP SERPL-CCNC: 110 U/L
ALT SERPL W/O P-5'-P-CCNC: 30 U/L
ANION GAP SERPL CALC-SCNC: 10 MMOL/L
AST SERPL-CCNC: 36 U/L
BASOPHILS # BLD AUTO: 0.11 K/UL
BASOPHILS NFR BLD: 0.9 %
BILIRUB SERPL-MCNC: 0.9 MG/DL
BNP SERPL-MCNC: 457 PG/ML
BUN SERPL-MCNC: 27 MG/DL
CALCIUM SERPL-MCNC: 9 MG/DL
CHLORIDE SERPL-SCNC: 110 MMOL/L
CO2 SERPL-SCNC: 20 MMOL/L
CREAT SERPL-MCNC: 2.1 MG/DL
D DIMER PPP IA.FEU-MCNC: 1.45 MG/L FEU
DIFFERENTIAL METHOD: ABNORMAL
EOSINOPHIL # BLD AUTO: 0.4 K/UL
EOSINOPHIL NFR BLD: 3 %
ERYTHROCYTE [DISTWIDTH] IN BLOOD BY AUTOMATED COUNT: 17.5 %
EST. GFR  (AFRICAN AMERICAN): 37.3 ML/MIN/1.73 M^2
EST. GFR  (NON AFRICAN AMERICAN): 32.3 ML/MIN/1.73 M^2
GLUCOSE SERPL-MCNC: 110 MG/DL
HCT VFR BLD AUTO: 28.3 %
HGB BLD-MCNC: 10.2 G/DL
IMM GRANULOCYTES # BLD AUTO: 0.08 K/UL
IMM GRANULOCYTES NFR BLD AUTO: 0.7 %
LYMPHOCYTES # BLD AUTO: 2.5 K/UL
LYMPHOCYTES NFR BLD: 20.8 %
MAGNESIUM SERPL-MCNC: 2.3 MG/DL
MCH RBC QN AUTO: 27.9 PG
MCHC RBC AUTO-ENTMCNC: 36 G/DL
MCV RBC AUTO: 77 FL
MONOCYTES # BLD AUTO: 1.5 K/UL
MONOCYTES NFR BLD: 13 %
NEUTROPHILS # BLD AUTO: 7.3 K/UL
NEUTROPHILS NFR BLD: 61.6 %
NRBC BLD-RTO: 1 /100 WBC
PHOSPHATE SERPL-MCNC: 3.4 MG/DL
PLATELET # BLD AUTO: 247 K/UL
PMV BLD AUTO: 12.1 FL
POTASSIUM SERPL-SCNC: 4.7 MMOL/L
PROT SERPL-MCNC: 7.7 G/DL
RBC # BLD AUTO: 3.66 M/UL
SODIUM SERPL-SCNC: 140 MMOL/L
TROPONIN I SERPL DL<=0.01 NG/ML-MCNC: 0.01 NG/ML
WBC # BLD AUTO: 11.82 K/UL

## 2018-06-27 PROCEDURE — 80053 COMPREHEN METABOLIC PANEL: CPT

## 2018-06-27 PROCEDURE — 93010 ELECTROCARDIOGRAM REPORT: CPT | Mod: ,,, | Performed by: INTERNAL MEDICINE

## 2018-06-27 PROCEDURE — 25000003 PHARM REV CODE 250: Performed by: STUDENT IN AN ORGANIZED HEALTH CARE EDUCATION/TRAINING PROGRAM

## 2018-06-27 PROCEDURE — 85025 COMPLETE CBC W/AUTO DIFF WBC: CPT

## 2018-06-27 PROCEDURE — G0378 HOSPITAL OBSERVATION PER HR: HCPCS

## 2018-06-27 PROCEDURE — 85379 FIBRIN DEGRADATION QUANT: CPT

## 2018-06-27 PROCEDURE — 51701 INSERT BLADDER CATHETER: CPT

## 2018-06-27 PROCEDURE — 84484 ASSAY OF TROPONIN QUANT: CPT | Mod: 91

## 2018-06-27 PROCEDURE — 83036 HEMOGLOBIN GLYCOSYLATED A1C: CPT

## 2018-06-27 PROCEDURE — 83735 ASSAY OF MAGNESIUM: CPT

## 2018-06-27 PROCEDURE — 51798 US URINE CAPACITY MEASURE: CPT

## 2018-06-27 PROCEDURE — 84100 ASSAY OF PHOSPHORUS: CPT

## 2018-06-27 PROCEDURE — 99284 EMERGENCY DEPT VISIT MOD MDM: CPT | Mod: 25

## 2018-06-27 PROCEDURE — 94761 N-INVAS EAR/PLS OXIMETRY MLT: CPT

## 2018-06-27 PROCEDURE — 99284 EMERGENCY DEPT VISIT MOD MDM: CPT | Mod: ,,, | Performed by: EMERGENCY MEDICINE

## 2018-06-27 PROCEDURE — 83880 ASSAY OF NATRIURETIC PEPTIDE: CPT

## 2018-06-27 RX ORDER — ASPIRIN 325 MG
325 TABLET ORAL
Status: COMPLETED | OUTPATIENT
Start: 2018-06-27 | End: 2018-06-27

## 2018-06-27 RX ADMIN — ASPIRIN 325 MG ORAL TABLET 325 MG: 325 PILL ORAL at 08:06

## 2018-06-28 ENCOUNTER — NURSE TRIAGE (OUTPATIENT)
Dept: ADMINISTRATIVE | Facility: CLINIC | Age: 63
End: 2018-06-28

## 2018-06-28 VITALS
HEART RATE: 90 BPM | TEMPERATURE: 98 F | OXYGEN SATURATION: 96 % | RESPIRATION RATE: 20 BRPM | SYSTOLIC BLOOD PRESSURE: 140 MMHG | HEIGHT: 70 IN | DIASTOLIC BLOOD PRESSURE: 78 MMHG | BODY MASS INDEX: 31.47 KG/M2 | WEIGHT: 219.81 LBS

## 2018-06-28 PROBLEM — R79.89 ELEVATED D-DIMER: Status: ACTIVE | Noted: 2018-06-28

## 2018-06-28 PROBLEM — N18.30 CHRONIC KIDNEY DISEASE, STAGE III (MODERATE): Status: ACTIVE | Noted: 2018-06-28

## 2018-06-28 PROBLEM — N30.01 ACUTE CYSTITIS WITH HEMATURIA: Status: ACTIVE | Noted: 2018-06-28

## 2018-06-28 PROBLEM — Z79.4 TYPE 2 DIABETES MELLITUS WITH HYPERGLYCEMIA, WITH LONG-TERM CURRENT USE OF INSULIN: Status: ACTIVE | Noted: 2017-06-28

## 2018-06-28 LAB
ANION GAP SERPL CALC-SCNC: 7 MMOL/L
AORTIC VALVE REGURGITATION: NORMAL
BACTERIA #/AREA URNS AUTO: ABNORMAL /HPF
BASOPHILS # BLD AUTO: 0.1 K/UL
BASOPHILS NFR BLD: 0.9 %
BILIRUB UR QL STRIP: NEGATIVE
BUN SERPL-MCNC: 23 MG/DL
CALCIUM SERPL-MCNC: 8.5 MG/DL
CHLORIDE SERPL-SCNC: 114 MMOL/L
CHOLEST SERPL-MCNC: 95 MG/DL
CHOLEST/HDLC SERPL: 3.3 {RATIO}
CLARITY UR REFRACT.AUTO: ABNORMAL
CO2 SERPL-SCNC: 20 MMOL/L
COLOR UR AUTO: ABNORMAL
CREAT SERPL-MCNC: 1.8 MG/DL
DIASTOLIC DYSFUNCTION: NO
DIFFERENTIAL METHOD: ABNORMAL
EOSINOPHIL # BLD AUTO: 0.5 K/UL
EOSINOPHIL NFR BLD: 4.2 %
ERYTHROCYTE [DISTWIDTH] IN BLOOD BY AUTOMATED COUNT: 17.2 %
EST. GFR  (AFRICAN AMERICAN): 45 ML/MIN/1.73 M^2
EST. GFR  (NON AFRICAN AMERICAN): 38.9 ML/MIN/1.73 M^2
ESTIMATED AVG GLUCOSE: 108 MG/DL
GLOBAL PERICARDIAL EFFUSION: NORMAL
GLUCOSE SERPL-MCNC: 129 MG/DL
GLUCOSE UR QL STRIP: NEGATIVE
HBA1C MFR BLD HPLC: 5.4 %
HCT VFR BLD AUTO: 25.4 %
HDLC SERPL-MCNC: 29 MG/DL
HDLC SERPL: 30.5 %
HGB BLD-MCNC: 9.2 G/DL
HGB UR QL STRIP: ABNORMAL
HYALINE CASTS UR QL AUTO: 0 /LPF
IMM GRANULOCYTES # BLD AUTO: 0.03 K/UL
IMM GRANULOCYTES NFR BLD AUTO: 0.3 %
KETONES UR QL STRIP: NEGATIVE
LDLC SERPL CALC-MCNC: 47.8 MG/DL
LEUKOCYTE ESTERASE UR QL STRIP: NEGATIVE
LYMPHOCYTES # BLD AUTO: 3 K/UL
LYMPHOCYTES NFR BLD: 28.2 %
MAGNESIUM SERPL-MCNC: 2.1 MG/DL
MCH RBC QN AUTO: 28.1 PG
MCHC RBC AUTO-ENTMCNC: 36.2 G/DL
MCV RBC AUTO: 78 FL
MICROSCOPIC COMMENT: ABNORMAL
MONOCYTES # BLD AUTO: 1.4 K/UL
MONOCYTES NFR BLD: 13.5 %
NEUTROPHILS # BLD AUTO: 5.6 K/UL
NEUTROPHILS NFR BLD: 52.9 %
NITRITE UR QL STRIP: NEGATIVE
NONHDLC SERPL-MCNC: 66 MG/DL
NRBC BLD-RTO: 1 /100 WBC
PH UR STRIP: 6 [PH] (ref 5–8)
PHOSPHATE SERPL-MCNC: 3.1 MG/DL
PLATELET # BLD AUTO: 291 K/UL
PMV BLD AUTO: 11.7 FL
POCT GLUCOSE: 128 MG/DL (ref 70–110)
POCT GLUCOSE: 136 MG/DL (ref 70–110)
POCT GLUCOSE: 162 MG/DL (ref 70–110)
POCT GLUCOSE: 164 MG/DL (ref 70–110)
POTASSIUM SERPL-SCNC: 4.2 MMOL/L
PROT UR QL STRIP: ABNORMAL
RBC # BLD AUTO: 3.27 M/UL
RBC #/AREA URNS AUTO: >100 /HPF (ref 0–4)
RETIRED EF AND QEF - SEE NOTES: 63 (ref 55–65)
SODIUM SERPL-SCNC: 141 MMOL/L
SP GR UR STRIP: 1.01 (ref 1–1.03)
TRIGL SERPL-MCNC: 91 MG/DL
TROPONIN I SERPL DL<=0.01 NG/ML-MCNC: 0.01 NG/ML
TROPONIN I SERPL DL<=0.01 NG/ML-MCNC: <0.006 NG/ML
TSH SERPL DL<=0.005 MIU/L-ACNC: 1.71 UIU/ML
URN SPEC COLLECT METH UR: ABNORMAL
UROBILINOGEN UR STRIP-ACNC: NEGATIVE EU/DL
WBC # BLD AUTO: 10.6 K/UL
WBC #/AREA URNS AUTO: 10 /HPF (ref 0–5)

## 2018-06-28 PROCEDURE — 93351 STRESS TTE COMPLETE: CPT

## 2018-06-28 PROCEDURE — 63600175 PHARM REV CODE 636 W HCPCS: Performed by: NURSE PRACTITIONER

## 2018-06-28 PROCEDURE — 93325 DOPPLER ECHO COLOR FLOW MAPG: CPT | Mod: 26,,, | Performed by: INTERNAL MEDICINE

## 2018-06-28 PROCEDURE — 25000003 PHARM REV CODE 250: Performed by: PHYSICIAN ASSISTANT

## 2018-06-28 PROCEDURE — 84100 ASSAY OF PHOSPHORUS: CPT

## 2018-06-28 PROCEDURE — 80061 LIPID PANEL: CPT

## 2018-06-28 PROCEDURE — 81001 URINALYSIS AUTO W/SCOPE: CPT

## 2018-06-28 PROCEDURE — 82962 GLUCOSE BLOOD TEST: CPT

## 2018-06-28 PROCEDURE — 84484 ASSAY OF TROPONIN QUANT: CPT

## 2018-06-28 PROCEDURE — 80048 BASIC METABOLIC PNL TOTAL CA: CPT

## 2018-06-28 PROCEDURE — 83735 ASSAY OF MAGNESIUM: CPT

## 2018-06-28 PROCEDURE — 85025 COMPLETE CBC W/AUTO DIFF WBC: CPT

## 2018-06-28 PROCEDURE — 93351 STRESS TTE COMPLETE: CPT | Mod: 26,,, | Performed by: INTERNAL MEDICINE

## 2018-06-28 PROCEDURE — G0378 HOSPITAL OBSERVATION PER HR: HCPCS

## 2018-06-28 PROCEDURE — 84443 ASSAY THYROID STIM HORMONE: CPT

## 2018-06-28 PROCEDURE — 99236 HOSP IP/OBS SAME DATE HI 85: CPT | Mod: ,,, | Performed by: NURSE PRACTITIONER

## 2018-06-28 PROCEDURE — 36415 COLL VENOUS BLD VENIPUNCTURE: CPT

## 2018-06-28 PROCEDURE — 93320 DOPPLER ECHO COMPLETE: CPT | Mod: 26,,, | Performed by: INTERNAL MEDICINE

## 2018-06-28 PROCEDURE — 99220 PR INITIAL OBSERVATION CARE,LEVL III: CPT | Mod: ,,, | Performed by: PHYSICIAN ASSISTANT

## 2018-06-28 RX ORDER — NITROGLYCERIN 0.4 MG/1
0.4 TABLET SUBLINGUAL EVERY 5 MIN PRN
Status: DISCONTINUED | OUTPATIENT
Start: 2018-06-28 | End: 2018-06-28 | Stop reason: HOSPADM

## 2018-06-28 RX ORDER — IPRATROPIUM BROMIDE AND ALBUTEROL SULFATE 2.5; .5 MG/3ML; MG/3ML
3 SOLUTION RESPIRATORY (INHALATION) EVERY 4 HOURS PRN
Status: DISCONTINUED | OUTPATIENT
Start: 2018-06-28 | End: 2018-06-28 | Stop reason: HOSPADM

## 2018-06-28 RX ORDER — LACOSAMIDE 50 MG/1
50 TABLET ORAL EVERY 12 HOURS
Start: 2018-06-28 | End: 2019-06-28

## 2018-06-28 RX ORDER — IBUPROFEN 200 MG
16 TABLET ORAL
Status: DISCONTINUED | OUTPATIENT
Start: 2018-06-28 | End: 2018-06-28 | Stop reason: HOSPADM

## 2018-06-28 RX ORDER — ACETAMINOPHEN 500 MG
1000 TABLET ORAL EVERY 8 HOURS PRN
Status: DISCONTINUED | OUTPATIENT
Start: 2018-06-28 | End: 2018-06-28 | Stop reason: HOSPADM

## 2018-06-28 RX ORDER — RAMELTEON 8 MG/1
8 TABLET ORAL NIGHTLY PRN
Status: DISCONTINUED | OUTPATIENT
Start: 2018-06-28 | End: 2018-06-28 | Stop reason: HOSPADM

## 2018-06-28 RX ORDER — AMOXICILLIN 250 MG
1 CAPSULE ORAL 2 TIMES DAILY
Status: DISCONTINUED | OUTPATIENT
Start: 2018-06-28 | End: 2018-06-28 | Stop reason: HOSPADM

## 2018-06-28 RX ORDER — ONDANSETRON 8 MG/1
8 TABLET, ORALLY DISINTEGRATING ORAL EVERY 8 HOURS PRN
Status: DISCONTINUED | OUTPATIENT
Start: 2018-06-28 | End: 2018-06-28 | Stop reason: HOSPADM

## 2018-06-28 RX ORDER — INSULIN ASPART 100 [IU]/ML
8 INJECTION, SOLUTION INTRAVENOUS; SUBCUTANEOUS
Status: DISCONTINUED | OUTPATIENT
Start: 2018-06-28 | End: 2018-06-28

## 2018-06-28 RX ORDER — GLUCAGON 1 MG
1 KIT INJECTION
Status: DISCONTINUED | OUTPATIENT
Start: 2018-06-28 | End: 2018-06-28 | Stop reason: HOSPADM

## 2018-06-28 RX ORDER — LACOSAMIDE 50 MG/1
50 TABLET ORAL DAILY
Status: DISCONTINUED | OUTPATIENT
Start: 2018-06-28 | End: 2018-06-28 | Stop reason: HOSPADM

## 2018-06-28 RX ORDER — IBUPROFEN 200 MG
24 TABLET ORAL
Status: DISCONTINUED | OUTPATIENT
Start: 2018-06-28 | End: 2018-06-28 | Stop reason: HOSPADM

## 2018-06-28 RX ORDER — ACETAMINOPHEN 325 MG/1
650 TABLET ORAL EVERY 8 HOURS PRN
Status: DISCONTINUED | OUTPATIENT
Start: 2018-06-28 | End: 2018-06-28 | Stop reason: HOSPADM

## 2018-06-28 RX ORDER — LOSARTAN POTASSIUM 50 MG/1
100 TABLET ORAL DAILY
Status: DISCONTINUED | OUTPATIENT
Start: 2018-06-28 | End: 2018-06-28 | Stop reason: HOSPADM

## 2018-06-28 RX ORDER — ACETAMINOPHEN 325 MG/1
650 TABLET ORAL EVERY 4 HOURS PRN
Status: DISCONTINUED | OUTPATIENT
Start: 2018-06-28 | End: 2018-06-28 | Stop reason: HOSPADM

## 2018-06-28 RX ORDER — NAPROXEN SODIUM 220 MG/1
81 TABLET, FILM COATED ORAL DAILY
Status: DISCONTINUED | OUTPATIENT
Start: 2018-06-28 | End: 2018-06-28 | Stop reason: HOSPADM

## 2018-06-28 RX ORDER — LEVOTHYROXINE SODIUM 100 UG/1
200 TABLET ORAL
Status: DISCONTINUED | OUTPATIENT
Start: 2018-06-28 | End: 2018-06-28 | Stop reason: HOSPADM

## 2018-06-28 RX ORDER — TAMSULOSIN HYDROCHLORIDE 0.4 MG/1
0.4 CAPSULE ORAL NIGHTLY
Status: DISCONTINUED | OUTPATIENT
Start: 2018-06-28 | End: 2018-06-28 | Stop reason: HOSPADM

## 2018-06-28 RX ORDER — AMLODIPINE BESYLATE 10 MG/1
10 TABLET ORAL DAILY
Status: DISCONTINUED | OUTPATIENT
Start: 2018-06-28 | End: 2018-06-28 | Stop reason: HOSPADM

## 2018-06-28 RX ORDER — CARVEDILOL 6.25 MG/1
6.25 TABLET ORAL 2 TIMES DAILY
Status: DISCONTINUED | OUTPATIENT
Start: 2018-06-29 | End: 2018-06-28 | Stop reason: HOSPADM

## 2018-06-28 RX ORDER — FOLIC ACID 1 MG/1
1000 TABLET ORAL DAILY
Status: DISCONTINUED | OUTPATIENT
Start: 2018-06-28 | End: 2018-06-28 | Stop reason: HOSPADM

## 2018-06-28 RX ORDER — FUROSEMIDE 10 MG/ML
40 INJECTION INTRAMUSCULAR; INTRAVENOUS ONCE
Status: COMPLETED | OUTPATIENT
Start: 2018-06-28 | End: 2018-06-28

## 2018-06-28 RX ORDER — INSULIN ASPART 100 [IU]/ML
7 INJECTION, SOLUTION INTRAVENOUS; SUBCUTANEOUS
Status: DISCONTINUED | OUTPATIENT
Start: 2018-06-28 | End: 2018-06-28 | Stop reason: HOSPADM

## 2018-06-28 RX ORDER — PROCHLORPERAZINE EDISYLATE 5 MG/ML
5 INJECTION INTRAMUSCULAR; INTRAVENOUS EVERY 6 HOURS PRN
Status: DISCONTINUED | OUTPATIENT
Start: 2018-06-28 | End: 2018-06-28 | Stop reason: HOSPADM

## 2018-06-28 RX ORDER — SODIUM CHLORIDE 0.9 % (FLUSH) 0.9 %
5 SYRINGE (ML) INJECTION
Status: DISCONTINUED | OUTPATIENT
Start: 2018-06-28 | End: 2018-06-28 | Stop reason: HOSPADM

## 2018-06-28 RX ORDER — INSULIN ASPART 100 [IU]/ML
0-5 INJECTION, SOLUTION INTRAVENOUS; SUBCUTANEOUS
Status: DISCONTINUED | OUTPATIENT
Start: 2018-06-28 | End: 2018-06-28 | Stop reason: HOSPADM

## 2018-06-28 RX ORDER — FUROSEMIDE 40 MG/1
40 TABLET ORAL 2 TIMES DAILY
Status: DISCONTINUED | OUTPATIENT
Start: 2018-06-28 | End: 2018-06-28

## 2018-06-28 RX ORDER — ATORVASTATIN CALCIUM 20 MG/1
40 TABLET, FILM COATED ORAL DAILY
Status: DISCONTINUED | OUTPATIENT
Start: 2018-06-28 | End: 2018-06-28 | Stop reason: HOSPADM

## 2018-06-28 RX ORDER — CEPHALEXIN 250 MG/1
500 CAPSULE ORAL EVERY 6 HOURS
Status: DISCONTINUED | OUTPATIENT
Start: 2018-06-28 | End: 2018-06-28 | Stop reason: HOSPADM

## 2018-06-28 RX ORDER — LEVETIRACETAM 750 MG/1
1500 TABLET ORAL 2 TIMES DAILY
Status: DISCONTINUED | OUTPATIENT
Start: 2018-06-28 | End: 2018-06-28 | Stop reason: HOSPADM

## 2018-06-28 RX ORDER — LACOSAMIDE 50 MG/1
100 TABLET ORAL NIGHTLY
Status: DISCONTINUED | OUTPATIENT
Start: 2018-06-28 | End: 2018-06-28 | Stop reason: HOSPADM

## 2018-06-28 RX ADMIN — LEVETIRACETAM 1500 MG: 750 TABLET ORAL at 08:06

## 2018-06-28 RX ADMIN — LACOSAMIDE 50 MG: 50 TABLET, FILM COATED ORAL at 08:06

## 2018-06-28 RX ADMIN — ATORVASTATIN CALCIUM 40 MG: 20 TABLET, FILM COATED ORAL at 08:06

## 2018-06-28 RX ADMIN — LOSARTAN POTASSIUM 100 MG: 50 TABLET ORAL at 08:06

## 2018-06-28 RX ADMIN — LEVOTHYROXINE SODIUM 200 MCG: 100 TABLET ORAL at 05:06

## 2018-06-28 RX ADMIN — AMLODIPINE BESYLATE 10 MG: 10 TABLET ORAL at 08:06

## 2018-06-28 RX ADMIN — FUROSEMIDE 40 MG: 40 TABLET ORAL at 08:06

## 2018-06-28 RX ADMIN — FUROSEMIDE 40 MG: 10 INJECTION, SOLUTION INTRAMUSCULAR; INTRAVENOUS at 09:06

## 2018-06-28 RX ADMIN — LACOSAMIDE 100 MG: 50 TABLET, FILM COATED ORAL at 02:06

## 2018-06-28 RX ADMIN — FOLIC ACID 1000 MCG: 1 TABLET ORAL at 08:06

## 2018-06-28 RX ADMIN — APIXABAN 5 MG: 5 TABLET, FILM COATED ORAL at 08:06

## 2018-06-28 RX ADMIN — ASPIRIN 81 MG CHEWABLE TABLET 81 MG: 81 TABLET CHEWABLE at 08:06

## 2018-06-28 RX ADMIN — CEPHALEXIN 500 MG: 250 CAPSULE ORAL at 05:06

## 2018-06-28 RX ADMIN — CEPHALEXIN 500 MG: 250 CAPSULE ORAL at 11:06

## 2018-06-28 NOTE — ASSESSMENT & PLAN NOTE
- Elevated at 1.45. Unclear etiology or significance  - low suspicion of DVT  - Well's Score 0  - Patient compliant with eliquis  - recent sickle cell crisis and UTI

## 2018-06-28 NOTE — ASSESSMENT & PLAN NOTE
- Elevated at 1.45. Unclear etiology or significance  - low suspicion of DVT  - Well's Score 0  - Patient compliant with eliquis

## 2018-06-28 NOTE — NURSING
Pt to floor via stretcher from ER in stable condition, wife at bedside. Pt urinated 250 ml of blood tinged urine. Specimen collected for UA. Pt assessed per flowsheet.m Pt denies chest pain at the present. Telemetry monitor in place. Pt oriented to room. Call light in reach. Fall precautions discussed and maintained

## 2018-06-28 NOTE — ASSESSMENT & PLAN NOTE
- compensated  - , elevated, will give IV lasix x1 dose, notable improvement already in breathing  - Patient denies cough or worsening SOB, no O2 requirements  - CXR shows pulm edema,  Prominence and stable enlargement of cardiomediastinum  - Echo 4/27/2018 shows EF 60-65%  - Continue home meds carvedilol, lasix, and losartan  - needs better b/p control for sure in addition to weight loss

## 2018-06-28 NOTE — HPI
Alfa Christy III is a 62M with CAD, CKD, HLD, chronic Afib on eliquis, HTN, HFpEF, IDDM, hx of CVA, seizures, hypothyroidism, sickle cell trait, BPH, obesity who presents w/ chest pain. Patient states chest pain began around  1300 while watching television. Patient describes the pain as sharp and burning, located at the left sternal border that extends to just below ribs, rated as a 6-7/10. Does not radiate down arm or up neck. Denies changes in pain with eating/drinking, position, deep inspiration, or exertion. He initially believed the pain was 2/2 GERD and walked around in an attempt to alleviate the pain w/o relief. The pain persisted for a few hours before he as brought to the ED. He denies taking any medications to alleviate the pain. He states his pain was alleviated by aspirin 325 mg given in ED. He does not smoke, reports his father had MI but  of CHF in his 50s.    Patient endorses increased dizziness w/ standing/bending since initiation of Flomax, fatigue x1 week, chills, minimally increased edema in BLE, and decreased activity over the last 3 weeks due to recent SS trait crisis and UTI. Patient denies any current CP, change SOB/AYALA, unilateral calf swelling or pain, recent surgery , chest trauma, hemoptysis, malignancy, or fever. He reports compliance with eliquis. Patient has not taken ASA in 4 days due to recent UTI/hematuria.     ED workup remarkable for EKG without new ischemic changes, trop negative x2,  (near baseline), D-Dimer 1.45, CXR without acute change.

## 2018-06-28 NOTE — PLAN OF CARE
Problem: Cardiac: Heart Failure (Adult)  Intervention: Prevent/Manage DVT/VTE Risk  Pt instructed on meds and devices to prevent blood clots. Pt states understands.

## 2018-06-28 NOTE — PROGRESS NOTES
Pt in bed with eyes closed, easily aroused. Pt medicated with scheduled morning meds. Pt denies chest pain. Pt has involuntary tremors/ shakes when moving. Pt reminded of nothing by mouth for Cardiac procedure today. Fall precautions maintained. Call light in reach. No distress noted.

## 2018-06-28 NOTE — ASSESSMENT & PLAN NOTE
- compensated  - , near baseline  - Patient denies cough or worsening SOB, no O2 requirements  - CXR shows slight increases int. Prominence and stable enlargement of cardiomediastinum  - Echo 4/27/2018 shows EF 60-65%  - Continue home meds carvedilol, lasix, and losartan

## 2018-06-28 NOTE — PLAN OF CARE
Problem: Patient Care Overview  Goal: Plan of Care Review  Outcome: Ongoing (interventions implemented as appropriate)  Plan of care discussed with pt, Meds discussed with pt.. Fall precautions maintained. Call light in reach. NPO status discussed with pt due to possible Cardiac testing in the morning

## 2018-06-28 NOTE — PLAN OF CARE
Problem: Cardiac: Heart Failure (Adult)  Goal: Signs and Symptoms of Listed Potential Problems Will be Absent, Minimized or Managed (Cardiac: Heart Failure)  Signs and symptoms of listed potential problems will be absent, minimized or managed by discharge/transition of care (reference Cardiac: Heart Failure (Adult) CPG).   Outcome: Ongoing (interventions implemented as appropriate)  Pt instructed on CHF and ways to manage the diagnosis.

## 2018-06-28 NOTE — ED PROVIDER NOTES
Encounter Date: 6/27/2018       History     Chief Complaint   Patient presents with    Chest Pain     reports left CP rated as 7/10, states that pain does not radiate, denies SOB, reports hx of afib, denies hx of stents or MI      62 years old male patient with hx of Afib, CKD, DM-2 , HTN, CVA 2016 and diastolic heat failure presented with cheat pain started around 6:30, central crushing, constant, 7/10 in severity, non radiating, came in at rest, not related to position or food,associated with shortness of breath, patient feel cold, had chills, no fever. He is on apixaban, was Asprin stopped 4 days ago per wife because he had haematuria which resolved, he has UTI and still on abx, otherwise denies palpitation, fever, and GI symptoms.Patient reported dizziness think it's related to Flomax which was started 5 days ago.           Review of patient's allergies indicates:   Allergen Reactions    Cough syrup [guaifenesin] Other (See Comments)     Past Medical History:   Diagnosis Date    Allergy     Atrial fibrillation     BMI 31.0-31.9,adult 11/25/2014    Cerebrovascular disease 7/25/2016    CHF (congestive heart failure)     CHF (congestive heart failure)     Chronic anticoagulation - pradaxa 11/10/2016    Chronic diastolic heart failure 11/20/2016    CKD (chronic kidney disease), stage III 9/23/2013    Controlled type 2 diabetes with neuropathy 12/16/2014    Coronary artery disease     Diabetes mellitus due to underlying condition with stage 3 chronic kidney disease, without long-term current use of insulin 11/2/2016    Elevated alkaline phosphatase level 8/1/2012    Elevated PSA     negative prostate biopsy 4/11    Erectile dysfunction associated with type 2 diabetes mellitus     Gallstones 3/20/2013    Generalized tonic-clonic seizure 11/2016    HTN (hypertension), benign 8/1/2012    Hypercholesterolemia 8/1/2012    Microcytic anemia 11/27/2013    Paroxysmal atrial fibrillation 9/23/2013     Postsurgical hypothyroidism 11/27/2013    Right homonymous hemianopsia 2/5/2016    Sickle cell trait     Stroke 1/27/2016    Thyroid cancer     multifocal with six lesions, largest 5mm, treated with surgery and radioactive iodine    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/2016     Past Surgical History:   Procedure Laterality Date    BREAST SURGERY      cyst removal    COLONOSCOPY      CYST REMOVAL      chest    PROSTATE BIOPSY  4/27/11    SKIN BIOPSY      THYROID SURGERY  8/26/09    VASCULAR SURGERY       Family History   Problem Relation Age of Onset    Heart disease Father     Diabetes Father     Hypertension Father     Diabetes Mother     Hypertension Mother     Heart disease Mother     Diabetes Brother     Cancer Sister     Thyroid disease Maternal Aunt     Clotting disorder Neg Hx      Social History   Substance Use Topics    Smoking status: Never Smoker    Smokeless tobacco: Never Used    Alcohol use Yes      Comment: occasionally, none recently     Review of Systems   Constitutional: Positive for activity change and chills. Negative for fever.   HENT: Negative for congestion and sore throat.    Eyes: Negative for photophobia and visual disturbance.   Respiratory: Positive for shortness of breath. Negative for cough.    Cardiovascular: Positive for chest pain and leg swelling.   Gastrointestinal: Negative for abdominal distention, nausea and vomiting.   Genitourinary: Positive for difficulty urinating. Negative for flank pain.   Musculoskeletal: Negative for arthralgias and gait problem.   Skin: Negative for color change and rash.   Neurological: Positive for dizziness. Negative for headaches.   Hematological: Negative for adenopathy. Does not bruise/bleed easily.   Psychiatric/Behavioral: Negative for agitation and confusion.       Physical Exam     Initial Vitals [06/27/18 1929]   BP Pulse Resp Temp SpO2   (!) 174/74 78 18 98.9 °F (37.2 °C) 97 %      MAP       --         Physical  Exam    Vitals reviewed.  Constitutional: He appears well-developed and well-nourished. He is not diaphoretic. No distress.   HENT:   Head: Normocephalic and atraumatic.   Mouth/Throat: No oropharyngeal exudate.   Eyes: Conjunctivae are normal. Pupils are equal, round, and reactive to light.   Neck: Neck supple.   Cardiovascular: Normal rate. An irregularly irregular rhythm present.   No murmur heard.  Pulmonary/Chest: Breath sounds normal. No respiratory distress. He has no wheezes.   Abdominal: Soft. Bowel sounds are normal. He exhibits no distension. There is no tenderness.   Musculoskeletal: He exhibits edema (LE +2 ).   Skin: No rash noted. No erythema.   Large mole in left back    Psychiatric: He has a normal mood and affect. Thought content normal.         ED Course   Procedures  Labs Reviewed   CBC W/ AUTO DIFFERENTIAL - Abnormal; Notable for the following:        Result Value    RBC 3.66 (*)     Hemoglobin 10.2 (*)     Hematocrit 28.3 (*)     MCV 77 (*)     RDW 17.5 (*)     Immature Granulocytes 0.7 (*)     Immature Grans (Abs) 0.08 (*)     Mono # 1.5 (*)     nRBC 1 (*)     All other components within normal limits   MAGNESIUM   PHOSPHORUS   COMPREHENSIVE METABOLIC PANEL   TROPONIN I   B-TYPE NATRIURETIC PEPTIDE   D DIMER, QUANTITATIVE     EKG Readings: (Independently Interpreted)   Initial Reading: No STEMI. Rhythm: Atrial Fibrillation. Heart Rate: 82. Ectopy: No Ectopy. Conduction: Normal. ST Segment Depression: V1, V2 and V3. Clinical Impression: Atrial Fibrillation       Imaging Results    None                APC / Resident Notes:   62 years old with several medical condition presented with chest pain, EKG showed AFIB no acute ST changes, first troponin -ve, elevated d-dimer but patient already on apixaban, chest pain improved with ASA, placed in observation.   HEART Score for Major Cardiac Events: 6 points         Attending Attestation:   Physician Attestation Statement for Resident:  As the  supervising MD   Physician Attestation Statement: I have personally seen and examined this patient.    As the supervising MD I agree with the above PE.    As the supervising MD I agree with the above treatment, course, plan, and disposition.   -: Concerning hx for chest pain, Will need obs.                        Clinical Impression:   The primary encounter diagnosis was Chronic atrial fibrillation. A diagnosis of Chest pain was also pertinent to this visit.      Disposition:   Disposition: Placed in Observation  Condition: Stable                        EkCATHY Rosales  Resident  06/28/18 0004       Wild Malave MD  06/28/18 [id number]

## 2018-06-28 NOTE — ASSESSMENT & PLAN NOTE
-EKG shows Afib w/ no ischemic changes, rate 82 BPM  -CHADS2 VA2S score of 7 on anticoag  -Continue Eliquis  - maintain on tele

## 2018-06-28 NOTE — DISCHARGE SUMMARY
[facility] Hospital Medicine  Discharge Summary      Patient Name: Alfa Christy III  MRN: [id number]  Admission Date: 2018  Hospital Length of Stay: 0 days  Discharge Date and Time:  2018 5:24 PM  Attending Physician: Eric Sutton MD   Discharging Provider: Rosa Maria Sy NP  Primary Care Provider: Tyler Jasmine MD  [facility] Medicine Team: [facility]  Rosa Maria Sy NP    HPI:   Alfa Christy III is a 62M with CAD, CKD, HLD, chronic Afib on eliquis, HTN, HFpEF, IDDM, hx of CVA, seizures, hypothyroidism, sickle cell trait, BPH, obesity who presents w/ chest pain. Patient states chest pain began around  1300 while watching television. Patient describes the pain as sharp and burning, located at the left sternal border that extends to just below ribs, rated as a 6-7/10. Does not radiate down arm or up neck. Denies changes in pain with eating/drinking, position, deep inspiration, or exertion. He initially believed the pain was 2/2 GERD and walked around in an attempt to alleviate the pain w/o relief. The pain persisted for a few hours before he as brought to the ED. He denies taking any medications to alleviate the pain. He states his pain was alleviated by aspirin 325 mg given in ED. He does not smoke, reports his father had MI but  of CHF in his 50s.    Patient endorses increased dizziness w/ standing/bending since initiation of Flomax, fatigue x1 week, chills, minimally increased edema in BLE, and decreased activity over the last 3 weeks due to recent SS trait crisis and UTI. Patient denies any current CP, change SOB/AYALA, unilateral calf swelling or pain, recent surgery , chest trauma, hemoptysis, malignancy, or fever. He reports compliance with eliquis. Patient has not taken ASA in 4 days due to recent UTI/hematuria.     ED workup remarkable for EKG without new ischemic changes, trop negative x2,  (near baseline), D-Dimer 1.45, CXR without acute  change.    * No surgery found *      Hospital Course:   Admitted to hospital medicine for chest pain rule out.  H/o negative Stress echo in 2005 and 2011.  Recent echo April 2018 normal EF performed for aphasia episodes/ seizure activity.  Troponin's negative x3, D dimer elevated possibly d/t recent UTI, hgb a1c 5.4/108, TSH 1.7, BNP slightly elevated at 457 with pulm edema cxr and peripheral edema on norvasc and b/p elevated on admit.  Given iv lasix 40mg along with home b/p meds b/p down to 124/87.  His breathing also improved.  He also had a recent bout of sickle cell crisis.      Exercise stress echo; negative for ischemia, needs f/u with htn management, weight control/ loss, MED fit program and also sleep apnea eval underway.     Dispo; home with f/u with pcp for b/p management, Dr. Marie 8/26 and Azra Martínez with alex 8/7     Consults:   Consults         Status Ordering Provider     IP consult to case management  Once     Provider:  (Not yet assigned)    Acknowledged EVIE ROSARIO          * Chest pain    Alfa Christy III is a 62M with CAD, CKD, HLD, chronic Afib on eliquis, HTN, HFpEF, IDDM, hx of CVA, seizures, hypothyroidism, sickle cell trait, BPH, obesity who presents w/ chest pain at rest w/o radiation relieved w/ ASA.    - CXR pulm edema, EKG negative, trop x 3 negative, BNP elevated, CP free on exam, VSS  - Heart Score 4, moderate risk for cardiac event  - s/p exercise stress echo negative for ischemia   - BB resumed after stress, HgbA1c 5.4, lipid panel super low continue statin  - maintain on tele, NTG prn CP        Acute cystitis with hematuria    Gross hematuria, dysuria after nieves placement for urinary retention on 6/22/2018  -UTI treated w/ Cephalexin and pyridium w/ 2 days left on Cephalexin  -Patient states symptoms have improved  -Continue Cephalexin 500 mg q 6 hours  - recheck UA        Elevated d-dimer    - Elevated at 1.45. Unclear etiology or significance  - low suspicion of  DVT  - Well's Score 0  - Patient compliant with eliquis  - recent sickle cell crisis and UTI        Chronic kidney disease, stage III (moderate)    - chronic and stable        Simple partial seizure, consciousness not impaired    -Continue Vimpat and Keppra        Prostate enlargement    -Recent treatment for urinary retention and associated UTI  -Continue flomax        Essential hypertension    - Continue home meds norvasc, losartan, coreg, lasix  Systolic (24hrs), Av , Min:124 , Max:174   Diastolic (24hrs), Av, Min:68, Max:89          Type 2 diabetes mellitus with hyperglycemia, with long-term current use of insulin    - HgbA1c 5.4%  - Home regimen: Trulicity, detemir 14U QHS, aspart 7U TIDWM,  - hospital regimen - resume trulicity, detemir 14U QHS, aspart 7 U TIDWM, low dose SSI  - Diabetic-Cardiac         Chronic diastolic heart failure    - compensated  - , elevated, will give IV lasix x1 dose, notable improvement already in breathing  - Patient denies cough or worsening SOB, no O2 requirements  - CXR shows pulm edema,  Prominence and stable enlargement of cardiomediastinum  - Echo 2018 shows EF 60-65%  - Continue home meds carvedilol, lasix, and losartan  - needs better b/p control for sure in addition to weight loss        Microcytic anemia    -Patient near baseline  -Continue folic acid        Postsurgical hypothyroidism    -Continue synthroid        Paroxysmal atrial fibrillation    -EKG shows Afib w/ no ischemic changes, rate 82 BPM  -CHADS2 VA2S score of 7 on anticoag  -Continue Eliquis  - maintain on tele        Hypercholesterolemia    -Continue home meds ASA and lipitor  -Lipid panel noted          Final Active Diagnoses:    Diagnosis Date Noted POA    PRINCIPAL PROBLEM:  Chest pain [R07.9] 2018 Yes    Chronic kidney disease, stage III (moderate) [N18.3] 2018 Yes    Elevated d-dimer [R79.89] 2018 Yes    Acute cystitis with hematuria [N30.01] 2018 Yes     Simple partial seizure, consciousness not impaired [G40.109] 10/05/2017 Yes    Prostate enlargement [N40.0] 09/14/2017 Yes    Essential hypertension [I10] 08/30/2017 Yes    Type 2 diabetes mellitus with hyperglycemia, with long-term current use of insulin [E11.65, Z79.4] 06/28/2017 Not Applicable    Chronic diastolic heart failure [I50.32] 11/20/2016 Yes    Postsurgical hypothyroidism [E89.0] 11/27/2013 Yes    Microcytic anemia [D50.9] 11/27/2013 Yes    Paroxysmal atrial fibrillation [I48.0] 09/23/2013 Yes     Chronic    Hypercholesterolemia [E78.00] 08/01/2012 Yes      Problems Resolved During this Admission:    Diagnosis Date Noted Date Resolved POA       Discharged Condition: good    Disposition: Home or Self Care    Follow Up:  Follow-up Information     Tyler Jasmine MD.    Specialty:  Internal Medicine  Contact information:  [address]  [place] [address]  [phone]             [facility]    Specialty:  Sleep Medicine  Contact information:  [address]  [facility] [address] [phone]  Additional information:  [facility]   Please park in [facility] Parking Garage.               Patient Instructions:     Ambulatory consult to Sleep Disorders   Referral Priority: Routine Referral Type: Consultation   Requested Specialty: Sleep Medicine    Number of Visits Requested: 1      Ambulatory Referral to Medical Fitness (MEDFIT)   Referral Priority: Routine Referral Type: Consultation   Number of Visits Requested: 1      Diet diabetic     Diet Cardiac     Notify your health care provider if you experience any of the following:  temperature >100.4     Notify your health care provider if you experience any of the following:  persistent nausea and vomiting or diarrhea     Notify your health care provider if you experience any of the following:  severe uncontrolled pain     Notify your health care provider if you experience any of the  following:  redness, tenderness, or signs of infection (pain, swelling, redness, odor or green/yellow discharge around incision site)     Notify your health care provider if you experience any of the following:  difficulty breathing or increased cough     Notify your health care provider if you experience any of the following:  severe persistent headache     Notify your health care provider if you experience any of the following:  worsening rash     Notify your health care provider if you experience any of the following:  persistent dizziness, light-headedness, or visual disturbances     Notify your health care provider if you experience any of the following:  increased confusion or weakness     MyChart Patient Entered [facility]     [facility] MYCHART ASSIGN QUESTIONNAIRE SERIES ([employer])     Activity as tolerated         Significant Diagnostic Studies: Labs:   BMP:   Recent Labs  Lab 06/27/18 2021 06/28/18 0626    129*    141   K 4.7 4.2    114*   CO2 20* 20*   BUN 27* 23   CREATININE 2.1* 1.8*   CALCIUM 9.0 8.5*   MG 2.3 2.1   , CBC   Recent Labs  Lab 06/27/18 2021 06/28/18  0626   WBC 11.82 10.60   HGB 10.2* 9.2*   HCT 28.3* 25.4*    291   , INR   Lab Results   Component Value Date    INR 1.0 05/31/2018    INR 1.0 04/26/2018    INR 1.0 03/23/2018   , Lipid Panel   Lab Results   Component Value Date    CHOL 95 (L) 06/28/2018    HDL 29 (L) 06/28/2018    LDLCALC 47.8 (L) 06/28/2018    TRIG 91 06/28/2018    CHOLHDL 30.5 06/28/2018   , Troponin   Recent Labs  Lab 06/28/18  0626   TROPONINI 0.006    and A1C:   Recent Labs  Lab 01/02/18  0815 03/24/18  0502 06/27/18 2021   HGBA1C 6.0* 4.9 5.4       Pending Diagnostic Studies:     None         Medications:  Reconciled Home Medications:      Medication List      CHANGE how you take these medications    lacosamide 50 mg Tab  Commonly known as:  VIMPAT  Take 1 tablet (50 mg total) by mouth every 12 (twelve) hours. Vim pat 50mg in am, 100  mg at night  What changed:  additional instructions        CONTINUE taking these medications    acetaminophen 500 MG tablet  Commonly known as:  TYLENOL  Take 1 tablet (500 mg total) by mouth every 6 (six) hours as needed for Pain.     amLODIPine 10 MG tablet  Commonly known as:  NORVASC  Take 1 tablet (10 mg total) by mouth once daily.     apixaban 5 mg Tab  Take 1 tablet (5 mg total) by mouth 2 (two) times daily.     aspirin 81 MG Chew  Take 1 tablet (81 mg total) by mouth once daily.     atorvastatin 40 MG tablet  Commonly known as:  LIPITOR  Take 1 tablet (40 mg total) by mouth once daily.     carvedilol 6.25 MG tablet  Commonly known as:  COREG  Take 1 tablet (6.25 mg total) by mouth 2 (two) times daily.     dulaglutide 0.75 mg/0.5 mL Pnij  Commonly known as:  TRULICITY  Inject 0.5 mLs (0.75 mg total) into the skin every 7 days.     flash glucose sensor Kit  Commonly known as:  FREESTYLE ANGELLA SENSOR  3 each by Misc.(Non-Drug; Combo Route) route every 30 days.     folic acid 400 MCG tablet  Commonly known as:  FOLVITE  Take 400 mcg by mouth once daily.     FREESTYLE ANGELLA READER [facility]  Generic drug:  flash glucose scanning reader  USE EVERY DAY AS DIRECTED FOR TEST BLOOD GLUCOSE     furosemide 40 MG tablet  Commonly known as:  LASIX  Take 1 tablet (40 mg total) by mouth 2 (two) times daily.     GLUCAGON EMERGENCY KIT (HUMAN) 1 mg Kit  Generic drug:  glucagon (human recombinant)  INJECT 1 MG INTO THE MUSCLE AS NEEDED     insulin aspart U-100 100 unit/mL Inpn pen  Commonly known as:  NovoLOG  Inject 7 units w/ meals plus scale 150-200+1, 201-250+2, 251-300+3, 301-350+4.     insulin detemir U-100 100 unit/mL (3 mL) Inpn pen  Commonly known as:  LEVEMIR FLEXPEN  Inject 14 Units into the skin every evening.     levETIRAcetam 750 MG Tab  Commonly known as:  KEPPRA  Take 2 tablets (1,500 mg total) by mouth 2 (two) times daily.     levothyroxine 200 MCG tablet  Commonly known as:  SYNTHROID  TAKE 1 TABLET BY MOUTH  "BEFORE BREAKFAST     losartan 100 MG tablet  Commonly known as:  COZAAR  Take 1 tablet (100 mg total) by mouth once daily.     magnesium 30 mg Tab  Take 1 tablet by mouth twice a week.     multivitamin capsule  Take 1 capsule by mouth every Mon, Wed, Fri.     ONETOUCH VERIO Strp  Generic drug:  blood sugar diagnostic  USE TO TEST BLOOD SUGAR THREE TIMES DAILY     pen needle, diabetic 31 gauge x 3/16" Ndle  Commonly known as:  BD ULTRA-FINE MINI PEN NEEDLE  To use with insulin pens 4  times daily     sildenafil 100 MG tablet  Commonly known as:  VIAGRA  Take 1 tablet (100 mg total) by mouth as needed for Erectile Dysfunction.     tamsulosin 0.4 mg Cp24  Commonly known as:  FLOMAX  Take 1 capsule (0.4 mg total) by mouth every evening.     vitamin D 1000 units Tab  Take 1,000 Units by mouth every Mon, Wed, Fri.        ASK your doctor about these medications    cephALEXin 500 MG capsule  Commonly known as:  KEFLEX  Take 1 capsule (500 mg total) by mouth 4 (four) times daily. for 5 days  Ask about: Should I take this medication?            Indwelling Lines/Drains at time of discharge:   Lines/Drains/Airways          No matching active lines, drains, or airways          Time spent on the discharge of patient: 45 minutes  Patient was seen and examined on the date of discharge and determined to be suitable for discharge.         Rosa Maria Sy NP  Department of Hospital Medicine  [facility]  Spectra:  [phone]  Pager: [phone]    "

## 2018-06-28 NOTE — ED NOTES
"Pt reports 5/10 substernal chest pain X1 day. Pt states "It was around an 8 when I left the house". Pt denies taking any medication since the pain began. Pt also reports SOB when chest pain began but denies now. Pt denies N/V/D, fever, abdominal pain, changes with urination or any other complaints.   "

## 2018-06-28 NOTE — H&P
[facility] Medicine  History & Physical    Patient Name: Alfa Christy III  MRN: [id number]  Admission Date: 2018  Attending Physician: Eric Sutton MD   Primary Care Provider: Tyler Jasmine MD    [facility] Medicine Team: [facility] MED  Luis M Daley PA-C     Patient information was obtained from patient, spouse/SO, past medical records and ER records.     Subjective:     Principal Problem:Chest pain    Chief Complaint:   Chief Complaint   Patient presents with    Chest Pain     reports left CP rated as 7/10, states that pain does not radiate, denies SOB, reports hx of afib, denies hx of stents or MI         HPI: Alfa Christy III is a 62M with CAD, CKD, HLD, chronic Afib on eliquis, HTN, HFpEF, IDDM, hx of CVA, seizures, hypothyroidism, sickle cell trait, BPH, obesity who presents w/ chest pain. Patient states chest pain began around  1300 while watching television. Patient describes the pain as sharp and burning, located at the left sternal border that extends to just below ribs, rated as a 6-7/10. Does not radiate down arm or up neck. Denies changes in pain with eating/drinking, position, deep inspiration, or exertion. He initially believed the pain was 2/2 GERD and walked around in an attempt to alleviate the pain w/o relief. The pain persisted for a few hours before he as brought to the ED. He denies taking any medications to alleviate the pain. He states his pain was alleviated by aspirin 325 mg given in ED. He does not smoke, reports his father had MI but  of CHF in his 50s.    Patient endorses increased dizziness w/ standing/bending since initiation of Flomax, fatigue x1 week, chills, minimally increased edema in BLE, and decreased activity over the last 3 weeks due to recent SS trait crisis and UTI. Patient denies any current CP, change SOB/AYALA, unilateral calf swelling or pain, recent surgery , chest trauma, hemoptysis, malignancy, or fever. He reports  compliance with eliquis. Patient has not taken ASA in 4 days due to recent UTI/hematuria.     ED workup remarkable for EKG without new ischemic changes, trop negative x2,  (near baseline), D-Dimer 1.45, CXR without acute change.    Past Medical History:   Diagnosis Date    Allergy     Atrial fibrillation     BMI 31.0-31.9,adult 11/25/2014    Cerebrovascular disease 7/25/2016    CHF (congestive heart failure)     CHF (congestive heart failure)     Chronic anticoagulation - pradaxa 11/10/2016    Chronic diastolic heart failure 11/20/2016    CKD (chronic kidney disease), stage III 9/23/2013    Controlled type 2 diabetes with neuropathy 12/16/2014    Coronary artery disease     Diabetes mellitus due to underlying condition with stage 3 chronic kidney disease, without long-term current use of insulin 11/2/2016    Elevated alkaline phosphatase level 8/1/2012    Elevated PSA     negative prostate biopsy 4/11    Erectile dysfunction associated with type 2 diabetes mellitus     Gallstones 3/20/2013    Generalized tonic-clonic seizure 11/2016    HTN (hypertension), benign 8/1/2012    Hypercholesterolemia 8/1/2012    Microcytic anemia 11/27/2013    Paroxysmal atrial fibrillation 9/23/2013    Postsurgical hypothyroidism 11/27/2013    Right homonymous hemianopsia 2/5/2016    Sickle cell trait     Stroke 1/27/2016    Thyroid cancer     multifocal with six lesions, largest 5mm, treated with surgery and radioactive iodine    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/2016       Past Surgical History:   Procedure Laterality Date    BREAST SURGERY      cyst removal    COLONOSCOPY      CYST REMOVAL      chest    PROSTATE BIOPSY  4/27/11    SKIN BIOPSY      THYROID SURGERY  8/26/09    VASCULAR SURGERY         Review of patient's allergies indicates:   Allergen Reactions    Cough syrup [guaifenesin] Other (See Comments)       No current facility-administered medications on file prior to encounter.       Current Outpatient Prescriptions on File Prior to Encounter   Medication Sig    acetaminophen (TYLENOL) 500 MG tablet Take 1 tablet (500 mg total) by mouth every 6 (six) hours as needed for Pain.    amLODIPine (NORVASC) 10 MG tablet Take 1 tablet (10 mg total) by mouth once daily.    apixaban 5 mg Tab Take 1 tablet (5 mg total) by mouth 2 (two) times daily.    aspirin 81 MG Chew Take 1 tablet (81 mg total) by mouth once daily.    carvedilol (COREG) 6.25 MG tablet Take 1 tablet (6.25 mg total) by mouth 2 (two) times daily.    folic acid (FOLVITE) 400 MCG tablet Take 400 mcg by mouth once daily.    furosemide (LASIX) 40 MG tablet Take 1 tablet (40 mg total) by mouth 2 (two) times daily.    levetiracetam (KEPPRA) 750 MG Tab Take 2 tablets (1,500 mg total) by mouth 2 (two) times daily.    losartan (COZAAR) 100 MG tablet Take 1 tablet (100 mg total) by mouth once daily.    magnesium 30 mg Tab Take 1 tablet by mouth twice a week.     multivitamin capsule Take 1 capsule by mouth every Mon, Wed, Fri.     tamsulosin (FLOMAX) 0.4 mg Cp24 Take 1 capsule (0.4 mg total) by mouth every evening.    vitamin D 1000 units Tab Take 1,000 Units by mouth every Mon, Wed, Fri.    atorvastatin (LIPITOR) 40 MG tablet Take 1 tablet (40 mg total) by mouth once daily.    [] cephALEXin (KEFLEX) 500 MG capsule Take 1 capsule (500 mg total) by mouth 4 (four) times daily. for 5 days    dulaglutide (TRULICITY) 0.75 mg/0.5 mL PnIj Inject 0.5 mLs (0.75 mg total) into the skin every 7 days.    flash glucose sensor (FREESTYLE ANGELLA SENSOR) Kit 3 each by Misc.(Non-Drug; Combo Route) route every 30 days.    FREESTYLE ANGELLA READER Misc USE EVERY DAY AS DIRECTED FOR TEST BLOOD GLUCOSE    GLUCAGON EMERGENCY KIT, HUMAN, 1 mg injection INJECT 1 MG INTO THE MUSCLE AS NEEDED    insulin aspart U-100 (NOVOLOG) 100 unit/mL InPn pen Inject 7 units w/ meals plus scale 150-200+1, 201-250+2, 251-300+3, 301-350+4.    insulin detemir  "(LEVEMIR FLEXPEN) 100 unit/mL (3 mL) SubQ InPn pen Inject 14 Units into the skin every evening.    lacosamide (VIMPAT) 50 mg Tab Take 1 tablet (50 mg total) by mouth every 12 (twelve) hours. (Patient taking differently: Take 50 mg by mouth every 12 (twelve) hours. Vim pat 50mg in am, 100 mg at night)    levothyroxine (SYNTHROID) 200 MCG tablet TAKE 1 TABLET BY MOUTH BEFORE BREAKFAST    ONETOUCH VERIO Strp USE TO TEST BLOOD SUGAR THREE TIMES DAILY    pen needle, diabetic (BD ULTRA-FINE MINI PEN NEEDLE) 31 gauge x 3/16" Ndle To use with insulin pens 4  times daily    sildenafil (VIAGRA) 100 MG tablet Take 1 tablet (100 mg total) by mouth as needed for Erectile Dysfunction.     Family History     Problem Relation (Age of Onset)    Cancer Sister    Diabetes Father, Mother, Brother    Heart disease Father, Mother    Hypertension Father, Mother    Thyroid disease Maternal Aunt        Social History Main Topics    Smoking status: Never Smoker    Smokeless tobacco: Never Used    Alcohol use Yes      Comment: occasionally, none recently    Drug use: No    Sexual activity: Yes     Partners: Female      Comment:  with 3 kids     Review of Systems   Constitutional: Positive for activity change, chills and fatigue. Negative for appetite change, diaphoresis and fever.   HENT: Negative for hearing loss, sore throat and trouble swallowing.    Eyes: Negative for discharge, redness and visual disturbance.   Respiratory: Positive for shortness of breath. Negative for cough, choking, chest tightness and wheezing.    Cardiovascular: Positive for chest pain and leg swelling. Negative for palpitations.   Gastrointestinal: Negative for abdominal distention, abdominal pain, blood in stool, constipation, diarrhea, nausea and vomiting.   Endocrine: Negative for cold intolerance and heat intolerance.   Genitourinary: Positive for dysuria and hematuria. Negative for discharge and flank pain.        Recent UTI, still on Abx tx " w/ Keflex   Musculoskeletal: Negative for arthralgias, joint swelling and myalgias.   Skin: Negative for color change, rash and wound.   Neurological: Positive for dizziness, seizures, weakness and light-headedness. Negative for syncope, numbness and headaches.   Hematological: Does not bruise/bleed easily.   Psychiatric/Behavioral: Negative for agitation, behavioral problems and confusion. The patient is not nervous/anxious.      Objective:     Vital Signs (Most Recent):  Temp: 98 °F (36.7 °C) (06/28/18 0125)  Pulse: (!) 57 (06/28/18 0125)  Resp: 16 (06/28/18 0125)  BP: (!) 141/74 (06/28/18 0125)  SpO2: 98 % (06/28/18 0125) Vital Signs (24h Range):  Temp:  [98 °F (36.7 °C)-98.9 °F (37.2 °C)] 98 °F (36.7 °C)  Pulse:  [57-81] 57  Resp:  [13-18] 16  SpO2:  [97 %-100 %] 98 %  BP: (139-174)/(68-89) 141/74     Weight: 99.7 kg (219 lb 12.8 oz)  Body mass index is 31.54 kg/m².    Physical Exam   Constitutional: He is oriented to person, place, and time. He appears well-developed and well-nourished. No distress.   HENT:   Head: Normocephalic and atraumatic.   Eyes: Pupils are equal, round, and reactive to light.   Neck: Normal range of motion. Neck supple.   Cardiovascular: An irregularly irregular rhythm present.   Pulmonary/Chest: Effort normal and breath sounds normal. No respiratory distress. He has no wheezes.   Abdominal: Bowel sounds are normal. He exhibits no distension. There is no tenderness. There is no guarding.   Musculoskeletal: Normal range of motion. He exhibits edema.   BLE 2+ pitting edema   Lymphadenopathy:     He has no cervical adenopathy.   Neurological: He is alert and oriented to person, place, and time.   Skin: Skin is warm and dry. He is not diaphoretic.   Psychiatric: He has a normal mood and affect. His behavior is normal. Judgment and thought content normal.         CRANIAL NERVES     CN III, IV, VI   Pupils are equal, round, and reactive to light.       Significant Labs:   CBC:   Recent  Labs  Lab 18   WBC 11.82   HGB 10.2*   HCT 28.3*        CMP:   Recent Labs  Lab 18      K 4.7      CO2 20*      BUN 27*   CREATININE 2.1*   CALCIUM 9.0   PROT 7.7   ALBUMIN 3.1*   BILITOT 0.9   ALKPHOS 110   AST 36   ALT 30   ANIONGAP 10   EGFRNONAA 32.3*     Cardiac Markers:   Recent Labs  Lab 18   *     Troponin:   Recent Labs  Lab 18  2341   TROPONINI 0.007 <0.006       Significant Imaging: I have reviewed all pertinent imaging results/findings within the past 24 hours.    Assessment/Plan:     * Chest pain    Alfa Christy III is a 62M with CAD, CKD, HLD, chronic Afib on eliquis, HTN, HFpEF, IDDM, hx of CVA, seizures, hypothyroidism, sickle cell trait, BPH, obesity who presents w/ chest pain at rest w/o radiation relieved w/ ASA.    - CXR negative, EKG negative, trop x 2 negative, BNP elevated but at baseline, CP free on exam, VSS  - Heart Score 4, moderate risk for cardiac event  - NPO for stress test in AM, modality per day team  - BB held, check HgbA1c, lipid panel in am  - maintain on tele, NTG prn CP        Elevated d-dimer    - Elevated at 1.45. Unclear etiology or significance  - low suspicion of DVT  - Well's Score 0  - Patient compliant with eliquis        Acute cystitis with hematuria    Gross hematuria, dysuria after nieves placement for urinary retention on 2018  -UTI treated w/ Cephalexin and pyridium w/ 2 days left on Cephalexin  -Patient states symptoms have improved  -Continue Cephalexin 500 mg q 6 hours  - recheck UA        Chronic kidney disease, stage III (moderate)    - chronic and stable        Simple partial seizure, consciousness not impaired    -Continue Vimpat and Keppra        Prostate enlargement    -Recent treatment for urinary retention and associated UTI  -Continue flomax        Essential hypertension    - Continue home meds norvasc, losartan, coreg, lasix  Systolic (24hrs), Av , Min:139  , Max:174   Diastolic (24hrs), Av, Min:68, Max:89          Type 2 diabetes mellitus with hyperglycemia, with long-term current use of insulin    - HgbA1c 5.4%  - Home regimen: Trulicity, detemir 14U QHS, aspart 7U TIDWM,  - hospital regimen - hold trulicity, detemir 14U QHS, aspart 7 U TIDWM, low dose SSI  - Diabetic-Cardiac diet pending cardiac testing        Chronic diastolic heart failure    - compensated  - , near baseline  - Patient denies cough or worsening SOB, no O2 requirements  - CXR shows slight increases int. Prominence and stable enlargement of cardiomediastinum  - Echo 2018 shows EF 60-65%  - Continue home meds carvedilol, lasix, and losartan        Microcytic anemia    -Patient near baseline  -Continue folic acid        Postsurgical hypothyroidism    -Continue synthroid        Paroxysmal atrial fibrillation    -EKG shows Afib w/ no ischemic changes, rate 82 BPM  -CHADS2 VA2S score of 7 on anticoag  -Continue Eliquis  - maintain on tele        Hypercholesterolemia    -Continue home meds ASA and lipitor  -Lipid panel in AM          VTE Risk Mitigation         Ordered     apixaban tablet 5 mg  2 times daily      18 0019     Place DARIAN hose  Until discontinued      18 0218             Luis M Daley PA-C  Department of Hospital Medicine   [facility]

## 2018-06-28 NOTE — ASSESSMENT & PLAN NOTE
- Continue home meds norvasc, losartan, coreg, lasix  Systolic (24hrs), Av , Min:124 , Max:174   Diastolic (24hrs), Av, Min:68, Max:89

## 2018-06-28 NOTE — PROGRESS NOTES
Shift change rounds made. Pt in bed with no distress. Urine noted to be clear yellow in color not blood tinged like upon admit. Comfort and safety maintained. Call light in reach

## 2018-06-28 NOTE — ASSESSMENT & PLAN NOTE
- HgbA1c 5.4%  - Home regimen: Trulicity, detemir 14U QHS, aspart 7U TIDWM,  - hospital regimen - resume trulicity, detemir 14U QHS, aspart 7 U TIDWM, low dose SSI  - Diabetic-Cardiac

## 2018-06-28 NOTE — ASSESSMENT & PLAN NOTE
- HgbA1c 5.4%  - Home regimen: Trulicity, detemir 14U QHS, aspart 7U TIDWM,  - hospital regimen - hold trulicity, detemir 14U QHS, aspart 7 U TIDWM, low dose SSI  - Diabetic-Cardiac diet pending cardiac testing

## 2018-06-28 NOTE — PLAN OF CARE
06/28/18 1012   Discharge Assessment   Assessment Type Discharge Planning Assessment   Confirmed/corrected address and phone number on facesheet? Yes   Assessment information obtained from? Patient;Caregiver;Medical Record   Expected Length of Stay (days) 2   Communicated expected length of stay with patient/caregiver yes   Prior to hospitilization cognitive status: Alert/Oriented   Prior to hospitalization functional status: Independent   Current cognitive status: Alert/Oriented   Current Functional Status: Independent   Lives With spouse;child(ron), adult   Able to Return to Prior Arrangements yes   Is patient able to care for self after discharge? Yes   Patient's perception of discharge disposition home or selfcare   Readmission Within The Last 30 Days no previous admission in last 30 days   Patient currently being followed by outpatient case management? No   Patient currently receives any other outside agency services? No   Equipment Currently Used at Home cane, straight;walker, rolling;grab bar   Do you have any problems affording any of your prescribed medications? No   Is the patient taking medications as prescribed? yes   Does the patient have transportation home? Yes   Does the patient receive services at the [facility]? No   Discharge Plan A Home with family   Patient/Family In Agreement With Plan yes   Placed in Obs for CP. Lives with wife and daughter. Independent in his ADLs. Plan is to DC home. No DC needs identified.

## 2018-06-28 NOTE — ASSESSMENT & PLAN NOTE
- Continue home meds norvasc, losartan, coreg, lasix  Systolic (24hrs), Av , Min:139 , Max:174   Diastolic (24hrs), Av, Min:68, Max:89

## 2018-06-28 NOTE — ASSESSMENT & PLAN NOTE
Gross hematuria, dysuria after nieves placement for urinary retention on 6/22/2018  -UTI treated w/ Cephalexin and pyridium w/ 2 days left on Cephalexin  -Patient states symptoms have improved  -Continue Cephalexin 500 mg q 6 hours  - recheck UA

## 2018-06-28 NOTE — ASSESSMENT & PLAN NOTE
Alfa Christy III is a 62M with CAD, CKD, HLD, chronic Afib on eliquis, HTN, HFpEF, IDDM, hx of CVA, seizures, hypothyroidism, sickle cell trait, BPH, obesity who presents w/ chest pain at rest w/o radiation relieved w/ ASA.    - CXR negative, EKG negative, trop x 2 negative, BNP elevated but at baseline, CP free on exam, VSS  - Heart Score 4, moderate risk for cardiac event  - NPO for stress test in AM, modality per day team  - BB held, check HgbA1c, lipid panel in am  - maintain on tele, NTG prn CP

## 2018-06-28 NOTE — PLAN OF CARE
Problem: Patient Care Overview  Goal: Plan of Care Review  Outcome: Ongoing (interventions implemented as appropriate)  Pt's VSS, NAD noted. Pt is in atrial fibrillation with controlled rate in 60s-70s on cardiac monitor. Pt's spouse at bedside. Pt denies pain at this time. Pt aware of NPO status d/t impending cardiac stress test. Nonskid socks and teds on pt.

## 2018-06-28 NOTE — SUBJECTIVE & OBJECTIVE
Past Medical History:   Diagnosis Date    Allergy     Atrial fibrillation     BMI 31.0-31.9,adult 11/25/2014    Cerebrovascular disease 7/25/2016    CHF (congestive heart failure)     CHF (congestive heart failure)     Chronic anticoagulation - pradaxa 11/10/2016    Chronic diastolic heart failure 11/20/2016    CKD (chronic kidney disease), stage III 9/23/2013    Controlled type 2 diabetes with neuropathy 12/16/2014    Coronary artery disease     Diabetes mellitus due to underlying condition with stage 3 chronic kidney disease, without long-term current use of insulin 11/2/2016    Elevated alkaline phosphatase level 8/1/2012    Elevated PSA     negative prostate biopsy 4/11    Erectile dysfunction associated with type 2 diabetes mellitus     Gallstones 3/20/2013    Generalized tonic-clonic seizure 11/2016    HTN (hypertension), benign 8/1/2012    Hypercholesterolemia 8/1/2012    Microcytic anemia 11/27/2013    Paroxysmal atrial fibrillation 9/23/2013    Postsurgical hypothyroidism 11/27/2013    Right homonymous hemianopsia 2/5/2016    Sickle cell trait     Stroke 1/27/2016    Thyroid cancer     multifocal with six lesions, largest 5mm, treated with surgery and radioactive iodine    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/2016       Past Surgical History:   Procedure Laterality Date    BREAST SURGERY      cyst removal    COLONOSCOPY      CYST REMOVAL      chest    PROSTATE BIOPSY  4/27/11    SKIN BIOPSY      THYROID SURGERY  8/26/09    VASCULAR SURGERY         Review of patient's allergies indicates:   Allergen Reactions    Cough syrup [guaifenesin] Other (See Comments)       No current facility-administered medications on file prior to encounter.      Current Outpatient Prescriptions on File Prior to Encounter   Medication Sig    acetaminophen (TYLENOL) 500 MG tablet Take 1 tablet (500 mg total) by mouth every 6 (six) hours as needed for Pain.    amLODIPine (NORVASC) 10 MG tablet  Take 1 tablet (10 mg total) by mouth once daily.    apixaban 5 mg Tab Take 1 tablet (5 mg total) by mouth 2 (two) times daily.    aspirin 81 MG Chew Take 1 tablet (81 mg total) by mouth once daily.    carvedilol (COREG) 6.25 MG tablet Take 1 tablet (6.25 mg total) by mouth 2 (two) times daily.    folic acid (FOLVITE) 400 MCG tablet Take 400 mcg by mouth once daily.    furosemide (LASIX) 40 MG tablet Take 1 tablet (40 mg total) by mouth 2 (two) times daily.    levetiracetam (KEPPRA) 750 MG Tab Take 2 tablets (1,500 mg total) by mouth 2 (two) times daily.    losartan (COZAAR) 100 MG tablet Take 1 tablet (100 mg total) by mouth once daily.    magnesium 30 mg Tab Take 1 tablet by mouth twice a week.     multivitamin capsule Take 1 capsule by mouth every Mon, Wed, Fri.     tamsulosin (FLOMAX) 0.4 mg Cp24 Take 1 capsule (0.4 mg total) by mouth every evening.    vitamin D 1000 units Tab Take 1,000 Units by mouth every Mon, Wed, Fri.    atorvastatin (LIPITOR) 40 MG tablet Take 1 tablet (40 mg total) by mouth once daily.    [] cephALEXin (KEFLEX) 500 MG capsule Take 1 capsule (500 mg total) by mouth 4 (four) times daily. for 5 days    dulaglutide (TRULICITY) 0.75 mg/0.5 mL PnIj Inject 0.5 mLs (0.75 mg total) into the skin every 7 days.    flash glucose sensor (FREESTYLE ANGELLA SENSOR) Kit 3 each by Misc.(Non-Drug; Combo Route) route every 30 days.    FREESTYLE ANGELLA READER Misc USE EVERY DAY AS DIRECTED FOR TEST BLOOD GLUCOSE    GLUCAGON EMERGENCY KIT, HUMAN, 1 mg injection INJECT 1 MG INTO THE MUSCLE AS NEEDED    insulin aspart U-100 (NOVOLOG) 100 unit/mL InPn pen Inject 7 units w/ meals plus scale 150-200+1, 201-250+2, 251-300+3, 301-350+4.    insulin detemir (LEVEMIR FLEXPEN) 100 unit/mL (3 mL) SubQ InPn pen Inject 14 Units into the skin every evening.    lacosamide (VIMPAT) 50 mg Tab Take 1 tablet (50 mg total) by mouth every 12 (twelve) hours. (Patient taking differently: Take 50 mg by mouth  "every 12 (twelve) hours. Vim pat 50mg in am, 100 mg at night)    levothyroxine (SYNTHROID) 200 MCG tablet TAKE 1 TABLET BY MOUTH BEFORE BREAKFAST    ONETOUCH VERIO Strp USE TO TEST BLOOD SUGAR THREE TIMES DAILY    pen needle, diabetic (BD ULTRA-FINE MINI PEN NEEDLE) 31 gauge x 3/16" Ndle To use with insulin pens 4  times daily    sildenafil (VIAGRA) 100 MG tablet Take 1 tablet (100 mg total) by mouth as needed for Erectile Dysfunction.     Family History     Problem Relation (Age of Onset)    Cancer Sister    Diabetes Father, Mother, Brother    Heart disease Father, Mother    Hypertension Father, Mother    Thyroid disease Maternal Aunt        Social History Main Topics    Smoking status: Never Smoker    Smokeless tobacco: Never Used    Alcohol use Yes      Comment: occasionally, none recently    Drug use: No    Sexual activity: Yes     Partners: Female      Comment:  with 3 kids     Review of Systems   Constitutional: Positive for activity change, chills and fatigue. Negative for appetite change, diaphoresis and fever.   HENT: Negative for hearing loss, sore throat and trouble swallowing.    Eyes: Negative for discharge, redness and visual disturbance.   Respiratory: Positive for shortness of breath. Negative for cough, choking, chest tightness and wheezing.    Cardiovascular: Positive for chest pain and leg swelling. Negative for palpitations.   Gastrointestinal: Negative for abdominal distention, abdominal pain, blood in stool, constipation, diarrhea, nausea and vomiting.   Endocrine: Negative for cold intolerance and heat intolerance.   Genitourinary: Positive for dysuria and hematuria. Negative for discharge and flank pain.        Recent UTI, still on Abx tx w/ Keflex   Musculoskeletal: Negative for arthralgias, joint swelling and myalgias.   Skin: Negative for color change, rash and wound.   Neurological: Positive for dizziness, seizures, weakness and light-headedness. Negative for syncope, " numbness and headaches.   Hematological: Does not bruise/bleed easily.   Psychiatric/Behavioral: Negative for agitation, behavioral problems and confusion. The patient is not nervous/anxious.      Objective:     Vital Signs (Most Recent):  Temp: 98 °F (36.7 °C) (06/28/18 0125)  Pulse: (!) 57 (06/28/18 0125)  Resp: 16 (06/28/18 0125)  BP: (!) 141/74 (06/28/18 0125)  SpO2: 98 % (06/28/18 0125) Vital Signs (24h Range):  Temp:  [98 °F (36.7 °C)-98.9 °F (37.2 °C)] 98 °F (36.7 °C)  Pulse:  [57-81] 57  Resp:  [13-18] 16  SpO2:  [97 %-100 %] 98 %  BP: (139-174)/(68-89) 141/74     Weight: 99.7 kg (219 lb 12.8 oz)  Body mass index is 31.54 kg/m².    Physical Exam   Constitutional: He is oriented to person, place, and time. He appears well-developed and well-nourished. No distress.   HENT:   Head: Normocephalic and atraumatic.   Eyes: Pupils are equal, round, and reactive to light.   Neck: Normal range of motion. Neck supple.   Cardiovascular: An irregularly irregular rhythm present.   Pulmonary/Chest: Effort normal and breath sounds normal. No respiratory distress. He has no wheezes.   Abdominal: Bowel sounds are normal. He exhibits no distension. There is no tenderness. There is no guarding.   Musculoskeletal: Normal range of motion. He exhibits edema.   BLE 2+ pitting edema   Lymphadenopathy:     He has no cervical adenopathy.   Neurological: He is alert and oriented to person, place, and time.   Skin: Skin is warm and dry. He is not diaphoretic.   Psychiatric: He has a normal mood and affect. His behavior is normal. Judgment and thought content normal.         CRANIAL NERVES     CN III, IV, VI   Pupils are equal, round, and reactive to light.       Significant Labs:   CBC:   Recent Labs  Lab 06/27/18 2021   WBC 11.82   HGB 10.2*   HCT 28.3*        CMP:   Recent Labs  Lab 06/27/18 2021      K 4.7      CO2 20*      BUN 27*   CREATININE 2.1*   CALCIUM 9.0   PROT 7.7   ALBUMIN 3.1*   BILITOT 0.9    ALKPHOS 110   AST 36   ALT 30   ANIONGAP 10   EGFRNONAA 32.3*     Cardiac Markers:   Recent Labs  Lab 06/27/18 2021   *     Troponin:   Recent Labs  Lab 06/27/18 2021 06/27/18  2341   TROPONINI 0.007 <0.006       Significant Imaging: I have reviewed all pertinent imaging results/findings within the past 24 hours.

## 2018-06-28 NOTE — ASSESSMENT & PLAN NOTE
Alfa Christy III is a 62M with CAD, CKD, HLD, chronic Afib on eliquis, HTN, HFpEF, IDDM, hx of CVA, seizures, hypothyroidism, sickle cell trait, BPH, obesity who presents w/ chest pain at rest w/o radiation relieved w/ ASA.    - CXR pulm edema, EKG negative, trop x 3 negative, BNP elevated, CP free on exam, VSS  - Heart Score 4, moderate risk for cardiac event  - s/p exercise stress echo negative for ischemia   - BB resumed after stress, HgbA1c 5.4, lipid panel super low continue statin  - maintain on tele, NTG prn CP

## 2018-06-29 NOTE — PLAN OF CARE
06/29/18 0730   Final Note   Assessment Type Final Discharge Note   Discharge Disposition Home   Hospital Follow Up  Appt(s) scheduled? Yes

## 2018-06-29 NOTE — PROGRESS NOTES
Pt discharged and left unit via wheelchair with transport tech. Pt's VSS, NAD noted. IV removed with catheter intact. Discharge instructions reviewed and pt verbalized understanding.

## 2018-06-29 NOTE — TELEPHONE ENCOUNTER
"    Reason for Disposition   Caller has medication question only, adult not sick, and triager answers question    Answer Assessment - Initial Assessment Questions  1. SYMPTOMS: "Do you have any symptoms?"      Wife concerned that she gave him an extra dose of lasix 40 mg and coreg 6.25 mg when they got home tonight.  He got both of them in the hospital earlier today.    2. SEVERITY: If symptoms are present, ask "Are they mild, moderate or severe?"      na    Protocols used: ST MEDICATION QUESTION CALL-A-AH      "

## 2018-07-16 ENCOUNTER — TELEPHONE (OUTPATIENT)
Dept: NEUROLOGY | Facility: CLINIC | Age: 63
End: 2018-07-16

## 2018-07-16 RX ORDER — LACOSAMIDE 50 MG/1
50 TABLET ORAL EVERY 12 HOURS
Qty: 60 TABLET | Refills: 11 | Status: CANCELLED | OUTPATIENT
Start: 2018-07-16 | End: 2019-07-16

## 2018-07-16 RX ORDER — LACOSAMIDE 50 MG/1
50 TABLET ORAL EVERY 12 HOURS
Qty: 60 TABLET | Refills: 11 | Status: CANCELLED
Start: 2018-07-16 | End: 2019-07-16

## 2018-07-17 DIAGNOSIS — I50.32 CHRONIC DIASTOLIC HEART FAILURE: ICD-10-CM

## 2018-07-18 ENCOUNTER — TELEPHONE (OUTPATIENT)
Dept: NEUROLOGY | Facility: CLINIC | Age: 63
End: 2018-07-18

## 2018-07-18 ENCOUNTER — OFFICE VISIT (OUTPATIENT)
Dept: UROLOGY | Facility: CLINIC | Age: 63
End: 2018-07-18
Payer: MEDICARE

## 2018-07-18 VITALS
HEART RATE: 75 BPM | DIASTOLIC BLOOD PRESSURE: 80 MMHG | BODY MASS INDEX: 31.32 KG/M2 | WEIGHT: 218.25 LBS | SYSTOLIC BLOOD PRESSURE: 130 MMHG

## 2018-07-18 DIAGNOSIS — R33.9 URINARY RETENTION: ICD-10-CM

## 2018-07-18 DIAGNOSIS — N40.1 BPH WITH OBSTRUCTION/LOWER URINARY TRACT SYMPTOMS: Primary | ICD-10-CM

## 2018-07-18 DIAGNOSIS — N13.8 BPH WITH OBSTRUCTION/LOWER URINARY TRACT SYMPTOMS: Primary | ICD-10-CM

## 2018-07-18 DIAGNOSIS — E78.00 HYPERCHOLESTEROLEMIA: ICD-10-CM

## 2018-07-18 PROCEDURE — 99999 PR PBB SHADOW E&M-EST. PATIENT-LVL III: CPT | Mod: PBBFAC,,, | Performed by: NURSE PRACTITIONER

## 2018-07-18 PROCEDURE — 81001 URINALYSIS AUTO W/SCOPE: CPT | Mod: S$GLB,,, | Performed by: NURSE PRACTITIONER

## 2018-07-18 PROCEDURE — 3075F SYST BP GE 130 - 139MM HG: CPT | Mod: CPTII,S$GLB,, | Performed by: NURSE PRACTITIONER

## 2018-07-18 PROCEDURE — 3079F DIAST BP 80-89 MM HG: CPT | Mod: CPTII,S$GLB,, | Performed by: NURSE PRACTITIONER

## 2018-07-18 PROCEDURE — 3008F BODY MASS INDEX DOCD: CPT | Mod: CPTII,S$GLB,, | Performed by: NURSE PRACTITIONER

## 2018-07-18 PROCEDURE — 99214 OFFICE O/P EST MOD 30 MIN: CPT | Mod: 25,S$GLB,, | Performed by: NURSE PRACTITIONER

## 2018-07-18 PROCEDURE — 51798 US URINE CAPACITY MEASURE: CPT | Mod: S$GLB,,, | Performed by: NURSE PRACTITIONER

## 2018-07-18 RX ORDER — TAMSULOSIN HYDROCHLORIDE 0.4 MG/1
0.4 CAPSULE ORAL NIGHTLY
Qty: 30 CAPSULE | Refills: 11 | Status: SHIPPED | OUTPATIENT
Start: 2018-07-18 | End: 2019-09-01 | Stop reason: SDUPTHER

## 2018-07-18 RX ORDER — ATORVASTATIN CALCIUM 40 MG/1
TABLET, FILM COATED ORAL
Qty: 90 TABLET | OUTPATIENT
Start: 2018-07-18

## 2018-07-18 RX ORDER — FUROSEMIDE 40 MG/1
TABLET ORAL
Qty: 30 TABLET | Refills: 0 | OUTPATIENT
Start: 2018-07-18

## 2018-07-18 NOTE — TELEPHONE ENCOUNTER
----- Message from Rut Louie RN sent at 7/18/2018  1:38 PM CDT -----  Need august for sara alvarez/nate

## 2018-07-18 NOTE — PROGRESS NOTES
Subjective:       Patient ID: Alfa Christy III is a 62 y.o. male.    Chief Complaint: f/u urinary retention and PVR check      HPI: Alfa Christy III is a 62 y.o. Black or  male who presents today for f/u urinary retention and PVR check. His last clinic visit was 6/20/18.    Pt went to ED on 6/15/18 for decreased urine flow and suprapubic pain and distention.   16 fr nieves catheter was inserted and 800 ml of clear yellow urine out.  He was discharged home with catheter.  Urinary retention possibly d/t enlarged prostate.   On previous cysto performed by Dr. Mejia, patient did have BPH.  On 6/20/18, VT passed.    Today he presents to clinic for f/u urinary retention and PVR check. Pt stopped taking flomax about 2 weeks ago. He is currently on lasix BID. He reports since starting lasix BID, he has been having nocturia x 5-6 and daytime frequency and urgency. Since having catheter removed and stopping flomax, nocturia increased to 6-7 x per night but no change in daytime frequency and urgency. FOS is strong. Denies intermittent stream, hesitancy, or incontinence. Denies dysuria, hematuria, or flank pain. Denies f/c/n/v.        Review of patient's allergies indicates:   Allergen Reactions    Cough syrup [guaifenesin] Other (See Comments)       Current Outpatient Prescriptions   Medication Sig Dispense Refill    acetaminophen (TYLENOL) 500 MG tablet Take 1 tablet (500 mg total) by mouth every 6 (six) hours as needed for Pain.  0    amLODIPine (NORVASC) 10 MG tablet Take 1 tablet (10 mg total) by mouth once daily. 90 tablet 3    apixaban 5 mg Tab Take 1 tablet (5 mg total) by mouth 2 (two) times daily. 60 tablet 11    aspirin 81 MG Chew Take 1 tablet (81 mg total) by mouth once daily.  0    atorvastatin (LIPITOR) 40 MG tablet Take 1 tablet (40 mg total) by mouth once daily. 90 tablet 3    carvedilol (COREG) 6.25 MG tablet Take 1 tablet (6.25 mg total) by mouth 2 (two) times daily. 180 tablet 0  "   dulaglutide (TRULICITY) 0.75 mg/0.5 mL PnIj Inject 0.5 mLs (0.75 mg total) into the skin every 7 days. 4 Syringe 6    flash glucose sensor (FREESTYLE ANGELLA SENSOR) Kit 3 each by Misc.(Non-Drug; Combo Route) route every 30 days. 3 kit 11    folic acid (FOLVITE) 400 MCG tablet Take 400 mcg by mouth once daily.      FREESTYLE ANGELLA READER Misc USE EVERY DAY AS DIRECTED FOR TEST BLOOD GLUCOSE 1 each 0    furosemide (LASIX) 40 MG tablet Take 1 tablet (40 mg total) by mouth 2 (two) times daily. 90 tablet 4    GLUCAGON EMERGENCY KIT, HUMAN, 1 mg injection INJECT 1 MG INTO THE MUSCLE AS NEEDED 1 kit 1    insulin aspart U-100 (NOVOLOG) 100 unit/mL InPn pen Inject 7 units w/ meals plus scale 150-200+1, 201-250+2, 251-300+3, 301-350+4. 1 Box 6    insulin detemir (LEVEMIR FLEXPEN) 100 unit/mL (3 mL) SubQ InPn pen Inject 14 Units into the skin every evening. 3 Box 3    lacosamide (VIMPAT) 50 mg Tab Take 1 tablet (50 mg total) by mouth every 12 (twelve) hours. Vim pat 50mg in am, 100 mg at night      levetiracetam (KEPPRA) 750 MG Tab Take 2 tablets (1,500 mg total) by mouth 2 (two) times daily. 120 tablet 11    levothyroxine (SYNTHROID) 200 MCG tablet TAKE 1 TABLET BY MOUTH BEFORE BREAKFAST 90 tablet 2    losartan (COZAAR) 100 MG tablet Take 1 tablet (100 mg total) by mouth once daily. 90 tablet 3    magnesium 30 mg Tab Take 1 tablet by mouth twice a week.       multivitamin capsule Take 1 capsule by mouth every Mon, Wed, Fri.       ONETOUCH VERIO Strp USE TO TEST BLOOD SUGAR THREE TIMES DAILY 100 strip 11    pen needle, diabetic (BD ULTRA-FINE MINI PEN NEEDLE) 31 gauge x 3/16" Ndle To use with insulin pens 4  times daily 360 each 3    sildenafil (VIAGRA) 100 MG tablet Take 1 tablet (100 mg total) by mouth as needed for Erectile Dysfunction. 6 tablet 12    tamsulosin (FLOMAX) 0.4 mg Cap Take 1 capsule (0.4 mg total) by mouth every evening. 30 capsule 11    vitamin D 1000 units Tab Take 1,000 Units by mouth " every Mon, Wed, Fri.       No current facility-administered medications for this visit.        Past Medical History:   Diagnosis Date    Allergy     Atrial fibrillation     BMI 31.0-31.9,adult 11/25/2014    Cerebrovascular disease 7/25/2016    CHF (congestive heart failure)     CHF (congestive heart failure)     Chronic anticoagulation - pradaxa 11/10/2016    Chronic diastolic heart failure 11/20/2016    CKD (chronic kidney disease), stage III 9/23/2013    Controlled type 2 diabetes with neuropathy 12/16/2014    Coronary artery disease     Diabetes mellitus due to underlying condition with stage 3 chronic kidney disease, without long-term current use of insulin 11/2/2016    Elevated alkaline phosphatase level 8/1/2012    Elevated PSA     negative prostate biopsy 4/11    Erectile dysfunction associated with type 2 diabetes mellitus     Gallstones 3/20/2013    Generalized tonic-clonic seizure 11/2016    HTN (hypertension), benign 8/1/2012    Hypercholesterolemia 8/1/2012    Microcytic anemia 11/27/2013    Paroxysmal atrial fibrillation 9/23/2013    Postsurgical hypothyroidism 11/27/2013    Right homonymous hemianopsia 2/5/2016    Sickle cell trait     Stroke 1/27/2016    Thyroid cancer     multifocal with six lesions, largest 5mm, treated with surgery and radioactive iodine    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/2016       Past Surgical History:   Procedure Laterality Date    BREAST SURGERY      cyst removal    COLONOSCOPY      CYST REMOVAL      chest    PROSTATE BIOPSY  4/27/11    SKIN BIOPSY      THYROID SURGERY  8/26/09    VASCULAR SURGERY         Family History   Problem Relation Age of Onset    Heart disease Father     Diabetes Father     Hypertension Father     Diabetes Mother     Hypertension Mother     Heart disease Mother     Diabetes Brother     Cancer Sister     Thyroid disease Maternal Aunt     Clotting disorder Neg Hx        Review of Systems    Constitutional: Negative for chills, diaphoresis and fever.   HENT: Negative for congestion and trouble swallowing.    Eyes: Negative for visual disturbance.   Respiratory: Negative for chest tightness and shortness of breath.    Cardiovascular: Negative for chest pain and palpitations.   Gastrointestinal: Negative for constipation, diarrhea, nausea and vomiting.   Genitourinary: Positive for frequency and urgency. Negative for decreased urine volume, difficulty urinating, discharge, dysuria, flank pain, hematuria, penile pain, penile swelling, scrotal swelling and testicular pain.   Musculoskeletal: Negative for gait problem.   Skin: Negative for rash.   Allergic/Immunologic: Negative for immunocompromised state.   Neurological: Negative for dizziness, seizures, syncope, light-headedness and headaches.   Hematological: Negative for adenopathy.   Psychiatric/Behavioral: Negative for confusion. The patient is not nervous/anxious.          All other systems were reviewed and were negative.    Objective:     Vitals:    07/18/18 1044   BP: 130/80   Pulse: 75        Physical Exam   Nursing note and vitals reviewed.  Constitutional: He is oriented to person, place, and time. He appears well-developed and well-nourished.  Non-toxic appearance. He does not have a sickly appearance. He does not appear ill. No distress.   HENT:   Head: Normocephalic.   Eyes: Conjunctivae are normal. Right eye exhibits no discharge. Left eye exhibits no discharge.   Neck: Normal range of motion.   Cardiovascular: Normal rate and regular rhythm.    Pulmonary/Chest: Effort normal. No respiratory distress.   Abdominal: Soft. He exhibits no distension.   Genitourinary:       Musculoskeletal: Normal range of motion.   Neurological: He is alert and oriented to person, place, and time.   Skin: Skin is warm and dry.     Psychiatric: He has a normal mood and affect. His behavior is normal. Judgment and thought content normal.         Lab Results    Component Value Date    CREATININE 1.8 (H) 06/28/2018     Lab Results   Component Value Date    EGFRNONAA 38.9 (A) 06/28/2018     Lab Results   Component Value Date    ESTGFRAFRICA 45.0 (A) 06/28/2018     Urine dipstick in clinic: trace protein, otherwise negative    PVR: done in clinic with bladder scanner by Nurse Juan Alberto immediately after voiding was 7 ml.    Assessment:       1. BPH with obstruction/lower urinary tract symptoms    2. Urinary retention        Plan:     Alfa was seen today for follow-up and dysuria.    Diagnoses and all orders for this visit:    BPH with obstruction/lower urinary tract symptoms  -     tamsulosin (FLOMAX) 0.4 mg Cap; Take 1 capsule (0.4 mg total) by mouth every evening.  -     Prostate Specific Antigen, Diagnostic; Future  -     POCT urinalysis, dipstick or tablet reag  -     POCT Bladder Scan    Urinary retention  -     POCT urinalysis, dipstick or tablet reag  -     POCT Bladder Scan    -Discussed plan with patient and his wife  -Restart flomax. Discussed side effects, indications, and MOA for flomax. Prescription sent to the pharmacy. Pt verbalized understanding.  If BPH symptoms continue to occur after restarting flomax, discussed surgical intervention with patient. He will schedule appt if no change in symptoms to discuss surgical intervention further and set up cysto/TRUS.  -PSA ordered and scheduled for 1 month  -Discussed the in office findings; reassurance with no infection and emptying his bladder.  -Discussed the contributing factors for his LUTS.  Diet modifications: limit salt intake, reducing PM fluids and no caffeine after 3 pm.  Avoid Bladder Irritants: Tea, coffee, caffeine, alcohol, artificial sweeteners, citrus, spicy foods, acidic foods,chocolate, tomato-based foods, smoking  Elevating his feet couple of hours prior to bedtime. Void prior to bed  -F/U in 6-8 weeks to reassess BPH symptoms       I spent 25 minutes with the patient of which more than half was  spent in coordinating the patient's care as well as in direct consultation with the patient in regards to our treatment and plan.

## 2018-07-18 NOTE — PATIENT INSTRUCTIONS
Diet modifications:  Limit salt intake, reducing PM fluids, no caffeine after 3 pm.  Avoid Bladder Irritants: Tea, coffee, caffeine, alcohol, artificial sweeteners, citrus, spicy foods, acidic foods,chocolate, tomato-based foods, smoking  Elevating his feet couple of hours prior to bedtime.         BPH (Enlarged Prostate)  The prostate is a gland at the base of the bladder. As some men get older, the prostate may get bigger in size. This problem is called benign prostatic hyperplasia (BPH). BPH puts pressure on the urethra. This is the tube that carries urine from the bladder to the penis. It may interfere with the flow of urine. It may also keep the bladder from emptying fully.    Symptoms of BPH include trouble starting urination and feeling as though the bladder isnt emptying all the way. It also includes a weak urine stream, dribbling and leaking of urine, and frequent and urgent urination (especially at night). BPH can increase the risk of urinary infections. It can also block off urine flow completely. If this occurs, a thin tube (catheter) may be passed into the bladder to help drain urine.  If symptoms are mild, no treatment may be needed right now. If symptoms are more severe, treatment is likely needed. The goal of treatment is to improve urine flow and reduce symptoms. Treatments can include medicine and procedures. Your healthcare provider will discuss treatment options with you as needed.  Home care  The following guidelines will help you care for yourself at home:  · Urinate as soon as you feel the urge. Don't try to hold your urine.  · Don't limit your fluid intake during the day. Drink 6 to 8 glasses of water or liquids a day. This prevents bacteria from building up in the bladder.  · Avoid drinking fluids after dinner to help reduce urination during the night.  · Avoid medicines that can worsen your symptoms. These include certain cold and allergy medicines and antidepressants. Diuretics used for  high blood pressure can also worsen symptoms. Talk to your doctor about the medicines you take. Other choices may work better for you.  Prostate cancer screening  BPH does not increase the risk of prostate cancer. But because prostate cancer is a common cancer in men, screening is sometimes recommended. This may help detect the cancer in its early stages when treatment is most effective. Factors that can increase the risk of prostate cancer include being -American or having a father or brother who had prostate cancer. A high-fat diet may also increase the risk of prostate cancer. Talk to your healthcare provider to see whether you should be screened for prostate cancer.  Follow-up care  Follow up with your healthcare provider, or as advised  To learn more, go to:  · National Kidney & Urologic Diseases Information Clearinghouse  kidney.niddk.nih.gov, 288.901.2398  When to seek medical advice  Call your healthcare provider right away if any of these occur:  · Fever of 100.4°F (38.0°C) or higher, or as advised  · Unable to pass urine for 8 hours  · Increasing pressure or pain in your bladder (lower abdomen)  · Blood in the urine  · Increasing low back pain, not related to injury  · Symptoms of urinary infection (increased urge to urinate, burning when passing urine, foul-smelling urine)  Date Last Reviewed: 7/1/2016  © 3275-3202 [employer], [employer]. [address] [place] [address], [place] [address]. All rights reserved. This information is not intended as a substitute for professional medical care. Always follow your healthcare professional's instructions.

## 2018-07-21 DIAGNOSIS — I50.32 CHRONIC DIASTOLIC HEART FAILURE: ICD-10-CM

## 2018-07-23 DIAGNOSIS — I50.32 CHRONIC DIASTOLIC HEART FAILURE: ICD-10-CM

## 2018-07-23 RX ORDER — FUROSEMIDE 40 MG/1
40 TABLET ORAL 2 TIMES DAILY
Qty: 180 TABLET | Refills: 1 | Status: SHIPPED | OUTPATIENT
Start: 2018-07-23 | End: 2019-01-03

## 2018-07-23 RX ORDER — FUROSEMIDE 40 MG/1
TABLET ORAL
Qty: 30 TABLET | Refills: 0 | OUTPATIENT
Start: 2018-07-23

## 2018-07-23 NOTE — TELEPHONE ENCOUNTER
----- Message from Shannan Blanc sent at 7/23/2018  9:54 AM CDT -----  Contact: Pt called   Pt need a refill on medication  furosemide (LASIX) 40 MG tablet and send to [facility] [address] [phone] (Phone)[phone] (Fax). Last visit 1/31/18 Silva Stout.Please call pt @ [phone]. Thank you.

## 2018-07-25 ENCOUNTER — NURSE TRIAGE (OUTPATIENT)
Dept: ADMINISTRATIVE | Facility: CLINIC | Age: 63
End: 2018-07-25

## 2018-07-26 ENCOUNTER — TELEPHONE (OUTPATIENT)
Dept: UROLOGY | Facility: CLINIC | Age: 63
End: 2018-07-26

## 2018-07-26 NOTE — TELEPHONE ENCOUNTER
Attempted to contact left message to return call to schedule appointment with urology christina arevalo lpn

## 2018-07-28 ENCOUNTER — HOSPITAL ENCOUNTER (EMERGENCY)
Facility: OTHER | Age: 63
Discharge: HOME | End: 2018-07-28
Attending: EMERGENCY MEDICINE
Payer: MEDICARE

## 2018-07-28 VITALS
SYSTOLIC BLOOD PRESSURE: 145 MMHG | RESPIRATION RATE: 20 BRPM | DIASTOLIC BLOOD PRESSURE: 77 MMHG | TEMPERATURE: 98 F | BODY MASS INDEX: 31.07 KG/M2 | HEART RATE: 77 BPM | WEIGHT: 217 LBS | HEIGHT: 70 IN | OXYGEN SATURATION: 97 %

## 2018-07-28 DIAGNOSIS — N48.89 PENILE PAIN: Primary | ICD-10-CM

## 2018-07-28 LAB
BACTERIA #/AREA URNS HPF: ABNORMAL /HPF
BILIRUB UR QL STRIP: NEGATIVE
CLARITY UR: CLEAR
COLOR UR: YELLOW
GLUCOSE UR QL STRIP: NEGATIVE
HGB UR QL STRIP: ABNORMAL
HYALINE CASTS #/AREA URNS LPF: 0 /LPF
KETONES UR QL STRIP: NEGATIVE
LEUKOCYTE ESTERASE UR QL STRIP: ABNORMAL
MICROSCOPIC COMMENT: ABNORMAL
NITRITE UR QL STRIP: NEGATIVE
PH UR STRIP: 6 [PH] (ref 5–8)
PROT UR QL STRIP: ABNORMAL
RBC #/AREA URNS HPF: 18 /HPF (ref 0–4)
SP GR UR STRIP: 1.01 (ref 1–1.03)
URN SPEC COLLECT METH UR: ABNORMAL
UROBILINOGEN UR STRIP-ACNC: NEGATIVE EU/DL
WBC #/AREA URNS HPF: 3 /HPF (ref 0–5)

## 2018-07-28 PROCEDURE — 99283 EMERGENCY DEPT VISIT LOW MDM: CPT

## 2018-07-28 PROCEDURE — 25000003 PHARM REV CODE 250: Performed by: EMERGENCY MEDICINE

## 2018-07-28 PROCEDURE — 81000 URINALYSIS NONAUTO W/SCOPE: CPT

## 2018-07-28 RX ORDER — LIDOCAINE HYDROCHLORIDE 20 MG/ML
5 SOLUTION OROPHARYNGEAL
Status: COMPLETED | OUTPATIENT
Start: 2018-07-28 | End: 2018-07-28

## 2018-07-28 RX ORDER — LIDOCAINE HYDROCHLORIDE 20 MG/ML
JELLY TOPICAL
Qty: 30 ML | Refills: 0 | Status: SHIPPED | OUTPATIENT
Start: 2018-07-28 | End: 2018-08-23

## 2018-07-28 RX ADMIN — LIDOCAINE HYDROCHLORIDE 5 ML: 20 SOLUTION ORAL; TOPICAL at 04:07

## 2018-07-28 NOTE — ED TRIAGE NOTES
Pt present to the ED with c/o penile pain x. Pt states that he had a nieves catheter removed 5 weeks ago and now he has increasing pain to penile. Pt states that he has difficulty emptying his bladder and urinary frequency. Pt states that he has a burning sensation when urinating. Pt denies abd pain, chill and fever.

## 2018-07-28 NOTE — DISCHARGE INSTRUCTIONS
Dysuria from urethral irritation.  Can try topical lidocaine as needed for pain.  Please follow up with Urology for further evaluation

## 2018-07-28 NOTE — ED PROVIDER NOTES
Encounter Date: 7/28/2018    SCRIBE #1 NOTE: I, Olivia Ramirez, am scribing for, and in the presence of, Dr. Zavaleta.       History     Chief Complaint   Patient presents with    Penis Pain     pt reports having nieves 5 wks ago and pain has been increasing since nieves removeal.      Time seen by provider: 3:45 PM    This is a 63 y.o. male, with history of HTN, DM, CKD, CHF, and stroke, who presents with complaint of penile pain. The 9-10/10 pain is waxing and waning and worsens with urination. Pain began when nieves catheter was inserted over one month ago for urinary retention. Catheter was removed five days later, though pain persisted. He has taken tylenol for pain. He reports passing clots and that blood in urine resolved two weeks ago. He denies fever, abdominal pain, or back pain. His urologist is Dr. Mejia.      The history is provided by the patient and the spouse.     Review of patient's allergies indicates:   Allergen Reactions    Cough syrup [guaifenesin] Other (See Comments)     Past Medical History:   Diagnosis Date    Allergy     Atrial fibrillation     BMI 31.0-31.9,adult 11/25/2014    Cerebrovascular disease 7/25/2016    CHF (congestive heart failure)     CHF (congestive heart failure)     Chronic anticoagulation - pradaxa 11/10/2016    Chronic diastolic heart failure 11/20/2016    CKD (chronic kidney disease), stage III 9/23/2013    Controlled type 2 diabetes with neuropathy 12/16/2014    Coronary artery disease     Diabetes mellitus due to underlying condition with stage 3 chronic kidney disease, without long-term current use of insulin 11/2/2016    Elevated alkaline phosphatase level 8/1/2012    Elevated PSA     negative prostate biopsy 4/11    Erectile dysfunction associated with type 2 diabetes mellitus     Gallstones 3/20/2013    Generalized tonic-clonic seizure 11/2016    HTN (hypertension), benign 8/1/2012    Hypercholesterolemia 8/1/2012    Microcytic anemia  11/27/2013    Paroxysmal atrial fibrillation 9/23/2013    Postsurgical hypothyroidism 11/27/2013    Right homonymous hemianopsia 2/5/2016    Sickle cell trait     Stroke 1/27/2016    Thyroid cancer     multifocal with six lesions, largest 5mm, treated with surgery and radioactive iodine    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/2016     Past Surgical History:   Procedure Laterality Date    BREAST SURGERY      cyst removal    COLONOSCOPY      CYST REMOVAL      chest    PROSTATE BIOPSY  4/27/11    SKIN BIOPSY      THYROID SURGERY  8/26/09    VASCULAR SURGERY       Family History   Problem Relation Age of Onset    Heart disease Father     Diabetes Father     Hypertension Father     Diabetes Mother     Hypertension Mother     Heart disease Mother     Diabetes Brother     Cancer Sister     Thyroid disease Maternal Aunt     Clotting disorder Neg Hx      Social History   Substance Use Topics    Smoking status: Never Smoker    Smokeless tobacco: Never Used    Alcohol use Yes      Comment: occasionally, none recently     Review of Systems   Constitutional: Negative for fever.   HENT: Negative for sore throat.    Respiratory: Negative for shortness of breath.    Cardiovascular: Negative for chest pain.   Gastrointestinal: Negative for abdominal pain and nausea.   Genitourinary: Positive for dysuria, hematuria and penile pain. Negative for discharge, enuresis, genital sores, penile swelling, scrotal swelling and testicular pain.   Musculoskeletal: Negative for back pain and myalgias.   Skin: Negative for rash.   Neurological: Negative for weakness and headaches.   Hematological: Does not bruise/bleed easily.       Physical Exam     Initial Vitals [07/28/18 1534]   BP Pulse Resp Temp SpO2   (!) 145/83 85 18 97.8 °F (36.6 °C) 99 %      MAP       --         Physical Exam    Nursing note and vitals reviewed.  Constitutional: He appears well-developed and well-nourished. He is not diaphoretic. No  distress.   HENT:   Head: Normocephalic and atraumatic.   Eyes: Conjunctivae and EOM are normal.   Neck: Normal range of motion.   Cardiovascular: Normal rate, regular rhythm and normal heart sounds. Exam reveals no gallop and no friction rub.    No murmur heard.  Pulmonary/Chest: Breath sounds normal. No respiratory distress. He has no wheezes. He has no rhonchi. He has no rales.   Abdominal:   No suprapubic tenderness.   Genitourinary:   Genitourinary Comments: No blood at the urethral meatus. No purulent discharge. No epididymal tenderness. No swelling of the testicles.   Musculoskeletal: Normal range of motion. He exhibits no edema or tenderness.   Neurological: He is alert and oriented to person, place, and time.   Skin: Skin is warm and dry.         ED Course   Procedures  Labs Reviewed   URINALYSIS, REFLEX TO URINE CULTURE - Abnormal; Notable for the following:        Result Value    Protein, UA 2+ (*)     Occult Blood UA 3+ (*)     Leukocytes, UA Trace (*)     All other components within normal limits    Narrative:     Preferred Collection Type->Urine, Clean Catch   URINALYSIS MICROSCOPIC - Abnormal; Notable for the following:     RBC, UA 18 (*)     All other components within normal limits    Narrative:     Preferred Collection Type->Urine, Clean Catch          Imaging Results    None          Medical Decision Making:   History:   Old Medical Records: I decided to obtain old medical records.  Initial Assessment:   Urgent evaluation 63-year-old male presenting with penile pain status post removal of Starr several weeks ago.  Differential Diagnosis:   Urethral irritation, urethritis, urethral trauma  Clinical Tests:   Lab Tests: Ordered and Reviewed  ED Management:  UA today with 3+ blood, trace leukocytes, rare bacteria.  Viscous lidocaine applied with symptomatic relief.  Due to the ER requires antibiotics at this time.  Patient stable for discharge to follow up with Urology            Scribe Attestation:    Scribe #1: I performed the above scribed service and the documentation accurately describes the services I performed. I attest to the accuracy of the note.    Attending Attestation:           Physician Attestation for Scribe:  Physician Attestation Statement for Scribe #1: I, Dr. Zavaleta, reviewed documentation, as scribed by Olivia Ramirez in my presence, and it is both accurate and complete.     Comments: I, Dr. Pamella Zavaleta, personally performed the services described in this documentation. All medical record entries made by the scribe were at my direction and in my presence.  I have reviewed the chart and agree that the record reflects my personal performance and is accurate and complete. Pamella Zavaleta MD.                 Clinical Impression:     1. Penile pain          Disposition:   Disposition: Discharged  Condition: Stable                        Pamella Zavaleta MD  07/29/18 9440

## 2018-07-31 ENCOUNTER — LAB VISIT (OUTPATIENT)
Dept: LAB | Facility: HOSPITAL | Age: 63
End: 2018-07-31
Attending: INTERNAL MEDICINE
Payer: MEDICARE

## 2018-07-31 DIAGNOSIS — Z85.850 HISTORY OF THYROID CANCER: ICD-10-CM

## 2018-07-31 DIAGNOSIS — I10 ESSENTIAL HYPERTENSION: ICD-10-CM

## 2018-07-31 DIAGNOSIS — E89.0 POSTSURGICAL HYPOTHYROIDISM: ICD-10-CM

## 2018-07-31 LAB
ALBUMIN SERPL BCP-MCNC: 3.2 G/DL
ALP SERPL-CCNC: 99 U/L
ALT SERPL W/O P-5'-P-CCNC: 21 U/L
ANION GAP SERPL CALC-SCNC: 7 MMOL/L
AST SERPL-CCNC: 25 U/L
BILIRUB SERPL-MCNC: 0.8 MG/DL
BUN SERPL-MCNC: 28 MG/DL
CALCIUM SERPL-MCNC: 8.7 MG/DL
CHLORIDE SERPL-SCNC: 106 MMOL/L
CO2 SERPL-SCNC: 23 MMOL/L
CREAT SERPL-MCNC: 2.2 MG/DL
EST. GFR  (AFRICAN AMERICAN): 35 ML/MIN/1.73 M^2
EST. GFR  (NON AFRICAN AMERICAN): 30.3 ML/MIN/1.73 M^2
ESTIMATED AVG GLUCOSE: 108 MG/DL
GLUCOSE SERPL-MCNC: 159 MG/DL
HBA1C MFR BLD HPLC: 5.4 %
POTASSIUM SERPL-SCNC: 4.7 MMOL/L
PROT SERPL-MCNC: 8 G/DL
SODIUM SERPL-SCNC: 136 MMOL/L

## 2018-07-31 PROCEDURE — 80053 COMPREHEN METABOLIC PANEL: CPT

## 2018-07-31 PROCEDURE — 82985 ASSAY OF GLYCATED PROTEIN: CPT

## 2018-07-31 PROCEDURE — 83036 HEMOGLOBIN GLYCOSYLATED A1C: CPT | Mod: 59

## 2018-08-01 ENCOUNTER — HOSPITAL ENCOUNTER (EMERGENCY)
Facility: OTHER | Age: 63
Discharge: HOME | End: 2018-08-01
Attending: EMERGENCY MEDICINE
Payer: MEDICARE

## 2018-08-01 VITALS
SYSTOLIC BLOOD PRESSURE: 149 MMHG | WEIGHT: 215 LBS | DIASTOLIC BLOOD PRESSURE: 80 MMHG | HEIGHT: 70 IN | HEART RATE: 76 BPM | BODY MASS INDEX: 30.78 KG/M2 | RESPIRATION RATE: 20 BRPM | TEMPERATURE: 99 F | OXYGEN SATURATION: 99 %

## 2018-08-01 DIAGNOSIS — R33.9 URINARY RETENTION: Primary | ICD-10-CM

## 2018-08-01 LAB
BACTERIA #/AREA URNS HPF: ABNORMAL /HPF
BILIRUB UR QL STRIP: NEGATIVE
CLARITY UR: CLEAR
COLOR UR: YELLOW
FRUCTOSAMINE SERPL-SCNC: 398 UMOL /L (ref 151–300)
GLUCOSE UR QL STRIP: NEGATIVE
HGB UR QL STRIP: ABNORMAL
HYALINE CASTS #/AREA URNS LPF: 0 /LPF
KETONES UR QL STRIP: NEGATIVE
LEUKOCYTE ESTERASE UR QL STRIP: NEGATIVE
MICROSCOPIC COMMENT: ABNORMAL
NITRITE UR QL STRIP: NEGATIVE
PH UR STRIP: 6 [PH] (ref 5–8)
PROT UR QL STRIP: ABNORMAL
RBC #/AREA URNS HPF: 12 /HPF (ref 0–4)
SP GR UR STRIP: 1.01 (ref 1–1.03)
URN SPEC COLLECT METH UR: ABNORMAL
UROBILINOGEN UR STRIP-ACNC: NEGATIVE EU/DL
WBC #/AREA URNS HPF: 0 /HPF (ref 0–5)

## 2018-08-01 PROCEDURE — 99284 EMERGENCY DEPT VISIT MOD MDM: CPT | Mod: 25

## 2018-08-01 PROCEDURE — 51702 INSERT TEMP BLADDER CATH: CPT

## 2018-08-01 PROCEDURE — 81000 URINALYSIS NONAUTO W/SCOPE: CPT

## 2018-08-01 NOTE — ED NOTES
"Pt presents with urinary retention, onset 4 hours ago. Pt states only passing drop, + urgency. Pt states hx of urinary retention. Pt denies blood in urine. Pt denies NVD, fevers. Last bowel movement yesterday, states " it wasn't full". Pt denies blood in stool. Pt walked slowly with small steps to exam room.  Pt is well appearing, pt in NAD.   "

## 2018-08-01 NOTE — ED PROVIDER NOTES
Encounter Date: 8/1/2018    SCRIBE #1 NOTE: I, Olivia Ramirez, am scribing for, and in the presence of, Dr. Hardin.       History     Chief Complaint   Patient presents with    Urinary Retention     x 4 hrs, gtts only     Time seen by provider: 4:49 AM    This is a 63 y.o. male, with history of HTN, DM, CKD, CHF, and thyroid CA, who presents with complaint of urinary retention today. He denies fever, abdominal pain, N/V/D, back pain, dysuria, or hematuria. He has had to be catheterized in the past. He has an appointment scheduled with Dr. Mejia (urology) tomorrow.      The history is provided by the patient.     Review of patient's allergies indicates:   Allergen Reactions    Cough syrup [guaifenesin] Other (See Comments)     Past Medical History:   Diagnosis Date    Allergy     Atrial fibrillation     BMI 31.0-31.9,adult 11/25/2014    Cerebrovascular disease 7/25/2016    CHF (congestive heart failure)     CHF (congestive heart failure)     Chronic anticoagulation - pradaxa 11/10/2016    Chronic diastolic heart failure 11/20/2016    CKD (chronic kidney disease), stage III 9/23/2013    Controlled type 2 diabetes with neuropathy 12/16/2014    Coronary artery disease     Diabetes mellitus due to underlying condition with stage 3 chronic kidney disease, without long-term current use of insulin 11/2/2016    Elevated alkaline phosphatase level 8/1/2012    Elevated PSA     negative prostate biopsy 4/11    Erectile dysfunction associated with type 2 diabetes mellitus     Gallstones 3/20/2013    Generalized tonic-clonic seizure 11/2016    HTN (hypertension), benign 8/1/2012    Hypercholesterolemia 8/1/2012    Microcytic anemia 11/27/2013    Paroxysmal atrial fibrillation 9/23/2013    Postsurgical hypothyroidism 11/27/2013    Right homonymous hemianopsia 2/5/2016    Sickle cell trait     Stroke 1/27/2016    Thyroid cancer     multifocal with six lesions, largest 5mm, treated with surgery and  radioactive iodine    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/2016     Past Surgical History:   Procedure Laterality Date    BREAST SURGERY      cyst removal    COLONOSCOPY      CYST REMOVAL      chest    PROSTATE BIOPSY  4/27/11    SKIN BIOPSY      THYROID SURGERY  8/26/09    VASCULAR SURGERY       Family History   Problem Relation Age of Onset    Heart disease Father     Diabetes Father     Hypertension Father     Diabetes Mother     Hypertension Mother     Heart disease Mother     Diabetes Brother     Cancer Sister     Thyroid disease Maternal Aunt     Clotting disorder Neg Hx      Social History   Substance Use Topics    Smoking status: Never Smoker    Smokeless tobacco: Never Used    Alcohol use Yes      Comment: occasionally, none recently     Review of Systems   Constitutional: Negative for chills and fever.   HENT: Negative for sore throat.    Respiratory: Negative for shortness of breath.    Cardiovascular: Negative for chest pain.   Gastrointestinal: Negative for abdominal pain, diarrhea, nausea and vomiting.   Genitourinary: Positive for difficulty urinating. Negative for dysuria and hematuria.   Musculoskeletal: Negative for back pain and myalgias.   Skin: Negative for rash.   Neurological: Negative for weakness.   Hematological: Does not bruise/bleed easily.       Physical Exam     Initial Vitals [08/01/18 0428]   BP Pulse Resp Temp SpO2   (!) 179/85 79 20 97.9 °F (36.6 °C) 96 %      MAP       --         Physical Exam    Nursing note and vitals reviewed.  Constitutional: He appears well-developed and well-nourished. He is not diaphoretic. No distress.   HENT:   Head: Normocephalic and atraumatic.   Eyes: Conjunctivae and EOM are normal. No scleral icterus.   Neck: Normal range of motion. Neck supple.   Cardiovascular: Normal rate, regular rhythm and normal heart sounds. Exam reveals no gallop and no friction rub.    No murmur heard.  Pulmonary/Chest: Breath sounds normal. No  respiratory distress. He has no wheezes. He has no rhonchi. He has no rales.   Abdominal: Soft. Bowel sounds are normal. He exhibits no distension. There is no tenderness. There is no rebound and no guarding.   Genitourinary:   Genitourinary Comments: Starr in place. Draining clear yellow fluid.   Musculoskeletal: Normal range of motion. He exhibits no edema or tenderness.   Neurological: He is alert and oriented to person, place, and time.   Skin: Skin is warm and dry. No rash and no abscess noted. No erythema. No pallor.   Psychiatric: He has a normal mood and affect. His behavior is normal. Judgment and thought content normal.         ED Course   Procedures  Labs Reviewed   URINALYSIS, REFLEX TO URINE CULTURE          Imaging Results    None          Medical Decision Making:   Clinical Tests:   Lab Tests: Ordered and Reviewed  ED Management:  Well-appearing patient with a history of large prostate and subsequent urinary tension presents complaining of urinary retention.  Started yesterday.  He has had to have a Starr catheter in the past.  Fully easily placed by nursing.  No sign of infection in the urine.  Switched to a leg bag.  Already has urology follow-up tomorrow.  Counseled to return here if worse in the meantime.    I did have an extensive talk regarding signs to return for and need for follow up. Patient expressed understanding and will monitor symptoms closely and follow-up as needed.    JOE Hardin M.D.  08/01/2018  5:34 AM              Scribe Attestation:   Scribe #1: I performed the above scribed service and the documentation accurately describes the services I performed. I attest to the accuracy of the note.    Attending Attestation:           Physician Attestation for Scribe:  Physician Attestation Statement for Scribe #1: I, Dr. Hardin, reviewed documentation, as scribed by Olivia Ramirez in my presence, and it is both accurate and complete.                    Clinical Impression:     1.  Urinary retention                                 Manuel Hardin MD  08/01/18 0539

## 2018-08-01 NOTE — ED NOTES
NEURO: Pt AAO x 4. Behavior and speech appropriate to situation.   CARDIAC: pt denies chest pain  RESPIRATORY: Respirations even and unlabored. Pt denies SOB  MUSCULOSKELETAL: Active ROM noted to extremities  GASTRO: abdomen rotund

## 2018-08-02 ENCOUNTER — OFFICE VISIT (OUTPATIENT)
Dept: UROLOGY | Facility: CLINIC | Age: 63
End: 2018-08-02
Payer: MEDICARE

## 2018-08-02 ENCOUNTER — PATIENT MESSAGE (OUTPATIENT)
Dept: UROLOGY | Facility: CLINIC | Age: 63
End: 2018-08-02

## 2018-08-02 VITALS — HEART RATE: 68 BPM | SYSTOLIC BLOOD PRESSURE: 123 MMHG | DIASTOLIC BLOOD PRESSURE: 63 MMHG

## 2018-08-02 DIAGNOSIS — N40.1 BPH WITH OBSTRUCTION/LOWER URINARY TRACT SYMPTOMS: Primary | ICD-10-CM

## 2018-08-02 DIAGNOSIS — N13.8 BPH WITH OBSTRUCTION/LOWER URINARY TRACT SYMPTOMS: Primary | ICD-10-CM

## 2018-08-02 DIAGNOSIS — Z97.8 FOLEY CATHETER PRESENT: ICD-10-CM

## 2018-08-02 DIAGNOSIS — R33.9 URINARY RETENTION: ICD-10-CM

## 2018-08-02 DIAGNOSIS — N32.89 BLADDER SPASMS: ICD-10-CM

## 2018-08-02 PROCEDURE — 3074F SYST BP LT 130 MM HG: CPT | Mod: CPTII,S$GLB,, | Performed by: NURSE PRACTITIONER

## 2018-08-02 PROCEDURE — 3078F DIAST BP <80 MM HG: CPT | Mod: CPTII,S$GLB,, | Performed by: NURSE PRACTITIONER

## 2018-08-02 PROCEDURE — 99214 OFFICE O/P EST MOD 30 MIN: CPT | Mod: S$GLB,,, | Performed by: NURSE PRACTITIONER

## 2018-08-02 PROCEDURE — 99999 PR PBB SHADOW E&M-EST. PATIENT-LVL III: CPT | Mod: PBBFAC,,, | Performed by: NURSE PRACTITIONER

## 2018-08-02 RX ORDER — OXYBUTYNIN CHLORIDE 5 MG/1
5 TABLET ORAL 2 TIMES DAILY
Qty: 14 TABLET | Refills: 0 | Status: SHIPPED | OUTPATIENT
Start: 2018-08-02 | End: 2018-08-14

## 2018-08-02 NOTE — PROGRESS NOTES
Subjective:       Patient ID: Alfa Christy III is a 63 y.o. male.    Chief Complaint: Urinary Retention      HPI: Alfa Christy III is a 63 y.o. Black or  male who presents today for evaluation and management of urinary retention. His last clinic visit was 7/18/18.    Pt went to ED on 6/15/18 for decreased urine flow and suprapubic pain and distention.   16 fr nieves catheter was inserted and 800 ml of clear yellow urine out.  He was discharged home with catheter.  Urinary retention possibly d/t enlarged prostate.   On previous cysto performed by Dr. Mejia, patient did have BPH.  On 6/20/18, VT passed.  On 7/18/18 PVR 7 ml, no infection and emptying his bladder    He was seen at ED 7/28/18 for penis pain (dysuria, hematuria and penile pain). He was given lidocaine and discharged home.  He was seen at ED 8/1/18 for inability to urinate. Nieves catheter was placed and 700 ml clear yellow urine drained. He was discharged home with nieves catheter.    Today he reports bladder spasms and burning from nieves catheter. He reports catheter has been draining without difficulty. He reports bladder spasms and urgency with catheter in place. Reports hematuria without blood clots in drainage bag. He was having prostate pain and dysuria prior to catheter insertion per patient. He was taking flomax BID since last clinic visit.     He reports nocturia x 5 and daytime frequency and urgency even with flomax BID. He is interested in prostate surgery to reduce prostate. Denies f/c/n/v.       Review of patient's allergies indicates:   Allergen Reactions    Cough syrup [guaifenesin] Other (See Comments)       Current Outpatient Prescriptions   Medication Sig Dispense Refill    acetaminophen (TYLENOL) 500 MG tablet Take 1 tablet (500 mg total) by mouth every 6 (six) hours as needed for Pain.  0    amLODIPine (NORVASC) 10 MG tablet Take 1 tablet (10 mg total) by mouth once daily. 90 tablet 3    apixaban 5 mg Tab Take 1  tablet (5 mg total) by mouth 2 (two) times daily. 60 tablet 11    aspirin 81 MG Chew Take 1 tablet (81 mg total) by mouth once daily.  0    atorvastatin (LIPITOR) 40 MG tablet Take 1 tablet (40 mg total) by mouth once daily. 90 tablet 3    carvedilol (COREG) 6.25 MG tablet Take 1 tablet (6.25 mg total) by mouth 2 (two) times daily. 180 tablet 0    dulaglutide (TRULICITY) 0.75 mg/0.5 mL PnIj Inject 0.5 mLs (0.75 mg total) into the skin every 7 days. 4 Syringe 6    flash glucose sensor (FREESTYLE ANGELLA SENSOR) Kit 3 each by Misc.(Non-Drug; Combo Route) route every 30 days. 3 kit 11    folic acid (FOLVITE) 400 MCG tablet Take 400 mcg by mouth once daily.      FREESTYLE ANGELLA READER Misc USE EVERY DAY AS DIRECTED FOR TEST BLOOD GLUCOSE 1 each 0    furosemide (LASIX) 40 MG tablet Take 1 tablet (40 mg total) by mouth 2 (two) times daily. 180 tablet 1    GLUCAGON EMERGENCY KIT, HUMAN, 1 mg injection INJECT 1 MG INTO THE MUSCLE AS NEEDED 1 kit 1    insulin aspart U-100 (NOVOLOG) 100 unit/mL InPn pen Inject 7 units w/ meals plus scale 150-200+1, 201-250+2, 251-300+3, 301-350+4. 1 Box 6    insulin detemir (LEVEMIR FLEXPEN) 100 unit/mL (3 mL) SubQ InPn pen Inject 14 Units into the skin every evening. 3 Box 3    lacosamide (VIMPAT) 50 mg Tab Take 1 tablet (50 mg total) by mouth every 12 (twelve) hours. Vim pat 50mg in am, 100 mg at night      levetiracetam (KEPPRA) 750 MG Tab Take 2 tablets (1,500 mg total) by mouth 2 (two) times daily. 120 tablet 11    levothyroxine (SYNTHROID) 200 MCG tablet TAKE 1 TABLET BY MOUTH BEFORE BREAKFAST 90 tablet 2    lidocaine HCL 2% (XYLOCAINE) 2 % jelly Apply topically as needed. Apply small amount topically as needed 30 mL 0    losartan (COZAAR) 100 MG tablet Take 1 tablet (100 mg total) by mouth once daily. 90 tablet 3    magnesium 30 mg Tab Take 1 tablet by mouth twice a week.       multivitamin capsule Take 1 capsule by mouth every Mon, Wed, Fri.       ONETOUCH VERIO Strp  "USE TO TEST BLOOD SUGAR THREE TIMES DAILY 100 strip 11    oxybutynin (DITROPAN) 5 MG Tab Take 1 tablet (5 mg total) by mouth 2 (two) times daily. 14 tablet 0    pen needle, diabetic (BD ULTRA-FINE MINI PEN NEEDLE) 31 gauge x 3/16" Ndle To use with insulin pens 4  times daily 360 each 3    sildenafil (VIAGRA) 100 MG tablet Take 1 tablet (100 mg total) by mouth as needed for Erectile Dysfunction. 6 tablet 12    tamsulosin (FLOMAX) 0.4 mg Cap Take 1 capsule (0.4 mg total) by mouth every evening. 30 capsule 11    vitamin D 1000 units Tab Take 1,000 Units by mouth every Mon, Wed, Fri.       No current facility-administered medications for this visit.        Past Medical History:   Diagnosis Date    Allergy     Atrial fibrillation     BMI 31.0-31.9,adult 11/25/2014    Cerebrovascular disease 7/25/2016    CHF (congestive heart failure)     CHF (congestive heart failure)     Chronic anticoagulation - pradaxa 11/10/2016    Chronic diastolic heart failure 11/20/2016    CKD (chronic kidney disease), stage III 9/23/2013    Controlled type 2 diabetes with neuropathy 12/16/2014    Coronary artery disease     Diabetes mellitus due to underlying condition with stage 3 chronic kidney disease, without long-term current use of insulin 11/2/2016    Elevated alkaline phosphatase level 8/1/2012    Elevated PSA     negative prostate biopsy 4/11    Erectile dysfunction associated with type 2 diabetes mellitus     Gallstones 3/20/2013    Generalized tonic-clonic seizure 11/2016    HTN (hypertension), benign 8/1/2012    Hypercholesterolemia 8/1/2012    Microcytic anemia 11/27/2013    Paroxysmal atrial fibrillation 9/23/2013    Postsurgical hypothyroidism 11/27/2013    Right homonymous hemianopsia 2/5/2016    Sickle cell trait     Stroke 1/27/2016    Thyroid cancer     multifocal with six lesions, largest 5mm, treated with surgery and radioactive iodine    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/2016       Past " Surgical History:   Procedure Laterality Date    BREAST SURGERY      cyst removal    COLONOSCOPY      CYST REMOVAL      chest    PROSTATE BIOPSY  4/27/11    SKIN BIOPSY      THYROID SURGERY  8/26/09    VASCULAR SURGERY         Family History   Problem Relation Age of Onset    Heart disease Father     Diabetes Father     Hypertension Father     Diabetes Mother     Hypertension Mother     Heart disease Mother     Diabetes Brother     Cancer Sister     Thyroid disease Maternal Aunt     Clotting disorder Neg Hx        Review of Systems   Constitutional: Negative for chills, diaphoresis and fever.   HENT: Negative for congestion and trouble swallowing.    Eyes: Negative for visual disturbance.   Respiratory: Negative for chest tightness and shortness of breath.    Cardiovascular: Negative for chest pain and palpitations.   Gastrointestinal: Negative for nausea and vomiting.   Genitourinary: Positive for difficulty urinating, dysuria, frequency, hematuria, penile pain and urgency. Negative for discharge, flank pain, penile swelling, scrotal swelling and testicular pain.   Musculoskeletal: Negative for back pain.   Skin: Negative for rash.   Allergic/Immunologic: Negative for immunocompromised state.   Neurological: Negative for seizures, syncope and headaches.   Hematological: Negative for adenopathy.   Psychiatric/Behavioral: Negative for confusion.         All other systems were reviewed and were negative.    Objective:     Vitals:    08/02/18 1249   BP: 123/63   Pulse: 68        Physical Exam   Nursing note and vitals reviewed.  Constitutional: He is oriented to person, place, and time. He appears well-developed and well-nourished.  Non-toxic appearance. He does not have a sickly appearance. He does not appear ill. No distress.   HENT:   Head: Normocephalic.   Eyes: Conjunctivae are normal.   Neck: Normal range of motion.   Cardiovascular: Normal rate and regular rhythm.    Pulmonary/Chest: Effort  normal. No respiratory distress.   Abdominal: Soft. He exhibits no distension. There is no CVA tenderness.   Genitourinary:   Genitourinary Comments: Indwelling nieves catheter in place   Musculoskeletal: Normal range of motion.   Neurological: He is alert and oriented to person, place, and time.   Skin: Skin is warm and dry.     Psychiatric: He has a normal mood and affect. His behavior is normal. Judgment and thought content normal.         Lab Results   Component Value Date    CREATININE 2.2 (H) 07/31/2018     Lab Results   Component Value Date    EGFRNONAA 30.3 (A) 07/31/2018     Lab Results   Component Value Date    ESTGFRAFRICA 35.0 (A) 07/31/2018         Assessment:       1. BPH with obstruction/lower urinary tract symptoms    2. Nieves catheter present    3. Urinary retention    4. Bladder spasms        Plan:     Alfa was seen today for urinary retention.    Diagnoses and all orders for this visit:    BPH with obstruction/lower urinary tract symptoms    Nieves catheter present  -     oxybutynin (DITROPAN) 5 MG Tab; Take 1 tablet (5 mg total) by mouth 2 (two) times daily.    Urinary retention    Bladder spasms  -     oxybutynin (DITROPAN) 5 MG Tab; Take 1 tablet (5 mg total) by mouth 2 (two) times daily.      -Discussed plan of care with patient  -Oxybutynin prescribed for bladder spasms while catheter in place. Stop taking 2-3 days prior to scheduled VT.  Discussed side effects, indications, and MOA for oxybutynin. Prescription sent to the pharmacy. Pt verbalized understanding.  -Increase water intake. Avoid Bladder Irritants: Tea, coffee, caffeine, alcohol, artificial sweeteners, citrus, spicy foods, acidic foods,chocolate, tomato-based foods, smoking  -Continue flomax BID  -Will consult with Dr. Mejia regarding surgical options and procedures prior. Pt had cysto 7/2017. Possible repeat cysto and TRUS.  -F/U VT as scheduled       I spent 25 minutes with the patient of which more than half was spent in  coordinating the patient's care as well as in direct consultation with the patient in regards to our treatment and plan.

## 2018-08-04 DIAGNOSIS — I48.0 PAROXYSMAL ATRIAL FIBRILLATION: Chronic | ICD-10-CM

## 2018-08-04 RX ORDER — APIXABAN 5 MG/1
TABLET, FILM COATED ORAL
Qty: 60 TABLET | Refills: 0 | Status: CANCELLED | OUTPATIENT
Start: 2018-08-04

## 2018-08-06 ENCOUNTER — OFFICE VISIT (OUTPATIENT)
Dept: CARDIOLOGY | Facility: CLINIC | Age: 63
End: 2018-08-06
Payer: MEDICARE

## 2018-08-06 VITALS
HEIGHT: 70 IN | BODY MASS INDEX: 31.43 KG/M2 | SYSTOLIC BLOOD PRESSURE: 115 MMHG | HEART RATE: 70 BPM | WEIGHT: 219.56 LBS | DIASTOLIC BLOOD PRESSURE: 63 MMHG

## 2018-08-06 DIAGNOSIS — E78.00 HYPERCHOLESTEROLEMIA: ICD-10-CM

## 2018-08-06 DIAGNOSIS — Z79.01 CHRONIC ANTICOAGULATION: Chronic | ICD-10-CM

## 2018-08-06 DIAGNOSIS — Z79.4 TYPE 2 DIABETES MELLITUS WITH STAGE 3 CHRONIC KIDNEY DISEASE, WITH LONG-TERM CURRENT USE OF INSULIN: ICD-10-CM

## 2018-08-06 DIAGNOSIS — I50.32 CHRONIC DIASTOLIC HEART FAILURE: Primary | ICD-10-CM

## 2018-08-06 DIAGNOSIS — D50.9 MICROCYTIC ANEMIA: ICD-10-CM

## 2018-08-06 DIAGNOSIS — I48.0 PAROXYSMAL ATRIAL FIBRILLATION: Chronic | ICD-10-CM

## 2018-08-06 DIAGNOSIS — E66.9 OBESITY (BMI 30.0-34.9): ICD-10-CM

## 2018-08-06 DIAGNOSIS — R00.1 BRADYCARDIA: ICD-10-CM

## 2018-08-06 DIAGNOSIS — E11.22 TYPE 2 DIABETES MELLITUS WITH STAGE 3 CHRONIC KIDNEY DISEASE, WITH LONG-TERM CURRENT USE OF INSULIN: ICD-10-CM

## 2018-08-06 DIAGNOSIS — N18.30 TYPE 2 DIABETES MELLITUS WITH STAGE 3 CHRONIC KIDNEY DISEASE, WITH LONG-TERM CURRENT USE OF INSULIN: ICD-10-CM

## 2018-08-06 DIAGNOSIS — I10 ESSENTIAL HYPERTENSION: ICD-10-CM

## 2018-08-06 PROCEDURE — 3044F HG A1C LEVEL LT 7.0%: CPT | Mod: CPTII,S$GLB,, | Performed by: NURSE PRACTITIONER

## 2018-08-06 PROCEDURE — 99999 PR PBB SHADOW E&M-EST. PATIENT-LVL III: CPT | Mod: PBBFAC,,, | Performed by: NURSE PRACTITIONER

## 2018-08-06 PROCEDURE — 99214 OFFICE O/P EST MOD 30 MIN: CPT | Mod: S$GLB,,, | Performed by: NURSE PRACTITIONER

## 2018-08-06 PROCEDURE — 99499 UNLISTED E&M SERVICE: CPT | Mod: S$GLB,,, | Performed by: NURSE PRACTITIONER

## 2018-08-06 PROCEDURE — 3074F SYST BP LT 130 MM HG: CPT | Mod: CPTII,S$GLB,, | Performed by: NURSE PRACTITIONER

## 2018-08-06 PROCEDURE — 3008F BODY MASS INDEX DOCD: CPT | Mod: CPTII,S$GLB,, | Performed by: NURSE PRACTITIONER

## 2018-08-06 PROCEDURE — 3078F DIAST BP <80 MM HG: CPT | Mod: CPTII,S$GLB,, | Performed by: NURSE PRACTITIONER

## 2018-08-06 RX ORDER — CARVEDILOL 6.25 MG/1
6.25 TABLET ORAL 2 TIMES DAILY
Qty: 180 TABLET | Refills: 3 | Status: SHIPPED | OUTPATIENT
Start: 2018-08-06 | End: 2019-01-17

## 2018-08-06 RX ORDER — ATORVASTATIN CALCIUM 40 MG/1
40 TABLET, FILM COATED ORAL DAILY
Qty: 90 TABLET | Refills: 3 | Status: SHIPPED | OUTPATIENT
Start: 2018-08-06 | End: 2019-09-03 | Stop reason: SDUPTHER

## 2018-08-06 NOTE — Clinical Note
Yobani Gold, It looks like you had wanted Mr. Christy to f/u with you in the spring.  I told him to make an appointment with you since his H/H is pretty low and he looks like he maybe iron deficient.  Thank you, Shanti

## 2018-08-06 NOTE — PROGRESS NOTES
CC: Patient is here for management of Type 2 diabetes and review of medical conditions as listed in visit diagnosis.      HPI: Mr. Alfa Christy III is a 63 y.o. Black or  male who was diagnosed with Type 2 DM in ~1990s. He was originally on orals, but started insulin in 2010. He was seen in ER for hyperglycemia in ~2010. (+) FH of DM in multiple family members. Has a history of CVA with diminished peripheral vision and also thyroid cancer, which was treated with surgery and radioactive iodine. Has sickle cell and last crisis in 2016.   Seen by Dr. Phillips in May 2018 and by me in Jan 2018.  Pt is being seen by me again today.  Lab Results   Component Value Date    HGBA1C 5.4 07/31/2018       Pt has had frequent visits in the past for sicke cell crisis, afib, CVA, hypoglycemia, hyperglycemia 600s (summer 2017).    Recently in the past 6 weeks, pt has had frequent ER visits for urinary retention, penile pain, afib.    Currently has a nieves catheter for 14 days, previously was on antibiotics.    He is currently awaiting, procedure for prostate, on flomax.    Exercising in the past 3 mos, recently has had difficulty with urinary issues.    Arrives in w/c with wife.     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 On levothyroxine 200 mcg daily.   Lab Results   Component Value Date    TSH 1.713 06/28/2018       Denies missing any doses of insulin.     Takes Humalog in tandem with meals.     Walks for exercise, in 30 minute intervals (3-4 times a week)-----gym member    Rarely skips meals. Has salad at night for dinner. Biggest meals are breakfast and lunch.     PREVIOUS DIABETES MEDICATIONS  Novolog  Metformin    CURRENT DIABETIC MEDS: Lantus 14 units QHS, Novolog 7 units plus correction scale, trulicity 0.75 mg weekly    Last Podiatry Exam: N/A    REVIEW OF SYSTEMS  General: no weakness, fatigue, or weight fluctuations 2-5  "lbs  Eyes: decreased peripheral vision in right eye; no eye pain or redness; last eye exam=6/2018 (Dr. Tejas Man)  Cardiovascular: no chest pain, palpitations, +edema, or murmurs.   Respiratory: no cough or no dyspnea    GI/: no heartburn, nausea, or changes in bowel patterns; good appetite. +urinary retention-pain, has nieves, bladder spasms  Skin: no rashes, dryness, itching, or reactions at insulin injection sites; rotates insulin injection sites.   Neuro: no c/o numbness, tingling, or dizziness.   Endocrine: no polyuria, polydipsia, polyphagia, heat or cold intolerance.     Vital Signs  /77 (BP Location: Left arm, Patient Position: Sitting)   Pulse 80   Ht 5' 10" (1.778 m)   Wt 100.3 kg (221 lb 1.9 oz)   BMI 31.73 kg/m²        Lab Results   Component Value Date    CREATININE 2.2 (H) 07/31/2018      Lab Results   Component Value Date    TSH 1.713 06/28/2018      Lab Results   Component Value Date    CHOL 102 (L) 07/31/2018    CHOL 95 (L) 06/28/2018    CHOL 105 (L) 04/26/2018     Lab Results   Component Value Date    HDL 35 (L) 07/31/2018    HDL 29 (L) 06/28/2018    HDL 37 (L) 04/26/2018     Lab Results   Component Value Date    LDLCALC 52.4 (L) 07/31/2018    LDLCALC 47.8 (L) 06/28/2018    LDLCALC 48.4 (L) 04/26/2018     Lab Results   Component Value Date    TRIG 73 07/31/2018    TRIG 91 06/28/2018    TRIG 98 04/26/2018     Lab Results   Component Value Date    CHOLHDL 34.3 07/31/2018    CHOLHDL 30.5 06/28/2018    CHOLHDL 35.2 04/26/2018      PHYSICAL EXAMINATION  Constitutional: Appears well, no distress  Neck: Supple, trachea midline.   Respiratory: no wheezes, even and unlabored.  Cardiovascular: RRR; no carotid bruits or murmurs.   Lymph: DP pulses  2+ bilaterally; +trace +1 BLE edema.   Skin: warm and dry; no injection site reactions, no acanthosis nigracans observed.  Neuro:patient alert and cooperative, normal affect.    Diabetes Foot Exam:   Appropriate footwear    Posterior tibialis: "   Right:Present  Left: Present      Assessment/Plan  1. Type 2 diabetes mellitus with stage 3 chronic kidney disease, with long-term current use of insulin  Hemoglobin A1c next time  F/u in 4 mos  a1c goal less than 7%  a1c below goal, no hypoglycemia   Decrease lantus to 12 units qhs  Continue novolog 7 units ac, if light meal 4 units w/ scale 150-200+1, etc  Continue trulicity 0.75 mg weekly  Send rx     Ambulatory referral to Podiatry-annual   2. Chronic kidney disease, stage III (moderate)  Stable  Continue to monitor   3. Chronic diastolic heart failure  Avoid hypoglycemia and insulin stacking   F/u with cards prn   4. DM type 2 without retinopathy  F/u with ophthalmology   5. Essential hypertension  Controlled, continue med(s)   6. History of CVA (cerebrovascular accident)  Avoid hypoglycemia, f/u with pcp   7. History of thyroid cancer  Levothyroxine 200 mcg daily  Continue on empty stomach, water only  Lab Results   Component Value Date    TSH 1.713 06/28/2018            FOLLOW UP  Follow-up in about 4 months (around 12/7/2018).     Orders Placed This Encounter   Procedures    Hemoglobin A1c     Standing Status:   Future     Standing Expiration Date:   10/5/2019    Ambulatory referral to Podiatry     Referral Priority:   Routine     Referral Type:   Consultation     Referral Reason:   Specialty Services Required     Requested Specialty:   Podiatry     Number of Visits Requested:   1

## 2018-08-06 NOTE — PROGRESS NOTES
Mr. Christy is a patient of Dr. Camacho and was last seen in [facility] Cardiology Visit 1/31/18.    Subjective:   Patient ID:  Alfa Christy III is a 63 y.o. male who presents for follow-up of Hypertension and Leg Swelling (both legs and ankles)    Problems:  HFpEF (EF 55% on 1/28/2016)   paroxysmal atrial fibrillation   CVA (stroke January 2016; episode of aphasia 10/2017 with no signs of acute stroke)   Carotid artery disease, <50% bilaterally in 2016  HTN   HLD  DM II    HPI  Mr. Christy is in clinic today for routine follow up.  Patient denies chest pain with exertion or at rest, palpitations, SOB, AYALA, dizziness, syncope, edema, orthopnea, PND, or claudication.  Reports no routine exercise.  He is treated with low dose ASA and high intensity statin.  Patient is taking atorvastatin 40mg and LDL is 52.  Patient is taking apixaban 5mg BID for thromboembolic prophylaxis.  Denies bleeding gums, epistaxis, hematuria, and hematochezia.  Reports following a low salt diet. He is not currently weighing himself.     Review of Systems   Constitution: Positive for weight gain. Negative for decreased appetite, diaphoresis, weakness, malaise/fatigue and weight loss.   Eyes: Negative for visual disturbance.   Cardiovascular: Positive for leg swelling. Negative for chest pain, claudication, dyspnea on exertion, irregular heartbeat, near-syncope, orthopnea, palpitations, paroxysmal nocturnal dyspnea and syncope.        Denies chest pressure   Respiratory: Negative for cough, hemoptysis, shortness of breath, sleep disturbances due to breathing and snoring.    Endocrine: Negative for cold intolerance and heat intolerance.   Hematologic/Lymphatic: Negative for bleeding problem. Does not bruise/bleed easily.   Musculoskeletal: Negative for myalgias.   Gastrointestinal: Negative for bloating, abdominal pain, anorexia, change in bowel habit, constipation, diarrhea, nausea and vomiting.   Neurological: Negative for difficulty with  concentration, disturbances in coordination, excessive daytime sleepiness, dizziness, headaches, light-headedness, loss of balance and numbness.   Psychiatric/Behavioral: The patient does not have insomnia.        Allergies and current medications updated and reviewed:  Review of patient's allergies indicates:   Allergen Reactions    Cough syrup [guaifenesin] Other (See Comments)     Current Outpatient Prescriptions   Medication Sig    acetaminophen (TYLENOL) 500 MG tablet Take 1 tablet (500 mg total) by mouth every 6 (six) hours as needed for Pain.    amLODIPine (NORVASC) 10 MG tablet Take 1 tablet (10 mg total) by mouth once daily.    apixaban 5 mg Tab Take 1 tablet (5 mg total) by mouth 2 (two) times daily.    aspirin 81 MG Chew Take 1 tablet (81 mg total) by mouth once daily.    carvedilol (COREG) 6.25 MG tablet Take 1 tablet (6.25 mg total) by mouth 2 (two) times daily.    dulaglutide (TRULICITY) 0.75 mg/0.5 mL PnIj Inject 0.5 mLs (0.75 mg total) into the skin every 7 days.    flash glucose sensor (FREESTYLE ANGELLA SENSOR) Kit 3 each by Misc.(Non-Drug; Combo Route) route every 30 days.    folic acid (FOLVITE) 400 MCG tablet Take 400 mcg by mouth once daily.    FREESTYLE ANGELLA READER Misc USE EVERY DAY AS DIRECTED FOR TEST BLOOD GLUCOSE    furosemide (LASIX) 40 MG tablet Take 1 tablet (40 mg total) by mouth 2 (two) times daily.    GLUCAGON EMERGENCY KIT, HUMAN, 1 mg injection INJECT 1 MG INTO THE MUSCLE AS NEEDED    insulin aspart U-100 (NOVOLOG) 100 unit/mL InPn pen Inject 7 units w/ meals plus scale 150-200+1, 201-250+2, 251-300+3, 301-350+4.    insulin detemir (LEVEMIR FLEXPEN) 100 unit/mL (3 mL) SubQ InPn pen Inject 14 Units into the skin every evening.    lacosamide (VIMPAT) 50 mg Tab Take 1 tablet (50 mg total) by mouth every 12 (twelve) hours. Vim pat 50mg in am, 100 mg at night    levetiracetam (KEPPRA) 750 MG Tab Take 2 tablets (1,500 mg total) by mouth 2 (two) times daily.     "levothyroxine (SYNTHROID) 200 MCG tablet TAKE 1 TABLET BY MOUTH BEFORE BREAKFAST    lidocaine HCL 2% (XYLOCAINE) 2 % jelly Apply topically as needed. Apply small amount topically as needed    losartan (COZAAR) 100 MG tablet Take 1 tablet (100 mg total) by mouth once daily.    magnesium 30 mg Tab Take 1 tablet by mouth twice a week.     multivitamin capsule Take 1 capsule by mouth every Mon, Wed, Fri.     ONETOUCH VERIO Strp USE TO TEST BLOOD SUGAR THREE TIMES DAILY    oxybutynin (DITROPAN) 5 MG Tab Take 1 tablet (5 mg total) by mouth 2 (two) times daily.    pen needle, diabetic (BD ULTRA-FINE MINI PEN NEEDLE) 31 gauge x 3/16" Ndle To use with insulin pens 4  times daily    sildenafil (VIAGRA) 100 MG tablet Take 1 tablet (100 mg total) by mouth as needed for Erectile Dysfunction.    tamsulosin (FLOMAX) 0.4 mg Cap Take 1 capsule (0.4 mg total) by mouth every evening.    vitamin D 1000 units Tab Take 1,000 Units by mouth every Mon, Wed, Fri.    atorvastatin (LIPITOR) 40 MG tablet Take 1 tablet (40 mg total) by mouth once daily.     No current facility-administered medications for this visit.      Objective:     Right Arm BP - Sittin/69 (18 1052)  Left Arm BP - Sittin/63 (18 1052)    /63   Pulse 70   Ht 5' 10" (1.778 m)   Wt 99.6 kg (219 lb 9.3 oz)   BMI 31.51 kg/m²     Physical Exam   Constitutional: He is oriented to person, place, and time. Vital signs are normal. He appears well-developed and well-nourished. He is active. No distress.   HENT:   Head: Normocephalic and atraumatic.   Eyes: Conjunctivae and lids are normal. No scleral icterus.   Neck: Neck supple. Normal carotid pulses, no hepatojugular reflux and no JVD present. Carotid bruit is not present.   Cardiovascular: Normal rate, S1 normal, S2 normal and intact distal pulses.  An irregularly irregular rhythm present. PMI is not displaced.  Exam reveals no gallop and no friction rub.    No murmur heard.  Pulses:     "   Carotid pulses are 2+ on the right side, and 2+ on the left side.       Radial pulses are 2+ on the right side, and 2+ on the left side.        Dorsalis pedis pulses are 2+ on the right side, and 2+ on the left side.        Posterior tibial pulses are 1+ on the right side, and 1+ on the left side.   Pulmonary/Chest: Effort normal and breath sounds normal. No respiratory distress. He has no decreased breath sounds. He has no wheezes. He has no rhonchi. He has no rales. He exhibits no tenderness.   Abdominal: Soft. Normal appearance and bowel sounds are normal. He exhibits no distension, no fluid wave, no abdominal bruit, no ascites and no pulsatile midline mass. There is no hepatosplenomegaly. There is no tenderness.   Musculoskeletal: He exhibits edema (BLE +2 pitting to knees).   Neurological: He is alert and oriented to person, place, and time. Gait normal.   Skin: Skin is warm, dry and intact. No rash noted. He is not diaphoretic. Nails show no clubbing.   Psychiatric: He has a normal mood and affect. His speech is normal and behavior is normal. Judgment and thought content normal. Cognition and memory are normal.   Nursing note and vitals reviewed.      Chemistry        Component Value Date/Time     07/31/2018 0803    K 4.7 07/31/2018 0803     07/31/2018 0803    CO2 23 07/31/2018 0803    BUN 28 (H) 07/31/2018 0803    CREATININE 2.2 (H) 07/31/2018 0803     (H) 07/31/2018 0803        Component Value Date/Time    CALCIUM 8.7 07/31/2018 0803    ALKPHOS 99 07/31/2018 0803    AST 25 07/31/2018 0803    ALT 21 07/31/2018 0803    BILITOT 0.8 07/31/2018 0803    ESTGFRAFRICA 35.0 (A) 07/31/2018 0803    EGFRNONAA 30.3 (A) 07/31/2018 0803        Lab Results   Component Value Date    HGBA1C 5.4 07/31/2018       Recent Labs  Lab 04/26/18  2355  05/31/18  1248 06/27/18  2021 06/28/18  0626 07/31/18  0803   WHITE BLOOD CELL COUNT 9.11  < > 9.65 11.82 10.60  --    HEMOGLOBIN 11.3 L  < > 11.7 L 10.2 L 9.2 L   --    HEMATOCRIT 31.8 L  < > 32.7 L 28.3 L 25.4 L  --    MCV 80 L  < > 79 L 77 L 78 L  --    PLATELETS 226  < > 209 247 291  --     H  --  473 H 457 H  --   --    TSH 2.145  --   --   --  1.713  --    CHOLESTEROL 105 L  --   --   --  95 L 102 L   HDL 37 L  --   --   --  29 L 35 L   LDL CHOLESTEROL 48.4 L  --   --   --  47.8 L 52.4 L   TRIGLYCERIDES 98  --   --   --  91 73   HDL/CHOLESTEROL RATIO 35.2  --   --   --  30.5 34.3   < > = values in this interval not displayed.  Lab Results   Component Value Date    IRON 76 06/12/2016    TIBC 241 (L) 06/12/2016    FERRITIN 546 (H) 06/12/2016         Recent Labs  Lab 10/04/17  2118 03/23/18  1317 04/26/18  2355 05/31/18  1248   INR 1.0 1.0 1.0 1.0        Test(s) Reviewed  I have reviewed the following in detail:  [x] Stress test   [] Angiography   [x] Echocardiogram   [x] Labs   [] Other:         Assessment/Plan:     Chronic diastolic heart failure  Comments:  Fairly well compensated. BLE edema +2. Double morning dose of lasix for 5 days or until he loses 5lbs whichever is first. Daily wts. 2gm sodium diet.     Paroxysmal atrial fibrillation  Comments:  Irregularly irregular rhythm. Continue anticoagulation and rate controlling medications.   Orders:  -     carvedilol (COREG) 6.25 MG tablet; Take 1 tablet (6.25 mg total) by mouth 2 (two) times daily.  Dispense: 180 tablet; Refill: 3  -     apixaban 5 mg Tab; Take 1 tablet (5 mg total) by mouth 2 (two) times daily.  Dispense: 180 tablet; Refill: 3    Chronic anticoagulation - pradaxa  Comments:  Denies bleeding. Some hematuria being followed by urology. Continue pradaxa for now.     Essential hypertension  Comments:  BP at goal <130/80. Continue current regimen.     Bradycardia    Hypercholesterolemia  Comments:  LDL at goal <70. Continue atorvastatin 40mg daily.   Orders:  -     atorvastatin (LIPITOR) 40 MG tablet; Take 1 tablet (40 mg total) by mouth once daily.  Dispense: 90 tablet; Refill: 3    Microcytic  anemia  Comments:  Likely multifactorial. CKD stage III likely contributing. MCV <80 likely r/t iron deficiency. H/H 25.4/9.2 F/U nephrology and PCP.     Type 2 diabetes mellitus with stage 3 chronic kidney disease, with long-term current use of insulin  Comments:  A1C at goal <7. F/U with PCP as planned.     Obesity (BMI 30.0-34.9)  Comments:  BMI 31.5 Currently unable to exercise.          Follow-up in about 3 months (around 11/6/2018).

## 2018-08-06 NOTE — PATIENT INSTRUCTIONS
Double your morning dose of lasix for the next 5 days until you lose 5 pounds.    Salt restriction of 2,000mg a day. Weigh yourself every morning after you go to the restroom.  Take an extra dose of Lasix (furosemide) if weight increases 2 or more pounds in a 24 hr period, or 5 pounds over a 7 day period. Contact our office if weight does not return to baseline within 1-2 days.    Schedule an appointment to see Dr. Gold

## 2018-08-07 ENCOUNTER — OFFICE VISIT (OUTPATIENT)
Dept: ENDOCRINOLOGY | Facility: CLINIC | Age: 63
End: 2018-08-07
Payer: MEDICARE

## 2018-08-07 VITALS
SYSTOLIC BLOOD PRESSURE: 120 MMHG | BODY MASS INDEX: 31.66 KG/M2 | WEIGHT: 221.13 LBS | DIASTOLIC BLOOD PRESSURE: 77 MMHG | HEIGHT: 70 IN | HEART RATE: 80 BPM

## 2018-08-07 DIAGNOSIS — I50.32 CHRONIC DIASTOLIC HEART FAILURE: ICD-10-CM

## 2018-08-07 DIAGNOSIS — Z85.850 HISTORY OF THYROID CANCER: ICD-10-CM

## 2018-08-07 DIAGNOSIS — I10 ESSENTIAL HYPERTENSION: ICD-10-CM

## 2018-08-07 DIAGNOSIS — Z79.4 TYPE 2 DIABETES MELLITUS WITH STAGE 3 CHRONIC KIDNEY DISEASE, WITH LONG-TERM CURRENT USE OF INSULIN: Primary | ICD-10-CM

## 2018-08-07 DIAGNOSIS — N18.30 TYPE 2 DIABETES MELLITUS WITH STAGE 3 CHRONIC KIDNEY DISEASE, WITH LONG-TERM CURRENT USE OF INSULIN: Primary | ICD-10-CM

## 2018-08-07 DIAGNOSIS — Z86.73 HISTORY OF CVA (CEREBROVASCULAR ACCIDENT): ICD-10-CM

## 2018-08-07 DIAGNOSIS — E11.22 TYPE 2 DIABETES MELLITUS WITH STAGE 3 CHRONIC KIDNEY DISEASE, WITH LONG-TERM CURRENT USE OF INSULIN: Primary | ICD-10-CM

## 2018-08-07 DIAGNOSIS — N18.30 CHRONIC KIDNEY DISEASE, STAGE III (MODERATE): ICD-10-CM

## 2018-08-07 DIAGNOSIS — E11.9 DM TYPE 2 WITHOUT RETINOPATHY: ICD-10-CM

## 2018-08-07 PROCEDURE — 99999 PR PBB SHADOW E&M-EST. PATIENT-LVL V: CPT | Mod: PBBFAC,,, | Performed by: NURSE PRACTITIONER

## 2018-08-07 PROCEDURE — 3078F DIAST BP <80 MM HG: CPT | Mod: CPTII,S$GLB,, | Performed by: NURSE PRACTITIONER

## 2018-08-07 PROCEDURE — 3044F HG A1C LEVEL LT 7.0%: CPT | Mod: CPTII,S$GLB,, | Performed by: NURSE PRACTITIONER

## 2018-08-07 PROCEDURE — 3074F SYST BP LT 130 MM HG: CPT | Mod: CPTII,S$GLB,, | Performed by: NURSE PRACTITIONER

## 2018-08-07 PROCEDURE — 99499 UNLISTED E&M SERVICE: CPT | Mod: S$GLB,,, | Performed by: NURSE PRACTITIONER

## 2018-08-07 PROCEDURE — 99214 OFFICE O/P EST MOD 30 MIN: CPT | Mod: S$GLB,,, | Performed by: NURSE PRACTITIONER

## 2018-08-07 PROCEDURE — 3008F BODY MASS INDEX DOCD: CPT | Mod: CPTII,S$GLB,, | Performed by: NURSE PRACTITIONER

## 2018-08-07 RX ORDER — INSULIN ASPART 100 [IU]/ML
INJECTION, SOLUTION INTRAVENOUS; SUBCUTANEOUS
Qty: 3 BOX | Refills: 3 | Status: SHIPPED | OUTPATIENT
Start: 2018-08-07 | End: 2019-08-14 | Stop reason: SDUPTHER

## 2018-08-07 NOTE — PATIENT INSTRUCTIONS
Snacks can be an important part of a balanced, healthy meal plan. They allow you to eat more frequently, feeling full and satisfied throughout the day. Also, they allow you to spread carbohydrates evenly, which may stabilize blood sugars.  Plus, snacks are enjoyable!     The amount of carbohydrate needed at snacks varies. Generally, about 15-30 grams of carbohydrate per snack is recommended.  Below you will find some tasty treats.       0-5 gm carb   Crystal Light   Vitamin Water Zero   Herbal tea, unsweetened   2 tsp peanut butter on celery   1./2 cup sugar-free jell-o   1 sugar-free popsicle   ¼ cup blueberries   8oz Blue Demi unsweetened almond milk   5 baby carrots & celery sticks, cucumbers, bell peppers dipped in ¼ cup salsa, 2Tbsp light ranch dressing or 2Tbsp plain Greek yogurt   10 Goldfish crackers   ½ oz low-fat cheese or string cheese   1 closed handful of nuts, unsalted   1 Tbsp of sunflower seeds, unsalted   1 cup Smart Pop popcorn   1 whole grain brown rice cake        15 gm carb   1 small piece of fruit or ½ banana or 1/2 cup lite canned fruit   3 jakob cracker squares   3 cups Smart Pop popcorn, top spray butter, [place] lite salt or cinnamon and Truvia   5 Vanilla Wafers   ½ cup low fat, no added sugar ice cream or frozen yogurt (Blue bell, Blue Bunny, Weight Watchers, Skinny Cow)   ½ turkey, ham, or chicken sandwich   ½ c fruit with ½ c Cottage cheese   4-6 unsalted wheat crackers with 1 oz low fat cheese or 1 tbsp peanut butter    30-45 goldfish crackers (depending on flavor)    7-8 Religion mini brown rice cakes (caramel, apple cinnamon, chocolate)    12 Religion mini brown rice cakes (cheddar, bbq, ranch)    1/3 cup hummus dip with raw veg   1/2 whole wheat syeda, 1Tbsp hummus   Mini Pizza (1/2 whole wheat English muffin, low-fat  cheese, tomato sauce)   100 calorie snack pack (Oreo, Chips Ahoy, Ritz Mix, Baked Cheetos)   4-6 oz. light or Greek Style yogurt  (Staci Butler, Matt, [facility])   ½ cup sugar-free pudding     6 in. wheat tortilla or syeda oven toasted chips (topped with spray butter flavoring, cinnamon, Truvia OR spray butter, garlic powder, chili powder)    18 BBQ Popchips (available at [employer], [employer], Fresh Market)                   Diabetes Support Group Meetings         Date: Topic:   February 8 Health Promotion/Cooking Demo   March 8 Taking Care of Your Kidneys   April 12 Taking Care of Your Feet   May 10 Ease Your Mind with Diabetes   Patricia 14 Summer Treats/Cooking Demo   July 12 Super Market Sweep   August 9 Taking Care of Your Eyes   Sept 13 Technology/ADA updates   October 11 Recipes & Treats/Cooking Demo   November 8 Heart Health/Pump it up!   December 13 Year-End Close Out        Meetings are held in the [facility] (A) of the [facility] located at [address] [place] [address]. Please call [phone] for additional information.    Free service, offered every 2nd Thursday of every month! Family members and/or friends are welcome as well!  Support group is for patients with type 1 or type 2 diabetes.    From 3:30p to 4:30p

## 2018-08-14 ENCOUNTER — OFFICE VISIT (OUTPATIENT)
Dept: UROLOGY | Facility: CLINIC | Age: 63
End: 2018-08-14
Payer: MEDICARE

## 2018-08-14 VITALS — SYSTOLIC BLOOD PRESSURE: 123 MMHG | HEART RATE: 63 BPM | DIASTOLIC BLOOD PRESSURE: 71 MMHG

## 2018-08-14 DIAGNOSIS — N40.1 BPH WITH OBSTRUCTION/LOWER URINARY TRACT SYMPTOMS: ICD-10-CM

## 2018-08-14 DIAGNOSIS — Z97.8 FOLEY CATHETER PRESENT: ICD-10-CM

## 2018-08-14 DIAGNOSIS — N13.8 BPH WITH OBSTRUCTION/LOWER URINARY TRACT SYMPTOMS: ICD-10-CM

## 2018-08-14 DIAGNOSIS — R33.9 URINARY RETENTION: ICD-10-CM

## 2018-08-14 DIAGNOSIS — Z46.6 ENCOUNTER FOR FOLEY CATHETER REMOVAL: Primary | ICD-10-CM

## 2018-08-14 PROCEDURE — 3074F SYST BP LT 130 MM HG: CPT | Mod: CPTII,S$GLB,, | Performed by: NURSE PRACTITIONER

## 2018-08-14 PROCEDURE — 51700 IRRIGATION OF BLADDER: CPT | Mod: S$GLB,,, | Performed by: NURSE PRACTITIONER

## 2018-08-14 PROCEDURE — 3078F DIAST BP <80 MM HG: CPT | Mod: CPTII,S$GLB,, | Performed by: NURSE PRACTITIONER

## 2018-08-14 PROCEDURE — 99999 PR PBB SHADOW E&M-EST. PATIENT-LVL IV: CPT | Mod: PBBFAC,,, | Performed by: NURSE PRACTITIONER

## 2018-08-14 PROCEDURE — 99214 OFFICE O/P EST MOD 30 MIN: CPT | Mod: 25,S$GLB,, | Performed by: NURSE PRACTITIONER

## 2018-08-14 RX ORDER — LIDOCAINE HYDROCHLORIDE 20 MG/ML
JELLY TOPICAL ONCE
Status: CANCELLED | OUTPATIENT
Start: 2018-08-14 | End: 2018-08-14

## 2018-08-14 NOTE — PROGRESS NOTES
Subjective:       Patient ID: Alfa Christy III is a 63 y.o. male.    Chief Complaint: F/u urinary retention, voiding trial    HPI: Alfa Christy III is a 63 y.o. Black or  male who presents today for f/u of urinary retention and voiding trial. His last clinic visit was 8/2/18.    Pt went to ED on 6/15/18 for decreased urine flow and suprapubic pain and distention.   16 fr nieves catheter was inserted and 800 ml of clear yellow urine out.  He was discharged home with catheter.  Urinary retention possibly d/t enlarged prostate.   On previous cysto performed by Dr. Mejia, patient did have BPH.  On 6/20/18, VT passed.  On 7/18/18 PVR 7 ml, no infection and emptying his bladder    He was seen at ED 7/28/18 for penis pain (dysuria, hematuria and penile pain). He was given lidocaine and discharged home.  He was seen at ED 8/1/18 for inability to urinate. Nieves catheter was placed and 700 ml clear yellow urine drained. He was discharged home with nieves catheter.  He was seen in clinic 8/2/18 for bladder spasms and burning r/t nieves catheter. He was given oxybutynin.    Today he reports nieves catheter has been draining without difficulty. He reports bladder spasms improved after starting oxybutynin. Denies hematuria, flank pain or urgency. Denies f/c/n/v. He was considering surgical intervention to reduce size of prostate but decided he is not interested. His baseline urination includes nocturia x 4-5 and daytime frequency and urgency. He takes flomax BID.    Cysto 7/5/17  Bilateral ureteral orifice were difficult to see.   No bladder tumors or lesions were seen.  No strictures were noted.  The prostate showed Significant hypertrophy.  There was Significant median lobe present. 6cm in length.    PSA - negative prostate biopsy in 2011  10/4/16 12.2  11/4/16 9.9    Lab Results   Component Value Date    PSA 7.2 (H) 10/28/2015    PSA 5.62 (H) 12/27/2012    PSA 8.08 (H) 06/26/2012       Review of patient's  allergies indicates:   Allergen Reactions    Cough syrup [guaifenesin] Other (See Comments)       Current Outpatient Medications   Medication Sig Dispense Refill    acetaminophen (TYLENOL) 500 MG tablet Take 1 tablet (500 mg total) by mouth every 6 (six) hours as needed for Pain.  0    amLODIPine (NORVASC) 10 MG tablet Take 1 tablet (10 mg total) by mouth once daily. 90 tablet 3    apixaban 5 mg Tab Take 1 tablet (5 mg total) by mouth 2 (two) times daily. 180 tablet 3    aspirin 81 MG Chew Take 1 tablet (81 mg total) by mouth once daily.  0    atorvastatin (LIPITOR) 40 MG tablet Take 1 tablet (40 mg total) by mouth once daily. 90 tablet 3    carvedilol (COREG) 6.25 MG tablet Take 1 tablet (6.25 mg total) by mouth 2 (two) times daily. 180 tablet 3    dulaglutide (TRULICITY) 0.75 mg/0.5 mL PnIj Inject 0.5 mLs (0.75 mg total) into the skin every 7 days. 4 Syringe 6    flash glucose sensor (FREESTYLE ANGELLA SENSOR) Kit 3 each by Misc.(Non-Drug; Combo Route) route every 30 days. 3 kit 11    folic acid (FOLVITE) 400 MCG tablet Take 400 mcg by mouth once daily.      FREESTYLE ANGELLA READER Misc USE EVERY DAY AS DIRECTED FOR TEST BLOOD GLUCOSE 1 each 0    furosemide (LASIX) 40 MG tablet Take 1 tablet (40 mg total) by mouth 2 (two) times daily. 180 tablet 1    GLUCAGON EMERGENCY KIT, HUMAN, 1 mg injection INJECT 1 MG INTO THE MUSCLE AS NEEDED 1 kit 1    insulin aspart U-100 (NOVOLOG) 100 unit/mL InPn pen Inject 7 units w/ meals plus scale 150-200+1, 201-250+2, 251-300+3, 301-350+4. Max daily 35 units, 90 day. 3 Box 3    insulin detemir U-100 (LEVEMIR FLEXPEN) 100 unit/mL (3 mL) SubQ InPn pen Inject 12 Units into the skin every evening. 1 Box 3    lacosamide (VIMPAT) 50 mg Tab Take 1 tablet (50 mg total) by mouth every 12 (twelve) hours. Vim pat 50mg in am, 100 mg at night      levetiracetam (KEPPRA) 750 MG Tab Take 2 tablets (1,500 mg total) by mouth 2 (two) times daily. 120 tablet 11    levothyroxine  "(SYNTHROID) 200 MCG tablet TAKE 1 TABLET BY MOUTH BEFORE BREAKFAST 90 tablet 2    lidocaine HCL 2% (XYLOCAINE) 2 % jelly Apply topically as needed. Apply small amount topically as needed 30 mL 0    losartan (COZAAR) 100 MG tablet Take 1 tablet (100 mg total) by mouth once daily. 90 tablet 3    magnesium 30 mg Tab Take 1 tablet by mouth twice a week.       multivitamin capsule Take 1 capsule by mouth every Mon, Wed, Fri.       ONETOUCH VERIO Strp USE TO TEST BLOOD SUGAR THREE TIMES DAILY 100 strip 11    pen needle, diabetic (BD ULTRA-FINE MINI PEN NEEDLE) 31 gauge x 3/16" Ndle To use with insulin pens 4  times daily 360 each 3    sildenafil (VIAGRA) 100 MG tablet Take 1 tablet (100 mg total) by mouth as needed for Erectile Dysfunction. 6 tablet 12    tamsulosin (FLOMAX) 0.4 mg Cap Take 1 capsule (0.4 mg total) by mouth every evening. 30 capsule 11    vitamin D 1000 units Tab Take 1,000 Units by mouth every Mon, Wed, Fri.       No current facility-administered medications for this visit.        Past Medical History:   Diagnosis Date    Allergy     Atrial fibrillation     BMI 31.0-31.9,adult 11/25/2014    Cerebrovascular disease 7/25/2016    CHF (congestive heart failure)     CHF (congestive heart failure)     Chronic anticoagulation - pradaxa 11/10/2016    Chronic diastolic heart failure 11/20/2016    CKD (chronic kidney disease), stage III 9/23/2013    Controlled type 2 diabetes with neuropathy 12/16/2014    Coronary artery disease     Diabetes mellitus due to underlying condition with stage 3 chronic kidney disease, without long-term current use of insulin 11/2/2016    Elevated alkaline phosphatase level 8/1/2012    Elevated PSA     negative prostate biopsy 4/11    Erectile dysfunction associated with type 2 diabetes mellitus     Gallstones 3/20/2013    Generalized tonic-clonic seizure 11/2016    HTN (hypertension), benign 8/1/2012    Hypercholesterolemia 8/1/2012    Microcytic anemia " 11/27/2013    Paroxysmal atrial fibrillation 9/23/2013    Postsurgical hypothyroidism 11/27/2013    Right homonymous hemianopsia 2/5/2016    Sickle cell trait     Stroke 1/27/2016    Thyroid cancer     multifocal with six lesions, largest 5mm, treated with surgery and radioactive iodine    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/2016       Past Surgical History:   Procedure Laterality Date    BREAST SURGERY      cyst removal    COLONOSCOPY      CYST REMOVAL      chest    PROSTATE BIOPSY  4/27/11    SKIN BIOPSY      THYROID SURGERY  8/26/09    VASCULAR SURGERY         Family History   Problem Relation Age of Onset    Heart disease Father     Diabetes Father     Hypertension Father     Diabetes Mother     Hypertension Mother     Heart disease Mother     Diabetes Brother     Cancer Sister     Thyroid disease Maternal Aunt     Clotting disorder Neg Hx        Review of Systems   Constitutional: Negative for chills, diaphoresis and fever.   HENT: Negative for congestion and trouble swallowing.    Eyes: Negative for visual disturbance.   Respiratory: Negative for chest tightness and shortness of breath.    Cardiovascular: Negative for chest pain and palpitations.   Gastrointestinal: Negative for nausea and vomiting.   Genitourinary: Positive for difficulty urinating, frequency, penile pain (r/t catheter in place) and urgency. Negative for discharge, flank pain, hematuria, penile swelling, scrotal swelling and testicular pain.   Musculoskeletal: Negative for back pain.   Skin: Negative for rash.   Allergic/Immunologic: Negative for immunocompromised state.   Neurological: Negative for seizures, syncope and headaches.   Hematological: Negative for adenopathy.   Psychiatric/Behavioral: Negative for confusion.         All other systems were reviewed and were negative.    Objective:     Vitals:    08/14/18 1051   BP: 123/71   Pulse: 63        Physical Exam   Nursing note and vitals  reviewed.  Constitutional: He is oriented to person, place, and time. He appears well-developed and well-nourished.  Non-toxic appearance. He does not have a sickly appearance. He does not appear ill. No distress.   HENT:   Head: Normocephalic.   Eyes: Conjunctivae are normal.   Neck: Normal range of motion.   Cardiovascular: Normal rate and regular rhythm.    Pulmonary/Chest: Effort normal. No respiratory distress.   Abdominal: Soft. He exhibits no distension. There is no CVA tenderness.   Genitourinary:   Genitourinary Comments: Indwelling nieves catheter in place   Musculoskeletal: Normal range of motion.   Neurological: He is alert and oriented to person, place, and time.   Skin: Skin is warm and dry.     Psychiatric: He has a normal mood and affect. His behavior is normal. Judgment and thought content normal.         Lab Results   Component Value Date    CREATININE 2.2 (H) 07/31/2018     Lab Results   Component Value Date    EGFRNONAA 30.3 (A) 07/31/2018     Lab Results   Component Value Date    ESTGFRAFRICA 35.0 (A) 07/31/2018         Assessment:       1. Encounter for Nieves catheter removal    2. Nieves catheter present    3. BPH with obstruction/lower urinary tract symptoms    4. Urinary retention        Plan:     Alfa was seen today for urinary retention.    Diagnoses and all orders for this visit:    Encounter for Nieves catheter removal    Nieves catheter present    BPH with obstruction/lower urinary tract symptoms    Urinary retention      -Discussed plan of care with patient  -Voiding trial performed by Nurse Avendano.  250 ml of sterile water was instilled into bladder.  Balloon deflated and nieves catheter was removed. Patient urinated 260 ml without difficulty.  Voiding trial passed.  Patient was instructed to drink plenty of fluids today.  Instructed patient to call at 5 p.m. to give an update on urine output.  Informed patient to return to clinic or emergency department (if after clinic hours) to have  nieves catheter put back in if unable to urinate within 5 hours of nieves catheter removal or starts to experience bladder pressure/pain, decrease flow, straining/difficulty urinating.    Patient voiced understanding  -Pt decided he does not want to have surgery to reduce prostate. Discussed with patient if he fails VT or has urinary retention again, we will need to doSUDS, cysto and possible TRUS. He verbalized understanding.  -Continue flomax BID  Discontinue oxybutynin  -RTC 4 weeks for PVR check and ISAI.  Will need PSA in 2-3 months.      I spent 25 minutes with the patient of which more than half was spent in coordinating the patient's care as well as in direct consultation with the patient in regards to our treatment and plan.

## 2018-08-14 NOTE — PATIENT INSTRUCTIONS
Patient was instructed to drink plenty of fluids today.  Instructed patient to call at 5 p.m. to give an update on urine output.  Informed patient to return to clinic or emergency department (if after clinic hours) to have nieves catheter put back in if unable to urinate within 5 hours of nieves catheter removal or starts to experience bladder pressure/pain, decrease flow, straining/difficulty urinating.

## 2018-08-20 ENCOUNTER — OFFICE VISIT (OUTPATIENT)
Dept: NEUROLOGY | Facility: CLINIC | Age: 63
End: 2018-08-20
Payer: MEDICARE

## 2018-08-20 ENCOUNTER — TELEPHONE (OUTPATIENT)
Dept: NEUROLOGY | Facility: CLINIC | Age: 63
End: 2018-08-20

## 2018-08-20 VITALS
DIASTOLIC BLOOD PRESSURE: 73 MMHG | HEIGHT: 70 IN | BODY MASS INDEX: 31.31 KG/M2 | SYSTOLIC BLOOD PRESSURE: 115 MMHG | HEART RATE: 74 BPM | WEIGHT: 218.69 LBS

## 2018-08-20 DIAGNOSIS — I50.32 CHRONIC DIASTOLIC HEART FAILURE: ICD-10-CM

## 2018-08-20 DIAGNOSIS — N18.30 CHRONIC KIDNEY DISEASE, STAGE III (MODERATE): ICD-10-CM

## 2018-08-20 DIAGNOSIS — G40.209 COMPLEX PARTIAL SEIZURES EVOLVING TO GENERALIZED TONIC-CLONIC SEIZURES: Primary | ICD-10-CM

## 2018-08-20 DIAGNOSIS — Z85.850 HISTORY OF THYROID CANCER: ICD-10-CM

## 2018-08-20 PROCEDURE — 99999 PR PBB SHADOW E&M-EST. PATIENT-LVL II: CPT | Mod: PBBFAC,,, | Performed by: PSYCHIATRY & NEUROLOGY

## 2018-08-20 PROCEDURE — 99215 OFFICE O/P EST HI 40 MIN: CPT | Mod: S$GLB,,, | Performed by: PSYCHIATRY & NEUROLOGY

## 2018-08-20 PROCEDURE — 3008F BODY MASS INDEX DOCD: CPT | Mod: CPTII,S$GLB,, | Performed by: PSYCHIATRY & NEUROLOGY

## 2018-08-20 PROCEDURE — 3074F SYST BP LT 130 MM HG: CPT | Mod: CPTII,S$GLB,, | Performed by: PSYCHIATRY & NEUROLOGY

## 2018-08-20 PROCEDURE — 3078F DIAST BP <80 MM HG: CPT | Mod: CPTII,S$GLB,, | Performed by: PSYCHIATRY & NEUROLOGY

## 2018-08-20 NOTE — PROGRESS NOTES
[facility]:   FOLLOW UP VISIT    Name: Alfa Christy III  MRN:[id number]   CSN: [id number]    Date of service: 8/20/18  Last clinic visit: 5/15/18  Prior clinic visit: 11/15/17  1st clinic visit: 12/27/16    Age:63 y.o.   Gender:male     The patient is here today alone  History obtained from the patient     CHIEF COMPLAINT:  - follow up of seizures    INTERVAL HISTORY (Since last visit):    This is a 63 y.o.  year old male who presents with a chief complaint of seizures, who was last seen by me on 5/15/18  - no hx of seizures since last visit; last sz was on 4/30/18  - compliant with AEDs: LCS 100mg bid and LEV 1500mg bid  - no new complaints; doing well    Notes from last clinic visit, 5/15/18:  - started on LAC 100mg bid 4/30/18  - also on LEV 1500mg bid  - no further episodes since 4/30  - no other/new complaints; doing well    Admitted during 4/28-30/18; notes from :  HPI:   61y/o M with pmhx stroke, seizure, afib (on eliquis), DM2, HLD, HTN presents w/ aphasia. Patient was last normal at 10:30 pm.  Per his wife he was doing well then started complaining of feeling dizzy after taking a shower. Denied fever, pains, vomiting.  She noticed he then started having trouble speaking and say words that did not make sense, so came to ED.  Wife denies any B/B incontinence, unilateral weakness, syncope, or tongue-biting.  Patient had similar presentation end of March; at that time TPA was deferred and subsequent neuroimaging negative for CVA. He does have history of seizures on keppra, and his seizures are atypical. Says wasn't having seizures until recently. Patient has had a previous stroke 1/2016 w/ residual L vision deficits only. EEG then abnormal w/ 1/18 CVA as likely foci for seizures. Currently on eliquis for a-fib. Further hisotry limited by patient's mental status.       In ED, [facility] neurology consulted for concern of stroke d/t aphasia.  Sxs likely 2/2 postictal period of complex seizure.   (baseline  "100s-200s). CXR shows CM with findings suggestive of pulmonary edema.  Gvien one time dose of lasix 80 mg IV.  EKG shows afib HR 69.  CTH with no significant change from previous. Remote left occipital lobe and left thalamic infarctions.       Hospital Course:   Pt admitted due to aphasia.  Similar presentation/admissions 11/2017 and 03/2018 (medication noncompliance an issue).  Vascular neurology consulted in ED.  CTH no acute changes.  No acute stroke suspected.  Keppra level ordered.  EEG ordered: abnormal extended (1 hour and 29 minutes) EEG due to rare left sided epileptiform activity seen, suggestive of underlying epileptiform activity; continuous left sided slowing seen, suggestive of underlying structural lesion.  Neurology consulted and recommended slow titration on lacosamide--50 mg qd x 1 week, increase lacosamide to 100 mg for 2nd week if pt tolerates.  Continue levetiracetam 1500 mg bid, level 69.4.  Follow up with Dr. Santiago in 2 weeks.  Pt also with acute CHF exacerbation (->395).  Pt treated with IV lasix bid and wt decreased 2 kg.  Day 3-4 transitioned back to home dosing furosemide 40 mg bid.  Seizure precautions at discharge.      ED visit, 3/23/18: Notes from :  HPI:   Mr. Christy is a 61 yo male with a PMHx of HTN, HLD, DM2, PAF on Eliquis, HFpEF, CKDIII, thyroid cancer s/p surgery and radiation (2009), previous L sided CVA (2016) resulting in R homonymous hemianopsia, and complex partial seizures presenting to the ED with wife at bedside c/o confusion and difficulty speaking. Wife at bedside to provide the history. She states this morning at ~8am patient was getting dressed and was c/o minor dizziness. She noted he had not taken his keppra from the previous night, in addition to all of his "night time medications." Pt was able to take all morning medications. At around 10am, pt was sitting down to "go over his finances and pay bills", she noted something was "off". She asked him his " "name, which he could provide for her but he could not remember the word for "shoes" when she asked him what he was wearing on his feet. She did not witness convulsions, jerking movements, LOC, tongue biting, or incontinence. Wife states patient was able to ambulate and continue to go about "normal" activities. Wife brought patient to the ED due to concern for aphasia. Of note, wife reports pt is non-compliant with hydralazine and ASA. Per wife, this was a similar presentation to his last admission for asphasia suspected to be secondary to seizures.      In the ED, HDS; noted to be hypertensive to 190s/90s. Stroke code was called; CTH and MRI without evidence of acute infarct. Vascular neurology assessed; low suspicion for acute CVA. Remainder of labs and imaging were unremarkable.     Hospital Course:   Pt admitted to observation for aphasia in setting of hx of CVA and seizures. CTH and MRI negative for acute infarct. Labs entirely unremarkable (TSH and lactate WNL). No s/s infection; afebrile without leukocytosis (CXR and UA without infection). Neurology consulted and suspects breakthrough seizure with prolonged post-ictus. EEG shows slowing consistent with hx of L sided CVA; no epileptic activity. Pt discharged home to follow up with epilepsy as outpatient. Educated on importance of compliance with home medications.     A/P at last visit:  63yo M with hx of CVA in 1/16 and seizures x 11/16 - prior episodes c/w glucose level alterations  Recent episode suggestive of sz - likely related to hyperglycemia     - check Levetiracetam levels  - continue same dose of LEV for now     Monitor glucose levels closely  Return in 6 months or earlier prn    Notes from last clinic visit, 11/15/17:  No hx of non-compliance    Recent admission: patient states that he had gone to exercise that day, followed by having donuts but did not miss any dose of meds.  No other triggers.  Possibly seizure? But no clinical activity seen by " family except for word-finding difficulty/confusion.  Last recognized seizure was possibly Apr 2017    Recent admission to [facility], 10/4-11/17:  Neurology notes, 10/5/17:  [facility] 10/04/17 due to wife coming home and finding  confused with agitation, word-finding difficulty, and imbalance.  Noted no weakness.  States that he was last seen normal at 7 pm, she went out to run an errand and returned at 8 pm.  Otherwise notes no signs of head trauma, no recent weakness, no medication changes.  He takes levetiracetam 750 mg po bid (started on 1500 mg po bid right after 11/2016 seizure) and is compliant.  Recommendation:  Breakthrough seizure in setting of recent respiratory infection and financial stressors, wife endorses compliance and patient not yet back to baseline.  Recommend load 1g LEV iv followed by increase to 1500mg bid.  R-EEG, 10/7/17:    IMPRESSION:  This is an abnormal EEG during wakefulness, drowsiness and sleep.    Nearly continuous irregular left hemisphere slowing was noted.  The EKG revealed an irregular rhythm.    Hospital course (per Hospital Medicine notes, 10/11/17): in the ED on presentation, the patient was in keeping of abnormally elevated blood pressure in setting of acute onset aphasia. Workup for an acute stroke included a CT head with no signs of acute stroke. MRI and MRA brain were negative for acute stroke. Vascular neurology was consulted and ruled out stroke and recommended admission to hospital medicine with improved BP control and a consult to general neurology. During admission to hospital medicine, the patient notably developed right sided jerking movement consistent with seizure. The patient was loaded with IV keppra 1500mg followed by oral twice daily dosing per neurology. The patient was observed on the hospital medicine for aphasia resolution that was gradual but incomplete per patient's wife. The patient had an EEG given personality changes and frequent self-repetition that was  negative. PT/OT evaluated the patient and recommended SNF placement that occurred appropriately on 10/11/2017.    Any other relevant history since last visit:   - none    SEIZURE HISTORY:  Notes from last (16) visit:   Information from H&P on 16:  60yo M with multiple medical co-morbidities, presented to an outside facility on 16 with left-sided HA and dizziness, associated with expressive aphasia. [place] [facility] [place] had a GTC seizure with right-sided eye deviation, lasting ~ 4 min. Blood glucose was 200 at the time and seizure resolved without any intervention. No hx of associated tongue bite or b/b incontinence. Reports post-ictal confusion but no focal deficits.   CTA head and neck at the time showed no acute ischemic changes.  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  Patient was evaluated by Neurology service while in hospital - advised Levetiracetam 1000mg  q 12h  INTERVAL HISTORY:  since discharge from hospital, no hx of any seizures - compliant with Levetiracteam     Previous evaluation:   EE) R-EEG, 2016:   IMPRESSION: Normal awake and asleep EEG.  2) R-EEG, 16  IMPRESSION:   This is an abnormal awake and asleep EEG due to mild generalized slowing seen, suggestive of mild diffuse or multifocal cerebral dysfunction.  No epileptiform activity or electrographic seizures were seen    MRI Brain:  Results for orders placed or performed during the hospital encounter of 10/04/17   MRI Brain Without Contrast    Addendum: 10/5/2017          Electronically signed by: DELL VITALE MD  Date:     10/05/17  Time:    01:19       Narrative    MRI BRAIN, MRA HEAD, MRA NECK    CLINICAL INDICATION: 62 year old M with stroke, severe expressive aphasia, LLE weakness.     TECHNIQUE: Multiplanar, multisequence images were obtained of the brain. Three-dimensional time-of-flight technique was used in assessment of the neck and intracranial  vasculature.    COMPARISON: CT head 10/4/2017.    FINDINGS:    MRI BRAIN:  The ventricles are normal in size for age, without evidence of hydrocephalus.  There is suggestion of subtle diffuse cortical diffusion restriction in the supratentorial brain.    There is a stable region of encephalomalacia within the left occipital lobe. There is relatively isointense diffusion signal in this region with corresponding hyperintensity on ADC map and T2/FLAIR hyperintensity, compatible with remote infarct. No areas of restricted diffusion signal are seen to suggest new/acute infarct. No areas of susceptibility to suggest hemorrhage. A punctate focus of T1 hyperintensity in the left aspect of the anterior interhemispheric fissure likely represents a dural calcification. Supratentorial patchy T2/FLAIR hyperintensities compatible with chronic microvascular ischemic disease.    No extra-axial blood or fluid collections.    The T2 skull base flow voids are preserved. Bone marrow signal intensity is unremarkable.    MRA HEAD AND NECK:   Common and internal carotid arteries are normal in caliber.  No significant stenosis at either carotid bifurcation. There is a small saccular aneurysm with a 0.4 cm neck arising from the supraclinoid right ICA.    Vertebral arteries are normal in caliber. The vertebrobasilar system appears within normal limits.     The ACAs, MCAs and PCAs demonstrate no evidence of  high-grade stenosis, focal occlusion or intracranial aneurysm.    Impression    No evidence of acute or major vascular distribution infarct or intracranial hemorrhage.    Questionable subtle cortical diffusion restriction throughout the supratentorial brain.  Short-term followup is suggested.    Remote left PCA distribution infarct within the left occipital lobe.    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  ___________________________________________________  This report has been flagged in the "[employer]  record.  ______________________________________     Electronically signed by resident: EFREM PENALOZA MD  Date:     10/05/17  Time:    00:24            As the supervising and teaching physician, I personally reviewed the images and resident's interpretation and I agree with the findings.          Electronically signed by: DELL VITALE MD  Date:     10/05/17  Time:    01:13    MRA Brain without contrast    Narrative    MRI BRAIN, MRA HEAD, MRA NECK    CLINICAL INDICATION: 62 year old M with stroke, severe expressive aphasia, LLE weakness.     TECHNIQUE: Multiplanar, multisequence images were obtained of the brain. Three-dimensional time-of-flight technique was used in assessment of the neck and intracranial vasculature.    COMPARISON: CT head 10/4/2017.    FINDINGS:    MRI BRAIN:  The ventricles are normal in size for age, without evidence of hydrocephalus.  There is suggestion of subtle diffuse cortical diffusion restriction in the supratentorial brain.    There is a stable region of encephalomalacia within the left occipital lobe. There is relatively isointense diffusion signal in this region with corresponding hyperintensity on ADC map and T2/FLAIR hyperintensity, compatible with remote infarct. No areas of restricted diffusion signal are seen to suggest new/acute infarct. No areas of susceptibility to suggest hemorrhage. A punctate focus of T1 hyperintensity in the left aspect of the anterior interhemispheric fissure likely represents a dural calcification. Supratentorial patchy T2/FLAIR hyperintensities compatible with chronic microvascular ischemic disease.    No extra-axial blood or fluid collections.    The T2 skull base flow voids are preserved. Bone marrow signal intensity is unremarkable.    MRA HEAD AND NECK:   Common and internal carotid arteries are normal in caliber.  No significant stenosis at either carotid bifurcation. There is a small saccular aneurysm with a 0.4 cm neck arising from the supraclinoid  right ICA.    Vertebral arteries are normal in caliber. The vertebrobasilar system appears within normal limits.     The ACAs, MCAs and PCAs demonstrate no evidence of  high-grade stenosis, focal occlusion or intracranial aneurysm.    Impression    No evidence of acute or major vascular distribution infarct or intracranial hemorrhage.    Questionable subtle cortical diffusion restriction throughout the supratentorial brain.  Short-term followup is suggested.    Remote left PCA distribution infarct within the left occipital lobe.    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  ___________________________________________________  This report has been flagged in the [employer] medical record  ______________________________________     Electronically signed by resident: EFREM PENALOZA MD  Date:     10/05/17  Time:    00:24            As the supervising and teaching physician, I personally reviewed the images and resident's interpretation and I agree with the findings.          Electronically signed by: DELL VITALE MD  Date:     10/05/17  Time:    01:13         Electronically signed by: DELL VITALE MD  Date:     10/05/17  Time:    01:19    CT Head Without Contrast    Narrative    CT HEAD WITHOUT CONTRAST    CLINICAL INDICATION: 62 year old M with possible stroke.    TECHNIQUE: Axial CT images obtained throughout the region of the head without the use of intravenous contrast. Axial, sagittal and coronal reconstructions were performed.    COMPARISON: MRI brain 11/6/2016, CT stroke multiphase 11/5/2016, CT head 11/5/2016.    FINDINGS:  The ventricles are stable. The brain appears unchanged.  The basal cisterns are patent.  There is a stable region of encephalomalacia within the left occipital lobe, compatible with remote left PCA infarct. No evidence of acute major vascular distribution infarct or intracranial hemorrhage. Patchy hypoattenuation within the supratentorial white matter is compatible with chronic microvascular  ischemic change.     No new extra-axial blood or fluid collections.    The cranium appears intact.  There is nonspecific calcifications in the subcutaneous tissues of the head.     Mastoid air cells and paranasal sinuses are essentially clear.  The orbits and intraorbital contents are unremarkable.    Impression       No evidence of acute major vascular distribution infarct or intracranial hemorrhage.     Stable, remote infarct in the left occipital lobe.    Chronic microvascular ischemic disease.        ______________________________________     Electronically signed by resident: EFREM PENALOZA MD  Date:     10/04/17  Time:    21:26            As the supervising and teaching physician, I personally reviewed the images and resident's interpretation and I agree with the findings.          Electronically signed by: DELL VITALE MD  Date:     10/04/17  Time:    22:05    Results for orders placed or performed during the hospital encounter of 11/05/16   MRI Brain W WO Contrast    Narrative    Comparison: CT 11/5/2016, MRI 6/11/2016    Clinical history: Seizure    Technique:    Multiplanar multi-sequence MRI images of the brain were obtained pre-and post the administration of IV Gadavist contrast.        Findings:    Examination is limited secondary to patient motion, most significantly involving the flair axial and flair coronal sequences, as well as the postcontrast sequences.    There is encephalomalacia within the left PCA territory consistent with remote infarction.  Minimal postcontrast enhancement is noted within this region.  There may be a few punctate foci of T2/flair signal abnormality within the periventricular white matter, may reflect chronic microvascular ischemic change or represent artifact as there is extensive motion on the FLAIR axial images.  Additionally, there is high flair signal material involving the basal cisterns about the brainstem, not confirmed on other pulse sequences, suggesting that this is  artifactual related to poor CSF signal saturation rather than proteinaceous or hemorrhagic material.  There is no evidence of intracranial mass, or acute infarction.  The ventricular system is within normal limits of size for age and shows no evidence of hydrocephalus.  There are no significant extra-axial or extracranial abnormalities detected.  The bilateral mesial temporal lobes are symmetric without abnormal signal, sclerosis, or enhancement.    The globes, orbits, pituitary gland, pineal gland and craniocervical junction are normal in configuration. The major vascular flow voids are patent.  Please see CTA performed earlier today for details of the intracranial vasculature.    Impression    Motion limited examination.    On the flair images, high attenuating material about the brainstem/basal cisterns is felt to represent artifact given extensive patient motion likely the result of poor saturation of the CSF signal in the region.  Of note, there is no associated enhancement or meningeal enhancement to suggest that this reflects proteinaceous material of infectious nature.  Blood products felt less likely.  If this finding would correlate with current patient symptomatology, consider either repeating the flair image once patient is better able to tolerate the examination, or correlate this finding with lumbar puncture.    No evidence of acute infarct.  Remote PCA territory infarct with mild enhancement.    No abnormal temporal lobe enhancement or focal focus to suggest seizure nidus.            Electronically signed by: NERY LO MD  Date:     11/06/16  Time:    01:08       Results for orders placed or performed during the hospital encounter of 10/04/17   MRI Brain Without Contrast    Addendum: 10/5/2017          Electronically signed by: DELL VITALE MD  Date:     10/05/17  Time:    01:19       Narrative    MRI BRAIN, MRA HEAD, MRA NECK    CLINICAL INDICATION: 62 year old M with stroke, severe expressive  aphasia, LLE weakness.     TECHNIQUE: Multiplanar, multisequence images were obtained of the brain. Three-dimensional time-of-flight technique was used in assessment of the neck and intracranial vasculature.    COMPARISON: CT head 10/4/2017.    FINDINGS:    MRI BRAIN:  The ventricles are normal in size for age, without evidence of hydrocephalus.  There is suggestion of subtle diffuse cortical diffusion restriction in the supratentorial brain.    There is a stable region of encephalomalacia within the left occipital lobe. There is relatively isointense diffusion signal in this region with corresponding hyperintensity on ADC map and T2/FLAIR hyperintensity, compatible with remote infarct. No areas of restricted diffusion signal are seen to suggest new/acute infarct. No areas of susceptibility to suggest hemorrhage. A punctate focus of T1 hyperintensity in the left aspect of the anterior interhemispheric fissure likely represents a dural calcification. Supratentorial patchy T2/FLAIR hyperintensities compatible with chronic microvascular ischemic disease.    No extra-axial blood or fluid collections.    The T2 skull base flow voids are preserved. Bone marrow signal intensity is unremarkable.    MRA HEAD AND NECK:   Common and internal carotid arteries are normal in caliber.  No significant stenosis at either carotid bifurcation. There is a small saccular aneurysm with a 0.4 cm neck arising from the supraclinoid right ICA.    Vertebral arteries are normal in caliber. The vertebrobasilar system appears within normal limits.     The ACAs, MCAs and PCAs demonstrate no evidence of  high-grade stenosis, focal occlusion or intracranial aneurysm.    Impression    No evidence of acute or major vascular distribution infarct or intracranial hemorrhage.    Questionable subtle cortical diffusion restriction throughout the supratentorial brain.  Short-term followup is suggested.    Remote left PCA distribution infarct within the left  occipital lobe.    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  ___________________________________________________  This report has been flagged in the [facility] medical record.  ______________________________________     Electronically signed by resident: EFREM PENALOZA MD  Date:     10/05/17  Time:    00:24            As the supervising and teaching physician, I personally reviewed the images and resident's interpretation and I agree with the findings.          Electronically signed by: DELL VITALE MD  Date:     10/05/17  Time:    01:13    MRA Brain without contrast    Narrative    MRI BRAIN, MRA HEAD, MRA NECK    CLINICAL INDICATION: 62 year old M with stroke, severe expressive aphasia, LLE weakness.     TECHNIQUE: Multiplanar, multisequence images were obtained of the brain. Three-dimensional time-of-flight technique was used in assessment of the neck and intracranial vasculature.    COMPARISON: CT head 10/4/2017.    FINDINGS:    MRI BRAIN:  The ventricles are normal in size for age, without evidence of hydrocephalus.  There is suggestion of subtle diffuse cortical diffusion restriction in the supratentorial brain.    There is a stable region of encephalomalacia within the left occipital lobe. There is relatively isointense diffusion signal in this region with corresponding hyperintensity on ADC map and T2/FLAIR hyperintensity, compatible with remote infarct. No areas of restricted diffusion signal are seen to suggest new/acute infarct. No areas of susceptibility to suggest hemorrhage. A punctate focus of T1 hyperintensity in the left aspect of the anterior interhemispheric fissure likely represents a dural calcification. Supratentorial patchy T2/FLAIR hyperintensities compatible with chronic microvascular ischemic disease.    No extra-axial blood or fluid collections.    The T2 skull base flow voids are preserved. Bone marrow signal intensity is unremarkable.    MRA HEAD AND NECK:   Common and internal carotid  arteries are normal in caliber.  No significant stenosis at either carotid bifurcation. There is a small saccular aneurysm with a 0.4 cm neck arising from the supraclinoid right ICA.    Vertebral arteries are normal in caliber. The vertebrobasilar system appears within normal limits.     The ACAs, MCAs and PCAs demonstrate no evidence of  high-grade stenosis, focal occlusion or intracranial aneurysm.    Impression    No evidence of acute or major vascular distribution infarct or intracranial hemorrhage.    Questionable subtle cortical diffusion restriction throughout the supratentorial brain.  Short-term followup is suggested.    Remote left PCA distribution infarct within the left occipital lobe.    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  ___________________________________________________  This report has been flagged in the [facility] medical record.  ______________________________________     Electronically signed by resident: EFREM PENALOZA MD  Date:     10/05/17  Time:    00:24            As the supervising and teaching physician, I personally reviewed the images and resident's interpretation and I agree with the findings.          Electronically signed by: DELL VITALE MD  Date:     10/05/17  Time:    01:13         Electronically signed by: DELL VITALE MD  Date:     10/05/17  Time:    01:19    CT Head Without Contrast    Narrative    CT HEAD WITHOUT CONTRAST    CLINICAL INDICATION: 62 year old M with possible stroke.    TECHNIQUE: Axial CT images obtained throughout the region of the head without the use of intravenous contrast. Axial, sagittal and coronal reconstructions were performed.    COMPARISON: MRI brain 11/6/2016, CT stroke multiphase 11/5/2016, CT head 11/5/2016.    FINDINGS:  The ventricles are stable. The brain appears unchanged.  The basal cisterns are patent.  There is a stable region of encephalomalacia within the left occipital lobe, compatible with remote left PCA infarct. No evidence of acute  major vascular distribution infarct or intracranial hemorrhage. Patchy hypoattenuation within the supratentorial white matter is compatible with chronic microvascular ischemic change.     No new extra-axial blood or fluid collections.    The cranium appears intact.  There is nonspecific calcifications in the subcutaneous tissues of the head.     Mastoid air cells and paranasal sinuses are essentially clear.  The orbits and intraorbital contents are unremarkable.    Impression       No evidence of acute major vascular distribution infarct or intracranial hemorrhage.     Stable, remote infarct in the left occipital lobe.    Chronic microvascular ischemic disease.        ______________________________________     Electronically signed by resident: EFREM PENALOZA MD  Date:     10/04/17  Time:    21:26            As the supervising and teaching physician, I personally reviewed the images and resident's interpretation and I agree with the findings.          Electronically signed by: DELL VITALE MD  Date:     10/04/17  Time:    22:05    Results for orders placed or performed during the hospital encounter of 11/05/16   MRI Brain W WO Contrast    Narrative    Comparison: CT 11/5/2016, MRI 6/11/2016    Clinical history: Seizure    Technique:    Multiplanar multi-sequence MRI images of the brain were obtained pre-and post the administration of IV Gadavist contrast.        Findings:    Examination is limited secondary to patient motion, most significantly involving the flair axial and flair coronal sequences, as well as the postcontrast sequences.    There is encephalomalacia within the left PCA territory consistent with remote infarction.  Minimal postcontrast enhancement is noted within this region.  There may be a few punctate foci of T2/flair signal abnormality within the periventricular white matter, may reflect chronic microvascular ischemic change or represent artifact as there is extensive motion on the FLAIR axial  images.  Additionally, there is high flair signal material involving the basal cisterns about the brainstem, not confirmed on other pulse sequences, suggesting that this is artifactual related to poor CSF signal saturation rather than proteinaceous or hemorrhagic material.  There is no evidence of intracranial mass, or acute infarction.  The ventricular system is within normal limits of size for age and shows no evidence of hydrocephalus.  There are no significant extra-axial or extracranial abnormalities detected.  The bilateral mesial temporal lobes are symmetric without abnormal signal, sclerosis, or enhancement.    The globes, orbits, pituitary gland, pineal gland and craniocervical junction are normal in configuration. The major vascular flow voids are patent.  Please see CTA performed earlier today for details of the intracranial vasculature.    Impression    Motion limited examination.    On the flair images, high attenuating material about the brainstem/basal cisterns is felt to represent artifact given extensive patient motion likely the result of poor saturation of the CSF signal in the region.  Of note, there is no associated enhancement or meningeal enhancement to suggest that this reflects proteinaceous material of infectious nature.  Blood products felt less likely.  If this finding would correlate with current patient symptomatology, consider either repeating the flair image once patient is better able to tolerate the examination, or correlate this finding with lumbar puncture.    No evidence of acute infarct.  Remote PCA territory infarct with mild enhancement.    No abnormal temporal lobe enhancement or focal focus to suggest seizure nidus.            Electronically signed by: NERY LO MD  Date:     11/06/16  Time:    01:08       Additional tests:  1)CT Scan: as noted  2) EEG\Video Monitoring: no  3) PET Scan: no  4) Neuropsychological evaluation: no  5) DEXA Scan: no  6) Others: no     RISK  FACTORS FOR SEIZURES:    1. Head Trauma:  No    2. CNS Infections:  No  3. CNS Tumors: No     4. CNS Vascular Disease: hx of prior CVA  5. Febrile Seizures: No    6. Developmental Delay: No       7. Family History of Seizures: No    8. Birth history: unremarkable     Pregnancy/Labor/Delivery: n/a    CURRENT MEDICATIONS:   Current Outpatient Medications   Medication Sig Dispense Refill    acetaminophen (TYLENOL) 500 MG tablet Take 1 tablet (500 mg total) by mouth every 6 (six) hours as needed for Pain.  0    amLODIPine (NORVASC) 10 MG tablet Take 1 tablet (10 mg total) by mouth once daily. 90 tablet 3    apixaban 5 mg Tab Take 1 tablet (5 mg total) by mouth 2 (two) times daily. 180 tablet 3    aspirin 81 MG Chew Take 1 tablet (81 mg total) by mouth once daily.  0    atorvastatin (LIPITOR) 40 MG tablet Take 1 tablet (40 mg total) by mouth once daily. 90 tablet 3    carvedilol (COREG) 6.25 MG tablet Take 1 tablet (6.25 mg total) by mouth 2 (two) times daily. 180 tablet 3    dulaglutide (TRULICITY) 0.75 mg/0.5 mL PnIj Inject 0.5 mLs (0.75 mg total) into the skin every 7 days. 4 Syringe 6    flash glucose sensor (FREESTYLE ANGELLA SENSOR) Kit 3 each by Misc.(Non-Drug; Combo Route) route every 30 days. 3 kit 11    folic acid (FOLVITE) 400 MCG tablet Take 400 mcg by mouth once daily.      FREESTYLE ANGELLA READER Misc USE EVERY DAY AS DIRECTED FOR TEST BLOOD GLUCOSE 1 each 0    furosemide (LASIX) 40 MG tablet Take 1 tablet (40 mg total) by mouth 2 (two) times daily. 180 tablet 1    GLUCAGON EMERGENCY KIT, HUMAN, 1 mg injection INJECT 1 MG INTO THE MUSCLE AS NEEDED 1 kit 1    insulin aspart U-100 (NOVOLOG) 100 unit/mL InPn pen Inject 7 units w/ meals plus scale 150-200+1, 201-250+2, 251-300+3, 301-350+4. Max daily 35 units, 90 day. 3 Box 3    insulin detemir U-100 (LEVEMIR FLEXPEN) 100 unit/mL (3 mL) SubQ InPn pen Inject 12 Units into the skin every evening. 1 Box 3    lacosamide (VIMPAT) 50 mg Tab Take 1 tablet  "(50 mg total) by mouth every 12 (twelve) hours. Vim pat 50mg in am, 100 mg at night      levetiracetam (KEPPRA) 750 MG Tab Take 2 tablets (1,500 mg total) by mouth 2 (two) times daily. 120 tablet 11    levothyroxine (SYNTHROID) 200 MCG tablet TAKE 1 TABLET BY MOUTH BEFORE BREAKFAST 90 tablet 2    lidocaine HCL 2% (XYLOCAINE) 2 % jelly Apply topically as needed. Apply small amount topically as needed 30 mL 0    losartan (COZAAR) 100 MG tablet Take 1 tablet (100 mg total) by mouth once daily. 90 tablet 3    magnesium 30 mg Tab Take 1 tablet by mouth twice a week.       multivitamin capsule Take 1 capsule by mouth every Mon, Wed, Fri.       ONETOUCH VERIO Strp USE TO TEST BLOOD SUGAR THREE TIMES DAILY 100 strip 11    pen needle, diabetic (BD ULTRA-FINE MINI PEN NEEDLE) 31 gauge x 3/16" Ndle To use with insulin pens 4  times daily 360 each 3    sildenafil (VIAGRA) 100 MG tablet Take 1 tablet (100 mg total) by mouth as needed for Erectile Dysfunction. 6 tablet 12    tamsulosin (FLOMAX) 0.4 mg Cap Take 1 capsule (0.4 mg total) by mouth every evening. 30 capsule 11    vitamin D 1000 units Tab Take 1,000 Units by mouth every Mon, Wed, Fri.       No current facility-administered medications for this visit.         CURRENT ANTI EPILEPTIC MEDICATIONS:   - Levetiracetam 1500mg bid    VAGAL NERVE STIMULATOR: n/a     PRIOR ANTICONVULSANT HISTORY:  none      PAST MEDICAL HISTORY:   Active Ambulatory Problems     Diagnosis Date Noted    Hypercholesterolemia 08/01/2012    Sickle cell trait 08/01/2012    Erectile dysfunction associated with type 2 diabetes mellitus 01/29/2013    Gallstones 03/20/2013    Paroxysmal atrial fibrillation 09/23/2013    Postsurgical hypothyroidism 11/27/2013    Microcytic anemia 11/27/2013    Obesity (BMI 30.0-34.9) 11/25/2014    Right homonymous hemianopsia 02/05/2016    History of CVA (cerebrovascular accident) 07/25/2016    Type 2 diabetes mellitus with stage 3 chronic kidney " disease, with long-term current use of insulin 11/02/2016    Proteinuria 11/02/2016    Aphasia     Generalized tonic-clonic seizure     Chronic anticoagulation - pradaxa 11/10/2016    Chronic diastolic heart failure 11/20/2016    History of thyroid cancer 01/13/2017    Bradycardia 02/01/2017    Bilateral leg edema 02/01/2017    Type 2 diabetes mellitus with hyperglycemia, with long-term current use of insulin 06/28/2017    Secondary hyperparathyroidism 06/29/2017    Elevated PSA 07/05/2017    Hematuria 08/14/2017    Essential hypertension 08/30/2017    DM type 2 without retinopathy 08/30/2017    Nuclear sclerosis of both eyes 08/30/2017    Refractive error 08/30/2017    Prostate enlargement 09/14/2017    Simple partial seizure, consciousness not impaired 10/05/2017    DM2 (diabetes mellitus, type 2) 10/05/2017    Complex partial seizures evolving to generalized tonic-clonic seizures 11/15/2017    Hypertensive CKD (chronic kidney disease) 01/31/2018    Hemianopia of right eye 03/23/2018    Encephalopathy 03/23/2018    Altered mental state 03/24/2018    Acute on chronic congestive heart failure 04/27/2018    Dysarthria     Stroke     Encounter for medication titration 05/22/2018    Leg pain, diffuse, right 05/31/2018    Chest pain 06/27/2018    Chronic kidney disease, stage III (moderate) 06/28/2018    Elevated d-dimer 06/28/2018    Acute cystitis with hematuria 06/28/2018     Resolved Ambulatory Problems     Diagnosis Date Noted    HTN (hypertension), benign 08/01/2012    Elevated alkaline phosphatase level 08/01/2012    Flank pain 03/20/2013    Syncope 11/26/2013    Leukocytosis 11/27/2013    Acute on chronic renal failure 11/27/2013    Controlled type 2 diabetes with neuropathy 12/16/2014    Screening 03/03/2015    Screening 03/03/2015    Visual field loss following stroke 01/28/2016    Headache 01/28/2016    Fall 06/10/2016    Abdominal pain 10/05/2017    Altered  mental status 10/05/2017     Past Medical History:   Diagnosis Date    Allergy     Atrial fibrillation     BMI 31.0-31.9,adult 11/25/2014    Cerebrovascular disease 7/25/2016    CHF (congestive heart failure)     CHF (congestive heart failure)     Chronic anticoagulation - pradaxa 11/10/2016    Chronic diastolic heart failure 11/20/2016    CKD (chronic kidney disease), stage III 9/23/2013    Controlled type 2 diabetes with neuropathy 12/16/2014    Coronary artery disease     Diabetes mellitus due to underlying condition with stage 3 chronic kidney disease, without long-term current use of insulin 11/2/2016    Elevated alkaline phosphatase level 8/1/2012    Elevated PSA     Erectile dysfunction associated with type 2 diabetes mellitus     Gallstones 3/20/2013    Generalized tonic-clonic seizure 11/2016    HTN (hypertension), benign 8/1/2012    Hypercholesterolemia 8/1/2012    Microcytic anemia 11/27/2013    Paroxysmal atrial fibrillation 9/23/2013    Postsurgical hypothyroidism 11/27/2013    Right homonymous hemianopsia 2/5/2016    Sickle cell trait     Stroke 1/27/2016    Thyroid cancer     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/2016      PAST PSYCHIATRIC HISTORY: none    PAST SURGICAL HISTORY including Epilepsy surgery:   Past Surgical History:   Procedure Laterality Date    BREAST SURGERY      cyst removal    COLONOSCOPY      CYST REMOVAL      chest    PROSTATE BIOPSY  4/27/11    SKIN BIOPSY      THYROID SURGERY  8/26/09    VASCULAR SURGERY          FAMILY HISTORY:   Family History   Problem Relation Age of Onset    Heart disease Father     Diabetes Father     Hypertension Father     Diabetes Mother     Hypertension Mother     Heart disease Mother     Diabetes Brother     Cancer Sister     Thyroid disease Maternal Aunt     Clotting disorder Neg Hx          SOCIAL HISTORY:   Social History     Socioeconomic History    Marital status:      Spouse name: Rut     Number of children: Not on file    Years of education: Not on file    Highest education level: Not on file   Social Needs    Financial resource strain: Not on file    Food insecurity - worry: Not on file    Food insecurity - inability: Not on file    Transportation needs - medical: Not on file    Transportation needs - non-medical: Not on file   Occupational History    Not on file   Tobacco Use    Smoking status: Never Smoker    Smokeless tobacco: Never Used   Substance and Sexual Activity    Alcohol use: Yes     Comment: occasionally, none recently    Drug use: No    Sexual activity: Yes     Partners: Female     Comment:  with 3 kids   Other Topics Concern    Not on file   Social History Narrative     with 3 kids     Not driving due to vision problem from stroke.       a) Marital status:                                                     b) Living situation: patient lives with his wife  c) Employed/Unemployed/Other: Disabled     DRIVING HISTORY:  Currently driving: No       LEVEL OF EDUCATION:  -     SUBSTANCE USE: none    ALLERGIES: Cough syrup [guaifenesin]     REVIEW OF SYSTEMS:  Review of Systems   Constitutional: Negative for appetite change and fatigue.   HENT: Negative for dental problem and sore throat.    Eyes: Negative for photophobia and visual disturbance.   Respiratory: Negative for cough and shortness of breath.    Cardiovascular: Negative for chest pain and palpitations.   Gastrointestinal: Negative for nausea and vomiting.   Endocrine: Negative for polydipsia and polyuria.   Genitourinary: Negative for frequency and urgency.   Musculoskeletal: Negative for arthralgias and joint swelling.   Skin: Negative for color change and rash.   Allergic/Immunologic: Negative for immunocompromised state.   All other systems reviewed and are negative.       GENERAL EXAMINATION:  There were no vitals taken for this visit.     GENERAL EXAMINATION:  General Appearance:    This is  an average built male who appears well.  HEENT: There are no dysmorphic features  Skin: There are no obvious stigmata for neurocutaneous disorders.  Neck: supple   Cardiovascular: regular rate and rhythm  Lungs: clear  Abdomen: deferred  The spine is non-tender.  Kyphosis:  NoScoliosis: No   Extremities: Warm and well perfused    NEUROLOGICAL EXAMINATION:  Mental status: Alert and oriented x 4; pleasant and cooperative with exam  Memory: Recent memory intact, Remote memory intact, Age correct, Month correct  Attention and concentration: intact  Fund of knowledge: adequate  Speech: normal  Dysarthria: No   Eyes: PERRL; EOM intact; No nystagmus.The visual pursuits  were smooth with normal saccadic eye movements.   Fundoscopic Exam: deferred  No facial asymmetry. Intact facial sensation bilaterally.  Hearing was intact bilaterally to finger rub  Tongue and palate was in the midline  Intact SCM and trapezii bilaterally     Motor examination:  Normal bulk and tone bilaterally. Power: no pronater drift; 5/5 bilaterally symmetric UE/LE  Abnormal movements: none  Deep tendon reflexes: 2+ bilaterally symmetric UE/LE with flexor plantars  Dysmetria: No     Sensory examination:   Normal, light touch, pin prick, and vibration bilaterally symmetric UE/LE    Gait:  Normal gait and station    IMPRESSION:  The patient's history is consistent with:   Complex partial seizures evolving to generalized tonic-clonic seizures  62yo M with hx of CVA in 1/16 and seizures x 11/16   - some prior episodes c/w glucose level alterations  - stable with no episodes since last clinic visit     Current AEDs:  - Lacosamide 100mg bid  - Levetiracetam 1500mg bid     Plan:  - continue Lacosamide 100mg bid  - continue LEV 1500mg bid  - check AED levels today - to assess stable levels     Seizure/glucose log  Seizure precautions/restrictions     Plan of care was discussed in detail with patient    History of thyroid cancer  - has follow up with  Endocrine    Chronic diastolic heart failure  - has follow up with Cardiology    Chronic kidney disease, stage III (moderate)  - has follow-up    The patient was asked to call me/the clinic 1 week after the test(s) are done to obtain results.    More than 50% of the time with the patient (as well as family/caregiver(s) was spent on face-to-face counseling about:  1. Diagnosis, plans, prognosis, medications and possible side-effects, risks and benefits of treatment, other alternatives to AEDs.  2. Risks related to continued seizures, status epilepticus, SUDEP, driving restrictions and seizure precautions ( no baths but showers are ok, no swimming unsupervised, no use of heavy machinery, no use of sharp moving objects, avoid heights).   3. Issues related to pregnancy, OCP and breast feeding as it relates to epilepsy (in female patients).  4. The potential of teratogenicity (for female patients) and suicidal risks of anti-epileptic medications.  5.Avoid any activity that compromise patient safety related to seizures.    Questions and concerns raised by the patient and family/care-giver(s) were addressed and they indicated understanding of everything discussed and agreed to plans as above.    Return in 4 months or earlier rodrigo Santiago MD, SONA(), FACNS.  Neurology-Epilepsy.

## 2018-08-20 NOTE — TELEPHONE ENCOUNTER
Called in Vimpat per clinic notes to Dr Santiago to [facility].Dosage verified with Patient.

## 2018-08-20 NOTE — ASSESSMENT & PLAN NOTE
62yo M with hx of CVA in 1/16 and seizures x 11/16   - some prior episodes c/w glucose level alterations  - stable with no episodes since last clinic visit     Current AEDs:  - Lacosamide 100mg bid  - Levetiracetam 1500mg bid     Plan:  - continue Lacosamide 100mg bid  - continue LEV 1500mg bid  - check AED levels today - to assess stable levels     Seizure/glucose log  Seizure precautions/restrictions     Plan of care was discussed in detail with patient

## 2018-08-20 NOTE — LETTER
August 20, 2018      Tyler Jasmine MD  [address]  [facility] [address]           [facility] - Neurology Epilepsy  [address]  [facility] [address]  Phone: [phone]  Fax: [phone]          Patient: Alfa Christy III   MR Number: [id number]   YOB: 1955   Date of Visit: 8/20/2018       Dear Dr. Tyler Jasmine:    Thank you for referring Alfa Christy to me for evaluation. Attached you will find relevant portions of my assessment and plan of care.    If you have questions, please do not hesitate to call me. I look forward to following Alfa Christy along with you.    Sincerely,    Keara Santiago MD    Enclosure  CC:  No Recipients    If you would like to receive this communication electronically, please contact [email or web address] or [phone] to request more information on [employer] access.    For providers and/or their staff who would like to refer a patient to [facility], please contact us through our one-stop-shop provider referral line, [facility], at [phone].    If you feel you have received this communication in error or would no longer like to receive these types of communications, please e-mail [email or web address]

## 2018-08-23 ENCOUNTER — OFFICE VISIT (OUTPATIENT)
Dept: INTERNAL MEDICINE | Facility: CLINIC | Age: 63
End: 2018-08-23
Payer: MEDICARE

## 2018-08-23 ENCOUNTER — TELEPHONE (OUTPATIENT)
Dept: NEPHROLOGY | Facility: CLINIC | Age: 63
End: 2018-08-23

## 2018-08-23 VITALS
BODY MASS INDEX: 31.59 KG/M2 | TEMPERATURE: 98 F | WEIGHT: 220.69 LBS | HEART RATE: 71 BPM | SYSTOLIC BLOOD PRESSURE: 130 MMHG | DIASTOLIC BLOOD PRESSURE: 70 MMHG | HEIGHT: 70 IN | OXYGEN SATURATION: 99 %

## 2018-08-23 DIAGNOSIS — R31.9 HEMATURIA, UNSPECIFIED TYPE: ICD-10-CM

## 2018-08-23 DIAGNOSIS — D50.9 MICROCYTIC ANEMIA: ICD-10-CM

## 2018-08-23 DIAGNOSIS — I10 ESSENTIAL HYPERTENSION: ICD-10-CM

## 2018-08-23 DIAGNOSIS — R33.9 URINARY RETENTION: Primary | ICD-10-CM

## 2018-08-23 DIAGNOSIS — I50.32 CHRONIC DIASTOLIC HEART FAILURE: ICD-10-CM

## 2018-08-23 DIAGNOSIS — Z79.4 TYPE 2 DIABETES MELLITUS WITH STAGE 3 CHRONIC KIDNEY DISEASE, WITH LONG-TERM CURRENT USE OF INSULIN: ICD-10-CM

## 2018-08-23 DIAGNOSIS — Z12.11 COLON CANCER SCREENING: ICD-10-CM

## 2018-08-23 DIAGNOSIS — N18.30 CHRONIC KIDNEY DISEASE, STAGE III (MODERATE): ICD-10-CM

## 2018-08-23 DIAGNOSIS — Z86.010 HISTORY OF COLON POLYPS: ICD-10-CM

## 2018-08-23 DIAGNOSIS — N18.30 TYPE 2 DIABETES MELLITUS WITH STAGE 3 CHRONIC KIDNEY DISEASE, WITH LONG-TERM CURRENT USE OF INSULIN: ICD-10-CM

## 2018-08-23 DIAGNOSIS — E11.22 TYPE 2 DIABETES MELLITUS WITH STAGE 3 CHRONIC KIDNEY DISEASE, WITH LONG-TERM CURRENT USE OF INSULIN: ICD-10-CM

## 2018-08-23 DIAGNOSIS — I48.0 PAROXYSMAL ATRIAL FIBRILLATION: Chronic | ICD-10-CM

## 2018-08-23 PROCEDURE — 3044F HG A1C LEVEL LT 7.0%: CPT | Mod: CPTII,S$GLB,, | Performed by: INTERNAL MEDICINE

## 2018-08-23 PROCEDURE — 3078F DIAST BP <80 MM HG: CPT | Mod: CPTII,S$GLB,, | Performed by: INTERNAL MEDICINE

## 2018-08-23 PROCEDURE — 99999 PR PBB SHADOW E&M-EST. PATIENT-LVL III: CPT | Mod: PBBFAC,,, | Performed by: INTERNAL MEDICINE

## 2018-08-23 PROCEDURE — 3008F BODY MASS INDEX DOCD: CPT | Mod: CPTII,S$GLB,, | Performed by: INTERNAL MEDICINE

## 2018-08-23 PROCEDURE — 3075F SYST BP GE 130 - 139MM HG: CPT | Mod: CPTII,S$GLB,, | Performed by: INTERNAL MEDICINE

## 2018-08-23 PROCEDURE — 99214 OFFICE O/P EST MOD 30 MIN: CPT | Mod: S$GLB,,, | Performed by: INTERNAL MEDICINE

## 2018-08-23 PROCEDURE — 99499 UNLISTED E&M SERVICE: CPT | Mod: S$GLB,,, | Performed by: INTERNAL MEDICINE

## 2018-08-23 NOTE — PROGRESS NOTES
Subjective:       Patient ID: Alfa Christy III is a 63 y.o. male.    Chief Complaint: Hospital Follow Up    HPI  60 y/o man with h/o CVA (11/2016), h/o seizures, DM2, CKD, HTN, hypothyroidism, paroxysmal atrial fibrillation, chronic diastolic HF, multiple other medical issues here for hospital follow up. Last seen 10/2017 also for hospital follow up. Multiple ER/hospital visits since that time.     ER visit recently 8/1/18 for urinary retention, followed up with urology after this; nieves removed 8/14/18. Since then did initially have some hematuria and dysuria but these have progressively improved.   Recently no problems with urination - taking flomax BID and feels this is helping. No longer taking oxybutynin.     CKD stage 3, last Cr 2.2 on 7/31. Last saw Dr Gold 1/2018, overdue for follow up.     H/o CVA 1/2016, seizures - aphasia, visual field change with CVA.   Admitted after last seizure 4/2018. Following regularly with neurology, no medications changed recently.    DM2 - following with Endocrine, seen recently.  Meds - lantus 12u (decreased from 14u 2 weeks ago), novolog 7u qAC OR 4u qAC if light meal + correction scale. He has been taking 5u qAC recently.   Trulicity once a week.  No lows in past 2 weeks, reports a little high this  (usually running 120-160) but did have pie yesterday  Tried Freestyle Giles but didn't like it, would rather do fingerstick checks.  Sees Dr Tejas Man for eye exams, also Dr Sanz here. Due at end of month for this.     Saw Cardiology 8/6/18 for CHF, paroxysmal atrial fibrillation, HTN, leg swelling  On ASA, apixaban 5mg BID, lasix, coreg 6.25mg BID, norvasc 10mg, losartan 100mg  Echo with HFpEF, EF 55% 1/2016, +carotid artery stenosis <50% bilaterally 2016  Monitoring weight at home  Wears compression stockings while sleeping    Review of Systems   Constitutional: Negative for activity change and unexpected weight change.   HENT: Negative.    Eyes: Negative for  "visual disturbance.   Respiratory: Negative for cough and shortness of breath.    Cardiovascular: Negative for chest pain, palpitations and leg swelling.   Gastrointestinal: Negative for abdominal pain and diarrhea.   Endocrine: Negative for polydipsia and polyuria.   Genitourinary: Positive for dysuria (resolving) and hematuria (resolving). Negative for decreased urine volume, difficulty urinating and frequency.   Musculoskeletal: Negative for gait problem and joint swelling.   Skin: Negative for rash.   Neurological: Negative for seizures (none recently), syncope and weakness.   Psychiatric/Behavioral: Negative.          Past medical history, surgical history, and family medical history reviewed and updated as appropriate.    Medications and allergies reviewed.     Objective:          Vitals:    08/23/18 0921   BP: 130/70   BP Location: Left arm   Patient Position: Sitting   Pulse: 71   Temp: 98.2 °F (36.8 °C)   TempSrc: Oral   SpO2: 99%   Weight: 100.1 kg (220 lb 10.9 oz)   Height: 5' 10" (1.778 m)     Body mass index is 31.66 kg/m².  Physical Exam   Constitutional: He is oriented to person, place, and time. He appears well-developed and well-nourished. No distress.   HENT:   Head: Normocephalic and atraumatic.   Mouth/Throat: Oropharynx is clear and moist.   Eyes: Conjunctivae and EOM are normal.   Neck: Neck supple.   Cardiovascular: Normal rate, regular rhythm and normal heart sounds.   No murmur heard.  Pulmonary/Chest: Effort normal and breath sounds normal.   Abdominal: Soft. Bowel sounds are normal. He exhibits no distension. There is no tenderness.   Musculoskeletal: He exhibits no edema or tenderness.   Lymphadenopathy:     He has no cervical adenopathy.   Neurological: He is alert and oriented to person, place, and time. He has normal strength. No cranial nerve deficit. Gait normal.   R peripheral vision deficit - not new  Speech slightly slow, no slurring.    Skin: Skin is warm and dry. "   +hyperpigmentation of skin at anterior shins bilaterally   Psychiatric: He has a normal mood and affect.   Vitals reviewed.      Lab Results   Component Value Date    WBC 10.60 06/28/2018    HGB 9.2 (L) 06/28/2018    HCT 25.4 (L) 06/28/2018     06/28/2018    CHOL 102 (L) 07/31/2018    TRIG 73 07/31/2018    HDL 35 (L) 07/31/2018    ALT 21 07/31/2018    AST 25 07/31/2018     07/31/2018    K 4.7 07/31/2018     07/31/2018    CREATININE 2.2 (H) 07/31/2018    BUN 28 (H) 07/31/2018    CO2 23 07/31/2018    TSH 1.713 06/28/2018    PSA 7.2 (H) 10/28/2015    INR 1.0 05/31/2018    GLUF 106 11/05/2008    HGBA1C 5.4 07/31/2018       Assessment:       1. Urinary retention    2. Hematuria, unspecified type    3. Microcytic anemia    4. Chronic kidney disease, stage III (moderate)    5. Paroxysmal atrial fibrillation    6. Chronic diastolic heart failure    7. Essential hypertension    8. Type 2 diabetes mellitus with stage 3 chronic kidney disease, with long-term current use of insulin    9. Colon cancer screening    10. History of colon polyps        Plan:   Alfa was seen today for hospital follow up.    Diagnoses and all orders for this visit:    Urinary retention  -     Renal function panel; Future  Hematuria, unspecified type  -     Renal function panel; Future  Follow with urology, nephrology    Microcytic anemia  Comments:  significant decrease recently - has had hematuria while on anticoagulation. Denies rectal bleeding, but due for colonoscopy so will also check this  Orders:  -     CBC Without Differential; Future    Chronic kidney disease, stage III (moderate)  -     Renal function panel; Future    Paroxysmal atrial fibrillation  Chronic diastolic heart failure  Essential hypertension - BP at goal, clinically euvolemic, continue to follow with cardiology    Type 2 diabetes mellitus with stage 3 chronic kidney disease, with long-term current use of insulin - follow with Endocrinology, continue current      Colon cancer screening  -     Case request GI: COLONOSCOPY    History of colon polyps  -     Case request GI: COLONOSCOPY    Health maintenance reviewed with patient.   Due for appt with nephrology - recommended to schedule  Needs f/u with Ophthalmology  Recommended get flu vaccine in the fall.    Follow-up in about 4 months (around 12/23/2018) for annual physical.    Tyler Jasmine MD  Internal Medicine  [facility]  8/23/2018

## 2018-08-23 NOTE — Clinical Note
On review of labs, I would like him to have repeat CBC and renal function panel done when he next comes for podiatry appt 8/30 - please call to let him know & schedule lab appt

## 2018-08-27 ENCOUNTER — TELEPHONE (OUTPATIENT)
Dept: INTERNAL MEDICINE | Facility: CLINIC | Age: 63
End: 2018-08-27

## 2018-08-27 PROBLEM — R41.82 ALTERED MENTAL STATE: Status: RESOLVED | Noted: 2018-03-24 | Resolved: 2018-08-27

## 2018-08-27 PROBLEM — I50.9 ACUTE ON CHRONIC CONGESTIVE HEART FAILURE: Status: RESOLVED | Noted: 2018-04-27 | Resolved: 2018-08-27

## 2018-08-27 PROBLEM — G40.109: Status: RESOLVED | Noted: 2017-10-05 | Resolved: 2018-08-27

## 2018-08-27 NOTE — TELEPHONE ENCOUNTER
----- Message from Tyler Jasmine MD sent at 8/27/2018  1:35 AM CDT -----  On review of labs, I would like him to have repeat CBC and renal function panel done when he next comes for podiatry appt 8/30 - please call to let him know & schedule lab appt

## 2018-08-30 ENCOUNTER — OFFICE VISIT (OUTPATIENT)
Dept: PODIATRY | Facility: CLINIC | Age: 63
End: 2018-08-30
Payer: MEDICARE

## 2018-08-30 VITALS
BODY MASS INDEX: 31.83 KG/M2 | WEIGHT: 222.31 LBS | SYSTOLIC BLOOD PRESSURE: 146 MMHG | HEART RATE: 76 BPM | DIASTOLIC BLOOD PRESSURE: 81 MMHG | HEIGHT: 70 IN

## 2018-08-30 DIAGNOSIS — B35.1 ONYCHOMYCOSIS DUE TO DERMATOPHYTE: Primary | ICD-10-CM

## 2018-08-30 DIAGNOSIS — N18.30 TYPE 2 DIABETES MELLITUS WITH STAGE 3 CHRONIC KIDNEY DISEASE, WITH LONG-TERM CURRENT USE OF INSULIN: ICD-10-CM

## 2018-08-30 DIAGNOSIS — E11.22 TYPE 2 DIABETES MELLITUS WITH STAGE 3 CHRONIC KIDNEY DISEASE, WITH LONG-TERM CURRENT USE OF INSULIN: ICD-10-CM

## 2018-08-30 DIAGNOSIS — Z79.4 TYPE 2 DIABETES MELLITUS WITH STAGE 3 CHRONIC KIDNEY DISEASE, WITH LONG-TERM CURRENT USE OF INSULIN: ICD-10-CM

## 2018-08-30 PROCEDURE — 3079F DIAST BP 80-89 MM HG: CPT | Mod: CPTII,S$GLB,, | Performed by: PODIATRIST

## 2018-08-30 PROCEDURE — 3077F SYST BP >= 140 MM HG: CPT | Mod: CPTII,S$GLB,, | Performed by: PODIATRIST

## 2018-08-30 PROCEDURE — 99203 OFFICE O/P NEW LOW 30 MIN: CPT | Mod: S$GLB,,, | Performed by: PODIATRIST

## 2018-08-30 PROCEDURE — 3008F BODY MASS INDEX DOCD: CPT | Mod: CPTII,S$GLB,, | Performed by: PODIATRIST

## 2018-08-30 PROCEDURE — 99999 PR PBB SHADOW E&M-EST. PATIENT-LVL III: CPT | Mod: PBBFAC,,, | Performed by: PODIATRIST

## 2018-08-30 PROCEDURE — 3044F HG A1C LEVEL LT 7.0%: CPT | Mod: CPTII,S$GLB,, | Performed by: PODIATRIST

## 2018-08-30 RX ORDER — CICLOPIROX 80 MG/ML
SOLUTION TOPICAL NIGHTLY
Qty: 6.6 ML | Refills: 11 | Status: SHIPPED | OUTPATIENT
Start: 2018-08-30 | End: 2019-05-22

## 2018-08-30 NOTE — PROGRESS NOTES
Subjective:      Patient ID: Alfa Christy III is a 63 y.o. male.    Chief Complaint: Diabetes Mellitus (PCP Dr. Jasmine 8/23/18); Diabetic Foot Exam; Routine Foot Care; and Peripheral Neuropathy    Alfa is a 63 y.o. male who presents to the clinic upon referral from Dr. Diallo  for evaluation and treatment of diabetic feet. Alfa has a past medical history of Allergy, Atrial fibrillation, BMI 31.0-31.9,adult (11/25/2014), Cerebrovascular disease (7/25/2016), CHF (congestive heart failure), CHF (congestive heart failure), Chronic anticoagulation - pradaxa (11/10/2016), Chronic diastolic heart failure (11/20/2016), CKD (chronic kidney disease), stage III (9/23/2013), Controlled type 2 diabetes with neuropathy (12/16/2014), Coronary artery disease, Diabetes mellitus due to underlying condition with stage 3 chronic kidney disease, without long-term current use of insulin (11/2/2016), Elevated alkaline phosphatase level (8/1/2012), Elevated PSA, Erectile dysfunction associated with type 2 diabetes mellitus, Gallstones (3/20/2013), Generalized tonic-clonic seizure (11/2016), HTN (hypertension), benign (8/1/2012), Hypercholesterolemia (8/1/2012), Microcytic anemia (11/27/2013), Paroxysmal atrial fibrillation (9/23/2013), Postsurgical 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 Patient relates no major problem with feet. Only complaints today consist of Diabetes, increased risk amputation needing evaluation/management/optomization of foot care.  No current symptoms..    PCP: Tyler Jasmine MD    Date Last Seen by PCP:   Chief Complaint   Patient presents with    Diabetes Mellitus     PCP Dr. Jasmine 8/23/18    Diabetic Foot Exam    Routine Foot Care    Peripheral Neuropathy        Current shoe gear: Casual shoes    Hemoglobin A1C   Date Value Ref Range Status   07/31/2018 5.4 4.0 - 5.6 %  Final     Comment:     ADA Screening Guidelines:  5.7-6.4%  Consistent with prediabetes  >or=6.5%  Consistent with diabetes  High levels of fetal hemoglobin interfere with the HbA1C  assay. Heterozygous hemoglobin variants (HbS, HgC, etc)do  not significantly interfere with this assay.   However, presence of multiple variants may affect accuracy.     06/27/2018 5.4 4.0 - 5.6 % Final     Comment:     ADA Screening Guidelines:  5.7-6.4%  Consistent with prediabetes  >or=6.5%  Consistent with diabetes  High levels of fetal hemoglobin interfere with the HbA1C  assay. Heterozygous hemoglobin variants (HbS, HgC, etc)do  not significantly interfere with this assay.   However, presence of multiple variants may affect accuracy.     03/24/2018 4.9 4.0 - 5.6 % Final     Comment:     According to ADA guidelines, hemoglobin A1c <7.0% represents  optimal control in non-pregnant diabetic patients. Different  metrics may apply to specific patient populations.   Standards of Medical Care in Diabetes-2016.  For the purpose of screening for the presence of diabetes:  <5.7%     Consistent with the absence of diabetes  5.7-6.4%  Consistent with increasing risk for diabetes   (prediabetes)  >or=6.5%  Consistent with diabetes  Currently, no consensus exists for use of hemoglobin A1c  for diagnosis of diabetes for children.  This Hemoglobin A1c assay has significant interference with fetal   hemoglobin   (HbF). The results are invalid for patients with abnormal amounts of   HbF,   including those with known Hereditary Persistence   of Fetal Hemoglobin. Heterozygous hemoglobin variants (HbAS, HbAC,   HbAD, HbAE, HbA2) do not significantly interfere with this assay;   however, presence of multiple variants in a sample may impact the %   interference.             Review of Systems   Constitution: Negative for chills, diaphoresis, fever, malaise/fatigue and night sweats.   Cardiovascular: Positive for leg swelling. Negative for claudication,  cyanosis and syncope.   Skin: Positive for nail changes. Negative for color change, dry skin, rash, suspicious lesions and unusual hair distribution.   Musculoskeletal: Negative for falls, joint pain, joint swelling, muscle cramps, muscle weakness and stiffness.   Gastrointestinal: Negative for constipation, diarrhea, nausea and vomiting.   Neurological: Negative for brief paralysis, disturbances in coordination, focal weakness, numbness, paresthesias, sensory change and tremors.           Objective:      Physical Exam   Constitutional: He is oriented to person, place, and time. He appears well-developed and well-nourished. He is cooperative. No distress.   Oriented to time, place, and person.   Cardiovascular:   Pulses:       Popliteal pulses are 2+ on the right side, and 2+ on the left side.        Dorsalis pedis pulses are 2+ on the right side, and 2+ on the left side.        Posterior tibial pulses are 1+ on the right side, and 1+ on the left side.   Capillary refill 3 seconds all toes/distal feet, all toes/both feet warm to touch.      Negative lymphadenopathy bilateral popliteal fossa and tarsal tunnel.     <2+ pitting lower extremity edema bilateral.     Musculoskeletal:        Right ankle: He exhibits normal range of motion, no swelling, no ecchymosis, no deformity, no laceration and normal pulse. Achilles tendon normal. Achilles tendon exhibits no pain, no defect and normal Gaspar's test results.   Normal angle, base, station of gait. Decreased stride length, early heel off, moderately propulsive toe off bilateral.    All ten toes without clubbing, cyanosis, or signs of ischemia.      No pain to palpation bilateral lower extremities.      Range of motion, stability, muscle strength, and muscle tone are age and health appropriate normal bilateral feet and legs.       Lymphadenopathy: No inguinal adenopathy noted on the right or left side.   Negative lymphadenopathy bilateral popliteal fossa and tarsal  tunnel.    Negative lymphangitic streaking bilateral feet/ankles/legs.   Neurological: He is alert and oriented to person, place, and time. He has normal strength. He is not disoriented. He displays no atrophy and no tremor. No sensory deficit. He exhibits normal muscle tone. Gait normal.   Reflex Scores:       Patellar reflexes are 2+ on the right side and 2+ on the left side.       Achilles reflexes are 2+ on the right side and 2+ on the left side.  Negative tinel sign to percussion sural, superficial peroneal, deep peroneal, saphenous, and posterior tibial nerves right and left ankles and feet.  .     Skin: Skin is warm, dry and intact. Capillary refill takes 2 to 3 seconds. No abrasion, no bruising, no burn, no ecchymosis, no laceration, no lesion, no petechiae and no rash noted. He is not diaphoretic. No cyanosis or erythema. No pallor. Nails show no clubbing.     Skin is normal age and health appropriate color, turgor, texture, and temperature bilateral lower extremities without ulceration, hyperpigmentation, discoloration, masses nodules or cords palpated.  No ecchymosis, erythema, edema, or cardinal signs of infection bilateral lower extremities.     Psychiatric: He has a normal mood and affect.             Assessment:       Encounter Diagnoses   Name Primary?    Onychomycosis due to dermatophyte Yes    Type 2 diabetes mellitus with stage 3 chronic kidney disease, with long-term current use of insulin          Plan:       Alfa was seen today for diabetes mellitus, diabetic foot exam, routine foot care and peripheral neuropathy.    Diagnoses and all orders for this visit:    Onychomycosis due to dermatophyte    Type 2 diabetes mellitus with stage 3 chronic kidney disease, with long-term current use of insulin    Other orders  -     ciclopirox (PENLAC) 8 % Soln; Apply topically nightly.      I counseled the patient on his conditions, their implications and medical management.        - Shoe inspection.  Diabetic Foot Education. Patient reminded of the importance of good nutrition and blood sugar control to help prevent podiatric complications of diabetes. Patient instructed on proper foot hygeine. We discussed wearing proper shoe gear, daily foot inspections, never walking without protective shoe gear, never putting sharp instruments to feet, routine podiatric visits at least annually     Rx penlac.    Discussed conservative treatment with shoes of adequate dimensions, material, and style to alleviate symptoms and delay or prevent surgical intervention.          Follow-up in about 1 year (around 8/30/2019).

## 2018-09-04 RX ORDER — DULAGLUTIDE 0.75 MG/.5ML
INJECTION, SOLUTION SUBCUTANEOUS
Qty: 4 SYRINGE | Refills: 4 | Status: SHIPPED | OUTPATIENT
Start: 2018-09-04 | End: 2019-01-21 | Stop reason: SDUPTHER

## 2018-09-19 ENCOUNTER — OFFICE VISIT (OUTPATIENT)
Dept: UROLOGY | Facility: CLINIC | Age: 63
End: 2018-09-19
Payer: MEDICARE

## 2018-09-19 VITALS
DIASTOLIC BLOOD PRESSURE: 76 MMHG | HEIGHT: 70 IN | BODY MASS INDEX: 31.97 KG/M2 | HEART RATE: 65 BPM | SYSTOLIC BLOOD PRESSURE: 125 MMHG | WEIGHT: 223.31 LBS

## 2018-09-19 DIAGNOSIS — R33.9 URINARY RETENTION: Primary | ICD-10-CM

## 2018-09-19 DIAGNOSIS — N40.1 BPH WITH OBSTRUCTION/LOWER URINARY TRACT SYMPTOMS: ICD-10-CM

## 2018-09-19 DIAGNOSIS — N13.8 BPH WITH OBSTRUCTION/LOWER URINARY TRACT SYMPTOMS: ICD-10-CM

## 2018-09-19 PROCEDURE — 3078F DIAST BP <80 MM HG: CPT | Mod: CPTII,,, | Performed by: NURSE PRACTITIONER

## 2018-09-19 PROCEDURE — 99999 PR PBB SHADOW E&M-EST. PATIENT-LVL III: CPT | Mod: PBBFAC,,, | Performed by: NURSE PRACTITIONER

## 2018-09-19 PROCEDURE — 3074F SYST BP LT 130 MM HG: CPT | Mod: CPTII,,, | Performed by: NURSE PRACTITIONER

## 2018-09-19 PROCEDURE — 51798 US URINE CAPACITY MEASURE: CPT | Mod: PBBFAC | Performed by: NURSE PRACTITIONER

## 2018-09-19 PROCEDURE — 3008F BODY MASS INDEX DOCD: CPT | Mod: CPTII,,, | Performed by: NURSE PRACTITIONER

## 2018-09-19 PROCEDURE — 1101F PT FALLS ASSESS-DOCD LE1/YR: CPT | Mod: CPTII,,, | Performed by: NURSE PRACTITIONER

## 2018-09-19 PROCEDURE — 99213 OFFICE O/P EST LOW 20 MIN: CPT | Mod: PBBFAC | Performed by: NURSE PRACTITIONER

## 2018-09-19 PROCEDURE — 99214 OFFICE O/P EST MOD 30 MIN: CPT | Mod: S$PBB,,, | Performed by: NURSE PRACTITIONER

## 2018-09-19 NOTE — PROGRESS NOTES
Subjective:       Patient ID: Alfa Christy III is a 63 y.o. male.    Chief Complaint: F/u urinary retention, PVR check    HPI: Alfa Christy III is a 63 y.o. Black or  male who presents today for f/u of urinary retention and PVR check. His last clinic visit was 8/14/18.    Pt went to ED on 6/15/18 for decreased urine flow and suprapubic pain and distention.   16 fr nieves catheter was inserted and 800 ml of clear yellow urine out.  He was discharged home with catheter.  Urinary retention possibly d/t enlarged prostate.   On previous cysto performed by Dr. Mejia, patient did have BPH.  On 6/20/18, VT passed.  On 7/18/18 PVR 7 ml, no infection and emptying his bladder    He was seen at ED 7/28/18 for penis pain (dysuria, hematuria and penile pain). He was given lidocaine and discharged home.  He was seen at ED 8/1/18 for inability to urinate. Nieves catheter was placed and 700 ml clear yellow urine drained. He was discharged home with nieves catheter.  He was seen in clinic 8/2/18 for bladder spasms and burning r/t nieves catheter. He was given oxybutynin.  On 8/14/18 he passed voiding trial.    Today he denies any urinary complaints. He is taking flomax BID. He reports nocturia x 3, which is improved from x 5-7 per night. He reports improvement to daytime frequency and urgency with flomax BID. He is not interested in surgical intervention for BPH and would like to continue with flomax BID since his symptoms are not bothersome to him. He feels he is emptying his bladder.   Denies dysuria, hematuria or flank pain. Denies f/c/n/v.  FOS is good.     Cysto 7/5/17  Bilateral ureteral orifice were difficult to see.   No bladder tumors or lesions were seen.  No strictures were noted.  The prostate showed Significant hypertrophy.  There was Significant median lobe present. 6cm in length.    8/14/17   Procedure(s) Performed:   1.  Cystoscopy with bilateral retrograde pyelograms  2.  Fluoroscopy < 1  hour  Findings:   1.) Large, friable prostate  2.) Right ureter with distal J hooking, no filling defects  3.) Left ureter with tortuosity, no filling defects     PSA - negative prostate biopsy in 2011  10/4/16 12.2  11/4/16 9.9    Lab Results   Component Value Date    PSA 7.2 (H) 10/28/2015    PSA 5.62 (H) 12/27/2012    PSA 8.08 (H) 06/26/2012       Review of patient's allergies indicates:   Allergen Reactions    Cough syrup [guaifenesin] Other (See Comments)       Current Outpatient Medications   Medication Sig Dispense Refill    amLODIPine (NORVASC) 10 MG tablet Take 1 tablet (10 mg total) by mouth once daily. 90 tablet 3    apixaban 5 mg Tab Take 1 tablet (5 mg total) by mouth 2 (two) times daily. 180 tablet 3    aspirin 81 MG Chew Take 1 tablet (81 mg total) by mouth once daily.  0    atorvastatin (LIPITOR) 40 MG tablet Take 1 tablet (40 mg total) by mouth once daily. 90 tablet 3    blood sugar diagnostic (ONETOUCH VERIO) Strp Check blood glucose readings 3 times a day. 100 strip 11    carvedilol (COREG) 6.25 MG tablet Take 1 tablet (6.25 mg total) by mouth 2 (two) times daily. 180 tablet 3    ciclopirox (PENLAC) 8 % Soln Apply topically nightly. 6.6 mL 11    flash glucose sensor (FREESTYLE ANGELLA SENSOR) Kit 3 each by Misc.(Non-Drug; Combo Route) route every 30 days. 3 kit 11    folic acid (FOLVITE) 400 MCG tablet Take 400 mcg by mouth once daily.      FREESTYLE ANGELLA READER Misc USE EVERY DAY AS DIRECTED FOR TEST BLOOD GLUCOSE 1 each 0    furosemide (LASIX) 40 MG tablet Take 1 tablet (40 mg total) by mouth 2 (two) times daily. 180 tablet 1    GLUCAGON EMERGENCY KIT, HUMAN, 1 mg injection INJECT 1 MG INTO THE MUSCLE AS NEEDED 1 kit 1    insulin aspart U-100 (NOVOLOG) 100 unit/mL InPn pen Inject 7 units w/ meals plus scale 150-200+1, 201-250+2, 251-300+3, 301-350+4. Max daily 35 units, 90 day. 3 Box 3    insulin detemir U-100 (LEVEMIR FLEXPEN) 100 unit/mL (3 mL) SubQ InPn pen Inject 12 Units into  "the skin every evening. 1 Box 3    lacosamide (VIMPAT) 50 mg Tab Take 1 tablet (50 mg total) by mouth every 12 (twelve) hours. Vim pat 50mg in am, 100 mg at night      levetiracetam (KEPPRA) 750 MG Tab Take 2 tablets (1,500 mg total) by mouth 2 (two) times daily. 120 tablet 11    levothyroxine (SYNTHROID) 200 MCG tablet TAKE 1 TABLET BY MOUTH BEFORE BREAKFAST 90 tablet 2    losartan (COZAAR) 100 MG tablet Take 1 tablet (100 mg total) by mouth once daily. 90 tablet 3    magnesium 30 mg Tab Take 1 tablet by mouth twice a week.       multivitamin capsule Take 1 capsule by mouth every Mon, Wed, Fri.       pen needle, diabetic (BD ULTRA-FINE MINI PEN NEEDLE) 31 gauge x 3/16" Ndle To use with insulin pens 4  times daily 360 each 3    tamsulosin (FLOMAX) 0.4 mg Cap Take 1 capsule (0.4 mg total) by mouth every evening. 30 capsule 11    TRULICITY 0.75 mg/0.5 mL PnIj INJECT 0.5ML UNDER THE SKIN EVERY 7 DAYS 4 Syringe 4    vitamin D 1000 units Tab Take 1,000 Units by mouth every Mon, Wed, Fri.      acetaminophen (TYLENOL) 500 MG tablet Take 1 tablet (500 mg total) by mouth every 6 (six) hours as needed for Pain.  0     No current facility-administered medications for this visit.        Past Medical History:   Diagnosis Date    Allergy     Atrial fibrillation     BMI 31.0-31.9,adult 11/25/2014    Cerebrovascular disease 7/25/2016    CHF (congestive heart failure)     CHF (congestive heart failure)     Chronic anticoagulation - pradaxa 11/10/2016    Chronic diastolic heart failure 11/20/2016    CKD (chronic kidney disease), stage III 9/23/2013    Controlled type 2 diabetes with neuropathy 12/16/2014    Coronary artery disease     Diabetes mellitus due to underlying condition with stage 3 chronic kidney disease, without long-term current use of insulin 11/2/2016    Elevated alkaline phosphatase level 8/1/2012    Elevated PSA     negative prostate biopsy 4/11    Erectile dysfunction associated with type 2 " diabetes mellitus     Gallstones 3/20/2013    Generalized tonic-clonic seizure 11/2016    HTN (hypertension), benign 8/1/2012    Hypercholesterolemia 8/1/2012    Microcytic anemia 11/27/2013    Paroxysmal atrial fibrillation 9/23/2013    Postsurgical hypothyroidism 11/27/2013    Right homonymous hemianopsia 2/5/2016    Sickle cell trait     Stroke 1/27/2016    Thyroid cancer     multifocal with six lesions, largest 5mm, treated with surgery and radioactive iodine    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/2016       Past Surgical History:   Procedure Laterality Date    BREAST SURGERY      cyst removal    COLONOSCOPY      COLONOSCOPY N/A 3/3/2015    Performed by CHANDA Wellington MD at [facility] (4TH FLR)    CYST REMOVAL      chest    CYSTOSCOPY WITH RETROGRADE PYELOGRAM Bilateral 8/14/2017    Performed by Anahi Mejia MD at [facility] OR 1ST FLR    PROSTATE BIOPSY  4/27/11    SKIN BIOPSY      THYROID SURGERY  8/26/09    VASCULAR SURGERY         Family History   Problem Relation Age of Onset    Heart disease Father     Diabetes Father     Hypertension Father     Diabetes Mother     Hypertension Mother     Heart disease Mother     Diabetes Brother     Cancer Sister     Thyroid disease Maternal Aunt     Clotting disorder Neg Hx        Review of Systems   Constitutional: Negative for chills, diaphoresis and fever.   HENT: Negative for congestion.    Eyes: Negative for discharge.   Respiratory: Negative for chest tightness and shortness of breath.    Cardiovascular: Negative for chest pain and palpitations.   Gastrointestinal: Negative for nausea and vomiting.   Genitourinary: Positive for frequency and urgency. Negative for decreased urine volume, difficulty urinating, discharge, dysuria, flank pain, hematuria, penile pain, penile swelling, scrotal swelling and testicular pain.        See HPI   Musculoskeletal: Negative for gait problem.   Skin: Negative for rash.    Allergic/Immunologic: Negative for immunocompromised state.   Neurological: Negative for seizures, syncope and headaches.   Hematological: Negative for adenopathy.   Psychiatric/Behavioral: Negative for confusion.         All other systems were reviewed and were negative.    Objective:     Vitals:    09/19/18 1159   BP: 125/76   Pulse: 65        Physical Exam   Nursing note and vitals reviewed.  Constitutional: He is oriented to person, place, and time. He appears well-developed and well-nourished.  Non-toxic appearance. He does not have a sickly appearance. He does not appear ill. No distress.   HENT:   Head: Normocephalic.   Eyes: Right eye exhibits no discharge. Left eye exhibits no discharge.   Neck: Normal range of motion.   Cardiovascular: Normal rate and regular rhythm.    Pulmonary/Chest: Effort normal. No respiratory distress.   Abdominal: Soft. He exhibits no distension. There is no CVA tenderness.   Genitourinary: Prostate is enlarged. Prostate is not tender.       Musculoskeletal: Normal range of motion.   Neurological: He is alert and oriented to person, place, and time.   Skin: Skin is warm and dry.     Psychiatric: He has a normal mood and affect. His behavior is normal. Judgment and thought content normal.         Lab Results   Component Value Date    CREATININE 2.2 (H) 07/31/2018     Lab Results   Component Value Date    EGFRNONAA 30.3 (A) 07/31/2018     Lab Results   Component Value Date    ESTGFRAFRICA 35.0 (A) 07/31/2018     PVR: done in clinic with bladder scanner by Nurse Fitzgerald was 34 ml.    Assessment:       1. Urinary retention    2. BPH with obstruction/lower urinary tract symptoms        Plan:     Diagnoses and all orders for this visit:    Urinary retention  -     POCT Bladder Scan    BPH with obstruction/lower urinary tract symptoms  -     POCT Bladder Scan      -Discussed plan of care with patient  -Reassured patient he is emptying his bladder  -Continue flomax BID.  -PSA in 2-3  months.  -Diet modifications: limit salt intake, reducing PM fluids and avoid bladder irritants.  No caffeine after 3 pm  Elevating his feet couple of hours prior to bedtime. Void before bed.  -RTC 1 year or worsening of symptoms       I spent 25 minutes with the patient of which more than half was spent in coordinating the patient's care as well as in direct consultation with the patient in regards to our treatment and plan.

## 2018-10-05 ENCOUNTER — TELEPHONE (OUTPATIENT)
Dept: NEPHROLOGY | Facility: CLINIC | Age: 63
End: 2018-10-05

## 2018-10-07 ENCOUNTER — NURSE TRIAGE (OUTPATIENT)
Dept: ADMINISTRATIVE | Facility: CLINIC | Age: 63
End: 2018-10-07

## 2018-10-07 PROBLEM — H35.419 LATTICE DEGENERATION OF PERIPHERAL RETINA: Status: ACTIVE | Noted: 2018-10-07

## 2018-10-07 PROBLEM — N30.01 ACUTE CYSTITIS WITH HEMATURIA: Status: RESOLVED | Noted: 2018-06-28 | Resolved: 2018-10-07

## 2018-10-07 PROBLEM — R60.0 BILATERAL LEG EDEMA: Status: RESOLVED | Noted: 2017-02-01 | Resolved: 2018-10-07

## 2018-10-07 PROBLEM — M79.604 LEG PAIN, DIFFUSE, RIGHT: Status: RESOLVED | Noted: 2018-05-31 | Resolved: 2018-10-07

## 2018-10-07 PROBLEM — R31.9 HEMATURIA: Status: RESOLVED | Noted: 2017-08-14 | Resolved: 2018-10-07

## 2018-10-07 PROBLEM — H43.11 VITREOUS HEMORRHAGE OF RIGHT EYE: Status: ACTIVE | Noted: 2018-10-07

## 2018-10-07 PROBLEM — H25.13 NUCLEAR SCLEROTIC CATARACT OF BOTH EYES: Status: ACTIVE | Noted: 2018-10-07

## 2018-10-07 PROBLEM — R00.1 BRADYCARDIA: Status: RESOLVED | Noted: 2017-02-01 | Resolved: 2018-10-07

## 2018-10-07 PROBLEM — G93.40 ENCEPHALOPATHY: Status: RESOLVED | Noted: 2018-03-23 | Resolved: 2018-10-07

## 2018-10-07 PROBLEM — R07.9 CHEST PAIN: Status: RESOLVED | Noted: 2018-06-27 | Resolved: 2018-10-07

## 2018-10-07 PROBLEM — H53.8 BLURRY VISION, RIGHT EYE: Status: ACTIVE | Noted: 2018-10-07

## 2018-10-07 PROBLEM — R79.89 ELEVATED D-DIMER: Status: RESOLVED | Noted: 2018-06-28 | Resolved: 2018-10-07

## 2018-10-07 PROBLEM — H40.003 GLAUCOMA SUSPECT OF BOTH EYES: Status: ACTIVE | Noted: 2018-10-07

## 2018-10-07 PROBLEM — N18.30 CHRONIC KIDNEY DISEASE, STAGE III (MODERATE): Status: RESOLVED | Noted: 2018-06-28 | Resolved: 2018-10-07

## 2018-10-07 PROBLEM — H43.812 POSTERIOR VITREOUS DETACHMENT OF LEFT EYE: Status: ACTIVE | Noted: 2018-10-07

## 2018-10-07 PROBLEM — H31.002 RETINAL SCAR, LEFT: Status: ACTIVE | Noted: 2018-10-07

## 2018-10-07 NOTE — TELEPHONE ENCOUNTER
"    Reason for Disposition   [1] Blurred vision or visual changes AND [2] present now AND [3] sudden onset or new (e.g., minutes, hours, days)  (Exception: previously diagnosed migraine headaches with same symptoms)    Answer Assessment - Initial Assessment Questions  1. DESCRIPTION: "What is the vision loss like? Describe it for me." (e.g., complete vision loss, blurred vision, double vision, floaters, etc.)      Loss of vision  2. LOCATION: "One or both eyes?" If one, ask: "Which eye?"      right  3. SEVERITY: "Can you see anything?" If so, ask: "What can you see?" (e.g., fine print)      fuzzy  4. ONSET: "When did this begin?" "Did it start suddenly or has this been gradual?"      Last night  5. PATTERN: "Does this come and go, or has it been constant since it started?"      constant  6. PAIN: "Is there any pain in your eye(s)?"  (Scale 1-10; or mild, moderate, severe)      no  7. CONTACTS-GLASSES: "Do you wear contacts or glasses?"      no  8. CAUSE: "What do you think is causing this visual problem?"      unsure  9. OTHER SYMPTOMS: "Do you have any other symptoms?" (e.g., headache, arm or leg weakness)      no  10. PREGNANCY: "Is there any chance you are pregnant?" "When was your last menstrual period?"        n/a    Protocols used: ST VISION LOSS OR CHANGE-A-AH      "

## 2018-10-08 ENCOUNTER — OFFICE VISIT (OUTPATIENT)
Dept: OPHTHALMOLOGY | Facility: CLINIC | Age: 63
End: 2018-10-08
Payer: MEDICARE

## 2018-10-08 DIAGNOSIS — H43.11 VITREOUS HEMORRHAGE OF RIGHT EYE: ICD-10-CM

## 2018-10-08 DIAGNOSIS — E11.9 TYPE 2 DIABETES MELLITUS WITHOUT OPHTHALMIC MANIFESTATIONS: ICD-10-CM

## 2018-10-08 DIAGNOSIS — D57.1 SICKLE CELL RETINOPATHY WITHOUT CRISIS: ICD-10-CM

## 2018-10-08 DIAGNOSIS — H25.13 NUCLEAR SCLEROSIS OF BOTH EYES: ICD-10-CM

## 2018-10-08 DIAGNOSIS — H36.89 SICKLE CELL RETINOPATHY WITHOUT CRISIS: ICD-10-CM

## 2018-10-08 DIAGNOSIS — H53.461 RIGHT HOMONYMOUS HEMIANOPSIA: Primary | ICD-10-CM

## 2018-10-08 DIAGNOSIS — H40.003 GLAUCOMA SUSPECT OF BOTH EYES: ICD-10-CM

## 2018-10-08 PROCEDURE — 99999 PR PBB SHADOW E&M-EST. PATIENT-LVL II: CPT | Mod: PBBFAC,,, | Performed by: OPHTHALMOLOGY

## 2018-10-08 PROCEDURE — 67145 PROPH RTA DTCHMNT PC: CPT | Mod: PBBFAC,RT | Performed by: OPHTHALMOLOGY

## 2018-10-08 PROCEDURE — 99499 UNLISTED E&M SERVICE: CPT | Mod: S$PBB,,, | Performed by: OPHTHALMOLOGY

## 2018-10-08 PROCEDURE — 99212 OFFICE O/P EST SF 10 MIN: CPT | Mod: PBBFAC | Performed by: OPHTHALMOLOGY

## 2018-10-08 PROCEDURE — 67145 PROPH RTA DTCHMNT PC: CPT | Mod: S$PBB,RT,, | Performed by: OPHTHALMOLOGY

## 2018-10-08 NOTE — PROGRESS NOTES
HPI     1 day hx of dense floaters and decreased VA OD x 1 day. Seen in ER   yesterday. Slight improvement since then. No pain, flashes. Homonymous   hemianopia right from old CVA. No hx of eye injury or surgery. No   valsalva. +HTN though controlled.     Last edited by Luis M Bose MD on 10/8/2018 10:40 AM. (History)        A/P    1. Vitreous hemorrhage OD    - unclear if hemorrhagic PVD vs PSCR   - Superior preretinal heme with apparent VR traction. Rec barrier laser. Discussed risks/benefits/alternatives to the procedure. All questions regarding the procedure were answered to the patient's satisfactions. Pt elects to proceed with planned procedure.   - Elevate HOB   - Discussed PVD/RT/RD at length  - Follow-up with Dr. He in one week for evaluation, sooner with new or worsening s/s.     2. Sickle cell retinopathy OU   - Self infarcted sea fans in periphery with no tractional membranes   - Discussed need for further evaluation. Consider IVFA after heme resolves.     3. Type 2 DM   - A1c < 6.5   - No retinopathy   - BS/BP/Chol control    RTC 1 week Dilate OU with Dr. FATIMA    Risks, benefits, and alternatives to treatment discussed in detail with the patient.  The patient voiced understanding and wished to proceed with the procedure    Laser Procedure Note  Dx: Hemorrhage with VR traction OD  Laser: Retinopexy OD  Argon  Spot: 200  Power: 150-350  Dur: 0.100  #:  354  Complications: None  F/U as above

## 2018-10-15 ENCOUNTER — TELEPHONE (OUTPATIENT)
Dept: NEPHROLOGY | Facility: CLINIC | Age: 63
End: 2018-10-15

## 2018-10-16 ENCOUNTER — INITIAL CONSULT (OUTPATIENT)
Dept: OPHTHALMOLOGY | Facility: CLINIC | Age: 63
End: 2018-10-16
Payer: MEDICARE

## 2018-10-16 VITALS — HEART RATE: 71 BPM | SYSTOLIC BLOOD PRESSURE: 153 MMHG | DIASTOLIC BLOOD PRESSURE: 77 MMHG

## 2018-10-16 DIAGNOSIS — H43.11 VITREOUS HEMORRHAGE OF RIGHT EYE: ICD-10-CM

## 2018-10-16 DIAGNOSIS — D57.1 SICKLE CELL RETINOPATHY WITHOUT CRISIS: Primary | ICD-10-CM

## 2018-10-16 DIAGNOSIS — H25.13 NUCLEAR SCLEROSIS OF BOTH EYES: ICD-10-CM

## 2018-10-16 DIAGNOSIS — H36.89 SICKLE CELL RETINOPATHY WITHOUT CRISIS: Primary | ICD-10-CM

## 2018-10-16 DIAGNOSIS — H35.033 HYPERTENSIVE RETINOPATHY, BILATERAL: ICD-10-CM

## 2018-10-16 PROCEDURE — 92225 PR SPECIAL EYE EXAM, INITIAL: CPT | Mod: 50,PBBFAC | Performed by: OPHTHALMOLOGY

## 2018-10-16 PROCEDURE — 99999 PR PBB SHADOW E&M-EST. PATIENT-LVL III: CPT | Mod: PBBFAC,,, | Performed by: OPHTHALMOLOGY

## 2018-10-16 PROCEDURE — 99213 OFFICE O/P EST LOW 20 MIN: CPT | Mod: PBBFAC | Performed by: OPHTHALMOLOGY

## 2018-10-16 PROCEDURE — 92014 COMPRE OPH EXAM EST PT 1/>: CPT | Mod: S$PBB,,, | Performed by: OPHTHALMOLOGY

## 2018-10-16 PROCEDURE — 92225 PR SPECIAL EYE EXAM, INITIAL: CPT | Mod: S$PBB,RT,, | Performed by: OPHTHALMOLOGY

## 2018-10-16 NOTE — LETTER
October 16, 2018      Luis M Bose MD  [address]  [facility] [address]           [facility] - Ophthalmology  [address]  [facility] [address]  Phone: [phone]  Fax: [phone]          Patient: Alfa Christy III   MR Number: [id number]   YOB: 1955   Date of Visit: 10/16/2018       Dear Dr. Luis M Bose:    Thank you for referring Alfa Christy to me for evaluation. Attached you will find relevant portions of my assessment and plan of care.    If you have questions, please do not hesitate to call me. I look forward to following Alfa Christy along with you.    Sincerely,    JAYANT White MD    Enclosure  CC:  No Recipients    If you would like to receive this communication electronically, please contact [email or web address] or [phone] to request more information on [employer] access.    For providers and/or their staff who would like to refer a patient to [facility], please contact us through our one-stop-shop provider referral line, [facility], at [phone].    If you feel you have received this communication in error or would no longer like to receive these types of communications, please e-mail [email or web address]

## 2018-10-16 NOTE — PROGRESS NOTES
HPI     Eye Problem      Additional comments: check per Hypes              Comments     DLS 10/8/18 Hypes-  Vision OD still blurry and he is seeing floaters OD. He has a hard time seeing detail with OD    [place] FA - Peripheral NP and sea fans OU      A/P    1. Proliferative SCR OU  Pt with sickle trait  seafans OU    2. VH OD  Some improvement  Will hold on Avastin at this time - slowly improving  If rebleeds then inject  HOB elevated 40    Will need Peripheral PRP OU    3. HTN Ret OU  BP control    4. NS OU    5. AFib  On Eliquiis      1 month no imaging - PRP OD if view

## 2018-10-16 NOTE — PATIENT INSTRUCTIONS
Fluorescein Angiography     A fluorescein angiogram of the retina   Fluorescein angiography is an eye test. It is done to look at the back of your eye, including:  · The blood vessels in your eye  · The layer of tissue at the back of your eye (the retina)  · The center of your retina (the macula)  · The optic nerve  This test can diagnose diseases found in these areas. It can also diagnose other conditions that affect these areas. To do this test, a dye called fluorescein is shot (injected) into your arm. The dye goes into your bloodstream and up into the blood vessels in your eyes. A special camera is then used to take images (angiograms) of your eyes.  Getting ready for your test  Tell your healthcare provider if you:  · Are pregnant or think you may be pregnant  · Are breastfeeding  · Have a history of severe allergic reactions, including to X-ray dye or other medicines  · Have kidney problems  Tell your provider about any medicines you are taking. You may need to stop taking all or some of these before the test. This includes:  · All prescription medicines  · Over-the-counter medicines such as aspirin or ibuprofen  · Street drugs  · Herbs, vitamins, and other supplements  You should arrange for an adult family member or friend to drive you home after your test. Your vision will be blurry for up to 12 hours.  Follow any other instructions from your healthcare provider.  During your test  · You are given eye drops to enlarge (dilate) your pupils.  · You then sit in front of a special camera. You place your chin on the chin rest and look into the camera.  · Images are taken of your eyes, one eye at a time.  · Fluorescein dye is then injected into your arm. The lights in the room are turned off. You may have mild nausea. You may have a warm feeling in your arm or upper body. Tell your provider if your skin feels itchy or if you are having trouble breathing. If so, you could be having an allergic reaction to the  dye.  · More pictures of your eyes are taken over 15 to 30 minutes. The camera shines a bright light into your eyes. Try to keep your head still and your eyes open.  · When enough images have been taken, the test is over.  After your test  Your vision will be blurry for up to 4 to 12 hours. This is because of your dilated pupils. Your eye will be more sensitive to light for up to 12 hours. You may want to wear sunglasses during this time. Do not drive if your vision is very blurry. You may also find it uncomfortable to read. Your skin may look yellow for a few hours. This is from the dye. Your urine will be bright yellow or orange for 24 to 48 hours after the test.     Risks and possible complications  All procedures have some risks. Possible risks of fluorescein angiography include:  · Upset stomach (nausea) and vomiting  · Leaking dye around the injection site that causes pain and swelling  · Metallic taste in your mouth  · Infection at injection site  · Allergic reaction to the dye  · Dry mouth or too much saliva  · Faster heart rate  · Sweating  · Lower back pain   Date Last Reviewed: 5/30/2015  © 8026-8044 [employer]. [address] [place] [address], [place] [address]. All rights reserved. This information is not intended as a substitute for professional medical care. Always follow your healthcare professional's instructions.

## 2018-10-30 ENCOUNTER — IMMUNIZATION (OUTPATIENT)
Dept: PHARMACY | Facility: CLINIC | Age: 63
End: 2018-10-30
Payer: MEDICARE

## 2018-11-15 ENCOUNTER — OFFICE VISIT (OUTPATIENT)
Dept: ENDOCRINOLOGY | Facility: CLINIC | Age: 63
End: 2018-11-15
Payer: MEDICARE

## 2018-11-15 VITALS
DIASTOLIC BLOOD PRESSURE: 82 MMHG | SYSTOLIC BLOOD PRESSURE: 126 MMHG | BODY MASS INDEX: 32.03 KG/M2 | HEIGHT: 70 IN | WEIGHT: 223.75 LBS | RESPIRATION RATE: 16 BRPM

## 2018-11-15 DIAGNOSIS — Z79.4 TYPE 2 DIABETES MELLITUS WITH STAGE 3 CHRONIC KIDNEY DISEASE, WITH LONG-TERM CURRENT USE OF INSULIN: Primary | ICD-10-CM

## 2018-11-15 DIAGNOSIS — I48.0 PAROXYSMAL ATRIAL FIBRILLATION: Chronic | ICD-10-CM

## 2018-11-15 DIAGNOSIS — E78.00 HYPERCHOLESTEROLEMIA: ICD-10-CM

## 2018-11-15 DIAGNOSIS — N18.30 TYPE 2 DIABETES MELLITUS WITH STAGE 3 CHRONIC KIDNEY DISEASE, WITH LONG-TERM CURRENT USE OF INSULIN: Primary | ICD-10-CM

## 2018-11-15 DIAGNOSIS — Z85.850 HISTORY OF THYROID CANCER: ICD-10-CM

## 2018-11-15 DIAGNOSIS — E89.0 POSTSURGICAL HYPOTHYROIDISM: ICD-10-CM

## 2018-11-15 DIAGNOSIS — E11.22 TYPE 2 DIABETES MELLITUS WITH STAGE 3 CHRONIC KIDNEY DISEASE, WITH LONG-TERM CURRENT USE OF INSULIN: Primary | ICD-10-CM

## 2018-11-15 DIAGNOSIS — I10 ESSENTIAL HYPERTENSION: ICD-10-CM

## 2018-11-15 PROCEDURE — 99214 OFFICE O/P EST MOD 30 MIN: CPT | Mod: S$GLB,,, | Performed by: INTERNAL MEDICINE

## 2018-11-15 PROCEDURE — 99999 PR PBB SHADOW E&M-EST. PATIENT-LVL III: CPT | Mod: PBBFAC,,, | Performed by: INTERNAL MEDICINE

## 2018-11-15 PROCEDURE — 3074F SYST BP LT 130 MM HG: CPT | Mod: CPTII,S$GLB,, | Performed by: INTERNAL MEDICINE

## 2018-11-15 PROCEDURE — 3079F DIAST BP 80-89 MM HG: CPT | Mod: CPTII,S$GLB,, | Performed by: INTERNAL MEDICINE

## 2018-11-15 PROCEDURE — 3044F HG A1C LEVEL LT 7.0%: CPT | Mod: CPTII,S$GLB,, | Performed by: INTERNAL MEDICINE

## 2018-11-15 PROCEDURE — 3008F BODY MASS INDEX DOCD: CPT | Mod: CPTII,S$GLB,, | Performed by: INTERNAL MEDICINE

## 2018-11-15 PROCEDURE — 1101F PT FALLS ASSESS-DOCD LE1/YR: CPT | Mod: CPTII,S$GLB,, | Performed by: INTERNAL MEDICINE

## 2018-11-15 NOTE — PROGRESS NOTES
CC: Patient is here for management of Type 2 diabetes and review of medical conditions as listed in visit diagnosis.      HPI: Mr. Alfa Christy III is a 63 y.o. Black or  male who was diagnosed with Type 2 DM in ~1990s. He was originally on orals, but started insulin in 2010. He was seen in ER for hyperglycemia in ~2010. (+) FH of DM in multiple family members. Has a history of CVA with diminished peripheral vision and also thyroid cancer, which was treated with surgery and radioactive iodine. Has sickle cell and last crisis in 2016.     Previously followed by other endocrine providers   He is being seen by me for the first time today     Current DM Regimen:   Novolog 5 units tid ac meals   Levemir 12 units at bedtime   Trulicity 0.75 mg weekly ( taking on Thursdays )     Taking prandial injections with meals? Yes   Uses pens or vials? Pens     Currently denies polyuria, polydipsia, unexplained weight loss or blurred vision     Reports testing BG once daily every morning   Arrives without meter or logs   Reported averages as follows: 115-140's     Hypoglycemia: reports ~2 x month   Symptoms: confusion   Treatment: regular soda   Attributes to skipping meals     24 hour dietary recall:   Breakfast: cereal   Lunch: salmon with crackers   Dinner: meatballs and spaghetti, green beans, okra   Snacks: chocolate mints, banana, plum, peaches   Beverages: water     Known complications: CKD   Follows with Nephrology: with last office visit in 01/2018 with Dr. Gold     Diabetes education: 03/2018   Exercise: no formal routine but patient tries to stay active     Diabetes Management Status  Statin: Taking  ACE/ARB: Taking  Screening or Prevention Patient's value Goal Complete/Controlled?   HgA1C Testing and Control   Lab Results   Component Value Date    HGBA1C 5.4 07/31/2018      Annually/Less than 8% Yes   Lipid profile : 07/31/2018 Annually Yes   LDL control Lab Results   Component Value Date    LDLCALC  52.4 (L) 07/31/2018    Annually/Less than 100 mg/dl  Yes   Nephropathy screening Lab Results   Component Value Date    LABMICR 232.0 01/08/2016     Lab Results   Component Value Date    PROTEINUA 1+ (A) 08/01/2018    Annually Yes   Blood pressure BP Readings from Last 1 Encounters:   11/15/18 126/82    Less than 140/90 No   Dilated retinal exam : 10/16/2018 Annually Yes   Foot exam   : 08/30/2018 Annually Yes       Also with h/o 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 There is some remaining tissue on the left.   A biopsy of this was done 11/03/2011, demonstrating an insufficient material to make a diagnosis     Current regimen: Levothyroxine 200 mcg   Taking appropriately. Every morning with water on an empty stomach 30-45 minutes before eating or taking other medications     Last TG from 05/2018:   Results for ROME LR III (MRN [id number]) as of 11/15/2018 11:31   Ref. Range 5/7/2018 10:13   Thyroglobulin Antibody Screen Latest Ref Range: <4.0 IU/mL <1.8   Thyroglobulin, Tumor Marker Latest Units: ng/mL <0.1       An iodine scan in 2011 showed some remaining uptake.     Last Neck ultrasound study from 03/2014:   THYROID GLAND has been surgically removed.    Sonographic examination of neck compartments II through V was carried out.    Benign appearing lymph nodes seen bilaterally.  One rounded lymph node in  the inferior left compartment IV (at the level of the clavicle). It measures 1.07 x 0.73 x 0.80 cm. It is not vascular, but no normal hilum seen.      Impression        Status post thyroidectomy.  Indeterminate lymph node in the left lower compartment IV.  Clinical correlation is recommended.     Lab Results   Component Value Date    TSH 2.446 10/07/2018       Vital Signs  /82 (BP Location: Right arm, Patient Position: Sitting, BP Method: Medium  "(Manual))   Resp 16   Ht 5' 10" (1.778 m)   Wt 101.5 kg (223 lb 12.3 oz)   BMI 32.11 kg/m²      Review of Systems   Constitutional: Negative for malaise/fatigue and weight loss.   HENT: Negative for hearing loss.    Eyes: Negative for blurred vision.   Respiratory: Negative for cough and sputum production.    Cardiovascular: Negative for chest pain, palpitations and leg swelling.   Gastrointestinal: Negative for abdominal pain, constipation, diarrhea, heartburn, nausea and vomiting.   Genitourinary: Negative for frequency.   Musculoskeletal: Negative for falls.   Skin: Negative for rash.   Neurological: Negative for weakness and headaches.   Endo/Heme/Allergies: Negative for polydipsia.   Psychiatric/Behavioral: The patient is not nervous/anxious.      Physical Exam   Constitutional: He is oriented to person, place, and time. He appears well-developed and well-nourished. No distress.   Neck:   No palpable thyroid tissue    Cardiovascular: Normal rate. An irregular rhythm present.   Pulmonary/Chest: Effort normal.   Abdominal: Soft. He exhibits no mass. There is no tenderness.   Musculoskeletal: He exhibits no edema.   Lymphadenopathy:     He has no cervical adenopathy.   Neurological: He is alert and oriented to person, place, and time. He displays normal reflexes.   Skin: Skin is warm and dry.   Injection sites are normal appearing. No lipo hypertrophy or atrophy    Psychiatric: He has a normal mood and affect.   Nursing note and vitals reviewed.    LABS:   Results for ROME LR III (MRN [id number]) as of 11/15/2018 11:31   Ref. Range 10/7/2018 06:32   Sodium Latest Ref Range: 136 - 145 mmol/L 138   Potassium Latest Ref Range: 3.5 - 5.1 mmol/L 4.5   Chloride Latest Ref Range: 95 - 110 mmol/L 108   CO2 Latest Ref Range: 23 - 29 mmol/L 22 (L)   Anion Gap Latest Ref Range: 8 - 16 mmol/L 8   BUN, Bld Latest Ref Range: 8 - 23 mg/dL 26 (H)   Creatinine Latest Ref Range: 0.5 - 1.4 mg/dL 1.8 (H)   eGFR if non African " American Latest Ref Range: >60 mL/min/1.73 m^2 38.6 (A)   eGFR if African American Latest Ref Range: >60 mL/min/1.73 m^2 44.7 (A)   Glucose Latest Ref Range: 70 - 110 mg/dL 165 (H)   Calcium Latest Ref Range: 8.7 - 10.5 mg/dL 8.8   Phosphorus Latest Ref Range: 2.7 - 4.5 mg/dL 4.4   Magnesium Latest Ref Range: 1.6 - 2.6 mg/dL 2.5   Alkaline Phosphatase Latest Ref Range: 55 - 135 U/L 135   Total Protein Latest Ref Range: 6.0 - 8.4 g/dL 7.7   Albumin Latest Ref Range: 3.5 - 5.2 g/dL 3.7   Total Bilirubin Latest Ref Range: 0.1 - 1.0 mg/dL 1.1 (H)   AST Latest Ref Range: 10 - 40 U/L 25   ALT Latest Ref Range: 10 - 44 U/L 17     Results for ROME LR III (MRN [id number]) as of 11/15/2018 11:31   Ref. Range 7/31/2018 08:03   Cholesterol Latest Ref Range: 120 - 199 mg/dL 102 (L)   HDL Latest Ref Range: 40 - 75 mg/dL 35 (L)   HDL/Chol Ratio Latest Ref Range: 20.0 - 50.0 % 34.3   LDL Cholesterol Latest Ref Range: 63.0 - 159.0 mg/dL 52.4 (L)   Non-HDL Cholesterol Latest Units: mg/dL 67   Total Cholesterol/HDL Ratio Latest Ref Range: 2.0 - 5.0  2.9   Triglycerides Latest Ref Range: 30 - 150 mg/dL 73     Results for ROME LR III (MRN [id number]) as of 11/15/2018 11:31   Ref. Range 10/7/2018 06:32   TSH Latest Ref Range: 0.400 - 4.000 uIU/mL 2.446     Results for ROME LR III (MRN [id number]) as of 11/15/2018 11:31   Ref. Range 7/31/2018 08:03   Hemoglobin A1C Latest Ref Range: 4.0 - 5.6 % 5.4   Estimated Avg Glucose Latest Ref Range: 68 - 131 mg/dL 108     Results for ROME LR III (MRN [id number]) as of 11/15/2018 11:31   Ref. Range 10/7/2018 06:32   WBC Latest Ref Range: 3.90 - 12.70 K/uL 9.82   RBC Latest Ref Range: 4.60 - 6.20 M/uL 4.32 (L)   Hemoglobin Latest Ref Range: 14.0 - 18.0 g/dL 11.8 (L)   Hematocrit Latest Ref Range: 40.0 - 54.0 % 33.4 (L)   MCV Latest Ref Range: 82 - 98 fL 77 (L)   MCH Latest Ref Range: 27.0 - 31.0 pg 27.3   MCHC Latest Ref Range: 32.0 - 36.0 g/dL 35.3   RDW Latest Ref Range: 11.5 - 14.5 %  18.0 (H)   Platelets Latest Ref Range: 150 - 350 K/uL 232     Assessment/Plan  1. Type 2 diabetes mellitus with stage 3 chronic kidney disease, with long-term current use of insulin  Hemoglobin A1c    Basic metabolic panel   2. History of thyroid cancer  TSH    Thyroglobulin    US Soft Tissue Head Neck Thyroid   3. Postsurgical hypothyroidism  TSH    Thyroglobulin   4. Paroxysmal atrial fibrillation     5. Essential hypertension     6. Hypercholesterolemia         1. T2DM with stage 3 CKD with long term current use of insulin   -- discussed the goal of therapy is to obtain the best control we can without hypoglycemia   -- reviewed BG and A1c goals   -- recommend Fructosamine with next set of labs as anemia can skew A1c results   -- reviewed signs and symptoms of hypoglycemia with the appropriate treatment protocol. Instructed on precautions before driving   -- instructed to smbg 4 x daily  -- advised to bring meter or logs to every office visit for review   -- notify office if BG <80 or >180  -- continue dietary and lifestyle modifications   -- recommend periodic follow ups for routine eye, foot and dental care   -- discussed decreasing insulin doses but pt declined. Stated he would like to continue the current regimen. Advised to contact office if he experiences frequent hypoglycemia     2. History of thyroid cancer   -- treated with surgery and Radioactive iodine   -- most recent TG is undetectable   -- pt is considered low risk for recurrence   -- recommend TSH goal <2.5  -- yearly TG levels   -- neck ultrasound for surveillance     3. Postsurgical hypothyroidism   -- see #2   -- for now, continue current regimen with levothyroxine   -- reinforced to take LT4 as prescribed. On an empty stomach with water ideally an hour before eating or taking other medications   -- avoid exogenous hyperthyroidism as this can accelerate bone loss and increase risk of CV complications     4. Afib   -- followed and managed by  Cardiology     5. HTN   -- BP at goal   -- followed and managed by Nephrology     6. HLD   -- on statin per ADA recommendations     The patient is instructed to notify the office with BG concerns or questions     FOLLOW UP  Follow-up in about 6 months (around 5/15/2019).     Orders Placed This Encounter   Procedures    US Soft Tissue Head Neck Thyroid     Standing Status:   Future     Standing Expiration Date:   11/15/2019     Order Specific Question:   May the Radiologist modify the order per protocol to meet the clinical needs of the patient?     Answer:   Yes    TSH     Standing Status:   Future     Standing Expiration Date:   11/10/2019    Thyroglobulin     Standing Status:   Future     Standing Expiration Date:   1/14/2020    Hemoglobin A1c     Standing Status:   Future     Standing Expiration Date:   1/14/2020    Basic metabolic panel     Standing Status:   Future     Standing Expiration Date:   12/20/2019

## 2018-11-15 NOTE — LETTER
November 15, 2018      Azra Galarza, APRN, FNP  [address] [facility] [address]           [facility] - Endocrinology  [address]  [place] [address]  Phone: [phone]          Patient: Alfa Christy III   MR Number: [id number]   YOB: 1955   Date of Visit: 11/15/2018       Dear Azra Galarza:    Thank you for referring Alfa Christy to me for evaluation. Attached you will find relevant portions of my assessment and plan of care.    If you have questions, please do not hesitate to call me. I look forward to following Alfa Christy along with you.    Sincerely,    Kiara Rivera, NP    Enclosure  CC:  No Recipients    If you would like to receive this communication electronically, please contact [email or web address] or [phone] to request more information on [facility] access.    For providers and/or their staff who would like to refer a patient to [facility], please contact us through our one-stop-shop provider referral line, Jose Romero, at [phone].    If you feel you have received this communication in error or would no longer like to receive these types of communications, please e-mail [email or web address]

## 2018-11-27 ENCOUNTER — TELEPHONE (OUTPATIENT)
Dept: NEUROLOGY | Facility: CLINIC | Age: 63
End: 2018-11-27

## 2018-11-28 ENCOUNTER — HOSPITAL ENCOUNTER (EMERGENCY)
Facility: OTHER | Age: 63
Discharge: HOME | End: 2018-11-29
Attending: EMERGENCY MEDICINE
Payer: MEDICARE

## 2018-11-28 DIAGNOSIS — G40.209 COMPLEX PARTIAL SEIZURE WITH IMPAIRMENT OF CONSCIOUSNESS: ICD-10-CM

## 2018-11-28 DIAGNOSIS — R40.4 ALTERED CONSCIOUSNESS: Primary | ICD-10-CM

## 2018-11-28 DIAGNOSIS — N28.9 ACUTE ON CHRONIC RENAL INSUFFICIENCY: ICD-10-CM

## 2018-11-28 DIAGNOSIS — N18.9 ACUTE ON CHRONIC RENAL INSUFFICIENCY: ICD-10-CM

## 2018-11-28 LAB
ANION GAP SERPL CALC-SCNC: 6 MMOL/L
ANISOCYTOSIS BLD QL SMEAR: SLIGHT
BASOPHILS # BLD AUTO: 0.07 K/UL
BASOPHILS NFR BLD: 0.7 %
BUN SERPL-MCNC: 27 MG/DL
CALCIUM SERPL-MCNC: 8.7 MG/DL
CHLORIDE SERPL-SCNC: 109 MMOL/L
CO2 SERPL-SCNC: 25 MMOL/L
CREAT SERPL-MCNC: 2.1 MG/DL (ref 0.5–1.4)
CREAT SERPL-MCNC: 2.2 MG/DL
DIFFERENTIAL METHOD: ABNORMAL
EOSINOPHIL # BLD AUTO: 0.4 K/UL
EOSINOPHIL NFR BLD: 4.4 %
ERYTHROCYTE [DISTWIDTH] IN BLOOD BY AUTOMATED COUNT: 17.6 %
EST. GFR  (AFRICAN AMERICAN): 35 ML/MIN/1.73 M^2
EST. GFR  (NON AFRICAN AMERICAN): 30 ML/MIN/1.73 M^2
GLUCOSE SERPL-MCNC: 217 MG/DL
HCT VFR BLD AUTO: 32.2 %
HGB BLD-MCNC: 11.3 G/DL
LYMPHOCYTES # BLD AUTO: 2.5 K/UL
LYMPHOCYTES NFR BLD: 25.9 %
MAGNESIUM SERPL-MCNC: 2.3 MG/DL
MCH RBC QN AUTO: 27.5 PG
MCHC RBC AUTO-ENTMCNC: 35.1 G/DL
MCV RBC AUTO: 78 FL
MONOCYTES # BLD AUTO: 1.6 K/UL
MONOCYTES NFR BLD: 16.9 %
NEUTROPHILS # BLD AUTO: 5 K/UL
NEUTROPHILS NFR BLD: 52.1 %
PLATELET # BLD AUTO: 244 K/UL
PLATELET BLD QL SMEAR: ABNORMAL
PMV BLD AUTO: 10.9 FL
POC PTINR: 1.1 (ref 0.9–1.2)
POC PTWBT: 12.9 SEC (ref 9.7–14.3)
POCT GLUCOSE: 208 MG/DL (ref 70–110)
POCT GLUCOSE: 226 MG/DL (ref 70–110)
POIKILOCYTOSIS BLD QL SMEAR: SLIGHT
POLYCHROMASIA BLD QL SMEAR: ABNORMAL
POTASSIUM SERPL-SCNC: 4.2 MMOL/L
RBC # BLD AUTO: 4.11 M/UL
SAMPLE: ABNORMAL
SAMPLE: NORMAL
SODIUM SERPL-SCNC: 140 MMOL/L
TARGETS BLD QL SMEAR: ABNORMAL
WBC # BLD AUTO: 9.61 K/UL
WBC TOXIC VACUOLES BLD QL SMEAR: PRESENT

## 2018-11-28 PROCEDURE — 80048 BASIC METABOLIC PNL TOTAL CA: CPT

## 2018-11-28 PROCEDURE — 80177 DRUG SCRN QUAN LEVETIRACETAM: CPT

## 2018-11-28 PROCEDURE — 99285 EMERGENCY DEPT VISIT HI MDM: CPT | Mod: 25

## 2018-11-28 PROCEDURE — 82565 ASSAY OF CREATININE: CPT

## 2018-11-28 PROCEDURE — 83735 ASSAY OF MAGNESIUM: CPT

## 2018-11-28 PROCEDURE — 82962 GLUCOSE BLOOD TEST: CPT

## 2018-11-28 PROCEDURE — 85610 PROTHROMBIN TIME: CPT

## 2018-11-28 PROCEDURE — 63600175 PHARM REV CODE 636 W HCPCS: Performed by: EMERGENCY MEDICINE

## 2018-11-28 PROCEDURE — 99900035 HC TECH TIME PER 15 MIN (STAT)

## 2018-11-28 PROCEDURE — 96374 THER/PROPH/DIAG INJ IV PUSH: CPT

## 2018-11-28 PROCEDURE — 80299 QUANTITATIVE ASSAY DRUG: CPT

## 2018-11-28 PROCEDURE — 85025 COMPLETE CBC W/AUTO DIFF WBC: CPT

## 2018-11-28 RX ADMIN — LORAZEPAM 1 MG: 2 INJECTION INTRAMUSCULAR; INTRAVENOUS at 10:11

## 2018-11-29 VITALS
OXYGEN SATURATION: 98 % | DIASTOLIC BLOOD PRESSURE: 78 MMHG | RESPIRATION RATE: 17 BRPM | SYSTOLIC BLOOD PRESSURE: 156 MMHG | BODY MASS INDEX: 32.21 KG/M2 | HEART RATE: 70 BPM | WEIGHT: 225 LBS | HEIGHT: 70 IN

## 2018-11-29 LAB
BACTERIA #/AREA URNS HPF: NORMAL /HPF
BILIRUB UR QL STRIP: NEGATIVE
CLARITY UR: CLEAR
COLOR UR: YELLOW
GLUCOSE UR QL STRIP: NEGATIVE
HGB UR QL STRIP: ABNORMAL
HYALINE CASTS #/AREA URNS LPF: 0 /LPF
KETONES UR QL STRIP: NEGATIVE
LEUKOCYTE ESTERASE UR QL STRIP: NEGATIVE
MICROSCOPIC COMMENT: NORMAL
NITRITE UR QL STRIP: NEGATIVE
PH UR STRIP: 6 [PH] (ref 5–8)
PROT UR QL STRIP: ABNORMAL
RBC #/AREA URNS HPF: 2 /HPF (ref 0–4)
SP GR UR STRIP: 1.01 (ref 1–1.03)
URN SPEC COLLECT METH UR: ABNORMAL
UROBILINOGEN UR STRIP-ACNC: NEGATIVE EU/DL
WBC #/AREA URNS HPF: 2 /HPF (ref 0–5)

## 2018-11-29 PROCEDURE — 81000 URINALYSIS NONAUTO W/SCOPE: CPT

## 2018-11-29 NOTE — ED NOTES
REASSESSMENT  Remains alert, otherwise no change; attempts to speak but cannot, can say one sylable unintelligable word/sound; able to move both arms,   strength equal on both sides;

## 2018-11-29 NOTE — ED NOTES
REASSESSMENT:  Remains alert, attempting to speak/respond;  Is now able to follow commands, squeeze hand/let go on command;

## 2018-11-29 NOTE — ED PROVIDER NOTES
Encounter Date: 11/28/2018    SCRIBE #1 NOTE: I, Pradeep Quintero, am scribing for, and in the presence of, Dr. Nielsen .       History     Chief Complaint   Patient presents with    Altered Mental Status     per wife patient started having confusion, and aphasia 90 min pta.      Time seen by provider: 9:55 PM    This is a 63 y.o. male, with history of HTN, CHF, CAD, and CVA, who presents with concern for altered mental status. Per nurse, patient's wife states he became confused 90 minutes prior to arrival. Per nurse, he reportedly began experiencing difficulty speaking in the ED. Per wife, he experienced similar symptoms with past episodes of seizures. Per wife, he has a history of CVA. Per nurse, he is able to follow commands, he was able to get out of the wheelchair into the ED bed, but he does not verbalize.     Per patient's wife, they were sitting on the sofa watching TV, he asked for water and his medications, but he would not tell her what was wrong. Per wife, he has a history of aphasia. She notes his last two episodes lasted for approximately two hours and one day and was attributed to seizures. He reportedly was able to verbalize on the way to the hospital. The wife reports tonight's episode is consistent with previous episodes of confusion and aphasia. She notes he has chronic peripheral vision loss in the left eye s/p CVA. Per wife, he has been experiencing a cough.         The history is provided by the spouse. The history is limited by the condition of the patient (altered mental status).     Review of patient's allergies indicates:   Allergen Reactions    Cough syrup [guaifenesin] Other (See Comments)     Past Medical History:   Diagnosis Date    Allergy     BMI 31.0-31.9,adult 11/25/2014    CHF (congestive heart failure)     Chronic anticoagulation - pradaxa 11/10/2016    Chronic diastolic heart failure 11/20/2016    CKD (chronic kidney disease), stage III 9/23/2013    Controlled type 2 diabetes  with neuropathy 12/16/2014    Coronary artery disease     Diabetes mellitus due to underlying condition with stage 3 chronic kidney disease, without long-term current use of insulin 11/2/2016    Elevated alkaline phosphatase level 8/1/2012    Elevated PSA     negative prostate biopsy 4/11    Erectile dysfunction associated with type 2 diabetes mellitus     Gallstones 3/20/2013    Generalized tonic-clonic seizure 11/2016    HTN (hypertension), benign 8/1/2012    Hypercholesterolemia 8/1/2012    Microcytic anemia 11/27/2013    Paroxysmal atrial fibrillation 9/23/2013    Postsurgical hypothyroidism 11/27/2013    Right homonymous hemianopsia 2/5/2016    Sickle cell trait     Stroke 1/27/2016    Thyroid cancer     multifocal with six lesions, largest 5mm, treated with surgery and radioactive iodine     Past Surgical History:   Procedure Laterality Date    BREAST SURGERY      cyst removal    COLONOSCOPY      COLONOSCOPY N/A 3/3/2015    Performed by CHANDA Wellington MD at [facility] ENDO (4TH FLR)    CYST REMOVAL      chest    CYSTOSCOPY WITH RETROGRADE PYELOGRAM Bilateral 8/14/2017    Performed by Anahi Mejia MD at [facility] OR 1ST FLR    PROSTATE BIOPSY  4/27/11    SKIN BIOPSY      THYROID SURGERY  8/26/09    VASCULAR SURGERY       Family History   Problem Relation Age of Onset    Heart disease Father     Diabetes Father     Hypertension Father     Diabetes Mother     Hypertension Mother     Heart disease Mother     Diabetes Brother     Cancer Sister     Thyroid disease Maternal Aunt     Clotting disorder Neg Hx      Social History     Tobacco Use    Smoking status: Never Smoker    Smokeless tobacco: Never Used   Substance Use Topics    Alcohol use: Yes     Comment: occasionally, none recently    Drug use: No     Review of Systems   Unable to perform ROS: Mental status change       Physical Exam     Initial Vitals [11/28/18 2147]   BP Pulse Resp Temp SpO2   (!) 189/91 60 16 -- 99 %       MAP       --         Physical Exam    Nursing note and vitals reviewed.  Constitutional: He appears well-developed and well-nourished. No distress.   HENT:   Head: Normocephalic and atraumatic.   Eyes: Conjunctivae and EOM are normal.   Eyes slightly deviated to right. Pupils are equal and reactive.    Neck: Normal range of motion. Neck supple.   Cardiovascular: Normal rate, regular rhythm, normal heart sounds and intact distal pulses.   Pulmonary/Chest: Breath sounds normal. No respiratory distress.   Abdominal: Soft. He exhibits no distension. There is no tenderness.   Musculoskeletal: Normal range of motion. He exhibits no edema.   Neurological: He is alert. No cranial nerve deficit or sensory deficit. GCS score is 15. GCS eye subscore is 4. GCS verbal subscore is 5. GCS motor subscore is 6.   Bilateral  strength 5/5 (able to sit himself up in bed unassisted). 5/5 bilateral lower extremity strength. Unable to perform further tasks.    Skin: Skin is warm and dry.         ED Course   Procedures  Labs Reviewed   CBC W/ AUTO DIFFERENTIAL - Abnormal; Notable for the following components:       Result Value    RBC 4.11 (*)     Hemoglobin 11.3 (*)     Hematocrit 32.2 (*)     MCV 78 (*)     RDW 17.6 (*)     Mono # 1.6 (*)     Mono% 16.9 (*)     All other components within normal limits   BASIC METABOLIC PANEL - Abnormal; Notable for the following components:    Glucose 217 (*)     BUN, Bld 27 (*)     Creatinine 2.2 (*)     Anion Gap 6 (*)     eGFR if  35 (*)     eGFR if non  30 (*)     All other components within normal limits   URINALYSIS, REFLEX TO URINE CULTURE - Abnormal; Notable for the following components:    Protein, UA 2+ (*)     Occult Blood UA 1+ (*)     All other components within normal limits    Narrative:     Preferred Collection Type->Urine, Clean Catch   POCT GLUCOSE - Abnormal; Notable for the following components:    POCT Glucose 226 (*)     All other components  within normal limits   ISTAT CREATININE - Abnormal; Notable for the following components:    POC Creatinine 2.1 (*)     All other components within normal limits   POCT GLUCOSE - Abnormal; Notable for the following components:    POCT Glucose 208 (*)     All other components within normal limits   MAGNESIUM   URINALYSIS MICROSCOPIC    Narrative:     Preferred Collection Type->Urine, Clean Catch   LACOSAMIDE (VIMPAT)   LEVETIRACETAM  (KEPPRA) LEVEL   ISTAT PROCEDURE     EKG Readings: (Independently Interpreted)   Initial Reading: No STEMI.   Atrial fibrillation at a rate of 66. No ST changes.        Imaging Results          CT Head Without Contrast (Final result)  Result time 11/28/18 22:20:39    Final result by Rajiv Dalton MD (11/28/18 22:20:39)                 Impression:      No acute intracranial process.  MRI of the brain may be obtained for further evaluation.    Stable appearance of encephalomalacia in the left PCA territory.      Electronically signed by: Rajiv Dalton MD  Date:    11/28/2018  Time:    22:20             Narrative:    EXAMINATION:  CT HEAD WITHOUT CONTRAST    CLINICAL HISTORY:  Confusion/delirium, altered LOC, unexplained;    TECHNIQUE:  Low dose axial images were obtained through the head.  Coronal and sagittal reformations were also performed. Contrast was not administered.    COMPARISON:  MRI of the brain dated 10/07/2018.    FINDINGS:  The subcutaneous tissues are unremarkable.  The bony calvarium is intact.  The paranasal sinuses are unremarkable.  The mastoid air cells are clear.  The orbits and intraorbital contents are within normal limits.    The craniocervical junction is unremarkable.  The midline structures are unremarkable.  There are no extra-axial fluid collections.  There is no evidence of intracranial hemorrhage.  The ventricles and sulci are prominent, suggestive of cerebral volume loss.  There is stable appearance of an old infarct involving the left PCA territory in the  left occipital and left thalamus.  There are scattered hypodense foci within the periventricular and subcortical white matter.  The gray-white differentiation is maintained.  There is no evidence of mass effect.                                 Medical Decision Making:   Clinical Tests:   Lab Tests: Ordered and Reviewed  Radiological Study: Ordered and Reviewed  Medical Tests: Ordered and Reviewed    Additional MDM:   Comments: 63-year-old male with multiple comorbidities presented with his wife for evaluation.  In triage the patient was aphasic, therefore, a code stroke was called.  Per the triage nurse, the patient was able to smile and transfer from the wheelchair onto the bed once placed in the room.  At the time of my assessment the patient's room he was unable to follow commands and remained nonverbal only making a slight grunting noise.  Initially his wife had left to move his car and was not present to provide any additional history.  She subsequently returned and was able to state he has had 3 similar episodes in the past which were associated with his seizures.  Upon review of his medical records he has been diagnosed with complex partial seizures which have evolved to generalized tonic-clonic seizures.  CT of the head showed no evidence of acute process.  Stroke Neurology was consulted but they were unable to examine the patient secondary to technical difficulties with the computer at home.  Based on presentation in history it was felt by Dr. Vega of that his symptoms were likely secondary to his seizure.  This was supported by the patient's gradual improvement throughout his emergency department stay.  He is currently alert and able to follow commands.  He was able to perform finger-to-nose, heel-to-shin and cranial nerves and strength could be assessed and normal. He is slightly groggy but I do believe this is secondary to the Ativan he received upon arrival.  We will observe the patient further but  likely will be able to discharge home.    Of note, labs are significant only for a worsening creatinine from 1.8-2.2.    2:16 AM  At this time the patient is able to answer all questions appropriately.  He was able to ambulate down the lanza without requiring any assistance.  Results of his lab work have been discussed with him as well as the results of the CT.  Have instructed both him and his wife to have ensure that he follows up with his neurologist for further management of his seizures..          Scribe Attestation:   Scribe #1: I performed the above scribed service and the documentation accurately describes the services I performed. I attest to the accuracy of the note.    Attending Attestation:           Physician Attestation for Scribe:  Physician Attestation Statement for Scribe #1: I, Dr. Nielsen, reviewed documentation, as scribed by Pradeep Quintero  in my presence, and it is both accurate and complete.                    Clinical Impression:     1. Altered consciousness    2. Complex partial seizure with impairment of consciousness    3. Acute on chronic renal insufficiency                                   Elke Nielsen MD  11/29/18 0216

## 2018-11-29 NOTE — ED NOTES
"REASSESSMENT    Pt alert, states he feels "much better";   Pt has received Ativan, his speech is slurred, he cannot recall the month or year, states Jovanna is president; when asked his name he did not respond; able to flex and extend his feet on command; drifts off while thinking about answers to questions;     Wife remains at bedside;   "

## 2018-11-29 NOTE — ED TRIAGE NOTES
Pt was at home, not feeling well; would not elaborate more, wife gave him his night time meds, brought him to the hospital, states by the time she got him here, he could not speak, could not say his name, understood questions, but could not form words;     Hx of stroke in Jan 2016 that effected right peripheral vision

## 2018-11-29 NOTE — ED NOTES
NURSING ASSESSMENT  Pt alert, does not respond verbally or follow commands to squeeze hands or follow my hand with his eyes;  Airway patent, no drooling, no stridor, swallowing secretions; resp even and unlabored;  Strong radial pulse, cap refill < 2 sec; skin warm and dry  PATTERSON;  Suspect pt is post ictal, poss stroke

## 2018-11-30 LAB — LEVETIRACETAM SERPL-MCNC: 69 UG/ML (ref 3–60)

## 2018-12-02 LAB — LACOSAMIDE: 2.9 MCG/ML

## 2018-12-03 ENCOUNTER — TELEPHONE (OUTPATIENT)
Dept: NEUROLOGY | Facility: CLINIC | Age: 63
End: 2018-12-03

## 2018-12-03 NOTE — TELEPHONE ENCOUNTER
----- Message from Tyler Palma sent at 12/3/2018 10:37 AM CST -----  Contact: Rut ( spouse ) @ [phone]  Caller requesting a return call about pt being seen before 12-10th due to a recent hospital visit, pls call to advise

## 2018-12-06 ENCOUNTER — TELEPHONE (OUTPATIENT)
Dept: NEPHROLOGY | Facility: CLINIC | Age: 63
End: 2018-12-06

## 2018-12-06 NOTE — TELEPHONE ENCOUNTER
----- Message from Sunita Cao sent at 12/6/2018 10:18 AM CST -----  Contact: Rut Christy / Wife / tel:    [phone]   Pt. Needs a f/u w/ Dr. Moraes .   Needs a f/u .   Would like to come in any time from 10am - 3pm.  / (no access)

## 2018-12-09 NOTE — PROGRESS NOTES
[facility]:   FOLLOW UP VISIT    Name: Rome Christy III  MRN:[id number]   CSN: [id number]    Date of service: 12/10/18  Last clinic visit: 8/20/18  Priorclinic visit: 5/15/18  Prior clinic visit: 11/15/17  1st clinic visit: 12/27/16    Age:63 y.o.   Gender:male     The patient is here today with his wife  History obtained from the patient     CHIEF COMPLAINT:  - follow up of seizures    INTERVAL HISTORY (Since last visit):    This is a 63 y.o.  year old male who presents with a chief complaint of seizures, who was last seen by me on 8/20/18    - no hx of non-compliance with AEDs  - however, levels low at the time of ED  - no other identifiable cause for breakthrough sz    AED levels when stable without seizures:  Results for ROME CHRISTY III (MRN [id number]) as of 12/9/2018 15:35   Ref. Range 8/20/2018 11:00   Lacosamide Latest Ref Range: 1.0 - 10.0 mcg/mL 5.2   Levetiracetam Lvl Latest Ref Range: 3.0 - 60.0 ug/mL 78.5 (H)     AED levels at the time of ED visit with seizure:  Results for ROME CHRISTY III (MRN [id number]) as of 12/9/2018 15:35   Ref. Range 11/28/2018 22:21   Lacosamide Latest Ref Range: 1.0 - 10.0 mcg/mL 2.9   Levetiracetam Lvl Latest Ref Range: 3.0 - 60.0 ug/mL 69.0 (H)     Notes from ED visit on 11/28/18:  63-year-old male with multiple comorbidities presented with his wife for evaluation.  In triage the patient was aphasic, therefore, a code stroke was called.  Per the triage nurse, the patient was able to smile and transfer from the wheelchair onto the bed once placed in the room.  At the time of my assessment the patient's room he was unable to follow commands and remained nonverbal only making a slight grunting noise.  Initially his wife had left to move his car and was not present to provide any additional history.  She subsequently returned and was able to state he has had 3 similar episodes in the past which were associated with his seizures.  Upon review of his medical records he has been  diagnosed with complex partial seizures which have evolved to generalized tonic-clonic seizures.  CT of the head showed no evidence of acute process.  Stroke Neurology was consulted but they were unable to examine the patient secondary to technical difficulties with the computer at home.  Based on presentation in history it was felt by Dr. Vega of that his symptoms were likely secondary to his seizure.  This was supported by the patient's gradual improvement throughout his emergency department stay.  He is currently alert and able to follow commands.  He was able to perform finger-to-nose, heel-to-shin and cranial nerves and strength could be assessed and normal. He is slightly groggy but I do believe this is secondary to the Ativan he received upon arrival.  We will observe the patient further but likely will be able to discharge home.     Of note, labs are significant only for a worsening creatinine from 1.8-2.2.     2:16 AM  At this time the patient is able to answer all questions appropriately.  He was able to ambulate down the lanza without requiring any assistance.  Results of his lab work have been discussed with him as well as the results of the CT.  Have instructed both him and his wife to have ensure that he follows up with his neurologist for further management of his seizures..    Notes from last clinic visit, 8/20/18:  - no hx of seizures since last visit; last sz was on 4/30/18  - compliant with AEDs: LCS 100mg bid and LEV 1500mg bid  - no new complaints; doing well    Notes from last clinic visit, 5/15/18:  - started on LAC 100mg bid 4/30/18  - also on LEV 1500mg bid  - no further episodes since 4/30  - no other/new complaints; doing well    Admitted during 4/28-30/18; notes from :  HPI:   63y/o M with pmhx stroke, seizure, afib (on eliquis), DM2, HLD, HTN presents w/ aphasia. Patient was last normal at 10:30 pm.  Per his wife he was doing well then started complaining of feeling dizzy after  taking a shower. Denied fever, pains, vomiting.  She noticed he then started having trouble speaking and say words that did not make sense, so came to ED.  Wife denies any B/B incontinence, unilateral weakness, syncope, or tongue-biting.  Patient had similar presentation end of March; at that time TPA was deferred and subsequent neuroimaging negative for CVA. He does have history of seizures on keppra, and his seizures are atypical. Says wasn't having seizures until recently. Patient has had a previous stroke 1/2016 w/ residual L vision deficits only. EEG then abnormal w/ 1/18 CVA as likely foci for seizures. Currently on eliquis for a-fib. Further hisotry limited by patient's mental status.       In ED, [facility] neurology consulted for concern of stroke d/t aphasia.  Sxs likely 2/2 postictal period of complex seizure.   (baseline 100s-200s). CXR shows CM with findings suggestive of pulmonary edema.  Gvien one time dose of lasix 80 mg IV.  EKG shows afib HR 69.  CTH with no significant change from previous. Remote left occipital lobe and left thalamic infarctions.       Hospital Course:   Pt admitted due to aphasia.  Similar presentation/admissions 11/2017 and 03/2018 (medication noncompliance an issue).  Vascular neurology consulted in ED.  CTH no acute changes.  No acute stroke suspected.  Keppra level ordered.  EEG ordered: abnormal extended (1 hour and 29 minutes) EEG due to rare left sided epileptiform activity seen, suggestive of underlying epileptiform activity; continuous left sided slowing seen, suggestive of underlying structural lesion.  Neurology consulted and recommended slow titration on lacosamide--50 mg qd x 1 week, increase lacosamide to 100 mg for 2nd week if pt tolerates.  Continue levetiracetam 1500 mg bid, level 69.4.  Follow up with Dr. Santiago in 2 weeks.  Pt also with acute CHF exacerbation (->395).  Pt treated with IV lasix bid and wt decreased 2 kg.  Day 3-4 transitioned back to  "home dosing furosemide 40 mg bid.  Seizure precautions at discharge.      ED visit, 3/23/18: Notes from HM:  HPI:   Mr. Christy is a 61 yo male with a PMHx of HTN, HLD, DM2, PAF on Eliquis, HFpEF, CKDIII, thyroid cancer s/p surgery and radiation (2009), previous L sided CVA (2016) resulting in R homonymous hemianopsia, and complex partial seizures presenting to the ED with wife at bedside c/o confusion and difficulty speaking. Wife at bedside to provide the history. She states this morning at ~8am patient was getting dressed and was c/o minor dizziness. She noted he had not taken his keppra from the previous night, in addition to all of his "night time medications." Pt was able to take all morning medications. At around 10am, pt was sitting down to "go over his finances and pay bills", she noted something was "off". She asked him his name, which he could provide for her but he could not remember the word for "shoes" when she asked him what he was wearing on his feet. She did not witness convulsions, jerking movements, LOC, tongue biting, or incontinence. Wife states patient was able to ambulate and continue to go about "normal" activities. Wife brought patient to the ED due to concern for aphasia. Of note, wife reports pt is non-compliant with hydralazine and ASA. Per wife, this was a similar presentation to his last admission for asphasia suspected to be secondary to seizures.      In the ED, HDS; noted to be hypertensive to 190s/90s. Stroke code was called; CTH and MRI without evidence of acute infarct. Vascular neurology assessed; low suspicion for acute CVA. Remainder of labs and imaging were unremarkable.     Hospital Course:   Pt admitted to observation for aphasia in setting of hx of CVA and seizures. CTH and MRI negative for acute infarct. Labs entirely unremarkable (TSH and lactate WNL). No s/s infection; afebrile without leukocytosis (CXR and UA without infection). Neurology consulted and suspects " breakthrough seizure with prolonged post-ictus. EEG shows slowing consistent with hx of L sided CVA; no epileptic activity. Pt discharged home to follow up with epilepsy as outpatient. Educated on importance of compliance with home medications.     A/P at last visit:  63yo M with hx of CVA in 1/16 and seizures x 11/16 - prior episodes c/w glucose level alterations  Recent episode suggestive of sz - likely related to hyperglycemia     - check Levetiracetam levels  - continue same dose of LEV for now     Monitor glucose levels closely  Return in 6 months or earlier prn    Notes from last clinic visit, 11/15/17:  No hx of non-compliance    Recent admission: patient states that he had gone to exercise that day, followed by having donuts but did not miss any dose of meds.  No other triggers.  Possibly seizure? But no clinical activity seen by family except for word-finding difficulty/confusion.  Last recognized seizure was possibly Apr 2017    [place] [facility] [place]  Neurology notes, 10/5/17[place]  [facility] [place] due to wife coming home and finding  confused with agitation, word-finding difficulty, and imbalance.  Noted no weakness.  States that he was last seen normal at 7 pm, she went out to run an errand and returned at 8 pm.  Otherwise notes no signs of head trauma, no recent weakness, no medication changes.  He takes levetiracetam 750 mg po bid (started on 1500 mg po bid right after 11/2016 seizure) and is compliant.  Recommendation:  Breakthrough seizure in setting of recent respiratory infection and financial stressors, wife endorses compliance and patient not yet back to baseline.  Recommend load 1g LEV iv followed by increase to 1500mg bid.  R-EEG, 10/7/17:    IMPRESSION:  This is an abnormal EEG during wakefulness, drowsiness and sleep.    Nearly continuous irregular left hemisphere slowing was noted.  The EKG revealed an irregular rhythm.    Hospital course (per Hospital Medicine notes,  10/11/17): in the ED on presentation, the patient was in keeping of abnormally elevated blood pressure in setting of acute onset aphasia. Workup for an acute stroke included a CT head with no signs of acute stroke. MRI and MRA brain were negative for acute stroke. Vascular neurology was consulted and ruled out stroke and recommended admission to hospital medicine with improved BP control and a consult to general neurology. During admission to hospital medicine, the patient notably developed right sided jerking movement consistent with seizure. The patient was loaded with IV keppra 1500mg followed by oral twice daily dosing per neurology. The patient was observed on the hospital medicine for aphasia resolution that was gradual but incomplete per patient's wife. The patient had an EEG given personality changes and frequent self-repetition that was negative. PT/OT evaluated the patient and recommended SNF placement that occurred appropriately on 10/11/2017.    Any other relevant history since last visit:   - none    SEIZURE HISTORY:  Notes from last (12/27/16) visit:   Information from H&P on 11/05/16:  60yo M with multiple medical co-morbidities, presented to an outside facility on 11/05/16 with left-sided HA and dizziness, associated with expressive aphasia. [place] [facility] [place] had a GTC seizure with right-sided eye deviation, lasting ~ 4 min. Blood glucose was 200 at the time and seizure resolved without any intervention. No hx of associated tongue bite or b/b incontinence. Reports post-ictal confusion but no focal deficits.   CTA head and neck at the time showed no acute ischemic changes.  Patient also has a hx of prior CVA (01/16) with residual right eye field deficit.  Patient was evaluated by Neurology service while in hospital - advised Levetiracetam 1000mg  q 12h  INTERVAL HISTORY:  since discharge from hospital, no hx of any seizures - compliant with Levetiracteam     Previous evaluation:    EE) R-EEG, 2016:   IMPRESSION: Normal awake and asleep EEG.  2) R-EEG, 16  IMPRESSION:   This is an abnormal awake and asleep EEG due to mild generalized slowing seen, suggestive of mild diffuse or multifocal cerebral dysfunction.  No epileptiform activity or electrographic seizures were seen    MRI Brain:  Results for orders placed or performed during the hospital encounter of 10/04/17   MRI Brain Without Contrast    Addendum: 10/5/2017          Electronically signed by: DELL VITALE MD  Date:     10/05/17  Time:    01:19       Narrative    MRI BRAIN, MRA HEAD, MRA NECK    CLINICAL INDICATION: 62 year old M with stroke, severe expressive aphasia, LLE weakness.     TECHNIQUE: Multiplanar, multisequence images were obtained of the brain. Three-dimensional time-of-flight technique was used in assessment of the neck and intracranial vasculature.    COMPARISON: CT head 10/4/2017.    FINDINGS:    MRI BRAIN:  The ventricles are normal in size for age, without evidence of hydrocephalus.  There is suggestion of subtle diffuse cortical diffusion restriction in the supratentorial brain.    There is a stable region of encephalomalacia within the left occipital lobe. There is relatively isointense diffusion signal in this region with corresponding hyperintensity on ADC map and T2/FLAIR hyperintensity, compatible with remote infarct. No areas of restricted diffusion signal are seen to suggest new/acute infarct. No areas of susceptibility to suggest hemorrhage. A punctate focus of T1 hyperintensity in the left aspect of the anterior interhemispheric fissure likely represents a dural calcification. Supratentorial patchy T2/FLAIR hyperintensities compatible with chronic microvascular ischemic disease.    No extra-axial blood or fluid collections.    The T2 skull base flow voids are preserved. Bone marrow signal intensity is unremarkable.    MRA HEAD AND NECK:   Common and internal carotid arteries are normal in  caliber.  No significant stenosis at either carotid bifurcation. There is a small saccular aneurysm with a 0.4 cm neck arising from the supraclinoid right ICA.    Vertebral arteries are normal in caliber. The vertebrobasilar system appears within normal limits.     The ACAs, MCAs and PCAs demonstrate no evidence of  high-grade stenosis, focal occlusion or intracranial aneurysm.    Impression    No evidence of acute or major vascular distribution infarct or intracranial hemorrhage.    Questionable subtle cortical diffusion restriction throughout the supratentorial brain.  Short-term followup is suggested.    Remote left PCA distribution infarct within the left occipital lobe.    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  ___________________________________________________  This report has been flagged in the [facility] medical record.  ______________________________________     Electronically signed by resident: EFREM PENALOZA MD  Date:     10/05/17  Time:    00:24            As the supervising and teaching physician, I personally reviewed the images and resident's interpretation and I agree with the findings.          Electronically signed by: DELL VITALE MD  Date:     10/05/17  Time:    01:13    MRA Brain without contrast    Narrative    MRI BRAIN, MRA HEAD, MRA NECK    CLINICAL INDICATION: 62 year old M with stroke, severe expressive aphasia, LLE weakness.     TECHNIQUE: Multiplanar, multisequence images were obtained of the brain. Three-dimensional time-of-flight technique was used in assessment of the neck and intracranial vasculature.    COMPARISON: CT head 10/4/2017.    FINDINGS:    MRI BRAIN:  The ventricles are normal in size for age, without evidence of hydrocephalus.  There is suggestion of subtle diffuse cortical diffusion restriction in the supratentorial brain.    There is a stable region of encephalomalacia within the left occipital lobe. There is relatively isointense diffusion signal in this region with  corresponding hyperintensity on ADC map and T2/FLAIR hyperintensity, compatible with remote infarct. No areas of restricted diffusion signal are seen to suggest new/acute infarct. No areas of susceptibility to suggest hemorrhage. A punctate focus of T1 hyperintensity in the left aspect of the anterior interhemispheric fissure likely represents a dural calcification. Supratentorial patchy T2/FLAIR hyperintensities compatible with chronic microvascular ischemic disease.    No extra-axial blood or fluid collections.    The T2 skull base flow voids are preserved. Bone marrow signal intensity is unremarkable.    MRA HEAD AND NECK:   Common and internal carotid arteries are normal in caliber.  No significant stenosis at either carotid bifurcation. There is a small saccular aneurysm with a 0.4 cm neck arising from the supraclinoid right ICA.    Vertebral arteries are normal in caliber. The vertebrobasilar system appears within normal limits.     The ACAs, MCAs and PCAs demonstrate no evidence of  high-grade stenosis, focal occlusion or intracranial aneurysm.    Impression    No evidence of acute or major vascular distribution infarct or intracranial hemorrhage.    Questionable subtle cortical diffusion restriction throughout the supratentorial brain.  Short-term followup is suggested.    Remote left PCA distribution infarct within the left occipital lobe.    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  ___________________________________________________  This report has been flagged in the Epic medical record  ______________________________________     Electronically signed by resident: EFREM PENALOZA MD  Date:     10/05/17  Time:    00:24            As the supervising and teaching physician, I personally reviewed the images and resident's interpretation and I agree with the findings.          Electronically signed by: DELL VITALE MD  Date:     10/05/17  Time:    01:13         Electronically signed by: DELL VITALE MD  Date:      10/05/17  Time:    01:19    CT Head Without Contrast    Narrative    CT HEAD WITHOUT CONTRAST    CLINICAL INDICATION: 62 year old M with possible stroke.    TECHNIQUE: Axial CT images obtained throughout the region of the head without the use of intravenous contrast. Axial, sagittal and coronal reconstructions were performed.    COMPARISON: MRI brain 11/6/2016, CT stroke multiphase 11/5/2016, CT head 11/5/2016.    FINDINGS:  The ventricles are stable. The brain appears unchanged.  The basal cisterns are patent.  There is a stable region of encephalomalacia within the left occipital lobe, compatible with remote left PCA infarct. No evidence of acute major vascular distribution infarct or intracranial hemorrhage. Patchy hypoattenuation within the supratentorial white matter is compatible with chronic microvascular ischemic change.     No new extra-axial blood or fluid collections.    The cranium appears intact.  There is nonspecific calcifications in the subcutaneous tissues of the head.     Mastoid air cells and paranasal sinuses are essentially clear.  The orbits and intraorbital contents are unremarkable.    Impression       No evidence of acute major vascular distribution infarct or intracranial hemorrhage.     Stable, remote infarct in the left occipital lobe.    Chronic microvascular ischemic disease.        ______________________________________     Electronically signed by resident: EFREM PENALOZA MD  Date:     10/04/17  Time:    21:26            As the supervising and teaching physician, I personally reviewed the images and resident's interpretation and I agree with the findings.          Electronically signed by: DELL VITALE MD  Date:     10/04/17  Time:    22:05    Results for orders placed or performed during the hospital encounter of 11/05/16   MRI Brain W WO Contrast    Narrative    Comparison: CT 11/5/2016, MRI 6/11/2016    Clinical history: Seizure    Technique:    Multiplanar multi-sequence MRI  images of the brain were obtained pre-and post the administration of IV Gadavist contrast.        Findings:    Examination is limited secondary to patient motion, most significantly involving the flair axial and flair coronal sequences, as well as the postcontrast sequences.    There is encephalomalacia within the left PCA territory consistent with remote infarction.  Minimal postcontrast enhancement is noted within this region.  There may be a few punctate foci of T2/flair signal abnormality within the periventricular white matter, may reflect chronic microvascular ischemic change or represent artifact as there is extensive motion on the FLAIR axial images.  Additionally, there is high flair signal material involving the basal cisterns about the brainstem, not confirmed on other pulse sequences, suggesting that this is artifactual related to poor CSF signal saturation rather than proteinaceous or hemorrhagic material.  There is no evidence of intracranial mass, or acute infarction.  The ventricular system is within normal limits of size for age and shows no evidence of hydrocephalus.  There are no significant extra-axial or extracranial abnormalities detected.  The bilateral mesial temporal lobes are symmetric without abnormal signal, sclerosis, or enhancement.    The globes, orbits, pituitary gland, pineal gland and craniocervical junction are normal in configuration. The major vascular flow voids are patent.  Please see CTA performed earlier today for details of the intracranial vasculature.    Impression    Motion limited examination.    On the flair images, high attenuating material about the brainstem/basal cisterns is felt to represent artifact given extensive patient motion likely the result of poor saturation of the CSF signal in the region.  Of note, there is no associated enhancement or meningeal enhancement to suggest that this reflects proteinaceous material of infectious nature.  Blood products felt  less likely.  If this finding would correlate with current patient symptomatology, consider either repeating the flair image once patient is better able to tolerate the examination, or correlate this finding with lumbar puncture.    No evidence of acute infarct.  Remote PCA territory infarct with mild enhancement.    No abnormal temporal lobe enhancement or focal focus to suggest seizure nidus.            Electronically signed by: NERY LO MD  Date:     11/06/16  Time:    01:08       Results for orders placed or performed during the hospital encounter of 10/04/17   MRI Brain Without Contrast    Addendum: 10/5/2017          Electronically signed by: DELL VITALE MD  Date:     10/05/17  Time:    01:19       Narrative    MRI BRAIN, MRA HEAD, MRA NECK    CLINICAL INDICATION: 62 year old M with stroke, severe expressive aphasia, LLE weakness.     TECHNIQUE: Multiplanar, multisequence images were obtained of the brain. Three-dimensional time-of-flight technique was used in assessment of the neck and intracranial vasculature.    COMPARISON: CT head 10/4/2017.    FINDINGS:    MRI BRAIN:  The ventricles are normal in size for age, without evidence of hydrocephalus.  There is suggestion of subtle diffuse cortical diffusion restriction in the supratentorial brain.    There is a stable region of encephalomalacia within the left occipital lobe. There is relatively isointense diffusion signal in this region with corresponding hyperintensity on ADC map and T2/FLAIR hyperintensity, compatible with remote infarct. No areas of restricted diffusion signal are seen to suggest new/acute infarct. No areas of susceptibility to suggest hemorrhage. A punctate focus of T1 hyperintensity in the left aspect of the anterior interhemispheric fissure likely represents a dural calcification. Supratentorial patchy T2/FLAIR hyperintensities compatible with chronic microvascular ischemic disease.    No extra-axial blood or fluid  collections.    The T2 skull base flow voids are preserved. Bone marrow signal intensity is unremarkable.    MRA HEAD AND NECK:   Common and internal carotid arteries are normal in caliber.  No significant stenosis at either carotid bifurcation. There is a small saccular aneurysm with a 0.4 cm neck arising from the supraclinoid right ICA.    Vertebral arteries are normal in caliber. The vertebrobasilar system appears within normal limits.     The ACAs, MCAs and PCAs demonstrate no evidence of  high-grade stenosis, focal occlusion or intracranial aneurysm.    Impression    No evidence of acute or major vascular distribution infarct or intracranial hemorrhage.    Questionable subtle cortical diffusion restriction throughout the supratentorial brain.  Short-term followup is suggested.    Remote left PCA distribution infarct within the left occipital lobe.    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  ___________________________________________________  This report has been flagged in the [facility] medical record.  ______________________________________     Electronically signed by resident: EFREM PENALOZA MD  Date:     10/05/17  Time:    00:24            As the supervising and teaching physician, I personally reviewed the images and resident's interpretation and I agree with the findings.          Electronically signed by: DELL VITALE MD  Date:     10/05/17  Time:    01:13    MRA Brain without contrast    Narrative    MRI BRAIN, MRA HEAD, MRA NECK    CLINICAL INDICATION: 62 year old M with stroke, severe expressive aphasia, LLE weakness.     TECHNIQUE: Multiplanar, multisequence images were obtained of the brain. Three-dimensional time-of-flight technique was used in assessment of the neck and intracranial vasculature.    COMPARISON: CT head 10/4/2017.    FINDINGS:    MRI BRAIN:  The ventricles are normal in size for age, without evidence of hydrocephalus.  There is suggestion of subtle diffuse cortical diffusion  restriction in the supratentorial brain.    There is a stable region of encephalomalacia within the left occipital lobe. There is relatively isointense diffusion signal in this region with corresponding hyperintensity on ADC map and T2/FLAIR hyperintensity, compatible with remote infarct. No areas of restricted diffusion signal are seen to suggest new/acute infarct. No areas of susceptibility to suggest hemorrhage. A punctate focus of T1 hyperintensity in the left aspect of the anterior interhemispheric fissure likely represents a dural calcification. Supratentorial patchy T2/FLAIR hyperintensities compatible with chronic microvascular ischemic disease.    No extra-axial blood or fluid collections.    The T2 skull base flow voids are preserved. Bone marrow signal intensity is unremarkable.    MRA HEAD AND NECK:   Common and internal carotid arteries are normal in caliber.  No significant stenosis at either carotid bifurcation. There is a small saccular aneurysm with a 0.4 cm neck arising from the supraclinoid right ICA.    Vertebral arteries are normal in caliber. The vertebrobasilar system appears within normal limits.     The ACAs, MCAs and PCAs demonstrate no evidence of  high-grade stenosis, focal occlusion or intracranial aneurysm.    Impression    No evidence of acute or major vascular distribution infarct or intracranial hemorrhage.    Questionable subtle cortical diffusion restriction throughout the supratentorial brain.  Short-term followup is suggested.    Remote left PCA distribution infarct within the left occipital lobe.    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  ___________________________________________________  This report has been flagged in the [facility] medical record.  ______________________________________     Electronically signed by resident: EFREM PENALOZA MD  Date:     10/05/17  Time:    00:24            As the supervising and teaching physician, I personally reviewed the images and  resident's interpretation and I agree with the findings.          Electronically signed by: DELL VITALE MD  Date:     10/05/17  Time:    01:13         Electronically signed by: DELL VITALE MD  Date:     10/05/17  Time:    01:19    CT Head Without Contrast    Narrative    CT HEAD WITHOUT CONTRAST    CLINICAL INDICATION: 62 year old M with possible stroke.    TECHNIQUE: Axial CT images obtained throughout the region of the head without the use of intravenous contrast. Axial, sagittal and coronal reconstructions were performed.    COMPARISON: MRI brain 11/6/2016, CT stroke multiphase 11/5/2016, CT head 11/5/2016.    FINDINGS:  The ventricles are stable. The brain appears unchanged.  The basal cisterns are patent.  There is a stable region of encephalomalacia within the left occipital lobe, compatible with remote left PCA infarct. No evidence of acute major vascular distribution infarct or intracranial hemorrhage. Patchy hypoattenuation within the supratentorial white matter is compatible with chronic microvascular ischemic change.     No new extra-axial blood or fluid collections.    The cranium appears intact.  There is nonspecific calcifications in the subcutaneous tissues of the head.     Mastoid air cells and paranasal sinuses are essentially clear.  The orbits and intraorbital contents are unremarkable.    Impression       No evidence of acute major vascular distribution infarct or intracranial hemorrhage.     Stable, remote infarct in the left occipital lobe.    Chronic microvascular ischemic disease.        ______________________________________     Electronically signed by resident: EFREM PENALOZA MD  Date:     10/04/17  Time:    21:26            As the supervising and teaching physician, I personally reviewed the images and resident's interpretation and I agree with the findings.          Electronically signed by: DELL VITALE MD  Date:     10/04/17  Time:    22:05    Results for orders placed or performed during  the hospital encounter of 11/05/16   MRI Brain W WO Contrast    Narrative    Comparison: CT 11/5/2016, MRI 6/11/2016    Clinical history: Seizure    Technique:    Multiplanar multi-sequence MRI images of the brain were obtained pre-and post the administration of IV Gadavist contrast.        Findings:    Examination is limited secondary to patient motion, most significantly involving the flair axial and flair coronal sequences, as well as the postcontrast sequences.    There is encephalomalacia within the left PCA territory consistent with remote infarction.  Minimal postcontrast enhancement is noted within this region.  There may be a few punctate foci of T2/flair signal abnormality within the periventricular white matter, may reflect chronic microvascular ischemic change or represent artifact as there is extensive motion on the FLAIR axial images.  Additionally, there is high flair signal material involving the basal cisterns about the brainstem, not confirmed on other pulse sequences, suggesting that this is artifactual related to poor CSF signal saturation rather than proteinaceous or hemorrhagic material.  There is no evidence of intracranial mass, or acute infarction.  The ventricular system is within normal limits of size for age and shows no evidence of hydrocephalus.  There are no significant extra-axial or extracranial abnormalities detected.  The bilateral mesial temporal lobes are symmetric without abnormal signal, sclerosis, or enhancement.    The globes, orbits, pituitary gland, pineal gland and craniocervical junction are normal in configuration. The major vascular flow voids are patent.  Please see CTA performed earlier today for details of the intracranial vasculature.    Impression    Motion limited examination.    On the flair images, high attenuating material about the brainstem/basal cisterns is felt to represent artifact given extensive patient motion likely the result of poor saturation of the  CSF signal in the region.  Of note, there is no associated enhancement or meningeal enhancement to suggest that this reflects proteinaceous material of infectious nature.  Blood products felt less likely.  If this finding would correlate with current patient symptomatology, consider either repeating the flair image once patient is better able to tolerate the examination, or correlate this finding with lumbar puncture.    No evidence of acute infarct.  Remote PCA territory infarct with mild enhancement.    No abnormal temporal lobe enhancement or focal focus to suggest seizure nidus.            Electronically signed by: NERY LO MD  Date:     11/06/16  Time:    01:08       Additional tests:  1)CT Scan: as noted  2) EEG\Video Monitoring: no  3) PET Scan: no  4) Neuropsychological evaluation: no  5) DEXA Scan: no  6) Others: no     RISK FACTORS FOR SEIZURES:    1. Head Trauma:  No    2. CNS Infections:  No  3. CNS Tumors: No     4. CNS Vascular Disease: hx of prior CVA  5. Febrile Seizures: No    6. Developmental Delay: No       7. Family History of Seizures: No    8. Birth history: unremarkable     Pregnancy/Labor/Delivery: n/a    CURRENT MEDICATIONS:   Current Outpatient Medications   Medication Sig Dispense Refill    acetaminophen (TYLENOL) 500 MG tablet Take 1 tablet (500 mg total) by mouth every 6 (six) hours as needed for Pain.  0    amLODIPine (NORVASC) 10 MG tablet Take 1 tablet (10 mg total) by mouth once daily. 90 tablet 3    apixaban 5 mg Tab Take 1 tablet (5 mg total) by mouth 2 (two) times daily. 180 tablet 3    aspirin 81 MG Chew Take 1 tablet (81 mg total) by mouth once daily.  0    atorvastatin (LIPITOR) 40 MG tablet Take 1 tablet (40 mg total) by mouth once daily. 90 tablet 3    blood sugar diagnostic (ONETOUCH VERIO) Strp Check blood glucose readings 3 times a day. 100 strip 11    carvedilol (COREG) 6.25 MG tablet Take 1 tablet (6.25 mg total) by mouth 2 (two) times daily. 180 tablet 3  "   ciclopirox (PENLAC) 8 % Soln Apply topically nightly. 6.6 mL 11    folic acid (FOLVITE) 400 MCG tablet Take 400 mcg by mouth once daily.      furosemide (LASIX) 40 MG tablet Take 1 tablet (40 mg total) by mouth 2 (two) times daily. 180 tablet 1    GLUCAGON EMERGENCY KIT, HUMAN, 1 mg injection INJECT 1 MG INTO THE MUSCLE AS NEEDED 1 kit 1    influenza (FLUZONE HIGH-DOSE) 180 mcg/0.5 mL vaccine Inject into the muscle. 0.5 mL 0    insulin aspart U-100 (NOVOLOG) 100 unit/mL InPn pen Inject 7 units w/ meals plus scale 150-200+1, 201-250+2, 251-300+3, 301-350+4. Max daily 35 units, 90 day. 3 Box 3    insulin detemir U-100 (LEVEMIR FLEXPEN) 100 unit/mL (3 mL) SubQ InPn pen Inject 12 Units into the skin every evening. 1 Box 3    lacosamide (VIMPAT) 50 mg Tab Take 1 tablet (50 mg total) by mouth every 12 (twelve) hours. Vim pat 50mg in am, 100 mg at night      levetiracetam (KEPPRA) 750 MG Tab Take 2 tablets (1,500 mg total) by mouth 2 (two) times daily. 120 tablet 11    levothyroxine (SYNTHROID) 200 MCG tablet TAKE 1 TABLET BY MOUTH BEFORE BREAKFAST 90 tablet 2    losartan (COZAAR) 100 MG tablet Take 1 tablet (100 mg total) by mouth once daily. 90 tablet 3    magnesium 30 mg Tab Take 1 tablet by mouth twice a week.       multivitamin capsule Take 1 capsule by mouth every Mon, Wed, Fri.       pen needle, diabetic (BD ULTRA-FINE MINI PEN NEEDLE) 31 gauge x 3/16" Ndle To use with insulin pens 4  times daily 360 each 3    SAW PALMETTO ORAL Take by mouth.      tamsulosin (FLOMAX) 0.4 mg Cap Take 1 capsule (0.4 mg total) by mouth every evening. 30 capsule 11    TRULICITY 0.75 mg/0.5 mL PnIj INJECT 0.5ML UNDER THE SKIN EVERY 7 DAYS 4 Syringe 4    vitamin D 1000 units Tab Take 1,000 Units by mouth every Mon, Wed, Fri.       No current facility-administered medications for this visit.         CURRENT ANTI EPILEPTIC MEDICATIONS:   - Levetiracetam 1500mg bid    VAGAL NERVE STIMULATOR: n/a     PRIOR ANTICONVULSANT " HISTORY:  none      PAST MEDICAL HISTORY:   Active Ambulatory Problems     Diagnosis Date Noted    Hypercholesterolemia 08/01/2012    Sickle cell trait 08/01/2012    Erectile dysfunction associated with type 2 diabetes mellitus 01/29/2013    Gallstones 03/20/2013    Paroxysmal atrial fibrillation 09/23/2013    Postsurgical hypothyroidism 11/27/2013    Microcytic anemia 11/27/2013    Obesity (BMI 30.0-34.9) 11/25/2014    Right homonymous hemianopsia 02/05/2016    History of CVA (cerebrovascular accident) 07/25/2016    Type 2 diabetes mellitus with stage 3 chronic kidney disease, with long-term current use of insulin 11/02/2016    Generalized tonic-clonic seizure     Chronic anticoagulation - pradaxa 11/10/2016    Chronic diastolic heart failure 11/20/2016    History of thyroid cancer 01/13/2017    Type 2 diabetes mellitus with hyperglycemia, with long-term current use of insulin 06/28/2017    Secondary hyperparathyroidism 06/29/2017    Elevated PSA 07/05/2017    Essential hypertension 08/30/2017    DM type 2 without retinopathy 08/30/2017    Nuclear sclerosis of both eyes 08/30/2017    Refractive error 08/30/2017    Prostate enlargement 09/14/2017    Complex partial seizures evolving to generalized tonic-clonic seizures 11/15/2017    Hypertensive CKD (chronic kidney disease) 01/31/2018    Stroke     Encounter for medication titration 05/22/2018    Vitreous hemorrhage of right eye 10/07/2018    Glaucoma suspect of both eyes 10/07/2018    Sickle cell retinopathy without crisis 10/08/2018    Type 2 diabetes mellitus without ophthalmic manifestations 10/08/2018    Hypertensive retinopathy, bilateral 10/16/2018     Resolved Ambulatory Problems     Diagnosis Date Noted    HTN (hypertension), benign 08/01/2012    Elevated alkaline phosphatase level 08/01/2012    Flank pain 03/20/2013    Syncope 11/26/2013    Leukocytosis 11/27/2013    Acute on chronic renal failure 11/27/2013     Controlled type 2 diabetes with neuropathy 12/16/2014    Screening 03/03/2015    Screening 03/03/2015    Visual field loss following stroke 01/28/2016    Headache 01/28/2016    Fall 06/10/2016    Proteinuria 11/02/2016    Aphasia     Bradycardia 02/01/2017    Bilateral leg edema 02/01/2017    Hematuria 08/14/2017    Abdominal pain 10/05/2017    Simple partial seizure, consciousness not impaired 10/05/2017    Altered mental status 10/05/2017    Encephalopathy 03/23/2018    Altered mental state 03/24/2018    Acute on chronic congestive heart failure 04/27/2018    Dysarthria     Leg pain, diffuse, right 05/31/2018    Chest pain 06/27/2018    Chronic kidney disease, stage III (moderate) 06/28/2018    Elevated d-dimer 06/28/2018    Acute cystitis with hematuria 06/28/2018     Past Medical History:   Diagnosis Date    Allergy     BMI 31.0-31.9,adult 11/25/2014    CHF (congestive heart failure)     Chronic anticoagulation - pradaxa 11/10/2016    Chronic diastolic heart failure 11/20/2016    CKD (chronic kidney disease), stage III 9/23/2013    Controlled type 2 diabetes with neuropathy 12/16/2014    Coronary artery disease     Diabetes mellitus due to underlying condition with stage 3 chronic kidney disease, without long-term current use of insulin 11/2/2016    Elevated alkaline phosphatase level 8/1/2012    Elevated PSA     Erectile dysfunction associated with type 2 diabetes mellitus     Gallstones 3/20/2013    Generalized tonic-clonic seizure 11/2016    HTN (hypertension), benign 8/1/2012    Hypercholesterolemia 8/1/2012    Microcytic anemia 11/27/2013    Paroxysmal atrial fibrillation 9/23/2013    Postsurgical hypothyroidism 11/27/2013    Right homonymous hemianopsia 2/5/2016    Sickle cell trait     Stroke 1/27/2016    Thyroid cancer       PAST PSYCHIATRIC HISTORY: none    PAST SURGICAL HISTORY including Epilepsy surgery:   Past Surgical History:   Procedure Laterality Date     BREAST SURGERY      cyst removal    COLONOSCOPY      COLONOSCOPY N/A 3/3/2015    Performed by CHANDA Wellington MD at [facility] (4TH FLR)    CYST REMOVAL      chest    CYSTOSCOPY WITH RETROGRADE PYELOGRAM Bilateral 8/14/2017    Performed by Anahi Mejia MD at [facility] 1ST FLR    PROSTATE BIOPSY  4/27/11    SKIN BIOPSY      THYROID SURGERY  8/26/09    VASCULAR SURGERY          FAMILY HISTORY:   Family History   Problem Relation Age of Onset    Heart disease Father     Diabetes Father     Hypertension Father     Diabetes Mother     Hypertension Mother     Heart disease Mother     Diabetes Brother     Cancer Sister     Thyroid disease Maternal Aunt     Clotting disorder Neg Hx          SOCIAL HISTORY:   Social History     Socioeconomic History    Marital status:      Spouse name: Rut    Number of children: Not on file    Years of education: Not on file    Highest education level: Not on file   Social Needs    Financial resource strain: Not on file    Food insecurity - worry: Not on file    Food insecurity - inability: Not on file    Transportation needs - medical: Not on file    Transportation needs - non-medical: Not on file   Occupational History    Not on file   Tobacco Use    Smoking status: Never Smoker    Smokeless tobacco: Never Used   Substance and Sexual Activity    Alcohol use: Yes     Comment: occasionally, none recently    Drug use: No    Sexual activity: Yes     Partners: Female     Comment:  with 3 kids   Other Topics Concern    Not on file   Social History Narrative     with 3 kids     Not driving due to vision problem from stroke.       a) Marital status:                                                     b) Living situation: patient lives with his wife  c) Employed/Unemployed/Other: Disabled     DRIVING HISTORY:  Currently driving: No       LEVEL OF EDUCATION:  -     SUBSTANCE USE: none    ALLERGIES: Cough syrup [guaifenesin]      REVIEW OF SYSTEMS:  Review of Systems   Constitutional: Negative for appetite change and fatigue.   HENT: Negative for dental problem and sore throat.    Eyes: Negative for photophobia and visual disturbance.   Respiratory: Negative for cough and shortness of breath.    Cardiovascular: Negative for chest pain and palpitations.   Gastrointestinal: Negative for nausea and vomiting.   Endocrine: Negative for polydipsia and polyuria.   Genitourinary: Negative for frequency and urgency.   Musculoskeletal: Negative for arthralgias and joint swelling.   Skin: Negative for color change and rash.   Allergic/Immunologic: Negative for immunocompromised state.   All other systems reviewed and are negative.       GENERAL EXAMINATION:  There were no vitals taken for this visit.     GENERAL EXAMINATION:  General Appearance:    This is an average built male who appears well.  HEENT: There are no dysmorphic features  Skin: There are no obvious stigmata for neurocutaneous disorders.  Neck: supple   Cardiovascular: regular rate and rhythm  Lungs: clear  Abdomen: deferred  The spine is non-tender.  Kyphosis:  NoScoliosis: No   Extremities: Warm and well perfused    NEUROLOGICAL EXAMINATION:  Mental status: Alert and oriented x 4; pleasant and cooperative with exam  Memory: Recent memory intact, Remote memory intact, Age correct, Month correct  Attention and concentration: intact  Fund of knowledge: adequate  Speech: normal  Dysarthria: No   Eyes: PERRL; EOM intact; No nystagmus.The visual pursuits  were smooth with normal saccadic eye movements.   Fundoscopic Exam: deferred  No facial asymmetry. Intact facial sensation bilaterally.  Hearing was intact bilaterally to finger rub  Tongue and palate was in the midline  Intact SCM and trapezii bilaterally     Motor examination:  Normal bulk and tone bilaterally. Power: no pronater drift; 5/5 bilaterally symmetric UE/LE  Abnormal movements: none  Deep tendon reflexes: 2+ bilaterally  symmetric UE/LE with flexor plantars  Dysmetria: No     Sensory examination:   Normal, light touch, pin prick, and vibration bilaterally symmetric UE/LE    Gait:  Normal gait and station    IMPRESSION:  The patient's history is consistent with:   Complex partial seizures evolving to generalized tonic-clonic seizures  64yo M with hx of CVA in 1/16 and seizures x 11/16   - some prior episodes c/w glucose level alterations  - seen in ED with seizure on 11/28/18: low AED levels      Current AEDs:  - Lacosamide 100mg bid  - Levetiracetam 1500mg bid     Plan:  - continue Lacosamide 100mg bid  - continue LEV 1500mg bid  - check AED levels today - to re-assess levels and may need further dose adjustment    Seizure/glucose log  Seizure precautions/restrictions     Plan of care was discussed in detail with patient    Chronic kidney disease, stage III (moderate)  - has follow-up    History of thyroid cancer  - has follow up with Endocrine    Chronic diastolic heart failure  - has follow up with Cardiology    The patient was asked to call me/the clinic 1 week after the test(s) are done to obtain results.    More than 50% of the time with the patient (as well as family/caregiver(s) was spent on face-to-face counseling about:  1. Diagnosis, plans, prognosis, medications and possible side-effects, risks and benefits of treatment, other alternatives to AEDs.  2. Risks related to continued seizures, status epilepticus, SUDEP, driving restrictions and seizure precautions ( no baths but showers are ok, no swimming unsupervised, no use of heavy machinery, no use of sharp moving objects, avoid heights).   3. Issues related to pregnancy, OCP and breast feeding as it relates to epilepsy (in female patients).  4. The potential of teratogenicity (for female patients) and suicidal risks of anti-epileptic medications.  5.Avoid any activity that compromise patient safety related to seizures.    Questions and concerns raised by the patient and  family/care-giver(s) were addressed and they indicated understanding of everything discussed and agreed to plans as above.    Return in 4 months or earlier prn    Keara Santiago MD, SONA(), FACNS.  Neurology-Epilepsy.

## 2018-12-10 ENCOUNTER — OFFICE VISIT (OUTPATIENT)
Dept: NEUROLOGY | Facility: CLINIC | Age: 63
End: 2018-12-10
Payer: MEDICARE

## 2018-12-10 DIAGNOSIS — N18.30 CHRONIC KIDNEY DISEASE, STAGE III (MODERATE): ICD-10-CM

## 2018-12-10 DIAGNOSIS — I50.32 CHRONIC DIASTOLIC HEART FAILURE: ICD-10-CM

## 2018-12-10 DIAGNOSIS — Z85.850 HISTORY OF THYROID CANCER: ICD-10-CM

## 2018-12-10 DIAGNOSIS — G40.209 COMPLEX PARTIAL SEIZURES EVOLVING TO GENERALIZED TONIC-CLONIC SEIZURES: ICD-10-CM

## 2018-12-10 PROCEDURE — 1101F PT FALLS ASSESS-DOCD LE1/YR: CPT | Mod: CPTII,S$GLB,, | Performed by: PSYCHIATRY & NEUROLOGY

## 2018-12-10 PROCEDURE — 99215 OFFICE O/P EST HI 40 MIN: CPT | Mod: S$GLB,,, | Performed by: PSYCHIATRY & NEUROLOGY

## 2018-12-10 NOTE — ASSESSMENT & PLAN NOTE
62yo M with hx of CVA in 1/16 and seizures x 11/16   - some prior episodes c/w glucose level alterations  - seen in ED with seizure on 11/28/18: low AED levels      Current AEDs:  - Lacosamide 100mg bid  - Levetiracetam 1500mg bid     Plan:  - continue Lacosamide 100mg bid  - continue LEV 1500mg bid  - check AED levels today - to re-assess levels and may need further dose adjustment    Seizure/glucose log  Seizure precautions/restrictions     Plan of care was discussed in detail with patient

## 2018-12-13 ENCOUNTER — TELEPHONE (OUTPATIENT)
Dept: NEUROLOGY | Facility: CLINIC | Age: 63
End: 2018-12-13

## 2018-12-13 NOTE — TELEPHONE ENCOUNTER
----- Message from Ivon Felix sent at 12/13/2018  9:24 AM CST -----  Contact: Rut (wife) @ [phone]  Calling for pts lab results from 12-10-18.  Pls call to discuss.

## 2018-12-14 ENCOUNTER — TELEPHONE (OUTPATIENT)
Dept: NEPHROLOGY | Facility: CLINIC | Age: 63
End: 2018-12-14

## 2018-12-14 ENCOUNTER — LAB VISIT (OUTPATIENT)
Dept: LAB | Facility: HOSPITAL | Age: 63
End: 2018-12-14
Attending: NURSE PRACTITIONER
Payer: MEDICARE

## 2018-12-14 ENCOUNTER — OFFICE VISIT (OUTPATIENT)
Dept: INTERNAL MEDICINE | Facility: CLINIC | Age: 63
End: 2018-12-14
Payer: MEDICARE

## 2018-12-14 VITALS
OXYGEN SATURATION: 97 % | DIASTOLIC BLOOD PRESSURE: 70 MMHG | TEMPERATURE: 98 F | WEIGHT: 228.63 LBS | HEART RATE: 54 BPM | SYSTOLIC BLOOD PRESSURE: 130 MMHG | HEIGHT: 70 IN | BODY MASS INDEX: 32.73 KG/M2

## 2018-12-14 DIAGNOSIS — N18.30 HYPERTENSIVE KIDNEY DISEASE WITH STAGE 3 CHRONIC KIDNEY DISEASE: ICD-10-CM

## 2018-12-14 DIAGNOSIS — D57.3 SICKLE CELL TRAIT: Primary | ICD-10-CM

## 2018-12-14 DIAGNOSIS — D57.3 SICKLE CELL TRAIT: ICD-10-CM

## 2018-12-14 DIAGNOSIS — I50.32 CHRONIC DIASTOLIC HEART FAILURE: ICD-10-CM

## 2018-12-14 DIAGNOSIS — H43.11 VITREOUS HEMORRHAGE OF RIGHT EYE: ICD-10-CM

## 2018-12-14 DIAGNOSIS — N40.1 BPH WITH OBSTRUCTION/LOWER URINARY TRACT SYMPTOMS: ICD-10-CM

## 2018-12-14 DIAGNOSIS — I12.9 HYPERTENSIVE KIDNEY DISEASE WITH STAGE 3 CHRONIC KIDNEY DISEASE: ICD-10-CM

## 2018-12-14 DIAGNOSIS — R97.20 ELEVATED PSA: ICD-10-CM

## 2018-12-14 DIAGNOSIS — E11.22 TYPE 2 DIABETES MELLITUS WITH STAGE 3 CHRONIC KIDNEY DISEASE, WITH LONG-TERM CURRENT USE OF INSULIN: ICD-10-CM

## 2018-12-14 DIAGNOSIS — Z79.4 TYPE 2 DIABETES MELLITUS WITH STAGE 3 CHRONIC KIDNEY DISEASE, WITH LONG-TERM CURRENT USE OF INSULIN: ICD-10-CM

## 2018-12-14 DIAGNOSIS — Z86.73 HISTORY OF CVA (CEREBROVASCULAR ACCIDENT): ICD-10-CM

## 2018-12-14 DIAGNOSIS — Z85.850 HISTORY OF THYROID CANCER: ICD-10-CM

## 2018-12-14 DIAGNOSIS — I10 ESSENTIAL HYPERTENSION: ICD-10-CM

## 2018-12-14 DIAGNOSIS — N18.30 TYPE 2 DIABETES MELLITUS WITH STAGE 3 CHRONIC KIDNEY DISEASE, WITH LONG-TERM CURRENT USE OF INSULIN: ICD-10-CM

## 2018-12-14 DIAGNOSIS — E89.0 POSTSURGICAL HYPOTHYROIDISM: ICD-10-CM

## 2018-12-14 DIAGNOSIS — L81.9 HYPERPIGMENTED SKIN LESION: ICD-10-CM

## 2018-12-14 DIAGNOSIS — G40.209 COMPLEX PARTIAL SEIZURES EVOLVING TO GENERALIZED TONIC-CLONIC SEIZURES: ICD-10-CM

## 2018-12-14 DIAGNOSIS — H36.89 SICKLE CELL RETINOPATHY WITHOUT CRISIS: ICD-10-CM

## 2018-12-14 DIAGNOSIS — D57.1 SICKLE CELL RETINOPATHY WITHOUT CRISIS: ICD-10-CM

## 2018-12-14 DIAGNOSIS — I48.0 PAROXYSMAL ATRIAL FIBRILLATION: Chronic | ICD-10-CM

## 2018-12-14 DIAGNOSIS — Z12.11 SCREENING FOR COLON CANCER: ICD-10-CM

## 2018-12-14 DIAGNOSIS — N13.8 BPH WITH OBSTRUCTION/LOWER URINARY TRACT SYMPTOMS: ICD-10-CM

## 2018-12-14 DIAGNOSIS — R21 RASH: Primary | ICD-10-CM

## 2018-12-14 DIAGNOSIS — Z86.010 HISTORY OF ADENOMATOUS POLYP OF COLON: ICD-10-CM

## 2018-12-14 LAB
ALBUMIN SERPL BCP-MCNC: 3.4 G/DL
ANION GAP SERPL CALC-SCNC: 5 MMOL/L
BUN SERPL-MCNC: 25 MG/DL
CALCIUM SERPL-MCNC: 9 MG/DL
CHLORIDE SERPL-SCNC: 109 MMOL/L
CO2 SERPL-SCNC: 25 MMOL/L
COMPLEXED PSA SERPL-MCNC: 13.7 NG/ML
CREAT SERPL-MCNC: 2 MG/DL
EST. GFR  (AFRICAN AMERICAN): 39.3 ML/MIN/1.73 M^2
EST. GFR  (NON AFRICAN AMERICAN): 34 ML/MIN/1.73 M^2
GLUCOSE SERPL-MCNC: 236 MG/DL
PHOSPHATE SERPL-MCNC: 3.9 MG/DL
POTASSIUM SERPL-SCNC: 5.3 MMOL/L
SODIUM SERPL-SCNC: 139 MMOL/L

## 2018-12-14 PROCEDURE — 99215 OFFICE O/P EST HI 40 MIN: CPT | Mod: S$GLB,,, | Performed by: INTERNAL MEDICINE

## 2018-12-14 PROCEDURE — 3044F HG A1C LEVEL LT 7.0%: CPT | Mod: CPTII,S$GLB,, | Performed by: INTERNAL MEDICINE

## 2018-12-14 PROCEDURE — 3075F SYST BP GE 130 - 139MM HG: CPT | Mod: CPTII,S$GLB,, | Performed by: INTERNAL MEDICINE

## 2018-12-14 PROCEDURE — 80069 RENAL FUNCTION PANEL: CPT

## 2018-12-14 PROCEDURE — 3008F BODY MASS INDEX DOCD: CPT | Mod: CPTII,S$GLB,, | Performed by: INTERNAL MEDICINE

## 2018-12-14 PROCEDURE — 3078F DIAST BP <80 MM HG: CPT | Mod: CPTII,S$GLB,, | Performed by: INTERNAL MEDICINE

## 2018-12-14 PROCEDURE — 99999 PR PBB SHADOW E&M-EST. PATIENT-LVL IV: CPT | Mod: PBBFAC,,, | Performed by: INTERNAL MEDICINE

## 2018-12-14 PROCEDURE — 84153 ASSAY OF PSA TOTAL: CPT

## 2018-12-14 PROCEDURE — 36415 COLL VENOUS BLD VENIPUNCTURE: CPT

## 2018-12-14 PROCEDURE — 1101F PT FALLS ASSESS-DOCD LE1/YR: CPT | Mod: CPTII,S$GLB,, | Performed by: INTERNAL MEDICINE

## 2018-12-14 PROCEDURE — 99499 UNLISTED E&M SERVICE: CPT | Mod: S$GLB,,, | Performed by: INTERNAL MEDICINE

## 2018-12-14 RX ORDER — LEVETIRACETAM 750 MG/1
TABLET ORAL
Qty: 120 TABLET | Refills: 1 | Status: CANCELLED | OUTPATIENT
Start: 2018-12-14

## 2018-12-14 RX ORDER — CLOTRIMAZOLE AND BETAMETHASONE DIPROPIONATE 10; .64 MG/G; MG/G
CREAM TOPICAL 2 TIMES DAILY
Qty: 45 G | Refills: 0 | Status: SHIPPED | OUTPATIENT
Start: 2018-12-14 | End: 2021-01-01

## 2018-12-14 NOTE — PATIENT INSTRUCTIONS
Make sure to schedule your appointment with Dr Gold for January.   Also schedule with Dermatology for your rash and a skin check, with Hematology for the sickle cell trait    You will get a call to schedule your colonoscopy    Call in to schedule for your endocrine labs at the end of January.    You can use Debrox peroxide ear drops.     Ok to take 1mg folate (folic acid) supplement.

## 2018-12-14 NOTE — PROGRESS NOTES
Subjective:       Patient ID: Alfa Christy III is a 63 y.o. male.    Chief Complaint: Follow-up    HPI  64 y/o man with h/o CVA (11/2016), h/o seizures, DM2, CKD, HTN, h/o thyroid cancer with postsurgical hypothyroidism, paroxysmal atrial fibrillation, chronic diastolic HF, sickle cell trait + retinopathy, BPH with obstruction, elevated PSA multiple other medical issues here for follow up.  2 ER visits since last visit 8/2018    10/7/18 ER visit for R eye blurry vision/floaters, MRI/MRA done which showed no stroke, found to have posterior vitreous hemorrhage, followed closely with Ophtho / retina specialist after this.  Noted to have sickle cell retinopathy in addition to vitreous hemorrhage  Did see Heme/Onc last year for sickle cell trait, recommended to continue folate, was due for f/u in 7/2018    CKD stage 3 - Last saw Dr Gold 1/2018, overdue for follow up.  No changes in urination    H/o CVA 1/2016, seizures - aphasia, visual field change with CVA.   ER visit 11/28/18 - per neurology note, no seizure since 4/2018, but pt feels that episode 11/2018 was c/w seizure, which is consistent with the ER note and AED levels were low at that time.   Following regularly with neurology, no medications changed recently.  Slight headache for past 2 days, takes tylenol in evening for this  Taking vimpat - this was increased - per an email that they bring in (not currently updated in chart) he will be gradually uptitrating to 200mg BID and then rechecking labs in January.  Taking keppra 1500mg BID, no change to this.    DM2 - following with Endocrine, seen 11/15/18  Meds - lantus 12u (dose maintained at last Endo visit), novolog 5u qAC (7u if eating large meal or going to a party) + correction scale.  Trulicity once a week.  Reports BG ranging 120-180s at home, usually not higher than 150-160 fasting  Sees Dr Tejas Man for eye exams, also Dr Sanz here + recent ophtho exams as noted  Foot exam done  8/2018    History of thyroid cancer treated with surgery, RAIA. Post-surgical hypothyroidism, on levothyroxine. Most recent TG undetectable.   TSH goal <2.5  Planned for yearly TG levels, surveillance ultrasound    Saw Cardiology 8/6/18 for CHF, paroxysmal atrial fibrillation, HTN, leg swelling  On ASA, apixaban 5mg BID, lasix, coreg 6.25mg BID, norvasc 10mg, losartan 100mg  Echo with HFpEF, EF 55% 1/2016, +carotid artery stenosis <50% bilaterally 2016  Monitoring weight at home  Wears compression stockings     Rash on buttocks, not itching but sometimes irritated, there for months - has used an OTC lotion but no other medication / treatment  Area of rash has been gradually getting larger    BPH with obstruction - saw Urology 9/2018, recommended to have repeat PSA in 2-3 months (due for this), continue flomax BID    Due for colonoscopy    Review of Systems   Constitutional: Negative for activity change, fever and unexpected weight change.   HENT: Negative.    Eyes: Positive for visual disturbance (improving).   Respiratory: Negative for cough and shortness of breath.    Cardiovascular: Negative for chest pain and palpitations. Leg swelling: sometimes.   Gastrointestinal: Negative for abdominal pain and blood in stool.   Endocrine: Negative for polydipsia and polyuria.   Genitourinary: Negative.    Musculoskeletal: Negative for gait problem and joint swelling.   Skin: Positive for rash.   Neurological: Negative for syncope and weakness. Seizures: as noted.   Psychiatric/Behavioral: Negative for behavioral problems and dysphoric mood (reports mood more even recently).         Past medical history, surgical history, and family medical history reviewed and updated as appropriate.    Medications and allergies reviewed.     Objective:          Vitals:    12/14/18 1118   BP: 130/70   BP Location: Left arm   Patient Position: Sitting   Pulse: (!) 54   Temp: 97.7 °F (36.5 °C)   TempSrc: Oral   SpO2: 97%   Weight: 103.7 kg  "(228 lb 9.9 oz)   Height: 5' 10" (1.778 m)     Body mass index is 32.8 kg/m².  Physical Exam   Constitutional: He is oriented to person, place, and time. He appears well-developed and well-nourished. No distress.   HENT:   Head: Normocephalic and atraumatic.   Mouth/Throat: Oropharynx is clear and moist.   Eyes: Conjunctivae and EOM are normal.   Neck: Neck supple.   Cardiovascular: Normal rate, regular rhythm and normal heart sounds.   No murmur heard.  Pulmonary/Chest: Effort normal and breath sounds normal.   Abdominal: Soft. Bowel sounds are normal. He exhibits no distension. There is no tenderness.   Musculoskeletal: He exhibits no edema or tenderness.   Lymphadenopathy:     He has no cervical adenopathy.   Neurological: He is alert and oriented to person, place, and time. He has normal strength. No cranial nerve deficit. Gait normal.   R peripheral vision deficit - not new  Speech slightly slow, no slurring.    Skin: Skin is warm and dry.   +hyperpigmentation of skin at anterior shins bilaterally  +6-8cm area of slight hyperpigmentation/erythema, with scaling or xerosis on L lateral buttock; smaller satellite lesion 3cm with similar appearance nearby. No ulceration or induration  +multiple macular hyperpigmented skin lesions on back with irregular border, some raised small hyperpigmented skin lesions as well  +two 2-3cm flat raised nodular lesions, flesh-colored, on upper back   Psychiatric: He has a normal mood and affect.   Vitals reviewed.      Lab Results   Component Value Date    WBC 9.61 11/28/2018    HGB 11.3 (L) 11/28/2018    HCT 32.2 (L) 11/28/2018     11/28/2018    CHOL 102 (L) 07/31/2018    TRIG 73 07/31/2018    HDL 35 (L) 07/31/2018    ALT 17 10/07/2018    AST 25 10/07/2018     11/28/2018    K 4.2 11/28/2018     11/28/2018    CREATININE 2.2 (H) 11/28/2018    BUN 27 (H) 11/28/2018    CO2 25 11/28/2018    TSH 2.446 10/07/2018    PSA 7.2 (H) 10/28/2015    INR 1.0 05/31/2018    GLUF " 106 11/05/2008    HGBA1C 5.4 07/31/2018       Assessment:       1. Rash    2. Hyperpigmented skin lesion    3. Screening for colon cancer    4. History of adenomatous polyp of colon    5. Sickle cell retinopathy without crisis    6. Vitreous hemorrhage of right eye    7. History of CVA (cerebrovascular accident)    8. Complex partial seizures evolving to generalized tonic-clonic seizures    9. Essential hypertension    10. Paroxysmal atrial fibrillation    11. Chronic diastolic heart failure    12. Elevated PSA    13. Hypertensive kidney disease with stage 3 chronic kidney disease    14. History of thyroid cancer    15. Type 2 diabetes mellitus with stage 3 chronic kidney disease, with long-term current use of insulin    16. Postsurgical hypothyroidism        Plan:   Alfa was seen today for follow-up.    Diagnoses and all orders for this visit:    Rash - trial of antifungal +steroid to address itching, if no improvement should see dermatology; going to see derm for full skin check and eval of hyperpigmented lesions on back as well  -     Ambulatory Referral to Dermatology  -     clotrimazole-betamethasone 1-0.05% (LOTRISONE) cream; Apply topically 2 (two) times daily. To area of rash for 2-4 weeks  Hyperpigmented skin lesion  -     Ambulatory Referral to Dermatology    Screening for colon cancer  -     Case request GI: COLONOSCOPY  History of adenomatous polyp of colon  -     Case request GI: COLONOSCOPY  Reviewed with patient and wife - due for c-scope    Sickle cell retinopathy without crisis  Vitreous hemorrhage of right eye - stable currently, follow up with ophtho as well as with hematology, continue folate    History of CVA (cerebrovascular accident) - continue apixaban, statin, ASA, BP control, working on DM control    Complex partial seizures evolving to generalized tonic-clonic seizures - reviewed; recent seizure, vimpat being titrated, follow with neurology    Essential hypertension - at goal today, no med  changes    Paroxysmal atrial fibrillation  Chronic diastolic heart failure - continue to follow with cardiology, no med changes today, euvolemic    Elevated PSA - rechecked today already    Hypertensive kidney disease with stage 3 chronic kidney disease - continue to follow with nephrology - planning to see Dr Gold in January  Will add on renal function panel to labs today if possible    Type 2 diabetes mellitus with stage 3 chronic kidney disease, with long-term current use of insulin - recommended plan for endocrine labs around the end of january    History of thyroid cancer  Postsurgical hypothyroidism  Continue to follow with endocrine, reviewed notes with patient and family    Health maintenance reviewed with patient.   Colonoscopy ordered  Labs today    Follow-up in about 4 months (around 4/14/2019) for coordination of care, diabetes.    Tyler Jasmine MD  Internal Medicine  [facility]  12/14/2018    45 minutes spent with patient and family with >50% of visit spent counseling patient regarding conditions documented above and planning for care coordination. All questions answered.

## 2018-12-14 NOTE — TELEPHONE ENCOUNTER
----- Message from Cristel Payan sent at 12/14/2018 12:43 PM CST -----  Contact: self  Pt need a F/U appointment in Jan per Dr Jasmine. Please call pt to schedule appointment. Thanks!

## 2018-12-17 PROBLEM — Z51.81 ENCOUNTER FOR MEDICATION TITRATION: Status: RESOLVED | Noted: 2018-05-22 | Resolved: 2018-12-17

## 2018-12-18 ENCOUNTER — OFFICE VISIT (OUTPATIENT)
Dept: UROLOGY | Facility: CLINIC | Age: 63
End: 2018-12-18
Payer: MEDICARE

## 2018-12-18 VITALS
HEART RATE: 52 BPM | DIASTOLIC BLOOD PRESSURE: 78 MMHG | HEIGHT: 70 IN | SYSTOLIC BLOOD PRESSURE: 140 MMHG | WEIGHT: 225.31 LBS | BODY MASS INDEX: 32.26 KG/M2

## 2018-12-18 DIAGNOSIS — R97.20 ELEVATED PSA: ICD-10-CM

## 2018-12-18 DIAGNOSIS — N40.1 BPH WITH OBSTRUCTION/LOWER URINARY TRACT SYMPTOMS: Primary | ICD-10-CM

## 2018-12-18 DIAGNOSIS — N13.8 BPH WITH OBSTRUCTION/LOWER URINARY TRACT SYMPTOMS: Primary | ICD-10-CM

## 2018-12-18 LAB
BACTERIA #/AREA URNS AUTO: ABNORMAL /HPF
HYALINE CASTS UR QL AUTO: 5 /LPF
MICROSCOPIC COMMENT: ABNORMAL
RBC #/AREA URNS AUTO: 5 /HPF (ref 0–4)
SQUAMOUS #/AREA URNS AUTO: 0 /HPF
WBC #/AREA URNS AUTO: 2 /HPF (ref 0–5)
WBC CLUMPS UR QL AUTO: ABNORMAL

## 2018-12-18 PROCEDURE — 81001 URINALYSIS AUTO W/SCOPE: CPT

## 2018-12-18 PROCEDURE — 87086 URINE CULTURE/COLONY COUNT: CPT

## 2018-12-18 PROCEDURE — 99999 PR PBB SHADOW E&M-EST. PATIENT-LVL III: CPT | Mod: PBBFAC,,, | Performed by: NURSE PRACTITIONER

## 2018-12-18 PROCEDURE — 1101F PT FALLS ASSESS-DOCD LE1/YR: CPT | Mod: CPTII,S$GLB,, | Performed by: NURSE PRACTITIONER

## 2018-12-18 PROCEDURE — 81002 URINALYSIS NONAUTO W/O SCOPE: CPT | Mod: S$GLB,,, | Performed by: NURSE PRACTITIONER

## 2018-12-18 PROCEDURE — 3077F SYST BP >= 140 MM HG: CPT | Mod: CPTII,S$GLB,, | Performed by: NURSE PRACTITIONER

## 2018-12-18 PROCEDURE — 99214 OFFICE O/P EST MOD 30 MIN: CPT | Mod: 25,S$GLB,, | Performed by: NURSE PRACTITIONER

## 2018-12-18 PROCEDURE — 3008F BODY MASS INDEX DOCD: CPT | Mod: CPTII,S$GLB,, | Performed by: NURSE PRACTITIONER

## 2018-12-18 PROCEDURE — 51798 US URINE CAPACITY MEASURE: CPT | Mod: S$GLB,,, | Performed by: NURSE PRACTITIONER

## 2018-12-18 PROCEDURE — 3078F DIAST BP <80 MM HG: CPT | Mod: CPTII,S$GLB,, | Performed by: NURSE PRACTITIONER

## 2018-12-18 RX ORDER — LIDOCAINE HYDROCHLORIDE 10 MG/ML
10 INJECTION INFILTRATION; PERINEURAL ONCE
Status: CANCELLED | OUTPATIENT
Start: 2018-12-18 | End: 2018-12-18

## 2018-12-18 RX ORDER — CIPROFLOXACIN 500 MG/1
500 TABLET ORAL 2 TIMES DAILY
Qty: 4 TABLET | Refills: 0 | Status: SHIPPED | OUTPATIENT
Start: 2018-12-18 | End: 2019-06-27 | Stop reason: ALTCHOICE

## 2018-12-18 RX ORDER — LIDOCAINE HYDROCHLORIDE 20 MG/ML
JELLY TOPICAL ONCE
Status: CANCELLED | OUTPATIENT
Start: 2018-12-18 | End: 2018-12-18

## 2018-12-18 NOTE — PROGRESS NOTES
Subjective:       Patient ID: Alfa Christy III is a 63 y.o. male.    Chief Complaint: F/u elevated PSA    HPI: Alfa Christy III is a 63 y.o. Black or  male who presents today for f/u elevated PSA. His last clinic visit was 9/19/18.    Today he presents to clinic for f/u elevated PSA. Denies family history of prostate cancer.   He is taking flomax once a day. He reports nocturia x 2-3, which is not bothersome to him. He voids every 3 hours during the daytime and denies urgency. He feels he is emptying his bladder.   Denies dysuria, hematuria or flank pain. Denies f/c/n/v.  FOS is good.     PSA - negative prostate biopsy in 2011 (PSA at this time was 5.4)  12/14/18 13.7  11/4/16 9.9  10/4/16 12.2    Pt went to ED on 6/15/18 for decreased urine flow and suprapubic pain and distention.   16 fr nieves catheter was inserted and 800 ml of clear yellow urine out.  He was discharged home with catheter.  Urinary retention possibly d/t enlarged prostate.   On previous cysto performed by Dr. Mejia, patient did have BPH.  On 6/20/18, VT passed.  On 7/18/18 PVR 7 ml, no infection and emptying his bladder  He was seen at ED 7/28/18 for penis pain (dysuria, hematuria and penile pain). He was given lidocaine and discharged home.  He was seen at ED 8/1/18 for inability to urinate. Nieves catheter was placed and 700 ml clear yellow urine drained. He was discharged home with nieves catheter.  He was seen in clinic 8/2/18 for bladder spasms and burning r/t nieves catheter. He was given oxybutynin.  On 8/14/18 he passed voiding trial.    Cysto 7/5/17  Bilateral ureteral orifice were difficult to see.   No bladder tumors or lesions were seen.  No strictures were noted.  The prostate showed Significant hypertrophy.  There was Significant median lobe present. 6cm in length.    8/14/17   Procedure(s) Performed:   1.  Cystoscopy with bilateral retrograde pyelograms  2.  Fluoroscopy < 1 hour  Findings:   1.) Large, friable  prostate  2.) Right ureter with distal J hooking, no filling defects  3.) Left ureter with tortuosity, no filling defects         Lab Results   Component Value Date    PSA 7.2 (H) 10/28/2015    PSA 5.62 (H) 12/27/2012    PSA 8.08 (H) 06/26/2012       Review of patient's allergies indicates:   Allergen Reactions    Cough syrup [guaifenesin] Other (See Comments)       Current Outpatient Medications   Medication Sig Dispense Refill    acetaminophen (TYLENOL) 500 MG tablet Take 1 tablet (500 mg total) by mouth every 6 (six) hours as needed for Pain.  0    amLODIPine (NORVASC) 10 MG tablet Take 1 tablet (10 mg total) by mouth once daily. 90 tablet 3    apixaban 5 mg Tab Take 1 tablet (5 mg total) by mouth 2 (two) times daily. 180 tablet 3    aspirin 81 MG Chew Take 1 tablet (81 mg total) by mouth once daily.  0    atorvastatin (LIPITOR) 40 MG tablet Take 1 tablet (40 mg total) by mouth once daily. 90 tablet 3    carvedilol (COREG) 6.25 MG tablet Take 1 tablet (6.25 mg total) by mouth 2 (two) times daily. 180 tablet 3    ciclopirox (PENLAC) 8 % Soln Apply topically nightly. 6.6 mL 11    clotrimazole-betamethasone 1-0.05% (LOTRISONE) cream Apply topically 2 (two) times daily. To area of rash for 2-4 weeks 45 g 0    folic acid (FOLVITE) 400 MCG tablet Take 400 mcg by mouth once daily.      furosemide (LASIX) 40 MG tablet Take 1 tablet (40 mg total) by mouth 2 (two) times daily. 180 tablet 1    GLUCAGON EMERGENCY KIT, HUMAN, 1 mg injection INJECT 1 MG INTO THE MUSCLE AS NEEDED 1 kit 1    insulin aspart U-100 (NOVOLOG) 100 unit/mL InPn pen Inject 7 units w/ meals plus scale 150-200+1, 201-250+2, 251-300+3, 301-350+4. Max daily 35 units, 90 day. 3 Box 3    insulin detemir U-100 (LEVEMIR FLEXPEN) 100 unit/mL (3 mL) SubQ InPn pen Inject 12 Units into the skin every evening. 1 Box 3    lacosamide (VIMPAT) 50 mg Tab Take 1 tablet (50 mg total) by mouth every 12 (twelve) hours. Vim pat 50mg in am, 100 mg at night    "   levetiracetam (KEPPRA) 750 MG Tab Take 2 tablets (1,500 mg total) by mouth 2 (two) times daily. 120 tablet 11    levothyroxine (SYNTHROID) 200 MCG tablet TAKE 1 TABLET BY MOUTH BEFORE BREAKFAST 90 tablet 2    losartan (COZAAR) 100 MG tablet Take 1 tablet (100 mg total) by mouth once daily. 90 tablet 3    magnesium 30 mg Tab Take 1 tablet by mouth twice a week.       multivitamin capsule Take 1 capsule by mouth every Mon, Wed, Fri.       SAW PALMETTO ORAL Take by mouth.      tamsulosin (FLOMAX) 0.4 mg Cap Take 1 capsule (0.4 mg total) by mouth every evening. 30 capsule 11    TRULICITY 0.75 mg/0.5 mL PnIj INJECT 0.5ML UNDER THE SKIN EVERY 7 DAYS 4 Syringe 4    vitamin D 1000 units Tab Take 1,000 Units by mouth every Mon, Wed, Fri.      blood sugar diagnostic (ONETOUCH VERIO) Strp Check blood glucose readings 3 times a day. 100 strip 11    ciprofloxacin HCl (CIPRO) 500 MG tablet Take 1 tablet (500 mg total) by mouth 2 (two) times daily. Take 1 pill at night before procedure. Take 1 pill 2 hours before procedure. Take 1 pill in evening after procedure. Take 1 pill in morning after procedure. 4 tablet 0    influenza (FLUZONE HIGH-DOSE) 180 mcg/0.5 mL vaccine Inject into the muscle. 0.5 mL 0    pen needle, diabetic (BD ULTRA-FINE MINI PEN NEEDLE) 31 gauge x 3/16" Ndle To use with insulin pens 4  times daily 360 each 3     No current facility-administered medications for this visit.        Past Medical History:   Diagnosis Date    Allergy     BMI 31.0-31.9,adult 11/25/2014    CHF (congestive heart failure)     Chronic anticoagulation - pradaxa 11/10/2016    Chronic diastolic heart failure 11/20/2016    CKD (chronic kidney disease), stage III 9/23/2013    Controlled type 2 diabetes with neuropathy 12/16/2014    Coronary artery disease     Diabetes mellitus due to underlying condition with stage 3 chronic kidney disease, without long-term current use of insulin 11/2/2016    Elevated alkaline " phosphatase level 8/1/2012    Elevated PSA     negative prostate biopsy 4/11    Erectile dysfunction associated with type 2 diabetes mellitus     Gallstones 3/20/2013    Generalized tonic-clonic seizure 11/2016    HTN (hypertension), benign 8/1/2012    Hypercholesterolemia 8/1/2012    Microcytic anemia 11/27/2013    Paroxysmal atrial fibrillation 9/23/2013    Postsurgical hypothyroidism 11/27/2013    Right homonymous hemianopsia 2/5/2016    Sickle cell trait     Stroke 1/27/2016    Thyroid cancer     multifocal with six lesions, largest 5mm, treated with surgery and radioactive iodine       Past Surgical History:   Procedure Laterality Date    BREAST SURGERY      cyst removal    COLONOSCOPY      COLONOSCOPY N/A 3/3/2015    Performed by CHANDA Wellington MD at [facility] (4TH FLR)    CYST REMOVAL      chest    CYSTOSCOPY WITH RETROGRADE PYELOGRAM Bilateral 8/14/2017    Performed by Anahi Mejia MD at [facility] OR 1ST FLR    PROSTATE BIOPSY  4/27/11    SKIN BIOPSY      THYROID SURGERY  8/26/09    VASCULAR SURGERY         Family History   Problem Relation Age of Onset    Heart disease Father     Diabetes Father     Hypertension Father     Diabetes Mother     Hypertension Mother     Heart disease Mother     Diabetes Brother     Cancer Sister     Thyroid disease Maternal Aunt     Clotting disorder Neg Hx        Review of Systems   Constitutional: Negative for chills, diaphoresis and fever.   HENT: Negative for congestion.    Eyes: Negative for discharge.   Respiratory: Negative for chest tightness and shortness of breath.    Cardiovascular: Negative for chest pain and palpitations.   Gastrointestinal: Negative for nausea and vomiting.   Genitourinary: Negative for decreased urine volume, difficulty urinating, discharge, dysuria, flank pain, hematuria, penile pain, penile swelling, scrotal swelling, testicular pain and urgency.        See HPI   Musculoskeletal: Negative for gait problem.    Skin: Negative for rash.   Allergic/Immunologic: Negative for immunocompromised state.   Neurological: Negative for syncope and headaches.   Hematological: Negative for adenopathy.   Psychiatric/Behavioral: Negative for confusion.         All other systems were reviewed and were negative.    Objective:     Vitals:    12/18/18 1312   BP: (!) 140/78   Pulse: (!) 52        Physical Exam   Nursing note and vitals reviewed.  Constitutional: He is oriented to person, place, and time. He appears well-developed and well-nourished.  Non-toxic appearance. He does not have a sickly appearance. He does not appear ill. No distress.   HENT:   Head: Normocephalic.   Eyes: Right eye exhibits no discharge. Left eye exhibits no discharge.   Neck: Normal range of motion.   Cardiovascular: Regular rhythm.    Pulmonary/Chest: Effort normal. No respiratory distress.   Abdominal: Soft. He exhibits no distension. There is no CVA tenderness.   Genitourinary:   Genitourinary Comments: Prostate exam 9/19/18   Musculoskeletal: Normal range of motion.   Neurological: He is alert and oriented to person, place, and time.   Skin: Skin is warm and dry.     Psychiatric: He has a normal mood and affect. His behavior is normal. Judgment and thought content normal.         Lab Results   Component Value Date    CREATININE 2.0 (H) 12/14/2018     Lab Results   Component Value Date    EGFRNONAA 34.0 (A) 12/14/2018     Lab Results   Component Value Date    ESTGFRAFRICA 39.3 (A) 12/14/2018     POCT UA: sp grav 1.010, pH 5, 100 protein, 50 blood, otherwise negative    PVR: done in clinic with bladder scanner by Nurse Bullock was 66 ml.    Assessment:       1. BPH with obstruction/lower urinary tract symptoms    2. Elevated PSA        Plan:     Alfa was seen today for elevated psa.    Diagnoses and all orders for this visit:    BPH with obstruction/lower urinary tract symptoms  -     POCT urinalysis, dipstick or tablet reag  -     POCT Bladder Scan  -      Urinalysis Microscopic  -     Urine culture    Elevated PSA  -     lidocaine HCL 2% jelly  -     lidocaine HCL 10 mg/ml (1%) injection 10 mL  -     Tissue Specimen To Pathology, Urology; Future  -     Transrectal Ultrasound w/ Biopsy; Future  -     ciprofloxacin HCl (CIPRO) 500 MG tablet; Take 1 tablet (500 mg total) by mouth 2 (two) times daily. Take 1 pill at night before procedure. Take 1 pill 2 hours before procedure. Take 1 pill in evening after procedure. Take 1 pill in morning after procedure.      -Discussed elevated PSA level with patient.  Pt unable to have MRI of prostate d/t decreased kidney function  Pt would like to proceed with TRUS with biopsy. Order placed for TRUS with biopsy with Dr. Mejia.  Pt is on blood thinners and will discuss with cardiologist regarding holding blood thinners for procedure.  Prescription for Cipro given to patient in clinic.   -Continue flomax daily.  -Urine specimen sent for microscopic UA and urine culture.  -RTC for TRUS with biopsy       I spent 25 minutes with the patient of which more than half was spent in coordinating the patient's care as well as in direct consultation with the patient in regards to our treatment and plan.

## 2018-12-19 LAB — BACTERIA UR CULT: NO GROWTH

## 2018-12-27 ENCOUNTER — INITIAL CONSULT (OUTPATIENT)
Dept: DERMATOLOGY | Facility: CLINIC | Age: 63
End: 2018-12-27
Payer: MEDICARE

## 2018-12-27 DIAGNOSIS — R21 RASH AND OTHER NONSPECIFIC SKIN ERUPTION: Primary | ICD-10-CM

## 2018-12-27 DIAGNOSIS — L91.0 KELOID SCAR: ICD-10-CM

## 2018-12-27 DIAGNOSIS — L81.0 POST-INFLAMMATORY HYPERPIGMENTATION: ICD-10-CM

## 2018-12-27 PROCEDURE — 1101F PR PT FALLS ASSESS DOC 0-1 FALLS W/OUT INJ PAST YR: ICD-10-PCS | Mod: CPTII,S$GLB,, | Performed by: PATHOLOGY

## 2018-12-27 PROCEDURE — 88305 TISSUE EXAM BY PATHOLOGIST: CPT | Performed by: PATHOLOGY

## 2018-12-27 PROCEDURE — 88305 TISSUE SPECIMEN TO PATHOLOGY, DERMATOLOGY: ICD-10-PCS | Mod: 26,,, | Performed by: PATHOLOGY

## 2018-12-27 PROCEDURE — 99999 PR PBB SHADOW E&M-EST. PATIENT-LVL III: ICD-10-PCS | Mod: PBBFAC,,, | Performed by: PATHOLOGY

## 2018-12-27 PROCEDURE — 99203 PR OFFICE/OUTPT VISIT, NEW, LEVL III, 30-44 MIN: ICD-10-PCS | Mod: 25,S$GLB,, | Performed by: PATHOLOGY

## 2018-12-27 PROCEDURE — 1101F PT FALLS ASSESS-DOCD LE1/YR: CPT | Mod: CPTII,S$GLB,, | Performed by: PATHOLOGY

## 2018-12-27 PROCEDURE — 99999 PR PBB SHADOW E&M-EST. PATIENT-LVL III: CPT | Mod: PBBFAC,,, | Performed by: PATHOLOGY

## 2018-12-27 PROCEDURE — 99203 OFFICE O/P NEW LOW 30 MIN: CPT | Mod: 25,S$GLB,, | Performed by: PATHOLOGY

## 2018-12-27 PROCEDURE — 88312 TISSUE SPECIMEN TO PATHOLOGY, DERMATOLOGY: ICD-10-PCS | Mod: 26,,, | Performed by: PATHOLOGY

## 2018-12-27 PROCEDURE — 11100 PR BIOPSY OF SKIN LESION: CPT | Mod: S$GLB,,, | Performed by: PATHOLOGY

## 2018-12-27 PROCEDURE — 11100 PR BIOPSY OF SKIN LESION: ICD-10-PCS | Mod: S$GLB,,, | Performed by: PATHOLOGY

## 2018-12-27 PROCEDURE — 88312 SPECIAL STAINS GROUP 1: CPT | Mod: 26,,, | Performed by: PATHOLOGY

## 2018-12-27 NOTE — LETTER
January 2, 2019      Tyler Jasmine MD  [address]  [place] [address]           [facility] - Dermatology  [address]  [place] [address]  Phone: [phone]  Fax: [phone]          Patient: Alfa Christy III   MR Number: [id number]   YOB: 1955   Date of Visit: 12/27/2018       Dear Dr. Tyler Jasmine:    Thank you for referring Alfa Christy to me for evaluation. Attached you will find relevant portions of my assessment and plan of care.    If you have questions, please do not hesitate to call me. I look forward to following Alfa Christy along with you.    Sincerely,    Sangeetha Gtz MD    Enclosure  CC:  No Recipients    If you would like to receive this communication electronically, please contact [email or web address] or [phone] to request more information on [facility] access.    For providers and/or their staff who would like to refer a patient to [facility], please contact us through our one-stop-shop provider referral line, [facility], at [phone].    If you feel you have received this communication in error or would no longer like to receive these types of communications, please e-mail [email or web address]

## 2018-12-27 NOTE — PROGRESS NOTES
Subjective:       Patient ID:  Alfa Christy III is a 63 y.o. male who presents for   Chief Complaint   Patient presents with    Spot     Back and buttock, 2 years, irritated, Tx. Clotrimazole Betamethasone     Spot  - Initial  Affected locations: back and left buttock  Duration: 2 years  Signs / symptoms: irritated  Severity: mild to moderate  Timing: intermittent  Aggravated by: scratching  Relieving factors/Treatments tried: Rx topical steroids  Improvement on treatment: no relief        Review of Systems   Constitutional: Negative for fever, chills, weight loss, weight gain, fatigue, night sweats and malaise.   Skin: Negative for daily sunscreen use and activity-related sunscreen use.        Objective:    Physical Exam   Constitutional: He appears well-developed and well-nourished.   Neurological: He is alert and oriented to person, place, and time.   Psychiatric: He has a normal mood and affect.   Skin:   Areas Examined (abnormalities noted in diagram):   Head / Face Inspection Performed  Neck Inspection Performed  Chest / Axilla Inspection Performed  Abdomen Inspection Performed  Back Inspection Performed  RUE Inspected  LUE Inspection Performed  RLE Inspected  LLE Inspection Performed              Diagram Legend     Erythematous scaling macule/papule c/w actinic keratosis       Vascular papule c/w angioma      Pigmented verrucoid papule/plaque c/w seborrheic keratosis      Yellow umbilicated papule c/w sebaceous hyperplasia      Irregularly shaped tan macule c/w lentigo     1-2 mm smooth white papules consistent with Milia      Movable subcutaneous cyst with punctum c/w epidermal inclusion cyst      Subcutaneous movable cyst c/w pilar cyst      Firm pink to brown papule c/w dermatofibroma      Pedunculated fleshy papule(s) c/w skin tag(s)      Evenly pigmented macule c/w junctional nevus     Mildly variegated pigmented, slightly irregular-bordered macule c/w mildly atypical nevus      Flesh colored to  evenly pigmented papule c/w intradermal nevus       Pink pearly papule/plaque c/w basal cell carcinoma      Erythematous hyperkeratotic cursted plaque c/w SCC      Surgical scar with no sign of skin cancer recurrence      Open and closed comedones      Inflammatory papules and pustules      Verrucoid papule consistent consistent with wart     Erythematous eczematous patches and plaques     Dystrophic onycholytic nail with subungual debris c/w onychomycosis     Umbilicated papule    Erythematous-base heme-crusted tan verrucoid plaque consistent with inflamed seborrheic keratosis     Erythematous Silvery Scaling Plaque c/w Psoriasis     See annotation      Assessment / Plan:   Rule Out - Biopsy 1: Eczematous Dermatitis versus Tinea Corporis versus Psoriasis Vulgaris versus Cutaneous T-Cell Lymphoma versus Other.        Pathology Orders:     Normal Orders This Visit    Tissue Specimen To Pathology, Dermatology     Questions:    Directional Terms:  Other(comment)    Left    Clinical information:  ovoid hyperpigmented, minimally erythematous scaly plaques; r/o psoriasis vs nummular eczema vs tinea vs LSC; r/o CTCL    Specific Site:  hip        Rash and other nonspecific skin eruption  -     Tissue Specimen To Pathology, Dermatology    Punch biopsy procedure note:  Punch biopsy performed after verbal consent obtained. Area marked and prepped with alcohol. Approximately 1cc of 1% lidocaine with epinephrine injected. 4 mm disposable punch used to remove lesion. Hemostasis obtained and biopsy site closed with 1 - 2 Prolene sutures. Wound care instructions reviewed with patient and handout given.      Keloid scar - asymptomatic; no treatment needed    Post-inflammatory hyperpigmentation - reassurance; asymptomatic; no treatment needed              Follow-up in about 2 weeks (around 1/10/2019) for suture removal.

## 2018-12-28 ENCOUNTER — LAB VISIT (OUTPATIENT)
Dept: LAB | Facility: HOSPITAL | Age: 63
End: 2018-12-28
Attending: PSYCHIATRY & NEUROLOGY
Payer: MEDICARE

## 2018-12-28 ENCOUNTER — TELEPHONE (OUTPATIENT)
Dept: NEUROLOGY | Facility: CLINIC | Age: 63
End: 2018-12-28

## 2018-12-28 DIAGNOSIS — G40.209 COMPLEX PARTIAL SEIZURES WITH CONSCIOUSNESS IMPAIRED: ICD-10-CM

## 2018-12-28 DIAGNOSIS — G40.209 COMPLEX PARTIAL SEIZURES WITH CONSCIOUSNESS IMPAIRED: Primary | ICD-10-CM

## 2018-12-28 PROCEDURE — 80299 QUANTITATIVE ASSAY DRUG: CPT

## 2018-12-28 PROCEDURE — 36415 COLL VENOUS BLD VENIPUNCTURE: CPT | Mod: PO

## 2018-12-28 PROCEDURE — 80177 DRUG SCRN QUAN LEVETIRACETAM: CPT

## 2018-12-28 NOTE — TELEPHONE ENCOUNTER
----- Message from Ruddy Marie sent at 12/28/2018  2:01 PM CST -----  Needs Advice    Reason for call: Mrs. Christy is asking to speak w/ someone on staff regarding the pt feeling dizzy after taking lacosamide (VIMPAT) today        Communication Preference: Mrs. Christy (wife) @ [phone] or     Additional Information:

## 2018-12-28 NOTE — TELEPHONE ENCOUNTER
Spoke to pts wife. Pt c/o short dizzy spell when standing up. He did not check his BP but did check his glucose level which was normal. Advised to have levels checked just to rule out that its the Vimpat causing dizziness

## 2018-12-31 ENCOUNTER — PATIENT MESSAGE (OUTPATIENT)
Dept: HEMATOLOGY/ONCOLOGY | Facility: CLINIC | Age: 63
End: 2018-12-31

## 2018-12-31 LAB — LEVETIRACETAM SERPL-MCNC: 68 UG/ML (ref 3–60)

## 2019-01-01 LAB — LACOSAMIDE: 10.9 MCG/ML

## 2019-01-02 ENCOUNTER — TELEPHONE (OUTPATIENT)
Dept: NEUROLOGY | Facility: CLINIC | Age: 64
End: 2019-01-02

## 2019-01-02 RX ORDER — PEN NEEDLE, DIABETIC 30 GX3/16"
NEEDLE, DISPOSABLE MISCELLANEOUS
Qty: 360 EACH | Refills: 3 | Status: SHIPPED | OUTPATIENT
Start: 2019-01-02 | End: 2020-01-01 | Stop reason: SDUPTHER

## 2019-01-02 RX ORDER — AMLODIPINE BESYLATE 10 MG/1
TABLET ORAL
Qty: 90 TABLET | Refills: 0 | Status: SHIPPED | OUTPATIENT
Start: 2019-01-02 | End: 2019-01-17

## 2019-01-02 NOTE — TELEPHONE ENCOUNTER
----- Message from Tyler Palma sent at 1/2/2019 10:59 AM CST -----  Contact: Rut ( spouse ) @ [phone]  Rx Refill/Request     Is this a Refill or New Rx: yes    Rx Name and Strength:  (lacosamide (VIMPAT) ? mg Tab )   Preferred Pharmacy with phone number:     [facility]/pharmacy #[id number] - [place], [place] - [id number] [facility] [address]  Phone: [phone] Fax: [phone]

## 2019-01-02 NOTE — TELEPHONE ENCOUNTER
"----- Message from Nicole Nunez sent at 1/2/2019 11:51 AM CST -----  Contact: Wife / Rut [phone]  .Refill request.  pen needle, diabetic (BD ULTRA-FINE MINI PEN NEEDLE) 31 gauge x 3/16" Ndle  & blood sugar diagnostic (ONETOUCH VERIO) Strp    ..  [facility]/pharmacy #[id number] - [place], [place] - [address] [address]  Phone: [phone] Fax: [phone]      "

## 2019-01-02 NOTE — TELEPHONE ENCOUNTER
Called in refills for Vimpat 200 mg tablets 1 po BID to [facility]. Wife notified of lab results as well

## 2019-01-03 ENCOUNTER — PATIENT MESSAGE (OUTPATIENT)
Dept: UROLOGY | Facility: CLINIC | Age: 64
End: 2019-01-03

## 2019-01-03 ENCOUNTER — OFFICE VISIT (OUTPATIENT)
Dept: UROLOGY | Facility: CLINIC | Age: 64
End: 2019-01-03
Payer: MEDICARE

## 2019-01-03 ENCOUNTER — OFFICE VISIT (OUTPATIENT)
Dept: CARDIOLOGY | Facility: CLINIC | Age: 64
End: 2019-01-03
Payer: MEDICARE

## 2019-01-03 VITALS
HEIGHT: 70 IN | BODY MASS INDEX: 31.78 KG/M2 | WEIGHT: 222 LBS | DIASTOLIC BLOOD PRESSURE: 81 MMHG | HEART RATE: 74 BPM | SYSTOLIC BLOOD PRESSURE: 149 MMHG

## 2019-01-03 VITALS
WEIGHT: 222.88 LBS | HEART RATE: 72 BPM | HEIGHT: 70 IN | DIASTOLIC BLOOD PRESSURE: 70 MMHG | BODY MASS INDEX: 31.91 KG/M2 | SYSTOLIC BLOOD PRESSURE: 145 MMHG

## 2019-01-03 DIAGNOSIS — Z86.73 HISTORY OF CVA (CEREBROVASCULAR ACCIDENT): ICD-10-CM

## 2019-01-03 DIAGNOSIS — I50.32 CHRONIC DIASTOLIC HEART FAILURE: Primary | ICD-10-CM

## 2019-01-03 DIAGNOSIS — I48.0 PAROXYSMAL ATRIAL FIBRILLATION: Chronic | ICD-10-CM

## 2019-01-03 DIAGNOSIS — E78.00 HYPERCHOLESTEROLEMIA: ICD-10-CM

## 2019-01-03 DIAGNOSIS — R31.0 GROSS HEMATURIA: Primary | ICD-10-CM

## 2019-01-03 DIAGNOSIS — H36.89 SICKLE CELL RETINOPATHY WITHOUT CRISIS: ICD-10-CM

## 2019-01-03 DIAGNOSIS — I12.9 HYPERTENSIVE KIDNEY DISEASE WITH STAGE 3 CHRONIC KIDNEY DISEASE: ICD-10-CM

## 2019-01-03 DIAGNOSIS — I10 ESSENTIAL HYPERTENSION: ICD-10-CM

## 2019-01-03 DIAGNOSIS — D57.1 SICKLE CELL RETINOPATHY WITHOUT CRISIS: ICD-10-CM

## 2019-01-03 DIAGNOSIS — N18.30 HYPERTENSIVE KIDNEY DISEASE WITH STAGE 3 CHRONIC KIDNEY DISEASE: ICD-10-CM

## 2019-01-03 PROCEDURE — 1101F PR PT FALLS ASSESS DOC 0-1 FALLS W/OUT INJ PAST YR: ICD-10-PCS | Mod: CPTII,S$GLB,, | Performed by: NURSE PRACTITIONER

## 2019-01-03 PROCEDURE — 99214 PR OFFICE/OUTPT VISIT, EST, LEVL IV, 30-39 MIN: ICD-10-PCS | Mod: GC,S$GLB,, | Performed by: HOSPITALIST

## 2019-01-03 PROCEDURE — 81002 PR URINALYSIS NONAUTO W/O SCOPE: ICD-10-PCS | Mod: S$GLB,,, | Performed by: NURSE PRACTITIONER

## 2019-01-03 PROCEDURE — 1101F PT FALLS ASSESS-DOCD LE1/YR: CPT | Mod: CPTII,S$GLB,, | Performed by: NURSE PRACTITIONER

## 2019-01-03 PROCEDURE — 3079F PR MOST RECENT DIASTOLIC BLOOD PRESSURE 80-89 MM HG: ICD-10-PCS | Mod: CPTII,S$GLB,, | Performed by: NURSE PRACTITIONER

## 2019-01-03 PROCEDURE — 3008F BODY MASS INDEX DOCD: CPT | Mod: CPTII,S$GLB,, | Performed by: NURSE PRACTITIONER

## 2019-01-03 PROCEDURE — 99499 UNLISTED E&M SERVICE: CPT | Mod: S$GLB,,, | Performed by: HOSPITALIST

## 2019-01-03 PROCEDURE — 99999 PR PBB SHADOW E&M-EST. PATIENT-LVL III: ICD-10-PCS | Mod: PBBFAC,,, | Performed by: NURSE PRACTITIONER

## 2019-01-03 PROCEDURE — 3077F PR MOST RECENT SYSTOLIC BLOOD PRESSURE >= 140 MM HG: ICD-10-PCS | Mod: CPTII,S$GLB,, | Performed by: NURSE PRACTITIONER

## 2019-01-03 PROCEDURE — 99999 PR PBB SHADOW E&M-EST. PATIENT-LVL V: ICD-10-PCS | Mod: PBBFAC,GC,, | Performed by: HOSPITALIST

## 2019-01-03 PROCEDURE — 99499 RISK ADDL DX/OHS AUDIT: ICD-10-PCS | Mod: S$GLB,,, | Performed by: HOSPITALIST

## 2019-01-03 PROCEDURE — 99214 PR OFFICE/OUTPT VISIT, EST, LEVL IV, 30-39 MIN: ICD-10-PCS | Mod: 25,S$GLB,, | Performed by: NURSE PRACTITIONER

## 2019-01-03 PROCEDURE — 99999 PR PBB SHADOW E&M-EST. PATIENT-LVL III: CPT | Mod: PBBFAC,,, | Performed by: NURSE PRACTITIONER

## 2019-01-03 PROCEDURE — 99214 OFFICE O/P EST MOD 30 MIN: CPT | Mod: GC,S$GLB,, | Performed by: HOSPITALIST

## 2019-01-03 PROCEDURE — 99999 PR PBB SHADOW E&M-EST. PATIENT-LVL V: CPT | Mod: PBBFAC,GC,, | Performed by: HOSPITALIST

## 2019-01-03 PROCEDURE — 3079F DIAST BP 80-89 MM HG: CPT | Mod: CPTII,S$GLB,, | Performed by: NURSE PRACTITIONER

## 2019-01-03 PROCEDURE — 81002 URINALYSIS NONAUTO W/O SCOPE: CPT | Mod: S$GLB,,, | Performed by: NURSE PRACTITIONER

## 2019-01-03 PROCEDURE — 99214 OFFICE O/P EST MOD 30 MIN: CPT | Mod: 25,S$GLB,, | Performed by: NURSE PRACTITIONER

## 2019-01-03 PROCEDURE — 3077F SYST BP >= 140 MM HG: CPT | Mod: CPTII,S$GLB,, | Performed by: NURSE PRACTITIONER

## 2019-01-03 PROCEDURE — 3008F PR BODY MASS INDEX (BMI) DOCUMENTED: ICD-10-PCS | Mod: CPTII,S$GLB,, | Performed by: NURSE PRACTITIONER

## 2019-01-03 RX ORDER — FUROSEMIDE 40 MG/1
60 TABLET ORAL 2 TIMES DAILY
Qty: 180 TABLET | Refills: 1 | Status: SHIPPED | OUTPATIENT
Start: 2019-01-03 | End: 2019-04-29 | Stop reason: SDUPTHER

## 2019-01-03 NOTE — TELEPHONE ENCOUNTER
Pt had RBC 5 and negative urine culture.  Consulted with Dr. Mejia. No work up needed since patient had negative work up 2017 for microscopic hematuria.

## 2019-01-03 NOTE — PROGRESS NOTES
Subjective:    Patient ID:  Alfa Chrsity III is a 63 y.o. male who presents for follow-up of Congestive Heart Failure (4 month f/u ); Dizziness; and Leg Swelling    HPI  Mr Christy is a 63 year old who is here for follow up for HFpEF.  Patient has PMH of HTN, HLD, DM, CKD III, CVA in 2016 with peripheral vision defect, p Afib on Apixaban, HFpEF and sickle cell trait.   Patient was seen last time in cardiology clinic in 8/2018, he was hypervolemic on that visit and his lasix was increased to 80 mgs in the AM and 40 mgs in the PM.  Since last visit he has been doing well, he is still able to go to the gym 3 times weekly where he walks for 30 minutes as 3 miles pace and do weight lifting. He denied AYALA or chest pain.   He reported leg swelling that has been stable for months. He denied Orthopnea or PND. Reported that he gets up 3 times nightly for diuresis, he takes PM lasix dose at 9 pm.   He doesn't add salt to his diet and doesn't eat canned food. He drinks whisky once every 2 weeks.   He is inconsistent in checking his BP and weight at home.   Patient denied palpitations, chest pain, or syncope.     Review of Systems   Constitution: Negative for decreased appetite, weight gain and weight loss.   HENT: Negative.  Negative for stridor and tinnitus.    Eyes: Positive for vision loss in right eye.   Cardiovascular: Positive for leg swelling. Negative for chest pain, cyanosis, dyspnea on exertion, irregular heartbeat, near-syncope, orthopnea, palpitations, paroxysmal nocturnal dyspnea and syncope.   Respiratory: Negative for cough, hemoptysis and shortness of breath.    Gastrointestinal: Negative for bloating, abdominal pain, melena, nausea and vomiting.   Genitourinary: Negative.    Neurological: Negative.         Objective:    Physical Exam   Constitutional: He is oriented to person, place, and time. He appears well-developed and well-nourished. No distress.   HENT:   Head: Normocephalic and atraumatic.   Eyes: Pupils  are equal, round, and reactive to light. Right eye exhibits no discharge.   Neck: Normal range of motion. Neck supple. JVD present.   JVD mid neck   Cardiovascular: Normal rate and regular rhythm. Exam reveals gallop and S4. Exam reveals no friction rub.   No murmur heard.  Pulmonary/Chest: Effort normal and breath sounds normal. He has no wheezes. He has no rales. He exhibits no tenderness.   Abdominal: Soft. Bowel sounds are normal. He exhibits no distension.   Musculoskeletal: He exhibits edema.   2+ pitting edema   Neurological: He is alert and oriented to person, place, and time.   Skin: Skin is warm and dry. He is not diaphoretic.     Vitals:    01/03/19 1037   BP: (!) 145/70   Pulse: 72           Assessment:       1. Chronic diastolic heart failure    2. History of CVA (cerebrovascular accident)    3. Paroxysmal atrial fibrillation    4. Hypercholesterolemia    5. Essential hypertension    6. 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 Hypertensive kidney disease with stage 3 chronic kidney disease         Plan:   -HFpEF: not well compensated:  Patient volume overloaded on exam with Leg swelling but no orthopnea or PND.   Likely due to inadequate diuresis given CKD.  Will increase lasix to 60 mgs BID.   Instructed to take the PM dose earlier before 5 pm.   Will check weights daily and document readings.     -HTN: not adequately controlled:   Goal < 130/80   Patient instructed to check BP readings at home and document readings.   Continue with Amlodipine 10 mgs   Continue with Losartan 100 mgs  Continue with Coreg 6.25 mgs  Will follow up in 2 weeks and review readings, if BP uncontrolled, will increase Coreg.     -HLD: Tcho 102/ HDL 35/ LDL 52:  At goal  Continue with Atorvastatin 40 mgs daily  Continue with high intensity statins given DM and CVA.    -Paroxsysmal Afib:  CHADSVASC 6.  Continue with Apixaban 5 mgs BID.  Continue with Coreg.     -CVA with homonymous hemianopsia:  MRA with < 50% stenosis of  left PCA  Continue with ASA 81 mgs and Atorvastatin 40 mgs    Follow up in 2 weeks with BMP.     Parveen Todd MD  Cardiology Fellow

## 2019-01-04 ENCOUNTER — HOSPITAL ENCOUNTER (OUTPATIENT)
Dept: RADIOLOGY | Facility: OTHER | Age: 64
Discharge: HOME | End: 2019-01-04
Attending: NURSE PRACTITIONER
Payer: MEDICARE

## 2019-01-04 DIAGNOSIS — R31.0 GROSS HEMATURIA: ICD-10-CM

## 2019-01-04 PROCEDURE — 76770 US EXAM ABDO BACK WALL COMP: CPT | Mod: TC

## 2019-01-04 PROCEDURE — 76770 US RETROPERITONEAL COMPLETE: ICD-10-PCS | Mod: 26,,, | Performed by: INTERNAL MEDICINE

## 2019-01-04 PROCEDURE — 76770 US EXAM ABDO BACK WALL COMP: CPT | Mod: 26,,, | Performed by: INTERNAL MEDICINE

## 2019-01-04 NOTE — PROGRESS NOTES
Subjective:       Patient ID: Alfa Christy III is a 63 y.o. male.    Chief Complaint: Gross hematuria    HPI: Alfa Christy III is a 63 y.o. Black or  male who presents today for gross hematuria. His last clinic visit was 12/18/18.    Today he presents to clinic for gross hematuria. He saw a blood clot this morning with first void then urine was clear. He then saw streaks of blood in urine since then. Denies dysuria or flank pain. Denies any change to baseline urinary symptoms. At last clinic visit, his microscopic UA resulted RBC 5 and negative urine culture but since he had negative work up in 2017, Dr. Mejia did not feel he needed another work up. He is scheduled for TRUS with biopsy 1/23/19 for elevated PSA. He denies smoking (current or previously), denies family history of bladder or kidney cancer, denies radiation or chemo to pelvis. He is unsure if he had a bladder or kidney stone in the past but has never had surgery to remove a stone.    Denies family history of prostate cancer.   He is taking flomax once a day. He reports nocturia x 2-3 as baseline, which is not bothersome to him. He voids every 3 hours during the daytime and denies urgency. He feels he is emptying his bladder.   Denies fever or chills.  FOS is good.     PSA - negative prostate biopsy in 2011 (PSA at this time was 5.4)  12/14/18 13.7  11/4/16 9.9  10/4/16 12.2    Pt went to ED on 6/15/18 for decreased urine flow and suprapubic pain and distention.   16 fr nieves catheter was inserted and 800 ml of clear yellow urine out.  He was discharged home with catheter.  Urinary retention possibly d/t enlarged prostate.   On previous cysto performed by Dr. Mejia, patient did have BPH.  On 6/20/18, VT passed.  On 7/18/18 PVR 7 ml, no infection and emptying his bladder  He was seen at ED 7/28/18 for penis pain (dysuria, hematuria and penile pain). He was given lidocaine and discharged home.  He was seen at ED 8/1/18 for  inability to urinate. Nieves catheter was placed and 700 ml clear yellow urine drained. He was discharged home with nieves catheter.  He was seen in clinic 8/2/18 for bladder spasms and burning r/t nieves catheter. He was given oxybutynin.  On 8/14/18 he passed voiding trial.    Cysto 7/5/17  Bilateral ureteral orifice were difficult to see.   No bladder tumors or lesions were seen.  No strictures were noted.  The prostate showed Significant hypertrophy.  There was Significant median lobe present. 6cm in length.    8/14/17   Procedure(s) Performed:   1.  Cystoscopy with bilateral retrograde pyelograms  2.  Fluoroscopy < 1 hour  Findings:   1.) Large, friable prostate  2.) Right ureter with distal J hooking, no filling defects  3.) Left ureter with tortuosity, no filling defects         Lab Results   Component Value Date    PSA 7.2 (H) 10/28/2015    PSA 5.62 (H) 12/27/2012    PSA 8.08 (H) 06/26/2012       Review of patient's allergies indicates:   Allergen Reactions    Cough syrup [guaifenesin] Other (See Comments)       Current Outpatient Medications   Medication Sig Dispense Refill    acetaminophen (TYLENOL) 500 MG tablet Take 1 tablet (500 mg total) by mouth every 6 (six) hours as needed for Pain.  0    amLODIPine (NORVASC) 10 MG tablet TAKE 1 TABLET BY MOUTH EVERY DAY 90 tablet 0    apixaban 5 mg Tab Take 1 tablet (5 mg total) by mouth 2 (two) times daily. 180 tablet 3    aspirin 81 MG Chew Take 1 tablet (81 mg total) by mouth once daily.  0    atorvastatin (LIPITOR) 40 MG tablet Take 1 tablet (40 mg total) by mouth once daily. 90 tablet 3    blood sugar diagnostic (ONETOUCH VERIO) Strp Check blood glucose readings 3 times a day. 100 strip 11    carvedilol (COREG) 6.25 MG tablet Take 1 tablet (6.25 mg total) by mouth 2 (two) times daily. 180 tablet 3    ciclopirox (PENLAC) 8 % Soln Apply topically nightly. 6.6 mL 11    ciprofloxacin HCl (CIPRO) 500 MG tablet Take 1 tablet (500 mg total) by mouth 2 (two)  "times daily. Take 1 pill at night before procedure. Take 1 pill 2 hours before procedure. Take 1 pill in evening after procedure. Take 1 pill in morning after procedure. 4 tablet 0    clotrimazole-betamethasone 1-0.05% (LOTRISONE) cream Apply topically 2 (two) times daily. To area of rash for 2-4 weeks 45 g 0    folic acid (FOLVITE) 400 MCG tablet Take 1,000 mcg by mouth once daily.       furosemide (LASIX) 40 MG tablet Take 1.5 tablets (60 mg total) by mouth 2 (two) times daily. 180 tablet 1    GLUCAGON EMERGENCY KIT, HUMAN, 1 mg injection INJECT 1 MG INTO THE MUSCLE AS NEEDED 1 kit 1    influenza (FLUZONE HIGH-DOSE) 180 mcg/0.5 mL vaccine Inject into the muscle. 0.5 mL 0    insulin aspart U-100 (NOVOLOG) 100 unit/mL InPn pen Inject 7 units w/ meals plus scale 150-200+1, 201-250+2, 251-300+3, 301-350+4. Max daily 35 units, 90 day. 3 Box 3    insulin detemir U-100 (LEVEMIR FLEXPEN) 100 unit/mL (3 mL) SubQ InPn pen Inject 12 Units into the skin every evening. 1 Box 3    lacosamide (VIMPAT) 50 mg Tab Take 1 tablet (50 mg total) by mouth every 12 (twelve) hours. Vim pat 50mg in am, 100 mg at night (Patient taking differently: Take 200 mg by mouth every 12 (twelve) hours. Vim pat 200mg in am, 200 mg at night)      levothyroxine (SYNTHROID) 200 MCG tablet TAKE 1 TABLET BY MOUTH BEFORE BREAKFAST 90 tablet 2    losartan (COZAAR) 100 MG tablet Take 1 tablet (100 mg total) by mouth once daily. 90 tablet 3    magnesium 30 mg Tab Take 1 tablet by mouth twice a week.       multivitamin capsule Take 1 capsule by mouth every Mon, Wed, Fri.       pen needle, diabetic (BD ULTRA-FINE MINI PEN NEEDLE) 31 gauge x 3/16" Ndle To use with insulin pens 4  times daily 360 each 3    SAW PALMETTO ORAL Take by mouth. mon , wed, fri      tamsulosin (FLOMAX) 0.4 mg Cap Take 1 capsule (0.4 mg total) by mouth every evening. 30 capsule 11    TRULICITY 0.75 mg/0.5 mL PnIj INJECT 0.5ML UNDER THE SKIN EVERY 7 DAYS 4 Syringe 4    " vitamin D 1000 units Tab Take 1,000 Units by mouth every Mon, Wed, Fri.      levetiracetam (KEPPRA) 750 MG Tab Take 2 tablets (1,500 mg total) by mouth 2 (two) times daily. 120 tablet 11     No current facility-administered medications for this visit.        Past Medical History:   Diagnosis Date    Allergy     BMI 31.0-31.9,adult 11/25/2014    CHF (congestive heart failure)     Chronic anticoagulation - pradaxa 11/10/2016    Chronic diastolic heart failure 11/20/2016    CKD (chronic kidney disease), stage III 9/23/2013    Controlled type 2 diabetes with neuropathy 12/16/2014    Coronary artery disease     Diabetes mellitus due to underlying condition with stage 3 chronic kidney disease, without long-term current use of insulin 11/2/2016    Elevated alkaline phosphatase level 8/1/2012    Elevated PSA     negative prostate biopsy 4/11    Erectile dysfunction associated with type 2 diabetes mellitus     Gallstones 3/20/2013    Generalized tonic-clonic seizure 11/2016    HTN (hypertension), benign 8/1/2012    Hypercholesterolemia 8/1/2012    Microcytic anemia 11/27/2013    Paroxysmal atrial fibrillation 9/23/2013    Postsurgical hypothyroidism 11/27/2013    Right homonymous hemianopsia 2/5/2016    Sickle cell trait     Stroke 1/27/2016    Thyroid cancer     multifocal with six lesions, largest 5mm, treated with surgery and radioactive iodine       Past Surgical History:   Procedure Laterality Date    BREAST SURGERY      cyst removal    COLONOSCOPY      COLONOSCOPY N/A 3/3/2015    Performed by [facility]ANDA Wellington [facility] (4[facility] FLR)    CYST REMOVAL      chest    CYSTOSCOPY WITH RETROGRADE PYELOGRAM Bilateral 8/14/2017    Performed by Anahi [facility]ia MD at [facility] OR 1ST FLR    PROSTATE BIOPSY  4/27/11    SKIN BIOPSY      THYROID SURGERY  8/26/09    VASCULAR SURGERY         Family History   Problem Relation Age of Onset    Heart disease Father     Diabetes Father      Hypertension Father     Diabetes Mother     Hypertension Mother     Heart disease Mother     Diabetes Brother     Cancer Sister     Thyroid disease Maternal Aunt     Clotting disorder Neg Hx     Melanoma Neg Hx        Review of Systems   Constitutional: Negative for chills and fever.   HENT: Negative for congestion.    Eyes: Negative for discharge.   Respiratory: Negative for chest tightness and shortness of breath.    Cardiovascular: Negative for chest pain and palpitations.   Gastrointestinal: Negative for nausea and vomiting.   Genitourinary: Positive for frequency and hematuria. Negative for decreased urine volume, difficulty urinating, discharge, dysuria, flank pain, penile pain, penile swelling, scrotal swelling, testicular pain and urgency.        See HPI   Musculoskeletal: Negative for gait problem.   Skin: Negative for rash.   Allergic/Immunologic: Negative for immunocompromised state.   Neurological: Negative for syncope and headaches.   Hematological: Negative for adenopathy.   Psychiatric/Behavioral: Negative for confusion.         All other systems were reviewed and were negative.    Objective:     Vitals:    01/03/19 1639   BP: (!) 149/81   Pulse: 74        Physical Exam   Nursing note and vitals reviewed.  Constitutional: He is oriented to person, place, and time. He appears well-developed and well-nourished.  Non-toxic appearance. He does not have a sickly appearance. He does not appear ill. No distress.   HENT:   Head: Normocephalic.   Eyes: Right eye exhibits no discharge. Left eye exhibits no discharge.   Neck: Normal range of motion.   Cardiovascular: Normal rate and regular rhythm.    Pulmonary/Chest: Effort normal. No respiratory distress.   Abdominal: Soft. He exhibits no distension. There is no CVA tenderness.   Genitourinary:   Genitourinary Comments: Prostate exam 9/19/18   Musculoskeletal: Normal range of motion.   Neurological: He is alert and oriented to person, place, and time.    Skin: Skin is warm and dry.     Psychiatric: He has a normal mood and affect. His behavior is normal. Judgment and thought content normal.         Lab Results   Component Value Date    CREATININE 2.0 (H) 12/14/2018     Lab Results   Component Value Date    EGFRNONAA 34.0 (A) 12/14/2018     Lab Results   Component Value Date    ESTGFRAFRICA 39.3 (A) 12/14/2018     POCT UA: + glucose, 250 blood, otherwise negative      Assessment:       1. Gross hematuria        Plan:     Alfa was seen today for hematuria.    Diagnoses and all orders for this visit:    Gross hematuria  -     US Retroperitoneal Complete (Kidney and; Future  -     POCT urinalysis, dipstick or tablet reag    -Since patient is now having gross hematuria, will proceed with work up.  Renal US ordered and scheduled d/t decreased kidney function.  12/14/18 creatinine 2.0 / GFR 39.3  -Will discuss with Dr. Mejia to have cysto with bilateral retrograde and prostate biopsy under anesthesia since patient unable to have CT urogram.  Will call patient to schedule.  Will message Dr. Parveen Todd in cardiology to discuss blood thinner prior to surgery  -Good water intake to keep urine dilute to prevent blood clots.  -RTC worsening of symptoms     I spent 25 minutes with the patient of which more than half was spent in coordinating the patient's care as well as in direct consultation with the patient in regards to our treatment and plan.

## 2019-01-07 ENCOUNTER — TELEPHONE (OUTPATIENT)
Dept: PEDIATRIC UROLOGY | Facility: CLINIC | Age: 64
End: 2019-01-07

## 2019-01-07 ENCOUNTER — TELEPHONE (OUTPATIENT)
Dept: UROLOGY | Facility: CLINIC | Age: 64
End: 2019-01-07

## 2019-01-07 DIAGNOSIS — N13.8 BPH WITH OBSTRUCTION/LOWER URINARY TRACT SYMPTOMS: Primary | ICD-10-CM

## 2019-01-07 DIAGNOSIS — N40.1 BPH WITH OBSTRUCTION/LOWER URINARY TRACT SYMPTOMS: Primary | ICD-10-CM

## 2019-01-07 DIAGNOSIS — R97.20 ELEVATED PSA: ICD-10-CM

## 2019-01-07 RX ORDER — FINASTERIDE 5 MG/1
5 TABLET, FILM COATED ORAL DAILY
Qty: 90 TABLET | Refills: 1 | Status: SHIPPED | OUTPATIENT
Start: 2019-01-07 | End: 2019-07-11 | Stop reason: SDUPTHER

## 2019-01-07 NOTE — TELEPHONE ENCOUNTER
Dr. Mejia recommends holding off on cysto and prostate biopsy at this time d/t blood thinners and high risk for recurrent CVA. Will cancel TRUS scheduled 1/23/19.  Will order psa total and free with no ejaculation 3-4 days prior.  Will start patient on finasteride to help with bleeding from prostate.  Will repeat psa in 3 months since finasteride should start to decrease it. If PSA does not decrease in 3 mths after starting finasteride, then will proceed with TRUS with biopsy.    Notified patient and his spouse of plan. Agree to plan.  Finasteride sent to pharmacy on file.  Discussed side effects, indications, and MOA for finasteride    PSA total and free and repeat PSA in 3 mths ordered. Sahra will call to schedule.

## 2019-01-07 NOTE — TELEPHONE ENCOUNTER
Called patient regarding renal US results and recommendations from Dr. Mejia.   No answer  Will return call later today

## 2019-01-07 NOTE — TELEPHONE ENCOUNTER
Left message on pt wife voicemail to call back to reschedule pt psa lab          ----- Message from Roxanna Barclay NP sent at 1/7/2019 12:09 PM CST -----  Regarding: PSA  Please call and schedule Mr. Christy for PSA total and free with no ejaculation 3-4 days prior (please schedule to complete this week).    Then please schedule repeat PSA in 3 mths.

## 2019-01-07 NOTE — TELEPHONE ENCOUNTER
Roxanna, lets do this first. Get renal ultrasound and repeat a psa total and free with no ejaculation 3-4 days prior.   Let's get this done this week and go from there.   Also, let's put him on finasteride. That will help with bleeding from the prostate.   His psa has been as high as 12 and then can back to the 9 range within a month before.   We could also just repeat psa in 3 months. Finasteride should start to decrease. If it doesn't, then concerning for prostate cancer.     Lab Results   Component Value Date    PSA 7.2 (H) 10/28/2015    PSA 5.62 (H) 12/27/2012    PSA 8.08 (H) 06/26/2012    PSA 9.32 (H) 02/06/2012    PSA 9.4 (H) 10/28/2011    PSA 5.4 (H) 12/09/2010    PSA 6.3 (H) 02/10/2009    PSA 8.5 (H) 11/05/2008    PSA 4.1 (H) 10/13/2008    PSA 2.0 11/22/2005    PSADIAG 13.7 (H) 12/14/2018    PSADIAG 9.9 (H) 11/04/2016    PSADIAG 12.2 (H) 10/04/2016    PSADIAG 6.4 (H) 04/18/2016    PSADIAG 5.9 (H) 02/11/2015    PSADIAG 6.8 (H) 12/30/2014    PSADIAG 6.0 (H) 02/06/2014    PSADIAG 5.6 (H) 01/30/2014    PSADIAG 4.7 (H) 12/30/2013    PSATOTAL 8.2 (H) 10/22/2014    PSAFREE 2.55 (H) 10/22/2014    PSAFREEPCT 31.10 10/22/2014         ----- Message from Roxanna Barclay NP sent at 1/7/2019  8:48 AM CST -----  Regarding: FW: Blood thinners  Here is what cardiology said in regards to blood thinner.   Who is your ? I will give them a paper to call and schedule with instructions for blood thinner.   Also what procedures do you want me to list on scheduling sheet??    Thanks  MICHELE  ----- Message -----  From: Parveen Todd MD  Sent: 1/5/2019   9:25 AM  To: Roxanna Barclay NP, Trell Leyva MD  Subject: RE: Blood thinners                               Yobani Mcknight,    Mr Christy is on Apixaban for CVA ppx in Afib, he is high risk for recurrent CVA. He has CKD stage III-IV. I recommend to Hold Apixaban 4 days prior to his scheduled procedure and restart it at same dose as soon as safe after the procedure. If  there is no contraindication, he should continue Aspirin.    Parveen    ----- Message -----  From: Roxanna Barclay NP  Sent: 1/4/2019   4:14 PM  To: Parveen Todd MD, Peyton QUEZADA Staff  Subject: Blood thinners                                   Dr. Todd,    Mr. Christy will need to be off blood thinners for a prostate biopsy and cysto with bilateral retrograde with Dr. Mejia for gross hematuria and elevated PSA.  When will he be able to come off of the blood thinners and for how long?    Thanks  Roxanna Barclay NP Urology

## 2019-01-08 ENCOUNTER — CLINICAL SUPPORT (OUTPATIENT)
Dept: DERMATOLOGY | Facility: CLINIC | Age: 64
End: 2019-01-08
Payer: MEDICARE

## 2019-01-08 ENCOUNTER — TELEPHONE (OUTPATIENT)
Dept: UROLOGY | Facility: CLINIC | Age: 64
End: 2019-01-08

## 2019-01-08 DIAGNOSIS — Z48.02 VISIT FOR SUTURE REMOVAL: Primary | ICD-10-CM

## 2019-01-08 PROCEDURE — 99999 PR PBB SHADOW E&M-EST. PATIENT-LVL I: ICD-10-PCS | Mod: PBBFAC,,,

## 2019-01-08 PROCEDURE — 99999 PR PBB SHADOW E&M-EST. PATIENT-LVL I: CPT | Mod: PBBFAC,,,

## 2019-01-08 PROCEDURE — 99024 POSTOP FOLLOW-UP VISIT: CPT | Mod: S$GLB,,, | Performed by: PATHOLOGY

## 2019-01-08 PROCEDURE — 99024 PR POST-OP FOLLOW-UP VISIT: ICD-10-PCS | Mod: S$GLB,,, | Performed by: PATHOLOGY

## 2019-01-08 NOTE — PROGRESS NOTES
Suture Removal note:  CC: 63 y.o. male patient is here for suture removal.         HPI: Patient is s/p punch biopsy of ovoid hyperpigmented, minimally erythematous scaly plaques; r/o psoriasis vs nummular eczema vs tinea vs LSC; r/o CTCL  from the left hip on 01/02/2019.  Patient reports no problems.    WOUND PE:  Sutures intact.  Wound healing well.  Good approximation of skin edges.  No signs or symptoms of infection.    IMPRESSION: Results in process    PLAN:  Sutures removed today.  Continue wound care.    RTC:PRN

## 2019-01-08 NOTE — TELEPHONE ENCOUNTER
Left message on pt home phone to return call to get lab scheduled    ----- Message from Eli Middleton LPN sent at 1/7/2019  3:30 PM CST -----    Preferred Name:   Alfa Macdonald III   Male, 63 y.o., 1955  Phone:   [phone] (M)  PCP:   Tyler Jasmine MD  Language:   English  Need Interp:   No  Allergies Last Reviewed:   01/07/19  Allergies:   Cough Syrup [Guaifenesin]  Health Maintenance:   Due  FYI  Blood Products Refusal  Primary Ins.:   [facility]  MRN:   [id number]  Pt Comm Pref:   Patient Portal, Mail  Patient Portal:   Active  Next Appt:   01/08/2019  My Sticky Note:     Specialty Comments:    PSA   Received: Today   Message Contents   Roxanna Barclay, TOBY Middleton LPN         Please call and schedule Mr. Christy for PSA total and free with no ejaculation 3-4 days prior (please schedule to complete this week).     Then please schedule repeat PSA in 3 mths.

## 2019-01-08 NOTE — TELEPHONE ENCOUNTER
Spoke with pt and scheduled his lab for 10a in [facility]. Instructed not to ejaculate 3-4 days prior. Voiced understanding            ----- Message from Eli Middleton LPN sent at 1/7/2019  3:30 PM CST -----    Preferred Name:   Alfa Macdonald III   Male, 63 y.o., 1955  Phone:   [phone] (M)  PCP:   Tyler Jasmine MD  Language:   English  Need Interp:   No  Allergies Last Reviewed:   01/07/19  Allergies:   Cough Syrup [Guaifenesin]  Health Maintenance:   Due  FYI  Blood Products Refusal  Primary Ins.:   [employer]  MRN:   [id number]  Pt Comm Pref:   Patient Portal, Mail  Patient Portal:   Active  Next Appt:   01/08/2019  My Sticky Note:     Specialty Comments:    PSA   Received: Today   Message Contents   TOBY Flores LPN         Please call and schedule Mr. Christy for PSA total and free with no ejaculation 3-4 days prior (please schedule to complete this week).     Then please schedule repeat PSA in 3 mths.

## 2019-01-09 DIAGNOSIS — N18.30 CKD (CHRONIC KIDNEY DISEASE), STAGE III: Chronic | ICD-10-CM

## 2019-01-09 RX ORDER — LOSARTAN POTASSIUM 100 MG/1
TABLET ORAL
Qty: 90 TABLET | Refills: 3 | Status: SHIPPED | OUTPATIENT
Start: 2019-01-09 | End: 2019-09-25 | Stop reason: ALTCHOICE

## 2019-01-11 ENCOUNTER — LAB VISIT (OUTPATIENT)
Dept: LAB | Facility: HOSPITAL | Age: 64
End: 2019-01-11
Attending: INTERNAL MEDICINE
Payer: MEDICARE

## 2019-01-11 DIAGNOSIS — R97.20 ELEVATED PSA: ICD-10-CM

## 2019-01-11 LAB
COMPLEXED PSA SERPL-MCNC: 13.2 NG/ML
PROSTATE SPECIFIC ANTIGEN, TOTAL: 12.8 NG/ML
PSA FREE MFR SERPL: 57.5 %
PSA FREE SERPL-MCNC: 7.36 NG/ML

## 2019-01-11 PROCEDURE — 84153 ASSAY OF PSA TOTAL: CPT | Mod: 91

## 2019-01-11 PROCEDURE — 36415 COLL VENOUS BLD VENIPUNCTURE: CPT | Mod: PO

## 2019-01-11 PROCEDURE — 84154 ASSAY OF PSA FREE: CPT

## 2019-01-14 ENCOUNTER — TELEPHONE (OUTPATIENT)
Dept: PEDIATRIC UROLOGY | Facility: CLINIC | Age: 64
End: 2019-01-14

## 2019-01-14 DIAGNOSIS — R97.20 ELEVATED PSA: Primary | ICD-10-CM

## 2019-01-14 NOTE — TELEPHONE ENCOUNTER
Reviewed PSA and PSA total and free with Dr. Mejia. Will continue with plan of finasteride 5 mg daily and repeat PSA in 3 months.    Called patient and his wife.  No answer  Left voicemail to return call to clinic.

## 2019-01-16 ENCOUNTER — LAB VISIT (OUTPATIENT)
Dept: LAB | Facility: HOSPITAL | Age: 64
End: 2019-01-16
Attending: INTERNAL MEDICINE
Payer: MEDICARE

## 2019-01-16 DIAGNOSIS — I50.32 CHRONIC DIASTOLIC HEART FAILURE: ICD-10-CM

## 2019-01-16 LAB
ANION GAP SERPL CALC-SCNC: 4 MMOL/L
BUN SERPL-MCNC: 34 MG/DL
CALCIUM SERPL-MCNC: 8.7 MG/DL
CHLORIDE SERPL-SCNC: 106 MMOL/L
CO2 SERPL-SCNC: 29 MMOL/L
CREAT SERPL-MCNC: 2.2 MG/DL
EST. GFR  (AFRICAN AMERICAN): 35 ML/MIN/1.73 M^2
EST. GFR  (NON AFRICAN AMERICAN): 30.3 ML/MIN/1.73 M^2
GLUCOSE SERPL-MCNC: 213 MG/DL
POTASSIUM SERPL-SCNC: 4.9 MMOL/L
SODIUM SERPL-SCNC: 139 MMOL/L

## 2019-01-16 PROCEDURE — 36415 COLL VENOUS BLD VENIPUNCTURE: CPT | Mod: PO

## 2019-01-16 PROCEDURE — 80048 BASIC METABOLIC PNL TOTAL CA: CPT

## 2019-01-17 ENCOUNTER — OFFICE VISIT (OUTPATIENT)
Dept: CARDIOLOGY | Facility: CLINIC | Age: 64
End: 2019-01-17
Payer: MEDICARE

## 2019-01-17 VITALS
WEIGHT: 222.44 LBS | HEIGHT: 70 IN | DIASTOLIC BLOOD PRESSURE: 74 MMHG | HEART RATE: 68 BPM | SYSTOLIC BLOOD PRESSURE: 152 MMHG | BODY MASS INDEX: 31.85 KG/M2

## 2019-01-17 DIAGNOSIS — I10 ESSENTIAL HYPERTENSION: ICD-10-CM

## 2019-01-17 DIAGNOSIS — I50.32 CHRONIC DIASTOLIC HEART FAILURE: Primary | ICD-10-CM

## 2019-01-17 DIAGNOSIS — N18.30 HYPERTENSIVE KIDNEY DISEASE WITH STAGE 3 CHRONIC KIDNEY DISEASE: ICD-10-CM

## 2019-01-17 DIAGNOSIS — E78.00 HYPERCHOLESTEROLEMIA: ICD-10-CM

## 2019-01-17 DIAGNOSIS — I48.0 PAROXYSMAL ATRIAL FIBRILLATION: Chronic | ICD-10-CM

## 2019-01-17 DIAGNOSIS — I12.9 HYPERTENSIVE KIDNEY DISEASE WITH STAGE 3 CHRONIC KIDNEY DISEASE: ICD-10-CM

## 2019-01-17 DIAGNOSIS — N18.30 CHRONIC KIDNEY DISEASE, STAGE III (MODERATE): ICD-10-CM

## 2019-01-17 DIAGNOSIS — Z86.73 HISTORY OF CVA (CEREBROVASCULAR ACCIDENT): ICD-10-CM

## 2019-01-17 PROCEDURE — 99999 PR PBB SHADOW E&M-EST. PATIENT-LVL V: ICD-10-PCS | Mod: PBBFAC,GC,, | Performed by: HOSPITALIST

## 2019-01-17 PROCEDURE — 99999 PR PBB SHADOW E&M-EST. PATIENT-LVL V: CPT | Mod: PBBFAC,GC,, | Performed by: HOSPITALIST

## 2019-01-17 PROCEDURE — 99214 OFFICE O/P EST MOD 30 MIN: CPT | Mod: GC,S$GLB,, | Performed by: HOSPITALIST

## 2019-01-17 PROCEDURE — 99214 PR OFFICE/OUTPT VISIT, EST, LEVL IV, 30-39 MIN: ICD-10-PCS | Mod: GC,S$GLB,, | Performed by: HOSPITALIST

## 2019-01-17 RX ORDER — CARVEDILOL 12.5 MG/1
12.5 TABLET ORAL 2 TIMES DAILY
Qty: 180 TABLET | Refills: 3 | Status: SHIPPED | OUTPATIENT
Start: 2019-01-17 | End: 2019-06-28 | Stop reason: SDUPTHER

## 2019-01-17 RX ORDER — AMLODIPINE BESYLATE 5 MG/1
5 TABLET ORAL DAILY
Qty: 30 TABLET | Refills: 11 | Status: SHIPPED | OUTPATIENT
Start: 2019-01-17 | End: 2019-07-11 | Stop reason: ALTCHOICE

## 2019-01-17 RX ORDER — CARVEDILOL 6.25 MG/1
12.5 TABLET ORAL 2 TIMES DAILY
Qty: 180 TABLET | Refills: 3 | Status: SHIPPED | OUTPATIENT
Start: 2019-01-17 | End: 2019-01-17

## 2019-01-17 NOTE — PROGRESS NOTES
Subjective:    Patient ID:  Alfa Christy III is a 63 y.o. male who presents for follow-up of HFpEF, Afib.     HPI  63 year old who is here for follow up for HFpEF.  Patient has PMH of HTN, HLD, DM, CKD III, CVA in 2016 with peripheral vision deficit, p Afib on Apixaban, HFpEF and sickle cell trait.   Patient was seen last time in cardiology clinic in 8/2018, he was hypervolemic on that visit and his lasix was increased to 80 mgs in the AM and 40 mgs in the PM.  Since last visit he has been doing well, he is still able to go to the gym 3 times weekly where he walks for 30 minutes as 3 miles pace and do weight lifting. He denied AYALA or chest pain.   He doesn't add salt to his diet and doesn't eat canned food. He drinks whisky once every 2 weeks.       He has negative HEIDI in 6/2018 with preserved EF and concentric remodeling.     Interval history:  Since last visit, patient furosemide was increased to 60 mgs BID and he started using compression stocking, his leg swelling has improved. He denied chest pain, dizziness, syncope. Denied orthopnea or PND. He hasn't been exercising as before because of the cold weather. Checks daily weight that has been stable over the last 2 weeks. Checks BP that ranges in 120-130 at home.     Patient was seen in Urology clinic for hematuria and planned for cystoscopy and possible biopsy after medication trial of Tamsulosi/ Finasteride.     BMP checked today. Cr 2.2, BUN 34, K 4.9    Review of Systems   Constitution: Negative for decreased appetite, fever, weight gain and weight loss.   HENT: Negative for congestion.    Eyes: Negative.    Cardiovascular: Positive for leg swelling. Negative for chest pain, dyspnea on exertion, irregular heartbeat, orthopnea and syncope.   Respiratory: Negative.  Negative for cough, hemoptysis, shortness of breath, sputum production and wheezing.    Musculoskeletal: Negative.    Gastrointestinal: Negative.    Genitourinary: Positive for hematuria.    Neurological: Negative.    Allergic/Immunologic: Negative.         Objective:    Physical Exam   Constitutional: He is oriented to person, place, and time. He appears well-developed and well-nourished. No distress.   HENT:   Head: Normocephalic and atraumatic.   Eyes: Pupils are equal, round, and reactive to light. Right eye exhibits no discharge.   Neck: Normal range of motion. Neck supple. No JVD present.   JVD mid neck   Cardiovascular: Normal rate and regular rhythm. Exam reveals gallop and S4. Exam reveals no friction rub.   No murmur heard.  Pulmonary/Chest: Effort normal and breath sounds normal. He has no wheezes. He has no rales. He exhibits no tenderness.   Abdominal: Soft. Bowel sounds are normal. He exhibits no distension.   Musculoskeletal: He exhibits edema.   2+ pitting edema   Neurological: He is alert and oriented to person, place, and time.   Skin: Skin is warm and dry. He is not diaphoretic.        Vitals:    01/17/19 0810   BP: (!) 152/74   Pulse: 68       Assessment:       1. Chronic diastolic heart failure    2. Paroxysmal atrial fibrillation    3. History of CVA (cerebrovascular accident)    4. Hypercholesterolemia    5. Essential hypertension    6. Hypertensive kidney disease with stage 3 chronic kidney disease    7. Chronic kidney disease, stage III (moderate)    8. Paroxysmal atrial fibrillation         Plan:   -HFpEF: Well compensated:  Volume status has improved since last visit.   Continue with lasix to 60 mgs BID.   Instructed to take the PM dose earlier before 5 pm.   Will check weights daily and document readings.      -HTN: Adequately controlled at home  Goal < 130/80 patient hasn't recieved his medications this morning  Will decrease Amlodipine to 5 mgs   Continue with Losartan 100 mgs  Will increase Coreg 12.5 mgs     -HLD: Tcho 102/ HDL 35/ LDL 52:  At goal  Continue with Atorvastatin 40 mgs daily  Continue with high intensity statins given DM and CVA.     -Paroxsysmal  "Afib:  CHADSVASC 6.  Continue with Apixaban 5 mgs BID.  Continue with Coreg.   Recommend holding Apixaban 4 days prior to elective procedure and restart it as soon as safer per the procedure team.  "2017 ACC expert consensus for managing anticoagulation perioperatively".  Explained to the patient the risks and benefits of anticoagulation.      -CVA with homonymous hemianopsia:  MRA with < 50% stenosis of left PCA  Continue with ASA 81 mgs and Atorvastatin 40 mgs     Follow up in 4 weeks with BMP.      Parveen Todd MD  Cardiology Fellow      "

## 2019-01-22 RX ORDER — DULAGLUTIDE 0.75 MG/.5ML
INJECTION, SOLUTION SUBCUTANEOUS
Qty: 2 SYRINGE | Refills: 4 | Status: SHIPPED | OUTPATIENT
Start: 2019-01-22 | End: 2019-06-10 | Stop reason: SDUPTHER

## 2019-01-30 DIAGNOSIS — L30.9 ECZEMA, UNSPECIFIED TYPE: Primary | ICD-10-CM

## 2019-01-30 RX ORDER — FLUOCINONIDE 0.5 MG/G
OINTMENT TOPICAL
Qty: 60 G | Refills: 3 | Status: SHIPPED | OUTPATIENT
Start: 2019-01-30 | End: 2019-02-04

## 2019-01-31 ENCOUNTER — TELEPHONE (OUTPATIENT)
Dept: NEUROLOGY | Facility: CLINIC | Age: 64
End: 2019-01-31

## 2019-01-31 NOTE — TELEPHONE ENCOUNTER
----- Message from Ruddy Marie sent at 1/31/2019 12:02 PM CST -----  Needs Advice    Reason for call: Mrs. Christy is asking to speak w/ the nurse about pt experinecing headaches after taking afternoon dosage of medications. Mrs. Christy said it happened Tuesday and yesterday.        Communication Preference: Mrs. Christy (wife) @ [phone]    Additional Information:

## 2019-01-31 NOTE — TELEPHONE ENCOUNTER
Pt has been having some breakthrough headaches. Scheduled to see Dr Matamoros. Mailed appointment letter

## 2019-02-04 ENCOUNTER — TELEPHONE (OUTPATIENT)
Dept: NEUROLOGY | Facility: CLINIC | Age: 64
End: 2019-02-04

## 2019-02-04 DIAGNOSIS — R56.9 SEIZURES: Primary | ICD-10-CM

## 2019-02-04 DIAGNOSIS — L30.9 ECZEMA, UNSPECIFIED TYPE: ICD-10-CM

## 2019-02-04 RX ORDER — BETAMETHASONE DIPROPIONATE 0.5 MG/G
CREAM TOPICAL 2 TIMES DAILY
Qty: 45 G | Refills: 3 | Status: SHIPPED | OUTPATIENT
Start: 2019-02-04 | End: 2021-01-01

## 2019-02-04 NOTE — TELEPHONE ENCOUNTER
----- Message from Tyler Palma sent at 2/4/2019  4:21 PM CST -----  Contact: Rut ( spouse ) @ [phone]  Caller requesting a return call regarding lab results an medication ( levetiracetam (KEPPRA) ? MG Tab)  changes, pls call to discuss

## 2019-02-07 ENCOUNTER — TELEPHONE (OUTPATIENT)
Dept: CARDIOLOGY | Facility: CLINIC | Age: 64
End: 2019-02-07

## 2019-02-07 NOTE — TELEPHONE ENCOUNTER
----- Message from Mary Villanueva sent at 2/7/2019  1:04 PM CST -----  Contact: Rut pt wife [phone]  Pt wife called to cb his 1 month f/u appt and she says before the call she saw him have a dizzy spell which lasted a few seconds.  LOV 1/17/19 Dr. Todd    Thanks

## 2019-02-07 NOTE — TELEPHONE ENCOUNTER
Dr. Todd, The pt's wife says that about 1:20 pm today the pt had a dizzy spell that lasted a few seconds. Says no other complaint except for feeling dizzy from the pt. Says BP was 118/69 and HR 70. Pt has a f/u scheduled for 2-14-19 with Dr. Kitchen. Please advise. Thanks, Joan

## 2019-02-14 ENCOUNTER — OFFICE VISIT (OUTPATIENT)
Dept: CARDIOLOGY | Facility: CLINIC | Age: 64
End: 2019-02-14
Payer: MEDICARE

## 2019-02-14 VITALS
HEART RATE: 73 BPM | DIASTOLIC BLOOD PRESSURE: 72 MMHG | SYSTOLIC BLOOD PRESSURE: 148 MMHG | BODY MASS INDEX: 32.03 KG/M2 | HEIGHT: 70 IN | WEIGHT: 223.75 LBS

## 2019-02-14 DIAGNOSIS — I50.30 HEART FAILURE WITH PRESERVED EJECTION FRACTION: Primary | ICD-10-CM

## 2019-02-14 PROCEDURE — 99999 PR PBB SHADOW E&M-EST. PATIENT-LVL III: ICD-10-PCS | Mod: PBBFAC,GC,, | Performed by: STUDENT IN AN ORGANIZED HEALTH CARE EDUCATION/TRAINING PROGRAM

## 2019-02-14 PROCEDURE — 1101F PR PT FALLS ASSESS DOC 0-1 FALLS W/OUT INJ PAST YR: ICD-10-PCS | Mod: CPTII,GC,S$GLB, | Performed by: STUDENT IN AN ORGANIZED HEALTH CARE EDUCATION/TRAINING PROGRAM

## 2019-02-14 PROCEDURE — 99999 PR PBB SHADOW E&M-EST. PATIENT-LVL III: CPT | Mod: PBBFAC,GC,, | Performed by: STUDENT IN AN ORGANIZED HEALTH CARE EDUCATION/TRAINING PROGRAM

## 2019-02-14 PROCEDURE — 3008F BODY MASS INDEX DOCD: CPT | Mod: CPTII,GC,S$GLB, | Performed by: STUDENT IN AN ORGANIZED HEALTH CARE EDUCATION/TRAINING PROGRAM

## 2019-02-14 PROCEDURE — 99214 PR OFFICE/OUTPT VISIT, EST, LEVL IV, 30-39 MIN: ICD-10-PCS | Mod: GC,S$GLB,, | Performed by: STUDENT IN AN ORGANIZED HEALTH CARE EDUCATION/TRAINING PROGRAM

## 2019-02-14 PROCEDURE — 3078F DIAST BP <80 MM HG: CPT | Mod: CPTII,GC,S$GLB, | Performed by: STUDENT IN AN ORGANIZED HEALTH CARE EDUCATION/TRAINING PROGRAM

## 2019-02-14 PROCEDURE — 1101F PT FALLS ASSESS-DOCD LE1/YR: CPT | Mod: CPTII,GC,S$GLB, | Performed by: STUDENT IN AN ORGANIZED HEALTH CARE EDUCATION/TRAINING PROGRAM

## 2019-02-14 PROCEDURE — 3077F PR MOST RECENT SYSTOLIC BLOOD PRESSURE >= 140 MM HG: ICD-10-PCS | Mod: CPTII,GC,S$GLB, | Performed by: STUDENT IN AN ORGANIZED HEALTH CARE EDUCATION/TRAINING PROGRAM

## 2019-02-14 PROCEDURE — 3008F PR BODY MASS INDEX (BMI) DOCUMENTED: ICD-10-PCS | Mod: CPTII,GC,S$GLB, | Performed by: STUDENT IN AN ORGANIZED HEALTH CARE EDUCATION/TRAINING PROGRAM

## 2019-02-14 PROCEDURE — 3078F PR MOST RECENT DIASTOLIC BLOOD PRESSURE < 80 MM HG: ICD-10-PCS | Mod: CPTII,GC,S$GLB, | Performed by: STUDENT IN AN ORGANIZED HEALTH CARE EDUCATION/TRAINING PROGRAM

## 2019-02-14 PROCEDURE — 99214 OFFICE O/P EST MOD 30 MIN: CPT | Mod: GC,S$GLB,, | Performed by: STUDENT IN AN ORGANIZED HEALTH CARE EDUCATION/TRAINING PROGRAM

## 2019-02-14 PROCEDURE — 3077F SYST BP >= 140 MM HG: CPT | Mod: CPTII,GC,S$GLB, | Performed by: STUDENT IN AN ORGANIZED HEALTH CARE EDUCATION/TRAINING PROGRAM

## 2019-02-14 NOTE — PROGRESS NOTES
Tyler Jasmine MD    Subjective:   Patient ID:      63 year old who is here for follow up for HFpEF.  Patient has PMH of HTN, HLD, DM, CKD III, CVA in 2016 with peripheral vision deficit, p Afib on Apixaban, HFpEF and sickle cell trait.   Seen in cardiology clinics recently, when his furosemide was increased to 60 mg BID for persistent LE edema.      He has negative HEIDI in 6/2018 with preserved EF and concentric remodeling.      Interval History:  He has been doing well. Denies angina, palpitations, syncope, or dyspnea. His LE edema has been improving with compressions socks and furosemide 60 PO BID but still feels has residual edema. Had brief lightheadedness upon standing and walking 10 days ago, his BP and HR were stable and it resolved after sitting. He works out three times weekly at the gym.    Review of Systems   Constitution: Negative for chills and decreased appetite.   HENT: Negative for congestion, ear discharge and ear pain.    Eyes: Negative for blurred vision and discharge.   Cardiovascular: Positive for leg swelling. Negative for chest pain, claudication, cyanosis and dyspnea on exertion.   Respiratory: Negative for cough and hemoptysis.    Endocrine: Negative for cold intolerance and heat intolerance.   Skin: Negative for color change and nail changes.   Musculoskeletal: Negative for arthritis and back pain.   Gastrointestinal: Negative for bloating and abdominal pain.   Genitourinary: Negative for bladder incontinence and decreased libido.   Neurological: Negative for aphonia and brief paralysis.       Past Medical History:   Diagnosis Date    Allergy     BMI 31.0-31.9,adult 11/25/2014    CHF (congestive heart failure)     Chronic anticoagulation - pradaxa 11/10/2016    Chronic diastolic heart failure 11/20/2016    CKD (chronic kidney disease), stage III 9/23/2013    Controlled type 2 diabetes with neuropathy 12/16/2014    Coronary artery disease     Diabetes mellitus due to underlying  condition with stage 3 chronic kidney disease, without long-term current use of insulin 11/2/2016    Elevated alkaline phosphatase level 8/1/2012    Elevated PSA     negative prostate biopsy 4/11    Erectile dysfunction associated with type 2 diabetes mellitus     Gallstones 3/20/2013    Generalized tonic-clonic seizure 11/2016    HTN (hypertension), benign 8/1/2012    Hypercholesterolemia 8/1/2012    Microcytic anemia 11/27/2013    Paroxysmal atrial fibrillation 9/23/2013    Postsurgical hypothyroidism 11/27/2013    Right homonymous hemianopsia 2/5/2016    Sickle cell trait     Stroke 1/27/2016    Thyroid cancer     multifocal with six lesions, largest 5mm, treated with surgery and radioactive iodine       Past Surgical History:   Procedure Laterality Date    BREAST SURGERY      cyst removal    COLONOSCOPY      COLONOSCOPY N/A 3/3/2015    Performed by CHANDA Wellington MD at [facility] (4TH FLR)    CYST REMOVAL      chest    CYSTOSCOPY WITH RETROGRADE PYELOGRAM Bilateral 8/14/2017    Performed by Anahi Mejia MD at [facility] OR 1ST FLR    PROSTATE BIOPSY  4/27/11    SKIN BIOPSY      THYROID SURGERY  8/26/09    VASCULAR SURGERY         Family History   Problem Relation Age of Onset    Heart disease Father     Diabetes Father     Hypertension Father     Diabetes Mother     Hypertension Mother     Heart disease Mother     Diabetes Brother     Cancer Sister     Thyroid disease Maternal Aunt     Clotting disorder Neg Hx     Melanoma Neg Hx          Current Outpatient Medications:     acetaminophen (TYLENOL) 500 MG tablet, Take 1 tablet (500 mg total) by mouth every 6 (six) hours as needed for Pain., Disp: , Rfl: 0    amLODIPine (NORVASC) 5 MG tablet, Take 1 tablet (5 mg total) by mouth once daily., Disp: 30 tablet, Rfl: 11    apixaban 5 mg Tab, Take 1 tablet (5 mg total) by mouth 2 (two) times daily., Disp: 180 tablet, Rfl: 3    atorvastatin (LIPITOR) 40 MG tablet, Take 1 tablet  (40 mg total) by mouth once daily., Disp: 90 tablet, Rfl: 3    betamethasone dipropionate (DIPROLENE) 0.05 % cream, Apply topically 2 (two) times daily., Disp: 45 g, Rfl: 3    blood sugar diagnostic (ONETOUCH VERIO) Strp, Check blood glucose readings 3 times a day., Disp: 100 strip, Rfl: 11    carvedilol (COREG) 12.5 MG tablet, Take 1 tablet (12.5 mg total) by mouth 2 (two) times daily., Disp: 180 tablet, Rfl: 3    ciclopirox (PENLAC) 8 % Soln, Apply topically nightly., Disp: 6.6 mL, Rfl: 11    ciprofloxacin HCl (CIPRO) 500 MG tablet, Take 1 tablet (500 mg total) by mouth 2 (two) times daily. Take 1 pill at night before procedure. Take 1 pill 2 hours before procedure. Take 1 pill in evening after procedure. Take 1 pill in morning after procedure., Disp: 4 tablet, Rfl: 0    clotrimazole-betamethasone 1-0.05% (LOTRISONE) cream, Apply topically 2 (two) times daily. To area of rash for 2-4 weeks, Disp: 45 g, Rfl: 0    finasteride (PROSCAR) 5 mg tablet, Take 1 tablet (5 mg total) by mouth once daily., Disp: 90 tablet, Rfl: 1    folic acid (FOLVITE) 400 MCG tablet, Take 1,000 mcg by mouth once daily. , Disp: , Rfl:     furosemide (LASIX) 40 MG tablet, Take 1.5 tablets (60 mg total) by mouth 2 (two) times daily., Disp: 180 tablet, Rfl: 1    GLUCAGON EMERGENCY KIT, HUMAN, 1 mg injection, INJECT 1 MG INTO THE MUSCLE AS NEEDED, Disp: 1 kit, Rfl: 1    influenza (FLUZONE HIGH-DOSE) 180 mcg/0.5 mL vaccine, Inject into the muscle., Disp: 0.5 mL, Rfl: 0    insulin aspart U-100 (NOVOLOG) 100 unit/mL InPn pen, Inject 7 units w/ meals plus scale 150-200+1, 201-250+2, 251-300+3, 301-350+4. Max daily 35 units, 90 day., Disp: 3 Box, Rfl: 3    insulin detemir U-100 (LEVEMIR FLEXPEN) 100 unit/mL (3 mL) SubQ InPn pen, Inject 12 Units into the skin every evening., Disp: 1 Box, Rfl: 3    lacosamide (VIMPAT) 50 mg Tab, Take 1 tablet (50 mg total) by mouth every 12 (twelve) hours. Vim pat 50mg in am, 100 mg at night (Patient  "taking differently: Take 200 mg by mouth every 12 (twelve) hours. Vim pat 200mg in am, 200 mg at night), Disp: , Rfl:     levothyroxine (SYNTHROID) 200 MCG tablet, TAKE 1 TABLET BY MOUTH BEFORE BREAKFAST, Disp: 90 tablet, Rfl: 2    losartan (COZAAR) 100 MG tablet, TAKE 1 TABLET BY MOUTH DAILY, Disp: 90 tablet, Rfl: 3    magnesium 30 mg Tab, Take 1 tablet by mouth twice a week. , Disp: , Rfl:     multivitamin capsule, Take 1 capsule by mouth every Mon, Wed, Fri. , Disp: , Rfl:     pen needle, diabetic (BD ULTRA-FINE MINI PEN NEEDLE) 31 gauge x 3/16" Ndle, To use with insulin pens 4  times daily, Disp: 360 each, Rfl: 3    SAW PALMETTO ORAL, Take by mouth. mon , wed, fri, Disp: , Rfl:     tamsulosin (FLOMAX) 0.4 mg Cap, Take 1 capsule (0.4 mg total) by mouth every evening., Disp: 30 capsule, Rfl: 11    TRULICITY 0.75 mg/0.5 mL PnIj, INJECT 1 PEN UNDER THE SKIN EVERY 7 DAYS, Disp: 2 Syringe, Rfl: 4    vitamin D 1000 units Tab, Take 1,000 Units by mouth every Mon, Wed, Fri., Disp: , Rfl:     aspirin 81 MG Chew, Take 1 tablet (81 mg total) by mouth once daily., Disp: , Rfl: 0    levetiracetam (KEPPRA) 750 MG Tab, Take 2 tablets (1,500 mg total) by mouth 2 (two) times daily., Disp: 120 tablet, Rfl: 11  Objective:   BP (!) 148/72 (BP Location: Left arm, Patient Position: Sitting, BP Method: Large (Automatic))   Pulse 73   Ht 5' 10" (1.778 m)   Wt 101.5 kg (223 lb 12.3 oz)   BMI 32.11 kg/m²      Physical Exam   Constitutional: He is oriented to person, place, and time. He appears well-developed and well-nourished.   HENT:   Head: Normocephalic and atraumatic.   Nose: Nose normal.   Mouth/Throat: No oropharyngeal exudate.   Eyes: Right eye exhibits no discharge. Left eye exhibits no discharge. No scleral icterus.   Neck: Normal range of motion. Neck supple. No JVD present.   Cardiovascular: Normal rate, regular rhythm, S1 normal and S2 normal. Exam reveals no gallop, no S3, no S4, no distant heart sounds, no " friction rub, no midsystolic click and no opening snap.   No murmur heard.  Pulses:       Radial pulses are 2+ on the right side, and 2+ on the left side.        Femoral pulses are 2+ on the right side, and 2+ on the left side.  Pulmonary/Chest: Effort normal and breath sounds normal. No respiratory distress. He has no wheezes. He has no rales. He exhibits no tenderness.   Abdominal: Soft. Bowel sounds are normal. He exhibits no distension. There is no tenderness. There is no rebound.   Musculoskeletal: Normal range of motion. He exhibits edema. He exhibits no tenderness or deformity.   Lymphadenopathy:     He has no cervical adenopathy.   Neurological: He is alert and oriented to person, place, and time. No cranial nerve deficit.   Skin: Skin is warm and dry.   Psychiatric: He has a normal mood and affect. His behavior is normal.       Lab Results   Component Value Date     01/16/2019    K 4.9 01/16/2019     01/16/2019    CO2 29 01/16/2019    BUN 34 (H) 01/16/2019    CREATININE 2.2 (H) 01/16/2019    ANIONGAP 4 (L) 01/16/2019     Lab Results   Component Value Date    HGBA1C 5.4 07/31/2018     Lab Results   Component Value Date     (H) 06/27/2018     (H) 05/31/2018     (H) 04/26/2018       Lab Results   Component Value Date    WBC 9.61 11/28/2018    HGB 11.3 (L) 11/28/2018    HCT 32.2 (L) 11/28/2018     11/28/2018    GRAN 5.0 11/28/2018    GRAN 52.1 11/28/2018     Lab Results   Component Value Date    CHOL 102 (L) 07/31/2018    HDL 35 (L) 07/31/2018    LDLCALC 52.4 (L) 07/31/2018    TRIG 73 07/31/2018        Assessment:     1. Chronic diastolic heart failure    2. Paroxysmal atrial fibrillation    3. History of CVA (cerebrovascular accident)    4. Hypercholesterolemia    5. Essential hypertension    6. Hypertensive kidney disease with stage 3 chronic kidney disease    7. Chronic kidney disease, stage III (moderate)    8. Paroxysmal atrial fibrillation      Plan:   -HFpEF: Well  compensated:  Volume status has been stable for the last weeks but still has mild BL LE edema   Increase lasix to 80 QAM and 60 QPM, small increase as patient is concerned about dehydration given his sickle cell trait   Instructed to take the PM dose earlier before 5 pm.   Will check weights daily and document readings.      -HTN: Adequately controlled at home  Goal < 130/80  Continue with Losartan 100, amlodipine 5, carvedilol 12.5     -HLD: Tcho 102/ HDL 35/ LDL 52:  At goal  Continue with Atorvastatin 40 mgs daily  Continue with high intensity statins given DM and CVA.     -Paroxsysmal Afib:  CHADSVASC 6.  Continue with Apixaban 5 mgs BID.  Continue with Coreg.      -CVA with homonymous hemianopsia:  MRA with < 50% stenosis of left PCA  Continue with ASA 81 mgs and Atorvastatin 40 mgs    Seen w Dr Conn

## 2019-02-15 ENCOUNTER — LAB VISIT (OUTPATIENT)
Dept: LAB | Facility: HOSPITAL | Age: 64
End: 2019-02-15
Attending: INTERNAL MEDICINE
Payer: MEDICARE

## 2019-02-15 DIAGNOSIS — I50.32 CHRONIC DIASTOLIC HEART FAILURE: ICD-10-CM

## 2019-02-15 LAB
ANION GAP SERPL CALC-SCNC: 7 MMOL/L
BUN SERPL-MCNC: 22 MG/DL
CALCIUM SERPL-MCNC: 8.5 MG/DL
CHLORIDE SERPL-SCNC: 106 MMOL/L
CO2 SERPL-SCNC: 25 MMOL/L
CREAT SERPL-MCNC: 2 MG/DL
EST. GFR  (AFRICAN AMERICAN): 39.3 ML/MIN/1.73 M^2
EST. GFR  (NON AFRICAN AMERICAN): 34 ML/MIN/1.73 M^2
GLUCOSE SERPL-MCNC: 154 MG/DL
POTASSIUM SERPL-SCNC: 4.9 MMOL/L
SODIUM SERPL-SCNC: 138 MMOL/L

## 2019-02-15 PROCEDURE — 80048 BASIC METABOLIC PNL TOTAL CA: CPT

## 2019-02-15 PROCEDURE — 36415 COLL VENOUS BLD VENIPUNCTURE: CPT | Mod: PO

## 2019-02-15 NOTE — PROGRESS NOTES
I have seen, taken a history and examined the patient. I agree with the evaluation and management plan

## 2019-02-18 ENCOUNTER — TELEPHONE (OUTPATIENT)
Dept: NEUROLOGY | Facility: CLINIC | Age: 64
End: 2019-02-18

## 2019-02-18 NOTE — TELEPHONE ENCOUNTER
----- Message from Tyler Palma sent at 2/18/2019 10:41 AM CST -----  Contact: Rut ( spouse ) @ [phone]  Caller calling to get an update on the medication refill request for ( levetiracetam (KEPPRA) 750 MG Tab )     [facility]/pharmacy #[id number] - [place], [place] - [address] [facility] [facility]  [address] [address]  Phone: [phone] Fax: [phone]

## 2019-02-20 ENCOUNTER — HOSPITAL ENCOUNTER (OUTPATIENT)
Dept: ENDOCRINOLOGY | Facility: CLINIC | Age: 64
Discharge: HOME | End: 2019-02-20
Attending: INTERNAL MEDICINE
Payer: MEDICARE

## 2019-02-20 ENCOUNTER — OFFICE VISIT (OUTPATIENT)
Dept: NEUROLOGY | Facility: CLINIC | Age: 64
End: 2019-02-20
Payer: MEDICARE

## 2019-02-20 VITALS — WEIGHT: 224 LBS | HEIGHT: 70 IN | BODY MASS INDEX: 32.07 KG/M2

## 2019-02-20 DIAGNOSIS — Z85.850 HISTORY OF THYROID CANCER: ICD-10-CM

## 2019-02-20 DIAGNOSIS — E89.0 POSTSURGICAL HYPOTHYROIDISM: Primary | ICD-10-CM

## 2019-02-20 DIAGNOSIS — G40.209 COMPLEX PARTIAL SEIZURES EVOLVING TO GENERALIZED TONIC-CLONIC SEIZURES: Primary | ICD-10-CM

## 2019-02-20 PROCEDURE — 3008F PR BODY MASS INDEX (BMI) DOCUMENTED: ICD-10-PCS | Mod: CPTII,S$GLB,, | Performed by: PSYCHIATRY & NEUROLOGY

## 2019-02-20 PROCEDURE — 76536 US EXAM OF HEAD AND NECK: CPT | Mod: 26,S$GLB,, | Performed by: INTERNAL MEDICINE

## 2019-02-20 PROCEDURE — 99214 OFFICE O/P EST MOD 30 MIN: CPT | Mod: S$GLB,,, | Performed by: PSYCHIATRY & NEUROLOGY

## 2019-02-20 PROCEDURE — 99214 PR OFFICE/OUTPT VISIT, EST, LEVL IV, 30-39 MIN: ICD-10-PCS | Mod: S$GLB,,, | Performed by: PSYCHIATRY & NEUROLOGY

## 2019-02-20 PROCEDURE — 99999 PR PBB SHADOW E&M-EST. PATIENT-LVL III: CPT | Mod: PBBFAC,,, | Performed by: PSYCHIATRY & NEUROLOGY

## 2019-02-20 PROCEDURE — 99999 PR PBB SHADOW E&M-EST. PATIENT-LVL III: ICD-10-PCS | Mod: PBBFAC,,, | Performed by: PSYCHIATRY & NEUROLOGY

## 2019-02-20 PROCEDURE — 76536 US SOFT TISSUE HEAD NECK THYROID: ICD-10-PCS | Mod: 26,S$GLB,, | Performed by: INTERNAL MEDICINE

## 2019-02-20 PROCEDURE — 99499 RISK ADDL DX/OHS AUDIT: ICD-10-PCS | Mod: S$GLB,,, | Performed by: PSYCHIATRY & NEUROLOGY

## 2019-02-20 PROCEDURE — 3008F BODY MASS INDEX DOCD: CPT | Mod: CPTII,S$GLB,, | Performed by: PSYCHIATRY & NEUROLOGY

## 2019-02-20 PROCEDURE — 1101F PR PT FALLS ASSESS DOC 0-1 FALLS W/OUT INJ PAST YR: ICD-10-PCS | Mod: CPTII,S$GLB,, | Performed by: PSYCHIATRY & NEUROLOGY

## 2019-02-20 PROCEDURE — 99499 UNLISTED E&M SERVICE: CPT | Mod: S$GLB,,, | Performed by: PSYCHIATRY & NEUROLOGY

## 2019-02-20 PROCEDURE — 1101F PT FALLS ASSESS-DOCD LE1/YR: CPT | Mod: CPTII,S$GLB,, | Performed by: PSYCHIATRY & NEUROLOGY

## 2019-02-20 RX ORDER — LEVETIRACETAM 750 MG/1
1500 TABLET ORAL 2 TIMES DAILY
Qty: 360 TABLET | Refills: 3 | Status: SHIPPED | OUTPATIENT
Start: 2019-02-20 | End: 2019-09-12 | Stop reason: SDUPTHER

## 2019-02-20 NOTE — PATIENT INSTRUCTIONS
CONTINUE KEPPRA 1500MG TWICE DAILY AND VIMPAT 200MG TWICE DAILY    TAKE MAGNESIUM 400MG DAILY FOR HEADACHES    TAKE TYLENOL AS NEEDED FOR HEADACHES

## 2019-02-20 NOTE — LETTER
February 21, 2019      Keara Santiago MD  [address]  [id number]  [place] [address]           [facility]  [address]  [place] [address]  Phone: [phone]  Fax: [phone]          Patient: Alfa Christy III   MR Number: [id number]   YOB: 1955   Date of Visit: 2/20/2019       Dear Dr. Keara Santiago:    Thank you for referring Alfa Christy to me for evaluation. Attached you will find relevant portions of my assessment and plan of care.    If you have questions, please do not hesitate to call me. I look forward to following Alfa Christy along with you.    Sincerely,    Contreras Matamoros MD    Enclosure  CC:  No Recipients    If you would like to receive this communication electronically, please contact [email or web address] or [phone] to request more information on to be Link access.    For providers and/or their staff who would like to refer a patient to [facility], please contact us through our one-stop-shop provider referral line, [facility], at [phone].    If you feel you have received this communication in error or would no longer like to receive these types of communications, please e-mail [email or web address]

## 2019-02-25 ENCOUNTER — TELEPHONE (OUTPATIENT)
Dept: DERMATOLOGY | Facility: CLINIC | Age: 64
End: 2019-02-25

## 2019-02-25 DIAGNOSIS — L30.9 ECZEMA, UNSPECIFIED TYPE: ICD-10-CM

## 2019-02-25 RX ORDER — FLUOCINONIDE 0.5 MG/G
OINTMENT TOPICAL
Qty: 60 G | Refills: 3 | Status: SHIPPED | OUTPATIENT
Start: 2019-02-25 | End: 2020-01-01 | Stop reason: SDUPTHER

## 2019-02-25 NOTE — TELEPHONE ENCOUNTER
----- Message from Sangeetha tGz MD sent at 2/25/2019 12:36 PM CST -----  Contact: Patients wife  Sent fluocinonide ointment prescription to [facility].  Jenni,  bv  ----- Message -----  From: Yadiel Taylor LPN  Sent: 2/25/2019  12:22 PM  To: Sangeetha Gtz MD     Please advise,     Pt wife requesting Fluocinonide ointment. Pt states spots are not getting better and are still the same.    jt  ----- Message -----  From: Janine Baez  Sent: 2/25/2019  12:01 PM  To: Uliss[facility] Vivas Staff    Needs Advice    Reason for call: Patients wife is calling to speak with nurse about patients prescription. States that pharmacy will not fill w/o providers approval.        Communication Preference: [phone]

## 2019-02-26 ENCOUNTER — PATIENT MESSAGE (OUTPATIENT)
Dept: ENDOCRINOLOGY | Facility: CLINIC | Age: 64
End: 2019-02-26

## 2019-02-26 NOTE — ASSESSMENT & PLAN NOTE
ddx includes: TIA vs seizures vs metabolic encephalopathy  CT head, MRI and MRA brain with on signs of acute ischemia  TSH elevated to 7 (hx of thyroid ca surgery and rad iodine), T4 wnl  Pending 24hr EEG  Neurology following appreciate assistance.   keppra dosing doubled to 1500mg bid po     normal (ped)...

## 2019-03-01 ENCOUNTER — OFFICE VISIT (OUTPATIENT)
Dept: CARDIOLOGY | Facility: CLINIC | Age: 64
End: 2019-03-01
Payer: MEDICARE

## 2019-03-01 VITALS
DIASTOLIC BLOOD PRESSURE: 63 MMHG | SYSTOLIC BLOOD PRESSURE: 123 MMHG | HEIGHT: 70 IN | HEART RATE: 57 BPM | WEIGHT: 220.44 LBS | BODY MASS INDEX: 31.56 KG/M2

## 2019-03-01 DIAGNOSIS — Z79.01 CHRONIC ANTICOAGULATION: Chronic | ICD-10-CM

## 2019-03-01 DIAGNOSIS — E78.00 HYPERCHOLESTEROLEMIA: ICD-10-CM

## 2019-03-01 DIAGNOSIS — N18.30 CHRONIC KIDNEY DISEASE, STAGE III (MODERATE): ICD-10-CM

## 2019-03-01 DIAGNOSIS — I48.0 PAROXYSMAL ATRIAL FIBRILLATION: Chronic | ICD-10-CM

## 2019-03-01 DIAGNOSIS — I50.32 CHRONIC DIASTOLIC HEART FAILURE: Primary | ICD-10-CM

## 2019-03-01 PROCEDURE — 99214 PR OFFICE/OUTPT VISIT, EST, LEVL IV, 30-39 MIN: ICD-10-PCS | Mod: GC,S$GLB,, | Performed by: HOSPITALIST

## 2019-03-01 PROCEDURE — 99999 PR PBB SHADOW E&M-EST. PATIENT-LVL V: ICD-10-PCS | Mod: PBBFAC,GC,, | Performed by: HOSPITALIST

## 2019-03-01 PROCEDURE — 99999 PR PBB SHADOW E&M-EST. PATIENT-LVL V: CPT | Mod: PBBFAC,GC,, | Performed by: HOSPITALIST

## 2019-03-01 PROCEDURE — 99214 OFFICE O/P EST MOD 30 MIN: CPT | Mod: GC,S$GLB,, | Performed by: HOSPITALIST

## 2019-03-01 RX ORDER — LACOSAMIDE 200 MG/1
1 TABLET, FILM COATED ORAL 2 TIMES DAILY
Refills: 4 | COMMUNITY
Start: 2019-02-15 | End: 2019-10-03

## 2019-03-01 NOTE — PROGRESS NOTES
Subjective:    Patient ID:  Alfa Christy III is a 63 y.o. male who presents for follow-up of Heart failure with preserved ejection fraction (4-6 weeks fu)      HPI    63 year old who is here for follow up for HFpEF.  Patient has PMH of HTN, HLD, DM, CKD III, CVA in 2016 with peripheral vision deficit, p Afib on Apixaban, HFpEF and sickle cell trait.   Patient has been followed in the clinic for HFpEF, his Lasix was increased to 60 BID.  Since last visit he has been doing well, he is still able to go to the gym 3 times weekly where he walks for 30 minutes as 3 miles pace and do weight lifting. He denied AYALA or chest pain.   He doesn't add salt to his diet and doesn't eat canned food. He drinks whisky once every 2 weeks.    He has negative HEIDI in 6/2018 with preserved EF and concentric remodeling.   Patients hasn't been drinking alcoholic drinks, his drinking appears related to watching foot ball.      Interval history:  Since last visit, patient furosemide was increased to 60 mgs BID and he started using compression stocking, his leg swelling has improved. He is still able to exercise 3 times weekly in the gym without limitation.   On last visit with Dr Xie, his lasix was increased to 80 mgs in the AM and 60 mgs in the PM. His wife was concerned about dehydration given his sickle cell trait and didn't follow recommendations.       Review of Systems   Constitution: Negative for decreased appetite, diaphoresis, fever, night sweats, weight gain and weight loss.   HENT: Negative for congestion, nosebleeds, sore throat and tinnitus.    Eyes: Negative for blurred vision, double vision, photophobia and visual disturbance.   Cardiovascular: Negative for chest pain, claudication, dyspnea on exertion, irregular heartbeat, leg swelling, orthopnea, palpitations, paroxysmal nocturnal dyspnea and syncope.   Respiratory: Negative for cough, shortness of breath, snoring and wheezing.    Endocrine: Negative for cold intolerance,  heat intolerance, polydipsia, polyphagia and polyuria.   Hematologic/Lymphatic: Does not bruise/bleed easily.   Skin: Negative for color change and rash.   Musculoskeletal: Negative for joint pain, muscle weakness, myalgias and stiffness.   Gastrointestinal: Negative for abdominal pain, change in bowel habit, heartburn, hematemesis, hematochezia, melena, nausea and vomiting.   Genitourinary: Negative for bladder incontinence, dysuria, frequency, hematuria, hesitancy and urgency.   Neurological: Negative for dizziness, focal weakness, headaches, numbness, paresthesias, tremors and vertigo.   Psychiatric/Behavioral: Negative for depression. The patient does not have insomnia and is not nervous/anxious.         Objective:    Physical Exam   Constitutional: He is oriented to person, place, and time. He appears well-developed and well-nourished. No distress.   HENT:   Head: Normocephalic and atraumatic.   Eyes: Conjunctivae are normal. Pupils are equal, round, and reactive to light.   Neck: No JVD present.   Cardiovascular: Normal rate, regular rhythm, normal heart sounds and intact distal pulses. Exam reveals no gallop and no friction rub.   No murmur heard.  Pulmonary/Chest: Effort normal and breath sounds normal. No respiratory distress. He has no wheezes. He has no rales.   Abdominal: Soft. Bowel sounds are normal. There is no tenderness. There is no rebound.   Musculoskeletal: He exhibits edema.   1 + edema   Neurological: He is alert and oriented to person, place, and time.   Skin: Skin is warm. He is not diaphoretic.         Vitals:    03/01/19 0920   BP: 123/63   Pulse: (!) 57       Assessment:       1. Chronic diastolic heart failure    2. Paroxysmal atrial fibrillation    3. Hypercholesterolemia    4. Chronic kidney disease, stage III (moderate)    5. Chronic anticoagulation - pradaxa         Plan:   -HFpEF: Well compensated:  Appears close to euvolemic.   Continue with lasix to 60 mgs BID.   Continue to check  "daily weights and document readings.      -HTN: Adequately controlled   Goal < 130/80  Continue with Amlodipine to 5 mgs   Continue with Losartan 100 mgs  Will increase Coreg 12.5 mgs     -HLD: Tcho 102/ HDL 35/ LDL 52:  At goal  Continue with Atorvastatin 40 mgs daily  Continue with high intensity statins given DM and CVA.     -Paroxsysmal Afib:  CHADSVASC 6.  Continue with Apixaban 5 mgs BID.  Continue with Coreg.   Recommend holding Apixaban 4 days prior to elective procedure and restart it as soon as safer per the procedure team.  "2017 ACC expert consensus for managing anticoagulation perioperatively".  Explained to the patient the risks and benefits of anticoagulation.      -CVA with homonymous hemianopsia:  MRA with < 50% stenosis of left PCA  Continue with ASA 81 mgs and Atorvastatin 40 mgs     Follow up in 3 months.     Parveen Todd MD  Cardiology Fellow        "

## 2019-03-08 ENCOUNTER — NURSE TRIAGE (OUTPATIENT)
Dept: ADMINISTRATIVE | Facility: CLINIC | Age: 64
End: 2019-03-08

## 2019-03-09 ENCOUNTER — HOSPITAL ENCOUNTER (EMERGENCY)
Facility: HOSPITAL | Age: 64
Discharge: HOME | End: 2019-03-09
Attending: EMERGENCY MEDICINE
Payer: MEDICARE

## 2019-03-09 VITALS
OXYGEN SATURATION: 98 % | WEIGHT: 210 LBS | HEART RATE: 64 BPM | HEIGHT: 70 IN | SYSTOLIC BLOOD PRESSURE: 168 MMHG | RESPIRATION RATE: 17 BRPM | TEMPERATURE: 99 F | DIASTOLIC BLOOD PRESSURE: 77 MMHG | BODY MASS INDEX: 30.06 KG/M2

## 2019-03-09 DIAGNOSIS — R42 LIGHTHEADEDNESS: ICD-10-CM

## 2019-03-09 DIAGNOSIS — T88.7XXA MEDICATION SIDE EFFECT: Primary | ICD-10-CM

## 2019-03-09 LAB
ALBUMIN SERPL BCP-MCNC: 3.5 G/DL
ALP SERPL-CCNC: 120 U/L
ALT SERPL W/O P-5'-P-CCNC: 16 U/L
ANION GAP SERPL CALC-SCNC: 7 MMOL/L
AST SERPL-CCNC: 26 U/L
BILIRUB SERPL-MCNC: 1 MG/DL
BUN SERPL-MCNC: 25 MG/DL
CALCIUM SERPL-MCNC: 9 MG/DL
CHLORIDE SERPL-SCNC: 107 MMOL/L
CO2 SERPL-SCNC: 25 MMOL/L
CREAT SERPL-MCNC: 2 MG/DL
EST. GFR  (AFRICAN AMERICAN): 39.3 ML/MIN/1.73 M^2
EST. GFR  (NON AFRICAN AMERICAN): 34 ML/MIN/1.73 M^2
GLUCOSE SERPL-MCNC: 144 MG/DL
POTASSIUM SERPL-SCNC: 4.5 MMOL/L
PROT SERPL-MCNC: 7.2 G/DL
SODIUM SERPL-SCNC: 139 MMOL/L

## 2019-03-09 PROCEDURE — 93005 ELECTROCARDIOGRAM TRACING: CPT

## 2019-03-09 PROCEDURE — 93010 EKG 12-LEAD: ICD-10-PCS | Mod: ,,, | Performed by: INTERNAL MEDICINE

## 2019-03-09 PROCEDURE — 80053 COMPREHEN METABOLIC PANEL: CPT

## 2019-03-09 PROCEDURE — 99284 EMERGENCY DEPT VISIT MOD MDM: CPT

## 2019-03-09 PROCEDURE — 93010 ELECTROCARDIOGRAM REPORT: CPT | Mod: ,,, | Performed by: INTERNAL MEDICINE

## 2019-03-09 PROCEDURE — 99285 EMERGENCY DEPT VISIT HI MDM: CPT | Mod: ,,, | Performed by: EMERGENCY MEDICINE

## 2019-03-09 PROCEDURE — 99285 PR EMERGENCY DEPT VISIT,LEVEL V: ICD-10-PCS | Mod: ,,, | Performed by: EMERGENCY MEDICINE

## 2019-03-09 NOTE — ED PROVIDER NOTES
Encounter Date: 3/9/2019    SCRIBE #1 NOTE: I, Fuad Ontiveros, am scribing for, and in the presence of,  Josefina Gutiérrez MD. I have scribed the following portions of the note - the EKG reading. Other sections scribed: HPI, ROS, PE.       History     Chief Complaint   Patient presents with    Dizziness     states dizzines after taking meds/     Alfa Westley HAGAN is a 63 y.o. male who has a past medical history of allergy, BMI 31.0-31.9,adult CHF, chronic anticoagulation - pradaxa, chronic diastolic heart failure, CKD stage III, controlled type 2 diabetes with neuropathy, coronary artery disease, generalized tonic-clonic seizure, HTN, hypercholesterolemia, microcytic anemia, paroxysmal atrial fibrillation, postsurgical hypothyroidism, right homonymous hemianopsia, sickle cell trait, stroke, and thyroid cancer.    The patient presents to the ED due to worsening episodic light headedness after taking his blood pressure medications.   Patient reports symptoms started 3 days ago, and have occurred every time he has taken his Coreg and Amlodipine in the past 3 days. The patient states that the episodes of light headedness occur about 15 minutes after taking his medications, which he takes every morning when he wakes up, and then proceed to last for about 15 minutes as well. He decided to come in today because the episodes of light headedness have become worse over the course of the past 3 days. During the episodes he states that he has to stay sitting down, otherwise he feels as if he is going to fall. Of note, he has to use a cane/walker in order to ambulate regularly. On 3/1, Dr. Todd, increased his Coreg dose from 6 mg to 12 mg, and decreased his Amlodipine from 10 mg to 5 mg.  He checks his blood glucose every morning and has not had a low read recently, however he has not checked his blood glucose during any of the episodes of light headedness. He denies pain, dyspnea, or weakness to one side. He has no vertiginous  symptoms and denies any chest pain or shortness of breath. Patient has no other complaints at this time. No diarrhea, vomiting, no other change in usual routine.            Review of patient's allergies indicates:   Allergen Reactions    Cough syrup [guaifenesin] Other (See Comments)     weakness     Past Medical History:   Diagnosis Date    Allergy     BMI 31.0-31.9,adult 11/25/2014    CHF (congestive heart failure)     Chronic anticoagulation - pradaxa 11/10/2016    Chronic diastolic heart failure 11/20/2016    CKD (chronic kidney disease), stage III 9/23/2013    Controlled type 2 diabetes with neuropathy 12/16/2014    Coronary artery disease     Diabetes mellitus due to underlying condition with stage 3 chronic kidney disease, without long-term current use of insulin 11/2/2016    Elevated alkaline phosphatase level 8/1/2012    Elevated PSA     negative prostate biopsy 4/11    Erectile dysfunction associated with type 2 diabetes mellitus     Gallstones 3/20/2013    Generalized tonic-clonic seizure 11/2016    HTN (hypertension), benign 8/1/2012    Hypercholesterolemia 8/1/2012    Microcytic anemia 11/27/2013    Paroxysmal atrial fibrillation 9/23/2013    Postsurgical hypothyroidism 11/27/2013    Right homonymous hemianopsia 2/5/2016    Sickle cell trait     Stroke 1/27/2016    Thyroid cancer     multifocal with six lesions, largest 5mm, treated with surgery and radioactive iodine     Past Surgical History:   Procedure Laterality Date    BREAST SURGERY      cyst removal    COLONOSCOPY      COLONOSCOPY N/A 3/3/2015    Performed by CHANDA Wellington MD at [facility] ENDO (4TH FLR)    CYST REMOVAL      chest    CYSTOSCOPY WITH RETROGRADE PYELOGRAM Bilateral 8/14/2017    Performed by Anahi Mejia MD at [facility] OR 1ST FLR    PROSTATE BIOPSY  4/27/11    SKIN BIOPSY      THYROID SURGERY  8/26/09    VASCULAR SURGERY       Family History   Problem Relation Age of Onset    Heart disease  Father     Diabetes Father     Hypertension Father     Diabetes Mother     Hypertension Mother     Heart disease Mother     Diabetes Brother     Cancer Sister     Thyroid disease Maternal Aunt     Clotting disorder Neg Hx     Melanoma Neg Hx      Social History     Tobacco Use    Smoking status: Never Smoker    Smokeless tobacco: Never Used   Substance Use Topics    Alcohol use: Yes     Comment: occasionally, none recently    Drug use: No     Review of Systems   HENT: Negative.    Respiratory: Negative for shortness of breath.         (-) Dyspnea   Gastrointestinal: Negative for nausea.        (+) Has felt gassy for the past week   Genitourinary: Negative.    Musculoskeletal: Negative.    Skin: Negative.    Neurological: Positive for light-headedness. Negative for dizziness, syncope, weakness and headaches.   All other systems reviewed and are negative.      Physical Exam     Initial Vitals [03/09/19 1733]   BP Pulse Resp Temp SpO2   134/69 77 16 98.9 °F (37.2 °C) 98 %      MAP       --         Physical Exam    Nursing note and vitals reviewed.  Constitutional: He appears well-developed and well-nourished. No distress.   HENT:   Head: Normocephalic and atraumatic.   Eyes: EOM are normal. Pupils are equal, round, and reactive to light. No scleral icterus. Right eye exhibits no nystagmus. Left eye exhibits no nystagmus.   No nystagmus.   Neck: Normal range of motion. Neck supple. No tracheal deviation present. No JVD present.   Cardiovascular: Normal rate and normal heart sounds.   No murmur heard.  Irregularly irregular rhythm.   Pulmonary/Chest: Breath sounds normal. No stridor. No respiratory distress. He has no wheezes.   CTAB   Abdominal: Soft. He exhibits no distension. There is no tenderness.   Musculoskeletal: Normal range of motion. He exhibits no edema.   Neurological: He is alert and oriented to person, place, and time.   Cranial nerves II-XII intact.  Slow gait, slow speech but baseline for  the patient since his strokes per his wife    Skin: Skin is warm and dry. No rash noted. No erythema.   Psychiatric: He has a normal mood and affect.         ED Course   Procedures  Labs Reviewed   COMPREHENSIVE METABOLIC PANEL - Abnormal; Notable for the following components:       Result Value    Glucose 144 (*)     BUN, Bld 25 (*)     Creatinine 2.0 (*)     Anion Gap 7 (*)     eGFR if  39.3 (*)     eGFR if non  34.0 (*)     All other components within normal limits     EKG Readings: (Independently Interpreted)   Rate controlled a-fib; ventricular rate of 71; no ST elevations or depressions.       Imaging Results    None          Medical Decision Making:   Differential Diagnosis:   Ddx hypotension, uncontrolled atrial fibrillation, bradycardia, vertigo, posterior cva,   Pt was moderately hypertensive here and didn't greatly fluctuate on the orthostatics. He is in rate controlled atrial fibrillation. He has no concerning symptoms of chest pain or syncope, or shortness of breath. He has no nystagmus. When dizziness versus lightheadedness is explained to the patient he is very clear that it is not vertigo. The onset of the symptoms very much coincides with med changes though 15 minutes after take the meds seems a bit quick to cause the symptoms though certainly not impossible.  discused the patient with Dr Tomer Sharma cardiology fellow who recommends that we decreased the coreg back to 6.25 (pt can break the 12.5 in half per pharmacy)  and amlodipine back to 10 mg and pt will call Dr Todd's office Monday. Renal function at baseline  Return if worse.   Clinical Tests:   Lab Tests: Ordered and Reviewed            Scribe Attestation:   Scribe #1: I performed the above scribed service and the documentation accurately describes the services I performed. I attest to the accuracy of the note.    I, Dr. Josefina Gutiérrez, personally performed the services described in this documentation. All  medical record entries made by the scribe were at my direction and in my presence.  I have reviewed the chart and agree that the record reflects my personal performance and is accurate and complete. Josefina Gutiérrez MD.  6:49 PM 03/09/2019           Clinical Impression:       ICD-10-CM ICD-9-CM   1. Medication side effect T88.7XXA 995.20   2. Lightheadedness R42 780.4                                Josefina Gutiérrez MD  03/09/19 1917

## 2019-03-09 NOTE — TELEPHONE ENCOUNTER
Reason for Disposition   [1] MODERATE dizziness (e.g., vertigo; feels very unsteady, interferes with normal activities) AND [2] has NOT been evaluated by physician for this    Protocols used: DIZZINESS - VERTIGO-A-AH    Wife called to report the following:     -reports dizzy spell after taking pm meds   -episode last 15 min and no dizziness at the time of call   -caregiver requested a call back from nurse   -bp 123/73 HR 62   -cbg 177   -denies difficulty breathing, cp, weakness, numbness, confusion, headache   -advised to f/u with provider within 24 hours in UC/ ER,  and when to call back / report to ED

## 2019-03-10 NOTE — ED NOTES
Pt identifiers checked and accurate with Alfa Christy III    Pt reports to ED with complaints of becoming lightheaded after taking home medications x 3-4 days. Pt denies N/V/D, fever, chills, LOC, dizziness, falls, cough and sore throat.     LOC: The patient is awake, alert and aware of environment with an appropriate affect, the patient is oriented x 3 and speaking appropriately.  APPEARANCE: Patient resting comfortably and in no acute distress, patient is clean and well groomed. Wife at the bedside.  SKIN: The skin is warm and dry, color consistent with ethnicity, patient has normal skin turgor and moist mucus membranes, skin intact.  MUSCULOSKELETAL: Patient moving all extremities well, no obvious swelling or deformities noted. Pt ambulates unassisted with steady gait.   RESPIRATORY: Airway is open and patent, breath sounds clear throughout all lung fields; respirations are spontaneous, patient has a normal effort and rate, no accessory muscle use noted. Pt denies SOB  CARDIAC: Patient has peripheral edema noted to bilateral lower extremities. No complaints of chest pain at this time.  ABDOMEN: Soft and non tender to palpation, no distention noted. Bowel sounds present x 4  NEUROLOGIC: PERRL, 3 mm bilaterally, eyes open spontaneously, behavior appropriate to situation, follows commands, facial expression symmetrical, bilateral hand grasp equal and even, purposeful motor response noted, normal sensation in all extremities when touched with a finger. Pt denies slurred speech, falls, headache, vision changes.

## 2019-03-11 NOTE — TELEPHONE ENCOUNTER
Reviewed ER note. Noted that patient's cardiology medications were changed (coreg and amlodipine), and patient was instructed to call Dr Todd today. Will forward message to Dr Todd as well to help coordinate care.

## 2019-03-14 NOTE — TELEPHONE ENCOUNTER
----- Message from Nicole Nunez sent at 3/14/2019 10:29 AM CDT -----  Contact: Self [phone]  .Refill request.  blood sugar diagnostic (ONETOUCH VERIO) Strp ---90 day supply    ..  [facility]/pharmacy #[id number] - [place], [place] - [address]  [address] [address]  Phone: [phone] Fax: [phone]

## 2019-03-18 ENCOUNTER — TELEPHONE (OUTPATIENT)
Dept: ENDOCRINOLOGY | Facility: CLINIC | Age: 64
End: 2019-03-18

## 2019-03-18 NOTE — TELEPHONE ENCOUNTER
----- Message from Sunita Kiley sent at 3/18/2019 10:09 AM CDT -----  Contact: Alfa      tel:   [phone]   MsZhou Brian' pt.    Pt.says he needs Verio test strips, testing 1 time a day.   Send to Pharmacy:   [place] [facility].   Pls send in a NEW order for this and pt. Had to buy these over the weekend, and had to pay cash.  Wants you to call the pharmacy and tell them to reimburse him his money.   Wants a 3 mos. Supply.  pls call them today.

## 2019-03-20 ENCOUNTER — TELEPHONE (OUTPATIENT)
Dept: DERMATOLOGY | Facility: CLINIC | Age: 64
End: 2019-03-20

## 2019-03-20 NOTE — TELEPHONE ENCOUNTER
Spoke with pt's wife and let her knw that I will reschedule her 's appointment for 1 month from today since he has not started his medication yet. Wife stated it will be fine and to send out a reminder.

## 2019-03-20 NOTE — TELEPHONE ENCOUNTER
----- Message from Rajni Silvestre sent at 3/20/2019  9:14 AM CDT -----  Contact: PT Wife- Rut  Patient Returning Call from [facility]    Who Left Message for Patient:  Ms. Jerome    Communication Preference: [phone]    Additional Information: Also would like to rescehdule the appt for 03/28/19 if available.

## 2019-03-20 NOTE — TELEPHONE ENCOUNTER
----- Message from Silva Perdomo sent at 3/20/2019  8:52 AM CDT -----  Contact: pts wife  BV - pt Needs Advice    Reason for call: pts wife is calling to speak with the nurse pt has an appt tomorrow pt just received his medication over the weekend the ointment pt just started to use and would like to reschedule his appt to another week         Communication Preference: can you please call pts wife at [phone]    Additional Information: none    BECKY

## 2019-04-10 DIAGNOSIS — E89.0 POSTOPERATIVE HYPOTHYROIDISM: ICD-10-CM

## 2019-04-10 RX ORDER — LEVOTHYROXINE SODIUM 200 UG/1
TABLET ORAL
Qty: 90 TABLET | Refills: 2 | Status: SHIPPED | OUTPATIENT
Start: 2019-04-10 | End: 2019-10-16 | Stop reason: SDUPTHER

## 2019-04-29 DIAGNOSIS — I50.32 CHRONIC DIASTOLIC HEART FAILURE: ICD-10-CM

## 2019-04-29 RX ORDER — FUROSEMIDE 40 MG/1
60 TABLET ORAL 2 TIMES DAILY
Qty: 180 TABLET | Refills: 3 | Status: SHIPPED | OUTPATIENT
Start: 2019-04-29 | End: 2019-10-24 | Stop reason: SDUPTHER

## 2019-04-30 DIAGNOSIS — Z79.4 TYPE 2 DIABETES MELLITUS WITH HYPERGLYCEMIA, WITH LONG-TERM CURRENT USE OF INSULIN: Primary | ICD-10-CM

## 2019-04-30 DIAGNOSIS — E11.65 TYPE 2 DIABETES MELLITUS WITH HYPERGLYCEMIA, WITH LONG-TERM CURRENT USE OF INSULIN: Primary | ICD-10-CM

## 2019-04-30 NOTE — TELEPHONE ENCOUNTER
"----- Message from Jen Crespo RN sent at 4/30/2019  1:32 PM CDT -----  Contact: pt;s wife    LOV (Kiara) 11/2018  ----- Message -----  From: Elke Riley  Sent: 4/30/2019  12:49 PM  To: Jen Crespo, RN, [facility] Staff, #    OUT OF TEST STRIPS  -      FORMER PT OF   [facility]  PT HAS CALLED  SEVERAL TIMES ABOUT NEEDED A MEDICARE CODE ON  " SCRIPT/ FORM"    Pharmacy  Sent messages   And Pt called about this    But NO Response     NEED MEDICARE  CODE  ON  FORM /SCRIPT     [id number] - [place], [place] - [address]    ______________________________________________________    Please call pt wife once the code is sent    Ph [phone]    Thanks                 "

## 2019-04-30 NOTE — TELEPHONE ENCOUNTER
"----- Message from Elke Riley sent at 4/30/2019 12:49 PM CDT -----  Contact: pt;s wife    OUT OF TEST STRIPS  -      FORMER PT OF   [facility]  PT HAS CALLED  SEVERAL TIMES ABOUT NEEDED A MEDICARE CODE ON  " SCRIPT/ FORM"    Pharmacy  Sent messages   And Pt called about this    But NO Response     NEED MEDICARE  CODE  ON  FORM /SCRIPT     [id number] - [place] - [address]    ______________________________________________________    Please call pt wife once the code is sent    Ph [phone]    Thanks               "

## 2019-05-09 RX ORDER — LACOSAMIDE 200 MG/1
TABLET, FILM COATED ORAL
Qty: 60 TABLET | Refills: 0 | OUTPATIENT
Start: 2019-05-09

## 2019-05-22 ENCOUNTER — OFFICE VISIT (OUTPATIENT)
Dept: PODIATRY | Facility: CLINIC | Age: 64
End: 2019-05-22
Payer: MEDICARE

## 2019-05-22 VITALS
HEIGHT: 70 IN | HEART RATE: 55 BPM | SYSTOLIC BLOOD PRESSURE: 167 MMHG | BODY MASS INDEX: 30.06 KG/M2 | WEIGHT: 210 LBS | DIASTOLIC BLOOD PRESSURE: 87 MMHG

## 2019-05-22 DIAGNOSIS — E11.49 TYPE II DIABETES MELLITUS WITH NEUROLOGICAL MANIFESTATIONS: Primary | ICD-10-CM

## 2019-05-22 DIAGNOSIS — Z79.01 LONG TERM (CURRENT) USE OF ANTICOAGULANTS: ICD-10-CM

## 2019-05-22 DIAGNOSIS — B35.1 ONYCHOMYCOSIS DUE TO DERMATOPHYTE: ICD-10-CM

## 2019-05-22 PROCEDURE — 3077F PR MOST RECENT SYSTOLIC BLOOD PRESSURE >= 140 MM HG: ICD-10-PCS | Mod: CPTII,S$GLB,, | Performed by: PODIATRIST

## 2019-05-22 PROCEDURE — 99213 OFFICE O/P EST LOW 20 MIN: CPT | Mod: 25,S$GLB,, | Performed by: PODIATRIST

## 2019-05-22 PROCEDURE — 3008F BODY MASS INDEX DOCD: CPT | Mod: CPTII,S$GLB,, | Performed by: PODIATRIST

## 2019-05-22 PROCEDURE — 3079F DIAST BP 80-89 MM HG: CPT | Mod: CPTII,S$GLB,, | Performed by: PODIATRIST

## 2019-05-22 PROCEDURE — 1101F PT FALLS ASSESS-DOCD LE1/YR: CPT | Mod: CPTII,S$GLB,, | Performed by: PODIATRIST

## 2019-05-22 PROCEDURE — 3044F HG A1C LEVEL LT 7.0%: CPT | Mod: CPTII,S$GLB,, | Performed by: PODIATRIST

## 2019-05-22 PROCEDURE — 3044F PR MOST RECENT HEMOGLOBIN A1C LEVEL <7.0%: ICD-10-PCS | Mod: CPTII,S$GLB,, | Performed by: PODIATRIST

## 2019-05-22 PROCEDURE — 1101F PR PT FALLS ASSESS DOC 0-1 FALLS W/OUT INJ PAST YR: ICD-10-PCS | Mod: CPTII,S$GLB,, | Performed by: PODIATRIST

## 2019-05-22 PROCEDURE — 99999 PR PBB SHADOW E&M-EST. PATIENT-LVL III: ICD-10-PCS | Mod: PBBFAC,,, | Performed by: PODIATRIST

## 2019-05-22 PROCEDURE — 3079F PR MOST RECENT DIASTOLIC BLOOD PRESSURE 80-89 MM HG: ICD-10-PCS | Mod: CPTII,S$GLB,, | Performed by: PODIATRIST

## 2019-05-22 PROCEDURE — 99213 PR OFFICE/OUTPT VISIT, EST, LEVL III, 20-29 MIN: ICD-10-PCS | Mod: 25,S$GLB,, | Performed by: PODIATRIST

## 2019-05-22 PROCEDURE — 99999 PR PBB SHADOW E&M-EST. PATIENT-LVL III: CPT | Mod: PBBFAC,,, | Performed by: PODIATRIST

## 2019-05-22 PROCEDURE — 3077F SYST BP >= 140 MM HG: CPT | Mod: CPTII,S$GLB,, | Performed by: PODIATRIST

## 2019-05-22 PROCEDURE — 3008F PR BODY MASS INDEX (BMI) DOCUMENTED: ICD-10-PCS | Mod: CPTII,S$GLB,, | Performed by: PODIATRIST

## 2019-05-22 RX ORDER — CICLOPIROX 80 MG/ML
SOLUTION TOPICAL NIGHTLY
Qty: 6.6 ML | Refills: 11 | Status: SHIPPED | OUTPATIENT
Start: 2019-05-22 | End: 2020-01-01

## 2019-05-22 NOTE — PROGRESS NOTES
Subjective:      Patient ID: Alfa Christy III is a 63 y.o. male.    Chief Complaint: Diabetic Foot Exam (8/23/18 dr dhruv mclaughlin)    Alfa is a 63 y.o. male who presents to the clinic upon referral from Dr. Joselin salvador. provider found  for evaluation and treatment of diabetic feet. 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), Coronary artery disease, Diabetes mellitus due to underlying condition with stage 3 chronic kidney disease, without long-term current use of insulin (11/2/2016), Elevated alkaline phosphatase level (8/1/2012), Elevated PSA, Erectile dysfunction associated with type 2 diabetes mellitus, Gallstones (3/20/2013), Generalized tonic-clonic seizure (11/2016), HTN (hypertension), benign (8/1/2012), Hypercholesterolemia (8/1/2012), Microcytic anemia (11/27/2013), Paroxysmal atrial fibrillation (9/23/2013), Postsurgical hypothyroidism (11/27/2013), Right homonymous hemianopsia (2/5/2016), Sickle cell trait, Stroke (1/27/2016), and Thyroid cancer. Patient relates no major problem with feet. Only complaints today consist of Diabetes, increased risk amputation needing evaluation/management/optomization of foot care.  No current symptoms..    PCP: Dhruv Jasmine MD    Date Last Seen by PCP:   Chief Complaint   Patient presents with    Diabetic Foot Exam     8/23/18 dr dhruv mclaughlin        Current shoe gear: Casual shoes    Hemoglobin A1C   Date Value Ref Range Status   07/31/2018 5.4 4.0 - 5.6 % Final     Comment:     ADA Screening Guidelines:  5.7-6.4%  Consistent with prediabetes  >or=6.5%  Consistent with diabetes  High levels of fetal hemoglobin interfere with the HbA1C  assay. Heterozygous hemoglobin variants (HbS, HgC, etc)do  not  significantly interfere with this assay.   However, presence of multiple variants may affect accuracy.     06/27/2018 5.4 4.0 - 5.6 % Final     Comment:     ADA Screening Guidelines:  5.7-6.4%  Consistent with prediabetes  >or=6.5%  Consistent with diabetes  High levels of fetal hemoglobin interfere with the HbA1C  assay. Heterozygous hemoglobin variants (HbS, HgC, etc)do  not significantly interfere with this assay.   However, presence of multiple variants may affect accuracy.     03/24/2018 4.9 4.0 - 5.6 % Final     Comment:     According to ADA guidelines, hemoglobin A1c <7.0% represents  optimal control in non-pregnant diabetic patients. Different  metrics may apply to specific patient populations.   Standards of Medical Care in Diabetes-2016.  For the purpose of screening for the presence of diabetes:  <5.7%     Consistent with the absence of diabetes  5.7-6.4%  Consistent with increasing risk for diabetes   (prediabetes)  >or=6.5%  Consistent with diabetes  Currently, no consensus exists for use of hemoglobin A1c  for diagnosis of diabetes for children.  This Hemoglobin A1c assay has significant interference with fetal   hemoglobin   (HbF). The results are invalid for patients with abnormal amounts of   HbF,   including those with known Hereditary Persistence   of Fetal Hemoglobin. Heterozygous hemoglobin variants (HbAS, HbAC,   HbAD, HbAE, HbA2) do not significantly interfere with this assay;   however, presence of multiple variants in a sample may impact the %   interference.             Review of Systems   Constitution: Negative for chills, diaphoresis, fever, malaise/fatigue and night sweats.   Cardiovascular: Positive for leg swelling. Negative for claudication, cyanosis and syncope.   Skin: Positive for nail changes. Negative for color change, dry skin, rash, suspicious lesions and unusual hair distribution.   Musculoskeletal: Negative for falls, joint pain, joint swelling, muscle cramps, muscle weakness  and stiffness.   Gastrointestinal: Negative for constipation, diarrhea, nausea and vomiting.   Neurological: Negative for brief paralysis, disturbances in coordination, focal weakness, numbness, paresthesias, sensory change and tremors.           Objective:      Physical Exam   Constitutional: He is oriented to person, place, and time. He appears well-developed and well-nourished. He is cooperative. No distress.   Oriented to time, place, and person.   Cardiovascular:   Pulses:       Popliteal pulses are 2+ on the right side, and 2+ on the left side.        Dorsalis pedis pulses are 2+ on the right side, and 2+ on the left side.        Posterior tibial pulses are 1+ on the right side, and 1+ on the left side.   Capillary refill 3 seconds all toes/distal feet, all toes/both feet warm to touch.      Negative lymphadenopathy bilateral popliteal fossa and tarsal tunnel.     <2+ pitting lower extremity edema bilateral.     Musculoskeletal:        Right ankle: He exhibits normal range of motion, no swelling, no ecchymosis, no deformity, no laceration and normal pulse. Achilles tendon normal. Achilles tendon exhibits no pain, no defect and normal Gaspar's test results.   Normal angle, base, station of gait. Decreased stride length, early heel off, moderately propulsive toe off bilateral.    All ten toes without clubbing, cyanosis, or signs of ischemia.      No pain to palpation bilateral lower extremities.      Range of motion, stability, muscle strength, and muscle tone are age and health appropriate normal bilateral feet and legs.       Lymphadenopathy: No inguinal adenopathy noted on the right or left side.   Negative lymphadenopathy bilateral popliteal fossa and tarsal tunnel.    Negative lymphangitic streaking bilateral feet/ankles/legs.   Neurological: He is alert and oriented to person, place, and time. He has normal strength. He is not disoriented. He displays no atrophy and no tremor. A sensory deficit is  present. He exhibits normal muscle tone. Gait normal.   Reflex Scores:       Patellar reflexes are 2+ on the right side and 2+ on the left side.       Achilles reflexes are 2+ on the right side and 2+ on the left side.  Negative tinel sign to percussion sural, superficial peroneal, deep peroneal, saphenous, and posterior tibial nerves right and left ankles and feet.  .  Decreased/absent vibratory sensation bilateral feet to 128Hz tuning fork.     Skin: Skin is warm, dry and intact. Capillary refill takes 2 to 3 seconds. No abrasion, no bruising, no burn, no ecchymosis, no laceration, no lesion, no petechiae and no rash noted. He is not diaphoretic. No cyanosis or erythema. No pallor. Nails show no clubbing.     Skin is normal age and health appropriate color, turgor, texture, and temperature bilateral lower extremities without ulceration, hyperpigmentation, discoloration, masses nodules or cords palpated.  No ecchymosis, erythema, edema, or cardinal signs of infection bilateral lower extremities.     Psychiatric: He has a normal mood and affect.             Assessment:       Encounter Diagnoses   Name Primary?    Type II diabetes mellitus with neurological manifestations Yes    Onychomycosis due to dermatophyte     Long term (current) use of anticoagulants          Plan:       Alfa was seen today for diabetic foot exam.    Diagnoses and all orders for this visit:    Type II diabetes mellitus with neurological manifestations    Onychomycosis due to dermatophyte    Long term (current) use of anticoagulants    Other orders  -     ciclopirox (PENLAC) 8 % Soln; Apply topically nightly.      I counseled the patient on his conditions, their implications and medical management.        - Shoe inspection. Diabetic Foot Education. Patient reminded of the importance of good nutrition and blood sugar control to help prevent podiatric complications of diabetes. Patient instructed on proper foot hygeine. We discussed wearing  proper shoe gear, daily foot inspections, never walking without protective shoe gear, never putting sharp instruments to feet, routine podiatric visits at least annually     Rx penlac.    Discussed conservative treatment with shoes of adequate dimensions, material, and style to alleviate symptoms and delay or prevent surgical intervention.      declines foot care today.    Follow up in about 1 year (around 5/22/2020).

## 2019-05-29 ENCOUNTER — LAB VISIT (OUTPATIENT)
Dept: LAB | Facility: HOSPITAL | Age: 64
End: 2019-05-29
Attending: INTERNAL MEDICINE
Payer: MEDICARE

## 2019-05-29 DIAGNOSIS — N18.30 TYPE 2 DIABETES MELLITUS WITH STAGE 3 CHRONIC KIDNEY DISEASE, WITH LONG-TERM CURRENT USE OF INSULIN: ICD-10-CM

## 2019-05-29 DIAGNOSIS — E11.22 TYPE 2 DIABETES MELLITUS WITH STAGE 3 CHRONIC KIDNEY DISEASE, WITH LONG-TERM CURRENT USE OF INSULIN: ICD-10-CM

## 2019-05-29 DIAGNOSIS — Z79.4 TYPE 2 DIABETES MELLITUS WITH STAGE 3 CHRONIC KIDNEY DISEASE, WITH LONG-TERM CURRENT USE OF INSULIN: ICD-10-CM

## 2019-05-29 DIAGNOSIS — Z85.850 HISTORY OF THYROID CANCER: ICD-10-CM

## 2019-05-29 DIAGNOSIS — E89.0 POSTSURGICAL HYPOTHYROIDISM: ICD-10-CM

## 2019-05-29 LAB
ANION GAP SERPL CALC-SCNC: 7 MMOL/L (ref 8–16)
BUN SERPL-MCNC: 27 MG/DL (ref 8–23)
CALCIUM SERPL-MCNC: 8.9 MG/DL (ref 8.7–10.5)
CHLORIDE SERPL-SCNC: 109 MMOL/L (ref 95–110)
CO2 SERPL-SCNC: 24 MMOL/L (ref 23–29)
CREAT SERPL-MCNC: 2.2 MG/DL (ref 0.5–1.4)
EST. GFR  (AFRICAN AMERICAN): 35 ML/MIN/1.73 M^2
EST. GFR  (NON AFRICAN AMERICAN): 30.3 ML/MIN/1.73 M^2
ESTIMATED AVG GLUCOSE: 120 MG/DL (ref 68–131)
GLUCOSE SERPL-MCNC: 177 MG/DL (ref 70–110)
HBA1C MFR BLD HPLC: 5.8 % (ref 4–5.6)
POTASSIUM SERPL-SCNC: 4.9 MMOL/L (ref 3.5–5.1)
SODIUM SERPL-SCNC: 140 MMOL/L (ref 136–145)
TSH SERPL DL<=0.005 MIU/L-ACNC: 0.96 UIU/ML (ref 0.4–4)

## 2019-05-29 PROCEDURE — 84443 ASSAY THYROID STIM HORMONE: CPT

## 2019-05-29 PROCEDURE — 80048 BASIC METABOLIC PNL TOTAL CA: CPT

## 2019-05-29 PROCEDURE — 83036 HEMOGLOBIN GLYCOSYLATED A1C: CPT

## 2019-05-29 PROCEDURE — 84432 ASSAY OF THYROGLOBULIN: CPT

## 2019-05-29 PROCEDURE — 36415 COLL VENOUS BLD VENIPUNCTURE: CPT | Mod: PO

## 2019-05-31 LAB
THRYOGLOBULIN INTERPRETATION: ABNORMAL
THYROGLOB AB SERPL-ACNC: <1.8 IU/ML
THYROGLOB SERPL-MCNC: 0.1 NG/ML

## 2019-06-03 ENCOUNTER — TELEPHONE (OUTPATIENT)
Dept: NEUROLOGY | Facility: CLINIC | Age: 64
End: 2019-06-03

## 2019-06-03 NOTE — TELEPHONE ENCOUNTER
----- Message from Ruddy Marie sent at 6/3/2019  9:36 AM CDT -----  Rx Refill/Request     Is this a Refill or New Rx: Refill  Rx Name and Strength: VIMPAT 200 mg Tab tablet   Preferred Pharmacy with phone number: see below  Communication Preference: Mrs. Christy (wife) @ [phone]  Additional Information: Mrs. Christy states pt is out of the medication and will need some for this morning    [id number] - [place], [place] - [id number] [facility] [address]  Phone: [phone] Fax: [phone]

## 2019-06-03 NOTE — PROGRESS NOTES
Subjective:      Patient ID: Alfa Christy III is a 63 y.o. male.    Chief Complaint:  Diabetes    History of Present Illness  Alfa Christy III presents today for follow up of type 2 DM. Previous patient of SUSSY Rivera NP. Last seen in 11/2018. This is his first visit with me.     diagnosed with Type 2 DM in ~1990s. He was originally on orals, but started insulin in 2010. He was seen in ER for hyperglycemia in ~2010. (+) FH of DM in multiple family members. Has a history of CVA with diminished peripheral vision and also thyroid cancer, which was treated with surgery and radioactive iodine. Has sickle cell and last crisis in 2016.      Current DM Regimen:   Novolog 5 units tid ac meals   Levemir 12 units at bedtime   Trulicity 0.75 mg weekly ( taking on Thursdays )      Taking prandial injections with meals? Yes   Uses pens or vials? Pens      Currently denies polyuria, polydipsia, unexplained weight loss or blurred vision      Reports testing BG once daily every morning           Hypoglycemia: Denies.   Symptoms: confusion   Treatment: regular soda   Knows how to correct with 15 grams of carbs- juice, coke, or a peppermint.     24 hour dietary recall:   Breakfast: cereal   Lunch: salmon with crackers   Dinner: meatballs and spaghetti, green beans, okra   Snacks: chocolate mints, banana, plum, peaches   Beverages: water      Known complications: CKD   Follows with Nephrology: with last office visit in 01/2018 with Dr. Gold      Diabetes education: 03/2018   Exercise: no formal routine but patient tries to stay active     Diabetes Management Status  Statin: Taking  ACE/ARB: Taking  Screening or Prevention Patient's value Goal Complete/Controlled?   HgA1C Testing and Control   Lab Results   Component Value Date    HGBA1C 5.8 (H) 05/29/2019    Annually/Less than 8% Yes   Lipid profile : 07/31/2018 Annually Yes   LDL control Lab Results   Component Value Date    LDLCALC 52.4 (L) 07/31/2018    Annually/Less than 100  mg/dl  Yes   Nephropathy screening Lab Results   Component Value Date    LABMICR 232.0 01/08/2016     Lab Results   Component Value Date    PROTEINUA 2+ (A) 11/29/2018    Annually Yes   Blood pressure BP Readings from Last 1 Encounters:   06/05/19 138/72    Less than 140/90 No   Dilated retinal exam : 10/16/2018 Annually Yes   Foot exam   : 08/30/2018 Annually Yes     Also with h/o 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 There is some remaining tissue on the left.   A biopsy of this was done 11/03/2011, demonstrating an insufficient material to make a diagnosis      Current regimen: Levothyroxine 200 mcg   Taking appropriately. Every morning with water on an empty stomach 30-45 minutes before eating or taking other medications      Denies any hypo/hyper thyroidism symptoms.     Ref. Range 5/29/2019 08:53   TSH Latest Ref Range: 0.400 - 4.000 uIU/mL 0.960   Thyroglobulin Interpretation Unknown SEE BELOW   Thyroglobulin Antibody Screen Latest Ref Range: <4.0 IU/mL <1.8   Thyroglobulin, Tumor Marker Latest Units: ng/mL 0.1 (H)     An iodine scan in 2011 showed some remaining uptake.      Last Neck ultrasound study from 02/2019:  FINDINGS:  THYROID GLAND has been surgically removed.    Sonographic examination of neck compartments II through V was carried out.    0.97 x 0.82 x 0.63 cm heterogeneous lymph node is seen in left level IV.  This lymph node does not have a distinct fatty hilum and may contain microcalcificatoins.  Short/long axis ratio >0.5.  There is no appreciable vascularity.      Impression     Status post thyroidectomy.    Left neck level IV indeterminate lymph node is again seen and appears stable when compared to 2014.    As thyroglobulin remains undetectable and this lymph node has not changed significantly since 2014 would recommend repeat ultrasound  in one year for monitoring.      Review of Systems   Constitutional: Negative for unexpected weight change.   Eyes: Negative for visual disturbance.   Respiratory: Negative for shortness of breath.    Cardiovascular: Negative for chest pain.   Gastrointestinal: Negative for abdominal pain.   Endocrine: Negative for cold intolerance, heat intolerance, polydipsia, polyphagia and polyuria.   Musculoskeletal: Negative for arthralgias.   Skin: Negative for wound.   Neurological: Negative for headaches.   Hematological: Does not bruise/bleed easily.   Psychiatric/Behavioral: Negative for sleep disturbance.     Objective:   Physical Exam   Neck:   No palpable thyroid tissue  Scar well healed   Cardiovascular: Normal rate.   No edema present   Pulmonary/Chest: Effort normal.   Abdominal: Soft.       Vitals reviewed.  Appropriate footwear, Foot exam deferred per patient.   No ulcers or wounds.   injection sites are ok. No lipo hypertropthy or atrophy    Body mass index is 31.85 kg/m².    Lab Review:   Lab Results   Component Value Date    HGBA1C 5.8 (H) 05/29/2019     Lab Results   Component Value Date    CHOL 102 (L) 07/31/2018    HDL 35 (L) 07/31/2018    LDLCALC 52.4 (L) 07/31/2018    TRIG 73 07/31/2018    CHOLHDL 34.3 07/31/2018     Lab Results   Component Value Date     05/29/2019    K 4.9 05/29/2019     05/29/2019    CO2 24 05/29/2019     (H) 05/29/2019    BUN 27 (H) 05/29/2019    CREATININE 2.2 (H) 05/29/2019    CALCIUM 8.9 05/29/2019    PROT 7.2 03/09/2019    ALBUMIN 3.5 03/09/2019    BILITOT 1.0 03/09/2019    ALKPHOS 120 03/09/2019    AST 26 03/09/2019    ALT 16 03/09/2019    ANIONGAP 7 (L) 05/29/2019    ESTGFRAFRICA 35.0 (A) 05/29/2019    EGFRNONAA 30.3 (A) 05/29/2019    TSH 0.960 05/29/2019     Assessment and Plan     1. Type 2 diabetes mellitus without ophthalmic manifestations  Fructosamine    CBC auto differential    Comprehensive metabolic panel    Hemoglobin A1c    Microalbumin/creatinine  urine ratio   2. Type 2 diabetes mellitus with stage 3 chronic kidney disease, with long-term current use of insulin     3. Type 2 diabetes mellitus with hyperglycemia, with long-term current use of insulin     4. History of thyroid cancer     5. Postsurgical hypothyroidism  TSH    Thyroglobulin   6. Paroxysmal atrial fibrillation     7. Essential hypertension     8. Hypercholesterolemia       T2DM with stage 3 CKD with long term current use of insulin   -- discussed the goal of therapy is to obtain the best control we can without hypoglycemia   -- reviewed BG and A1c goals   -- recommend Fructosamine with next set of labs as anemia can skew A1c results   -- reviewed signs and symptoms of hypoglycemia with the appropriate treatment protocol. Instructed on precautions before driving   -- instructed to smbg 4 x daily  -- advised to bring meter or logs to every office visit for review   -- notify office if BG <80 or >180  -- continue dietary and lifestyle modifications   -- recommend periodic follow ups for routine eye, foot and dental care   -- Increase Levemir 13u HS & Continue novolog & Trulicity doses      History of thyroid cancer   -- treated with surgery and Radioactive iodine   -- most recent TG is barely detectable at 0.1.   -- pt is considered low risk for recurrence   -- recommend TSH goal <2.5  -- yearly TG levels, but since most recent was barely detectable recheck in 4 months.      Postsurgical hypothyroidism   -- see #2   -- continue current regimen with levothyroxine   -- reinforced to take LT4 as prescribed. On an empty stomach with water ideally an hour before eating or taking other medications   -- avoid exogenous hyperthyroidism as this can accelerate bone loss and increase risk of CV complications      Afib   -- followed and managed by Cardiology      HTN   -- BP at goal   -- followed and managed by Nephrology (to make an appt today)      HLD   -- on statin per ADA recommendations      Follow up in  about 4 months (around 10/5/2019).  Labs & urine prior to next appointment with me     Case discussed with Dr. Starr  Recommendations were discussed with the patient in detail  The patient verbalized understanding and agrees with the plan outlined as above.

## 2019-06-04 DIAGNOSIS — Z79.4 TYPE 2 DIABETES MELLITUS WITH HYPERGLYCEMIA, WITH LONG-TERM CURRENT USE OF INSULIN: Primary | ICD-10-CM

## 2019-06-04 DIAGNOSIS — E11.65 TYPE 2 DIABETES MELLITUS WITH HYPERGLYCEMIA, WITH LONG-TERM CURRENT USE OF INSULIN: Primary | ICD-10-CM

## 2019-06-04 NOTE — TELEPHONE ENCOUNTER
Message from [facility] Nurse:    please fax: [phone]     orders for diabetes testing supplies and glucometer, completed medical necessity form and last visit progress notes.     Thanks!

## 2019-06-05 ENCOUNTER — OFFICE VISIT (OUTPATIENT)
Dept: ENDOCRINOLOGY | Facility: CLINIC | Age: 64
End: 2019-06-05
Payer: MEDICARE

## 2019-06-05 VITALS
BODY MASS INDEX: 31.78 KG/M2 | SYSTOLIC BLOOD PRESSURE: 138 MMHG | WEIGHT: 222 LBS | HEIGHT: 70 IN | DIASTOLIC BLOOD PRESSURE: 72 MMHG | HEART RATE: 77 BPM

## 2019-06-05 DIAGNOSIS — E78.00 HYPERCHOLESTEROLEMIA: ICD-10-CM

## 2019-06-05 DIAGNOSIS — Z79.4 TYPE 2 DIABETES MELLITUS WITH STAGE 3 CHRONIC KIDNEY DISEASE, WITH LONG-TERM CURRENT USE OF INSULIN: ICD-10-CM

## 2019-06-05 DIAGNOSIS — Z79.4 TYPE 2 DIABETES MELLITUS WITH HYPERGLYCEMIA, WITH LONG-TERM CURRENT USE OF INSULIN: ICD-10-CM

## 2019-06-05 DIAGNOSIS — Z85.850 HISTORY OF THYROID CANCER: ICD-10-CM

## 2019-06-05 DIAGNOSIS — E11.22 TYPE 2 DIABETES MELLITUS WITH STAGE 3 CHRONIC KIDNEY DISEASE, WITH LONG-TERM CURRENT USE OF INSULIN: ICD-10-CM

## 2019-06-05 DIAGNOSIS — E89.0 POSTSURGICAL HYPOTHYROIDISM: ICD-10-CM

## 2019-06-05 DIAGNOSIS — E11.65 TYPE 2 DIABETES MELLITUS WITH HYPERGLYCEMIA, WITH LONG-TERM CURRENT USE OF INSULIN: ICD-10-CM

## 2019-06-05 DIAGNOSIS — I48.0 PAROXYSMAL ATRIAL FIBRILLATION: Chronic | ICD-10-CM

## 2019-06-05 DIAGNOSIS — E11.9 TYPE 2 DIABETES MELLITUS WITHOUT OPHTHALMIC MANIFESTATIONS: Primary | ICD-10-CM

## 2019-06-05 DIAGNOSIS — N18.30 TYPE 2 DIABETES MELLITUS WITH STAGE 3 CHRONIC KIDNEY DISEASE, WITH LONG-TERM CURRENT USE OF INSULIN: ICD-10-CM

## 2019-06-05 DIAGNOSIS — I10 ESSENTIAL HYPERTENSION: ICD-10-CM

## 2019-06-05 PROCEDURE — 99214 OFFICE O/P EST MOD 30 MIN: CPT | Mod: S$GLB,,, | Performed by: NURSE PRACTITIONER

## 2019-06-05 PROCEDURE — 99999 PR PBB SHADOW E&M-EST. PATIENT-LVL III: CPT | Mod: PBBFAC,,, | Performed by: NURSE PRACTITIONER

## 2019-06-05 PROCEDURE — 3075F SYST BP GE 130 - 139MM HG: CPT | Mod: CPTII,S$GLB,, | Performed by: NURSE PRACTITIONER

## 2019-06-05 PROCEDURE — 99499 RISK ADDL DX/OHS AUDIT: ICD-10-PCS | Mod: S$GLB,,, | Performed by: NURSE PRACTITIONER

## 2019-06-05 PROCEDURE — 99499 UNLISTED E&M SERVICE: CPT | Mod: S$GLB,,, | Performed by: NURSE PRACTITIONER

## 2019-06-05 PROCEDURE — 1101F PT FALLS ASSESS-DOCD LE1/YR: CPT | Mod: CPTII,S$GLB,, | Performed by: NURSE PRACTITIONER

## 2019-06-05 PROCEDURE — 3075F PR MOST RECENT SYSTOLIC BLOOD PRESS GE 130-139MM HG: ICD-10-PCS | Mod: CPTII,S$GLB,, | Performed by: NURSE PRACTITIONER

## 2019-06-05 PROCEDURE — 3044F PR MOST RECENT HEMOGLOBIN A1C LEVEL <7.0%: ICD-10-PCS | Mod: CPTII,S$GLB,, | Performed by: NURSE PRACTITIONER

## 2019-06-05 PROCEDURE — 3078F DIAST BP <80 MM HG: CPT | Mod: CPTII,S$GLB,, | Performed by: NURSE PRACTITIONER

## 2019-06-05 PROCEDURE — 99999 PR PBB SHADOW E&M-EST. PATIENT-LVL III: ICD-10-PCS | Mod: PBBFAC,,, | Performed by: NURSE PRACTITIONER

## 2019-06-05 PROCEDURE — 3078F PR MOST RECENT DIASTOLIC BLOOD PRESSURE < 80 MM HG: ICD-10-PCS | Mod: CPTII,S$GLB,, | Performed by: NURSE PRACTITIONER

## 2019-06-05 PROCEDURE — 3008F BODY MASS INDEX DOCD: CPT | Mod: CPTII,S$GLB,, | Performed by: NURSE PRACTITIONER

## 2019-06-05 PROCEDURE — 99214 PR OFFICE/OUTPT VISIT, EST, LEVL IV, 30-39 MIN: ICD-10-PCS | Mod: S$GLB,,, | Performed by: NURSE PRACTITIONER

## 2019-06-05 PROCEDURE — 3044F HG A1C LEVEL LT 7.0%: CPT | Mod: CPTII,S$GLB,, | Performed by: NURSE PRACTITIONER

## 2019-06-05 PROCEDURE — 3008F PR BODY MASS INDEX (BMI) DOCUMENTED: ICD-10-PCS | Mod: CPTII,S$GLB,, | Performed by: NURSE PRACTITIONER

## 2019-06-05 PROCEDURE — 1101F PR PT FALLS ASSESS DOC 0-1 FALLS W/OUT INJ PAST YR: ICD-10-PCS | Mod: CPTII,S$GLB,, | Performed by: NURSE PRACTITIONER

## 2019-06-05 RX ORDER — LANCING DEVICE
1 EACH MISCELLANEOUS 3 TIMES DAILY
Qty: 1 EACH | Refills: 0 | Status: SHIPPED | OUTPATIENT
Start: 2019-06-05 | End: 2021-01-01

## 2019-06-05 RX ORDER — INSULIN PUMP SYRINGE, 3 ML
EACH MISCELLANEOUS
Qty: 1 EACH | Refills: 0 | Status: SHIPPED | OUTPATIENT
Start: 2019-06-05 | End: 2021-01-01

## 2019-06-05 RX ORDER — LANCETS
EACH MISCELLANEOUS
Qty: 100 EACH | Refills: 11 | Status: SHIPPED | OUTPATIENT
Start: 2019-06-05 | End: 2020-07-02 | Stop reason: SDUPTHER

## 2019-06-10 RX ORDER — DULAGLUTIDE 0.75 MG/.5ML
INJECTION, SOLUTION SUBCUTANEOUS
Qty: 2 SYRINGE | Refills: 4 | Status: SHIPPED | OUTPATIENT
Start: 2019-06-10 | End: 2019-11-01 | Stop reason: SDUPTHER

## 2019-06-13 ENCOUNTER — OFFICE VISIT (OUTPATIENT)
Dept: CARDIOLOGY | Facility: CLINIC | Age: 64
End: 2019-06-13
Payer: MEDICARE

## 2019-06-13 VITALS
HEART RATE: 67 BPM | BODY MASS INDEX: 31.82 KG/M2 | HEIGHT: 70 IN | SYSTOLIC BLOOD PRESSURE: 145 MMHG | DIASTOLIC BLOOD PRESSURE: 70 MMHG | WEIGHT: 222.25 LBS

## 2019-06-13 DIAGNOSIS — E66.9 OBESITY (BMI 30.0-34.9): ICD-10-CM

## 2019-06-13 DIAGNOSIS — Z79.01 CHRONIC ANTICOAGULATION: Chronic | ICD-10-CM

## 2019-06-13 DIAGNOSIS — I48.0 PAROXYSMAL ATRIAL FIBRILLATION: Chronic | ICD-10-CM

## 2019-06-13 DIAGNOSIS — I10 ESSENTIAL HYPERTENSION: ICD-10-CM

## 2019-06-13 DIAGNOSIS — I50.32 CHRONIC DIASTOLIC HEART FAILURE: Primary | ICD-10-CM

## 2019-06-13 PROCEDURE — 99214 PR OFFICE/OUTPT VISIT, EST, LEVL IV, 30-39 MIN: ICD-10-PCS | Mod: GC,S$GLB,, | Performed by: HOSPITALIST

## 2019-06-13 PROCEDURE — 99999 PR PBB SHADOW E&M-EST. PATIENT-LVL V: CPT | Mod: PBBFAC,GC,, | Performed by: HOSPITALIST

## 2019-06-13 PROCEDURE — 99214 OFFICE O/P EST MOD 30 MIN: CPT | Mod: GC,S$GLB,, | Performed by: HOSPITALIST

## 2019-06-13 PROCEDURE — 99999 PR PBB SHADOW E&M-EST. PATIENT-LVL V: ICD-10-PCS | Mod: PBBFAC,GC,, | Performed by: HOSPITALIST

## 2019-06-13 NOTE — PROGRESS NOTES
I have personally taken the history and examined this patient and agree with the Fellow's note as stated above.    CV status quo and he is active.

## 2019-06-13 NOTE — PROGRESS NOTES
Subjective:    Patient ID:  Alfa Christy III is a 63 y.o. male who presents for follow-up of Chronic diastolic heart failure (3 month f/u )      HPI  63 year old who is here for follow up for HFpEF.  Patient has PMH of HTN, HLD, DM, CKD III, CVA in 2016 with peripheral vision deficit, p Afib on Apixaban, HFpEF and sickle cell trait.   Patient has been followed in the clinic for HFpEF, his Lasix was increased to 60 BID.  Since last visit he has been doing well, he is still able to go to the gym 3 times weekly where he walks for 30 minutes as 3 miles pace and do weight lifting. He denied AYALA or chest pain.   He doesn't add salt to his diet and doesn't eat canned food. He drinks whisky once every 2 weeks.    He has negative HEIDI in 6/2018 with preserved EF and concentric remodeling.   Patients hasn't been drinking alcoholic drinks, his drinking appears related to watching foot ball.      Interval history:  Since last visit, patient has been slowly gaining weight that his wife attributed to eating excess, he doesn't check weight daily and he doesn't have a log for his weight. He takes the last twice daily in the morning the in the evening, he reported nocturia at night. He denied orthopnea or PND. He reported leg swelling that he uses the compression stocking for however he uses during sleep and takes it off during the day. He checks blood pressure at home and it ranges 110 - 130s at home.   He still goes to the gym at least every other day, exercise 30 mins aerobic exercise and 30 minutes weights.      Review of Systems   Constitution: Positive for weight gain. Negative for decreased appetite.   HENT: Negative.    Eyes: Negative.    Cardiovascular: Positive for leg swelling. Negative for chest pain, claudication, cyanosis, dyspnea on exertion, near-syncope, orthopnea, palpitations, paroxysmal nocturnal dyspnea and syncope.   Respiratory: Negative for cough and shortness of breath.    Gastrointestinal: Negative for  "bloating, melena, nausea and vomiting.   Genitourinary: Negative.    Neurological: Positive for dizziness.        Objective:    Physical Exam   Constitutional: He appears well-developed and well-nourished. No distress.   HENT:   Head: Normocephalic.   Eyes: Pupils are equal, round, and reactive to light.   Neck: No JVD present.   Cardiovascular: Normal rate, regular rhythm, normal heart sounds and intact distal pulses. Exam reveals no gallop and no friction rub.   No murmur heard.  Pulmonary/Chest: Effort normal. He has no wheezes. He has no rales.   Abdominal: Soft.   Musculoskeletal: He exhibits edema.   Neurological: He is alert.   Skin: Skin is warm. He is not diaphoretic.         Vitals:    06/13/19 1102   BP: (!) 145/70   Pulse: 67       Assessment:       1. Chronic diastolic heart failure    2. Essential hypertension    3. Obesity (BMI 30.0-34.9)    4. Paroxysmal atrial fibrillation    5. Chronic anticoagulation - pradaxa         Plan:   -HFpEF: Well compensated:  Euvolemic.   Continue with lasix to 60 mgs BID.   Continue to check daily weights and document readings. Bring log next visit.  Avoid salt in diet.     -HTN: uncontrolled   Goal < 130/80  Continue with Amlodipine to 10 mgs   Continue with Losartan 100 mgs  Will increase Coreg 6.25 mgs  Controlled at home, he will bring blood pressure log next visit.      -HLD: Tcho 102/ HDL 35/ LDL 52:  At goal  Continue with Atorvastatin 40 mgs daily  Continue with high intensity statins given DM and CVA.     -Paroxsysmal Afib:  CHADSVASC 6.  Continue with Apixaban 5 mgs BID.  Continue with Coreg.   Recommend holding Apixaban 4 days prior to elective procedure and restart it as soon as safer per the procedure team.  "2017 ACC expert consensus for managing anticoagulation perioperatively".  Explained to the patient the risks and benefits of anticoagulation.      -CVA with homonymous hemianopsia:  MRA with < 50% stenosis of left PCA  Continue with ASA 81 mgs and " Atorvastatin 40 mgs     Follow up in 3 months.     Parveen Todd MD  Cardiology Fellow

## 2019-06-27 ENCOUNTER — OFFICE VISIT (OUTPATIENT)
Dept: INTERNAL MEDICINE | Facility: CLINIC | Age: 64
End: 2019-06-27
Payer: MEDICARE

## 2019-06-27 VITALS
OXYGEN SATURATION: 97 % | SYSTOLIC BLOOD PRESSURE: 106 MMHG | BODY MASS INDEX: 32.07 KG/M2 | HEART RATE: 66 BPM | WEIGHT: 224 LBS | HEIGHT: 70 IN | DIASTOLIC BLOOD PRESSURE: 64 MMHG

## 2019-06-27 VITALS
DIASTOLIC BLOOD PRESSURE: 64 MMHG | OXYGEN SATURATION: 98 % | WEIGHT: 223.31 LBS | HEART RATE: 60 BPM | SYSTOLIC BLOOD PRESSURE: 106 MMHG | HEIGHT: 70 IN | BODY MASS INDEX: 31.97 KG/M2

## 2019-06-27 DIAGNOSIS — K42.9 UMBILICAL HERNIA WITHOUT OBSTRUCTION AND WITHOUT GANGRENE: ICD-10-CM

## 2019-06-27 DIAGNOSIS — H43.11 VITREOUS HEMORRHAGE OF RIGHT EYE: ICD-10-CM

## 2019-06-27 DIAGNOSIS — I48.0 PAROXYSMAL ATRIAL FIBRILLATION: Chronic | ICD-10-CM

## 2019-06-27 DIAGNOSIS — E11.9 TYPE 2 DIABETES MELLITUS WITHOUT OPHTHALMIC MANIFESTATIONS: ICD-10-CM

## 2019-06-27 DIAGNOSIS — N52.1 ERECTILE DYSFUNCTION ASSOCIATED WITH TYPE 2 DIABETES MELLITUS: ICD-10-CM

## 2019-06-27 DIAGNOSIS — Z86.73 HISTORY OF CVA (CEREBROVASCULAR ACCIDENT): ICD-10-CM

## 2019-06-27 DIAGNOSIS — Z85.850 HISTORY OF THYROID CANCER: ICD-10-CM

## 2019-06-27 DIAGNOSIS — N18.30 CHRONIC KIDNEY DISEASE, STAGE III (MODERATE): ICD-10-CM

## 2019-06-27 DIAGNOSIS — E11.22 TYPE 2 DIABETES MELLITUS WITH STAGE 3 CHRONIC KIDNEY DISEASE, WITH LONG-TERM CURRENT USE OF INSULIN: ICD-10-CM

## 2019-06-27 DIAGNOSIS — E89.0 POSTSURGICAL HYPOTHYROIDISM: ICD-10-CM

## 2019-06-27 DIAGNOSIS — Z12.11 ENCOUNTER FOR SCREENING FOR MALIGNANT NEOPLASM OF COLON: ICD-10-CM

## 2019-06-27 DIAGNOSIS — I50.32 CHRONIC DIASTOLIC HEART FAILURE: ICD-10-CM

## 2019-06-27 DIAGNOSIS — G40.209 COMPLEX PARTIAL SEIZURES EVOLVING TO GENERALIZED TONIC-CLONIC SEIZURES: ICD-10-CM

## 2019-06-27 DIAGNOSIS — I10 ESSENTIAL HYPERTENSION: ICD-10-CM

## 2019-06-27 DIAGNOSIS — Z00.00 ANNUAL PHYSICAL EXAM: Primary | ICD-10-CM

## 2019-06-27 DIAGNOSIS — E11.69 ERECTILE DYSFUNCTION ASSOCIATED WITH TYPE 2 DIABETES MELLITUS: ICD-10-CM

## 2019-06-27 DIAGNOSIS — H36.89 SICKLE CELL RETINOPATHY WITHOUT CRISIS: ICD-10-CM

## 2019-06-27 DIAGNOSIS — Z00.00 ENCOUNTER FOR PREVENTIVE HEALTH EXAMINATION: Primary | ICD-10-CM

## 2019-06-27 DIAGNOSIS — D57.3 SICKLE CELL TRAIT: ICD-10-CM

## 2019-06-27 DIAGNOSIS — I12.9 HYPERTENSIVE KIDNEY DISEASE WITH STAGE 3 CHRONIC KIDNEY DISEASE: ICD-10-CM

## 2019-06-27 DIAGNOSIS — Z79.01 CHRONIC ANTICOAGULATION: Chronic | ICD-10-CM

## 2019-06-27 DIAGNOSIS — E78.00 HYPERCHOLESTEROLEMIA: ICD-10-CM

## 2019-06-27 DIAGNOSIS — H53.461 RIGHT HOMONYMOUS HEMIANOPSIA: ICD-10-CM

## 2019-06-27 DIAGNOSIS — Z79.4 TYPE 2 DIABETES MELLITUS WITH STAGE 3 CHRONIC KIDNEY DISEASE, WITH LONG-TERM CURRENT USE OF INSULIN: ICD-10-CM

## 2019-06-27 DIAGNOSIS — N18.30 TYPE 2 DIABETES MELLITUS WITH STAGE 3 CHRONIC KIDNEY DISEASE, WITH LONG-TERM CURRENT USE OF INSULIN: ICD-10-CM

## 2019-06-27 DIAGNOSIS — D57.1 SICKLE CELL RETINOPATHY WITHOUT CRISIS: ICD-10-CM

## 2019-06-27 DIAGNOSIS — E66.9 OBESITY (BMI 30.0-34.9): ICD-10-CM

## 2019-06-27 DIAGNOSIS — H35.033 HYPERTENSIVE RETINOPATHY, BILATERAL: ICD-10-CM

## 2019-06-27 DIAGNOSIS — N25.81 SECONDARY HYPERPARATHYROIDISM: ICD-10-CM

## 2019-06-27 DIAGNOSIS — N18.30 HYPERTENSIVE KIDNEY DISEASE WITH STAGE 3 CHRONIC KIDNEY DISEASE: ICD-10-CM

## 2019-06-27 DIAGNOSIS — H40.003 GLAUCOMA SUSPECT OF BOTH EYES: ICD-10-CM

## 2019-06-27 PROCEDURE — 3044F HG A1C LEVEL LT 7.0%: CPT | Mod: CPTII,S$GLB,, | Performed by: INTERNAL MEDICINE

## 2019-06-27 PROCEDURE — 99397 PR PREVENTIVE VISIT,EST,65 & OVER: ICD-10-PCS | Mod: S$GLB,,, | Performed by: INTERNAL MEDICINE

## 2019-06-27 PROCEDURE — 3078F PR MOST RECENT DIASTOLIC BLOOD PRESSURE < 80 MM HG: ICD-10-PCS | Mod: CPTII,S$GLB,, | Performed by: NURSE PRACTITIONER

## 2019-06-27 PROCEDURE — 99999 PR PBB SHADOW E&M-EST. PATIENT-LVL V: ICD-10-PCS | Mod: PBBFAC,,, | Performed by: NURSE PRACTITIONER

## 2019-06-27 PROCEDURE — 3074F PR MOST RECENT SYSTOLIC BLOOD PRESSURE < 130 MM HG: ICD-10-PCS | Mod: CPTII,S$GLB,, | Performed by: NURSE PRACTITIONER

## 2019-06-27 PROCEDURE — 3074F SYST BP LT 130 MM HG: CPT | Mod: CPTII,S$GLB,, | Performed by: INTERNAL MEDICINE

## 2019-06-27 PROCEDURE — 3078F DIAST BP <80 MM HG: CPT | Mod: CPTII,S$GLB,, | Performed by: INTERNAL MEDICINE

## 2019-06-27 PROCEDURE — 3044F PR MOST RECENT HEMOGLOBIN A1C LEVEL <7.0%: ICD-10-PCS | Mod: CPTII,S$GLB,, | Performed by: NURSE PRACTITIONER

## 2019-06-27 PROCEDURE — 3078F PR MOST RECENT DIASTOLIC BLOOD PRESSURE < 80 MM HG: ICD-10-PCS | Mod: CPTII,S$GLB,, | Performed by: INTERNAL MEDICINE

## 2019-06-27 PROCEDURE — 99999 PR PBB SHADOW E&M-EST. PATIENT-LVL IV: ICD-10-PCS | Mod: PBBFAC,,, | Performed by: INTERNAL MEDICINE

## 2019-06-27 PROCEDURE — 99499 RISK ADDL DX/OHS AUDIT: ICD-10-PCS | Mod: S$GLB,,, | Performed by: INTERNAL MEDICINE

## 2019-06-27 PROCEDURE — 3044F HG A1C LEVEL LT 7.0%: CPT | Mod: CPTII,S$GLB,, | Performed by: NURSE PRACTITIONER

## 2019-06-27 PROCEDURE — 3044F PR MOST RECENT HEMOGLOBIN A1C LEVEL <7.0%: ICD-10-PCS | Mod: CPTII,S$GLB,, | Performed by: INTERNAL MEDICINE

## 2019-06-27 PROCEDURE — 99499 UNLISTED E&M SERVICE: CPT | Mod: S$GLB,,, | Performed by: INTERNAL MEDICINE

## 2019-06-27 PROCEDURE — 3074F PR MOST RECENT SYSTOLIC BLOOD PRESSURE < 130 MM HG: ICD-10-PCS | Mod: CPTII,S$GLB,, | Performed by: INTERNAL MEDICINE

## 2019-06-27 PROCEDURE — 99397 PER PM REEVAL EST PAT 65+ YR: CPT | Mod: S$GLB,,, | Performed by: INTERNAL MEDICINE

## 2019-06-27 PROCEDURE — 3074F SYST BP LT 130 MM HG: CPT | Mod: CPTII,S$GLB,, | Performed by: NURSE PRACTITIONER

## 2019-06-27 PROCEDURE — 99499 RISK ADDL DX/OHS AUDIT: ICD-10-PCS | Mod: S$GLB,,, | Performed by: NURSE PRACTITIONER

## 2019-06-27 PROCEDURE — G0439 PPPS, SUBSEQ VISIT: HCPCS | Mod: S$GLB,,, | Performed by: NURSE PRACTITIONER

## 2019-06-27 PROCEDURE — 99499 UNLISTED E&M SERVICE: CPT | Mod: S$GLB,,, | Performed by: NURSE PRACTITIONER

## 2019-06-27 PROCEDURE — 99999 PR PBB SHADOW E&M-EST. PATIENT-LVL V: CPT | Mod: PBBFAC,,, | Performed by: NURSE PRACTITIONER

## 2019-06-27 PROCEDURE — G0439 PR MEDICARE ANNUAL WELLNESS SUBSEQUENT VISIT: ICD-10-PCS | Mod: S$GLB,,, | Performed by: NURSE PRACTITIONER

## 2019-06-27 PROCEDURE — 99999 PR PBB SHADOW E&M-EST. PATIENT-LVL IV: CPT | Mod: PBBFAC,,, | Performed by: INTERNAL MEDICINE

## 2019-06-27 PROCEDURE — 3078F DIAST BP <80 MM HG: CPT | Mod: CPTII,S$GLB,, | Performed by: NURSE PRACTITIONER

## 2019-06-27 NOTE — PROGRESS NOTES
Subjective:       Patient ID: Alfa Christy III is a 63 y.o. male.    Chief Complaint: Follow-up (4 month f/u, pt has questions about his navel)    HPI  62 y/o man with h/o CVA (11/2016) with residual deficits, h/o seizures, DM2, CKD, HTN, h/o thyroid cancer with postsurgical hypothyroidism, paroxysmal atrial fibrillation, chronic diastolic HF, sickle cell trait + retinopathy, BPH with obstruction, elevated PSA, multiple other medical issues here for follow up. Due for annual exam, also with new concern re: bulge at navel and painful keloid on back.   Here with wife Rut today    Umbilical hernia - first noticed small bulge about 6 months ago, this has been gradually enlarging. Nontender, reducible, no color change.     Keloid scar on L side of back has been occasionally painful. Has seen dermatology about this in the past though wasn't having pain at that time.     DM2 - dx in 1990s, on insulin since 2010.   Following with Endocrine, seen 6/5/19, A1c at goal at 5.8  Meds - levemir 12u qHS, novolog 5u qAC, trulicity 0.75mg weekly  Foot exam 5/22/19 with Dr Segura  Eye exam 10/2018  On ARB, statin     History of thyroid cancer treated with surgery, RAIA. Post-surgical hypothyroidism, on levothyroxine. Most recent TG undetectable.   TSH goal <2.5  Planned for yearly TG levels, surveillance ultrasound    HFpEF, HTN, atrial fibrillation -   Has f/u with cardiology 9/17/19  On ASA, apixaban 5mg BID, lasix, coreg 6.25mg BID, norvasc 10mg, losartan 100mg, atorvastatin  Echo with HFpEF, EF 55% 1/2016, +carotid artery stenosis <50% bilaterally 2016. Exercise stress echo 6/2018 negative, pEF.  Does have bilateral leg swelling, does have compression stocking but had been using this overnight -- switching to daytime use per cardiology recommendation    H/o CVA 2016, residual R vision field deficit, some cognitive and balance effects - L occipital stroke  Uses cane or assistance from another person  Having headaches occasionally,  "takes tylenol which help with these  Taking magnesium 500mg every other day  Taking ASA and apixaban  No blood in stool, easy bruising, or nosebleeds.   Good support from his wife    Seizures - taking keppra and vimpat BID as directed  No seizures in last 6 months    BPH, elevated PSA - Has f/u with Urology 7/9/19,     CKD - seeing nephrology 8/6/19, renal function generally stable with slight eGFR drop on recent labs.    Sickle cell trait, noted on previous eye exam to have sickle cell retinopathy - overdue for follow up with Heme/Onc    Working out 3-5 days/week - treadmill 30min and light weights    Due for colonoscopy - this was ordered last visit but not done    Review of Systems   Constitutional: Negative for activity change, fatigue, fever and unexpected weight change.   HENT: Negative.    Eyes: Positive for visual disturbance (no change).   Respiratory: Negative for cough and shortness of breath.    Cardiovascular: Positive for leg swelling (chronic). Negative for chest pain and palpitations.   Gastrointestinal: Negative for abdominal pain, blood in stool, constipation and diarrhea.   Endocrine: Negative.    Genitourinary: Negative for difficulty urinating and hematuria.   Musculoskeletal: Negative for back pain and gait problem.   Skin: Negative.    Neurological: Positive for headaches. Negative for seizures, syncope, weakness and numbness.        Balance problem since stroke   Hematological: Does not bruise/bleed easily.   Psychiatric/Behavioral: Negative for dysphoric mood. The patient is not nervous/anxious.          Past medical history, surgical history, and family medical history reviewed and updated as appropriate.    Medications and allergies reviewed.     Objective:          Vitals:    06/27/19 1655   BP: 106/64   BP Location: Right arm   Patient Position: Sitting   BP Method: Large (Manual)   Pulse: 60   SpO2: 98%   Weight: 101.3 kg (223 lb 5.2 oz)   Height: 5' 10" (1.778 m)     Body mass index is " 32.04 kg/m².  Physical Exam   Constitutional: He is oriented to person, place, and time. He appears well-developed and well-nourished. No distress.   HENT:   Head: Normocephalic and atraumatic.   Mouth/Throat: Oropharynx is clear and moist.   Eyes: Pupils are equal, round, and reactive to light. Conjunctivae and EOM are normal.   Neck: Neck supple. No thyromegaly present.   Cardiovascular: Normal rate and normal heart sounds. An irregularly irregular rhythm present.   No murmur heard.  Pulmonary/Chest: Effort normal and breath sounds normal. No respiratory distress.   Abdominal: Soft. Bowel sounds are normal. He exhibits no distension. There is no tenderness. A hernia (small reducible umbilical hernia, nontender) is present.   Musculoskeletal: He exhibits edema (2+ pitting edema to just distal to knee, bilateral). He exhibits no tenderness.   Lymphadenopathy:     He has no cervical adenopathy.   Neurological: He is alert and oriented to person, place, and time. He has normal strength. No cranial nerve deficit. Gait normal.   Repeats questions at times, sometimes requires redirection when perseverating or tangential   Skin: Skin is warm and dry.   +mild hyperpigmentation of skin at anterior shins bilaterally  +multiple macular hyperpigmented skin lesions on back with irregular border, some raised small hyperpigmented skin lesions as well  +two 3-5cm flat raised nodular lesions, flesh-colored, on upper back   Psychiatric: He has a normal mood and affect.   Vitals reviewed.      Lab Results   Component Value Date    WBC 9.61 11/28/2018    HGB 11.3 (L) 11/28/2018    HCT 32.2 (L) 11/28/2018     11/28/2018    CHOL 102 (L) 07/31/2018    TRIG 73 07/31/2018    HDL 35 (L) 07/31/2018    ALT 16 03/09/2019    AST 26 03/09/2019     05/29/2019    K 4.9 05/29/2019     05/29/2019    CREATININE 2.2 (H) 05/29/2019    BUN 27 (H) 05/29/2019    CO2 24 05/29/2019    TSH 0.960 05/29/2019    PSA 7.2 (H) 10/28/2015    INR  1.0 05/31/2018    GLUF 106 11/05/2008    HGBA1C 5.8 (H) 05/29/2019       Assessment:       1. Annual physical exam    2. Encounter for screening for malignant neoplasm of colon    3. Type 2 diabetes mellitus with stage 3 chronic kidney disease, with long-term current use of insulin    4. Secondary hyperparathyroidism    5. History of thyroid cancer    6. Postsurgical hypothyroidism    7. Sickle cell trait    8. Chronic diastolic heart failure    9. Essential hypertension    10. Hypercholesterolemia    11. Paroxysmal atrial fibrillation    12. History of CVA (cerebrovascular accident)    13. Complex partial seizures evolving to generalized tonic-clonic seizures    14. Right homonymous hemianopsia    15. Vitreous hemorrhage of right eye    16. Umbilical hernia without obstruction and without gangrene        Plan:   Alfa was seen today for follow-up.    Diagnoses and all orders for this visit:    Annual physical exam - Overall stable with good social supports, doing well. Reviewed chronic and preventive health concerns.  Colonoscopy ordered.   Following with Urology for elevated PSA, encouraged him to keep this appt.  Should get flu vaccine in the fall.  Recommended shingrix    Encounter for screening for malignant neoplasm of colon  -     Case request GI: COLONOSCOPY    Type 2 diabetes mellitus with stage 3 chronic kidney disease, with long-term current use of insulin - at goal, follow with Endocrine, continue current medications. No episodes of hypoglycemia or significant medication side effects noted.    Secondary hyperparathyroidism - related to CKD, follow with nephrology    History of thyroid cancer  Postsurgical hypothyroidism - stable, no e/o recurrence at present, follow with thyroid team there    Sickle cell trait - noted as trait but has had crisis, noted on eye exam to have sickle cell retinopathy  Encouraged him to reschedule f/u with Heme/Onc for further evaluation    Chronic diastolic heart failure -  "well-compensated currently though does have some mild chronic leg edema. Recommended compression stockings. Continue current medications as per cardiology note    Essential hypertension - at goal, continue current medications    Hypercholesterolemia - continue statin    Paroxysmal atrial fibrillation - rate-controlled, continue beta-blocker, keep well-hydrated    History of CVA (cerebrovascular accident) - reviewed; still some deficits but overall functioning fairly well with good support from family    Complex partial seizures evolving to generalized tonic-clonic seizures - controlled on current medications, continue    Right homonymous hemianopsia - follow with neurology, ophthalmology    Vitreous hemorrhage of right eye - follow with ophthalmology for this and other chronic eye problems    Umbilical hernia - small, reducible, non-emergent. Counseled re: what hernia is, treatment options vs using hernia belt / surveillance.  Will put in referral to General Surgery for consultation; they would like to consider this.     Health maintenance reviewed with patient.   Not sure of last tetanus vaccine  Can get shingrix if desired - defers this for now  Schedule with dermatology, hematology, neurology  Colonoscopy re-ordered    For colonoscopy, from cardiology note: "Recommend holding Apixaban 4 days prior to elective procedure and restart it as soon as safer per the procedure team.  [2017 ACC expert consensus for managing anticoagulation perioperatively]"    Follow up in about 6 months (around 12/27/2019) for coordination of care.    Tyler Jasmine MD  Internal Medicine  [facility]  6/27/2019      "

## 2019-06-27 NOTE — PATIENT INSTRUCTIONS
Counseling and Referral of Other Preventative  (Italic type indicates deductible and co-insurance are waived)    Patient Name: Alfa Christy  Today's Date: 6/27/2019    Health Maintenance       Date Due Completion Date    TETANUS VACCINE [birth year]1971 ---    Shingles Vaccine (2 of 3) 01/05/2016 11/10/2015    Colonoscopy 03/03/2018 3/3/2015    Lipid Panel 07/31/2019 7/31/2018    Influenza Vaccine 08/01/2019 10/30/2018    Foot Exam 08/30/2019 8/30/2018 (Done)    Override on 8/30/2018: Done    Eye Exam 10/16/2019 10/16/2018    Override on 7/10/2017: Done (outside provider - records scanned)    Hemoglobin A1c 11/29/2019 5/29/2019    High Dose Statin 06/27/2020 6/27/2019    Pneumococcal Vaccine (65+ High/Highest Risk) (2 of 2 - PPSV23) 07/11/2022 7/11/2017        No orders of the defined types were placed in this encounter.    The following information is provided to all patients.  This information is to help you find resources for any of the problems found today that may be affecting your health:                Living healthy guide: www.[facility].[place].gov      Understanding Diabetes: www.diabetes.org      Eating healthy: www.cdc.gov/healthyweight      CDC home safety checklist: www.cdc.gov/steadi/patient.html      Agency on Aging: www.goea.[place].[facility]      Alcoholics anonymous (AA): www.aa.org      Physical Activity: www.deborah.nih.gov/ll3dsug      Tobacco use: www.quitwithusla.org

## 2019-06-27 NOTE — PATIENT INSTRUCTIONS
Call the neurology clinic to schedule follow up with Dr Matamoros for August 2019. You are following with him for your stroke and your seizures.    Call to schedule your follow up in the dermatology clinic.    You are also due to follow up in the Hematology/Oncology clinic for your sickle cell trait -- it has been more than a year since your visit to that clinic.   You last saw Dr Johansen there.    Look into getting a hernia belt that keeps some pressure over your bellybutton to wear when you are lifting weights, exercising, or lifting anything heavy.         Hernia (Adult)    A hernia can happen when there is a weakness or defect in the wall of the abdomen or groin. Intestines or nearby tissues may move from their usual location and push through the weakness in the wall. This can cause a hernia (bulge) you may see or feel.  Causes and Risk Factors   A hernia may be present at birth. Or it may be caused by the wear and tear of daily living. Certain factors can make a hernia more likely. These can include:  · Heavy lifting  · Straining, whether from lifting, movement, or constipation  · Chronic cough  · Injury to the abdominal wall  · Excess weight  · Pregnancy  · Prior surgery  · Older age  · Family history of hernia  Symptoms  Symptoms of a hernia may come on suddenly. Or they may appear slowly over time. Some common symptoms include:  · Bulge in the groin area, around the navel, or in the scrotum (the bulge may get bigger when you stand and go away when you lie down)  · Pain or pressure around the bulge  · Pain during activities such as lifting, coughing, or sneezing  · A feeling of weakness or pressure in the groin  · Pain or swelling in the scrotum  Types of hernias  There are different types of hernia. The type you have depends on its location:  · Inguinal: This type is in the groin or scrotum. It is more common in men.  · Femoral: This type is in the groin, upper thigh (where the leg bends), or labia. It is more  common in women.  · Ventral: This type is in the abdominal wall.  · Umbilical: This type occurs around the navel (belly button).  · Incisional: This type occurs at the site of a previous surgery.  The condition of the hernia can help determine how urgently it needs to be treated.  · Reducible: It goes back in by itself, or it can be pushed back in.  · Irreducible: It cant be pushed back in.  · Incarcerated/Strangulated: The intestine is trapped (incarcerated). If this happens, you wont be able to push the bulge back in. If the incarcerated hernia isnt treated, it may become strangulated. This means the area loses blood supply and the tissue may die. This requires emergency surgery! Treatment is needed right away!  In most cases, a hernia will not heal on its own. Surgery is usually needed to repair the defect in the abdominal wall or groin. Youll be told more about surgery, if needed.  If your symptoms are not severe, treatment may sometimes be delayed. In such cases, regular follow-up visits with the provider will be needed. Youll be asked to keep track of your symptoms and to watch for signs of more serious problems. You may also be given guidelines similar to the home care instructions below.  Home Care  To help keep a hernia from getting worse, you may be advised to:  · Avoid heavy lifting and straining as directed.  · Take steps to prevent constipation, such as eating more fiber and drinking more water. This may help reduce straining that can occur when having a bowel movement. Reducing straining may help keep your symptoms from getting worse.  · Maintain a healthy weight or lose excess weight. This can help reduce strain on abdominal muscles and tissues.  · Stop smoking. This can help prevent coughing that may also strain abdominal muscles and tissues.  Follow-up care  Follow up with your healthcare provider, or as directed. If imaging tests were done, they will be reviewed a doctor. You will be told the  results and any new findings that may affect your care.  When to seek medical advice  Call your healthcare provider right away if any of these occur:  · Hernia hardens, swells, or grows larger  · Hernia can no longer be pushed back in  · Pain moves to the lower right abdomen (just below the waistline), or spreads to the back  Call 911  Call 911 right away if any of these occur:  · Nausea and vomiting  · Severe pain, redness, or tenderness in the area near the hernia  · Pain worsens quickly and doesnt get better  · Inability to have a bowel movement or pass gas  · Fever of 100.4°F (38°C) or higher  · Trouble breathing  · Fainting  · Rapid heart rate  · Vomiting blood  · Large amounts of blood in stool  Date Last Reviewed: 6/9/2015  © 2412-6281 [employer]. [address] [place] [address], [place] [address]. All rights reserved. This information is not intended as a substitute for professional medical care. Always follow your healthcare professional's instructions.

## 2019-06-28 ENCOUNTER — TELEPHONE (OUTPATIENT)
Dept: INTERNAL MEDICINE | Facility: CLINIC | Age: 64
End: 2019-06-28

## 2019-06-28 DIAGNOSIS — I48.0 PAROXYSMAL ATRIAL FIBRILLATION: Chronic | ICD-10-CM

## 2019-06-28 NOTE — PROGRESS NOTES
"Alfa Christy presented for a  Medicare AWV and comprehensive Health Risk Assessment today. The following components were reviewed and updated:    · Medical history  · Family History  · Social history  · Allergies and Current Medications  · Health Risk Assessment  · Health Maintenance  · Care Team     ** See Completed Assessments for Annual Wellness Visit within the encounter summary.**       The following assessments were completed:  · Living Situation  · CAGE  · Depression Screening  · Timed Get Up and Go  · Whisper Test  · Cognitive Function Screening*post CVA, impairment  · Nutrition Screening  · ADL Screening  · PAQ Screening    Vitals:    06/27/19 1522   BP: 106/64   Pulse: 66   SpO2: 97%   Weight: 101.6 kg (223 lb 15.8 oz)   Height: 5' 10" (1.778 m)     Body mass index is 32.14 kg/m².  Physical Exam   Constitutional: He is oriented to person, place, and time. He appears well-developed.   obese   HENT:   Head: Normocephalic and atraumatic.   Nose: Nose normal.   Eyes: Conjunctivae and EOM are normal.   Cardiovascular: Normal rate, regular rhythm, normal heart sounds and intact distal pulses.   Pulmonary/Chest: Effort normal and breath sounds normal.   Neurological: He is alert and oriented to person, place, and time. Gait abnormal.   wheelchair   Skin: Skin is warm and dry.   Psychiatric: He has a normal mood and affect. Judgment and thought content normal. His speech is delayed. He is withdrawn. Cognition and memory are impaired.   Nursing note and vitals reviewed.        Diagnoses and health risks identified today and associated recommendations/orders:    1. Encounter for preventive health examination  Assessment performed. Health maintenance updated. Chart review completed.  Colonoscopy was previously ordered by . Number provided to schedule appointment.    2. Complex partial seizures evolving to generalized tonic-clonic seizures  Chronic. Stable with current regimen. Followed by Neurology.    3. Sickle cell " retinopathy without crisis  Chronic. Stable without recent crisis. Followed by Hematology Oncology.  Last visit 8/3/2017.    4. Chronic diastolic heart failure  Chronic. Continue regimen as instructed. Followed by Cardiology.  Last seen 6/13/2019    5. Paroxysmal atrial fibrillation  Chronic. Continue regimen as instructed. Followed by Cardiology.  Last seen 6/13/2019    6. Chronic kidney disease, stage III (moderate)  Component      Latest Ref Rng & Units 5/29/2019             Creatinine      0.5 - 1.4 mg/dL 2.2 (H)   Chronic. Last seen by Nephrology 1/31/2018.  Due for follow up visit.    7. Type 2 diabetes mellitus without ophthalmic manifestations  Chronic. Stable. Last eye exam 10/16/2018.  Followed by Ophthalmology and Endocrinology.    8. Type 2 diabetes mellitus with stage 3 chronic kidney disease, with long-term current use of insulin  Component      Latest Ref Rng & Units 5/29/2019             Creatinine      0.5 - 1.4 mg/dL 2.2 (H)   Chronic. Last seen by Nephrology 1/31/2018 and Endocrinology.  Due for follow up visit.    9. Erectile dysfunction associated with type 2 diabetes mellitus  Chronic. Continue regimen as instructed. Followed by Urology and Endocrinology.    10. Secondary hyperparathyroidism  Chronic. Last seen by Nephrology 1/31/2018.  Due for follow up visit    11. Vitreous hemorrhage of right eye  Chronic. Stable. Last eye exam 10/16/2018.  Followed by Ophthalmology.    12. Sickle cell trait  Chronic. Stable without recent crisis. Followed by Hematology Oncology.  Last visit 8/3/2017.    13. Essential hypertension  Chronic. Stable. Followed by PCP.    14. Hypercholesterolemia  Chronic. Stable. Followed by PCP.    15. History of thyroid cancer  Noted.    16. Obesity (BMI 30.0-34.9)  Chronic. Discussed diet and exercise. He is working out at home.  Encouragement provided.    17. Postsurgical hypothyroidism  Chronic. Stable. Followed by PCP.    18. Chronic anticoagulation -  pradaxa  Noted.    19. Hypertensive kidney disease with stage 3 chronic kidney disease  Chronic. Last seen by Nephrology 1/31/2018.  Due for follow up visit.    20. History of CVA (cerebrovascular accident)  Noted.  Abnormal gait.  Slowed response to questions.    21. Glaucoma suspect of both eyes  Chronic. Stable. Last eye exam 10/16/2018.  Followed by Ophthalmology.    22. Hypertensive retinopathy, bilateral  Chronic. Stable. Last eye exam 10/16/2018.  Followed by Ophthalmology.      Provided Alfa with a 5-10 year written screening schedule and personal prevention plan. Recommendations were developed using the USPSTF age appropriate recommendations. Education, counseling, and referrals were provided as needed. After Visit Summary printed and given to patient which includes a list of additional screenings\tests needed.    Follow up for Annual Wellness Visit in 1 year, follow up with PCP as instructed, ;sooner if problems.    KRISTY Rhodes

## 2019-07-02 ENCOUNTER — TELEPHONE (OUTPATIENT)
Dept: UROLOGY | Facility: CLINIC | Age: 64
End: 2019-07-02

## 2019-07-02 RX ORDER — CARVEDILOL 12.5 MG/1
6.25 TABLET ORAL 2 TIMES DAILY
Qty: 60 TABLET | Refills: 6 | Status: SHIPPED | OUTPATIENT
Start: 2019-07-02 | End: 2019-09-25

## 2019-07-08 DIAGNOSIS — E11.22 STAGE 3 CHRONIC KIDNEY DISEASE DUE TO DIABETES MELLITUS: Primary | ICD-10-CM

## 2019-07-08 DIAGNOSIS — N18.30 STAGE 3 CHRONIC KIDNEY DISEASE DUE TO DIABETES MELLITUS: Primary | ICD-10-CM

## 2019-07-11 DIAGNOSIS — N40.1 BPH WITH OBSTRUCTION/LOWER URINARY TRACT SYMPTOMS: ICD-10-CM

## 2019-07-11 DIAGNOSIS — N13.8 BPH WITH OBSTRUCTION/LOWER URINARY TRACT SYMPTOMS: ICD-10-CM

## 2019-07-11 RX ORDER — AMLODIPINE BESYLATE 5 MG/1
5 TABLET ORAL 2 TIMES DAILY
Qty: 180 TABLET | Refills: 3 | Status: CANCELLED | OUTPATIENT
Start: 2019-07-11

## 2019-07-11 RX ORDER — AMLODIPINE BESYLATE 10 MG/1
10 TABLET ORAL DAILY
Qty: 30 TABLET | Refills: 11 | Status: SHIPPED | OUTPATIENT
Start: 2019-07-11 | End: 2019-09-25

## 2019-07-11 NOTE — TELEPHONE ENCOUNTER
----- Message from Mary Villanueva sent at 7/11/2019  9:52 AM CDT -----  Contact: pt wife  See below   ----- Message -----  From: Mary Villanueva  Sent: 7/11/2019   9:01 AM  To: Shawna Zaid    Pt need a new Rx for his Norvasc 10 mg.  She states the pharmacist says they cannot get the 5 mg refilled until the end of the month.  LOV 6/13/19 Dr. Todd  [facility]/pharmacy #[id number] - [facility] [address] [facility] [phone] (Phone)  [phone] (Fax)    Thanks

## 2019-07-16 RX ORDER — FINASTERIDE 5 MG/1
TABLET, FILM COATED ORAL
Qty: 30 TABLET | Refills: 0 | Status: SHIPPED | OUTPATIENT
Start: 2019-07-16 | End: 2019-10-03

## 2019-07-25 DIAGNOSIS — I48.0 PAROXYSMAL ATRIAL FIBRILLATION: Chronic | ICD-10-CM

## 2019-07-26 RX ORDER — APIXABAN 5 MG/1
TABLET, FILM COATED ORAL
Qty: 180 TABLET | Refills: 3 | Status: SHIPPED | OUTPATIENT
Start: 2019-07-26 | End: 2020-01-01 | Stop reason: SDUPTHER

## 2019-07-30 ENCOUNTER — PATIENT MESSAGE (OUTPATIENT)
Dept: UROLOGY | Facility: CLINIC | Age: 64
End: 2019-07-30

## 2019-07-30 ENCOUNTER — OFFICE VISIT (OUTPATIENT)
Dept: UROLOGY | Facility: CLINIC | Age: 64
End: 2019-07-30
Payer: MEDICARE

## 2019-07-30 VITALS
WEIGHT: 226.19 LBS | SYSTOLIC BLOOD PRESSURE: 136 MMHG | HEIGHT: 70 IN | DIASTOLIC BLOOD PRESSURE: 74 MMHG | HEART RATE: 58 BPM | BODY MASS INDEX: 32.38 KG/M2

## 2019-07-30 DIAGNOSIS — E11.69 ERECTILE DYSFUNCTION ASSOCIATED WITH TYPE 2 DIABETES MELLITUS: ICD-10-CM

## 2019-07-30 DIAGNOSIS — Z79.01 CHRONIC ANTICOAGULATION: Chronic | ICD-10-CM

## 2019-07-30 DIAGNOSIS — E66.9 OBESITY (BMI 30.0-34.9): ICD-10-CM

## 2019-07-30 DIAGNOSIS — I10 ESSENTIAL HYPERTENSION: ICD-10-CM

## 2019-07-30 DIAGNOSIS — Z86.73 HISTORY OF CVA (CEREBROVASCULAR ACCIDENT): Primary | ICD-10-CM

## 2019-07-30 DIAGNOSIS — E11.22 TYPE 2 DIABETES MELLITUS WITH STAGE 3 CHRONIC KIDNEY DISEASE, WITH LONG-TERM CURRENT USE OF INSULIN: ICD-10-CM

## 2019-07-30 DIAGNOSIS — N18.30 CHRONIC KIDNEY DISEASE, STAGE III (MODERATE): ICD-10-CM

## 2019-07-30 DIAGNOSIS — G40.209 COMPLEX PARTIAL SEIZURES EVOLVING TO GENERALIZED TONIC-CLONIC SEIZURES: ICD-10-CM

## 2019-07-30 DIAGNOSIS — R97.20 ELEVATED PSA: Primary | ICD-10-CM

## 2019-07-30 DIAGNOSIS — I50.32 CHRONIC DIASTOLIC HEART FAILURE: ICD-10-CM

## 2019-07-30 DIAGNOSIS — N18.30 TYPE 2 DIABETES MELLITUS WITH STAGE 3 CHRONIC KIDNEY DISEASE, WITH LONG-TERM CURRENT USE OF INSULIN: ICD-10-CM

## 2019-07-30 DIAGNOSIS — R97.20 ELEVATED PSA: ICD-10-CM

## 2019-07-30 DIAGNOSIS — Z79.4 TYPE 2 DIABETES MELLITUS WITH STAGE 3 CHRONIC KIDNEY DISEASE, WITH LONG-TERM CURRENT USE OF INSULIN: ICD-10-CM

## 2019-07-30 DIAGNOSIS — D57.3 SICKLE CELL TRAIT: ICD-10-CM

## 2019-07-30 DIAGNOSIS — N52.1 ERECTILE DYSFUNCTION ASSOCIATED WITH TYPE 2 DIABETES MELLITUS: ICD-10-CM

## 2019-07-30 PROCEDURE — 3078F PR MOST RECENT DIASTOLIC BLOOD PRESSURE < 80 MM HG: ICD-10-PCS | Mod: CPTII,S$GLB,, | Performed by: UROLOGY

## 2019-07-30 PROCEDURE — 99214 PR OFFICE/OUTPT VISIT, EST, LEVL IV, 30-39 MIN: ICD-10-PCS | Mod: S$GLB,,, | Performed by: UROLOGY

## 2019-07-30 PROCEDURE — 3044F PR MOST RECENT HEMOGLOBIN A1C LEVEL <7.0%: ICD-10-PCS | Mod: CPTII,S$GLB,, | Performed by: UROLOGY

## 2019-07-30 PROCEDURE — 3075F PR MOST RECENT SYSTOLIC BLOOD PRESS GE 130-139MM HG: ICD-10-PCS | Mod: CPTII,S$GLB,, | Performed by: UROLOGY

## 2019-07-30 PROCEDURE — 99999 PR PBB SHADOW E&M-EST. PATIENT-LVL III: CPT | Mod: PBBFAC,,, | Performed by: UROLOGY

## 2019-07-30 PROCEDURE — 3044F HG A1C LEVEL LT 7.0%: CPT | Mod: CPTII,S$GLB,, | Performed by: UROLOGY

## 2019-07-30 PROCEDURE — 99214 OFFICE O/P EST MOD 30 MIN: CPT | Mod: S$GLB,,, | Performed by: UROLOGY

## 2019-07-30 PROCEDURE — 3078F DIAST BP <80 MM HG: CPT | Mod: CPTII,S$GLB,, | Performed by: UROLOGY

## 2019-07-30 PROCEDURE — 99999 PR PBB SHADOW E&M-EST. PATIENT-LVL III: ICD-10-PCS | Mod: PBBFAC,,, | Performed by: UROLOGY

## 2019-07-30 PROCEDURE — 1101F PT FALLS ASSESS-DOCD LE1/YR: CPT | Mod: CPTII,S$GLB,, | Performed by: UROLOGY

## 2019-07-30 PROCEDURE — 1101F PR PT FALLS ASSESS DOC 0-1 FALLS W/OUT INJ PAST YR: ICD-10-PCS | Mod: CPTII,S$GLB,, | Performed by: UROLOGY

## 2019-07-30 PROCEDURE — 3075F SYST BP GE 130 - 139MM HG: CPT | Mod: CPTII,S$GLB,, | Performed by: UROLOGY

## 2019-07-30 RX ORDER — SILDENAFIL 100 MG/1
TABLET, FILM COATED ORAL
Qty: 6 TABLET | Refills: 12 | Status: SHIPPED | OUTPATIENT
Start: 2019-07-30 | End: 2019-07-30 | Stop reason: SDUPTHER

## 2019-07-30 RX ORDER — SILDENAFIL 100 MG/1
TABLET, FILM COATED ORAL
Qty: 30 TABLET | Refills: 12 | Status: SHIPPED | OUTPATIENT
Start: 2019-07-30 | End: 2020-01-01

## 2019-07-30 NOTE — PROGRESS NOTES
Subjective:       Patient ID: Alfa Christy III is a 64 y.o. male.    Chief Complaint: follow up    HPI: Alfa Christy III is a 64 y.o. Black or  male who presents today for gross hematuria. His last clinic visit was 12/18/18.    H/o gross hematuria with negative workup.     Took finasteride for 90 days and then stopped. No side effects.  No f/u psa today.   Urinary frequency every 3 hours. Good stream. No nocturia x 1 month. No intermittency, hesitancy.   Rare urge incontinence.   No LUTS. No nocturia.   Takes flomax.   No weight loss, fevers or chills.   No GI complaints.     Denies family history of prostate cancer.   Denies fever or chills.  FOS is good.     Has diabetes.   He is on lifetime eliquis.     Lab Results   Component Value Date    PSA 7.2 (H) 10/28/2015    PSA 5.62 (H) 12/27/2012    PSA 8.08 (H) 06/26/2012    PSA 9.32 (H) 02/06/2012    PSA 9.4 (H) 10/28/2011    PSA 5.4 (H) 12/09/2010    PSA 6.3 (H) 02/10/2009    PSA 8.5 (H) 11/05/2008    PSA 4.1 (H) 10/13/2008    PSA 2.0 11/22/2005    PSADIAG 13.2 (H) 01/11/2019    PSADIAG 13.7 (H) 12/14/2018    PSADIAG 9.9 (H) 11/04/2016    PSADIAG 12.2 (H) 10/04/2016    PSADIAG 6.4 (H) 04/18/2016    PSADIAG 5.9 (H) 02/11/2015    PSADIAG 6.8 (H) 12/30/2014    PSADIAG 6.0 (H) 02/06/2014    PSADIAG 5.6 (H) 01/30/2014    PSADIAG 4.7 (H) 12/30/2013    PSATOTAL 12.8 (H) 01/11/2019    PSATOTAL 8.2 (H) 10/22/2014    PSAFREE 7.36 (H) 01/11/2019    PSAFREE 2.55 (H) 10/22/2014    PSAFREEPCT 57.50 01/11/2019    PSAFREEPCT 31.10 10/22/2014       PSA - negative prostate biopsy in 2011 (PSA at this time was 5.4)    Pt went to ED on 6/15/18 for urinary retention.   16 fr nieves catheter was inserted and 800 ml of clear yellow urine out.    Cysto 7/5/17  Bilateral ureteral orifice were difficult to see.   No bladder tumors or lesions were seen.  No strictures were noted.  The prostate showed Significant hypertrophy.  There was Significant median lobe present. 6cm in  length.    8/14/17   Procedure(s) Performed:   1.  Cystoscopy with bilateral retrograde pyelograms  2.  Fluoroscopy < 1 hour  Findings:   1.) Large, friable prostate  2.) Right ureter with distal J hooking, no filling defects  3.) Left ureter with tortuosity, no filling defects         Lab Results   Component Value Date    PSA 7.2 (H) 10/28/2015    PSA 5.62 (H) 12/27/2012    PSA 8.08 (H) 06/26/2012       Review of patient's allergies indicates:   Allergen Reactions    Cough syrup [guaifenesin] Other (See Comments)       Current Outpatient Medications   Medication Sig Dispense Refill    acetaminophen (TYLENOL) 500 MG tablet Take 1 tablet (500 mg total) by mouth every 6 (six) hours as needed for Pain.  0    amLODIPine (NORVASC) 10 MG tablet Take 1 tablet (10 mg total) by mouth once daily. 30 tablet 11    aspirin 81 MG Chew Take 1 tablet (81 mg total) by mouth once daily.  0    atorvastatin (LIPITOR) 40 MG tablet Take 1 tablet (40 mg total) by mouth once daily. 90 tablet 3    betamethasone dipropionate (DIPROLENE) 0.05 % cream Apply topically 2 (two) times daily. 45 g 3    blood sugar diagnostic (ONETOUCH VERIO) Strp Check blood glucose readings 3 times a day. 300 strip 3    blood sugar diagnostic Strp To check BG 3 times daily, to use with insurance preferred meter 100 each 11    blood-glucose meter kit To check BG 3 times daily - please provide insurance preferred meter 1 each 0    carvedilol (COREG) 12.5 MG tablet Take 0.5 tablets (6.25 mg total) by mouth 2 (two) times daily. 60 tablet 6    ciclopirox (PENLAC) 8 % Soln Apply topically nightly. 6.6 mL 11    clotrimazole-betamethasone 1-0.05% (LOTRISONE) cream Apply topically 2 (two) times daily. To area of rash for 2-4 weeks 45 g 0    ELIQUIS 5 mg Tab TAKE 1 TABLET BY MOUTH TWICE A  tablet 3    fluocinonide (LIDEX) 0.05 % ointment AAA bid 60 g 3    folic acid (FOLVITE) 400 MCG tablet Take 1,000 mcg by mouth once daily.       furosemide (LASIX)  "40 MG tablet Take 1.5 tablets (60 mg total) by mouth 2 (two) times daily. 180 tablet 3    GLUCAGON EMERGENCY KIT, HUMAN, 1 mg injection INJECT 1 MG INTO THE MUSCLE AS NEEDED 1 kit 1    insulin aspart U-100 (NOVOLOG) 100 unit/mL InPn pen Inject 7 units w/ meals plus scale 150-200+1, 201-250+2, 251-300+3, 301-350+4. Max daily 35 units, 90 day. 3 Box 3    insulin detemir U-100 (LEVEMIR FLEXPEN) 100 unit/mL (3 mL) SubQ InPn pen Inject 12 Units into the skin every evening. (Patient taking differently: Inject 13 Units into the skin every evening. ) 1 Box 3    lancets Misc To check BG 3 times daily, to use with insurance preferred meter 100 each 11    lancing device Misc 1 Device by Misc.(Non-Drug; Combo Route) route 3 (three) times daily. 1 each 0    levETIRAcetam (KEPPRA) 750 MG Tab Take 2 tablets (1,500 mg total) by mouth 2 (two) times daily. 360 tablet 3    levothyroxine (SYNTHROID) 200 MCG tablet TAKE 1 TABLET BY MOUTH BEFORE BREAKFAST 90 tablet 2    losartan (COZAAR) 100 MG tablet TAKE 1 TABLET BY MOUTH DAILY 90 tablet 3    magnesium 30 mg Tab Take 1 tablet by mouth twice a week. Mon ,weds,fridays      pen needle, diabetic (BD ULTRA-FINE MINI PEN NEEDLE) 31 gauge x 3/16" Ndle To use with insulin pens 4  times daily 360 each 3    SAW PALMETTO ORAL Take by mouth. mon , wed, fri      tamsulosin (FLOMAX) 0.4 mg Cap Take 1 capsule (0.4 mg total) by mouth every evening. 30 capsule 11    TRULICITY 0.75 mg/0.5 mL PnIj INJECT 1 PEN UNDER THE SKIN EVERY 7 DAYS 2 Syringe 4    VIMPAT 200 mg Tab tablet Take 1 tablet by mouth 2 (two) times daily.  4    vitamin D 1000 units Tab Take 1,000 Units by mouth every Mon, Wed, Fri.      finasteride (PROSCAR) 5 mg tablet TAKE 1 TABLET BY MOUTH EVERY DAY 30 tablet 0    multivitamin capsule Take 1 capsule by mouth every Mon, Wed, Fri.        No current facility-administered medications for this visit.        Past Medical History:   Diagnosis Date    Allergy     BMI " 31.0-31.9,adult 11/25/2014    CHF (congestive heart failure)     Chronic anticoagulation - pradaxa 11/10/2016    Chronic diastolic heart failure 11/20/2016    CKD (chronic kidney disease), stage III 9/23/2013    Controlled type 2 diabetes with neuropathy 12/16/2014    Coronary artery disease     Diabetes mellitus due to underlying condition with stage 3 chronic kidney disease, without long-term current use of insulin 11/2/2016    Elevated alkaline phosphatase level 8/1/2012    Elevated PSA     negative prostate biopsy 4/11    Erectile dysfunction associated with type 2 diabetes mellitus     Gallstones 3/20/2013    Generalized tonic-clonic seizure 11/2016    HTN (hypertension), benign 8/1/2012    Hypercholesterolemia 8/1/2012    Microcytic anemia 11/27/2013    Paroxysmal atrial fibrillation 9/23/2013    Postsurgical hypothyroidism 11/27/2013    Right homonymous hemianopsia 2/5/2016    Sickle cell trait     Stroke 1/27/2016    Thyroid cancer     multifocal with six lesions, largest 5mm, treated with surgery and radioactive iodine       Past Surgical History:   Procedure Laterality Date    BREAST SURGERY      cyst removal    COLONOSCOPY      COLONOSCOPY N/A 3/3/2015    Performed by CHANDA Wellington MD at [facility] (4TH FLR)    CYST REMOVAL      chest    CYSTOSCOPY WITH RETROGRADE PYELOGRAM Bilateral 8/14/2017    Performed by Anahi Mejia MD at [facility] OR 1ST FLR    PROSTATE BIOPSY  4/27/11    SKIN BIOPSY      THYROID SURGERY  8/26/09    VASCULAR SURGERY         Family History   Problem Relation Age of Onset    Heart disease Father     Diabetes Father     Hypertension Father     Diabetes Mother     Hypertension Mother     Heart disease Mother     Diabetes Brother     No Known Problems Daughter     Cancer Sister     No Known Problems Daughter     Thyroid disease Maternal Aunt     Clotting disorder Neg Hx     Melanoma Neg Hx        Review of Systems   Constitutional:  Negative for chills and fever.   HENT: Negative for congestion.    Eyes: Negative for discharge.   Respiratory: Negative for chest tightness and shortness of breath.    Cardiovascular: Negative for chest pain and palpitations.   Gastrointestinal: Negative for nausea and vomiting.   Genitourinary: Negative for decreased urine volume, difficulty urinating, discharge, dysuria, flank pain, frequency, hematuria, penile pain, penile swelling, scrotal swelling, testicular pain and urgency.        See HPI   Musculoskeletal: Negative for gait problem.   Skin: Negative for rash.   Allergic/Immunologic: Negative for immunocompromised state.   Neurological: Negative for syncope and headaches.   Hematological: Negative for adenopathy.   Psychiatric/Behavioral: Negative for confusion.         All other systems were reviewed and were negative.    Objective:     Vitals:    07/30/19 0907   BP: 136/74   Pulse: (!) 58        Physical Exam   Nursing note and vitals reviewed.  Constitutional: He is oriented to person, place, and time. He appears well-developed and well-nourished.  Non-toxic appearance. He does not have a sickly appearance. He does not appear ill. No distress.   HENT:   Head: Normocephalic.   Eyes: Right eye exhibits no discharge. Left eye exhibits no discharge.   Neck: Normal range of motion.   Cardiovascular: Normal rate and regular rhythm.    Pulmonary/Chest: Effort normal. No respiratory distress.   Abdominal: Soft. He exhibits no distension. There is no CVA tenderness.   Umbilical hernia   Genitourinary:   Genitourinary Comments: Prostate was smooth without nodularity. No rectal masses.  40 grams. External hemorrhoids were not present. Normal perineum.     Musculoskeletal: Normal range of motion.   Neurological: He is alert and oriented to person, place, and time.   Skin: Skin is warm and dry.     Psychiatric: He has a normal mood and affect. His behavior is normal. Judgment and thought content normal.         Lab  Results   Component Value Date    CREATININE 2.2 (H) 05/29/2019     Lab Results   Component Value Date    EGFRNONAA 30.3 (A) 05/29/2019     Lab Results   Component Value Date    ESTGFRAFRICA 35.0 (A) 05/29/2019     POCT UA: 100 glucose      Assessment:       1. History of CVA (cerebrovascular accident)    2. Erectile dysfunction associated with type 2 diabetes mellitus    3. Type 2 diabetes mellitus with stage 3 chronic kidney disease, with long-term current use of insulin    4. Chronic anticoagulation - pradaxa    5. Chronic diastolic heart failure    6. Essential hypertension    7. Complex partial seizures evolving to generalized tonic-clonic seizures    8. Chronic kidney disease, stage III (moderate)    9. Sickle cell trait    10. Obesity (BMI 30.0-34.9)        Plan:     Alfa was seen today for follow-up.    Diagnoses and all orders for this visit:    History of CVA (cerebrovascular accident)    Erectile dysfunction associated with type 2 diabetes mellitus    Type 2 diabetes mellitus with stage 3 chronic kidney disease, with long-term current use of insulin    Chronic anticoagulation - pradaxa    Chronic diastolic heart failure    Essential hypertension    Complex partial seizures evolving to generalized tonic-clonic seizures    Chronic kidney disease, stage III (moderate)    Sickle cell trait    Obesity (BMI 30.0-34.9)    Elevated PSA  -     PSA, total and free; Future    Other orders  -     sildenafil (VIAGRA) 100 MG tablet; Take 1/2 to 1 tablet daily as needed.  Take on an empty stomach or with a light meal.    patient will hold off on finasteride for now.   Further recommendations based on the follow up psa.   viagra trial  I spent 25 minutes with the patient of which more than half was spent in direct consultation with the patient in regards to our treatment and plan.

## 2019-07-30 NOTE — TELEPHONE ENCOUNTER
psa up to 16. Needs repeat prostate biopsy. Will have to be off eliquis, so will need cards clearance to do this. He came off for colonoscopy before, so should be ok.

## 2019-07-31 ENCOUNTER — TELEPHONE (OUTPATIENT)
Dept: UROLOGY | Facility: CLINIC | Age: 64
End: 2019-07-31

## 2019-07-31 NOTE — TELEPHONE ENCOUNTER
A message was left for the patient to call the office regarding his PSA. PSA level is 16 per Dr. Mejia he needs to have a biopsy. He needs to be off Eliquis and this needs to be cleared by Cardiology.    Mena

## 2019-08-01 ENCOUNTER — TELEPHONE (OUTPATIENT)
Dept: UROLOGY | Facility: CLINIC | Age: 64
End: 2019-08-01

## 2019-08-01 ENCOUNTER — LAB VISIT (OUTPATIENT)
Dept: LAB | Facility: HOSPITAL | Age: 64
End: 2019-08-01
Attending: INTERNAL MEDICINE
Payer: MEDICARE

## 2019-08-01 DIAGNOSIS — N18.30 STAGE 3 CHRONIC KIDNEY DISEASE DUE TO DIABETES MELLITUS: ICD-10-CM

## 2019-08-01 DIAGNOSIS — E11.22 STAGE 3 CHRONIC KIDNEY DISEASE DUE TO DIABETES MELLITUS: ICD-10-CM

## 2019-08-01 LAB
ALBUMIN SERPL BCP-MCNC: 3.4 G/DL (ref 3.5–5.2)
ANION GAP SERPL CALC-SCNC: 8 MMOL/L (ref 8–16)
BUN SERPL-MCNC: 20 MG/DL (ref 8–23)
CALCIUM SERPL-MCNC: 8.2 MG/DL (ref 8.7–10.5)
CHLORIDE SERPL-SCNC: 109 MMOL/L (ref 95–110)
CO2 SERPL-SCNC: 23 MMOL/L (ref 23–29)
CREAT SERPL-MCNC: 2.1 MG/DL (ref 0.5–1.4)
EST. GFR  (AFRICAN AMERICAN): 36.8 ML/MIN/1.73 M^2
EST. GFR  (NON AFRICAN AMERICAN): 31.8 ML/MIN/1.73 M^2
GLUCOSE SERPL-MCNC: 171 MG/DL (ref 70–110)
PHOSPHATE SERPL-MCNC: 3.5 MG/DL (ref 2.7–4.5)
POTASSIUM SERPL-SCNC: 4.7 MMOL/L (ref 3.5–5.1)
PTH-INTACT SERPL-MCNC: 200 PG/ML (ref 9–77)
SODIUM SERPL-SCNC: 140 MMOL/L (ref 136–145)

## 2019-08-01 PROCEDURE — 83970 ASSAY OF PARATHORMONE: CPT

## 2019-08-01 PROCEDURE — 36415 COLL VENOUS BLD VENIPUNCTURE: CPT | Mod: PO

## 2019-08-01 PROCEDURE — 80069 RENAL FUNCTION PANEL: CPT

## 2019-08-01 NOTE — TELEPHONE ENCOUNTER
A message was left for the patient on ALL of the contact numbers in his chart. He was asked to call the office back so we can give him his test results.    Contact number was provided for the patient.  Message was also left on his wife's Rut's VM as well.    Mena

## 2019-08-01 NOTE — TELEPHONE ENCOUNTER
Spoke to the patient and his wife and they were given the results to the PSA. They verbally understood and they will get inc contact with the cardiologist for the directions about the noemy San

## 2019-08-06 ENCOUNTER — OFFICE VISIT (OUTPATIENT)
Dept: NEPHROLOGY | Facility: CLINIC | Age: 64
End: 2019-08-06
Payer: MEDICARE

## 2019-08-06 VITALS
SYSTOLIC BLOOD PRESSURE: 140 MMHG | HEART RATE: 70 BPM | WEIGHT: 225.31 LBS | OXYGEN SATURATION: 96 % | HEIGHT: 70 IN | BODY MASS INDEX: 32.26 KG/M2 | DIASTOLIC BLOOD PRESSURE: 90 MMHG

## 2019-08-06 DIAGNOSIS — Z79.4 TYPE 2 DIABETES MELLITUS WITH STAGE 3 CHRONIC KIDNEY DISEASE, WITH LONG-TERM CURRENT USE OF INSULIN: ICD-10-CM

## 2019-08-06 DIAGNOSIS — N18.30 TYPE 2 DIABETES MELLITUS WITH STAGE 3 CHRONIC KIDNEY DISEASE, WITH LONG-TERM CURRENT USE OF INSULIN: ICD-10-CM

## 2019-08-06 DIAGNOSIS — N18.30 CHRONIC KIDNEY DISEASE, STAGE III (MODERATE): Primary | ICD-10-CM

## 2019-08-06 DIAGNOSIS — I50.32 CHRONIC DIASTOLIC HEART FAILURE: ICD-10-CM

## 2019-08-06 DIAGNOSIS — E11.22 TYPE 2 DIABETES MELLITUS WITH STAGE 3 CHRONIC KIDNEY DISEASE, WITH LONG-TERM CURRENT USE OF INSULIN: ICD-10-CM

## 2019-08-06 DIAGNOSIS — E11.29 PROTEINURIA DUE TO TYPE 2 DIABETES MELLITUS: ICD-10-CM

## 2019-08-06 DIAGNOSIS — R80.9 PROTEINURIA DUE TO TYPE 2 DIABETES MELLITUS: ICD-10-CM

## 2019-08-06 DIAGNOSIS — I12.9 HYPERTENSIVE KIDNEY DISEASE WITH STAGE 3 CHRONIC KIDNEY DISEASE: ICD-10-CM

## 2019-08-06 DIAGNOSIS — E21.3 HYPERPARATHYROIDISM: ICD-10-CM

## 2019-08-06 DIAGNOSIS — N18.30 HYPERTENSIVE KIDNEY DISEASE WITH STAGE 3 CHRONIC KIDNEY DISEASE: ICD-10-CM

## 2019-08-06 PROCEDURE — 3044F PR MOST RECENT HEMOGLOBIN A1C LEVEL <7.0%: ICD-10-PCS | Mod: CPTII,S$GLB,, | Performed by: INTERNAL MEDICINE

## 2019-08-06 PROCEDURE — 99214 PR OFFICE/OUTPT VISIT, EST, LEVL IV, 30-39 MIN: ICD-10-PCS | Mod: S$GLB,,, | Performed by: INTERNAL MEDICINE

## 2019-08-06 PROCEDURE — 99499 RISK ADDL DX/OHS AUDIT: ICD-10-PCS | Mod: S$GLB,,, | Performed by: INTERNAL MEDICINE

## 2019-08-06 PROCEDURE — 1101F PR PT FALLS ASSESS DOC 0-1 FALLS W/OUT INJ PAST YR: ICD-10-PCS | Mod: CPTII,S$GLB,, | Performed by: INTERNAL MEDICINE

## 2019-08-06 PROCEDURE — 3080F PR MOST RECENT DIASTOLIC BLOOD PRESSURE >= 90 MM HG: ICD-10-PCS | Mod: CPTII,S$GLB,, | Performed by: INTERNAL MEDICINE

## 2019-08-06 PROCEDURE — 99999 PR PBB SHADOW E&M-EST. PATIENT-LVL V: ICD-10-PCS | Mod: PBBFAC,,, | Performed by: INTERNAL MEDICINE

## 2019-08-06 PROCEDURE — 99214 OFFICE O/P EST MOD 30 MIN: CPT | Mod: S$GLB,,, | Performed by: INTERNAL MEDICINE

## 2019-08-06 PROCEDURE — 3077F SYST BP >= 140 MM HG: CPT | Mod: CPTII,S$GLB,, | Performed by: INTERNAL MEDICINE

## 2019-08-06 PROCEDURE — 3080F DIAST BP >= 90 MM HG: CPT | Mod: CPTII,S$GLB,, | Performed by: INTERNAL MEDICINE

## 2019-08-06 PROCEDURE — 99499 UNLISTED E&M SERVICE: CPT | Mod: S$GLB,,, | Performed by: INTERNAL MEDICINE

## 2019-08-06 PROCEDURE — 3044F HG A1C LEVEL LT 7.0%: CPT | Mod: CPTII,S$GLB,, | Performed by: INTERNAL MEDICINE

## 2019-08-06 PROCEDURE — 3077F PR MOST RECENT SYSTOLIC BLOOD PRESSURE >= 140 MM HG: ICD-10-PCS | Mod: CPTII,S$GLB,, | Performed by: INTERNAL MEDICINE

## 2019-08-06 PROCEDURE — 1101F PT FALLS ASSESS-DOCD LE1/YR: CPT | Mod: CPTII,S$GLB,, | Performed by: INTERNAL MEDICINE

## 2019-08-06 PROCEDURE — 99999 PR PBB SHADOW E&M-EST. PATIENT-LVL V: CPT | Mod: PBBFAC,,, | Performed by: INTERNAL MEDICINE

## 2019-08-07 NOTE — PROGRESS NOTES
"Subjective:       Patient ID: Alfa Christy III is a 64 y.o. Black or  male who presents for follow upof CKD.      HPI     Mr. Christy was seen in follow up of CKD today. He was last seen on 1/31/18. He has CKD III due to poorly controlled diabetes, hypertension. He arrived in the clinic accompanied by his spouse. He was lost for follow up for a long time. He came to reestablish care.     He denies decreased urine output/ dysuria/ cloudy urine/ nausea/ vomiting/ decreased oral intake. His spouse reports she tries to be the "food police". Pt admits having occasional swelling over ankles and legs. He has been on diuretic regimen. He admitted today he had a meal at a fast food restaurant chain.     He has underlying longstanding diabetes, hypertension, recent CVA, paroxysmal atrial fibrillation, chronic anticoagulation use, hyperlipidemia, sickle cell trait, thyroid cancer, hyperlipidemiaen along with CKD III. He admits to prior uncontrolled nature of his diabetes. He also at some point was taking NSAID like Advil. Per available labs since 2005 to 2007 he has had elevated creatinine with some episodes of JAELYN.     He continues to have BMI in the range of 30's. He did not bring any BP readings fro review from his home.     US kidneys from 5/17 noted for 10.9, 10.4 cm kidney size, enlarged prostate, peripelvic cyst. He has diastolic dysfunction on echo.    Renal Function:  Lab Results   Component Value Date     (H) 08/01/2019     (H) 05/29/2019     08/01/2019     05/29/2019    K 4.7 08/01/2019    K 4.9 05/29/2019     08/01/2019     05/29/2019    CO2 23 08/01/2019    CO2 24 05/29/2019    BUN 20 08/01/2019    BUN 27 (H) 05/29/2019    CALCIUM 8.2 (L) 08/01/2019    CALCIUM 8.9 05/29/2019    CREATININE 2.1 (H) 08/01/2019    CREATININE 2.2 (H) 05/29/2019    ALBUMIN 3.4 (L) 08/01/2019    ALBUMIN 3.5 03/09/2019    PHOS 3.5 08/01/2019    PHOS 3.9 12/14/2018    ESTGFRAFRICA 36.8 " (A) 08/01/2019    ESTGFRAFRICA 35.0 (A) 05/29/2019    EGFRNONAA 31.8 (A) 08/01/2019    EGFRNONAA 30.3 (A) 05/29/2019       Urinalysis:  Lab Results   Component Value Date    APPEARANCEUA Clear 08/01/2019    PHUR 5.0 08/01/2019    SPECGRAV 1.015 08/01/2019    PROTEINUA 2+ (A) 08/01/2019    GLUCUA Negative 08/01/2019    OCCULTUA 1+ (A) 08/01/2019    NITRITE Negative 08/01/2019    LEUKOCYTESUR Negative 08/01/2019       Protein/Creatinine Ratio:  Lab Results   Component Value Date    PROTEINURINE 146 (H) 08/01/2019    CREATRANDUR 135.0 08/01/2019    UTPCR 1.08 (H) 08/01/2019       CBC:  Lab Results   Component Value Date    WBC 9.61 11/28/2018    HGB 11.3 (L) 11/28/2018    HCT 32.2 (L) 11/28/2018       Vit D 33      Review of Systems   Constitutional: Negative for activity change, appetite change, chills, fatigue and fever.   HENT: Negative for hearing loss and nosebleeds.    Eyes: Negative for pain and discharge.   Respiratory: Negative for cough, shortness of breath and wheezing.    Cardiovascular: Positive for leg swelling. Negative for chest pain.   Gastrointestinal: Negative for abdominal pain, diarrhea, nausea and vomiting.   Endocrine: Negative for polydipsia and polyuria.   Genitourinary: Negative for decreased urine volume, dysuria, flank pain and frequency.   Musculoskeletal: Negative for back pain and myalgias.   Skin: Negative for pallor and rash.   Allergic/Immunologic: Negative for environmental allergies.   Neurological: Negative for dizziness and headaches.   Psychiatric/Behavioral: Negative for behavioral problems. The patient is not nervous/anxious.        Objective:      Physical Exam   Constitutional: He is oriented to person, place, and time. He appears well-developed and well-nourished. No distress.   HENT:   Head: Normocephalic and atraumatic.   Neck: Neck supple.   Cardiovascular: Normal rate and normal heart sounds.   Pulmonary/Chest: Effort normal and breath sounds normal. He has no  wheezes. He has no rales.   Abdominal: Soft. There is no tenderness.   Musculoskeletal: He exhibits edema.   Neurological: He is alert and oriented to person, place, and time.   Skin: Skin is warm and dry.   Psychiatric: He has a normal mood and affect.   Vitals reviewed.      Assessment:       1. Chronic kidney disease, stage III (moderate)    2. Type 2 diabetes mellitus with stage 3 chronic kidney disease, with long-term current use of insulin    3. Hypertensive kidney disease with stage 3 chronic kidney disease    4. Chronic diastolic heart failure    5. Proteinuria due to type 2 diabetes mellitus    6. Hyperparathyroidism      Plan:     Mr. Christy has longstanding diabetes, hypertension, CVA, paroxysmal atrial fibrillation, chronic anticoagulation use, hyperlipidemia, sickle cell trait, thyroid cancer, hyperlipidemia and has CKD III. CKD is due to longstanding and poorly controlled diabetes, also hypertension and prior NSAID use. He has proteinuria of less than a gram which could be from diabetic nephropathy.     Overall he has slow progression of CKD. This was brought to his attention again. Stressed need for weight loss, keeping ideal body weight given his persistent proteinuria and need for strict low salt diet and better BP control.    Previously he had very poorly controlled diabetes with episodes of hyperglycemia crisis with blood glucose of 500. Overall his risk of further progression of CKD which is already IIIB is really significant. I had a detailed discussion about this with pt and his wife. Better glycemic control, strict low salt diet, BP goal of less than 130/80 are all very crucial for his CKD.     Management of BPH, hematuria and abnormal cytology per urology. Given enlarged prostate, watch for any symptoms of urinary retention. D/w them about avoiding any sleep medicine/ antihistaminic as it can precipitate urinary retention.     CKD IIIB with proteinuria, slow progression  - labs reviewed and  d/w them  - periodically monitor renal function and assess if any rapid decline  - avoid NSAID/ bactrim/ IV contrast/ gadolinium/ aminoglycoside/fleet enema/ PPI where possible  - continue to screen for CKD MBD, anemia of CKD, acid base disorder  - continue losartan for RAAS blockade   - Continue follow up with urology given enlarged prostate and hematuria work up     hyperparathyroidism  - continue to trend PTH, phos, Ca, vit D  - suspect component of primary hyperparathyroidism     Proteinuria  - see above  - due to diabetic nephropathy and +/- hypertensive glomerulosclerosis  - continue RAAS blockade   - strict low salt diet   - weight loss by calorie control is advisable    Fluid overload  - diuretic regimen per cardiology   - stressed importance of strict low salt diet. Offered referral to see dietician but his wife stated they would like to defer it for now     Diabetes type 2 with renal manifestations  - management per endocrine    Hypertension  - see above, strict low salt diet, home BP monitoring    Sickle cell trait, thyroid cancer, hyperlipidemia, atrial fibrillation management per PCP/ other MDs      RTC 4 months   Plan, labs, recommendations were discussed with patient and his wife, their questions were answered to their satisfaction.

## 2019-08-13 ENCOUNTER — TELEPHONE (OUTPATIENT)
Dept: ENDOSCOPY | Facility: HOSPITAL | Age: 64
End: 2019-08-13

## 2019-08-13 NOTE — TELEPHONE ENCOUNTER
Pt needs to be scheduled for a colonoscopy.  Pt is currently taking Eliquis.  Per endoscopy protocol it should be held x 2 days prior.  Ok to hold?  Please advise.  Thanks

## 2019-08-14 RX ORDER — INSULIN ASPART 100 [IU]/ML
INJECTION, SOLUTION INTRAVENOUS; SUBCUTANEOUS
Qty: 3 BOX | Refills: 3 | Status: SHIPPED | OUTPATIENT
Start: 2019-08-14 | End: 2020-01-01 | Stop reason: SDUPTHER

## 2019-08-16 NOTE — TELEPHONE ENCOUNTER
Our endoscopists do not clear patients for blood thinner holding.  Can pt hold Eliquis x 2 days or should we proceed with colonoscopy while on Eliquis?  Please advise.  Thanks

## 2019-08-26 DIAGNOSIS — E11.9 TYPE 2 DIABETES MELLITUS WITHOUT COMPLICATION: ICD-10-CM

## 2019-08-28 ENCOUNTER — TELEPHONE (OUTPATIENT)
Dept: ENDOSCOPY | Facility: HOSPITAL | Age: 64
End: 2019-08-28

## 2019-08-28 NOTE — TELEPHONE ENCOUNTER
Left voicemail for pt to call endoscopy scheduling to schedule colonoscopy.  Ok to hold Eliquis x 2 days per Dr. Gabriel Hager.

## 2019-08-28 NOTE — TELEPHONE ENCOUNTER
"August 17, 2019   Gabriel Hager MD   to Me            7:13 AM    In our pre-procedural evaluations, we place recommendations and warn about risk/benefits of holding certain medication (ASA, plavix, anticoagulation, etc.), we do not actually "clear" (or at least use such term). If the GI physician/surgeon performing such procedure is concern about increased risk of bleeding whilst on apixaban, and considers that it is better to hold the apixaban; holding it for 2 days as per your protocol, would be of very low risk from a CVA prophylaxis stand point, since it is being used for AF. This is something that is routinely done for patients with AF on anticoagulation. If apixaban is held it should be restarted when deemed safe by the endoscopist (once hemostasis is achieved). If your team has doubts/concerns, feel free to reach back to me.     August 16, 2019              11:04 AM   You routed this conversation to Gabriel Hager MD    Me           11:02 AM   Note      Our endoscopists do not clear patients for blood thinner holding.  Can pt hold Eliquis x 2 days or should we proceed with colonoscopy while on Eliquis?  Please advise.  Thanks      August 15, 2019   Gabriel Hager MD   to Me            7:11 AM    He is on apixaban for stroke prophylaxis given his atrial fibrillation. If the GI attending performing the procedure, considers the risk of bleeding is worse than his risk of stroke, it would be up to them to decide to hold the apixaban and restart it as soon as hemostasis achieved, and they deem it is safe.   August 13, 2019              11:41 AM   You routed this conversation to Gabriel Hager MD    Me           11:40 AM   Note      Pt needs to be scheduled for a colonoscopy.  Pt is currently taking Eliquis.  Per endoscopy protocol it should be held x 2 days prior.  Ok to hold?  Please advise.  Thanks          "

## 2019-09-01 DIAGNOSIS — N13.8 BPH WITH OBSTRUCTION/LOWER URINARY TRACT SYMPTOMS: ICD-10-CM

## 2019-09-01 DIAGNOSIS — E78.00 HYPERCHOLESTEROLEMIA: ICD-10-CM

## 2019-09-01 DIAGNOSIS — N40.1 BPH WITH OBSTRUCTION/LOWER URINARY TRACT SYMPTOMS: ICD-10-CM

## 2019-09-01 RX ORDER — LACOSAMIDE 200 MG/1
TABLET, FILM COATED ORAL
Qty: 60 TABLET | Refills: 2 | OUTPATIENT
Start: 2019-09-01

## 2019-09-02 ENCOUNTER — NURSE TRIAGE (OUTPATIENT)
Dept: ADMINISTRATIVE | Facility: CLINIC | Age: 64
End: 2019-09-02

## 2019-09-02 ENCOUNTER — HOSPITAL ENCOUNTER (EMERGENCY)
Facility: OTHER | Age: 64
Discharge: HOME | End: 2019-09-02
Attending: EMERGENCY MEDICINE
Payer: MEDICARE

## 2019-09-02 VITALS
BODY MASS INDEX: 32.35 KG/M2 | DIASTOLIC BLOOD PRESSURE: 78 MMHG | HEART RATE: 60 BPM | OXYGEN SATURATION: 97 % | HEIGHT: 70 IN | SYSTOLIC BLOOD PRESSURE: 132 MMHG | RESPIRATION RATE: 16 BRPM | WEIGHT: 226 LBS | TEMPERATURE: 98 F

## 2019-09-02 DIAGNOSIS — Z76.0 MEDICATION REFILL: ICD-10-CM

## 2019-09-02 DIAGNOSIS — G40.909 SEIZURE DISORDER: Primary | ICD-10-CM

## 2019-09-02 PROCEDURE — 99283 EMERGENCY DEPT VISIT LOW MDM: CPT

## 2019-09-02 PROCEDURE — 25000003 PHARM REV CODE 250: Performed by: EMERGENCY MEDICINE

## 2019-09-02 RX ORDER — LACOSAMIDE 50 MG/1
200 TABLET ORAL
Status: COMPLETED | OUTPATIENT
Start: 2019-09-02 | End: 2019-09-02

## 2019-09-02 RX ORDER — LACOSAMIDE 200 MG/1
200 TABLET ORAL EVERY 12 HOURS
Qty: 60 TABLET | Refills: 1 | Status: SHIPPED | OUTPATIENT
Start: 2019-09-02 | End: 2019-09-12 | Stop reason: SDUPTHER

## 2019-09-02 RX ADMIN — LACOSAMIDE 200 MG: 50 TABLET, FILM COATED ORAL at 11:09

## 2019-09-02 NOTE — ED PROVIDER NOTES
Encounter Date: 9/2/2019    SCRIBE #1 NOTE: Oriana LÓPEZ am scribing for, and in the presence of, Dr. Call.       History     Chief Complaint   Patient presents with    Medication Refill     Vimpat refill     Time seen by provider: 11:23 AM    This is a 64 y.o. male, with history of seizure who presents requesting medication refill for Vimpat. The patient's wife states he ran out of the medication one day ago, and has missed one dose. His last seizure was over a year ago. He denies fever, chills, nausea, vomiting, diarrhea, cough, congestion, rhinorrhea, chest pain, abdominal pain, or headache. He reports an allergy to guaifenesin. He states he usually has one alcoholic beverage during the [employer] games. He denies use of tobacco, or illicit drugs.    The history is provided by the patient.     Review of patient's allergies indicates:   Allergen Reactions    Cough syrup [guaifenesin] Other (See Comments)     weakness     Past Medical History:   Diagnosis Date    Allergy     BMI 31.0-31.9,adult 11/25/2014    CHF (congestive heart failure)     Chronic anticoagulation - pradaxa 11/10/2016    Chronic diastolic heart failure 11/20/2016    CKD (chronic kidney disease), stage III 9/23/2013    Controlled type 2 diabetes with neuropathy 12/16/2014    Coronary artery disease     Diabetes mellitus due to underlying condition with stage 3 chronic kidney disease, without long-term current use of insulin 11/2/2016    Elevated alkaline phosphatase level 8/1/2012    Elevated PSA     negative prostate biopsy 4/11    Erectile dysfunction associated with type 2 diabetes mellitus     Gallstones 3/20/2013    Generalized tonic-clonic seizure 11/2016    HTN (hypertension), benign 8/1/2012    Hypercholesterolemia 8/1/2012    Microcytic anemia 11/27/2013    Paroxysmal atrial fibrillation 9/23/2013    Postsurgical hypothyroidism 11/27/2013    Right homonymous hemianopsia 2/5/2016    Sickle cell trait     Stroke  1/27/2016    Thyroid cancer     multifocal with six lesions, largest 5mm, treated with surgery and radioactive iodine     Past Surgical History:   Procedure Laterality Date    BREAST SURGERY      cyst removal    COLONOSCOPY      COLONOSCOPY N/A 3/3/2015    Performed by CHANDA Wellington MD at [facility] ENDO (4TH FLR)    CYST REMOVAL      chest    CYSTOSCOPY WITH RETROGRADE PYELOGRAM Bilateral 8/14/2017    Performed by Anahi Mejia MD at [facility] OR 1ST FLR    PROSTATE BIOPSY  4/27/11    SKIN BIOPSY      THYROID SURGERY  8/26/09    VASCULAR SURGERY       Family History   Problem Relation Age of Onset    Heart disease Father     Diabetes Father     Hypertension Father     Diabetes Mother     Hypertension Mother     Heart disease Mother     Diabetes Brother     No Known Problems Daughter     Cancer Sister     No Known Problems Daughter     Thyroid disease Maternal Aunt     Clotting disorder Neg Hx     Melanoma Neg Hx      Social History     Tobacco Use    Smoking status: Never Smoker    Smokeless tobacco: Never Used   Substance Use Topics    Alcohol use: Yes     Comment: occasionally, none recently    Drug use: No     Review of Systems   Constitutional: Negative for chills and fever.   HENT: Negative for congestion, rhinorrhea, sore throat and trouble swallowing.    Eyes: Negative for photophobia and visual disturbance.   Respiratory: Negative for cough and shortness of breath.    Cardiovascular: Negative for chest pain.   Gastrointestinal: Negative for abdominal pain, diarrhea, nausea and vomiting.   Genitourinary: Negative for dysuria and hematuria.   Musculoskeletal: Negative for back pain and neck pain.   Skin: Negative for rash and wound.   Neurological: Negative for weakness and headaches.   Psychiatric/Behavioral: Negative.        Physical Exam     Initial Vitals [09/02/19 1048]   BP Pulse Resp Temp SpO2   (!) 162/74 83 18 98.7 °F (37.1 °C) 98 %      MAP       --         Physical  Exam    Nursing note and vitals reviewed.  Constitutional: He appears well-developed and well-nourished. No distress.   HENT:   Head: Normocephalic and atraumatic.   Eyes: Conjunctivae and EOM are normal.   Neck: Normal range of motion. Neck supple.   Cardiovascular: Normal rate, regular rhythm and normal heart sounds. Exam reveals no gallop and no friction rub.    No murmur heard.  Pulmonary/Chest: Breath sounds normal. No respiratory distress. He has no wheezes. He has no rhonchi. He has no rales.   Abdominal: Soft. There is no tenderness. There is no rebound and no guarding.   Musculoskeletal: Normal range of motion. He exhibits no edema or tenderness.   Neurological: He is alert and oriented to person, place, and time. He has normal strength.   Skin: Skin is dry. No rash noted.   Psychiatric: He has a normal mood and affect. His behavior is normal. Judgment and thought content normal.         ED Course   Procedures  Labs Reviewed - No data to display       Imaging Results    None          Medical Decision Making:   ED Management:  Emergent evaluation of 64-year-old male presents requesting medication refill.  Patient states he has been out of his Vimpat x1 day.  His last seizure was a year ago.  He has no other complaints.  Vital signs and physical exam are benign.  He was given 1 dose of Vimpat here and given prescription for a 1 month supply.  He is encouraged close follow-up with his PCP, neurologist, and to return to the ED for any new or worsening symptoms.            Scribe Attestation:   Scribe #1: I performed the above scribed service and the documentation accurately describes the services I performed. I attest to the accuracy of the note.    Attending Attestation:           Physician Attestation for Scribe:  Physician Attestation Statement for Scribe #1: I, Dr. Call, reviewed documentation, as scribed by Oriana Morales in my presence, and it is both accurate and complete.                    Clinical  Impression:     1. Seizure disorder    2. Medication refill                                 Rose Call MD  09/02/19 [id number]

## 2019-09-02 NOTE — TELEPHONE ENCOUNTER
Reason for Disposition   Caller requesting a NON-URGENT new prescription or refill and triager unable to refill per unit policy    Protocols used: MEDICATION QUESTION CALL-A-    Cg called stated pt has been out of his vimpat since Saturday. Explained to her that the clinics are closed and we aren't allowed to call in control substances so pt would need to go to urgent care or er. Cg stated she will bring pt to urgent care on [facility]

## 2019-09-03 RX ORDER — TAMSULOSIN HYDROCHLORIDE 0.4 MG/1
0.4 CAPSULE ORAL NIGHTLY
Qty: 90 CAPSULE | Refills: 3 | Status: SHIPPED | OUTPATIENT
Start: 2019-09-03 | End: 2021-01-01

## 2019-09-03 RX ORDER — ATORVASTATIN CALCIUM 40 MG/1
TABLET, FILM COATED ORAL
Qty: 90 TABLET | Refills: 3 | Status: SHIPPED | OUTPATIENT
Start: 2019-09-03 | End: 2019-09-05 | Stop reason: SDUPTHER

## 2019-09-03 NOTE — TELEPHONE ENCOUNTER
Left message for pt to confirm that he did receive his medication from the ER in [place] and schedule a follow up appointment with Dr Santiago

## 2019-09-04 ENCOUNTER — TELEPHONE (OUTPATIENT)
Dept: ENDOSCOPY | Facility: HOSPITAL | Age: 64
End: 2019-09-04

## 2019-09-04 ENCOUNTER — TELEPHONE (OUTPATIENT)
Dept: NEUROLOGY | Facility: CLINIC | Age: 64
End: 2019-09-04

## 2019-09-04 DIAGNOSIS — Z12.11 SPECIAL SCREENING FOR MALIGNANT NEOPLASMS, COLON: Primary | ICD-10-CM

## 2019-09-04 RX ORDER — POLYETHYLENE GLYCOL 3350, SODIUM SULFATE ANHYDROUS, SODIUM BICARBONATE, SODIUM CHLORIDE, POTASSIUM CHLORIDE 236; 22.74; 6.74; 5.86; 2.97 G/4L; G/4L; G/4L; G/4L; G/4L
4 POWDER, FOR SOLUTION ORAL ONCE
Qty: 4000 ML | Refills: 0 | Status: SHIPPED | OUTPATIENT
Start: 2019-09-04 | End: 2019-09-04

## 2019-09-04 NOTE — TELEPHONE ENCOUNTER
Colonoscopy scheduled on 9/19/19.  Prep instructions and Eliquis holding instructions mailed to pt.  Pt and wife verbalized understanding.

## 2019-09-04 NOTE — TELEPHONE ENCOUNTER
----- Message from Tyler Palma sent at 9/4/2019 11:14 AM CDT -----  Contact: Rut ( spouse ) @ [phone]  Caller calling to schedule a f/u appt, felipe call

## 2019-09-05 DIAGNOSIS — E78.00 HYPERCHOLESTEROLEMIA: ICD-10-CM

## 2019-09-09 RX ORDER — ATORVASTATIN CALCIUM 40 MG/1
40 TABLET, FILM COATED ORAL DAILY
Qty: 90 TABLET | Refills: 1 | Status: SHIPPED | OUTPATIENT
Start: 2019-09-09 | End: 2020-06-03

## 2019-09-10 NOTE — PROGRESS NOTES
[facility]:   FOLLOW UP VISIT    Name: Rome Christy III  MRN:[id number]   CSN: [id number]    Date of service: 9/12/19  Last clinic visit: 2/20/19 seen by Dr.Van Portillo ?reason (error in appt ?)  Last clinic visit: 12/10/18  Last clinic visit: 8/20/18  Priorclinic visit: 5/15/18  Prior clinic visit: 11/15/17  1st clinic visit: 12/27/16    Age:64 y.o.   Gender:male     The patient is here today with his wife   History obtained from the patient     CHIEF COMPLAINT:  - follow up of seizures    INTERVAL HISTORY (Since last visit):    This is a 64 y.o.  year old male who presents with a chief complaint of seizures, who was last seen by me on 12/10/18  - patient and wife states that  they did not request change of provider and was puzzled why he was seen by  at last visit (2/20/19)    Seizure log since last visit: none    Current AEDs: compliant, no side-effects  - Levetiracetam 1500mg bid  - Lacosamide 100mg bid    Results for ROME CHRISTY III (MRN [id number]) as of 9/10/2019 16:43   Ref. Range 8/20/2018 11:00 11/28/2018 22:21 12/10/2018 16:40 12/28/2018 15:28   Lacosamide Latest Ref Range: 1.0 - 10.0 mcg/mL 5.2 2.9 3.6 10.9 (H)   Levetiracetam Lvl Latest Ref Range: 3.0 - 60.0 ug/mL 78.5 (H) 69.0 (H) 64.9 (H) 68.0 (H)     Notes from last clinic visit, 12/10/18:  - no hx of non-compliance with AEDs  - however, levels low at the time of ED  - no other identifiable cause for breakthrough sz    AED levels when stable without seizures:  Results for ROME CHRISTY III (MRN [id number]) as of 12/9/2018 15:35   Ref. Range 8/20/2018 11:00   Lacosamide Latest Ref Range: 1.0 - 10.0 mcg/mL 5.2   Levetiracetam Lvl Latest Ref Range: 3.0 - 60.0 ug/mL 78.5 (H)     AED levels at the time of ED visit with seizure:  Results for ROME CHRISTY III (MRN [id number]) as of 12/9/2018 15:35   Ref. Range 11/28/2018 22:21   Lacosamide Latest Ref Range: 1.0 - 10.0 mcg/mL 2.9   Levetiracetam Lvl Latest Ref Range: 3.0 - 60.0 ug/mL 69.0 (H)     Notes from ED  visit on 11/28/18:  63-year-old male with multiple comorbidities presented with his wife for evaluation.  In triage the patient was aphasic, therefore, a code stroke was called.  Per the triage nurse, the patient was able to smile and transfer from the wheelchair onto the bed once placed in the room.  At the time of my assessment the patient's room he was unable to follow commands and remained nonverbal only making a slight grunting noise.  Initially his wife had left to move his car and was not present to provide any additional history.  She subsequently returned and was able to state he has had 3 similar episodes in the past which were associated with his seizures.  Upon review of his medical records he has been diagnosed with complex partial seizures which have evolved to generalized tonic-clonic seizures.  CT of the head showed no evidence of acute process.  Stroke Neurology was consulted but they were unable to examine the patient secondary to technical difficulties with the computer at home.  Based on presentation in history it was felt by Dr. Vega of that his symptoms were likely secondary to his seizure.  This was supported by the patient's gradual improvement throughout his emergency department stay.  He is currently alert and able to follow commands.  He was able to perform finger-to-nose, heel-to-shin and cranial nerves and strength could be assessed and normal. He is slightly groggy but I do believe this is secondary to the Ativan he received upon arrival.  We will observe the patient further but likely will be able to discharge home.     Of note, labs are significant only for a worsening creatinine from 1.8-2.2.     2:16 AM  At this time the patient is able to answer all questions appropriately.  He was able to ambulate down the lanza without requiring any assistance.  Results of his lab work have been discussed with him as well as the results of the CT.  Have instructed both him and his wife to have  ensure that he follows up with his neurologist for further management of his seizures..    Notes from last clinic visit, 8/20/18:  - no hx of seizures since last visit; last sz was on 4/30/18  - compliant with AEDs: LCS 100mg bid and LEV 1500mg bid  - no new complaints; doing well    Notes from last clinic visit, 5/15/18:  - started on LAC 100mg bid 4/30/18  - also on LEV 1500mg bid  - no further episodes since 4/30  - no other/new complaints; doing well    Admitted during 4/28-30/18; notes from :  HPI:   61y/o M with pmhx stroke, seizure, afib (on eliquis), DM2, HLD, HTN presents w/ aphasia. Patient was last normal at 10:30 pm.  Per his wife he was doing well then started complaining of feeling dizzy after taking a shower. Denied fever, pains, vomiting.  She noticed he then started having trouble speaking and say words that did not make sense, so came to ED.  Wife denies any B/B incontinence, unilateral weakness, syncope, or tongue-biting.  Patient had similar presentation end of March; at that time TPA was deferred and subsequent neuroimaging negative for CVA. He does have history of seizures on keppra, and his seizures are atypical. Says wasn't having seizures until recently. Patient has had a previous stroke 1/2016 w/ residual L vision deficits only. EEG then abnormal w/ 1/18 CVA as likely foci for seizures. Currently on eliquis for a-fib. Further hisotry limited by patient's mental status.       In ED, [facility] neurology consulted for concern of stroke d/t aphasia.  Sxs likely 2/2 postictal period of complex seizure.   (baseline 100s-200s). CXR shows CM with findings suggestive of pulmonary edema.  Gvien one time dose of lasix 80 mg IV.  EKG shows afib HR 69.  CTH with no significant change from previous. Remote left occipital lobe and left thalamic infarctions.       Hospital Course:   Pt admitted due to aphasia.  Similar presentation/admissions 11/2017 and 03/2018 (medication noncompliance an issue).   "Vascular neurology consulted in ED.  CTH no acute changes.  No acute stroke suspected.  Keppra level ordered.  EEG ordered: abnormal extended (1 hour and 29 minutes) EEG due to rare left sided epileptiform activity seen, suggestive of underlying epileptiform activity; continuous left sided slowing seen, suggestive of underlying structural lesion.  Neurology consulted and recommended slow titration on lacosamide--50 mg qd x 1 week, increase lacosamide to 100 mg for 2nd week if pt tolerates.  Continue levetiracetam 1500 mg bid, level 69.4.  Follow up with Dr. Santiago in 2 weeks.  Pt also with acute CHF exacerbation (->395).  Pt treated with IV lasix bid and wt decreased 2 kg.  Day 3-4 transitioned back to home dosing furosemide 40 mg bid.  Seizure precautions at discharge.      ED visit, 3/23/18: Notes from :  HPI:   Mr. Christy is a 61 yo male with a PMHx of HTN, HLD, DM2, PAF on Eliquis, HFpEF, CKDIII, thyroid cancer s/p surgery and radiation (2009), previous L sided CVA (2016) resulting in R homonymous hemianopsia, and complex partial seizures presenting to the ED with wife at bedside c/o confusion and difficulty speaking. Wife at bedside to provide the history. She states this morning at ~8am patient was getting dressed and was c/o minor dizziness. She noted he had not taken his keppra from the previous night, in addition to all of his "night time medications." Pt was able to take all morning medications. At around 10am, pt was sitting down to "go over his finances and pay bills", she noted something was "off". She asked him his name, which he could provide for her but he could not remember the word for "shoes" when she asked him what he was wearing on his feet. She did not witness convulsions, jerking movements, LOC, tongue biting, or incontinence. Wife states patient was able to ambulate and continue to go about "normal" activities. Wife brought patient to the ED due to concern for aphasia. Of note, wife " reports pt is non-compliant with hydralazine and ASA. Per wife, this was a similar presentation to his last admission for asphasia suspected to be secondary to seizures.      In the ED, HDS; noted to be hypertensive to 190s/90s. Stroke code was called; CTH and MRI without evidence of acute infarct. Vascular neurology assessed; low suspicion for acute CVA. Remainder of labs and imaging were unremarkable.     Hospital Course:   Pt admitted to observation for aphasia in setting of hx of CVA and seizures. CTH and MRI negative for acute infarct. Labs entirely unremarkable (TSH and lactate WNL). No s/s infection; afebrile without leukocytosis (CXR and UA without infection). Neurology consulted and suspects breakthrough seizure with prolonged post-ictus. EEG shows slowing consistent with hx of L sided CVA; no epileptic activity. Pt discharged home to follow up with epilepsy as outpatient. Educated on importance of compliance with home medications.     A/P at last visit:  61yo M with hx of CVA in 1/16 and seizures x 11/16 - prior episodes c/w glucose level alterations  Recent episode suggestive of sz - likely related to hyperglycemia     - check Levetiracetam levels  - continue same dose of LEV for now     Monitor glucose levels closely  Return in 6 months or earlier prn    Notes from last clinic visit, 11/15/17:  No hx of non-compliance    Recent admission: patient states that he had gone to exercise that day, followed by having donuts but did not miss any dose of meds.  No other triggers.  Possibly seizure? But no clinical activity seen by family except for word-finding difficulty/confusion.  Last recognized seizure was possibly Apr 2017    Recent admission to [facility], 10/4-11/17:  Neurology notes, 10/5/17:  [facility] 10/04/17 due to wife coming home and finding  confused with agitation, word-finding difficulty, and imbalance.  Noted no weakness.  States that he was last seen normal at 7 pm, she went out to run an errand and  returned at 8 pm.  Otherwise notes no signs of head trauma, no recent weakness, no medication changes.  He takes levetiracetam 750 mg po bid (started on 1500 mg po bid right after 11/2016 seizure) and is compliant.  Recommendation:  Breakthrough seizure in setting of recent respiratory infection and financial stressors, wife endorses compliance and patient not yet back to baseline.  Recommend load 1g LEV iv followed by increase to 1500mg bid.  R-EEG, 10/7/17:    IMPRESSION:  This is an abnormal EEG during wakefulness, drowsiness and sleep.    Nearly continuous irregular left hemisphere slowing was noted.  The EKG revealed an irregular rhythm.    Hospital course (per Hospital Medicine notes, 10/11/17): in the ED on presentation, the patient was in keeping of abnormally elevated blood pressure in setting of acute onset aphasia. Workup for an acute stroke included a CT head with no signs of acute stroke. MRI and MRA brain were negative for acute stroke. Vascular neurology was consulted and ruled out stroke and recommended admission to hospital medicine with improved BP control and a consult to general neurology. During admission to hospital medicine, the patient notably developed right sided jerking movement consistent with seizure. The patient was loaded with IV keppra 1500mg followed by oral twice daily dosing per neurology. The patient was observed on the hospital medicine for aphasia resolution that was gradual but incomplete per patient's wife. The patient had an EEG given personality changes and frequent self-repetition that was negative. PT/OT evaluated the patient and recommended SNF placement that occurred appropriately on 10/11/2017.    Any other relevant history since last visit:   - none    SEIZURE HISTORY:  Notes from last (12/27/16) visit:   Information from H&P on 11/05/16:  62yo M with multiple medical co-morbidities, presented to an outside facility on 11/05/16 with left-sided HA and dizziness,  associated with expressive aphasia. He was being transferred to [facility] when had a GTC seizure with right-sided eye deviation, lasting ~ 4 min. Blood glucose was 200 at the time and seizure resolved without any intervention. No hx of associated tongue bite or b/b incontinence. Reports post-ictal confusion but no focal deficits.   CTA head and neck at the time showed no acute ischemic changes.  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  Patient was evaluated by Neurology service while in hospital - advised Levetiracetam 1000mg  q 12h  INTERVAL HISTORY:  since discharge from hospital, no hx of any seizures - compliant with Levetiracteam     Previous evaluation:   EE) R-EEG, 2016:   IMPRESSION: Normal awake and asleep EEG.  2) R-EEG, 16  IMPRESSION:   This is an abnormal awake and asleep EEG due to mild generalized slowing seen, suggestive of mild diffuse or multifocal cerebral dysfunction.  No epileptiform activity or electrographic seizures were seen    MRI Brain:  Results for orders placed or performed during the hospital encounter of 10/04/17   MRI Brain Without Contrast    Addendum: 10/5/2017          Electronically signed by: DELL VITALE MD  Date:     10/05/17  Time:    01:19       Narrative    MRI BRAIN, MRA HEAD, MRA NECK    CLINICAL INDICATION: 62 year old M with stroke, severe expressive aphasia, LLE weakness.     TECHNIQUE: Multiplanar, multisequence images were obtained of the brain. Three-dimensional time-of-flight technique was used in assessment of the neck and intracranial vasculature.    COMPARISON: CT head 10/4/2017.    FINDINGS:    MRI BRAIN:  The ventricles are normal in size for age, without evidence of hydrocephalus.  There is suggestion of subtle diffuse cortical diffusion restriction in the supratentorial brain.    There is a stable region of encephalomalacia within the left occipital lobe. There is relatively isointense diffusion signal in  this region with corresponding hyperintensity on ADC map and T2/FLAIR hyperintensity, compatible with remote infarct. No areas of restricted diffusion signal are seen to suggest new/acute infarct. No areas of susceptibility to suggest hemorrhage. A punctate focus of T1 hyperintensity in the left aspect of the anterior interhemispheric fissure likely represents a dural calcification. Supratentorial patchy T2/FLAIR hyperintensities compatible with chronic microvascular ischemic disease.    No extra-axial blood or fluid collections.    The T2 skull base flow voids are preserved. Bone marrow signal intensity is unremarkable.    MRA HEAD AND NECK:   Common and internal carotid arteries are normal in caliber.  No significant stenosis at either carotid bifurcation. There is a small saccular aneurysm with a 0.4 cm neck arising from the supraclinoid right ICA.    Vertebral arteries are normal in caliber. The vertebrobasilar system appears within normal limits.     The ACAs, MCAs and PCAs demonstrate no evidence of  high-grade stenosis, focal occlusion or intracranial aneurysm.    Impression    No evidence of acute or major vascular distribution infarct or intracranial hemorrhage.    Questionable subtle cortical diffusion restriction throughout the supratentorial brain.  Short-term followup is suggested.    Remote left PCA distribution infarct within the left occipital lobe.    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  ___________________________________________________  This report has been flagged in the [facility] medical record.  ______________________________________     Electronically signed by resident: EFREM PENALOZA MD  Date:     10/05/17  Time:    00:24            As the supervising and teaching physician, I personally reviewed the images and resident's interpretation and I agree with the findings.          Electronically signed by: DELL VITALE MD  Date:     10/05/17  Time:    01:13    MRA Brain without contrast     Narrative    MRI BRAIN, MRA HEAD, MRA NECK    CLINICAL INDICATION: 62 year old M with stroke, severe expressive aphasia, LLE weakness.     TECHNIQUE: Multiplanar, multisequence images were obtained of the brain. Three-dimensional time-of-flight technique was used in assessment of the neck and intracranial vasculature.    COMPARISON: CT head 10/4/2017.    FINDINGS:    MRI BRAIN:  The ventricles are normal in size for age, without evidence of hydrocephalus.  There is suggestion of subtle diffuse cortical diffusion restriction in the supratentorial brain.    There is a stable region of encephalomalacia within the left occipital lobe. There is relatively isointense diffusion signal in this region with corresponding hyperintensity on ADC map and T2/FLAIR hyperintensity, compatible with remote infarct. No areas of restricted diffusion signal are seen to suggest new/acute infarct. No areas of susceptibility to suggest hemorrhage. A punctate focus of T1 hyperintensity in the left aspect of the anterior interhemispheric fissure likely represents a dural calcification. Supratentorial patchy T2/FLAIR hyperintensities compatible with chronic microvascular ischemic disease.    No extra-axial blood or fluid collections.    The T2 skull base flow voids are preserved. Bone marrow signal intensity is unremarkable.    MRA HEAD AND NECK:   Common and internal carotid arteries are normal in caliber.  No significant stenosis at either carotid bifurcation. There is a small saccular aneurysm with a 0.4 cm neck arising from the supraclinoid right ICA.    Vertebral arteries are normal in caliber. The vertebrobasilar system appears within normal limits.     The ACAs, MCAs and PCAs demonstrate no evidence of  high-grade stenosis, focal occlusion or intracranial aneurysm.    Impression    No evidence of acute or major vascular distribution infarct or intracranial hemorrhage.    Questionable subtle cortical diffusion restriction throughout the  supratentorial brain.  Short-term followup is suggested.    Remote left PCA distribution infarct within the left occipital lobe.    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  ___________________________________________________  This report has been flagged in the [employer] medical record  ______________________________________     Electronically signed by resident: EFREM PENALOZA MD  Date:     10/05/17  Time:    00:24            As the supervising and teaching physician, I personally reviewed the images and resident's interpretation and I agree with the findings.          Electronically signed by: DELL VITALE MD  Date:     10/05/17  Time:    01:13         Electronically signed by: DELL VITALE MD  Date:     10/05/17  Time:    01:19    CT Head Without Contrast    Narrative    CT HEAD WITHOUT CONTRAST    CLINICAL INDICATION: 62 year old M with possible stroke.    TECHNIQUE: Axial CT images obtained throughout the region of the head without the use of intravenous contrast. Axial, sagittal and coronal reconstructions were performed.    COMPARISON: MRI brain 11/6/2016, CT stroke multiphase 11/5/2016, CT head 11/5/2016.    FINDINGS:  The ventricles are stable. The brain appears unchanged.  The basal cisterns are patent.  There is a stable region of encephalomalacia within the left occipital lobe, compatible with remote left PCA infarct. No evidence of acute major vascular distribution infarct or intracranial hemorrhage. Patchy hypoattenuation within the supratentorial white matter is compatible with chronic microvascular ischemic change.     No new extra-axial blood or fluid collections.    The cranium appears intact.  There is nonspecific calcifications in the subcutaneous tissues of the head.     Mastoid air cells and paranasal sinuses are essentially clear.  The orbits and intraorbital contents are unremarkable.    Impression       No evidence of acute major vascular distribution infarct or intracranial hemorrhage.      Stable, remote infarct in the left occipital lobe.    Chronic microvascular ischemic disease.        ______________________________________     Electronically signed by resident: EFREM PENALOZA MD  Date:     10/04/17  Time:    21:26            As the supervising and teaching physician, I personally reviewed the images and resident's interpretation and I agree with the findings.          Electronically signed by: DELL VITALE MD  Date:     10/04/17  Time:    22:05    Results for orders placed or performed during the hospital encounter of 11/05/16   MRI Brain W WO Contrast    Narrative    Comparison: CT 11/5/2016, MRI 6/11/2016    Clinical history: Seizure    Technique:    Multiplanar multi-sequence MRI images of the brain were obtained pre-and post the administration of IV Gadavist contrast.        Findings:    Examination is limited secondary to patient motion, most significantly involving the flair axial and flair coronal sequences, as well as the postcontrast sequences.    There is encephalomalacia within the left PCA territory consistent with remote infarction.  Minimal postcontrast enhancement is noted within this region.  There may be a few punctate foci of T2/flair signal abnormality within the periventricular white matter, may reflect chronic microvascular ischemic change or represent artifact as there is extensive motion on the FLAIR axial images.  Additionally, there is high flair signal material involving the basal cisterns about the brainstem, not confirmed on other pulse sequences, suggesting that this is artifactual related to poor CSF signal saturation rather than proteinaceous or hemorrhagic material.  There is no evidence of intracranial mass, or acute infarction.  The ventricular system is within normal limits of size for age and shows no evidence of hydrocephalus.  There are no significant extra-axial or extracranial abnormalities detected.  The bilateral mesial temporal lobes are symmetric  without abnormal signal, sclerosis, or enhancement.    The globes, orbits, pituitary gland, pineal gland and craniocervical junction are normal in configuration. The major vascular flow voids are patent.  Please see CTA performed earlier today for details of the intracranial vasculature.    Impression    Motion limited examination.    On the flair images, high attenuating material about the brainstem/basal cisterns is felt to represent artifact given extensive patient motion likely the result of poor saturation of the CSF signal in the region.  Of note, there is no associated enhancement or meningeal enhancement to suggest that this reflects proteinaceous material of infectious nature.  Blood products felt less likely.  If this finding would correlate with current patient symptomatology, consider either repeating the flair image once patient is better able to tolerate the examination, or correlate this finding with lumbar puncture.    No evidence of acute infarct.  Remote PCA territory infarct with mild enhancement.    No abnormal temporal lobe enhancement or focal focus to suggest seizure nidus.            Electronically signed by: NERY LO MD  Date:     11/06/16  Time:    01:08       Results for orders placed or performed during the hospital encounter of 10/04/17   MRI Brain Without Contrast    Addendum: 10/5/2017          Electronically signed by: DELL VITALE MD  Date:     10/05/17  Time:    01:19       Narrative    MRI BRAIN, MRA HEAD, MRA NECK    CLINICAL INDICATION: 62 year old M with stroke, severe expressive aphasia, LLE weakness.     TECHNIQUE: Multiplanar, multisequence images were obtained of the brain. Three-dimensional time-of-flight technique was used in assessment of the neck and intracranial vasculature.    COMPARISON: CT head 10/4/2017.    FINDINGS:    MRI BRAIN:  The ventricles are normal in size for age, without evidence of hydrocephalus.  There is suggestion of subtle diffuse cortical  diffusion restriction in the supratentorial brain.    There is a stable region of encephalomalacia within the left occipital lobe. There is relatively isointense diffusion signal in this region with corresponding hyperintensity on ADC map and T2/FLAIR hyperintensity, compatible with remote infarct. No areas of restricted diffusion signal are seen to suggest new/acute infarct. No areas of susceptibility to suggest hemorrhage. A punctate focus of T1 hyperintensity in the left aspect of the anterior interhemispheric fissure likely represents a dural calcification. Supratentorial patchy T2/FLAIR hyperintensities compatible with chronic microvascular ischemic disease.    No extra-axial blood or fluid collections.    The T2 skull base flow voids are preserved. Bone marrow signal intensity is unremarkable.    MRA HEAD AND NECK:   Common and internal carotid arteries are normal in caliber.  No significant stenosis at either carotid bifurcation. There is a small saccular aneurysm with a 0.4 cm neck arising from the supraclinoid right ICA.    Vertebral arteries are normal in caliber. The vertebrobasilar system appears within normal limits.     The ACAs, MCAs and PCAs demonstrate no evidence of  high-grade stenosis, focal occlusion or intracranial aneurysm.    Impression    No evidence of acute or major vascular distribution infarct or intracranial hemorrhage.    Questionable subtle cortical diffusion restriction throughout the supratentorial brain.  Short-term followup is suggested.    Remote left PCA distribution infarct within the left occipital lobe.    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  ___________________________________________________  This report has been flagged in the [facility] medical record.  ______________________________________     Electronically signed by resident: EFREM PENALOZA MD  Date:     10/05/17  Time:    00:24            As the supervising and teaching physician, I personally reviewed the images  and resident's interpretation and I agree with the findings.          Electronically signed by: DELL VITALE MD  Date:     10/05/17  Time:    01:13    MRA Brain without contrast    Narrative    MRI BRAIN, MRA HEAD, MRA NECK    CLINICAL INDICATION: 62 year old M with stroke, severe expressive aphasia, LLE weakness.     TECHNIQUE: Multiplanar, multisequence images were obtained of the brain. Three-dimensional time-of-flight technique was used in assessment of the neck and intracranial vasculature.    COMPARISON: CT head 10/4/2017.    FINDINGS:    MRI BRAIN:  The ventricles are normal in size for age, without evidence of hydrocephalus.  There is suggestion of subtle diffuse cortical diffusion restriction in the supratentorial brain.    There is a stable region of encephalomalacia within the left occipital lobe. There is relatively isointense diffusion signal in this region with corresponding hyperintensity on ADC map and T2/FLAIR hyperintensity, compatible with remote infarct. No areas of restricted diffusion signal are seen to suggest new/acute infarct. No areas of susceptibility to suggest hemorrhage. A punctate focus of T1 hyperintensity in the left aspect of the anterior interhemispheric fissure likely represents a dural calcification. Supratentorial patchy T2/FLAIR hyperintensities compatible with chronic microvascular ischemic disease.    No extra-axial blood or fluid collections.    The T2 skull base flow voids are preserved. Bone marrow signal intensity is unremarkable.    MRA HEAD AND NECK:   Common and internal carotid arteries are normal in caliber.  No significant stenosis at either carotid bifurcation. There is a small saccular aneurysm with a 0.4 cm neck arising from the supraclinoid right ICA.    Vertebral arteries are normal in caliber. The vertebrobasilar system appears within normal limits.     The ACAs, MCAs and PCAs demonstrate no evidence of  high-grade stenosis, focal occlusion or intracranial  aneurysm.    Impression    No evidence of acute or major vascular distribution infarct or intracranial hemorrhage.    Questionable subtle cortical diffusion restriction throughout the supratentorial brain.  Short-term followup is suggested.    Remote left PCA distribution infarct within the left occipital lobe.    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  ___________________________________________________  This report has been flagged in the [facility] medical record.  ______________________________________     Electronically signed by resident: EFREM PENALOZA MD  Date:     10/05/17  Time:    00:24            As the supervising and teaching physician, I personally reviewed the images and resident's interpretation and I agree with the findings.          Electronically signed by: DELL VITALE MD  Date:     10/05/17  Time:    01:13         Electronically signed by: DELL VITALE MD  Date:     10/05/17  Time:    01:19    CT Head Without Contrast    Narrative    CT HEAD WITHOUT CONTRAST    CLINICAL INDICATION: 62 year old M with possible stroke.    TECHNIQUE: Axial CT images obtained throughout the region of the head without the use of intravenous contrast. Axial, sagittal and coronal reconstructions were performed.    COMPARISON: MRI brain 11/6/2016, CT stroke multiphase 11/5/2016, CT head 11/5/2016.    FINDINGS:  The ventricles are stable. The brain appears unchanged.  The basal cisterns are patent.  There is a stable region of encephalomalacia within the left occipital lobe, compatible with remote left PCA infarct. No evidence of acute major vascular distribution infarct or intracranial hemorrhage. Patchy hypoattenuation within the supratentorial white matter is compatible with chronic microvascular ischemic change.     No new extra-axial blood or fluid collections.    The cranium appears intact.  There is nonspecific calcifications in the subcutaneous tissues of the head.     Mastoid air cells and paranasal sinuses are  essentially clear.  The orbits and intraorbital contents are unremarkable.    Impression       No evidence of acute major vascular distribution infarct or intracranial hemorrhage.     Stable, remote infarct in the left occipital lobe.    Chronic microvascular ischemic disease.        ______________________________________     Electronically signed by resident: EFREM PENALOZA MD  Date:     10/04/17  Time:    21:26            As the supervising and teaching physician, I personally reviewed the images and resident's interpretation and I agree with the findings.          Electronically signed by: DELL VITALE MD  Date:     10/04/17  Time:    22:05    Results for orders placed or performed during the hospital encounter of 11/05/16   MRI Brain W WO Contrast    Narrative    Comparison: CT 11/5/2016, MRI 6/11/2016    Clinical history: Seizure    Technique:    Multiplanar multi-sequence MRI images of the brain were obtained pre-and post the administration of IV Gadavist contrast.        Findings:    Examination is limited secondary to patient motion, most significantly involving the flair axial and flair coronal sequences, as well as the postcontrast sequences.    There is encephalomalacia within the left PCA territory consistent with remote infarction.  Minimal postcontrast enhancement is noted within this region.  There may be a few punctate foci of T2/flair signal abnormality within the periventricular white matter, may reflect chronic microvascular ischemic change or represent artifact as there is extensive motion on the FLAIR axial images.  Additionally, there is high flair signal material involving the basal cisterns about the brainstem, not confirmed on other pulse sequences, suggesting that this is artifactual related to poor CSF signal saturation rather than proteinaceous or hemorrhagic material.  There is no evidence of intracranial mass, or acute infarction.  The ventricular system is within normal limits of size for  age and shows no evidence of hydrocephalus.  There are no significant extra-axial or extracranial abnormalities detected.  The bilateral mesial temporal lobes are symmetric without abnormal signal, sclerosis, or enhancement.    The globes, orbits, pituitary gland, pineal gland and craniocervical junction are normal in configuration. The major vascular flow voids are patent.  Please see CTA performed earlier today for details of the intracranial vasculature.    Impression    Motion limited examination.    On the flair images, high attenuating material about the brainstem/basal cisterns is felt to represent artifact given extensive patient motion likely the result of poor saturation of the CSF signal in the region.  Of note, there is no associated enhancement or meningeal enhancement to suggest that this reflects proteinaceous material of infectious nature.  Blood products felt less likely.  If this finding would correlate with current patient symptomatology, consider either repeating the flair image once patient is better able to tolerate the examination, or correlate this finding with lumbar puncture.    No evidence of acute infarct.  Remote PCA territory infarct with mild enhancement.    No abnormal temporal lobe enhancement or focal focus to suggest seizure nidus.            Electronically signed by: NERY LO MD  Date:     11/06/16  Time:    01:08       Additional tests:  1)CT Scan: as noted  2) EEG\Video Monitoring: no  3) PET Scan: no  4) Neuropsychological evaluation: no  5) DEXA Scan: no  6) Others: no     RISK FACTORS FOR SEIZURES:    1. Head Trauma:  No    2. CNS Infections:  No  3. CNS Tumors: No     4. CNS Vascular Disease: hx of prior CVA  5. Febrile Seizures: No    6. Developmental Delay: No       7. Family History of Seizures: No    8. Birth history: unremarkable     Pregnancy/Labor/Delivery: n/a    CURRENT MEDICATIONS:   Current Outpatient Medications   Medication Sig Dispense Refill     acetaminophen (TYLENOL) 500 MG tablet Take 1 tablet (500 mg total) by mouth every 6 (six) hours as needed for Pain.  0    amLODIPine (NORVASC) 10 MG tablet Take 1 tablet (10 mg total) by mouth once daily. 30 tablet 11    aspirin 81 MG Chew Take 1 tablet (81 mg total) by mouth once daily.  0    atorvastatin (LIPITOR) 40 MG tablet Take 1 tablet (40 mg total) by mouth once daily. 90 tablet 1    betamethasone dipropionate (DIPROLENE) 0.05 % cream Apply topically 2 (two) times daily. 45 g 3    blood sugar diagnostic (ONETOUCH VERIO) Strp Check blood glucose readings 3 times a day. 300 strip 3    blood sugar diagnostic Strp To check BG 3 times daily, to use with insurance preferred meter 100 each 11    blood-glucose meter kit To check BG 3 times daily - please provide insurance preferred meter 1 each 0    carvedilol (COREG) 12.5 MG tablet Take 0.5 tablets (6.25 mg total) by mouth 2 (two) times daily. 60 tablet 6    ciclopirox (PENLAC) 8 % Soln Apply topically nightly. 6.6 mL 11    clotrimazole-betamethasone 1-0.05% (LOTRISONE) cream Apply topically 2 (two) times daily. To area of rash for 2-4 weeks 45 g 0    ELIQUIS 5 mg Tab TAKE 1 TABLET BY MOUTH TWICE A  tablet 3    finasteride (PROSCAR) 5 mg tablet TAKE 1 TABLET BY MOUTH EVERY DAY 30 tablet 0    fluocinonide (LIDEX) 0.05 % ointment AAA bid 60 g 3    folic acid (FOLVITE) 400 MCG tablet Take 1,000 mcg by mouth once daily.       furosemide (LASIX) 40 MG tablet Take 1.5 tablets (60 mg total) by mouth 2 (two) times daily. 180 tablet 3    GLUCAGON EMERGENCY KIT, HUMAN, 1 mg injection INJECT 1 MG INTO THE MUSCLE AS NEEDED 1 kit 1    insulin aspart U-100 (NOVOLOG FLEXPEN U-100 INSULIN) 100 unit/mL (3 mL) InPn pen INJECT 7 UNITS WITH MEALS PLUS SLIDING SCALE WITH MAX OF 35 UNITS PER DAY 3 Box 3    insulin detemir U-100 (LEVEMIR FLEXTOUCH U-100 INSULN) 100 unit/mL (3 mL) SubQ InPn pen Inject 13 units at night. 2 Box 3    lacosamide (VIMPAT) 200 mg Tab  "tablet Take 1 tablet (200 mg total) by mouth every 12 (twelve) hours. 60 tablet 1    lancets Misc To check BG 3 times daily, to use with insurance preferred meter 100 each 11    lancing device Misc 1 Device by Misc.(Non-Drug; Combo Route) route 3 (three) times daily. 1 each 0    levETIRAcetam (KEPPRA) 750 MG Tab Take 2 tablets (1,500 mg total) by mouth 2 (two) times daily. 360 tablet 3    levothyroxine (SYNTHROID) 200 MCG tablet TAKE 1 TABLET BY MOUTH BEFORE BREAKFAST 90 tablet 2    losartan (COZAAR) 100 MG tablet TAKE 1 TABLET BY MOUTH DAILY 90 tablet 3    magnesium 30 mg Tab Take 1 tablet by mouth twice a week. Mon ,weds,fridays      multivitamin capsule Take 1 capsule by mouth every Mon, Wed, Fri.       pen needle, diabetic (BD ULTRA-FINE MINI PEN NEEDLE) 31 gauge x 3/16" Ndle To use with insulin pens 4  times daily 360 each 3    SAW PALMETTO ORAL Take by mouth. mon , wed, fri      sildenafil (VIAGRA) 100 MG tablet Take 1/2 to 1 tablet daily as needed.  Take on an empty stomach or with a light meal. 30 tablet 12    tamsulosin (FLOMAX) 0.4 mg Cap TAKE 1 CAPSULE (0.4 MG TOTAL) BY MOUTH EVERY EVENING. 90 capsule 3    TRULICITY 0.75 mg/0.5 mL PnIj INJECT 1 PEN UNDER THE SKIN EVERY 7 DAYS 2 Syringe 4    VIMPAT 200 mg Tab tablet Take 1 tablet by mouth 2 (two) times daily.  4    vitamin D 1000 units Tab Take 1,000 Units by mouth every Mon, Wed, Fri.       No current facility-administered medications for this visit.       CURRENT ANTI EPILEPTIC MEDICATIONS:    - Levetiracetam 1500mg bid  - Lacosamide 200mg bid    VAGAL NERVE STIMULATOR: n/a     PRIOR ANTICONVULSANT HISTORY:  none      PAST MEDICAL HISTORY:   Active Ambulatory Problems     Diagnosis Date Noted    Hypercholesterolemia 08/01/2012    Sickle cell trait 08/01/2012    Erectile dysfunction associated with type 2 diabetes mellitus 01/29/2013    Gallstones 03/20/2013    Paroxysmal atrial fibrillation 09/23/2013    Postsurgical hypothyroidism " 11/27/2013    Microcytic anemia 11/27/2013    Obesity (BMI 30.0-34.9) 11/25/2014    Right homonymous hemianopsia 02/05/2016    History of CVA (cerebrovascular accident) 07/25/2016    Type 2 diabetes mellitus with stage 3 chronic kidney disease, with long-term current use of insulin 11/02/2016    Chronic anticoagulation - pradaxa 11/10/2016    Chronic diastolic heart failure 11/20/2016    History of thyroid cancer 01/13/2017    Secondary hyperparathyroidism 06/29/2017    Elevated PSA 07/05/2017    Essential hypertension 08/30/2017    Nuclear sclerosis of both eyes 08/30/2017    Refractive error 08/30/2017    Prostate enlargement 09/14/2017    Complex partial seizures evolving to generalized tonic-clonic seizures 11/15/2017    Hypertensive CKD (chronic kidney disease) 01/31/2018    Chronic kidney disease, stage III (moderate) 06/28/2018    Vitreous hemorrhage of right eye 10/07/2018    Glaucoma suspect of both eyes 10/07/2018    Sickle cell retinopathy without crisis 10/08/2018    Type 2 diabetes mellitus without ophthalmic manifestations 10/08/2018    Hypertensive retinopathy, bilateral 10/16/2018    Proteinuria due to type 2 diabetes mellitus 08/06/2019    Hyperparathyroidism 08/06/2019     Resolved Ambulatory Problems     Diagnosis Date Noted    HTN (hypertension), benign 08/01/2012    Elevated alkaline phosphatase level 08/01/2012    Flank pain 03/20/2013    Syncope 11/26/2013    Leukocytosis 11/27/2013    Acute on chronic renal failure 11/27/2013    Controlled type 2 diabetes with neuropathy 12/16/2014    Screening 03/03/2015    Screening 03/03/2015    Visual field loss following stroke 01/28/2016    Headache 01/28/2016    Fall 06/10/2016    Proteinuria 11/02/2016    Aphasia     Generalized tonic-clonic seizure     Bradycardia 02/01/2017    Bilateral leg edema 02/01/2017    Hematuria 08/14/2017    Abdominal pain 10/05/2017    Simple partial seizure, consciousness not  impaired 10/05/2017    Altered mental status 10/05/2017    Encephalopathy 03/23/2018    Altered mental state 03/24/2018    Acute on chronic congestive heart failure 04/27/2018    Dysarthria     Stroke     Encounter for medication titration 05/22/2018    Leg pain, diffuse, right 05/31/2018    Chest pain 06/27/2018    Elevated d-dimer 06/28/2018    Acute cystitis with hematuria 06/28/2018     Past Medical History:   Diagnosis Date    Allergy     BMI 31.0-31.9,adult 11/25/2014    CHF (congestive heart failure)     Chronic anticoagulation - pradaxa 11/10/2016    Chronic diastolic heart failure 11/20/2016    CKD (chronic kidney disease), stage III 9/23/2013    Controlled type 2 diabetes with neuropathy 12/16/2014    Coronary artery disease     Diabetes mellitus due to underlying condition with stage 3 chronic kidney disease, without long-term current use of insulin 11/2/2016    Elevated alkaline phosphatase level 8/1/2012    Elevated PSA     Erectile dysfunction associated with type 2 diabetes mellitus     Gallstones 3/20/2013    Generalized tonic-clonic seizure 11/2016    HTN (hypertension), benign 8/1/2012    Hypercholesterolemia 8/1/2012    Microcytic anemia 11/27/2013    Paroxysmal atrial fibrillation 9/23/2013    Postsurgical hypothyroidism 11/27/2013    Right homonymous hemianopsia 2/5/2016    Sickle cell trait     Stroke 1/27/2016    Thyroid cancer       PAST PSYCHIATRIC HISTORY: none    PAST SURGICAL HISTORY including Epilepsy surgery:   Past Surgical History:   Procedure Laterality Date    BREAST SURGERY      cyst removal    COLONOSCOPY      COLONOSCOPY N/A 3/3/2015    Performed by CHANDA Wellington MD at [facility] (4TH FLR)    CYST REMOVAL      chest    CYSTOSCOPY WITH RETROGRADE PYELOGRAM Bilateral 8/14/2017    Performed by Anahi Mejia MD at [facility] OR 1ST FLR    PROSTATE BIOPSY  4/27/11    SKIN BIOPSY      THYROID SURGERY  8/26/09    VASCULAR SURGERY           FAMILY HISTORY:   Family History   Problem Relation Age of Onset    Heart disease Father     Diabetes Father     Hypertension Father     Diabetes Mother     Hypertension Mother     Heart disease Mother     Diabetes Brother     No Known Problems Daughter     Cancer Sister     No Known Problems Daughter     Thyroid disease Maternal Aunt     Clotting disorder Neg Hx     Melanoma Neg Hx          SOCIAL HISTORY:   Social History     Socioeconomic History    Marital status:      Spouse name: Rut    Number of children: Not on file    Years of education: Not on file    Highest education level: Not on file   Occupational History    Not on file   Social Needs    Financial resource strain: Not on file    Food insecurity:     Worry: Not on file     Inability: Not on file    Transportation needs:     Medical: Not on file     Non-medical: Not on file   Tobacco Use    Smoking status: Never Smoker    Smokeless tobacco: Never Used   Substance and Sexual Activity    Alcohol use: Yes     Comment: occasionally, none recently    Drug use: No    Sexual activity: Yes     Partners: Female     Comment:  with 3 kids   Lifestyle    Physical activity:     Days per week: Not on file     Minutes per session: Not on file    Stress: Not on file   Relationships    Social connections:     Talks on phone: Not on file     Gets together: Not on file     Attends Bahai service: Not on file     Active member of club or organization: Not on file     Attends meetings of clubs or organizations: Not on file     Relationship status: Not on file   Other Topics Concern    Not on file   Social History Narrative     with 3 kids     Not driving due to vision problem from stroke.       a) Marital status:                                                     b) Living situation: patient lives with his wife  c) Employed/Unemployed/Other: Disabled     DRIVING HISTORY:  Currently driving: No       LEVEL OF  EDUCATION:  -     SUBSTANCE USE: none    ALLERGIES: Cough syrup [guaifenesin]     REVIEW OF SYSTEMS:  Review of Systems   Constitutional: Negative for appetite change and fatigue.   HENT: Negative for dental problem and sore throat.    Eyes: Negative for photophobia and visual disturbance.   Respiratory: Negative for cough and shortness of breath.    Cardiovascular: Negative for chest pain and palpitations.   Gastrointestinal: Negative for nausea and vomiting.   Endocrine: Negative for polydipsia and polyuria.   Genitourinary: Negative for frequency and urgency.   Musculoskeletal: Negative for arthralgias and joint swelling.   Skin: Negative for color change and rash.   Allergic/Immunologic: Negative for immunocompromised state.   All other systems reviewed and are negative.  - except as per hpi     GENERAL EXAMINATION:  There were no vitals taken for this visit.     GENERAL EXAMINATION:  General Appearance:    This is an average built male who appears well.  HEENT: There are no dysmorphic features  Skin: There are no obvious stigmata for neurocutaneous disorders.  Neck: supple   Cardiovascular: regular rate and rhythm  Lungs: clear  Abdomen: deferred  The spine is non-tender.  Kyphosis:  NoScoliosis: No   Extremities: Warm and well perfused    NEUROLOGICAL EXAMINATION:  Mental status: Alert and oriented x 4; pleasant and cooperative with exam  Memory: Recent memory intact, Remote memory intact, Age correct, Month correct  Attention and concentration: intact  Fund of knowledge: adequate  Speech: normal  Dysarthria: No   Eyes: PERRL; EOM intact; No nystagmus.The visual pursuits  were smooth with normal saccadic eye movements.   Fundoscopic Exam: deferred  No facial asymmetry. Intact facial sensation bilaterally.  Hearing was intact bilaterally to finger rub  Tongue and palate was in the midline  Intact SCM and trapezii bilaterally     Motor examination:  Normal bulk and tone bilaterally. Power: no pronater drift; 5/5  bilaterally symmetric UE/LE  Abnormal movements: none  Deep tendon reflexes: 2+ bilaterally symmetric UE/LE with flexor plantars  Dysmetria: No     Sensory examination:   Normal, light touch, pin prick, and vibration bilaterally symmetric UE/LE    Gait:  Normal gait and station    IMPRESSION:  The patient's history is consistent with:   Complex partial seizures evolving to generalized tonic-clonic seizures  63yo M with hx of CVA in 1/16 and seizures x 11/16   - some prior episodes c/w glucose level alterations  - seen in ED with seizure on 11/28/18: low AED levels     - no seizures x 12/2018     Current AEDs:  - Lacosamide 100mg bid  - Levetiracetam 1500mg bid     Plan:  - continue current AEDs    Seizure/glucose log  Seizure precautions/restrictions     Plan of care was discussed in detail with patient and his wife    History of CVA (cerebrovascular accident)  - hx of CVA in 2016    The patient was asked to call me/the clinic 1 week after the test(s) are done to obtain results.    More than 50% of the time with the patient (as well as family/caregiver(s) was spent on face-to-face counseling about:  1. Diagnosis, plans, prognosis, medications and possible side-effects, risks and benefits of treatment, other alternatives to AEDs.  2. Risks related to continued seizures, status epilepticus, SUDEP, driving restrictions and seizure precautions ( no baths but showers are ok, no swimming unsupervised, no use of heavy machinery, no use of sharp moving objects, avoid heights).   3. Issues related to pregnancy, OCP and breast feeding as it relates to epilepsy (in female patients).  4. The potential of teratogenicity (for female patients) and suicidal risks of anti-epileptic medications.  5.Avoid any activity that compromise patient safety related to seizures.    Questions and concerns raised by the patient and family/care-giver(s) were addressed and they indicated understanding of everything discussed and agreed to plans as  above.    Return in 6 months or earlier prn     Keara Santiago MD, SONA(), FACNS.  Neurology-Epilepsy.

## 2019-09-12 ENCOUNTER — OFFICE VISIT (OUTPATIENT)
Dept: NEUROLOGY | Facility: CLINIC | Age: 64
End: 2019-09-12
Payer: MEDICARE

## 2019-09-12 VITALS
BODY MASS INDEX: 33.04 KG/M2 | WEIGHT: 230.81 LBS | HEIGHT: 70 IN | SYSTOLIC BLOOD PRESSURE: 127 MMHG | HEART RATE: 78 BPM | DIASTOLIC BLOOD PRESSURE: 77 MMHG

## 2019-09-12 DIAGNOSIS — G40.209 COMPLEX PARTIAL SEIZURES EVOLVING TO GENERALIZED TONIC-CLONIC SEIZURES: Primary | ICD-10-CM

## 2019-09-12 DIAGNOSIS — Z86.73 HISTORY OF CVA (CEREBROVASCULAR ACCIDENT): ICD-10-CM

## 2019-09-12 PROCEDURE — 99999 PR PBB SHADOW E&M-EST. PATIENT-LVL III: ICD-10-PCS | Mod: PBBFAC,,, | Performed by: PSYCHIATRY & NEUROLOGY

## 2019-09-12 PROCEDURE — 3074F SYST BP LT 130 MM HG: CPT | Mod: CPTII,S$GLB,, | Performed by: PSYCHIATRY & NEUROLOGY

## 2019-09-12 PROCEDURE — 99999 PR PBB SHADOW E&M-EST. PATIENT-LVL III: CPT | Mod: PBBFAC,,, | Performed by: PSYCHIATRY & NEUROLOGY

## 2019-09-12 PROCEDURE — 3074F PR MOST RECENT SYSTOLIC BLOOD PRESSURE < 130 MM HG: ICD-10-PCS | Mod: CPTII,S$GLB,, | Performed by: PSYCHIATRY & NEUROLOGY

## 2019-09-12 PROCEDURE — 3078F DIAST BP <80 MM HG: CPT | Mod: CPTII,S$GLB,, | Performed by: PSYCHIATRY & NEUROLOGY

## 2019-09-12 PROCEDURE — 3078F PR MOST RECENT DIASTOLIC BLOOD PRESSURE < 80 MM HG: ICD-10-PCS | Mod: CPTII,S$GLB,, | Performed by: PSYCHIATRY & NEUROLOGY

## 2019-09-12 PROCEDURE — 99215 OFFICE O/P EST HI 40 MIN: CPT | Mod: S$GLB,,, | Performed by: PSYCHIATRY & NEUROLOGY

## 2019-09-12 PROCEDURE — 1101F PR PT FALLS ASSESS DOC 0-1 FALLS W/OUT INJ PAST YR: ICD-10-PCS | Mod: CPTII,S$GLB,, | Performed by: PSYCHIATRY & NEUROLOGY

## 2019-09-12 PROCEDURE — 1101F PT FALLS ASSESS-DOCD LE1/YR: CPT | Mod: CPTII,S$GLB,, | Performed by: PSYCHIATRY & NEUROLOGY

## 2019-09-12 PROCEDURE — 99215 PR OFFICE/OUTPT VISIT, EST, LEVL V, 40-54 MIN: ICD-10-PCS | Mod: S$GLB,,, | Performed by: PSYCHIATRY & NEUROLOGY

## 2019-09-12 RX ORDER — LEVETIRACETAM 750 MG/1
1500 TABLET ORAL 2 TIMES DAILY
Qty: 120 TABLET | Refills: 11 | Status: SHIPPED | OUTPATIENT
Start: 2019-09-12 | End: 2020-01-01

## 2019-09-12 RX ORDER — LACOSAMIDE 200 MG/1
200 TABLET ORAL EVERY 12 HOURS
Qty: 60 TABLET | Refills: 11 | Status: SHIPPED | OUTPATIENT
Start: 2019-09-12 | End: 2020-05-18 | Stop reason: SDUPTHER

## 2019-09-13 ENCOUNTER — RESEARCH ENCOUNTER (OUTPATIENT)
Dept: RESEARCH | Facility: OTHER | Age: 64
End: 2019-09-13

## 2019-09-13 NOTE — PROGRESS NOTES
Pt and his wife were approached in Epilepsy Neurology clinic regarding participation in IRB protocol #2019.095, MicroRNAs Associated with Late Onset Seizures study. Pt was agreeable.     The Informed Consent Form (ICF) was reviewed with pt. The discussion included:   - participation is voluntary  - pt can change his mind about participating  - pt was informed that participation in this study would not preclude him from participating in any other research if offered  - specimens may be used by [facility] researchers, community researchers or research companies  - specimens collected include only those discussed with the patient at the time of consent and are indicated on the ICF   - specimens may be used for DNA, RNA or protein studies investigating biomarkers for diagnostic, prognostic or treatment purposes  - blood specimen will be collected from phlebotomy on day of consent  - all specimens released to researchers will be stripped of identifiers  - no personal medical information will be released to any parties outside of this research study  - there will be no other physical risks outside of those involved in standard of care procedure     Dr. Santiago approved of patient's participation in the Biob[facility] study. Pt did not have any questions. Pt willingly and independently signed ICF.    A copy of signed ICF was given to pt with instructions to call with any questions that may arise or if he should change his mind regarding participation in this study.

## 2019-09-15 NOTE — ASSESSMENT & PLAN NOTE
65yo M with hx of CVA in 1/16 and seizures x 11/16   - some prior episodes c/w glucose level alterations  - seen in ED with seizure on 11/28/18: low AED levels     - no seizures x 12/2018     Current AEDs:  - Lacosamide 100mg bid  - Levetiracetam 1500mg bid     Plan:  - continue current AEDs    Seizure/glucose log  Seizure precautions/restrictions     Plan of care was discussed in detail with patient and his wife

## 2019-09-19 ENCOUNTER — ANESTHESIA EVENT (OUTPATIENT)
Dept: ENDOSCOPY | Facility: HOSPITAL | Age: 64
End: 2019-09-19
Payer: MEDICARE

## 2019-09-19 ENCOUNTER — HOSPITAL ENCOUNTER (OUTPATIENT)
Facility: HOSPITAL | Age: 64
Discharge: HOME | End: 2019-09-19
Attending: COLON & RECTAL SURGERY | Admitting: COLON & RECTAL SURGERY
Payer: MEDICARE

## 2019-09-19 ENCOUNTER — ANESTHESIA (OUTPATIENT)
Dept: ENDOSCOPY | Facility: HOSPITAL | Age: 64
End: 2019-09-19
Payer: MEDICARE

## 2019-09-19 VITALS
WEIGHT: 210 LBS | RESPIRATION RATE: 16 BRPM | HEART RATE: 63 BPM | TEMPERATURE: 98 F | OXYGEN SATURATION: 97 % | SYSTOLIC BLOOD PRESSURE: 162 MMHG | DIASTOLIC BLOOD PRESSURE: 57 MMHG | HEIGHT: 70 IN | BODY MASS INDEX: 30.06 KG/M2

## 2019-09-19 DIAGNOSIS — Z98.890 S/P COLONOSCOPY: ICD-10-CM

## 2019-09-19 DIAGNOSIS — Z86.010 HISTORY OF COLON POLYPS: Primary | ICD-10-CM

## 2019-09-19 LAB — POCT GLUCOSE: 106 MG/DL (ref 70–110)

## 2019-09-19 PROCEDURE — 37000008 HC ANESTHESIA 1ST 15 MINUTES: Performed by: COLON & RECTAL SURGERY

## 2019-09-19 PROCEDURE — 82962 GLUCOSE BLOOD TEST: CPT | Performed by: COLON & RECTAL SURGERY

## 2019-09-19 PROCEDURE — 63600175 PHARM REV CODE 636 W HCPCS: Performed by: NURSE ANESTHETIST, CERTIFIED REGISTERED

## 2019-09-19 PROCEDURE — 63600175 PHARM REV CODE 636 W HCPCS: Performed by: COLON & RECTAL SURGERY

## 2019-09-19 PROCEDURE — E9220 PRA ENDO ANESTHESIA: HCPCS | Mod: ,,, | Performed by: NURSE ANESTHETIST, CERTIFIED REGISTERED

## 2019-09-19 PROCEDURE — 25000003 PHARM REV CODE 250: Performed by: NURSE ANESTHETIST, CERTIFIED REGISTERED

## 2019-09-19 PROCEDURE — E9220 PRA ENDO ANESTHESIA: ICD-10-PCS | Mod: ,,, | Performed by: NURSE ANESTHETIST, CERTIFIED REGISTERED

## 2019-09-19 PROCEDURE — G0105 COLORECTAL SCRN; HI RISK IND: HCPCS | Mod: ,,, | Performed by: COLON & RECTAL SURGERY

## 2019-09-19 PROCEDURE — G0105 COLORECTAL SCRN; HI RISK IND: HCPCS | Performed by: COLON & RECTAL SURGERY

## 2019-09-19 PROCEDURE — G0105 COLORECTAL SCRN; HI RISK IND: ICD-10-PCS | Mod: ,,, | Performed by: COLON & RECTAL SURGERY

## 2019-09-19 PROCEDURE — 37000009 HC ANESTHESIA EA ADD 15 MINS: Performed by: COLON & RECTAL SURGERY

## 2019-09-19 RX ORDER — SODIUM CHLORIDE 9 MG/ML
INJECTION, SOLUTION INTRAVENOUS CONTINUOUS
Status: DISCONTINUED | OUTPATIENT
Start: 2019-09-19 | End: 2019-09-19 | Stop reason: HOSPADM

## 2019-09-19 RX ORDER — PROPOFOL 10 MG/ML
INJECTION, EMULSION INTRAVENOUS CONTINUOUS PRN
Status: DISCONTINUED | OUTPATIENT
Start: 2019-09-19 | End: 2019-09-19

## 2019-09-19 RX ORDER — GLYCOPYRROLATE 0.2 MG/ML
INJECTION INTRAMUSCULAR; INTRAVENOUS
Status: DISCONTINUED | OUTPATIENT
Start: 2019-09-19 | End: 2019-09-19

## 2019-09-19 RX ORDER — PROPOFOL 10 MG/ML
INJECTION, EMULSION INTRAVENOUS
Status: DISCONTINUED | OUTPATIENT
Start: 2019-09-19 | End: 2019-09-19

## 2019-09-19 RX ORDER — LIDOCAINE HCL/PF 100 MG/5ML
SYRINGE (ML) INTRAVENOUS
Status: DISCONTINUED | OUTPATIENT
Start: 2019-09-19 | End: 2019-09-19

## 2019-09-19 RX ADMIN — SODIUM CHLORIDE: 0.9 INJECTION, SOLUTION INTRAVENOUS at 02:09

## 2019-09-19 RX ADMIN — SODIUM CHLORIDE: 0.9 INJECTION, SOLUTION INTRAVENOUS at 01:09

## 2019-09-19 RX ADMIN — PROPOFOL 70 MG: 10 INJECTION, EMULSION INTRAVENOUS at 02:09

## 2019-09-19 RX ADMIN — LIDOCAINE HYDROCHLORIDE 75 MG: 20 INJECTION, SOLUTION INTRAVENOUS at 02:09

## 2019-09-19 RX ADMIN — GLYCOPYRROLATE 0.1 MG: 0.2 INJECTION, SOLUTION INTRAMUSCULAR; INTRAVENOUS at 02:09

## 2019-09-19 RX ADMIN — PROPOFOL 200 MCG/KG/MIN: 10 INJECTION, EMULSION INTRAVENOUS at 02:09

## 2019-09-19 NOTE — DISCHARGE INSTRUCTIONS
Colonoscopy     A camera attached to a flexible tube with a viewing lens is used to take video pictures.     Colonoscopy is a test to view the inside of your lower digestive tract (colon and rectum). Sometimes it can show the last part of the small intestine (ileum). During the test, small pieces of tissue may be removed for testing. This is called a biopsy. Small growths, such as polyps, may also be removed.   Why is colonoscopy done?  The test is done to help look for colon cancer. And it can help find the source of abdominal pain, bleeding, and changes in bowel habits. It may be needed once a year, depending on factors such as your:  · Age  · Health history  · Family health history  · Symptoms  · Results from any prior colonoscopy  Risks and possible complications  These include:  · Bleeding               · A puncture or tear in the colon   · Risks of anesthesia  · A cancer lesion not being seen  Getting ready   To prepare for the test:  · Talk with your healthcare provider about the risks of the test (see below). Also ask your healthcare provider about alternatives to the test.  · Tell your healthcare provider about any medicines you take. Also tell him or her about any health conditions you may have.  · Make sure your rectum and colon are empty for the test. Follow the diet and bowel prep instructions exactly. If you dont, the test may need to be rescheduled.  · Plan for a friend or family member to drive you home after the test.     Colonoscopy provides an inside view of the entire colon.     You may discuss the results with your doctor right away or at a future visit.  During the test   The test is usually done in the hospital on an outpatient basis. This means you go home the same day. The procedure takes about 30 minutes. During that time:  · You are given relaxing (sedating) medicine through an IV line. You may be drowsy, or fully asleep.  · The healthcare provider will first give you a physical exam to  check for anal and rectal problems.  · Then the anus is lubricated and the scope inserted.  · If you are awake, you may have a feeling similar to needing to have a bowel movement. You may also feel pressure as air is pumped into the colon. Its OK to pass gas during the procedure.  · Biopsy, polyp removal, or other treatments may be done during the test.  After the test   You may have gas right after the test. It can help to try to pass it to help prevent later bloating. Your healthcare provider may discuss the results with you right away. Or you may need to schedule a follow-up visit to talk about the results. After the test, you can go back to your normal eating and other activities. You may be tired from the sedation and need to rest for a few hours.  Date Last Reviewed: 11/1/2016 © 2000-2017 [employer] [address] [place] [address], [place] [address]. All rights reserved. This information is not intended as a substitute for professional medical care. Always follow your healthcare professional's instructions.

## 2019-09-19 NOTE — H&P
COLONOSCOPY HISTORY AND PHYSICAL EXAM    Procedure : Colonoscopy      INDICATIONS: personal history of colon polyps    Family Hx of CRC: None    Last Colonoscopy:  2015  Findings: Polyps       Past Medical History:   Diagnosis Date    Allergy     BMI 31.0-31.9,adult 11/25/2014    CHF (congestive heart failure)     Chronic anticoagulation - pradaxa 11/10/2016    Chronic diastolic heart failure 11/20/2016    CKD (chronic kidney disease), stage III 9/23/2013    Controlled type 2 diabetes with neuropathy 12/16/2014    Coronary artery disease     Diabetes mellitus due to underlying condition with stage 3 chronic kidney disease, without long-term current use of insulin 11/2/2016    Elevated alkaline phosphatase level 8/1/2012    Elevated PSA     negative prostate biopsy 4/11    Erectile dysfunction associated with type 2 diabetes mellitus     Gallstones 3/20/2013    Generalized tonic-clonic seizure 11/2016    HTN (hypertension), benign 8/1/2012    Hypercholesterolemia 8/1/2012    Microcytic anemia 11/27/2013    Paroxysmal atrial fibrillation 9/23/2013    Postsurgical hypothyroidism 11/27/2013    Right homonymous hemianopsia 2/5/2016    Sickle cell trait     Stroke 1/27/2016    Thyroid cancer     multifocal with six lesions, largest 5mm, treated with surgery and radioactive iodine     Sedation Problems: NO  Family History   Problem Relation Age of Onset    Heart disease Father     Diabetes Father     Hypertension Father     Diabetes Mother     Hypertension Mother     Heart disease Mother     Diabetes Brother     No Known Problems Daughter     Cancer Sister     No Known Problems Daughter     Thyroid disease Maternal Aunt     Clotting disorder Neg Hx     Melanoma Neg Hx      Fam Hx of Sedation Problems: NO  Social History     Socioeconomic History    Marital status:      Spouse name: Rut    Number of children: Not on file    Years of education: Not on file    Highest education  "level: Not on file   Occupational History    Not on file   Social Needs    Financial resource strain: Not on file    Food insecurity:     Worry: Not on file     Inability: Not on file    Transportation needs:     Medical: Not on file     Non-medical: Not on file   Tobacco Use    Smoking status: Never Smoker    Smokeless tobacco: Never Used   Substance and Sexual Activity    Alcohol use: Yes     Comment: occasionally, none recently    Drug use: No    Sexual activity: Yes     Partners: Female     Comment:  with 3 kids   Lifestyle    Physical activity:     Days per week: Not on file     Minutes per session: Not on file    Stress: Not on file   Relationships    Social connections:     Talks on phone: Not on file     Gets together: Not on file     Attends Scientologist service: Not on file     Active member of club or organization: Not on file     Attends meetings of clubs or organizations: Not on file     Relationship status: Not on file   Other Topics Concern    Not on file   Social History Narrative     with 3 kids     Not driving due to vision problem from stroke.        Review of Systems - Negative except   Respiratory ROS: no dyspnea  Cardiovascular ROS: no exertional chest pain  Gastrointestinal ROS: NO abdominal discomfort,  NO rectal bleeding  Musculoskeletal ROS: no muscular pain  Neurological ROS: no recent stroke    Physical Exam:  BP (!) 193/91 (BP Location: Left arm, Patient Position: Lying)   Pulse (!) 54   Temp 97.9 °F (36.6 °C) (Temporal)   Resp 18   Ht 5' 10" (1.778 m)   Wt 95.3 kg (210 lb)   SpO2 99%   BMI 30.13 kg/m²   General: no distress  Head: normocephalic  Mallampati Score   Neck: supple, symmetrical, trachea midline  Lungs:  clear to auscultation bilaterally and normal respiratory effort  Heart: regular rate and rhythm and no murmur  Abdomen: soft, non-tender non-distented; bowel sounds normal; no masses,  no organomegaly  Extremities: no cyanosis or edema, or " clubbing    ASA:  III    PLAN  COLONOSCOPY.    SedationPlan :MAC    The details of the procedure, the possible need for biopsy or polypectomy and the potential risks including bleeding, perforation, missed polyps were discussed in detail.

## 2019-09-19 NOTE — ANESTHESIA POSTPROCEDURE EVALUATION
Anesthesia Post Evaluation    Patient: Alfa Christy III    Procedure(s) Performed: Procedure(s) (LRB):  COLONOSCOPY (N/A)    Final Anesthesia Type: general  Patient location during evaluation: GI PACU  Patient participation: Yes- Able to Participate  Level of consciousness: awake and alert  Post-procedure vital signs: reviewed and stable  Pain management: adequate  Airway patency: patent  PONV status at discharge: No PONV  Anesthetic complications: no      Cardiovascular status: blood pressure returned to baseline  Respiratory status: unassisted  Hydration status: euvolemic  Follow-up not needed.          Vitals Value Taken Time   /57 9/19/2019  3:28 PM   Temp 36.8 °C (98.2 °F) 9/19/2019  3:02 PM   Pulse 63 9/19/2019  3:28 PM   Resp 16 9/19/2019  3:28 PM   SpO2 97 % 9/19/2019  3:28 PM         Event Time     Out of Recovery 15:36:58          Pain/Lenin Score: Lenin Score: 10 (9/19/2019  3:16 PM)

## 2019-09-19 NOTE — ANESTHESIA PREPROCEDURE EVALUATION
09/19/2019  Alfa Christy III is a 64 y.o., male.    Anesthesia Evaluation    I have reviewed the Patient Summary Reports.    I have reviewed the Nursing Notes.   I have reviewed the Medications.     Review of Systems  Anesthesia Hx:  No problems with previous Anesthesia   Denies Personal Hx of Anesthesia complications.   Social:  Non-Smoker    Hematology/Oncology:  Hematology Normal      Hematology Comments: + sickle cell trait  --  Cancer in past history:  Oncology Comments: H/o thyroid cancer; s/p thyroidectomy     EENT/Dental:EENT/Dental Normal   Cardiovascular:   Exercise tolerance: good Hypertension, well controlled CAD  Dysrhythmias atrial fibrillation CHF hyperlipidemia ECG has been reviewed.   CONCLUSIONS     1 - Normal left ventricular systolic function (EF 60-65%).     2 - Concentric remodeling.     3 - Wall motion abnormalities.     4 - Normal left ventricular diastolic function.     5 - Severe left atrial enlargement.     6 - Normal right ventricular systolic function .     7 - Trivial to mild aortic regurgitation.     8 - Trivial pericardial effusion.     No evidence of stress induced myocardial ischemia.  Functional Capacity 4 METS    Pulmonary:  Pulmonary Normal    Renal/:  Kidney Function/Disease, Chronic Kidney Disease (CKD) , CKD Stage III (GFR 30-59)  Other Renal / Gu Conditions: Benign Prostatic Hypertrophy (BPH)  Hepatic/GI:  Hepatic/GI Normal Bowel Prep.    Musculoskeletal:  Musculoskeletal Normal    Neurological:   CVA, residual symptoms Seizures, well controlled    Endocrine:   Diabetes, poorly controlled Hypothyroidism    Dermatological:  Skin Normal    Psych:  Psychiatric Normal           Physical Exam  General:  Well nourished    Airway/Jaw/Neck:  Airway Findings: Mouth Opening: Normal Tongue: Normal  General Airway Assessment: Adult  Mallampati: II  Improves to I with phonation.   TM Distance: Normal, at least 6 cm  Jaw/Neck Findings:  Neck ROM: Normal ROM      Dental:  Dental Findings: In tact        Mental Status:  Mental Status Findings: Normal        Anesthesia Plan  Type of Anesthesia, risks & benefits discussed:  Anesthesia Type:  general  Patient's Preference:   Intra-op Monitoring Plan: standard ASA monitors  Intra-op Monitoring Plan Comments:   Post Op Pain Control Plan: IV/PO Opioids PRN  Post Op Pain Control Plan Comments:   Induction:   IV  Beta Blocker:  Patient is not currently on a Beta-Blocker (No further documentation required).       Informed Consent: Patient understands risks and agrees with Anesthesia plan.  Questions answered. Anesthesia consent signed with patient.  ASA Score: 3     Day of Surgery Review of History & Physical:    H&P update referred to the provider.         Ready For Surgery From Anesthesia Perspective.

## 2019-09-19 NOTE — [employer] PATIENT INSTRUCTIONS
Discharge Summary/Instructions after an Endoscopic Procedure  Patient Name: Alfa Christy  Patient MRN: [id number]  Patient YOB: 1955 Thursday, September 19, 2019  Silva Granger MD  RESTRICTIONS:  During your procedure today, you received medications for sedation.  These   medications may affect your judgment, balance and coordination.  Therefore,   for 24 hours, you have the following restrictions:   - DO NOT drive a car, operate machinery, make legal/financial decisions,   sign important papers or drink alcohol.    ACTIVITY:  Today: no heavy lifting, straining or running due to procedural   sedation/anesthesia.  The following day: return to full activity including work.  DIET:  Eat and drink normally unless instructed otherwise.     TREATMENT FOR COMMON SIDE EFFECTS:  - Mild abdominal pain, nausea, belching, bloating or excessive gas:  rest,   eat lightly and use a heating pad.  - Sore Throat: treat with throat lozenges and/or gargle with warm salt   water.  - Because air was used during the procedure, expelling large amounts of air   from your rectum or belching is normal.  - If a bowel prep was taken, you may not have a bowel movement for 1-3 days.    This is normal.  SYMPTOMS TO WATCH FOR AND REPORT TO YOUR PHYSICIAN:  1. Abdominal pain or bloating, other than gas cramps.  2. Chest pain.  3. Back pain.  4. Signs of infection such as: chills or fever occurring within 24 hours   after the procedure.  5. Rectal bleeding, which would show as bright red, maroon, or black stools.   (A tablespoon of blood from the rectum is not serious, especially if   hemorrhoids are present.)  6. Vomiting.  7. Weakness or dizziness.  GO DIRECTLY TO THE NEAREST EMERGENCY ROOM IF YOU HAVE ANY OF THE FOLLOWING:      Difficulty breathing              Chills and/or fever over 101 F   Persistent vomiting and/or vomiting blood   Severe abdominal pain   Severe chest pain   Black, tarry stools   Bleeding- more than one  tablespoon   Any other symptom or condition that you feel may need urgent attention  Your doctor recommends these additional instructions:  If any biopsies were taken, your doctors clinic will contact you in 1 to 2   weeks with any results.  - Discharge patient to home.   - Resume previous diet.   - Resume Eliquis (apixaban) at prior dose today.   - Repeat colonoscopy in 5 years for surveillance.   - Return to referring physician.   - Written discharge instructions were provided to the patient.   - The signs and symptoms of potential delayed complications were discussed   with the patient.   - Patient has a contact number available for emergencies.   - Return to normal activities tomorrow.  For questions, problems or results please call your physician - Silva Granger MD at Work:  [phone].  [facility] [place], EMERGENCY ROOM PHONE NUMBER: [phone]  IF A COMPLICATION OR EMERGENCY SITUATION ARISES AND YOU ARE UNABLE TO REACH   YOUR PHYSICIAN - GO DIRECTLY TO THE EMERGENCY ROOM.  Silva Granger MD  9/19/2019 3:01:42 PM  This report has been verified and signed electronically.  [employer]

## 2019-09-19 NOTE — TRANSFER OF CARE
"Anesthesia Transfer of Care Note    Patient: Alfa Christy III    Procedure(s) Performed: Procedure(s) (LRB):  COLONOSCOPY (N/A)    Patient location: GI    Anesthesia Type: general    Transport from OR: Transported from OR on room air with adequate spontaneous ventilation    Post pain: adequate analgesia    Post assessment: no apparent anesthetic complications and tolerated procedure well    Post vital signs: stable    Level of consciousness: awake    Nausea/Vomiting: no nausea/vomiting    Complications: none    Transfer of care protocol was followed      Last vitals:   Visit Vitals  /60 (BP Location: Left arm, Patient Position: Lying)   Pulse 75   Temp 36.8 °C (98.2 °F) (Oral)   Resp 16   Ht 5' 10" (1.778 m)   Wt 95.3 kg (210 lb)   SpO2 98%   BMI 30.13 kg/m²     "

## 2019-09-19 NOTE — PLAN OF CARE
Pt states ready for discharge; verbalizes understanding of instructions. Pt instructed to restart Eliquis today 9/19/19

## 2019-09-24 ENCOUNTER — PATIENT OUTREACH (OUTPATIENT)
Dept: ADMINISTRATIVE | Facility: OTHER | Age: 64
End: 2019-09-24

## 2019-09-25 ENCOUNTER — OFFICE VISIT (OUTPATIENT)
Dept: CARDIOLOGY | Facility: CLINIC | Age: 64
End: 2019-09-25
Payer: MEDICARE

## 2019-09-25 VITALS
HEIGHT: 70 IN | WEIGHT: 227.75 LBS | HEART RATE: 81 BPM | BODY MASS INDEX: 32.61 KG/M2 | SYSTOLIC BLOOD PRESSURE: 155 MMHG | DIASTOLIC BLOOD PRESSURE: 70 MMHG

## 2019-09-25 DIAGNOSIS — Z79.01 CHRONIC ANTICOAGULATION: Chronic | ICD-10-CM

## 2019-09-25 DIAGNOSIS — N18.30 CHRONIC KIDNEY DISEASE, STAGE III (MODERATE): ICD-10-CM

## 2019-09-25 DIAGNOSIS — E66.9 OBESITY (BMI 30.0-34.9): ICD-10-CM

## 2019-09-25 DIAGNOSIS — I48.20 CHRONIC ATRIAL FIBRILLATION: Primary | ICD-10-CM

## 2019-09-25 DIAGNOSIS — I10 ESSENTIAL HYPERTENSION: ICD-10-CM

## 2019-09-25 DIAGNOSIS — I65.23 BILATERAL CAROTID ARTERY STENOSIS: ICD-10-CM

## 2019-09-25 DIAGNOSIS — I50.32 CHRONIC DIASTOLIC HEART FAILURE: ICD-10-CM

## 2019-09-25 DIAGNOSIS — Z86.73 HISTORY OF CVA (CEREBROVASCULAR ACCIDENT): ICD-10-CM

## 2019-09-25 DIAGNOSIS — E78.00 HYPERCHOLESTEROLEMIA: ICD-10-CM

## 2019-09-25 PROCEDURE — 99999 PR PBB SHADOW E&M-EST. PATIENT-LVL III: CPT | Mod: PBBFAC,,, | Performed by: NURSE PRACTITIONER

## 2019-09-25 PROCEDURE — 99499 RISK ADDL DX/OHS AUDIT: ICD-10-PCS | Mod: S$GLB,,, | Performed by: NURSE PRACTITIONER

## 2019-09-25 PROCEDURE — 99214 PR OFFICE/OUTPT VISIT, EST, LEVL IV, 30-39 MIN: ICD-10-PCS | Mod: S$GLB,,, | Performed by: NURSE PRACTITIONER

## 2019-09-25 PROCEDURE — 99214 OFFICE O/P EST MOD 30 MIN: CPT | Mod: S$GLB,,, | Performed by: NURSE PRACTITIONER

## 2019-09-25 PROCEDURE — 3078F PR MOST RECENT DIASTOLIC BLOOD PRESSURE < 80 MM HG: ICD-10-PCS | Mod: CPTII,S$GLB,, | Performed by: NURSE PRACTITIONER

## 2019-09-25 PROCEDURE — 3077F SYST BP >= 140 MM HG: CPT | Mod: CPTII,S$GLB,, | Performed by: NURSE PRACTITIONER

## 2019-09-25 PROCEDURE — 99499 UNLISTED E&M SERVICE: CPT | Mod: S$GLB,,, | Performed by: NURSE PRACTITIONER

## 2019-09-25 PROCEDURE — 3077F PR MOST RECENT SYSTOLIC BLOOD PRESSURE >= 140 MM HG: ICD-10-PCS | Mod: CPTII,S$GLB,, | Performed by: NURSE PRACTITIONER

## 2019-09-25 PROCEDURE — 99999 PR PBB SHADOW E&M-EST. PATIENT-LVL III: ICD-10-PCS | Mod: PBBFAC,,, | Performed by: NURSE PRACTITIONER

## 2019-09-25 PROCEDURE — 1101F PR PT FALLS ASSESS DOC 0-1 FALLS W/OUT INJ PAST YR: ICD-10-PCS | Mod: CPTII,S$GLB,, | Performed by: NURSE PRACTITIONER

## 2019-09-25 PROCEDURE — 3078F DIAST BP <80 MM HG: CPT | Mod: CPTII,S$GLB,, | Performed by: NURSE PRACTITIONER

## 2019-09-25 PROCEDURE — 1101F PT FALLS ASSESS-DOCD LE1/YR: CPT | Mod: CPTII,S$GLB,, | Performed by: NURSE PRACTITIONER

## 2019-09-25 RX ORDER — VALSARTAN 320 MG/1
320 TABLET ORAL DAILY
Qty: 90 TABLET | Refills: 3 | Status: SHIPPED | OUTPATIENT
Start: 2019-09-25 | End: 2020-01-01

## 2019-09-25 RX ORDER — AMLODIPINE BESYLATE 5 MG/1
5 TABLET ORAL DAILY
Qty: 90 TABLET | Refills: 3 | Status: SHIPPED | OUTPATIENT
Start: 2019-09-25 | End: 2020-01-01

## 2019-09-25 RX ORDER — CARVEDILOL 6.25 MG/1
6.25 TABLET ORAL 2 TIMES DAILY
Qty: 180 TABLET | Refills: 3 | Status: SHIPPED | OUTPATIENT
Start: 2019-09-25 | End: 2020-01-31

## 2019-09-25 NOTE — PATIENT INSTRUCTIONS
Salt restriction of 2,000mg a day. Weigh yourself every morning after you go to the restroom.  Take Lasix (furosemide) 80 mg if weight increases 3 or more pounds in a 24 hr period, or 5 pounds over a 7 day period. Contact our office if weight does not return to baseline within 1-2 days.    Your eye exam is due next month.    Please get your flu shot in the pharmacy.    Stop the losartan.    Start the valsartan 320 mg by mouth daily    Decrease the amlodipine to 5 mg.

## 2019-09-25 NOTE — PROGRESS NOTES
Mr. Christy is a patient of Dr. Camacho and was last seen in [facility] 6/13/2019.      Subjective:   Patient ID:  Alfa Christy III is a 64 y.o. male who presents for follow-up of Chronic diastolic heart failure    Problems:  HFpEF (EF 55% on 1/28/2016)   paroxysmal atrial fibrillation   CVA (stroke January 2016; episode of aphasia 10/2017 with no signs of acute stroke)   Carotid artery disease, <50% bilaterally in 2016  HTN   HLD  DM II    HPI  Mr. Christy is in clinic today for routine follow up.  Patient denies chest pain with exertion or at rest, palpitations, SOB, AYALA, dizziness, syncope, orthopnea, PND, or claudication.  Reports routine exercise, treadmill and weights for an hour 3 days a week.  He is treated with low dose ASA and high intensity statin.  Patient is taking atorvastatin 40 mg and LDL is 52.  His home bp is running 115-130s.  He took his antihypertensives right after getting to the clinic today.      Review of Systems   Constitution: Negative for decreased appetite, diaphoresis, malaise/fatigue, weight gain and weight loss.   Eyes: Negative for visual disturbance.   Cardiovascular: Positive for leg swelling. Negative for chest pain, claudication, dyspnea on exertion, irregular heartbeat, near-syncope, orthopnea, palpitations, paroxysmal nocturnal dyspnea and syncope.        Denies chest pressure   Respiratory: Negative for cough, hemoptysis, shortness of breath, sleep disturbances due to breathing and snoring.    Endocrine: Negative for cold intolerance and heat intolerance.   Hematologic/Lymphatic: Negative for bleeding problem. Does not bruise/bleed easily.   Musculoskeletal: Negative for myalgias.   Gastrointestinal: Negative for bloating, abdominal pain, anorexia, change in bowel habit, constipation, diarrhea, nausea and vomiting.   Neurological: Negative for difficulty with concentration, disturbances in coordination, excessive daytime sleepiness, dizziness, headaches, light-headedness,  loss of balance, numbness and weakness.   Psychiatric/Behavioral: The patient does not have insomnia.        Allergies and current medications updated and reviewed:  Review of patient's allergies indicates:   Allergen Reactions    Cough syrup [guaifenesin] Other (See Comments)     weakness     Current Outpatient Medications   Medication Sig    acetaminophen (TYLENOL) 500 MG tablet Take 1 tablet (500 mg total) by mouth every 6 (six) hours as needed for Pain.    amLODIPine (NORVASC) 10 MG tablet Take 1 tablet (10 mg total) by mouth once daily.    aspirin 81 MG Chew Take 1 tablet (81 mg total) by mouth once daily.    atorvastatin (LIPITOR) 40 MG tablet Take 1 tablet (40 mg total) by mouth once daily.    betamethasone dipropionate (DIPROLENE) 0.05 % cream Apply topically 2 (two) times daily.    blood sugar diagnostic (ONETOUCH VERIO) Strp Check blood glucose readings 3 times a day.    blood sugar diagnostic Strp To check BG 3 times daily, to use with insurance preferred meter    blood-glucose meter kit To check BG 3 times daily - please provide insurance preferred meter    carvedilol (COREG) 12.5 MG tablet Take 0.5 tablets (6.25 mg total) by mouth 2 (two) times daily.    ciclopirox (PENLAC) 8 % Soln Apply topically nightly.    clotrimazole-betamethasone 1-0.05% (LOTRISONE) cream Apply topically 2 (two) times daily. To area of rash for 2-4 weeks    ELIQUIS 5 mg Tab TAKE 1 TABLET BY MOUTH TWICE A DAY    finasteride (PROSCAR) 5 mg tablet TAKE 1 TABLET BY MOUTH EVERY DAY    fluocinonide (LIDEX) 0.05 % ointment AAA bid    folic acid (FOLVITE) 400 MCG tablet Take 1,000 mcg by mouth once daily.     furosemide (LASIX) 40 MG tablet Take 1.5 tablets (60 mg total) by mouth 2 (two) times daily.    GLUCAGON EMERGENCY KIT, HUMAN, 1 mg injection INJECT 1 MG INTO THE MUSCLE AS NEEDED    insulin aspart U-100 (NOVOLOG FLEXPEN U-100 INSULIN) 100 unit/mL (3 mL) InPn pen INJECT 7 UNITS WITH MEALS PLUS SLIDING SCALE WITH  "MAX OF 35 UNITS PER DAY    insulin detemir U-100 (LEVEMIR FLEXTOUCH U-100 INSULN) 100 unit/mL (3 mL) SubQ InPn pen Inject 13 units at night.    lacosamide (VIMPAT) 200 mg Tab tablet Take 1 tablet (200 mg total) by mouth every 12 (twelve) hours.    lancets Misc To check BG 3 times daily, to use with insurance preferred meter    lancing device Misc 1 Device by Misc.(Non-Drug; Combo Route) route 3 (three) times daily.    levETIRAcetam (KEPPRA) 750 MG Tab Take 2 tablets (1,500 mg total) by mouth 2 (two) times daily.    levothyroxine (SYNTHROID) 200 MCG tablet TAKE 1 TABLET BY MOUTH BEFORE BREAKFAST    losartan (COZAAR) 100 MG tablet TAKE 1 TABLET BY MOUTH DAILY    magnesium 30 mg Tab Take 1 tablet by mouth twice a week. Mon ,,    multivitamin capsule Take 1 capsule by mouth every Mon, Wed, Fri.     pen needle, diabetic (BD ULTRA-FINE MINI PEN NEEDLE) 31 gauge x 3/16" Ndle To use with insulin pens 4  times daily    SAW PALMETTO ORAL Take by mouth. mon , wed, fri    sildenafil (VIAGRA) 100 MG tablet Take 1/2 to 1 tablet daily as needed.  Take on an empty stomach or with a light meal.    tamsulosin (FLOMAX) 0.4 mg Cap TAKE 1 CAPSULE (0.4 MG TOTAL) BY MOUTH EVERY EVENING.    TRULICITY 0.75 mg/0.5 mL PnIj INJECT 1 PEN UNDER THE SKIN EVERY 7 DAYS    VIMPAT 200 mg Tab tablet Take 1 tablet by mouth 2 (two) times daily.    vitamin D 1000 units Tab Take 1,000 Units by mouth every Mon, Wed, Fri.     No current facility-administered medications for this visit.        Objective:     Right Arm BP - Sittin/63 (19 1135)  Left Arm BP - Sittin/70 (19 1135)    BP (!) 155/70 (BP Location: Left arm, Patient Position: Sitting, BP Method: Large (Automatic))   Pulse 81   Ht 5' 10" (1.778 m)   Wt 103.3 kg (227 lb 11.8 oz)   BMI 32.68 kg/m²       Physical Exam   Constitutional: He is oriented to person, place, and time. Vital signs are normal. He appears well-developed and well-nourished. He " is active. No distress.   HENT:   Head: Normocephalic and atraumatic.   Eyes: Conjunctivae and lids are normal. No scleral icterus.   Neck: Neck supple. Normal carotid pulses, no hepatojugular reflux and no JVD present. Carotid bruit is not present.   Cardiovascular: Normal rate, S1 normal, S2 normal and intact distal pulses. An irregularly irregular rhythm present. PMI is not displaced. Exam reveals no gallop and no friction rub.   No murmur heard.  Pulses:       Carotid pulses are 2+ on the right side, and 2+ on the left side.       Radial pulses are 2+ on the right side, and 2+ on the left side.        Dorsalis pedis pulses are 2+ on the right side, and 2+ on the left side.        Posterior tibial pulses are 1+ on the right side, and 1+ on the left side.   Pulmonary/Chest: Effort normal and breath sounds normal. No respiratory distress. He has no decreased breath sounds. He has no wheezes. He has no rhonchi. He has no rales. He exhibits no tenderness.   Abdominal: Soft. Normal appearance and bowel sounds are normal. He exhibits no distension, no fluid wave, no abdominal bruit, no ascites and no pulsatile midline mass. There is no hepatosplenomegaly. There is no tenderness.   Musculoskeletal: He exhibits edema (BLE +1 pitting edema).   Neurological: He is alert and oriented to person, place, and time. Gait normal.   Skin: Skin is warm, dry and intact. No rash noted. He is not diaphoretic. Nails show no clubbing.   Psychiatric: He has a normal mood and affect. His speech is normal and behavior is normal. Judgment and thought content normal. Cognition and memory are normal.   Nursing note and vitals reviewed.      Chemistry        Component Value Date/Time     08/01/2019 0914    K 4.7 08/01/2019 0914     08/01/2019 0914    CO2 23 08/01/2019 0914    BUN 20 08/01/2019 0914    CREATININE 2.1 (H) 08/01/2019 0914     (H) 08/01/2019 0914        Component Value Date/Time    CALCIUM 8.2 (L) 08/01/2019  0914    ALKPHOS 120 03/09/2019 1814    AST 26 03/09/2019 1814    ALT 16 03/09/2019 1814    BILITOT 1.0 03/09/2019 1814    ESTGFRAFRICA 36.8 (A) 08/01/2019 0914    EGFRNONAA 31.8 (A) 08/01/2019 0914        Lab Results   Component Value Date    HGBA1C 5.8 (H) 05/29/2019     Recent Labs   Lab 04/26/18  2355  05/31/18  1248 06/27/18 2021 07/31/18  0803  11/28/18 2208 05/29/19  0853   WBC 9.11   < > 9.65 11.82   < >  --    < > 9.61  --   --    Hemoglobin 11.3 L   < > 11.7 L 10.2 L   < >  --    < > 11.3 L  --   --    Hematocrit 31.8 L   < > 32.7 L 28.3 L   < >  --    < > 32.2 L  --   --    Mean Corpuscular Volume 80 L   < > 79 L 77 L   < >  --    < > 78 L  --   --    Platelets 226   < > 209 247   < >  --    < > 244  --   --     H  --  473 H 457 H  --   --   --   --   --   --    TSH 2.145  --   --   --    < >  --    < >  --    < > 0.960   Cholesterol 105 L  --   --   --    < > 102 L  --   --   --   --    HDL 37 L  --   --   --    < > 35 L  --   --   --   --    LDL Cholesterol 48.4 L  --   --   --    < > 52.4 L  --   --   --   --    Triglycerides 98  --   --   --    < > 73  --   --   --   --    Hdl/Cholesterol Ratio 35.2  --   --   --    < > 34.3  --   --   --   --     < > = values in this interval not displayed.       Recent Labs   Lab 10/04/17  2118 03/23/18  1317 04/26/18 2355 05/31/18  1248   INR 1.0 1.0 1.0 1.0        Test(s) Reviewed  I have reviewed the following in detail:  [] Stress test   [] Angiography   [x] Echocardiogram   [x] Labs   [] Other:         Assessment/Plan:   1. Chronic atrial fibrillation  Irregularly irregular rhythm.  Continue carvedilol 6.25 mg BID and anticoagulation therapy.      - carvedilol (COREG) 6.25 MG tablet; Take 1 tablet (6.25 mg total) by mouth 2 (two) times daily.  Dispense: 180 tablet; Refill: 3    2. Chronic anticoagulation - eliquis  Denies bleeding.  Discussed when to seek immediate medical attention.     3. History of CVA (cerebrovascular accident)      4. Chronic  diastolic heart failure  Euvolemic on exam.  LE edema most consistent with SE from amlodipine.  Reduce amlodipine to 5 mg daily.  Encouraged leg elevation, walking, low salt diet, and compression stockings.  Daily weights.  Continue current diuretic regimen.     5. Essential hypertension  Home BP at goal <130/80. Decrease amlodipine for LE edema.  Change losartan to more potent ARB. Requested 2 week bp log.     - amLODIPine (NORVASC) 5 MG tablet; Take 1 tablet (5 mg total) by mouth once daily.  Dispense: 90 tablet; Refill: 3  - valsartan (DIOVAN) 320 MG tablet; Take 1 tablet (320 mg total) by mouth once daily.  Dispense: 90 tablet; Refill: 3    6. Bilateral carotid artery stenosis  No bruit on exam.     7. Hypercholesterolemia  LDL at goal <70. No changes      8. Chronic kidney disease, stage III (moderate)      9. Obesity (BMI 30.0-34.9)  BMI 32.7  Encouraged increased CV exercise to 30 minutes a day for 5 days a week.        The patient was discussed and examined by Dr. Galan    Follow up in about 3 months (around 12/25/2019).

## 2019-09-26 ENCOUNTER — TELEPHONE (OUTPATIENT)
Dept: ENDOSCOPY | Facility: HOSPITAL | Age: 64
End: 2019-09-26

## 2019-09-26 PROBLEM — Z98.890 S/P COLONOSCOPY: Status: RESOLVED | Noted: 2019-09-19 | Resolved: 2019-09-26

## 2019-09-26 PROBLEM — I65.23 BILATERAL CAROTID ARTERY STENOSIS: Status: ACTIVE | Noted: 2019-09-26

## 2019-10-03 ENCOUNTER — HOSPITAL ENCOUNTER (OUTPATIENT)
Facility: HOSPITAL | Age: 64
Discharge: HOME | End: 2019-10-04
Attending: EMERGENCY MEDICINE | Admitting: HOSPITALIST
Payer: MEDICARE

## 2019-10-03 DIAGNOSIS — E11.22 TYPE 2 DIABETES MELLITUS WITH STAGE 3 CHRONIC KIDNEY DISEASE, WITH LONG-TERM CURRENT USE OF INSULIN: ICD-10-CM

## 2019-10-03 DIAGNOSIS — I50.33 ACUTE ON CHRONIC DIASTOLIC HEART FAILURE: Primary | ICD-10-CM

## 2019-10-03 DIAGNOSIS — Z79.4 TYPE 2 DIABETES MELLITUS WITH STAGE 3 CHRONIC KIDNEY DISEASE, WITH LONG-TERM CURRENT USE OF INSULIN: ICD-10-CM

## 2019-10-03 DIAGNOSIS — N18.30 TYPE 2 DIABETES MELLITUS WITH STAGE 3 CHRONIC KIDNEY DISEASE, WITH LONG-TERM CURRENT USE OF INSULIN: ICD-10-CM

## 2019-10-03 DIAGNOSIS — R07.9 CHEST PAIN: ICD-10-CM

## 2019-10-03 LAB
ALBUMIN SERPL BCP-MCNC: 3.5 G/DL (ref 3.5–5.2)
ALP SERPL-CCNC: 132 U/L (ref 55–135)
ALT SERPL W/O P-5'-P-CCNC: 21 U/L (ref 10–44)
ANION GAP SERPL CALC-SCNC: 8 MMOL/L (ref 8–16)
AST SERPL-CCNC: 29 U/L (ref 10–40)
BASOPHILS # BLD AUTO: 0.09 K/UL (ref 0–0.2)
BASOPHILS NFR BLD: 1 % (ref 0–1.9)
BILIRUB SERPL-MCNC: 0.8 MG/DL (ref 0.1–1)
BNP SERPL-MCNC: 426 PG/ML (ref 0–99)
BUN SERPL-MCNC: 23 MG/DL (ref 8–23)
CALCIUM SERPL-MCNC: 8.4 MG/DL (ref 8.7–10.5)
CHLORIDE SERPL-SCNC: 109 MMOL/L (ref 95–110)
CO2 SERPL-SCNC: 23 MMOL/L (ref 23–29)
CREAT SERPL-MCNC: 2 MG/DL (ref 0.5–1.4)
DIFFERENTIAL METHOD: ABNORMAL
EOSINOPHIL # BLD AUTO: 0.4 K/UL (ref 0–0.5)
EOSINOPHIL NFR BLD: 5 % (ref 0–8)
ERYTHROCYTE [DISTWIDTH] IN BLOOD BY AUTOMATED COUNT: 16.6 % (ref 11.5–14.5)
EST. GFR  (AFRICAN AMERICAN): 39 ML/MIN/1.73 M^2
EST. GFR  (NON AFRICAN AMERICAN): 33.8 ML/MIN/1.73 M^2
GLUCOSE SERPL-MCNC: 169 MG/DL (ref 70–110)
HCT VFR BLD AUTO: 32.7 % (ref 40–54)
HGB BLD-MCNC: 11.6 G/DL (ref 14–18)
IMM GRANULOCYTES # BLD AUTO: 0.02 K/UL (ref 0–0.04)
IMM GRANULOCYTES NFR BLD AUTO: 0.2 % (ref 0–0.5)
LYMPHOCYTES # BLD AUTO: 1.6 K/UL (ref 1–4.8)
LYMPHOCYTES NFR BLD: 18.5 % (ref 18–48)
MCH RBC QN AUTO: 28 PG (ref 27–31)
MCHC RBC AUTO-ENTMCNC: 35.5 G/DL (ref 32–36)
MCV RBC AUTO: 79 FL (ref 82–98)
MONOCYTES # BLD AUTO: 1.4 K/UL (ref 0.3–1)
MONOCYTES NFR BLD: 16.1 % (ref 4–15)
NEUTROPHILS # BLD AUTO: 5.1 K/UL (ref 1.8–7.7)
NEUTROPHILS NFR BLD: 59.2 % (ref 38–73)
NRBC BLD-RTO: 1 /100 WBC
PLATELET # BLD AUTO: 253 K/UL (ref 150–350)
PMV BLD AUTO: 11.6 FL (ref 9.2–12.9)
POCT GLUCOSE: 149 MG/DL (ref 70–110)
POTASSIUM SERPL-SCNC: 4.6 MMOL/L (ref 3.5–5.1)
PROT SERPL-MCNC: 7.2 G/DL (ref 6–8.4)
RBC # BLD AUTO: 4.15 M/UL (ref 4.6–6.2)
SODIUM SERPL-SCNC: 140 MMOL/L (ref 136–145)
TROPONIN I SERPL DL<=0.01 NG/ML-MCNC: 0.01 NG/ML (ref 0–0.03)
TROPONIN I SERPL DL<=0.01 NG/ML-MCNC: <0.006 NG/ML (ref 0–0.03)
TROPONIN I SERPL DL<=0.01 NG/ML-MCNC: <0.006 NG/ML (ref 0–0.03)
WBC # BLD AUTO: 8.67 K/UL (ref 3.9–12.7)

## 2019-10-03 PROCEDURE — 25000003 PHARM REV CODE 250: Performed by: EMERGENCY MEDICINE

## 2019-10-03 PROCEDURE — 63600175 PHARM REV CODE 636 W HCPCS: Performed by: PHYSICIAN ASSISTANT

## 2019-10-03 PROCEDURE — 63600175 PHARM REV CODE 636 W HCPCS: Performed by: STUDENT IN AN ORGANIZED HEALTH CARE EDUCATION/TRAINING PROGRAM

## 2019-10-03 PROCEDURE — 84484 ASSAY OF TROPONIN QUANT: CPT | Mod: 91

## 2019-10-03 PROCEDURE — 99219 PR INITIAL OBSERVATION CARE,LEVL II: CPT | Mod: ,,, | Performed by: PHYSICIAN ASSISTANT

## 2019-10-03 PROCEDURE — 93005 ELECTROCARDIOGRAM TRACING: CPT

## 2019-10-03 PROCEDURE — G0378 HOSPITAL OBSERVATION PER HR: HCPCS

## 2019-10-03 PROCEDURE — 80053 COMPREHEN METABOLIC PANEL: CPT

## 2019-10-03 PROCEDURE — 36415 COLL VENOUS BLD VENIPUNCTURE: CPT

## 2019-10-03 PROCEDURE — C9399 UNCLASSIFIED DRUGS OR BIOLOG: HCPCS | Performed by: PHYSICIAN ASSISTANT

## 2019-10-03 PROCEDURE — 25000003 PHARM REV CODE 250: Performed by: PHYSICIAN ASSISTANT

## 2019-10-03 PROCEDURE — 99285 PR EMERGENCY DEPT VISIT,LEVEL V: ICD-10-PCS | Mod: ,,, | Performed by: EMERGENCY MEDICINE

## 2019-10-03 PROCEDURE — 94761 N-INVAS EAR/PLS OXIMETRY MLT: CPT

## 2019-10-03 PROCEDURE — 99285 EMERGENCY DEPT VISIT HI MDM: CPT | Mod: 25

## 2019-10-03 PROCEDURE — 85025 COMPLETE CBC W/AUTO DIFF WBC: CPT

## 2019-10-03 PROCEDURE — 99285 EMERGENCY DEPT VISIT HI MDM: CPT | Mod: ,,, | Performed by: EMERGENCY MEDICINE

## 2019-10-03 PROCEDURE — 99219 PR INITIAL OBSERVATION CARE,LEVL II: ICD-10-PCS | Mod: ,,, | Performed by: PHYSICIAN ASSISTANT

## 2019-10-03 PROCEDURE — 83880 ASSAY OF NATRIURETIC PEPTIDE: CPT

## 2019-10-03 PROCEDURE — 84484 ASSAY OF TROPONIN QUANT: CPT

## 2019-10-03 PROCEDURE — 93010 ELECTROCARDIOGRAM REPORT: CPT | Mod: ,,, | Performed by: INTERNAL MEDICINE

## 2019-10-03 PROCEDURE — 96374 THER/PROPH/DIAG INJ IV PUSH: CPT

## 2019-10-03 PROCEDURE — 93010 ELECTROCARDIOGRAM REPORT: CPT | Mod: 76,,, | Performed by: INTERNAL MEDICINE

## 2019-10-03 PROCEDURE — 93010 EKG 12-LEAD: ICD-10-PCS | Mod: 76,,, | Performed by: INTERNAL MEDICINE

## 2019-10-03 RX ORDER — AMLODIPINE BESYLATE 5 MG/1
5 TABLET ORAL DAILY
Status: DISCONTINUED | OUTPATIENT
Start: 2019-10-03 | End: 2019-10-04

## 2019-10-03 RX ORDER — NITROGLYCERIN 0.4 MG/1
0.4 TABLET SUBLINGUAL
Status: COMPLETED | OUTPATIENT
Start: 2019-10-03 | End: 2019-10-03

## 2019-10-03 RX ORDER — FUROSEMIDE 10 MG/ML
40 INJECTION INTRAMUSCULAR; INTRAVENOUS 2 TIMES DAILY
Status: DISCONTINUED | OUTPATIENT
Start: 2019-10-03 | End: 2019-10-04

## 2019-10-03 RX ORDER — IBUPROFEN 200 MG
16 TABLET ORAL
Status: DISCONTINUED | OUTPATIENT
Start: 2019-10-03 | End: 2019-10-04 | Stop reason: HOSPADM

## 2019-10-03 RX ORDER — TAMSULOSIN HYDROCHLORIDE 0.4 MG/1
0.4 CAPSULE ORAL NIGHTLY
Status: DISCONTINUED | OUTPATIENT
Start: 2019-10-03 | End: 2019-10-04 | Stop reason: HOSPADM

## 2019-10-03 RX ORDER — INSULIN ASPART 100 [IU]/ML
2 INJECTION, SOLUTION INTRAVENOUS; SUBCUTANEOUS
Status: DISCONTINUED | OUTPATIENT
Start: 2019-10-03 | End: 2019-10-04 | Stop reason: HOSPADM

## 2019-10-03 RX ORDER — NAPROXEN SODIUM 220 MG/1
81 TABLET, FILM COATED ORAL DAILY
Status: DISCONTINUED | OUTPATIENT
Start: 2019-10-04 | End: 2019-10-04 | Stop reason: HOSPADM

## 2019-10-03 RX ORDER — VALSARTAN 160 MG/1
320 TABLET ORAL DAILY
Status: DISCONTINUED | OUTPATIENT
Start: 2019-10-03 | End: 2019-10-03 | Stop reason: ALTCHOICE

## 2019-10-03 RX ORDER — ONDANSETRON 2 MG/ML
4 INJECTION INTRAMUSCULAR; INTRAVENOUS EVERY 8 HOURS PRN
Status: DISCONTINUED | OUTPATIENT
Start: 2019-10-03 | End: 2019-10-04 | Stop reason: HOSPADM

## 2019-10-03 RX ORDER — LEVOTHYROXINE SODIUM 100 UG/1
200 TABLET ORAL
Status: DISCONTINUED | OUTPATIENT
Start: 2019-10-04 | End: 2019-10-04 | Stop reason: HOSPADM

## 2019-10-03 RX ORDER — AMOXICILLIN 250 MG
1 CAPSULE ORAL DAILY PRN
Status: DISCONTINUED | OUTPATIENT
Start: 2019-10-03 | End: 2019-10-04 | Stop reason: HOSPADM

## 2019-10-03 RX ORDER — LOSARTAN POTASSIUM 50 MG/1
100 TABLET ORAL NIGHTLY
Status: DISCONTINUED | OUTPATIENT
Start: 2019-10-03 | End: 2019-10-04

## 2019-10-03 RX ORDER — GLUCAGON 1 MG
1 KIT INJECTION
Status: DISCONTINUED | OUTPATIENT
Start: 2019-10-03 | End: 2019-10-04 | Stop reason: HOSPADM

## 2019-10-03 RX ORDER — CARVEDILOL 6.25 MG/1
6.25 TABLET ORAL 2 TIMES DAILY
Status: DISCONTINUED | OUTPATIENT
Start: 2019-10-03 | End: 2019-10-04 | Stop reason: HOSPADM

## 2019-10-03 RX ORDER — LEVETIRACETAM 750 MG/1
1500 TABLET ORAL 2 TIMES DAILY
Status: DISCONTINUED | OUTPATIENT
Start: 2019-10-03 | End: 2019-10-04 | Stop reason: HOSPADM

## 2019-10-03 RX ORDER — SODIUM CHLORIDE 0.9 % (FLUSH) 0.9 %
3 SYRINGE (ML) INJECTION EVERY 8 HOURS
Status: DISCONTINUED | OUTPATIENT
Start: 2019-10-03 | End: 2019-10-04 | Stop reason: HOSPADM

## 2019-10-03 RX ORDER — IBUPROFEN 200 MG
24 TABLET ORAL
Status: DISCONTINUED | OUTPATIENT
Start: 2019-10-03 | End: 2019-10-04 | Stop reason: HOSPADM

## 2019-10-03 RX ORDER — INSULIN ASPART 100 [IU]/ML
0-5 INJECTION, SOLUTION INTRAVENOUS; SUBCUTANEOUS
Status: DISCONTINUED | OUTPATIENT
Start: 2019-10-03 | End: 2019-10-04 | Stop reason: HOSPADM

## 2019-10-03 RX ORDER — RAMELTEON 8 MG/1
8 TABLET ORAL NIGHTLY PRN
Status: DISCONTINUED | OUTPATIENT
Start: 2019-10-03 | End: 2019-10-04 | Stop reason: HOSPADM

## 2019-10-03 RX ORDER — ONDANSETRON 8 MG/1
8 TABLET, ORALLY DISINTEGRATING ORAL EVERY 8 HOURS PRN
Status: DISCONTINUED | OUTPATIENT
Start: 2019-10-03 | End: 2019-10-04 | Stop reason: HOSPADM

## 2019-10-03 RX ORDER — LACOSAMIDE 50 MG/1
200 TABLET ORAL EVERY 12 HOURS
Status: DISCONTINUED | OUTPATIENT
Start: 2019-10-03 | End: 2019-10-04 | Stop reason: HOSPADM

## 2019-10-03 RX ORDER — ASPIRIN 325 MG
325 TABLET ORAL
Status: COMPLETED | OUTPATIENT
Start: 2019-10-03 | End: 2019-10-03

## 2019-10-03 RX ORDER — ACETAMINOPHEN 325 MG/1
650 TABLET ORAL EVERY 6 HOURS PRN
Status: DISCONTINUED | OUTPATIENT
Start: 2019-10-03 | End: 2019-10-04 | Stop reason: HOSPADM

## 2019-10-03 RX ORDER — ATORVASTATIN CALCIUM 20 MG/1
40 TABLET, FILM COATED ORAL DAILY
Status: DISCONTINUED | OUTPATIENT
Start: 2019-10-03 | End: 2019-10-04 | Stop reason: HOSPADM

## 2019-10-03 RX ADMIN — AMLODIPINE BESYLATE 5 MG: 5 TABLET ORAL at 03:10

## 2019-10-03 RX ADMIN — CARVEDILOL 6.25 MG: 6.25 TABLET, FILM COATED ORAL at 02:10

## 2019-10-03 RX ADMIN — TAMSULOSIN HYDROCHLORIDE 0.4 MG: 0.4 CAPSULE ORAL at 08:10

## 2019-10-03 RX ADMIN — ASPIRIN 325 MG ORAL TABLET 325 MG: 325 PILL ORAL at 10:10

## 2019-10-03 RX ADMIN — LACOSAMIDE 200 MG: 50 TABLET, FILM COATED ORAL at 02:10

## 2019-10-03 RX ADMIN — APIXABAN 5 MG: 5 TABLET, FILM COATED ORAL at 02:10

## 2019-10-03 RX ADMIN — ATORVASTATIN CALCIUM 40 MG: 20 TABLET, FILM COATED ORAL at 03:10

## 2019-10-03 RX ADMIN — NITROGLYCERIN 0.4 MG: 0.4 TABLET SUBLINGUAL at 10:10

## 2019-10-03 RX ADMIN — ACETAMINOPHEN 650 MG: 325 TABLET ORAL at 08:10

## 2019-10-03 RX ADMIN — INSULIN DETEMIR 5 UNITS: 100 INJECTION, SOLUTION SUBCUTANEOUS at 08:10

## 2019-10-03 RX ADMIN — LOSARTAN POTASSIUM 100 MG: 50 TABLET ORAL at 08:10

## 2019-10-03 RX ADMIN — LEVETIRACETAM 1500 MG: 750 TABLET ORAL at 02:10

## 2019-10-03 RX ADMIN — FUROSEMIDE 40 MG: 10 INJECTION, SOLUTION INTRAVENOUS at 02:10

## 2019-10-03 NOTE — HPI
64 year old male with a PMHx of HTN, HLD, HFpEF, Afib on Eliquis, DM2, CVA, hypothyroidism, and seizures presenting to the ER complaining of L axilla pain. Pain has been constant since last night. Patient reports taking tylenol x2 last night which improved pain and he was able to sleep. Patient awoke this morning with continued pain so decided to present to the ER. Patient denies radiation of pain, CP, SOB, N/V, and diaphoresis. Patient reports going to gym 5-6x/week; able to do cardio and weights without CP or SOB. At the time of my exam, patient reports pain has resolved. Patient endorses chronic BLE; he has no other complaints. Denies CP, SOB, abdominal pain, N/V/D, fever/chills, dysuria/frequency, headache, focal weakness, syncope and palpitations.      HDS on admission. Afebrile without leukocytosis. Labs show chronic anemia, chronic CKD, . EKG shows afib. Initial troponin negative, . CXR consistent with CHF.

## 2019-10-03 NOTE — ED PROVIDER NOTES
Encounter Date: 10/3/2019       History     Chief Complaint   Patient presents with    Chest Pain     left axillary and left flank pain since last night.  Pain increases with palpation. Pt states that he has been sneezing hard     Mr. Christy is a 64-year-old gentleman with PMH atrial fibrillation, HFpEF (EF 60-65% in 2018 on TTE), CVA (2016), CAD, HTN, HLD, DM II who presents with squeezing chest pain (describes it by making a fist) that is beneath his skin, rated 5/10, located left mid-axillary and non-radiating. He can point to the pain with a finger. He denies any other associated symptoms. He reports associated palpitations but denies nausea, vomiting, diaphoresis, or shortness of breath. Denies any new skin rashes. States the pain was temporarily relieved on day prior with 2 tylenol. He reports excessive sneezing on day prior. Denies any other recent excessive physical exertion. He reports some leg swelling that prompted his cardiologist to decrease his dose of amlodipine on 9/25 as he believed it was due to a drug side effect.         Review of patient's allergies indicates:   Allergen Reactions    Cough syrup [guaifenesin] Other (See Comments)     weakness     Past Medical History:   Diagnosis Date    Allergy     BMI 31.0-31.9,adult 11/25/2014    CHF (congestive heart failure)     Chronic anticoagulation - pradaxa 11/10/2016    Chronic diastolic heart failure 11/20/2016    CKD (chronic kidney disease), stage III 9/23/2013    Controlled type 2 diabetes with neuropathy 12/16/2014    Coronary artery disease     Diabetes mellitus due to underlying condition with stage 3 chronic kidney disease, without long-term current use of insulin 11/2/2016    Elevated alkaline phosphatase level 8/1/2012    Elevated PSA     negative prostate biopsy 4/11    Erectile dysfunction associated with type 2 diabetes mellitus     Gallstones 3/20/2013    Generalized tonic-clonic seizure 11/2016    HTN (hypertension),  benign 8/1/2012    Hypercholesterolemia 8/1/2012    Microcytic anemia 11/27/2013    Paroxysmal atrial fibrillation 9/23/2013    Postsurgical hypothyroidism 11/27/2013    Right homonymous hemianopsia 2/5/2016    Sickle cell trait     Stroke 1/27/2016    Thyroid cancer     multifocal with six lesions, largest 5mm, treated with surgery and radioactive iodine     Past Surgical History:   Procedure Laterality Date    BREAST SURGERY      cyst removal    COLONOSCOPY      COLONOSCOPY N/A 9/19/2019    Procedure: COLONOSCOPY;  Surgeon: Silva Granger MD;  Location: [facility] ([address] [place] [address]);  Service: Endoscopy;  Laterality: N/A;  history of colon polyps on scope 2015, recommended to repeat at 1 yrs  on eliquis and ASA after stroke, afib -- please check with his cardiologist re: instructions for holding this  ok to hold Eliquis x 2 days prior per Dr. Gabriel Hager-see telephone enc    CYST REMOVAL      chest    PROSTATE BIOPSY  4/27/11    SKIN BIOPSY      THYROID SURGERY  8/26/09    VASCULAR SURGERY       Family History   Problem Relation Age of Onset    Heart disease Father     Diabetes Father     Hypertension Father     Diabetes Mother     Hypertension Mother     Heart disease Mother     Diabetes Brother     No Known Problems Daughter     Cancer Sister     No Known Problems Daughter     Thyroid disease Maternal Aunt     Clotting disorder Neg Hx     Melanoma Neg Hx      Social History     Tobacco Use    Smoking status: Never Smoker    Smokeless tobacco: Never Used   Substance Use Topics    Alcohol use: Yes     Comment: occasionally, none recently    Drug use: No     Review of Systems   Constitutional: Negative for chills and fever.   HENT: Positive for sneezing.    Eyes: Negative for visual disturbance.   Respiratory: Negative for shortness of breath.    Cardiovascular: Positive for chest pain, palpitations and leg swelling.   Gastrointestinal: Negative for abdominal pain,  rectal pain and vomiting.   Musculoskeletal: Negative for back pain.   Skin: Negative for rash.   Neurological: Negative for headaches.   Psychiatric/Behavioral: Negative for behavioral problems.       Physical Exam     Initial Vitals [10/03/19 0940]   BP Pulse Resp Temp SpO2   128/75 75 16 97.7 °F (36.5 °C) 99 %      MAP       --         Physical Exam    Constitutional: He appears well-developed and well-nourished. He is not diaphoretic. No distress.   HENT:   Head: Normocephalic and atraumatic.   Eyes: EOM are normal.   Cardiovascular: Normal rate and intact distal pulses.   Irregular rhythm   Pulmonary/Chest: Breath sounds normal. No respiratory distress.   Abdominal: Soft. Bowel sounds are normal. He exhibits no distension. There is no tenderness.   Musculoskeletal: He exhibits edema.   Neurological: He is alert.   Skin: Skin is warm and dry. No erythema.   Skin lesions on left mid-axillary region and back, small amount of erythema mid-axillary region, no vesicles (see image), skin nontender to palpation             ED Course   Procedures  Labs Reviewed   CBC W/ AUTO DIFFERENTIAL   COMPREHENSIVE METABOLIC PANEL   TROPONIN I   TROPONIN I   B-TYPE NATRIURETIC PEPTIDE     EKG Readings: (Independently Interpreted)   EKG 10/3 at 9:44: atrial fibrillation, HR 67, new Q-waves in lateral leads  EKG 10/3 at 10:41: atrial fibrillation, HR 70, Q-waves in lateral leads        Imaging Results    None       X-Rays:   Independently Interpreted Readings:   Chest X-Ray: Cardiomegaly and mild CHF.     Medical Decision Making:   History:   Old Medical Records: I decided to obtain old medical records.  Old Records Summarized: records from clinic visits.       <> Summary of Records: Recent decreased dose of amlodipine on 9/25 by cardiologist for leg edema.   Initial Assessment:   64yoM with PMH CAD, HTN, HLD, DM, a fib, HFpEF presenting with first episode of atypical squeezing chestpain (improved with nitroglycerin, no change with  exertion, located left mid-axillary) for several hours on day prior and recurring this morning (10/3). EKG with new Q waves in lateral leads. Troponin #1 negative. BNP in 400s (baseline).  Differential Diagnosis:   MI, angina, heart failure, musculoskeletal pain, shingles, GERD.  Independently Interpreted Test(s):   I have ordered and independently interpreted EKG Reading(s) - see prior notes  Clinical Tests:   Lab Tests: Reviewed       <> Summary of Lab: Creatinine 2.0 (baseline per chart review)  Troponin #1 negative, Troponin #2 pending  Radiological Study: Reviewed  Medical Tests: Reviewed  ED Management:  ASA x1, Nitroglycerine x1.                      Clinical Impression:       ICD-10-CM ICD-9-CM   1. Chest pain R07.9 786.50         Disposition:   Disposition: Admitted                        Princess Last MD  Resident  10/03/19 [id number]

## 2019-10-03 NOTE — ASSESSMENT & PLAN NOTE
Suspect diet noncompliance at home  - HDS on admission  - Labs remarkable for chronic anemia, chronic CKD,   - Initial troponin negative,   - EKG shows afib (HR 67)  - CXR consistent with CHF  - 2+ pitting BLE  - Ordered IV lasix 40 mg BID  - Monitor response to diuresis  - Repeat echo  - Low suspicion for ACS; will trend troponin q6 for set of 3 for completeness  - Strict I/Os, daily weights  - Telemetry  - Cardiology follow up as outpatient

## 2019-10-03 NOTE — ED NOTES
1205- Pt arrived to adm lanza 4. Pt placed on CM.   1228- CM verified. TTX# [id number]. Afib, rate 47. No ectopy noted.

## 2019-10-03 NOTE — H&P
[facility] Medicine  History & Physical    Patient Name: Alfa Christy III  MRN: [id number]  Admission Date: 10/3/2019  Attending Physician: Bethle Mukherjee MD   Primary Care Provider: Tyler Jasmine MD    [facility] Medicine Team: [facility] [place] [facility] F Dorothy Parks PA-C     Patient information was obtained from patient, spouse/SO, past medical records and ER records.     Subjective:     Principal Problem:Acute on chronic diastolic heart failure    Chief Complaint:   Chief Complaint   Patient presents with    Chest Pain     left axillary and left flank pain since last night.  Pain increases with palpation. Pt states that he has been sneezing hard        HPI: 64 year old male with a PMHx of HTN, HLD, HFpEF, Afib on Eliquis, DM2, CVA, hypothyroidism, and seizures presenting to the ER complaining of L axilla pain. Pain has been constant since last night. Patient reports taking tylenol x2 last night which improved pain and he was able to sleep. Patient awoke this morning with continued pain so decided to present to the ER. Patient denies radiation of pain, CP, SOB, N/V, and diaphoresis. Patient reports going to gym 5-6x/week; able to do cardio and weights without CP or SOB. At the time of my exam, patient reports pain has resolved. Patient endorses chronic BLE; he has no other complaints. Denies CP, SOB, abdominal pain, N/V/D, fever/chills, dysuria/frequency, headache, focal weakness, syncope and palpitations.      HDS on admission. Afebrile without leukocytosis. Labs show chronic anemia, chronic CKD, . EKG shows afib. Initial troponin negative, . CXR consistent with CHF.    Past Medical History:   Diagnosis Date    Allergy     BMI 31.0-31.9,adult 11/25/2014    CHF (congestive heart failure)     Chronic anticoagulation - pradaxa 11/10/2016    Chronic diastolic heart failure 11/20/2016    CKD (chronic kidney disease), stage III 9/23/2013    Controlled type 2 diabetes with  neuropathy 12/16/2014    Coronary artery disease     Diabetes mellitus due to underlying condition with stage 3 chronic kidney disease, without long-term current use of insulin 11/2/2016    Elevated alkaline phosphatase level 8/1/2012    Elevated PSA     negative prostate biopsy 4/11    Erectile dysfunction associated with type 2 diabetes mellitus     Gallstones 3/20/2013    Generalized tonic-clonic seizure 11/2016    HTN (hypertension), benign 8/1/2012    Hypercholesterolemia 8/1/2012    Microcytic anemia 11/27/2013    Paroxysmal atrial fibrillation 9/23/2013    Postsurgical hypothyroidism 11/27/2013    Right homonymous hemianopsia 2/5/2016    Sickle cell trait     Stroke 1/27/2016    Thyroid cancer     multifocal with six lesions, largest 5mm, treated with surgery and radioactive iodine       Past Surgical History:   Procedure Laterality Date    BREAST SURGERY      cyst removal    COLONOSCOPY      COLONOSCOPY N/A 9/19/2019    Procedure: COLONOSCOPY;  Surgeon: Silva Granger MD;  [facility] ([address] [place] [address]);  Service: Endoscopy;  Laterality: N/A;  history of colon polyps on scope 2015, recommended to repeat at 1 yrs  on eliquis and ASA after stroke, afib -- please check with his cardiologist re: instructions for holding this  ok to hold Eliquis x 2 days prior per Dr. Gabriel Hager-see telephone enc    CYST REMOVAL      chest    PROSTATE BIOPSY  4/27/11    SKIN BIOPSY      THYROID SURGERY  8/26/09    VASCULAR SURGERY         Review of patient's allergies indicates:   Allergen Reactions    Cough syrup [guaifenesin] Other (See Comments)     weakness       No current facility-administered medications on file prior to encounter.      Current Outpatient Medications on File Prior to Encounter   Medication Sig    amLODIPine (NORVASC) 5 MG tablet Take 1 tablet (5 mg total) by mouth once daily.    atorvastatin (LIPITOR) 40 MG tablet Take 1 tablet (40 mg total) by mouth once  daily.    carvedilol (COREG) 6.25 MG tablet Take 1 tablet (6.25 mg total) by mouth 2 (two) times daily.    ELIQUIS 5 mg Tab TAKE 1 TABLET BY MOUTH TWICE A DAY    furosemide (LASIX) 40 MG tablet Take 1.5 tablets (60 mg total) by mouth 2 (two) times daily.    levETIRAcetam (KEPPRA) 750 MG Tab Take 2 tablets (1,500 mg total) by mouth 2 (two) times daily.    levothyroxine (SYNTHROID) 200 MCG tablet TAKE 1 TABLET BY MOUTH BEFORE BREAKFAST    tamsulosin (FLOMAX) 0.4 mg Cap TAKE 1 CAPSULE (0.4 MG TOTAL) BY MOUTH EVERY EVENING.    valsartan (DIOVAN) 320 MG tablet Take 1 tablet (320 mg total) by mouth once daily.    [DISCONTINUED] finasteride (PROSCAR) 5 mg tablet TAKE 1 TABLET BY MOUTH EVERY DAY    acetaminophen (TYLENOL) 500 MG tablet Take 1 tablet (500 mg total) by mouth every 6 (six) hours as needed for Pain.    aspirin 81 MG Chew Take 1 tablet (81 mg total) by mouth once daily.    betamethasone dipropionate (DIPROLENE) 0.05 % cream Apply topically 2 (two) times daily.    blood sugar diagnostic (ONETOUCH VERIO) Strp Check blood glucose readings 3 times a day.    blood sugar diagnostic Strp To check BG 3 times daily, to use with insurance preferred meter    blood-glucose meter kit To check BG 3 times daily - please provide insurance preferred meter    ciclopirox (PENLAC) 8 % Soln Apply topically nightly.    clotrimazole-betamethasone 1-0.05% (LOTRISONE) cream Apply topically 2 (two) times daily. To area of rash for 2-4 weeks    fluocinonide (LIDEX) 0.05 % ointment AAA bid    folic acid (FOLVITE) 400 MCG tablet Take 1,000 mcg by mouth once daily.     GLUCAGON EMERGENCY KIT, HUMAN, 1 mg injection INJECT 1 MG INTO THE MUSCLE AS NEEDED    insulin aspart U-100 (NOVOLOG FLEXPEN U-100 INSULIN) 100 unit/mL (3 mL) InPn pen INJECT 7 UNITS WITH MEALS PLUS SLIDING SCALE WITH MAX OF 35 UNITS PER DAY    insulin detemir U-100 (LEVEMIR FLEXTOUCH U-100 INSULN) 100 unit/mL (3 mL) SubQ InPn pen Inject 13 units at night.  "   lacosamide (VIMPAT) 200 mg Tab tablet Take 1 tablet (200 mg total) by mouth every 12 (twelve) hours.    lancets [facility] To check BG 3 times daily, to use with insurance preferred meter    lancing device Misc 1 Device by Misc.(Non-Drug; Combo Route) route 3 (three) times daily.    magnesium 30 mg Tab Take 1 tablet by mouth twice a week. Mon ,weds,fridays    multivitamin capsule Take 1 capsule by mouth every Mon, Wed, Fri.     pen needle, diabetic (BD ULTRA-FINE MINI PEN NEEDLE) 31 gauge x 3/16" Ndle To use with insulin pens 4  times daily    SAW PALMETTO ORAL Take by mouth. mon , wed, fri    sildenafil (VIAGRA) 100 MG tablet Take 1/2 to 1 tablet daily as needed.  Take on an empty stomach or with a light meal.    TRULICITY 0.75 mg/0.5 mL PnIj INJECT 1 PEN UNDER THE SKIN EVERY 7 DAYS    vitamin D 1000 units Tab Take 1,000 Units by mouth every Mon, Wed, Fri.    [DISCONTINUED] VIMPAT 200 mg Tab tablet Take 1 tablet by mouth 2 (two) times daily.     Family History     Problem Relation (Age of Onset)    Cancer Sister    Diabetes Father, Mother, Brother    Heart disease Father, Mother    Hypertension Father, Mother    No Known Problems Daughter, Daughter    Thyroid disease Maternal Aunt        Tobacco Use    Smoking status: Never Smoker    Smokeless tobacco: Never Used   Substance and Sexual Activity    Alcohol use: Yes     Comment: occasionally, none recently    Drug use: No    Sexual activity: Yes     Partners: Female     Comment:  with 3 kids     Review of Systems   Constitutional: Negative for chills, diaphoresis, fatigue and fever.   HENT: Negative for congestion, sinus pressure, sore throat and trouble swallowing.    Respiratory: Negative for cough, shortness of breath and wheezing.    Cardiovascular: Positive for leg swelling. Negative for chest pain and palpitations.        L axilla pain   Gastrointestinal: Negative for abdominal distention, abdominal pain, diarrhea, nausea and vomiting. "   Genitourinary: Negative for difficulty urinating, dysuria, flank pain, frequency and hematuria.   Musculoskeletal: Negative for back pain, gait problem, joint swelling and myalgias.   Skin: Negative for rash and wound.   Neurological: Negative for dizziness, seizures, syncope, speech difficulty, weakness and headaches.   Psychiatric/Behavioral: Negative for behavioral problems, confusion and dysphoric mood. The patient is not nervous/anxious.      Objective:     Vital Signs (Most Recent):  Temp: 97.7 °F (36.5 °C) (10/03/19 0940)  Pulse: 63 (10/03/19 1117)  Resp: 16 (10/03/19 1117)  BP: (!) 179/85 (10/03/19 1202)  SpO2: 96 % (10/03/19 1117) Vital Signs (24h Range):  Temp:  [97.7 °F (36.5 °C)] 97.7 °F (36.5 °C)  Pulse:  [62-75] 63  Resp:  [16-20] 16  SpO2:  [96 %-99 %] 96 %  BP: (128-202)/(75-90) 179/85     Weight: 99.8 kg (220 lb)  Body mass index is 31.57 kg/m².    Physical Exam   Constitutional: He is oriented to person, place, and time. He appears well-developed and well-nourished. No distress.   HENT:   Head: Normocephalic and atraumatic.   Eyes: Conjunctivae and EOM are normal. Right eye exhibits no discharge. Left eye exhibits no discharge. No scleral icterus.   Neck: Normal range of motion. Neck supple. No JVD present.   Cardiovascular: Normal rate and intact distal pulses. An irregularly irregular rhythm present.   Pulmonary/Chest: Effort normal and breath sounds normal. No respiratory distress. He has no wheezes.   Abdominal: Soft. Bowel sounds are normal. He exhibits no distension. There is no tenderness.   Musculoskeletal: Normal range of motion. He exhibits edema.   Neurological: He is alert and oriented to person, place, and time. No cranial nerve deficit.   Skin: Skin is warm and dry. He is not diaphoretic. No erythema.   Psychiatric: His behavior is normal. Thought content normal. Cognition and memory are normal.   Flat affect         CRANIAL NERVES     CN III, IV, VI   Extraocular motions are  normal.        Significant Labs: All pertinent labs within the past 24 hours have been reviewed.    Significant Imaging: I have reviewed all pertinent imaging results/findings within the past 24 hours.    Assessment/Plan:     * Acute on chronic diastolic heart failure  Suspect diet noncompliance at home  - HDS on admission  - Labs remarkable for chronic anemia, chronic CKD,   - Initial troponin negative,   - EKG shows afib (HR 67)  - CXR consistent with CHF  - 2+ pitting BLE  - Ordered IV lasix 40 mg BID  - Monitor response to diuresis  - Repeat echo  - Low suspicion for ACS; will trend troponin q6 for set of 3 for completeness  - Strict I/Os, daily weights  - Telemetry  - Cardiology follow up as outpatient    Essential hypertension  - Elevated in ER in setting of missed AM meds  - Resume home regimen  - Continue to monitor    Type 2 diabetes mellitus with stage 3 chronic kidney disease, with long-term current use of insulin  - Last A1c 5.8 in 5/2019; ordered repeat  -   - Cardiac/DM diet  - Detemir 5 Uqhs, Aspart 2U TID WM, low dose SSI  - Monitor BGs and adjust insulin PRN    Chronic atrial fibrillation  Chronic anticoagulation   - Rate controlled  - Continue BB and Eliquis    History of CVA (cerebrovascular accident)  - Continue ASA and statin    Hypercholesterolemia  - Continue atorvastatin    Complex partial seizures evolving to generalized tonic-clonic seizures  - Continue home vimpat and keppra    Postsurgical hypothyroidism  - Continue home synthroid      VTE Risk Mitigation (From admission, onward)         Ordered     apixaban tablet 5 mg  2 times daily      10/03/19 1248     Place DARIAN hose  Until discontinued      10/03/19 1248     Place sequential compression device  Until discontinued      10/03/19 1248     Reason for No Pharmacological VTE Prophylaxis  Once      10/03/19 1248     IP VTE HIGH RISK PATIENT  Once      10/03/19 1248                   Dorothy Parks PA-C  Department of  Hospital Medicine [facility]

## 2019-10-03 NOTE — ED NOTES
Pt placed on continuous cardiac and pulse ox monitoring with blood pressure to cycle every 30 minutes.    Bed locked in lowest position; side rails up and locked x 2; call light, bedside table, and personal belongings within reach.  Pt instructed to alert nurse for assistance before attempting to get out of bed; verbalizes understanding.  Pt complaints of chest pain at this time; will continue to monitor pt.

## 2019-10-03 NOTE — ED TRIAGE NOTES
Pt arrived with wife from home. Pt reports left sided sharp non radiating CP since 1230pm yesterday while watching TV. Denies N/V/DF. Denies SOB. Pt was switched from losartan to Valsartan on Monday. Pt has 4+ pitting edema.

## 2019-10-03 NOTE — NURSING
Admitted to OBS from ED approx 1600. BP elevated upon admission 190's on mon, 170's manual, MD aware. Oriented to room and use of call bell. Pt reports continued L axilla pain, but says it is better than it was earlier today. Set up for dinner. S/v in bathroom, AUO. Wife @ bedside, involved & supportive. Fall precautions reviewed w/ pt and wife - indicated understanding. See flowsheet for detail.     10/3/2019 4:52 PM

## 2019-10-03 NOTE — SUBJECTIVE & OBJECTIVE
Past Medical History:   Diagnosis Date    Allergy     BMI 31.0-31.9,adult 11/25/2014    CHF (congestive heart failure)     Chronic anticoagulation - pradaxa 11/10/2016    Chronic diastolic heart failure 11/20/2016    CKD (chronic kidney disease), stage III 9/23/2013    Controlled type 2 diabetes with neuropathy 12/16/2014    Coronary artery disease     Diabetes mellitus due to underlying condition with stage 3 chronic kidney disease, without long-term current use of insulin 11/2/2016    Elevated alkaline phosphatase level 8/1/2012    Elevated PSA     negative prostate biopsy 4/11    Erectile dysfunction associated with type 2 diabetes mellitus     Gallstones 3/20/2013    Generalized tonic-clonic seizure 11/2016    HTN (hypertension), benign 8/1/2012    Hypercholesterolemia 8/1/2012    Microcytic anemia 11/27/2013    Paroxysmal atrial fibrillation 9/23/2013    Postsurgical hypothyroidism 11/27/2013    Right homonymous hemianopsia 2/5/2016    Sickle cell trait     Stroke 1/27/2016    Thyroid cancer     multifocal with six lesions, largest 5mm, treated with surgery and radioactive iodine       Past Surgical History:   Procedure Laterality Date    BREAST SURGERY      cyst removal    COLONOSCOPY      COLONOSCOPY N/A 9/19/2019    Procedure: COLONOSCOPY;  Surgeon: Silva Granger MD;  Location: [facility] ([address] [place] [address]);  Service: Endoscopy;  Laterality: N/A;  history of colon polyps on scope 2015, recommended to repeat at 1 yrs  on eliquis and ASA after stroke, afib -- please check with his cardiologist re: instructions for holding this  ok to hold Eliquis x 2 days prior per Dr. Gabriel Hager-see telephone enc    CYST REMOVAL      chest    PROSTATE BIOPSY  4/27/11    SKIN BIOPSY      THYROID SURGERY  8/26/09    VASCULAR SURGERY         Review of patient's allergies indicates:   Allergen Reactions    Cough syrup [guaifenesin] Other (See Comments)     weakness       No current  facility-administered medications on file prior to encounter.      Current Outpatient Medications on File Prior to Encounter   Medication Sig    amLODIPine (NORVASC) 5 MG tablet Take 1 tablet (5 mg total) by mouth once daily.    atorvastatin (LIPITOR) 40 MG tablet Take 1 tablet (40 mg total) by mouth once daily.    carvedilol (COREG) 6.25 MG tablet Take 1 tablet (6.25 mg total) by mouth 2 (two) times daily.    ELIQUIS 5 mg Tab TAKE 1 TABLET BY MOUTH TWICE A DAY    furosemide (LASIX) 40 MG tablet Take 1.5 tablets (60 mg total) by mouth 2 (two) times daily.    levETIRAcetam (KEPPRA) 750 MG Tab Take 2 tablets (1,500 mg total) by mouth 2 (two) times daily.    levothyroxine (SYNTHROID) 200 MCG tablet TAKE 1 TABLET BY MOUTH BEFORE BREAKFAST    tamsulosin (FLOMAX) 0.4 mg Cap TAKE 1 CAPSULE (0.4 MG TOTAL) BY MOUTH EVERY EVENING.    valsartan (DIOVAN) 320 MG tablet Take 1 tablet (320 mg total) by mouth once daily.    [DISCONTINUED] finasteride (PROSCAR) 5 mg tablet TAKE 1 TABLET BY MOUTH EVERY DAY    acetaminophen (TYLENOL) 500 MG tablet Take 1 tablet (500 mg total) by mouth every 6 (six) hours as needed for Pain.    aspirin 81 MG Chew Take 1 tablet (81 mg total) by mouth once daily.    betamethasone dipropionate (DIPROLENE) 0.05 % cream Apply topically 2 (two) times daily.    blood sugar diagnostic (ONETOUCH VERIO) Strp Check blood glucose readings 3 times a day.    blood sugar diagnostic Strp To check BG 3 times daily, to use with insurance preferred meter    blood-glucose meter kit To check BG 3 times daily - please provide insurance preferred meter    ciclopirox (PENLAC) 8 % Soln Apply topically nightly.    clotrimazole-betamethasone 1-0.05% (LOTRISONE) cream Apply topically 2 (two) times daily. To area of rash for 2-4 weeks    fluocinonide (LIDEX) 0.05 % ointment AAA bid    folic acid (FOLVITE) 400 MCG tablet Take 1,000 mcg by mouth once daily.     GLUCAGON EMERGENCY KIT, HUMAN, 1 mg injection  "INJECT 1 MG INTO THE MUSCLE AS NEEDED    insulin aspart U-100 (NOVOLOG FLEXPEN U-100 INSULIN) 100 unit/mL (3 mL) InPn pen INJECT 7 UNITS WITH MEALS PLUS SLIDING SCALE WITH MAX OF 35 UNITS PER DAY    insulin detemir U-100 (LEVEMIR FLEXTOUCH U-100 INSULN) 100 unit/mL (3 mL) SubQ InPn pen Inject 13 units at night.    lacosamide (VIMPAT) 200 mg Tab tablet Take 1 tablet (200 mg total) by mouth every 12 (twelve) hours.    lancets Misc To check BG 3 times daily, to use with insurance preferred meter    lancing device Misc 1 Device by Misc.(Non-Drug; Combo Route) route 3 (three) times daily.    magnesium 30 mg Tab Take 1 tablet by mouth twice a week. Mon ,weds,fridays    multivitamin capsule Take 1 capsule by mouth every Mon, Wed, Fri.     pen needle, diabetic (BD ULTRA-FINE MINI PEN NEEDLE) 31 gauge x 3/16" Ndle To use with insulin pens 4  times daily    SAW PALMETTO ORAL Take by mouth. mon , wed, fri    sildenafil (VIAGRA) 100 MG tablet Take 1/2 to 1 tablet daily as needed.  Take on an empty stomach or with a light meal.    TRULICITY 0.75 mg/0.5 mL PnIj INJECT 1 PEN UNDER THE SKIN EVERY 7 DAYS    vitamin D 1000 units Tab Take 1,000 Units by mouth every Mon, Wed, Fri.    [DISCONTINUED] VIMPAT 200 mg Tab tablet Take 1 tablet by mouth 2 (two) times daily.     Family History     Problem Relation (Age of Onset)    Cancer Sister    Diabetes Father, Mother, Brother    Heart disease Father, Mother    Hypertension Father, Mother    No Known Problems Daughter, Daughter    Thyroid disease Maternal Aunt        Tobacco Use    Smoking status: Never Smoker    Smokeless tobacco: Never Used   Substance and Sexual Activity    Alcohol use: Yes     Comment: occasionally, none recently    Drug use: No    Sexual activity: Yes     Partners: Female     Comment:  with 3 kids     Review of Systems   Constitutional: Negative for chills, diaphoresis, fatigue and fever.   HENT: Negative for congestion, sinus pressure, sore " throat and trouble swallowing.    Respiratory: Negative for cough, shortness of breath and wheezing.    Cardiovascular: Positive for leg swelling. Negative for chest pain and palpitations.        L axilla pain   Gastrointestinal: Negative for abdominal distention, abdominal pain, diarrhea, nausea and vomiting.   Genitourinary: Negative for difficulty urinating, dysuria, flank pain, frequency and hematuria.   Musculoskeletal: Negative for back pain, gait problem, joint swelling and myalgias.   Skin: Negative for rash and wound.   Neurological: Negative for dizziness, seizures, syncope, speech difficulty, weakness and headaches.   Psychiatric/Behavioral: Negative for behavioral problems, confusion and dysphoric mood. The patient is not nervous/anxious.      Objective:     Vital Signs (Most Recent):  Temp: 97.7 °F (36.5 °C) (10/03/19 0940)  Pulse: 63 (10/03/19 1117)  Resp: 16 (10/03/19 1117)  BP: (!) 179/85 (10/03/19 1202)  SpO2: 96 % (10/03/19 1117) Vital Signs (24h Range):  Temp:  [97.7 °F (36.5 °C)] 97.7 °F (36.5 °C)  Pulse:  [62-75] 63  Resp:  [16-20] 16  SpO2:  [96 %-99 %] 96 %  BP: (128-202)/(75-90) 179/85     Weight: 99.8 kg (220 lb)  Body mass index is 31.57 kg/m².    Physical Exam   Constitutional: He is oriented to person, place, and time. He appears well-developed and well-nourished. No distress.   HENT:   Head: Normocephalic and atraumatic.   Eyes: Conjunctivae and EOM are normal. Right eye exhibits no discharge. Left eye exhibits no discharge. No scleral icterus.   Neck: Normal range of motion. Neck supple. No JVD present.   Cardiovascular: Normal rate and intact distal pulses. An irregularly irregular rhythm present.   Pulmonary/Chest: Effort normal and breath sounds normal. No respiratory distress. He has no wheezes.   Abdominal: Soft. Bowel sounds are normal. He exhibits no distension. There is no tenderness.   Musculoskeletal: Normal range of motion. He exhibits edema.   Neurological: He is alert and  oriented to person, place, and time. No cranial nerve deficit.   Skin: Skin is warm and dry. He is not diaphoretic. No erythema.   Psychiatric: His behavior is normal. Thought content normal. Cognition and memory are normal.   Flat affect         CRANIAL NERVES     CN III, IV, VI   Extraocular motions are normal.        Significant Labs: All pertinent labs within the past 24 hours have been reviewed.    Significant Imaging: I have reviewed all pertinent imaging results/findings within the past 24 hours.

## 2019-10-03 NOTE — ASSESSMENT & PLAN NOTE
- Last A1c 5.8 in 5/2019; ordered repeat  -   - Cardiac/DM diet  - Detemir 5 Uqhs, Aspart 2U TID WM, low dose SSI  - Monitor BGs and adjust insulin PRN

## 2019-10-04 VITALS
HEIGHT: 69 IN | WEIGHT: 225 LBS | OXYGEN SATURATION: 98 % | SYSTOLIC BLOOD PRESSURE: 165 MMHG | HEART RATE: 74 BPM | BODY MASS INDEX: 33.33 KG/M2 | DIASTOLIC BLOOD PRESSURE: 75 MMHG | TEMPERATURE: 96 F | RESPIRATION RATE: 18 BRPM

## 2019-10-04 LAB
ANION GAP SERPL CALC-SCNC: 8 MMOL/L (ref 8–16)
ASCENDING AORTA: 3.44 CM
AV INDEX (PROSTH): 0.79
AV MEAN GRADIENT: 2 MMHG
AV PEAK GRADIENT: 4 MMHG
AV VALVE AREA: 2.88 CM2
AV VELOCITY RATIO: 0.79
BASOPHILS # BLD AUTO: 0.08 K/UL (ref 0–0.2)
BASOPHILS NFR BLD: 0.8 % (ref 0–1.9)
BSA FOR ECHO PROCEDURE: 2.23 M2
BUN SERPL-MCNC: 22 MG/DL (ref 8–23)
CALCIUM SERPL-MCNC: 8.6 MG/DL (ref 8.7–10.5)
CHLORIDE SERPL-SCNC: 108 MMOL/L (ref 95–110)
CO2 SERPL-SCNC: 21 MMOL/L (ref 23–29)
CREAT SERPL-MCNC: 1.8 MG/DL (ref 0.5–1.4)
CV ECHO LV RWT: 0.54 CM
DIFFERENTIAL METHOD: ABNORMAL
DOP CALC AO PEAK VEL: 0.95 M/S
DOP CALC AO VTI: 16.52 CM
DOP CALC LVOT AREA: 3.6 CM2
DOP CALC LVOT DIAMETER: 2.15 CM
DOP CALC LVOT PEAK VEL: 0.75 M/S
DOP CALC LVOT STROKE VOLUME: 47.61 CM3
DOP CALCLVOT PEAK VEL VTI: 13.12 CM
E/A RATIO: 4.39
E/E' RATIO: 10.63 M/S
ECHO LV POSTERIOR WALL: 1.3 CM (ref 0.6–1.1)
EOSINOPHIL # BLD AUTO: 0.4 K/UL (ref 0–0.5)
EOSINOPHIL NFR BLD: 3.9 % (ref 0–8)
ERYTHROCYTE [DISTWIDTH] IN BLOOD BY AUTOMATED COUNT: 16.7 % (ref 11.5–14.5)
EST. GFR  (AFRICAN AMERICAN): 44.3 ML/MIN/1.73 M^2
EST. GFR  (NON AFRICAN AMERICAN): 38.4 ML/MIN/1.73 M^2
ESTIMATED AVG GLUCOSE: 94 MG/DL (ref 68–131)
FRACTIONAL SHORTENING: 35 % (ref 28–44)
GLUCOSE SERPL-MCNC: 129 MG/DL (ref 70–110)
HBA1C MFR BLD HPLC: 4.9 % (ref 4–5.6)
HCT VFR BLD AUTO: 36.1 % (ref 40–54)
HGB BLD-MCNC: 12.3 G/DL (ref 14–18)
IMM GRANULOCYTES # BLD AUTO: 0.03 K/UL (ref 0–0.04)
IMM GRANULOCYTES NFR BLD AUTO: 0.3 % (ref 0–0.5)
INTERVENTRICULAR SEPTUM: 1.32 CM (ref 0.6–1.1)
LA MAJOR: 7.12 CM
LA MINOR: 6.41 CM
LA WIDTH: 4.83 CM
LEFT ATRIUM SIZE: 4.51 CM
LEFT ATRIUM VOLUME INDEX: 57.5 ML/M2
LEFT ATRIUM VOLUME: 124.91 CM3
LEFT INTERNAL DIMENSION IN SYSTOLE: 3.1 CM (ref 2.1–4)
LEFT VENTRICLE DIASTOLIC VOLUME INDEX: 52.14 ML/M2
LEFT VENTRICLE DIASTOLIC VOLUME: 113.24 ML
LEFT VENTRICLE MASS INDEX: 114 G/M2
LEFT VENTRICLE SYSTOLIC VOLUME INDEX: 17.5 ML/M2
LEFT VENTRICLE SYSTOLIC VOLUME: 38.05 ML
LEFT VENTRICULAR INTERNAL DIMENSION IN DIASTOLE: 4.8 CM (ref 3.5–6)
LEFT VENTRICULAR MASS: 248.47 G
LV LATERAL E/E' RATIO: 8.42 M/S
LV SEPTAL E/E' RATIO: 14.43 M/S
LYMPHOCYTES # BLD AUTO: 1.5 K/UL (ref 1–4.8)
LYMPHOCYTES NFR BLD: 14.9 % (ref 18–48)
MAGNESIUM SERPL-MCNC: 2.4 MG/DL (ref 1.6–2.6)
MCH RBC QN AUTO: 28.1 PG (ref 27–31)
MCHC RBC AUTO-ENTMCNC: 34.1 G/DL (ref 32–36)
MCV RBC AUTO: 83 FL (ref 82–98)
MONOCYTES # BLD AUTO: 1.4 K/UL (ref 0.3–1)
MONOCYTES NFR BLD: 13.7 % (ref 4–15)
MV PEAK A VEL: 0.23 M/S
MV PEAK E VEL: 1.01 M/S
NEUTROPHILS # BLD AUTO: 6.7 K/UL (ref 1.8–7.7)
NEUTROPHILS NFR BLD: 66.4 % (ref 38–73)
NRBC BLD-RTO: 1 /100 WBC
PHOSPHATE SERPL-MCNC: 3.3 MG/DL (ref 2.7–4.5)
PLATELET # BLD AUTO: 240 K/UL (ref 150–350)
PMV BLD AUTO: 13 FL (ref 9.2–12.9)
POCT GLUCOSE: 115 MG/DL (ref 70–110)
POCT GLUCOSE: 135 MG/DL (ref 70–110)
POCT GLUCOSE: 183 MG/DL (ref 70–110)
POTASSIUM SERPL-SCNC: 4.5 MMOL/L (ref 3.5–5.1)
RA MAJOR: 6.82 CM
RA PRESSURE: 3 MMHG
RA WIDTH: 3.5 CM
RBC # BLD AUTO: 4.37 M/UL (ref 4.6–6.2)
RIGHT VENTRICULAR END-DIASTOLIC DIMENSION: 3.66 CM
SINUS: 3.22 CM
SODIUM SERPL-SCNC: 137 MMOL/L (ref 136–145)
STJ: 3.08 CM
TDI LATERAL: 0.12 M/S
TDI SEPTAL: 0.07 M/S
TDI: 0.1 M/S
TRICUSPID ANNULAR PLANE SYSTOLIC EXCURSION: 2.41 CM
TROPONIN I SERPL DL<=0.01 NG/ML-MCNC: <0.006 NG/ML (ref 0–0.03)
WBC # BLD AUTO: 10.07 K/UL (ref 3.9–12.7)

## 2019-10-04 PROCEDURE — 25000003 PHARM REV CODE 250: Performed by: PHYSICIAN ASSISTANT

## 2019-10-04 PROCEDURE — 99217 PR OBSERVATION CARE DISCHARGE: CPT | Mod: ,,, | Performed by: PHYSICIAN ASSISTANT

## 2019-10-04 PROCEDURE — 84100 ASSAY OF PHOSPHORUS: CPT

## 2019-10-04 PROCEDURE — 63600175 PHARM REV CODE 636 W HCPCS: Performed by: PHYSICIAN ASSISTANT

## 2019-10-04 PROCEDURE — 80048 BASIC METABOLIC PNL TOTAL CA: CPT

## 2019-10-04 PROCEDURE — 83735 ASSAY OF MAGNESIUM: CPT

## 2019-10-04 PROCEDURE — 99217 PR OBSERVATION CARE DISCHARGE: ICD-10-PCS | Mod: ,,, | Performed by: PHYSICIAN ASSISTANT

## 2019-10-04 PROCEDURE — G0378 HOSPITAL OBSERVATION PER HR: HCPCS

## 2019-10-04 PROCEDURE — 84484 ASSAY OF TROPONIN QUANT: CPT

## 2019-10-04 PROCEDURE — 83036 HEMOGLOBIN GLYCOSYLATED A1C: CPT

## 2019-10-04 PROCEDURE — 85025 COMPLETE CBC W/AUTO DIFF WBC: CPT

## 2019-10-04 RX ORDER — VALSARTAN 160 MG/1
320 TABLET ORAL DAILY
Status: DISCONTINUED | OUTPATIENT
Start: 2019-10-04 | End: 2019-10-04 | Stop reason: HOSPADM

## 2019-10-04 RX ORDER — FUROSEMIDE 10 MG/ML
60 INJECTION INTRAMUSCULAR; INTRAVENOUS ONCE
Status: COMPLETED | OUTPATIENT
Start: 2019-10-04 | End: 2019-10-04

## 2019-10-04 RX ORDER — AMLODIPINE BESYLATE 5 MG/1
5 TABLET ORAL DAILY
Status: DISCONTINUED | OUTPATIENT
Start: 2019-10-05 | End: 2019-10-04 | Stop reason: HOSPADM

## 2019-10-04 RX ORDER — AMLODIPINE BESYLATE 10 MG/1
10 TABLET ORAL DAILY
Status: DISCONTINUED | OUTPATIENT
Start: 2019-10-04 | End: 2019-10-04

## 2019-10-04 RX ADMIN — CARVEDILOL 6.25 MG: 6.25 TABLET, FILM COATED ORAL at 07:10

## 2019-10-04 RX ADMIN — INSULIN ASPART 2 UNITS: 100 INJECTION, SOLUTION INTRAVENOUS; SUBCUTANEOUS at 07:10

## 2019-10-04 RX ADMIN — ACETAMINOPHEN 650 MG: 325 TABLET ORAL at 05:10

## 2019-10-04 RX ADMIN — INSULIN ASPART 2 UNITS: 100 INJECTION, SOLUTION INTRAVENOUS; SUBCUTANEOUS at 11:10

## 2019-10-04 RX ADMIN — AMLODIPINE BESYLATE 10 MG: 5 TABLET ORAL at 07:10

## 2019-10-04 RX ADMIN — LEVOTHYROXINE SODIUM 200 MCG: 100 TABLET ORAL at 05:10

## 2019-10-04 RX ADMIN — ASPIRIN 81 MG CHEWABLE TABLET 81 MG: 81 TABLET CHEWABLE at 07:10

## 2019-10-04 RX ADMIN — APIXABAN 5 MG: 5 TABLET, FILM COATED ORAL at 07:10

## 2019-10-04 RX ADMIN — FUROSEMIDE 60 MG: 10 INJECTION, SOLUTION INTRAMUSCULAR; INTRAVENOUS at 02:10

## 2019-10-04 RX ADMIN — LACOSAMIDE 200 MG: 50 TABLET, FILM COATED ORAL at 07:10

## 2019-10-04 RX ADMIN — VALSARTAN 320 MG: 160 TABLET, FILM COATED ORAL at 12:10

## 2019-10-04 RX ADMIN — LEVETIRACETAM 1500 MG: 750 TABLET ORAL at 07:10

## 2019-10-04 RX ADMIN — FUROSEMIDE 40 MG: 10 INJECTION, SOLUTION INTRAVENOUS at 07:10

## 2019-10-04 RX ADMIN — ATORVASTATIN CALCIUM 40 MG: 20 TABLET, FILM COATED ORAL at 07:10

## 2019-10-04 NOTE — PLAN OF CARE
10/04/19 1109   Post-Acute Status   Post-Acute Authorization Other   Home Health/Hospice Status Awaiting Internal Medical Clearance   Discharge Delays None known at this time

## 2019-10-04 NOTE — HOSPITAL COURSE
Mr. Christy was admitted to observation for CHF exacerbation. Started on Lasix 40 mg IV BID with good UOP. BP elevated, reviewed patient's BP regimen and restarted patient's current home medications. Intermittent left axilla/ chest pain resolved with both NTG and tylenol. Unclear etiology, last episode of left axilla pain resolved with tylenol only. Troponin negative X 4. EKG without changes. Low concern for ACS, but will order outpatient exercise stress test for completeness. TTE with EF 60%, normal diastolic function, severe LAE. Patient with improvement in LE edema. Given Lasix 60 mg IV prior to discharge. No further episodes of axilla/chest pain. Patient to follow up with PCP and cardiologist in 1 week. Patient verbalized understanding, all questions answered.

## 2019-10-04 NOTE — DISCHARGE SUMMARY
[facility] Hospital Medicine  Discharge Summary      Patient Name: Alfa Christy III  MRN: [id number]  Admission Date: 10/3/2019  Hospital Length of Stay: 0 days  Discharge Date and Time: 10/4/2019  3:12 PM  Attending Physician: Bethel Mukherjee MD   Discharging Provider: Audrey Bowser PA-C  Primary Care Provider: Tyler Jasmine MD  [facility] Medicine Team: [facility] [place] [facility]P MED F Audrey Bowser PA-C    HPI:   64 year old male with a PMHx of HTN, HLD, HFpEF, Afib on Eliquis, DM2, CVA, hypothyroidism, and seizures presenting to the ER complaining of L axilla pain. Pain has been constant since last night. Patient reports taking tylenol x2 last night which improved pain and he was able to sleep. Patient awoke this morning with continued pain so decided to present to the ER. Patient denies radiation of pain, CP, SOB, N/V, and diaphoresis. Patient reports going to gym 5-6x/week; able to do cardio and weights without CP or SOB. At the time of my exam, patient reports pain has resolved. Patient endorses chronic BLE; he has no other complaints. Denies CP, SOB, abdominal pain, N/V/D, fever/chills, dysuria/frequency, headache, focal weakness, syncope and palpitations.      HDS on admission. Afebrile without leukocytosis. Labs show chronic anemia, chronic CKD, . EKG shows afib. Initial troponin negative, . CXR consistent with CHF.    * No surgery found *      Hospital Course:   Mr. Christy was admitted to observation for CHF exacerbation. Started on Lasix 40 mg IV BID with good UOP. BP elevated, reviewed patient's BP regimen and restarted patient's current home medications. Intermittent left axilla/ chest pain resolved with both NTG and tylenol. Unclear etiology, last episode of left axilla pain resolved with tylenol only. Troponin negative X 4. EKG without changes. Low concern for ACS, but will order outpatient exercise stress test for completeness. TTE with EF 60%, normal diastolic function,  severe LAE. Patient with improvement in LE edema. Given Lasix 60 mg IV prior to discharge. No further episodes of axilla/chest pain. Patient to follow up with PCP and cardiologist in 1 week. Patient verbalized understanding, all questions answered.      Consults:     * Acute on chronic diastolic heart failure  Suspect diet noncompliance at home  - HDS on admission  - Labs remarkable for chronic anemia, chronic CKD,   - Initial troponin negative,   - EKG shows afib (HR 67)  - CXR consistent with CHF  - 2+ pitting BLE, patient reports increased from baseline   - seen by cardiology last week, believed LE edema SE of amlodipine, decreased from 10mg to 5mg  - Ordered IV lasix 40 mg BID  - Monitor response to diuresis  - Repeat echo with EF 60%, normal diastolic function, severe LAE  - Low suspicion for ACS; will trend troponin q6 for set of 3 for completeness- negative   - Strict I/Os, daily weights  - Telemetry with afib  - Cardiology follow up as outpatient  - Outpatient stress test ordered    Chest pain  Patient reports left axilla/ left substernal chest tightness/ discomfort that started at rest. Denies injury/ trauma to area. Relieved with Tylenol, but persisted after dose wore off.   - Troponin negative X 3  - EKG unchanged from previous  - low suspicion for ACS, outpatient stress test ordered.   - Relieved with both Tylenol and NTG. Last episode in hospital relieved with tylenol. No further episodes since. Non tender on exam. No deformities seen/ palpated  - Possible musculoskeletal vs. Gas, continue supportive treatment  - follow up with PCP in 1 week    Chronic atrial fibrillation  Chronic anticoagulation   - Rate controlled  - Continue BB and Eliquis    Complex partial seizures evolving to generalized tonic-clonic seizures  - Continue home vimpat and keppra    Essential hypertension  - Elevated in ER in setting of missed AM meds  - Resume home regimen  - Continue to monitor    Type 2 diabetes  mellitus with stage 3 chronic kidney disease, with long-term current use of insulin  - Last A1c 5.8 in 5/2019; repeat 4.9  -   - Cardiac/DM diet  - Detemir 5 Uqhs, Aspart 2U TID WM, low dose SSI  - Monitor BGs and adjust insulin PRN    History of CVA (cerebrovascular accident)  - Continue ASA and statin    Postsurgical hypothyroidism  - Continue home synthroid    Hypercholesterolemia  - Continue atorvastatin      Final Active Diagnoses:    Diagnosis Date Noted POA    PRINCIPAL PROBLEM:  Acute on chronic diastolic heart failure [I50.33] 11/20/2016 Yes    Chest pain [R07.9] 10/03/2019 Yes    Chronic atrial fibrillation [I48.20] 09/25/2019 Yes    Complex partial seizures evolving to generalized tonic-clonic seizures [G40.209] 11/15/2017 Yes    Essential hypertension [I10] 08/30/2017 Yes    Chronic anticoagulation - eliquis [Z79.01] 11/10/2016 Not Applicable     Chronic    Type 2 diabetes mellitus with stage 3 chronic kidney disease, with long-term current use of insulin [E11.22, N18.3, Z79.4] 11/02/2016 Not Applicable    History of CVA (cerebrovascular accident) [Z86.73] 07/25/2016 Not Applicable    Postsurgical hypothyroidism [E89.0] 11/27/2013 Yes    Hypercholesterolemia [E78.00] 08/01/2012 Yes      Problems Resolved During this Admission:       Discharged Condition: good    Disposition: Home or Self Care    Follow Up:  Follow-up Information     Tyler Jasmine MD In 1 week.    Specialty:  Internal Medicine  Contact information:  [address]  [place] [address]  [phone]                 Patient Instructions:      Diet diabetic     Diet Cardiac     Notify your health care provider if you experience any of the following:  temperature >100.4     Notify your health care provider if you experience any of the following:  severe uncontrolled pain     Notify your health care provider if you experience any of the following:  difficulty breathing or increased cough     Notify your health care  provider if you experience any of the following:  persistent dizziness, light-headedness, or visual disturbances     Stress Echo Which stress agent will be used? Treadmill Exercise; Color Flow Doppler? No   Standing Status: Future Standing Exp. Date: 10/04/20   Order Comments: Please send results to patient's cardiologist, Dr. Josue Galan     Order Specific Question Answer Comments   Which stress agent will be used? Treadmill Exercise    Color Flow Doppler? No      Activity as tolerated       Significant Diagnostic Studies: Labs:   CMP   Recent Labs   Lab 10/03/19  1045 10/04/19  0527    137   K 4.6 4.5    108   CO2 23 21*   * 129*   BUN 23 22   CREATININE 2.0* 1.8*   CALCIUM 8.4* 8.6*   PROT 7.2  --    ALBUMIN 3.5  --    BILITOT 0.8  --    ALKPHOS 132  --    AST 29  --    ALT 21  --    ANIONGAP 8 8   ESTGFRAFRICA 39.0* 44.3*   EGFRNONAA 33.8* 38.4*   , CBC   Recent Labs   Lab 10/03/19  1045 10/04/19  0527   WBC 8.67 10.07   HGB 11.6* 12.3*   HCT 32.7* 36.1*    240   , Troponin   Recent Labs   Lab 10/04/19  0527   TROPONINI <0.006    and A1C:   Recent Labs   Lab 05/29/19  0853 10/04/19  0527   HGBA1C 5.8* 4.9       Pending Diagnostic Studies:     None         Medications:  Reconciled Home Medications:      Medication List      CHANGE how you take these medications    lacosamide 200 mg Tab tablet  Commonly known as:  VIMPAT  Take 1 tablet (200 mg total) by mouth every 12 (twelve) hours.  What changed:  Another medication with the same name was removed. Continue taking this medication, and follow the directions you see here.        CONTINUE taking these medications    acetaminophen 500 MG tablet  Commonly known as:  TYLENOL  Take 1 tablet (500 mg total) by mouth every 6 (six) hours as needed for Pain.     amLODIPine 5 MG tablet  Commonly known as:  NORVASC  Take 1 tablet (5 mg total) by mouth once daily.     aspirin 81 MG Chew  Take 1 tablet (81 mg total) by mouth once daily.     atorvastatin  40 MG tablet  Commonly known as:  LIPITOR  Take 1 tablet (40 mg total) by mouth once daily.     betamethasone dipropionate 0.05 % cream  Commonly known as:  DIPROLENE  Apply topically 2 (two) times daily.     * blood sugar diagnostic Strp  Commonly known as:  ONETOUCH VERIO  Check blood glucose readings 3 times a day.     * blood sugar diagnostic Strp  To check BG 3 times daily, to use with insurance preferred meter     blood-glucose meter kit  To check BG 3 times daily - please provide insurance preferred meter     carvedilol 6.25 MG tablet  Commonly known as:  COREG  Take 1 tablet (6.25 mg total) by mouth 2 (two) times daily.     ciclopirox 8 % Soln  Commonly known as:  PENLAC  Apply topically nightly.     clotrimazole-betamethasone 1-0.05% cream  Commonly known as:  LOTRISONE  Apply topically 2 (two) times daily. To area of rash for 2-4 weeks     ELIQUIS 5 mg Tab  Generic drug:  apixaban  TAKE 1 TABLET BY MOUTH TWICE A DAY     fluocinonide 0.05 % ointment  Commonly known as:  LIDEX  AAA bid     folic acid 400 MCG tablet  Commonly known as:  FOLVITE  Take 1,000 mcg by mouth once daily.     furosemide 40 MG tablet  Commonly known as:  LASIX  Take 1.5 tablets (60 mg total) by mouth 2 (two) times daily.     GLUCAGON EMERGENCY KIT (HUMAN) 1 mg Solr  Generic drug:  glucagon (human recombinant)  INJECT 1 MG INTO THE MUSCLE AS NEEDED     insulin aspart U-100 100 unit/mL (3 mL) Inpn pen  Commonly known as:  NovoLOG Flexpen U-100 Insulin  INJECT 7 UNITS WITH MEALS PLUS SLIDING SCALE WITH MAX OF 35 UNITS PER DAY     insulin detemir U-100 100 unit/mL (3 mL) Inpn pen  Commonly known as:  LEVEMIR FLEXTOUCH U-100 INSULN  Inject 13 units at night.     lancets Misc  To check BG 3 times daily, to use with insurance preferred meter     lancing device Misc  1 Device by Misc.(Non-Drug; Combo Route) route 3 (three) times daily.     levETIRAcetam 750 MG Tab  Commonly known as:  KEPPRA  Take 2 tablets (1,500 mg total) by mouth 2 (two)  "times daily.     levothyroxine 200 MCG tablet  Commonly known as:  SYNTHROID  TAKE 1 TABLET BY MOUTH BEFORE BREAKFAST     magnesium 30 mg Tab  Take 1 tablet by mouth twice a week. Mon ,weds,fridays     multivitamin capsule  Take 1 capsule by mouth every Mon, Wed, Fri.     pen needle, diabetic 31 gauge x 3/16" Ndle  Commonly known as:  BD ULTRA-FINE MINI PEN NEEDLE  To use with insulin pens 4  times daily     SAW PALMETTO ORAL  Take by mouth. mon , wed, fri     sildenafil 100 MG tablet  Commonly known as:  VIAGRA  Take 1/2 to 1 tablet daily as needed.  Take on an empty stomach or with a light meal.     tamsulosin 0.4 mg Cap  Commonly known as:  FLOMAX  TAKE 1 CAPSULE (0.4 MG TOTAL) BY MOUTH EVERY EVENING.     TRULICITY 0.75 mg/0.5 mL Pnij  Generic drug:  dulaglutide  INJECT 1 PEN UNDER THE SKIN EVERY 7 DAYS     valsartan 320 MG tablet  Commonly known as:  DIOVAN  Take 1 tablet (320 mg total) by mouth once daily.     vitamin D 1000 units Tab  Commonly known as:  VITAMIN D3  Take 1,000 Units by mouth every Mon, Wed, Fri.         * This list has 2 medication(s) that are the same as other medications prescribed for you. Read the directions carefully, and ask your doctor or other care provider to review them with you.            STOP taking these medications    finasteride 5 mg tablet  Commonly known as:  PROSCAR            Indwelling Lines/Drains at time of discharge:   Lines/Drains/Airways     None                 Time spent on the discharge of patient: 36 minutes  Patient was seen and examined on the date of discharge and determined to be suitable for discharge.         Audrey Bowser PA-C  Department of Hospital Medicine  [facility]  "

## 2019-10-04 NOTE — ASSESSMENT & PLAN NOTE
- Last A1c 5.8 in 5/2019; repeat 4.9  -   - Cardiac/DM diet  - Detemir 5 Uqhs, Aspart 2U TID WM, low dose SSI  - Monitor BGs and adjust insulin PRN

## 2019-10-04 NOTE — PLAN OF CARE
10/04/19 1102   Discharge Assessment   Assessment Type Discharge Planning Assessment   Confirmed/corrected address and phone number on facesheet? Yes   Assessment information obtained from? Patient   Expected Length of Stay (days) 0   Communicated expected length of stay with patient/caregiver yes   Prior to hospitilization cognitive status: Alert/Oriented   Prior to hospitalization functional status: Assistive Equipment;Independent   Current cognitive status: Alert/Oriented   Current Functional Status: Independent;Assistive Equipment   Lives With child(ron), adult;spouse   Able to Return to Prior Arrangements yes   Is patient able to care for self after discharge? Yes   Patient's perception of discharge disposition home or selfcare   Patient currently being followed by outpatient case management? Yes   If yes, name of outpatient case management following: insurance company assigned oupatient case management   Patient currently receives any other outside agency services? No   Equipment Currently Used at Home walker, rolling;cane, quad   Do you have any problems affording any of your prescribed medications? No   Is the patient taking medications as prescribed? yes   Does the patient have transportation home? Yes   Transportation Anticipated family or friend will provide   Does the patient receive services at the [facility]? No   Discharge Plan A Home   Discharge Plan B Home   DME Needed Upon Discharge  none   Patient/Family in Agreement with Plan yes     Sw met with patient to complete the discharge assessment.  Patient reported lives with his wife and daughter.  He reported that he has two steps to get into his home.  Patient reported using a cane and walker at home.  He stated he was independent. He was going to the gym 5-6 times a week. He reported having [employer] Case Management. Patient uses [facility] on [address].  He reported that his stepson will transport him home.   Patient reported no needs as it  pertains to discharge.     YOLANDA Ng,LCSW

## 2019-10-04 NOTE — ASSESSMENT & PLAN NOTE
Suspect diet noncompliance at home  - HDS on admission  - Labs remarkable for chronic anemia, chronic CKD,   - Initial troponin negative,   - EKG shows afib (HR 67)  - CXR consistent with CHF  - 2+ pitting BLE, patient reports increased from baseline   - seen by cardiology last week, believed LE edema SE of amlodipine, decreased from 10mg to 5mg  - Ordered IV lasix 40 mg BID  - Monitor response to diuresis  - Repeat echo with EF 60%, normal diastolic function, severe LAE  - Low suspicion for ACS; will trend troponin q6 for set of 3 for completeness- negative   - Strict I/Os, daily weights  - Telemetry with afib  - Cardiology follow up as outpatient  - Outpatient stress test ordered

## 2019-10-04 NOTE — NURSING
Patient OK for d/c, per TONYA Ventura. Patient given discharge instructions. IV removed and bandage applied. Telemetry monitor removed and returned to the nursing station. Patient awaiting transportation out of facility.    10/4/2019 1:59 PM

## 2019-10-04 NOTE — PLAN OF CARE
POC reviewed with pt, verbalized an understanding; NADN; tylenol given for chest/left side pain with relief; pt rested quietly through the night; call bell in reach, bed in lowest position

## 2019-10-04 NOTE — ASSESSMENT & PLAN NOTE
Patient reports left axilla/ left substernal chest tightness/ discomfort that started at rest. Denies injury/ trauma to area. Relieved with Tylenol, but persisted after dose wore off.   - Troponin negative X 3  - EKG unchanged from previous  - low suspicion for ACS, outpatient stress test ordered.   - Relieved with both Tylenol and NTG. Last episode in hospital relieved with tylenol. No further episodes since. Non tender on exam. No deformities seen/ palpated  - Possible musculoskeletal vs. Gas, continue supportive treatment  - follow up with PCP in 1 week

## 2019-10-11 ENCOUNTER — TELEPHONE (OUTPATIENT)
Dept: ENDOCRINOLOGY | Facility: CLINIC | Age: 64
End: 2019-10-11

## 2019-10-11 ENCOUNTER — LAB VISIT (OUTPATIENT)
Dept: LAB | Facility: HOSPITAL | Age: 64
End: 2019-10-11
Attending: NURSE PRACTITIONER
Payer: MEDICARE

## 2019-10-11 DIAGNOSIS — E11.9 TYPE 2 DIABETES MELLITUS WITHOUT OPHTHALMIC MANIFESTATIONS: ICD-10-CM

## 2019-10-11 DIAGNOSIS — E11.9 TYPE 2 DIABETES MELLITUS WITHOUT COMPLICATION: ICD-10-CM

## 2019-10-11 DIAGNOSIS — E89.0 POSTSURGICAL HYPOTHYROIDISM: ICD-10-CM

## 2019-10-11 DIAGNOSIS — E11.9 TYPE 2 DIABETES MELLITUS WITHOUT OPHTHALMIC MANIFESTATIONS: Primary | ICD-10-CM

## 2019-10-11 LAB
ALBUMIN SERPL BCP-MCNC: 3.5 G/DL (ref 3.5–5.2)
ALP SERPL-CCNC: 119 U/L (ref 55–135)
ALT SERPL W/O P-5'-P-CCNC: 22 U/L (ref 10–44)
ANION GAP SERPL CALC-SCNC: 9 MMOL/L (ref 8–16)
AST SERPL-CCNC: 28 U/L (ref 10–40)
BASOPHILS # BLD AUTO: 0.06 K/UL (ref 0–0.2)
BASOPHILS NFR BLD: 0.8 % (ref 0–1.9)
BILIRUB SERPL-MCNC: 0.8 MG/DL (ref 0.1–1)
BUN SERPL-MCNC: 25 MG/DL (ref 8–23)
CALCIUM SERPL-MCNC: 8.7 MG/DL (ref 8.7–10.5)
CHLORIDE SERPL-SCNC: 109 MMOL/L (ref 95–110)
CHOLEST SERPL-MCNC: 114 MG/DL (ref 120–199)
CHOLEST/HDLC SERPL: 3.2 {RATIO} (ref 2–5)
CO2 SERPL-SCNC: 23 MMOL/L (ref 23–29)
CREAT SERPL-MCNC: 2.4 MG/DL (ref 0.5–1.4)
DIFFERENTIAL METHOD: ABNORMAL
EOSINOPHIL # BLD AUTO: 0.3 K/UL (ref 0–0.5)
EOSINOPHIL NFR BLD: 4.1 % (ref 0–8)
ERYTHROCYTE [DISTWIDTH] IN BLOOD BY AUTOMATED COUNT: 16.5 % (ref 11.5–14.5)
EST. GFR  (AFRICAN AMERICAN): 31.3 ML/MIN/1.73 M^2
EST. GFR  (NON AFRICAN AMERICAN): 27.1 ML/MIN/1.73 M^2
ESTIMATED AVG GLUCOSE: 100 MG/DL (ref 68–131)
GLUCOSE SERPL-MCNC: 111 MG/DL (ref 70–110)
HBA1C MFR BLD HPLC: 5.1 % (ref 4–5.6)
HCT VFR BLD AUTO: 33.8 % (ref 40–54)
HDLC SERPL-MCNC: 36 MG/DL (ref 40–75)
HDLC SERPL: 31.6 % (ref 20–50)
HGB BLD-MCNC: 11.5 G/DL (ref 14–18)
IMM GRANULOCYTES # BLD AUTO: 0.01 K/UL (ref 0–0.04)
IMM GRANULOCYTES NFR BLD AUTO: 0.1 % (ref 0–0.5)
LDLC SERPL CALC-MCNC: 62 MG/DL (ref 63–159)
LYMPHOCYTES # BLD AUTO: 1.4 K/UL (ref 1–4.8)
LYMPHOCYTES NFR BLD: 19.5 % (ref 18–48)
MCH RBC QN AUTO: 28.2 PG (ref 27–31)
MCHC RBC AUTO-ENTMCNC: 34 G/DL (ref 32–36)
MCV RBC AUTO: 83 FL (ref 82–98)
MONOCYTES # BLD AUTO: 1.3 K/UL (ref 0.3–1)
MONOCYTES NFR BLD: 18.6 % (ref 4–15)
NEUTROPHILS # BLD AUTO: 4 K/UL (ref 1.8–7.7)
NEUTROPHILS NFR BLD: 56.9 % (ref 38–73)
NONHDLC SERPL-MCNC: 78 MG/DL
NRBC BLD-RTO: 2 /100 WBC
PLATELET # BLD AUTO: 249 K/UL (ref 150–350)
PMV BLD AUTO: 13.2 FL (ref 9.2–12.9)
POTASSIUM SERPL-SCNC: 5.1 MMOL/L (ref 3.5–5.1)
PROT SERPL-MCNC: 7.2 G/DL (ref 6–8.4)
RBC # BLD AUTO: 4.08 M/UL (ref 4.6–6.2)
SODIUM SERPL-SCNC: 141 MMOL/L (ref 136–145)
TRIGL SERPL-MCNC: 80 MG/DL (ref 30–150)
TSH SERPL DL<=0.005 MIU/L-ACNC: 2.7 UIU/ML (ref 0.4–4)
WBC # BLD AUTO: 7.06 K/UL (ref 3.9–12.7)

## 2019-10-11 PROCEDURE — 80061 LIPID PANEL: CPT

## 2019-10-11 PROCEDURE — 36415 COLL VENOUS BLD VENIPUNCTURE: CPT | Mod: PO

## 2019-10-11 PROCEDURE — 86800 THYROGLOBULIN ANTIBODY: CPT

## 2019-10-11 PROCEDURE — 84443 ASSAY THYROID STIM HORMONE: CPT

## 2019-10-11 PROCEDURE — 82985 ASSAY OF GLYCATED PROTEIN: CPT

## 2019-10-11 PROCEDURE — 80053 COMPREHEN METABOLIC PANEL: CPT

## 2019-10-11 PROCEDURE — 85025 COMPLETE CBC W/AUTO DIFF WBC: CPT

## 2019-10-11 PROCEDURE — 83036 HEMOGLOBIN GLYCOSYLATED A1C: CPT | Mod: 59

## 2019-10-14 NOTE — PROGRESS NOTES
Subjective:      Patient ID: Alfa Christy III is a 64 y.o. male.    Chief Complaint:  Diabetes    History of Present Illness  Alfa Christy III presents today for follow up of type 2 DM.     Diagnosed with Type 2 DM in ~1990s. He was originally on orals, but started insulin in 2010. He was seen in ER for hyperglycemia in ~2010. (+) FH of DM in multiple family members. Has a history of CVA with diminished peripheral vision and also thyroid cancer, which was treated with surgery and radioactive iodine. Has sickle cell and last crisis in 2016.      Current DM Regimen:   Novolog 5 units tid ac meals   Levemir 13 units at bedtime   Trulicity 0.75 mg weekly ( taking on Thursdays )      Taking prandial injections with meals? Yes   Uses pens or vials? Pens      Currently denies polyuria, polydipsia, unexplained weight loss or blurred vision      Reports testing BG once daily every morning:    Hypoglycemia: Denies.   Symptoms: confusion   Treatment: regular soda   Knows how to correct with 15 grams of carbs- juice, coke, or a peppermint.     24 hour dietary recall:   Breakfast: cereal   Lunch: salmon with crackers   Dinner: meatballs and spaghetti, green beans, okra   Snacks: chocolate mints, banana, plum, peaches   Beverages: water      Known complications: CKD   Follows with Nephrology: with last office visit in 01/2018 with Dr. Gold      Diabetes education: 03/2018   Exercise: no formal routine but patient tries to stay active     Diabetes Management Status  Statin: Taking  ACE/ARB: Taking  Screening or Prevention Patient's value Goal Complete/Controlled?   HgA1C Testing and Control   Lab Results   Component Value Date    HGBA1C 5.1 10/11/2019      Ref. Range 10/11/2019 09:12   FRUCTOSAMINE Latest Ref Range: SeeBelow umol /L 342    Annually/Less than 8% Yes   Lipid profile : 10/11/2019 Annually Yes   LDL control Lab Results   Component Value Date    LDLCALC 62.0 (L) 10/11/2019    Annually/Less than 100 mg/dl  Yes    Nephropathy screening Lab Results   Component Value Date    LABMICR 823.0 10/11/2019     Lab Results   Component Value Date    PROTEINUA 2+ (A) 08/01/2019    Annually Yes   Blood pressure BP Readings from Last 1 Encounters:   10/16/19 (!) 148/80    Less than 140/90 No   Dilated retinal exam : 10/16/2018 Annually Yes   Foot exam   : 08/30/2018 Annually Yes     Also with h/o 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 There is some remaining tissue on the left.   A biopsy of this was done 11/03/2011, demonstrating an insufficient material to make a diagnosis      Current regimen: Levothyroxine 200 mcg   Taking appropriately. Every morning with water on an empty stomach 30-45 minutes before eating or taking other medications      Denies any hypo/hyperthyroidism symptoms.     Ref. Range 10/11/2019 09:12   TSH Latest Ref Range: 0.400 - 4.000 uIU/mL 2.703   Thyroglobulin Interpretation Unknown SEE BELOW   Thyroglobulin Antibody Screen Latest Ref Range: <4.0 IU/mL <1.8   Thyroglobulin, Tumor Marker Latest Units: ng/mL 0.2 (H)     An iodine scan in 2011 showed some remaining uptake.      Last Neck ultrasound study from 02/2019:  FINDINGS:  THYROID GLAND has been surgically removed.    Sonographic examination of neck compartments II through V was carried out.    0.97 x 0.82 x 0.63 cm heterogeneous lymph node is seen in left level IV.  This lymph node does not have a distinct fatty hilum and may contain microcalcificatoins.  Short/long axis ratio >0.5.  There is no appreciable vascularity.      Impression     Status post thyroidectomy.    Left neck level IV indeterminate lymph node is again seen and appears stable when compared to 2014.    As thyroglobulin remains undetectable and this lymph node has not changed significantly since 2014 would recommend repeat ultrasound in one  year for monitoring.      Review of Systems   Constitutional: Negative for unexpected weight change.   Eyes: Negative for visual disturbance.   Respiratory: Negative for shortness of breath.    Cardiovascular: Negative for chest pain.   Gastrointestinal: Negative for abdominal pain.   Endocrine: Negative for cold intolerance, heat intolerance, polydipsia, polyphagia and polyuria.   Musculoskeletal: Negative for arthralgias.   Skin: Negative for wound.   Neurological: Negative for headaches.   Hematological: Does not bruise/bleed easily.   Psychiatric/Behavioral: Negative for sleep disturbance.     Objective:   Physical Exam   Neck:   No palpable thyroid tissue  Scar well healed   Cardiovascular: Normal rate.   No edema present   Pulmonary/Chest: Effort normal.   Abdominal: Soft.       Vitals reviewed.  Appropriate footwear, Foot exam deferred per patient.   No ulcers or wounds.   injection sites are ok. No lipo hypertropthy or atrophy    Body mass index is 31.78 kg/m².    Lab Review:   Lab Results   Component Value Date    HGBA1C 5.1 10/11/2019     Lab Results   Component Value Date    CHOL 114 (L) 10/11/2019    HDL 36 (L) 10/11/2019    LDLCALC 62.0 (L) 10/11/2019    TRIG 80 10/11/2019    CHOLHDL 31.6 10/11/2019     Lab Results   Component Value Date     10/11/2019    K 5.1 10/11/2019     10/11/2019    CO2 23 10/11/2019     (H) 10/11/2019    BUN 25 (H) 10/11/2019    CREATININE 2.4 (H) 10/11/2019    CALCIUM 8.7 10/11/2019    PROT 7.2 10/11/2019    ALBUMIN 3.5 10/11/2019    BILITOT 0.8 10/11/2019    ALKPHOS 119 10/11/2019    AST 28 10/11/2019    ALT 22 10/11/2019    ANIONGAP 9 10/11/2019    ESTGFRAFRICA 31.3 (A) 10/11/2019    EGFRNONAA 27.1 (A) 10/11/2019    TSH 2.703 10/11/2019     Assessment and Plan     1. Type 2 diabetes mellitus with stage 3 chronic kidney disease, with long-term current use of insulin  Fructosamine    Hemoglobin A1c    Comprehensive metabolic panel   2. Type 2 diabetes  mellitus without ophthalmic manifestations     3. History of thyroid cancer  TSH    Thyroglobulin    US Soft Tissue Head Neck Thyroid   4. Postsurgical hypothyroidism  TSH    Thyroglobulin   5. Chronic atrial fibrillation     6. Essential hypertension     7. Hypercholesterolemia     8. Postoperative hypothyroidism  levothyroxine (SYNTHROID) 200 MCG tablet     T2DM with stage 3 CKD with long term current use of insulin   -- discussed the goal of therapy is to obtain the best control we can without hypoglycemia   -- reviewed BG and A1c goals   -- continue checking Fructosamine with next set of labs as anemia can skew A1c results   -- reviewed signs and symptoms of hypoglycemia with the appropriate treatment protocol. Instructed on precautions before driving   -- instructed to smbg 4 x daily  -- advised to bring meter or logs to every office visit for review   -- notify office if BG <80 or >180  -- continue dietary and lifestyle modifications   -- recommend periodic follow ups for routine eye, foot and dental care   -- Continue Levemir 13u HS & Continue novolog & Trulicity doses      History of thyroid cancer   -- treated with surgery and Radioactive iodine   -- most recent TG is more detectable at 0.2 than previously. Could be to TSH increase.  -- pt is considered low risk for recurrence   -- recommend TSH goal <2.5   -- yearly TG levels, but since most recent was more detectable recheck in 4 months.   -- he has opted to receive an ultrasound in 2/2020 to assess the lymph node, if TG does not decrease with increase in lt4 will plan to do a FNA of the lymph node.      Postsurgical hypothyroidism   -- see #2   -- Increase lt4 200 mcg daily except Monday 1.5 tablets.   -- reinforced to take LT4 as prescribed. On an empty stomach with water ideally an hour before eating or taking other medications   -- avoid exogenous hyperthyroidism as this can accelerate bone loss and increase risk of CV complications      Afib   --  followed and managed by Cardiology      HTN   -- BP at goal   -- followed and managed by Nephrology (to make an appt today)      HLD   -- on statin per ADA recommendations      Follow up in about 4 months (around 2/16/2020).  Labs prior to next appointment with me     Case discussed with Dr. Starr  Recommendations were discussed with the patient in detail  The patient verbalized understanding and agrees with the plan outlined as above.

## 2019-10-15 ENCOUNTER — PATIENT OUTREACH (OUTPATIENT)
Dept: ADMINISTRATIVE | Facility: OTHER | Age: 64
End: 2019-10-15

## 2019-10-15 LAB — FRUCTOSAMINE SERPL-SCNC: 342 UMOL /L

## 2019-10-16 ENCOUNTER — OFFICE VISIT (OUTPATIENT)
Dept: ENDOCRINOLOGY | Facility: CLINIC | Age: 64
End: 2019-10-16
Payer: MEDICARE

## 2019-10-16 VITALS
HEART RATE: 84 BPM | BODY MASS INDEX: 31.7 KG/M2 | WEIGHT: 221.44 LBS | SYSTOLIC BLOOD PRESSURE: 148 MMHG | DIASTOLIC BLOOD PRESSURE: 80 MMHG | HEIGHT: 70 IN

## 2019-10-16 DIAGNOSIS — Z85.850 HISTORY OF THYROID CANCER: ICD-10-CM

## 2019-10-16 DIAGNOSIS — E89.0 POSTOPERATIVE HYPOTHYROIDISM: ICD-10-CM

## 2019-10-16 DIAGNOSIS — E78.00 HYPERCHOLESTEROLEMIA: ICD-10-CM

## 2019-10-16 DIAGNOSIS — E89.0 POSTSURGICAL HYPOTHYROIDISM: ICD-10-CM

## 2019-10-16 DIAGNOSIS — I48.20 CHRONIC ATRIAL FIBRILLATION: ICD-10-CM

## 2019-10-16 DIAGNOSIS — N18.30 TYPE 2 DIABETES MELLITUS WITH STAGE 3 CHRONIC KIDNEY DISEASE, WITH LONG-TERM CURRENT USE OF INSULIN: Primary | ICD-10-CM

## 2019-10-16 DIAGNOSIS — Z79.4 TYPE 2 DIABETES MELLITUS WITH STAGE 3 CHRONIC KIDNEY DISEASE, WITH LONG-TERM CURRENT USE OF INSULIN: Primary | ICD-10-CM

## 2019-10-16 DIAGNOSIS — E11.22 TYPE 2 DIABETES MELLITUS WITH STAGE 3 CHRONIC KIDNEY DISEASE, WITH LONG-TERM CURRENT USE OF INSULIN: Primary | ICD-10-CM

## 2019-10-16 DIAGNOSIS — E11.9 TYPE 2 DIABETES MELLITUS WITHOUT OPHTHALMIC MANIFESTATIONS: ICD-10-CM

## 2019-10-16 DIAGNOSIS — I10 ESSENTIAL HYPERTENSION: ICD-10-CM

## 2019-10-16 PROCEDURE — 3079F PR MOST RECENT DIASTOLIC BLOOD PRESSURE 80-89 MM HG: ICD-10-PCS | Mod: CPTII,S$GLB,, | Performed by: NURSE PRACTITIONER

## 2019-10-16 PROCEDURE — 1101F PT FALLS ASSESS-DOCD LE1/YR: CPT | Mod: CPTII,S$GLB,, | Performed by: NURSE PRACTITIONER

## 2019-10-16 PROCEDURE — 99214 PR OFFICE/OUTPT VISIT, EST, LEVL IV, 30-39 MIN: ICD-10-PCS | Mod: S$GLB,,, | Performed by: NURSE PRACTITIONER

## 2019-10-16 PROCEDURE — 3044F HG A1C LEVEL LT 7.0%: CPT | Mod: CPTII,S$GLB,, | Performed by: NURSE PRACTITIONER

## 2019-10-16 PROCEDURE — 99499 UNLISTED E&M SERVICE: CPT | Mod: S$GLB,,, | Performed by: NURSE PRACTITIONER

## 2019-10-16 PROCEDURE — 3077F SYST BP >= 140 MM HG: CPT | Mod: CPTII,S$GLB,, | Performed by: NURSE PRACTITIONER

## 2019-10-16 PROCEDURE — 99499 RISK ADDL DX/OHS AUDIT: ICD-10-PCS | Mod: S$GLB,,, | Performed by: NURSE PRACTITIONER

## 2019-10-16 PROCEDURE — 3044F PR MOST RECENT HEMOGLOBIN A1C LEVEL <7.0%: ICD-10-PCS | Mod: CPTII,S$GLB,, | Performed by: NURSE PRACTITIONER

## 2019-10-16 PROCEDURE — 3077F PR MOST RECENT SYSTOLIC BLOOD PRESSURE >= 140 MM HG: ICD-10-PCS | Mod: CPTII,S$GLB,, | Performed by: NURSE PRACTITIONER

## 2019-10-16 PROCEDURE — 99214 OFFICE O/P EST MOD 30 MIN: CPT | Mod: S$GLB,,, | Performed by: NURSE PRACTITIONER

## 2019-10-16 PROCEDURE — 3079F DIAST BP 80-89 MM HG: CPT | Mod: CPTII,S$GLB,, | Performed by: NURSE PRACTITIONER

## 2019-10-16 PROCEDURE — 99999 PR PBB SHADOW E&M-EST. PATIENT-LVL III: CPT | Mod: PBBFAC,,, | Performed by: NURSE PRACTITIONER

## 2019-10-16 PROCEDURE — 99999 PR PBB SHADOW E&M-EST. PATIENT-LVL III: ICD-10-PCS | Mod: PBBFAC,,, | Performed by: NURSE PRACTITIONER

## 2019-10-16 PROCEDURE — 1101F PR PT FALLS ASSESS DOC 0-1 FALLS W/OUT INJ PAST YR: ICD-10-PCS | Mod: CPTII,S$GLB,, | Performed by: NURSE PRACTITIONER

## 2019-10-16 RX ORDER — LEVOTHYROXINE SODIUM 200 UG/1
200 TABLET ORAL
Qty: 96 TABLET | Refills: 4 | Status: SHIPPED | OUTPATIENT
Start: 2019-10-16 | End: 2019-12-20

## 2019-10-17 ENCOUNTER — IMMUNIZATION (OUTPATIENT)
Dept: PHARMACY | Facility: CLINIC | Age: 64
End: 2019-10-17
Payer: MEDICARE

## 2019-10-21 ENCOUNTER — PATIENT OUTREACH (OUTPATIENT)
Dept: ADMINISTRATIVE | Facility: OTHER | Age: 64
End: 2019-10-21

## 2019-10-21 DIAGNOSIS — Z79.4 TYPE 2 DIABETES MELLITUS WITH STAGE 3 CHRONIC KIDNEY DISEASE, WITH LONG-TERM CURRENT USE OF INSULIN: Primary | ICD-10-CM

## 2019-10-21 DIAGNOSIS — E11.22 TYPE 2 DIABETES MELLITUS WITH STAGE 3 CHRONIC KIDNEY DISEASE, WITH LONG-TERM CURRENT USE OF INSULIN: Primary | ICD-10-CM

## 2019-10-21 DIAGNOSIS — N18.30 TYPE 2 DIABETES MELLITUS WITH STAGE 3 CHRONIC KIDNEY DISEASE, WITH LONG-TERM CURRENT USE OF INSULIN: Primary | ICD-10-CM

## 2019-10-23 ENCOUNTER — TELEPHONE (OUTPATIENT)
Dept: ENDOCRINOLOGY | Facility: CLINIC | Age: 64
End: 2019-10-23

## 2019-10-23 ENCOUNTER — OFFICE VISIT (OUTPATIENT)
Dept: OPHTHALMOLOGY | Facility: CLINIC | Age: 64
End: 2019-10-23
Payer: MEDICARE

## 2019-10-23 DIAGNOSIS — H25.13 NUCLEAR SCLEROSIS OF BOTH EYES: Primary | ICD-10-CM

## 2019-10-23 DIAGNOSIS — D57.1 SICKLE CELL RETINOPATHY WITHOUT CRISIS: ICD-10-CM

## 2019-10-23 DIAGNOSIS — H53.461 RIGHT HOMONYMOUS HEMIANOPSIA: ICD-10-CM

## 2019-10-23 DIAGNOSIS — E11.9 TYPE 2 DIABETES MELLITUS WITHOUT OPHTHALMIC MANIFESTATIONS: ICD-10-CM

## 2019-10-23 DIAGNOSIS — H52.7 REFRACTIVE ERROR: ICD-10-CM

## 2019-10-23 DIAGNOSIS — H36.89 SICKLE CELL RETINOPATHY WITHOUT CRISIS: ICD-10-CM

## 2019-10-23 DIAGNOSIS — H35.371 EPIRETINAL MEMBRANE (ERM) OF RIGHT EYE: ICD-10-CM

## 2019-10-23 PROCEDURE — 92014 COMPRE OPH EXAM EST PT 1/>: CPT | Mod: S$GLB,,, | Performed by: OPHTHALMOLOGY

## 2019-10-23 PROCEDURE — 99999 PR PBB SHADOW E&M-EST. PATIENT-LVL III: CPT | Mod: PBBFAC,,, | Performed by: OPHTHALMOLOGY

## 2019-10-23 PROCEDURE — 99499 UNLISTED E&M SERVICE: CPT | Mod: S$GLB,,, | Performed by: OPHTHALMOLOGY

## 2019-10-23 PROCEDURE — 99499 RISK ADDL DX/OHS AUDIT: ICD-10-PCS | Mod: S$GLB,,, | Performed by: OPHTHALMOLOGY

## 2019-10-23 PROCEDURE — 92014 PR EYE EXAM, EST PATIENT,COMPREHESV: ICD-10-PCS | Mod: S$GLB,,, | Performed by: OPHTHALMOLOGY

## 2019-10-23 PROCEDURE — 99999 PR PBB SHADOW E&M-EST. PATIENT-LVL III: ICD-10-PCS | Mod: PBBFAC,,, | Performed by: OPHTHALMOLOGY

## 2019-10-23 NOTE — TELEPHONE ENCOUNTER
Received call back  form pt wife and gave instructions for Levothyroxine per Kasey Thomson NP last note.   Mail out after visit summary to pt home address.

## 2019-10-23 NOTE — PROGRESS NOTES
Subjective:       Patient ID: Alfa Christy III is a 64 y.o. male.    Chief Complaint: Concerns About Ocular Health (1 yr f/u)    HPI     Concerns About Ocular Health      Additional comments: 1 yr f/u              Comments     Pt here for 1 yr f/u.  Pt c/o loss peripheral vision OD due to stroke and denies any other   complaints.    S/p retina procedure - date unknown  Right homonymous hemianopsia  Cataracts  Refractive error  Vitreous hemorrhage of right eye  Glaucoma suspect OU  Sickle cell retinopathy wo crisis  HTN retinopathy OU  HTN  DM    Hemoglobin A1C       Date                     Value               Ref Range             Status                10/11/2019               5.1                 4.0 - 5.6 %           Final                  10/04/2019               4.9                 4.0 - 5.6 %           Final                  05/29/2019               5.8 (H)             4.0 - 5.6 %           Final                      Last edited by Soto Draper on 10/23/2019  1:24 PM. (History)             Assessment:       1. Nuclear sclerosis of both eyes    2. Epiretinal membrane (ERM) of right eye    3. 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 Right homonymous hemianopsia    5. Type 2 diabetes mellitus without ophthalmic manifestations    6. Refractive error        Plan:       Cataracts- Not visually significant.  ERM OD-Mild, NVS.  SC retinopathy OU-Stable.     R homonymous hemianopsia from CVA-Stable.  DM-No NPDR OU.  RE-Pt wants MRx.        Control DM.  Give MRx.  RTC 1 yr.

## 2019-10-23 NOTE — TELEPHONE ENCOUNTER
----- Message from Soniya Aquino MA sent at 10/22/2019  4:03 PM CDT -----  Contact: patient wife      ----- Message -----  From: Marivel Rivera  Sent: 10/22/2019   3:46 PM CDT  To: Berto Parks Staff    Please call above Patient wife at [phone] need to speak with the nurse need discharge instructions with medication increase waiting on a call back thanks.

## 2019-10-24 DIAGNOSIS — I50.32 CHRONIC DIASTOLIC HEART FAILURE: ICD-10-CM

## 2019-10-25 RX ORDER — FUROSEMIDE 40 MG/1
60 TABLET ORAL 2 TIMES DAILY
Qty: 180 TABLET | Refills: 3 | Status: SHIPPED | OUTPATIENT
Start: 2019-10-25 | End: 2020-01-31

## 2019-10-31 RX ORDER — LEVOTHYROXINE SODIUM 50 UG/1
50 TABLET ORAL
COMMUNITY
End: 2019-10-31 | Stop reason: SDUPTHER

## 2019-10-31 RX ORDER — LEVOTHYROXINE SODIUM 50 UG/1
50 TABLET ORAL
Qty: 30 TABLET | Refills: 1 | Status: SHIPPED | OUTPATIENT
Start: 2019-10-31 | End: 2020-01-01 | Stop reason: SDUPTHER

## 2019-11-01 RX ORDER — DULAGLUTIDE 0.75 MG/.5ML
INJECTION, SOLUTION SUBCUTANEOUS
Qty: 2 SYRINGE | Refills: 4 | Status: SHIPPED | OUTPATIENT
Start: 2019-11-01 | End: 2020-03-25

## 2019-11-06 ENCOUNTER — TELEPHONE (OUTPATIENT)
Dept: NEUROLOGY | Facility: CLINIC | Age: 64
End: 2019-11-06

## 2019-11-20 ENCOUNTER — OFFICE VISIT (OUTPATIENT)
Dept: INTERNAL MEDICINE | Facility: CLINIC | Age: 64
End: 2019-11-20
Payer: MEDICARE

## 2019-11-20 VITALS
OXYGEN SATURATION: 97 % | HEIGHT: 70 IN | TEMPERATURE: 98 F | BODY MASS INDEX: 32.51 KG/M2 | HEART RATE: 64 BPM | WEIGHT: 227.06 LBS | DIASTOLIC BLOOD PRESSURE: 74 MMHG | SYSTOLIC BLOOD PRESSURE: 134 MMHG

## 2019-11-20 DIAGNOSIS — J06.9 UPPER RESPIRATORY TRACT INFECTION, UNSPECIFIED TYPE: Primary | ICD-10-CM

## 2019-11-20 PROCEDURE — 1159F PR MEDICATION LIST DOCUMENTED IN MEDICAL RECORD: ICD-10-PCS | Mod: S$GLB,,, | Performed by: PHYSICIAN ASSISTANT

## 2019-11-20 PROCEDURE — 3078F PR MOST RECENT DIASTOLIC BLOOD PRESSURE < 80 MM HG: ICD-10-PCS | Mod: CPTII,S$GLB,, | Performed by: PHYSICIAN ASSISTANT

## 2019-11-20 PROCEDURE — 1101F PT FALLS ASSESS-DOCD LE1/YR: CPT | Mod: CPTII,S$GLB,, | Performed by: PHYSICIAN ASSISTANT

## 2019-11-20 PROCEDURE — 1159F MED LIST DOCD IN RCRD: CPT | Mod: S$GLB,,, | Performed by: PHYSICIAN ASSISTANT

## 2019-11-20 PROCEDURE — 3075F SYST BP GE 130 - 139MM HG: CPT | Mod: CPTII,S$GLB,, | Performed by: PHYSICIAN ASSISTANT

## 2019-11-20 PROCEDURE — 99999 PR PBB SHADOW E&M-EST. PATIENT-LVL V: CPT | Mod: PBBFAC,,, | Performed by: PHYSICIAN ASSISTANT

## 2019-11-20 PROCEDURE — 99999 PR PBB SHADOW E&M-EST. PATIENT-LVL V: ICD-10-PCS | Mod: PBBFAC,,, | Performed by: PHYSICIAN ASSISTANT

## 2019-11-20 PROCEDURE — 3078F DIAST BP <80 MM HG: CPT | Mod: CPTII,S$GLB,, | Performed by: PHYSICIAN ASSISTANT

## 2019-11-20 PROCEDURE — 3075F PR MOST RECENT SYSTOLIC BLOOD PRESS GE 130-139MM HG: ICD-10-PCS | Mod: CPTII,S$GLB,, | Performed by: PHYSICIAN ASSISTANT

## 2019-11-20 PROCEDURE — 99213 OFFICE O/P EST LOW 20 MIN: CPT | Mod: S$GLB,,, | Performed by: PHYSICIAN ASSISTANT

## 2019-11-20 PROCEDURE — 1126F AMNT PAIN NOTED NONE PRSNT: CPT | Mod: S$GLB,,, | Performed by: PHYSICIAN ASSISTANT

## 2019-11-20 PROCEDURE — 1126F PR PAIN SEVERITY QUANTIFIED, NO PAIN PRESENT: ICD-10-PCS | Mod: S$GLB,,, | Performed by: PHYSICIAN ASSISTANT

## 2019-11-20 PROCEDURE — 1101F PR PT FALLS ASSESS DOC 0-1 FALLS W/OUT INJ PAST YR: ICD-10-PCS | Mod: CPTII,S$GLB,, | Performed by: PHYSICIAN ASSISTANT

## 2019-11-20 PROCEDURE — 99213 PR OFFICE/OUTPT VISIT, EST, LEVL III, 20-29 MIN: ICD-10-PCS | Mod: S$GLB,,, | Performed by: PHYSICIAN ASSISTANT

## 2019-11-20 NOTE — PATIENT INSTRUCTIONS
Viral Upper Respiratory Illness (Adult)  You have a viral upper respiratory illness (URI), which is another term for the common cold. This illness is contagious during the first few days. It is spread through the air by coughing and sneezing. It may also be spread by direct contact (touching the sick person and then touching your own eyes, nose, or mouth). Frequent handwashing will decrease risk of spread. Most viral illnesses go away within 7 to 10 days with rest and simple home remedies. Sometimes the illness may last for several weeks. Antibiotics will not kill a virus, and they are generally not prescribed for this condition.    Home care  · If symptoms are severe, rest at home for the first 2 to 3 days. When you resume activity, don't let yourself get too tired.  · Avoid being exposed to cigarette smoke (yours or others).  · You may use acetaminophen or ibuprofen to control pain and fever, unless another medicine was prescribed. (Note: If you have chronic liver or kidney disease, have ever had a stomach ulcer or gastrointestinal bleeding, or are taking blood-thinning medicines, talk with your healthcare provider before using these medicines.) Aspirin should never be given to anyone under 18 years of age who is ill with a viral infection or fever. It may cause severe liver or brain damage.  · Your appetite may be poor, so a light diet is fine. Avoid dehydration by drinking 6 to 8 glasses of fluids per day (water, soft drinks, juices, tea, or soup). Extra fluids will help loosen secretions in the nose and lungs.  · Over-the-counter cold medicines will not shorten the length of time youre sick, but they may be helpful for the following symptoms: cough, sore throat, and nasal and sinus congestion. (Note: Do not use decongestants if you have high blood pressure.)  Follow-up care  Follow up with your healthcare provider, or as advised.  When to seek medical advice  Call your healthcare provider right away if any  of these occur:  · Cough with lots of colored sputum (mucus)  · Severe headache; face, neck, or ear pain  · Difficulty swallowing due to throat pain  · Fever of 100.4°F (38°C)  Call 911, or get immediate medical care  Call emergency services right away if any of these occur:  · Chest pain, shortness of breath, wheezing, or difficulty breathing  · Coughing up blood  · Inability to swallow due to throat pain  Date Last Reviewed: 9/13/2015  © 4241-2199 [employer]. [address] [place] [address]. All rights reserved. This information is not intended as a substitute for professional medical care. Always follow your healthcare professional's instructions.

## 2019-11-20 NOTE — PROGRESS NOTES
"Subjective:       Patient ID: Alfa Christy III is a 64 y.o. male.        Chief Complaint: Nasal Congestion and Hoarse    Alfa Christy III is an established patient of Tyler Jasmine MD here today for urgent care visit.    Nasal congestion, post nasal drip, and mild hoarseness x 1 week.  Occasional cough but not much.  Had some shortness of breath described as "can't breathe through my nose" but that is now resolved.  All sx are improving.  No fever, chills, sweats, body aches.  No N/V/D/C.  Lots of sneezing but now improved.          Review of Systems   Constitutional: Negative for appetite change, chills, fatigue and fever.   HENT: Positive for congestion, postnasal drip and voice change. Negative for sore throat.    Eyes: Negative for visual disturbance.   Respiratory: Positive for cough (occasional). Negative for chest tightness and shortness of breath.    Cardiovascular: Negative for chest pain, palpitations and leg swelling.   Gastrointestinal: Negative for abdominal pain, blood in stool, constipation, diarrhea, nausea and vomiting.   Genitourinary: Negative for dysuria, frequency, hematuria and urgency.   Musculoskeletal: Negative for arthralgias and back pain.   Skin: Negative for rash.   Neurological: Negative for dizziness, syncope, weakness and headaches.   Psychiatric/Behavioral: Negative for dysphoric mood and sleep disturbance. The patient is not nervous/anxious.        Objective:      Physical Exam   Constitutional: He appears well-developed and well-nourished.   HENT:   Head: Normocephalic.   Right Ear: External ear normal. A middle ear effusion is present.   Left Ear: External ear normal. A middle ear effusion is present.   Nose: Mucosal edema and rhinorrhea present. Right sinus exhibits no maxillary sinus tenderness and no frontal sinus tenderness. Left sinus exhibits no maxillary sinus tenderness.   Mouth/Throat: Oropharynx is clear and moist.   Eyes: Pupils are equal, round, and reactive to " "light.   Cardiovascular: Normal rate, regular rhythm and normal heart sounds. Exam reveals no gallop and no friction rub.   No murmur heard.  Pulmonary/Chest: Effort normal and breath sounds normal. No respiratory distress.   Abdominal: Soft. There is no tenderness. There is no CVA tenderness.   Musculoskeletal: He exhibits no edema.   Neurological: He is alert.   Skin: Skin is warm and dry.   Psychiatric: He has a normal mood and affect.   Nursing note and vitals reviewed.      Assessment:       1. Upper respiratory tract infection, unspecified type        Plan:       Alfa was seen today for nasal congestion and hoarse.    Diagnoses and all orders for this visit:    Upper respiratory tract infection, unspecified type    All sx have improved over past 24-48 hours.  Declines any medications for symptomatic care.  Due for f/u with Dr. Jasmine so advised to go ahead and schedule.  BP improved at recheck to 134/74.    Pt has been given instructions populated from [employer] database and has verbalized understanding of the after visit summary and information contained wherein.    Follow up with a primary care provider. May go to ER for acute shortness of breath, lightheadedness, fever, or any other emergent complaints or changes in condition.    "This note will be shared with the patient"    Future Appointments   Date Time Provider Department Center   2/12/2020  9:00 AM [facility] US ENDOCRINOLOGY [facility] DONA [facility]   2/12/2020 10:00 AM LAB, APPOINTMENT [facility] LAB V[facility]   2/14/2020  2:30 PM Tyler Jasmine MD [facility] HERMINIA [facility]   2/19/2020  3:30 PM Kasey Thomson NP [facility] ENDOHERMESA [facility]               "

## 2019-12-20 DIAGNOSIS — E89.0 POSTOPERATIVE HYPOTHYROIDISM: ICD-10-CM

## 2019-12-20 RX ORDER — LEVOTHYROXINE SODIUM 200 UG/1
TABLET ORAL
Qty: 90 TABLET | Refills: 2 | Status: SHIPPED | OUTPATIENT
Start: 2019-12-20 | End: 2020-01-01 | Stop reason: SDUPTHER

## 2020-01-01 ENCOUNTER — TELEPHONE (OUTPATIENT)
Dept: UROLOGY | Facility: CLINIC | Age: 65
End: 2020-01-01

## 2020-01-01 ENCOUNTER — CLINICAL SUPPORT (OUTPATIENT)
Dept: ENDOCRINOLOGY | Facility: CLINIC | Age: 65
End: 2020-01-01
Payer: MEDICARE

## 2020-01-01 ENCOUNTER — TELEPHONE (OUTPATIENT)
Dept: NEPHROLOGY | Facility: CLINIC | Age: 65
End: 2020-01-01

## 2020-01-01 ENCOUNTER — DOCUMENT SCAN (OUTPATIENT)
Dept: HOME HEALTH SERVICES | Facility: HOSPITAL | Age: 65
End: 2020-01-01
Payer: MEDICARE

## 2020-01-01 ENCOUNTER — OFFICE VISIT (OUTPATIENT)
Dept: ENDOCRINOLOGY | Facility: CLINIC | Age: 65
End: 2020-01-01
Payer: MEDICARE

## 2020-01-01 ENCOUNTER — OFFICE VISIT (OUTPATIENT)
Dept: PODIATRY | Facility: CLINIC | Age: 65
End: 2020-01-01
Payer: MEDICARE

## 2020-01-01 ENCOUNTER — OFFICE VISIT (OUTPATIENT)
Dept: INTERNAL MEDICINE | Facility: CLINIC | Age: 65
End: 2020-01-01
Payer: MEDICARE

## 2020-01-01 ENCOUNTER — PATIENT OUTREACH (OUTPATIENT)
Dept: ADMINISTRATIVE | Facility: OTHER | Age: 65
End: 2020-01-01

## 2020-01-01 ENCOUNTER — TELEPHONE (OUTPATIENT)
Dept: NEUROLOGY | Facility: CLINIC | Age: 65
End: 2020-01-01

## 2020-01-01 ENCOUNTER — OFFICE VISIT (OUTPATIENT)
Dept: UROLOGY | Facility: CLINIC | Age: 65
End: 2020-01-01
Payer: MEDICARE

## 2020-01-01 ENCOUNTER — TELEPHONE (OUTPATIENT)
Dept: CARDIOLOGY | Facility: CLINIC | Age: 65
End: 2020-01-01

## 2020-01-01 ENCOUNTER — OFFICE VISIT (OUTPATIENT)
Dept: CARDIOLOGY | Facility: CLINIC | Age: 65
End: 2020-01-01
Payer: MEDICARE

## 2020-01-01 ENCOUNTER — PATIENT MESSAGE (OUTPATIENT)
Dept: ENDOCRINOLOGY | Facility: CLINIC | Age: 65
End: 2020-01-01

## 2020-01-01 ENCOUNTER — TELEPHONE (OUTPATIENT)
Dept: INTERNAL MEDICINE | Facility: CLINIC | Age: 65
End: 2020-01-01

## 2020-01-01 ENCOUNTER — HOSPITAL ENCOUNTER (OUTPATIENT)
Dept: RADIOLOGY | Facility: HOSPITAL | Age: 65
Discharge: HOME | End: 2020-08-11
Attending: INTERNAL MEDICINE
Payer: MEDICARE

## 2020-01-01 ENCOUNTER — TELEPHONE (OUTPATIENT)
Dept: ENDOCRINOLOGY | Facility: CLINIC | Age: 65
End: 2020-01-01

## 2020-01-01 ENCOUNTER — LAB VISIT (OUTPATIENT)
Dept: LAB | Facility: HOSPITAL | Age: 65
End: 2020-01-01
Payer: MEDICARE

## 2020-01-01 ENCOUNTER — HOSPITAL ENCOUNTER (OUTPATIENT)
Dept: RADIOLOGY | Facility: HOSPITAL | Age: 65
Discharge: HOME | End: 2020-11-18
Attending: PHYSICAL MEDICINE & REHABILITATION
Payer: MEDICARE

## 2020-01-01 ENCOUNTER — OFFICE VISIT (OUTPATIENT)
Dept: NEUROLOGY | Facility: CLINIC | Age: 65
End: 2020-01-01
Payer: MEDICARE

## 2020-01-01 ENCOUNTER — LAB VISIT (OUTPATIENT)
Dept: LAB | Facility: HOSPITAL | Age: 65
End: 2020-01-01
Attending: INTERNAL MEDICINE
Payer: MEDICARE

## 2020-01-01 ENCOUNTER — OFFICE VISIT (OUTPATIENT)
Dept: NEPHROLOGY | Facility: CLINIC | Age: 65
End: 2020-01-01
Payer: MEDICARE

## 2020-01-01 ENCOUNTER — IMMUNIZATION (OUTPATIENT)
Dept: PHARMACY | Facility: CLINIC | Age: 65
End: 2020-01-01
Payer: MEDICARE

## 2020-01-01 ENCOUNTER — LAB VISIT (OUTPATIENT)
Dept: LAB | Facility: HOSPITAL | Age: 65
End: 2020-01-01
Attending: NURSE PRACTITIONER
Payer: MEDICARE

## 2020-01-01 ENCOUNTER — NURSE TRIAGE (OUTPATIENT)
Dept: ADMINISTRATIVE | Facility: CLINIC | Age: 65
End: 2020-01-01

## 2020-01-01 VITALS
DIASTOLIC BLOOD PRESSURE: 70 MMHG | HEIGHT: 70 IN | HEART RATE: 63 BPM | SYSTOLIC BLOOD PRESSURE: 150 MMHG | OXYGEN SATURATION: 97 % | WEIGHT: 225.75 LBS | BODY MASS INDEX: 32.32 KG/M2

## 2020-01-01 VITALS
HEIGHT: 70 IN | HEART RATE: 67 BPM | HEART RATE: 68 BPM | DIASTOLIC BLOOD PRESSURE: 73 MMHG | WEIGHT: 215.38 LBS | HEIGHT: 70 IN | OXYGEN SATURATION: 99 % | OXYGEN SATURATION: 99 % | BODY MASS INDEX: 30.83 KG/M2 | BODY MASS INDEX: 29.86 KG/M2 | SYSTOLIC BLOOD PRESSURE: 159 MMHG | SYSTOLIC BLOOD PRESSURE: 183 MMHG | WEIGHT: 208.56 LBS | DIASTOLIC BLOOD PRESSURE: 81 MMHG

## 2020-01-01 VITALS
BODY MASS INDEX: 31.53 KG/M2 | OXYGEN SATURATION: 99 % | DIASTOLIC BLOOD PRESSURE: 62 MMHG | SYSTOLIC BLOOD PRESSURE: 128 MMHG | WEIGHT: 220.25 LBS | HEART RATE: 64 BPM | HEIGHT: 70 IN

## 2020-01-01 VITALS
DIASTOLIC BLOOD PRESSURE: 76 MMHG | WEIGHT: 220 LBS | SYSTOLIC BLOOD PRESSURE: 149 MMHG | HEIGHT: 70 IN | BODY MASS INDEX: 31.5 KG/M2 | HEART RATE: 69 BPM

## 2020-01-01 VITALS
HEART RATE: 71 BPM | OXYGEN SATURATION: 96 % | WEIGHT: 229.75 LBS | HEIGHT: 70 IN | DIASTOLIC BLOOD PRESSURE: 80 MMHG | BODY MASS INDEX: 32.89 KG/M2 | SYSTOLIC BLOOD PRESSURE: 134 MMHG

## 2020-01-01 VITALS — HEIGHT: 70 IN | WEIGHT: 210.56 LBS | BODY MASS INDEX: 30.14 KG/M2

## 2020-01-01 VITALS
WEIGHT: 228.19 LBS | HEART RATE: 67 BPM | DIASTOLIC BLOOD PRESSURE: 70 MMHG | BODY MASS INDEX: 32.67 KG/M2 | OXYGEN SATURATION: 98 % | SYSTOLIC BLOOD PRESSURE: 150 MMHG | HEIGHT: 70 IN

## 2020-01-01 VITALS
SYSTOLIC BLOOD PRESSURE: 130 MMHG | OXYGEN SATURATION: 98 % | HEART RATE: 67 BPM | DIASTOLIC BLOOD PRESSURE: 72 MMHG | WEIGHT: 210.56 LBS | HEIGHT: 70 IN | BODY MASS INDEX: 30.14 KG/M2

## 2020-01-01 DIAGNOSIS — E11.22 TYPE 2 DIABETES MELLITUS WITH STAGE 4 CHRONIC KIDNEY DISEASE, WITH LONG-TERM CURRENT USE OF INSULIN: ICD-10-CM

## 2020-01-01 DIAGNOSIS — I50.32 CHRONIC DIASTOLIC HEART FAILURE: Primary | ICD-10-CM

## 2020-01-01 DIAGNOSIS — R80.9 PROTEINURIA DUE TO TYPE 2 DIABETES MELLITUS: ICD-10-CM

## 2020-01-01 DIAGNOSIS — Z79.4 TYPE 2 DIABETES MELLITUS WITH STAGE 4 CHRONIC KIDNEY DISEASE, WITH LONG-TERM CURRENT USE OF INSULIN: ICD-10-CM

## 2020-01-01 DIAGNOSIS — Z79.4 TYPE 2 DIABETES MELLITUS WITH STAGE 4 CHRONIC KIDNEY DISEASE, WITH LONG-TERM CURRENT USE OF INSULIN: Primary | ICD-10-CM

## 2020-01-01 DIAGNOSIS — E11.22 TYPE 2 DIABETES MELLITUS WITH STAGE 4 CHRONIC KIDNEY DISEASE, WITH LONG-TERM CURRENT USE OF INSULIN: Primary | ICD-10-CM

## 2020-01-01 DIAGNOSIS — R82.81 PYURIA: ICD-10-CM

## 2020-01-01 DIAGNOSIS — I10 ESSENTIAL HYPERTENSION: ICD-10-CM

## 2020-01-01 DIAGNOSIS — Z79.01 CHRONIC ANTICOAGULATION: Chronic | ICD-10-CM

## 2020-01-01 DIAGNOSIS — N25.81 SECONDARY HYPERPARATHYROIDISM: ICD-10-CM

## 2020-01-01 DIAGNOSIS — N18.4 HYPERTENSIVE KIDNEY DISEASE WITH STAGE 4 CHRONIC KIDNEY DISEASE: ICD-10-CM

## 2020-01-01 DIAGNOSIS — L30.9 CHRONIC ECZEMA: ICD-10-CM

## 2020-01-01 DIAGNOSIS — N18.4 TYPE 2 DIABETES MELLITUS WITH STAGE 4 CHRONIC KIDNEY DISEASE, WITH LONG-TERM CURRENT USE OF INSULIN: Primary | ICD-10-CM

## 2020-01-01 DIAGNOSIS — I12.9 HYPERTENSIVE KIDNEY DISEASE WITH STAGE 4 CHRONIC KIDNEY DISEASE: ICD-10-CM

## 2020-01-01 DIAGNOSIS — G40.209 COMPLEX PARTIAL SEIZURES EVOLVING TO GENERALIZED TONIC-CLONIC SEIZURES: ICD-10-CM

## 2020-01-01 DIAGNOSIS — N17.9 AKI (ACUTE KIDNEY INJURY): ICD-10-CM

## 2020-01-01 DIAGNOSIS — I50.32 CHRONIC DIASTOLIC HEART FAILURE: ICD-10-CM

## 2020-01-01 DIAGNOSIS — E11.9 TYPE 2 DIABETES MELLITUS WITHOUT OPHTHALMIC MANIFESTATIONS: ICD-10-CM

## 2020-01-01 DIAGNOSIS — I48.0 PAROXYSMAL ATRIAL FIBRILLATION: Chronic | ICD-10-CM

## 2020-01-01 DIAGNOSIS — I48.20 CHRONIC ATRIAL FIBRILLATION: ICD-10-CM

## 2020-01-01 DIAGNOSIS — I65.23 BILATERAL CAROTID ARTERY STENOSIS: ICD-10-CM

## 2020-01-01 DIAGNOSIS — Z86.73 HISTORY OF CVA (CEREBROVASCULAR ACCIDENT): ICD-10-CM

## 2020-01-01 DIAGNOSIS — Z09 HOSPITAL DISCHARGE FOLLOW-UP: Primary | ICD-10-CM

## 2020-01-01 DIAGNOSIS — I67.1 CEREBRAL ANEURYSM WITHOUT RUPTURE: ICD-10-CM

## 2020-01-01 DIAGNOSIS — N18.4 TYPE 2 DIABETES MELLITUS WITH STAGE 4 CHRONIC KIDNEY DISEASE, WITH LONG-TERM CURRENT USE OF INSULIN: ICD-10-CM

## 2020-01-01 DIAGNOSIS — N18.30 CHRONIC KIDNEY DISEASE, STAGE III (MODERATE): ICD-10-CM

## 2020-01-01 DIAGNOSIS — E11.65 TYPE 2 DIABETES MELLITUS WITH HYPERGLYCEMIA, WITH LONG-TERM CURRENT USE OF INSULIN: ICD-10-CM

## 2020-01-01 DIAGNOSIS — E89.0 POSTSURGICAL HYPOTHYROIDISM: ICD-10-CM

## 2020-01-01 DIAGNOSIS — E66.9 OBESITY (BMI 30-39.9): ICD-10-CM

## 2020-01-01 DIAGNOSIS — E78.00 HYPERCHOLESTEROLEMIA: ICD-10-CM

## 2020-01-01 DIAGNOSIS — N17.9 AKI (ACUTE KIDNEY INJURY): Primary | ICD-10-CM

## 2020-01-01 DIAGNOSIS — N18.4 TYPE 2 DIABETES MELLITUS WITH STAGE 4 CHRONIC KIDNEY DISEASE, WITH LONG-TERM CURRENT USE OF INSULIN: Chronic | ICD-10-CM

## 2020-01-01 DIAGNOSIS — H53.461 RIGHT HOMONYMOUS HEMIANOPSIA: ICD-10-CM

## 2020-01-01 DIAGNOSIS — D64.89 ANEMIA DUE TO MULTIPLE MECHANISMS: ICD-10-CM

## 2020-01-01 DIAGNOSIS — Z79.4 TYPE 2 DIABETES MELLITUS WITH STAGE 4 CHRONIC KIDNEY DISEASE, WITH LONG-TERM CURRENT USE OF INSULIN: Chronic | ICD-10-CM

## 2020-01-01 DIAGNOSIS — E11.29 PROTEINURIA DUE TO TYPE 2 DIABETES MELLITUS: ICD-10-CM

## 2020-01-01 DIAGNOSIS — E55.9 VITAMIN D DEFICIENCY: ICD-10-CM

## 2020-01-01 DIAGNOSIS — Z86.73 HISTORY OF CVA (CEREBROVASCULAR ACCIDENT): Primary | ICD-10-CM

## 2020-01-01 DIAGNOSIS — E11.22 TYPE 2 DIABETES MELLITUS WITH STAGE 4 CHRONIC KIDNEY DISEASE, WITH LONG-TERM CURRENT USE OF INSULIN: Chronic | ICD-10-CM

## 2020-01-01 DIAGNOSIS — Z00.00 ANNUAL PHYSICAL EXAM: Primary | ICD-10-CM

## 2020-01-01 DIAGNOSIS — Z85.850 HISTORY OF THYROID CANCER: ICD-10-CM

## 2020-01-01 DIAGNOSIS — I10 ESSENTIAL HYPERTENSION: Primary | ICD-10-CM

## 2020-01-01 DIAGNOSIS — L30.9 ECZEMA, UNSPECIFIED TYPE: ICD-10-CM

## 2020-01-01 DIAGNOSIS — B35.1 ONYCHOMYCOSIS DUE TO DERMATOPHYTE: ICD-10-CM

## 2020-01-01 DIAGNOSIS — D57.3 SICKLE CELL TRAIT: ICD-10-CM

## 2020-01-01 DIAGNOSIS — N25.81 SECONDARY HYPERPARATHYROIDISM: Primary | ICD-10-CM

## 2020-01-01 DIAGNOSIS — N18.30 STAGE 3 CHRONIC KIDNEY DISEASE, UNSPECIFIED WHETHER STAGE 3A OR 3B CKD: ICD-10-CM

## 2020-01-01 DIAGNOSIS — Z86.718 HISTORY OF DVT (DEEP VEIN THROMBOSIS): ICD-10-CM

## 2020-01-01 DIAGNOSIS — Z79.4 TYPE 2 DIABETES MELLITUS WITH HYPERGLYCEMIA, WITH LONG-TERM CURRENT USE OF INSULIN: ICD-10-CM

## 2020-01-01 DIAGNOSIS — R97.20 ELEVATED PSA: ICD-10-CM

## 2020-01-01 DIAGNOSIS — Z95.828 S/P IVC FILTER: ICD-10-CM

## 2020-01-01 DIAGNOSIS — Z79.01 CHRONIC ANTICOAGULATION: ICD-10-CM

## 2020-01-01 DIAGNOSIS — R00.2 PALPITATION: ICD-10-CM

## 2020-01-01 DIAGNOSIS — E66.9 OBESITY (BMI 30.0-34.9): ICD-10-CM

## 2020-01-01 DIAGNOSIS — R97.20 ELEVATED PSA: Primary | ICD-10-CM

## 2020-01-01 DIAGNOSIS — N52.1 ERECTILE DYSFUNCTION ASSOCIATED WITH TYPE 2 DIABETES MELLITUS: ICD-10-CM

## 2020-01-01 DIAGNOSIS — N40.0 PROSTATE ENLARGEMENT: ICD-10-CM

## 2020-01-01 DIAGNOSIS — I12.9 HYPERTENSIVE CHRONIC KIDNEY DISEASE, UNSPECIFIED CKD STAGE: ICD-10-CM

## 2020-01-01 DIAGNOSIS — E89.0 POSTOPERATIVE HYPOTHYROIDISM: ICD-10-CM

## 2020-01-01 DIAGNOSIS — G40.209 COMPLEX PARTIAL SEIZURES EVOLVING TO GENERALIZED TONIC-CLONIC SEIZURES: Primary | ICD-10-CM

## 2020-01-01 DIAGNOSIS — H25.13 NUCLEAR SCLEROSIS OF BOTH EYES: ICD-10-CM

## 2020-01-01 DIAGNOSIS — E89.0 POSTSURGICAL HYPOTHYROIDISM: Chronic | ICD-10-CM

## 2020-01-01 DIAGNOSIS — E11.49 TYPE II DIABETES MELLITUS WITH NEUROLOGICAL MANIFESTATIONS: Primary | ICD-10-CM

## 2020-01-01 DIAGNOSIS — R00.2 PALPITATION: Primary | ICD-10-CM

## 2020-01-01 DIAGNOSIS — E11.69 ERECTILE DYSFUNCTION ASSOCIATED WITH TYPE 2 DIABETES MELLITUS: ICD-10-CM

## 2020-01-01 DIAGNOSIS — I67.1 CEREBRAL ANEURYSM: ICD-10-CM

## 2020-01-01 LAB
ALBUMIN SERPL BCP-MCNC: 3 G/DL (ref 3.5–5.2)
ALBUMIN SERPL BCP-MCNC: 3.2 G/DL (ref 3.5–5.2)
ALBUMIN SERPL BCP-MCNC: 3.4 G/DL (ref 3.5–5.2)
ALP SERPL-CCNC: 82 U/L (ref 55–135)
ALT SERPL W/O P-5'-P-CCNC: 13 U/L (ref 10–44)
ANION GAP SERPL CALC-SCNC: 11 MMOL/L (ref 8–16)
ANION GAP SERPL CALC-SCNC: 12 MMOL/L (ref 8–16)
ANION GAP SERPL CALC-SCNC: 8 MMOL/L (ref 8–16)
ANION GAP SERPL CALC-SCNC: 9 MMOL/L (ref 8–16)
ANION GAP SERPL CALC-SCNC: 9 MMOL/L (ref 8–16)
AST SERPL-CCNC: 23 U/L (ref 10–40)
BACTERIA #/AREA URNS AUTO: ABNORMAL /HPF
BACTERIA UR CULT: NO GROWTH
BASOPHILS # BLD AUTO: 0.08 K/UL (ref 0–0.2)
BASOPHILS NFR BLD: 0.9 % (ref 0–1.9)
BILIRUB SERPL-MCNC: 0.6 MG/DL (ref 0.1–1)
BNP SERPL-MCNC: 435 PG/ML (ref 0–99)
BUN SERPL-MCNC: 42 MG/DL (ref 8–23)
BUN SERPL-MCNC: 45 MG/DL (ref 8–23)
BUN SERPL-MCNC: 48 MG/DL (ref 8–23)
BUN SERPL-MCNC: 65 MG/DL (ref 8–23)
BUN SERPL-MCNC: 65 MG/DL (ref 8–23)
CALCIUM SERPL-MCNC: 8.1 MG/DL (ref 8.7–10.5)
CALCIUM SERPL-MCNC: 8.3 MG/DL (ref 8.7–10.5)
CALCIUM SERPL-MCNC: 8.5 MG/DL (ref 8.7–10.5)
CALCIUM SERPL-MCNC: 8.6 MG/DL (ref 8.7–10.5)
CALCIUM SERPL-MCNC: 9.2 MG/DL (ref 8.7–10.5)
CHLORIDE SERPL-SCNC: 101 MMOL/L (ref 95–110)
CHLORIDE SERPL-SCNC: 102 MMOL/L (ref 95–110)
CHLORIDE SERPL-SCNC: 104 MMOL/L (ref 95–110)
CHLORIDE SERPL-SCNC: 104 MMOL/L (ref 95–110)
CHLORIDE SERPL-SCNC: 108 MMOL/L (ref 95–110)
CO2 SERPL-SCNC: 24 MMOL/L (ref 23–29)
CO2 SERPL-SCNC: 25 MMOL/L (ref 23–29)
CO2 SERPL-SCNC: 25 MMOL/L (ref 23–29)
CO2 SERPL-SCNC: 26 MMOL/L (ref 23–29)
CO2 SERPL-SCNC: 27 MMOL/L (ref 23–29)
CREAT SERPL-MCNC: 2.9 MG/DL (ref 0.5–1.4)
CREAT SERPL-MCNC: 2.9 MG/DL (ref 0.5–1.4)
CREAT SERPL-MCNC: 3 MG/DL (ref 0.5–1.4)
CREAT SERPL-MCNC: 3.1 MG/DL (ref 0.5–1.4)
CREAT SERPL-MCNC: 3.7 MG/DL (ref 0.5–1.4)
DIFFERENTIAL METHOD: ABNORMAL
EOSINOPHIL # BLD AUTO: 0.4 K/UL (ref 0–0.5)
EOSINOPHIL NFR BLD: 4.6 % (ref 0–8)
ERYTHROCYTE [DISTWIDTH] IN BLOOD BY AUTOMATED COUNT: 16.2 % (ref 11.5–14.5)
EST. GFR  (AFRICAN AMERICAN): 18.4 ML/MIN/1.73 M^2
EST. GFR  (AFRICAN AMERICAN): 22.8 ML/MIN/1.73 M^2
EST. GFR  (AFRICAN AMERICAN): 23.7 ML/MIN/1.73 M^2
EST. GFR  (AFRICAN AMERICAN): 24.7 ML/MIN/1.73 M^2
EST. GFR  (AFRICAN AMERICAN): 24.7 ML/MIN/1.73 M^2
EST. GFR  (NON AFRICAN AMERICAN): 15.9 ML/MIN/1.73 M^2
EST. GFR  (NON AFRICAN AMERICAN): 19.7 ML/MIN/1.73 M^2
EST. GFR  (NON AFRICAN AMERICAN): 20.5 ML/MIN/1.73 M^2
EST. GFR  (NON AFRICAN AMERICAN): 21.4 ML/MIN/1.73 M^2
EST. GFR  (NON AFRICAN AMERICAN): 21.4 ML/MIN/1.73 M^2
ESTIMATED AVG GLUCOSE: 117 MG/DL (ref 68–131)
FERRITIN SERPL-MCNC: 198 NG/ML (ref 20–300)
FRUCTOSAMINE SERPL-SCNC: 348 UMOL /L
GLUCOSE SERPL-MCNC: 109 MG/DL (ref 70–110)
GLUCOSE SERPL-MCNC: 151 MG/DL (ref 70–110)
GLUCOSE SERPL-MCNC: 161 MG/DL (ref 70–110)
GLUCOSE SERPL-MCNC: 176 MG/DL (ref 70–110)
GLUCOSE SERPL-MCNC: 219 MG/DL (ref 70–110)
HBA1C MFR BLD HPLC: 5.7 % (ref 4–5.6)
HCT VFR BLD AUTO: 31.6 % (ref 40–54)
HGB BLD-MCNC: 11.1 G/DL (ref 14–18)
HYALINE CASTS UR QL AUTO: 4 /LPF
IMM GRANULOCYTES # BLD AUTO: 0.02 K/UL (ref 0–0.04)
IMM GRANULOCYTES NFR BLD AUTO: 0.2 % (ref 0–0.5)
IRON SERPL-MCNC: 78 UG/DL (ref 45–160)
LYMPHOCYTES # BLD AUTO: 1.6 K/UL (ref 1–4.8)
LYMPHOCYTES NFR BLD: 19 % (ref 18–48)
MCH RBC QN AUTO: 28.6 PG (ref 27–31)
MCHC RBC AUTO-ENTMCNC: 35.1 G/DL (ref 32–36)
MCV RBC AUTO: 81 FL (ref 82–98)
MICROSCOPIC COMMENT: ABNORMAL
MONOCYTES # BLD AUTO: 1.3 K/UL (ref 0.3–1)
MONOCYTES NFR BLD: 15.1 % (ref 4–15)
NEUTROPHILS # BLD AUTO: 5.1 K/UL (ref 1.8–7.7)
NEUTROPHILS NFR BLD: 60.2 % (ref 38–73)
NRBC BLD-RTO: 1 /100 WBC
PHOSPHATE SERPL-MCNC: 3.9 MG/DL (ref 2.7–4.5)
PHOSPHATE SERPL-MCNC: 4.4 MG/DL (ref 2.7–4.5)
PLATELET # BLD AUTO: 315 K/UL (ref 150–350)
PMV BLD AUTO: 12.2 FL (ref 9.2–12.9)
POTASSIUM SERPL-SCNC: 3.9 MMOL/L (ref 3.5–5.1)
POTASSIUM SERPL-SCNC: 4.1 MMOL/L (ref 3.5–5.1)
POTASSIUM SERPL-SCNC: 4.3 MMOL/L (ref 3.5–5.1)
POTASSIUM SERPL-SCNC: 4.6 MMOL/L (ref 3.5–5.1)
POTASSIUM SERPL-SCNC: 4.9 MMOL/L (ref 3.5–5.1)
PROSTATE SPECIFIC ANTIGEN, TOTAL: 29.1 NG/ML (ref 0–4)
PROT SERPL-MCNC: 7.2 G/DL (ref 6–8.4)
PSA FREE MFR SERPL: 55.57 %
PSA FREE SERPL-MCNC: 16.17 NG/ML (ref 0.01–1.5)
RBC # BLD AUTO: 3.88 M/UL (ref 4.6–6.2)
RBC #/AREA URNS AUTO: 1 /HPF (ref 0–4)
SATURATED IRON: 25 % (ref 20–50)
SODIUM SERPL-SCNC: 136 MMOL/L (ref 136–145)
SODIUM SERPL-SCNC: 138 MMOL/L (ref 136–145)
SODIUM SERPL-SCNC: 138 MMOL/L (ref 136–145)
SODIUM SERPL-SCNC: 139 MMOL/L (ref 136–145)
SODIUM SERPL-SCNC: 144 MMOL/L (ref 136–145)
TOTAL IRON BINDING CAPACITY: 312 UG/DL (ref 250–450)
TRANSFERRIN SERPL-MCNC: 211 MG/DL (ref 200–375)
WBC # BLD AUTO: 8.46 K/UL (ref 3.9–12.7)
WBC #/AREA URNS AUTO: 5 /HPF (ref 0–5)

## 2020-01-01 PROCEDURE — 99999 PR PBB SHADOW E&M-EST. PATIENT-LVL V: ICD-10-PCS | Mod: PBBFAC,,, | Performed by: NURSE PRACTITIONER

## 2020-01-01 PROCEDURE — 1159F MED LIST DOCD IN RCRD: CPT | Mod: 95,,, | Performed by: PSYCHIATRY & NEUROLOGY

## 2020-01-01 PROCEDURE — 99214 PR OFFICE/OUTPT VISIT, EST, LEVL IV, 30-39 MIN: ICD-10-PCS | Mod: S$GLB,,, | Performed by: UROLOGY

## 2020-01-01 PROCEDURE — 3078F DIAST BP <80 MM HG: CPT | Mod: CPTII,S$GLB,, | Performed by: UROLOGY

## 2020-01-01 PROCEDURE — 3078F PR MOST RECENT DIASTOLIC BLOOD PRESSURE < 80 MM HG: ICD-10-PCS | Mod: CPTII,S$GLB,, | Performed by: NURSE PRACTITIONER

## 2020-01-01 PROCEDURE — 3008F PR BODY MASS INDEX (BMI) DOCUMENTED: ICD-10-PCS | Mod: CPTII,S$GLB,, | Performed by: NURSE PRACTITIONER

## 2020-01-01 PROCEDURE — 1159F MED LIST DOCD IN RCRD: CPT | Mod: S$GLB,,, | Performed by: UROLOGY

## 2020-01-01 PROCEDURE — 3077F PR MOST RECENT SYSTOLIC BLOOD PRESSURE >= 140 MM HG: ICD-10-PCS | Mod: CPTII,S$GLB,, | Performed by: NURSE PRACTITIONER

## 2020-01-01 PROCEDURE — 3072F LOW RISK FOR RETINOPATHY: CPT | Mod: 95,,, | Performed by: PSYCHIATRY & NEUROLOGY

## 2020-01-01 PROCEDURE — 1159F PR MEDICATION LIST DOCUMENTED IN MEDICAL RECORD: ICD-10-PCS | Mod: S$GLB,,, | Performed by: NURSE PRACTITIONER

## 2020-01-01 PROCEDURE — 3044F PR MOST RECENT HEMOGLOBIN A1C LEVEL <7.0%: ICD-10-PCS | Mod: CPTII,S$GLB,, | Performed by: INTERNAL MEDICINE

## 2020-01-01 PROCEDURE — 3072F PR LOW RISK FOR RETINOPATHY: ICD-10-PCS | Mod: 95,,, | Performed by: PSYCHIATRY & NEUROLOGY

## 2020-01-01 PROCEDURE — 99499 UNLISTED E&M SERVICE: CPT | Mod: 95,,, | Performed by: NURSE PRACTITIONER

## 2020-01-01 PROCEDURE — 3075F SYST BP GE 130 - 139MM HG: CPT | Mod: CPTII,S$GLB,, | Performed by: INTERNAL MEDICINE

## 2020-01-01 PROCEDURE — 36415 COLL VENOUS BLD VENIPUNCTURE: CPT

## 2020-01-01 PROCEDURE — 3074F SYST BP LT 130 MM HG: CPT | Mod: CPTII,S$GLB,, | Performed by: INTERNAL MEDICINE

## 2020-01-01 PROCEDURE — 1101F PT FALLS ASSESS-DOCD LE1/YR: CPT | Mod: CPTII,S$GLB,, | Performed by: NURSE PRACTITIONER

## 2020-01-01 PROCEDURE — 99214 PR OFFICE/OUTPT VISIT, EST, LEVL IV, 30-39 MIN: ICD-10-PCS | Mod: 95,,, | Performed by: PSYCHIATRY & NEUROLOGY

## 2020-01-01 PROCEDURE — 99999 PR PBB SHADOW E&M-EST. PATIENT-LVL V: CPT | Mod: PBBFAC,,, | Performed by: UROLOGY

## 2020-01-01 PROCEDURE — 99499 UNLISTED E&M SERVICE: CPT | Mod: S$GLB,,, | Performed by: NURSE PRACTITIONER

## 2020-01-01 PROCEDURE — 1126F AMNT PAIN NOTED NONE PRSNT: CPT | Mod: S$GLB,,, | Performed by: NURSE PRACTITIONER

## 2020-01-01 PROCEDURE — 83880 ASSAY OF NATRIURETIC PEPTIDE: CPT

## 2020-01-01 PROCEDURE — 3078F DIAST BP <80 MM HG: CPT | Mod: CPTII,S$GLB,, | Performed by: NURSE PRACTITIONER

## 2020-01-01 PROCEDURE — 3078F DIAST BP <80 MM HG: CPT | Mod: CPTII,S$GLB,, | Performed by: INTERNAL MEDICINE

## 2020-01-01 PROCEDURE — 76770 US EXAM ABDO BACK WALL COMP: CPT | Mod: TC

## 2020-01-01 PROCEDURE — 99999 PR PBB SHADOW E&M-EST. PATIENT-LVL V: CPT | Mod: PBBFAC,,, | Performed by: NURSE PRACTITIONER

## 2020-01-01 PROCEDURE — 99214 PR OFFICE/OUTPT VISIT, EST, LEVL IV, 30-39 MIN: ICD-10-PCS | Mod: S$GLB,,, | Performed by: NURSE PRACTITIONER

## 2020-01-01 PROCEDURE — 1126F PR PAIN SEVERITY QUANTIFIED, NO PAIN PRESENT: ICD-10-PCS | Mod: S$GLB,,, | Performed by: UROLOGY

## 2020-01-01 PROCEDURE — 3044F PR MOST RECENT HEMOGLOBIN A1C LEVEL <7.0%: ICD-10-PCS | Mod: CPTII,S$GLB,, | Performed by: PODIATRIST

## 2020-01-01 PROCEDURE — 3044F HG A1C LEVEL LT 7.0%: CPT | Mod: CPTII,S$GLB,, | Performed by: UROLOGY

## 2020-01-01 PROCEDURE — 1159F PR MEDICATION LIST DOCUMENTED IN MEDICAL RECORD: ICD-10-PCS | Mod: 95,,, | Performed by: NURSE PRACTITIONER

## 2020-01-01 PROCEDURE — 3288F PR FALLS RISK ASSESSMENT DOCUMENTED: ICD-10-PCS | Mod: CPTII,S$GLB,, | Performed by: NURSE PRACTITIONER

## 2020-01-01 PROCEDURE — 3072F LOW RISK FOR RETINOPATHY: CPT | Mod: 95,,, | Performed by: NURSE PRACTITIONER

## 2020-01-01 PROCEDURE — 99999 PR PBB SHADOW E&M-EST. PATIENT-LVL V: CPT | Mod: PBBFAC,,, | Performed by: INTERNAL MEDICINE

## 2020-01-01 PROCEDURE — 99499 RISK ADDL DX/OHS AUDIT: ICD-10-PCS | Mod: S$GLB,,, | Performed by: NURSE PRACTITIONER

## 2020-01-01 PROCEDURE — 3288F PR FALLS RISK ASSESSMENT DOCUMENTED: ICD-10-PCS | Mod: CPTII,S$GLB,, | Performed by: PODIATRIST

## 2020-01-01 PROCEDURE — 1101F PR PT FALLS ASSESS DOC 0-1 FALLS W/OUT INJ PAST YR: ICD-10-PCS | Mod: CPTII,S$GLB,, | Performed by: NURSE PRACTITIONER

## 2020-01-01 PROCEDURE — 87086 URINE CULTURE/COLONY COUNT: CPT

## 2020-01-01 PROCEDURE — 36415 COLL VENOUS BLD VENIPUNCTURE: CPT | Mod: PO

## 2020-01-01 PROCEDURE — 99214 OFFICE O/P EST MOD 30 MIN: CPT | Mod: S$GLB,,, | Performed by: NURSE PRACTITIONER

## 2020-01-01 PROCEDURE — 3044F HG A1C LEVEL LT 7.0%: CPT | Mod: CPTII,S$GLB,, | Performed by: NURSE PRACTITIONER

## 2020-01-01 PROCEDURE — 1159F PR MEDICATION LIST DOCUMENTED IN MEDICAL RECORD: ICD-10-PCS | Mod: 95,,, | Performed by: PSYCHIATRY & NEUROLOGY

## 2020-01-01 PROCEDURE — 99999 PR PBB SHADOW E&M-EST. PATIENT-LVL V: ICD-10-PCS | Mod: PBBFAC,,, | Performed by: UROLOGY

## 2020-01-01 PROCEDURE — 3077F PR MOST RECENT SYSTOLIC BLOOD PRESSURE >= 140 MM HG: ICD-10-PCS | Mod: CPTII,S$GLB,, | Performed by: UROLOGY

## 2020-01-01 PROCEDURE — 3044F HG A1C LEVEL LT 7.0%: CPT | Mod: CPTII,S$GLB,, | Performed by: INTERNAL MEDICINE

## 2020-01-01 PROCEDURE — 3077F SYST BP >= 140 MM HG: CPT | Mod: CPTII,S$GLB,, | Performed by: NURSE PRACTITIONER

## 2020-01-01 PROCEDURE — 1126F PR PAIN SEVERITY QUANTIFIED, NO PAIN PRESENT: ICD-10-PCS | Mod: S$GLB,,, | Performed by: NURSE PRACTITIONER

## 2020-01-01 PROCEDURE — 99999 PR PBB SHADOW E&M-EST. PATIENT-LVL IV: CPT | Mod: PBBFAC,,, | Performed by: PODIATRIST

## 2020-01-01 PROCEDURE — 3008F BODY MASS INDEX DOCD: CPT | Mod: CPTII,S$GLB,, | Performed by: NURSE PRACTITIONER

## 2020-01-01 PROCEDURE — 1101F PR PT FALLS ASSESS DOC 0-1 FALLS W/OUT INJ PAST YR: ICD-10-PCS | Mod: CPTII,S$GLB,, | Performed by: PODIATRIST

## 2020-01-01 PROCEDURE — 1126F AMNT PAIN NOTED NONE PRSNT: CPT | Mod: S$GLB,,, | Performed by: INTERNAL MEDICINE

## 2020-01-01 PROCEDURE — 80048 BASIC METABOLIC PNL TOTAL CA: CPT

## 2020-01-01 PROCEDURE — 3075F PR MOST RECENT SYSTOLIC BLOOD PRESS GE 130-139MM HG: ICD-10-PCS | Mod: CPTII,S$GLB,, | Performed by: INTERNAL MEDICINE

## 2020-01-01 PROCEDURE — 99214 PR OFFICE/OUTPT VISIT, EST, LEVL IV, 30-39 MIN: ICD-10-PCS | Mod: S$GLB,,, | Performed by: INTERNAL MEDICINE

## 2020-01-01 PROCEDURE — 99215 PR OFFICE/OUTPT VISIT, EST, LEVL V, 40-54 MIN: ICD-10-PCS | Mod: S$GLB,,, | Performed by: INTERNAL MEDICINE

## 2020-01-01 PROCEDURE — 93000 EKG 12-LEAD: ICD-10-PCS | Mod: S$GLB,,, | Performed by: INTERNAL MEDICINE

## 2020-01-01 PROCEDURE — 99999 PR PBB SHADOW E&M-EST. PATIENT-LVL IV: ICD-10-PCS | Mod: PBBFAC,,, | Performed by: PODIATRIST

## 2020-01-01 PROCEDURE — 99214 OFFICE O/P EST MOD 30 MIN: CPT | Mod: S$GLB,,, | Performed by: UROLOGY

## 2020-01-01 PROCEDURE — 3079F PR MOST RECENT DIASTOLIC BLOOD PRESSURE 80-89 MM HG: ICD-10-PCS | Mod: CPTII,S$GLB,, | Performed by: INTERNAL MEDICINE

## 2020-01-01 PROCEDURE — 3072F PR LOW RISK FOR RETINOPATHY: ICD-10-PCS | Mod: 95,,, | Performed by: NURSE PRACTITIONER

## 2020-01-01 PROCEDURE — 1159F MED LIST DOCD IN RCRD: CPT | Mod: 95,,, | Performed by: NURSE PRACTITIONER

## 2020-01-01 PROCEDURE — 1126F PR PAIN SEVERITY QUANTIFIED, NO PAIN PRESENT: ICD-10-PCS | Mod: S$GLB,,, | Performed by: INTERNAL MEDICINE

## 2020-01-01 PROCEDURE — 3078F PR MOST RECENT DIASTOLIC BLOOD PRESSURE < 80 MM HG: ICD-10-PCS | Mod: CPTII,S$GLB,, | Performed by: UROLOGY

## 2020-01-01 PROCEDURE — 71250 CT THORAX DX C-: CPT | Mod: 26,,, | Performed by: RADIOLOGY

## 2020-01-01 PROCEDURE — 99214 PR OFFICE/OUTPT VISIT, EST, LEVL IV, 30-39 MIN: ICD-10-PCS | Mod: 95,,, | Performed by: NURSE PRACTITIONER

## 2020-01-01 PROCEDURE — 3044F PR MOST RECENT HEMOGLOBIN A1C LEVEL <7.0%: ICD-10-PCS | Mod: CPTII,S$GLB,, | Performed by: NURSE PRACTITIONER

## 2020-01-01 PROCEDURE — 3008F BODY MASS INDEX DOCD: CPT | Mod: CPTII,S$GLB,, | Performed by: UROLOGY

## 2020-01-01 PROCEDURE — 83540 ASSAY OF IRON: CPT

## 2020-01-01 PROCEDURE — 1159F MED LIST DOCD IN RCRD: CPT | Mod: S$GLB,,, | Performed by: NURSE PRACTITIONER

## 2020-01-01 PROCEDURE — 3044F PR MOST RECENT HEMOGLOBIN A1C LEVEL <7.0%: ICD-10-PCS | Mod: CPTII,S$GLB,, | Performed by: UROLOGY

## 2020-01-01 PROCEDURE — 99214 OFFICE O/P EST MOD 30 MIN: CPT | Mod: 95,,, | Performed by: NURSE PRACTITIONER

## 2020-01-01 PROCEDURE — 1159F MED LIST DOCD IN RCRD: CPT | Mod: S$GLB,,, | Performed by: PODIATRIST

## 2020-01-01 PROCEDURE — 82985 ASSAY OF GLYCATED PROTEIN: CPT

## 2020-01-01 PROCEDURE — 93000 ELECTROCARDIOGRAM COMPLETE: CPT | Mod: S$GLB,,, | Performed by: INTERNAL MEDICINE

## 2020-01-01 PROCEDURE — 1159F MED LIST DOCD IN RCRD: CPT | Mod: S$GLB,,, | Performed by: INTERNAL MEDICINE

## 2020-01-01 PROCEDURE — 99499 UNLISTED E&M SERVICE: CPT | Mod: S$GLB,,, | Performed by: INTERNAL MEDICINE

## 2020-01-01 PROCEDURE — 95250 PR GLUCOSE MONITORING,72 HRS,SUB-Q SENSOR: ICD-10-PCS | Mod: S$GLB,,, | Performed by: INTERNAL MEDICINE

## 2020-01-01 PROCEDURE — 1101F PT FALLS ASSESS-DOCD LE1/YR: CPT | Mod: CPTII,S$GLB,, | Performed by: INTERNAL MEDICINE

## 2020-01-01 PROCEDURE — 3288F FALL RISK ASSESSMENT DOCD: CPT | Mod: CPTII,S$GLB,, | Performed by: PODIATRIST

## 2020-01-01 PROCEDURE — 95250 CONT GLUC MNTR PHYS/QHP EQP: CPT | Mod: S$GLB,,, | Performed by: INTERNAL MEDICINE

## 2020-01-01 PROCEDURE — 11721 PR DEBRIDEMENT OF NAILS, 6 OR MORE: ICD-10-PCS | Mod: Q9,S$GLB,, | Performed by: PODIATRIST

## 2020-01-01 PROCEDURE — 99213 OFFICE O/P EST LOW 20 MIN: CPT | Mod: 25,S$GLB,, | Performed by: PODIATRIST

## 2020-01-01 PROCEDURE — 3072F LOW RISK FOR RETINOPATHY: CPT | Mod: S$GLB,,, | Performed by: NURSE PRACTITIONER

## 2020-01-01 PROCEDURE — 84154 ASSAY OF PSA FREE: CPT

## 2020-01-01 PROCEDURE — 76770 US EXAM ABDO BACK WALL COMP: CPT | Mod: 26,,, | Performed by: RADIOLOGY

## 2020-01-01 PROCEDURE — 3077F SYST BP >= 140 MM HG: CPT | Mod: CPTII,S$GLB,, | Performed by: UROLOGY

## 2020-01-01 PROCEDURE — 1159F PR MEDICATION LIST DOCUMENTED IN MEDICAL RECORD: ICD-10-PCS | Mod: S$GLB,,, | Performed by: UROLOGY

## 2020-01-01 PROCEDURE — 1126F AMNT PAIN NOTED NONE PRSNT: CPT | Mod: S$GLB,,, | Performed by: UROLOGY

## 2020-01-01 PROCEDURE — 1101F PR PT FALLS ASSESS DOC 0-1 FALLS W/OUT INJ PAST YR: ICD-10-PCS | Mod: CPTII,S$GLB,, | Performed by: INTERNAL MEDICINE

## 2020-01-01 PROCEDURE — 99499 RISK ADDL DX/OHS AUDIT: ICD-10-PCS | Mod: S$GLB,,, | Performed by: INTERNAL MEDICINE

## 2020-01-01 PROCEDURE — 99215 OFFICE O/P EST HI 40 MIN: CPT | Mod: S$GLB,,, | Performed by: INTERNAL MEDICINE

## 2020-01-01 PROCEDURE — 80053 COMPREHEN METABOLIC PANEL: CPT

## 2020-01-01 PROCEDURE — 3008F PR BODY MASS INDEX (BMI) DOCUMENTED: ICD-10-PCS | Mod: CPTII,S$GLB,, | Performed by: UROLOGY

## 2020-01-01 PROCEDURE — 3079F DIAST BP 80-89 MM HG: CPT | Mod: CPTII,S$GLB,, | Performed by: NURSE PRACTITIONER

## 2020-01-01 PROCEDURE — 3044F HG A1C LEVEL LT 7.0%: CPT | Mod: CPTII,S$GLB,, | Performed by: PODIATRIST

## 2020-01-01 PROCEDURE — 99999 PR PBB SHADOW E&M-EST. PATIENT-LVL V: ICD-10-PCS | Mod: PBBFAC,,, | Performed by: INTERNAL MEDICINE

## 2020-01-01 PROCEDURE — 3072F PR LOW RISK FOR RETINOPATHY: ICD-10-PCS | Mod: S$GLB,,, | Performed by: PODIATRIST

## 2020-01-01 PROCEDURE — 3008F BODY MASS INDEX DOCD: CPT | Mod: CPTII,S$GLB,, | Performed by: INTERNAL MEDICINE

## 2020-01-01 PROCEDURE — 1101F PT FALLS ASSESS-DOCD LE1/YR: CPT | Mod: CPTII,S$GLB,, | Performed by: UROLOGY

## 2020-01-01 PROCEDURE — 3288F FALL RISK ASSESSMENT DOCD: CPT | Mod: CPTII,S$GLB,, | Performed by: NURSE PRACTITIONER

## 2020-01-01 PROCEDURE — 3072F PR LOW RISK FOR RETINOPATHY: ICD-10-PCS | Mod: S$GLB,,, | Performed by: NURSE PRACTITIONER

## 2020-01-01 PROCEDURE — 1126F AMNT PAIN NOTED NONE PRSNT: CPT | Mod: S$GLB,,, | Performed by: PODIATRIST

## 2020-01-01 PROCEDURE — 3008F PR BODY MASS INDEX (BMI) DOCUMENTED: ICD-10-PCS | Mod: CPTII,S$GLB,, | Performed by: INTERNAL MEDICINE

## 2020-01-01 PROCEDURE — 11721 DEBRIDE NAIL 6 OR MORE: CPT | Mod: Q9,S$GLB,, | Performed by: PODIATRIST

## 2020-01-01 PROCEDURE — 95251 CONT GLUC MNTR ANALYSIS I&R: CPT | Mod: S$GLB,,, | Performed by: INTERNAL MEDICINE

## 2020-01-01 PROCEDURE — 3079F DIAST BP 80-89 MM HG: CPT | Mod: CPTII,S$GLB,, | Performed by: INTERNAL MEDICINE

## 2020-01-01 PROCEDURE — 76770 US RETROPERITONEAL COMPLETE: ICD-10-PCS | Mod: 26,,, | Performed by: RADIOLOGY

## 2020-01-01 PROCEDURE — 3044F HG A1C LEVEL LT 7.0%: CPT | Mod: CPTII,95,, | Performed by: NURSE PRACTITIONER

## 2020-01-01 PROCEDURE — 85025 COMPLETE CBC W/AUTO DIFF WBC: CPT

## 2020-01-01 PROCEDURE — 1159F PR MEDICATION LIST DOCUMENTED IN MEDICAL RECORD: ICD-10-PCS | Mod: S$GLB,,, | Performed by: PODIATRIST

## 2020-01-01 PROCEDURE — 83036 HEMOGLOBIN GLYCOSYLATED A1C: CPT

## 2020-01-01 PROCEDURE — 99213 OFFICE O/P EST LOW 20 MIN: CPT | Mod: S$GLB,,, | Performed by: NURSE PRACTITIONER

## 2020-01-01 PROCEDURE — 99213 PR OFFICE/OUTPT VISIT, EST, LEVL III, 20-29 MIN: ICD-10-PCS | Mod: S$GLB,,, | Performed by: NURSE PRACTITIONER

## 2020-01-01 PROCEDURE — 99214 OFFICE O/P EST MOD 30 MIN: CPT | Mod: 95,,, | Performed by: PSYCHIATRY & NEUROLOGY

## 2020-01-01 PROCEDURE — 99214 OFFICE O/P EST MOD 30 MIN: CPT | Mod: S$GLB,,, | Performed by: INTERNAL MEDICINE

## 2020-01-01 PROCEDURE — 80069 RENAL FUNCTION PANEL: CPT

## 2020-01-01 PROCEDURE — 3008F BODY MASS INDEX DOCD: CPT | Mod: CPTII,S$GLB,, | Performed by: PODIATRIST

## 2020-01-01 PROCEDURE — 99499 RISK ADDL DX/OHS AUDIT: ICD-10-PCS | Mod: 95,,, | Performed by: NURSE PRACTITIONER

## 2020-01-01 PROCEDURE — 1126F PR PAIN SEVERITY QUANTIFIED, NO PAIN PRESENT: ICD-10-PCS | Mod: S$GLB,,, | Performed by: PODIATRIST

## 2020-01-01 PROCEDURE — 99213 PR OFFICE/OUTPT VISIT, EST, LEVL III, 20-29 MIN: ICD-10-PCS | Mod: 25,S$GLB,, | Performed by: PODIATRIST

## 2020-01-01 PROCEDURE — 3044F PR MOST RECENT HEMOGLOBIN A1C LEVEL <7.0%: ICD-10-PCS | Mod: CPTII,95,, | Performed by: NURSE PRACTITIONER

## 2020-01-01 PROCEDURE — 81001 URINALYSIS AUTO W/SCOPE: CPT

## 2020-01-01 PROCEDURE — 3079F PR MOST RECENT DIASTOLIC BLOOD PRESSURE 80-89 MM HG: ICD-10-PCS | Mod: CPTII,S$GLB,, | Performed by: NURSE PRACTITIONER

## 2020-01-01 PROCEDURE — 1159F PR MEDICATION LIST DOCUMENTED IN MEDICAL RECORD: ICD-10-PCS | Mod: S$GLB,,, | Performed by: INTERNAL MEDICINE

## 2020-01-01 PROCEDURE — 3072F LOW RISK FOR RETINOPATHY: CPT | Mod: S$GLB,,, | Performed by: PODIATRIST

## 2020-01-01 PROCEDURE — 3008F PR BODY MASS INDEX (BMI) DOCUMENTED: ICD-10-PCS | Mod: CPTII,S$GLB,, | Performed by: PODIATRIST

## 2020-01-01 PROCEDURE — 1101F PR PT FALLS ASSESS DOC 0-1 FALLS W/OUT INJ PAST YR: ICD-10-PCS | Mod: CPTII,S$GLB,, | Performed by: UROLOGY

## 2020-01-01 PROCEDURE — 3074F PR MOST RECENT SYSTOLIC BLOOD PRESSURE < 130 MM HG: ICD-10-PCS | Mod: CPTII,S$GLB,, | Performed by: INTERNAL MEDICINE

## 2020-01-01 PROCEDURE — 1101F PT FALLS ASSESS-DOCD LE1/YR: CPT | Mod: CPTII,S$GLB,, | Performed by: PODIATRIST

## 2020-01-01 PROCEDURE — 82728 ASSAY OF FERRITIN: CPT

## 2020-01-01 PROCEDURE — 71250 CT CHEST WITHOUT CONTRAST: ICD-10-PCS | Mod: 26,,, | Performed by: RADIOLOGY

## 2020-01-01 PROCEDURE — 3078F PR MOST RECENT DIASTOLIC BLOOD PRESSURE < 80 MM HG: ICD-10-PCS | Mod: CPTII,S$GLB,, | Performed by: INTERNAL MEDICINE

## 2020-01-01 PROCEDURE — 95251 PR GLUCOSE MONITOR, 72 HOUR, PHYS INTERP: ICD-10-PCS | Mod: S$GLB,,, | Performed by: INTERNAL MEDICINE

## 2020-01-01 RX ORDER — SEVELAMER CARBONATE 800 MG/1
800 TABLET, FILM COATED ORAL 3 TIMES DAILY
COMMUNITY
Start: 2020-01-01 | End: 2020-01-01

## 2020-01-01 RX ORDER — INSULIN DETEMIR 100 [IU]/ML
8 INJECTION, SOLUTION SUBCUTANEOUS NIGHTLY
COMMUNITY
End: 2021-01-01

## 2020-01-01 RX ORDER — GLUCAGON 1 MG
VIAL (EA) INJECTION
Qty: 1 EACH | Refills: 2 | Status: SHIPPED | OUTPATIENT
Start: 2020-01-01

## 2020-01-01 RX ORDER — CALCITRIOL 0.25 UG/1
0.25 CAPSULE ORAL DAILY
Qty: 30 CAPSULE | Refills: 3 | Status: ON HOLD | OUTPATIENT
Start: 2020-01-01 | End: 2021-01-01 | Stop reason: SDUPTHER

## 2020-01-01 RX ORDER — LEVOTHYROXINE SODIUM 200 UG/1
200 TABLET ORAL
Qty: 90 TABLET | Refills: 2 | Status: ON HOLD | OUTPATIENT
Start: 2020-01-01 | End: 2021-01-01 | Stop reason: SDUPTHER

## 2020-01-01 RX ORDER — LEVETIRACETAM 500 MG/1
500 TABLET ORAL 2 TIMES DAILY
COMMUNITY
Start: 2020-01-01 | End: 2020-01-01

## 2020-01-01 RX ORDER — CICLOPIROX 80 MG/ML
SOLUTION TOPICAL NIGHTLY
Qty: 6.6 ML | Refills: 11 | Status: SHIPPED | OUTPATIENT
Start: 2020-01-01 | End: 2021-01-01

## 2020-01-01 RX ORDER — INSULIN ASPART 100 [IU]/ML
INJECTION, SOLUTION INTRAVENOUS; SUBCUTANEOUS
Qty: 1 BOX | Refills: 1 | OUTPATIENT
Start: 2020-01-01 | End: 2020-01-01 | Stop reason: SDUPTHER

## 2020-01-01 RX ORDER — GLUCAGON INJECTION, SOLUTION 1 MG/.2ML
1 INJECTION, SOLUTION SUBCUTANEOUS
Qty: 2 SYRINGE | Refills: 0 | Status: SHIPPED | OUTPATIENT
Start: 2020-01-01 | End: 2021-01-01 | Stop reason: SDUPTHER

## 2020-01-01 RX ORDER — NAPROXEN SODIUM 220 MG/1
81 TABLET, FILM COATED ORAL DAILY
Refills: 0 | COMMUNITY
Start: 2020-01-01

## 2020-01-01 RX ORDER — BLOOD-GLUCOSE METER
EACH MISCELLANEOUS
Qty: 300 STRIP | Refills: 3 | Status: SHIPPED | OUTPATIENT
Start: 2020-01-01 | End: 2021-01-01

## 2020-01-01 RX ORDER — INSULIN GLARGINE 100 [IU]/ML
8 INJECTION, SOLUTION SUBCUTANEOUS
COMMUNITY
Start: 2020-01-01 | End: 2020-01-01

## 2020-01-01 RX ORDER — METOLAZONE 2.5 MG/1
2.5 TABLET ORAL DAILY PRN
Qty: 15 TABLET | Refills: 11 | Status: ON HOLD | OUTPATIENT
Start: 2020-01-01 | End: 2021-01-01 | Stop reason: HOSPADM

## 2020-01-01 RX ORDER — FERROUS SULFATE 325(65) MG
1 TABLET ORAL EVERY OTHER DAY
COMMUNITY
Start: 2020-01-01

## 2020-01-01 RX ORDER — INSULIN LISPRO 100 [IU]/ML
INJECTION, SOLUTION INTRAVENOUS; SUBCUTANEOUS
Qty: 1 BOX | Refills: 6 | Status: SHIPPED | OUTPATIENT
Start: 2020-01-01 | End: 2020-01-01

## 2020-01-01 RX ORDER — DULAGLUTIDE 0.75 MG/.5ML
INJECTION, SOLUTION SUBCUTANEOUS
Qty: 4 PEN | Refills: 8 | Status: SHIPPED | OUTPATIENT
Start: 2020-01-01 | End: 2020-01-01

## 2020-01-01 RX ORDER — INSULIN DETEMIR 100 [IU]/ML
INJECTION, SOLUTION SUBCUTANEOUS
Qty: 15 ML | Refills: 1 | Status: SHIPPED | OUTPATIENT
Start: 2020-01-01 | End: 2020-01-01

## 2020-01-01 RX ORDER — ENEMA 19; 7 G/133ML; G/133ML
1 ENEMA RECTAL ONCE
Qty: 1 BOTTLE | Refills: 0 | Status: SHIPPED | OUTPATIENT
Start: 2020-01-01 | End: 2020-01-01

## 2020-01-01 RX ORDER — INSULIN DETEMIR 100 [IU]/ML
INJECTION, SOLUTION SUBCUTANEOUS
Qty: 1 BOX | Refills: 1 | OUTPATIENT
Start: 2020-01-01 | End: 2020-01-01 | Stop reason: SDUPTHER

## 2020-01-01 RX ORDER — DULAGLUTIDE 0.75 MG/.5ML
0.75 INJECTION, SOLUTION SUBCUTANEOUS
Qty: 2 ML | Refills: 3 | Status: SHIPPED | OUTPATIENT
Start: 2020-01-01 | End: 2020-01-01

## 2020-01-01 RX ORDER — LEVOTHYROXINE SODIUM 50 UG/1
50 TABLET ORAL
Qty: 30 TABLET | Refills: 1 | Status: SHIPPED | OUTPATIENT
Start: 2020-01-01 | End: 2021-01-01

## 2020-01-01 RX ORDER — FLUOCINONIDE 0.5 MG/G
OINTMENT TOPICAL
Qty: 60 G | Refills: 3 | Status: SHIPPED | OUTPATIENT
Start: 2020-01-01

## 2020-01-01 RX ORDER — INSULIN ASPART 100 [IU]/ML
INJECTION, SOLUTION INTRAVENOUS; SUBCUTANEOUS
Qty: 15 ML | Refills: 1 | Status: ON HOLD | OUTPATIENT
Start: 2020-01-01 | End: 2021-01-01 | Stop reason: HOSPADM

## 2020-01-01 RX ORDER — AMLODIPINE BESYLATE 5 MG/1
5 TABLET ORAL DAILY
Qty: 30 TABLET | Refills: 11 | Status: SHIPPED | OUTPATIENT
Start: 2020-01-01 | End: 2021-01-01

## 2020-01-01 RX ORDER — PEN NEEDLE, DIABETIC 30 GX3/16"
NEEDLE, DISPOSABLE MISCELLANEOUS
Qty: 360 EACH | Refills: 3 | Status: SHIPPED | OUTPATIENT
Start: 2020-01-01

## 2020-01-01 RX ORDER — INSULIN ASPART 100 [IU]/ML
INJECTION, SOLUTION INTRAVENOUS; SUBCUTANEOUS
Qty: 3 BOX | Refills: 3 | Status: SHIPPED | OUTPATIENT
Start: 2020-01-01 | End: 2020-01-01

## 2020-01-31 ENCOUNTER — OFFICE VISIT (OUTPATIENT)
Dept: CARDIOLOGY | Facility: CLINIC | Age: 65
End: 2020-01-31
Payer: MEDICARE

## 2020-01-31 ENCOUNTER — LAB VISIT (OUTPATIENT)
Dept: LAB | Facility: HOSPITAL | Age: 65
End: 2020-01-31
Payer: MEDICARE

## 2020-01-31 VITALS
DIASTOLIC BLOOD PRESSURE: 74 MMHG | BODY MASS INDEX: 32.76 KG/M2 | HEART RATE: 72 BPM | WEIGHT: 228.81 LBS | SYSTOLIC BLOOD PRESSURE: 171 MMHG | HEIGHT: 70 IN | OXYGEN SATURATION: 97 %

## 2020-01-31 DIAGNOSIS — E66.9 OBESITY (BMI 30.0-34.9): ICD-10-CM

## 2020-01-31 DIAGNOSIS — Z85.850 HISTORY OF THYROID CANCER: ICD-10-CM

## 2020-01-31 DIAGNOSIS — E78.00 HYPERCHOLESTEROLEMIA: ICD-10-CM

## 2020-01-31 DIAGNOSIS — E11.22 TYPE 2 DIABETES MELLITUS WITH STAGE 3 CHRONIC KIDNEY DISEASE, WITH LONG-TERM CURRENT USE OF INSULIN: ICD-10-CM

## 2020-01-31 DIAGNOSIS — Z79.01 CHRONIC ANTICOAGULATION: Chronic | ICD-10-CM

## 2020-01-31 DIAGNOSIS — N18.30 HYPERTENSIVE KIDNEY DISEASE WITH STAGE 3 CHRONIC KIDNEY DISEASE: ICD-10-CM

## 2020-01-31 DIAGNOSIS — I50.32 CHRONIC DIASTOLIC HEART FAILURE: ICD-10-CM

## 2020-01-31 DIAGNOSIS — Z79.4 TYPE 2 DIABETES MELLITUS WITH STAGE 3 CHRONIC KIDNEY DISEASE, WITH LONG-TERM CURRENT USE OF INSULIN: ICD-10-CM

## 2020-01-31 DIAGNOSIS — N18.30 TYPE 2 DIABETES MELLITUS WITH STAGE 3 CHRONIC KIDNEY DISEASE, WITH LONG-TERM CURRENT USE OF INSULIN: ICD-10-CM

## 2020-01-31 DIAGNOSIS — I50.32 CHRONIC DIASTOLIC HEART FAILURE: Primary | ICD-10-CM

## 2020-01-31 DIAGNOSIS — I10 ESSENTIAL HYPERTENSION: ICD-10-CM

## 2020-01-31 DIAGNOSIS — I48.20 CHRONIC ATRIAL FIBRILLATION: ICD-10-CM

## 2020-01-31 DIAGNOSIS — I65.23 BILATERAL CAROTID ARTERY STENOSIS: ICD-10-CM

## 2020-01-31 DIAGNOSIS — N18.30 CHRONIC KIDNEY DISEASE, STAGE III (MODERATE): ICD-10-CM

## 2020-01-31 DIAGNOSIS — Z86.73 HISTORY OF CVA (CEREBROVASCULAR ACCIDENT): ICD-10-CM

## 2020-01-31 DIAGNOSIS — I12.9 HYPERTENSIVE KIDNEY DISEASE WITH STAGE 3 CHRONIC KIDNEY DISEASE: ICD-10-CM

## 2020-01-31 LAB
ANION GAP SERPL CALC-SCNC: 8 MMOL/L (ref 8–16)
BNP SERPL-MCNC: 479 PG/ML (ref 0–99)
BUN SERPL-MCNC: 30 MG/DL (ref 8–23)
CALCIUM SERPL-MCNC: 8.3 MG/DL (ref 8.7–10.5)
CHLORIDE SERPL-SCNC: 110 MMOL/L (ref 95–110)
CO2 SERPL-SCNC: 22 MMOL/L (ref 23–29)
CREAT SERPL-MCNC: 2.2 MG/DL (ref 0.5–1.4)
EST. GFR  (AFRICAN AMERICAN): 34.8 ML/MIN/1.73 M^2
EST. GFR  (NON AFRICAN AMERICAN): 30.1 ML/MIN/1.73 M^2
GLUCOSE SERPL-MCNC: 144 MG/DL (ref 70–110)
POTASSIUM SERPL-SCNC: 4.6 MMOL/L (ref 3.5–5.1)
SODIUM SERPL-SCNC: 140 MMOL/L (ref 136–145)

## 2020-01-31 PROCEDURE — 1159F PR MEDICATION LIST DOCUMENTED IN MEDICAL RECORD: ICD-10-PCS | Mod: S$GLB,,, | Performed by: NURSE PRACTITIONER

## 2020-01-31 PROCEDURE — 3078F PR MOST RECENT DIASTOLIC BLOOD PRESSURE < 80 MM HG: ICD-10-PCS | Mod: CPTII,S$GLB,, | Performed by: NURSE PRACTITIONER

## 2020-01-31 PROCEDURE — 99499 RISK ADDL DX/OHS AUDIT: ICD-10-PCS | Mod: S$GLB,,, | Performed by: NURSE PRACTITIONER

## 2020-01-31 PROCEDURE — 99999 PR PBB SHADOW E&M-EST. PATIENT-LVL IV: ICD-10-PCS | Mod: PBBFAC,,, | Performed by: NURSE PRACTITIONER

## 2020-01-31 PROCEDURE — 83880 ASSAY OF NATRIURETIC PEPTIDE: CPT

## 2020-01-31 PROCEDURE — 1126F AMNT PAIN NOTED NONE PRSNT: CPT | Mod: S$GLB,,, | Performed by: NURSE PRACTITIONER

## 2020-01-31 PROCEDURE — 3078F DIAST BP <80 MM HG: CPT | Mod: CPTII,S$GLB,, | Performed by: NURSE PRACTITIONER

## 2020-01-31 PROCEDURE — 3077F PR MOST RECENT SYSTOLIC BLOOD PRESSURE >= 140 MM HG: ICD-10-PCS | Mod: CPTII,S$GLB,, | Performed by: NURSE PRACTITIONER

## 2020-01-31 PROCEDURE — 99499 UNLISTED E&M SERVICE: CPT | Mod: S$GLB,,, | Performed by: NURSE PRACTITIONER

## 2020-01-31 PROCEDURE — 36415 COLL VENOUS BLD VENIPUNCTURE: CPT

## 2020-01-31 PROCEDURE — 1126F PR PAIN SEVERITY QUANTIFIED, NO PAIN PRESENT: ICD-10-PCS | Mod: S$GLB,,, | Performed by: NURSE PRACTITIONER

## 2020-01-31 PROCEDURE — 3044F HG A1C LEVEL LT 7.0%: CPT | Mod: CPTII,S$GLB,, | Performed by: NURSE PRACTITIONER

## 2020-01-31 PROCEDURE — 80048 BASIC METABOLIC PNL TOTAL CA: CPT

## 2020-01-31 PROCEDURE — 3077F SYST BP >= 140 MM HG: CPT | Mod: CPTII,S$GLB,, | Performed by: NURSE PRACTITIONER

## 2020-01-31 PROCEDURE — 1159F MED LIST DOCD IN RCRD: CPT | Mod: S$GLB,,, | Performed by: NURSE PRACTITIONER

## 2020-01-31 PROCEDURE — 99999 PR PBB SHADOW E&M-EST. PATIENT-LVL IV: CPT | Mod: PBBFAC,,, | Performed by: NURSE PRACTITIONER

## 2020-01-31 PROCEDURE — 3044F PR MOST RECENT HEMOGLOBIN A1C LEVEL <7.0%: ICD-10-PCS | Mod: CPTII,S$GLB,, | Performed by: NURSE PRACTITIONER

## 2020-01-31 PROCEDURE — 99214 PR OFFICE/OUTPT VISIT, EST, LEVL IV, 30-39 MIN: ICD-10-PCS | Mod: S$GLB,,, | Performed by: NURSE PRACTITIONER

## 2020-01-31 PROCEDURE — 1101F PT FALLS ASSESS-DOCD LE1/YR: CPT | Mod: CPTII,S$GLB,, | Performed by: NURSE PRACTITIONER

## 2020-01-31 PROCEDURE — 1101F PR PT FALLS ASSESS DOC 0-1 FALLS W/OUT INJ PAST YR: ICD-10-PCS | Mod: CPTII,S$GLB,, | Performed by: NURSE PRACTITIONER

## 2020-01-31 PROCEDURE — 99214 OFFICE O/P EST MOD 30 MIN: CPT | Mod: S$GLB,,, | Performed by: NURSE PRACTITIONER

## 2020-01-31 RX ORDER — CARVEDILOL 12.5 MG/1
12.5 TABLET ORAL 2 TIMES DAILY
Qty: 180 TABLET | Refills: 3 | Status: ON HOLD | OUTPATIENT
Start: 2020-01-31 | End: 2021-01-01

## 2020-01-31 RX ORDER — FUROSEMIDE 80 MG/1
80 TABLET ORAL 2 TIMES DAILY
Qty: 110 TABLET | Refills: 3 | Status: SHIPPED | OUTPATIENT
Start: 2020-01-31 | End: 2020-04-20 | Stop reason: SDUPTHER

## 2020-02-04 ENCOUNTER — TELEPHONE (OUTPATIENT)
Dept: INTERNAL MEDICINE | Facility: CLINIC | Age: 65
End: 2020-02-04

## 2020-02-04 NOTE — TELEPHONE ENCOUNTER
----- Message from Aileen Saucedo sent at 2/4/2020  9:36 AM CST -----  Contact: Rut (wife) [phone]  Patients wife is the caretaker for patient. She is requesting to have a letter stating she cannot make it to Jury Duty due to her being patients caretaker.  Please advise, thanks

## 2020-02-04 NOTE — LETTER
February 5, 2020    Alfa Christy III  [address]  [facility] [address]             [facility]  [address]  [place] [address]  Phone: [phone]  Fax: [phone] To whom it may concern:     Mr Alfa Christy III is under my care at the [facility]. Due to his medical conditions, he requires frequent assistance with daily activities. His wife Rut Christy is his primary support and caregiver. Please excuse her from jury duty as she is essential for his ongoing care.    Sincerely,         Tyler Jasmine MD

## 2020-02-05 NOTE — TELEPHONE ENCOUNTER
Letter composed, please print and contact patient / patient's wife to let her know to come pick this up.

## 2020-02-07 NOTE — PROGRESS NOTES
Subjective:      Patient ID: Alfa Christy III is a 64 y.o. male.    Chief Complaint:  Diabetes    History of Present Illness  Alfa Christy III presents today for follow up of type 2 DM.     Diagnosed with Type 2 DM in ~1990s. He was originally on orals, but started insulin in 2010. He was seen in ER for hyperglycemia in ~2010. (+) FH of DM in multiple family members. Has a history of CVA with diminished peripheral vision and also thyroid cancer, which was treated with surgery and radioactive iodine. Has sickle cell and last crisis in 2016.      Current DM Regimen:   Novolog 5-7-8 units tid ac meals   Levemir 13 units at bedtime   Trulicity 0.75 mg weekly ( taking on Thursdays )      Taking prandial injections with meals? Yes   Uses pens or vials? Pens      Currently denies polyuria, polydipsia, unexplained weight loss or blurred vision      Reports testing BG once daily every morning:  consistently under 150, 100-120 average           Hypoglycemia: Denies.   Symptoms: confusion   Treatment: regular soda   Knows how to correct with 15 grams of carbs- juice, coke, or a peppermint.     24 hour dietary recall:   Breakfast: cereal   Lunch: salmon with crackers   Dinner: meatballs and spaghetti, green beans, okra   Snacks: chocolate mints, banana, plum, peaches   Beverages: water      Known complications: CKD   Follows with Nephrology: with last office visit in 01/2018 with Dr. Gold      Diabetes education: 03/2018   Exercise: no formal routine but patient tries to stay active     Diabetes Management Status  Statin: Taking  ACE/ARB: Taking  Screening or Prevention Patient's value Goal Complete/Controlled?   HgA1C Testing and Control   Lab Results   Component Value Date    HGBA1C 5.1 02/12/2020      Ref. Range 2/12/2020 15:30   FRUCTOSAMINE Latest Ref Range: SeeBelow umol /L 240    Annually/Less than 8% Yes   Lipid profile : 10/11/2019 Annually Yes   LDL control Lab Results   Component Value Date    LDLCALC 62.0 (L)  10/11/2019    Annually/Less than 100 mg/dl  Yes   Nephropathy screening Lab Results   Component Value Date    LABMICR 823.0 10/11/2019     Lab Results   Component Value Date    PROTEINUA 2+ (A) 08/01/2019    Annually Yes   Blood pressure BP Readings from Last 1 Encounters:   02/19/20 (!) 140/68    Less than 140/90 No   Dilated retinal exam : 10/23/2019 Annually Yes   Foot exam   : 05/22/2019 Annually Yes     Also with h/o 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 There is some remaining tissue on the left.   A biopsy of this was done 11/03/2011, demonstrating an insufficient material to make a diagnosis      Current regimen: Levothyroxine 200 mcg, 1 tablet daily except Monday 1.5 tablets   Taking appropriately. Every morning with water on an empty stomach 30-45 minutes before eating or taking other medications      Denies any hypo/hyperthyroidism symptoms.   Ref. Range 10/11/2019 09:12 2/12/2020 09:44   TSH Latest Ref Range: 0.400 - 4.000 uIU/mL 2.703 2.181   Thyroglobulin Interpretation Unknown SEE BELOW SEE BELOW   Thyroglobulin Antibody Screen Latest Ref Range: <4.0 IU/mL <1.8 <1.8   Thyroglobulin, Tumor Marker Latest Units: ng/mL 0.2 (H) 0.2 (H)     An iodine scan in 2011 showed some remaining uptake.      Neck ultrasound study from 02/2019:  FINDINGS:  THYROID GLAND has been surgically removed.    Sonographic examination of neck compartments II through V was carried out.    0.97 x 0.82 x 0.63 cm heterogeneous lymph node is seen in left level IV.  This lymph node does not have a distinct fatty hilum and may contain microcalcificatoins.  Short/long axis ratio >0.5.  There is no appreciable vascularity.      Impression     Status post thyroidectomy.    Left neck level IV indeterminate lymph node is again seen and appears stable when compared to 2014.    As  thyroglobulin remains undetectable and this lymph node has not changed significantly since 2014 would recommend repeat ultrasound in one year for monitoring.      Last ultrasound 2/12/2020:  FINDINGS:  THYROID GLAND has been surgically removed.    Sonographic examination of neck compartments II through V was carried out.    1.10 x 0.83 x 0.65 cm lymph node is seen in left level IV.  This lymph node does not contain a distinct fatty hilum and may contain microcalcifications.  Short/long axis ratio is > 0.5.  This lymph node is avascular      Impression     Status post thyroidectomy.  Left level IV indeterminate lymph node is stable going back to 2014.  Would continue to monitor thyroglobulin levels and repeat neck ultrasound in one year.     Review of Systems   Constitutional: Negative for unexpected weight change.   Eyes: Negative for visual disturbance.   Respiratory: Negative for shortness of breath.    Cardiovascular: Negative for chest pain.   Gastrointestinal: Negative for abdominal pain.   Endocrine: Negative for cold intolerance, heat intolerance, polydipsia, polyphagia and polyuria.   Musculoskeletal: Negative for arthralgias.   Skin: Negative for wound.   Neurological: Negative for headaches.   Hematological: Does not bruise/bleed easily.   Psychiatric/Behavioral: Negative for sleep disturbance.     Objective:   Physical Exam   Neck:   No palpable thyroid tissue  Scar well healed   Cardiovascular: Normal rate.   No edema present   Pulmonary/Chest: Effort normal.   Abdominal: Soft.       Vitals reviewed.  Appropriate footwear, Foot exam deferred per patient.   No ulcers or wounds.   injection sites are ok. No lipo hypertropthy or atrophy    Body mass index is 32.42 kg/m².    Lab Review:   Lab Results   Component Value Date    HGBA1C 5.1 02/12/2020     Lab Results   Component Value Date    CHOL 114 (L) 10/11/2019    HDL 36 (L) 10/11/2019    LDLCALC 62.0 (L) 10/11/2019    TRIG 80 10/11/2019    CHOLHDL 31.6  10/11/2019     Lab Results   Component Value Date     02/12/2020    K 5.0 02/12/2020     02/12/2020    CO2 24 02/12/2020    GLU 98 02/12/2020    BUN 28 (H) 02/12/2020    CREATININE 2.4 (H) 02/12/2020    CALCIUM 8.6 (L) 02/12/2020    PROT 6.8 02/12/2020    ALBUMIN 3.2 (L) 02/12/2020    BILITOT 0.8 02/12/2020    ALKPHOS 110 02/12/2020    AST 32 02/12/2020    ALT 20 02/12/2020    ANIONGAP 8 02/12/2020    ESTGFRAFRICA 31.3 (A) 02/12/2020    EGFRNONAA 27.1 (A) 02/12/2020    TSH 2.181 02/12/2020     Assessment and Plan     1. Type 2 diabetes mellitus without ophthalmic manifestations  Hemoglobin A1c    Fructosamine    Comprehensive metabolic panel   2. Type 2 diabetes mellitus with stage 3 chronic kidney disease, with long-term current use of insulin     3. History of thyroid cancer  Thyroglobulin    TSH   4. Postsurgical hypothyroidism     5. Chronic atrial fibrillation     6. Essential hypertension     7. Hypercholesterolemia  Lipid panel   8. Obesity (BMI 30.0-34.9)       T2DM with stage 3 CKD with long term current use of insulin   -- discussed the goal of therapy is to obtain the best control we can without hypoglycemia   -- reviewed BG and A1c goals   -- continue checking Fructosamine with next set of labs as anemia can skew A1c results   -- reviewed signs and symptoms of hypoglycemia with the appropriate treatment protocol. Instructed on precautions before driving   -- instructed to smbg 4 x daily  -- advised to bring meter or logs to every office visit for review   -- notify office if BG <80 or >180  -- continue dietary and lifestyle modifications   -- recommend periodic follow ups for routine eye, foot and dental care   -- Continue Levemir 13u HS & Continue novolog & Trulicity doses      History of thyroid cancer   -- treated with surgery and Radioactive iodine   -- most recent TG is stable at 0.2.  -- pt is considered low risk for recurrence   -- recommend TSH goal <2.5   -- yearly TG levels that are  stable at 0.2  -- plan for repeat ultrasound in 1 year, stable (since 2014) left sided lymph node      Postsurgical hypothyroidism   -- see #2   -- Continue lt4 200 mcg daily except Monday 1.5 tablets.   -- reinforced to take LT4 as prescribed. On an empty stomach with water ideally an hour before eating or taking other medications   -- avoid exogenous hyperthyroidism as this can accelerate bone loss and increase risk of CV complications      Afib   -- followed and managed by Cardiology      HTN   -- BP at goal   -- followed and managed by cardiology    HLD   -- on statin per ADA recommendations   -- managed per cardiology      Body mass index is 32.42 kg/m².  -- encouraged dietary and lifestyle modifications   -- emphasized weight loss goals     Follow up in about 6 months (around 8/19/2020).  Labs prior to next appointment with me     Case discussed with Dr. Starr  Recommendations were discussed with the patient in detail  The patient verbalized understanding and agrees with the plan outlined as above.

## 2020-02-10 ENCOUNTER — PATIENT OUTREACH (OUTPATIENT)
Dept: ADMINISTRATIVE | Facility: HOSPITAL | Age: 65
End: 2020-02-10

## 2020-02-10 RX ORDER — INSULIN LISPRO 100 [IU]/ML
7 INJECTION, SOLUTION SUBCUTANEOUS
COMMUNITY
End: 2020-02-10 | Stop reason: SDUPTHER

## 2020-02-10 RX ORDER — INSULIN LISPRO 100 [IU]/ML
INJECTION, SOLUTION SUBCUTANEOUS
Qty: 20 ML | Refills: 6 | Status: SHIPPED | OUTPATIENT
Start: 2020-02-10 | End: 2020-01-01

## 2020-02-12 ENCOUNTER — HOSPITAL ENCOUNTER (OUTPATIENT)
Dept: ENDOCRINOLOGY | Facility: CLINIC | Age: 65
Discharge: HOME | End: 2020-02-12
Attending: NURSE PRACTITIONER
Payer: MEDICARE

## 2020-02-12 ENCOUNTER — TELEPHONE (OUTPATIENT)
Dept: CARDIOLOGY | Facility: CLINIC | Age: 65
End: 2020-02-12

## 2020-02-12 DIAGNOSIS — Z85.850 HISTORY OF THYROID CANCER: ICD-10-CM

## 2020-02-12 PROCEDURE — 76536 US SOFT TISSUE HEAD NECK THYROID: ICD-10-PCS | Mod: S$GLB,,, | Performed by: INTERNAL MEDICINE

## 2020-02-12 PROCEDURE — 76536 US EXAM OF HEAD AND NECK: CPT | Mod: S$GLB,,, | Performed by: INTERNAL MEDICINE

## 2020-02-13 ENCOUNTER — PATIENT MESSAGE (OUTPATIENT)
Dept: CARDIOLOGY | Facility: CLINIC | Age: 65
End: 2020-02-13

## 2020-02-14 ENCOUNTER — OFFICE VISIT (OUTPATIENT)
Dept: INTERNAL MEDICINE | Facility: CLINIC | Age: 65
End: 2020-02-14
Payer: MEDICARE

## 2020-02-14 DIAGNOSIS — H53.461 RIGHT HOMONYMOUS HEMIANOPSIA: ICD-10-CM

## 2020-02-14 DIAGNOSIS — Z79.4 TYPE 2 DIABETES MELLITUS WITH STAGE 3 CHRONIC KIDNEY DISEASE, WITH LONG-TERM CURRENT USE OF INSULIN: ICD-10-CM

## 2020-02-14 DIAGNOSIS — N18.30 HYPERTENSIVE KIDNEY DISEASE WITH STAGE 3 CHRONIC KIDNEY DISEASE: ICD-10-CM

## 2020-02-14 DIAGNOSIS — H36.89 SICKLE CELL RETINOPATHY WITHOUT CRISIS: ICD-10-CM

## 2020-02-14 DIAGNOSIS — N18.30 TYPE 2 DIABETES MELLITUS WITH STAGE 3 CHRONIC KIDNEY DISEASE, WITH LONG-TERM CURRENT USE OF INSULIN: ICD-10-CM

## 2020-02-14 DIAGNOSIS — E21.3 HYPERPARATHYROIDISM: ICD-10-CM

## 2020-02-14 DIAGNOSIS — I50.32 CHRONIC DIASTOLIC HEART FAILURE: ICD-10-CM

## 2020-02-14 DIAGNOSIS — Z86.73 HISTORY OF CVA (CEREBROVASCULAR ACCIDENT): ICD-10-CM

## 2020-02-14 DIAGNOSIS — D57.3 SICKLE CELL TRAIT: ICD-10-CM

## 2020-02-14 DIAGNOSIS — I12.9 HYPERTENSIVE KIDNEY DISEASE WITH STAGE 3 CHRONIC KIDNEY DISEASE: ICD-10-CM

## 2020-02-14 DIAGNOSIS — E11.22 TYPE 2 DIABETES MELLITUS WITH STAGE 3 CHRONIC KIDNEY DISEASE, WITH LONG-TERM CURRENT USE OF INSULIN: ICD-10-CM

## 2020-02-14 DIAGNOSIS — I48.20 CHRONIC ATRIAL FIBRILLATION: ICD-10-CM

## 2020-02-14 DIAGNOSIS — I10 ESSENTIAL HYPERTENSION: Primary | ICD-10-CM

## 2020-02-14 DIAGNOSIS — G40.209 COMPLEX PARTIAL SEIZURES EVOLVING TO GENERALIZED TONIC-CLONIC SEIZURES: ICD-10-CM

## 2020-02-14 DIAGNOSIS — E89.0 POSTSURGICAL HYPOTHYROIDISM: ICD-10-CM

## 2020-02-14 DIAGNOSIS — D57.1 SICKLE CELL RETINOPATHY WITHOUT CRISIS: ICD-10-CM

## 2020-02-14 DIAGNOSIS — H43.11 VITREOUS HEMORRHAGE OF RIGHT EYE: ICD-10-CM

## 2020-02-14 DIAGNOSIS — L30.9 CHRONIC ECZEMA: ICD-10-CM

## 2020-02-14 PROCEDURE — 1159F MED LIST DOCD IN RCRD: CPT | Mod: S$GLB,,, | Performed by: INTERNAL MEDICINE

## 2020-02-14 PROCEDURE — 1126F AMNT PAIN NOTED NONE PRSNT: CPT | Mod: S$GLB,,, | Performed by: INTERNAL MEDICINE

## 2020-02-14 PROCEDURE — 3044F PR MOST RECENT HEMOGLOBIN A1C LEVEL <7.0%: ICD-10-PCS | Mod: CPTII,S$GLB,, | Performed by: INTERNAL MEDICINE

## 2020-02-14 PROCEDURE — 99215 OFFICE O/P EST HI 40 MIN: CPT | Mod: S$GLB,,, | Performed by: INTERNAL MEDICINE

## 2020-02-14 PROCEDURE — 3044F HG A1C LEVEL LT 7.0%: CPT | Mod: CPTII,S$GLB,, | Performed by: INTERNAL MEDICINE

## 2020-02-14 PROCEDURE — 1126F PR PAIN SEVERITY QUANTIFIED, NO PAIN PRESENT: ICD-10-PCS | Mod: S$GLB,,, | Performed by: INTERNAL MEDICINE

## 2020-02-14 PROCEDURE — 99499 RISK ADDL DX/OHS AUDIT: ICD-10-PCS | Mod: S$GLB,,, | Performed by: INTERNAL MEDICINE

## 2020-02-14 PROCEDURE — 3075F SYST BP GE 130 - 139MM HG: CPT | Mod: CPTII,S$GLB,, | Performed by: INTERNAL MEDICINE

## 2020-02-14 PROCEDURE — 3078F DIAST BP <80 MM HG: CPT | Mod: CPTII,S$GLB,, | Performed by: INTERNAL MEDICINE

## 2020-02-14 PROCEDURE — 99999 PR PBB SHADOW E&M-EST. PATIENT-LVL V: CPT | Mod: PBBFAC,,, | Performed by: INTERNAL MEDICINE

## 2020-02-14 PROCEDURE — 3078F PR MOST RECENT DIASTOLIC BLOOD PRESSURE < 80 MM HG: ICD-10-PCS | Mod: CPTII,S$GLB,, | Performed by: INTERNAL MEDICINE

## 2020-02-14 PROCEDURE — 3075F PR MOST RECENT SYSTOLIC BLOOD PRESS GE 130-139MM HG: ICD-10-PCS | Mod: CPTII,S$GLB,, | Performed by: INTERNAL MEDICINE

## 2020-02-14 PROCEDURE — 99999 PR PBB SHADOW E&M-EST. PATIENT-LVL V: ICD-10-PCS | Mod: PBBFAC,,, | Performed by: INTERNAL MEDICINE

## 2020-02-14 PROCEDURE — 1101F PR PT FALLS ASSESS DOC 0-1 FALLS W/OUT INJ PAST YR: ICD-10-PCS | Mod: CPTII,S$GLB,, | Performed by: INTERNAL MEDICINE

## 2020-02-14 PROCEDURE — 1159F PR MEDICATION LIST DOCUMENTED IN MEDICAL RECORD: ICD-10-PCS | Mod: S$GLB,,, | Performed by: INTERNAL MEDICINE

## 2020-02-14 PROCEDURE — 99215 PR OFFICE/OUTPT VISIT, EST, LEVL V, 40-54 MIN: ICD-10-PCS | Mod: S$GLB,,, | Performed by: INTERNAL MEDICINE

## 2020-02-14 PROCEDURE — 99499 UNLISTED E&M SERVICE: CPT | Mod: S$GLB,,, | Performed by: INTERNAL MEDICINE

## 2020-02-14 PROCEDURE — 1101F PT FALLS ASSESS-DOCD LE1/YR: CPT | Mod: CPTII,S$GLB,, | Performed by: INTERNAL MEDICINE

## 2020-02-14 NOTE — PROGRESS NOTES
Subjective:       Patient ID: Alfa Christy III is a 64 y.o. male.    Chief Complaint: Follow-up (FOOT EXAM, LAST B/P 171/74)    HPI  64 y/o man with h/o CVA (11/2016) with residual deficits, h/o seizures, DM2, CKD, HTN, h/o thyroid cancer with postsurgical hypothyroidism, paroxysmal atrial fibrillation, chronic diastolic HF, sickle cell trait + retinopathy, BPH with obstruction, elevated PSA, multiple other medical issues here for follow up.   Seen 6/2019 for annual exam.  Here with his wife Rut.    Change in vision - reports saw his eye doctor recently, noted to have loss of vision in R eye, lateral side, and that he noticed this around 6 months ago  On review - has had vision changes since stroke including R homonymous hemianopsia since 2016.     Rash on left lower back, itching - multiple patches, chronic. Saw Dermatology for this 12/2018. Biopsy c/w with chronic eczematous dermatitis / nummular eczema. Treated with lidex in the past but doesn't have this now, has been developing more patches on back, hips.    DM2 - dx in 1990s, on insulin since 2010.   Following with Endocrine, seen 10/2019, last A1c 5.1 on labs 2/2020, but recommended by Endocrine to have fructosamine checked with next labs  BG up to 150-160s sometimes; does have symptoms with hypoglycemia by 75-80  Meds - levemir 12u qHS, novolog 5u qAC, trulicity 0.75mg weekly  Foot exam 5/22/19 with Dr Segura  Eye exam - done 10/23/2019    History of papillary thyroid cancer treated with surgery, RAIA. Post-surgical hypothyroidism, on levothyroxine 200mcg. Most recent TG undetectable.   TSH goal <2.5  Planned for yearly TG levels, surveillance ultrasound; last ultrasound 2/2020  Saw Endocrine 10/2019, dose increased by 1/2 tablet on 1 day/week     HFpEF, HTN, atrial fibrillation - follows with Cardiology, seen 1/31/20 for hospital follow up after overnight admission for hypervolemia/diastolic HF exacerbation  On ASA, apixaban 5mg BID, lasix (increased to  80mg BID at last cardiology visit), coreg 12.5mg BID (increased at last cardiology visit), norvasc 10mg, losartan 100mg, atorvastatin  They were concerned that dose of lasix was too high and are working on controlling fluid balance with minimizing sodium in diet. Have decreased dose back down to 60mg BID  Checking BP at home, reports 130-140s  Exercising 3-4 days/week but not walking every day  Tries to follow low-sodium diet    H/o CVA 2016, residual R vision field deficit, some cognitive and balance effects - L occipital stroke  Episode of aphasia 10/2017 without e/o stroke  Uses cane or assistance from another person    Seizures - taking keppra and vimpat BID  Follows with Dr Santiago in epilepsy clinic, seen 9/2019, next due at 6 months    BPH, elevated PSA - follows with Urology  Last PSA 16 7/2019    CKD stage 3 - follows with Dr Gold with nephrology, seen 8/2019, overdue for follow up  Last Cr 2.4, eGFR 31 2/2020  Reports drinking plenty of water daily  Mild anemia    Sickle cell trait, noted on previous eye exam to have sickle cell retinopathy - recommended for follow up with Heme/Onc but has not seen Dr Johansen since 2017     Umbilical hernia - stable, not painful.    Exercising regularly - 3-4 days/week at the gym if not going to MD appts - treadmill 30 min, weights 30 min    Colonoscopy done 9/2019, normal, recommended for repeat at 5 years  Completed pneumonia vaccine series  Has had flu vaccine this season  Has had zostavax, but not shingrix    Review of Systems   Constitutional: Negative for activity change, fatigue and unexpected weight change.   HENT: Negative.    Eyes: Positive for visual disturbance (no change).   Respiratory: Negative for cough and shortness of breath.    Cardiovascular: Positive for leg swelling (chronic). Negative for chest pain and palpitations.   Gastrointestinal: Negative for abdominal pain, blood in stool, constipation and diarrhea.   Endocrine: Negative.    Genitourinary:  "Negative for difficulty urinating.   Musculoskeletal: Positive for gait problem. Negative for back pain.   Skin: Negative.    Neurological: Negative for seizures, weakness, numbness and headaches.        Balance problem since stroke   Hematological: Does not bruise/bleed easily.   Psychiatric/Behavioral: Negative for dysphoric mood. The patient is not nervous/anxious.          Past medical history, surgical history, and family medical history reviewed and updated as appropriate.    Medications and allergies reviewed.     Objective:          Vitals:    02/14/20 1422 02/14/20 1455   BP: (!) 140/78 136/72   BP Location: Left arm    Patient Position: Sitting    BP Method: Large (Manual)    Pulse: 70    Temp: 97.5 °F (36.4 °C)    SpO2: 99%    Weight: 103.2 kg (227 lb 8.2 oz)    Height: 5' 10" (1.778 m)      Body mass index is 32.65 kg/m².  Physical Exam   Constitutional: He appears well-developed and well-nourished. No distress.   HENT:   Head: Normocephalic and atraumatic.   Mouth/Throat: Oropharynx is clear and moist.   Eyes: Conjunctivae and EOM are normal. No scleral icterus.   Neck: Neck supple.   Cardiovascular: Normal rate and normal heart sounds. An irregularly irregular rhythm present.   No murmur heard.  Pulmonary/Chest: Effort normal and breath sounds normal. No respiratory distress.   Abdominal: Soft. Bowel sounds are normal. He exhibits no distension. There is no tenderness. A hernia (hernia larger than previous, partially reducible, nontender) is present.   Musculoskeletal: He exhibits edema (1+ pitting edema). He exhibits no tenderness.   Lymphadenopathy:     He has no cervical adenopathy.   Neurological: He is alert. He has normal strength. No cranial nerve deficit. Gait normal.   Oriented to self, place, situation. Repeats questions at times, sometimes requires redirection when perseverating or tangential. Memory concern - reporting rash and vision change as new despite both being present for years. "   Skin: Skin is warm and dry.   +mild hyperpigmentation of skin at anterior shins bilaterally  +multiple macular hyperpigmented skin lesions on back with irregular border, slight scaling  +two 3-5cm flat raised nodular lesions, flesh-colored, on upper back   Psychiatric: He has a normal mood and affect.   Vitals reviewed.      Lab Results   Component Value Date    WBC 7.06 10/11/2019    HGB 11.5 (L) 10/11/2019    HCT 33.8 (L) 10/11/2019     10/11/2019    CHOL 114 (L) 10/11/2019    TRIG 80 10/11/2019    HDL 36 (L) 10/11/2019    ALT 20 02/12/2020    AST 32 02/12/2020     02/12/2020    K 5.0 02/12/2020     02/12/2020    CREATININE 2.4 (H) 02/12/2020    BUN 28 (H) 02/12/2020    CO2 24 02/12/2020    TSH 2.181 02/12/2020    PSA 7.2 (H) 10/28/2015    INR 1.0 05/31/2018    GLUF 106 11/05/2008    HGBA1C 5.1 02/12/2020       Assessment:       1. Essential hypertension    2. Hypertensive kidney disease with stage 3 chronic kidney disease    3. Chronic diastolic heart failure    4. Chronic atrial fibrillation    5. Type 2 diabetes mellitus with stage 3 chronic kidney disease, with long-term current use of insulin    6. Chronic eczema    7. History of CVA (cerebrovascular accident)    8. Complex partial seizures evolving to generalized tonic-clonic seizures    9. Sickle cell trait    10. Hyperparathyroidism    11. Postsurgical hypothyroidism    12. 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 Vitreous hemorrhage of right eye    14. Right homonymous hemianopsia        Plan:   Alfa was seen today for follow-up.    Diagnoses and all orders for this visit:    Essential hypertension  -     Ambulatory referral/consult to Nutrition Services; Future  Hypertensive kidney disease with stage 3 chronic kidney disease  -     Ambulatory referral/consult to Nutrition Services; Future  Chronic diastolic heart failure  -     Ambulatory referral/consult to Nutrition Services; Future  Chronic atrial fibrillation  Reviewed  medications - they have decreased lasix down to 60mg BID. Does continue to have some LE edema, no e/o pulmonary edema. Recommend discussing this with cardiologist  Also referring to nutrition for discussion re: low-sodium diet. Given CKD, agree with approach to controlling fluid balance with diet as much as possible.   Follow with nephrology    Type 2 diabetes mellitus with stage 3 chronic kidney disease, with long-term current use of insulin  -     Ambulatory referral/consult to Nutrition Services; Future    Chronic eczema  -     Ambulatory referral/consult to Dermatology; Future  Reviewed dermatologist note and path results with patient and family  Recommended follow up with dermatologist  Use lidex ointment BID on rash    History of CVA (cerebrovascular accident)  Complex partial seizures evolving to generalized tonic-clonic seizures  Follow with neurology; recommended discuss difficulty with memory further at next neurology visit.    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   Card given for shingrix    Follow up in about 6 months (around 8/14/2020) for annual physical.    Tyler Jasmine MD  Internal Medicine  [facility]  2/14/2020

## 2020-02-14 NOTE — PATIENT INSTRUCTIONS
There is a new shingles vaccine available (Shingrix) that is both safer and more effective than the old shingles vaccine (Zostavax). I do recommend that you get this, as it can help prevent a shingles outbreak even if you have had one in the past.   For this vaccine, you will need a series of 2 shots, first one and then a second 2 to 6 months later.  Please check in with the [facility] or with your local pharmacy about getting this vaccine; they should be able to check on insurance coverage and whether there is any cost to you.     For the rash / patches on your back:  Use the lidex ointment (from the dermatologist / Dr Gtz) twice daily to all of the scaly patches on hips, buttocks twice daily  Keep the rest of your skin well moisturized - 1 bath/shower daily at most; lukewarm water; pat dry and apply moisturizer such as CeraVe cream to damp skin after showering.    Please check in with your cardiologist about the fluid pill and blood pressure.  Work with the nutritionist to reduce sodium and try to use this to help manage fluid as well as blood pressure.

## 2020-02-17 VITALS
SYSTOLIC BLOOD PRESSURE: 136 MMHG | DIASTOLIC BLOOD PRESSURE: 72 MMHG | TEMPERATURE: 98 F | HEART RATE: 70 BPM | WEIGHT: 227.5 LBS | BODY MASS INDEX: 32.57 KG/M2 | HEIGHT: 70 IN | OXYGEN SATURATION: 99 %

## 2020-02-17 PROBLEM — L30.9 CHRONIC ECZEMA: Status: ACTIVE | Noted: 2020-02-17

## 2020-02-17 PROBLEM — R07.9 CHEST PAIN: Status: RESOLVED | Noted: 2019-10-03 | Resolved: 2020-02-17

## 2020-02-19 ENCOUNTER — OFFICE VISIT (OUTPATIENT)
Dept: ENDOCRINOLOGY | Facility: CLINIC | Age: 65
End: 2020-02-19
Payer: MEDICARE

## 2020-02-19 VITALS
HEART RATE: 78 BPM | BODY MASS INDEX: 32.35 KG/M2 | SYSTOLIC BLOOD PRESSURE: 140 MMHG | WEIGHT: 226 LBS | DIASTOLIC BLOOD PRESSURE: 68 MMHG | HEIGHT: 70 IN

## 2020-02-19 DIAGNOSIS — Z85.850 HISTORY OF THYROID CANCER: ICD-10-CM

## 2020-02-19 DIAGNOSIS — E11.22 TYPE 2 DIABETES MELLITUS WITH STAGE 3 CHRONIC KIDNEY DISEASE, WITH LONG-TERM CURRENT USE OF INSULIN: ICD-10-CM

## 2020-02-19 DIAGNOSIS — E78.00 HYPERCHOLESTEROLEMIA: ICD-10-CM

## 2020-02-19 DIAGNOSIS — E89.0 POSTSURGICAL HYPOTHYROIDISM: ICD-10-CM

## 2020-02-19 DIAGNOSIS — I10 ESSENTIAL HYPERTENSION: ICD-10-CM

## 2020-02-19 DIAGNOSIS — E11.9 TYPE 2 DIABETES MELLITUS WITHOUT OPHTHALMIC MANIFESTATIONS: Primary | ICD-10-CM

## 2020-02-19 DIAGNOSIS — I48.20 CHRONIC ATRIAL FIBRILLATION: ICD-10-CM

## 2020-02-19 DIAGNOSIS — E66.9 OBESITY (BMI 30.0-34.9): ICD-10-CM

## 2020-02-19 DIAGNOSIS — N18.30 TYPE 2 DIABETES MELLITUS WITH STAGE 3 CHRONIC KIDNEY DISEASE, WITH LONG-TERM CURRENT USE OF INSULIN: ICD-10-CM

## 2020-02-19 DIAGNOSIS — Z79.4 TYPE 2 DIABETES MELLITUS WITH STAGE 3 CHRONIC KIDNEY DISEASE, WITH LONG-TERM CURRENT USE OF INSULIN: ICD-10-CM

## 2020-02-19 PROCEDURE — 3044F PR MOST RECENT HEMOGLOBIN A1C LEVEL <7.0%: ICD-10-PCS | Mod: CPTII,S$GLB,, | Performed by: NURSE PRACTITIONER

## 2020-02-19 PROCEDURE — 3078F DIAST BP <80 MM HG: CPT | Mod: CPTII,S$GLB,, | Performed by: NURSE PRACTITIONER

## 2020-02-19 PROCEDURE — 1101F PT FALLS ASSESS-DOCD LE1/YR: CPT | Mod: CPTII,S$GLB,, | Performed by: NURSE PRACTITIONER

## 2020-02-19 PROCEDURE — 3078F PR MOST RECENT DIASTOLIC BLOOD PRESSURE < 80 MM HG: ICD-10-PCS | Mod: CPTII,S$GLB,, | Performed by: NURSE PRACTITIONER

## 2020-02-19 PROCEDURE — 3077F PR MOST RECENT SYSTOLIC BLOOD PRESSURE >= 140 MM HG: ICD-10-PCS | Mod: CPTII,S$GLB,, | Performed by: NURSE PRACTITIONER

## 2020-02-19 PROCEDURE — 99999 PR PBB SHADOW E&M-EST. PATIENT-LVL III: ICD-10-PCS | Mod: PBBFAC,,, | Performed by: NURSE PRACTITIONER

## 2020-02-19 PROCEDURE — 99214 OFFICE O/P EST MOD 30 MIN: CPT | Mod: S$GLB,,, | Performed by: NURSE PRACTITIONER

## 2020-02-19 PROCEDURE — 1159F PR MEDICATION LIST DOCUMENTED IN MEDICAL RECORD: ICD-10-PCS | Mod: S$GLB,,, | Performed by: NURSE PRACTITIONER

## 2020-02-19 PROCEDURE — 1126F PR PAIN SEVERITY QUANTIFIED, NO PAIN PRESENT: ICD-10-PCS | Mod: S$GLB,,, | Performed by: NURSE PRACTITIONER

## 2020-02-19 PROCEDURE — 1159F MED LIST DOCD IN RCRD: CPT | Mod: S$GLB,,, | Performed by: NURSE PRACTITIONER

## 2020-02-19 PROCEDURE — 99214 PR OFFICE/OUTPT VISIT, EST, LEVL IV, 30-39 MIN: ICD-10-PCS | Mod: S$GLB,,, | Performed by: NURSE PRACTITIONER

## 2020-02-19 PROCEDURE — 3044F HG A1C LEVEL LT 7.0%: CPT | Mod: CPTII,S$GLB,, | Performed by: NURSE PRACTITIONER

## 2020-02-19 PROCEDURE — 99499 UNLISTED E&M SERVICE: CPT | Mod: S$GLB,,, | Performed by: NURSE PRACTITIONER

## 2020-02-19 PROCEDURE — 1101F PR PT FALLS ASSESS DOC 0-1 FALLS W/OUT INJ PAST YR: ICD-10-PCS | Mod: CPTII,S$GLB,, | Performed by: NURSE PRACTITIONER

## 2020-02-19 PROCEDURE — 99999 PR PBB SHADOW E&M-EST. PATIENT-LVL III: CPT | Mod: PBBFAC,,, | Performed by: NURSE PRACTITIONER

## 2020-02-19 PROCEDURE — 99499 RISK ADDL DX/OHS AUDIT: ICD-10-PCS | Mod: S$GLB,,, | Performed by: NURSE PRACTITIONER

## 2020-02-19 PROCEDURE — 1126F AMNT PAIN NOTED NONE PRSNT: CPT | Mod: S$GLB,,, | Performed by: NURSE PRACTITIONER

## 2020-02-19 PROCEDURE — 3077F SYST BP >= 140 MM HG: CPT | Mod: CPTII,S$GLB,, | Performed by: NURSE PRACTITIONER

## 2020-02-27 ENCOUNTER — TELEPHONE (OUTPATIENT)
Dept: NEPHROLOGY | Facility: CLINIC | Age: 65
End: 2020-02-27

## 2020-02-27 NOTE — TELEPHONE ENCOUNTER
----- Message from Marivel Rivera sent at 2/27/2020  2:31 PM CST -----  Contact: patient wife   Please call above patient wife at [phone] returning call to the nurse to schedule lab before doctor appointment thanks.

## 2020-02-27 NOTE — TELEPHONE ENCOUNTER
----- Message from Rachel Gold MD sent at 2/27/2020  1:19 PM CST -----  Yes.    ----- Message -----  From: July Perdue MA  Sent: 2/27/2020   8:48 AM CST  To: Rachel Gold MD     Good morning!     Is pt suitable to see NP for soon appt ?    Thanks

## 2020-03-05 NOTE — PROGRESS NOTES
"Subjective:       Patient ID: Alfa Christy III is a 64 y.o. AA male who presents for follow-up evaluation of CKD, proteinuria   No chief complaint on file.    HPI   Patient is new to me. Last seen by Dr. Gold in August 2019.  Prior pertinent chart reviewed since this is patient's first appointment with me who helps c history.  Presents c caretaker/wife who helps with history.    Patient presents for f/u of CKD.  Baseline creatinine of 2.0-2.2 mg/dL.    Home BPs: 130s-140s/60-70s before taking medications     Significant hx includes urinary retention in 2018, longstanding diabetes for >15 years (wife says difficult to control when first diagnosed), hypertension, CVA, CHF, paroxysmal atrial fibrillation, chronic anticoagulation use, hyperlipidemia, sickle cell trait, thyroid cancer, HLD.  He also at some point was taking NSAID like Advil. Per available labs since 2005 to 2007 he has had elevated creatinine with some episodes of JAELYN.    The patient denies taking NSAIDs, or new antibiotics, recreational drugs, recent episode of dehydration, diarrhea, nausea or vomiting, acute illness, hospitalization or exposure to IV radiocontrast.     Significant family hx includes: DM, HTN, heart disease    Last renal US: January 2019, reviewed.    Review of Systems   Constitutional: Negative for fatigue.   HENT: Negative for facial swelling.    Respiratory: Negative for cough, shortness of breath, wheezing and stridor.    Cardiovascular: Positive for leg swelling. Negative for chest pain and palpitations.   Gastrointestinal: Negative for diarrhea, nausea and vomiting.   Genitourinary: Positive for urgency. Negative for difficulty urinating, dysuria, flank pain, frequency and hematuria.   Neurological: Negative for dizziness, weakness and light-headedness.       Objective:       Blood pressure (!) 160/80, pulse 72, height 5' 10" (1.778 m), weight 102.2 kg (225 lb 5 oz), SpO2 98 %.  Physical Exam   Constitutional: He is oriented to " person, place, and time. He appears well-developed and well-nourished. No distress.   Neck: Neck supple.   Cardiovascular: Normal rate, regular rhythm and normal heart sounds. Exam reveals no gallop and no friction rub.   No murmur heard.  Pulmonary/Chest: Effort normal and breath sounds normal. No stridor. No respiratory distress. He has no wheezes. He has no rales.   Abdominal: Soft. Bowel sounds are normal. He exhibits no distension. There is no tenderness. There is no CVA tenderness.   Musculoskeletal: He exhibits edema ( +2 pitting BLE to ankles, shins; trace to knees).   Neurological: He is alert and oriented to person, place, and time.   Skin: Skin is warm and dry. He is not diaphoretic. No pallor.   Psychiatric: His affect is blunt.   Seems easily confused and aggravated   Vitals reviewed.        Crt: 2.3 mg/dL  Hgb: 11.5 g/dL  CO2: 24 mmol/L  UPCR: 3.69 g/g (from 1.08 g/g)  A1c: 5.0%    Assessment:       1. Chronic kidney disease, stage III (moderate)    2. Type 2 diabetes mellitus with stage 3 chronic kidney disease, with long-term current use of insulin    3. Hypertensive kidney disease with stage 3 chronic kidney disease    4. Proteinuria, unspecified type        Plan:     CKD stage 3B c eGFR 31-33 mL/min - due to previously poorly-controlled diabetes, hypertension. Mild increase in sCr recently; proteinuric, significantly worse; now in nephrotic range. Proteinuria could be due to DM, but given tightly controlled A1c, edema, and increased rate of rise in proteinuria, will do proteinuria workup. Educated patient to control BP, BG, remain well-hydrated, and avoid NSAIDs to prevent progression of CKD. Moderate risk for progression to ESRD in next 5 years.    UPCR Proteinuric; significantly increased. On valsartan 320 mg. Needs proteinuria workup.   Acid-base WNL.   Renal osteodystrophy Corrected Ca, phos okay. PTH trending down on vit D 1000 units   Anemia Previously at goal. Need repeat.   DM  Well-controlled on Novolog, detemir, trulicity   Lipid Management On statin   ESRD planning Anticipatory guidance provided about timing of dialysis. Start discussions and planning when eGFR is about 20 mL/min; most patients start dialysis between 5-10 mL/min.     HTN - High today, but home BPs seem okay on amlodipine 5 mg, carvediolol 12.5 mg BID, lasix 60 mg BID, valsartan 320 mg. Goal < 130/80. Will increase lasix back to 80 mg BID. Instructed to take second dose in afternoon around 3 p.m. (takes morning dose between 9-10).  Told wife okay to take Lasix 80 mg in morning and 60 mg in afternoon if he does not seem to be tolerating it.  Wife said pt became irritable and that she attributed to taking 80 mg BID, but said evening dose was given at 9 p.m, so patient was not sleeping through night.    Edema - Encouraged low salt diet, elevation. Will increase lasix back to 80 mg BID. Instructed to take second dose in afternoon around 3 p.m. (takes morning dose between 9-10).  Told wife okay to take Lasix 80 mg in morning and 60 mg in afternoon if he does not seem to be tolerating it.  Wife said pt became irritable and that she attributed to taking 80 mg BID, but said evening dose was given at 9 p.m, and patient was not sleeping through night due to lasix-induced nocturia.    All questions patient had were answered.  Asked if further questions. None. F/u in clinic in 2 mos with labs and urine prior to next visit or sooner if needed.  ER for emergency concerns.    Summary of Plan:  1. Proteinuria workup  2. Increase lasix to 80 mg BID; call if having issues with this  3. Avoid NSAIDs, IV contrast, Bactrim, aminoglycosides  4. RTC in 2 mos c Dr. Gold

## 2020-03-09 ENCOUNTER — LAB VISIT (OUTPATIENT)
Dept: LAB | Facility: HOSPITAL | Age: 65
End: 2020-03-09
Attending: INTERNAL MEDICINE
Payer: MEDICARE

## 2020-03-09 DIAGNOSIS — N18.30 TYPE 2 DIABETES MELLITUS WITH STAGE 3 CHRONIC KIDNEY DISEASE, WITH LONG-TERM CURRENT USE OF INSULIN: ICD-10-CM

## 2020-03-09 DIAGNOSIS — N18.30 CHRONIC KIDNEY DISEASE, STAGE III (MODERATE): ICD-10-CM

## 2020-03-09 DIAGNOSIS — E11.29 PROTEINURIA DUE TO TYPE 2 DIABETES MELLITUS: ICD-10-CM

## 2020-03-09 DIAGNOSIS — N18.30 HYPERTENSIVE KIDNEY DISEASE WITH STAGE 3 CHRONIC KIDNEY DISEASE: ICD-10-CM

## 2020-03-09 DIAGNOSIS — E21.3 HYPERPARATHYROIDISM: ICD-10-CM

## 2020-03-09 DIAGNOSIS — I12.9 HYPERTENSIVE KIDNEY DISEASE WITH STAGE 3 CHRONIC KIDNEY DISEASE: ICD-10-CM

## 2020-03-09 DIAGNOSIS — Z79.4 TYPE 2 DIABETES MELLITUS WITH STAGE 3 CHRONIC KIDNEY DISEASE, WITH LONG-TERM CURRENT USE OF INSULIN: ICD-10-CM

## 2020-03-09 DIAGNOSIS — R80.9 PROTEINURIA DUE TO TYPE 2 DIABETES MELLITUS: ICD-10-CM

## 2020-03-09 DIAGNOSIS — E11.22 TYPE 2 DIABETES MELLITUS WITH STAGE 3 CHRONIC KIDNEY DISEASE, WITH LONG-TERM CURRENT USE OF INSULIN: ICD-10-CM

## 2020-03-09 LAB
25(OH)D3+25(OH)D2 SERPL-MCNC: 25 NG/ML (ref 30–96)
ALBUMIN SERPL BCP-MCNC: 3.2 G/DL (ref 3.5–5.2)
ANION GAP SERPL CALC-SCNC: 8 MMOL/L (ref 8–16)
BUN SERPL-MCNC: 27 MG/DL (ref 8–23)
CALCIUM SERPL-MCNC: 8.3 MG/DL (ref 8.7–10.5)
CHLORIDE SERPL-SCNC: 110 MMOL/L (ref 95–110)
CO2 SERPL-SCNC: 24 MMOL/L (ref 23–29)
CREAT SERPL-MCNC: 2.3 MG/DL (ref 0.5–1.4)
EST. GFR  (AFRICAN AMERICAN): 33 ML/MIN/1.73 M^2
EST. GFR  (NON AFRICAN AMERICAN): 28.5 ML/MIN/1.73 M^2
GLUCOSE SERPL-MCNC: 96 MG/DL (ref 70–110)
PHOSPHATE SERPL-MCNC: 4 MG/DL (ref 2.7–4.5)
POTASSIUM SERPL-SCNC: 4.8 MMOL/L (ref 3.5–5.1)
PTH-INTACT SERPL-MCNC: 170 PG/ML (ref 9–77)
SODIUM SERPL-SCNC: 142 MMOL/L (ref 136–145)

## 2020-03-09 PROCEDURE — 80069 RENAL FUNCTION PANEL: CPT

## 2020-03-09 PROCEDURE — 36415 COLL VENOUS BLD VENIPUNCTURE: CPT | Mod: PO

## 2020-03-09 PROCEDURE — 83970 ASSAY OF PARATHORMONE: CPT

## 2020-03-09 PROCEDURE — 82306 VITAMIN D 25 HYDROXY: CPT

## 2020-03-11 ENCOUNTER — OFFICE VISIT (OUTPATIENT)
Dept: NEPHROLOGY | Facility: CLINIC | Age: 65
End: 2020-03-11
Payer: MEDICARE

## 2020-03-11 VITALS
OXYGEN SATURATION: 98 % | HEART RATE: 72 BPM | WEIGHT: 225.31 LBS | BODY MASS INDEX: 32.26 KG/M2 | HEIGHT: 70 IN | DIASTOLIC BLOOD PRESSURE: 80 MMHG | SYSTOLIC BLOOD PRESSURE: 160 MMHG

## 2020-03-11 DIAGNOSIS — N18.30 CHRONIC KIDNEY DISEASE, STAGE III (MODERATE): Primary | ICD-10-CM

## 2020-03-11 DIAGNOSIS — E11.22 TYPE 2 DIABETES MELLITUS WITH STAGE 3 CHRONIC KIDNEY DISEASE, WITH LONG-TERM CURRENT USE OF INSULIN: ICD-10-CM

## 2020-03-11 DIAGNOSIS — N18.30 TYPE 2 DIABETES MELLITUS WITH STAGE 3 CHRONIC KIDNEY DISEASE, WITH LONG-TERM CURRENT USE OF INSULIN: ICD-10-CM

## 2020-03-11 DIAGNOSIS — R80.9 PROTEINURIA, UNSPECIFIED TYPE: ICD-10-CM

## 2020-03-11 DIAGNOSIS — I12.9 HYPERTENSIVE KIDNEY DISEASE WITH STAGE 3 CHRONIC KIDNEY DISEASE: ICD-10-CM

## 2020-03-11 DIAGNOSIS — N18.30 HYPERTENSIVE KIDNEY DISEASE WITH STAGE 3 CHRONIC KIDNEY DISEASE: ICD-10-CM

## 2020-03-11 DIAGNOSIS — Z79.4 TYPE 2 DIABETES MELLITUS WITH STAGE 3 CHRONIC KIDNEY DISEASE, WITH LONG-TERM CURRENT USE OF INSULIN: ICD-10-CM

## 2020-03-11 PROCEDURE — 1126F AMNT PAIN NOTED NONE PRSNT: CPT | Mod: S$GLB,,, | Performed by: NURSE PRACTITIONER

## 2020-03-11 PROCEDURE — 1159F MED LIST DOCD IN RCRD: CPT | Mod: S$GLB,,, | Performed by: NURSE PRACTITIONER

## 2020-03-11 PROCEDURE — 99214 OFFICE O/P EST MOD 30 MIN: CPT | Mod: S$GLB,,, | Performed by: NURSE PRACTITIONER

## 2020-03-11 PROCEDURE — 99499 RISK ADDL DX/OHS AUDIT: ICD-10-PCS | Mod: S$GLB,,, | Performed by: NURSE PRACTITIONER

## 2020-03-11 PROCEDURE — 1101F PT FALLS ASSESS-DOCD LE1/YR: CPT | Mod: CPTII,S$GLB,, | Performed by: NURSE PRACTITIONER

## 2020-03-11 PROCEDURE — 99999 PR PBB SHADOW E&M-EST. PATIENT-LVL III: CPT | Mod: PBBFAC,,, | Performed by: NURSE PRACTITIONER

## 2020-03-11 PROCEDURE — 1126F PR PAIN SEVERITY QUANTIFIED, NO PAIN PRESENT: ICD-10-PCS | Mod: S$GLB,,, | Performed by: NURSE PRACTITIONER

## 2020-03-11 PROCEDURE — 99499 UNLISTED E&M SERVICE: CPT | Mod: S$GLB,,, | Performed by: NURSE PRACTITIONER

## 2020-03-11 PROCEDURE — 3044F PR MOST RECENT HEMOGLOBIN A1C LEVEL <7.0%: ICD-10-PCS | Mod: CPTII,S$GLB,, | Performed by: NURSE PRACTITIONER

## 2020-03-11 PROCEDURE — 1159F PR MEDICATION LIST DOCUMENTED IN MEDICAL RECORD: ICD-10-PCS | Mod: S$GLB,,, | Performed by: NURSE PRACTITIONER

## 2020-03-11 PROCEDURE — 1101F PR PT FALLS ASSESS DOC 0-1 FALLS W/OUT INJ PAST YR: ICD-10-PCS | Mod: CPTII,S$GLB,, | Performed by: NURSE PRACTITIONER

## 2020-03-11 PROCEDURE — 3077F PR MOST RECENT SYSTOLIC BLOOD PRESSURE >= 140 MM HG: ICD-10-PCS | Mod: CPTII,S$GLB,, | Performed by: NURSE PRACTITIONER

## 2020-03-11 PROCEDURE — 99999 PR PBB SHADOW E&M-EST. PATIENT-LVL III: ICD-10-PCS | Mod: PBBFAC,,, | Performed by: NURSE PRACTITIONER

## 2020-03-11 PROCEDURE — 99214 PR OFFICE/OUTPT VISIT, EST, LEVL IV, 30-39 MIN: ICD-10-PCS | Mod: S$GLB,,, | Performed by: NURSE PRACTITIONER

## 2020-03-11 PROCEDURE — 3079F PR MOST RECENT DIASTOLIC BLOOD PRESSURE 80-89 MM HG: ICD-10-PCS | Mod: CPTII,S$GLB,, | Performed by: NURSE PRACTITIONER

## 2020-03-11 PROCEDURE — 3044F HG A1C LEVEL LT 7.0%: CPT | Mod: CPTII,S$GLB,, | Performed by: NURSE PRACTITIONER

## 2020-03-11 PROCEDURE — 3079F DIAST BP 80-89 MM HG: CPT | Mod: CPTII,S$GLB,, | Performed by: NURSE PRACTITIONER

## 2020-03-11 PROCEDURE — 3077F SYST BP >= 140 MM HG: CPT | Mod: CPTII,S$GLB,, | Performed by: NURSE PRACTITIONER

## 2020-03-11 NOTE — PATIENT INSTRUCTIONS
Take furosemide/lasix 80 mg twice a day; take once in the morning; take again at about 3 p.m.   If not tolerating 80 mg twice per day; take 80 mg in the morning; take 60 mg around 3 p.m.  Call if having any issues with the lasix.  Continue low sodium diet. (Try Bryan Naidu's Magic No Salt Seasoning).  Avoid NSAID (ibuprofen, advil, motrin, aleve, naprosyn, naproxen, [place], Goody's Powder). Tylenol is okay.  Avoid bactrim/ IV contrast/ gadolinium/ aminoglycoside where possible.  Avoid herbal supplements.  Stay hydrated.  Return to clinic in 2 months with labs (bloodwork and urine) or sooner if needed.  Go to the ER with any emergency concerns.

## 2020-03-11 NOTE — Clinical Note
FYI: sCr stable but significantly increased proteinuria to 3.69 g/g. Doing a proteinuria workup and having pt f/u with you in 8 weeks.

## 2020-03-17 ENCOUNTER — TELEPHONE (OUTPATIENT)
Dept: NEPHROLOGY | Facility: CLINIC | Age: 65
End: 2020-03-17

## 2020-03-17 DIAGNOSIS — R76.8 RHEUMATOID FACTOR POSITIVE: ICD-10-CM

## 2020-03-17 DIAGNOSIS — I10 HYPERTENSION, UNSPECIFIED TYPE: Primary | ICD-10-CM

## 2020-03-17 NOTE — TELEPHONE ENCOUNTER
Spoke to patient's wife. Informed her of lab results. Need to place rheumatology referral. She verbalized understanding.    She said pt is tolerating 80 mg lasix BID. BP now 133/80 today. Edema to ankles only.

## 2020-03-24 ENCOUNTER — DOCUMENTATION ONLY (OUTPATIENT)
Dept: NUTRITION | Facility: CLINIC | Age: 65
End: 2020-03-24

## 2020-03-24 NOTE — PROGRESS NOTES
Spoke with pt's wife on the phone to reschedule nutrition appt to 5/14/20 at 11:30 am.  Per her request, am mailing information for their review.  Dietitian contact information included.    Handouts mailed:  Diabetes and Chronic Kidney Disease; Acceptable salt-free seasoning Blends; Reading a Food Label.

## 2020-03-25 RX ORDER — DULAGLUTIDE 0.75 MG/.5ML
INJECTION, SOLUTION SUBCUTANEOUS
Qty: 2 SYRINGE | Refills: 4 | Status: SHIPPED | OUTPATIENT
Start: 2020-03-25 | End: 2020-08-03

## 2020-04-20 DIAGNOSIS — I50.32 CHRONIC DIASTOLIC HEART FAILURE: ICD-10-CM

## 2020-04-20 RX ORDER — FUROSEMIDE 80 MG/1
80 TABLET ORAL 2 TIMES DAILY
Qty: 120 TABLET | Refills: 3 | Status: ON HOLD | OUTPATIENT
Start: 2020-04-20 | End: 2021-01-01 | Stop reason: SDUPTHER

## 2020-05-14 ENCOUNTER — OFFICE VISIT (OUTPATIENT)
Dept: INTERNAL MEDICINE | Facility: CLINIC | Age: 65
End: 2020-05-14
Payer: MEDICARE

## 2020-05-14 ENCOUNTER — TELEPHONE (OUTPATIENT)
Dept: NEPHROLOGY | Facility: CLINIC | Age: 65
End: 2020-05-14

## 2020-05-14 VITALS
BODY MASS INDEX: 32.54 KG/M2 | HEIGHT: 70 IN | SYSTOLIC BLOOD PRESSURE: 132 MMHG | OXYGEN SATURATION: 96 % | DIASTOLIC BLOOD PRESSURE: 80 MMHG | HEART RATE: 81 BPM | WEIGHT: 227.31 LBS

## 2020-05-14 DIAGNOSIS — R60.0 BILATERAL LEG EDEMA: Primary | ICD-10-CM

## 2020-05-14 PROCEDURE — 3075F SYST BP GE 130 - 139MM HG: CPT | Mod: CPTII,S$GLB,, | Performed by: INTERNAL MEDICINE

## 2020-05-14 PROCEDURE — 3079F DIAST BP 80-89 MM HG: CPT | Mod: CPTII,S$GLB,, | Performed by: INTERNAL MEDICINE

## 2020-05-14 PROCEDURE — 1101F PR PT FALLS ASSESS DOC 0-1 FALLS W/OUT INJ PAST YR: ICD-10-PCS | Mod: CPTII,S$GLB,, | Performed by: INTERNAL MEDICINE

## 2020-05-14 PROCEDURE — 3075F PR MOST RECENT SYSTOLIC BLOOD PRESS GE 130-139MM HG: ICD-10-PCS | Mod: CPTII,S$GLB,, | Performed by: INTERNAL MEDICINE

## 2020-05-14 PROCEDURE — 3079F PR MOST RECENT DIASTOLIC BLOOD PRESSURE 80-89 MM HG: ICD-10-PCS | Mod: CPTII,S$GLB,, | Performed by: INTERNAL MEDICINE

## 2020-05-14 PROCEDURE — 99214 OFFICE O/P EST MOD 30 MIN: CPT | Mod: S$GLB,,, | Performed by: INTERNAL MEDICINE

## 2020-05-14 PROCEDURE — 1159F PR MEDICATION LIST DOCUMENTED IN MEDICAL RECORD: ICD-10-PCS | Mod: S$GLB,,, | Performed by: INTERNAL MEDICINE

## 2020-05-14 PROCEDURE — 1126F PR PAIN SEVERITY QUANTIFIED, NO PAIN PRESENT: ICD-10-PCS | Mod: S$GLB,,, | Performed by: INTERNAL MEDICINE

## 2020-05-14 PROCEDURE — 99999 PR PBB SHADOW E&M-EST. PATIENT-LVL III: ICD-10-PCS | Mod: PBBFAC,,, | Performed by: INTERNAL MEDICINE

## 2020-05-14 PROCEDURE — 99999 PR PBB SHADOW E&M-EST. PATIENT-LVL III: CPT | Mod: PBBFAC,,, | Performed by: INTERNAL MEDICINE

## 2020-05-14 PROCEDURE — 99214 PR OFFICE/OUTPT VISIT, EST, LEVL IV, 30-39 MIN: ICD-10-PCS | Mod: S$GLB,,, | Performed by: INTERNAL MEDICINE

## 2020-05-14 PROCEDURE — 1126F AMNT PAIN NOTED NONE PRSNT: CPT | Mod: S$GLB,,, | Performed by: INTERNAL MEDICINE

## 2020-05-14 PROCEDURE — 1159F MED LIST DOCD IN RCRD: CPT | Mod: S$GLB,,, | Performed by: INTERNAL MEDICINE

## 2020-05-14 PROCEDURE — 1101F PT FALLS ASSESS-DOCD LE1/YR: CPT | Mod: CPTII,S$GLB,, | Performed by: INTERNAL MEDICINE

## 2020-05-14 NOTE — PATIENT INSTRUCTIONS
increase furosemide to TID x3 days.   Wear compression stockings daily  Elevate feet ankles above knees, knees above hips.

## 2020-05-14 NOTE — PROGRESS NOTES
"    CHIEF COMPLAINT     Chief Complaint   Patient presents with    Follow-up    Edema     right foot, since yesterday, no pain        HPI     Alfa Christy III is a 64 y.o. male here today for      64-year-old male with CKD stage 3 B with increased lower extremity edema over the past few days.  He associates worsening of swelling with decreased physical activity due to being homebound in the setting of COVID.  Reports he has been careful watching his salt, reports compliance with his furosemide.  However, reports he has not been elevating his feet and has not been wearing his compression stockings as directed.  Reports heaviness small skin wound on anterior right shin since yesterday.  Of note patient is up approximately 2 lb since last visit.  Denies fever, bleeding, significant bruising    Personally Reviewed Patient's Medical, surgical, family and social hx. Changes updated in [facility].  Care Team updated in Epic    Review of Systems:  Review of Systems   Constitutional: Positive for unexpected weight change.   Respiratory: Negative for shortness of breath.    Cardiovascular: Positive for leg swelling.   Skin: Negative for rash.             PHYSICAL EXAM     /80 (BP Location: Left arm, Patient Position: Sitting, BP Method: Large (Manual))   Pulse 81   Ht 5' 10" (1.778 m)   Wt 103.1 kg (227 lb 4.7 oz)   SpO2 96%   BMI 32.61 kg/m²     Gen: Well Appearing, NAD  HEENT: PERR, EOMI  Neck: FROM, no thyromegaly, no cervical adenopathy  CVD: RRR, no M/R/G  Pulm: Normal work of breathing, CTAB, no wheezing  Abd:  Soft, NT, ND non TTP, no mass  MSK: BL LE edema 3+, skin wound R Anterior shin.  Post inflammatory hyperpigmentation bilateral shins right greater than left  Neuro: A&Ox3, gait normal, speech normal  Mood; Mood normal, behavior normal, thought process linear       LABS     Labs reviewed; Notable for  Lab Results   Component Value Date    CREATININE 2.3 (H) 03/09/2020    BUN 27 (H) 03/09/2020     " 03/09/2020    K 4.8 03/09/2020     03/09/2020    CO2 24 03/09/2020         ASSESSMENT     1. Bilateral leg edema             Plan     Alfa Christy III is a 64 y.o. male with  1. Bilateral leg edema   New problem to me,  adjustment medication   Suspect multifactorial in the setting of CKD.  Patient reports compliance with Eliquis since 1 has bilateral so low suspicion for VTE, patient does have anterior shin wound but no erythema, drainage or signs of localized infection.  Think this is just discomfort due to increased lower extremity edema.  -increase his furosemide frequency to 3 times daily for the next 3 days.    Instructed to wear his lower extremity compression socks as much as he can stand it.  Also discussed proper technique regarding elevating his feet.    -Recommend cleaning anterior shin wound with regular soap and water pat dry, Vaseline and nonstick bandage to anterior shin lesion.  Asked patient to reach out if swelling does not improve in 3 days or if the wound does not improve over the next week.    Randal Lemus MD

## 2020-05-18 ENCOUNTER — OFFICE VISIT (OUTPATIENT)
Dept: RHEUMATOLOGY | Facility: CLINIC | Age: 65
End: 2020-05-18
Payer: MEDICARE

## 2020-05-18 VITALS
HEIGHT: 70 IN | SYSTOLIC BLOOD PRESSURE: 157 MMHG | BODY MASS INDEX: 32.73 KG/M2 | HEART RATE: 58 BPM | DIASTOLIC BLOOD PRESSURE: 83 MMHG | WEIGHT: 228.63 LBS

## 2020-05-18 DIAGNOSIS — G40.209 COMPLEX PARTIAL SEIZURES EVOLVING TO GENERALIZED TONIC-CLONIC SEIZURES: ICD-10-CM

## 2020-05-18 DIAGNOSIS — R76.8 RHEUMATOID FACTOR POSITIVE: ICD-10-CM

## 2020-05-18 PROCEDURE — 1101F PT FALLS ASSESS-DOCD LE1/YR: CPT | Mod: CPTII,S$GLB,, | Performed by: INTERNAL MEDICINE

## 2020-05-18 PROCEDURE — 99999 PR PBB SHADOW E&M-EST. PATIENT-LVL V: ICD-10-PCS | Mod: PBBFAC,,, | Performed by: INTERNAL MEDICINE

## 2020-05-18 PROCEDURE — 99205 PR OFFICE/OUTPT VISIT, NEW, LEVL V, 60-74 MIN: ICD-10-PCS | Mod: S$GLB,,, | Performed by: INTERNAL MEDICINE

## 2020-05-18 PROCEDURE — 3079F PR MOST RECENT DIASTOLIC BLOOD PRESSURE 80-89 MM HG: ICD-10-PCS | Mod: CPTII,S$GLB,, | Performed by: INTERNAL MEDICINE

## 2020-05-18 PROCEDURE — 99999 PR PBB SHADOW E&M-EST. PATIENT-LVL V: CPT | Mod: PBBFAC,,, | Performed by: INTERNAL MEDICINE

## 2020-05-18 PROCEDURE — 1126F AMNT PAIN NOTED NONE PRSNT: CPT | Mod: S$GLB,,, | Performed by: INTERNAL MEDICINE

## 2020-05-18 PROCEDURE — 3077F PR MOST RECENT SYSTOLIC BLOOD PRESSURE >= 140 MM HG: ICD-10-PCS | Mod: CPTII,S$GLB,, | Performed by: INTERNAL MEDICINE

## 2020-05-18 PROCEDURE — 1159F PR MEDICATION LIST DOCUMENTED IN MEDICAL RECORD: ICD-10-PCS | Mod: S$GLB,,, | Performed by: INTERNAL MEDICINE

## 2020-05-18 PROCEDURE — 3077F SYST BP >= 140 MM HG: CPT | Mod: CPTII,S$GLB,, | Performed by: INTERNAL MEDICINE

## 2020-05-18 PROCEDURE — 3079F DIAST BP 80-89 MM HG: CPT | Mod: CPTII,S$GLB,, | Performed by: INTERNAL MEDICINE

## 2020-05-18 PROCEDURE — 99205 OFFICE O/P NEW HI 60 MIN: CPT | Mod: S$GLB,,, | Performed by: INTERNAL MEDICINE

## 2020-05-18 PROCEDURE — 1159F MED LIST DOCD IN RCRD: CPT | Mod: S$GLB,,, | Performed by: INTERNAL MEDICINE

## 2020-05-18 PROCEDURE — 1101F PR PT FALLS ASSESS DOC 0-1 FALLS W/OUT INJ PAST YR: ICD-10-PCS | Mod: CPTII,S$GLB,, | Performed by: INTERNAL MEDICINE

## 2020-05-18 PROCEDURE — 1126F PR PAIN SEVERITY QUANTIFIED, NO PAIN PRESENT: ICD-10-PCS | Mod: S$GLB,,, | Performed by: INTERNAL MEDICINE

## 2020-05-18 RX ORDER — LACOSAMIDE 200 MG/1
200 TABLET ORAL EVERY 12 HOURS
Qty: 60 TABLET | Refills: 11 | Status: SHIPPED | OUTPATIENT
Start: 2020-05-18 | End: 2020-05-19 | Stop reason: SDUPTHER

## 2020-05-18 ASSESSMENT — ROUTINE ASSESSMENT OF PATIENT INDEX DATA (RAPID3)
PAIN SCORE: 0
TOTAL RAPID3 SCORE: 1.33
FATIGUE SCORE: 0
PSYCHOLOGICAL DISTRESS SCORE: 0
AM STIFFNESS SCORE: 0, NO
MDHAQ FUNCTION SCORE: 0
PATIENT GLOBAL ASSESSMENT SCORE: 4

## 2020-05-18 NOTE — LETTER
May 19, 2020      Kayli Suarez, NP  [address] [facility] [address]           [facility] - Rheumatology  [address]  [place] [address]  Phone: [phone]  Fax: [phone]          Patient: Alfa Christy III   MR Number: [id number]   YOB: 1955   Date of Visit: 5/18/2020       Dear Kayli Suarez:    Thank you for referring Alfa Christy to me for evaluation. Attached you will find relevant portions of my assessment and plan of care.    If you have questions, please do not hesitate to call me. I look forward to following Alfa Christy along with you.    Sincerely,    Elayne Fischer MD    Enclosure  CC:  No Recipients    If you would like to receive this communication electronically, please contact [email or web address] or [phone] to request more information on [employer] access.    For providers and/or their staff who would like to refer a patient to [facility], please contact us through our one-stop-shop provider referral line, [facility], at [phone].    If you feel you have received this communication in error or would no longer like to receive these types of communications, please e-mail [email or web address]

## 2020-05-18 NOTE — PROGRESS NOTES
Subjective:       Patient ID: Alfa Christy III is a 64 y.o. male.    Chief Complaint: Disease Management    HPI  64 year old M with PMH of CHF, CKD, type II DM, thyroid cancer, afib on eliquis her for evaluation.  He is here for evaluation of +RF. Denies joint pain. Denies joint swelling. Reports lower extremity edema.  Denies raynauds, photosensitivity, fevers, night sweats, pleurisy, rashes, or abdominal pain.  Denies changes in weight. He feels well overall.  Denies smoking.    Family hx:negative for arthritis       Past Medical History:   Diagnosis Date    Allergy     BMI 31.0-31.9,adult 11/25/2014    CHF (congestive heart failure)     Chronic anticoagulation - pradaxa 11/10/2016    Chronic diastolic heart failure 11/20/2016    CKD (chronic kidney disease), stage III 9/23/2013    Controlled type 2 diabetes with neuropathy 12/16/2014    Coronary artery disease     Diabetes mellitus due to underlying condition with stage 3 chronic kidney disease, without long-term current use of insulin 11/2/2016    Elevated alkaline phosphatase level 8/1/2012    Elevated PSA     negative prostate biopsy 4/11    Erectile dysfunction associated with type 2 diabetes mellitus     Gallstones 3/20/2013    Generalized tonic-clonic seizure 11/2016    HTN (hypertension), benign 8/1/2012    Hypercholesterolemia 8/1/2012    Microcytic anemia 11/27/2013    Paroxysmal atrial fibrillation 9/23/2013    Postsurgical hypothyroidism 11/27/2013    Right homonymous hemianopsia 2/5/2016    Sickle cell trait     Stroke 1/27/2016    Thyroid cancer     multifocal with six lesions, largest 5mm, treated with surgery and radioactive iodine       Review of Systems   Constitutional: Negative for activity change, appetite change, chills, diaphoresis, fatigue and fever.   HENT: Negative for ear discharge, ear pain, hearing loss, mouth sores, nosebleeds and sinus pressure.    Eyes: Negative.  Negative for photophobia, pain, discharge,  "redness and itching.   Respiratory: Negative for cough, chest tightness and shortness of breath.    Cardiovascular: Negative for chest pain, palpitations and leg swelling.   Gastrointestinal: Negative for abdominal distention, blood in stool and nausea.   Endocrine: Negative for cold intolerance, heat intolerance, polydipsia and polyphagia.   Genitourinary: Negative for flank pain, genital sores and hematuria.   Musculoskeletal: Negative for arthralgias, back pain, gait problem, joint swelling, myalgias, neck pain and neck stiffness.   Skin: Negative for color change, pallor and rash.   Neurological: Negative for dizziness, weakness, light-headedness and headaches.   Hematological: Negative for adenopathy. Does not bruise/bleed easily.   Psychiatric/Behavioral: Negative for decreased concentration and hallucinations. The patient is not nervous/anxious.          Objective:   BP (!) 157/83   Pulse (!) 58   Ht 5' 10" (1.778 m)   Wt 103.7 kg (228 lb 9.9 oz)   BMI 32.80 kg/m²      Physical Exam   Constitutional: He is oriented to person, place, and time and well-developed, well-nourished, and in no distress. No distress.   HENT:   Head: Normocephalic.   Eyes: Conjunctivae are normal. Pupils are equal, round, and reactive to light. Right eye exhibits no discharge.   Neck: Normal range of motion. Neck supple. No JVD present. No tracheal deviation present. No thyromegaly present.   Pulmonary/Chest: No stridor. No respiratory distress. He has no wheezes. He has no rales. He exhibits no tenderness.   Abdominal: Soft. Bowel sounds are normal. He exhibits no distension and no mass. There is no tenderness. There is no rebound and no guarding.   Lymphadenopathy:     He has no cervical adenopathy.   Neurological: He is alert and oriented to person, place, and time.   Skin: Skin is warm and dry. No rash noted. He is not diaphoretic. No erythema. No pallor.     Musculoskeletal: He exhibits no edema, tenderness or deformity.      "     Labs: reviewed    No data to display     Assessment:      64 year old M with PMH of CHF, CKD, type II DM, thyroid cancer, afib on eliquis, CVA, seizure, vitreous hemorrhage of right eye her for evaluation of +RF.  I will do further work up but have low suspicion for inflammatory arthritis.  I discussed with him extensively causes of RF and discussed with him symptoms of RA and connective tissue disease.  He has proteinuria likely from HTN and CKD but will defer to nephrology for further work up.    1. Rheumatoid factor positive            Plan:       Problem List Items Addressed This Visit     None      Visit Diagnoses     Rheumatoid factor positive            Labs  rtc in  6 months    45 minutes of face to face contact spent with patient  *

## 2020-05-18 NOTE — TELEPHONE ENCOUNTER
----- Message from Charis Mesa sent at 5/18/2020  2:50 PM CDT -----  Contact: Rut/wife [phone]  Calling in to speak with nurse about refill on Rx lacosamide (VIMPAT) 200 mg Tab tablet, and determine if visit is needed. Please call      [facility]/pharmacy #[id number] - [place] - [address] [facility]  [address] [facility] [address] [address]  Phone: [phone] Fax: [phone]

## 2020-05-19 ENCOUNTER — TELEPHONE (OUTPATIENT)
Dept: INTERNAL MEDICINE | Facility: CLINIC | Age: 65
End: 2020-05-19

## 2020-05-19 ENCOUNTER — NURSE TRIAGE (OUTPATIENT)
Dept: ADMINISTRATIVE | Facility: CLINIC | Age: 65
End: 2020-05-19

## 2020-05-19 DIAGNOSIS — G40.209 COMPLEX PARTIAL SEIZURES EVOLVING TO GENERALIZED TONIC-CLONIC SEIZURES: ICD-10-CM

## 2020-05-19 RX ORDER — LACOSAMIDE 200 MG/1
200 TABLET ORAL EVERY 12 HOURS
Qty: 60 TABLET | Refills: 11 | Status: SHIPPED | OUTPATIENT
Start: 2020-05-19 | End: 2021-01-01 | Stop reason: SDUPTHER

## 2020-05-19 NOTE — TELEPHONE ENCOUNTER
----- Message from Ruddy Marie sent at 5/19/2020  2:14 PM CDT -----  Contact: Mrs. Christy (wife) @ [phone]  Mrs. Christy is asking to speak w/ Alena to confirm if Dr. Santiago has signed script for VIMPAT. Mrs. Christy states pt has been without medication for a few days.

## 2020-05-19 NOTE — TELEPHONE ENCOUNTER
"  Reason for Disposition   [1] Prescription not at pharmacy AND [2] was prescribed today by PCP    Additional Information   Negative: Child takes medicine, but then vomits it   Negative: Child sounds very sick or weak to the triager   Negative: [1] Prescription medicine AND [2] child refuses to take it AND [3] parent has used correct technique per guideline   Negative: [1] Non-prescription (OTC) medicine recommended by PCP as part of a treatment plan (e.g., constipation, allergies) AND [2] child refuses to take it AND [3] parent has used correct technique (Exception: medicines for fever)   Negative: Drug overdose and nurse unable to answer question   Negative: Caller requesting information not related to medicine   Negative: Caller requesting a prescription for Strep throat and has a positive culture result   Negative: Rash while taking a medication or within 3 days of stopping it   Negative: Immunization reaction suspected   Negative: [1] Asthma AND [2] having symptoms of asthma (cough, wheezing, etc)   Negative: MORE THAN A DOUBLE DOSE of a prescription or over-the-counter (OTC) drug   Negative: [1] DOUBLE DOSE (an extra dose or lesser amount) of over-the-counter (OTC) drug AND [2] any symptoms (e.g., dizziness, nausea, pain, sleepiness)   Negative: [1] DOUBLE DOSE (an extra dose or lesser amount) of prescription drug AND [2] any symptoms (e.g., dizziness, nausea, pain, sleepiness)   Negative: Took another person's prescription drug   Negative: [1] Request for URGENT new prescription or refill of "essential" medication (i.e., likelihood of harm to patient if not taken) AND [2] triager unable to fill per unit policy   Negative: Diabetes drug error or overdose (e.g., insulin or extra dose)   Negative: [1] DOUBLE DOSE (an extra dose or lesser amount) of prescription drug AND [2] NO symptoms (Exception: a double dose of antibiotics)    Protocols used: MEDICATION QUESTION CALL-[facility] MEDICATION - " REFUSAL TO TAKE-P-AH

## 2020-05-19 NOTE — TELEPHONE ENCOUNTER
----- Message from Ruddy Marie sent at 5/19/2020  2:22 PM CDT -----  Contact: Mrs. Christy (wife) @ [phone]  Mrs. Christy trying to get fill of VIMPAT prescribed by Dr. Santiago with no success. Mrs. Christy said pharmacy would not give emergency fill. Mrs. Christy is asking Dr. Lemus fill while they are waiting for Dr. Santiago to fill.    [facility]/pharmacy #[id number] - [place], [place] - [address] [address] [facility] [address]  Phone: [phone] Fax: [phone]

## 2020-05-21 ENCOUNTER — OFFICE VISIT (OUTPATIENT)
Dept: NEUROLOGY | Facility: CLINIC | Age: 65
End: 2020-05-21
Payer: MEDICARE

## 2020-05-21 DIAGNOSIS — G40.209 COMPLEX PARTIAL SEIZURES EVOLVING TO GENERALIZED TONIC-CLONIC SEIZURES: ICD-10-CM

## 2020-05-21 DIAGNOSIS — Z86.73 HISTORY OF CVA (CEREBROVASCULAR ACCIDENT): ICD-10-CM

## 2020-05-21 PROCEDURE — 1159F PR MEDICATION LIST DOCUMENTED IN MEDICAL RECORD: ICD-10-PCS | Mod: 95,,, | Performed by: PSYCHIATRY & NEUROLOGY

## 2020-05-21 PROCEDURE — 99213 PR OFFICE/OUTPT VISIT, EST, LEVL III, 20-29 MIN: ICD-10-PCS | Mod: 95,,, | Performed by: PSYCHIATRY & NEUROLOGY

## 2020-05-21 PROCEDURE — 1101F PR PT FALLS ASSESS DOC 0-1 FALLS W/OUT INJ PAST YR: ICD-10-PCS | Mod: CPTII,95,, | Performed by: PSYCHIATRY & NEUROLOGY

## 2020-05-21 PROCEDURE — 99213 OFFICE O/P EST LOW 20 MIN: CPT | Mod: 95,,, | Performed by: PSYCHIATRY & NEUROLOGY

## 2020-05-21 PROCEDURE — 1101F PT FALLS ASSESS-DOCD LE1/YR: CPT | Mod: CPTII,95,, | Performed by: PSYCHIATRY & NEUROLOGY

## 2020-05-21 PROCEDURE — 1159F MED LIST DOCD IN RCRD: CPT | Mod: 95,,, | Performed by: PSYCHIATRY & NEUROLOGY

## 2020-05-21 NOTE — PROGRESS NOTES
[facility]:   FOLLOW UP VISIT    The patient location is: home  The chief complaint leading to consultation is: follow up of seizures  Visit type: audio only  Time with patient: 15 minutes  minutes of total time spent on the encounter, which includes face to face time and non-face to face time preparing to see the patient (eg, review of tests), Obtaining and/or reviewing separately obtained history, Documenting clinical information in the electronic or other health record, Independently interpreting results (not separately reported) and communicating results to the patient/family/caregiver, or Care coordination (not separately reported).   Each patient to whom he or she provides medical services by telemedicine is:  (1) informed of the relationship between the physician and patient and the respective role of any other health care provider with respect to management of the patient; and (2) notified that he or she may decline to receive medical services by telemedicine and may withdraw from such care at any time.     Name: Alfa Christy III  MRN:[id number]   CSN: [id number]    Date of service: 5/21/2020  Last clinic visit: 9/12/19  Last clinic visit: 2/20/19 seen by Dr.Van Portillo (error in appt)  Last clinic visit: 12/10/18  Last clinic visit: 8/20/18  Priorclinic visit: 5/15/18  Prior clinic visit: 11/15/17  1st clinic visit: 12/27/16    Age:64 y.o.   Gender:male     The patient is here today with his wife   History obtained from the patient     CHIEF COMPLAINT:  - follow up of seizures    INTERVAL HISTORY (Since last visit):    This is a 64 y.o.  year old male who presents for follow up of seizures    Seizure log since last visit: none     Current AEDs: compliant, no side-effects   - Levetiracetam 1500mg bid  - Lacosamide 100mg bid    Notes from last clinic visit, 9/12/19:  - patient and wife states that  they did not request change of provider and was puzzled why he was seen by  at last visit  (2/20/19)    Seizure log since last visit: none    Current AEDs: compliant, no side-effects  - Levetiracetam 1500mg bid  - Lacosamide 100mg bid    Results for ROME LR III (MRN [id number]) as of 9/10/2019 16:43   Ref. Range 8/20/2018 11:00 11/28/2018 22:21 12/10/2018 16:40 12/28/2018 15:28   Lacosamide Latest Ref Range: 1.0 - 10.0 mcg/mL 5.2 2.9 3.6 10.9 (H)   Levetiracetam Lvl Latest Ref Range: 3.0 - 60.0 ug/mL 78.5 (H) 69.0 (H) 64.9 (H) 68.0 (H)     Notes from last clinic visit, 12/10/18:  - no hx of non-compliance with AEDs  - however, levels low at the time of ED visit  - no other identifiable cause for breakthrough sz    AED levels when stable without seizures:  Results for ROME LR III (MRN [id number]) as of 12/9/2018 15:35   Ref. Range 8/20/2018 11:00   Lacosamide Latest Ref Range: 1.0 - 10.0 mcg/mL 5.2   Levetiracetam Lvl Latest Ref Range: 3.0 - 60.0 ug/mL 78.5 (H)     AED levels at the time of ED visit with seizure:  Results for ROME LR III (MRN [id number]) as of 12/9/2018 15:35   Ref. Range 11/28/2018 22:21   Lacosamide Latest Ref Range: 1.0 - 10.0 mcg/mL 2.9   Levetiracetam Lvl Latest Ref Range: 3.0 - 60.0 ug/mL 69.0 (H)     Notes from ED visit on 11/28/18:  63-year-old male with multiple comorbidities presented with his wife for evaluation.  In triage the patient was aphasic, therefore, a code stroke was called.  Per the triage nurse, the patient was able to smile and transfer from the wheelchair onto the bed once placed in the room.  At the time of my assessment the patient's room he was unable to follow commands and remained nonverbal only making a slight grunting noise.  Initially his wife had left to move his car and was not present to provide any additional history.  She subsequently returned and was able to state he has had 3 similar episodes in the past which were associated with his seizures.  Upon review of his medical records he has been diagnosed with complex partial seizures which have  evolved to generalized tonic-clonic seizures.  CT of the head showed no evidence of acute process.  Stroke Neurology was consulted but they were unable to examine the patient secondary to technical difficulties with the computer at home.  Based on presentation in history it was felt by Dr. Vega of that his symptoms were likely secondary to his seizure.  This was supported by the patient's gradual improvement throughout his emergency department stay.  He is currently alert and able to follow commands.  He was able to perform finger-to-nose, heel-to-shin and cranial nerves and strength could be assessed and normal. He is slightly groggy but I do believe this is secondary to the Ativan he received upon arrival.  We will observe the patient further but likely will be able to discharge home.     Of note, labs are significant only for a worsening creatinine from 1.8-2.2.     2:16 AM  At this time the patient is able to answer all questions appropriately.  He was able to ambulate down the lanza without requiring any assistance.  Results of his lab work have been discussed with him as well as the results of the CT.  Have instructed both him and his wife to have ensure that he follows up with his neurologist for further management of his seizures..    Notes from last clinic visit, 8/20/18:  - no hx of seizures since last visit; last sz was on 4/30/18  - compliant with AEDs: LCS 100mg bid and LEV 1500mg bid  - no new complaints; doing well    Notes from last clinic visit, 5/15/18:  - started on LAC 100mg bid 4/30/18  - also on LEV 1500mg bid  - no further episodes since 4/30  - no other/new complaints; doing well    Admitted during 4/28-30/18; notes from :  HPI:   61y/o M with pmhx stroke, seizure, afib (on eliquis), DM2, HLD, HTN presents w/ aphasia. Patient was last normal at 10:30 pm.  Per his wife he was doing well then started complaining of feeling dizzy after taking a shower. Denied fever, pains, vomiting.  She  noticed he then started having trouble speaking and say words that did not make sense, so came to ED.  Wife denies any B/B incontinence, unilateral weakness, syncope, or tongue-biting.  Patient had similar presentation end of March; at that time TPA was deferred and subsequent neuroimaging negative for CVA. He does have history of seizures on keppra, and his seizures are atypical. Says wasn't having seizures until recently. Patient has had a previous stroke 1/2016 w/ residual L vision deficits only. EEG then abnormal w/ 1/18 CVA as likely foci for seizures. Currently on eliquis for a-fib. Further hisotry limited by patient's mental status.       In ED, [facility] neurology consulted for concern of stroke d/t aphasia.  Sxs likely 2/2 postictal period of complex seizure.   (baseline 100s-200s). CXR shows CM with findings suggestive of pulmonary edema.  Gvien one time dose of lasix 80 mg IV.  EKG shows afib HR 69.  CTH with no significant change from previous. Remote left occipital lobe and left thalamic infarctions.       Hospital Course:   Pt admitted due to aphasia.  Similar presentation/admissions 11/2017 and 03/2018 (medication noncompliance an issue).  Vascular neurology consulted in ED.  CTH no acute changes.  No acute stroke suspected.  Keppra level ordered.  EEG ordered: abnormal extended (1 hour and 29 minutes) EEG due to rare left sided epileptiform activity seen, suggestive of underlying epileptiform activity; continuous left sided slowing seen, suggestive of underlying structural lesion.  Neurology consulted and recommended slow titration on lacosamide--50 mg qd x 1 week, increase lacosamide to 100 mg for 2nd week if pt tolerates.  Continue levetiracetam 1500 mg bid, level 69.4.  Follow up with Dr. Santiago in 2 weeks.  Pt also with acute CHF exacerbation (->395).  Pt treated with IV lasix bid and wt decreased 2 kg.  Day 3-4 transitioned back to home dosing furosemide 40 mg bid.  Seizure precautions  "at discharge.      ED visit, 3/23/18: Notes from HM:  HPI:   Mr. Christy is a 61 yo male with a PMHx of HTN, HLD, DM2, PAF on Eliquis, HFpEF, CKDIII, thyroid cancer s/p surgery and radiation (2009), previous L sided CVA (2016) resulting in R homonymous hemianopsia, and complex partial seizures presenting to the ED with wife at bedside c/o confusion and difficulty speaking. Wife at bedside to provide the history. She states this morning at ~8am patient was getting dressed and was c/o minor dizziness. She noted he had not taken his keppra from the previous night, in addition to all of his "night time medications." Pt was able to take all morning medications. At around 10am, pt was sitting down to "go over his finances and pay bills", she noted something was "off". She asked him his name, which he could provide for her but he could not remember the word for "shoes" when she asked him what he was wearing on his feet. She did not witness convulsions, jerking movements, LOC, tongue biting, or incontinence. Wife states patient was able to ambulate and continue to go about "normal" activities. Wife brought patient to the ED due to concern for aphasia. Of note, wife reports pt is non-compliant with hydralazine and ASA. Per wife, this was a similar presentation to his last admission for asphasia suspected to be secondary to seizures.      In the ED, HDS; noted to be hypertensive to 190s/90s. Stroke code was called; CTH and MRI without evidence of acute infarct. Vascular neurology assessed; low suspicion for acute CVA. Remainder of labs and imaging were unremarkable.     Hospital Course:   Pt admitted to observation for aphasia in setting of hx of CVA and seizures. CTH and MRI negative for acute infarct. Labs entirely unremarkable (TSH and lactate WNL). No s/s infection; afebrile without leukocytosis (CXR and UA without infection). Neurology consulted and suspects breakthrough seizure with prolonged post-ictus. EEG shows " slowing consistent with hx of L sided CVA; no epileptic activity. Pt discharged home to follow up with epilepsy as outpatient. Educated on importance of compliance with home medications.     A/P at last visit:  61yo M with hx of CVA in 1/16 and seizures x 11/16 - prior episodes c/w glucose level alterations  Recent episode suggestive of sz - likely related to hyperglycemia     - check Levetiracetam levels  - continue same dose of LEV for now     Monitor glucose levels closely  Return in 6 months or earlier prn    Notes from last clinic visit, 11/15/17:  No hx of non-compliance    Recent admission: patient states that he had gone to exercise that day, followed by having donuts but did not miss any dose of meds.  No other triggers.  Possibly seizure? But no clinical activity seen by family except for word-finding difficulty/confusion.  Last recognized seizure was possibly Apr 2017    Recent [place] to [facility] [place], [place]11/17:  Neurology notes, 10/5/17:  [facility] [place] 10[place]04/17 due to wife coming home and finding  confused with agitation, word-finding difficulty, and imbalance.  Noted no weakness.  States that he was last seen normal at 7 pm, she went out to run an errand and returned at 8 pm.  Otherwise notes no signs of head trauma, no recent weakness, no medication changes.  He takes levetiracetam 750 mg po bid (started on 1500 mg po bid right after 11/2016 seizure) and is compliant.  Recommendation:  Breakthrough seizure in setting of recent respiratory infection and financial stressors, wife endorses compliance and patient not yet back to baseline.  Recommend load 1g LEV iv followed by increase to 1500mg bid.  R-EEG, 10/7/17:    IMPRESSION:  This is an abnormal EEG during wakefulness, drowsiness and sleep.    Nearly continuous irregular left hemisphere slowing was noted.  The EKG revealed an irregular rhythm.    Hospital course (per Hospital Medicine notes, 10/11/17): in the ED on presentation, the patient was in keeping of  abnormally elevated blood pressure in setting of acute onset aphasia. Workup for an acute stroke included a CT head with no signs of acute stroke. MRI and MRA brain were negative for acute stroke. Vascular neurology was consulted and ruled out stroke and recommended admission to hospital medicine with improved BP control and a consult to general neurology. During admission to hospital medicine, the patient notably developed right sided jerking movement consistent with seizure. The patient was loaded with IV keppra 1500mg followed by oral twice daily dosing per neurology. The patient was observed on the hospital medicine for aphasia resolution that was gradual but incomplete per patient's wife. The patient had an EEG given personality changes and frequent self-repetition that was negative. PT/OT evaluated the patient and recommended SNF placement that occurred appropriately on 10/11/2017.    Any other relevant history since last visit:   - none    SEIZURE HISTORY:  Notes from last (16) visit:   Information from H&P on 16:  60yo M with multiple medical co-morbidities, presented to an outside facility on 16 with left-sided HA and dizziness, associated with expressive aphasia. [place] [facility] [place] a GTC seizure with right-sided eye deviation, lasting ~ 4 min. Blood glucose was 200 at the time and seizure resolved without any intervention. No hx of associated tongue bite or b/b incontinence. Reports post-ictal confusion but no focal deficits.   CTA head and neck at the time showed no acute ischemic changes.  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  Patient was evaluated by Neurology service while in hospital - advised Levetiracetam 1000mg  q 12h  INTERVAL HISTORY:  since discharge from hospital, no hx of any seizures - compliant with Levetiracteam     Previous evaluation:   EE) R-EEG, 2016:   IMPRESSION: Normal awake and asleep  EEG.  2) R-EEG, 11/07/16  IMPRESSION:   This is an abnormal awake and asleep EEG due to mild generalized slowing seen, suggestive of mild diffuse or multifocal cerebral dysfunction.  No epileptiform activity or electrographic seizures were seen    MRI Brain:  Results for orders placed or performed during the hospital encounter of 10/04/17   MRI Brain Without Contrast    Addendum: 10/5/2017          Electronically signed by: DELL VITALE MD  Date:     10/05/17  Time:    01:19       Narrative    MRI BRAIN, MRA HEAD, MRA NECK    CLINICAL INDICATION: 62 year old M with stroke, severe expressive aphasia, LLE weakness.     TECHNIQUE: Multiplanar, multisequence images were obtained of the brain. Three-dimensional time-of-flight technique was used in assessment of the neck and intracranial vasculature.    COMPARISON: CT head 10/4/2017.    FINDINGS:    MRI BRAIN:  The ventricles are normal in size for age, without evidence of hydrocephalus.  There is suggestion of subtle diffuse cortical diffusion restriction in the supratentorial brain.    There is a stable region of encephalomalacia within the left occipital lobe. There is relatively isointense diffusion signal in this region with corresponding hyperintensity on ADC map and T2/FLAIR hyperintensity, compatible with remote infarct. No areas of restricted diffusion signal are seen to suggest new/acute infarct. No areas of susceptibility to suggest hemorrhage. A punctate focus of T1 hyperintensity in the left aspect of the anterior interhemispheric fissure likely represents a dural calcification. Supratentorial patchy T2/FLAIR hyperintensities compatible with chronic microvascular ischemic disease.    No extra-axial blood or fluid collections.    The T2 skull base flow voids are preserved. Bone marrow signal intensity is unremarkable.    MRA HEAD AND NECK:   Common and internal carotid arteries are normal in caliber.  No significant stenosis at either carotid bifurcation. There  is a small saccular aneurysm with a 0.4 cm neck arising from the supraclinoid right ICA.    Vertebral arteries are normal in caliber. The vertebrobasilar system appears within normal limits.     The ACAs, MCAs and PCAs demonstrate no evidence of  high-grade stenosis, focal occlusion or intracranial aneurysm.    Impression    No evidence of acute or major vascular distribution infarct or intracranial hemorrhage.    Questionable subtle cortical diffusion restriction throughout the supratentorial brain.  Short-term followup is suggested.    Remote left PCA distribution infarct within the left occipital lobe.    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  ___________________________________________________  This report has been flagged in the [facility] medical record.  ______________________________________     Electronically signed by resident: EFREM PENALOZA MD  Date:     10/05/17  Time:    00:24            As the supervising and teaching physician, I personally reviewed the images and resident's interpretation and I agree with the findings.          Electronically signed by: DELL VITALE MD  Date:     10/05/17  Time:    01:13    MRA Brain without contrast    Narrative    MRI BRAIN, MRA HEAD, MRA NECK    CLINICAL INDICATION: 62 year old M with stroke, severe expressive aphasia, LLE weakness.     TECHNIQUE: Multiplanar, multisequence images were obtained of the brain. Three-dimensional time-of-flight technique was used in assessment of the neck and intracranial vasculature.    COMPARISON: CT head 10/4/2017.    FINDINGS:    MRI BRAIN:  The ventricles are normal in size for age, without evidence of hydrocephalus.  There is suggestion of subtle diffuse cortical diffusion restriction in the supratentorial brain.    There is a stable region of encephalomalacia within the left occipital lobe. There is relatively isointense diffusion signal in this region with corresponding hyperintensity on ADC map and T2/FLAIR hyperintensity,  compatible with remote infarct. No areas of restricted diffusion signal are seen to suggest new/acute infarct. No areas of susceptibility to suggest hemorrhage. A punctate focus of T1 hyperintensity in the left aspect of the anterior interhemispheric fissure likely represents a dural calcification. Supratentorial patchy T2/FLAIR hyperintensities compatible with chronic microvascular ischemic disease.    No extra-axial blood or fluid collections.    The T2 skull base flow voids are preserved. Bone marrow signal intensity is unremarkable.    MRA HEAD AND NECK:   Common and internal carotid arteries are normal in caliber.  No significant stenosis at either carotid bifurcation. There is a small saccular aneurysm with a 0.4 cm neck arising from the supraclinoid right ICA.    Vertebral arteries are normal in caliber. The vertebrobasilar system appears within normal limits.     The ACAs, MCAs and PCAs demonstrate no evidence of  high-grade stenosis, focal occlusion or intracranial aneurysm.    Impression    No evidence of acute or major vascular distribution infarct or intracranial hemorrhage.    Questionable subtle cortical diffusion restriction throughout the supratentorial brain.  Short-term followup is suggested.    Remote left PCA distribution infarct within the left occipital lobe.    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  ___________________________________________________  This report has been flagged in the [employer] medical record  ______________________________________     Electronically signed by resident: EFREM PENALOZA MD  Date:     10/05/17  Time:    00:24            As the supervising and teaching physician, I personally reviewed the images and resident's interpretation and I agree with the findings.          Electronically signed by: DELL VITALE MD  Date:     10/05/17  Time:    01:13         Electronically signed by: DELL VITALE MD  Date:     10/05/17  Time:    01:19    CT Head Without Contrast     Narrative    CT HEAD WITHOUT CONTRAST    CLINICAL INDICATION: 62 year old M with possible stroke.    TECHNIQUE: Axial CT images obtained throughout the region of the head without the use of intravenous contrast. Axial, sagittal and coronal reconstructions were performed.    COMPARISON: MRI brain 11/6/2016, CT stroke multiphase 11/5/2016, CT head 11/5/2016.    FINDINGS:  The ventricles are stable. The brain appears unchanged.  The basal cisterns are patent.  There is a stable region of encephalomalacia within the left occipital lobe, compatible with remote left PCA infarct. No evidence of acute major vascular distribution infarct or intracranial hemorrhage. Patchy hypoattenuation within the supratentorial white matter is compatible with chronic microvascular ischemic change.     No new extra-axial blood or fluid collections.    The cranium appears intact.  There is nonspecific calcifications in the subcutaneous tissues of the head.     Mastoid air cells and paranasal sinuses are essentially clear.  The orbits and intraorbital contents are unremarkable.    Impression       No evidence of acute major vascular distribution infarct or intracranial hemorrhage.     Stable, remote infarct in the left occipital lobe.    Chronic microvascular ischemic disease.        ______________________________________     Electronically signed by resident: EFREM PENALOZA MD  Date:     10/04/17  Time:    21:26            As the supervising and teaching physician, I personally reviewed the images and resident's interpretation and I agree with the findings.          Electronically signed by: DELL VITALE MD  Date:     10/04/17  Time:    22:05    Results for orders placed or performed during the hospital encounter of 11/05/16   MRI Brain W WO Contrast    Narrative    Comparison: CT 11/5/2016, MRI 6/11/2016    Clinical history: Seizure    Technique:    Multiplanar multi-sequence MRI images of the brain were obtained pre-and post the  administration of IV Gadavist contrast.        Findings:    Examination is limited secondary to patient motion, most significantly involving the flair axial and flair coronal sequences, as well as the postcontrast sequences.    There is encephalomalacia within the left PCA territory consistent with remote infarction.  Minimal postcontrast enhancement is noted within this region.  There may be a few punctate foci of T2/flair signal abnormality within the periventricular white matter, may reflect chronic microvascular ischemic change or represent artifact as there is extensive motion on the FLAIR axial images.  Additionally, there is high flair signal material involving the basal cisterns about the brainstem, not confirmed on other pulse sequences, suggesting that this is artifactual related to poor CSF signal saturation rather than proteinaceous or hemorrhagic material.  There is no evidence of intracranial mass, or acute infarction.  The ventricular system is within normal limits of size for age and shows no evidence of hydrocephalus.  There are no significant extra-axial or extracranial abnormalities detected.  The bilateral mesial temporal lobes are symmetric without abnormal signal, sclerosis, or enhancement.    The globes, orbits, pituitary gland, pineal gland and craniocervical junction are normal in configuration. The major vascular flow voids are patent.  Please see CTA performed earlier today for details of the intracranial vasculature.    Impression    Motion limited examination.    On the flair images, high attenuating material about the brainstem/basal cisterns is felt to represent artifact given extensive patient motion likely the result of poor saturation of the CSF signal in the region.  Of note, there is no associated enhancement or meningeal enhancement to suggest that this reflects proteinaceous material of infectious nature.  Blood products felt less likely.  If this finding would correlate with  current patient symptomatology, consider either repeating the flair image once patient is better able to tolerate the examination, or correlate this finding with lumbar puncture.    No evidence of acute infarct.  Remote PCA territory infarct with mild enhancement.    No abnormal temporal lobe enhancement or focal focus to suggest seizure nidus.            Electronically signed by: NERY LO MD  Date:     11/06/16  Time:    01:08       Results for orders placed or performed during the hospital encounter of 10/04/17   MRI Brain Without Contrast    Addendum: 10/5/2017          Electronically signed by: DELL VITALE MD  Date:     10/05/17  Time:    01:19       Narrative    MRI BRAIN, MRA HEAD, MRA NECK    CLINICAL INDICATION: 62 year old M with stroke, severe expressive aphasia, LLE weakness.     TECHNIQUE: Multiplanar, multisequence images were obtained of the brain. Three-dimensional time-of-flight technique was used in assessment of the neck and intracranial vasculature.    COMPARISON: CT head 10/4/2017.    FINDINGS:    MRI BRAIN:  The ventricles are normal in size for age, without evidence of hydrocephalus.  There is suggestion of subtle diffuse cortical diffusion restriction in the supratentorial brain.    There is a stable region of encephalomalacia within the left occipital lobe. There is relatively isointense diffusion signal in this region with corresponding hyperintensity on ADC map and T2/FLAIR hyperintensity, compatible with remote infarct. No areas of restricted diffusion signal are seen to suggest new/acute infarct. No areas of susceptibility to suggest hemorrhage. A punctate focus of T1 hyperintensity in the left aspect of the anterior interhemispheric fissure likely represents a dural calcification. Supratentorial patchy T2/FLAIR hyperintensities compatible with chronic microvascular ischemic disease.    No extra-axial blood or fluid collections.    The T2 skull base flow voids are preserved. Bone  marrow signal intensity is unremarkable.    MRA HEAD AND NECK:   Common and internal carotid arteries are normal in caliber.  No significant stenosis at either carotid bifurcation. There is a small saccular aneurysm with a 0.4 cm neck arising from the supraclinoid right ICA.    Vertebral arteries are normal in caliber. The vertebrobasilar system appears within normal limits.     The ACAs, MCAs and PCAs demonstrate no evidence of  high-grade stenosis, focal occlusion or intracranial aneurysm.    Impression    No evidence of acute or major vascular distribution infarct or intracranial hemorrhage.    Questionable subtle cortical diffusion restriction throughout the supratentorial brain.  Short-term followup is suggested.    Remote left PCA distribution infarct within the left occipital lobe.    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  ___________________________________________________  This report has been flagged in the [facility] medical record.  ______________________________________     Electronically signed by resident: EFREM PENALOZA MD  Date:     10/05/17  Time:    00:24            As the supervising and teaching physician, I personally reviewed the images and resident's interpretation and I agree with the findings.          Electronically signed by: DELL VITALE MD  Date:     10/05/17  Time:    01:13    MRA Brain without contrast    Narrative    MRI BRAIN, MRA HEAD, MRA NECK    CLINICAL INDICATION: 62 year old M with stroke, severe expressive aphasia, LLE weakness.     TECHNIQUE: Multiplanar, multisequence images were obtained of the brain. Three-dimensional time-of-flight technique was used in assessment of the neck and intracranial vasculature.    COMPARISON: CT head 10/4/2017.    FINDINGS:    MRI BRAIN:  The ventricles are normal in size for age, without evidence of hydrocephalus.  There is suggestion of subtle diffuse cortical diffusion restriction in the supratentorial brain.    There is a stable region of  encephalomalacia within the left occipital lobe. There is relatively isointense diffusion signal in this region with corresponding hyperintensity on ADC map and T2/FLAIR hyperintensity, compatible with remote infarct. No areas of restricted diffusion signal are seen to suggest new/acute infarct. No areas of susceptibility to suggest hemorrhage. A punctate focus of T1 hyperintensity in the left aspect of the anterior interhemispheric fissure likely represents a dural calcification. Supratentorial patchy T2/FLAIR hyperintensities compatible with chronic microvascular ischemic disease.    No extra-axial blood or fluid collections.    The T2 skull base flow voids are preserved. Bone marrow signal intensity is unremarkable.    MRA HEAD AND NECK:   Common and internal carotid arteries are normal in caliber.  No significant stenosis at either carotid bifurcation. There is a small saccular aneurysm with a 0.4 cm neck arising from the supraclinoid right ICA.    Vertebral arteries are normal in caliber. The vertebrobasilar system appears within normal limits.     The ACAs, MCAs and PCAs demonstrate no evidence of  high-grade stenosis, focal occlusion or intracranial aneurysm.    Impression    No evidence of acute or major vascular distribution infarct or intracranial hemorrhage.    Questionable subtle cortical diffusion restriction throughout the supratentorial brain.  Short-term followup is suggested.    Remote left PCA distribution infarct within the left occipital lobe.    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  ___________________________________________________  This report has been flagged in the [facility] medical record.  ______________________________________     Electronically signed by resident: EFREM PENALOZA MD  Date:     10/05/17  Time:    00:24            As the supervising and teaching physician, I personally reviewed the images and resident's interpretation and I agree with the findings.          Electronically  signed by: DELL VITALE MD  Date:     10/05/17  Time:    01:13         Electronically signed by: DELL VITALE MD  Date:     10/05/17  Time:    01:19    CT Head Without Contrast    Narrative    CT HEAD WITHOUT CONTRAST    CLINICAL INDICATION: 62 year old M with possible stroke.    TECHNIQUE: Axial CT images obtained throughout the region of the head without the use of intravenous contrast. Axial, sagittal and coronal reconstructions were performed.    COMPARISON: MRI brain 11/6/2016, CT stroke multiphase 11/5/2016, CT head 11/5/2016.    FINDINGS:  The ventricles are stable. The brain appears unchanged.  The basal cisterns are patent.  There is a stable region of encephalomalacia within the left occipital lobe, compatible with remote left PCA infarct. No evidence of acute major vascular distribution infarct or intracranial hemorrhage. Patchy hypoattenuation within the supratentorial white matter is compatible with chronic microvascular ischemic change.     No new extra-axial blood or fluid collections.    The cranium appears intact.  There is nonspecific calcifications in the subcutaneous tissues of the head.     Mastoid air cells and paranasal sinuses are essentially clear.  The orbits and intraorbital contents are unremarkable.    Impression       No evidence of acute major vascular distribution infarct or intracranial hemorrhage.     Stable, remote infarct in the left occipital lobe.    Chronic microvascular ischemic disease.        ______________________________________     Electronically signed by resident: EFREM PENALOZA MD  Date:     10/04/17  Time:    21:26            As the supervising and teaching physician, I personally reviewed the images and resident's interpretation and I agree with the findings.          Electronically signed by: DELL VITALE MD  Date:     10/04/17  Time:    22:05    Results for orders placed or performed during the hospital encounter of 11/05/16   MRI Brain W WO Contrast    Narrative     Comparison: CT 11/5/2016, MRI 6/11/2016    Clinical history: Seizure    Technique:    Multiplanar multi-sequence MRI images of the brain were obtained pre-and post the administration of IV Gadavist contrast.        Findings:    Examination is limited secondary to patient motion, most significantly involving the flair axial and flair coronal sequences, as well as the postcontrast sequences.    There is encephalomalacia within the left PCA territory consistent with remote infarction.  Minimal postcontrast enhancement is noted within this region.  There may be a few punctate foci of T2/flair signal abnormality within the periventricular white matter, may reflect chronic microvascular ischemic change or represent artifact as there is extensive motion on the FLAIR axial images.  Additionally, there is high flair signal material involving the basal cisterns about the brainstem, not confirmed on other pulse sequences, suggesting that this is artifactual related to poor CSF signal saturation rather than proteinaceous or hemorrhagic material.  There is no evidence of intracranial mass, or acute infarction.  The ventricular system is within normal limits of size for age and shows no evidence of hydrocephalus.  There are no significant extra-axial or extracranial abnormalities detected.  The bilateral mesial temporal lobes are symmetric without abnormal signal, sclerosis, or enhancement.    The globes, orbits, pituitary gland, pineal gland and craniocervical junction are normal in configuration. The major vascular flow voids are patent.  Please see CTA performed earlier today for details of the intracranial vasculature.    Impression    Motion limited examination.    On the flair images, high attenuating material about the brainstem/basal cisterns is felt to represent artifact given extensive patient motion likely the result of poor saturation of the CSF signal in the region.  Of note, there is no associated enhancement or  meningeal enhancement to suggest that this reflects proteinaceous material of infectious nature.  Blood products felt less likely.  If this finding would correlate with current patient symptomatology, consider either repeating the flair image once patient is better able to tolerate the examination, or correlate this finding with lumbar puncture.    No evidence of acute infarct.  Remote PCA territory infarct with mild enhancement.    No abnormal temporal lobe enhancement or focal focus to suggest seizure nidus.            Electronically signed by: NERY LO MD  Date:     11/06/16  Time:    01:08       Additional tests:  1)CT Scan: as noted  2) EEG\Video Monitoring: no  3) PET Scan: no  4) Neuropsychological evaluation: no  5) DEXA Scan: no  6) Others: no     RISK FACTORS FOR SEIZURES:    1. Head Trauma:  No    2. CNS Infections:  No  3. CNS Tumors: No     4. CNS Vascular Disease: hx of prior CVA  5. Febrile Seizures: No    6. Developmental Delay: No       7. Family History of Seizures: No    8. Birth history: unremarkable     Pregnancy/Labor/Delivery: n/a    CURRENT MEDICATIONS:   Current Outpatient Medications   Medication Sig Dispense Refill    acetaminophen (TYLENOL) 500 MG tablet Take 1 tablet (500 mg total) by mouth every 6 (six) hours as needed for Pain.  0    amLODIPine (NORVASC) 5 MG tablet Take 1 tablet (5 mg total) by mouth once daily. 90 tablet 3    aspirin 81 MG Chew Take 1 tablet (81 mg total) by mouth once daily.  0    atorvastatin (LIPITOR) 40 MG tablet Take 1 tablet (40 mg total) by mouth once daily. 90 tablet 1    betamethasone dipropionate (DIPROLENE) 0.05 % cream Apply topically 2 (two) times daily. 45 g 3    blood sugar diagnostic (ONETOUCH VERIO) Strp Check blood glucose readings 3 times a day. 300 strip 3    blood sugar diagnostic Strp To check BG 3 times daily, to use with insurance preferred meter 100 each 11    blood-glucose meter kit To check BG 3 times daily - please provide  insurance preferred meter 1 each 0    carvediloL (COREG) 12.5 MG tablet Take 1 tablet (12.5 mg total) by mouth 2 (two) times daily. 180 tablet 3    ciclopirox (PENLAC) 8 % Soln Apply topically nightly. 6.6 mL 11    clotrimazole-betamethasone 1-0.05% (LOTRISONE) cream Apply topically 2 (two) times daily. To area of rash for 2-4 weeks 45 g 0    ELIQUIS 5 mg Tab TAKE 1 TABLET BY MOUTH TWICE A  tablet 3    flu vacc [id number],65yr up,PF (FLUZONE HIGH-DOSE 2019-20, PF,) 180 mcg/0.5 mL Syrg Inject 0.5 ml into the muscle once for 1 dose. 0.5 mL 0    fluocinonide (LIDEX) 0.05 % ointment AAA bid 60 g 3    folic acid (FOLVITE) 400 MCG tablet Take 1,000 mcg by mouth once daily.       furosemide (LASIX) 80 MG tablet Take 1 tablet (80 mg total) by mouth 2 (two) times daily. 120 tablet 3    GLUCAGON EMERGENCY KIT, HUMAN, 1 mg injection INJECT 1 MG INTO THE MUSCLE AS NEEDED 1 kit 1    insulin aspart U-100 (NOVOLOG FLEXPEN U-100 INSULIN) 100 unit/mL (3 mL) InPn pen INJECT 7 UNITS WITH MEALS PLUS SLIDING SCALE WITH MAX OF 35 UNITS PER DAY 3 Box 3    insulin detemir U-100 (LEVEMIR FLEXTOUCH U-100 INSULN) 100 unit/mL (3 mL) SubQ InPn pen Inject 13 units at night. 2 Box 3    insulin lispro 100 unit/mL inph Inject 7 Units into the skin 3 (three) times daily before meals. Max daily 50 units. 20 mL 6    lacosamide (VIMPAT) 200 mg Tab tablet Take 1 tablet (200 mg total) by mouth every 12 (twelve) hours. 60 tablet 11    lancets Misc To check BG 3 times daily, to use with insurance preferred meter 100 each 11    lancing device Misc 1 Device by Misc.(Non-Drug; Combo Route) route 3 (three) times daily. 1 each 0    levETIRAcetam (KEPPRA) 750 MG Tab Take 2 tablets (1,500 mg total) by mouth 2 (two) times daily. 120 tablet 11    levothyroxine (SYNTHROID) 200 MCG tablet TAKE 1 TABLET BY MOUTH EVERY DAY BEFORE BREAKFAST 90 tablet 2    levothyroxine (SYNTHROID) 50 MCG tablet Take 1 tablet (50 mcg total) by mouth every 7 days.  "Pt will be taking only on Monday. 30 tablet 1    magnesium 30 mg Tab Take 1 tablet by mouth twice a week. Mon ,weds,fridays      multivitamin capsule Take 1 capsule by mouth every Mon, Wed, Fri.       pen needle, diabetic (BD ULTRA-FINE MINI PEN NEEDLE) 31 gauge x 3/16" Ndle To use with insulin pens 4  times daily 360 each 3    SAW PALMETTO ORAL Take by mouth. mon , wed, fri      sildenafil (VIAGRA) 100 MG tablet Take 1/2 to 1 tablet daily as needed.  Take on an empty stomach or with a light meal. 30 tablet 12    tamsulosin (FLOMAX) 0.4 mg Cap TAKE 1 CAPSULE (0.4 MG TOTAL) BY MOUTH EVERY EVENING. 90 capsule 3    TRULICITY 0.75 mg/0.5 mL PnIj INJECT 1 PEN UNDER THE SKIN EVERY 7 DAYS 2 Syringe 4    valsartan (DIOVAN) 320 MG tablet Take 1 tablet (320 mg total) by mouth once daily. 90 tablet 3    vitamin D 1000 units Tab Take 1,000 Units by mouth every Mon, Wed, Fri.       No current facility-administered medications for this visit.       CURRENT ANTI EPILEPTIC MEDICATIONS:    - Levetiracetam 1500mg bid  - Lacosamide 200mg bid    VAGAL NERVE STIMULATOR: n/a     PRIOR ANTICONVULSANT HISTORY:  none      PAST MEDICAL HISTORY:   Active Ambulatory Problems     Diagnosis Date Noted    Hypercholesterolemia 08/01/2012    Sickle cell trait 08/01/2012    Erectile dysfunction associated with type 2 diabetes mellitus 01/29/2013    Gallstones 03/20/2013    Postsurgical hypothyroidism 11/27/2013    Microcytic anemia 11/27/2013    Obesity (BMI 30.0-34.9) 11/25/2014    Right homonymous hemianopsia 02/05/2016    History of CVA (cerebrovascular accident) 07/25/2016    Type 2 diabetes mellitus with stage 3 chronic kidney disease, with long-term current use of insulin 11/02/2016    Chronic anticoagulation - eliquis 11/10/2016    Acute on chronic diastolic heart failure 11/20/2016    History of thyroid cancer 01/13/2017    Secondary hyperparathyroidism 06/29/2017    Elevated PSA 07/05/2017    Essential hypertension " 08/30/2017    Nuclear sclerosis of both eyes 08/30/2017    Refractive error 08/30/2017    Prostate enlargement 09/14/2017    Complex partial seizures evolving to generalized tonic-clonic seizures 11/15/2017    Hypertensive CKD (chronic kidney disease) 01/31/2018    Chronic kidney disease, stage III (moderate) 06/28/2018    Vitreous hemorrhage of right eye 10/07/2018    Glaucoma suspect of both eyes 10/07/2018    Sickle cell retinopathy without crisis 10/08/2018    Type 2 diabetes mellitus without ophthalmic manifestations 10/08/2018    Hypertensive retinopathy, bilateral 10/16/2018    Proteinuria due to type 2 diabetes mellitus 08/06/2019    Hyperparathyroidism 08/06/2019    Chronic atrial fibrillation 09/25/2019    Bilateral carotid artery stenosis 09/26/2019    Epiretinal membrane (ERM) of right eye 10/23/2019    Chronic eczema 02/17/2020     Resolved Ambulatory Problems     Diagnosis Date Noted    HTN (hypertension), benign 08/01/2012    Elevated alkaline phosphatase level 08/01/2012    Flank pain 03/20/2013    Paroxysmal atrial fibrillation 09/23/2013    Syncope 11/26/2013    Leukocytosis 11/27/2013    Acute on chronic renal failure 11/27/2013    Controlled type 2 diabetes with neuropathy 12/16/2014    Screening 03/03/2015    Screening 03/03/2015    Visual field loss following stroke 01/28/2016    Headache 01/28/2016    Fall 06/10/2016    Proteinuria 11/02/2016    Aphasia     Generalized tonic-clonic seizure     Bradycardia 02/01/2017    Bilateral leg edema 02/01/2017    Hematuria 08/14/2017    Abdominal pain 10/05/2017    Simple partial seizure, consciousness not impaired 10/05/2017    Altered mental status 10/05/2017    Encephalopathy 03/23/2018    Altered mental state 03/24/2018    Acute on chronic congestive heart failure 04/27/2018    Dysarthria     Stroke     Encounter for medication titration 05/22/2018    Leg pain, diffuse, right 05/31/2018    Chest pain  06/27/2018    Elevated d-dimer 06/28/2018    Acute cystitis with hematuria 06/28/2018    S/P colonoscopy 09/19/2019    Chest pain 10/03/2019     Past Medical History:   Diagnosis Date    Allergy     BMI 31.0-31.9,adult 11/25/2014    CHF (congestive heart failure)     Chronic diastolic heart failure 11/20/2016    CKD (chronic kidney disease), stage III 9/23/2013    Coronary artery disease     Diabetes mellitus due to underlying condition with stage 3 chronic kidney disease, without long-term current use of insulin 11/2/2016    Thyroid cancer       PAST PSYCHIATRIC HISTORY: none    PAST SURGICAL HISTORY including Epilepsy surgery:   Past Surgical History:   Procedure Laterality Date    BREAST SURGERY      cyst removal    COLONOSCOPY      COLONOSCOPY N/A 9/19/2019    Procedure: COLONOSCOPY;  Surgeon: Silva Granger MD;  Location: [facility] ([address] [place] [address]);  Service: Endoscopy;  Laterality: N/A;  history of colon polyps on scope 2015, recommended to repeat at 3 yrs  on eliquis and ASA after stroke, afib -- please check with his cardiologist re: instructions for holding this  ok to hold Eliquis x 2 days prior per Dr. Gabriel Hager-see telephone enc    CYST REMOVAL      chest    PROSTATE BIOPSY  4/27/11    SKIN BIOPSY      THYROID SURGERY  8/26/09    VASCULAR SURGERY          FAMILY HISTORY:   Family History   Problem Relation Age of Onset    Heart disease Father     Diabetes Father     Hypertension Father     Diabetes Mother     Hypertension Mother     Heart disease Mother     Diabetes Brother     No Known Problems Daughter     Cancer Sister     No Known Problems Daughter     Thyroid disease Maternal Aunt     Clotting disorder Neg Hx     Melanoma Neg Hx          SOCIAL HISTORY:   Social History     Socioeconomic History    Marital status:      Spouse name: Rut    Number of children: Not on file    Years of education: Not on file    Highest education level: Not  on file   Occupational History    Not on file   Social Needs    Financial resource strain: Not on file    Food insecurity:     Worry: Not on file     Inability: Not on file    Transportation needs:     Medical: Not on file     Non-medical: Not on file   Tobacco Use    Smoking status: Never Smoker    Smokeless tobacco: Never Used   Substance and Sexual Activity    Alcohol use: Yes     Comment: occasionally, none recently    Drug use: No    Sexual activity: Yes     Partners: Female     Comment:  with 3 kids   Lifestyle    Physical activity:     Days per week: Not on file     Minutes per session: Not on file    Stress: Not on file   Relationships    Social connections:     Talks on phone: Not on file     Gets together: Not on file     Attends Confucianism service: Not on file     Active member of club or organization: Not on file     Attends meetings of clubs or organizations: Not on file     Relationship status: Not on file   Other Topics Concern    Not on file   Social History Narrative     with 3 kids     Not driving due to vision problem from stroke.       a) Marital status:                                                     b) Living situation: patient lives with his wife  c) Employed/Unemployed/Other: Disabled     DRIVING HISTORY:  Currently driving: No       LEVEL OF EDUCATION:  -     SUBSTANCE USE: none    ALLERGIES: Cough syrup [guaifenesin]     REVIEW OF SYSTEMS:  Review of Systems   Constitutional: Negative for appetite change and fatigue.   HENT: Negative for dental problem and sore throat.    Eyes: Negative for photophobia and visual disturbance.   Respiratory: Negative for cough and shortness of breath.    Cardiovascular: Negative for chest pain and palpitations.   Gastrointestinal: Negative for nausea and vomiting.   Endocrine: Negative for polydipsia and polyuria.   Genitourinary: Negative for frequency and urgency.   Musculoskeletal: Negative for arthralgias and joint  swelling.   Skin: Negative for color change and rash.   Allergic/Immunologic: Negative for immunocompromised state.   All other systems reviewed and are negative.  - except as per hpi     GENERAL EXAMINATION: not assessed  NEUROLOGICAL EXAMINATION:   Mental status: Alert and oriented  Speech: normal    IMPRESSION:  The patient's history is consistent with:   Complex partial seizures evolving to generalized tonic-clonic seizures  65yo M with hx of CVA in 1/16 and seizures x 11/16   - some prior episodes c/w glucose level alterations  - seen in ED with seizure on 11/28/18: low AED levels     - no seizures x 12/2018     Current AEDs: compliant with no side-effects  - Lacosamide 100mg bid  - Levetiracetam 1500mg bid     Plan:  - continue current AEDs at same doses    Seizure/glucose log  Seizure precautions/restrictions     Plan of care was discussed in detail with patient and his wife    History of CVA (cerebrovascular accident)  - hx of CVA in 2016; stable    The patient was asked to call me/the clinic 1 week after the test(s) are done to obtain results.    More than 50% of the time with the patient (as well as family/caregiver(s) was spent on face-to-face counseling about:  1. Diagnosis, plans, prognosis, medications and possible side-effects, risks and benefits of treatment, other alternatives to AEDs.  2. Risks related to continued seizures, status epilepticus, SUDEP, driving restrictions and seizure precautions ( no baths but showers are ok, no swimming unsupervised, no use of heavy machinery, no use of sharp moving objects, avoid heights).   3. Issues related to pregnancy, OCP and breast feeding as it relates to epilepsy (in female patients).  4. The potential of teratogenicity (for female patients) and suicidal risks of anti-epileptic medications.  5.Avoid any activity that compromise patient safety related to seizures.    Questions and concerns raised by the patient and family/care-giver(s) were addressed and  they indicated understanding of everything discussed and agreed to plans as above.    Return in 1 year or earlier prn     Keara Santiago MD, SONA(), FACNS.  Neurology-Epilepsy.

## 2020-05-24 NOTE — ASSESSMENT & PLAN NOTE
63yo M with hx of CVA in 1/16 and seizures x 11/16   - some prior episodes c/w glucose level alterations  - seen in ED with seizure on 11/28/18: low AED levels     - no seizures x 12/2018     Current AEDs: compliant with no side-effects  - Lacosamide 100mg bid  - Levetiracetam 1500mg bid     Plan:  - continue current AEDs at same doses    Seizure/glucose log  Seizure precautions/restrictions     Plan of care was discussed in detail with patient and his wife

## 2020-05-28 ENCOUNTER — NUTRITION (OUTPATIENT)
Dept: NUTRITION | Facility: CLINIC | Age: 65
End: 2020-05-28
Payer: MEDICARE

## 2020-05-28 VITALS — BODY MASS INDEX: 32.76 KG/M2 | HEIGHT: 70 IN | WEIGHT: 228.81 LBS

## 2020-05-28 DIAGNOSIS — I50.32 CHRONIC DIASTOLIC HEART FAILURE: ICD-10-CM

## 2020-05-28 DIAGNOSIS — N18.30 HYPERTENSIVE KIDNEY DISEASE WITH STAGE 3 CHRONIC KIDNEY DISEASE: ICD-10-CM

## 2020-05-28 DIAGNOSIS — Z79.4 TYPE 2 DIABETES MELLITUS WITH STAGE 3 CHRONIC KIDNEY DISEASE, WITH LONG-TERM CURRENT USE OF INSULIN: ICD-10-CM

## 2020-05-28 DIAGNOSIS — E11.22 TYPE 2 DIABETES MELLITUS WITH STAGE 3 CHRONIC KIDNEY DISEASE, WITH LONG-TERM CURRENT USE OF INSULIN: ICD-10-CM

## 2020-05-28 DIAGNOSIS — Z71.3 DIETARY COUNSELING: Primary | ICD-10-CM

## 2020-05-28 DIAGNOSIS — I12.9 HYPERTENSIVE KIDNEY DISEASE WITH STAGE 3 CHRONIC KIDNEY DISEASE: ICD-10-CM

## 2020-05-28 DIAGNOSIS — I10 ESSENTIAL HYPERTENSION: ICD-10-CM

## 2020-05-28 DIAGNOSIS — N18.30 TYPE 2 DIABETES MELLITUS WITH STAGE 3 CHRONIC KIDNEY DISEASE, WITH LONG-TERM CURRENT USE OF INSULIN: ICD-10-CM

## 2020-05-28 PROCEDURE — 97802 PR MED NUTR THER, 1ST, INDIV, EA 15 MIN: ICD-10-PCS | Mod: S$GLB,,, | Performed by: DIETITIAN, REGISTERED

## 2020-05-28 PROCEDURE — 97802 MEDICAL NUTRITION INDIV IN: CPT | Mod: S$GLB,,, | Performed by: DIETITIAN, REGISTERED

## 2020-05-28 PROCEDURE — 99999 PR PBB SHADOW E&M-EST. PATIENT-LVL IV: CPT | Mod: PBBFAC,,,

## 2020-05-28 PROCEDURE — 99999 PR PBB SHADOW E&M-EST. PATIENT-LVL IV: ICD-10-PCS | Mod: PBBFAC,,,

## 2020-05-28 NOTE — PROGRESS NOTES
"Referring Physician:Tyler Jasmine MD     Reason for visit:  Chief Complaint   Patient presents with    Diabetes    Chronic Kidney Disease    Obesity    Nutrition Counseling      Initial Visit    :1955     Allergies Reviewed  Meds Reviewed    Anthropometrics  Weight:103.8 kg (228 lb 13.4 oz)  Height:5' 10" (1.778 m)  BMI:Body mass index is 32.83 kg/m².   IBW:   75.5 kg  +/-10%    Meds:  Outpatient Medications Prior to Visit   Medication Sig Dispense Refill    acetaminophen (TYLENOL) 500 MG tablet Take 1 tablet (500 mg total) by mouth every 6 (six) hours as needed for Pain.  0    amLODIPine (NORVASC) 5 MG tablet Take 1 tablet (5 mg total) by mouth once daily. 90 tablet 3    aspirin 81 MG Chew Take 1 tablet (81 mg total) by mouth once daily.  0    atorvastatin (LIPITOR) 40 MG tablet Take 1 tablet (40 mg total) by mouth once daily. 90 tablet 1    betamethasone dipropionate (DIPROLENE) 0.05 % cream Apply topically 2 (two) times daily. 45 g 3    blood sugar diagnostic (ONETOUCH VERIO) Strp Check blood glucose readings 3 times a day. 300 strip 3    blood sugar diagnostic Strp To check BG 3 times daily, to use with insurance preferred meter 100 each 11    blood-glucose meter kit To check BG 3 times daily - please provide insurance preferred meter 1 each 0    carvediloL (COREG) 12.5 MG tablet Take 1 tablet (12.5 mg total) by mouth 2 (two) times daily. 180 tablet 3    ciclopirox (PENLAC) 8 % Soln Apply topically nightly. 6.6 mL 11    clotrimazole-betamethasone 1-0.05% (LOTRISONE) cream Apply topically 2 (two) times daily. To area of rash for 2-4 weeks 45 g 0    ELIQUIS 5 mg Tab TAKE 1 TABLET BY MOUTH TWICE A  tablet 3    flu vacc [id number],65yr up,PF (FLUZONE HIGH-DOSE , PF,) 180 mcg/0.5 mL Syrg Inject 0.5 ml into the muscle once for 1 dose. 0.5 mL 0    fluocinonide (LIDEX) 0.05 % ointment AAA bid 60 g 3    folic acid (FOLVITE) 400 MCG tablet Take 1,000 mcg by mouth once daily.  " "     furosemide (LASIX) 80 MG tablet Take 1 tablet (80 mg total) by mouth 2 (two) times daily. 120 tablet 3    GLUCAGON EMERGENCY KIT, HUMAN, 1 mg injection INJECT 1 MG INTO THE MUSCLE AS NEEDED 1 kit 1    insulin aspart U-100 (NOVOLOG FLEXPEN U-100 INSULIN) 100 unit/mL (3 mL) InPn pen INJECT 7 UNITS WITH MEALS PLUS SLIDING SCALE WITH MAX OF 35 UNITS PER DAY 3 Box 3    insulin detemir U-100 (LEVEMIR FLEXTOUCH) 100 unit/mL (3 mL) SubQ InPn pen INJECT 13 UNITS AT NIGHT. 3 Box 4    insulin lispro 100 unit/mL inph Inject 7 Units into the skin 3 (three) times daily before meals. Max daily 50 units. 20 mL 6    lacosamide (VIMPAT) 200 mg Tab tablet Take 1 tablet (200 mg total) by mouth every 12 (twelve) hours. 60 tablet 11    lancets Misc To check BG 3 times daily, to use with insurance preferred meter 100 each 11    lancing device Misc 1 Device by Misc.(Non-Drug; Combo Route) route 3 (three) times daily. 1 each 0    levETIRAcetam (KEPPRA) 750 MG Tab Take 2 tablets (1,500 mg total) by mouth 2 (two) times daily. 120 tablet 11    levothyroxine (SYNTHROID) 200 MCG tablet TAKE 1 TABLET BY MOUTH EVERY DAY BEFORE BREAKFAST 90 tablet 2    levothyroxine (SYNTHROID) 50 MCG tablet Take 1 tablet (50 mcg total) by mouth every 7 days. Pt will be taking only on Monday. 30 tablet 1    magnesium 30 mg Tab Take 1 tablet by mouth twice a week. Mon ,weds,fridays      multivitamin capsule Take 1 capsule by mouth every Mon, Wed, Fri.       pen needle, diabetic (BD ULTRA-FINE MINI PEN NEEDLE) 31 gauge x 3/16" Ndle To use with insulin pens 4  times daily 360 each 3    SAW PALMETTO ORAL Take by mouth. mon , wed, fri      sildenafil (VIAGRA) 100 MG tablet Take 1/2 to 1 tablet daily as needed.  Take on an empty stomach or with a light meal. 30 tablet 12    tamsulosin (FLOMAX) 0.4 mg Cap TAKE 1 CAPSULE (0.4 MG TOTAL) BY MOUTH EVERY EVENING. 90 capsule 3    TRULICITY 0.75 mg/0.5 mL PnIj INJECT 1 PEN UNDER THE SKIN EVERY 7 DAYS 2 " Syringe 4    valsartan (DIOVAN) 320 MG tablet Take 1 tablet (320 mg total) by mouth once daily. 90 tablet 3    vitamin D 1000 units Tab Take 1,000 Units by mouth every Mon, Wed, Fri.       No facility-administered medications prior to visit.        Food/Drug Interactions Noted:  none    Vitamins/Supplements/Herbs:    Vit D; folic acid; MVI; magnesium    Labs:   HgbA1c  5.1   Cr  2.3   eGFR  33.0   K+  4.8   Phos  4.0     Nutrition Prescription:   2265 Kcals/day( 30 kcal/kg IBW),    60 g protein( 0.8 g/kg IBW)     Support System:  Pt's wife (who remained in the lobby) does the grocery shopping and cooking.  Pt states his wife checks his blood glucose every day and adjusts his meal intake according to reading.    Diet Hx:   Pt states he is here today for diabetic diet education.  Is familiar with foods containing carbohydrates, and avoids all processed foods.  States he is to drink 8-8 oz glasses of fluid (water/fruit juice very occasionally) daily.  Snacks occasionally on chocolates; not too much salty food (unless his wife isn't home).  States potato bread doesn't affect his blood glucose like wheat bread does.  Wife doesn't use salt in cooking; uses other type of seasoning which pt could not recall the name of.  Vegetables are usually fresh, because some frozen and canned contain too much sodium  Pt uses reading glasses, which he did not have with him today.       Breakfast: cereal and 2% milk or scrambled eggs with sausage.  Water.  Lunch:   Vegetables (spinach, green beans, mushrooms) and meat ie chicken/fish/ground meat jean carlos.  water  Dinner:   Last night:  Fish with vegetables, small piece of cake, water.    Current activity level and/or physical limitations:  Pt states he walks one mile, 5-6 times/week, with his wife    Motivation to make changes/anticipated barriers and/or expected adherence:    Continued diet adherence expected    Nutrition-Focus Physical Findings:  Pt wearing mask per COVID19 protocol;  appears well nourished.  Had to stand up during session when his left leg cramped.    Assessment:   Pt attentive and asked relevant questions about foods recommended & to avoid; reading food labels; sample meal plan and menus; seasoning foods without using salt.  All questions answered, and pt verbalized understanding of information.  Pt seems knowledgeable of carb counting and low sodium diet regimens    Nutrition Diagnosis:   None at this time.    Recommendations:   Carbohydrate controlled, low sodium diet.  Exercise goal:  30 minutes per day, 3-5 days per week.  Handouts provided and reviewed:  Diabetes and Heart Healthy Nutrition Therapy; Reading a Food Label    Strategies Implemented:    Portion control    Consultation Time:35 minutes.  Communicated with referring healthcare provider:  Consult note available in pt's Epic chart per MD discretion  Follow Up:  Pt provided with dietitian contact number and advised to call with questions or make future appointment if further intervention needed.

## 2020-06-25 ENCOUNTER — LAB VISIT (OUTPATIENT)
Dept: LAB | Facility: HOSPITAL | Age: 65
End: 2020-06-25
Attending: INTERNAL MEDICINE
Payer: MEDICARE

## 2020-06-25 DIAGNOSIS — Z85.850 HISTORY OF THYROID CANCER: ICD-10-CM

## 2020-06-25 DIAGNOSIS — I50.32 CHRONIC DIASTOLIC HEART FAILURE: ICD-10-CM

## 2020-06-25 DIAGNOSIS — I10 HYPERTENSION, UNSPECIFIED TYPE: ICD-10-CM

## 2020-06-25 DIAGNOSIS — E78.00 HYPERCHOLESTEROLEMIA: ICD-10-CM

## 2020-06-25 DIAGNOSIS — E11.9 TYPE 2 DIABETES MELLITUS WITHOUT OPHTHALMIC MANIFESTATIONS: ICD-10-CM

## 2020-06-25 DIAGNOSIS — R76.8 RHEUMATOID FACTOR POSITIVE: ICD-10-CM

## 2020-06-25 LAB
ALBUMIN SERPL BCP-MCNC: 3.1 G/DL (ref 3.5–5.2)
ALP SERPL-CCNC: 111 U/L (ref 55–135)
ALT SERPL W/O P-5'-P-CCNC: 18 U/L (ref 10–44)
ANION GAP SERPL CALC-SCNC: 8 MMOL/L (ref 8–16)
AST SERPL-CCNC: 25 U/L (ref 10–40)
BILIRUB SERPL-MCNC: 0.7 MG/DL (ref 0.1–1)
BUN SERPL-MCNC: 28 MG/DL (ref 8–23)
CALCIUM SERPL-MCNC: 8.2 MG/DL (ref 8.7–10.5)
CCP AB SER IA-ACNC: <0.5 U/ML
CHLORIDE SERPL-SCNC: 108 MMOL/L (ref 95–110)
CHOLEST SERPL-MCNC: 118 MG/DL (ref 120–199)
CHOLEST/HDLC SERPL: 3 {RATIO} (ref 2–5)
CO2 SERPL-SCNC: 23 MMOL/L (ref 23–29)
CREAT SERPL-MCNC: 2.5 MG/DL (ref 0.5–1.4)
CRP SERPL-MCNC: 5.3 MG/L (ref 0–8.2)
ERYTHROCYTE [SEDIMENTATION RATE] IN BLOOD BY WESTERGREN METHOD: 4 MM/HR (ref 0–23)
EST. GFR  (AFRICAN AMERICAN): 29.8 ML/MIN/1.73 M^2
EST. GFR  (NON AFRICAN AMERICAN): 25.8 ML/MIN/1.73 M^2
ESTIMATED AVG GLUCOSE: 100 MG/DL (ref 68–131)
GLUCOSE SERPL-MCNC: 189 MG/DL (ref 70–110)
HBA1C MFR BLD HPLC: 5.1 % (ref 4–5.6)
HDLC SERPL-MCNC: 39 MG/DL (ref 40–75)
HDLC SERPL: 33.1 % (ref 20–50)
LDLC SERPL CALC-MCNC: 55.6 MG/DL (ref 63–159)
NONHDLC SERPL-MCNC: 79 MG/DL
POTASSIUM SERPL-SCNC: 4.9 MMOL/L (ref 3.5–5.1)
PROT SERPL-MCNC: 6.9 G/DL (ref 6–8.4)
SODIUM SERPL-SCNC: 139 MMOL/L (ref 136–145)
TRIGL SERPL-MCNC: 117 MG/DL (ref 30–150)
TSH SERPL DL<=0.005 MIU/L-ACNC: 1.9 UIU/ML (ref 0.4–4)

## 2020-06-25 PROCEDURE — 86706 HEP B SURFACE ANTIBODY: CPT

## 2020-06-25 PROCEDURE — 86705 HEP B CORE ANTIBODY IGM: CPT

## 2020-06-25 PROCEDURE — 80061 LIPID PANEL: CPT

## 2020-06-25 PROCEDURE — 84165 PATHOLOGIST INTERPRETATION SPE: ICD-10-PCS | Mod: 26,,, | Performed by: PATHOLOGY

## 2020-06-25 PROCEDURE — 83036 HEMOGLOBIN GLYCOSYLATED A1C: CPT | Mod: 59

## 2020-06-25 PROCEDURE — 84165 PROTEIN E-PHORESIS SERUM: CPT

## 2020-06-25 PROCEDURE — 86140 C-REACTIVE PROTEIN: CPT

## 2020-06-25 PROCEDURE — 84165 PROTEIN E-PHORESIS SERUM: CPT | Mod: 26,,, | Performed by: PATHOLOGY

## 2020-06-25 PROCEDURE — 86200 CCP ANTIBODY: CPT

## 2020-06-25 PROCEDURE — 84432 ASSAY OF THYROGLOBULIN: CPT

## 2020-06-25 PROCEDURE — 85652 RBC SED RATE AUTOMATED: CPT

## 2020-06-25 PROCEDURE — 80053 COMPREHEN METABOLIC PANEL: CPT

## 2020-06-25 PROCEDURE — 36415 COLL VENOUS BLD VENIPUNCTURE: CPT | Mod: PO

## 2020-06-25 PROCEDURE — 82985 ASSAY OF GLYCATED PROTEIN: CPT

## 2020-06-25 PROCEDURE — 87340 HEPATITIS B SURFACE AG IA: CPT

## 2020-06-25 PROCEDURE — 84443 ASSAY THYROID STIM HORMONE: CPT

## 2020-06-25 PROCEDURE — 86704 HEP B CORE ANTIBODY TOTAL: CPT

## 2020-06-26 LAB
ALBUMIN SERPL ELPH-MCNC: 3.34 G/DL (ref 3.35–5.55)
ALPHA1 GLOB SERPL ELPH-MCNC: 0.29 G/DL (ref 0.17–0.41)
ALPHA2 GLOB SERPL ELPH-MCNC: 0.6 G/DL (ref 0.43–0.99)
B-GLOBULIN SERPL ELPH-MCNC: 0.75 G/DL (ref 0.5–1.1)
GAMMA GLOB SERPL ELPH-MCNC: 1.52 G/DL (ref 0.67–1.58)
HBV CORE AB SERPL QL IA: NEGATIVE
HBV CORE IGM SERPL QL IA: NEGATIVE
HBV SURFACE AB SER-ACNC: NEGATIVE M[IU]/ML
HBV SURFACE AG SERPL QL IA: NEGATIVE
PATHOLOGIST INTERPRETATION SPE: NORMAL
PROT SERPL-MCNC: 6.5 G/DL (ref 6–8.4)

## 2020-06-29 LAB
FRUCTOSAMINE SERPL-SCNC: 294 UMOL /L
THRYOGLOBULIN INTERPRETATION: ABNORMAL
THYROGLOB AB SERPL-ACNC: <1.8 IU/ML
THYROGLOB SERPL-MCNC: 0.1 NG/ML

## 2020-07-01 ENCOUNTER — PATIENT OUTREACH (OUTPATIENT)
Dept: ADMINISTRATIVE | Facility: OTHER | Age: 65
End: 2020-07-01

## 2020-07-02 ENCOUNTER — OFFICE VISIT (OUTPATIENT)
Dept: NEPHROLOGY | Facility: CLINIC | Age: 65
End: 2020-07-02
Payer: MEDICARE

## 2020-07-02 VITALS
WEIGHT: 225.5 LBS | DIASTOLIC BLOOD PRESSURE: 84 MMHG | HEART RATE: 60 BPM | OXYGEN SATURATION: 99 % | HEIGHT: 70 IN | BODY MASS INDEX: 32.28 KG/M2 | SYSTOLIC BLOOD PRESSURE: 146 MMHG

## 2020-07-02 DIAGNOSIS — N18.4 HYPERTENSIVE KIDNEY DISEASE WITH STAGE 4 CHRONIC KIDNEY DISEASE: Primary | ICD-10-CM

## 2020-07-02 DIAGNOSIS — I12.9 HYPERTENSIVE KIDNEY DISEASE WITH STAGE 4 CHRONIC KIDNEY DISEASE: Primary | ICD-10-CM

## 2020-07-02 DIAGNOSIS — R80.9 PROTEINURIA DUE TO TYPE 2 DIABETES MELLITUS: ICD-10-CM

## 2020-07-02 DIAGNOSIS — E11.29 PROTEINURIA DUE TO TYPE 2 DIABETES MELLITUS: ICD-10-CM

## 2020-07-02 DIAGNOSIS — E11.65 TYPE 2 DIABETES MELLITUS WITH HYPERGLYCEMIA, WITH LONG-TERM CURRENT USE OF INSULIN: ICD-10-CM

## 2020-07-02 DIAGNOSIS — Z79.4 TYPE 2 DIABETES MELLITUS WITH STAGE 4 CHRONIC KIDNEY DISEASE, WITH LONG-TERM CURRENT USE OF INSULIN: ICD-10-CM

## 2020-07-02 DIAGNOSIS — E11.22 TYPE 2 DIABETES MELLITUS WITH STAGE 4 CHRONIC KIDNEY DISEASE, WITH LONG-TERM CURRENT USE OF INSULIN: ICD-10-CM

## 2020-07-02 DIAGNOSIS — N18.4 TYPE 2 DIABETES MELLITUS WITH STAGE 4 CHRONIC KIDNEY DISEASE, WITH LONG-TERM CURRENT USE OF INSULIN: ICD-10-CM

## 2020-07-02 DIAGNOSIS — Z79.4 TYPE 2 DIABETES MELLITUS WITH HYPERGLYCEMIA, WITH LONG-TERM CURRENT USE OF INSULIN: ICD-10-CM

## 2020-07-02 DIAGNOSIS — N25.81 SECONDARY HYPERPARATHYROIDISM: ICD-10-CM

## 2020-07-02 DIAGNOSIS — E21.3 HYPERPARATHYROIDISM: ICD-10-CM

## 2020-07-02 PROCEDURE — 3079F PR MOST RECENT DIASTOLIC BLOOD PRESSURE 80-89 MM HG: ICD-10-PCS | Mod: CPTII,S$GLB,, | Performed by: INTERNAL MEDICINE

## 2020-07-02 PROCEDURE — 1101F PT FALLS ASSESS-DOCD LE1/YR: CPT | Mod: CPTII,S$GLB,, | Performed by: INTERNAL MEDICINE

## 2020-07-02 PROCEDURE — 99499 UNLISTED E&M SERVICE: CPT | Mod: S$GLB,,, | Performed by: INTERNAL MEDICINE

## 2020-07-02 PROCEDURE — 3044F PR MOST RECENT HEMOGLOBIN A1C LEVEL <7.0%: ICD-10-PCS | Mod: CPTII,S$GLB,, | Performed by: INTERNAL MEDICINE

## 2020-07-02 PROCEDURE — 3008F BODY MASS INDEX DOCD: CPT | Mod: CPTII,S$GLB,, | Performed by: INTERNAL MEDICINE

## 2020-07-02 PROCEDURE — 1126F AMNT PAIN NOTED NONE PRSNT: CPT | Mod: S$GLB,,, | Performed by: INTERNAL MEDICINE

## 2020-07-02 PROCEDURE — 1159F MED LIST DOCD IN RCRD: CPT | Mod: S$GLB,,, | Performed by: INTERNAL MEDICINE

## 2020-07-02 PROCEDURE — 3077F SYST BP >= 140 MM HG: CPT | Mod: CPTII,S$GLB,, | Performed by: INTERNAL MEDICINE

## 2020-07-02 PROCEDURE — 3077F PR MOST RECENT SYSTOLIC BLOOD PRESSURE >= 140 MM HG: ICD-10-PCS | Mod: CPTII,S$GLB,, | Performed by: INTERNAL MEDICINE

## 2020-07-02 PROCEDURE — 3044F HG A1C LEVEL LT 7.0%: CPT | Mod: CPTII,S$GLB,, | Performed by: INTERNAL MEDICINE

## 2020-07-02 PROCEDURE — 99499 RISK ADDL DX/OHS AUDIT: ICD-10-PCS | Mod: S$GLB,,, | Performed by: INTERNAL MEDICINE

## 2020-07-02 PROCEDURE — 3008F PR BODY MASS INDEX (BMI) DOCUMENTED: ICD-10-PCS | Mod: CPTII,S$GLB,, | Performed by: INTERNAL MEDICINE

## 2020-07-02 PROCEDURE — 99999 PR PBB SHADOW E&M-EST. PATIENT-LVL V: CPT | Mod: PBBFAC,,, | Performed by: INTERNAL MEDICINE

## 2020-07-02 PROCEDURE — 1159F PR MEDICATION LIST DOCUMENTED IN MEDICAL RECORD: ICD-10-PCS | Mod: S$GLB,,, | Performed by: INTERNAL MEDICINE

## 2020-07-02 PROCEDURE — 1126F PR PAIN SEVERITY QUANTIFIED, NO PAIN PRESENT: ICD-10-PCS | Mod: S$GLB,,, | Performed by: INTERNAL MEDICINE

## 2020-07-02 PROCEDURE — 1101F PR PT FALLS ASSESS DOC 0-1 FALLS W/OUT INJ PAST YR: ICD-10-PCS | Mod: CPTII,S$GLB,, | Performed by: INTERNAL MEDICINE

## 2020-07-02 PROCEDURE — 99215 PR OFFICE/OUTPT VISIT, EST, LEVL V, 40-54 MIN: ICD-10-PCS | Mod: S$GLB,,, | Performed by: INTERNAL MEDICINE

## 2020-07-02 PROCEDURE — 99999 PR PBB SHADOW E&M-EST. PATIENT-LVL V: ICD-10-PCS | Mod: PBBFAC,,, | Performed by: INTERNAL MEDICINE

## 2020-07-02 PROCEDURE — 99215 OFFICE O/P EST HI 40 MIN: CPT | Mod: S$GLB,,, | Performed by: INTERNAL MEDICINE

## 2020-07-02 PROCEDURE — 3079F DIAST BP 80-89 MM HG: CPT | Mod: CPTII,S$GLB,, | Performed by: INTERNAL MEDICINE

## 2020-07-02 RX ORDER — LANCETS
EACH MISCELLANEOUS
Qty: 200 EACH | Refills: 3 | Status: SHIPPED | OUTPATIENT
Start: 2020-07-02

## 2020-07-02 NOTE — PROGRESS NOTES
Requested updates within Care Everywhere.  Patient's chart was reviewed for overdue BLAS topics.  Immunizations reconciled.

## 2020-07-05 NOTE — PROGRESS NOTES
"Subjective:       Patient ID: Alfa Christy III is a 64 y.o. Black or  male who presents for follow upof CKD.      HPI     Mr. Christy was seen in follow up of CKD today. He was last seen on 8/6/19.     In march 2020 he was seen by SEDaniela. During that visit he was noted to have slight worsening of CKD and his baseline proteinuria. She discussed this with me and in discussion with her work up was undertaken for further evaluation of this. Patient had serologic testing done. It showed elevated rheumatoid factor. Hence he was referred to rheumatology for evaluation. Other than elevated rheum factor other serologic tests came back negative.    Interim since last visit due to poorly controlled hypertension and worsening leg edema he was started on lasix for diuresis.    He did not bring home BP readings. His wife was accompanying him during this visit. She reported due to ongoing covid pandemic he has not been going to gym for exercise. They're not sure if he has weight changes but notice that leg swelling is slightly better since he started lasix. He denies SOB/ CP.     He reports having stable appetite. Denies any acute illness.     Patient has h/o CVA. He has been on Eliquis and aspirin for stroke prevention.     He has CKD which has been slowly worsening due to poorly controlled diabetes, hypertension.     Per prior notes:    His spouse reports she tries to be the "food police". Pt admits having occasional swelling over ankles and legs. He has been on diuretic regimen. He admitted today he had a meal at a fast food restaurant chain.     He has underlying longstanding diabetes, hypertension, CVA, paroxysmal atrial fibrillation, chronic anticoagulation use, hyperlipidemia, sickle cell trait, thyroid cancer, hyperlipidemiaen along with CKD. He admits to prior uncontrolled nature of his diabetes. He also at some point was taking NSAID like Advil. Per available labs since 2005 to 2007 he has had " elevated creatinine with some episodes of JAELYN.     He continues to have BMI in the range of 30's. He did not bring any BP readings fro review from his home.     US kidneys from 5/17 noted for 10.9, 10.4 cm kidney size, enlarged prostate, peripelvic cyst. He has diastolic dysfunction on echo.    US kidneys 1/4/19 9.8 an 9.6 cm kidneys, mildly increased echogenicity, no mass. Prostate enlargement.     Renal Function:  Lab Results   Component Value Date     (H) 06/25/2020     (H) 06/25/2020     (H) 06/25/2020     06/25/2020     06/25/2020     06/25/2020    K 4.9 06/25/2020    K 4.9 06/25/2020    K 4.9 06/25/2020     06/25/2020     06/25/2020     06/25/2020    CO2 23 06/25/2020    CO2 23 06/25/2020    CO2 23 06/25/2020    BUN 28 (H) 06/25/2020    BUN 28 (H) 06/25/2020    BUN 28 (H) 06/25/2020    CALCIUM 8.2 (L) 06/25/2020    CALCIUM 8.2 (L) 06/25/2020    CALCIUM 8.2 (L) 06/25/2020    CREATININE 2.5 (H) 06/25/2020    CREATININE 2.5 (H) 06/25/2020    CREATININE 2.5 (H) 06/25/2020    ALBUMIN 3.1 (L) 06/25/2020    ALBUMIN 3.2 (L) 03/09/2020    PHOS 4.0 03/09/2020    PHOS 3.3 10/04/2019    ESTGFRAFRICA 29.8 (A) 06/25/2020    ESTGFRAFRICA 29.8 (A) 06/25/2020    ESTGFRAFRICA 29.8 (A) 06/25/2020    EGFRNONAA 25.8 (A) 06/25/2020    EGFRNONAA 25.8 (A) 06/25/2020    EGFRNONAA 25.8 (A) 06/25/2020       Urinalysis:  Lab Results   Component Value Date    APPEARANCEUA Clear 07/02/2020    PHUR 6.0 07/02/2020    SPECGRAV 1.015 07/02/2020    PROTEINUA 2+ (A) 07/02/2020    GLUCUA 1+ (A) 07/02/2020    OCCULTUA Negative 07/02/2020    NITRITE Negative 07/02/2020    LEUKOCYTESUR Trace (A) 07/02/2020       Protein/Creatinine Ratio:  Lab Results   Component Value Date    PROTEINURINE 211 (H) 07/02/2020    CREATRANDUR 119.0 07/02/2020    UTPCR 1.77 (H) 07/02/2020       CBC:  Lab Results   Component Value Date    WBC 7.06 10/11/2019    HGB 11.5 (L) 10/11/2019    HCT 33.8 (L) 10/11/2019        Vit D 25  Previously A1C in the range of 8.6 - 8.9, > 14 on one reading     Review of Systems   Constitutional: Negative for activity change, appetite change, chills, fatigue and fever.   HENT: Negative for hearing loss and nosebleeds.    Eyes: Negative for pain and discharge.   Respiratory: Negative for cough, shortness of breath and wheezing.    Cardiovascular: Positive for leg swelling. Negative for chest pain.   Gastrointestinal: Negative for abdominal pain, diarrhea, nausea and vomiting.   Endocrine: Negative for polydipsia and polyuria.   Genitourinary: Negative for decreased urine volume, dysuria, flank pain and frequency.   Musculoskeletal: Negative for back pain and myalgias.   Skin: Negative for pallor and rash.   Allergic/Immunologic: Negative for environmental allergies.   Neurological: Negative for dizziness and headaches.   Psychiatric/Behavioral: Negative for behavioral problems. The patient is not nervous/anxious.        Objective:      Physical Exam  Vitals signs reviewed.   Constitutional:       General: He is not in acute distress.     Appearance: He is well-developed.   HENT:      Head: Normocephalic.   Neck:      Musculoskeletal: Neck supple.   Cardiovascular:      Rate and Rhythm: Normal rate.      Heart sounds: Normal heart sounds.   Pulmonary:      Effort: Pulmonary effort is normal.      Breath sounds: Normal breath sounds. No wheezing.   Abdominal:      Comments: Abdominal obesity    Skin:     General: Skin is warm and dry.   Neurological:      Mental Status: He is alert and oriented to person, place, and time.         Assessment:       1. Hypertensive kidney disease with stage 4 chronic kidney disease    2. Hyperparathyroidism    3. Secondary hyperparathyroidism    4. Proteinuria due to type 2 diabetes mellitus    5. Type 2 diabetes mellitus with stage 4 chronic kidney disease, with long-term current use of insulin      Plan:     Mr. Christy has longstanding diabetes,  hypertension, CVA, paroxysmal atrial fibrillation, chronic anticoagulation use, hyperlipidemia, sickle cell trait, thyroid cancer, hyperlipidemia and has CKD which has worsened to IV with worsening of proteinuria. CKD is due to longstanding and poorly controlled diabetes, also hypertension, obesity and prior NSAID use.     Overall he has slow worsening of CKD. This was brought to his attention again. Stressed need for weight loss, keeping ideal body weight given his persistent proteinuria and need for strict low salt diet and better BP control.    Previously he had very poorly controlled diabetes with episodes of hyperglycemia crisis with blood glucose of 500. Overall his risk of further progression of CKD to higher stages is really significant. I had a detailed discussion about this with pt and his wife. Better glycemic control, strict low salt diet, BP goal of less than 130/80, weight loss with goal to achieve BMI <30 are all very crucial for his CKD and slow down its progression.     Management of BPH, hematuria and abnormal cytology per urology. Given enlarged prostate, watch for any symptoms of urinary retention. D/w them about avoiding any sleep medicine/ antihistaminic as it can precipitate urinary retention.     In light of worsened CKD and proteinuria serologic work up was obtained which was negative for paraprotein/ PASTOR screen, C3/C4 levels were normal, hep screen was negative for B/C, PLA2R Ab negative. Due to elevated rheumatoid factor he was referred to rheumatology but per rheum he does not appear to have any inflammatory arthritis.     I had a detailed discussion with patient and his wife that as such he has longstanding CKD with proteinuria and has progression over time. His US kidneys so far show slight but gradual loss of size as evidenced on US reports. We discussed at length if this would permit optimal use of diagnostic modality like percutaneous kidney biopsy, explained that it may show  significant interstitial fibrosis already. Also discussed with them he will need to stop both aspirin and Eliquis at least 5 days before the procedure per current radiology protocol but this will increase his stroke or any other CV event risk significantly. Considering these issues it is felt there will be significant risk involved to his overall medical condition by doing a kidney biopsy. They both felt they would not want to consider kidney biopsy for him despite being aware of his worsening kidney function. He has high risk of progression of CKD regardless of whether he receives a tissue diagnosis for this. They expressed understanding of this.    Plan  Repeat UA, UPC  Repeat renal US  Aim for weight loss by calorie control, increased aerobic activity level  Strict low salt diet  Fluid restriction 1500 ml/day given edema  Continue diuretic regimen  Home BP monitoring, send results, goal should be less than 130/80 however avoid aggressive lowering of BP however that can potentially precipitate an episode of hypotension. He is advised to have BP readings reviewed by MD with close periodicity.     Additional plan as follows:    CKD IV with proteinuria, slow worsening  - labs reviewed and d/w them  - periodically monitor renal function and assess if any rapid decline  - avoid NSAID/ bactrim/ IV contrast/ gadolinium/ aminoglycoside/fleet enema/ PPI where possible  - continue to screen for CKD MBD, anemia of CKD, acid base disorder  - continue ARB for RAAS blockade   - Continue follow up with urology given enlarged prostate and hematuria work up     hyperparathyroidism  - continue to trend PTH, phos, Ca, vit D  - suspect component of primary hyperparathyroidism   - add calcitriol if PTH > 200  - continue OTC vit D for now    Proteinuria  - see above  - due to diabetic nephropathy and +/- hypertensive glomerulosclerosis  - continue RAAS blockade   - strict low salt diet   - weight loss by calorie control is  advisable    Fluid overload  - diuretic regimen per cardiology   - stressed importance of strict low salt diet. Offered referral to see dietician but his wife stated they would like to defer it for now     Diabetes type 2 with renal manifestations  - management per endocrine    Hypertension  - see above, strict low salt diet, home BP monitoring    Sickle cell trait, thyroid cancer, hyperlipidemia, atrial fibrillation management per PCP/ other MDs      RTC 3 months   Plan, labs, recommendations were discussed with patient and his wife, their questions were answered to their satisfaction.   Total time spent was 55 minutes with >50% time spent in face to face evaluation, counseling about possible etiology, work up, monitoring, natural course of illness, recommendations.

## 2020-07-31 ENCOUNTER — PATIENT OUTREACH (OUTPATIENT)
Dept: ADMINISTRATIVE | Facility: HOSPITAL | Age: 65
End: 2020-07-31

## 2020-08-11 NOTE — PROGRESS NOTES
Please inform pt  Kidneys sonogram does not show blockage. There is a cyst on left kidney which does not need any intervention. Prostate is enlarged for which he should continue to follow with urologist.

## 2020-08-11 NOTE — TELEPHONE ENCOUNTER
PER DR KNOWLES    Please inform pt   Kidneys sonogram does not show blockage. There is a cyst on left kidney which does not need any intervention. Prostate is enlarged for which he should continue to follow with urologist.   -

## 2020-08-12 NOTE — TELEPHONE ENCOUNTER
PER DR KNOWLES    -Please inform pt/ his wife   Kidney function has slightly further declined. Please continue to take all medicines. Additionally   Follow low potassium diet   Please report if any new symptoms like leg swelling worsening/ BP remaining elevated/ not making much urine etc.     Start calcitriol to correct elevated parathyroid levels which can happen during chronic kidney disease spectrum. Rx has been sent to pt preferred pharmacy.   Please repeat renal panel in 3 weeks   Please schedule return visit in following this lab in 3 weeks. Thanks

## 2020-08-14 PROBLEM — E55.9 VITAMIN D DEFICIENCY: Status: ACTIVE | Noted: 2020-01-01

## 2020-08-14 NOTE — PATIENT INSTRUCTIONS
Today we will try to get your follow up appointments scheduled with Endocrine, Cardiology, and Podiatry (foot doctor).    Talk with your cardiologist about whether they feel a sleep study might be helpful. If you notice feeling drowsy during the day or more snoring, please contact the clinic as we may consider doing a sleep evaluation at that point.    Make sure to get your flu vaccine in the fall!    There is a new shingles vaccine available (Shingrix) that is both safer and more effective than the old shingles vaccine (Zostavax). I do recommend that you get this, as it can help prevent a shingles outbreak even if you have had one in the past.   For this vaccine, you will need a series of 2 shots, first one and then a second 2 to 6 months later.  Please check in with the [facility] or with your local pharmacy about getting this vaccine; they should be able to check on insurance coverage and whether there is any cost to you.

## 2020-08-14 NOTE — Clinical Note
Patient scheduled for urology f/u 9/15 and has lab appt scheduled 8/27. I can't tell for sure which labs are linked to that appt, but he is due for repeat PSA -- could you review and add on any urology labs you want to have done?

## 2020-08-15 NOTE — TELEPHONE ENCOUNTER
Reason for Disposition   No answer.  First attempt to contact caller.  Follow-up call scheduled within 15 minutes.   Second attempt to contact family AND no contact made.  Phone number verified.    Protocols used: NO CONTACT OR DUPLICATE CONTACT CALL-A-AH

## 2020-08-17 NOTE — PATIENT INSTRUCTIONS
Take the metolazone 2.5 mg daily by mouth 30 to 60 minutes before the furosemide for the next 3 days.  Then take metolazone only as needed for weight gain.  Salt restriction of 2,000mg a day. Weigh yourself every morning after you go to the restroom.  Take an extra dose of Lasix (furosemide) if weight increases 3 or more pounds in a 24 hr period, or 5 pounds over a 7 day period. Contact our office if weight does not return to baseline within 1-2 days    Discharge Instructions: Eating a Low-Potassium Diet  Your health care provider has prescribed a low-potassium diet for you. This kind of diet is advised for people who have certain kidney problems. Potassium is needed for muscle function. But too much potassium is a health risk. Potassium is found in many foods. Read below to find out how to change your diet.  Foods to limit  Some foods are high in potassium. Limit your daily intake of the foods in the list below.  · Fruits: apricots (canned and fresh), bananas, cantaloupe, honeydew melon, kiwi, nectarines, pomegranates, oranges, orange juice, pears, dried fruits (apricots, dates, figs, prunes), and prune juice  · Vegetables: asparagus, avocado, artichoke, bamboo shoots, beets, brussels sprouts, cabbage, celery, chard, okra, potatoes (white and sweet), pumpkin, rutabaga, spinach (cooked), squash, tomato, tomato sauce, tomato juice, and vegetable juice cocktail  · Legumes: black-eyed peas, chickpeas, lentils, lima beans, navy beans, red kidney beans, soybeans, and split peas  · Nuts and seeds: almonds, Brazil nuts, cashews, peanuts, peanut butter, pecans, pumpkin seeds, sunflower seeds, and walnuts  · Breads and cereals: bran and whole-grain products  · Dairy foods: milk, cheese, ice cream, yogurt  · Animal protein: all forms of animal protein  · Other: chocolate, cocoa, coconut milk, and molasses  Tips  · Ask your health care provider how much potassium you are allowed each day. This will help you figure out serving  sizes for your needs.  · Check labels for potassium. It may be listed as potassium chloride.  · Do not use salt substitutes. These often have potassium in them.  · Cook frozen fruits and vegetables in water. Rinse and drain them well before eating.  · Drain liquid from all canned fruits and vegetables. Rinse them before eating.  · Reduce the potassium in potatoes. Peel them, slice thinly, and soak in water for at least 4 hours.  · Reduce the potassium in green leafy vegetables. Soak them in water for at least 4 hours.  · Eat white rice and refined white flour products. These include white bread, pasta, and grits.  Follow-up  Make a follow-up appointment as advised by our staff.  When to call your health care provider  Call your health care provider right away if you have any of the following:  · Fatigue  · Shortness of breath  · Chest pain  · Slow, irregular heartbeat  · Fainting  · Dizziness  · Lightheadedness  · Confusion   Date Last Reviewed: 6/21/2015  © 4147-2965 [employer]. [address] [place] [address], [place] [address]. All rights reserved. This information is not intended as a substitute for professional medical care. Always follow your healthcare professional's instructions.

## 2020-08-17 NOTE — PROGRESS NOTES
Mr. Christy is a patient of Dr. Camacho and was last seen in [facility] Cardiology Visit 1/31/20.      Subjective:   Patient ID:  Alfa Christy III is a 65 y.o. male who presents for follow-up of Congestive Heart Failure    Problems:  HFpEF (EF 60%)   paroxysmal atrial fibrillation   CVA (stroke January 2016; episode of aphasia 10/2017 with no signs of acute stroke)   Carotid artery disease, <50% bilaterally in 2016  HTN   HLD  DM II    HPI  Mr. Christy is in clinic today for routine follow up.  He walks slower so that he is not short of breath and reports legs are heavy to lift.  Patient denies chest pain with exertion or at rest, palpitations,  dizziness, syncope, edema, orthopnea, PND, or claudication.  Reports walking 2 days a week.  He ran out of the Zinio on Friday.  Denies bleeding gums, epistaxis, hemoptysis, hematuria, and hematochezia.  His weight is stable.  He is on a 64 oz fluid restriction a day.      Review of Systems   Constitution: Negative for decreased appetite, diaphoresis, malaise/fatigue, weight gain and weight loss.   Eyes: Negative for visual disturbance.   Cardiovascular: Positive for leg swelling. Negative for chest pain, claudication, dyspnea on exertion, irregular heartbeat, near-syncope, orthopnea, palpitations, paroxysmal nocturnal dyspnea and syncope.        Denies chest pressure   Respiratory: Negative for cough, hemoptysis, shortness of breath, sleep disturbances due to breathing and snoring.    Endocrine: Negative for cold intolerance and heat intolerance.   Hematologic/Lymphatic: Negative for bleeding problem. Does not bruise/bleed easily.   Musculoskeletal: Negative for myalgias.   Gastrointestinal: Negative for bloating, abdominal pain, anorexia, change in bowel habit, constipation, diarrhea, nausea and vomiting.   Neurological: Negative for difficulty with concentration, disturbances in coordination, excessive daytime sleepiness, dizziness, headaches, light-headedness, loss of balance,  numbness and weakness.   Psychiatric/Behavioral: The patient does not have insomnia.        Allergies and current medications updated and reviewed:  Review of patient's allergies indicates:   Allergen Reactions    Cough syrup [guaifenesin] Other (See Comments)     weakness     Current Outpatient Medications   Medication Sig    acetaminophen (TYLENOL) 500 MG tablet Take 1 tablet (500 mg total) by mouth every 6 (six) hours as needed for Pain.    amLODIPine (NORVASC) 5 MG tablet Take 1 tablet (5 mg total) by mouth once daily.    aspirin 81 MG Chew Take 1 tablet (81 mg total) by mouth once daily.    atorvastatin (LIPITOR) 40 MG tablet TAKE 1 TABLET BY MOUTH EVERY DAY    betamethasone dipropionate (DIPROLENE) 0.05 % cream Apply topically 2 (two) times daily.    blood sugar diagnostic (ONETOUCH VERIO) Strp Check blood glucose readings 3 times a day.    blood sugar diagnostic Strp To check BG 3 times daily, to use with insurance preferred meter    blood-glucose meter kit To check BG 3 times daily - please provide insurance preferred meter    calcitRIOL (ROCALTROL) 0.25 MCG Cap Take 1 capsule (0.25 mcg total) by mouth once daily.    carvediloL (COREG) 12.5 MG tablet Take 1 tablet (12.5 mg total) by mouth 2 (two) times daily.    ciclopirox (PENLAC) 8 % Soln Apply topically nightly.    clotrimazole-betamethasone 1-0.05% (LOTRISONE) cream Apply topically 2 (two) times daily. To area of rash for 2-4 weeks    ELIQUIS 5 mg Tab TAKE 1 TABLET BY MOUTH TWICE A DAY    flu vacc [id number],65yr up,PF (FLUZONE HIGH-DOSE 2019-20, PF,) 180 mcg/0.5 mL Syrg Inject 0.5 ml into the muscle once for 1 dose.    fluocinonide (LIDEX) 0.05 % ointment Apply to area of rash twice daily    folic acid (FOLVITE) 400 MCG tablet Take 1,000 mcg by mouth once daily.     furosemide (LASIX) 80 MG tablet Take 1 tablet (80 mg total) by mouth 2 (two) times daily.    GLUCAGON EMERGENCY KIT, HUMAN, 1 mg injection INJECT 1 MG INTO THE MUSCLE AS  "NEEDED    insulin aspart U-100 (NOVOLOG FLEXPEN U-100 INSULIN) 100 unit/mL (3 mL) InPn pen INJECT 7 UNITS WITH MEALS PLUS SLIDING SCALE WITH MAX OF 35 UNITS PER DAY    insulin detemir U-100 (LEVEMIR FLEXTOUCH) 100 unit/mL (3 mL) SubQ InPn pen INJECT 13 UNITS AT NIGHT.    insulin lispro 100 unit/mL inph Inject 7 Units into the skin 3 (three) times daily before meals. Max daily 50 units.    lacosamide (VIMPAT) 200 mg Tab tablet Take 1 tablet (200 mg total) by mouth every 12 (twelve) hours.    lancets Misc To check BG 3 times daily, to use with insurance preferred meter    lancing device Misc 1 Device by Misc.(Non-Drug; Combo Route) route 3 (three) times daily.    levETIRAcetam (KEPPRA) 750 MG Tab Take 2 tablets (1,500 mg total) by mouth 2 (two) times daily.    levothyroxine (SYNTHROID) 200 MCG tablet TAKE 1 TABLET BY MOUTH EVERY DAY BEFORE BREAKFAST    levothyroxine (SYNTHROID) 50 MCG tablet Take 1 tablet (50 mcg total) by mouth every 7 days. Pt will be taking only on Monday.    magnesium 30 mg Tab Take 1 tablet by mouth twice a week. Mon ,,    multivitamin capsule Take 1 capsule by mouth every Mon, Wed, Fri.     pen needle, diabetic (BD ULTRA-FINE MINI PEN NEEDLE) 31 gauge x 3/16" Ndle To use with insulin pens 4  times daily    SAW PALMETTO ORAL Take by mouth. mon , wed, fri    sildenafil (VIAGRA) 100 MG tablet Take 1/2 to 1 tablet daily as needed.  Take on an empty stomach or with a light meal.    tamsulosin (FLOMAX) 0.4 mg Cap TAKE 1 CAPSULE (0.4 MG TOTAL) BY MOUTH EVERY EVENING.    TRULICITY 0.75 mg/0.5 mL pen injector INJECT 1 PEN UNDER THE SKIN EVERY 7 DAYS    valsartan (DIOVAN) 320 MG tablet Take 1 tablet (320 mg total) by mouth once daily.    vitamin D 1000 units Tab Take 1,000 Units by mouth every Mon, Wed, Fri.     No current facility-administered medications for this visit.        Objective:     Right Arm BP - Sittin/80 (20 1346)  Left Arm BP - Sittin/70 " "(08/17/20 1346)    BP (!) 150/70   Pulse 67   Ht 5' 10" (1.778 m)   Wt 103.5 kg (228 lb 2.8 oz)   SpO2 98%   BMI 32.74 kg/m²       Physical Exam   Constitutional: He is oriented to person, place, and time. Vital signs are normal. He appears well-developed and well-nourished. He is active. No distress.   HENT:   Head: Normocephalic and atraumatic.   Eyes: Conjunctivae and lids are normal. No scleral icterus.   Neck: Neck supple. JVD present. Normal carotid pulses and no hepatojugular reflux present. Carotid bruit is not present.   Cardiovascular: Normal rate, regular rhythm, S1 normal, S2 normal and intact distal pulses. PMI is not displaced. Exam reveals no gallop and no friction rub.   No murmur heard.  Pulses:       Carotid pulses are 2+ on the right side and 2+ on the left side.       Radial pulses are 2+ on the right side and 2+ on the left side.        Dorsalis pedis pulses are 2+ on the right side and 2+ on the left side.        Posterior tibial pulses are 1+ on the right side and 1+ on the left side.   Pulmonary/Chest: Effort normal and breath sounds normal. No respiratory distress. He has no decreased breath sounds. He has no wheezes. He has no rhonchi. He has no rales. He exhibits no tenderness.   Abdominal: Soft. Normal appearance and bowel sounds are normal. He exhibits no distension, no fluid wave, no abdominal bruit, no ascites and no pulsatile midline mass. There is no hepatosplenomegaly. There is no abdominal tenderness.   Musculoskeletal:         General: Edema (BLE +2 pitting; trace pitting into thighs ) present.   Neurological: He is alert and oriented to person, place, and time. Gait normal.   Skin: Skin is warm, dry and intact. No rash noted. He is not diaphoretic. Nails show no clubbing.   Psychiatric: He has a normal mood and affect. His speech is normal and behavior is normal. Judgment and thought content normal. Cognition and memory are normal.   Nursing note and vitals reviewed.      " Chemistry        Component Value Date/Time     08/11/2020 1230    K 5.1 08/11/2020 1230     08/11/2020 1230    CO2 25 08/11/2020 1230    BUN 35 (H) 08/11/2020 1230    CREATININE 2.6 (H) 08/11/2020 1230     (H) 08/11/2020 1230        Component Value Date/Time    CALCIUM 7.9 (L) 08/11/2020 1230    ALKPHOS 111 06/25/2020 1005    AST 25 06/25/2020 1005    ALT 18 06/25/2020 1005    BILITOT 0.7 06/25/2020 1005    ESTGFRAFRICA 28.2 (A) 08/11/2020 1230    EGFRNONAA 24.4 (A) 08/11/2020 1230        Lab Results   Component Value Date    HGBA1C 5.1 06/25/2020     Recent Labs   Lab 06/27/18  2021  10/03/19  1045  01/31/20  1442  06/25/20  1005 08/11/20  1230   WBC 11.82   < > 8.67   < >  --   --   --  8.43   Hemoglobin 10.2 L   < > 11.6 L   < >  --   --   --  10.4 L   Hematocrit 28.3 L   < > 32.7 L   < >  --   --   --  29.8 L   Mean Corpuscular Volume 77 L   < > 79 L   < >  --   --   --  81 L   Platelets 247   < > 253   < >  --   --   --  240    H  --  426 H  --  479 H  --   --   --    TSH  --    < >  --    < >  --    < > 1.897  --    Cholesterol  --    < >  --    < >  --   --  118 L  --    HDL  --    < >  --    < >  --   --  39 L  --    LDL Cholesterol  --    < >  --    < >  --   --  55.6 L  --    Triglycerides  --    < >  --    < >  --   --  117  --    Hdl/Cholesterol Ratio  --    < >  --    < >  --   --  33.1  --     < > = values in this interval not displayed.     Lab Results   Component Value Date    IRON 76 06/12/2016    TIBC 241 (L) 06/12/2016    FERRITIN 546 (H) 06/12/2016       Recent Labs   Lab 10/04/17  2118 03/23/18  1317 04/26/18  2355 05/31/18  1248   INR 1.0 1.0 1.0 1.0        Test(s) Reviewed  I have reviewed the following in detail:  [] Stress test   [] Angiography   [x] Echocardiogram   [x] Labs   [] Other:         Assessment/Plan:   1. Chronic diastolic heart failure  Hypervolemic on exam. Urinating every 2-3 hours with current dose of furosemide.  Previously had increased the  furosemide to 80 mg BID at last visit.  Will add metolazone 2.5 mg daily for 3 days to try to diurese him.  Instructed to call by Friday if weight is not decreasing.     - Basic metabolic panel; Future  - metOLazone (ZAROXOLYN) 2.5 MG tablet; Take 1 tablet (2.5 mg total) by mouth daily as needed (weight gain more than 3 pounds/day or more than 5 pounds/week).  Dispense: 15 tablet; Refill: 11    2. Essential hypertension  BP at goal <130/80. Continue current regimen.      3. Chronic atrial fibrillation  Irregularly irregular rhythm. Continue anticoagulation therapy.     4. Chronic anticoagulation - eliquis  Denies bleeding.  Discussed when to seek immediate medical attention.       5. Bilateral carotid artery stenosis  Asymptomatic. No bruit on exam. No changes    6. Hypercholesterolemia  LDL at goal <70. No changes      7. Chronic kidney disease, stage III (moderate)  Followed by Dr. Gold in nephrology    8. History of CVA (cerebrovascular accident)      9. History of thyroid cancer      10. Obesity (BMI 30.0-34.9)  BMI 32.7 Encouraged increased CV exercise to 30 minutes a day for 5 days a week.       Patient was discussed with but not examined by Dr. Conrad    Follow up in about 4 weeks (around 9/14/2020).

## 2020-08-17 NOTE — TELEPHONE ENCOUNTER
psa total and free order in to add to labs. Just make sure it isn't in there for me and pcp both.

## 2020-08-17 NOTE — PROGRESS NOTES
Patient's chart was reviewed for overdue BLAS topics.  Immunizations reconciled.    Orders placed:n/a  Tasked appts:n/a  Labs Linked:n/a

## 2020-08-18 NOTE — PATIENT INSTRUCTIONS
Snacks can be an important part of a balanced, healthy meal plan. They allow you to eat more frequently, feeling full and satisfied throughout the day. Also, they allow you to spread carbohydrates evenly, which may stabilize blood sugars.  Plus, snacks are enjoyable!     The amount of carbohydrate needed at snacks varies. Generally, about 15-30 grams of carbohydrate per snack is recommended.  Below you will find some tasty treats.       0-5 gm carb   Crystal Light   Vitamin Water Zero   Herbal tea, unsweetened   2 tsp peanut butter on celery   1./2 cup sugar-free jell-o   1 sugar-free popsicle   ¼ cup blueberries   8oz Blue Demi unsweetened almond milk   5 baby carrots & celery sticks, cucumbers, bell peppers dipped in ¼ cup salsa, 2Tbsp light ranch dressing or 2Tbsp plain Greek yogurt   10 Goldfish crackers   ½ oz low-fat cheese or string cheese   1 closed handful of nuts, unsalted   1 Tbsp of sunflower seeds, unsalted   1 cup Smart Pop popcorn   1 whole grain brown rice cake        15 gm carb   1 small piece of fruit or ½ banana or 1/2 cup lite canned fruit   3 jakob cracker squares   3 cups Smart Pop popcorn, top spray butter, Aguirre lite salt or cinnamon and Truvia   5 Vanilla Wafers   ½ cup low fat, no added sugar ice cream or frozen yogurt (Blue bell, Blue Bunny, Weight Watchers, Skinny Cow)   ½ turkey, ham, or chicken sandwich   ½ c fruit with ½ c Cottage cheese   4-6 unsalted wheat crackers with 1 oz low fat cheese or 1 tbsp peanut butter    30-45 goldfish crackers (depending on flavor)    7-8 Religion mini brown rice cakes (caramel, apple cinnamon, chocolate)    12 Religion mini brown rice cakes (cheddar, bbq, ranch)    1/3 cup hummus dip with raw veg   1/2 whole wheat syeda, 1Tbsp hummus   Mini Pizza (1/2 whole wheat English muffin, low-fat  cheese, tomato sauce)   100 calorie snack pack (Oreo, Chips Ahoy, Ritz Mix, Baked Cheetos)   4-6 oz. light or Greek Style yogurt  (Luke, Staci, Matt, [facility])   ½ cup sugar-free pudding     6 in. wheat tortilla or syeda oven toasted chips (topped with spray butter flavoring, cinnamon, Truvia OR spray butter, garlic powder, chili powder)    18 BBQ Popchips (available at Target, Whole Foods, Fresh Market)                   Managing Diabetes: The A1C Test       Healthy red blood cells have some glucose stuck to them. A high A1C means that unhealthy amounts of glucose are stuck to the cells.   What is the A1C test?  Using your meter helps you track your blood sugar every day. But your glucose meter tells you the value at the time of testing only. You also need to know if your treatment plan is keeping you healthy over time. The hemoglobin A1C (or glycated hemoglobin) test can help. This test measures your average blood sugar level over a few months. A higher A1C result means that you have a higher risk of developing complications.  The A1C test  The A1C is a blood test done by your healthcare provider. You will likely have an A1C test every 3 to 6 months.  Your blood glucose goal  A1C has been shown as a percentage. But it can also be shown as a number representing the estimated Average Glucose (eAG). Unlike the A1C percentage, eAG is a number similar to the numbers listed on your daily glucose monitor. Both A1C and eAG measure the amount of glucose stuck to a protein called hemoglobin in red blood cells. Your healthcare provider will help you figure out what your ideal A1C or eAG should be. Your target number will depend on your age, general health, and other factors. If your current number is too high, your treatment plan may need changes, such as different medicines.  Sample results  Most people aim for an A1c lower than 7%. Thats an eAG less than 154 mg/dL. Or, your healthcare provider may want you to aim for an A1C of 6%. Thats an eAG of 126 mg/dL.     Glucose  calculator  Visit http://[email or web address].diabetes.org/diapro/glucose_calc for a chart that helps convert your A1C percentages into eAG numbers.   Date Last Reviewed: 6/1/2016 © 2000-2017 [employer]. [address] [place] [address], [place] [address]. All rights reserved. This information is not intended as a substitute for professional medical care. Always follow your healthcare professional's instructions.        Hypoglycemia (Low Blood Sugar)     Fast-acting sugar includes a cup of nonfat milk.     Too little sugar (glucose) in your blood is called hypoglycemia or low blood sugar. Low blood sugar usually means anything lower than 70 mg/dL. Talk with your healthcare provider about your target range and what level is too low for you. Diabetes itself doesnt cause low blood sugar. But some of the treatments for diabetes, such as pills or insulin, may raise your risk for it. Low blood sugar may cause you to pass out or have a seizure. So always treat low blood sugar right away, but don't overeat.  Special note: Always carry a source of fast-acting sugar and a snack in case of hypoglycemia.   What you may notice  If you have low blood sugar, you may have one or more of these symptoms:  · Shakiness or dizziness  · Cold, clammy skin or sweating  · Feelings of hunger  · Headache  · Nervousness  · A hard, fast heartbeat  · Weakness  · Confusion or irritability  · Blurred vision  · Having nightmares or waking up confused or sweating  · Numbness or tingling in the lips or tongue  What you should do  Here are tips to follow if you have hypoglycemia:   · First check your blood sugar. If it is too low (out of your target range), eat or drink 15 to 20 grams of fast-acting sugar. This may be 3 to 4 glucose tablets, 4 ounces (half a cup) of fruit juice or regular (nondiet) soda, 8 ounces (1 cup) of fat-free milk, or 1 tablespoon of honey. Dont take more than this, or your blood sugar may go too high.  · Wait 15 minutes. Then  recheck your blood sugar if you can.  · If your blood sugar is still too low, repeat the steps above and check your blood sugar again. If your blood sugar still has not returned to your target range, contact your healthcare provider or seek emergency care.  · Once your blood sugar returns to target range, eat a snack or meal.  Preventing low blood sugar  Things you can do include the following:   · If your condition needs a strict treatment plan, eat your meals and snacks at the same times each day. Dont skip meals!  · If your treatment plan lets you change when you eat and what you eat, learn how to change the time and dose of your rapid-acting insulin to match this.   · Ask your healthcare provider if it is safe for you to drink alcohol. Never drink on an empty stomach.  · Take your medicine at the prescribed times.  · Always carry a source of fast-acting sugar and a snack when youre away from home.  Other things to do  Additional tips include the following:  · Carry a medical ID card, a compact USB drive, or wear a medical alert bracelet or necklace. It should say that you have diabetes. It should also say what to do if you pass out or have a seizure.  · Make sure your family, friends, and coworkers know the signs of low blood sugar. Tell them what to do if your blood sugar falls very low and you cant treat yourself.  · Keep a glucagon emergency kit handy. Be sure your family, friends, and coworkers know how and when to use it. Check it regularly and replace the glucagon before it expires.  · Talk with your health care team about other things you can do to prevent low blood sugar.     If you have unexplained hypoglycemia or hypoglycemia several times, call your healthcare provider.   Date Last Reviewed: 5/1/2016  © 8560-4102 [employer] [address] [place] [address], [place] [address]. All rights reserved. This information is not intended as a substitute for professional medical care. Always follow  your healthcare professional's instructions.        Exercise: Adding Intensity  You have been exercising for 30 minutes most days of the week. Now you can move on to the next stage: increasing the intensity. This means doing your activity in one or more of these ways:  · Longer. Exercise for 30 minutes or more without a break.  · Faster. Hike, run, or skate fast enough to raise your heart rate moderately--as if you had walked fast to catch a bus.  · More often. Do your activity 4 to 6 times a week instead of 1 to 3 times.    Not just gym class  Be creative. You can reach your health and fitness goals in many ways. Try some of these activities:  · Team sports, like basketball or soccer  · Social or recreational activities, like hiking or dancing  · Individual exercise, like cycling, swimming, or skating  · Group fitness classes, like aerobic classes or weight training  Safety first  Whatever activity you choose, think about safety:  · Wear the right safety gear and shoes for your activity.  · Drink plenty of water during and after workouts.  · Wear light-colored clothing if youre out when its dark.  · Make time to warm up before you exercise and cool down after.  · Carry ID (identification) with you if youre out alone. And be sure someone knows where youre going.  · If youre on foot, travel against traffic (except on blind corners). If youre on a bike, go with traffic. Obey the rules of the road.   Tips for sticking with it  · Find a workout partner or sports club. If you know someone is expecting you, youll be less likely to skip your workout.  · Pack a workout bag with everything you need. Then its ready when you are.  · Choose a few different activities so youll stay interested. Make it fun!  What will help you to stick with it?  1.   2.   3.   Date Last Reviewed: 8/13/2015  © 2862-1836 [employer]. [address] [place] [address], [place] [address]. All rights reserved. This information is not  intended as a substitute for professional medical care. Always follow your healthcare professional's instructions.

## 2020-08-18 NOTE — PROGRESS NOTES
CC: Patient is here for management of Type 2 diabetes and review of medical conditions as listed in visit diagnosis.      HPI: Mr. Alfa Christy III is a 65 y.o. Black or  male who was diagnosed with Type 2 DM in ~1990s. He was originally on orals, but started insulin in 2010. He was seen in ER for hyperglycemia in ~2010. (+) FH of DM in multiple family members. Has a history of CVA with diminished peripheral vision and also thyroid cancer, which was treated with surgery and radioactive iodine. Has sickle cell and last crisis in 2016.   Seen by Dr. Phillips in May 2018 and by me in Jan 2018.  Past Medical History:   Diagnosis Date    Allergy     BMI 31.0-31.9,adult 11/25/2014    CHF (congestive heart failure)     Chronic anticoagulation - pradaxa 11/10/2016    Chronic diastolic heart failure 11/20/2016    CKD (chronic kidney disease), stage III 9/23/2013    Controlled type 2 diabetes with neuropathy 12/16/2014    Coronary artery disease     Diabetes mellitus due to underlying condition with stage 3 chronic kidney disease, without long-term current use of insulin 11/2/2016    Elevated alkaline phosphatase level 8/1/2012    Elevated PSA     negative prostate biopsy 4/11    Erectile dysfunction associated with type 2 diabetes mellitus     Gallstones 3/20/2013    Generalized tonic-clonic seizure 11/2016    HTN (hypertension), benign 8/1/2012    Hypercholesterolemia 8/1/2012    Microcytic anemia 11/27/2013    Paroxysmal atrial fibrillation 9/23/2013    Postsurgical hypothyroidism 11/27/2013    Right homonymous hemianopsia 2/5/2016    Sickle cell trait     Stroke 1/27/2016    Thyroid cancer     multifocal with six lesions, largest 5mm, treated with surgery and radioactive iodine     Pt has had frequent visits in the past for sicke cell crisis, afib, CVA, hypoglycemia, hyperglycemia 600s (summer 2017).      Pt was last seen by me in 2018.  Pt in the past had used dexcom g5.     On MDI,  injections 4 x a day  Testing 4 x a day  Patient is willing and able to use the device  Demonstrated an understanding of the technology and is motivated to use CGM  Patient expected to adhere to a comprehensive diabetes treatment plan and patient has adequate medical supervision  Patient experiences multiple impaired awareness of hypoglycemia (hypoglycemia unawareness)    Lab Results   Component Value Date    HGBA1C 5.1 06/25/2020     In 2018, pt has had frequent ER visits for urinary retention, penile pain, afib.  Arrived w/ wife.     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 On levothyroxine 200 mcg daily.   Every Monday, levothyroxine 50 mcg daily   Lab Results   Component Value Date    TSH 1.897 06/25/2020     Walks for exercise, in 30 minute intervals (3-4 times a week)-----gym member-not attending r/t pandemic    Rarely skips meals. Has salad at night for dinner. Biggest meals are breakfast and lunch.   Usually 2.5 meals a day     PREVIOUS DIABETES MEDICATIONS  Novolog  Metformin    CURRENT DIABETIC MEDS: Lantus 14 units QHS, Novolog 7 units plus correction scale, trulicity 0.75 mg weekly    Diabetes Management Status    Statin: Taking  ACE/ARB: Taking    Screening or Prevention Patient's value Goal Complete/Controlled?   HgA1C Testing and Control   Lab Results   Component Value Date    HGBA1C 5.1 06/25/2020      Annually/Less than 8% Yes   Lipid profile : 06/25/2020 Annually Yes   LDL control Lab Results   Component Value Date    LDLCALC 55.6 (L) 06/25/2020    Annually/Less than 100 mg/dl  Yes   Nephropathy screening Lab Results   Component Value Date    LABMICR 823.0 10/11/2019     Lab Results   Component Value Date    PROTEINUA 2+ (A) 08/11/2020    Annually Yes   Blood pressure BP Readings from Last 1 Encounters:   08/18/20 (!) 150/70    Less than 140/90 No   Dilated retinal exam : 10/23/2019 Annually Yes   Foot exam   : 05/22/2019 Annually No       REVIEW OF SYSTEMS  General: no  "weakness, fatigue, or weight fluctuations 1-3#  Eyes: decreased peripheral vision in right eye; no eye pain or redness  Cardiovascular: no chest pain, palpitations, +BLE edema, or murmurs.   Respiratory: no cough or no dyspnea    GI/: no heartburn, nausea, or changes in bowel patterns; good appetite. Seen by urology, on flomax  Skin: no rashes, dryness, itching, or reactions at insulin injection sites; rotates insulin injection sites.   Neuro: no c/o numbness, tingling, or dizziness.   Endocrine: no polyuria, polydipsia, polyphagia, heat or cold intolerance.     Vital Signs  BP (!) 150/70 (BP Location: Left arm, Patient Position: Sitting, BP Method: Large (Manual))   Pulse 63   Ht 5' 10" (1.778 m)   Wt 102.4 kg (225 lb 12 oz)   SpO2 97%   BMI 32.39 kg/m²        Lab Results   Component Value Date    CREATININE 2.6 (H) 08/11/2020      Lab Results   Component Value Date    TSH 1.897 06/25/2020      Lab Results   Component Value Date    CHOL 118 (L) 06/25/2020    CHOL 114 (L) 10/11/2019    CHOL 102 (L) 07/31/2018     Lab Results   Component Value Date    HDL 39 (L) 06/25/2020    HDL 36 (L) 10/11/2019    HDL 35 (L) 07/31/2018     Lab Results   Component Value Date    LDLCALC 55.6 (L) 06/25/2020    LDLCALC 62.0 (L) 10/11/2019    LDLCALC 52.4 (L) 07/31/2018     Lab Results   Component Value Date    TRIG 117 06/25/2020    TRIG 80 10/11/2019    TRIG 73 07/31/2018     Lab Results   Component Value Date    CHOLHDL 33.1 06/25/2020    CHOLHDL 31.6 10/11/2019    CHOLHDL 34.3 07/31/2018      PHYSICAL EXAMINATION  Constitutional: Appears well, no distress  Neck: Supple, trachea midline.   Respiratory: no wheezes, coarse, even and unlabored.  Cardiovascular: RRR  Lymph: DP pulses  2+ bilaterally; +1-2 pedal,BLE edema.   Skin: warm and dry; no injection site reactions, no acanthosis nigracans observed.  Neuro:patient alert and cooperative, normal affect.    Diabetes Foot Exam:     Protective Sensation (w/ 10 gram " monofilament):  Right: Intact 6/6  Left: Intact 6/6     Visual Inspection:  Normal -  Bilateral    Pedal Pulses:   Right: Present  Left: Present    Posterior tibialis:   Right:Present  Left: Present      Assessment/Plan  1. Type 2 diabetes mellitus with stage 4 chronic kidney disease, with long-term current use of insulin  Hemoglobin A1C    FRUCTOSAMINE    Ambulatory referral/consult to Podiatry   2. Type 2 diabetes mellitus without ophthalmic manifestations     3. Vitamin D deficiency     4. Postsurgical hypothyroidism     5. Obesity (BMI 30.0-34.9)     6. Proteinuria due to type 2 diabetes mellitus     7. Nuclear sclerosis of both eyes     8. History of thyroid cancer     9. History of CVA (cerebrovascular accident)     10. Essential hypertension     11. Chronic diastolic heart failure     12. Postoperative hypothyroidism  levothyroxine (SYNTHROID) 200 MCG tablet     1.-2. F/u in 4 mos w/ me  novocare voucher-novolog and levemir  Continue regimen above  duramed for testing supplies /[pen needles  a1c goal less than 7.5%  Fructosamine next time   Discussed dietary habits  Discussed dexcom g6   Previously on dexcom g5- not biggest fan  3. On supplement  4. On lt4 200 mcg daily  Mondays: lt4 50 mcg   Managed per endo  5. Body mass index is 32.39 kg/m². may increase insulin resistance  Encourage exercise 3x a week   Walking  6. F/u with nephrology  7. F/u with ophthalmology   8. See above  Seen in past by endocrinology   9. F/u with neuro prn   10. Continue med(s)  On arb  Sl elevated sbp  11. F/u with cards   12. See above       FOLLOW UP  Follow up in about 4 months (around 12/18/2020).     Orders Placed This Encounter   Procedures    Hemoglobin A1C     Standing Status:   Future     Standing Expiration Date:   10/17/2021    FRUCTOSAMINE     Standing Status:   Future     Standing Expiration Date:   10/17/2021    Ambulatory referral/consult to Podiatry     Standing Status:   Future     Standing Expiration Date:    9/18/2021     Referral Priority:   Routine     Referral Type:   Consultation     Referral Reason:   Specialty Services Required     Requested Specialty:   Podiatry     Number of Visits Requested:   1

## 2020-08-31 NOTE — PROGRESS NOTES
Subjective:       Patient ID: Alfa Christy III is a 65 y.o. male.    Chief Complaint: follow up    HPI: Alfa Christy III is a 65 y.o. Black or  male who presents today for gross hematuria. His last clinic visit was 12/18/18.    H/o gross hematuria with negative workup.     Took finasteride for 90 days and then stopped. No side effects.    Urinary frequency every 3 hours. Good stream. No nocturia x 1 month. No intermittency, hesitancy. Post void dribble.   Rare urge incontinence. Some urgency.   No LUTS. No nocturia.   Takes flomax.   No weight loss, fevers or chills.   No GI complaints.     Denies family history of prostate cancer.   Denies fever or chills.    Has diabetes.   He is on lifetime eliquis.     SOB if he over exerts himself. Could walk a mile.     Lab Results   Component Value Date    PSA 7.2 (H) 10/28/2015    PSA 5.62 (H) 12/27/2012    PSA 8.08 (H) 06/26/2012    PSA 9.32 (H) 02/06/2012    PSA 9.4 (H) 10/28/2011    PSA 5.4 (H) 12/09/2010    PSA 6.3 (H) 02/10/2009    PSA 8.5 (H) 11/05/2008    PSA 4.1 (H) 10/13/2008    PSA 2.0 11/22/2005    PSADIAG 13.2 (H) 01/11/2019    PSADIAG 13.7 (H) 12/14/2018    PSADIAG 9.9 (H) 11/04/2016    PSADIAG 12.2 (H) 10/04/2016    PSADIAG 6.4 (H) 04/18/2016    PSADIAG 5.9 (H) 02/11/2015    PSADIAG 6.8 (H) 12/30/2014    PSADIAG 6.0 (H) 02/06/2014    PSADIAG 5.6 (H) 01/30/2014    PSADIAG 4.7 (H) 12/30/2013    PSATOTAL 29.1 (H) 08/26/2020    PSATOTAL 16.0 (H) 07/30/2019    PSATOTAL 12.8 (H) 01/11/2019    PSATOTAL 8.2 (H) 10/22/2014    PSAFREE 16.17 (H) 08/26/2020    PSAFREE 8.79 (H) 07/30/2019    PSAFREE 7.36 (H) 01/11/2019    PSAFREE 2.55 (H) 10/22/2014    PSAFREEPCT 55.57 08/26/2020    PSAFREEPCT 54.94 07/30/2019    PSAFREEPCT 57.50 01/11/2019    PSAFREEPCT 31.10 10/22/2014       PSA - negative prostate biopsy in 2011 (PSA at this time was 5.4)    Pt went to ED on 6/15/18 for urinary retention.     Cysto 7/5/17  Bilateral ureteral orifice were difficult to see.    No bladder tumors or lesions were seen.  No strictures were noted.  The prostate showed Significant hypertrophy.  There was Significant median lobe present. 6cm in length.    8/14/17   Procedure(s) Performed:   1.  Cystoscopy with bilateral retrograde pyelograms  2.  Fluoroscopy < 1 hour  Findings:   1.) Large, friable prostate  2.) Right ureter with distal J hooking, no filling defects  3.) Left ureter with tortuosity, no filling defects           Review of patient's allergies indicates:   Allergen Reactions    Cough syrup [guaifenesin] Other (See Comments)       Current Outpatient Medications   Medication Sig Dispense Refill    acetaminophen (TYLENOL) 500 MG tablet Take 1 tablet (500 mg total) by mouth every 6 (six) hours as needed for Pain.  0    amLODIPine (NORVASC) 5 MG tablet TAKE 1 TABLET BY MOUTH EVERY DAY 90 tablet 3    apixaban (ELIQUIS) 5 mg Tab Take 1 tablet (5 mg total) by mouth 2 (two) times daily. 180 tablet 3    aspirin 81 MG Chew Take 1 tablet (81 mg total) by mouth once daily.  0    atorvastatin (LIPITOR) 40 MG tablet TAKE 1 TABLET BY MOUTH EVERY DAY 90 tablet 1    betamethasone dipropionate (DIPROLENE) 0.05 % cream Apply topically 2 (two) times daily. 45 g 3    blood sugar diagnostic (ONETOUCH VERIO) Strp Check blood glucose readings 3 times a day. 300 strip 3    blood sugar diagnostic Strp To check BG 3 times daily, to use with insurance preferred meter 200 each 3    blood-glucose meter kit To check BG 3 times daily - please provide insurance preferred meter 1 each 0    calcitRIOL (ROCALTROL) 0.25 MCG Cap Take 1 capsule (0.25 mcg total) by mouth once daily. 30 capsule 3    carvediloL (COREG) 12.5 MG tablet Take 1 tablet (12.5 mg total) by mouth 2 (two) times daily. 180 tablet 3    ciclopirox (PENLAC) 8 % Soln Apply topically nightly. 6.6 mL 11    clotrimazole-betamethasone 1-0.05% (LOTRISONE) cream Apply topically 2 (two) times daily. To area of rash for 2-4 weeks 45 g 0     dulaglutide (TRULICITY) 0.75 mg/0.5 mL pen injector Inject 0.75 mg weekly 4 pen 8    flu vacc [id number],65yr up,PF (FLUZONE HIGH-DOSE 2019-20, PF,) 180 mcg/0.5 mL Syrg Inject 0.5 ml into the muscle once for 1 dose. 0.5 mL 0    fluocinonide (LIDEX) 0.05 % ointment Apply to area of rash twice daily 60 g 3    folic acid (FOLVITE) 400 MCG tablet Take 1,000 mcg by mouth once daily.       furosemide (LASIX) 80 MG tablet Take 1 tablet (80 mg total) by mouth 2 (two) times daily. 120 tablet 3    glucagon, human recombinant, (GLUCAGON EMERGENCY KIT, HUMAN,) 1 mg SolR Inject intramuscularly for emergency 1 each 2    insulin aspart U-100 (NOVOLOG FLEXPEN U-100 INSULIN) 100 unit/mL (3 mL) InPn pen Inject 7 units w/ meals plus scale 150-200+1, 201-250+2, 251-300+3, 301-350+4, >350+5. Max daily 50 units 1 box via novocare voucher 15 mL 1    insulin detemir U-100 (LEVEMIR FLEXTOUCH U-100 INSULN) 100 unit/mL (3 mL) InPn pen Inject 13 units at night 1 box free via novocare voucher 15 mL 1    insulin detemir U-100 (LEVEMIR FLEXTOUCH) 100 unit/mL (3 mL) SubQ InPn pen INJECT 13 UNITS AT NIGHT. 3 Box 4    insulin lispro (HUMALOG KWIKPEN INSULIN) 100 unit/mL pen Inject 7 units w/ meals plus scale 150-200+1, 201-250+2, 251-300+3, 301-350+4, >350+5 max daily 51 units. 1 Box 6    lacosamide (VIMPAT) 200 mg Tab tablet Take 1 tablet (200 mg total) by mouth every 12 (twelve) hours. 60 tablet 11    lancets Misc To check BG 3 times daily, to use with insurance preferred meter 200 each 3    lancing device Misc 1 Device by Misc.(Non-Drug; Combo Route) route 3 (three) times daily. 1 each 0    levETIRAcetam (KEPPRA) 750 MG Tab Take 2 tablets (1,500 mg total) by mouth 2 (two) times daily. 120 tablet 11    levothyroxine (SYNTHROID) 200 MCG tablet Take 1 tablet (200 mcg total) by mouth before breakfast. 90 tablet 2    levothyroxine (SYNTHROID) 50 MCG tablet Take 1 tablet (50 mcg total) by mouth every 7 days. Pt will be taking only on  "Monday. 30 tablet 1    magnesium 30 mg Tab Take 1 tablet by mouth twice a week. Mon ,weds,fridays      metOLazone (ZAROXOLYN) 2.5 MG tablet Take 1 tablet (2.5 mg total) by mouth daily as needed (weight gain more than 3 pounds/day or more than 5 pounds/week). 15 tablet 11    multivitamin capsule Take 1 capsule by mouth every Mon, Wed, Fri.       pen needle, diabetic (BD ULTRA-FINE MINI PEN NEEDLE) 31 gauge x 3/16" Ndle To use with insulin pens 4  times daily 360 each 3    SAW PALMETTO ORAL Take by mouth. mon , wed, fri      sildenafil (VIAGRA) 100 MG tablet Take 1/2 to 1 tablet daily as needed.  Take on an empty stomach or with a light meal. 30 tablet 12    tamsulosin (FLOMAX) 0.4 mg Cap TAKE 1 CAPSULE (0.4 MG TOTAL) BY MOUTH EVERY EVENING. 90 capsule 3    valsartan (DIOVAN) 320 MG tablet Take 1 tablet (320 mg total) by mouth once daily. 90 tablet 3    vitamin D 1000 units Tab Take 1,000 Units by mouth every Mon, Wed, Fri.       No current facility-administered medications for this visit.        Past Medical History:   Diagnosis Date    Allergy     BMI 31.0-31.9,adult 11/25/2014    CHF (congestive heart failure)     Chronic anticoagulation - pradaxa 11/10/2016    Chronic diastolic heart failure 11/20/2016    CKD (chronic kidney disease), stage III 9/23/2013    Controlled type 2 diabetes with neuropathy 12/16/2014    Coronary artery disease     Diabetes mellitus due to underlying condition with stage 3 chronic kidney disease, without long-term current use of insulin 11/2/2016    Elevated alkaline phosphatase level 8/1/2012    Elevated PSA     negative prostate biopsy 4/11    Erectile dysfunction associated with type 2 diabetes mellitus     Gallstones 3/20/2013    Generalized tonic-clonic seizure 11/2016    HTN (hypertension), benign 8/1/2012    Hypercholesterolemia 8/1/2012    Microcytic anemia 11/27/2013    Paroxysmal atrial fibrillation 9/23/2013    Postsurgical hypothyroidism 11/27/2013 "    Right homonymous hemianopsia 2/5/2016    Sickle cell trait     Stroke 1/27/2016    Thyroid cancer     multifocal with six lesions, largest 5mm, treated with surgery and radioactive iodine       Past Surgical History:   Procedure Laterality Date    BREAST SURGERY      cyst removal    COLONOSCOPY      COLONOSCOPY N/A 9/19/2019    Procedure: COLONOSCOPY;  Surgeon: Silva Granger MD;  Location: [facility] ([address] [place] [address]);  Service: Endoscopy;  Laterality: N/A;  history of colon polyps on scope 2015, recommended to repeat at 3 yrs  on eliquis and ASA after stroke, afib -- please check with his cardiologist re: instructions for holding this  ok to hold Eliquis x 2 days prior per Dr. Gabriel Hager-see telephone enc    CYST REMOVAL      chest    PROSTATE BIOPSY  4/27/11    SKIN BIOPSY      THYROID SURGERY  8/26/09    VASCULAR SURGERY         Family History   Problem Relation Age of Onset    Heart disease Father     Diabetes Father     Hypertension Father     Diabetes Mother     Hypertension Mother     Heart disease Mother     Diabetes Brother     No Known Problems Daughter     Cancer Sister     No Known Problems Daughter     Thyroid disease Maternal Aunt     Clotting disorder Neg Hx     Melanoma Neg Hx        Review of Systems   Constitutional: Negative for chills and fever.   HENT: Negative for congestion.    Eyes: Negative for discharge.   Respiratory: Negative for chest tightness and shortness of breath.    Cardiovascular: Negative for chest pain and palpitations.   Gastrointestinal: Negative for nausea and vomiting.   Genitourinary: Negative for decreased urine volume, difficulty urinating, discharge, dysuria, flank pain, frequency, hematuria, penile pain, penile swelling, scrotal swelling, testicular pain and urgency.        See HPI   Musculoskeletal: Negative for gait problem.   Skin: Negative for rash.   Allergic/Immunologic: Negative for immunocompromised state.    Neurological: Negative for syncope and headaches.   Hematological: Negative for adenopathy.   Psychiatric/Behavioral: Negative for confusion.         All other systems were reviewed and were negative.    Objective:     Vitals:    08/31/20 1107   BP: (!) 149/76   Pulse: 69        Physical Exam   Nursing note and vitals reviewed.  Constitutional: He is oriented to person, place, and time. He appears well-developed.  Non-toxic appearance. He does not appear ill. No distress.   HENT:   Head: Normocephalic.   Eyes: Right eye exhibits no discharge. Left eye exhibits no discharge.   Neck: Normal range of motion.   Cardiovascular: Normal rate and regular rhythm.    Pulmonary/Chest: Effort normal. No respiratory distress.   Abdominal: Soft. He exhibits no distension. Hernia confirmed negative in the right inguinal area and confirmed negative in the left inguinal area.   Umbilical hernia   Genitourinary:    Testes and penis normal.   Circumcised.    Genitourinary Comments: Prostate was smooth without nodularity. No rectal masses.  50 grams. External hemorrhoids were present. Normal perineum.       Musculoskeletal: Normal range of motion.   Lymphadenopathy: No inguinal adenopathy noted on the right or left side.   Neurological: He is alert and oriented to person, place, and time.   Skin: Skin is warm and dry.     Psychiatric: His behavior is normal. Judgment and thought content normal.         Lab Results   Component Value Date    CREATININE 3.7 (H) 08/26/2020     Lab Results   Component Value Date    EGFRNONAA 15.9 (A) 08/26/2020     Lab Results   Component Value Date    ESTGFRAFRICA 18.4 (A) 08/26/2020     POCT UA: 1+ leuk, trace blood, trace protein      Assessment:       1. History of CVA (cerebrovascular accident)    2. Erectile dysfunction associated with type 2 diabetes mellitus    3. Type 2 diabetes mellitus with stage 4 chronic kidney disease, with long-term current use of insulin    4. Chronic anticoagulation -  eliquis    5. Chronic diastolic heart failure    6. Elevated PSA    7. Essential hypertension    8. Complex partial seizures evolving to generalized tonic-clonic seizures    9. Hypertensive kidney disease with stage 4 chronic kidney disease    10. Chronic kidney disease, stage III (moderate)    11. Prostate enlargement    12. Type 2 diabetes mellitus without ophthalmic manifestations    13. Sickle cell trait    14. Bilateral carotid artery stenosis    15. Chronic atrial fibrillation        Plan:     Alfa was seen today for elevated psa.    Diagnoses and all orders for this visit:    History of CVA (cerebrovascular accident)    Erectile dysfunction associated with type 2 diabetes mellitus    Type 2 diabetes mellitus with stage 4 chronic kidney disease, with long-term current use of insulin    Chronic anticoagulation - eliquis    Chronic diastolic heart failure    Elevated PSA  -     Transrectal Ultrasound w/ Biopsy; Future  -     Urine culture  -     Urinalysis Microscopic    Essential hypertension    Complex partial seizures evolving to generalized tonic-clonic seizures    Hypertensive kidney disease with stage 4 chronic kidney disease    Chronic kidney disease, stage III (moderate)    Prostate enlargement    Type 2 diabetes mellitus without ophthalmic manifestations    Sickle cell trait    Bilateral carotid artery stenosis    Chronic atrial fibrillation    Pyuria  -     Urine culture  -     Urinalysis Microscopic    Other orders  -     sodium phosphates (FLEET ENEMA) 19-7 gram/118 mL Enem; Place 1 enema rectally once. Administer the am of the procedure for 1 dose      Prostate biopsy. Can't do MRI b/c of kidney function.   Will have to be off Eliquis for biopsy. Will need clearance for this to get off eliquis.   Can stay on 81mg of aspirin.     I spent 25 minutes with the patient of which more than half was spent in direct consultation with the patient in regards to our treatment and plan.

## 2020-09-02 PROBLEM — N17.9 AKI (ACUTE KIDNEY INJURY): Status: ACTIVE | Noted: 2020-01-01

## 2020-09-02 PROBLEM — D64.89 ANEMIA DUE TO MULTIPLE MECHANISMS: Status: ACTIVE | Noted: 2020-01-01

## 2020-09-02 NOTE — TELEPHONE ENCOUNTER
Per Dr. Gold       Please inform his wife   Kidney function slightly better. Blood count also slightly better. Urgently no changes indicated. Creatinine which was 3.7 has improved to 2.9. continue current plan and continue to do labs 4 weekly. Thanks.    Call patient and left a message to return call.

## 2020-09-02 NOTE — PROGRESS NOTES
Please inform his wife  Kidney function slightly better. Blood count also slightly better. Urgently no changes indicated. Creatinine which was 3.7 has improved to 2.9. continue current plan and continue to do labs 4 weekly. Thanks.

## 2020-09-02 NOTE — PROGRESS NOTES
Subjective:       Patient ID: Alfa Christy III is a 65 y.o. Black or  male who presents for follow upof CKD.      HPI     Mr. Christy was seen in follow up of CKD today. He was last seen on 7/2/20.       This appointment was set up for closer follow up on his worsening CKD, hypervolemia/ worsening fluid and salt retention. He arrived in the clinic accompanied by his wife.     In the interim he was followed in cardiology clinic on 8/17/20. He was noted to have worsening of hypervolemia with positive JVD and worsening of edema causing weight gain. He was treated with metolazone 2.5 mg for 3 days. Cardiology SEIan had discussed this issue with me. It was felt that he was at risk of developing decompensated heart failure due to fluid overload. His activity level was noted to be very limited at that time. It was also anticipated with aggressive diuresis his renal function may worsen due to cardiorenal etiology. Labs were obtained by cardiology clinic on 8/26/20 which showed creatinine 3.7, BUN 65, bicarb 27.    Pt returned for a follow up with this background. He reported actually feeling better. Per his wife he was able to walk more distance since he took 3 doses of metolazone. He has lost around 5 lbs on our clinic scale. Unclear if he has been monitoring his weight at home as advised by cardiology. Also he did not bring BP readings from home.    He denies decreased urine/ cloudy urine/ dysuria/ flank pain/ fever/ decreased oral intake/ nausea/ vomiting/ diarrhea.    In march 2020 he was seen by Daniela SAEZ. During that visit he was noted to have slight worsening of CKD and his baseline proteinuria. She discussed this with me and in discussion with her work up was undertaken for further evaluation of this. Patient had serologic testing done. It showed elevated rheumatoid factor. Hence he was referred to rheumatology for evaluation. Other than elevated rheum factor other serologic tests came  "back negative. Interim since last visit due to poorly controlled hypertension and worsening leg edema he was started on lasix for diuresis.    He did not bring home BP readings. His wife was accompanying him during this visit. She reported due to ongoing covid pandemic he has not been going to gym for exercise. They're not sure if he has weight changes but notice that leg swelling is slightly better since he started lasix.    Patient has h/o CVA. He has been on Eliquis and aspirin for stroke prevention.     He has CKD which has been slowly worsening due to poorly controlled diabetes, hypertension.     Per prior notes:    His spouse reports she tries to be the "food police". Pt admits having occasional swelling over ankles and legs. He has been on diuretic regimen. He admitted today he had a meal at a fast food restaurant chain.     He has underlying longstanding diabetes, hypertension, CVA, paroxysmal atrial fibrillation, chronic anticoagulation use, hyperlipidemia, sickle cell trait, thyroid cancer, hyperlipidemiaen along with CKD. He admits to prior uncontrolled nature of his diabetes. He also at some point was taking NSAID like Advil. Per available labs since 2005 to 2007 he has had elevated creatinine with some episodes of JAELYN.     He continues to have BMI in the range of 30's. He did not bring any BP readings fro review from his home.     US kidneys from 5/17 noted for 10.9, 10.4 cm kidney size, enlarged prostate, peripelvic cyst. He has diastolic dysfunction on echo.    US kidneys 1/4/19 9.8 an 9.6 cm kidneys, mildly increased echogenicity, no mass. Prostate enlargement.     Renal Function:  Lab Results   Component Value Date     09/02/2020     (H) 08/26/2020     09/02/2020     08/26/2020    K 4.9 09/02/2020    K 4.6 08/26/2020     09/02/2020     08/26/2020    CO2 25 09/02/2020    CO2 27 08/26/2020    BUN 48 (H) 09/02/2020    BUN 65 (H) 08/26/2020    CALCIUM 9.2 09/02/2020 "    CALCIUM 8.1 (L) 08/26/2020    CREATININE 2.9 (H) 09/02/2020    CREATININE 3.7 (H) 08/26/2020    ALBUMIN 3.4 (L) 09/02/2020    ALBUMIN 3.2 (L) 08/11/2020    PHOS 4.4 09/02/2020    PHOS 3.9 08/11/2020    ESTGFRAFRICA 24.7 (A) 09/02/2020    ESTGFRAFRICA 18.4 (A) 08/26/2020    EGFRNONAA 21.4 (A) 09/02/2020    EGFRNONAA 15.9 (A) 08/26/2020       Urinalysis:  Lab Results   Component Value Date    APPEARANCEUA Clear 08/11/2020    PHUR 6.0 08/11/2020    SPECGRAV 1.010 08/11/2020    PROTEINUA 2+ (A) 08/11/2020    GLUCUA Negative 08/11/2020    OCCULTUA 1+ (A) 08/11/2020    NITRITE Negative 08/11/2020    LEUKOCYTESUR Trace (A) 08/11/2020       Protein/Creatinine Ratio:  Lab Results   Component Value Date    PROTEINURINE 144 (H) 08/11/2020    CREATRANDUR 66.0 08/11/2020    UTPCR 2.18 (H) 08/11/2020       CBC:  Lab Results   Component Value Date    WBC 8.46 09/02/2020    HGB 11.1 (L) 09/02/2020    HCT 31.6 (L) 09/02/2020       Vit D 28  Previously A1C in the range of 8.6 - 8.9, > 14 on one reading     US kidneys 8/11/20    FINDINGS:  Right kidney: The right kidney measures 10.5 cm.  There is mildly decreased perfusion and decreased corticomedullary distinction.  Resistive index measures 0.80.  No mass. No renal stone. No hydronephrosis.     Left kidney: The left kidney measures 10.2 cm.  There is mildly decreased perfusion and decreased corticomedullary distinction.  Resistive index measures 0.86.  There is a simple appearing cyst measuring 0.9 cm in the upper pole.  No mass. No renal stone. No hydronephrosis.     The bladder is partially distended at the time of scanning and has an unremarkable appearance.     The prostate is enlarged in size measuring 7.2 x 7.8 x 6.1 cm.     Impression:     Findings suggestive of medical renal disease.     Left renal cyst.     Prostatomegaly.    Review of Systems   Constitutional: Negative for activity change, appetite change, chills, fatigue and fever.   HENT: Negative for hearing  loss and nosebleeds.    Eyes: Negative for pain and discharge.   Respiratory: Negative for cough, shortness of breath and wheezing.    Cardiovascular: Positive for leg swelling. Negative for chest pain.   Gastrointestinal: Negative for abdominal pain, diarrhea, nausea and vomiting.   Endocrine: Negative for polydipsia and polyuria.   Genitourinary: Negative for decreased urine volume, dysuria, flank pain and frequency.   Musculoskeletal: Negative for back pain and myalgias.   Skin: Negative for pallor and rash.   Allergic/Immunologic: Negative for environmental allergies.   Neurological: Negative for dizziness and headaches.   Psychiatric/Behavioral: Negative for behavioral problems. The patient is not nervous/anxious.        Objective:      Physical Exam  Vitals signs reviewed.   Constitutional:       General: He is not in acute distress.     Appearance: He is well-developed.   HENT:      Head: Normocephalic.   Neck:      Musculoskeletal: Neck supple.   Cardiovascular:      Rate and Rhythm: Normal rate.      Heart sounds: Normal heart sounds.   Pulmonary:      Effort: Pulmonary effort is normal.      Breath sounds: Normal breath sounds. No wheezing.   Abdominal:      Comments: Abdominal obesity    Skin:     General: Skin is warm and dry.   Neurological:      Mental Status: He is alert and oriented to person, place, and time.   Legs; 1+ edema pitting bilateral         Assessment:       1. JAELYN (acute kidney injury)    2. Hypertensive kidney disease with stage 4 chronic kidney disease    3. Type 2 diabetes mellitus with stage 4 chronic kidney disease, with long-term current use of insulin    4. Proteinuria due to type 2 diabetes mellitus    5. Secondary hyperparathyroidism    6. 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 Christy has longstanding diabetes, hypertension, CVA, paroxysmal atrial fibrillation, chronic anticoagulation use, hyperlipidemia, sickle cell trait, thyroid cancer, hyperlipidemia and has  CKD which has worsened to IV with worsening of proteinuria. CKD is due to longstanding and poorly controlled diabetes, also hypertension, obesity and prior NSAID use.     JAELYN superimposed on CKD IV due to cardiorenal etiology. He had volume overload and was at risk of decompensation and he was in need for more diuresis. Although kidney function worsened his effort tolerance distance seems to have improved. Repeat renal panel obtained post clinic, it shows slight improvement in creatinine.    For now continue lasix bid, prn use of metolazone per symptoms/ fluid weight gain. He needs to keep a record of daily weight. Continue to follow with cardiology.    Overall he has slow worsening of CKD. This was brought to his attention again. Stressed need for weight loss, keeping ideal body weight given his persistent proteinuria and need for strict low salt diet and better BP control.    Previously he had very poorly controlled diabetes with episodes of hyperglycemia crisis with blood glucose of 500. Overall his risk of further progression of CKD to higher stages is really significant. I had a detailed discussion about this with pt and his wife. Better glycemic control, strict low salt diet, BP goal of less than 130/80, weight loss with goal to achieve BMI <30 are all very crucial for his CKD and slow down its progression.     Management of BPH, hematuria and abnormal cytology per urology. Given enlarged prostate, watch for any symptoms of urinary retention. D/w them about avoiding any sleep medicine/ antihistaminic as it can precipitate urinary retention.     In light of worsened CKD and proteinuria serologic work up was obtained which was negative for paraprotein/ PASTOR screen, C3/C4 levels were normal, hep screen was negative for B/C, PLA2R Ab negative. Due to elevated rheumatoid factor he was referred to rheumatology but per rheum he does not appear to have any inflammatory arthritis.     I had a detailed discussion with  patient and his wife that as such he has longstanding CKD with proteinuria and has progression over time. His US kidneys so far show slight but gradual loss of size as evidenced on US reports. We discussed at length if this would permit optimal use of diagnostic modality like percutaneous kidney biopsy, explained that it may show significant interstitial fibrosis already. Also discussed with them he will need to stop both aspirin and Eliquis at least 5 days before the procedure per current radiology protocol but this will increase his stroke or any other CV event risk significantly. Considering these issues it is felt there will be significant risk involved to his overall medical condition by doing a kidney biopsy. They both felt they would not want to consider kidney biopsy for him despite being aware of his worsening kidney function. He has high risk of progression of CKD regardless of whether he receives a tissue diagnosis for this. They expressed understanding of this.    His wife expressed understanding of his worsening CKD. She expressed that they would like to consider experimental therapies like stem cell treatments. She is advised to contact such centers directly.      Plan  Repeat UA, UPC, renal panel  Aim for weight loss by calorie control, increased aerobic activity level  Strict low salt diet  Fluid restriction 1500 ml/day given edema  Continue diuretic regimen per cardiology.   Home BP monitoring, send results, goal should be less than 130/80 however avoid aggressive lowering of BP however that can potentially precipitate an episode of hypotension. He is advised to have BP readings reviewed by MD with close periodicity.     Additional plan as follows:    CKD IV with proteinuria, slow worsening  - labs reviewed and d/w them  - periodically monitor renal function and assess if any rapid decline  - avoid NSAID/ bactrim/ IV contrast/ gadolinium/ aminoglycoside/fleet enema/ PPI where possible  - continue to  screen for CKD MBD, anemia of CKD, acid base disorder  - continue ARB for RAAS blockade   - Continue follow up with urology given enlarged prostate and hematuria work up     hyperparathyroidism  - continue to trend PTH, phos, Ca, vit D  - suspect component of primary hyperparathyroidism   - continue calcitriol  - continue OTC vit D for now    Proteinuria  - see above  - due to diabetic nephropathy and +/- hypertensive glomerulosclerosis, APOL1 gene variant disease  - continue RAAS blockade   - strict low salt diet   - weight loss by calorie control is advisable    Fluid overload  - diuretic regimen per cardiology   - stressed importance of strict low salt diet. Offered referral to see dietician but his wife stated they would like to defer it for now     Diabetes type 2 with renal manifestations  - management per endocrine    Hypertension  - see above, strict low salt diet, home BP monitoring    Sickle cell trait, thyroid cancer, hyperlipidemia, atrial fibrillation management per PCP/ other MDs      RTC 1 month  Plan, labs, recommendations were discussed with patient and his wife, their questions were answered to their satisfaction.   Total time spent was 60 minutes with >50% time spent in face to face evaluation, counseling about possible etiology, work up, monitoring, natural course of illness, recommendations.

## 2020-09-10 NOTE — TELEPHONE ENCOUNTER
----- Message from Corinne Martinez sent at 9/10/2020  3:00 PM CDT -----  Pt wife asking for a callback she states their currently at [facility] wife asking for a callback regarding to a few concerns please contact pt wife           Contact info [phone]

## 2020-09-29 NOTE — TELEPHONE ENCOUNTER
Patient has been hospitalized since 9/9/2020. Family thought he was having a stroke. While in the hospital patient suffered blood clots. He is now preparing to go to a rehab facility to regain his strength. Cancelling his prostate biopsy.    Angela        ----- Message from Ca Navarro sent at 9/29/2020  2:26 PM CDT -----  Regarding: Pt Wife Rut  Reason: Calling to cancel biopsy for tomorrow regard to pt still at [facility]. Pt wife has a few questions as well. Please call     Communication: [phone]

## 2020-09-30 NOTE — TELEPHONE ENCOUNTER
Spoke to pt's wife, she stated he is in the hospital at [facility] right now. She also stated she think they want the send him to a rehab facility and she would like to know would it be possible you can put in an order so he can go to a [facility] rehab.

## 2020-09-30 NOTE — TELEPHONE ENCOUNTER
----- Message from Michelle Vasquez sent at 9/30/2020  9:44 AM CDT -----  Pt`s wife  Rut called in to speak to the nurse in regards to getting some in patient rehab. Pt is currently in the hospital at [facility] 9/9/2020 but would like to get the patient back in [facility] for the rehab.    Rut can be reached at [phone].    Thank you

## 2020-09-30 NOTE — TELEPHONE ENCOUNTER
Patient has brain aneurysm. Going to rehab.     Has elevated psa. On anti-coagulation.     Will follow up in 3 months with psa total and free

## 2020-10-01 NOTE — TELEPHONE ENCOUNTER
I'm sorry to hear that he's in the hospital!  The referral/orders for rehab on discharge from hospital need to come from his inpatient team -- I can't put this in. Would recommend that she talk with his inpatient team (including the  helping coordinate his discharge planning) about having him go to an [facility] facility.    I would like to see him for a hospital follow up after he is released - she should contact us once he is being discharged so we can help schedule. If she can get a copy of his full discharge summary (not just the patient discharge information) to bring to that visit, that would be helpful.

## 2020-10-02 NOTE — TELEPHONE ENCOUNTER
"----- Message from Ubaldo Maravilla sent at 10/2/2020  8:24 AM CDT -----  Regarding: Advice  Contact: Spouse [phone]  Patient would like to get medical advice.    Comments: Spouse of patient stating the patient was admitted to the Hospital on 9/09/20, was informed the patient has an "Aneurysm, the patient also informed is needing PT, the spouse stating wants to get the patient in the [facility] and where his Dr are, stating is having a road block on having the patient transferred to [facility], call to spouse to discuss next steps.    Please call an advise  Thank you    "

## 2020-10-02 NOTE — TELEPHONE ENCOUNTER
"----- Message from Ubaldo Maravilla sent at 10/2/2020  8:24 AM CDT -----  Regarding: Advice  Contact: Spouse [phone]  Patient would like to get medical advice.    Comments: Spouse of patient stating the patient was admitted to the Hospital on 9/09/20, was informed the patient has an "Aneurysm, the patient also informed is needing PT, the spouse stating wants to get the patient in the [facility] and where his Dr are, stating is having a road block on having the patient transferred to [facility], call to spouse to discuss next steps.      Please call an advise  Thank you    "

## 2020-10-02 NOTE — TELEPHONE ENCOUNTER
"----- Message from Ubaldo Maravilla sent at 10/2/2020  7:58 AM CDT -----  Regarding: Advice  Contact: Spouse [phone]  Patient would like to get medical advice.    Comments: Spouse of patient stating the patient was admitted to the Hospital on 9/09/20, was informed the patient has an "Aneurysm, the patient also informed is needing PT, the spouse stating wants to get the patient in the [facility] and where his Dr are, stating is having a road block on having the patient transferred to [facility], call to spouse to discuss next steps.      Please call an advise  Thank you    "

## 2020-10-07 NOTE — TELEPHONE ENCOUNTER
Spoke with Rut pts wife He is still in the Hospital .They are trying to get his Heparin adjusted . Informed her of the referral for [facility] re[facility] has to come from a in team Dr at the hospital. Also told her to call to make an appt with you Dr Jasmine and to bring Full discharge summary . Thx Griselda

## 2020-10-07 NOTE — TELEPHONE ENCOUNTER
Spoke with wife Rut .Informed her we can not do anything on this end because her  is still in the hospital. She said she would f/u with the team at the hospital . She was talking about more than her  receiving in pt PT . There was mention of Eliquis ,his surgery ;aneursym ect.

## 2020-10-22 NOTE — TELEPHONE ENCOUNTER
Earlier this month patient was admitted at [facility] with an aneurysm; at last phone call 10/2 patient's wife mentioned they were trying to have him transferred to [facility]. I don't see that he was transferred here and he does not appear to have a hospital f/u appt scheduled. Please call to check in on how he is doing and whether he is still in the hospital?    If he has been discharged, please help to schedule for hospital follow up with me or with Vanessa Hylton NP.

## 2020-11-04 NOTE — TELEPHONE ENCOUNTER
Called the patient and spoke to the wife Rut. Wife said that patient still in [facility] at this moment and he's doing much better. She spoke to the  from [facility] and said they're thinking of discharging the patient either this week or early next week, they're just waiting for confirmation at the [facility] rehab if they have a spot for him. Wife wants to make an appointment for f/u. Scheduled f/u hospital appt. On 11/19/20 @ 3:30 pm. Appointment letter mailed today per patient's wife request.

## 2020-11-04 NOTE — PROGRESS NOTES
Care Everywhere:  Immunization: updated  Health Maintenance: updated  Media Review: review for outside eye exam report   Legacy Review:   Order placed:   Upcoming appts:

## 2020-11-23 NOTE — ASSESSMENT & PLAN NOTE
-- Restart lt4 200 mcg daily except Monday 1.5 tablets. Likely reduced while hospitalized though I do not see labs in care everywhere  -- Recheck TSH and fT4 in 6 weeks   -- reinforced to take LT4 as prescribed. On an empty stomach with water ideally an hour before eating or taking other medications   -- avoid exogenous hyperthyroidism as this can accelerate bone loss and increase risk of CV complications

## 2020-11-23 NOTE — ASSESSMENT & PLAN NOTE
-- treated with surgery and Radioactive iodine   -- most recent TG is stable at 0.2.  -- pt is considered low risk for recurrence   -- recommend TSH goal <2.5   -- yearly TG levels that are stable at 0.2  -- plan for repeat ultrasound in Feb 2021, stable (since 2014) left sided lymph node

## 2020-11-23 NOTE — PROGRESS NOTES
Subjective:      Patient ID: Alfa Christy III is a 65 y.o. male.    Chief Complaint:  No chief complaint on file.    History of Present Illness  Alfa Christy III presents today for follow up of diabetes. Previous patient of BISMARK Thomson NP. Last seen in Feb 2020. This is his first visit with me.     This is a Doximity video visit.    The patient location is: at home  The chief complaint leading to consultation is: Diabetes  Visit type: Virtual visit with synchronous audio and video  Total time spent with patient: 25 minutes   Each patient to whom he or she provides medical services by telemedicine is:  (1) informed of the relationship between the physician and patient and the respective role of any other health care provider with respect to management of the patient; and (2) notified that he or she may decline to receive medical services by telemedicine and may withdraw from such care at any time.    Has sickle cell trait and last crisis in Oct 2020.   Hospitalized in Sept 2020 at [facility] -thought stroke but states he was dehydrated but possible brain mass? Wife will bring [facility] records. Home medications changed while hospitalized-different diabetic medications. He was then sent to [facility] rehab and discharged 11/21    With regards to the diabetes:    Diagnosed with Type 2 DM in 1990's  He was originally on orals, but started insulin in 2010.   Family History of Type 2 DM: multiple family members.  Known complications:  DKA/HHS: He was seen in ER for hyperglycemia in ~2010.   RN: -; Oct 2019  PN: sees 5/2019  Nephropathy: +; sees nephrology  Gastroparesis:   CAD: denies  CVA: with diminished peripheral vision     Current regimen:  Levemir 8 units daily at bedtime  Novolog SSI isf 25/200     Missed doses: Trulicity 0.75 mg weekly ( taking on Thursdays ) -stopped since hospitalization     Other medications tried:  Metformin    3 # times a day testing  Log reviewed: yes; Oral recall  128, 105, 103, 130,  304    Hypoglycemic event- denies  Yes, did not need assistance   Knows how to correct with 15 grams of carbs- juice, coke, or a peppermint.     He feels that he is following renal and cardiac diet. States not much of an appetite.   Snacks: not much-fruit, or piece of chocolate   Beverages: water    Exercise - tried to stay active. Will have home rehab.      Education - last visit: 2018    Has a Medic alert tag-does not have   Glucagon/Baqsimi-does not have     Diabetes Management Status  Statin: Taking  ACE/ARB: Not taking    Lab Results   Component Value Date    HGBA1C 5.1 06/25/2020    HGBA1C 5.1 02/12/2020    HGBA1C 5.1 10/11/2019     Screening or Prevention Patient's value Goal Complete/Controlled?   HgA1C Testing and Control   Lab Results   Component Value Date    HGBA1C 5.1 06/25/2020      Annually/Less than 8% Yes   Lipid profile : 06/25/2020 Annually Yes   LDL control Lab Results   Component Value Date    LDLCALC 55.6 (L) 06/25/2020    Annually/Less than 100 mg/dl  Yes   Nephropathy screening Lab Results   Component Value Date    LABMICR 823.0 10/11/2019     Lab Results   Component Value Date    PROTEINUA 2+ (A) 08/11/2020    Annually Yes   Blood pressure BP Readings from Last 1 Encounters:   09/02/20 128/62    Less than 140/90 Yes   Dilated retinal exam : 10/23/2019 Annually No   Foot exam   : 08/18/2020 Annually Yes       Also with h/o Multifocal papillary thyroid cancer with six lesions, but the largest 5mm, treated with surgery and 100 mCi radioactive iodine orally   Had surgery on 08/26/2009   This was a multifocal lesion. There is some remaining tissue on the left.   A biopsy of this was done 11/03/2011, demonstrating an insufficient material to make a diagnosis      Current regimen: Levothyroxine 200 mcg, 1 tablet daily except Monday 1.5 tablets   Stopped extra half pill while in hospital-now taking LT4 200 mcg daily    Taking appropriately. Every morning with water on an empty stomach 30-45 minutes  before eating or taking other medications      Denies any hypo/hyperthyroidism symptoms    An iodine scan in 2011 showed some remaining uptake.      US thyroid 2/2020  FINDINGS:  THYROID GLAND has been surgically removed.     Sonographic examination of neck compartments II through V was carried out.     1.10 x 0.83 x 0.65 cm lymph node is seen in left level IV.  This lymph node does not contain a distinct fatty hilum and may contain microcalcifications.  Short/long axis ratio is > 0.5.  This lymph node is avascular     Impression:     Status post thyroidectomy.     Left level IV indeterminate lymph node is stable going back to 2014.     Would continue to monitor thyroglobulin levels and repeat neck ultrasound in one year.       Ref. Range 6/25/2020 10:05   TSH Latest Ref Range: 0.400 - 4.000 uIU/mL 1.897   Thyroglobulin Interpretation Unknown SEE BELOW   Thyroglobulin Antibody Screen Latest Ref Range: <1.8 IU/mL <1.8   Thyroglobulin, Tumor Marker Latest Units: ng/mL 0.1 (H)       Review of Systems   Constitutional: Negative for fatigue.   Eyes: Negative for visual disturbance.   Respiratory: Negative for shortness of breath.    Cardiovascular: Negative for chest pain.   Gastrointestinal: Negative for abdominal pain.   Musculoskeletal: Negative for arthralgias.   Skin: Negative for wound.   Neurological: Negative for headaches.   Hematological: Does not bruise/bleed easily.   Psychiatric/Behavioral: Negative for sleep disturbance.     Objective:   Physical Exam  Constitutional:       Appearance: Normal appearance.   Neurological:      Mental Status: He is alert.       There were no vitals taken for this visit.     Lab Review:   Lab Results   Component Value Date    HGBA1C 5.1 06/25/2020    HGBA1C 5.1 02/12/2020    HGBA1C 5.1 10/11/2019      Lab Results   Component Value Date    CHOL 118 (L) 06/25/2020    HDL 39 (L) 06/25/2020    LDLCALC 55.6 (L) 06/25/2020    TRIG 117 06/25/2020    CHOLHDL 33.1 06/25/2020     Lab  Results   Component Value Date     09/02/2020    K 4.9 09/02/2020     09/02/2020    CO2 25 09/02/2020     09/02/2020    BUN 48 (H) 09/02/2020    CREATININE 2.9 (H) 09/02/2020    CALCIUM 9.2 09/02/2020    PROT 6.9 06/25/2020    ALBUMIN 3.4 (L) 09/02/2020    BILITOT 0.7 06/25/2020    ALKPHOS 111 06/25/2020    AST 25 06/25/2020    ALT 18 06/25/2020    ANIONGAP 11 09/02/2020    ESTGFRAFRICA 24.7 (A) 09/02/2020    EGFRNONAA 21.4 (A) 09/02/2020    TSH 1.897 06/25/2020     Vit D, 25-Hydroxy   Date Value Ref Range Status   08/11/2020 28 (L) 30 - 96 ng/mL Final     Comment:     Vitamin D deficiency.........<10 ng/mL                              Vitamin D insufficiency......10-29 ng/mL       Vitamin D sufficiency........> or equal to 30 ng/mL  Vitamin D toxicity............>100 ng/mL       Assessment and Plan     1. Type 2 diabetes mellitus with stage 4 chronic kidney disease, with long-term current use of insulin  Comprehensive Metabolic Panel    Hemoglobin A1C    Fructosamine    glucagon (GVOKE HYPOPEN 2-PACK) 1 mg/0.2 mL AtIn    dulaglutide (TRULICITY) 0.75 mg/0.5 mL pen injector   2. Postsurgical hypothyroidism  US Soft Tissue Head Neck Thyroid    TSH    T4, Free   3. History of thyroid cancer  US Soft Tissue Head Neck Thyroid    TSH    T4, Free   4. Hypercholesterolemia     5. Essential hypertension     6. Obesity (BMI 30.0-34.9)         Type 2 diabetes mellitus with stage 4 chronic kidney disease, with long-term current use of insulin  -- discussed the goal of therapy is to obtain the best control we can without hypoglycemia   -- reviewed BG and A1c goals   -- continue checking Fructosamine with next set of labs as anemia can skew A1c results   Medication Changes    Continue Levemir 8 units daily    Continue Novolog if needed based on blood sugar; greater than 200   Restart Trulicity 0.75 mg weekly     Send me a blood sugar log in one week with blood sugar checks 3 times before meals. If blood sugars  are persistently above 200, reach out to me sooner. Message me for any blood sugar less than 70.     Check labs whenever he can  I am trying to restart Trulicity so he does not need anymore novolog if possible  GVOKE called in    -- reviewed signs and symptoms of hypoglycemia with the appropriate treatment protocol. Instructed on precautions before driving   -- instructed to smbg 4 x daily  -- advised to bring meter or logs to every office visit for review   -- notify office if BG <80 or >180  -- continue dietary and lifestyle modifications   -- recommend periodic follow ups for routine eye, foot and dental care       Postsurgical hypothyroidism  -- Restart lt4 200 mcg daily except Monday 1.5 tablets. Likely reduced while hospitalized though I do not see labs in care everywhere  -- Recheck TSH and fT4 in 6 weeks   -- reinforced to take LT4 as prescribed. On an empty stomach with water ideally an hour before eating or taking other medications   -- avoid exogenous hyperthyroidism as this can accelerate bone loss and increase risk of CV complications     History of thyroid cancer  -- treated with surgery and Radioactive iodine   -- most recent TG is stable at 0.2.  -- pt is considered low risk for recurrence   -- recommend TSH goal <2.5   -- yearly TG levels that are stable at 0.2  -- plan for repeat ultrasound in Feb 2021, stable (since 2014) left sided lymph node     Hypercholesterolemia  -- on statin per ADA recommendations   -- managed per cardiology        Essential hypertension  -- BP at goal   -- followed and managed by cardiology       Obesity (BMI 30.0-34.9)  There is no height or weight on file to calculate BMI.  -- encouraged dietary and lifestyle modifications   -- emphasized weight loss goals     Patient's wife agrees to drop off information from hospitalization and we will call her to review changes in medications for complete update.    Follow up in about 3 months (around 2/23/2021).    I spent 25  minutes face-to-face with the patient, over half of the visit was spent on counseling and/or coordinating the care of the patient.    Counseling includes:  Diagnostic results, impressions, recommendations   Prognosis   Risk and benefits of management/treatment options   Instructions for management treatment and or follow-up   Importance of compliance with management   Risk factor reduction   Patient education

## 2020-11-23 NOTE — TELEPHONE ENCOUNTER
"----- Message from Cally Zavala sent at 11/23/2020  9:05 AM CST -----  Contact: Rut"wife" [phone]  Type: Patient Call Back    Who called: Rut"wife"    What is the request in detail: calling in regards to speaking with someone about the patient's insulin intake because the patient was recently discharged from rehab and she is confused about what he is supposed to do    Can the clinic reply by [facility]? Call back    Would the patient rather a call back or a response via [facility]? Call back    Best call back number: [phone]        "

## 2020-11-23 NOTE — ASSESSMENT & PLAN NOTE
-- discussed the goal of therapy is to obtain the best control we can without hypoglycemia   -- reviewed BG and A1c goals   -- continue checking Fructosamine with next set of labs as anemia can skew A1c results   Medication Changes    Continue Levemir 8 units daily    Continue Novolog if needed based on blood sugar; greater than 200   Restart Trulicity 0.75 mg weekly     Send me a blood sugar log in one week with blood sugar checks 3 times before meals. If blood sugars are persistently above 200, reach out to me sooner. Message me for any blood sugar less than 70.     Check labs whenever he can  I am trying to restart Trulicity so he does not need anymore novolog if possible  GVOKE called in    -- reviewed signs and symptoms of hypoglycemia with the appropriate treatment protocol. Instructed on precautions before driving   -- instructed to smbg 4 x daily  -- advised to bring meter or logs to every office visit for review   -- notify office if BG <80 or >180  -- continue dietary and lifestyle modifications   -- recommend periodic follow ups for routine eye, foot and dental care

## 2020-11-23 NOTE — PATIENT INSTRUCTIONS
Diabetes   Continue Levemir 8 units daily    Continue Novolog if needed based on blood sugar; greater than 200   Restart Trulicity 0.75 mg weekly     Send me a blood sugar log in one week with blood sugar checks 3 times before meals. If blood sugars are persistently above 200, reach out to me sooner. Message me for any blood sugar less than 70.     Check labs whenever he can  I am trying to restart Trulicity so he does not need anymore novolog if possible  GVOKE-Glucagon is an emergency pen that is used to increase your blood sugar. Glucagon is a pen that is used in an emergency situation where you are unable to eat or drink anything. Glucagon is a medication that is given by someone else-spouse, child, etc.     Hypoglycemia - Low Blood Sugar Blood Glucose is less than 70  What can you do?  Rule of 15:    Test your blood sugar   If glucose is between 50-70 mg/dL then ingest 15 grams of fast-acting carbs   If glucose is less than 50 mg/dL then ingest 30 grams of fast-acting carbs   Ingest 15 grams of fast-acting carbohydrate - such as:   a. 3-4 glucose tablets  b. 4 oz juice  c. ½ can regular soda pop  d. 15 skittles or mini jelly beans    Re-check your blood sugar in 15 minutes. If its less than 70mg/dl, repeat steps 2 and 3.   If your next meal is more than 1 hour away, eat an additional 15 grams of carbohydrate and 1 ounce of protein (examples include crackers with cheese or one-half of a sandwich with peanut butter). It is important not to eat too much because this can raise your blood sugar above the target level.      After your blood sugar has normalized, think about why you went low. If you notice a pattern of low blood sugars, contact your Diabetes Team. We may need to adjust your medication.      Snacks can be an important part of a balanced, healthy meal plan. They allow you to eat more frequently, feeling full and satisfied throughout the day. Also, they allow you to spread carbohydrates evenly, which  may stabilize blood sugars.  Plus, snacks are enjoyable!     The amount of carbohydrate needed at snacks varies. Generally, about 15-30 grams of carbohydrate per snack is recommended.  Below you will find some tasty treats.       0-5 gm carb   Crystal Light   Vitamin Water Zero   Herbal tea, unsweetened   2 tsp peanut butter on celery   1./2 cup sugar-free jell-o   1 sugar-free popsicle   ¼ cup blueberries   8oz Blue Demi unsweetened almond milk   5 baby carrots & celery sticks, cucumbers, bell peppers dipped in ¼ cup salsa, 2Tbsp light ranch dressing or 2Tbsp plain Greek yogurt   10 Goldfish crackers   ½ oz low-fat cheese or string cheese   1 closed handful of nuts, unsalted   1 Tbsp of sunflower seeds, unsalted   1 cup Smart Pop popcorn   1 whole grain brown rice cake        15 gm carb   1 small piece of fruit or ½ banana or 1/2 cup lite canned fruit   3 jakob cracker squares   3 cups Smart Pop popcorn, top spray butter, [place]re lite salt or cinnamon and Truvia   5 Vanilla Wafers   ½ cup low fat, no added sugar ice cream or frozen yogurt (Blue bell, Blue Bunny, Weight Watchers, Skinny Cow)   ½ turkey, ham, or chicken sandwich   ½ c fruit with ½ c Cottage cheese   4-6 unsalted wheat crackers with 1 oz low fat cheese or 1 tbsp peanut butter    30-45 goldfish crackers (depending on flavor)    7-8 [employer]ism mini brown rice cakes (caramel, apple cinnamon, chocolate)    12 [employer]ism mini brown rice cakes (cheddar, bbq, ranch)    1/3 cup hummus dip with raw veg   1/2 whole wheat syeda, 1Tbsp hummus   Mini Pizza (1/2 whole wheat English muffin, low-fat  cheese, tomato sauce)   100 calorie snack pack (Oreo, Chips Ahoy, Ritz Mix, Baked Cheetos)   4-6 oz. light or Greek Style yogurt (Chobani, Yoplait, Ok[place], [place])   ½ cup sugar-free pudding     6 in. wheat tortilla or syeda oven toasted chips (topped with spray butter flavoring, cinnamon, Truvia OR spray butter, garlic powder, chili powder)     18 BBQ Popchips (available at [employer], [employer], [employer])       Diabetic drinks we recommend:   Water -BEST  Crystal light sugar free packets  Gatorade zero   Powerade zero  Tea without sweetener    Diabetic drinks we do not recommend:  Coke or any sugary regular soft drink  Coffee with sugar  Milk  Juice  Beer  Liquor    Sweeteners we recommend:  Stevia   Kelvin Wheeler    Note: Caffeine can increase your blood sugar

## 2020-11-25 PROBLEM — N40.0 BENIGN PROSTATIC HYPERPLASIA WITHOUT LOWER URINARY TRACT SYMPTOMS: Status: ACTIVE | Noted: 2017-09-14

## 2020-11-25 NOTE — PROGRESS NOTES
Subjective:       Patient ID: Alfa Christy III is a 65 y.o. male.    Chief Complaint: Hospital Follow Up    Pt of Dr. Jasmine, here for a hospital follow up.    Was in the hospital at [facility] for a cerebral aneurysm. Transferred to [facility] 11/6/20 through 11/21/20 and appears he was discharged with home health.  Saw Endocrine on 11-23-20 for insulin adjustment. Has a Podiatry appt today after seeing me for sharp pains at bottom of his feet.    I reviewed the extensive Rehab notes in chart. Pt was taken off of Eliquis due to hematuria.    Insulin was changed back to Levemir 8 units at bedtime. Had labs done per Endocrine today that are pending. Showed me home readings. Readings before meals from 110-150s.    Doing well. Wife present with him. He is scheduled to have Home Health for PT/OT/ST with [facility], however wife states they are waiting on [employer] for an authorization so they have not seen him.     He has wheelchair, commode, and hospital bed at home.     Wife provided Discharge summary from [facility] hospitalization back in September, scanned in chart. I have reviewed it.     Review of Systems   Constitutional: Negative for activity change, appetite change, chills, diaphoresis, fatigue, fever and unexpected weight change.   HENT: Negative for hearing loss and voice change.    Eyes: Negative for visual disturbance.   Respiratory: Negative for apnea, cough, chest tightness and shortness of breath.    Cardiovascular: Negative for chest pain, palpitations and leg swelling.   Gastrointestinal: Negative for abdominal distention, abdominal pain, blood in stool, constipation, diarrhea, nausea and vomiting.   Endocrine: Negative for cold intolerance, heat intolerance, polydipsia, polyphagia and polyuria.   Genitourinary: Negative for difficulty urinating, dysuria and penile pain.   Musculoskeletal: Negative for arthralgias and myalgias.   Integumentary:  Negative for color change, pallor, rash and  wound.   Allergic/Immunologic: Negative for environmental allergies and food allergies.   Neurological: Positive for weakness, disturbances in coordination and coordination difficulties. Negative for dizziness, tremors, seizures, syncope, light-headedness, numbness and memory loss.        Wheelchair bound   Hematological: Negative for adenopathy. Does not bruise/bleed easily.   Psychiatric/Behavioral: Negative for agitation, behavioral problems, sleep disturbance and suicidal ideas.      Review of patient's allergies indicates:   Allergen Reactions    Cough syrup [guaifenesin] Other (See Comments)     weakness     Current Outpatient Medications:     acetaminophen (TYLENOL) 500 MG tablet, Take 1 tablet (500 mg total) by mouth every 6 (six) hours as needed for Pain., Disp: , Rfl: 0    aspirin 81 MG Chew, Take 1 tablet (81 mg total) by mouth once daily., Disp: , Rfl: 0    atorvastatin (LIPITOR) 40 MG tablet, TAKE 1 TABLET BY MOUTH EVERY DAY, Disp: 90 tablet, Rfl: 1    betamethasone dipropionate (DIPROLENE) 0.05 % cream, Apply topically 2 (two) times daily., Disp: 45 g, Rfl: 3    blood sugar diagnostic (ONETOUCH VERIO) Strp, Check blood glucose readings 3 times a day., Disp: 300 strip, Rfl: 3    blood sugar diagnostic Strp, To check BG 3 times daily, to use with insurance preferred meter, Disp: 200 each, Rfl: 3    calcitRIOL (ROCALTROL) 0.25 MCG Cap, Take 1 capsule (0.25 mcg total) by mouth once daily., Disp: 30 capsule, Rfl: 3    carvediloL (COREG) 12.5 MG tablet, Take 1 tablet (12.5 mg total) by mouth 2 (two) times daily., Disp: 180 tablet, Rfl: 3    ciclopirox (PENLAC) 8 % Soln, Apply topically nightly., Disp: 6.6 mL, Rfl: 11    clotrimazole-betamethasone 1-0.05% (LOTRISONE) cream, Apply topically 2 (two) times daily. To area of rash for 2-4 weeks, Disp: 45 g, Rfl: 0    dulaglutide (TRULICITY) 0.75 mg/0.5 mL pen injector, Inject 0.75 mg into the skin every 7 days., Disp: 2 mL, Rfl: 3    ferrous sulfate  (FEOSOL) 325 mg (65 mg iron) Tab tablet, Take 1 tablet by mouth every other day., Disp: , Rfl:     fluocinonide (LIDEX) 0.05 % ointment, Apply to area of rash twice daily, Disp: 60 g, Rfl: 3    folic acid (FOLVITE) 400 MCG tablet, Take 1,000 mcg by mouth once daily. , Disp: , Rfl:     furosemide (LASIX) 80 MG tablet, Take 1 tablet (80 mg total) by mouth 2 (two) times daily., Disp: 120 tablet, Rfl: 3    glucagon (GVOKE HYPOPEN 2-PACK) 1 mg/0.2 mL AtIn, Inject 1 Package into the skin as needed., Disp: 2 Syringe, Rfl: 0    glucagon, human recombinant, (GLUCAGON EMERGENCY KIT, HUMAN,) 1 mg SolR, Inject intramuscularly for emergency, Disp: 1 each, Rfl: 2    insulin aspart U-100 (NOVOLOG FLEXPEN U-100 INSULIN) 100 unit/mL (3 mL) InPn pen, Inject 7 units w/ meals plus scale 150-200+1, 201-250+2, 251-300+3, 301-350+4, >350+5. Max daily 50 units 1 box via [employer] voucher, Disp: 15 mL, Rfl: 1    insulin detemir U-100 (LEVEMIR FLEXTOUCH U-100 INSULN) 100 unit/mL (3 mL) InPn pen, Inject 8 Units into the skin every evening., Disp: , Rfl:     lacosamide (VIMPAT) 200 mg Tab tablet, Take 1 tablet (200 mg total) by mouth every 12 (twelve) hours., Disp: 60 tablet, Rfl: 11    lancets Mis, To check BG 3 times daily, to use with insurance preferred meter, Disp: 200 each, Rfl: 3    levothyroxine (SYNTHROID) 200 MCG tablet, Take 1 tablet (200 mcg total) by mouth before breakfast., Disp: 90 tablet, Rfl: 2    levothyroxine (SYNTHROID) 50 MCG tablet, Take 1 tablet (50 mcg total) by mouth every 7 days. Pt will be taking only on Monday., Disp: 30 tablet, Rfl: 1    magnesium 30 mg Tab, Take 1 tablet by mouth twice a week. Mon ,weds,fridays, Disp: , Rfl:     metOLazone (ZAROXOLYN) 2.5 MG tablet, Take 1 tablet (2.5 mg total) by mouth daily as needed (weight gain more than 3 pounds/day or more than 5 pounds/week)., Disp: 15 tablet, Rfl: 11    multivitamin capsule, Take 1 capsule by mouth every Mon, Wed, Fri. , Disp: , Rfl:     pen  "needle, diabetic (BD ULTRA-FINE MINI PEN NEEDLE) 31 gauge x 3/16" Ndle, To use with insulin pens 4  times daily, Disp: 360 each, Rfl: 3    SAW PALMETTO ORAL, Take by mouth. mon , wed, fri, Disp: , Rfl:     sevelamer carbonate (RENVELA) 800 mg Tab, Take 800 mg by mouth 3 (three) times daily., Disp: , Rfl:     tamsulosin (FLOMAX) 0.4 mg Cap, TAKE 1 CAPSULE (0.4 MG TOTAL) BY MOUTH EVERY EVENING., Disp: 90 capsule, Rfl: 3    ticagrelor (BRILINTA) 90 mg tablet, Take 90 mg by mouth 2 (two) times daily., Disp: , Rfl:     vitamin D 1000 units Tab, Take 1,000 Units by mouth every Mon, Wed, Fri., Disp: , Rfl:     blood-glucose meter kit, To check BG 3 times daily - please provide insurance preferred meter, Disp: 1 each, Rfl: 0    lancing device Misc, 1 Device by Misc.(Non-Drug; Combo Route) route 3 (three) times daily., Disp: 1 each, Rfl: 0    valsartan (DIOVAN) 320 MG tablet, Take 1 tablet (320 mg total) by mouth once daily., Disp: 90 tablet, Rfl: 3    Patient Active Problem List   Diagnosis    Hypercholesterolemia    Sickle cell trait    Erectile dysfunction associated with type 2 diabetes mellitus    Gallstones    Postsurgical hypothyroidism    Microcytic anemia    Obesity (BMI 30.0-34.9)    Right homonymous hemianopsia    History of CVA (cerebrovascular accident)    Type 2 diabetes mellitus with stage 4 chronic kidney disease, with long-term current use of insulin    Chronic anticoagulation - eliquis    Chronic diastolic heart failure    History of thyroid cancer    Secondary hyperparathyroidism    Elevated PSA    Essential hypertension    Nuclear sclerosis of both eyes    Refractive error    Prostate enlargement    Complex partial seizures evolving to generalized tonic-clonic seizures    Hypertensive CKD (chronic kidney disease)    Chronic kidney disease, stage III (moderate)    Vitreous hemorrhage of right eye    Glaucoma suspect of both eyes    Sickle cell retinopathy without crisis    " Type 2 diabetes mellitus without ophthalmic manifestations    Hypertensive retinopathy, bilateral    Proteinuria due to type 2 diabetes mellitus    Chronic atrial fibrillation    Bilateral carotid artery stenosis    Epiretinal membrane (ERM) of right eye    Chronic eczema    Vitamin D deficiency    JAELYN (acute kidney injury)    Anemia due to multiple mechanisms    Benign prostatic hyperplasia without lower urinary tract symptoms     Past Medical History:   Diagnosis Date    Allergy     BMI 31.0-31.9,adult 11/25/2014    CHF (congestive heart failure)     Chronic anticoagulation - pradaxa 11/10/2016    Chronic diastolic heart failure 11/20/2016    CKD (chronic kidney disease), stage III 9/23/2013    Controlled type 2 diabetes with neuropathy 12/16/2014    Coronary artery disease     Diabetes mellitus due to underlying condition with stage 3 chronic kidney disease, without long-term current use of insulin 11/2/2016    Elevated alkaline phosphatase level 8/1/2012    Elevated PSA     negative prostate biopsy 4/11    Erectile dysfunction associated with type 2 diabetes mellitus     Gallstones 3/20/2013    Generalized tonic-clonic seizure 11/2016    HTN (hypertension), benign 8/1/2012    Hypercholesterolemia 8/1/2012    Microcytic anemia 11/27/2013    Paroxysmal atrial fibrillation 9/23/2013    Postsurgical hypothyroidism 11/27/2013    Right homonymous hemianopsia 2/5/2016    Sickle cell trait     Stroke 1/27/2016    Thyroid cancer     multifocal with six lesions, largest 5mm, treated with surgery and radioactive iodine     Past Surgical History:   Procedure Laterality Date    BREAST SURGERY      cyst removal    COLONOSCOPY      COLONOSCOPY N/A 9/19/2019    Procedure: COLONOSCOPY;  Surgeon: Silva Granger MD;  Location: [address]);  Service: Endoscopy;  Laterality: N/A;  history of colon polyps on scope 2015, recommended to repeat at 3 yrs  on eliquis and ASA after stroke,  afib -- please check with his cardiologist re: instructions for holding this  ok to hold Eliquis x 2 days prior per Dr. Gabriel Hager-see telephone enc    CYST REMOVAL      chest    PROSTATE BIOPSY  4/27/11    SKIN BIOPSY      THYROID SURGERY  8/26/09    VASCULAR SURGERY       Social History     Socioeconomic History    Marital status:      Spouse name: Rut    Number of children: Not on file    Years of education: Not on file    Highest education level: Not on file   Occupational History    Not on file   Social Needs    Financial resource strain: Not on file    Food insecurity     Worry: Not on file     Inability: Not on file    Transportation needs     Medical: Not on file     Non-medical: Not on file   Tobacco Use    Smoking status: Never Smoker    Smokeless tobacco: Never Used   Substance and Sexual Activity    Alcohol use: Yes     Comment: occasionally, none recently    Drug use: No    Sexual activity: Yes     Partners: Female     Comment:  with 3 kids   Lifestyle    Physical activity     Days per week: Not on file     Minutes per session: Not on file    Stress: Not on file   Relationships    Social connections     Talks on phone: Not on file     Gets together: Not on file     Attends Anabaptism service: Not on file     Active member of club or organization: Not on file     Attends meetings of clubs or organizations: Not on file     Relationship status: Not on file   Other Topics Concern    Not on file   Social History Narrative     with 3 kids     Not driving due to vision problem from stroke.      Family History   Problem Relation Age of Onset    Heart disease Father     Diabetes Father     Hypertension Father     Diabetes Mother     Hypertension Mother     Heart disease Mother     Diabetes Brother     No Known Problems Daughter     Cancer Sister     No Known Problems Daughter     Thyroid disease Maternal Aunt     Clotting disorder Neg Hx   "   Melanoma Neg Hx        Objective:       Vitals:    11/25/20 1337   BP: 130/72   Pulse: 67   SpO2: 98%   Weight: 95.5 kg (210 lb 8.6 oz)   Height: 5' 10" (1.778 m)   PainSc: 0-No pain     Body mass index is 30.21 kg/m².    Physical Exam  Vitals signs and nursing note reviewed.   Constitutional:       Appearance: He is obese.   HENT:      Head: Normocephalic.      Nose: Nose normal.      Mouth/Throat:      Mouth: Mucous membranes are moist.      Pharynx: Oropharynx is clear.   Eyes:      Extraocular Movements: Extraocular movements intact.      Conjunctiva/sclera: Conjunctivae normal.      Pupils: Pupils are equal, round, and reactive to light.   Neck:      Musculoskeletal: Normal range of motion and neck supple.   Cardiovascular:      Rate and Rhythm: Normal rate and regular rhythm.      Pulses: Normal pulses.      Heart sounds: Normal heart sounds. No murmur. No friction rub. No gallop.    Pulmonary:      Effort: Pulmonary effort is normal.      Breath sounds: Normal breath sounds.   Abdominal:      General: Abdomen is flat. Bowel sounds are normal.      Palpations: Abdomen is soft.   Musculoskeletal:      Comments: Wheelchair bound   Skin:     General: Skin is warm and dry.      Capillary Refill: Capillary refill takes less than 2 seconds.   Neurological:      General: No focal deficit present.      Mental Status: He is alert and oriented to person, place, and time.      Motor: Weakness present.      Comments: Generalized weakness   Psychiatric:         Mood and Affect: Mood normal.         Behavior: Behavior normal.         Thought Content: Thought content normal.         Judgment: Judgment normal.         Assessment:       1. Hospital discharge follow-up    2. Cerebral aneurysm    3. Chronic atrial fibrillation    4. Type 2 diabetes mellitus with stage 4 chronic kidney disease, with long-term current use of insulin    5. Hypertensive chronic kidney disease, unspecified CKD stage    6. Chronic diastolic heart " failure    7. Complex partial seizures evolving to generalized tonic-clonic seizures    8. Essential hypertension    9. Right homonymous hemianopsia    10. BMI 30.0-30.9,adult    11. Obesity (BMI 30-39.9)        Plan:   Alfa was seen today for hospital follow up.    Diagnoses and all orders for this visit:    Hospital discharge follow-up  Cerebral aneurysm  Chronic atrial fibrillation  Stable and improving    Type 2 diabetes mellitus with stage 4 chronic kidney disease, with long-term current use of insulin  Followed by Endocrine, seen by them yesterday    Hypertensive chronic kidney disease, unspecified CKD stage  Info on renal diet given to pt as he has started Renvela for Stage 4 CKD    Chronic diastolic heart failure  Chronic, stable on meds    Complex partial seizures evolving to generalized tonic-clonic seizures  Stable on meds    Essential hypertension  Stable    Take medications as prescribed.    Monitor BP at home, goal BP < or = 140/80, call office if consistently above this range.    Follow low salt DASH diet and exercise.    BMI reviewed.    Go to ED if Headaches, blurred vision, chest pain, or SOB occurs along with elevated readings > or = 160/90.    Right homonymous hemianopsia  Stable    BMI 30.0-30.9,adult  BMI reviewed    Obesity (BMI 30-39.9)  BMI reviewed.    Diet and exercise to lose weight.    Keep follow up appts    Follow low carb diabetic diet and renal diet see info below    Self care instructions provided in AVS    Follow up in about 12 weeks (around 2/17/2021) for with PCP Dr. Jasmine.

## 2020-11-25 NOTE — PATIENT INSTRUCTIONS
Keep follow up appts    Follow low carb diabetic diet and renal diet see info below      Kidney Disease: Avoiding High-Sodium Foods  Sodium is a mineral that the body needs in small amounts. Sodium is found in table salt. Table salt is sodium chloride. Most people eat far more salt than they need. There are 2 main reasons for this. Salt is present in high amounts in most processed foods (pre-prepared foods like breakfast cereals, cookies, and pickles) and in all restaurant foods. In other words, if you are not cooking from fresh ingredients at home, you are very likely eating more salt than you need. When sodium intake is too high, it can increase thirst and cause the body to retain fluid. This can increase blood pressure and strain the kidneys. If you have chronic kidney disease, try not to eat the foods listed here, unless the label states that they are made without salt. People with chronic kidney disease should restrict their sodium intake to less than 1,500 mg of sodium (3,800 mg of table salt) each day.     · Canned and processed foods, such as gravies, instant cereal, packaged noodles and potato mixes, olives, pickles, soups, vegetables  · Cheeses, such as American, Blue, Parmesan, Roquefort  · Cured meats, such as pandey, beef jerky, bologna, corned beef, ham, hot dogs, sandwich meats, sausages  · Fast foods, such as burritos, fish sandwiches, milk shakes, salted French fries, tacos  · Frozen foods, such as meat pies, TV dinners, waffles  · Salted snacks, such as chips, crackers, peanut popcorn, pretzels, and nuts  · Other packaged items, such as antacids, baking soda, bouillon, catsup, lite salt, relish, salted butter and margarine, soy and teriyaki sauce, steak sauce, vegetable juices  Date Last Reviewed: 2/1/2017  © 2377-4510 [employer]. [address] [place] [address], [place] [address]. All rights reserved. This information is not intended as a substitute for professional medical care. Always  follow your healthcare professional's instructions.      Kidney Disease: Reducing Potassium in Food  By controlling the amount of potassium you eat, you can keep a safe level in your blood. Cooking helps remove potassium from starchy vegetables, such as potatoes. To reduce potassium, boil the vegetables in a large amount of unsalted water. Drain and discard the water before serving. The tips on this sheet can help.  To cook potatoes  Follow the steps below to reduce the potassium content of white potatoes:  · Peel and cut the potatoes into 1/8-inch pieces.  · Place the potatoes in a large amount of unsalted water. Allow to stand for at least 2 hours.  · Drain, rinse, and drain the potatoes again.  · Cook in a large amount of unsalted water.  Watch out for hidden sources of potassium  The potassium content of a food may change depending on how the food is preserved. Most food labels do not include potassium, so keep these tips in mind:  · Dried fruits are high in potassium. Canned fruits are lower.  · Other foods with high levels of potassium include salt substitutes, lite salts, milk, coffee, and some vegetable juices and powdered drink mixes.  Date Last Reviewed: 1/1/2017 © 2000-2017 [employer]. [address] [place] [address], [place] [address]. All rights reserved. This information is not intended as a substitute for professional medical care. Always follow your healthcare professional's instructions.      Kidney Disease: Eating Less Sodium  Sodium is a mineral that the body needs in small amounts. Sodium is found in table salt. Most people eat far more salt than they need. There are 2main reasons for this: Salt is present in high amounts in most processed foods (pre-prepared foods like breakfast cereals, cookies, and pickles) and in all restaurant foods. In other words, if you are not cooking from fresh ingredients at home, you are very likely eating more salt than you need. When sodium intake is too  high, it can increase thirst and cause the body to retain fluid. To avoid these side effects, people with chronic kidney disease are often told to eat less sodium. The tips on this sheet can show you how.  People with chronic kidney disease should restrict their salt intake to less than 1,500 milligrams (mg) of sodium daily. Food labels generally report the sodium content. Table salt is sodium chloride. One level teaspoon of table salt contains 2.300 mg of sodium.    When you shop for food  Unlike canned and processed foods, fresh foods have no added salt and are better for you. When youre food shopping:  · Choose fresh foods when you can.  · Read food labels before buying packaged foods. Check the labels nutrition facts for sodium amounts and servings per package.  · Try to pick packaged foods with a sodium content of 140 mg (milligrams) or less per serving.  · Do not choose foods with over 400 mg of sodium per serving.  Season instead of salt  Try the seasonings and foods listed below to season without sodium.  · Basil: tomatoes, squash, eggplant, soups, fish  · Larsen: soups, rice, lentils, chicken  · Dill: beets, cucumbers, green beans  · Garlic: sauces, vegetables, meats, fish  · Brenda: carrots, chicken, cooked fruit, white sauces  · Lemon: asparagus, artichokes, broccoli, spinach, fish  · Mint: cold soups, salads, fruit dishes  · Oregano: eggplant, chicken, salads, sauces  · Thyme: chicken, fish, lean meats, soups, stews  Food that has salt added during cooking tastes less salty than if salt is sprinkled on it at the table. Therefore, use half the amount of salt you want to have in your food during cooking, and sprinkle the other half on the food at the table.  Do not use seasoned salt or salt substitutes. They may contain sodium or potassium (another mineral people with kidney disease are often told to limit).  Date Last Reviewed: 2/1/2017  © 3502-2730 [employer]. [address] [place] [address],  [place] [address]. All rights reserved. This information is not intended as a substitute for professional medical care. Always follow your healthcare professional's instructions.      Kidney Disease: Balancing Calcium and Phosphorus    Calcium and phosphorus are minerals found in many foods. Your body works best when these minerals are in balance. But if you have kidney disease, phosphorus may build up in your blood. This can weaken your bones over time. To help keep your bones strong, control the amount of phosphorus in your body. This sheet tells you how.  Take phosphate binders  Phosphate binders are medicines that stick to the phosphorus in the food you eat. This keeps the phosphorus from being absorbed into your body. Instead, the phosphorus passes from your body with stool (solid waste). For best results, keep these tips in mind:  ? Use only the type of phosphate binder that your healthcare provider recommends. The type of binder that you should be taking is ___________________________  ? Always take phosphate binders as directed.  ? With meals  ? Other _________________________  ? Phosphate binders can cause constipation. You may need to eat more fiber or take stool softeners.  Limit these foods   Do not eat these foods   To keep calcium and phosphorus in balance, limit the amount of phosphorus you eat. To do so, eat less of the following foods:  · Milk  · Chocolate  · Cheese  · Beer  · Yogurt  · Soybeans  · Ice cream   Some foods are so high in phosphorus that you may need to stop eating them. Talk with your dietitian before eating these foods:  · Cola drinks  · Dried or baked beans  · Nuts and seeds of all kinds  · Peanut butter  · Split peas  · Whole-grain cereals   Date Last Reviewed: 1/1/2017  © 6238-3025 [employer]. [address] [place] [address], [place] [address]. All rights reserved. This information is not intended as a substitute for professional medical care. Always follow your healthcare  professional's instructions.          Sevelamer capsules or tablets  What is this medicine?  SEVELAMER (se NOVA a robb) is a polymer. It binds phosphates in the stomach and prevents them from being absorbed into the body. This medicine is used in patients with chronic kidney disease on dialysis to prevent dangerous increases in phosphates.  How should I use this medicine?  Take this medicine by mouth with a glass of water. Follow the directions on the prescription label. Take with meals. Do not crush, chew or break open your dose. Take your medicine at regular intervals. Do not take your medicine more often than directed. Do not stop taking except on your doctor's advice.  Talk to your pediatrician regarding the use of this medicine in children. Special care may be needed.  What side effects may I notice from receiving this medicine?  Side effects that you should report to your doctor or health care professional as soon as possible:  · allergic reactions like skin rash, itching or hives, swelling of the face, lips, or tongue  · confusion, irritability  · difficulty breathing  · fever, infection  · low or high blood pressure  · unusually weak or tired  · vomiting  Side effects that usually do not require medical attention (report to your doctor or health care professional if they continue or are bothersome):  · cough  · diarrhea or constipation  · headache  · pain  · stomach upset, nausea  What may interact with this medicine?  Do not take this medicine with any of the following medications:  · phosphorus containing product or supplement  This medicine may also interact with the following medications:  · ciprofloxacin  · medicines for an irregular heartbeat  · medicines for seizure control  What if I miss a dose?  If you miss a dose, take it as soon as you can. If it is almost time for your next dose, take only that dose. Do not take double or extra doses.  Where should I keep my medicine?  Keep out of the reach of  children.  Store at room temperature between 15 and 30 degrees C (59 and 86 degrees F). Protect from moisture. Throw away any unused medicine after the expiration date.  What should I tell my health care provider before I take this medicine?  They need to know if you have any of these conditions:  · difficulty swallowing  · bowel obstruction  · stomach surgery or disorder  · an unusual or allergic reaction to sevelamer, other medicines, foods, dyes, or preservatives  · pregnant or trying to get pregnant  · breast-feeding  What should I watch for while using this medicine?  Visit your Doctor or health care professional for regular check ups. Follow the diet as directed by your doctor.  If you are taking other medications, take them at least 1 hour before or 3 hours after taking this medicine.  NOTE:This sheet is a summary. It may not cover all possible information. If you have questions about this medicine, talk to your doctor, pharmacist, or health care provider. Copyright© 2017 [employer]

## 2020-11-27 NOTE — PROGRESS NOTES
Subjective:      Patient ID: Alfa Christy III is a 65 y.o. male.    Chief Complaint: No chief complaint on file.    Alfa is a 65 y.o. male who presents to the clinic upon referral from Dr. Galarza  for evaluation and treatment of diabetic feet. 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), Coronary artery disease, Diabetes mellitus due to underlying condition with stage 3 chronic kidney disease, without long-term current use of insulin (11/2/2016), Elevated alkaline phosphatase level (8/1/2012), Elevated PSA, Erectile dysfunction associated with type 2 diabetes mellitus, Gallstones (3/20/2013), Generalized tonic-clonic seizure (11/2016), HTN (hypertension), benign (8/1/2012), Hypercholesterolemia (8/1/2012), Microcytic anemia (11/27/2013), Paroxysmal atrial fibrillation (9/23/2013), Postsurgical hypothyroidism (11/27/2013), Right homonymous hemianopsia (2/5/2016), Sickle cell trait, Stroke (1/27/2016), and Thyroid cancer. Patient relates no major problem with feet. Only complaints today consist of Diabetes, increased risk amputation needing evaluation/management/optomization of foot care.  No current symptoms..    CC2 thick discolored misshapen toenails all ten toes.  Gradual onset, worsening over past several weeks, aggravated by increased weight bearing, shoe gear, pressure.  Periodic debridement helps symptoms.     PCP: Tyler Jasmine MD    Date Last Seen by PCP:   No chief complaint on file.       Current shoe gear: Casual shoes    Hemoglobin A1C   Date Value Ref Range Status   11/25/2020 5.7 (H) 4.0 - 5.6 % Final     Comment:     ADA Screening Guidelines:  5.7-6.4%  Consistent with prediabetes  >or=6.5%  Consistent with diabetes  High levels of  fetal hemoglobin interfere with the HbA1C  assay. Heterozygous hemoglobin variants (HbS, HgC, etc)do  not significantly interfere with this assay.   However, presence of multiple variants may affect accuracy.     06/25/2020 5.1 4.0 - 5.6 % Final     Comment:     ADA Screening Guidelines:  5.7-6.4%  Consistent with prediabetes  >or=6.5%  Consistent with diabetes  High levels of fetal hemoglobin interfere with the HbA1C  assay. Heterozygous hemoglobin variants (HbS, HgC, etc)do  not significantly interfere with this assay.   However, presence of multiple variants may affect accuracy.     02/12/2020 5.1 4.0 - 5.6 % Final     Comment:     ADA Screening Guidelines:  5.7-6.4%  Consistent with prediabetes  >or=6.5%  Consistent with diabetes  High levels of fetal hemoglobin interfere with the HbA1C  assay. Heterozygous hemoglobin variants (HbS, HgC, etc)do  not significantly interfere with this assay.   However, presence of multiple variants may affect accuracy.             Review of Systems   Constitution: Negative for chills, diaphoresis, fever, malaise/fatigue and night sweats.   Cardiovascular: Positive for leg swelling. Negative for claudication, cyanosis and syncope.   Skin: Positive for nail changes. Negative for color change, dry skin, rash, suspicious lesions and unusual hair distribution.   Musculoskeletal: Negative for falls, joint pain, joint swelling, muscle cramps, muscle weakness and stiffness.   Gastrointestinal: Negative for constipation, diarrhea, nausea and vomiting.   Neurological: Negative for brief paralysis, disturbances in coordination, focal weakness, numbness, paresthesias, sensory change and tremors.           Objective:      Physical Exam  Constitutional:       General: He is not in acute distress.     Appearance: He is well-developed. He is not diaphoretic.      Comments: Oriented to time, place, and person.   Cardiovascular:      Pulses:           Popliteal pulses are 2+ on the right side and  2+ on the left side.        Dorsalis pedis pulses are 2+ on the right side and 2+ on the left side.        Posterior tibial pulses are 1+ on the right side and 1+ on the left side.      Comments: Capillary refill 3 seconds all toes/distal feet, all toes/both feet warm to touch.      Negative lymphadenopathy bilateral popliteal fossa and tarsal tunnel.     <2+ pitting lower extremity edema bilateral.    Musculoskeletal:      Right ankle: He exhibits normal range of motion, no swelling, no ecchymosis, no deformity, no laceration and normal pulse. Achilles tendon normal. Achilles tendon exhibits no pain, no defect and normal Gaspar's test results.      Comments: Normal angle, base, station of gait. Decreased stride length, early heel off, moderately propulsive toe off bilateral.    All ten toes without clubbing, cyanosis, or signs of ischemia.      No pain to palpation bilateral lower extremities.      Range of motion, stability, muscle strength, and muscle tone are age and health appropriate normal bilateral feet and legs.       Lymphadenopathy:      Lower Body: No right inguinal adenopathy. No left inguinal adenopathy.      Comments: Negative lymphadenopathy bilateral popliteal fossa and tarsal tunnel.    Negative lymphangitic streaking bilateral feet/ankles/legs.   Skin:     General: Skin is warm and dry.      Capillary Refill: Capillary refill takes 2 to 3 seconds.      Coloration: Skin is not pale.      Findings: No abrasion, bruising, burn, ecchymosis, erythema, laceration, lesion, petechiae or rash.      Nails: There is no clubbing.        Comments:   Skin is normal age and health appropriate color, turgor, texture, and temperature bilateral lower extremities without ulceration, hyperpigmentation, discoloration, masses nodules or cords palpated.  No ecchymosis, erythema, edema, or cardinal signs of infection bilateral lower extremities.     Neurological:      Mental Status: He is alert and oriented to person,  place, and time. He is not disoriented.      Sensory: Sensory deficit present.      Motor: No tremor, atrophy or abnormal muscle tone.      Gait: Gait normal.      Deep Tendon Reflexes:      Reflex Scores:       Patellar reflexes are 2+ on the right side and 2+ on the left side.       Achilles reflexes are 2+ on the right side and 2+ on the left side.     Comments: Negative tinel sign to percussion sural, superficial peroneal, deep peroneal, saphenous, and posterior tibial nerves right and left ankles and feet.  .  Decreased/absent vibratory sensation bilateral feet to 128Hz tuning fork.    Paresthesias, and burning bilateral feet with no clearly identified trigger or source.     Psychiatric:         Behavior: Behavior is cooperative.               Assessment:       Encounter Diagnoses   Name Primary?    Type 2 diabetes mellitus with stage 4 chronic kidney disease, with long-term current use of insulin     Type II diabetes mellitus with neurological manifestations Yes    Onychomycosis due to dermatophyte          Plan:       Diagnoses and all orders for this visit:    Type II diabetes mellitus with neurological manifestations    Type 2 diabetes mellitus with stage 4 chronic kidney disease, with long-term current use of insulin  -     Ambulatory referral/consult to Podiatry    Onychomycosis due to dermatophyte    Other orders  -     Cancel: Wound Debridement      I counseled the patient on his conditions, their implications and medical management.        - Shoe inspection. Diabetic Foot Education. Patient reminded of the importance of good nutrition and blood sugar control to help prevent podiatric complications of diabetes. Patient instructed on proper foot hygeine. We discussed wearing proper shoe gear, daily foot inspections, never walking without protective shoe gear, never putting sharp instruments to feet, routine podiatric visits at least annually     Rx penlac.    With the patient's permission, I debrided  all ten toenails with a sterile nipper and curette, removing all offending nail and debris.  Patient tolerated the procedure well and related significant relief.        Discussed conservative treatment with shoes of adequate dimensions, material, and style to alleviate symptoms and delay or prevent surgical intervention.         Follow up in about 1 year (around 11/27/2021).

## 2020-11-27 NOTE — LETTER
November 27, 2020      RODNEY Martinez, FNP  [address]  [place] [address]           [facility] - Podiatry  [address] [address]  Phone: [phone]  Fax: [phone]          Patient: Alfa Christy III   MR Number: [id number]   YOB: 1955   Date of Visit: 11/27/2020       Dear Azra Galarza:    Thank you for referring Alfa Christy to me for evaluation. Attached you will find relevant portions of my assessment and plan of care.    If you have questions, please do not hesitate to call me. I look forward to following Alfa Christy along with you.    Sincerely,    Sean Segura, SHAR    Enclosure  CC:  No Recipients    If you would like to receive this communication electronically, please contact [email or web address] or [phone] to request more information on [employer] Link access.    For providers and/or their staff who would like to refer a patient to [facility], please contact us through our one-stop-shop provider referral line, [facility], at [phone].    If you feel you have received this communication in error or would no longer like to receive these types of communications, please e-mail [email or web address]

## 2020-12-03 NOTE — TELEPHONE ENCOUNTER
----- Message from Elayne Valdez sent at 12/3/2020 12:45 PM CST -----  Contact: [phone] ext [phone]  Type:Home Health(orders,updates,clarifications,etc)    Home Health Agency/Nurse:  [facility]    Phone number: [phone] ext [phone]    Reason for call:    Comments: [facility] called requested order for aid 2 times a week for 4 weeks.

## 2020-12-03 NOTE — TELEPHONE ENCOUNTER
----- Message from Sowmya Carlton sent at 12/3/2020  4:09 PM CST -----  Contact: [facility] # [phone]  Pt wife needs to schedule her  an appt for ER fu visit for the seizures.

## 2020-12-07 PROBLEM — I67.1 CEREBRAL ANEURYSM WITHOUT RUPTURE: Status: ACTIVE | Noted: 2020-01-01

## 2020-12-07 PROBLEM — Z95.828 S/P IVC FILTER: Status: ACTIVE | Noted: 2020-01-01

## 2020-12-07 PROBLEM — Z86.718 HISTORY OF DVT (DEEP VEIN THROMBOSIS): Status: ACTIVE | Noted: 2020-01-01

## 2020-12-07 NOTE — LETTER
December 7, 2020      Tyler Jasmine MD  [address]  [facility] [address]           [facility] 3rd Floor  [address]  [facility] [address]  Phone: [phone]          Patient: Alfa Christy III   MR Number: [id number]   YOB: 1955   Date of Visit: 12/7/2020       Dear Dr. Tyler Jasmine:    Thank you for referring Alfa Christy to me for evaluation. Attached you will find relevant portions of my assessment and plan of care.    If you have questions, please do not hesitate to call me. I look forward to following Alfa Christy along with you.    Sincerely,    Shanti Sosa, NP    Enclosure  CC:  No Recipients    If you would like to receive this communication electronically, please contact [email or web address] or [phone] to request more information on [employer] Link access.    For providers and/or their staff who would like to refer a patient to [facility], please contact us through our one-stop-shop provider referral line, [facility], at [phone].    If you feel you have received this communication in error or would no longer like to receive these types of communications, please e-mail [email or web address]

## 2020-12-07 NOTE — PROGRESS NOTES
Mr. Christy is a patient of Dr. Camacho and was last seen in [facility] 8/17/2020.      Subjective:   Patient ID:  Alfa Christy III is a 65 y.o. male who presents for follow-up of Chronic diastolic heart failure    Problems:  HFpEF (EF 60%)   paroxysmal atrial fibrillation   CVA (stroke January 2016; episode of aphasia 10/2017 with no signs of acute stroke)   BRENDA, right occipital artery, and right superior hypophyseal artery unruptured aneurysms dx 9/2020  Carotid artery disease, <50% bilaterally in 2016  DVT right popliteal s/p IVC filter  Complex partial seizures  HTN   HLD  DM II    HPI  Mr. Christy is in clinic today for HF follow up after adding metolazone 2.5 mg daily for 3 days and then PRN weight gain.  His weight on 8/17 (LOV) was 218 pounds and today he is 215 pounds.  His home weight today is 205 pounds.  Since his last visit, he was admitted at [facility] in 9/9-11/6 for a presyncopal episode and to r/o acute CVA.  As part of his w/u, he had an MRI that revealed no acute stroke but right internal carotid artery, right occipital artery, and right superior hypophyseal artery unruptured aneurysms.  His admission was complicated by a DVT in his right popliteal and an IVC filter was placed on 9/22.  He was in rehab from 11/6-11/21. Has hospital f/u with neuro on 12/18 here at [facility].  Need records from [facility] as most of admission information taken from rehab discharge summary    Review of Systems   Constitution: Negative for decreased appetite, diaphoresis, malaise/fatigue, weight gain and weight loss.   Eyes: Negative for visual disturbance.   Cardiovascular: Positive for dyspnea on exertion and leg swelling. Negative for chest pain, claudication, irregular heartbeat, near-syncope, orthopnea, palpitations, paroxysmal nocturnal dyspnea and syncope.        Denies chest pressure   Respiratory: Positive for shortness of breath. Negative for cough, hemoptysis, sleep disturbances due to breathing and snoring.     Endocrine: Negative for cold intolerance and heat intolerance.   Hematologic/Lymphatic: Negative for bleeding problem. Does not bruise/bleed easily.   Musculoskeletal: Negative for myalgias.   Gastrointestinal: Negative for bloating, abdominal pain, anorexia, change in bowel habit, constipation, diarrhea, nausea and vomiting.   Neurological: Negative for difficulty with concentration, disturbances in coordination, excessive daytime sleepiness, dizziness, headaches, light-headedness, loss of balance, numbness and weakness.   Psychiatric/Behavioral: The patient does not have insomnia.        Allergies and current medications updated and reviewed:  Review of patient's allergies indicates:   Allergen Reactions    Cough syrup [guaifenesin] Other (See Comments)     weakness     Current Outpatient Medications   Medication Sig    acetaminophen (TYLENOL) 500 MG tablet Take 1 tablet (500 mg total) by mouth every 6 (six) hours as needed for Pain.    aspirin 81 MG Chew Take 1 tablet (81 mg total) by mouth once daily.    atorvastatin (LIPITOR) 40 MG tablet TAKE 1 TABLET BY MOUTH EVERY DAY    betamethasone dipropionate (DIPROLENE) 0.05 % cream Apply topically 2 (two) times daily.    blood sugar diagnostic (ONETOUCH VERIO) Strp Check blood glucose readings 3 times a day.    blood sugar diagnostic Strp To check BG 3 times daily, to use with insurance preferred meter    calcitRIOL (ROCALTROL) 0.25 MCG Cap Take 1 capsule (0.25 mcg total) by mouth once daily.    carvediloL (COREG) 12.5 MG tablet Take 1 tablet (12.5 mg total) by mouth 2 (two) times daily.    ciclopirox (PENLAC) 8 % Soln Apply topically nightly.    clotrimazole-betamethasone 1-0.05% (LOTRISONE) cream Apply topically 2 (two) times daily. To area of rash for 2-4 weeks    dulaglutide (TRULICITY) 0.75 mg/0.5 mL pen injector Inject 0.75 mg into the skin every 7 days.    ferrous sulfate (FEOSOL) 325 mg (65 mg iron) Tab tablet Take 1 tablet by mouth every other  "day.    fluocinonide (LIDEX) 0.05 % ointment Apply to area of rash twice daily    folic acid (FOLVITE) 400 MCG tablet Take 1,000 mcg by mouth once daily.     furosemide (LASIX) 80 MG tablet Take 1 tablet (80 mg total) by mouth 2 (two) times daily. (Patient taking differently: Take 80 mg by mouth once daily. )    glucagon, human recombinant, (GLUCAGON EMERGENCY KIT, HUMAN,) 1 mg SolR Inject intramuscularly for emergency    insulin aspart U-100 (NOVOLOG FLEXPEN U-100 INSULIN) 100 unit/mL (3 mL) InPn pen Inject 7 units w/ meals plus scale 150-200+1, 201-250+2, 251-300+3, 301-350+4, >350+5. Max daily 50 units 1 box via [employer] voucher (Patient taking differently: Inject 7 units w/ meals plus scale 150-200+1, 201-250+2, 251-300+3, 301-350+4, >350+5. Max daily 50 units 1 box via [employer] voucher PRN)    insulin detemir U-100 (LEVEMIR FLEXTOUCH U-100 INSULN) 100 unit/mL (3 mL) InPn pen Inject 8 Units into the skin every evening.    lacosamide (VIMPAT) 200 mg Tab tablet Take 1 tablet (200 mg total) by mouth every 12 (twelve) hours.    lancets Misc To check BG 3 times daily, to use with insurance preferred meter    levETIRAcetam (KEPPRA) 500 MG Tab Take 500 mg by mouth 2 (two) times daily.    levothyroxine (SYNTHROID) 200 MCG tablet Take 1 tablet (200 mcg total) by mouth before breakfast.    levothyroxine (SYNTHROID) 50 MCG tablet Take 1 tablet (50 mcg total) by mouth every 7 days. Pt will be taking only on Monday.    magnesium 30 mg Tab Take 1 tablet by mouth twice a week. Mon ,weds,fridays    multivitamin capsule Take 1 capsule by mouth every Mon, Wed, Fri.     pen needle, diabetic (BD ULTRA-FINE MINI PEN NEEDLE) 31 gauge x 3/16" Ndle To use with insulin pens 4  times daily    sevelamer carbonate (RENVELA) 800 mg Tab Take 800 mg by mouth 3 (three) times daily.    tamsulosin (FLOMAX) 0.4 mg Cap TAKE 1 CAPSULE (0.4 MG TOTAL) BY MOUTH EVERY EVENING.    ticagrelor (BRILINTA) 90 mg tablet Take 90 mg by mouth 2 " "(two) times daily.    vitamin D 1000 units Tab Take 1,000 Units by mouth every Mon, Wed, Fri.    blood-glucose meter kit To check BG 3 times daily - please provide insurance preferred meter    glucagon (GVOKE HYPOPEN 2-PACK) 1 mg/0.2 mL AtIn Inject 1 Package into the skin as needed. (Patient not taking: Reported on 2020)    lancing device Misc 1 Device by Misc.(Non-Drug; Combo Route) route 3 (three) times daily.    metOLazone (ZAROXOLYN) 2.5 MG tablet Take 1 tablet (2.5 mg total) by mouth daily as needed (weight gain more than 3 pounds/day or more than 5 pounds/week). (Patient not taking: Reported on 2020)    SAW PALMETTO ORAL Take by mouth. mon , wed, fri    valsartan (DIOVAN) 320 MG tablet Take 1 tablet (320 mg total) by mouth once daily. (Patient not taking: Reported on 2020)     No current facility-administered medications for this visit.        Objective:     Right Arm BP - Sittin/78 (20 1453)  Left Arm BP - Sittin/81 (20 1453)    BP (!) 183/81 (BP Location: Left arm, Patient Position: Sitting, BP Method: X-Large (Automatic))   Pulse 67   Ht 5' 10" (1.778 m)   Wt 97.7 kg (215 lb 6.2 oz)   SpO2 99%   BMI 30.91 kg/m²       Physical Exam   Constitutional: He is oriented to person, place, and time. Vital signs are normal. He appears well-developed and well-nourished. He is active. No distress.   HENT:   Head: Normocephalic and atraumatic.   Eyes: Conjunctivae and lids are normal. No scleral icterus.   Neck: Neck supple. Normal carotid pulses, no hepatojugular reflux and no JVD present. Carotid bruit is not present.   Cardiovascular: Normal rate, S1 normal, S2 normal and intact distal pulses. An irregularly irregular rhythm present. PMI is not displaced. Exam reveals no gallop and no friction rub.   No murmur heard.  Pulses:       Carotid pulses are 2+ on the right side and 2+ on the left side.       Radial pulses are 2+ on the right side and 2+ on the left side.    "     Dorsalis pedis pulses are 2+ on the right side and 2+ on the left side.        Posterior tibial pulses are 1+ on the right side and 1+ on the left side.   Pulmonary/Chest: Effort normal and breath sounds normal. No respiratory distress. He has no decreased breath sounds. He has no wheezes. He has no rhonchi. He has no rales. He exhibits no tenderness.   Abdominal: Soft. Normal appearance and bowel sounds are normal. He exhibits no distension, no fluid wave, no abdominal bruit, no ascites and no pulsatile midline mass. There is no hepatosplenomegaly. There is no abdominal tenderness.   Musculoskeletal:         General: Edema (BLE +2 pitting edema to knees; trace to +1 pitting thighs; +1 presacral edema) present.   Neurological: He is alert and oriented to person, place, and time. Gait normal.   Skin: Skin is warm, dry and intact. No rash noted. He is not diaphoretic. Nails show no clubbing.   Psychiatric: He has a normal mood and affect. His speech is normal and behavior is normal. Judgment and thought content normal. Cognition and memory are normal.   Nursing note and vitals reviewed.      Chemistry        Component Value Date/Time     11/25/2020 1305    K 3.9 11/25/2020 1305     11/25/2020 1305    CO2 26 11/25/2020 1305    BUN 65 (H) 11/25/2020 1305    CREATININE 2.9 (H) 11/25/2020 1305     (H) 11/25/2020 1305        Component Value Date/Time    CALCIUM 8.6 (L) 11/25/2020 1305    ALKPHOS 82 11/25/2020 1305    AST 23 11/25/2020 1305    ALT 13 11/25/2020 1305    BILITOT 0.6 11/25/2020 1305    ESTGFRAFRICA 24.7 (A) 11/25/2020 1305    EGFRNONAA 21.4 (A) 11/25/2020 1305        Lab Results   Component Value Date    HGBA1C 5.7 (H) 11/25/2020     Recent Labs   Lab 06/27/18  2021  10/03/19  1045  01/31/20  1442  06/25/20  1005  09/02/20  1146   WBC 11.82   < > 8.67   < >  --   --   --    < > 8.46   Hemoglobin 10.2 L   < > 11.6 L   < >  --   --   --    < > 11.1 L   Hematocrit 28.3 L   < > 32.7 L   < >   --   --   --    < > 31.6 L   MCV 77 L   < > 79 L   < >  --   --   --    < > 81 L   Platelets 247   < > 253   < >  --   --   --    < > 315    H  --  426 H  --  479 H  --   --   --   --    TSH  --    < >  --    < >  --    < > 1.897  --   --    Cholesterol  --    < >  --    < >  --   --  118 L  --   --    HDL  --    < >  --    < >  --   --  39 L  --   --    LDL Cholesterol  --    < >  --    < >  --   --  55.6 L  --   --    Triglycerides  --    < >  --    < >  --   --  117  --   --    HDL/Cholesterol Ratio  --    < >  --    < >  --   --  33.1  --   --     < > = values in this interval not displayed.       Recent Labs   Lab 03/23/18  1317 04/26/18  2355 05/31/18  1248   INR 1.0 1.0 1.0        Test(s) Reviewed  I have reviewed the following in detail:  [] Stress test   [] Angiography   [x] Echocardiogram   [x] Labs   [] Other:         Assessment/Plan:   1. Chronic diastolic heart failure  NYHA class III sx, SOB walking 100 ft or less.  Hypervolemic on exam. Will have him take metolazone for the next 3 days, BMP in 1 week, and requested he call on Friday with his weight and how his sx are.  His home weight today is 205 and clinic weight is 215 pounds.    - Basic metabolic panel; Future    2. Essential hypertension  BP not at goal <130/80. Continue current regimen.  Re-evaluate following diuresis.       3. Bilateral carotid artery stenosis      4. Hypercholesterolemia  LDL at goal <70. No changes      5. Chronic atrial fibrillation  Irregularly irregular rhythm.  Had bleeding issues while in patient in September and anticoagulation therapy had to be stopped d/t large clots in his bladder, anemia, and hypotension.  He is tolerating DAPT but that is not covering him for the afib.  Will have to d/w neuro regarding re-initiation of anticoagulation therapy given aneurysms and with PCP regarding whether GI/ would be better for referral regarding the bleeding he had during his admission.  Will discuss possibility of  jairo with short term anticoagulation therapy with Dr. Camacho.     6. Postsurgical hypothyroidism  TSH wnl in June. Defer to PCP    7. Type 2 diabetes mellitus with stage 4 chronic kidney disease, with long-term current use of insulin  Lab Results   Component Value Date    HGBA1C 5.7 (H) 11/25/2020     A1C at goal <7. F/U with PCP as planned      8. Stage 3 chronic kidney disease, unspecified whether stage 3a or 3b CKD      9. Obesity (BMI 30.0-34.9)  BMI 30.9 Encouraged increased CV exercise to 30 minutes a day for 5 days a week.       10. History of CVA (cerebrovascular accident)      11. Complex partial seizures evolving to generalized tonic-clonic seizures      12. History of DVT (deep vein thrombosis)- right popliteal 9/2020      13. S/P IVC filter 9/22/20 at [facility]      14. Cerebral aneurysm without rupture      A copy of this note will be forwarded to Dr. Jasmine and Dr. Camacho    The patient was discussed and examined by Dr. Puckett      Follow up in about 2 weeks (around 12/21/2020).

## 2020-12-07 NOTE — PATIENT INSTRUCTIONS
Take the metolazone 30 minutes before the furosemide for the next 3 days.    Call me on Friday to let me know your weight and how your breathing is.

## 2020-12-11 NOTE — TELEPHONE ENCOUNTER
----- Message from Mary Villanueva sent at 12/11/2020 10:09 AM CST -----  Regarding: Callback  Rut pt wife [phone] says Wendy Sosa wanted her to call today with the pt weight and breathing. She gave him the Metolazone 2.5 mg and he says his breathing felt better yesterday, than today.  LOV 12/7/20 Np. Heytens    Thanks

## 2020-12-11 NOTE — TELEPHONE ENCOUNTER
Advised to hold Metolazone and wait to start Valsartan until repeat lab work r/s to be done sooner for this Monday as per DANGELO Sosa NP. Lab scheduled.

## 2020-12-11 NOTE — TELEPHONE ENCOUNTER
Wife callling to update on patient status from visit Monday.  Wt today 202lb,   12/8 208lb at home.  12/7 215lb in office   Reports swelling reduced.  Started Metolazone Wednesday. Difficulty with walking short distances like walking 20 feet feel needs to rest to catch his breath or shortness of breath. Denies palpitations. Yesterday was able to walk further.   BP today 144/90 HR 78. After meds- 158/87 hr66.   Wife also questioned if provider wanted her to have pt resume valsartan- states this was discussed at visit

## 2020-12-15 NOTE — TELEPHONE ENCOUNTER
Call placed to patient for update. Spoke with patient and wife. Patient   wt today 195lb.   12/11- 202lb  12/8- 208lb  12/7- 215lb  Denied edema or shortness of breath. Reports is urinating per his normal. BP today 138/65 HR 68 after meds. Wife reports some hoarsness in patient voice but denied discomfort. Patient feels a little tired after meds but states he is fine. Routed to provider for review.

## 2020-12-15 NOTE — TELEPHONE ENCOUNTER
His creatinine is too high for me to feel comfortable with re-starting his valsartan.  If he is running in the 120-130s, then we do not need to adjust his blood pressure medications.  Please have him bring his log to his follow up.  I would continue the increased dose of furosemide for now.  Thanks!  Shanti

## 2020-12-16 NOTE — TELEPHONE ENCOUNTER
Spoke with pt wife, Rut.   This morning, when pt got up, he felt weak. bp was 171/76-61. Pt had not taken meds at the time of bp check.     Pt is losing weight and urinating a lot during night. Blood sugar was 120 this morning.    Pt states he is feeling better now.     Denies chest pain, no numbness, nausea.     Today is the only day he has felt this way.     Pt has appt with endocrinology tomorrow. Pt will decide if he needs to be seen by PCP.    Explained that if pt continues to feel worse, he should go to the ER or urgent care. Rut verbalized understanding.

## 2020-12-16 NOTE — TELEPHONE ENCOUNTER
----- Message from Elke Riley sent at 12/16/2020  3:09 PM CST -----  Regarding: JAELYN f/u  Contact: pt's wife  Pt's wife  would like to be called back regarding  f/u after hosp discharge  JAELYN   Also pt is on reneval 800mg from Rehab Unit   Pt's wife Mrs. Christy    can be reached  [phone]

## 2020-12-16 NOTE — TELEPHONE ENCOUNTER
----- Message from Dori Yusuf sent at 12/16/2020  8:53 AM CST -----  Regarding: Requesting a callback ASAP  Contact: Rut  Type:  Needs Medical Advice    Who Called: Pt wife Rut  Symptoms (please be specific): feeling weak  How long has patient had these symptoms:  This morning  Pharmacy name and phone #:  [facility] [address]  Would the patient rather a call back or a response via [facility]? Call back  Best Call Back Number: [phone] or [phone]  Additional Information: Requesting a callback from office says pt woke up and was feeling weak says pressure and okay and blood pressure was at 61 and does not know what to do

## 2020-12-17 NOTE — TELEPHONE ENCOUNTER
Please check in with patient / wife about his symptoms. If his BP is continuing to run higher, or if they are otherwise concerned, please help to schedule for urgent visit (with me, Vanessa Hylton NP, or other provider if needed).

## 2020-12-17 NOTE — TELEPHONE ENCOUNTER
Notified pt's wife Rut of appt cx for nuha w/ IAlexei d/t sched appt today w/another Endocrinology provider.

## 2020-12-17 NOTE — TELEPHONE ENCOUNTER
Spoke to pt's wife and she said the vs and bs have improved 164/77-71, maintained glu 120. Pt is scheduled to see the endocrinologist in office today and virtual visit is set to follow w/ Neurology. Pt's wife declined need for appt w/ PCP or NP at this time.

## 2020-12-17 NOTE — PROGRESS NOTES
Please inform patient's wife:  Kidney filtration is around 23, immediately there is no indication for any changes to his medical treatment.   Phosphorus level is normal. She had contacted a couple of days requesting refill on phosphorus binder medicine that was prescribed to him from [facility] as he was admitted there apparently for several days.  Looking at these labs, his phosphorus level is within normal limits so he does not need to take the binder at present (sevelamer or renvela).  He should follow low phosphorus and low potassium diet and repeat renal panel along with CBC, UA, UPC, PTH, vit D couple of days prior to clinic visit in January 2021.   Thanks.

## 2020-12-18 NOTE — ASSESSMENT & PLAN NOTE
63yo M with hx of CVA in 1/16 and seizures x 11/16   - some prior episodes c/w glucose level alterations  - seen in ED with seizure on 11/28/18: low AED levels     - no seizures x 12/2018   - ?? sz on 11/2020    Current AEDs: compliant   - Lacosamide 100mg bid  - Levetiracetam 500mg bid - recently changed     Plan:  - continue LCS 200mg bid  - increase LEV to 1500mg bid - written instructions given    Seizure log  Seizure precautions/restrictions     Plan of care was discussed in detail with patient and his wife

## 2020-12-18 NOTE — TELEPHONE ENCOUNTER
MD MANJULA Bates Staff             Please inform patient's wife:   Kidney filtration is around 23, immediately there is no indication for any changes to his medical treatment.   Phosphorus level is normal. She had contacted a couple of days requesting refill on phosphorus binder medicine that was prescribed to him from [facility] as he was admitted there apparently for several days.   Looking at these labs, his phosphorus level is within normal limits so he does not need to take the binder at present (sevelamer or renvela).   He should follow low phosphorus and low potassium diet and repeat renal panel along with CBC, UA, UPC, PTH, vit D couple of days prior to clinic visit in January 2021.   Thanks.      Spoke with Mrs Christy (pt's wife) relayed all findings per Dr Gold., verbalized understanding. Labs schedules on Jan 4,2020 per wife request at [facility] prior to clinic visit on Jan 7,2020

## 2020-12-18 NOTE — PROGRESS NOTES
[facility]:   FOLLOW UP VISIT    The patient location is: home  The chief complaint leading to consultation is: follow up of seizures  Visit type: audio only  Time with patient: 15 minutes  minutes of total time spent on the encounter, which includes face to face time and non-face to face time preparing to see the patient (eg, review of tests), Obtaining and/or reviewing separately obtained history, Documenting clinical information in the electronic or other health record, Independently interpreting results (not separately reported) and communicating results to the patient/family/caregiver, or Care coordination (not separately reported).   Each patient to whom he or she provides medical services by telemedicine is:  (1) informed of the relationship between the physician and patient and the respective role of any other health care provider with respect to management of the patient; and (2) notified that he or she may decline to receive medical services by telemedicine and may withdraw from such care at any time.     Name: Alfa Christy III  MRN:[id number]   CSN: [id number]    Date of service: 12/18/2020  Last clinic visit: 5/21/2020  Last clinic visit: 9/12/19  Last clinic visit: 2/20/19 seen by Dr.Van Portillo (error in appt)  Last clinic visit: 12/10/18  Last clinic visit: 8/20/18  Priorclinic visit: 5/15/18  Prior clinic visit: 11/15/17  1st clinic visit: 12/27/16    Age:65 y.o.   Gender:male     The patient is here today with his wife   History obtained from the patient     CHIEF COMPLAINT:  - follow up of seizures    INTERVAL HISTORY (Since last visit):    This is a 65 y.o.  year old male who presents for follow up of seizures  - hx of seizures s/p CVA 2016    Seizure log since last visit:   - 'witnessed sz' on 11/9/2020 ??wife is unsure (no records available for review)  - last sz prior was in 12/2018     Current AEDs:   - Levetiracetam 500mg bid - changed by Ananth from prior dose of 1500mg bid   - Lacosamide  200mg bid    Review of notes in epic from Rehab services, 11/6/2020:  - admitted to [facility] in 9/9/2020 with pre-syncope   Due to his symptoms and extensive history, stroke workup was initiated. He was not found to have suffered from an acute stroke, but he was incidentally found to have right internal carotid artery, right occipital artery, and right superior hypophyseal artery unruptured aneurysms. Neurosurgery was consulted and he underwent stenting on 9/16. His hospital course has been complicated by AMS and a right popliteal venous thrombosis. An IVC filter was placed on 9/22. He also developed gross hematuria due to traumatic Starr placement. A cystoscopy was performed on 9/21 which showed a large clot within the bladder. He has had to be transfused due to symptomatic anemia. He did have a TURP with angioembolization of the prostate on 10/19. He was hypotensive with his anemia and Lasix 80mg bid was held. He did become volume overloaded and was started on IV diuretics. He has since been transitioned to oral diuretics and tolerating this well.      Patient was deemed medically stable and transferred to [facility] to participate in acute inpatient rehabilitation on 11/6/2020    Notes from last clinic visit, 5/21/2020:  Seizure log since last visit: none     Current AEDs: compliant, no side-effects   - Levetiracetam 1500mg bid  - Lacosamide 100mg bid    Notes from last clinic visit, 9/12/19:  - patient and wife states that  they did not request change of provider and was puzzled why he was seen by  at last visit (2/20/19)    Seizure log since last visit: none    Current AEDs: compliant, no side-effects  - Levetiracetam 1500mg bid  - Lacosamide 100mg bid    Results for ROME LR III (MRN [id number]) as of 9/10/2019 16:43   Ref. Range 8/20/2018 11:00 11/28/2018 22:21 12/10/2018 16:40 12/28/2018 15:28   Lacosamide Latest Ref Range: 1.0 - 10.0 mcg/mL 5.2 2.9 3.6 10.9 (H)    Levetiracetam Lvl Latest Ref Range: 3.0 - 60.0 ug/mL 78.5 (H) 69.0 (H) 64.9 (H) 68.0 (H)     Notes from last clinic visit, 12/10/18:  - no hx of non-compliance with AEDs  - however, levels low at the time of ED visit  - no other identifiable cause for breakthrough sz    AED levels when stable without seizures:  Results for ROME LR III (MRN [id number]) as of 12/9/2018 15:35   Ref. Range 8/20/2018 11:00   Lacosamide Latest Ref Range: 1.0 - 10.0 mcg/mL 5.2   Levetiracetam Lvl Latest Ref Range: 3.0 - 60.0 ug/mL 78.5 (H)     AED levels at the time of ED visit with seizure:  Results for ROME LR III (MRN [id number]) as of 12/9/2018 15:35   Ref. Range 11/28/2018 22:21   Lacosamide Latest Ref Range: 1.0 - 10.0 mcg/mL 2.9   Levetiracetam Lvl Latest Ref Range: 3.0 - 60.0 ug/mL 69.0 (H)     Notes from ED visit on 11/28/18:  63-year-old male with multiple comorbidities presented with his wife for evaluation.  In triage the patient was aphasic, therefore, a code stroke was called.  Per the triage nurse, the patient was able to smile and transfer from the wheelchair onto the bed once placed in the room.  At the time of my assessment the patient's room he was unable to follow commands and remained nonverbal only making a slight grunting noise.  Initially his wife had left to move his car and was not present to provide any additional history.  She subsequently returned and was able to state he has had 3 similar episodes in the past which were associated with his seizures.  Upon review of his medical records he has been diagnosed with complex partial seizures which have evolved to generalized tonic-clonic seizures.  CT of the head showed no evidence of acute process.  Stroke Neurology was consulted but they were unable to examine the patient secondary to technical difficulties with the computer at home.  Based on presentation in history it was felt by Dr. Vega of that his symptoms were likely secondary to his seizure.   This was supported by the patient's gradual improvement throughout his emergency department stay.  He is currently alert and able to follow commands.  He was able to perform finger-to-nose, heel-to-shin and cranial nerves and strength could be assessed and normal. He is slightly groggy but I do believe this is secondary to the Ativan he received upon arrival.  We will observe the patient further but likely will be able to discharge home.     Of note, labs are significant only for a worsening creatinine from 1.8-2.2.     2:16 AM  At this time the patient is able to answer all questions appropriately.  He was able to ambulate down the lanza without requiring any assistance.  Results of his lab work have been discussed with him as well as the results of the CT.  Have instructed both him and his wife to have ensure that he follows up with his neurologist for further management of his seizures..    Notes from last clinic visit, 8/20/18:  - no hx of seizures since last visit; last sz was on 4/30/18  - compliant with AEDs: LCS 100mg bid and LEV 1500mg bid  - no new complaints; doing well    Notes from last clinic visit, 5/15/18:  - started on LAC 100mg bid 4/30/18  - also on LEV 1500mg bid  - no further episodes since 4/30  - no other/new complaints; doing well    Admitted during 4/28-30/18; notes from :  HPI:   61y/o M with pmhx stroke, seizure, afib (on eliquis), DM2, HLD, HTN presents w/ aphasia. Patient was last normal at 10:30 pm.  Per his wife he was doing well then started complaining of feeling dizzy after taking a shower. Denied fever, pains, vomiting.  She noticed he then started having trouble speaking and say words that did not make sense, so came to ED.  Wife denies any B/B incontinence, unilateral weakness, syncope, or tongue-biting.  Patient had similar presentation end of March; at that time TPA was deferred and subsequent neuroimaging negative for CVA. He does have history of seizures on keppra, and his  seizures are atypical. Says wasn't having seizures until recently. Patient has had a previous stroke 1/2016 w/ residual L vision deficits only. EEG then abnormal w/ 1/18 CVA as likely foci for seizures. Currently on eliquis for a-fib. Further hisotry limited by patient's mental status.       In ED, [facility] neurology consulted for concern of stroke d/t aphasia.  Sxs likely 2/2 postictal period of complex seizure.   (baseline 100s-200s). CXR shows CM with findings suggestive of pulmonary edema.  Gvien one time dose of lasix 80 mg IV.  EKG shows afib HR 69.  CTH with no significant change from previous. Remote left occipital lobe and left thalamic infarctions.       Hospital Course:   Pt admitted due to aphasia.  Similar presentation/admissions 11/2017 and 03/2018 (medication noncompliance an issue).  Vascular neurology consulted in ED.  CTH no acute changes.  No acute stroke suspected.  Keppra level ordered.  EEG ordered: abnormal extended (1 hour and 29 minutes) EEG due to rare left sided epileptiform activity seen, suggestive of underlying epileptiform activity; continuous left sided slowing seen, suggestive of underlying structural lesion.  Neurology consulted and recommended slow titration on lacosamide--50 mg qd x 1 week, increase lacosamide to 100 mg for 2nd week if pt tolerates.  Continue levetiracetam 1500 mg bid, level 69.4.  Follow up with Dr. Santiago in 2 weeks.  Pt also with acute CHF exacerbation (->395).  Pt treated with IV lasix bid and wt decreased 2 kg.  Day 3-4 transitioned back to home dosing furosemide 40 mg bid.  Seizure precautions at discharge.      ED visit, 3/23/18: Notes from :  HPI:   Mr. Christy is a 61 yo male with a PMHx of HTN, HLD, DM2, PAF on Eliquis, HFpEF, CKDIII, thyroid cancer s/p surgery and radiation (2009), previous L sided CVA (2016) resulting in R homonymous hemianopsia, and complex partial seizures presenting to the ED with wife at bedside c/o confusion and  "difficulty speaking. Wife at bedside to provide the history. She states this morning at ~8am patient was getting dressed and was c/o minor dizziness. She noted he had not taken his keppra from the previous night, in addition to all of his "night time medications." Pt was able to take all morning medications. At around 10am, pt was sitting down to "go over his finances and pay bills", she noted something was "off". She asked him his name, which he could provide for her but he could not remember the word for "shoes" when she asked him what he was wearing on his feet. She did not witness convulsions, jerking movements, LOC, tongue biting, or incontinence. Wife states patient was able to ambulate and continue to go about "normal" activities. Wife brought patient to the ED due to concern for aphasia. Of note, wife reports pt is non-compliant with hydralazine and ASA. Per wife, this was a similar presentation to his last admission for asphasia suspected to be secondary to seizures.      In the ED, HDS; noted to be hypertensive to 190s/90s. Stroke code was called; CTH and MRI without evidence of acute infarct. Vascular neurology assessed; low suspicion for acute CVA. Remainder of labs and imaging were unremarkable.     Hospital Course:   Pt admitted to observation for aphasia in setting of hx of CVA and seizures. CTH and MRI negative for acute infarct. Labs entirely unremarkable (TSH and lactate WNL). No s/s infection; afebrile without leukocytosis (CXR and UA without infection). Neurology consulted and suspects breakthrough seizure with prolonged post-ictus. EEG shows slowing consistent with hx of L sided CVA; no epileptic activity. Pt discharged home to follow up with epilepsy as outpatient. Educated on importance of compliance with home medications.     A/P at last visit:  63yo M with hx of CVA in 1/16 and seizures x 11/16 - prior episodes c/w glucose level alterations  Recent episode suggestive of sz - likely related " to hyperglycemia     - check Levetiracetam levels  - continue same dose of LEV for now     Monitor glucose levels closely  Return in 6 months or earlier prn    Notes from last clinic visit, 11/15/17:  No hx of non-compliance    Recent admission: patient states that he had gone to exercise that day, followed by having donuts but did not miss any dose of meds.  No other triggers.  Possibly seizure? But no clinical activity seen by family except for word-finding difficulty/confusion.  Last recognized seizure was possibly Apr 2017    [place] [facility]/17:  Neurology notes, 10/5/17:  [facility] [place] due to wife coming home and finding  confused with agitation, word-finding difficulty, and imbalance.  Noted no weakness.  States that he was last seen normal at 7 pm, she went out to run an errand and returned at 8 pm.  Otherwise notes no signs of head trauma, no recent weakness, no medication changes.  He takes levetiracetam 750 mg po bid (started on 1500 mg po bid right after 11/2016 seizure) and is compliant.  Recommendation:  Breakthrough seizure in setting of recent respiratory infection and financial stressors, wife endorses compliance and patient not yet back to baseline.  Recommend load 1g LEV iv followed by increase to 1500mg bid.  R-EEG, 10/7/17:    IMPRESSION:  This is an abnormal EEG during wakefulness, drowsiness and sleep.    Nearly continuous irregular left hemisphere slowing was noted.  The EKG revealed an irregular rhythm.    Hospital course (per Hospital Medicine notes, 10/11/17): in the ED on presentation, the patient was in keeping of abnormally elevated blood pressure in setting of acute onset aphasia. Workup for an acute stroke included a CT head with no signs of acute stroke. MRI and MRA brain were negative for acute stroke. Vascular neurology was consulted and ruled out stroke and recommended admission to hospital medicine with improved BP control and a consult to general neurology.  During admission to hospital medicine, the patient notably developed right sided jerking movement consistent with seizure. The patient was loaded with IV keppra 1500mg followed by oral twice daily dosing per neurology. The patient was observed on the hospital medicine for aphasia resolution that was gradual but incomplete per patient's wife. The patient had an EEG given personality changes and frequent self-repetition that was negative. PT/OT evaluated the patient and recommended SNF placement that occurred appropriately on 10/11/2017.    Any other relevant history since last visit:   - none    SEIZURE HISTORY:  Notes from last (16) visit:   Information from H&P on 16:  60yo M with multiple medical co-morbidities, presented to an outside facility on 16 with left-sided HA and dizziness, associated with expressive aphasia. [place] [facility] [place]TC seizure with right-sided eye deviation, lasting ~ 4 min. Blood glucose was 200 at the time and seizure resolved without any intervention. No hx of associated tongue bite or b/b incontinence. Reports post-ictal confusion but no focal deficits.   CTA head and neck at the time showed no acute ischemic changes.  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  Patient was evaluated by Neurology service while in hospital - advised Levetiracetam 1000mg  q 12h  INTERVAL HISTORY:  since discharge from hospital, no hx of any seizures - compliant with Levetiracteam     Previous evaluation:   EE) R-EEG, 2016:   IMPRESSION: Normal awake and asleep EEG.  2) R-EEG, 16  IMPRESSION:   This is an abnormal awake and asleep EEG due to mild generalized slowing seen, suggestive of mild diffuse or multifocal cerebral dysfunction.  No epileptiform activity or electrographic seizures were seen    MRI Brain:  Results for orders placed or performed during the hospital encounter of 10/04/17   MRI Brain Without  Contrast    Addendum: 10/5/2017          Electronically signed by: DELL VITALE MD  Date:     10/05/17  Time:    01:19       Narrative    MRI BRAIN, MRA HEAD, MRA NECK    CLINICAL INDICATION: 62 year old M with stroke, severe expressive aphasia, LLE weakness.     TECHNIQUE: Multiplanar, multisequence images were obtained of the brain. Three-dimensional time-of-flight technique was used in assessment of the neck and intracranial vasculature.    COMPARISON: CT head 10/4/2017.    FINDINGS:    MRI BRAIN:  The ventricles are normal in size for age, without evidence of hydrocephalus.  There is suggestion of subtle diffuse cortical diffusion restriction in the supratentorial brain.    There is a stable region of encephalomalacia within the left occipital lobe. There is relatively isointense diffusion signal in this region with corresponding hyperintensity on ADC map and T2/FLAIR hyperintensity, compatible with remote infarct. No areas of restricted diffusion signal are seen to suggest new/acute infarct. No areas of susceptibility to suggest hemorrhage. A punctate focus of T1 hyperintensity in the left aspect of the anterior interhemispheric fissure likely represents a dural calcification. Supratentorial patchy T2/FLAIR hyperintensities compatible with chronic microvascular ischemic disease.    No extra-axial blood or fluid collections.    The T2 skull base flow voids are preserved. Bone marrow signal intensity is unremarkable.    MRA HEAD AND NECK:   Common and internal carotid arteries are normal in caliber.  No significant stenosis at either carotid bifurcation. There is a small saccular aneurysm with a 0.4 cm neck arising from the supraclinoid right ICA.    Vertebral arteries are normal in caliber. The vertebrobasilar system appears within normal limits.     The ACAs, MCAs and PCAs demonstrate no evidence of  high-grade stenosis, focal occlusion or intracranial aneurysm.    Impression    No evidence of acute or major  vascular distribution infarct or intracranial hemorrhage.    Questionable subtle cortical diffusion restriction throughout the supratentorial brain.  Short-term followup is suggested.    Remote left PCA distribution infarct within the left occipital lobe.    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  ___________________________________________________  This report has been flagged in the [facility] medical record.  ______________________________________     Electronically signed by resident: EFREM PENALOZA MD  Date:     10/05/17  Time:    00:24            As the supervising and teaching physician, I personally reviewed the images and resident's interpretation and I agree with the findings.          Electronically signed by: DELL VITALE MD  Date:     10/05/17  Time:    01:13    MRA Brain without contrast    Narrative    MRI BRAIN, MRA HEAD, MRA NECK    CLINICAL INDICATION: 62 year old M with stroke, severe expressive aphasia, LLE weakness.     TECHNIQUE: Multiplanar, multisequence images were obtained of the brain. Three-dimensional time-of-flight technique was used in assessment of the neck and intracranial vasculature.    COMPARISON: CT head 10/4/2017.    FINDINGS:    MRI BRAIN:  The ventricles are normal in size for age, without evidence of hydrocephalus.  There is suggestion of subtle diffuse cortical diffusion restriction in the supratentorial brain.    There is a stable region of encephalomalacia within the left occipital lobe. There is relatively isointense diffusion signal in this region with corresponding hyperintensity on ADC map and T2/FLAIR hyperintensity, compatible with remote infarct. No areas of restricted diffusion signal are seen to suggest new/acute infarct. No areas of susceptibility to suggest hemorrhage. A punctate focus of T1 hyperintensity in the left aspect of the anterior interhemispheric fissure likely represents a dural calcification. Supratentorial patchy T2/FLAIR hyperintensities compatible  with chronic microvascular ischemic disease.    No extra-axial blood or fluid collections.    The T2 skull base flow voids are preserved. Bone marrow signal intensity is unremarkable.    MRA HEAD AND NECK:   Common and internal carotid arteries are normal in caliber.  No significant stenosis at either carotid bifurcation. There is a small saccular aneurysm with a 0.4 cm neck arising from the supraclinoid right ICA.    Vertebral arteries are normal in caliber. The vertebrobasilar system appears within normal limits.     The ACAs, MCAs and PCAs demonstrate no evidence of  high-grade stenosis, focal occlusion or intracranial aneurysm.    Impression    No evidence of acute or major vascular distribution infarct or intracranial hemorrhage.    Questionable subtle cortical diffusion restriction throughout the supratentorial brain.  Short-term followup is suggested.    Remote left PCA distribution infarct within the left occipital lobe.    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  ___________________________________________________  This report has been flagged in the Epic medical record  ______________________________________     Electronically signed by resident: EFREM PENALOZA MD  Date:     10/05/17  Time:    00:24            As the supervising and teaching physician, I personally reviewed the images and resident's interpretation and I agree with the findings.          Electronically signed by: DELL VITALE MD  Date:     10/05/17  Time:    01:13         Electronically signed by: DELL VITALE MD  Date:     10/05/17  Time:    01:19    CT Head Without Contrast    Narrative    CT HEAD WITHOUT CONTRAST    CLINICAL INDICATION: 62 year old M with possible stroke.    TECHNIQUE: Axial CT images obtained throughout the region of the head without the use of intravenous contrast. Axial, sagittal and coronal reconstructions were performed.    COMPARISON: MRI brain 11/6/2016, CT stroke multiphase 11/5/2016, CT head  11/5/2016.    FINDINGS:  The ventricles are stable. The brain appears unchanged.  The basal cisterns are patent.  There is a stable region of encephalomalacia within the left occipital lobe, compatible with remote left PCA infarct. No evidence of acute major vascular distribution infarct or intracranial hemorrhage. Patchy hypoattenuation within the supratentorial white matter is compatible with chronic microvascular ischemic change.     No new extra-axial blood or fluid collections.    The cranium appears intact.  There is nonspecific calcifications in the subcutaneous tissues of the head.     Mastoid air cells and paranasal sinuses are essentially clear.  The orbits and intraorbital contents are unremarkable.    Impression       No evidence of acute major vascular distribution infarct or intracranial hemorrhage.     Stable, remote infarct in the left occipital lobe.    Chronic microvascular ischemic disease.        ______________________________________     Electronically signed by resident: EFREM PENALOZA MD  Date:     10/04/17  Time:    21:26            As the supervising and teaching physician, I personally reviewed the images and resident's interpretation and I agree with the findings.          Electronically signed by: DELL VITALE MD  Date:     10/04/17  Time:    22:05    Results for orders placed or performed during the hospital encounter of 11/05/16   MRI Brain W WO Contrast    Narrative    Comparison: CT 11/5/2016, MRI 6/11/2016    Clinical history: Seizure    Technique:    Multiplanar multi-sequence MRI images of the brain were obtained pre-and post the administration of IV Gadavist contrast.        Findings:    Examination is limited secondary to patient motion, most significantly involving the flair axial and flair coronal sequences, as well as the postcontrast sequences.    There is encephalomalacia within the left PCA territory consistent with remote infarction.  Minimal postcontrast enhancement is noted  within this region.  There may be a few punctate foci of T2/flair signal abnormality within the periventricular white matter, may reflect chronic microvascular ischemic change or represent artifact as there is extensive motion on the FLAIR axial images.  Additionally, there is high flair signal material involving the basal cisterns about the brainstem, not confirmed on other pulse sequences, suggesting that this is artifactual related to poor CSF signal saturation rather than proteinaceous or hemorrhagic material.  There is no evidence of intracranial mass, or acute infarction.  The ventricular system is within normal limits of size for age and shows no evidence of hydrocephalus.  There are no significant extra-axial or extracranial abnormalities detected.  The bilateral mesial temporal lobes are symmetric without abnormal signal, sclerosis, or enhancement.    The globes, orbits, pituitary gland, pineal gland and craniocervical junction are normal in configuration. The major vascular flow voids are patent.  Please see CTA performed earlier today for details of the intracranial vasculature.    Impression    Motion limited examination.    On the flair images, high attenuating material about the brainstem/basal cisterns is felt to represent artifact given extensive patient motion likely the result of poor saturation of the CSF signal in the region.  Of note, there is no associated enhancement or meningeal enhancement to suggest that this reflects proteinaceous material of infectious nature.  Blood products felt less likely.  If this finding would correlate with current patient symptomatology, consider either repeating the flair image once patient is better able to tolerate the examination, or correlate this finding with lumbar puncture.    No evidence of acute infarct.  Remote PCA territory infarct with mild enhancement.    No abnormal temporal lobe enhancement or focal focus to suggest seizure  nidus.            Electronically signed by: NERY LO MD  Date:     11/06/16  Time:    01:08       Results for orders placed or performed during the hospital encounter of 10/04/17   MRI Brain Without Contrast    Addendum: 10/5/2017          Electronically signed by: DELL VITALE MD  Date:     10/05/17  Time:    01:19       Narrative    MRI BRAIN, MRA HEAD, MRA NECK    CLINICAL INDICATION: 62 year old M with stroke, severe expressive aphasia, LLE weakness.     TECHNIQUE: Multiplanar, multisequence images were obtained of the brain. Three-dimensional time-of-flight technique was used in assessment of the neck and intracranial vasculature.    COMPARISON: CT head 10/4/2017.    FINDINGS:    MRI BRAIN:  The ventricles are normal in size for age, without evidence of hydrocephalus.  There is suggestion of subtle diffuse cortical diffusion restriction in the supratentorial brain.    There is a stable region of encephalomalacia within the left occipital lobe. There is relatively isointense diffusion signal in this region with corresponding hyperintensity on ADC map and T2/FLAIR hyperintensity, compatible with remote infarct. No areas of restricted diffusion signal are seen to suggest new/acute infarct. No areas of susceptibility to suggest hemorrhage. A punctate focus of T1 hyperintensity in the left aspect of the anterior interhemispheric fissure likely represents a dural calcification. Supratentorial patchy T2/FLAIR hyperintensities compatible with chronic microvascular ischemic disease.    No extra-axial blood or fluid collections.    The T2 skull base flow voids are preserved. Bone marrow signal intensity is unremarkable.    MRA HEAD AND NECK:   Common and internal carotid arteries are normal in caliber.  No significant stenosis at either carotid bifurcation. There is a small saccular aneurysm with a 0.4 cm neck arising from the supraclinoid right ICA.    Vertebral arteries are normal in caliber. The vertebrobasilar  system appears within normal limits.     The ACAs, MCAs and PCAs demonstrate no evidence of  high-grade stenosis, focal occlusion or intracranial aneurysm.    Impression    No evidence of acute or major vascular distribution infarct or intracranial hemorrhage.    Questionable subtle cortical diffusion restriction throughout the supratentorial brain.  Short-term followup is suggested.    Remote left PCA distribution infarct within the left occipital lobe.    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  ___________________________________________________  This report has been flagged in the [facility] medical record.  ______________________________________     Electronically signed by resident: EFREM PENALOZA MD  Date:     10/05/17  Time:    00:24            As the supervising and teaching physician, I personally reviewed the images and resident's interpretation and I agree with the findings.          Electronically signed by: DELL VITALE MD  Date:     10/05/17  Time:    01:13    MRA Brain without contrast    Narrative    MRI BRAIN, MRA HEAD, MRA NECK    CLINICAL INDICATION: 62 year old M with stroke, severe expressive aphasia, LLE weakness.     TECHNIQUE: Multiplanar, multisequence images were obtained of the brain. Three-dimensional time-of-flight technique was used in assessment of the neck and intracranial vasculature.    COMPARISON: CT head 10/4/2017.    FINDINGS:    MRI BRAIN:  The ventricles are normal in size for age, without evidence of hydrocephalus.  There is suggestion of subtle diffuse cortical diffusion restriction in the supratentorial brain.    There is a stable region of encephalomalacia within the left occipital lobe. There is relatively isointense diffusion signal in this region with corresponding hyperintensity on ADC map and T2/FLAIR hyperintensity, compatible with remote infarct. No areas of restricted diffusion signal are seen to suggest new/acute infarct. No areas of susceptibility to suggest  hemorrhage. A punctate focus of T1 hyperintensity in the left aspect of the anterior interhemispheric fissure likely represents a dural calcification. Supratentorial patchy T2/FLAIR hyperintensities compatible with chronic microvascular ischemic disease.    No extra-axial blood or fluid collections.    The T2 skull base flow voids are preserved. Bone marrow signal intensity is unremarkable.    MRA HEAD AND NECK:   Common and internal carotid arteries are normal in caliber.  No significant stenosis at either carotid bifurcation. There is a small saccular aneurysm with a 0.4 cm neck arising from the supraclinoid right ICA.    Vertebral arteries are normal in caliber. The vertebrobasilar system appears within normal limits.     The ACAs, MCAs and PCAs demonstrate no evidence of  high-grade stenosis, focal occlusion or intracranial aneurysm.    Impression    No evidence of acute or major vascular distribution infarct or intracranial hemorrhage.    Questionable subtle cortical diffusion restriction throughout the supratentorial brain.  Short-term followup is suggested.    Remote left PCA distribution infarct within the left occipital lobe.    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  ___________________________________________________  This report has been flagged in the [facility] medical record.  ______________________________________     Electronically signed by resident: EFREM PENALOZA MD  Date:     10/05/17  Time:    00:24            As the supervising and teaching physician, I personally reviewed the images and resident's interpretation and I agree with the findings.          Electronically signed by: DELL VITALE MD  Date:     10/05/17  Time:    01:13         Electronically signed by: DELL VITALE MD  Date:     10/05/17  Time:    01:19    CT Head Without Contrast    Narrative    CT HEAD WITHOUT CONTRAST    CLINICAL INDICATION: 62 year old M with possible stroke.    TECHNIQUE: Axial CT images obtained throughout the  region of the head without the use of intravenous contrast. Axial, sagittal and coronal reconstructions were performed.    COMPARISON: MRI brain 11/6/2016, CT stroke multiphase 11/5/2016, CT head 11/5/2016.    FINDINGS:  The ventricles are stable. The brain appears unchanged.  The basal cisterns are patent.  There is a stable region of encephalomalacia within the left occipital lobe, compatible with remote left PCA infarct. No evidence of acute major vascular distribution infarct or intracranial hemorrhage. Patchy hypoattenuation within the supratentorial white matter is compatible with chronic microvascular ischemic change.     No new extra-axial blood or fluid collections.    The cranium appears intact.  There is nonspecific calcifications in the subcutaneous tissues of the head.     Mastoid air cells and paranasal sinuses are essentially clear.  The orbits and intraorbital contents are unremarkable.    Impression       No evidence of acute major vascular distribution infarct or intracranial hemorrhage.     Stable, remote infarct in the left occipital lobe.    Chronic microvascular ischemic disease.        ______________________________________     Electronically signed by resident: FEREM PENALOZA MD  Date:     10/04/17  Time:    21:26            As the supervising and teaching physician, I personally reviewed the images and resident's interpretation and I agree with the findings.          Electronically signed by: DELL VITALE MD  Date:     10/04/17  Time:    22:05    Results for orders placed or performed during the hospital encounter of 11/05/16   MRI Brain W WO Contrast    Narrative    Comparison: CT 11/5/2016, MRI 6/11/2016    Clinical history: Seizure    Technique:    Multiplanar multi-sequence MRI images of the brain were obtained pre-and post the administration of IV Gadavist contrast.        Findings:    Examination is limited secondary to patient motion, most significantly involving the flair axial and  flair coronal sequences, as well as the postcontrast sequences.    There is encephalomalacia within the left PCA territory consistent with remote infarction.  Minimal postcontrast enhancement is noted within this region.  There may be a few punctate foci of T2/flair signal abnormality within the periventricular white matter, may reflect chronic microvascular ischemic change or represent artifact as there is extensive motion on the FLAIR axial images.  Additionally, there is high flair signal material involving the basal cisterns about the brainstem, not confirmed on other pulse sequences, suggesting that this is artifactual related to poor CSF signal saturation rather than proteinaceous or hemorrhagic material.  There is no evidence of intracranial mass, or acute infarction.  The ventricular system is within normal limits of size for age and shows no evidence of hydrocephalus.  There are no significant extra-axial or extracranial abnormalities detected.  The bilateral mesial temporal lobes are symmetric without abnormal signal, sclerosis, or enhancement.    The globes, orbits, pituitary gland, pineal gland and craniocervical junction are normal in configuration. The major vascular flow voids are patent.  Please see CTA performed earlier today for details of the intracranial vasculature.    Impression    Motion limited examination.    On the flair images, high attenuating material about the brainstem/basal cisterns is felt to represent artifact given extensive patient motion likely the result of poor saturation of the CSF signal in the region.  Of note, there is no associated enhancement or meningeal enhancement to suggest that this reflects proteinaceous material of infectious nature.  Blood products felt less likely.  If this finding would correlate with current patient symptomatology, consider either repeating the flair image once patient is better able to tolerate the examination, or correlate this finding with  lumbar puncture.    No evidence of acute infarct.  Remote PCA territory infarct with mild enhancement.    No abnormal temporal lobe enhancement or focal focus to suggest seizure nidus.            Electronically signed by: NERY LO MD  Date:     11/06/16  Time:    01:08       Additional tests:  1)CT Scan: as noted  2) EEG\Video Monitoring: no  3) PET Scan: no  4) Neuropsychological evaluation: no  5) DEXA Scan: no  6) Others: no     RISK FACTORS FOR SEIZURES:    1. Head Trauma:  No    2. CNS Infections:  No  3. CNS Tumors: No     4. CNS Vascular Disease: hx of prior CVA  5. Febrile Seizures: No    6. Developmental Delay: No       7. Family History of Seizures: No    8. Birth history: unremarkable     Pregnancy/Labor/Delivery: n/a    CURRENT MEDICATIONS:   Current Outpatient Medications   Medication Sig Dispense Refill    acetaminophen (TYLENOL) 500 MG tablet Take 1 tablet (500 mg total) by mouth every 6 (six) hours as needed for Pain.  0    aspirin 81 MG Chew Take 1 tablet (81 mg total) by mouth once daily.  0    atorvastatin (LIPITOR) 40 MG tablet TAKE 1 TABLET BY MOUTH EVERY DAY 90 tablet 1    betamethasone dipropionate (DIPROLENE) 0.05 % cream Apply topically 2 (two) times daily. 45 g 3    blood sugar diagnostic (ONETOUCH VERIO) Strp Check blood glucose readings 3 times a day. 300 strip 3    blood sugar diagnostic Strp To check BG 3 times daily, to use with insurance preferred meter 200 each 3    blood-glucose meter kit To check BG 3 times daily - please provide insurance preferred meter 1 each 0    calcitRIOL (ROCALTROL) 0.25 MCG Cap Take 1 capsule (0.25 mcg total) by mouth once daily. 30 capsule 3    carvediloL (COREG) 12.5 MG tablet Take 1 tablet (12.5 mg total) by mouth 2 (two) times daily. 180 tablet 3    ciclopirox (PENLAC) 8 % Soln Apply topically nightly. 6.6 mL 11    clotrimazole-betamethasone 1-0.05% (LOTRISONE) cream Apply topically 2 (two) times daily. To area of rash for 2-4 weeks 45  g 0    ferrous sulfate (FEOSOL) 325 mg (65 mg iron) Tab tablet Take 1 tablet by mouth every other day.      fluocinonide (LIDEX) 0.05 % ointment Apply to area of rash twice daily 60 g 3    folic acid (FOLVITE) 400 MCG tablet Take 1,000 mcg by mouth once daily.       furosemide (LASIX) 80 MG tablet Take 1 tablet (80 mg total) by mouth 2 (two) times daily. (Patient taking differently: Take 80 mg by mouth once daily. ) 120 tablet 3    glucagon (GVOKE HYPOPEN 2-PACK) 1 mg/0.2 mL AtIn Inject 1 Package into the skin as needed. (Patient not taking: Reported on 12/7/2020) 2 Syringe 0    glucagon, human recombinant, (GLUCAGON EMERGENCY KIT, HUMAN,) 1 mg SolR Inject intramuscularly for emergency 1 each 2    insulin aspart U-100 (NOVOLOG FLEXPEN U-100 INSULIN) 100 unit/mL (3 mL) InPn pen Inject 7 units w/ meals plus scale 150-200+1, 201-250+2, 251-300+3, 301-350+4, >350+5. Max daily 50 units 1 box via [employer] voucher (Patient taking differently: Inject 7 units w/ meals plus scale 150-200+1, 201-250+2, 251-300+3, 301-350+4, >350+5. Max daily 50 units 1 box via [employer] voucher PRN) 15 mL 1    insulin detemir U-100 (LEVEMIR FLEXTOUCH U-100 INSULN) 100 unit/mL (3 mL) InPn pen Inject 8 Units into the skin every evening.      lacosamide (VIMPAT) 200 mg Tab tablet Take 1 tablet (200 mg total) by mouth every 12 (twelve) hours. 60 tablet 11    lancets Misc To check BG 3 times daily, to use with insurance preferred meter 200 each 3    lancing device Misc 1 Device by Misc.(Non-Drug; Combo Route) route 3 (three) times daily. 1 each 0    levETIRAcetam (KEPPRA) 500 MG Tab Take 500 mg by mouth 2 (two) times daily.      levothyroxine (SYNTHROID) 200 MCG tablet Take 1 tablet (200 mcg total) by mouth before breakfast. 90 tablet 2    levothyroxine (SYNTHROID) 50 MCG tablet Take 1 tablet (50 mcg total) by mouth every 7 days. Pt will be taking only on Monday. 30 tablet 1    magnesium 30 mg Tab Take 1 tablet by mouth twice a week.  "Mon ,weds,fridays      metOLazone (ZAROXOLYN) 2.5 MG tablet Take 1 tablet (2.5 mg total) by mouth daily as needed (weight gain more than 3 pounds/day or more than 5 pounds/week). (Patient not taking: Reported on 12/7/2020) 15 tablet 11    multivitamin capsule Take 1 capsule by mouth every Mon, Wed, Fri.       pen needle, diabetic (BD ULTRA-FINE MINI PEN NEEDLE) 31 gauge x 3/16" Ndle To use with insulin pens 4  times daily 360 each 3    SAW PALMETTO ORAL Take by mouth. mon , wed, fri      sevelamer carbonate (RENVELA) 800 mg Tab Take 800 mg by mouth 3 (three) times daily.      tamsulosin (FLOMAX) 0.4 mg Cap TAKE 1 CAPSULE (0.4 MG TOTAL) BY MOUTH EVERY EVENING. 90 capsule 3    ticagrelor (BRILINTA) 90 mg tablet Take 90 mg by mouth 2 (two) times daily.      TRULICITY 0.75 mg/0.5 mL pen injector INJECT 0.75 MG UNDER THE SKIN WEEKLY 4 pen 5    valsartan (DIOVAN) 320 MG tablet Take 1 tablet (320 mg total) by mouth once daily. (Patient not taking: Reported on 12/7/2020) 90 tablet 3    vitamin D 1000 units Tab Take 1,000 Units by mouth every Mon, Wed, Fri.       No current facility-administered medications for this visit.       CURRENT ANTI EPILEPTIC MEDICATIONS:    - Levetiracetam 1500mg bid  - Lacosamide 200mg bid    VAGAL NERVE STIMULATOR: n/a     PRIOR ANTICONVULSANT HISTORY:  none      PAST MEDICAL HISTORY:   Active Ambulatory Problems     Diagnosis Date Noted    Hypercholesterolemia 08/01/2012    Sickle cell trait 08/01/2012    Erectile dysfunction associated with type 2 diabetes mellitus 01/29/2013    Gallstones 03/20/2013    Postsurgical hypothyroidism 11/27/2013    Microcytic anemia 11/27/2013    Obesity (BMI 30.0-34.9) 11/25/2014    Right homonymous hemianopsia 02/05/2016    History of CVA (cerebrovascular accident) 07/25/2016    Type 2 diabetes mellitus with stage 4 chronic kidney disease, with long-term current use of insulin 11/02/2016    Chronic anticoagulation - eliquis 11/10/2016    " Chronic diastolic heart failure 11/20/2016    History of thyroid cancer 01/13/2017    Secondary hyperparathyroidism 06/29/2017    Elevated PSA 07/05/2017    Essential hypertension 08/30/2017    Nuclear sclerosis of both eyes 08/30/2017    Refractive error 08/30/2017    Prostate enlargement 09/14/2017    Complex partial seizures evolving to generalized tonic-clonic seizures 11/15/2017    Hypertensive CKD (chronic kidney disease) 01/31/2018    Chronic kidney disease, stage III (moderate) 06/28/2018    Vitreous hemorrhage of right eye 10/07/2018    Glaucoma suspect of both eyes 10/07/2018    Sickle cell retinopathy without crisis 10/08/2018    Type 2 diabetes mellitus without ophthalmic manifestations 10/08/2018    Hypertensive retinopathy, bilateral 10/16/2018    Proteinuria due to type 2 diabetes mellitus 08/06/2019    Chronic atrial fibrillation 09/25/2019    Bilateral carotid artery stenosis 09/26/2019    Epiretinal membrane (ERM) of right eye 10/23/2019    Chronic eczema 02/17/2020    Vitamin D deficiency 08/14/2020    JAELYN (acute kidney injury) 09/02/2020    Anemia due to multiple mechanisms 09/02/2020    Benign prostatic hyperplasia without lower urinary tract symptoms 09/14/2017    History of DVT (deep vein thrombosis)- right popliteal 9/2020 12/07/2020    S[facility] 12/07/2020    Cerebral aneurysm without rupture 12/07/2020     Resolved Ambulatory Problems     Diagnosis Date Noted    HTN (hypertension), benign 08/01/2012    Elevated alkaline phosphatase level 08/01/2012    Flank pain 03/20/2013    Paroxysmal atrial fibrillation 09/23/2013    Syncope 11/26/2013    Leukocytosis 11/27/2013    Acute on chronic renal failure 11/27/2013    Controlled type 2 diabetes with neuropathy 12/16/2014    Screening 03/03/2015    Screening 03/03/2015    Visual field loss following stroke 01/28/2016    Headache 01/28/2016    Fall 06/10/2016    Proteinuria 11/02/2016     Aphasia     Generalized tonic-clonic seizure     Bradycardia 02/01/2017    Bilateral leg edema 02/01/2017    Hematuria 08/14/2017    Abdominal pain 10/05/2017    Simple partial seizure, consciousness not impaired 10/05/2017    Altered mental status 10/05/2017    Encephalopathy 03/23/2018    Altered mental state 03/24/2018    Acute on chronic congestive heart failure 04/27/2018    Dysarthria     Stroke     Encounter for medication titration 05/22/2018    Leg pain, diffuse, right 05/31/2018    Chest pain 06/27/2018    Elevated d-dimer 06/28/2018    Acute cystitis with hematuria 06/28/2018    Hyperparathyroidism 08/06/2019    S/P colonoscopy 09/19/2019    Chest pain 10/03/2019     Past Medical History:   Diagnosis Date    Allergy     BMI 31.0-31.9,adult 11/25/2014    CHF (congestive heart failure)     CKD (chronic kidney disease), stage III 9/23/2013    Coronary artery disease     Diabetes mellitus due to underlying condition with stage 3 chronic kidney disease, without long-term current use of insulin 11/2/2016    Thyroid cancer       PAST PSYCHIATRIC HISTORY: none    PAST SURGICAL HISTORY including Epilepsy surgery:   Past Surgical History:   Procedure Laterality Date    BREAST SURGERY      cyst removal    COLONOSCOPY      COLONOSCOPY N/A 9/19/2019    Procedure: COLONOSCOPY;  Surgeon: Silva Granger MD;  Location: [facility] ([address] [place] [address]);  Service: Endoscopy;  Laterality: N/A;  history of colon polyps on scope 2015, recommended to repeat at 3 yrs  on eliquis and ASA after stroke, afib -- please check with his cardiologist re: instructions for holding this  ok to hold Eliquis x 2 days prior per Dr. Gabriel Hager-see telephone enc    CYST REMOVAL      chest    PROSTATE BIOPSY  4/27/11    SKIN BIOPSY      THYROID SURGERY  8/26/09    VASCULAR SURGERY          FAMILY HISTORY:   Family History   Problem Relation Age of Onset    Heart disease Father     Diabetes  Father     Hypertension Father     Diabetes Mother     Hypertension Mother     Heart disease Mother     Diabetes Brother     No Known Problems Daughter     Cancer Sister     No Known Problems Daughter     Thyroid disease Maternal Aunt     Clotting disorder Neg Hx     Melanoma Neg Hx          SOCIAL HISTORY:   Social History     Socioeconomic History    Marital status:      Spouse name: Rut    Number of children: Not on file    Years of education: Not on file    Highest education level: Not on file   Occupational History    Not on file   Social Needs    Financial resource strain: Not on file    Food insecurity     Worry: Not on file     Inability: Not on file    Transportation needs     Medical: Not on file     Non-medical: Not on file   Tobacco Use    Smoking status: Never Smoker    Smokeless tobacco: Never Used   Substance and Sexual Activity    Alcohol use: Yes     Comment: occasionally, none recently    Drug use: No    Sexual activity: Yes     Partners: Female     Comment:  with 3 kids   Lifestyle    Physical activity     Days per week: Not on file     Minutes per session: Not on file    Stress: Not on file   Relationships    Social connections     Talks on phone: Not on file     Gets together: Not on file     Attends Congregation service: Not on file     Active member of club or organization: Not on file     Attends meetings of clubs or organizations: Not on file     Relationship status: Not on file   Other Topics Concern    Not on file   Social History Narrative     with 3 kids     Not driving due to vision problem from stroke.       a) Marital status:                                                     b) Living situation: patient lives with his wife  c) Employed/Unemployed/Other: Disabled     DRIVING HISTORY:  Currently driving: No       LEVEL OF EDUCATION:  -     SUBSTANCE USE: none    ALLERGIES: Cough syrup [guaifenesin]     REVIEW OF SYSTEMS:  Review of  Systems   Constitutional: Negative for appetite change and fatigue.   HENT: Negative for dental problem and sore throat.    Eyes: Negative for photophobia and visual disturbance.   Respiratory: Negative for cough and shortness of breath.    Cardiovascular: Negative for chest pain and palpitations.   Gastrointestinal: Negative for nausea and vomiting.   Endocrine: Negative for polydipsia and polyuria.   Genitourinary: Negative for frequency and urgency.   Musculoskeletal: Negative for arthralgias and joint swelling.   Skin: Negative for color change and rash.   Allergic/Immunologic: Negative for immunocompromised state.   All other systems reviewed and are negative.  - except as per hpi     GENERAL EXAMINATION:   General Appearance:    This is an average built male who appears well.  HEENT: There are no dysmorphic features  Skin: There are no obvious stigmata for neurocutaneous disorders.  Neck: not assessed  Cardiovascular:not assessed  Lungs:not assessed  Abdomen: deferred  Spine: not assessed  Extremities: not assessed    NEUROLOGICAL EXAMINATION:   Mental status: alert and responsive  Speech: normal   Dysarthria: No   Eyes: not assessed  Fundoscopic Exam: not assessed  No facial asymmetry. Facial sensation: not assessed  Hearing was intact bilaterally  Tongue and palate; SCM and trapezii bilaterally: not assessed     Motor examination: not assessed  Sensory examination: not assessed  Gait: not assessed    IMPRESSION:  The patient's history is consistent with:   Complex partial seizures evolving to generalized tonic-clonic seizures  65yo M with hx of CVA in 1/16 and seizures x 11/16   - some prior episodes c/w glucose level alterations  - seen in ED with seizure on 11/28/18: low AED levels     - no seizures x 12/2018   - ?? sz on 11/2020    Current AEDs: compliant   - Lacosamide 100mg bid  - Levetiracetam 500mg bid - recently changed     Plan:  - continue LCS 200mg bid  - increase LEV to 1500mg bid - written  instructions given    Seizure log  Seizure precautions/restrictions     Plan of care was discussed in detail with patient and his wife    History of CVA (cerebrovascular accident)  - hx of CVA in 2016; stable    Cerebral aneurysm without rupture  - multiple, evaluated and treated at [facility] in 9/2020    The patient was asked to call me/the clinic 1 week after the test(s) are done to obtain results.    More than 50% of the time with the patient (as well as family/caregiver(s) was spent on face-to-face counseling about:  1. Diagnosis, plans, prognosis, medications and possible side-effects, risks and benefits of treatment, other alternatives to AEDs.  2. Risks related to continued seizures, status epilepticus, SUDEP, driving restrictions and seizure precautions ( no baths but showers are ok, no swimming unsupervised, no use of heavy machinery, no use of sharp moving objects, avoid heights).   3. Issues related to pregnancy, OCP and breast feeding as it relates to epilepsy (in female patients).  4. The potential of teratogenicity (for female patients) and suicidal risks of anti-epileptic medications.  5.Avoid any activity that compromise patient safety related to seizures.    Questions and concerns raised by the patient and family/care-giver(s) were addressed and they indicated understanding of everything discussed and agreed to plans as above.    Return in 3 months or earlier prn     Keara Santiago MD, SONA(), FACNS.  Neurology-Epilepsy.

## 2020-12-18 NOTE — PATIENT INSTRUCTIONS
LEVETIRACETAM (KEPPRA) 500mg tablets  Days 1-3: take 1 tablet in the morning and 2 tablets in the evening  Days 4-6: take 2 tablets in the morning and 2 tablets in the evening  Days 7-9: take 2 tablets in the morning and 3 tablets in the evening  From day 10:  take 3 tablets in the morning and 3 tablets in the evening   - continue on this dose    or     From day 10: take Levetiracteam 750mg 2 tablets in the morning and 2 tablets in the evening  - continue on this dose      Continue LACOSAMIDE (VIMPAT) 200mg in the morning and 200mg in the evening      Check levels in 2 months    Seizure/fall precautions

## 2020-12-23 NOTE — TELEPHONE ENCOUNTER
Pt wife showed up in lobby to drop off pt CGMS and she stated that pt need one touch verio test  strips to pharmacy [facility] in pt chart. Pt testing his sugar 4 times a day.

## 2021-01-01 ENCOUNTER — PATIENT MESSAGE (OUTPATIENT)
Dept: INTERNAL MEDICINE | Facility: CLINIC | Age: 66
End: 2021-01-01

## 2021-01-01 ENCOUNTER — PATIENT MESSAGE (OUTPATIENT)
Dept: ENDOCRINOLOGY | Facility: CLINIC | Age: 66
End: 2021-01-01

## 2021-01-01 ENCOUNTER — OFFICE VISIT (OUTPATIENT)
Dept: NEPHROLOGY | Facility: CLINIC | Age: 66
End: 2021-01-01
Payer: MEDICARE

## 2021-01-01 ENCOUNTER — OFFICE VISIT (OUTPATIENT)
Dept: INTERNAL MEDICINE | Facility: CLINIC | Age: 66
End: 2021-01-01
Payer: MEDICARE

## 2021-01-01 ENCOUNTER — TELEPHONE (OUTPATIENT)
Dept: INTERNAL MEDICINE | Facility: CLINIC | Age: 66
End: 2021-01-01

## 2021-01-01 ENCOUNTER — TELEPHONE (OUTPATIENT)
Dept: NEUROLOGY | Facility: CLINIC | Age: 66
End: 2021-01-01

## 2021-01-01 ENCOUNTER — LAB VISIT (OUTPATIENT)
Dept: LAB | Facility: HOSPITAL | Age: 66
End: 2021-01-01
Attending: INTERNAL MEDICINE
Payer: MEDICARE

## 2021-01-01 ENCOUNTER — TELEPHONE (OUTPATIENT)
Dept: NEPHROLOGY | Facility: CLINIC | Age: 66
End: 2021-01-01

## 2021-01-01 ENCOUNTER — OFFICE VISIT (OUTPATIENT)
Dept: CARDIOLOGY | Facility: CLINIC | Age: 66
End: 2021-01-01
Payer: MEDICARE

## 2021-01-01 ENCOUNTER — TELEPHONE (OUTPATIENT)
Dept: PRIMARY CARE CLINIC | Facility: CLINIC | Age: 66
End: 2021-01-01

## 2021-01-01 ENCOUNTER — PATIENT OUTREACH (OUTPATIENT)
Dept: ADMINISTRATIVE | Facility: OTHER | Age: 66
End: 2021-01-01

## 2021-01-01 ENCOUNTER — LAB VISIT (OUTPATIENT)
Dept: LAB | Facility: HOSPITAL | Age: 66
End: 2021-01-01
Attending: STUDENT IN AN ORGANIZED HEALTH CARE EDUCATION/TRAINING PROGRAM
Payer: MEDICARE

## 2021-01-01 ENCOUNTER — INFUSION (OUTPATIENT)
Dept: INFECTIOUS DISEASES | Facility: HOSPITAL | Age: 66
End: 2021-01-01
Attending: INTERNAL MEDICINE
Payer: MEDICARE

## 2021-01-01 ENCOUNTER — LAB VISIT (OUTPATIENT)
Dept: LAB | Facility: HOSPITAL | Age: 66
End: 2021-01-01
Attending: NURSE PRACTITIONER
Payer: MEDICARE

## 2021-01-01 ENCOUNTER — ANESTHESIA EVENT (OUTPATIENT)
Dept: ENDOSCOPY | Facility: HOSPITAL | Age: 66
DRG: 286 | End: 2021-01-01
Payer: MEDICARE

## 2021-01-01 ENCOUNTER — LAB VISIT (OUTPATIENT)
Dept: LAB | Facility: HOSPITAL | Age: 66
End: 2021-01-01
Payer: MEDICARE

## 2021-01-01 ENCOUNTER — TELEPHONE (OUTPATIENT)
Dept: EMERGENCY MEDICINE | Facility: HOSPITAL | Age: 66
End: 2021-01-01

## 2021-01-01 ENCOUNTER — CLINICAL SUPPORT (OUTPATIENT)
Dept: DIABETES | Facility: CLINIC | Age: 66
End: 2021-01-01
Payer: MEDICARE

## 2021-01-01 ENCOUNTER — PATIENT OUTREACH (OUTPATIENT)
Dept: ADMINISTRATIVE | Facility: HOSPITAL | Age: 66
End: 2021-01-01

## 2021-01-01 ENCOUNTER — OFFICE VISIT (OUTPATIENT)
Dept: UROLOGY | Facility: CLINIC | Age: 66
End: 2021-01-01
Payer: MEDICARE

## 2021-01-01 ENCOUNTER — OFFICE VISIT (OUTPATIENT)
Dept: NEUROSURGERY | Facility: CLINIC | Age: 66
End: 2021-01-01
Payer: MEDICARE

## 2021-01-01 ENCOUNTER — PATIENT OUTREACH (OUTPATIENT)
Dept: EMERGENCY MEDICINE | Facility: HOSPITAL | Age: 66
End: 2021-01-01

## 2021-01-01 ENCOUNTER — NUTRITION (OUTPATIENT)
Dept: NUTRITION | Facility: CLINIC | Age: 66
End: 2021-01-01
Payer: MEDICARE

## 2021-01-01 ENCOUNTER — PATIENT MESSAGE (OUTPATIENT)
Dept: ADMINISTRATIVE | Facility: HOSPITAL | Age: 66
End: 2021-01-01

## 2021-01-01 ENCOUNTER — DOCUMENT SCAN (OUTPATIENT)
Dept: HOME HEALTH SERVICES | Facility: HOSPITAL | Age: 66
End: 2021-01-01
Payer: MEDICARE

## 2021-01-01 ENCOUNTER — PATIENT MESSAGE (OUTPATIENT)
Dept: NEUROLOGY | Facility: CLINIC | Age: 66
End: 2021-01-01

## 2021-01-01 ENCOUNTER — PATIENT OUTREACH (OUTPATIENT)
Dept: ADMINISTRATIVE | Facility: CLINIC | Age: 66
End: 2021-01-01

## 2021-01-01 ENCOUNTER — TELEPHONE (OUTPATIENT)
Dept: UROLOGY | Facility: CLINIC | Age: 66
End: 2021-01-01

## 2021-01-01 ENCOUNTER — OFFICE VISIT (OUTPATIENT)
Dept: PRIMARY CARE CLINIC | Facility: CLINIC | Age: 66
End: 2021-01-01
Payer: MEDICARE

## 2021-01-01 ENCOUNTER — TELEPHONE (OUTPATIENT)
Dept: PHARMACY | Facility: CLINIC | Age: 66
End: 2021-01-01

## 2021-01-01 ENCOUNTER — HOSPITAL ENCOUNTER (OUTPATIENT)
Dept: ENDOCRINOLOGY | Facility: CLINIC | Age: 66
Discharge: HOME | End: 2021-02-26
Attending: NURSE PRACTITIONER
Payer: MEDICARE

## 2021-01-01 ENCOUNTER — OFFICE VISIT (OUTPATIENT)
Dept: PODIATRY | Facility: CLINIC | Age: 66
End: 2021-01-01
Payer: MEDICARE

## 2021-01-01 ENCOUNTER — TELEPHONE (OUTPATIENT)
Dept: ENDOCRINOLOGY | Facility: CLINIC | Age: 66
End: 2021-01-01

## 2021-01-01 ENCOUNTER — TELEPHONE (OUTPATIENT)
Dept: DIABETES | Facility: CLINIC | Age: 66
End: 2021-01-01

## 2021-01-01 ENCOUNTER — TELEPHONE (OUTPATIENT)
Dept: CARDIOLOGY | Facility: CLINIC | Age: 66
End: 2021-01-01

## 2021-01-01 ENCOUNTER — PATIENT MESSAGE (OUTPATIENT)
Dept: CARDIOLOGY | Facility: CLINIC | Age: 66
End: 2021-01-01

## 2021-01-01 ENCOUNTER — EXTERNAL HOME HEALTH (OUTPATIENT)
Dept: HOME HEALTH SERVICES | Facility: HOSPITAL | Age: 66
End: 2021-01-01
Payer: MEDICARE

## 2021-01-01 ENCOUNTER — NURSE TRIAGE (OUTPATIENT)
Dept: ADMINISTRATIVE | Facility: CLINIC | Age: 66
End: 2021-01-01

## 2021-01-01 ENCOUNTER — HOSPITAL ENCOUNTER (EMERGENCY)
Facility: HOSPITAL | Age: 66
Discharge: HOME | End: 2021-03-28
Attending: EMERGENCY MEDICINE
Payer: MEDICARE

## 2021-01-01 ENCOUNTER — OFFICE VISIT (OUTPATIENT)
Dept: ENDOCRINOLOGY | Facility: CLINIC | Age: 66
End: 2021-01-01
Payer: MEDICARE

## 2021-01-01 ENCOUNTER — HOSPITAL ENCOUNTER (INPATIENT)
Facility: HOSPITAL | Age: 66
LOS: 29 days | DRG: 286 | End: 2021-08-06
Attending: EMERGENCY MEDICINE | Admitting: EMERGENCY MEDICINE
Payer: MEDICARE

## 2021-01-01 ENCOUNTER — OFFICE VISIT (OUTPATIENT)
Dept: RHEUMATOLOGY | Facility: CLINIC | Age: 66
End: 2021-01-01
Payer: MEDICARE

## 2021-01-01 ENCOUNTER — ANESTHESIA (OUTPATIENT)
Dept: ENDOSCOPY | Facility: HOSPITAL | Age: 66
DRG: 286 | End: 2021-01-01
Payer: MEDICARE

## 2021-01-01 ENCOUNTER — PATIENT MESSAGE (OUTPATIENT)
Dept: RHEUMATOLOGY | Facility: CLINIC | Age: 66
End: 2021-01-01

## 2021-01-01 ENCOUNTER — HOSPITAL ENCOUNTER (EMERGENCY)
Facility: HOSPITAL | Age: 66
Discharge: HOME | End: 2021-04-18
Attending: EMERGENCY MEDICINE
Payer: MEDICARE

## 2021-01-01 ENCOUNTER — RESEARCH ENCOUNTER (OUTPATIENT)
Dept: RESEARCH | Facility: HOSPITAL | Age: 66
End: 2021-01-01

## 2021-01-01 ENCOUNTER — HOSPITAL ENCOUNTER (INPATIENT)
Facility: OTHER | Age: 66
LOS: 2 days | Discharge: HOME HEALTH | DRG: 920 | End: 2021-04-07
Attending: EMERGENCY MEDICINE | Admitting: INTERNAL MEDICINE
Payer: MEDICARE

## 2021-01-01 ENCOUNTER — HOSPITAL ENCOUNTER (INPATIENT)
Facility: HOSPITAL | Age: 66
LOS: 6 days | Discharge: HOME HEALTH | DRG: 291 | End: 2021-03-27
Attending: EMERGENCY MEDICINE | Admitting: HOSPITALIST
Payer: MEDICARE

## 2021-01-01 VITALS
HEIGHT: 70 IN | BODY MASS INDEX: 29.06 KG/M2 | SYSTOLIC BLOOD PRESSURE: 180 MMHG | WEIGHT: 203 LBS | DIASTOLIC BLOOD PRESSURE: 77 MMHG | HEART RATE: 73 BPM

## 2021-01-01 VITALS
BODY MASS INDEX: 31.5 KG/M2 | WEIGHT: 220 LBS | OXYGEN SATURATION: 100 % | TEMPERATURE: 98 F | SYSTOLIC BLOOD PRESSURE: 187 MMHG | DIASTOLIC BLOOD PRESSURE: 83 MMHG | RESPIRATION RATE: 16 BRPM | HEART RATE: 68 BPM | HEIGHT: 70 IN

## 2021-01-01 VITALS — WEIGHT: 199 LBS | BODY MASS INDEX: 28.55 KG/M2

## 2021-01-01 VITALS
BODY MASS INDEX: 29.45 KG/M2 | WEIGHT: 203 LBS | HEART RATE: 67 BPM | SYSTOLIC BLOOD PRESSURE: 153 MMHG | DIASTOLIC BLOOD PRESSURE: 78 MMHG | HEIGHT: 70 IN | WEIGHT: 205.69 LBS | DIASTOLIC BLOOD PRESSURE: 88 MMHG | HEART RATE: 64 BPM | HEIGHT: 70 IN | OXYGEN SATURATION: 99 % | WEIGHT: 205.81 LBS | BODY MASS INDEX: 29.13 KG/M2 | SYSTOLIC BLOOD PRESSURE: 182 MMHG | BODY MASS INDEX: 29.46 KG/M2

## 2021-01-01 VITALS
BODY MASS INDEX: 30.99 KG/M2 | HEART RATE: 74 BPM | SYSTOLIC BLOOD PRESSURE: 116 MMHG | HEIGHT: 70 IN | DIASTOLIC BLOOD PRESSURE: 72 MMHG | WEIGHT: 216.5 LBS | OXYGEN SATURATION: 97 %

## 2021-01-01 VITALS
HEIGHT: 70 IN | HEART RATE: 81 BPM | BODY MASS INDEX: 29.51 KG/M2 | SYSTOLIC BLOOD PRESSURE: 172 MMHG | OXYGEN SATURATION: 96 % | WEIGHT: 206.13 LBS | DIASTOLIC BLOOD PRESSURE: 81 MMHG

## 2021-01-01 VITALS
BODY MASS INDEX: 29.2 KG/M2 | OXYGEN SATURATION: 27 % | WEIGHT: 203.94 LBS | SYSTOLIC BLOOD PRESSURE: 67 MMHG | RESPIRATION RATE: 10 BRPM | HEART RATE: 233 BPM | HEIGHT: 70 IN | TEMPERATURE: 103 F | DIASTOLIC BLOOD PRESSURE: 43 MMHG

## 2021-01-01 VITALS
RESPIRATION RATE: 20 BRPM | HEART RATE: 68 BPM | WEIGHT: 202 LBS | TEMPERATURE: 98 F | DIASTOLIC BLOOD PRESSURE: 68 MMHG | WEIGHT: 201.25 LBS | OXYGEN SATURATION: 95 % | HEIGHT: 70 IN | OXYGEN SATURATION: 95 % | SYSTOLIC BLOOD PRESSURE: 132 MMHG | HEIGHT: 70 IN | SYSTOLIC BLOOD PRESSURE: 135 MMHG | BODY MASS INDEX: 28.81 KG/M2 | HEART RATE: 62 BPM | DIASTOLIC BLOOD PRESSURE: 64 MMHG | BODY MASS INDEX: 28.92 KG/M2

## 2021-01-01 VITALS
BODY MASS INDEX: 29.35 KG/M2 | WEIGHT: 205 LBS | DIASTOLIC BLOOD PRESSURE: 79 MMHG | SYSTOLIC BLOOD PRESSURE: 191 MMHG | HEART RATE: 72 BPM | HEIGHT: 70 IN

## 2021-01-01 VITALS
DIASTOLIC BLOOD PRESSURE: 79 MMHG | SYSTOLIC BLOOD PRESSURE: 156 MMHG | TEMPERATURE: 98 F | HEIGHT: 70 IN | BODY MASS INDEX: 29.53 KG/M2 | HEART RATE: 65 BPM

## 2021-01-01 VITALS
DIASTOLIC BLOOD PRESSURE: 86 MMHG | HEART RATE: 70 BPM | SYSTOLIC BLOOD PRESSURE: 178 MMHG | RESPIRATION RATE: 18 BRPM | OXYGEN SATURATION: 97 % | WEIGHT: 198.88 LBS | TEMPERATURE: 98 F | BODY MASS INDEX: 28.47 KG/M2 | HEIGHT: 70 IN

## 2021-01-01 VITALS
DIASTOLIC BLOOD PRESSURE: 70 MMHG | SYSTOLIC BLOOD PRESSURE: 180 MMHG | BODY MASS INDEX: 30.13 KG/M2 | HEART RATE: 80 BPM | WEIGHT: 210.44 LBS | HEIGHT: 70 IN

## 2021-01-01 VITALS
OXYGEN SATURATION: 96 % | BODY MASS INDEX: 29.67 KG/M2 | SYSTOLIC BLOOD PRESSURE: 192 MMHG | HEART RATE: 68 BPM | HEIGHT: 70 IN | WEIGHT: 207.25 LBS | DIASTOLIC BLOOD PRESSURE: 87 MMHG

## 2021-01-01 VITALS
SYSTOLIC BLOOD PRESSURE: 120 MMHG | BODY MASS INDEX: 29.13 KG/M2 | HEIGHT: 70 IN | WEIGHT: 203.69 LBS | WEIGHT: 202.38 LBS | HEART RATE: 64 BPM | HEART RATE: 58 BPM | DIASTOLIC BLOOD PRESSURE: 82 MMHG | SYSTOLIC BLOOD PRESSURE: 164 MMHG | SYSTOLIC BLOOD PRESSURE: 175 MMHG | HEIGHT: 70 IN | WEIGHT: 203.69 LBS | HEIGHT: 70 IN | BODY MASS INDEX: 29.16 KG/M2 | HEIGHT: 70 IN | DIASTOLIC BLOOD PRESSURE: 78 MMHG | WEIGHT: 203.5 LBS | OXYGEN SATURATION: 99 % | HEART RATE: 54 BPM | SYSTOLIC BLOOD PRESSURE: 156 MMHG | HEART RATE: 62 BPM | OXYGEN SATURATION: 99 % | DIASTOLIC BLOOD PRESSURE: 76 MMHG | BODY MASS INDEX: 29.16 KG/M2 | DIASTOLIC BLOOD PRESSURE: 70 MMHG | BODY MASS INDEX: 28.97 KG/M2

## 2021-01-01 VITALS
RESPIRATION RATE: 20 BRPM | HEIGHT: 70 IN | BODY MASS INDEX: 29.08 KG/M2 | HEART RATE: 78 BPM | DIASTOLIC BLOOD PRESSURE: 78 MMHG | OXYGEN SATURATION: 98 % | WEIGHT: 203.13 LBS | SYSTOLIC BLOOD PRESSURE: 170 MMHG

## 2021-01-01 VITALS
HEART RATE: 62 BPM | HEIGHT: 70 IN | DIASTOLIC BLOOD PRESSURE: 74 MMHG | DIASTOLIC BLOOD PRESSURE: 69 MMHG | WEIGHT: 205.69 LBS | HEART RATE: 57 BPM | HEIGHT: 70 IN | SYSTOLIC BLOOD PRESSURE: 153 MMHG | BODY MASS INDEX: 29.45 KG/M2 | SYSTOLIC BLOOD PRESSURE: 141 MMHG | HEIGHT: 70 IN | WEIGHT: 199 LBS | OXYGEN SATURATION: 97 % | BODY MASS INDEX: 28.49 KG/M2 | WEIGHT: 207.88 LBS | BODY MASS INDEX: 29.76 KG/M2 | DIASTOLIC BLOOD PRESSURE: 79 MMHG | SYSTOLIC BLOOD PRESSURE: 171 MMHG | HEART RATE: 57 BPM

## 2021-01-01 VITALS
HEART RATE: 65 BPM | SYSTOLIC BLOOD PRESSURE: 186 MMHG | BODY MASS INDEX: 30.02 KG/M2 | DIASTOLIC BLOOD PRESSURE: 91 MMHG | OXYGEN SATURATION: 97 % | HEIGHT: 70 IN | WEIGHT: 209.69 LBS

## 2021-01-01 VITALS
BODY MASS INDEX: 29.67 KG/M2 | OXYGEN SATURATION: 96 % | HEIGHT: 70 IN | DIASTOLIC BLOOD PRESSURE: 78 MMHG | WEIGHT: 205 LBS | BODY MASS INDEX: 29.35 KG/M2 | SYSTOLIC BLOOD PRESSURE: 150 MMHG | HEIGHT: 70 IN | WEIGHT: 207.25 LBS | HEART RATE: 77 BPM

## 2021-01-01 VITALS
WEIGHT: 206.81 LBS | DIASTOLIC BLOOD PRESSURE: 60 MMHG | HEIGHT: 70 IN | BODY MASS INDEX: 29.61 KG/M2 | HEART RATE: 73 BPM | SYSTOLIC BLOOD PRESSURE: 138 MMHG

## 2021-01-01 VITALS
HEART RATE: 72 BPM | BODY MASS INDEX: 28.79 KG/M2 | WEIGHT: 200.63 LBS | SYSTOLIC BLOOD PRESSURE: 150 MMHG | DIASTOLIC BLOOD PRESSURE: 62 MMHG | OXYGEN SATURATION: 98 %

## 2021-01-01 VITALS
DIASTOLIC BLOOD PRESSURE: 85 MMHG | HEART RATE: 68 BPM | SYSTOLIC BLOOD PRESSURE: 183 MMHG | HEIGHT: 70 IN | OXYGEN SATURATION: 99 % | TEMPERATURE: 98 F | WEIGHT: 200 LBS | RESPIRATION RATE: 16 BRPM | BODY MASS INDEX: 28.63 KG/M2

## 2021-01-01 VITALS — BODY MASS INDEX: 28.55 KG/M2 | WEIGHT: 199 LBS

## 2021-01-01 VITALS
SYSTOLIC BLOOD PRESSURE: 152 MMHG | DIASTOLIC BLOOD PRESSURE: 68 MMHG | BODY MASS INDEX: 28.6 KG/M2 | HEART RATE: 63 BPM | WEIGHT: 199.75 LBS | OXYGEN SATURATION: 98 % | HEIGHT: 70 IN

## 2021-01-01 VITALS
SYSTOLIC BLOOD PRESSURE: 170 MMHG | HEART RATE: 60 BPM | DIASTOLIC BLOOD PRESSURE: 73 MMHG | WEIGHT: 195 LBS | TEMPERATURE: 98 F | RESPIRATION RATE: 17 BRPM | OXYGEN SATURATION: 97 % | BODY MASS INDEX: 27.98 KG/M2

## 2021-01-01 DIAGNOSIS — Z79.02 ENCOUNTER FOR MONITORING ANTIPLATELET THERAPY: ICD-10-CM

## 2021-01-01 DIAGNOSIS — N18.4 STAGE 4 CHRONIC KIDNEY DISEASE: ICD-10-CM

## 2021-01-01 DIAGNOSIS — N17.9 AKI (ACUTE KIDNEY INJURY): Primary | ICD-10-CM

## 2021-01-01 DIAGNOSIS — I48.20 CHRONIC ATRIAL FIBRILLATION: ICD-10-CM

## 2021-01-01 DIAGNOSIS — N18.4 ANEMIA IN STAGE 4 CHRONIC KIDNEY DISEASE: Primary | ICD-10-CM

## 2021-01-01 DIAGNOSIS — Z86.73 HISTORY OF CVA (CEREBROVASCULAR ACCIDENT): ICD-10-CM

## 2021-01-01 DIAGNOSIS — Z95.828 S/P IVC FILTER: ICD-10-CM

## 2021-01-01 DIAGNOSIS — N18.4 TYPE 2 DIABETES MELLITUS WITH STAGE 4 CHRONIC KIDNEY DISEASE, WITH LONG-TERM CURRENT USE OF INSULIN: ICD-10-CM

## 2021-01-01 DIAGNOSIS — I10 ESSENTIAL HYPERTENSION: ICD-10-CM

## 2021-01-01 DIAGNOSIS — E55.9 VITAMIN D DEFICIENCY: ICD-10-CM

## 2021-01-01 DIAGNOSIS — D64.89 ANEMIA DUE TO MULTIPLE MECHANISMS: ICD-10-CM

## 2021-01-01 DIAGNOSIS — E11.22 TYPE 2 DIABETES MELLITUS WITH STAGE 4 CHRONIC KIDNEY DISEASE, WITH LONG-TERM CURRENT USE OF INSULIN: ICD-10-CM

## 2021-01-01 DIAGNOSIS — N18.4 HYPERTENSIVE KIDNEY DISEASE WITH STAGE 4 CHRONIC KIDNEY DISEASE: ICD-10-CM

## 2021-01-01 DIAGNOSIS — Z85.850 HISTORY OF THYROID CANCER: Primary | ICD-10-CM

## 2021-01-01 DIAGNOSIS — Z79.01 CHRONIC ANTICOAGULATION: Chronic | ICD-10-CM

## 2021-01-01 DIAGNOSIS — E11.29 PROTEINURIA DUE TO TYPE 2 DIABETES MELLITUS: ICD-10-CM

## 2021-01-01 DIAGNOSIS — E83.39 HYPERPHOSPHATEMIA: ICD-10-CM

## 2021-01-01 DIAGNOSIS — E66.9 OBESITY (BMI 30.0-34.9): ICD-10-CM

## 2021-01-01 DIAGNOSIS — D50.9 IRON DEFICIENCY ANEMIA, UNSPECIFIED IRON DEFICIENCY ANEMIA TYPE: ICD-10-CM

## 2021-01-01 DIAGNOSIS — R80.9 PROTEINURIA DUE TO TYPE 2 DIABETES MELLITUS: ICD-10-CM

## 2021-01-01 DIAGNOSIS — E89.0 POSTSURGICAL HYPOTHYROIDISM: ICD-10-CM

## 2021-01-01 DIAGNOSIS — I12.9 HYPERTENSIVE KIDNEY DISEASE WITH STAGE 4 CHRONIC KIDNEY DISEASE: ICD-10-CM

## 2021-01-01 DIAGNOSIS — T45.515A ANTICOAGULANT-INDUCED HEMATURIA: ICD-10-CM

## 2021-01-01 DIAGNOSIS — Z85.850 HISTORY OF THYROID CANCER: ICD-10-CM

## 2021-01-01 DIAGNOSIS — E83.41 HYPERMAGNESEMIA: ICD-10-CM

## 2021-01-01 DIAGNOSIS — I50.33 ACUTE ON CHRONIC DIASTOLIC (CONGESTIVE) HEART FAILURE: ICD-10-CM

## 2021-01-01 DIAGNOSIS — Z79.4 TYPE 2 DIABETES MELLITUS WITH STAGE 4 CHRONIC KIDNEY DISEASE, WITH LONG-TERM CURRENT USE OF INSULIN: ICD-10-CM

## 2021-01-01 DIAGNOSIS — R06.09 DOE (DYSPNEA ON EXERTION): ICD-10-CM

## 2021-01-01 DIAGNOSIS — N18.4 TYPE 2 DIABETES MELLITUS WITH STAGE 4 CHRONIC KIDNEY DISEASE, WITH LONG-TERM CURRENT USE OF INSULIN: Primary | ICD-10-CM

## 2021-01-01 DIAGNOSIS — N25.81 SECONDARY HYPERPARATHYROIDISM: ICD-10-CM

## 2021-01-01 DIAGNOSIS — E89.0 POSTSURGICAL HYPOTHYROIDISM: Primary | ICD-10-CM

## 2021-01-01 DIAGNOSIS — Z79.4 TYPE 2 DIABETES MELLITUS WITH STAGE 4 CHRONIC KIDNEY DISEASE, WITH LONG-TERM CURRENT USE OF INSULIN: Chronic | ICD-10-CM

## 2021-01-01 DIAGNOSIS — D57.1 SICKLE-CELL DISEASE WITHOUT CRISIS: ICD-10-CM

## 2021-01-01 DIAGNOSIS — I50.32 CHRONIC DIASTOLIC HEART FAILURE: ICD-10-CM

## 2021-01-01 DIAGNOSIS — E78.00 HYPERCHOLESTEROLEMIA: ICD-10-CM

## 2021-01-01 DIAGNOSIS — D64.9 ANEMIA, UNSPECIFIED TYPE: ICD-10-CM

## 2021-01-01 DIAGNOSIS — I34.0 MITRAL VALVE INSUFFICIENCY, UNSPECIFIED ETIOLOGY: ICD-10-CM

## 2021-01-01 DIAGNOSIS — R07.9 CHEST PAIN: ICD-10-CM

## 2021-01-01 DIAGNOSIS — G40.209 COMPLEX PARTIAL SEIZURES EVOLVING TO GENERALIZED TONIC-CLONIC SEIZURES: ICD-10-CM

## 2021-01-01 DIAGNOSIS — L97.511 ULCER OF RIGHT FOOT, LIMITED TO BREAKDOWN OF SKIN: ICD-10-CM

## 2021-01-01 DIAGNOSIS — R10.2 SUPRAPUBIC PAIN: ICD-10-CM

## 2021-01-01 DIAGNOSIS — N18.4 CKD (CHRONIC KIDNEY DISEASE) STAGE 4, GFR 15-29 ML/MIN: ICD-10-CM

## 2021-01-01 DIAGNOSIS — Z51.81 ENCOUNTER FOR MONITORING DIURETIC THERAPY: ICD-10-CM

## 2021-01-01 DIAGNOSIS — D63.1 ANEMIA IN STAGE 4 CHRONIC KIDNEY DISEASE: ICD-10-CM

## 2021-01-01 DIAGNOSIS — I50.33 ACUTE ON CHRONIC DIASTOLIC CONGESTIVE HEART FAILURE: ICD-10-CM

## 2021-01-01 DIAGNOSIS — N18.4 ANEMIA IN STAGE 4 CHRONIC KIDNEY DISEASE: ICD-10-CM

## 2021-01-01 DIAGNOSIS — R30.0 DYSURIA: Primary | ICD-10-CM

## 2021-01-01 DIAGNOSIS — N18.4 TYPE 2 DIABETES MELLITUS WITH STAGE 4 CHRONIC KIDNEY DISEASE, WITH LONG-TERM CURRENT USE OF INSULIN: Primary | Chronic | ICD-10-CM

## 2021-01-01 DIAGNOSIS — Z79.4 TYPE 2 DIABETES MELLITUS WITH HYPERGLYCEMIA, WITH LONG-TERM CURRENT USE OF INSULIN: ICD-10-CM

## 2021-01-01 DIAGNOSIS — Z86.718 HISTORY OF DVT (DEEP VEIN THROMBOSIS): ICD-10-CM

## 2021-01-01 DIAGNOSIS — N18.4 TYPE 2 DIABETES MELLITUS WITH STAGE 4 CHRONIC KIDNEY DISEASE, WITH LONG-TERM CURRENT USE OF INSULIN: Chronic | ICD-10-CM

## 2021-01-01 DIAGNOSIS — Z79.4 TYPE 2 DIABETES MELLITUS WITH STAGE 4 CHRONIC KIDNEY DISEASE, WITH LONG-TERM CURRENT USE OF INSULIN: Primary | ICD-10-CM

## 2021-01-01 DIAGNOSIS — G40.209 COMPLEX PARTIAL SEIZURES EVOLVING TO GENERALIZED TONIC-CLONIC SEIZURES: Primary | ICD-10-CM

## 2021-01-01 DIAGNOSIS — R31.0 GROSS HEMATURIA: Primary | ICD-10-CM

## 2021-01-01 DIAGNOSIS — M54.9 BACK PAIN, UNSPECIFIED BACK LOCATION, UNSPECIFIED BACK PAIN LATERALITY, UNSPECIFIED CHRONICITY: Primary | ICD-10-CM

## 2021-01-01 DIAGNOSIS — E11.22 TYPE 2 DIABETES MELLITUS WITH STAGE 4 CHRONIC KIDNEY DISEASE, WITH LONG-TERM CURRENT USE OF INSULIN: Primary | ICD-10-CM

## 2021-01-01 DIAGNOSIS — N40.0 ENLARGED PROSTATE WITHOUT LOWER URINARY TRACT SYMPTOMS (LUTS): ICD-10-CM

## 2021-01-01 DIAGNOSIS — I50.32 CHRONIC DIASTOLIC HEART FAILURE: Primary | ICD-10-CM

## 2021-01-01 DIAGNOSIS — I67.1 CEREBRAL ANEURYSM WITHOUT RUPTURE: Primary | ICD-10-CM

## 2021-01-01 DIAGNOSIS — M25.50 POLYARTHRALGIA: ICD-10-CM

## 2021-01-01 DIAGNOSIS — S32.020D COMPRESSION FRACTURE OF L2 VERTEBRA WITH ROUTINE HEALING, SUBSEQUENT ENCOUNTER: ICD-10-CM

## 2021-01-01 DIAGNOSIS — N17.9 ACUTE RENAL FAILURE SUPERIMPOSED ON STAGE 4 CHRONIC KIDNEY DISEASE, UNSPECIFIED ACUTE RENAL FAILURE TYPE: ICD-10-CM

## 2021-01-01 DIAGNOSIS — E11.69 ERECTILE DYSFUNCTION ASSOCIATED WITH TYPE 2 DIABETES MELLITUS: ICD-10-CM

## 2021-01-01 DIAGNOSIS — N39.0 ENTEROCOCCUS UTI: ICD-10-CM

## 2021-01-01 DIAGNOSIS — Z86.73 HISTORY OF CVA (CEREBROVASCULAR ACCIDENT): Primary | ICD-10-CM

## 2021-01-01 DIAGNOSIS — R97.20 ELEVATED PSA: ICD-10-CM

## 2021-01-01 DIAGNOSIS — E11.59 HYPERTENSION ASSOCIATED WITH DIABETES: Chronic | ICD-10-CM

## 2021-01-01 DIAGNOSIS — N18.4 CKD (CHRONIC KIDNEY DISEASE) STAGE 4, GFR 15-29 ML/MIN: Primary | ICD-10-CM

## 2021-01-01 DIAGNOSIS — S32.020D COMPRESSION FRACTURE OF L2 VERTEBRA WITH ROUTINE HEALING, SUBSEQUENT ENCOUNTER: Primary | ICD-10-CM

## 2021-01-01 DIAGNOSIS — N40.0 BENIGN PROSTATIC HYPERPLASIA WITHOUT LOWER URINARY TRACT SYMPTOMS: ICD-10-CM

## 2021-01-01 DIAGNOSIS — E11.22 TYPE 2 DIABETES MELLITUS WITH STAGE 4 CHRONIC KIDNEY DISEASE, WITH LONG-TERM CURRENT USE OF INSULIN: Chronic | ICD-10-CM

## 2021-01-01 DIAGNOSIS — E11.22 TYPE 2 DIABETES MELLITUS WITH STAGE 4 CHRONIC KIDNEY DISEASE, WITH LONG-TERM CURRENT USE OF INSULIN: Primary | Chronic | ICD-10-CM

## 2021-01-01 DIAGNOSIS — N30.01 ACUTE CYSTITIS WITH HEMATURIA: ICD-10-CM

## 2021-01-01 DIAGNOSIS — Z51.81 ENCOUNTER FOR MONITORING ANTIPLATELET THERAPY: ICD-10-CM

## 2021-01-01 DIAGNOSIS — H53.461 RIGHT HOMONYMOUS HEMIANOPSIA: Primary | ICD-10-CM

## 2021-01-01 DIAGNOSIS — I67.1 CEREBRAL ANEURYSM WITHOUT RUPTURE: ICD-10-CM

## 2021-01-01 DIAGNOSIS — N17.9 AKI (ACUTE KIDNEY INJURY): ICD-10-CM

## 2021-01-01 DIAGNOSIS — I46.9 CARDIAC ARREST: ICD-10-CM

## 2021-01-01 DIAGNOSIS — R60.0 BILATERAL LOWER EXTREMITY EDEMA: ICD-10-CM

## 2021-01-01 DIAGNOSIS — I50.9 CONGESTIVE HEART FAILURE, UNSPECIFIED HF CHRONICITY, UNSPECIFIED HEART FAILURE TYPE: ICD-10-CM

## 2021-01-01 DIAGNOSIS — N18.4 HYPERTENSIVE KIDNEY DISEASE WITH STAGE 4 CHRONIC KIDNEY DISEASE: Primary | ICD-10-CM

## 2021-01-01 DIAGNOSIS — Z79.01 ANTICOAGULATED: ICD-10-CM

## 2021-01-01 DIAGNOSIS — E11.59 HYPERTENSION ASSOCIATED WITH DIABETES: ICD-10-CM

## 2021-01-01 DIAGNOSIS — Z79.899 HIGH RISK MEDICATION USE: ICD-10-CM

## 2021-01-01 DIAGNOSIS — E89.0 POSTOPERATIVE HYPOTHYROIDISM: ICD-10-CM

## 2021-01-01 DIAGNOSIS — I15.2 HYPERTENSION ASSOCIATED WITH DIABETES: ICD-10-CM

## 2021-01-01 DIAGNOSIS — I50.33 ACUTE ON CHRONIC HEART FAILURE WITH PRESERVED EJECTION FRACTION: ICD-10-CM

## 2021-01-01 DIAGNOSIS — I50.33 ACUTE ON CHRONIC DIASTOLIC (CONGESTIVE) HEART FAILURE: Primary | ICD-10-CM

## 2021-01-01 DIAGNOSIS — Z79.899 ENCOUNTER FOR MONITORING DIURETIC THERAPY: ICD-10-CM

## 2021-01-01 DIAGNOSIS — I48.20 CHRONIC ATRIAL FIBRILLATION: Primary | ICD-10-CM

## 2021-01-01 DIAGNOSIS — I50.9 ACUTE DECOMPENSATED HEART FAILURE: ICD-10-CM

## 2021-01-01 DIAGNOSIS — D63.1 ANEMIA IN STAGE 4 CHRONIC KIDNEY DISEASE: Primary | ICD-10-CM

## 2021-01-01 DIAGNOSIS — N18.4 ACUTE RENAL FAILURE SUPERIMPOSED ON STAGE 4 CHRONIC KIDNEY DISEASE, UNSPECIFIED ACUTE RENAL FAILURE TYPE: ICD-10-CM

## 2021-01-01 DIAGNOSIS — R06.02 SHORTNESS OF BREATH: ICD-10-CM

## 2021-01-01 DIAGNOSIS — D72.829 LEUKOCYTOSIS, UNSPECIFIED TYPE: ICD-10-CM

## 2021-01-01 DIAGNOSIS — R31.9 HEMATURIA, UNSPECIFIED TYPE: ICD-10-CM

## 2021-01-01 DIAGNOSIS — Z79.4 TYPE 2 DIABETES MELLITUS WITH STAGE 4 CHRONIC KIDNEY DISEASE, WITH LONG-TERM CURRENT USE OF INSULIN: Primary | Chronic | ICD-10-CM

## 2021-01-01 DIAGNOSIS — R31.9 ANTICOAGULANT-INDUCED HEMATURIA: ICD-10-CM

## 2021-01-01 DIAGNOSIS — E87.70 HYPERVOLEMIA, UNSPECIFIED HYPERVOLEMIA TYPE: ICD-10-CM

## 2021-01-01 DIAGNOSIS — I35.1 AORTIC VALVE INSUFFICIENCY, ETIOLOGY OF CARDIAC VALVE DISEASE UNSPECIFIED: ICD-10-CM

## 2021-01-01 DIAGNOSIS — B35.1 ONYCHOMYCOSIS DUE TO DERMATOPHYTE: ICD-10-CM

## 2021-01-01 DIAGNOSIS — E11.65 TYPE 2 DIABETES MELLITUS WITH HYPERGLYCEMIA, WITH LONG-TERM CURRENT USE OF INSULIN: ICD-10-CM

## 2021-01-01 DIAGNOSIS — E11.9 TYPE 2 DIABETES MELLITUS WITHOUT OPHTHALMIC MANIFESTATIONS: ICD-10-CM

## 2021-01-01 DIAGNOSIS — N18.30 STAGE 3 CHRONIC KIDNEY DISEASE, UNSPECIFIED WHETHER STAGE 3A OR 3B CKD: Primary | ICD-10-CM

## 2021-01-01 DIAGNOSIS — N18.30 STAGE 3 CHRONIC KIDNEY DISEASE, UNSPECIFIED WHETHER STAGE 3A OR 3B CKD: ICD-10-CM

## 2021-01-01 DIAGNOSIS — B95.2 ENTEROCOCCUS UTI: ICD-10-CM

## 2021-01-01 DIAGNOSIS — N30.00 ACUTE CYSTITIS WITHOUT HEMATURIA: Primary | ICD-10-CM

## 2021-01-01 DIAGNOSIS — L97.519 ULCER OF RIGHT FOOT, UNSPECIFIED ULCER STAGE: ICD-10-CM

## 2021-01-01 DIAGNOSIS — Z71.3 DIETARY COUNSELING: ICD-10-CM

## 2021-01-01 DIAGNOSIS — I50.33 ACUTE ON CHRONIC DIASTOLIC CHF (CONGESTIVE HEART FAILURE): ICD-10-CM

## 2021-01-01 DIAGNOSIS — M25.50 POLYARTHRALGIA: Primary | ICD-10-CM

## 2021-01-01 DIAGNOSIS — N52.1 ERECTILE DYSFUNCTION ASSOCIATED WITH TYPE 2 DIABETES MELLITUS: ICD-10-CM

## 2021-01-01 DIAGNOSIS — I65.23 BILATERAL CAROTID ARTERY STENOSIS: ICD-10-CM

## 2021-01-01 DIAGNOSIS — I50.32 CHRONIC DIASTOLIC HEART FAILURE: Chronic | ICD-10-CM

## 2021-01-01 DIAGNOSIS — R76.8 RHEUMATOID FACTOR POSITIVE: Primary | ICD-10-CM

## 2021-01-01 DIAGNOSIS — I50.33 ACUTE ON CHRONIC DIASTOLIC CONGESTIVE HEART FAILURE: Primary | ICD-10-CM

## 2021-01-01 DIAGNOSIS — R26.9 GAIT ABNORMALITY: Primary | ICD-10-CM

## 2021-01-01 DIAGNOSIS — M54.9 BACK PAIN: ICD-10-CM

## 2021-01-01 DIAGNOSIS — I50.9 ACUTE ON CHRONIC CONGESTIVE HEART FAILURE, UNSPECIFIED HEART FAILURE TYPE: ICD-10-CM

## 2021-01-01 DIAGNOSIS — Z79.899 ENCOUNTER FOR MONITORING DIURETIC THERAPY: Primary | ICD-10-CM

## 2021-01-01 DIAGNOSIS — I12.9 HYPERTENSIVE KIDNEY DISEASE WITH STAGE 4 CHRONIC KIDNEY DISEASE: Primary | ICD-10-CM

## 2021-01-01 DIAGNOSIS — N40.0 PROSTATE ENLARGEMENT: ICD-10-CM

## 2021-01-01 DIAGNOSIS — S32.020A COMPRESSION FRACTURE OF L2 VERTEBRA, INITIAL ENCOUNTER: Primary | ICD-10-CM

## 2021-01-01 DIAGNOSIS — E11.9 TYPE 2 DIABETES MELLITUS WITHOUT OPHTHALMIC MANIFESTATIONS: Primary | ICD-10-CM

## 2021-01-01 DIAGNOSIS — K59.00 CONSTIPATION, UNSPECIFIED CONSTIPATION TYPE: Primary | ICD-10-CM

## 2021-01-01 DIAGNOSIS — I74.4 EMBOLISM AND THROMBOSIS OF ARTERIES OF EXTREMITIES, UNSPECIFIED: ICD-10-CM

## 2021-01-01 DIAGNOSIS — Z79.01 LONG TERM (CURRENT) USE OF ANTICOAGULANTS: ICD-10-CM

## 2021-01-01 DIAGNOSIS — I15.2 HYPERTENSION ASSOCIATED WITH DIABETES: Chronic | ICD-10-CM

## 2021-01-01 DIAGNOSIS — R06.02 SOB (SHORTNESS OF BREATH): Primary | ICD-10-CM

## 2021-01-01 DIAGNOSIS — Z51.81 ENCOUNTER FOR MONITORING DIURETIC THERAPY: Primary | ICD-10-CM

## 2021-01-01 DIAGNOSIS — I50.9 ACUTE ON CHRONIC HEART FAILURE: ICD-10-CM

## 2021-01-01 LAB
25(OH)D3+25(OH)D2 SERPL-MCNC: 27 NG/ML (ref 30–96)
ABO + RH BLD: NORMAL
ABO + RH BLD: NORMAL
ALBUMIN SERPL BCP-MCNC: 2.3 G/DL (ref 3.5–5.2)
ALBUMIN SERPL BCP-MCNC: 2.4 G/DL (ref 3.5–5.2)
ALBUMIN SERPL BCP-MCNC: 2.5 G/DL (ref 3.5–5.2)
ALBUMIN SERPL BCP-MCNC: 2.6 G/DL (ref 3.5–5.2)
ALBUMIN SERPL BCP-MCNC: 2.7 G/DL (ref 3.5–5.2)
ALBUMIN SERPL BCP-MCNC: 2.8 G/DL (ref 3.5–5.2)
ALBUMIN SERPL BCP-MCNC: 2.9 G/DL (ref 3.5–5.2)
ALBUMIN SERPL BCP-MCNC: 3 G/DL (ref 3.5–5.2)
ALBUMIN SERPL BCP-MCNC: 3.1 G/DL (ref 3.5–5.2)
ALBUMIN SERPL BCP-MCNC: 3.1 G/DL (ref 3.5–5.2)
ALBUMIN SERPL ELPH-MCNC: 3.01 G/DL (ref 3.35–5.55)
ALP SERPL-CCNC: 103 U/L (ref 55–135)
ALP SERPL-CCNC: 110 U/L (ref 55–135)
ALP SERPL-CCNC: 84 U/L (ref 55–135)
ALP SERPL-CCNC: 84 U/L (ref 55–135)
ALP SERPL-CCNC: 85 U/L (ref 55–135)
ALP SERPL-CCNC: 88 U/L (ref 55–135)
ALP SERPL-CCNC: 89 U/L (ref 55–135)
ALP SERPL-CCNC: 91 U/L (ref 55–135)
ALP SERPL-CCNC: 97 U/L (ref 55–135)
ALPHA1 GLOB SERPL ELPH-MCNC: 0.37 G/DL (ref 0.17–0.41)
ALPHA2 GLOB SERPL ELPH-MCNC: 0.62 G/DL (ref 0.43–0.99)
ALT SERPL W/O P-5'-P-CCNC: 12 U/L (ref 10–44)
ALT SERPL W/O P-5'-P-CCNC: 13 U/L (ref 10–44)
ALT SERPL W/O P-5'-P-CCNC: 13 U/L (ref 10–44)
ALT SERPL W/O P-5'-P-CCNC: 14 U/L (ref 10–44)
ALT SERPL W/O P-5'-P-CCNC: 15 U/L (ref 10–44)
ALT SERPL W/O P-5'-P-CCNC: 17 U/L (ref 10–44)
ALT SERPL W/O P-5'-P-CCNC: 19 U/L (ref 10–44)
ALT SERPL W/O P-5'-P-CCNC: 25 U/L (ref 10–44)
ALT SERPL W/O P-5'-P-CCNC: 27 U/L (ref 10–44)
ANA SER QL IF: NORMAL
ANCA AB TITR SER IF: NORMAL TITER
ANION GAP SERPL CALC-SCNC: 10 MMOL/L (ref 8–16)
ANION GAP SERPL CALC-SCNC: 11 MMOL/L (ref 8–16)
ANION GAP SERPL CALC-SCNC: 12 MMOL/L (ref 8–16)
ANION GAP SERPL CALC-SCNC: 13 MMOL/L (ref 8–16)
ANION GAP SERPL CALC-SCNC: 14 MMOL/L (ref 8–16)
ANION GAP SERPL CALC-SCNC: 15 MMOL/L (ref 8–16)
ANION GAP SERPL CALC-SCNC: 16 MMOL/L (ref 8–16)
ANION GAP SERPL CALC-SCNC: 22 MMOL/L (ref 8–16)
ANION GAP SERPL CALC-SCNC: 7 MMOL/L (ref 8–16)
ANION GAP SERPL CALC-SCNC: 7 MMOL/L (ref 8–16)
ANION GAP SERPL CALC-SCNC: 8 MMOL/L (ref 8–16)
ANION GAP SERPL CALC-SCNC: 8 MMOL/L (ref 8–16)
ANION GAP SERPL CALC-SCNC: 9 MMOL/L (ref 8–16)
ANISOCYTOSIS BLD QL SMEAR: SLIGHT
ASCENDING AORTA: 3.12 CM
ASCENDING AORTA: 3.74 CM
AST SERPL-CCNC: 19 U/L (ref 10–40)
AST SERPL-CCNC: 21 U/L (ref 10–40)
AST SERPL-CCNC: 23 U/L (ref 10–40)
AST SERPL-CCNC: 25 U/L (ref 10–40)
AST SERPL-CCNC: 27 U/L (ref 10–40)
AST SERPL-CCNC: 30 U/L (ref 10–40)
AV INDEX (PROSTH): 0.84
AV INDEX (PROSTH): 1.21
AV MEAN GRADIENT: 3 MMHG
AV MEAN GRADIENT: 5 MMHG
AV PEAK GRADIENT: 7 MMHG
AV PEAK GRADIENT: 7 MMHG
AV VALVE AREA: 3.25 CM2
AV VALVE AREA: 4.36 CM2
AV VELOCITY RATIO: 0.8
AV VELOCITY RATIO: 1.01
B-GLOBULIN SERPL ELPH-MCNC: 0.75 G/DL (ref 0.5–1.1)
BACTERIA #/AREA URNS AUTO: ABNORMAL /HPF
BACTERIA #/AREA URNS HPF: ABNORMAL /HPF
BACTERIA UR CULT: ABNORMAL
BACTERIA UR CULT: NO GROWTH
BACTERIA UR CULT: NO GROWTH
BACTERIA UR CULT: NORMAL
BACTERIA UR CULT: NORMAL
BASOPHILS # BLD AUTO: 0.04 K/UL (ref 0–0.2)
BASOPHILS # BLD AUTO: 0.04 K/UL (ref 0–0.2)
BASOPHILS # BLD AUTO: 0.05 K/UL (ref 0–0.2)
BASOPHILS # BLD AUTO: 0.05 K/UL (ref 0–0.2)
BASOPHILS # BLD AUTO: 0.06 K/UL (ref 0–0.2)
BASOPHILS # BLD AUTO: 0.07 K/UL (ref 0–0.2)
BASOPHILS # BLD AUTO: 0.08 K/UL (ref 0–0.2)
BASOPHILS # BLD AUTO: 0.1 K/UL (ref 0–0.2)
BASOPHILS # BLD AUTO: 0.12 K/UL (ref 0–0.2)
BASOPHILS NFR BLD: 0.3 % (ref 0–1.9)
BASOPHILS NFR BLD: 0.5 % (ref 0–1.9)
BASOPHILS NFR BLD: 0.6 % (ref 0–1.9)
BASOPHILS NFR BLD: 0.7 % (ref 0–1.9)
BASOPHILS NFR BLD: 0.8 % (ref 0–1.9)
BASOPHILS NFR BLD: 0.9 % (ref 0–1.9)
BASOPHILS NFR BLD: 1 % (ref 0–1.9)
BILIRUB SERPL-MCNC: 0.5 MG/DL (ref 0.1–1)
BILIRUB SERPL-MCNC: 0.6 MG/DL (ref 0.1–1)
BILIRUB SERPL-MCNC: 0.6 MG/DL (ref 0.1–1)
BILIRUB SERPL-MCNC: 0.7 MG/DL (ref 0.1–1)
BILIRUB SERPL-MCNC: 0.8 MG/DL (ref 0.1–1)
BILIRUB SERPL-MCNC: 0.9 MG/DL (ref 0.1–1)
BILIRUB UR QL STRIP: NEGATIVE
BLD GP AB SCN CELLS X3 SERPL QL: NORMAL
BLD GP AB SCN CELLS X3 SERPL QL: NORMAL
BLD PROD TYP BPU: NORMAL
BLD PROD TYP BPU: NORMAL
BLOOD UNIT EXPIRATION DATE: NORMAL
BLOOD UNIT EXPIRATION DATE: NORMAL
BLOOD UNIT TYPE CODE: 5100
BLOOD UNIT TYPE CODE: 5100
BLOOD UNIT TYPE: NORMAL
BLOOD UNIT TYPE: NORMAL
BNP SERPL-MCNC: 681 PG/ML (ref 0–99)
BNP SERPL-MCNC: 752 PG/ML (ref 0–99)
BNP SERPL-MCNC: 894 PG/ML (ref 0–99)
BNP SERPL-MCNC: 969 PG/ML (ref 0–99)
BNP SERPL-MCNC: 981 PG/ML (ref 0–99)
BSA FOR ECHO PROCEDURE: 2.13 M2
BSA FOR ECHO PROCEDURE: 2.14 M2
BUN SERPL-MCNC: 105 MG/DL (ref 8–23)
BUN SERPL-MCNC: 106 MG/DL (ref 8–23)
BUN SERPL-MCNC: 112 MG/DL (ref 8–23)
BUN SERPL-MCNC: 15 MG/DL (ref 8–23)
BUN SERPL-MCNC: 35 MG/DL (ref 8–23)
BUN SERPL-MCNC: 40 MG/DL (ref 8–23)
BUN SERPL-MCNC: 41 MG/DL (ref 8–23)
BUN SERPL-MCNC: 42 MG/DL (ref 8–23)
BUN SERPL-MCNC: 42 MG/DL (ref 8–23)
BUN SERPL-MCNC: 43 MG/DL (ref 8–23)
BUN SERPL-MCNC: 45 MG/DL (ref 8–23)
BUN SERPL-MCNC: 47 MG/DL (ref 8–23)
BUN SERPL-MCNC: 48 MG/DL (ref 8–23)
BUN SERPL-MCNC: 48 MG/DL (ref 8–23)
BUN SERPL-MCNC: 50 MG/DL (ref 8–23)
BUN SERPL-MCNC: 50 MG/DL (ref 8–23)
BUN SERPL-MCNC: 51 MG/DL (ref 8–23)
BUN SERPL-MCNC: 51 MG/DL (ref 8–23)
BUN SERPL-MCNC: 52 MG/DL (ref 8–23)
BUN SERPL-MCNC: 52 MG/DL (ref 8–23)
BUN SERPL-MCNC: 53 MG/DL (ref 8–23)
BUN SERPL-MCNC: 55 MG/DL (ref 8–23)
BUN SERPL-MCNC: 55 MG/DL (ref 8–23)
BUN SERPL-MCNC: 57 MG/DL (ref 8–23)
BUN SERPL-MCNC: 58 MG/DL (ref 8–23)
BUN SERPL-MCNC: 58 MG/DL (ref 8–23)
BUN SERPL-MCNC: 61 MG/DL (ref 8–23)
BUN SERPL-MCNC: 62 MG/DL (ref 8–23)
BUN SERPL-MCNC: 65 MG/DL (ref 8–23)
BUN SERPL-MCNC: 66 MG/DL (ref 8–23)
BUN SERPL-MCNC: 67 MG/DL (ref 8–23)
BUN SERPL-MCNC: 69 MG/DL (ref 8–23)
BUN SERPL-MCNC: 70 MG/DL (ref 8–23)
BUN SERPL-MCNC: 71 MG/DL (ref 8–23)
BUN SERPL-MCNC: 72 MG/DL (ref 8–23)
BUN SERPL-MCNC: 73 MG/DL (ref 8–23)
BUN SERPL-MCNC: 78 MG/DL (ref 8–23)
BUN SERPL-MCNC: 78 MG/DL (ref 8–23)
BUN SERPL-MCNC: 79 MG/DL (ref 8–23)
BUN SERPL-MCNC: 85 MG/DL (ref 8–23)
BUN SERPL-MCNC: 93 MG/DL (ref 8–23)
BUN SERPL-MCNC: 99 MG/DL (ref 8–23)
BURR CELLS BLD QL SMEAR: ABNORMAL
CALCIUM SERPL-MCNC: 7.6 MG/DL (ref 8.7–10.5)
CALCIUM SERPL-MCNC: 7.6 MG/DL (ref 8.7–10.5)
CALCIUM SERPL-MCNC: 7.7 MG/DL (ref 8.7–10.5)
CALCIUM SERPL-MCNC: 7.8 MG/DL (ref 8.7–10.5)
CALCIUM SERPL-MCNC: 7.9 MG/DL (ref 8.7–10.5)
CALCIUM SERPL-MCNC: 8 MG/DL (ref 8.7–10.5)
CALCIUM SERPL-MCNC: 8.1 MG/DL (ref 8.7–10.5)
CALCIUM SERPL-MCNC: 8.2 MG/DL (ref 8.7–10.5)
CALCIUM SERPL-MCNC: 8.3 MG/DL (ref 8.7–10.5)
CALCIUM SERPL-MCNC: 8.4 MG/DL (ref 8.7–10.5)
CALCIUM SERPL-MCNC: 8.5 MG/DL (ref 8.7–10.5)
CALCIUM SERPL-MCNC: 8.5 MG/DL (ref 8.7–10.5)
CALCIUM SERPL-MCNC: 8.6 MG/DL (ref 8.7–10.5)
CALCIUM SERPL-MCNC: 8.7 MG/DL (ref 8.7–10.5)
CALCIUM SERPL-MCNC: 8.7 MG/DL (ref 8.7–10.5)
CALCIUM SERPL-MCNC: 8.8 MG/DL (ref 8.7–10.5)
CALCIUM SERPL-MCNC: 8.9 MG/DL (ref 8.7–10.5)
CALCIUM SERPL-MCNC: 8.9 MG/DL (ref 8.7–10.5)
CALCIUM SERPL-MCNC: 9 MG/DL (ref 8.7–10.5)
CALCIUM SERPL-MCNC: 9.1 MG/DL (ref 8.7–10.5)
CHLORIDE SERPL-SCNC: 100 MMOL/L (ref 95–110)
CHLORIDE SERPL-SCNC: 101 MMOL/L (ref 95–110)
CHLORIDE SERPL-SCNC: 103 MMOL/L (ref 95–110)
CHLORIDE SERPL-SCNC: 104 MMOL/L (ref 95–110)
CHLORIDE SERPL-SCNC: 105 MMOL/L (ref 95–110)
CHLORIDE SERPL-SCNC: 105 MMOL/L (ref 95–110)
CHLORIDE SERPL-SCNC: 106 MMOL/L (ref 95–110)
CHLORIDE SERPL-SCNC: 106 MMOL/L (ref 95–110)
CHLORIDE SERPL-SCNC: 107 MMOL/L (ref 95–110)
CHLORIDE SERPL-SCNC: 108 MMOL/L (ref 95–110)
CHLORIDE SERPL-SCNC: 109 MMOL/L (ref 95–110)
CHLORIDE SERPL-SCNC: 110 MMOL/L (ref 95–110)
CHLORIDE SERPL-SCNC: 111 MMOL/L (ref 95–110)
CHLORIDE SERPL-SCNC: 112 MMOL/L (ref 95–110)
CHLORIDE SERPL-SCNC: 112 MMOL/L (ref 95–110)
CHLORIDE SERPL-SCNC: 113 MMOL/L (ref 95–110)
CHLORIDE SERPL-SCNC: 114 MMOL/L (ref 95–110)
CHLORIDE SERPL-SCNC: 115 MMOL/L (ref 95–110)
CHLORIDE SERPL-SCNC: 97 MMOL/L (ref 95–110)
CHLORIDE SERPL-SCNC: 98 MMOL/L (ref 95–110)
CHLORIDE SERPL-SCNC: 99 MMOL/L (ref 95–110)
CHLORIDE SERPL-SCNC: 99 MMOL/L (ref 95–110)
CHOLEST SERPL-MCNC: 141 MG/DL (ref 120–199)
CHOLEST/HDLC SERPL: 2.7 {RATIO} (ref 2–5)
CLARITY UR REFRACT.AUTO: ABNORMAL
CLARITY UR REFRACT.AUTO: CLEAR
CLARITY UR: CLEAR
CO2 SERPL-SCNC: 12 MMOL/L (ref 23–29)
CO2 SERPL-SCNC: 14 MMOL/L (ref 23–29)
CO2 SERPL-SCNC: 17 MMOL/L (ref 23–29)
CO2 SERPL-SCNC: 18 MMOL/L (ref 23–29)
CO2 SERPL-SCNC: 19 MMOL/L (ref 23–29)
CO2 SERPL-SCNC: 20 MMOL/L (ref 23–29)
CO2 SERPL-SCNC: 21 MMOL/L (ref 23–29)
CO2 SERPL-SCNC: 22 MMOL/L (ref 23–29)
CO2 SERPL-SCNC: 23 MMOL/L (ref 23–29)
CO2 SERPL-SCNC: 24 MMOL/L (ref 23–29)
CO2 SERPL-SCNC: 25 MMOL/L (ref 23–29)
CO2 SERPL-SCNC: 25 MMOL/L (ref 23–29)
CO2 SERPL-SCNC: 26 MMOL/L (ref 23–29)
CODING SYSTEM: NORMAL
CODING SYSTEM: NORMAL
COLOR UR AUTO: ABNORMAL
COLOR UR AUTO: YELLOW
COLOR UR: YELLOW
CREAT SERPL-MCNC: 2.8 MG/DL (ref 0.5–1.4)
CREAT SERPL-MCNC: 3 MG/DL (ref 0.5–1.4)
CREAT SERPL-MCNC: 3 MG/DL (ref 0.5–1.4)
CREAT SERPL-MCNC: 3.1 MG/DL (ref 0.5–1.4)
CREAT SERPL-MCNC: 3.2 MG/DL (ref 0.5–1.4)
CREAT SERPL-MCNC: 3.2 MG/DL (ref 0.5–1.4)
CREAT SERPL-MCNC: 3.3 MG/DL (ref 0.5–1.4)
CREAT SERPL-MCNC: 3.5 MG/DL (ref 0.5–1.4)
CREAT SERPL-MCNC: 3.6 MG/DL (ref 0.5–1.4)
CREAT SERPL-MCNC: 3.7 MG/DL (ref 0.5–1.4)
CREAT SERPL-MCNC: 3.8 MG/DL (ref 0.5–1.4)
CREAT SERPL-MCNC: 3.9 MG/DL (ref 0.5–1.4)
CREAT SERPL-MCNC: 3.9 MG/DL (ref 0.5–1.4)
CREAT SERPL-MCNC: 4 MG/DL (ref 0.5–1.4)
CREAT SERPL-MCNC: 4 MG/DL (ref 0.5–1.4)
CREAT SERPL-MCNC: 4.1 MG/DL (ref 0.5–1.4)
CREAT SERPL-MCNC: 4.2 MG/DL (ref 0.5–1.4)
CREAT SERPL-MCNC: 4.2 MG/DL (ref 0.5–1.4)
CREAT SERPL-MCNC: 4.3 MG/DL (ref 0.5–1.4)
CREAT SERPL-MCNC: 4.4 MG/DL (ref 0.5–1.4)
CREAT SERPL-MCNC: 4.6 MG/DL (ref 0.5–1.4)
CREAT SERPL-MCNC: 4.7 MG/DL (ref 0.5–1.4)
CREAT SERPL-MCNC: 4.8 MG/DL (ref 0.5–1.4)
CREAT SERPL-MCNC: 4.8 MG/DL (ref 0.5–1.4)
CREAT SERPL-MCNC: 4.9 MG/DL (ref 0.5–1.4)
CREAT SERPL-MCNC: 4.9 MG/DL (ref 0.5–1.4)
CREAT SERPL-MCNC: 5 MG/DL (ref 0.5–1.4)
CREAT SERPL-MCNC: 5.1 MG/DL (ref 0.5–1.4)
CREAT SERPL-MCNC: 5.2 MG/DL (ref 0.5–1.4)
CREAT SERPL-MCNC: 5.3 MG/DL (ref 0.5–1.4)
CREAT SERPL-MCNC: 5.3 MG/DL (ref 0.5–1.4)
CREAT SERPL-MCNC: 5.4 MG/DL (ref 0.5–1.4)
CREAT SERPL-MCNC: 5.5 MG/DL (ref 0.5–1.4)
CREAT SERPL-MCNC: 5.8 MG/DL (ref 0.5–1.4)
CREAT SERPL-MCNC: 5.9 MG/DL (ref 0.5–1.4)
CREAT SERPL-MCNC: 5.9 MG/DL (ref 0.5–1.4)
CREAT SERPL-MCNC: 6.1 MG/DL (ref 0.5–1.4)
CREAT SERPL-MCNC: 6.2 MG/DL (ref 0.5–1.4)
CREAT SERPL-MCNC: 6.4 MG/DL (ref 0.5–1.4)
CREAT SERPL-MCNC: 6.5 MG/DL (ref 0.5–1.4)
CREAT UR-MCNC: 76 MG/DL (ref 23–375)
CREAT UR-MCNC: 89 MG/DL (ref 23–375)
CTP QC/QA: YES
CV ECHO LV RWT: 0.47 CM
CV ECHO LV RWT: 0.47 CM
CYSTATIN C SERPL-MCNC: 3.63 MG/L (ref 0.67–1.21)
DACRYOCYTES BLD QL SMEAR: ABNORMAL
DIFFERENTIAL METHOD: ABNORMAL
DISPENSE STATUS: NORMAL
DISPENSE STATUS: NORMAL
DOP CALC AO PEAK VEL: 1.34 M/S
DOP CALC AO PEAK VEL: 1.34 M/S
DOP CALC AO VTI: 25.48 CM
DOP CALC AO VTI: 26.67 CM
DOP CALC LVOT AREA: 3.6 CM2
DOP CALC LVOT AREA: 3.9 CM2
DOP CALC LVOT DIAMETER: 2.14 CM
DOP CALC LVOT DIAMETER: 2.22 CM
DOP CALC LVOT PEAK VEL: 1.07 M/S
DOP CALC LVOT PEAK VEL: 1.36 M/S
DOP CALC LVOT STROKE VOLUME: 116.26 CM3
DOP CALC LVOT STROKE VOLUME: 82.75 CM3
DOP CALCLVOT PEAK VEL VTI: 21.39 CM
DOP CALCLVOT PEAK VEL VTI: 32.34 CM
E/E' RATIO: 14.84 M/S
E/E' RATIO: 15.53 M/S
ECHO LV POSTERIOR WALL: 1.08 CM (ref 0.6–1.1)
ECHO LV POSTERIOR WALL: 1.21 CM (ref 0.6–1.1)
EJECTION FRACTION: 68 %
EOSINOPHIL # BLD AUTO: 0 K/UL (ref 0–0.5)
EOSINOPHIL # BLD AUTO: 0.1 K/UL (ref 0–0.5)
EOSINOPHIL # BLD AUTO: 0.2 K/UL (ref 0–0.5)
EOSINOPHIL # BLD AUTO: 0.2 K/UL (ref 0–0.5)
EOSINOPHIL # BLD AUTO: 0.3 K/UL (ref 0–0.5)
EOSINOPHIL # BLD AUTO: 0.3 K/UL (ref 0–0.5)
EOSINOPHIL # BLD AUTO: 0.4 K/UL (ref 0–0.5)
EOSINOPHIL # BLD AUTO: 0.5 K/UL (ref 0–0.5)
EOSINOPHIL # BLD AUTO: 0.6 K/UL (ref 0–0.5)
EOSINOPHIL # BLD AUTO: 0.7 K/UL (ref 0–0.5)
EOSINOPHIL NFR BLD: 0.1 % (ref 0–8)
EOSINOPHIL NFR BLD: 0.7 % (ref 0–8)
EOSINOPHIL NFR BLD: 1.3 % (ref 0–8)
EOSINOPHIL NFR BLD: 2.8 % (ref 0–8)
EOSINOPHIL NFR BLD: 3.3 % (ref 0–8)
EOSINOPHIL NFR BLD: 3.3 % (ref 0–8)
EOSINOPHIL NFR BLD: 3.4 % (ref 0–8)
EOSINOPHIL NFR BLD: 3.9 % (ref 0–8)
EOSINOPHIL NFR BLD: 4.6 % (ref 0–8)
EOSINOPHIL NFR BLD: 4.6 % (ref 0–8)
EOSINOPHIL NFR BLD: 4.8 % (ref 0–8)
EOSINOPHIL NFR BLD: 4.9 % (ref 0–8)
EOSINOPHIL NFR BLD: 4.9 % (ref 0–8)
EOSINOPHIL NFR BLD: 5.3 % (ref 0–8)
EOSINOPHIL NFR BLD: 5.3 % (ref 0–8)
EOSINOPHIL NFR BLD: 5.4 % (ref 0–8)
EOSINOPHIL NFR BLD: 5.7 % (ref 0–8)
EOSINOPHIL NFR BLD: 6 % (ref 0–8)
EOSINOPHIL NFR BLD: 6.1 % (ref 0–8)
EOSINOPHIL NFR BLD: 6.2 % (ref 0–8)
EOSINOPHIL NFR BLD: 6.5 % (ref 0–8)
EOSINOPHIL NFR BLD: 7.4 % (ref 0–8)
EOSINOPHIL NFR BLD: 7.6 % (ref 0–8)
EOSINOPHIL NFR BLD: 7.7 % (ref 0–8)
EOSINOPHIL NFR BLD: 9.1 % (ref 0–8)
ERYTHROCYTE [DISTWIDTH] IN BLOOD BY AUTOMATED COUNT: 14.6 % (ref 11.5–14.5)
ERYTHROCYTE [DISTWIDTH] IN BLOOD BY AUTOMATED COUNT: 14.6 % (ref 11.5–14.5)
ERYTHROCYTE [DISTWIDTH] IN BLOOD BY AUTOMATED COUNT: 14.8 % (ref 11.5–14.5)
ERYTHROCYTE [DISTWIDTH] IN BLOOD BY AUTOMATED COUNT: 14.9 % (ref 11.5–14.5)
ERYTHROCYTE [DISTWIDTH] IN BLOOD BY AUTOMATED COUNT: 15.1 % (ref 11.5–14.5)
ERYTHROCYTE [DISTWIDTH] IN BLOOD BY AUTOMATED COUNT: 15.2 % (ref 11.5–14.5)
ERYTHROCYTE [DISTWIDTH] IN BLOOD BY AUTOMATED COUNT: 15.3 % (ref 11.5–14.5)
ERYTHROCYTE [DISTWIDTH] IN BLOOD BY AUTOMATED COUNT: 15.4 % (ref 11.5–14.5)
ERYTHROCYTE [DISTWIDTH] IN BLOOD BY AUTOMATED COUNT: 15.5 % (ref 11.5–14.5)
ERYTHROCYTE [DISTWIDTH] IN BLOOD BY AUTOMATED COUNT: 15.6 % (ref 11.5–14.5)
ERYTHROCYTE [DISTWIDTH] IN BLOOD BY AUTOMATED COUNT: 15.7 % (ref 11.5–14.5)
ERYTHROCYTE [DISTWIDTH] IN BLOOD BY AUTOMATED COUNT: 15.9 % (ref 11.5–14.5)
ERYTHROCYTE [DISTWIDTH] IN BLOOD BY AUTOMATED COUNT: 16.1 % (ref 11.5–14.5)
ERYTHROCYTE [DISTWIDTH] IN BLOOD BY AUTOMATED COUNT: 16.3 % (ref 11.5–14.5)
ERYTHROCYTE [DISTWIDTH] IN BLOOD BY AUTOMATED COUNT: 16.4 % (ref 11.5–14.5)
ERYTHROCYTE [DISTWIDTH] IN BLOOD BY AUTOMATED COUNT: 16.5 % (ref 11.5–14.5)
ERYTHROCYTE [DISTWIDTH] IN BLOOD BY AUTOMATED COUNT: 16.7 % (ref 11.5–14.5)
ERYTHROCYTE [DISTWIDTH] IN BLOOD BY AUTOMATED COUNT: 18.4 % (ref 11.5–14.5)
ERYTHROCYTE [DISTWIDTH] IN BLOOD BY AUTOMATED COUNT: 18.5 % (ref 11.5–14.5)
EST. GFR  (AFRICAN AMERICAN): 10 ML/MIN/1.73 M^2
EST. GFR  (AFRICAN AMERICAN): 10.4 ML/MIN/1.73 M^2
EST. GFR  (AFRICAN AMERICAN): 10.4 ML/MIN/1.73 M^2
EST. GFR  (AFRICAN AMERICAN): 10.6 ML/MIN/1.73 M^2
EST. GFR  (AFRICAN AMERICAN): 11.3 ML/MIN/1.73 M^2
EST. GFR  (AFRICAN AMERICAN): 11.7 ML/MIN/1.73 M^2
EST. GFR  (AFRICAN AMERICAN): 11.9 ML/MIN/1.73 M^2
EST. GFR  (AFRICAN AMERICAN): 11.9 ML/MIN/1.73 M^2
EST. GFR  (AFRICAN AMERICAN): 12.2 ML/MIN/1.73 M^2
EST. GFR  (AFRICAN AMERICAN): 12.4 ML/MIN/1.73 M^2
EST. GFR  (AFRICAN AMERICAN): 12.8 ML/MIN/1.73 M^2
EST. GFR  (AFRICAN AMERICAN): 13 ML/MIN/1.73 M^2
EST. GFR  (AFRICAN AMERICAN): 13.1 ML/MIN/1.73 M^2
EST. GFR  (AFRICAN AMERICAN): 13.4 ML/MIN/1.73 M^2
EST. GFR  (AFRICAN AMERICAN): 13.5 ML/MIN/1.73 M^2
EST. GFR  (AFRICAN AMERICAN): 13.7 ML/MIN/1.73 M^2
EST. GFR  (AFRICAN AMERICAN): 14.1 ML/MIN/1.73 M^2
EST. GFR  (AFRICAN AMERICAN): 14.9 ML/MIN/1.73 M^2
EST. GFR  (AFRICAN AMERICAN): 15 ML/MIN/1.73 M^2
EST. GFR  (AFRICAN AMERICAN): 15.8 ML/MIN/1.73 M^2
EST. GFR  (AFRICAN AMERICAN): 16 ML/MIN/1.73 M^2
EST. GFR  (AFRICAN AMERICAN): 16.3 ML/MIN/1.73 M^2
EST. GFR  (AFRICAN AMERICAN): 16.8 ML/MIN/1.73 M^2
EST. GFR  (AFRICAN AMERICAN): 16.8 ML/MIN/1.73 M^2
EST. GFR  (AFRICAN AMERICAN): 17.2 ML/MIN/1.73 M^2
EST. GFR  (AFRICAN AMERICAN): 17.3 ML/MIN/1.73 M^2
EST. GFR  (AFRICAN AMERICAN): 17.8 ML/MIN/1.73 M^2
EST. GFR  (AFRICAN AMERICAN): 18 ML/MIN/1.73 M^2
EST. GFR  (AFRICAN AMERICAN): 18.4 ML/MIN/1.73 M^2
EST. GFR  (AFRICAN AMERICAN): 19 ML/MIN/1.73 M^2
EST. GFR  (AFRICAN AMERICAN): 19.7 ML/MIN/1.73 M^2
EST. GFR  (AFRICAN AMERICAN): 21.2 ML/MIN/1.73 M^2
EST. GFR  (AFRICAN AMERICAN): 22 ML/MIN/1.73 M^2
EST. GFR  (AFRICAN AMERICAN): 22 ML/MIN/1.73 M^2
EST. GFR  (AFRICAN AMERICAN): 22.8 ML/MIN/1.73 M^2
EST. GFR  (AFRICAN AMERICAN): 22.8 ML/MIN/1.73 M^2
EST. GFR  (AFRICAN AMERICAN): 23 ML/MIN/1.73 M^2
EST. GFR  (AFRICAN AMERICAN): 23 ML/MIN/1.73 M^2
EST. GFR  (AFRICAN AMERICAN): 23.7 ML/MIN/1.73 M^2
EST. GFR  (AFRICAN AMERICAN): 23.7 ML/MIN/1.73 M^2
EST. GFR  (AFRICAN AMERICAN): 25.6 ML/MIN/1.73 M^2
EST. GFR  (AFRICAN AMERICAN): 9.3 ML/MIN/1.73 M^2
EST. GFR  (AFRICAN AMERICAN): 9.4 ML/MIN/1.73 M^2
EST. GFR  (AFRICAN AMERICAN): 9.8 ML/MIN/1.73 M^2
EST. GFR  (NON AFRICAN AMERICAN): 10.1 ML/MIN/1.73 M^2
EST. GFR  (NON AFRICAN AMERICAN): 10.3 ML/MIN/1.73 M^2
EST. GFR  (NON AFRICAN AMERICAN): 10.3 ML/MIN/1.73 M^2
EST. GFR  (NON AFRICAN AMERICAN): 10.6 ML/MIN/1.73 M^2
EST. GFR  (NON AFRICAN AMERICAN): 10.7 ML/MIN/1.73 M^2
EST. GFR  (NON AFRICAN AMERICAN): 11.1 ML/MIN/1.73 M^2
EST. GFR  (NON AFRICAN AMERICAN): 11.3 ML/MIN/1.73 M^2
EST. GFR  (NON AFRICAN AMERICAN): 11.4 ML/MIN/1.73 M^2
EST. GFR  (NON AFRICAN AMERICAN): 11.6 ML/MIN/1.73 M^2
EST. GFR  (NON AFRICAN AMERICAN): 11.6 ML/MIN/1.73 M^2
EST. GFR  (NON AFRICAN AMERICAN): 11.9 ML/MIN/1.73 M^2
EST. GFR  (NON AFRICAN AMERICAN): 12.2 ML/MIN/1.73 M^2
EST. GFR  (NON AFRICAN AMERICAN): 12.9 ML/MIN/1.73 M^2
EST. GFR  (NON AFRICAN AMERICAN): 13 ML/MIN/1.73 M^2
EST. GFR  (NON AFRICAN AMERICAN): 13.7 ML/MIN/1.73 M^2
EST. GFR  (NON AFRICAN AMERICAN): 14 ML/MIN/1.73 M^2
EST. GFR  (NON AFRICAN AMERICAN): 14.1 ML/MIN/1.73 M^2
EST. GFR  (NON AFRICAN AMERICAN): 14.5 ML/MIN/1.73 M^2
EST. GFR  (NON AFRICAN AMERICAN): 14.5 ML/MIN/1.73 M^2
EST. GFR  (NON AFRICAN AMERICAN): 14.9 ML/MIN/1.73 M^2
EST. GFR  (NON AFRICAN AMERICAN): 15 ML/MIN/1.73 M^2
EST. GFR  (NON AFRICAN AMERICAN): 15 ML/MIN/1.73 M^2
EST. GFR  (NON AFRICAN AMERICAN): 15.4 ML/MIN/1.73 M^2
EST. GFR  (NON AFRICAN AMERICAN): 15.9 ML/MIN/1.73 M^2
EST. GFR  (NON AFRICAN AMERICAN): 16 ML/MIN/1.73 M^2
EST. GFR  (NON AFRICAN AMERICAN): 17 ML/MIN/1.73 M^2
EST. GFR  (NON AFRICAN AMERICAN): 18.3 ML/MIN/1.73 M^2
EST. GFR  (NON AFRICAN AMERICAN): 19 ML/MIN/1.73 M^2
EST. GFR  (NON AFRICAN AMERICAN): 19 ML/MIN/1.73 M^2
EST. GFR  (NON AFRICAN AMERICAN): 19.7 ML/MIN/1.73 M^2
EST. GFR  (NON AFRICAN AMERICAN): 19.7 ML/MIN/1.73 M^2
EST. GFR  (NON AFRICAN AMERICAN): 20 ML/MIN/1.73 M^2
EST. GFR  (NON AFRICAN AMERICAN): 20 ML/MIN/1.73 M^2
EST. GFR  (NON AFRICAN AMERICAN): 20.5 ML/MIN/1.73 M^2
EST. GFR  (NON AFRICAN AMERICAN): 20.5 ML/MIN/1.73 M^2
EST. GFR  (NON AFRICAN AMERICAN): 22.2 ML/MIN/1.73 M^2
EST. GFR  (NON AFRICAN AMERICAN): 8 ML/MIN/1.73 M^2
EST. GFR  (NON AFRICAN AMERICAN): 8.2 ML/MIN/1.73 M^2
EST. GFR  (NON AFRICAN AMERICAN): 8.5 ML/MIN/1.73 M^2
EST. GFR  (NON AFRICAN AMERICAN): 8.6 ML/MIN/1.73 M^2
EST. GFR  (NON AFRICAN AMERICAN): 9 ML/MIN/1.73 M^2
EST. GFR  (NON AFRICAN AMERICAN): 9 ML/MIN/1.73 M^2
EST. GFR  (NON AFRICAN AMERICAN): 9.2 ML/MIN/1.73 M^2
EST. GFR  (NON AFRICAN AMERICAN): 9.8 ML/MIN/1.73 M^2
ESTIMATED AVG GLUCOSE: 85 MG/DL (ref 68–131)
ESTIMATED AVG GLUCOSE: 91 MG/DL (ref 68–131)
FERRITIN SERPL-MCNC: 113 NG/ML (ref 20–300)
FERRITIN SERPL-MCNC: 892 NG/ML (ref 20–300)
FERRITIN SERPL-MCNC: 969 NG/ML (ref 20–300)
FRACTIONAL SHORTENING: 32 % (ref 28–44)
FRACTIONAL SHORTENING: 38 % (ref 28–44)
GAMMA GLOB SERPL ELPH-MCNC: 1.56 G/DL (ref 0.67–1.58)
GFR/BSA.PRED SERPLBLD CYS-BASED-ARV: 14 ML/MIN/BSA
GIANT PLATELETS BLD QL SMEAR: PRESENT
GLUCOSE SERPL-MCNC: 102 MG/DL (ref 70–110)
GLUCOSE SERPL-MCNC: 103 MG/DL (ref 70–110)
GLUCOSE SERPL-MCNC: 103 MG/DL (ref 70–110)
GLUCOSE SERPL-MCNC: 107 MG/DL (ref 70–110)
GLUCOSE SERPL-MCNC: 110 MG/DL (ref 70–110)
GLUCOSE SERPL-MCNC: 112 MG/DL (ref 70–110)
GLUCOSE SERPL-MCNC: 114 MG/DL (ref 70–110)
GLUCOSE SERPL-MCNC: 116 MG/DL (ref 70–110)
GLUCOSE SERPL-MCNC: 117 MG/DL (ref 70–110)
GLUCOSE SERPL-MCNC: 117 MG/DL (ref 70–110)
GLUCOSE SERPL-MCNC: 118 MG/DL (ref 70–110)
GLUCOSE SERPL-MCNC: 119 MG/DL (ref 70–110)
GLUCOSE SERPL-MCNC: 119 MG/DL (ref 70–110)
GLUCOSE SERPL-MCNC: 120 MG/DL (ref 70–110)
GLUCOSE SERPL-MCNC: 121 MG/DL (ref 70–110)
GLUCOSE SERPL-MCNC: 123 MG/DL (ref 70–110)
GLUCOSE SERPL-MCNC: 123 MG/DL (ref 70–110)
GLUCOSE SERPL-MCNC: 124 MG/DL (ref 70–110)
GLUCOSE SERPL-MCNC: 124 MG/DL (ref 70–110)
GLUCOSE SERPL-MCNC: 125 MG/DL (ref 70–110)
GLUCOSE SERPL-MCNC: 126 MG/DL (ref 70–110)
GLUCOSE SERPL-MCNC: 126 MG/DL (ref 70–110)
GLUCOSE SERPL-MCNC: 128 MG/DL (ref 70–110)
GLUCOSE SERPL-MCNC: 129 MG/DL (ref 70–110)
GLUCOSE SERPL-MCNC: 129 MG/DL (ref 70–110)
GLUCOSE SERPL-MCNC: 130 MG/DL (ref 70–110)
GLUCOSE SERPL-MCNC: 135 MG/DL (ref 70–110)
GLUCOSE SERPL-MCNC: 138 MG/DL (ref 70–110)
GLUCOSE SERPL-MCNC: 139 MG/DL (ref 70–110)
GLUCOSE SERPL-MCNC: 142 MG/DL (ref 70–110)
GLUCOSE SERPL-MCNC: 145 MG/DL (ref 70–110)
GLUCOSE SERPL-MCNC: 147 MG/DL (ref 70–110)
GLUCOSE SERPL-MCNC: 148 MG/DL (ref 70–110)
GLUCOSE SERPL-MCNC: 150 MG/DL (ref 70–110)
GLUCOSE SERPL-MCNC: 150 MG/DL (ref 70–110)
GLUCOSE SERPL-MCNC: 155 MG/DL (ref 70–110)
GLUCOSE SERPL-MCNC: 162 MG/DL (ref 70–110)
GLUCOSE SERPL-MCNC: 162 MG/DL (ref 70–110)
GLUCOSE SERPL-MCNC: 167 MG/DL (ref 70–110)
GLUCOSE SERPL-MCNC: 178 MG/DL (ref 70–110)
GLUCOSE SERPL-MCNC: 187 MG/DL (ref 70–110)
GLUCOSE SERPL-MCNC: 187 MG/DL (ref 70–110)
GLUCOSE SERPL-MCNC: 190 MG/DL (ref 70–110)
GLUCOSE SERPL-MCNC: 209 MG/DL (ref 70–110)
GLUCOSE SERPL-MCNC: 96 MG/DL (ref 70–110)
GLUCOSE SERPL-MCNC: 99 MG/DL (ref 70–110)
GLUCOSE UR QL STRIP: ABNORMAL
HAV IGM SERPL QL IA: NEGATIVE
HBA1C MFR BLD: 4.6 % (ref 4–5.6)
HBA1C MFR BLD: 4.8 % (ref 4–5.6)
HBV CORE AB SERPL QL IA: NEGATIVE
HBV SURFACE AB SER-ACNC: NEGATIVE M[IU]/ML
HBV SURFACE AG SERPL QL IA: NEGATIVE
HCT VFR BLD AUTO: 18.8 % (ref 40–54)
HCT VFR BLD AUTO: 19 % (ref 40–54)
HCT VFR BLD AUTO: 19.4 % (ref 40–54)
HCT VFR BLD AUTO: 19.5 % (ref 40–54)
HCT VFR BLD AUTO: 20 % (ref 40–54)
HCT VFR BLD AUTO: 20.1 % (ref 40–54)
HCT VFR BLD AUTO: 20.1 % (ref 40–54)
HCT VFR BLD AUTO: 20.3 % (ref 40–54)
HCT VFR BLD AUTO: 20.9 % (ref 40–54)
HCT VFR BLD AUTO: 21 % (ref 40–54)
HCT VFR BLD AUTO: 21.1 % (ref 40–54)
HCT VFR BLD AUTO: 21.2 % (ref 40–54)
HCT VFR BLD AUTO: 21.5 % (ref 40–54)
HCT VFR BLD AUTO: 21.5 % (ref 40–54)
HCT VFR BLD AUTO: 21.6 % (ref 40–54)
HCT VFR BLD AUTO: 21.7 % (ref 40–54)
HCT VFR BLD AUTO: 21.8 % (ref 40–54)
HCT VFR BLD AUTO: 22.6 % (ref 40–54)
HCT VFR BLD AUTO: 22.7 % (ref 40–54)
HCT VFR BLD AUTO: 23 % (ref 40–54)
HCT VFR BLD AUTO: 24.9 % (ref 40–54)
HCT VFR BLD AUTO: 25.8 % (ref 40–54)
HCT VFR BLD AUTO: 30 % (ref 40–54)
HCV AB SERPL QL IA: NEGATIVE
HDLC SERPL-MCNC: 53 MG/DL (ref 40–75)
HDLC SERPL: 37.6 % (ref 20–50)
HGB BLD-MCNC: 10.2 G/DL (ref 14–18)
HGB BLD-MCNC: 6.8 G/DL (ref 14–18)
HGB BLD-MCNC: 6.8 G/DL (ref 14–18)
HGB BLD-MCNC: 7.1 G/DL (ref 14–18)
HGB BLD-MCNC: 7.2 G/DL (ref 14–18)
HGB BLD-MCNC: 7.2 G/DL (ref 14–18)
HGB BLD-MCNC: 7.3 G/DL (ref 14–18)
HGB BLD-MCNC: 7.3 G/DL (ref 14–18)
HGB BLD-MCNC: 7.4 G/DL (ref 14–18)
HGB BLD-MCNC: 7.4 G/DL (ref 14–18)
HGB BLD-MCNC: 7.5 G/DL (ref 14–18)
HGB BLD-MCNC: 7.6 G/DL (ref 14–18)
HGB BLD-MCNC: 7.7 G/DL (ref 14–18)
HGB BLD-MCNC: 7.9 G/DL (ref 14–18)
HGB BLD-MCNC: 8 G/DL (ref 14–18)
HGB BLD-MCNC: 8 G/DL (ref 14–18)
HGB BLD-MCNC: 8.3 G/DL (ref 14–18)
HGB BLD-MCNC: 8.3 G/DL (ref 14–18)
HGB BLD-MCNC: 8.6 G/DL (ref 14–18)
HGB BLD-MCNC: 8.9 G/DL (ref 14–18)
HGB BLD-MCNC: 9 G/DL (ref 14–18)
HGB UR QL STRIP: ABNORMAL
HYALINE CASTS #/AREA URNS LPF: 1 /LPF
HYALINE CASTS UR QL AUTO: 0 /LPF
HYALINE CASTS UR QL AUTO: 1 /LPF
HYALINE CASTS UR QL AUTO: 1 /LPF
HYPOCHROMIA BLD QL SMEAR: ABNORMAL
IMM GRANULOCYTES # BLD AUTO: 0.01 K/UL (ref 0–0.04)
IMM GRANULOCYTES # BLD AUTO: 0.02 K/UL (ref 0–0.04)
IMM GRANULOCYTES # BLD AUTO: 0.03 K/UL (ref 0–0.04)
IMM GRANULOCYTES # BLD AUTO: 0.04 K/UL (ref 0–0.04)
IMM GRANULOCYTES # BLD AUTO: 0.06 K/UL (ref 0–0.04)
IMM GRANULOCYTES # BLD AUTO: 0.07 K/UL (ref 0–0.04)
IMM GRANULOCYTES # BLD AUTO: 0.12 K/UL (ref 0–0.04)
IMM GRANULOCYTES # BLD AUTO: 0.46 K/UL (ref 0–0.04)
IMM GRANULOCYTES NFR BLD AUTO: 0.1 % (ref 0–0.5)
IMM GRANULOCYTES NFR BLD AUTO: 0.1 % (ref 0–0.5)
IMM GRANULOCYTES NFR BLD AUTO: 0.2 % (ref 0–0.5)
IMM GRANULOCYTES NFR BLD AUTO: 0.3 % (ref 0–0.5)
IMM GRANULOCYTES NFR BLD AUTO: 0.4 % (ref 0–0.5)
IMM GRANULOCYTES NFR BLD AUTO: 0.8 % (ref 0–0.5)
IMM GRANULOCYTES NFR BLD AUTO: 1.6 % (ref 0–0.5)
IMM GRANULOCYTES NFR BLD AUTO: 2.7 % (ref 0–0.5)
INR PPP: 1 (ref 0.8–1.2)
INTERVENTRICULAR SEPTUM: 0.88 CM (ref 0.6–1.1)
INTERVENTRICULAR SEPTUM: 1.07 CM (ref 0.6–1.1)
IRON SERPL-MCNC: 304 UG/DL (ref 45–160)
IRON SERPL-MCNC: 79 UG/DL (ref 45–160)
IRON SERPL-MCNC: 94 UG/DL (ref 45–160)
IVRT: 79.92 MSEC
KETONES UR QL STRIP: NEGATIVE
LA MAJOR: 6.41 CM
LA MAJOR: 7.38 CM
LA MINOR: 5.86 CM
LA MINOR: 7.75 CM
LA WIDTH: 4.63 CM
LA WIDTH: 4.84 CM
LACOSAMIDE: 8.9 MCG/ML (ref 1–10)
LACOSAMIDE: 9.3 MCG/ML (ref 1–10)
LDLC SERPL CALC-MCNC: 72.6 MG/DL (ref 63–159)
LEFT ATRIUM SIZE: 4.33 CM
LEFT ATRIUM SIZE: 4.51 CM
LEFT ATRIUM VOLUME INDEX MOD: 44.4 ML/M2
LEFT ATRIUM VOLUME INDEX MOD: 49.1 ML/M2
LEFT ATRIUM VOLUME INDEX: 49.7 ML/M2
LEFT ATRIUM VOLUME INDEX: 66.8 ML/M2
LEFT ATRIUM VOLUME MOD: 103.06 CM3
LEFT ATRIUM VOLUME MOD: 93.26 CM3
LEFT ATRIUM VOLUME: 104.33 CM3
LEFT ATRIUM VOLUME: 140.28 CM3
LEFT INTERNAL DIMENSION IN SYSTOLE: 3.11 CM (ref 2.1–4)
LEFT INTERNAL DIMENSION IN SYSTOLE: 3.2 CM (ref 2.1–4)
LEFT VENTRICLE DIASTOLIC VOLUME INDEX: 45.17 ML/M2
LEFT VENTRICLE DIASTOLIC VOLUME INDEX: 61.57 ML/M2
LEFT VENTRICLE DIASTOLIC VOLUME: 129.3 ML
LEFT VENTRICLE DIASTOLIC VOLUME: 94.85 ML
LEFT VENTRICLE MASS INDEX: 110 G/M2
LEFT VENTRICLE MASS INDEX: 72 G/M2
LEFT VENTRICLE SYSTOLIC VOLUME INDEX: 18.3 ML/M2
LEFT VENTRICLE SYSTOLIC VOLUME INDEX: 19.4 ML/M2
LEFT VENTRICLE SYSTOLIC VOLUME: 38.34 ML
LEFT VENTRICLE SYSTOLIC VOLUME: 40.81 ML
LEFT VENTRICULAR INTERNAL DIMENSION IN DIASTOLE: 4.55 CM (ref 3.5–6)
LEFT VENTRICULAR INTERNAL DIMENSION IN DIASTOLE: 5.2 CM (ref 3.5–6)
LEFT VENTRICULAR MASS: 151.77 G
LEFT VENTRICULAR MASS: 231.81 G
LEUKOCYTE ESTERASE UR QL STRIP: ABNORMAL
LEVETIRACETAM SERPL-MCNC: 120.8 UG/ML (ref 3–60)
LEVETIRACETAM SERPL-MCNC: 68.4 UG/ML (ref 3–60)
LEVETIRACETAM SERPL-MCNC: 73.9 UG/ML (ref 3–60)
LV LATERAL E/E' RATIO: 13.2 M/S
LV LATERAL E/E' RATIO: 14.1 M/S
LV SEPTAL E/E' RATIO: 15.67 M/S
LV SEPTAL E/E' RATIO: 18.86 M/S
LYMPHOCYTES # BLD AUTO: 0.3 K/UL (ref 1–4.8)
LYMPHOCYTES # BLD AUTO: 0.4 K/UL (ref 1–4.8)
LYMPHOCYTES # BLD AUTO: 0.8 K/UL (ref 1–4.8)
LYMPHOCYTES # BLD AUTO: 0.8 K/UL (ref 1–4.8)
LYMPHOCYTES # BLD AUTO: 0.9 K/UL (ref 1–4.8)
LYMPHOCYTES # BLD AUTO: 0.9 K/UL (ref 1–4.8)
LYMPHOCYTES # BLD AUTO: 1 K/UL (ref 1–4.8)
LYMPHOCYTES # BLD AUTO: 1.1 K/UL (ref 1–4.8)
LYMPHOCYTES # BLD AUTO: 1.2 K/UL (ref 1–4.8)
LYMPHOCYTES # BLD AUTO: 1.3 K/UL (ref 1–4.8)
LYMPHOCYTES # BLD AUTO: 1.3 K/UL (ref 1–4.8)
LYMPHOCYTES # BLD AUTO: 1.4 K/UL (ref 1–4.8)
LYMPHOCYTES # BLD AUTO: 1.6 K/UL (ref 1–4.8)
LYMPHOCYTES # BLD AUTO: 1.8 K/UL (ref 1–4.8)
LYMPHOCYTES NFR BLD: 10.3 % (ref 18–48)
LYMPHOCYTES NFR BLD: 10.3 % (ref 18–48)
LYMPHOCYTES NFR BLD: 10.4 % (ref 18–48)
LYMPHOCYTES NFR BLD: 10.5 % (ref 18–48)
LYMPHOCYTES NFR BLD: 11.5 % (ref 18–48)
LYMPHOCYTES NFR BLD: 11.6 % (ref 18–48)
LYMPHOCYTES NFR BLD: 11.8 % (ref 18–48)
LYMPHOCYTES NFR BLD: 12.5 % (ref 18–48)
LYMPHOCYTES NFR BLD: 12.6 % (ref 18–48)
LYMPHOCYTES NFR BLD: 13.4 % (ref 18–48)
LYMPHOCYTES NFR BLD: 14.5 % (ref 18–48)
LYMPHOCYTES NFR BLD: 14.8 % (ref 18–48)
LYMPHOCYTES NFR BLD: 14.8 % (ref 18–48)
LYMPHOCYTES NFR BLD: 17 % (ref 18–48)
LYMPHOCYTES NFR BLD: 18.1 % (ref 18–48)
LYMPHOCYTES NFR BLD: 18.2 % (ref 18–48)
LYMPHOCYTES NFR BLD: 18.8 % (ref 18–48)
LYMPHOCYTES NFR BLD: 20.2 % (ref 18–48)
LYMPHOCYTES NFR BLD: 3.9 % (ref 18–48)
LYMPHOCYTES NFR BLD: 4.9 % (ref 18–48)
LYMPHOCYTES NFR BLD: 8.2 % (ref 18–48)
LYMPHOCYTES NFR BLD: 8.5 % (ref 18–48)
LYMPHOCYTES NFR BLD: 9 % (ref 18–48)
MAGNESIUM SERPL-MCNC: 2 MG/DL (ref 1.6–2.6)
MAGNESIUM SERPL-MCNC: 2 MG/DL (ref 1.6–2.6)
MAGNESIUM SERPL-MCNC: 2.1 MG/DL (ref 1.6–2.6)
MAGNESIUM SERPL-MCNC: 2.2 MG/DL (ref 1.6–2.6)
MAGNESIUM SERPL-MCNC: 2.3 MG/DL (ref 1.6–2.6)
MAGNESIUM SERPL-MCNC: 2.4 MG/DL (ref 1.6–2.6)
MAGNESIUM SERPL-MCNC: 2.5 MG/DL (ref 1.6–2.6)
MAGNESIUM SERPL-MCNC: 2.6 MG/DL (ref 1.6–2.6)
MAGNESIUM SERPL-MCNC: 2.7 MG/DL (ref 1.6–2.6)
MAGNESIUM SERPL-MCNC: 2.9 MG/DL (ref 1.6–2.6)
MAGNESIUM SERPL-MCNC: 3.2 MG/DL (ref 1.6–2.6)
MAGNESIUM SERPL-MCNC: 3.6 MG/DL (ref 1.6–2.6)
MAGNESIUM SERPL-MCNC: 3.8 MG/DL (ref 1.6–2.6)
MCH RBC QN AUTO: 27.1 PG (ref 27–31)
MCH RBC QN AUTO: 28.2 PG (ref 27–31)
MCH RBC QN AUTO: 28.4 PG (ref 27–31)
MCH RBC QN AUTO: 28.4 PG (ref 27–31)
MCH RBC QN AUTO: 28.5 PG (ref 27–31)
MCH RBC QN AUTO: 28.7 PG (ref 27–31)
MCH RBC QN AUTO: 29 PG (ref 27–31)
MCH RBC QN AUTO: 29.2 PG (ref 27–31)
MCH RBC QN AUTO: 29.3 PG (ref 27–31)
MCH RBC QN AUTO: 29.3 PG (ref 27–31)
MCH RBC QN AUTO: 29.4 PG (ref 27–31)
MCH RBC QN AUTO: 29.7 PG (ref 27–31)
MCH RBC QN AUTO: 29.9 PG (ref 27–31)
MCH RBC QN AUTO: 30.1 PG (ref 27–31)
MCH RBC QN AUTO: 30.2 PG (ref 27–31)
MCH RBC QN AUTO: 30.5 PG (ref 27–31)
MCH RBC QN AUTO: 30.8 PG (ref 27–31)
MCH RBC QN AUTO: 30.8 PG (ref 27–31)
MCH RBC QN AUTO: 30.9 PG (ref 27–31)
MCH RBC QN AUTO: 31.7 PG (ref 27–31)
MCH RBC QN AUTO: 32.9 PG (ref 27–31)
MCHC RBC AUTO-ENTMCNC: 34 G/DL (ref 32–36)
MCHC RBC AUTO-ENTMCNC: 34.3 G/DL (ref 32–36)
MCHC RBC AUTO-ENTMCNC: 34.9 G/DL (ref 32–36)
MCHC RBC AUTO-ENTMCNC: 35.2 G/DL (ref 32–36)
MCHC RBC AUTO-ENTMCNC: 35.3 G/DL (ref 32–36)
MCHC RBC AUTO-ENTMCNC: 35.6 G/DL (ref 32–36)
MCHC RBC AUTO-ENTMCNC: 35.7 G/DL (ref 32–36)
MCHC RBC AUTO-ENTMCNC: 35.7 G/DL (ref 32–36)
MCHC RBC AUTO-ENTMCNC: 35.8 G/DL (ref 32–36)
MCHC RBC AUTO-ENTMCNC: 36 G/DL (ref 32–36)
MCHC RBC AUTO-ENTMCNC: 36.2 G/DL (ref 32–36)
MCHC RBC AUTO-ENTMCNC: 36.3 G/DL (ref 32–36)
MCHC RBC AUTO-ENTMCNC: 36.4 G/DL (ref 32–36)
MCHC RBC AUTO-ENTMCNC: 36.5 G/DL (ref 32–36)
MCHC RBC AUTO-ENTMCNC: 36.6 G/DL (ref 32–36)
MCHC RBC AUTO-ENTMCNC: 36.6 G/DL (ref 32–36)
MCHC RBC AUTO-ENTMCNC: 36.7 G/DL (ref 32–36)
MCHC RBC AUTO-ENTMCNC: 36.7 G/DL (ref 32–36)
MCHC RBC AUTO-ENTMCNC: 36.9 G/DL (ref 32–36)
MCHC RBC AUTO-ENTMCNC: 36.9 G/DL (ref 32–36)
MCHC RBC AUTO-ENTMCNC: 37 G/DL (ref 32–36)
MCHC RBC AUTO-ENTMCNC: 37.3 G/DL (ref 32–36)
MCHC RBC AUTO-ENTMCNC: 37.4 G/DL (ref 32–36)
MCV RBC AUTO: 79 FL (ref 82–98)
MCV RBC AUTO: 79 FL (ref 82–98)
MCV RBC AUTO: 80 FL (ref 82–98)
MCV RBC AUTO: 81 FL (ref 82–98)
MCV RBC AUTO: 82 FL (ref 82–98)
MCV RBC AUTO: 83 FL (ref 82–98)
MCV RBC AUTO: 84 FL (ref 82–98)
MCV RBC AUTO: 84 FL (ref 82–98)
MCV RBC AUTO: 85 FL (ref 82–98)
MCV RBC AUTO: 87 FL (ref 82–98)
MCV RBC AUTO: 88 FL (ref 82–98)
MICROSCOPIC COMMENT: ABNORMAL
MONOCYTES # BLD AUTO: 0.9 K/UL (ref 0.3–1)
MONOCYTES # BLD AUTO: 1 K/UL (ref 0.3–1)
MONOCYTES # BLD AUTO: 1.1 K/UL (ref 0.3–1)
MONOCYTES # BLD AUTO: 1.2 K/UL (ref 0.3–1)
MONOCYTES # BLD AUTO: 1.3 K/UL (ref 0.3–1)
MONOCYTES # BLD AUTO: 1.4 K/UL (ref 0.3–1)
MONOCYTES # BLD AUTO: 1.5 K/UL (ref 0.3–1)
MONOCYTES # BLD AUTO: 1.6 K/UL (ref 0.3–1)
MONOCYTES # BLD AUTO: 1.6 K/UL (ref 0.3–1)
MONOCYTES # BLD AUTO: 1.7 K/UL (ref 0.3–1)
MONOCYTES NFR BLD: 10 % (ref 4–15)
MONOCYTES NFR BLD: 11.6 % (ref 4–15)
MONOCYTES NFR BLD: 12.6 % (ref 4–15)
MONOCYTES NFR BLD: 12.7 % (ref 4–15)
MONOCYTES NFR BLD: 13.6 % (ref 4–15)
MONOCYTES NFR BLD: 13.6 % (ref 4–15)
MONOCYTES NFR BLD: 14.2 % (ref 4–15)
MONOCYTES NFR BLD: 14.6 % (ref 4–15)
MONOCYTES NFR BLD: 15.1 % (ref 4–15)
MONOCYTES NFR BLD: 15.2 % (ref 4–15)
MONOCYTES NFR BLD: 15.6 % (ref 4–15)
MONOCYTES NFR BLD: 16.1 % (ref 4–15)
MONOCYTES NFR BLD: 16.3 % (ref 4–15)
MONOCYTES NFR BLD: 16.6 % (ref 4–15)
MONOCYTES NFR BLD: 16.7 % (ref 4–15)
MONOCYTES NFR BLD: 17.1 % (ref 4–15)
MONOCYTES NFR BLD: 17.3 % (ref 4–15)
MONOCYTES NFR BLD: 17.4 % (ref 4–15)
MONOCYTES NFR BLD: 17.6 % (ref 4–15)
MONOCYTES NFR BLD: 18.6 % (ref 4–15)
MONOCYTES NFR BLD: 18.8 % (ref 4–15)
MONOCYTES NFR BLD: 20.5 % (ref 4–15)
MONOCYTES NFR BLD: 21.2 % (ref 4–15)
MONOCYTES NFR BLD: 21.9 % (ref 4–15)
MONOCYTES NFR BLD: 7.2 % (ref 4–15)
MV PEAK E VEL: 1.32 M/S
MV PEAK E VEL: 1.41 M/S
MYELOPEROXIDASE AB SER-ACNC: 6 UNITS
NEUTROPHILS # BLD AUTO: 10.2 K/UL (ref 1.8–7.7)
NEUTROPHILS # BLD AUTO: 13.5 K/UL (ref 1.8–7.7)
NEUTROPHILS # BLD AUTO: 3.3 K/UL (ref 1.8–7.7)
NEUTROPHILS # BLD AUTO: 3.6 K/UL (ref 1.8–7.7)
NEUTROPHILS # BLD AUTO: 3.6 K/UL (ref 1.8–7.7)
NEUTROPHILS # BLD AUTO: 3.7 K/UL (ref 1.8–7.7)
NEUTROPHILS # BLD AUTO: 4.7 K/UL (ref 1.8–7.7)
NEUTROPHILS # BLD AUTO: 4.8 K/UL (ref 1.8–7.7)
NEUTROPHILS # BLD AUTO: 4.9 K/UL (ref 1.8–7.7)
NEUTROPHILS # BLD AUTO: 4.9 K/UL (ref 1.8–7.7)
NEUTROPHILS # BLD AUTO: 5 K/UL (ref 1.8–7.7)
NEUTROPHILS # BLD AUTO: 5 K/UL (ref 1.8–7.7)
NEUTROPHILS # BLD AUTO: 5.3 K/UL (ref 1.8–7.7)
NEUTROPHILS # BLD AUTO: 5.5 K/UL (ref 1.8–7.7)
NEUTROPHILS # BLD AUTO: 5.6 K/UL (ref 1.8–7.7)
NEUTROPHILS # BLD AUTO: 6 K/UL (ref 1.8–7.7)
NEUTROPHILS # BLD AUTO: 6 K/UL (ref 1.8–7.7)
NEUTROPHILS # BLD AUTO: 6.3 K/UL (ref 1.8–7.7)
NEUTROPHILS # BLD AUTO: 6.5 K/UL (ref 1.8–7.7)
NEUTROPHILS # BLD AUTO: 6.8 K/UL (ref 1.8–7.7)
NEUTROPHILS # BLD AUTO: 7.3 K/UL (ref 1.8–7.7)
NEUTROPHILS # BLD AUTO: 7.5 K/UL (ref 1.8–7.7)
NEUTROPHILS # BLD AUTO: 7.6 K/UL (ref 1.8–7.7)
NEUTROPHILS NFR BLD: 50.3 % (ref 38–73)
NEUTROPHILS NFR BLD: 53.2 % (ref 38–73)
NEUTROPHILS NFR BLD: 56 % (ref 38–73)
NEUTROPHILS NFR BLD: 56.1 % (ref 38–73)
NEUTROPHILS NFR BLD: 56.8 % (ref 38–73)
NEUTROPHILS NFR BLD: 60.7 % (ref 38–73)
NEUTROPHILS NFR BLD: 62.1 % (ref 38–73)
NEUTROPHILS NFR BLD: 62.4 % (ref 38–73)
NEUTROPHILS NFR BLD: 62.5 % (ref 38–73)
NEUTROPHILS NFR BLD: 63 % (ref 38–73)
NEUTROPHILS NFR BLD: 63.3 % (ref 38–73)
NEUTROPHILS NFR BLD: 64 % (ref 38–73)
NEUTROPHILS NFR BLD: 64.8 % (ref 38–73)
NEUTROPHILS NFR BLD: 66.8 % (ref 38–73)
NEUTROPHILS NFR BLD: 68.4 % (ref 38–73)
NEUTROPHILS NFR BLD: 68.9 % (ref 38–73)
NEUTROPHILS NFR BLD: 69 % (ref 38–73)
NEUTROPHILS NFR BLD: 69.7 % (ref 38–73)
NEUTROPHILS NFR BLD: 70.2 % (ref 38–73)
NEUTROPHILS NFR BLD: 70.9 % (ref 38–73)
NEUTROPHILS NFR BLD: 71.8 % (ref 38–73)
NEUTROPHILS NFR BLD: 74.7 % (ref 38–73)
NEUTROPHILS NFR BLD: 75.6 % (ref 38–73)
NEUTROPHILS NFR BLD: 78.2 % (ref 38–73)
NEUTROPHILS NFR BLD: 83.7 % (ref 38–73)
NITRITE UR QL STRIP: NEGATIVE
NONHDLC SERPL-MCNC: 88 MG/DL
NRBC BLD-RTO: 1 /100 WBC
NRBC BLD-RTO: 2 /100 WBC
NRBC BLD-RTO: 3 /100 WBC
NUM UNITS TRANS PACKED RBC: NORMAL
NUM UNITS TRANS PACKED RBC: NORMAL
OVALOCYTES BLD QL SMEAR: ABNORMAL
P-ANCA TITR SER IF: NORMAL TITER
PATHOLOGIST INTERPRETATION SPE: NORMAL
PH UR STRIP: 5 [PH] (ref 5–8)
PH UR STRIP: 6 [PH] (ref 5–8)
PHOSPHATE SERPL-MCNC: 2.5 MG/DL (ref 2.7–4.5)
PHOSPHATE SERPL-MCNC: 3.3 MG/DL (ref 2.7–4.5)
PHOSPHATE SERPL-MCNC: 3.8 MG/DL (ref 2.7–4.5)
PHOSPHATE SERPL-MCNC: 4 MG/DL (ref 2.7–4.5)
PHOSPHATE SERPL-MCNC: 4.1 MG/DL (ref 2.7–4.5)
PHOSPHATE SERPL-MCNC: 4.1 MG/DL (ref 2.7–4.5)
PHOSPHATE SERPL-MCNC: 4.2 MG/DL (ref 2.7–4.5)
PHOSPHATE SERPL-MCNC: 4.2 MG/DL (ref 2.7–4.5)
PHOSPHATE SERPL-MCNC: 4.3 MG/DL (ref 2.7–4.5)
PHOSPHATE SERPL-MCNC: 4.4 MG/DL (ref 2.7–4.5)
PHOSPHATE SERPL-MCNC: 4.4 MG/DL (ref 2.7–4.5)
PHOSPHATE SERPL-MCNC: 4.5 MG/DL (ref 2.7–4.5)
PHOSPHATE SERPL-MCNC: 4.5 MG/DL (ref 2.7–4.5)
PHOSPHATE SERPL-MCNC: 4.6 MG/DL (ref 2.7–4.5)
PHOSPHATE SERPL-MCNC: 4.7 MG/DL (ref 2.7–4.5)
PHOSPHATE SERPL-MCNC: 4.8 MG/DL (ref 2.7–4.5)
PHOSPHATE SERPL-MCNC: 5 MG/DL (ref 2.7–4.5)
PHOSPHATE SERPL-MCNC: 5.1 MG/DL (ref 2.7–4.5)
PHOSPHATE SERPL-MCNC: 5.1 MG/DL (ref 2.7–4.5)
PHOSPHATE SERPL-MCNC: 5.2 MG/DL (ref 2.7–4.5)
PHOSPHATE SERPL-MCNC: 5.2 MG/DL (ref 2.7–4.5)
PHOSPHATE SERPL-MCNC: 5.3 MG/DL (ref 2.7–4.5)
PHOSPHATE SERPL-MCNC: 5.4 MG/DL (ref 2.7–4.5)
PHOSPHATE SERPL-MCNC: 5.6 MG/DL (ref 2.7–4.5)
PHOSPHATE SERPL-MCNC: 5.6 MG/DL (ref 2.7–4.5)
PHOSPHATE SERPL-MCNC: 5.7 MG/DL (ref 2.7–4.5)
PHOSPHATE SERPL-MCNC: 5.8 MG/DL (ref 2.7–4.5)
PHOSPHATE SERPL-MCNC: 6.1 MG/DL (ref 2.7–4.5)
PHOSPHATE SERPL-MCNC: 6.2 MG/DL (ref 2.7–4.5)
PHOSPHATE SERPL-MCNC: 6.3 MG/DL (ref 2.7–4.5)
PHOSPHATE SERPL-MCNC: 6.5 MG/DL (ref 2.7–4.5)
PHOSPHATE SERPL-MCNC: 6.5 MG/DL (ref 2.7–4.5)
PISA TR MAX VEL: 2.59 M/S
PISA TR MAX VEL: 2.88 M/S
PLATELET # BLD AUTO: 105 K/UL (ref 150–450)
PLATELET # BLD AUTO: 164 K/UL (ref 150–450)
PLATELET # BLD AUTO: 164 K/UL (ref 150–450)
PLATELET # BLD AUTO: 172 K/UL (ref 150–450)
PLATELET # BLD AUTO: 179 K/UL (ref 150–450)
PLATELET # BLD AUTO: 183 K/UL (ref 150–450)
PLATELET # BLD AUTO: 185 K/UL (ref 150–450)
PLATELET # BLD AUTO: 196 K/UL (ref 150–450)
PLATELET # BLD AUTO: 197 K/UL (ref 150–450)
PLATELET # BLD AUTO: 200 K/UL (ref 150–450)
PLATELET # BLD AUTO: 200 K/UL (ref 150–450)
PLATELET # BLD AUTO: 205 K/UL (ref 150–450)
PLATELET # BLD AUTO: 218 K/UL (ref 150–450)
PLATELET # BLD AUTO: 230 K/UL (ref 150–350)
PLATELET # BLD AUTO: 234 K/UL (ref 150–350)
PLATELET # BLD AUTO: 239 K/UL (ref 150–350)
PLATELET # BLD AUTO: 251 K/UL (ref 150–350)
PLATELET # BLD AUTO: 251 K/UL (ref 150–450)
PLATELET # BLD AUTO: 251 K/UL (ref 150–450)
PLATELET # BLD AUTO: 252 K/UL (ref 150–350)
PLATELET # BLD AUTO: 267 K/UL (ref 150–450)
PLATELET # BLD AUTO: 269 K/UL (ref 150–350)
PLATELET # BLD AUTO: 274 K/UL (ref 150–450)
PLATELET # BLD AUTO: 275 K/UL (ref 150–450)
PLATELET # BLD AUTO: 276 K/UL (ref 150–450)
PLATELET # BLD AUTO: 285 K/UL (ref 150–450)
PLATELET # BLD AUTO: 314 K/UL (ref 150–450)
PLATELET BLD QL SMEAR: ABNORMAL
PLATELET BLD QL SMEAR: ABNORMAL
PMV BLD AUTO: 11.8 FL (ref 9.2–12.9)
PMV BLD AUTO: 11.8 FL (ref 9.2–12.9)
PMV BLD AUTO: 12 FL (ref 9.2–12.9)
PMV BLD AUTO: 12.2 FL (ref 9.2–12.9)
PMV BLD AUTO: 12.2 FL (ref 9.2–12.9)
PMV BLD AUTO: 12.3 FL (ref 9.2–12.9)
PMV BLD AUTO: 12.4 FL (ref 9.2–12.9)
PMV BLD AUTO: 12.4 FL (ref 9.2–12.9)
PMV BLD AUTO: 12.5 FL (ref 9.2–12.9)
PMV BLD AUTO: 12.8 FL (ref 9.2–12.9)
PMV BLD AUTO: 12.9 FL (ref 9.2–12.9)
PMV BLD AUTO: 13 FL (ref 9.2–12.9)
PMV BLD AUTO: 13 FL (ref 9.2–12.9)
PMV BLD AUTO: 13.1 FL (ref 9.2–12.9)
PMV BLD AUTO: 13.2 FL (ref 9.2–12.9)
PMV BLD AUTO: 13.2 FL (ref 9.2–12.9)
PMV BLD AUTO: 13.3 FL (ref 9.2–12.9)
PMV BLD AUTO: 13.6 FL (ref 9.2–12.9)
PMV BLD AUTO: 13.7 FL (ref 9.2–12.9)
PMV BLD AUTO: ABNORMAL FL (ref 9.2–12.9)
POCT GLUCOSE: 100 MG/DL (ref 70–110)
POCT GLUCOSE: 110 MG/DL (ref 70–110)
POCT GLUCOSE: 110 MG/DL (ref 70–110)
POCT GLUCOSE: 112 MG/DL (ref 70–110)
POCT GLUCOSE: 112 MG/DL (ref 70–110)
POCT GLUCOSE: 113 MG/DL (ref 70–110)
POCT GLUCOSE: 114 MG/DL (ref 70–110)
POCT GLUCOSE: 117 MG/DL (ref 70–110)
POCT GLUCOSE: 120 MG/DL (ref 70–110)
POCT GLUCOSE: 121 MG/DL (ref 70–110)
POCT GLUCOSE: 122 MG/DL (ref 70–110)
POCT GLUCOSE: 124 MG/DL (ref 70–110)
POCT GLUCOSE: 124 MG/DL (ref 70–110)
POCT GLUCOSE: 125 MG/DL (ref 70–110)
POCT GLUCOSE: 125 MG/DL (ref 70–110)
POCT GLUCOSE: 126 MG/DL (ref 70–110)
POCT GLUCOSE: 127 MG/DL (ref 70–110)
POCT GLUCOSE: 127 MG/DL (ref 70–110)
POCT GLUCOSE: 128 MG/DL (ref 70–110)
POCT GLUCOSE: 131 MG/DL (ref 70–110)
POCT GLUCOSE: 132 MG/DL (ref 70–110)
POCT GLUCOSE: 132 MG/DL (ref 70–110)
POCT GLUCOSE: 133 MG/DL (ref 70–110)
POCT GLUCOSE: 134 MG/DL (ref 70–110)
POCT GLUCOSE: 135 MG/DL (ref 70–110)
POCT GLUCOSE: 135 MG/DL (ref 70–110)
POCT GLUCOSE: 136 MG/DL (ref 70–110)
POCT GLUCOSE: 137 MG/DL (ref 70–110)
POCT GLUCOSE: 139 MG/DL (ref 70–110)
POCT GLUCOSE: 140 MG/DL (ref 70–110)
POCT GLUCOSE: 141 MG/DL (ref 70–110)
POCT GLUCOSE: 142 MG/DL (ref 70–110)
POCT GLUCOSE: 144 MG/DL (ref 70–110)
POCT GLUCOSE: 145 MG/DL (ref 70–110)
POCT GLUCOSE: 148 MG/DL (ref 70–110)
POCT GLUCOSE: 150 MG/DL (ref 70–110)
POCT GLUCOSE: 151 MG/DL (ref 70–110)
POCT GLUCOSE: 151 MG/DL (ref 70–110)
POCT GLUCOSE: 153 MG/DL (ref 70–110)
POCT GLUCOSE: 154 MG/DL (ref 70–110)
POCT GLUCOSE: 154 MG/DL (ref 70–110)
POCT GLUCOSE: 155 MG/DL (ref 70–110)
POCT GLUCOSE: 156 MG/DL (ref 70–110)
POCT GLUCOSE: 157 MG/DL (ref 70–110)
POCT GLUCOSE: 158 MG/DL (ref 70–110)
POCT GLUCOSE: 158 MG/DL (ref 70–110)
POCT GLUCOSE: 160 MG/DL (ref 70–110)
POCT GLUCOSE: 162 MG/DL (ref 70–110)
POCT GLUCOSE: 163 MG/DL (ref 70–110)
POCT GLUCOSE: 165 MG/DL (ref 70–110)
POCT GLUCOSE: 166 MG/DL (ref 70–110)
POCT GLUCOSE: 167 MG/DL (ref 70–110)
POCT GLUCOSE: 169 MG/DL (ref 70–110)
POCT GLUCOSE: 170 MG/DL (ref 70–110)
POCT GLUCOSE: 172 MG/DL (ref 70–110)
POCT GLUCOSE: 175 MG/DL (ref 70–110)
POCT GLUCOSE: 176 MG/DL (ref 70–110)
POCT GLUCOSE: 177 MG/DL (ref 70–110)
POCT GLUCOSE: 177 MG/DL (ref 70–110)
POCT GLUCOSE: 178 MG/DL (ref 70–110)
POCT GLUCOSE: 179 MG/DL (ref 70–110)
POCT GLUCOSE: 179 MG/DL (ref 70–110)
POCT GLUCOSE: 180 MG/DL (ref 70–110)
POCT GLUCOSE: 181 MG/DL (ref 70–110)
POCT GLUCOSE: 181 MG/DL (ref 70–110)
POCT GLUCOSE: 182 MG/DL (ref 70–110)
POCT GLUCOSE: 185 MG/DL (ref 70–110)
POCT GLUCOSE: 186 MG/DL (ref 70–110)
POCT GLUCOSE: 186 MG/DL (ref 70–110)
POCT GLUCOSE: 187 MG/DL (ref 70–110)
POCT GLUCOSE: 190 MG/DL (ref 70–110)
POCT GLUCOSE: 194 MG/DL (ref 70–110)
POCT GLUCOSE: 195 MG/DL (ref 70–110)
POCT GLUCOSE: 196 MG/DL (ref 70–110)
POCT GLUCOSE: 197 MG/DL (ref 70–110)
POCT GLUCOSE: 201 MG/DL (ref 70–110)
POCT GLUCOSE: 201 MG/DL (ref 70–110)
POCT GLUCOSE: 202 MG/DL (ref 70–110)
POCT GLUCOSE: 204 MG/DL (ref 70–110)
POCT GLUCOSE: 207 MG/DL (ref 70–110)
POCT GLUCOSE: 209 MG/DL (ref 70–110)
POCT GLUCOSE: 210 MG/DL (ref 70–110)
POCT GLUCOSE: 210 MG/DL (ref 70–110)
POCT GLUCOSE: 211 MG/DL (ref 70–110)
POCT GLUCOSE: 211 MG/DL (ref 70–110)
POCT GLUCOSE: 212 MG/DL (ref 70–110)
POCT GLUCOSE: 213 MG/DL (ref 70–110)
POCT GLUCOSE: 216 MG/DL (ref 70–110)
POCT GLUCOSE: 216 MG/DL (ref 70–110)
POCT GLUCOSE: 220 MG/DL (ref 70–110)
POCT GLUCOSE: 220 MG/DL (ref 70–110)
POCT GLUCOSE: 221 MG/DL (ref 70–110)
POCT GLUCOSE: 222 MG/DL (ref 70–110)
POCT GLUCOSE: 222 MG/DL (ref 70–110)
POCT GLUCOSE: 223 MG/DL (ref 70–110)
POCT GLUCOSE: 225 MG/DL (ref 70–110)
POCT GLUCOSE: 226 MG/DL (ref 70–110)
POCT GLUCOSE: 226 MG/DL (ref 70–110)
POCT GLUCOSE: 228 MG/DL (ref 70–110)
POCT GLUCOSE: 229 MG/DL (ref 70–110)
POCT GLUCOSE: 230 MG/DL (ref 70–110)
POCT GLUCOSE: 231 MG/DL (ref 70–110)
POCT GLUCOSE: 233 MG/DL (ref 70–110)
POCT GLUCOSE: 233 MG/DL (ref 70–110)
POCT GLUCOSE: 234 MG/DL (ref 70–110)
POCT GLUCOSE: 234 MG/DL (ref 70–110)
POCT GLUCOSE: 235 MG/DL (ref 70–110)
POCT GLUCOSE: 242 MG/DL (ref 70–110)
POCT GLUCOSE: 251 MG/DL (ref 70–110)
POCT GLUCOSE: 251 MG/DL (ref 70–110)
POCT GLUCOSE: 260 MG/DL (ref 70–110)
POCT GLUCOSE: 286 MG/DL (ref 70–110)
POCT GLUCOSE: 288 MG/DL (ref 70–110)
POCT GLUCOSE: 84 MG/DL (ref 70–110)
POCT GLUCOSE: 98 MG/DL (ref 70–110)
POIKILOCYTOSIS BLD QL SMEAR: SLIGHT
POLYCHROMASIA BLD QL SMEAR: ABNORMAL
POTASSIUM SERPL-SCNC: 2.9 MMOL/L (ref 3.5–5.1)
POTASSIUM SERPL-SCNC: 3 MMOL/L (ref 3.5–5.1)
POTASSIUM SERPL-SCNC: 3 MMOL/L (ref 3.5–5.1)
POTASSIUM SERPL-SCNC: 3.1 MMOL/L (ref 3.5–5.1)
POTASSIUM SERPL-SCNC: 3.1 MMOL/L (ref 3.5–5.1)
POTASSIUM SERPL-SCNC: 3.2 MMOL/L (ref 3.5–5.1)
POTASSIUM SERPL-SCNC: 3.2 MMOL/L (ref 3.5–5.1)
POTASSIUM SERPL-SCNC: 3.3 MMOL/L (ref 3.5–5.1)
POTASSIUM SERPL-SCNC: 3.3 MMOL/L (ref 3.5–5.1)
POTASSIUM SERPL-SCNC: 3.4 MMOL/L (ref 3.5–5.1)
POTASSIUM SERPL-SCNC: 3.4 MMOL/L (ref 3.5–5.1)
POTASSIUM SERPL-SCNC: 3.5 MMOL/L (ref 3.5–5.1)
POTASSIUM SERPL-SCNC: 3.6 MMOL/L (ref 3.5–5.1)
POTASSIUM SERPL-SCNC: 3.7 MMOL/L (ref 3.5–5.1)
POTASSIUM SERPL-SCNC: 3.8 MMOL/L (ref 3.5–5.1)
POTASSIUM SERPL-SCNC: 3.9 MMOL/L (ref 3.5–5.1)
POTASSIUM SERPL-SCNC: 4 MMOL/L (ref 3.5–5.1)
POTASSIUM SERPL-SCNC: 4.1 MMOL/L (ref 3.5–5.1)
POTASSIUM SERPL-SCNC: 4.2 MMOL/L (ref 3.5–5.1)
POTASSIUM SERPL-SCNC: 4.3 MMOL/L (ref 3.5–5.1)
POTASSIUM SERPL-SCNC: 4.4 MMOL/L (ref 3.5–5.1)
POTASSIUM SERPL-SCNC: 4.6 MMOL/L (ref 3.5–5.1)
POTASSIUM SERPL-SCNC: 4.7 MMOL/L (ref 3.5–5.1)
POTASSIUM SERPL-SCNC: 4.7 MMOL/L (ref 3.5–5.1)
POTASSIUM SERPL-SCNC: 5.2 MMOL/L (ref 3.5–5.1)
PROT SERPL-MCNC: 5.9 G/DL (ref 6–8.4)
PROT SERPL-MCNC: 5.9 G/DL (ref 6–8.4)
PROT SERPL-MCNC: 6 G/DL (ref 6–8.4)
PROT SERPL-MCNC: 6.3 G/DL (ref 6–8.4)
PROT SERPL-MCNC: 6.4 G/DL (ref 6–8.4)
PROT SERPL-MCNC: 6.4 G/DL (ref 6–8.4)
PROT SERPL-MCNC: 6.5 G/DL (ref 6–8.4)
PROT SERPL-MCNC: 6.6 G/DL (ref 6–8.4)
PROT SERPL-MCNC: 6.7 G/DL (ref 6–8.4)
PROT SERPL-MCNC: 6.9 G/DL (ref 6–8.4)
PROT UR QL STRIP: ABNORMAL
PROT UR-MCNC: 323 MG/DL (ref 0–15)
PROT UR-MCNC: 373 MG/DL (ref 0–15)
PROT/CREAT UR: 3.63 MG/G{CREAT} (ref 0–0.2)
PROT/CREAT UR: 4.91 MG/G{CREAT} (ref 0–0.2)
PROTEINASE3 IGG SER-ACNC: <0.2 U
PROTHROMBIN TIME: 10.6 SEC (ref 9–12.5)
PROTHROMBIN TIME: 10.6 SEC (ref 9–12.5)
PROTHROMBIN TIME: 11 SEC (ref 9–12.5)
PTH-INTACT SERPL-MCNC: 118.2 PG/ML (ref 9–77)
PTH-INTACT SERPL-MCNC: 126 PG/ML (ref 9–77)
PTH-INTACT SERPL-MCNC: 135 PG/ML (ref 9–77)
PTH-INTACT SERPL-MCNC: 140 PG/ML (ref 9–77)
PULM VEIN S/D RATIO: 0.4
PV PEAK D VEL: 0.63 M/S
PV PEAK S VEL: 0.25 M/S
RA MAJOR: 5.06 CM
RA MAJOR: 6.47 CM
RA PRESSURE: 3 MMHG
RA PRESSURE: 8 MMHG
RA WIDTH: 4.29 CM
RA WIDTH: 4.4 CM
RBC # BLD AUTO: 2.23 M/UL (ref 4.6–6.2)
RBC # BLD AUTO: 2.29 M/UL (ref 4.6–6.2)
RBC # BLD AUTO: 2.35 M/UL (ref 4.6–6.2)
RBC # BLD AUTO: 2.4 M/UL (ref 4.6–6.2)
RBC # BLD AUTO: 2.4 M/UL (ref 4.6–6.2)
RBC # BLD AUTO: 2.42 M/UL (ref 4.6–6.2)
RBC # BLD AUTO: 2.46 M/UL (ref 4.6–6.2)
RBC # BLD AUTO: 2.46 M/UL (ref 4.6–6.2)
RBC # BLD AUTO: 2.47 M/UL (ref 4.6–6.2)
RBC # BLD AUTO: 2.5 M/UL (ref 4.6–6.2)
RBC # BLD AUTO: 2.53 M/UL (ref 4.6–6.2)
RBC # BLD AUTO: 2.56 M/UL (ref 4.6–6.2)
RBC # BLD AUTO: 2.58 M/UL (ref 4.6–6.2)
RBC # BLD AUTO: 2.59 M/UL (ref 4.6–6.2)
RBC # BLD AUTO: 2.62 M/UL (ref 4.6–6.2)
RBC # BLD AUTO: 2.62 M/UL (ref 4.6–6.2)
RBC # BLD AUTO: 2.63 M/UL (ref 4.6–6.2)
RBC # BLD AUTO: 2.65 M/UL (ref 4.6–6.2)
RBC # BLD AUTO: 2.66 M/UL (ref 4.6–6.2)
RBC # BLD AUTO: 2.66 M/UL (ref 4.6–6.2)
RBC # BLD AUTO: 2.68 M/UL (ref 4.6–6.2)
RBC # BLD AUTO: 2.71 M/UL (ref 4.6–6.2)
RBC # BLD AUTO: 2.72 M/UL (ref 4.6–6.2)
RBC # BLD AUTO: 2.88 M/UL (ref 4.6–6.2)
RBC # BLD AUTO: 3.03 M/UL (ref 4.6–6.2)
RBC # BLD AUTO: 3.19 M/UL (ref 4.6–6.2)
RBC # BLD AUTO: 3.76 M/UL (ref 4.6–6.2)
RBC #/AREA URNS AUTO: 0 /HPF (ref 0–4)
RBC #/AREA URNS AUTO: 1 /HPF (ref 0–4)
RBC #/AREA URNS AUTO: 3 /HPF (ref 0–4)
RBC #/AREA URNS AUTO: 37 /HPF (ref 0–4)
RBC #/AREA URNS AUTO: 5 /HPF (ref 0–4)
RBC #/AREA URNS HPF: >100 /HPF (ref 0–4)
RIGHT VENTRICULAR END-DIASTOLIC DIMENSION: 4.3 CM
RV TISSUE DOPPLER FREE WALL SYSTOLIC VELOCITY 1 (APICAL 4 CHAMBER VIEW): 12.3 CM/S
RV TISSUE DOPPLER FREE WALL SYSTOLIC VELOCITY 1 (APICAL 4 CHAMBER VIEW): 13.95 CM/S
SARS-COV-2 RDRP RESP QL NAA+PROBE: NEGATIVE
SARS-COV-2 RDRP RESP QL NAA+PROBE: POSITIVE
SARS-COV-2 RNA RESP QL NAA+PROBE: DETECTED
SATURATED IRON: 132 % (ref 20–50)
SATURATED IRON: 34 % (ref 20–50)
SATURATED IRON: 35 % (ref 20–50)
SCHISTOCYTES BLD QL SMEAR: ABNORMAL
SINUS: 2.98 CM
SINUS: 3.65 CM
SODIUM SERPL-SCNC: 135 MMOL/L (ref 136–145)
SODIUM SERPL-SCNC: 136 MMOL/L (ref 136–145)
SODIUM SERPL-SCNC: 137 MMOL/L (ref 136–145)
SODIUM SERPL-SCNC: 138 MMOL/L (ref 136–145)
SODIUM SERPL-SCNC: 139 MMOL/L (ref 136–145)
SODIUM SERPL-SCNC: 140 MMOL/L (ref 136–145)
SODIUM SERPL-SCNC: 141 MMOL/L (ref 136–145)
SODIUM SERPL-SCNC: 142 MMOL/L (ref 136–145)
SODIUM SERPL-SCNC: 143 MMOL/L (ref 136–145)
SP GR UR STRIP: 1.01 (ref 1–1.03)
SP GR UR STRIP: 1.02 (ref 1–1.03)
SQUAMOUS #/AREA URNS AUTO: 0 /HPF
SQUAMOUS #/AREA URNS AUTO: 1 /HPF
STJ: 2.86 CM
STJ: 3.04 CM
T4 FREE SERPL-MCNC: 0.91 NG/DL (ref 0.71–1.51)
T4 FREE SERPL-MCNC: 0.93 NG/DL (ref 0.71–1.51)
T4 FREE SERPL-MCNC: 1.06 NG/DL (ref 0.71–1.51)
T4 FREE SERPL-MCNC: 1.53 NG/DL (ref 0.71–1.51)
T4 FREE SERPL-MCNC: 1.72 NG/DL (ref 0.71–1.51)
TARGETS BLD QL SMEAR: ABNORMAL
TB INDURATION 48 - 72 HR READ: 0 MM
TDI LATERAL: 0.1 M/S
TDI LATERAL: 0.1 M/S
TDI SEPTAL: 0.07 M/S
TDI SEPTAL: 0.09 M/S
TDI: 0.09 M/S
TDI: 0.1 M/S
THRYOGLOBULIN INTERPRETATION: ABNORMAL
THYROGLOB AB SERPL-ACNC: <1.8 IU/ML
THYROGLOB SERPL-MCNC: 0.5 NG/ML
TOTAL IRON BINDING CAPACITY: 229 UG/DL (ref 250–450)
TOTAL IRON BINDING CAPACITY: 231 UG/DL (ref 250–450)
TOTAL IRON BINDING CAPACITY: 269 UG/DL (ref 250–450)
TR MAX PG: 27 MMHG
TR MAX PG: 33 MMHG
TRANSFERRIN SERPL-MCNC: 155 MG/DL (ref 200–375)
TRANSFERRIN SERPL-MCNC: 156 MG/DL (ref 200–375)
TRANSFERRIN SERPL-MCNC: 182 MG/DL (ref 200–375)
TRICUSPID ANNULAR PLANE SYSTOLIC EXCURSION: 1.69 CM
TRICUSPID ANNULAR PLANE SYSTOLIC EXCURSION: 2.02 CM
TRIGL SERPL-MCNC: 77 MG/DL (ref 30–150)
TROPONIN I SERPL DL<=0.01 NG/ML-MCNC: 0.02 NG/ML (ref 0–0.03)
TROPONIN I SERPL DL<=0.01 NG/ML-MCNC: 0.02 NG/ML (ref 0–0.03)
TSH SERPL DL<=0.005 MIU/L-ACNC: 0.14 UIU/ML (ref 0.4–4)
TSH SERPL DL<=0.005 MIU/L-ACNC: 0.15 UIU/ML (ref 0.4–4)
TSH SERPL DL<=0.005 MIU/L-ACNC: 7.15 UIU/ML (ref 0.4–4)
TSH SERPL DL<=0.005 MIU/L-ACNC: 9.48 UIU/ML (ref 0.4–4)
TSH SERPL DL<=0.005 MIU/L-ACNC: 9.69 UIU/ML (ref 0.4–4)
TV REST PULMONARY ARTERY PRESSURE: 35 MMHG
TV REST PULMONARY ARTERY PRESSURE: 36 MMHG
URN SPEC COLLECT METH UR: ABNORMAL
UROBILINOGEN UR STRIP-ACNC: NEGATIVE EU/DL
WBC # BLD AUTO: 10.14 K/UL (ref 3.9–12.7)
WBC # BLD AUTO: 10.93 K/UL (ref 3.9–12.7)
WBC # BLD AUTO: 13.48 K/UL (ref 3.9–12.7)
WBC # BLD AUTO: 17.27 K/UL (ref 3.9–12.7)
WBC # BLD AUTO: 6.12 K/UL (ref 3.9–12.7)
WBC # BLD AUTO: 6.48 K/UL (ref 3.9–12.7)
WBC # BLD AUTO: 6.57 K/UL (ref 3.9–12.7)
WBC # BLD AUTO: 6.57 K/UL (ref 3.9–12.7)
WBC # BLD AUTO: 6.82 K/UL (ref 3.9–12.7)
WBC # BLD AUTO: 6.99 K/UL (ref 3.9–12.7)
WBC # BLD AUTO: 7.1 K/UL (ref 3.9–12.7)
WBC # BLD AUTO: 7.12 K/UL (ref 3.9–12.7)
WBC # BLD AUTO: 7.54 K/UL (ref 3.9–12.7)
WBC # BLD AUTO: 7.91 K/UL (ref 3.9–12.7)
WBC # BLD AUTO: 7.92 K/UL (ref 3.9–12.7)
WBC # BLD AUTO: 8.06 K/UL (ref 3.9–12.7)
WBC # BLD AUTO: 8.43 K/UL (ref 3.9–12.7)
WBC # BLD AUTO: 8.5 K/UL (ref 3.9–12.7)
WBC # BLD AUTO: 8.5 K/UL (ref 3.9–12.7)
WBC # BLD AUTO: 8.62 K/UL (ref 3.9–12.7)
WBC # BLD AUTO: 8.69 K/UL (ref 3.9–12.7)
WBC # BLD AUTO: 8.77 K/UL (ref 3.9–12.7)
WBC # BLD AUTO: 8.81 K/UL (ref 3.9–12.7)
WBC # BLD AUTO: 9.14 K/UL (ref 3.9–12.7)
WBC # BLD AUTO: 9.22 K/UL (ref 3.9–12.7)
WBC # BLD AUTO: 9.42 K/UL (ref 3.9–12.7)
WBC # BLD AUTO: 9.51 K/UL (ref 3.9–12.7)
WBC #/AREA URNS AUTO: 21 /HPF (ref 0–5)
WBC #/AREA URNS AUTO: 33 /HPF (ref 0–5)
WBC #/AREA URNS AUTO: >100 /HPF (ref 0–5)
WBC #/AREA URNS HPF: 3 /HPF (ref 0–5)
WBC CLUMPS UR QL AUTO: ABNORMAL
WBC CLUMPS UR QL AUTO: ABNORMAL

## 2021-01-01 PROCEDURE — 99232 PR SUBSEQUENT HOSPITAL CARE,LEVL II: ICD-10-PCS | Mod: ,,, | Performed by: HOSPITALIST

## 2021-01-01 PROCEDURE — 84439 ASSAY OF FREE THYROXINE: CPT | Performed by: NURSE PRACTITIONER

## 2021-01-01 PROCEDURE — 3008F BODY MASS INDEX DOCD: CPT | Mod: CPTII,S$GLB,, | Performed by: NURSE PRACTITIONER

## 2021-01-01 PROCEDURE — 97110 THERAPEUTIC EXERCISES: CPT

## 2021-01-01 PROCEDURE — 3044F PR MOST RECENT HEMOGLOBIN A1C LEVEL <7.0%: ICD-10-PCS | Mod: CPTII,S$GLB,, | Performed by: INTERNAL MEDICINE

## 2021-01-01 PROCEDURE — 20600001 HC STEP DOWN PRIVATE ROOM

## 2021-01-01 PROCEDURE — 63600175 PHARM REV CODE 636 W HCPCS: Performed by: INTERNAL MEDICINE

## 2021-01-01 PROCEDURE — 1126F AMNT PAIN NOTED NONE PRSNT: CPT | Mod: S$GLB,,, | Performed by: INTERNAL MEDICINE

## 2021-01-01 PROCEDURE — 99232 PR SUBSEQUENT HOSPITAL CARE,LEVL II: ICD-10-PCS | Mod: ,,, | Performed by: INTERNAL MEDICINE

## 2021-01-01 PROCEDURE — 11000001 HC ACUTE MED/SURG PRIVATE ROOM

## 2021-01-01 PROCEDURE — 25000003 PHARM REV CODE 250: Performed by: STUDENT IN AN ORGANIZED HEALTH CARE EDUCATION/TRAINING PROGRAM

## 2021-01-01 PROCEDURE — 80069 RENAL FUNCTION PANEL: CPT | Performed by: HOSPITALIST

## 2021-01-01 PROCEDURE — U0002 COVID-19 LAB TEST NON-CDC: HCPCS | Performed by: EMERGENCY MEDICINE

## 2021-01-01 PROCEDURE — 3044F PR MOST RECENT HEMOGLOBIN A1C LEVEL <7.0%: ICD-10-PCS | Mod: CPTII,S$GLB,, | Performed by: PODIATRIST

## 2021-01-01 PROCEDURE — 3008F PR BODY MASS INDEX (BMI) DOCUMENTED: ICD-10-PCS | Mod: CPTII,S$GLB,, | Performed by: NURSE PRACTITIONER

## 2021-01-01 PROCEDURE — 3008F PR BODY MASS INDEX (BMI) DOCUMENTED: ICD-10-PCS | Mod: CPTII,S$GLB,, | Performed by: INTERNAL MEDICINE

## 2021-01-01 PROCEDURE — 99223 PR INITIAL HOSPITAL CARE,LEVL III: ICD-10-PCS | Mod: AI,,, | Performed by: INTERNAL MEDICINE

## 2021-01-01 PROCEDURE — 11721 DEBRIDE NAIL 6 OR MORE: CPT | Mod: Q9,S$GLB,, | Performed by: PODIATRIST

## 2021-01-01 PROCEDURE — 99233 PR SUBSEQUENT HOSPITAL CARE,LEVL III: ICD-10-PCS | Mod: ,,, | Performed by: INTERNAL MEDICINE

## 2021-01-01 PROCEDURE — 99284 EMERGENCY DEPT VISIT MOD MDM: CPT | Mod: 25

## 2021-01-01 PROCEDURE — 84443 ASSAY THYROID STIM HORMONE: CPT | Performed by: NURSE PRACTITIONER

## 2021-01-01 PROCEDURE — 1159F PR MEDICATION LIST DOCUMENTED IN MEDICAL RECORD: ICD-10-PCS | Mod: S$GLB,,, | Performed by: INTERNAL MEDICINE

## 2021-01-01 PROCEDURE — 87086 URINE CULTURE/COLONY COUNT: CPT | Performed by: PHYSICIAN ASSISTANT

## 2021-01-01 PROCEDURE — 96372 THER/PROPH/DIAG INJ SC/IM: CPT

## 2021-01-01 PROCEDURE — 97116 GAIT TRAINING THERAPY: CPT

## 2021-01-01 PROCEDURE — 86900 BLOOD TYPING SEROLOGIC ABO: CPT | Performed by: NURSE PRACTITIONER

## 2021-01-01 PROCEDURE — 99999 PR PBB SHADOW E&M-EST. PATIENT-LVL V: CPT | Mod: PBBFAC,,, | Performed by: INTERNAL MEDICINE

## 2021-01-01 PROCEDURE — 25000003 PHARM REV CODE 250: Performed by: INTERNAL MEDICINE

## 2021-01-01 PROCEDURE — 90935 PR HEMODIALYSIS, ONE EVALUATION: ICD-10-PCS | Mod: ,,, | Performed by: INTERNAL MEDICINE

## 2021-01-01 PROCEDURE — 63600175 PHARM REV CODE 636 W HCPCS: Performed by: STUDENT IN AN ORGANIZED HEALTH CARE EDUCATION/TRAINING PROGRAM

## 2021-01-01 PROCEDURE — 84443 ASSAY THYROID STIM HORMONE: CPT

## 2021-01-01 PROCEDURE — 3078F PR MOST RECENT DIASTOLIC BLOOD PRESSURE < 80 MM HG: ICD-10-PCS | Mod: CPTII,S$GLB,, | Performed by: PHYSICIAN ASSISTANT

## 2021-01-01 PROCEDURE — 80048 BASIC METABOLIC PNL TOTAL CA: CPT | Performed by: HOSPITALIST

## 2021-01-01 PROCEDURE — 83735 ASSAY OF MAGNESIUM: CPT | Performed by: HOSPITALIST

## 2021-01-01 PROCEDURE — 30200315 PPD INTRADERMAL TEST REV CODE 302: Performed by: HOSPITALIST

## 2021-01-01 PROCEDURE — 85025 COMPLETE CBC W/AUTO DIFF WBC: CPT | Performed by: HOSPITALIST

## 2021-01-01 PROCEDURE — 99239 PR HOSPITAL DISCHARGE DAY,>30 MIN: ICD-10-PCS | Mod: ,,, | Performed by: FAMILY MEDICINE

## 2021-01-01 PROCEDURE — 99499 UNLISTED E&M SERVICE: CPT | Mod: S$GLB,,, | Performed by: INTERNAL MEDICINE

## 2021-01-01 PROCEDURE — 99233 PR SUBSEQUENT HOSPITAL CARE,LEVL III: ICD-10-PCS | Mod: ,,, | Performed by: FAMILY MEDICINE

## 2021-01-01 PROCEDURE — 99232 SBSQ HOSP IP/OBS MODERATE 35: CPT | Mod: ,,, | Performed by: INTERNAL MEDICINE

## 2021-01-01 PROCEDURE — 99999 PR PBB SHADOW E&M-EST. PATIENT-LVL III: CPT | Mod: PBBFAC,,, | Performed by: PODIATRIST

## 2021-01-01 PROCEDURE — 83540 ASSAY OF IRON: CPT

## 2021-01-01 PROCEDURE — 1159F PR MEDICATION LIST DOCUMENTED IN MEDICAL RECORD: ICD-10-PCS | Mod: 95,,, | Performed by: NURSE PRACTITIONER

## 2021-01-01 PROCEDURE — 90935 HEMODIALYSIS ONE EVALUATION: CPT | Mod: ,,, | Performed by: INTERNAL MEDICINE

## 2021-01-01 PROCEDURE — 99239 HOSP IP/OBS DSCHRG MGMT >30: CPT | Mod: ,,, | Performed by: FAMILY MEDICINE

## 2021-01-01 PROCEDURE — 99999 PR PBB SHADOW E&M-EST. PATIENT-LVL V: ICD-10-PCS | Mod: PBBFAC,,, | Performed by: NURSE PRACTITIONER

## 2021-01-01 PROCEDURE — 85610 PROTHROMBIN TIME: CPT | Performed by: HOSPITALIST

## 2021-01-01 PROCEDURE — 99214 OFFICE O/P EST MOD 30 MIN: CPT | Mod: S$GLB,,, | Performed by: NURSE PRACTITIONER

## 2021-01-01 PROCEDURE — 84100 ASSAY OF PHOSPHORUS: CPT | Performed by: STUDENT IN AN ORGANIZED HEALTH CARE EDUCATION/TRAINING PROGRAM

## 2021-01-01 PROCEDURE — 99232 SBSQ HOSP IP/OBS MODERATE 35: CPT | Mod: ,,, | Performed by: HOSPITALIST

## 2021-01-01 PROCEDURE — 99285 EMERGENCY DEPT VISIT HI MDM: CPT | Mod: CS,,, | Performed by: PHYSICIAN ASSISTANT

## 2021-01-01 PROCEDURE — 1126F AMNT PAIN NOTED NONE PRSNT: CPT | Mod: S$GLB,,, | Performed by: PODIATRIST

## 2021-01-01 PROCEDURE — 83880 ASSAY OF NATRIURETIC PEPTIDE: CPT | Performed by: STUDENT IN AN ORGANIZED HEALTH CARE EDUCATION/TRAINING PROGRAM

## 2021-01-01 PROCEDURE — 85025 COMPLETE CBC W/AUTO DIFF WBC: CPT

## 2021-01-01 PROCEDURE — P9016 RBC LEUKOCYTES REDUCED: HCPCS | Performed by: HOSPITALIST

## 2021-01-01 PROCEDURE — 1159F MED LIST DOCD IN RCRD: CPT | Mod: S$GLB,,, | Performed by: PODIATRIST

## 2021-01-01 PROCEDURE — 3288F FALL RISK ASSESSMENT DOCD: CPT | Mod: CPTII,S$GLB,, | Performed by: INTERNAL MEDICINE

## 2021-01-01 PROCEDURE — 1126F PR PAIN SEVERITY QUANTIFIED, NO PAIN PRESENT: ICD-10-PCS | Mod: S$GLB,,, | Performed by: NURSE PRACTITIONER

## 2021-01-01 PROCEDURE — 84100 ASSAY OF PHOSPHORUS: CPT | Performed by: HOSPITALIST

## 2021-01-01 PROCEDURE — 36415 COLL VENOUS BLD VENIPUNCTURE: CPT | Performed by: INTERNAL MEDICINE

## 2021-01-01 PROCEDURE — 1126F PR PAIN SEVERITY QUANTIFIED, NO PAIN PRESENT: ICD-10-PCS | Mod: S$GLB,,, | Performed by: INTERNAL MEDICINE

## 2021-01-01 PROCEDURE — 99232 PR SUBSEQUENT HOSPITAL CARE,LEVL II: ICD-10-PCS | Mod: 95,,, | Performed by: INTERNAL MEDICINE

## 2021-01-01 PROCEDURE — 36415 COLL VENOUS BLD VENIPUNCTURE: CPT | Performed by: HOSPITALIST

## 2021-01-01 PROCEDURE — 1126F AMNT PAIN NOTED NONE PRSNT: CPT | Mod: S$GLB,,, | Performed by: NURSE PRACTITIONER

## 2021-01-01 PROCEDURE — 25000003 PHARM REV CODE 250: Performed by: HOSPITALIST

## 2021-01-01 PROCEDURE — 99233 SBSQ HOSP IP/OBS HIGH 50: CPT | Mod: ,,, | Performed by: INTERNAL MEDICINE

## 2021-01-01 PROCEDURE — 99499 RISK ADDL DX/OHS AUDIT: ICD-10-PCS | Mod: S$GLB,,, | Performed by: INTERNAL MEDICINE

## 2021-01-01 PROCEDURE — 81001 URINALYSIS AUTO W/SCOPE: CPT | Performed by: PHYSICIAN ASSISTANT

## 2021-01-01 PROCEDURE — 82962 GLUCOSE BLOOD TEST: CPT

## 2021-01-01 PROCEDURE — 99233 SBSQ HOSP IP/OBS HIGH 50: CPT | Mod: ,,, | Performed by: HOSPITALIST

## 2021-01-01 PROCEDURE — 3044F PR MOST RECENT HEMOGLOBIN A1C LEVEL <7.0%: ICD-10-PCS | Mod: CPTII,S$GLB,, | Performed by: NURSE PRACTITIONER

## 2021-01-01 PROCEDURE — 83735 ASSAY OF MAGNESIUM: CPT | Mod: 91 | Performed by: PHYSICIAN ASSISTANT

## 2021-01-01 PROCEDURE — G0108 DIAB MANAGE TRN  PER INDIV: HCPCS | Mod: S$GLB,,, | Performed by: INTERNAL MEDICINE

## 2021-01-01 PROCEDURE — 1101F PR PT FALLS ASSESS DOC 0-1 FALLS W/OUT INJ PAST YR: ICD-10-PCS | Mod: CPTII,S$GLB,, | Performed by: INTERNAL MEDICINE

## 2021-01-01 PROCEDURE — 82306 VITAMIN D 25 HYDROXY: CPT

## 2021-01-01 PROCEDURE — 99999 PR PBB SHADOW E&M-EST. PATIENT-LVL IV: CPT | Mod: PBBFAC,,, | Performed by: INTERNAL MEDICINE

## 2021-01-01 PROCEDURE — 3288F PR FALLS RISK ASSESSMENT DOCUMENTED: ICD-10-PCS | Mod: CPTII,S$GLB,, | Performed by: NURSE PRACTITIONER

## 2021-01-01 PROCEDURE — 99999 PR PBB SHADOW E&M-EST. PATIENT-LVL V: ICD-10-PCS | Mod: PBBFAC,,, | Performed by: INTERNAL MEDICINE

## 2021-01-01 PROCEDURE — 84165 PROTEIN E-PHORESIS SERUM: CPT | Mod: 26,,, | Performed by: PATHOLOGY

## 2021-01-01 PROCEDURE — 83036 HEMOGLOBIN GLYCOSYLATED A1C: CPT | Performed by: HOSPITALIST

## 2021-01-01 PROCEDURE — 3008F BODY MASS INDEX DOCD: CPT | Mod: CPTII,S$GLB,, | Performed by: INTERNAL MEDICINE

## 2021-01-01 PROCEDURE — 87186 SC STD MICRODIL/AGAR DIL: CPT | Performed by: PHYSICIAN ASSISTANT

## 2021-01-01 PROCEDURE — 3078F PR MOST RECENT DIASTOLIC BLOOD PRESSURE < 80 MM HG: ICD-10-PCS | Mod: CPTII,S$GLB,, | Performed by: INTERNAL MEDICINE

## 2021-01-01 PROCEDURE — 31500 INSERT EMERGENCY AIRWAY: CPT

## 2021-01-01 PROCEDURE — 99499 UNLISTED E&M SERVICE: CPT | Mod: S$GLB,,, | Performed by: PODIATRIST

## 2021-01-01 PROCEDURE — 86800 THYROGLOBULIN ANTIBODY: CPT | Performed by: NURSE PRACTITIONER

## 2021-01-01 PROCEDURE — 3078F DIAST BP <80 MM HG: CPT | Mod: CPTII,S$GLB,, | Performed by: INTERNAL MEDICINE

## 2021-01-01 PROCEDURE — 99285 PR EMERGENCY DEPT VISIT,LEVEL V: ICD-10-PCS | Mod: CS,,, | Performed by: PHYSICIAN ASSISTANT

## 2021-01-01 PROCEDURE — 36415 COLL VENOUS BLD VENIPUNCTURE: CPT | Performed by: STUDENT IN AN ORGANIZED HEALTH CARE EDUCATION/TRAINING PROGRAM

## 2021-01-01 PROCEDURE — 36415 COLL VENOUS BLD VENIPUNCTURE: CPT | Mod: PO

## 2021-01-01 PROCEDURE — 99999 PR PBB SHADOW E&M-EST. PATIENT-LVL IV: CPT | Mod: PBBFAC,,,

## 2021-01-01 PROCEDURE — 3044F HG A1C LEVEL LT 7.0%: CPT | Mod: CPTII,S$GLB,, | Performed by: INTERNAL MEDICINE

## 2021-01-01 PROCEDURE — 82728 ASSAY OF FERRITIN: CPT

## 2021-01-01 PROCEDURE — 1125F PR PAIN SEVERITY QUANTIFIED, PAIN PRESENT: ICD-10-PCS | Mod: S$GLB,,, | Performed by: PHYSICIAN ASSISTANT

## 2021-01-01 PROCEDURE — 63600175 PHARM REV CODE 636 W HCPCS: Performed by: HOSPITALIST

## 2021-01-01 PROCEDURE — 99999 PR PBB SHADOW E&M-EST. PATIENT-LVL IV: ICD-10-PCS | Mod: PBBFAC,,, | Performed by: INTERNAL MEDICINE

## 2021-01-01 PROCEDURE — 80100014 HC HEMODIALYSIS 1:1

## 2021-01-01 PROCEDURE — 96376 TX/PRO/DX INJ SAME DRUG ADON: CPT

## 2021-01-01 PROCEDURE — 1159F MED LIST DOCD IN RCRD: CPT | Mod: S$GLB,,, | Performed by: INTERNAL MEDICINE

## 2021-01-01 PROCEDURE — 85025 COMPLETE CBC W/AUTO DIFF WBC: CPT | Performed by: INTERNAL MEDICINE

## 2021-01-01 PROCEDURE — G0108 DIAB MANAGE TRN  PER INDIV: HCPCS | Mod: S$GLB,,, | Performed by: REGISTERED NURSE

## 2021-01-01 PROCEDURE — 99999 PR PBB SHADOW E&M-EST. PATIENT-LVL V: ICD-10-PCS | Mod: PBBFAC,,, | Performed by: PHYSICIAN ASSISTANT

## 2021-01-01 PROCEDURE — 3288F FALL RISK ASSESSMENT DOCD: CPT | Mod: CPTII,S$GLB,, | Performed by: NURSE PRACTITIONER

## 2021-01-01 PROCEDURE — 1159F MED LIST DOCD IN RCRD: CPT | Mod: S$GLB,,, | Performed by: NURSE PRACTITIONER

## 2021-01-01 PROCEDURE — U0005 INFEC AGEN DETEC AMPLI PROBE: HCPCS | Performed by: INTERNAL MEDICINE

## 2021-01-01 PROCEDURE — 83880 ASSAY OF NATRIURETIC PEPTIDE: CPT | Performed by: NURSE PRACTITIONER

## 2021-01-01 PROCEDURE — 80048 BASIC METABOLIC PNL TOTAL CA: CPT | Performed by: STUDENT IN AN ORGANIZED HEALTH CARE EDUCATION/TRAINING PROGRAM

## 2021-01-01 PROCEDURE — 99213 OFFICE O/P EST LOW 20 MIN: CPT | Mod: S$GLB,,, | Performed by: PHYSICIAN ASSISTANT

## 2021-01-01 PROCEDURE — 96372 THER/PROPH/DIAG INJ SC/IM: CPT | Mod: S$GLB,,, | Performed by: NURSE PRACTITIONER

## 2021-01-01 PROCEDURE — 87340 HEPATITIS B SURFACE AG IA: CPT | Performed by: HOSPITALIST

## 2021-01-01 PROCEDURE — 21400001 HC TELEMETRY ROOM

## 2021-01-01 PROCEDURE — 99999 PR PBB SHADOW E&M-EST. PATIENT-LVL V: CPT | Mod: PBBFAC,,, | Performed by: NURSE PRACTITIONER

## 2021-01-01 PROCEDURE — 63600175 PHARM REV CODE 636 W HCPCS: Mod: JG | Performed by: INTERNAL MEDICINE

## 2021-01-01 PROCEDURE — 99213 PR OFFICE/OUTPT VISIT, EST, LEVL III, 20-29 MIN: ICD-10-PCS | Mod: 25,S$GLB,, | Performed by: PODIATRIST

## 2021-01-01 PROCEDURE — 99215 PR OFFICE/OUTPT VISIT, EST, LEVL V, 40-54 MIN: ICD-10-PCS | Mod: S$GLB,,, | Performed by: PHYSICIAN ASSISTANT

## 2021-01-01 PROCEDURE — 87186 SC STD MICRODIL/AGAR DIL: CPT | Performed by: STUDENT IN AN ORGANIZED HEALTH CARE EDUCATION/TRAINING PROGRAM

## 2021-01-01 PROCEDURE — 3077F PR MOST RECENT SYSTOLIC BLOOD PRESSURE >= 140 MM HG: ICD-10-PCS | Mod: CPTII,S$GLB,, | Performed by: INTERNAL MEDICINE

## 2021-01-01 PROCEDURE — 80177 DRUG SCRN QUAN LEVETIRACETAM: CPT | Performed by: EMERGENCY MEDICINE

## 2021-01-01 PROCEDURE — 82040 ASSAY OF SERUM ALBUMIN: CPT | Performed by: HOSPITALIST

## 2021-01-01 PROCEDURE — 36430 TRANSFUSION BLD/BLD COMPNT: CPT

## 2021-01-01 PROCEDURE — 1101F PT FALLS ASSESS-DOCD LE1/YR: CPT | Mod: CPTII,S$GLB,, | Performed by: INTERNAL MEDICINE

## 2021-01-01 PROCEDURE — 1159F MED LIST DOCD IN RCRD: CPT | Mod: 95,,, | Performed by: NURSE PRACTITIONER

## 2021-01-01 PROCEDURE — 99214 PR OFFICE/OUTPT VISIT, EST, LEVL IV, 30-39 MIN: ICD-10-PCS | Mod: S$GLB,,, | Performed by: NURSE PRACTITIONER

## 2021-01-01 PROCEDURE — 99214 PR OFFICE/OUTPT VISIT, EST, LEVL IV, 30-39 MIN: ICD-10-PCS | Mod: S$GLB,,, | Performed by: INTERNAL MEDICINE

## 2021-01-01 PROCEDURE — 25000003 PHARM REV CODE 250: Performed by: PHYSICIAN ASSISTANT

## 2021-01-01 PROCEDURE — 1101F PT FALLS ASSESS-DOCD LE1/YR: CPT | Mod: CPTII,S$GLB,, | Performed by: UROLOGY

## 2021-01-01 PROCEDURE — 3077F SYST BP >= 140 MM HG: CPT | Mod: CPTII,S$GLB,, | Performed by: NURSE PRACTITIONER

## 2021-01-01 PROCEDURE — 63600175 PHARM REV CODE 636 W HCPCS: Performed by: PHYSICIAN ASSISTANT

## 2021-01-01 PROCEDURE — 90935 HEMODIALYSIS ONE EVALUATION: CPT

## 2021-01-01 PROCEDURE — 99214 PR OFFICE/OUTPT VISIT, EST, LEVL IV, 30-39 MIN: ICD-10-PCS | Mod: 95,,, | Performed by: NURSE PRACTITIONER

## 2021-01-01 PROCEDURE — 99220 PR INITIAL OBSERVATION CARE,LEVL III: CPT | Mod: ,,, | Performed by: HOSPITALIST

## 2021-01-01 PROCEDURE — 81001 URINALYSIS AUTO W/SCOPE: CPT | Performed by: INTERNAL MEDICINE

## 2021-01-01 PROCEDURE — 96365 THER/PROPH/DIAG IV INF INIT: CPT

## 2021-01-01 PROCEDURE — 85610 PROTHROMBIN TIME: CPT | Performed by: EMERGENCY MEDICINE

## 2021-01-01 PROCEDURE — 99999 PR PBB SHADOW E&M-EST. PATIENT-LVL V: ICD-10-PCS | Mod: PBBFAC,,, | Performed by: UROLOGY

## 2021-01-01 PROCEDURE — 99213 PR OFFICE/OUTPT VISIT, EST, LEVL III, 20-29 MIN: ICD-10-PCS | Mod: S$GLB,,, | Performed by: PHYSICIAN ASSISTANT

## 2021-01-01 PROCEDURE — 36415 COLL VENOUS BLD VENIPUNCTURE: CPT | Performed by: PHYSICIAN ASSISTANT

## 2021-01-01 PROCEDURE — 99215 OFFICE O/P EST HI 40 MIN: CPT | Mod: S$GLB,,, | Performed by: INTERNAL MEDICINE

## 2021-01-01 PROCEDURE — 85027 COMPLETE CBC AUTOMATED: CPT | Performed by: INTERNAL MEDICINE

## 2021-01-01 PROCEDURE — 99214 OFFICE O/P EST MOD 30 MIN: CPT | Mod: S$GLB,,, | Performed by: INTERNAL MEDICINE

## 2021-01-01 PROCEDURE — 1159F PR MEDICATION LIST DOCUMENTED IN MEDICAL RECORD: ICD-10-PCS | Mod: S$GLB,,, | Performed by: UROLOGY

## 2021-01-01 PROCEDURE — 1125F PR PAIN SEVERITY QUANTIFIED, PAIN PRESENT: ICD-10-PCS | Mod: S$GLB,,, | Performed by: NURSE PRACTITIONER

## 2021-01-01 PROCEDURE — 36415 COLL VENOUS BLD VENIPUNCTURE: CPT | Performed by: NURSE PRACTITIONER

## 2021-01-01 PROCEDURE — 3288F PR FALLS RISK ASSESSMENT DOCUMENTED: ICD-10-PCS | Mod: CPTII,S$GLB,, | Performed by: INTERNAL MEDICINE

## 2021-01-01 PROCEDURE — 99291 CRITICAL CARE FIRST HOUR: CPT | Mod: ,,, | Performed by: CLINICAL NURSE SPECIALIST

## 2021-01-01 PROCEDURE — 80053 COMPREHEN METABOLIC PANEL: CPT | Performed by: HOSPITALIST

## 2021-01-01 PROCEDURE — 80177 DRUG SCRN QUAN LEVETIRACETAM: CPT | Performed by: PSYCHIATRY & NEUROLOGY

## 2021-01-01 PROCEDURE — 99214 OFFICE O/P EST MOD 30 MIN: CPT | Mod: S$GLB,,, | Performed by: UROLOGY

## 2021-01-01 PROCEDURE — 97530 THERAPEUTIC ACTIVITIES: CPT

## 2021-01-01 PROCEDURE — 99215 PR OFFICE/OUTPT VISIT, EST, LEVL V, 40-54 MIN: ICD-10-PCS | Mod: S$GLB,,, | Performed by: NURSE PRACTITIONER

## 2021-01-01 PROCEDURE — 1126F PR PAIN SEVERITY QUANTIFIED, NO PAIN PRESENT: ICD-10-PCS | Mod: S$GLB,,, | Performed by: UROLOGY

## 2021-01-01 PROCEDURE — 94761 N-INVAS EAR/PLS OXIMETRY MLT: CPT

## 2021-01-01 PROCEDURE — 93010 EKG 12-LEAD: ICD-10-PCS | Mod: ,,, | Performed by: INTERNAL MEDICINE

## 2021-01-01 PROCEDURE — 93005 ELECTROCARDIOGRAM TRACING: CPT

## 2021-01-01 PROCEDURE — 1101F PT FALLS ASSESS-DOCD LE1/YR: CPT | Mod: CPTII,S$GLB,, | Performed by: NURSE PRACTITIONER

## 2021-01-01 PROCEDURE — 1101F PR PT FALLS ASSESS DOC 0-1 FALLS W/OUT INJ PAST YR: ICD-10-PCS | Mod: CPTII,S$GLB,, | Performed by: PHYSICIAN ASSISTANT

## 2021-01-01 PROCEDURE — 86920 COMPATIBILITY TEST SPIN: CPT | Performed by: INTERNAL MEDICINE

## 2021-01-01 PROCEDURE — 97802 PR MED NUTR THER, 1ST, INDIV, EA 15 MIN: ICD-10-PCS | Mod: S$GLB,,, | Performed by: DIETITIAN, REGISTERED

## 2021-01-01 PROCEDURE — 87088 URINE BACTERIA CULTURE: CPT | Performed by: STUDENT IN AN ORGANIZED HEALTH CARE EDUCATION/TRAINING PROGRAM

## 2021-01-01 PROCEDURE — 25000003 PHARM REV CODE 250: Performed by: NURSE PRACTITIONER

## 2021-01-01 PROCEDURE — 83735 ASSAY OF MAGNESIUM: CPT | Performed by: STUDENT IN AN ORGANIZED HEALTH CARE EDUCATION/TRAINING PROGRAM

## 2021-01-01 PROCEDURE — 80053 COMPREHEN METABOLIC PANEL: CPT | Performed by: STUDENT IN AN ORGANIZED HEALTH CARE EDUCATION/TRAINING PROGRAM

## 2021-01-01 PROCEDURE — 76536 US SOFT TISSUE HEAD NECK THYROID: ICD-10-PCS | Mod: S$GLB,,, | Performed by: INTERNAL MEDICINE

## 2021-01-01 PROCEDURE — 84484 ASSAY OF TROPONIN QUANT: CPT | Performed by: PHYSICIAN ASSISTANT

## 2021-01-01 PROCEDURE — 84439 ASSAY OF FREE THYROXINE: CPT | Performed by: STUDENT IN AN ORGANIZED HEALTH CARE EDUCATION/TRAINING PROGRAM

## 2021-01-01 PROCEDURE — 85025 COMPLETE CBC W/AUTO DIFF WBC: CPT | Performed by: PHYSICIAN ASSISTANT

## 2021-01-01 PROCEDURE — 99223 1ST HOSP IP/OBS HIGH 75: CPT | Mod: ,,, | Performed by: HOSPITALIST

## 2021-01-01 PROCEDURE — 3078F DIAST BP <80 MM HG: CPT | Mod: CPTII,S$GLB,, | Performed by: NURSE PRACTITIONER

## 2021-01-01 PROCEDURE — 80048 BASIC METABOLIC PNL TOTAL CA: CPT | Performed by: NURSE PRACTITIONER

## 2021-01-01 PROCEDURE — 3079F PR MOST RECENT DIASTOLIC BLOOD PRESSURE 80-89 MM HG: ICD-10-PCS | Mod: CPTII,S$GLB,, | Performed by: NURSE PRACTITIONER

## 2021-01-01 PROCEDURE — 99223 1ST HOSP IP/OBS HIGH 75: CPT | Mod: AI,,, | Performed by: INTERNAL MEDICINE

## 2021-01-01 PROCEDURE — 3008F BODY MASS INDEX DOCD: CPT | Mod: CPTII,S$GLB,, | Performed by: PHYSICIAN ASSISTANT

## 2021-01-01 PROCEDURE — 3008F BODY MASS INDEX DOCD: CPT | Mod: CPTII,S$GLB,, | Performed by: UROLOGY

## 2021-01-01 PROCEDURE — 85025 COMPLETE CBC W/AUTO DIFF WBC: CPT | Performed by: FAMILY MEDICINE

## 2021-01-01 PROCEDURE — 36556 INSERT NON-TUNNEL CV CATH: CPT | Performed by: STUDENT IN AN ORGANIZED HEALTH CARE EDUCATION/TRAINING PROGRAM

## 2021-01-01 PROCEDURE — 3075F PR MOST RECENT SYSTOLIC BLOOD PRESS GE 130-139MM HG: ICD-10-PCS | Mod: CPTII,S$GLB,, | Performed by: PHYSICIAN ASSISTANT

## 2021-01-01 PROCEDURE — 1159F PR MEDICATION LIST DOCUMENTED IN MEDICAL RECORD: ICD-10-PCS | Mod: S$GLB,,, | Performed by: NURSE PRACTITIONER

## 2021-01-01 PROCEDURE — 99285 PR EMERGENCY DEPT VISIT,LEVEL V: ICD-10-PCS | Mod: CS,,, | Performed by: EMERGENCY MEDICINE

## 2021-01-01 PROCEDURE — 99999 PR PBB SHADOW E&M-EST. PATIENT-LVL V: CPT | Mod: PBBFAC,,, | Performed by: UROLOGY

## 2021-01-01 PROCEDURE — 85025 COMPLETE CBC W/AUTO DIFF WBC: CPT | Mod: 91 | Performed by: INTERNAL MEDICINE

## 2021-01-01 PROCEDURE — 99215 OFFICE O/P EST HI 40 MIN: CPT | Mod: 25,S$GLB,, | Performed by: NURSE PRACTITIONER

## 2021-01-01 PROCEDURE — 3044F HG A1C LEVEL LT 7.0%: CPT | Mod: CPTII,S$GLB,, | Performed by: NURSE PRACTITIONER

## 2021-01-01 PROCEDURE — 99233 PR SUBSEQUENT HOSPITAL CARE,LEVL III: ICD-10-PCS | Mod: ,,, | Performed by: HOSPITALIST

## 2021-01-01 PROCEDURE — 80053 COMPREHEN METABOLIC PANEL: CPT | Performed by: PHYSICIAN ASSISTANT

## 2021-01-01 PROCEDURE — 80177 DRUG SCRN QUAN LEVETIRACETAM: CPT | Performed by: INTERNAL MEDICINE

## 2021-01-01 PROCEDURE — 80053 COMPREHEN METABOLIC PANEL: CPT | Performed by: NURSE PRACTITIONER

## 2021-01-01 PROCEDURE — 99285 EMERGENCY DEPT VISIT HI MDM: CPT | Mod: 25

## 2021-01-01 PROCEDURE — 36415 COLL VENOUS BLD VENIPUNCTURE: CPT | Mod: PO | Performed by: NURSE PRACTITIONER

## 2021-01-01 PROCEDURE — 99214 PR OFFICE/OUTPT VISIT, EST, LEVL IV, 30-39 MIN: ICD-10-PCS | Mod: S$GLB,,, | Performed by: UROLOGY

## 2021-01-01 PROCEDURE — 99220 PR INITIAL OBSERVATION CARE,LEVL III: ICD-10-PCS | Mod: ,,, | Performed by: HOSPITALIST

## 2021-01-01 PROCEDURE — 99284 EMERGENCY DEPT VISIT MOD MDM: CPT | Mod: ,,, | Performed by: EMERGENCY MEDICINE

## 2021-01-01 PROCEDURE — 99499 RISK ADDL DX/OHS AUDIT: ICD-10-PCS | Mod: S$GLB,,, | Performed by: NURSE PRACTITIONER

## 2021-01-01 PROCEDURE — 80069 RENAL FUNCTION PANEL: CPT

## 2021-01-01 PROCEDURE — 63600150 PHARM REV CODE 636: Performed by: INTERNAL MEDICINE

## 2021-01-01 PROCEDURE — 99239 HOSP IP/OBS DSCHRG MGMT >30: CPT | Mod: ,,, | Performed by: HOSPITALIST

## 2021-01-01 PROCEDURE — G0180 PR HOME HEALTH MD CERTIFICATION: ICD-10-PCS | Mod: ,,, | Performed by: STUDENT IN AN ORGANIZED HEALTH CARE EDUCATION/TRAINING PROGRAM

## 2021-01-01 PROCEDURE — 3288F PR FALLS RISK ASSESSMENT DOCUMENTED: ICD-10-PCS | Mod: CPTII,S$GLB,, | Performed by: UROLOGY

## 2021-01-01 PROCEDURE — 93010 ELECTROCARDIOGRAM REPORT: CPT | Mod: ,,, | Performed by: INTERNAL MEDICINE

## 2021-01-01 PROCEDURE — 84443 ASSAY THYROID STIM HORMONE: CPT | Performed by: STUDENT IN AN ORGANIZED HEALTH CARE EDUCATION/TRAINING PROGRAM

## 2021-01-01 PROCEDURE — 3288F FALL RISK ASSESSMENT DOCD: CPT | Mod: CPTII,S$GLB,, | Performed by: PHYSICIAN ASSISTANT

## 2021-01-01 PROCEDURE — 82728 ASSAY OF FERRITIN: CPT | Performed by: INTERNAL MEDICINE

## 2021-01-01 PROCEDURE — 83735 ASSAY OF MAGNESIUM: CPT

## 2021-01-01 PROCEDURE — 99999 PR PBB SHADOW E&M-EST. PATIENT-LVL IV: ICD-10-PCS | Mod: PBBFAC,,,

## 2021-01-01 PROCEDURE — 25000003 PHARM REV CODE 250: Performed by: CLINICAL NURSE SPECIALIST

## 2021-01-01 PROCEDURE — 99999 PR PBB SHADOW E&M-EST. PATIENT-LVL IV: ICD-10-PCS | Mod: PBBFAC,,, | Performed by: PHYSICIAN ASSISTANT

## 2021-01-01 PROCEDURE — 87077 CULTURE AEROBIC IDENTIFY: CPT | Performed by: STUDENT IN AN ORGANIZED HEALTH CARE EDUCATION/TRAINING PROGRAM

## 2021-01-01 PROCEDURE — 96374 THER/PROPH/DIAG INJ IV PUSH: CPT

## 2021-01-01 PROCEDURE — 99213 OFFICE O/P EST LOW 20 MIN: CPT | Mod: 25,S$GLB,, | Performed by: PODIATRIST

## 2021-01-01 PROCEDURE — 97161 PT EVAL LOW COMPLEX 20 MIN: CPT

## 2021-01-01 PROCEDURE — 99215 PR OFFICE/OUTPT VISIT, EST, LEVL V, 40-54 MIN: ICD-10-PCS | Mod: S$GLB,,, | Performed by: INTERNAL MEDICINE

## 2021-01-01 PROCEDURE — 87088 URINE BACTERIA CULTURE: CPT | Performed by: INTERNAL MEDICINE

## 2021-01-01 PROCEDURE — 1101F PR PT FALLS ASSESS DOC 0-1 FALLS W/OUT INJ PAST YR: ICD-10-PCS | Mod: CPTII,S$GLB,, | Performed by: NURSE PRACTITIONER

## 2021-01-01 PROCEDURE — 99499 UNLISTED E&M SERVICE: CPT | Mod: S$GLB,,, | Performed by: NURSE PRACTITIONER

## 2021-01-01 PROCEDURE — 80048 BASIC METABOLIC PNL TOTAL CA: CPT | Performed by: INTERNAL MEDICINE

## 2021-01-01 PROCEDURE — 1159F MED LIST DOCD IN RCRD: CPT | Mod: S$GLB,,, | Performed by: UROLOGY

## 2021-01-01 PROCEDURE — 99999 PR PBB SHADOW E&M-EST. PATIENT-LVL V: CPT | Mod: PBBFAC,,, | Performed by: PHYSICIAN ASSISTANT

## 2021-01-01 PROCEDURE — 1159F PR MEDICATION LIST DOCUMENTED IN MEDICAL RECORD: ICD-10-PCS | Mod: S$GLB,,, | Performed by: PODIATRIST

## 2021-01-01 PROCEDURE — 99999 PR PBB SHADOW E&M-EST. PATIENT-LVL III: ICD-10-PCS | Mod: PBBFAC,,, | Performed by: PODIATRIST

## 2021-01-01 PROCEDURE — G0108 PR DIAB MANAGE TRN  PER INDIV: ICD-10-PCS | Mod: S$GLB,,, | Performed by: REGISTERED NURSE

## 2021-01-01 PROCEDURE — 97535 SELF CARE MNGMENT TRAINING: CPT

## 2021-01-01 PROCEDURE — 84165 PATHOLOGIST INTERPRETATION SPE: ICD-10-PCS | Mod: 26,,, | Performed by: PATHOLOGY

## 2021-01-01 PROCEDURE — P9612 CATHETERIZE FOR URINE SPEC: HCPCS

## 2021-01-01 PROCEDURE — 80235 DRUG ASSAY LACOSAMIDE: CPT | Performed by: EMERGENCY MEDICINE

## 2021-01-01 PROCEDURE — 81000 URINALYSIS NONAUTO W/SCOPE: CPT | Performed by: PHYSICIAN ASSISTANT

## 2021-01-01 PROCEDURE — 3288F PR FALLS RISK ASSESSMENT DOCUMENTED: ICD-10-PCS | Mod: CPTII,S$GLB,, | Performed by: PODIATRIST

## 2021-01-01 PROCEDURE — 36556 CENTRAL LINE: ICD-10-PCS | Mod: ,,, | Performed by: STUDENT IN AN ORGANIZED HEALTH CARE EDUCATION/TRAINING PROGRAM

## 2021-01-01 PROCEDURE — G0378 HOSPITAL OBSERVATION PER HR: HCPCS

## 2021-01-01 PROCEDURE — P9047 ALBUMIN (HUMAN), 25%, 50ML: HCPCS | Mod: JG | Performed by: INTERNAL MEDICINE

## 2021-01-01 PROCEDURE — 3008F BODY MASS INDEX DOCD: CPT | Mod: CPTII,S$GLB,, | Performed by: PODIATRIST

## 2021-01-01 PROCEDURE — 99223 1ST HOSP IP/OBS HIGH 75: CPT | Mod: ,,, | Performed by: INTERNAL MEDICINE

## 2021-01-01 PROCEDURE — 99213 OFFICE O/P EST LOW 20 MIN: CPT | Mod: S$GLB,,, | Performed by: INTERNAL MEDICINE

## 2021-01-01 PROCEDURE — 3044F HG A1C LEVEL LT 7.0%: CPT | Mod: CPTII,S$GLB,, | Performed by: PHYSICIAN ASSISTANT

## 2021-01-01 PROCEDURE — 25000003 PHARM REV CODE 250: Performed by: GENERAL PRACTICE

## 2021-01-01 PROCEDURE — C9113 INJ PANTOPRAZOLE SODIUM, VIA: HCPCS | Performed by: FAMILY MEDICINE

## 2021-01-01 PROCEDURE — 36415 COLL VENOUS BLD VENIPUNCTURE: CPT | Mod: PO | Performed by: INTERNAL MEDICINE

## 2021-01-01 PROCEDURE — 80177 DRUG SCRN QUAN LEVETIRACETAM: CPT | Performed by: PHYSICIAN ASSISTANT

## 2021-01-01 PROCEDURE — 1125F AMNT PAIN NOTED PAIN PRSNT: CPT | Mod: S$GLB,,, | Performed by: PHYSICIAN ASSISTANT

## 2021-01-01 PROCEDURE — 3077F SYST BP >= 140 MM HG: CPT | Mod: CPTII,S$GLB,, | Performed by: INTERNAL MEDICINE

## 2021-01-01 PROCEDURE — 87086 URINE CULTURE/COLONY COUNT: CPT | Performed by: STUDENT IN AN ORGANIZED HEALTH CARE EDUCATION/TRAINING PROGRAM

## 2021-01-01 PROCEDURE — 1126F AMNT PAIN NOTED NONE PRSNT: CPT | Mod: S$GLB,,, | Performed by: UROLOGY

## 2021-01-01 PROCEDURE — 1159F MED LIST DOCD IN RCRD: CPT | Mod: S$GLB,,, | Performed by: PHYSICIAN ASSISTANT

## 2021-01-01 PROCEDURE — 99226 PR SUBSEQUENT OBSERVATION CARE,LEVEL III: CPT | Mod: ,,, | Performed by: HOSPITALIST

## 2021-01-01 PROCEDURE — 86580 TB INTRADERMAL TEST: CPT | Performed by: HOSPITALIST

## 2021-01-01 PROCEDURE — 3008F PR BODY MASS INDEX (BMI) DOCUMENTED: ICD-10-PCS | Mod: CPTII,S$GLB,, | Performed by: PHYSICIAN ASSISTANT

## 2021-01-01 PROCEDURE — 1100F PTFALLS ASSESS-DOCD GE2>/YR: CPT | Mod: CPTII,S$GLB,, | Performed by: INTERNAL MEDICINE

## 2021-01-01 PROCEDURE — 87077 CULTURE AEROBIC IDENTIFY: CPT | Performed by: INTERNAL MEDICINE

## 2021-01-01 PROCEDURE — 99999 PR PBB SHADOW E&M-EST. PATIENT-LVL I: ICD-10-PCS | Mod: PBBFAC,,,

## 2021-01-01 PROCEDURE — 86255 FLUORESCENT ANTIBODY SCREEN: CPT | Performed by: INTERNAL MEDICINE

## 2021-01-01 PROCEDURE — 99285 EMERGENCY DEPT VISIT HI MDM: CPT | Mod: ,,, | Performed by: PHYSICIAN ASSISTANT

## 2021-01-01 PROCEDURE — 3288F FALL RISK ASSESSMENT DOCD: CPT | Mod: CPTII,S$GLB,, | Performed by: PODIATRIST

## 2021-01-01 PROCEDURE — 99226 PR SUBSEQUENT OBSERVATION CARE,LEVEL III: ICD-10-PCS | Mod: ,,, | Performed by: HOSPITALIST

## 2021-01-01 PROCEDURE — G0180 MD CERTIFICATION HHA PATIENT: HCPCS | Mod: ,,, | Performed by: STUDENT IN AN ORGANIZED HEALTH CARE EDUCATION/TRAINING PROGRAM

## 2021-01-01 PROCEDURE — 3079F DIAST BP 80-89 MM HG: CPT | Mod: CPTII,S$GLB,, | Performed by: NURSE PRACTITIONER

## 2021-01-01 PROCEDURE — 3077F PR MOST RECENT SYSTOLIC BLOOD PRESSURE >= 140 MM HG: ICD-10-PCS | Mod: CPTII,S$GLB,, | Performed by: NURSE PRACTITIONER

## 2021-01-01 PROCEDURE — 3074F SYST BP LT 130 MM HG: CPT | Mod: CPTII,S$GLB,, | Performed by: INTERNAL MEDICINE

## 2021-01-01 PROCEDURE — 51798 US URINE CAPACITY MEASURE: CPT

## 2021-01-01 PROCEDURE — 3075F SYST BP GE 130 - 139MM HG: CPT | Mod: CPTII,S$GLB,, | Performed by: PHYSICIAN ASSISTANT

## 2021-01-01 PROCEDURE — 99214 OFFICE O/P EST MOD 30 MIN: CPT | Mod: 95,,, | Performed by: NURSE PRACTITIONER

## 2021-01-01 PROCEDURE — 83735 ASSAY OF MAGNESIUM: CPT | Mod: 91 | Performed by: HOSPITALIST

## 2021-01-01 PROCEDURE — G0108 PR DIAB MANAGE TRN  PER INDIV: ICD-10-PCS | Mod: S$GLB,,, | Performed by: INTERNAL MEDICINE

## 2021-01-01 PROCEDURE — 3044F HG A1C LEVEL LT 7.0%: CPT | Mod: CPTII,S$GLB,, | Performed by: PODIATRIST

## 2021-01-01 PROCEDURE — 99284 PR EMERGENCY DEPT VISIT,LEVEL IV: ICD-10-PCS | Mod: ,,, | Performed by: EMERGENCY MEDICINE

## 2021-01-01 PROCEDURE — 83735 ASSAY OF MAGNESIUM: CPT | Performed by: INTERNAL MEDICINE

## 2021-01-01 PROCEDURE — 3008F PR BODY MASS INDEX (BMI) DOCUMENTED: ICD-10-PCS | Mod: CPTII,S$GLB,, | Performed by: UROLOGY

## 2021-01-01 PROCEDURE — U0002 COVID-19 LAB TEST NON-CDC: HCPCS | Performed by: PHYSICIAN ASSISTANT

## 2021-01-01 PROCEDURE — 99239 PR HOSPITAL DISCHARGE DAY,>30 MIN: ICD-10-PCS | Mod: ,,, | Performed by: STUDENT IN AN ORGANIZED HEALTH CARE EDUCATION/TRAINING PROGRAM

## 2021-01-01 PROCEDURE — 82610 CYSTATIN C: CPT | Performed by: STUDENT IN AN ORGANIZED HEALTH CARE EDUCATION/TRAINING PROGRAM

## 2021-01-01 PROCEDURE — 87086 URINE CULTURE/COLONY COUNT: CPT | Performed by: INTERNAL MEDICINE

## 2021-01-01 PROCEDURE — 99233 PR SUBSEQUENT HOSPITAL CARE,LEVL III: ICD-10-PCS | Mod: ,,, | Performed by: STUDENT IN AN ORGANIZED HEALTH CARE EDUCATION/TRAINING PROGRAM

## 2021-01-01 PROCEDURE — 93451 RIGHT HEART CATH: CPT | Mod: 26,,, | Performed by: INTERNAL MEDICINE

## 2021-01-01 PROCEDURE — 3044F PR MOST RECENT HEMOGLOBIN A1C LEVEL <7.0%: ICD-10-PCS | Mod: CPTII,95,, | Performed by: NURSE PRACTITIONER

## 2021-01-01 PROCEDURE — 96372 THER/PROPH/DIAG INJ SC/IM: CPT | Mod: 59

## 2021-01-01 PROCEDURE — 84132 ASSAY OF SERUM POTASSIUM: CPT | Performed by: NURSE PRACTITIONER

## 2021-01-01 PROCEDURE — 99999 PR PBB SHADOW E&M-EST. PATIENT-LVL III: ICD-10-PCS | Mod: PBBFAC,,, | Performed by: NURSE PRACTITIONER

## 2021-01-01 PROCEDURE — 76536 US EXAM OF HEAD AND NECK: CPT | Mod: S$GLB,,, | Performed by: INTERNAL MEDICINE

## 2021-01-01 PROCEDURE — 99239 PR HOSPITAL DISCHARGE DAY,>30 MIN: ICD-10-PCS | Mod: ,,, | Performed by: HOSPITALIST

## 2021-01-01 PROCEDURE — 11721 ROUTINE FOOT CARE: ICD-10-PCS | Mod: Q9,S$GLB,, | Performed by: PODIATRIST

## 2021-01-01 PROCEDURE — 1100F PR PT FALLS ASSESS DOC 2+ FALLS/FALL W/INJURY/YR: ICD-10-PCS | Mod: CPTII,S$GLB,, | Performed by: INTERNAL MEDICINE

## 2021-01-01 PROCEDURE — 3074F PR MOST RECENT SYSTOLIC BLOOD PRESSURE < 130 MM HG: ICD-10-PCS | Mod: CPTII,S$GLB,, | Performed by: INTERNAL MEDICINE

## 2021-01-01 PROCEDURE — 99900035 HC TECH TIME PER 15 MIN (STAT)

## 2021-01-01 PROCEDURE — 99213 OFFICE O/P EST LOW 20 MIN: CPT | Mod: S$GLB,,, | Performed by: UROLOGY

## 2021-01-01 PROCEDURE — 87088 URINE BACTERIA CULTURE: CPT | Performed by: PHYSICIAN ASSISTANT

## 2021-01-01 PROCEDURE — 63600175 PHARM REV CODE 636 W HCPCS: Performed by: CLINICAL NURSE SPECIALIST

## 2021-01-01 PROCEDURE — 86704 HEP B CORE ANTIBODY TOTAL: CPT | Performed by: HOSPITALIST

## 2021-01-01 PROCEDURE — 63600175 PHARM REV CODE 636 W HCPCS: Performed by: FAMILY MEDICINE

## 2021-01-01 PROCEDURE — 99285 PR EMERGENCY DEPT VISIT,LEVEL V: ICD-10-PCS | Mod: ,,, | Performed by: PHYSICIAN ASSISTANT

## 2021-01-01 PROCEDURE — 81001 URINALYSIS AUTO W/SCOPE: CPT | Performed by: STUDENT IN AN ORGANIZED HEALTH CARE EDUCATION/TRAINING PROGRAM

## 2021-01-01 PROCEDURE — 83540 ASSAY OF IRON: CPT | Performed by: INTERNAL MEDICINE

## 2021-01-01 PROCEDURE — 83970 ASSAY OF PARATHORMONE: CPT

## 2021-01-01 PROCEDURE — U0002 COVID-19 LAB TEST NON-CDC: HCPCS | Performed by: STUDENT IN AN ORGANIZED HEALTH CARE EDUCATION/TRAINING PROGRAM

## 2021-01-01 PROCEDURE — U0002 COVID-19 LAB TEST NON-CDC: HCPCS | Performed by: HOSPITALIST

## 2021-01-01 PROCEDURE — 83036 HEMOGLOBIN GLYCOSYLATED A1C: CPT | Performed by: NURSE PRACTITIONER

## 2021-01-01 PROCEDURE — 99233 SBSQ HOSP IP/OBS HIGH 50: CPT | Mod: ,,, | Performed by: STUDENT IN AN ORGANIZED HEALTH CARE EDUCATION/TRAINING PROGRAM

## 2021-01-01 PROCEDURE — 99213 PR OFFICE/OUTPT VISIT, EST, LEVL III, 20-29 MIN: ICD-10-PCS | Mod: S$GLB,,, | Performed by: INTERNAL MEDICINE

## 2021-01-01 PROCEDURE — 96372 PR INJECTION,THERAP/PROPH/DIAG2ST, IM OR SUBCUT: ICD-10-PCS | Mod: S$GLB,,, | Performed by: NURSE PRACTITIONER

## 2021-01-01 PROCEDURE — 3008F PR BODY MASS INDEX (BMI) DOCUMENTED: ICD-10-PCS | Mod: CPTII,S$GLB,, | Performed by: PODIATRIST

## 2021-01-01 PROCEDURE — 3288F FALL RISK ASSESSMENT DOCD: CPT | Mod: CPTII,S$GLB,, | Performed by: UROLOGY

## 2021-01-01 PROCEDURE — C1894 INTRO/SHEATH, NON-LASER: HCPCS | Performed by: INTERNAL MEDICINE

## 2021-01-01 PROCEDURE — 87077 CULTURE AEROBIC IDENTIFY: CPT | Performed by: PHYSICIAN ASSISTANT

## 2021-01-01 PROCEDURE — 99999 PR PBB SHADOW E&M-EST. PATIENT-LVL III: CPT | Mod: PBBFAC,,, | Performed by: NURSE PRACTITIONER

## 2021-01-01 PROCEDURE — 80069 RENAL FUNCTION PANEL: CPT | Performed by: INTERNAL MEDICINE

## 2021-01-01 PROCEDURE — 99285 EMERGENCY DEPT VISIT HI MDM: CPT | Mod: CS,,, | Performed by: EMERGENCY MEDICINE

## 2021-01-01 PROCEDURE — 83516 IMMUNOASSAY NONANTIBODY: CPT | Performed by: INTERNAL MEDICINE

## 2021-01-01 PROCEDURE — 99215 PR OFFICE/OUTPT VISIT, EST, LEVL V, 40-54 MIN: ICD-10-PCS | Mod: 25,S$GLB,, | Performed by: NURSE PRACTITIONER

## 2021-01-01 PROCEDURE — 80061 LIPID PANEL: CPT | Performed by: NURSE PRACTITIONER

## 2021-01-01 PROCEDURE — U0003 INFECTIOUS AGENT DETECTION BY NUCLEIC ACID (DNA OR RNA); SEVERE ACUTE RESPIRATORY SYNDROME CORONAVIRUS 2 (SARS-COV-2) (CORONAVIRUS DISEASE [COVID-19]), AMPLIFIED PROBE TECHNIQUE, MAKING USE OF HIGH THROUGHPUT TECHNOLOGIES AS DESCRIBED BY CMS-2020-01-R: HCPCS | Performed by: INTERNAL MEDICINE

## 2021-01-01 PROCEDURE — 99239 HOSP IP/OBS DSCHRG MGMT >30: CPT | Mod: ,,, | Performed by: STUDENT IN AN ORGANIZED HEALTH CARE EDUCATION/TRAINING PROGRAM

## 2021-01-01 PROCEDURE — 99204 OFFICE O/P NEW MOD 45 MIN: CPT | Mod: S$GLB,,, | Performed by: NURSE PRACTITIONER

## 2021-01-01 PROCEDURE — 97165 OT EVAL LOW COMPLEX 30 MIN: CPT

## 2021-01-01 PROCEDURE — 99233 SBSQ HOSP IP/OBS HIGH 50: CPT | Mod: ,,, | Performed by: FAMILY MEDICINE

## 2021-01-01 PROCEDURE — P9016 RBC LEUKOCYTES REDUCED: HCPCS | Performed by: INTERNAL MEDICINE

## 2021-01-01 PROCEDURE — 1100F PR PT FALLS ASSESS DOC 2+ FALLS/FALL W/INJURY/YR: ICD-10-PCS | Mod: CPTII,S$GLB,, | Performed by: NURSE PRACTITIONER

## 2021-01-01 PROCEDURE — 99233 PR SUBSEQUENT HOSPITAL CARE,LEVL III: ICD-10-PCS | Mod: ,,, | Performed by: UROLOGY

## 2021-01-01 PROCEDURE — 83970 ASSAY OF PARATHORMONE: CPT | Performed by: INTERNAL MEDICINE

## 2021-01-01 PROCEDURE — 86709 HEPATITIS A IGM ANTIBODY: CPT | Performed by: HOSPITALIST

## 2021-01-01 PROCEDURE — 1159F PR MEDICATION LIST DOCUMENTED IN MEDICAL RECORD: ICD-10-PCS | Mod: S$GLB,,, | Performed by: PHYSICIAN ASSISTANT

## 2021-01-01 PROCEDURE — 99215 OFFICE O/P EST HI 40 MIN: CPT | Mod: S$GLB,,, | Performed by: PHYSICIAN ASSISTANT

## 2021-01-01 PROCEDURE — 97802 MEDICAL NUTRITION INDIV IN: CPT | Mod: S$GLB,,, | Performed by: DIETITIAN, REGISTERED

## 2021-01-01 PROCEDURE — 84484 ASSAY OF TROPONIN QUANT: CPT | Performed by: STUDENT IN AN ORGANIZED HEALTH CARE EDUCATION/TRAINING PROGRAM

## 2021-01-01 PROCEDURE — 97537 COMMUNITY/WORK REINTEGRATION: CPT | Mod: CO

## 2021-01-01 PROCEDURE — 86920 COMPATIBILITY TEST SPIN: CPT | Performed by: HOSPITALIST

## 2021-01-01 PROCEDURE — 82570 ASSAY OF URINE CREATININE: CPT | Performed by: STUDENT IN AN ORGANIZED HEALTH CARE EDUCATION/TRAINING PROGRAM

## 2021-01-01 PROCEDURE — 86900 BLOOD TYPING SEROLOGIC ABO: CPT | Performed by: INTERNAL MEDICINE

## 2021-01-01 PROCEDURE — 3044F PR MOST RECENT HEMOGLOBIN A1C LEVEL <7.0%: ICD-10-PCS | Mod: CPTII,S$GLB,, | Performed by: PHYSICIAN ASSISTANT

## 2021-01-01 PROCEDURE — 84156 ASSAY OF PROTEIN URINE: CPT | Performed by: INTERNAL MEDICINE

## 2021-01-01 PROCEDURE — 99223 PR INITIAL HOSPITAL CARE,LEVL III: ICD-10-PCS | Mod: ,,, | Performed by: INTERNAL MEDICINE

## 2021-01-01 PROCEDURE — 99499 RISK ADDL DX/OHS AUDIT: ICD-10-PCS | Mod: S$GLB,,, | Performed by: PODIATRIST

## 2021-01-01 PROCEDURE — 80048 BASIC METABOLIC PNL TOTAL CA: CPT | Performed by: PHYSICIAN ASSISTANT

## 2021-01-01 PROCEDURE — 86038 ANTINUCLEAR ANTIBODIES: CPT | Performed by: INTERNAL MEDICINE

## 2021-01-01 PROCEDURE — 86706 HEP B SURFACE ANTIBODY: CPT | Performed by: HOSPITALIST

## 2021-01-01 PROCEDURE — 85025 COMPLETE CBC W/AUTO DIFF WBC: CPT | Performed by: NURSE PRACTITIONER

## 2021-01-01 PROCEDURE — 1101F PT FALLS ASSESS-DOCD LE1/YR: CPT | Mod: CPTII,S$GLB,, | Performed by: PHYSICIAN ASSISTANT

## 2021-01-01 PROCEDURE — 83516 IMMUNOASSAY NONANTIBODY: CPT | Mod: 59 | Performed by: INTERNAL MEDICINE

## 2021-01-01 PROCEDURE — 36556 INSERT NON-TUNNEL CV CATH: CPT | Mod: ,,, | Performed by: STUDENT IN AN ORGANIZED HEALTH CARE EDUCATION/TRAINING PROGRAM

## 2021-01-01 PROCEDURE — 3288F PR FALLS RISK ASSESSMENT DOCUMENTED: ICD-10-PCS | Mod: CPTII,S$GLB,, | Performed by: PHYSICIAN ASSISTANT

## 2021-01-01 PROCEDURE — 93451 RIGHT HEART CATH: CPT | Performed by: INTERNAL MEDICINE

## 2021-01-01 PROCEDURE — 99204 PR OFFICE/OUTPT VISIT, NEW, LEVL IV, 45-59 MIN: ICD-10-PCS | Mod: S$GLB,,, | Performed by: NURSE PRACTITIONER

## 2021-01-01 PROCEDURE — 93451 PR RIGHT HEART CATH O2 SATURATION & CARDIAC OUTPUT: ICD-10-PCS | Mod: 26,,, | Performed by: INTERNAL MEDICINE

## 2021-01-01 PROCEDURE — 87186 SC STD MICRODIL/AGAR DIL: CPT | Performed by: INTERNAL MEDICINE

## 2021-01-01 PROCEDURE — 85025 COMPLETE CBC W/AUTO DIFF WBC: CPT | Performed by: STUDENT IN AN ORGANIZED HEALTH CARE EDUCATION/TRAINING PROGRAM

## 2021-01-01 PROCEDURE — C1751 CATH, INF, PER/CENT/MIDLINE: HCPCS | Performed by: INTERNAL MEDICINE

## 2021-01-01 PROCEDURE — 84100 ASSAY OF PHOSPHORUS: CPT | Mod: 91 | Performed by: STUDENT IN AN ORGANIZED HEALTH CARE EDUCATION/TRAINING PROGRAM

## 2021-01-01 PROCEDURE — 99213 PR OFFICE/OUTPT VISIT, EST, LEVL III, 20-29 MIN: ICD-10-PCS | Mod: S$GLB,,, | Performed by: UROLOGY

## 2021-01-01 PROCEDURE — 3044F HG A1C LEVEL LT 7.0%: CPT | Mod: CPTII,95,, | Performed by: NURSE PRACTITIONER

## 2021-01-01 PROCEDURE — 83735 ASSAY OF MAGNESIUM: CPT | Performed by: PHYSICIAN ASSISTANT

## 2021-01-01 PROCEDURE — 25000003 PHARM REV CODE 250: Performed by: FAMILY MEDICINE

## 2021-01-01 PROCEDURE — 1126F PR PAIN SEVERITY QUANTIFIED, NO PAIN PRESENT: ICD-10-PCS | Mod: S$GLB,,, | Performed by: PODIATRIST

## 2021-01-01 PROCEDURE — 99291 PR CRITICAL CARE, E/M 30-74 MINUTES: ICD-10-PCS | Mod: ,,, | Performed by: CLINICAL NURSE SPECIALIST

## 2021-01-01 PROCEDURE — 83735 ASSAY OF MAGNESIUM: CPT | Performed by: NURSE PRACTITIONER

## 2021-01-01 PROCEDURE — 99232 SBSQ HOSP IP/OBS MODERATE 35: CPT | Mod: 95,,, | Performed by: INTERNAL MEDICINE

## 2021-01-01 PROCEDURE — 3078F DIAST BP <80 MM HG: CPT | Mod: CPTII,S$GLB,, | Performed by: PHYSICIAN ASSISTANT

## 2021-01-01 PROCEDURE — 83735 ASSAY OF MAGNESIUM: CPT | Mod: 91 | Performed by: STUDENT IN AN ORGANIZED HEALTH CARE EDUCATION/TRAINING PROGRAM

## 2021-01-01 PROCEDURE — 1101F PT FALLS ASSESS-DOCD LE1/YR: CPT | Mod: CPTII,S$GLB,, | Performed by: PODIATRIST

## 2021-01-01 PROCEDURE — 80235 DRUG ASSAY LACOSAMIDE: CPT | Performed by: PHYSICIAN ASSISTANT

## 2021-01-01 PROCEDURE — 1101F PR PT FALLS ASSESS DOC 0-1 FALLS W/OUT INJ PAST YR: ICD-10-PCS | Mod: CPTII,S$GLB,, | Performed by: PODIATRIST

## 2021-01-01 PROCEDURE — 99999 PR PBB SHADOW E&M-EST. PATIENT-LVL I: CPT | Mod: PBBFAC,,,

## 2021-01-01 PROCEDURE — 99999 PR PBB SHADOW E&M-EST. PATIENT-LVL IV: CPT | Mod: PBBFAC,,, | Performed by: PHYSICIAN ASSISTANT

## 2021-01-01 PROCEDURE — 83880 ASSAY OF NATRIURETIC PEPTIDE: CPT | Performed by: PHYSICIAN ASSISTANT

## 2021-01-01 PROCEDURE — 99223 PR INITIAL HOSPITAL CARE,LEVL III: ICD-10-PCS | Mod: ,,, | Performed by: HOSPITALIST

## 2021-01-01 PROCEDURE — 97166 OT EVAL MOD COMPLEX 45 MIN: CPT

## 2021-01-01 PROCEDURE — 63600175 PHARM REV CODE 636 W HCPCS: Performed by: RADIOLOGY

## 2021-01-01 PROCEDURE — 84439 ASSAY OF FREE THYROXINE: CPT

## 2021-01-01 PROCEDURE — 3078F PR MOST RECENT DIASTOLIC BLOOD PRESSURE < 80 MM HG: ICD-10-PCS | Mod: CPTII,S$GLB,, | Performed by: NURSE PRACTITIONER

## 2021-01-01 PROCEDURE — 84165 PROTEIN E-PHORESIS SERUM: CPT | Performed by: INTERNAL MEDICINE

## 2021-01-01 PROCEDURE — 1125F AMNT PAIN NOTED PAIN PRSNT: CPT | Mod: S$GLB,,, | Performed by: NURSE PRACTITIONER

## 2021-01-01 PROCEDURE — 85610 PROTHROMBIN TIME: CPT | Performed by: PHYSICIAN ASSISTANT

## 2021-01-01 PROCEDURE — 36415 COLL VENOUS BLD VENIPUNCTURE: CPT | Performed by: FAMILY MEDICINE

## 2021-01-01 PROCEDURE — 1101F PR PT FALLS ASSESS DOC 0-1 FALLS W/OUT INJ PAST YR: ICD-10-PCS | Mod: CPTII,S$GLB,, | Performed by: UROLOGY

## 2021-01-01 PROCEDURE — 99233 SBSQ HOSP IP/OBS HIGH 50: CPT | Mod: ,,, | Performed by: UROLOGY

## 2021-01-01 PROCEDURE — 99215 OFFICE O/P EST HI 40 MIN: CPT | Mod: S$GLB,,, | Performed by: NURSE PRACTITIONER

## 2021-01-01 PROCEDURE — 1100F PTFALLS ASSESS-DOCD GE2>/YR: CPT | Mod: CPTII,S$GLB,, | Performed by: NURSE PRACTITIONER

## 2021-01-01 PROCEDURE — 86803 HEPATITIS C AB TEST: CPT | Performed by: EMERGENCY MEDICINE

## 2021-01-01 RX ORDER — METOLAZONE 10 MG/1
10 TABLET ORAL DAILY
Qty: 30 TABLET | Refills: 11 | OUTPATIENT
Start: 2021-01-01 | End: 2022-08-05

## 2021-01-01 RX ORDER — ATORVASTATIN CALCIUM 40 MG/1
40 TABLET, FILM COATED ORAL NIGHTLY
COMMUNITY

## 2021-01-01 RX ORDER — SODIUM CHLORIDE 9 MG/ML
INJECTION, SOLUTION INTRAVENOUS ONCE
Status: COMPLETED | OUTPATIENT
Start: 2021-01-01 | End: 2021-01-01

## 2021-01-01 RX ORDER — LEVETIRACETAM 500 MG/1
1500 TABLET ORAL 2 TIMES DAILY
Status: DISCONTINUED | OUTPATIENT
Start: 2021-01-01 | End: 2021-01-01

## 2021-01-01 RX ORDER — HEPARIN 100 UNIT/ML
5 SYRINGE INTRAVENOUS
Status: CANCELLED | OUTPATIENT
Start: 2021-01-01

## 2021-01-01 RX ORDER — FUROSEMIDE 40 MG/1
40 TABLET ORAL DAILY
Qty: 120 TABLET | Refills: 3 | Status: SHIPPED | OUTPATIENT
Start: 2021-01-01 | End: 2021-01-01 | Stop reason: SDUPTHER

## 2021-01-01 RX ORDER — BLOOD-GLUCOSE TRANSMITTER
EACH MISCELLANEOUS
Qty: 1 DEVICE | Refills: 3 | OUTPATIENT
Start: 2021-01-01 | End: 2021-01-01 | Stop reason: SDUPTHER

## 2021-01-01 RX ORDER — CHOLECALCIFEROL (VITAMIN D3) 25 MCG
1000 TABLET ORAL
Status: DISCONTINUED | OUTPATIENT
Start: 2021-01-01 | End: 2021-01-01 | Stop reason: HOSPADM

## 2021-01-01 RX ORDER — IBUPROFEN 200 MG
24 TABLET ORAL
Status: DISCONTINUED | OUTPATIENT
Start: 2021-01-01 | End: 2021-01-01 | Stop reason: HOSPADM

## 2021-01-01 RX ORDER — DIPHENHYDRAMINE HYDROCHLORIDE 50 MG/ML
50 INJECTION INTRAMUSCULAR; INTRAVENOUS ONCE AS NEEDED
Status: CANCELLED | OUTPATIENT
Start: 2021-01-01

## 2021-01-01 RX ORDER — FERROUS SULFATE 325(65) MG
325 TABLET, DELAYED RELEASE (ENTERIC COATED) ORAL DAILY
Status: DISCONTINUED | OUTPATIENT
Start: 2021-01-01 | End: 2021-01-01 | Stop reason: HOSPADM

## 2021-01-01 RX ORDER — SODIUM CHLORIDE 0.9 % (FLUSH) 0.9 %
10 SYRINGE (ML) INJECTION
Status: CANCELLED | OUTPATIENT
Start: 2021-01-01

## 2021-01-01 RX ORDER — SODIUM CHLORIDE 9 MG/ML
INJECTION, SOLUTION INTRAVENOUS
Status: DISCONTINUED | OUTPATIENT
Start: 2021-01-01 | End: 2021-01-01 | Stop reason: HOSPADM

## 2021-01-01 RX ORDER — ISOSORBIDE MONONITRATE 30 MG/1
30 TABLET, EXTENDED RELEASE ORAL DAILY
Status: DISCONTINUED | OUTPATIENT
Start: 2021-01-01 | End: 2021-01-01

## 2021-01-01 RX ORDER — FUROSEMIDE 40 MG/1
80 TABLET ORAL 2 TIMES DAILY
Qty: 14 TABLET | Refills: 0 | Status: SHIPPED | OUTPATIENT
Start: 2021-01-01 | End: 2021-01-01 | Stop reason: SDUPTHER

## 2021-01-01 RX ORDER — NIFEDIPINE 90 MG/1
90 TABLET, EXTENDED RELEASE ORAL DAILY
Qty: 30 TABLET | Refills: 11 | Status: SHIPPED | OUTPATIENT
Start: 2021-01-01 | End: 2021-01-01 | Stop reason: SDUPTHER

## 2021-01-01 RX ORDER — METHYLPREDNISOLONE SOD SUCC 125 MG
125 VIAL (EA) INJECTION ONCE AS NEEDED
Status: CANCELLED | OUTPATIENT
Start: 2021-01-01

## 2021-01-01 RX ORDER — FENTANYL CITRATE 50 UG/ML
INJECTION, SOLUTION INTRAMUSCULAR; INTRAVENOUS CODE/TRAUMA/SEDATION MEDICATION
Status: COMPLETED | OUTPATIENT
Start: 2021-01-01 | End: 2021-01-01

## 2021-01-01 RX ORDER — LEVETIRACETAM 1000 MG/1
1000 TABLET ORAL DAILY
Qty: 30 TABLET | Refills: 11 | OUTPATIENT
Start: 2021-01-01 | End: 2022-08-05

## 2021-01-01 RX ORDER — CARVEDILOL 12.5 MG/1
12.5 TABLET ORAL 2 TIMES DAILY
Qty: 60 TABLET | Refills: 0 | Status: SHIPPED | OUTPATIENT
Start: 2021-01-01

## 2021-01-01 RX ORDER — MAGNESIUM SULFATE HEPTAHYDRATE 40 MG/ML
2 INJECTION, SOLUTION INTRAVENOUS ONCE
Status: COMPLETED | OUTPATIENT
Start: 2021-01-01 | End: 2021-01-01

## 2021-01-01 RX ORDER — BISACODYL 5 MG
5 TABLET, DELAYED RELEASE (ENTERIC COATED) ORAL DAILY PRN
Status: DISCONTINUED | OUTPATIENT
Start: 2021-01-01 | End: 2021-01-01 | Stop reason: HOSPADM

## 2021-01-01 RX ORDER — LEVETIRACETAM 250 MG/1
500 TABLET ORAL
Status: DISCONTINUED | OUTPATIENT
Start: 2021-01-01 | End: 2021-01-01 | Stop reason: HOSPADM

## 2021-01-01 RX ORDER — POTASSIUM CHLORIDE 20 MEQ/1
20 TABLET, EXTENDED RELEASE ORAL ONCE
Status: COMPLETED | OUTPATIENT
Start: 2021-01-01 | End: 2021-01-01

## 2021-01-01 RX ORDER — INSULIN ASPART 100 [IU]/ML
1-10 INJECTION, SOLUTION INTRAVENOUS; SUBCUTANEOUS
Refills: 0
Start: 2021-01-01 | End: 2022-04-07

## 2021-01-01 RX ORDER — MORPHINE SULFATE 15 MG/1
15 TABLET ORAL
Status: COMPLETED | OUTPATIENT
Start: 2021-01-01 | End: 2021-01-01

## 2021-01-01 RX ORDER — ATORVASTATIN CALCIUM 40 MG/1
40 TABLET, FILM COATED ORAL NIGHTLY
Status: DISCONTINUED | OUTPATIENT
Start: 2021-01-01 | End: 2021-01-01 | Stop reason: HOSPADM

## 2021-01-01 RX ORDER — HYDROCODONE BITARTRATE AND ACETAMINOPHEN 500; 5 MG/1; MG/1
TABLET ORAL
Status: DISCONTINUED | OUTPATIENT
Start: 2021-01-01 | End: 2021-01-01 | Stop reason: HOSPADM

## 2021-01-01 RX ORDER — BLOOD-GLUCOSE TRANSMITTER
EACH MISCELLANEOUS
Qty: 1 DEVICE | Refills: 3 | Status: SHIPPED | OUTPATIENT
Start: 2021-01-01

## 2021-01-01 RX ORDER — EPINEPHRINE 0.3 MG/.3ML
0.3 INJECTION SUBCUTANEOUS ONCE AS NEEDED
Status: CANCELLED | OUTPATIENT
Start: 2021-01-01

## 2021-01-01 RX ORDER — CARVEDILOL 12.5 MG/1
12.5 TABLET ORAL 2 TIMES DAILY
Status: DISCONTINUED | OUTPATIENT
Start: 2021-01-01 | End: 2021-01-01 | Stop reason: HOSPADM

## 2021-01-01 RX ORDER — LEVETIRACETAM 750 MG/1
1500 TABLET ORAL 2 TIMES DAILY
Status: DISCONTINUED | OUTPATIENT
Start: 2021-01-01 | End: 2021-01-01 | Stop reason: HOSPADM

## 2021-01-01 RX ORDER — CALCIUM CHLORIDE INJECTION 100 MG/ML
INJECTION, SOLUTION INTRAVENOUS CODE/TRAUMA/SEDATION MEDICATION
Status: COMPLETED | OUTPATIENT
Start: 2021-01-01 | End: 2021-01-01

## 2021-01-01 RX ORDER — POTASSIUM CHLORIDE 20 MEQ/1
40 TABLET, EXTENDED RELEASE ORAL ONCE
Status: COMPLETED | OUTPATIENT
Start: 2021-01-01 | End: 2021-01-01

## 2021-01-01 RX ORDER — LIDOCAINE 50 MG/G
1 PATCH TOPICAL DAILY
Qty: 15 PATCH | Refills: 0 | Status: SHIPPED | OUTPATIENT
Start: 2021-01-01 | End: 2021-01-01

## 2021-01-01 RX ORDER — INSULIN ASPART 100 [IU]/ML
1-10 INJECTION, SOLUTION INTRAVENOUS; SUBCUTANEOUS
Status: DISCONTINUED | OUTPATIENT
Start: 2021-01-01 | End: 2021-01-01 | Stop reason: HOSPADM

## 2021-01-01 RX ORDER — EPINEPHRINE 0.3 MG/.3ML
0.3 INJECTION SUBCUTANEOUS ONCE AS NEEDED
Status: DISCONTINUED | OUTPATIENT
Start: 2021-01-01 | End: 2021-01-01 | Stop reason: HOSPADM

## 2021-01-01 RX ORDER — SODIUM CHLORIDE 9 MG/ML
INJECTION, SOLUTION INTRAVENOUS CONTINUOUS
Status: DISCONTINUED | OUTPATIENT
Start: 2021-01-01 | End: 2021-01-01 | Stop reason: HOSPADM

## 2021-01-01 RX ORDER — ATORVASTATIN CALCIUM 20 MG/1
40 TABLET, FILM COATED ORAL DAILY
Status: DISCONTINUED | OUTPATIENT
Start: 2021-01-01 | End: 2021-01-01

## 2021-01-01 RX ORDER — LACOSAMIDE 50 MG/1
200 TABLET ORAL EVERY 12 HOURS
Status: DISCONTINUED | OUTPATIENT
Start: 2021-01-01 | End: 2021-01-01 | Stop reason: HOSPADM

## 2021-01-01 RX ORDER — BLOOD-GLUCOSE SENSOR
EACH MISCELLANEOUS
Qty: 9 DEVICE | Refills: 3 | OUTPATIENT
Start: 2021-01-01 | End: 2021-01-01 | Stop reason: SDUPTHER

## 2021-01-01 RX ORDER — DULAGLUTIDE 1.5 MG/.5ML
INJECTION, SOLUTION SUBCUTANEOUS
COMMUNITY
Start: 2021-01-01 | End: 2021-01-01

## 2021-01-01 RX ORDER — HEPARIN SODIUM 5000 [USP'U]/ML
5000 INJECTION, SOLUTION INTRAVENOUS; SUBCUTANEOUS EVERY 8 HOURS
Status: DISCONTINUED | OUTPATIENT
Start: 2021-01-01 | End: 2021-01-01 | Stop reason: HOSPADM

## 2021-01-01 RX ORDER — FUROSEMIDE 10 MG/ML
120 INJECTION INTRAMUSCULAR; INTRAVENOUS 2 TIMES DAILY
Status: DISCONTINUED | OUTPATIENT
Start: 2021-01-01 | End: 2021-01-01

## 2021-01-01 RX ORDER — LEVOTHYROXINE SODIUM 175 UG/1
175 TABLET ORAL
Qty: 30 TABLET | Refills: 0 | Status: SHIPPED | OUTPATIENT
Start: 2021-01-01 | End: 2021-01-01 | Stop reason: DRUGHIGH

## 2021-01-01 RX ORDER — CEPHALEXIN 500 MG/1
500 CAPSULE ORAL 2 TIMES DAILY
Status: DISCONTINUED | OUTPATIENT
Start: 2021-01-01 | End: 2021-01-01

## 2021-01-01 RX ORDER — SODIUM CHLORIDE 0.9 G/100ML
IRRIGANT IRRIGATION
Status: DISCONTINUED | OUTPATIENT
Start: 2021-01-01 | End: 2021-01-01 | Stop reason: HOSPADM

## 2021-01-01 RX ORDER — TALC
6 POWDER (GRAM) TOPICAL NIGHTLY PRN
Status: DISCONTINUED | OUTPATIENT
Start: 2021-01-01 | End: 2021-01-01 | Stop reason: HOSPADM

## 2021-01-01 RX ORDER — ATORVASTATIN CALCIUM 40 MG/1
40 TABLET, FILM COATED ORAL NIGHTLY
Qty: 90 TABLET | Refills: 3 | OUTPATIENT
Start: 2021-01-01 | End: 2022-08-04

## 2021-01-01 RX ORDER — AMLODIPINE BESYLATE 10 MG/1
10 TABLET ORAL DAILY
Qty: 90 TABLET | Refills: 3 | Status: ON HOLD | OUTPATIENT
Start: 2021-01-01 | End: 2021-01-01 | Stop reason: HOSPADM

## 2021-01-01 RX ORDER — SODIUM CHLORIDE 9 MG/ML
INJECTION, SOLUTION INTRAVENOUS CONTINUOUS
Status: CANCELLED | OUTPATIENT
Start: 2021-01-01

## 2021-01-01 RX ORDER — LEVETIRACETAM 250 MG/1
500 TABLET ORAL
Status: DISCONTINUED | OUTPATIENT
Start: 2021-01-01 | End: 2021-01-01

## 2021-01-01 RX ORDER — SODIUM CHLORIDE 9 MG/ML
INJECTION, SOLUTION INTRAVENOUS ONCE
Status: DISCONTINUED | OUTPATIENT
Start: 2021-01-01 | End: 2021-01-01 | Stop reason: HOSPADM

## 2021-01-01 RX ORDER — POTASSIUM CHLORIDE 750 MG/1
10 CAPSULE, EXTENDED RELEASE ORAL ONCE
Status: COMPLETED | OUTPATIENT
Start: 2021-01-01 | End: 2021-01-01

## 2021-01-01 RX ORDER — CALCITRIOL 0.25 UG/1
0.25 CAPSULE ORAL DAILY
Status: DISCONTINUED | OUTPATIENT
Start: 2021-01-01 | End: 2021-01-01 | Stop reason: HOSPADM

## 2021-01-01 RX ORDER — FUROSEMIDE 10 MG/ML
80 INJECTION INTRAMUSCULAR; INTRAVENOUS ONCE
Status: COMPLETED | OUTPATIENT
Start: 2021-01-01 | End: 2021-01-01

## 2021-01-01 RX ORDER — DEXTROSE 4 G
TABLET,CHEWABLE ORAL
Qty: 50 EACH | Refills: 11 | Status: SHIPPED | OUTPATIENT
Start: 2021-01-01

## 2021-01-01 RX ORDER — ATORVASTATIN CALCIUM 40 MG/1
40 TABLET, FILM COATED ORAL DAILY
Status: DISCONTINUED | OUTPATIENT
Start: 2021-01-01 | End: 2021-01-01

## 2021-01-01 RX ORDER — DULAGLUTIDE 1.5 MG/.5ML
1.5 INJECTION, SOLUTION SUBCUTANEOUS WEEKLY
Status: ON HOLD | COMMUNITY
End: 2021-01-01 | Stop reason: HOSPADM

## 2021-01-01 RX ORDER — SEVELAMER CARBONATE 800 MG/1
800 TABLET, FILM COATED ORAL
Qty: 90 TABLET | Refills: 11 | OUTPATIENT
Start: 2021-01-01 | End: 2022-08-04

## 2021-01-01 RX ORDER — SODIUM BICARBONATE 1 MEQ/ML
SYRINGE (ML) INTRAVENOUS CODE/TRAUMA/SEDATION MEDICATION
Status: COMPLETED | OUTPATIENT
Start: 2021-01-01 | End: 2021-01-01

## 2021-01-01 RX ORDER — LEVOTHYROXINE SODIUM 50 UG/1
CAPSULE ORAL
COMMUNITY

## 2021-01-01 RX ORDER — ONDANSETRON 2 MG/ML
4 INJECTION INTRAMUSCULAR; INTRAVENOUS EVERY 8 HOURS PRN
Status: DISCONTINUED | OUTPATIENT
Start: 2021-01-01 | End: 2021-01-01 | Stop reason: HOSPADM

## 2021-01-01 RX ORDER — BLOOD-GLUCOSE TRANSMITTER
EACH MISCELLANEOUS
Qty: 1 DEVICE | Refills: 3 | OUTPATIENT
Start: 2021-01-01

## 2021-01-01 RX ORDER — SODIUM CHLORIDE 0.9 % (FLUSH) 0.9 %
10 SYRINGE (ML) INJECTION
Status: DISCONTINUED | OUTPATIENT
Start: 2021-01-01 | End: 2021-01-01 | Stop reason: HOSPADM

## 2021-01-01 RX ORDER — POLYETHYLENE GLYCOL 3350 17 G/17G
17 POWDER, FOR SOLUTION ORAL DAILY
Status: DISCONTINUED | OUTPATIENT
Start: 2021-01-01 | End: 2021-01-01 | Stop reason: HOSPADM

## 2021-01-01 RX ORDER — GLUCAGON 1 MG
1 KIT INJECTION
Status: DISCONTINUED | OUTPATIENT
Start: 2021-01-01 | End: 2021-01-01 | Stop reason: HOSPADM

## 2021-01-01 RX ORDER — ISOSORBIDE DINITRATE AND HYDRALAZINE HYDROCHLORIDE 37.5; 2 MG/1; MG/1
1 TABLET ORAL 3 TIMES DAILY
Status: DISCONTINUED | OUTPATIENT
Start: 2021-01-01 | End: 2021-01-01

## 2021-01-01 RX ORDER — FUROSEMIDE 10 MG/ML
120 INJECTION INTRAMUSCULAR; INTRAVENOUS ONCE
Status: COMPLETED | OUTPATIENT
Start: 2021-01-01 | End: 2021-01-01

## 2021-01-01 RX ORDER — ACETAMINOPHEN 500 MG
1000 TABLET ORAL EVERY 8 HOURS PRN
Status: DISCONTINUED | OUTPATIENT
Start: 2021-01-01 | End: 2021-01-01 | Stop reason: HOSPADM

## 2021-01-01 RX ORDER — DIPHENHYDRAMINE HYDROCHLORIDE 50 MG/ML
50 INJECTION INTRAMUSCULAR; INTRAVENOUS ONCE AS NEEDED
Status: DISCONTINUED | OUTPATIENT
Start: 2021-01-01 | End: 2021-01-01 | Stop reason: HOSPADM

## 2021-01-01 RX ORDER — AMOXICILLIN 250 MG
1 CAPSULE ORAL 2 TIMES DAILY
Status: DISCONTINUED | OUTPATIENT
Start: 2021-01-01 | End: 2021-01-01 | Stop reason: HOSPADM

## 2021-01-01 RX ORDER — FERROUS SULFATE 325(65) MG
324 TABLET, DELAYED RELEASE (ENTERIC COATED) ORAL DAILY
Status: DISCONTINUED | OUTPATIENT
Start: 2021-01-01 | End: 2021-01-01

## 2021-01-01 RX ORDER — BLOOD-GLUCOSE,RECEIVER,CONT
EACH MISCELLANEOUS
Qty: 1 EACH | Refills: 0 | OUTPATIENT
Start: 2021-01-01 | End: 2021-01-01 | Stop reason: SDUPTHER

## 2021-01-01 RX ORDER — FUROSEMIDE 40 MG/1
40 TABLET ORAL DAILY
Status: DISCONTINUED | OUTPATIENT
Start: 2021-01-01 | End: 2021-01-01 | Stop reason: HOSPADM

## 2021-01-01 RX ORDER — LIDOCAINE HYDROCHLORIDE AND EPINEPHRINE 10; 10 MG/ML; UG/ML
INJECTION, SOLUTION INFILTRATION; PERINEURAL
Status: DISCONTINUED | OUTPATIENT
Start: 2021-01-01 | End: 2021-01-01 | Stop reason: HOSPADM

## 2021-01-01 RX ORDER — CEPHALEXIN 500 MG/1
500 CAPSULE ORAL 3 TIMES DAILY
Qty: 15 CAPSULE | Refills: 0 | Status: SHIPPED | OUTPATIENT
Start: 2021-01-01 | End: 2021-01-01

## 2021-01-01 RX ORDER — IBUPROFEN 200 MG
16 TABLET ORAL
Status: DISCONTINUED | OUTPATIENT
Start: 2021-01-01 | End: 2021-01-01 | Stop reason: HOSPADM

## 2021-01-01 RX ORDER — LEVOTHYROXINE SODIUM 200 UG/1
200 TABLET ORAL
Status: DISCONTINUED | OUTPATIENT
Start: 2021-01-01 | End: 2021-01-01 | Stop reason: HOSPADM

## 2021-01-01 RX ORDER — SODIUM CHLORIDE 9 MG/ML
INJECTION, SOLUTION INTRAVENOUS ONCE
Status: DISCONTINUED | OUTPATIENT
Start: 2021-01-01 | End: 2021-01-01

## 2021-01-01 RX ORDER — BLOOD-GLUCOSE,RECEIVER,CONT
EACH MISCELLANEOUS
Qty: 1 EACH | Refills: 0 | Status: SHIPPED | OUTPATIENT
Start: 2021-01-01

## 2021-01-01 RX ORDER — CEPHALEXIN 500 MG/1
500 CAPSULE ORAL
Status: COMPLETED | OUTPATIENT
Start: 2021-01-01 | End: 2021-01-01

## 2021-01-01 RX ORDER — LEVOTHYROXINE SODIUM 100 UG/1
200 TABLET ORAL
Status: DISCONTINUED | OUTPATIENT
Start: 2021-01-01 | End: 2021-01-01

## 2021-01-01 RX ORDER — ISOSORBIDE MONONITRATE 60 MG/1
60 TABLET, EXTENDED RELEASE ORAL DAILY
Qty: 30 TABLET | Refills: 11 | OUTPATIENT
Start: 2021-01-01 | End: 2022-08-05

## 2021-01-01 RX ORDER — HYDRALAZINE HYDROCHLORIDE 50 MG/1
50 TABLET, FILM COATED ORAL EVERY 12 HOURS
Status: DISCONTINUED | OUTPATIENT
Start: 2021-01-01 | End: 2021-01-01

## 2021-01-01 RX ORDER — FUROSEMIDE 20 MG/1
60 TABLET ORAL 2 TIMES DAILY
Qty: 180 TABLET | Refills: 1 | Status: SHIPPED | OUTPATIENT
Start: 2021-01-01 | End: 2022-07-01

## 2021-01-01 RX ORDER — CARVEDILOL 12.5 MG/1
12.5 TABLET ORAL 2 TIMES DAILY
Status: DISCONTINUED | OUTPATIENT
Start: 2021-01-01 | End: 2021-01-01

## 2021-01-01 RX ORDER — NIFEDIPINE 30 MG/1
90 TABLET, EXTENDED RELEASE ORAL DAILY
Status: DISCONTINUED | OUTPATIENT
Start: 2021-01-01 | End: 2021-01-01

## 2021-01-01 RX ORDER — ATORVASTATIN CALCIUM 40 MG/1
40 TABLET, FILM COATED ORAL DAILY
Qty: 30 TABLET | Refills: 1 | Status: SHIPPED | OUTPATIENT
Start: 2021-01-01 | End: 2021-01-01

## 2021-01-01 RX ORDER — LOSARTAN POTASSIUM 25 MG/1
25 TABLET ORAL NIGHTLY
Status: DISCONTINUED | OUTPATIENT
Start: 2021-01-01 | End: 2021-01-01 | Stop reason: HOSPADM

## 2021-01-01 RX ORDER — AMOXICILLIN 500 MG/1
500 CAPSULE ORAL EVERY 12 HOURS
Qty: 14 CAPSULE | Refills: 0 | Status: SHIPPED | OUTPATIENT
Start: 2021-01-01 | End: 2021-01-01

## 2021-01-01 RX ORDER — CARVEDILOL 12.5 MG/1
25 TABLET ORAL 2 TIMES DAILY
Status: DISCONTINUED | OUTPATIENT
Start: 2021-01-01 | End: 2021-01-01

## 2021-01-01 RX ORDER — METHOCARBAMOL 500 MG/1
1000 TABLET, FILM COATED ORAL 3 TIMES DAILY
Qty: 30 TABLET | Refills: 0 | Status: SHIPPED | OUTPATIENT
Start: 2021-01-01 | End: 2021-01-01

## 2021-01-01 RX ORDER — NIFEDIPINE 30 MG/1
90 TABLET, EXTENDED RELEASE ORAL DAILY
Status: DISCONTINUED | OUTPATIENT
Start: 2021-01-01 | End: 2021-01-01 | Stop reason: HOSPADM

## 2021-01-01 RX ORDER — CALCIUM CARBONATE 200(500)MG
500 TABLET,CHEWABLE ORAL 3 TIMES DAILY PRN
Status: DISCONTINUED | OUTPATIENT
Start: 2021-01-01 | End: 2021-01-01 | Stop reason: HOSPADM

## 2021-01-01 RX ORDER — LABETALOL 100 MG/1
200 TABLET, FILM COATED ORAL EVERY 12 HOURS
Status: DISCONTINUED | OUTPATIENT
Start: 2021-01-01 | End: 2021-01-01

## 2021-01-01 RX ORDER — NIFEDIPINE 30 MG/1
30 TABLET, EXTENDED RELEASE ORAL DAILY
Status: DISCONTINUED | OUTPATIENT
Start: 2021-01-01 | End: 2021-01-01

## 2021-01-01 RX ORDER — CEFAZOLIN SODIUM 1 G/50ML
SOLUTION INTRAVENOUS
Status: COMPLETED | OUTPATIENT
Start: 2021-01-01 | End: 2021-01-01

## 2021-01-01 RX ORDER — CALCITRIOL 0.25 UG/1
0.25 CAPSULE ORAL DAILY
Qty: 30 CAPSULE | Refills: 0 | Status: SHIPPED | OUTPATIENT
Start: 2021-01-01 | End: 2021-01-01 | Stop reason: SDUPTHER

## 2021-01-01 RX ORDER — HEPARIN SODIUM 1000 [USP'U]/ML
1000 INJECTION, SOLUTION INTRAVENOUS; SUBCUTANEOUS
Status: DISCONTINUED | OUTPATIENT
Start: 2021-01-01 | End: 2021-01-01 | Stop reason: HOSPADM

## 2021-01-01 RX ORDER — LACTULOSE 10 G/15ML
15 SOLUTION ORAL EVERY 6 HOURS PRN
Status: DISCONTINUED | OUTPATIENT
Start: 2021-01-01 | End: 2021-01-01 | Stop reason: HOSPADM

## 2021-01-01 RX ORDER — AMLODIPINE BESYLATE 10 MG/1
10 TABLET ORAL DAILY
Status: DISCONTINUED | OUTPATIENT
Start: 2021-01-01 | End: 2021-01-01

## 2021-01-01 RX ORDER — HEPARIN 100 UNIT/ML
5 SYRINGE INTRAVENOUS
Status: DISCONTINUED | OUTPATIENT
Start: 2021-01-01 | End: 2021-01-01 | Stop reason: HOSPADM

## 2021-01-01 RX ORDER — LEVETIRACETAM 500 MG/1
1500 TABLET ORAL 2 TIMES DAILY
Status: DISCONTINUED | OUTPATIENT
Start: 2021-01-01 | End: 2021-01-01 | Stop reason: HOSPADM

## 2021-01-01 RX ORDER — ACETAMINOPHEN 325 MG/1
650 TABLET ORAL EVERY 4 HOURS PRN
Status: DISCONTINUED | OUTPATIENT
Start: 2021-01-01 | End: 2021-01-01 | Stop reason: HOSPADM

## 2021-01-01 RX ORDER — HYDRALAZINE HYDROCHLORIDE 50 MG/1
100 TABLET, FILM COATED ORAL 3 TIMES DAILY
Status: DISCONTINUED | OUTPATIENT
Start: 2021-01-01 | End: 2021-01-01

## 2021-01-01 RX ORDER — ALBUMIN HUMAN 250 G/1000ML
12.5 SOLUTION INTRAVENOUS ONCE
Status: COMPLETED | OUTPATIENT
Start: 2021-01-01 | End: 2021-01-01

## 2021-01-01 RX ORDER — HYDRALAZINE HYDROCHLORIDE 25 MG/1
25 TABLET, FILM COATED ORAL EVERY 4 HOURS PRN
Status: DISCONTINUED | OUTPATIENT
Start: 2021-01-01 | End: 2021-01-01 | Stop reason: HOSPADM

## 2021-01-01 RX ORDER — LEVETIRACETAM 500 MG/1
500 TABLET ORAL
Qty: 12 TABLET | Refills: 11 | OUTPATIENT
Start: 2021-01-01 | End: 2022-08-05

## 2021-01-01 RX ORDER — BLOOD-GLUCOSE SENSOR
EACH MISCELLANEOUS
Qty: 9 DEVICE | Refills: 3 | OUTPATIENT
Start: 2021-01-01 | End: 2021-01-01

## 2021-01-01 RX ORDER — ATORVASTATIN CALCIUM 20 MG/1
40 TABLET, FILM COATED ORAL DAILY
Status: DISCONTINUED | OUTPATIENT
Start: 2021-01-01 | End: 2021-01-01 | Stop reason: HOSPADM

## 2021-01-01 RX ORDER — ISOSORBIDE MONONITRATE 60 MG/1
60 TABLET, EXTENDED RELEASE ORAL DAILY
Status: DISCONTINUED | OUTPATIENT
Start: 2021-01-01 | End: 2021-01-01

## 2021-01-01 RX ORDER — HEPARIN SODIUM 1000 [USP'U]/ML
1000 INJECTION, SOLUTION INTRAVENOUS; SUBCUTANEOUS
Status: DISCONTINUED | OUTPATIENT
Start: 2021-08-07 | End: 2021-01-01 | Stop reason: HOSPADM

## 2021-01-01 RX ORDER — ACETAMINOPHEN 500 MG
500 TABLET ORAL EVERY 6 HOURS PRN
Status: DISCONTINUED | OUTPATIENT
Start: 2021-01-01 | End: 2021-01-01 | Stop reason: HOSPADM

## 2021-01-01 RX ORDER — METHOCARBAMOL 500 MG/1
1000 TABLET, FILM COATED ORAL
Status: COMPLETED | OUTPATIENT
Start: 2021-01-01 | End: 2021-01-01

## 2021-01-01 RX ORDER — MORPHINE SULFATE 15 MG/1
15 TABLET ORAL EVERY 6 HOURS PRN
Qty: 9 TABLET | Refills: 0 | Status: ON HOLD | OUTPATIENT
Start: 2021-01-01 | End: 2021-01-01 | Stop reason: HOSPADM

## 2021-01-01 RX ORDER — PANTOPRAZOLE SODIUM 40 MG/10ML
40 INJECTION, POWDER, LYOPHILIZED, FOR SOLUTION INTRAVENOUS ONCE
Status: COMPLETED | OUTPATIENT
Start: 2021-01-01 | End: 2021-01-01

## 2021-01-01 RX ORDER — ATORVASTATIN CALCIUM 20 MG/1
40 TABLET, FILM COATED ORAL NIGHTLY
Status: DISCONTINUED | OUTPATIENT
Start: 2021-01-01 | End: 2021-01-01 | Stop reason: HOSPADM

## 2021-01-01 RX ORDER — INSULIN ASPART 100 [IU]/ML
0-5 INJECTION, SOLUTION INTRAVENOUS; SUBCUTANEOUS
Status: DISCONTINUED | OUTPATIENT
Start: 2021-01-01 | End: 2021-01-01 | Stop reason: HOSPADM

## 2021-01-01 RX ORDER — CALCITRIOL 0.25 UG/1
0.25 CAPSULE ORAL DAILY
Qty: 30 CAPSULE | Refills: 6 | Status: SHIPPED | OUTPATIENT
Start: 2021-01-01

## 2021-01-01 RX ORDER — CHOLECALCIFEROL (VITAMIN D3) 25 MCG
1000 TABLET ORAL DAILY
Status: DISCONTINUED | OUTPATIENT
Start: 2021-01-01 | End: 2021-01-01 | Stop reason: HOSPADM

## 2021-01-01 RX ORDER — LIDOCAINE 50 MG/G
1 PATCH TOPICAL
Status: DISCONTINUED | OUTPATIENT
Start: 2021-01-01 | End: 2021-01-01 | Stop reason: HOSPADM

## 2021-01-01 RX ORDER — HYDRALAZINE HYDROCHLORIDE 20 MG/ML
10 INJECTION INTRAMUSCULAR; INTRAVENOUS EVERY 6 HOURS PRN
Status: DISCONTINUED | OUTPATIENT
Start: 2021-01-01 | End: 2021-01-01 | Stop reason: HOSPADM

## 2021-01-01 RX ORDER — CARVEDILOL 25 MG/1
25 TABLET ORAL 2 TIMES DAILY
Status: DISCONTINUED | OUTPATIENT
Start: 2021-01-01 | End: 2021-01-01

## 2021-01-01 RX ORDER — NIFEDIPINE 90 MG/1
90 TABLET, EXTENDED RELEASE ORAL DAILY
Qty: 30 TABLET | Refills: 11 | Status: SHIPPED | OUTPATIENT
Start: 2021-01-01 | End: 2022-04-26

## 2021-01-01 RX ORDER — DEXTROSE 4 G
TABLET,CHEWABLE ORAL
Qty: 50 EACH | Refills: 11 | Status: SHIPPED | OUTPATIENT
Start: 2021-01-01 | End: 2021-01-01

## 2021-01-01 RX ORDER — NAPROXEN SODIUM 220 MG/1
81 TABLET, FILM COATED ORAL DAILY
Status: DISCONTINUED | OUTPATIENT
Start: 2021-01-01 | End: 2021-01-01

## 2021-01-01 RX ORDER — FUROSEMIDE 40 MG/1
80 TABLET ORAL 3 TIMES DAILY
Status: DISCONTINUED | OUTPATIENT
Start: 2021-01-01 | End: 2021-01-01

## 2021-01-01 RX ORDER — SODIUM CHLORIDE, SODIUM LACTATE, POTASSIUM CHLORIDE, CALCIUM CHLORIDE 600; 310; 30; 20 MG/100ML; MG/100ML; MG/100ML; MG/100ML
INJECTION, SOLUTION INTRAVENOUS CONTINUOUS
Status: ACTIVE | OUTPATIENT
Start: 2021-01-01 | End: 2021-01-01

## 2021-01-01 RX ORDER — LEVOTHYROXINE SODIUM 100 UG/1
200 TABLET ORAL
Status: DISCONTINUED | OUTPATIENT
Start: 2021-01-01 | End: 2021-01-01 | Stop reason: HOSPADM

## 2021-01-01 RX ORDER — METOLAZONE 5 MG/1
5 TABLET ORAL DAILY
Status: DISCONTINUED | OUTPATIENT
Start: 2021-01-01 | End: 2021-01-01

## 2021-01-01 RX ORDER — LACOSAMIDE 200 MG/1
200 TABLET, FILM COATED ORAL EVERY 12 HOURS
Qty: 60 TABLET | Refills: 11 | Status: ON HOLD | OUTPATIENT
Start: 2021-01-01 | End: 2021-01-01

## 2021-01-01 RX ORDER — PROCHLORPERAZINE EDISYLATE 5 MG/ML
5 INJECTION INTRAMUSCULAR; INTRAVENOUS EVERY 6 HOURS PRN
Status: DISCONTINUED | OUTPATIENT
Start: 2021-01-01 | End: 2021-01-01 | Stop reason: HOSPADM

## 2021-01-01 RX ORDER — FUROSEMIDE 80 MG/1
80 TABLET ORAL 2 TIMES DAILY
Status: DISCONTINUED | OUTPATIENT
Start: 2021-01-01 | End: 2021-01-01

## 2021-01-01 RX ORDER — SODIUM BICARBONATE 650 MG/1
1300 TABLET ORAL 2 TIMES DAILY
Status: DISCONTINUED | OUTPATIENT
Start: 2021-01-01 | End: 2021-01-01

## 2021-01-01 RX ORDER — ATORVASTATIN CALCIUM 40 MG/1
40 TABLET, FILM COATED ORAL DAILY
Qty: 30 TABLET | Refills: 0 | Status: ON HOLD | OUTPATIENT
Start: 2021-01-01 | End: 2021-01-01 | Stop reason: SDUPTHER

## 2021-01-01 RX ORDER — METOLAZONE 10 MG/1
10 TABLET ORAL DAILY
Status: DISCONTINUED | OUTPATIENT
Start: 2021-01-01 | End: 2021-01-01 | Stop reason: HOSPADM

## 2021-01-01 RX ORDER — FOLIC ACID 1 MG/1
1000 TABLET ORAL DAILY
Status: DISCONTINUED | OUTPATIENT
Start: 2021-01-01 | End: 2021-01-01 | Stop reason: HOSPADM

## 2021-01-01 RX ORDER — BLOOD-GLUCOSE,RECEIVER,CONT
EACH MISCELLANEOUS
Qty: 1 EACH | Refills: 0 | OUTPATIENT
Start: 2021-01-01 | End: 2021-01-01

## 2021-01-01 RX ORDER — DULAGLUTIDE 0.75 MG/.5ML
0.75 INJECTION, SOLUTION SUBCUTANEOUS
Qty: 4 PEN | Refills: 3
Start: 2021-01-01

## 2021-01-01 RX ORDER — AMPICILLIN 500 MG/1
500 CAPSULE ORAL 3 TIMES DAILY
Status: DISPENSED | OUTPATIENT
Start: 2021-01-01 | End: 2021-01-01

## 2021-01-01 RX ORDER — SODIUM CHLORIDE 0.9 % (FLUSH) 0.9 %
10 SYRINGE (ML) INJECTION EVERY 6 HOURS PRN
Status: DISCONTINUED | OUTPATIENT
Start: 2021-01-01 | End: 2021-01-01 | Stop reason: HOSPADM

## 2021-01-01 RX ORDER — HYDRALAZINE HYDROCHLORIDE 50 MG/1
50 TABLET, FILM COATED ORAL 3 TIMES DAILY
Status: DISCONTINUED | OUTPATIENT
Start: 2021-01-01 | End: 2021-01-01

## 2021-01-01 RX ORDER — TICAGRELOR 90 MG/1
90 TABLET ORAL 2 TIMES DAILY
COMMUNITY
Start: 2021-01-01 | End: 2021-01-01 | Stop reason: ALTCHOICE

## 2021-01-01 RX ORDER — HYDRALAZINE HYDROCHLORIDE 100 MG/1
100 TABLET, FILM COATED ORAL 3 TIMES DAILY
Qty: 90 TABLET | Refills: 11 | OUTPATIENT
Start: 2021-01-01 | End: 2022-08-04

## 2021-01-01 RX ORDER — FUROSEMIDE 10 MG/ML
120 INJECTION INTRAMUSCULAR; INTRAVENOUS
Status: COMPLETED | OUTPATIENT
Start: 2021-01-01 | End: 2021-01-01

## 2021-01-01 RX ORDER — NAPROXEN SODIUM 220 MG/1
81 TABLET, FILM COATED ORAL DAILY
Status: DISCONTINUED | OUTPATIENT
Start: 2021-01-01 | End: 2021-01-01 | Stop reason: HOSPADM

## 2021-01-01 RX ORDER — CARVEDILOL 12.5 MG/1
12.5 TABLET ORAL ONCE
Status: COMPLETED | OUTPATIENT
Start: 2021-01-01 | End: 2021-01-01

## 2021-01-01 RX ORDER — METHYLPREDNISOLONE SOD SUCC 125 MG
125 VIAL (EA) INJECTION ONCE AS NEEDED
Status: DISCONTINUED | OUTPATIENT
Start: 2021-01-01 | End: 2021-01-01 | Stop reason: HOSPADM

## 2021-01-01 RX ORDER — EPINEPHRINE 0.1 MG/ML
INJECTION INTRAVENOUS CODE/TRAUMA/SEDATION MEDICATION
Status: COMPLETED | OUTPATIENT
Start: 2021-01-01 | End: 2021-01-01

## 2021-01-01 RX ORDER — MUPIROCIN 20 MG/G
OINTMENT TOPICAL 2 TIMES DAILY
Status: DISPENSED | OUTPATIENT
Start: 2021-01-01 | End: 2021-01-01

## 2021-01-01 RX ORDER — ACETAMINOPHEN 325 MG/1
650 TABLET ORAL EVERY 8 HOURS PRN
Status: DISCONTINUED | OUTPATIENT
Start: 2021-01-01 | End: 2021-01-01 | Stop reason: HOSPADM

## 2021-01-01 RX ORDER — SODIUM CHLORIDE 9 MG/ML
INJECTION, SOLUTION INTRAVENOUS ONCE
Status: DISCONTINUED | OUTPATIENT
Start: 2021-08-07 | End: 2021-01-01 | Stop reason: HOSPADM

## 2021-01-01 RX ORDER — BISACODYL 5 MG
5 TABLET, DELAYED RELEASE (ENTERIC COATED) ORAL DAILY PRN
Qty: 10 TABLET | Refills: 0 | Status: SHIPPED | OUTPATIENT
Start: 2021-01-01

## 2021-01-01 RX ORDER — ACETAMINOPHEN 500 MG
1000 TABLET ORAL
Status: COMPLETED | OUTPATIENT
Start: 2021-01-01 | End: 2021-01-01

## 2021-01-01 RX ORDER — LANCETS
EACH MISCELLANEOUS
Qty: 50 EACH | Refills: 11 | Status: SHIPPED | OUTPATIENT
Start: 2021-01-01

## 2021-01-01 RX ORDER — BLOOD-GLUCOSE SENSOR
EACH MISCELLANEOUS
Qty: 9 DEVICE | Refills: 3 | Status: SHIPPED | OUTPATIENT
Start: 2021-01-01

## 2021-01-01 RX ORDER — LEVOTHYROXINE SODIUM 200 UG/1
200 TABLET ORAL
COMMUNITY

## 2021-01-01 RX ORDER — LEVETIRACETAM 250 MG/1
1000 TABLET ORAL DAILY
Status: DISCONTINUED | OUTPATIENT
Start: 2021-01-01 | End: 2021-01-01

## 2021-01-01 RX ORDER — FUROSEMIDE 10 MG/ML
80 INJECTION INTRAMUSCULAR; INTRAVENOUS
Status: COMPLETED | OUTPATIENT
Start: 2021-01-01 | End: 2021-01-01

## 2021-01-01 RX ORDER — SEVELAMER CARBONATE 800 MG/1
800 TABLET, FILM COATED ORAL
Status: DISCONTINUED | OUTPATIENT
Start: 2021-01-01 | End: 2021-01-01 | Stop reason: HOSPADM

## 2021-01-01 RX ORDER — FUROSEMIDE 40 MG/1
80 TABLET ORAL 2 TIMES DAILY
Qty: 60 TABLET | Refills: 3 | Status: SHIPPED | OUTPATIENT
Start: 2021-01-01 | End: 2021-01-01 | Stop reason: DRUGHIGH

## 2021-01-01 RX ORDER — OXYCODONE HYDROCHLORIDE 5 MG/1
5 TABLET ORAL EVERY 6 HOURS PRN
Status: DISCONTINUED | OUTPATIENT
Start: 2021-01-01 | End: 2021-01-01 | Stop reason: HOSPADM

## 2021-01-01 RX ADMIN — LABETALOL HYDROCHLORIDE 200 MG: 100 TABLET, FILM COATED ORAL at 08:07

## 2021-01-01 RX ADMIN — HEPARIN SODIUM 5000 UNITS: 5000 INJECTION INTRAVENOUS; SUBCUTANEOUS at 10:03

## 2021-01-01 RX ADMIN — ISOSORBIDE MONONITRATE 30 MG: 30 TABLET, EXTENDED RELEASE ORAL at 08:07

## 2021-01-01 RX ADMIN — DOCUSATE SODIUM 50 MG AND SENNOSIDES 8.6 MG 1 TABLET: 8.6; 5 TABLET, FILM COATED ORAL at 08:07

## 2021-01-01 RX ADMIN — NIFEDIPINE 90 MG: 60 TABLET, FILM COATED, EXTENDED RELEASE ORAL at 08:07

## 2021-01-01 RX ADMIN — CARVEDILOL 25 MG: 25 TABLET, FILM COATED ORAL at 09:07

## 2021-01-01 RX ADMIN — DOCUSATE SODIUM 50 MG AND SENNOSIDES 8.6 MG 1 TABLET: 8.6; 5 TABLET, FILM COATED ORAL at 01:07

## 2021-01-01 RX ADMIN — FUROSEMIDE 80 MG: 80 TABLET ORAL at 09:07

## 2021-01-01 RX ADMIN — FUROSEMIDE 40 MG: 40 TABLET ORAL at 08:04

## 2021-01-01 RX ADMIN — LACOSAMIDE 200 MG: 100 TABLET, FILM COATED ORAL at 08:07

## 2021-01-01 RX ADMIN — LACOSAMIDE 200 MG: 50 TABLET, FILM COATED ORAL at 10:03

## 2021-01-01 RX ADMIN — HYDRALAZINE HYDROCHLORIDE 100 MG: 50 TABLET, FILM COATED ORAL at 03:08

## 2021-01-01 RX ADMIN — LACOSAMIDE 200 MG: 100 TABLET, FILM COATED ORAL at 09:07

## 2021-01-01 RX ADMIN — SEVELAMER CARBONATE 800 MG: 800 TABLET, FILM COATED ORAL at 08:07

## 2021-01-01 RX ADMIN — HYDRALAZINE HYDROCHLORIDE 75 MG: 50 TABLET ORAL at 09:07

## 2021-01-01 RX ADMIN — DOCUSATE SODIUM 50MG AND SENNOSIDES 8.6MG 1 TABLET: 8.6; 5 TABLET, FILM COATED ORAL at 08:03

## 2021-01-01 RX ADMIN — NIFEDIPINE 90 MG: 60 TABLET, FILM COATED, EXTENDED RELEASE ORAL at 09:07

## 2021-01-01 RX ADMIN — EPINEPHRINE 1 MG: 0.1 INJECTION, SOLUTION ENDOTRACHEAL; INTRACARDIAC; INTRAVENOUS at 10:08

## 2021-01-01 RX ADMIN — LEVETIRACETAM 1500 MG: 750 TABLET ORAL at 09:03

## 2021-01-01 RX ADMIN — Medication 1000 UNITS: at 05:03

## 2021-01-01 RX ADMIN — HYDRALAZINE HYDROCHLORIDE AND ISOSORBIDE DINITRATE 1 TABLET: 37.5; 2 TABLET, FILM COATED ORAL at 10:03

## 2021-01-01 RX ADMIN — FUROSEMIDE 80 MG: 80 TABLET ORAL at 08:07

## 2021-01-01 RX ADMIN — MUPIROCIN: 20 OINTMENT TOPICAL at 08:07

## 2021-01-01 RX ADMIN — SEVELAMER CARBONATE 800 MG: 800 TABLET, FILM COATED ORAL at 11:07

## 2021-01-01 RX ADMIN — HEPARIN SODIUM 5000 UNITS: 5000 INJECTION INTRAVENOUS; SUBCUTANEOUS at 02:03

## 2021-01-01 RX ADMIN — ATORVASTATIN CALCIUM 40 MG: 40 TABLET, FILM COATED ORAL at 08:03

## 2021-01-01 RX ADMIN — LACOSAMIDE 200 MG: 50 TABLET, FILM COATED ORAL at 09:04

## 2021-01-01 RX ADMIN — CHOLECALCIFEROL (VITAMIN D3) 25 MCG (1,000 UNIT) TABLET 1000 UNITS: TABLET at 10:07

## 2021-01-01 RX ADMIN — AMPICILLIN 500 MG: 500 CAPSULE ORAL at 02:07

## 2021-01-01 RX ADMIN — SEVELAMER CARBONATE 800 MG: 800 TABLET, FILM COATED ORAL at 09:08

## 2021-01-01 RX ADMIN — ATORVASTATIN CALCIUM 40 MG: 20 TABLET, FILM COATED ORAL at 08:07

## 2021-01-01 RX ADMIN — SEVELAMER CARBONATE 800 MG: 800 TABLET, FILM COATED ORAL at 04:07

## 2021-01-01 RX ADMIN — INSULIN ASPART 1 UNITS: 100 INJECTION, SOLUTION INTRAVENOUS; SUBCUTANEOUS at 09:07

## 2021-01-01 RX ADMIN — LEVETIRACETAM 1500 MG: 500 TABLET, FILM COATED ORAL at 08:04

## 2021-01-01 RX ADMIN — HYDRALAZINE HYDROCHLORIDE 75 MG: 50 TABLET ORAL at 08:07

## 2021-01-01 RX ADMIN — APIXABAN 5 MG: 5 TABLET, FILM COATED ORAL at 09:07

## 2021-01-01 RX ADMIN — APIXABAN 5 MG: 5 TABLET, FILM COATED ORAL at 08:07

## 2021-01-01 RX ADMIN — LEVOTHYROXINE SODIUM 200 MCG: 100 TABLET ORAL at 05:07

## 2021-01-01 RX ADMIN — AMLODIPINE BESYLATE 10 MG: 10 TABLET ORAL at 08:03

## 2021-01-01 RX ADMIN — FERRIC CARBOXYMALTOSE INJECTION 750 MG: 50 INJECTION, SOLUTION INTRAVENOUS at 11:03

## 2021-01-01 RX ADMIN — CHOLECALCIFEROL (VITAMIN D3) 25 MCG (1,000 UNIT) TABLET 1000 UNITS: TABLET at 09:08

## 2021-01-01 RX ADMIN — ASPIRIN 81 MG CHEWABLE TABLET 81 MG: 81 TABLET CHEWABLE at 08:07

## 2021-01-01 RX ADMIN — HYDRALAZINE HYDROCHLORIDE 75 MG: 50 TABLET ORAL at 04:07

## 2021-01-01 RX ADMIN — DOCUSATE SODIUM 50 MG AND SENNOSIDES 8.6 MG 1 TABLET: 8.6; 5 TABLET, FILM COATED ORAL at 09:07

## 2021-01-01 RX ADMIN — APIXABAN 5 MG: 5 TABLET, FILM COATED ORAL at 09:08

## 2021-01-01 RX ADMIN — CALCITRIOL CAPSULES 0.25 MCG 0.25 MCG: 0.25 CAPSULE ORAL at 09:07

## 2021-01-01 RX ADMIN — DOCUSATE SODIUM 50 MG AND SENNOSIDES 8.6 MG 1 TABLET: 8.6; 5 TABLET, FILM COATED ORAL at 09:08

## 2021-01-01 RX ADMIN — LACOSAMIDE 200 MG: 50 TABLET, FILM COATED ORAL at 08:03

## 2021-01-01 RX ADMIN — LACOSAMIDE 200 MG: 100 TABLET, FILM COATED ORAL at 09:08

## 2021-01-01 RX ADMIN — HYDRALAZINE HYDROCHLORIDE 50 MG: 50 TABLET ORAL at 09:07

## 2021-01-01 RX ADMIN — LEVOTHYROXINE SODIUM 200 MCG: 100 TABLET ORAL at 11:03

## 2021-01-01 RX ADMIN — HYDRALAZINE HYDROCHLORIDE AND ISOSORBIDE DINITRATE 1 TABLET: 37.5; 2 TABLET, FILM COATED ORAL at 02:03

## 2021-01-01 RX ADMIN — INSULIN ASPART 2 UNITS: 100 INJECTION, SOLUTION INTRAVENOUS; SUBCUTANEOUS at 06:07

## 2021-01-01 RX ADMIN — FUROSEMIDE 20 MG/HR: 10 INJECTION, SOLUTION INTRAMUSCULAR; INTRAVENOUS at 08:07

## 2021-01-01 RX ADMIN — HEPARIN SODIUM 5000 UNITS: 5000 INJECTION INTRAVENOUS; SUBCUTANEOUS at 01:03

## 2021-01-01 RX ADMIN — INSULIN ASPART 2 UNITS: 100 INJECTION, SOLUTION INTRAVENOUS; SUBCUTANEOUS at 04:08

## 2021-01-01 RX ADMIN — Medication 1 CAPSULE: at 10:07

## 2021-01-01 RX ADMIN — LEVETIRACETAM 1500 MG: 500 TABLET, FILM COATED ORAL at 08:07

## 2021-01-01 RX ADMIN — HYDRALAZINE HYDROCHLORIDE 100 MG: 50 TABLET, FILM COATED ORAL at 02:07

## 2021-01-01 RX ADMIN — CHOLECALCIFEROL (VITAMIN D3) 25 MCG (1,000 UNIT) TABLET 1000 UNITS: TABLET at 08:07

## 2021-01-01 RX ADMIN — HYDRALAZINE HYDROCHLORIDE 75 MG: 50 TABLET ORAL at 10:07

## 2021-01-01 RX ADMIN — LEVOTHYROXINE SODIUM 200 MCG: 100 TABLET ORAL at 06:07

## 2021-01-01 RX ADMIN — ATORVASTATIN CALCIUM 40 MG: 20 TABLET, FILM COATED ORAL at 10:07

## 2021-01-01 RX ADMIN — INSULIN ASPART 2 UNITS: 100 INJECTION, SOLUTION INTRAVENOUS; SUBCUTANEOUS at 12:07

## 2021-01-01 RX ADMIN — NIFEDIPINE 30 MG: 30 TABLET, FILM COATED, EXTENDED RELEASE ORAL at 09:03

## 2021-01-01 RX ADMIN — FUROSEMIDE 30 MG/HR: 10 INJECTION, SOLUTION INTRAMUSCULAR; INTRAVENOUS at 08:07

## 2021-01-01 RX ADMIN — DOCUSATE SODIUM 50 MG AND SENNOSIDES 8.6 MG 1 TABLET: 8.6; 5 TABLET, FILM COATED ORAL at 10:07

## 2021-01-01 RX ADMIN — LEVETIRACETAM 1500 MG: 500 TABLET ORAL at 09:08

## 2021-01-01 RX ADMIN — HYDRALAZINE HYDROCHLORIDE 75 MG: 50 TABLET ORAL at 02:07

## 2021-01-01 RX ADMIN — FUROSEMIDE 80 MG: 10 INJECTION INTRAMUSCULAR; INTRAVENOUS at 05:05

## 2021-01-01 RX ADMIN — HYDRALAZINE HYDROCHLORIDE 75 MG: 50 TABLET ORAL at 01:07

## 2021-01-01 RX ADMIN — SODIUM BICARBONATE 650 MG TABLET 1300 MG: at 08:07

## 2021-01-01 RX ADMIN — LEVETIRACETAM 1500 MG: 500 TABLET, FILM COATED ORAL at 09:07

## 2021-01-01 RX ADMIN — FUROSEMIDE 20 MG/HR: 10 INJECTION, SOLUTION INTRAMUSCULAR; INTRAVENOUS at 06:07

## 2021-01-01 RX ADMIN — METOLAZONE 10 MG: 5 TABLET ORAL at 09:07

## 2021-01-01 RX ADMIN — HYDRALAZINE HYDROCHLORIDE 25 MG: 25 TABLET ORAL at 01:07

## 2021-01-01 RX ADMIN — HYDRALAZINE HYDROCHLORIDE 100 MG: 50 TABLET, FILM COATED ORAL at 09:08

## 2021-01-01 RX ADMIN — FUROSEMIDE 120 MG: 10 INJECTION, SOLUTION INTRAMUSCULAR; INTRAVENOUS at 08:03

## 2021-01-01 RX ADMIN — ASPIRIN 81 MG CHEWABLE TABLET 81 MG: 81 TABLET CHEWABLE at 09:07

## 2021-01-01 RX ADMIN — CHOLECALCIFEROL (VITAMIN D3) 25 MCG (1,000 UNIT) TABLET 1000 UNITS: TABLET at 08:08

## 2021-01-01 RX ADMIN — NIFEDIPINE 90 MG: 60 TABLET, FILM COATED, EXTENDED RELEASE ORAL at 09:08

## 2021-01-01 RX ADMIN — CARVEDILOL 25 MG: 25 TABLET, FILM COATED ORAL at 08:07

## 2021-01-01 RX ADMIN — INSULIN ASPART 1 UNITS: 100 INJECTION, SOLUTION INTRAVENOUS; SUBCUTANEOUS at 10:07

## 2021-01-01 RX ADMIN — Medication 1000 UNITS: at 04:03

## 2021-01-01 RX ADMIN — HYDRALAZINE HYDROCHLORIDE 75 MG: 50 TABLET ORAL at 03:07

## 2021-01-01 RX ADMIN — SEVELAMER CARBONATE 800 MG: 800 TABLET, FILM COATED ORAL at 01:07

## 2021-01-01 RX ADMIN — METOLAZONE 5 MG: 5 TABLET ORAL at 04:03

## 2021-01-01 RX ADMIN — DOCUSATE SODIUM 50 MG AND SENNOSIDES 8.6 MG 1 TABLET: 8.6; 5 TABLET, FILM COATED ORAL at 10:08

## 2021-01-01 RX ADMIN — METOLAZONE 10 MG: 5 TABLET ORAL at 01:07

## 2021-01-01 RX ADMIN — CARVEDILOL 12.5 MG: 12.5 TABLET, FILM COATED ORAL at 09:03

## 2021-01-01 RX ADMIN — Medication 1 CAPSULE: at 09:07

## 2021-01-01 RX ADMIN — FUROSEMIDE 30 MG/HR: 10 INJECTION, SOLUTION INTRAMUSCULAR; INTRAVENOUS at 04:07

## 2021-01-01 RX ADMIN — SODIUM BICARBONATE 650 MG TABLET 1300 MG: at 09:07

## 2021-01-01 RX ADMIN — SEVELAMER CARBONATE 800 MG: 800 TABLET, FILM COATED ORAL at 06:08

## 2021-01-01 RX ADMIN — CALCITRIOL CAPSULES 0.25 MCG 0.25 MCG: 0.25 CAPSULE ORAL at 08:03

## 2021-01-01 RX ADMIN — HYDRALAZINE HYDROCHLORIDE 25 MG: 25 TABLET ORAL at 11:07

## 2021-01-01 RX ADMIN — INSULIN ASPART 2 UNITS: 100 INJECTION, SOLUTION INTRAVENOUS; SUBCUTANEOUS at 03:07

## 2021-01-01 RX ADMIN — SEVELAMER CARBONATE 800 MG: 800 TABLET, FILM COATED ORAL at 08:08

## 2021-01-01 RX ADMIN — HEPARIN SODIUM 5000 UNITS: 5000 INJECTION INTRAVENOUS; SUBCUTANEOUS at 06:03

## 2021-01-01 RX ADMIN — HYDRALAZINE HYDROCHLORIDE 100 MG: 50 TABLET, FILM COATED ORAL at 04:08

## 2021-01-01 RX ADMIN — TICAGRELOR 90 MG: 90 TABLET ORAL at 09:03

## 2021-01-01 RX ADMIN — ISOSORBIDE MONONITRATE 60 MG: 60 TABLET, EXTENDED RELEASE ORAL at 09:08

## 2021-01-01 RX ADMIN — AMPICILLIN 500 MG: 500 CAPSULE ORAL at 09:07

## 2021-01-01 RX ADMIN — ATORVASTATIN CALCIUM 40 MG: 20 TABLET, FILM COATED ORAL at 09:07

## 2021-01-01 RX ADMIN — INSULIN ASPART 2 UNITS: 100 INJECTION, SOLUTION INTRAVENOUS; SUBCUTANEOUS at 12:08

## 2021-01-01 RX ADMIN — HYDRALAZINE HYDROCHLORIDE 25 MG: 25 TABLET ORAL at 05:07

## 2021-01-01 RX ADMIN — INSULIN ASPART 2 UNITS: 100 INJECTION, SOLUTION INTRAVENOUS; SUBCUTANEOUS at 11:03

## 2021-01-01 RX ADMIN — CARVEDILOL 12.5 MG: 12.5 TABLET, FILM COATED ORAL at 08:03

## 2021-01-01 RX ADMIN — CARVEDILOL 25 MG: 12.5 TABLET, FILM COATED ORAL at 09:08

## 2021-01-01 RX ADMIN — HEPARIN SODIUM 5000 UNITS: 5000 INJECTION INTRAVENOUS; SUBCUTANEOUS at 05:03

## 2021-01-01 RX ADMIN — LEVETIRACETAM 1000 MG: 500 TABLET ORAL at 08:08

## 2021-01-01 RX ADMIN — METOLAZONE 10 MG: 5 TABLET ORAL at 10:07

## 2021-01-01 RX ADMIN — ATORVASTATIN CALCIUM 40 MG: 40 TABLET, FILM COATED ORAL at 09:03

## 2021-01-01 RX ADMIN — CHOLECALCIFEROL (VITAMIN D3) 25 MCG (1,000 UNIT) TABLET 1000 UNITS: TABLET at 09:07

## 2021-01-01 RX ADMIN — POTASSIUM BICARBONATE 20 MEQ: 391 TABLET, EFFERVESCENT ORAL at 08:07

## 2021-01-01 RX ADMIN — LEVETIRACETAM 1000 MG: 500 TABLET ORAL at 09:08

## 2021-01-01 RX ADMIN — FERROUS SULFATE TAB EC 325 MG (65 MG FE EQUIVALENT) 325 MG: 325 (65 FE) TABLET DELAYED RESPONSE at 08:03

## 2021-01-01 RX ADMIN — LEVETIRACETAM 500 MG: 250 TABLET ORAL at 05:08

## 2021-01-01 RX ADMIN — APIXABAN 5 MG: 5 TABLET, FILM COATED ORAL at 08:08

## 2021-01-01 RX ADMIN — CALCITRIOL CAPSULES 0.25 MCG 0.25 MCG: 0.25 CAPSULE ORAL at 08:07

## 2021-01-01 RX ADMIN — INSULIN ASPART 2 UNITS: 100 INJECTION, SOLUTION INTRAVENOUS; SUBCUTANEOUS at 04:07

## 2021-01-01 RX ADMIN — POTASSIUM CHLORIDE 20 MEQ: 1500 TABLET, EXTENDED RELEASE ORAL at 12:07

## 2021-01-01 RX ADMIN — LEVOTHYROXINE SODIUM 200 MCG: 100 TABLET ORAL at 06:08

## 2021-01-01 RX ADMIN — Medication 1 CAPSULE: at 08:07

## 2021-01-01 RX ADMIN — LACOSAMIDE 200 MG: 100 TABLET, FILM COATED ORAL at 10:07

## 2021-01-01 RX ADMIN — CARVEDILOL 25 MG: 12.5 TABLET, FILM COATED ORAL at 10:08

## 2021-01-01 RX ADMIN — LACTULOSE 15 G: 10 SOLUTION ORAL at 10:07

## 2021-01-01 RX ADMIN — LEVETIRACETAM 1500 MG: 750 TABLET ORAL at 08:03

## 2021-01-01 RX ADMIN — TICAGRELOR 90 MG: 90 TABLET ORAL at 08:03

## 2021-01-01 RX ADMIN — METOLAZONE 10 MG: 5 TABLET ORAL at 08:07

## 2021-01-01 RX ADMIN — LIDOCAINE 1 PATCH: 50 PATCH TOPICAL at 09:04

## 2021-01-01 RX ADMIN — CARVEDILOL 12.5 MG: 12.5 TABLET, FILM COATED ORAL at 09:07

## 2021-01-01 RX ADMIN — CARVEDILOL 25 MG: 12.5 TABLET, FILM COATED ORAL at 08:08

## 2021-01-01 RX ADMIN — CARVEDILOL 12.5 MG: 12.5 TABLET, FILM COATED ORAL at 09:04

## 2021-01-01 RX ADMIN — POLYETHYLENE GLYCOL 3350 17 G: 17 POWDER, FOR SOLUTION ORAL at 09:03

## 2021-01-01 RX ADMIN — LEVETIRACETAM 1500 MG: 500 TABLET ORAL at 10:08

## 2021-01-01 RX ADMIN — FUROSEMIDE 30 MG/HR: 10 INJECTION, SOLUTION INTRAMUSCULAR; INTRAVENOUS at 02:07

## 2021-01-01 RX ADMIN — CALCITRIOL CAPSULES 0.25 MCG 0.25 MCG: 0.25 CAPSULE ORAL at 09:08

## 2021-01-01 RX ADMIN — ATORVASTATIN CALCIUM 40 MG: 20 TABLET, FILM COATED ORAL at 10:08

## 2021-01-01 RX ADMIN — SEVELAMER CARBONATE 800 MG: 800 TABLET, FILM COATED ORAL at 05:07

## 2021-01-01 RX ADMIN — FUROSEMIDE 80 MG: 80 TABLET ORAL at 09:08

## 2021-01-01 RX ADMIN — SEVELAMER CARBONATE 800 MG: 800 TABLET, FILM COATED ORAL at 09:07

## 2021-01-01 RX ADMIN — LABETALOL HYDROCHLORIDE 200 MG: 100 TABLET, FILM COATED ORAL at 09:07

## 2021-01-01 RX ADMIN — SEVELAMER CARBONATE 800 MG: 800 TABLET, FILM COATED ORAL at 05:08

## 2021-01-01 RX ADMIN — FUROSEMIDE 30 MG/HR: 10 INJECTION, SOLUTION INTRAMUSCULAR; INTRAVENOUS at 06:07

## 2021-01-01 RX ADMIN — FUROSEMIDE 80 MG: 80 TABLET ORAL at 08:08

## 2021-01-01 RX ADMIN — LACOSAMIDE 200 MG: 100 TABLET, FILM COATED ORAL at 08:08

## 2021-01-01 RX ADMIN — CALCITRIOL CAPSULES 0.25 MCG 0.25 MCG: 0.25 CAPSULE ORAL at 08:04

## 2021-01-01 RX ADMIN — INSULIN ASPART 4 UNITS: 100 INJECTION, SOLUTION INTRAVENOUS; SUBCUTANEOUS at 12:04

## 2021-01-01 RX ADMIN — FUROSEMIDE 20 MG/HR: 10 INJECTION, SOLUTION INTRAMUSCULAR; INTRAVENOUS at 07:07

## 2021-01-01 RX ADMIN — SEVELAMER CARBONATE 800 MG: 800 TABLET, FILM COATED ORAL at 12:07

## 2021-01-01 RX ADMIN — CHLOROTHIAZIDE SODIUM 250 MG: 500 INJECTION, POWDER, LYOPHILIZED, FOR SOLUTION INTRAVENOUS at 02:07

## 2021-01-01 RX ADMIN — NIFEDIPINE 90 MG: 30 TABLET, FILM COATED, EXTENDED RELEASE ORAL at 08:04

## 2021-01-01 RX ADMIN — LACOSAMIDE 200 MG: 50 TABLET, FILM COATED ORAL at 09:03

## 2021-01-01 RX ADMIN — CALCIUM CHLORIDE 1 G: 100 INJECTION, SOLUTION INTRAVENOUS at 10:08

## 2021-01-01 RX ADMIN — AMPICILLIN 500 MG: 500 CAPSULE ORAL at 08:07

## 2021-01-01 RX ADMIN — HYDRALAZINE HYDROCHLORIDE 75 MG: 50 TABLET ORAL at 06:07

## 2021-01-01 RX ADMIN — SEVELAMER CARBONATE 800 MG: 800 TABLET, FILM COATED ORAL at 06:07

## 2021-01-01 RX ADMIN — HYDRALAZINE HYDROCHLORIDE 100 MG: 50 TABLET, FILM COATED ORAL at 08:08

## 2021-01-01 RX ADMIN — CARVEDILOL 25 MG: 12.5 TABLET, FILM COATED ORAL at 10:07

## 2021-01-01 RX ADMIN — FUROSEMIDE 30 MG/HR: 10 INJECTION, SOLUTION INTRAMUSCULAR; INTRAVENOUS at 12:07

## 2021-01-01 RX ADMIN — INSULIN ASPART 1 UNITS: 100 INJECTION, SOLUTION INTRAVENOUS; SUBCUTANEOUS at 08:07

## 2021-01-01 RX ADMIN — HYDRALAZINE HYDROCHLORIDE AND ISOSORBIDE DINITRATE 1 TABLET: 37.5; 2 TABLET, FILM COATED ORAL at 08:03

## 2021-01-01 RX ADMIN — LEVOTHYROXINE SODIUM 200 MCG: 100 TABLET ORAL at 06:03

## 2021-01-01 RX ADMIN — DOCUSATE SODIUM 50MG AND SENNOSIDES 8.6MG 1 TABLET: 8.6; 5 TABLET, FILM COATED ORAL at 09:03

## 2021-01-01 RX ADMIN — FUROSEMIDE 80 MG: 80 TABLET ORAL at 04:08

## 2021-01-01 RX ADMIN — FUROSEMIDE 80 MG: 80 TABLET ORAL at 02:08

## 2021-01-01 RX ADMIN — LACOSAMIDE 200 MG: 100 TABLET, FILM COATED ORAL at 10:08

## 2021-01-01 RX ADMIN — FOLIC ACID 1000 MCG: 1 TABLET ORAL at 08:04

## 2021-01-01 RX ADMIN — DOCUSATE SODIUM 50MG AND SENNOSIDES 8.6MG 1 TABLET: 8.6; 5 TABLET, FILM COATED ORAL at 10:03

## 2021-01-01 RX ADMIN — HYDRALAZINE HYDROCHLORIDE 50 MG: 50 TABLET ORAL at 02:07

## 2021-01-01 RX ADMIN — METOLAZONE 10 MG: 5 TABLET ORAL at 08:08

## 2021-01-01 RX ADMIN — SEVELAMER CARBONATE 800 MG: 800 TABLET, FILM COATED ORAL at 07:07

## 2021-01-01 RX ADMIN — MAGNESIUM SULFATE 2 G: 2 INJECTION INTRAVENOUS at 04:03

## 2021-01-01 RX ADMIN — LEVOTHYROXINE SODIUM 200 MCG: 100 TABLET ORAL at 05:03

## 2021-01-01 RX ADMIN — LABETALOL HYDROCHLORIDE 200 MG: 100 TABLET, FILM COATED ORAL at 10:07

## 2021-01-01 RX ADMIN — FUROSEMIDE 80 MG: 80 TABLET ORAL at 05:08

## 2021-01-01 RX ADMIN — CALCITRIOL CAPSULES 0.25 MCG 0.25 MCG: 0.25 CAPSULE ORAL at 09:03

## 2021-01-01 RX ADMIN — NIFEDIPINE 90 MG: 60 TABLET, FILM COATED, EXTENDED RELEASE ORAL at 08:08

## 2021-01-01 RX ADMIN — MUPIROCIN: 20 OINTMENT TOPICAL at 09:07

## 2021-01-01 RX ADMIN — TUBERCULIN PURIFIED PROTEIN DERIVATIVE 5 UNITS: 5 INJECTION, SOLUTION INTRADERMAL at 11:07

## 2021-01-01 RX ADMIN — ISOSORBIDE MONONITRATE 30 MG: 30 TABLET, EXTENDED RELEASE ORAL at 09:07

## 2021-01-01 RX ADMIN — ASPIRIN 81 MG CHEWABLE TABLET 81 MG: 81 TABLET CHEWABLE at 09:03

## 2021-01-01 RX ADMIN — CHOLECALCIFEROL (VITAMIN D3) 25 MCG (1,000 UNIT) TABLET 1000 UNITS: TABLET at 01:07

## 2021-01-01 RX ADMIN — POTASSIUM CHLORIDE 10 MEQ: 10 CAPSULE, COATED, EXTENDED RELEASE ORAL at 04:03

## 2021-01-01 RX ADMIN — FUROSEMIDE 120 MG: 10 INJECTION, SOLUTION INTRAMUSCULAR; INTRAVENOUS at 05:03

## 2021-01-01 RX ADMIN — SODIUM BICARBONATE 50 MEQ: 84 INJECTION, SOLUTION INTRAVENOUS at 10:08

## 2021-01-01 RX ADMIN — LABETALOL HYDROCHLORIDE 200 MG: 100 TABLET, FILM COATED ORAL at 01:07

## 2021-01-01 RX ADMIN — SEVELAMER CARBONATE 800 MG: 800 TABLET, FILM COATED ORAL at 12:08

## 2021-01-01 RX ADMIN — SODIUM CHLORIDE: 0.9 INJECTION, SOLUTION INTRAVENOUS at 11:03

## 2021-01-01 RX ADMIN — FERROUS SULFATE TAB EC 325 MG (65 MG FE EQUIVALENT) 325 MG: 325 (65 FE) TABLET DELAYED RESPONSE at 09:03

## 2021-01-01 RX ADMIN — METOLAZONE 5 MG: 5 TABLET ORAL at 05:03

## 2021-01-01 RX ADMIN — AMPICILLIN 500 MG: 500 CAPSULE ORAL at 03:07

## 2021-01-01 RX ADMIN — LEVOTHYROXINE SODIUM 200 MCG: 0.2 TABLET ORAL at 08:04

## 2021-01-01 RX ADMIN — MUPIROCIN: 20 OINTMENT TOPICAL at 11:07

## 2021-01-01 RX ADMIN — CARVEDILOL 12.5 MG: 12.5 TABLET, FILM COATED ORAL at 10:03

## 2021-01-01 RX ADMIN — CEPHALEXIN 500 MG: 500 CAPSULE ORAL at 12:03

## 2021-01-01 RX ADMIN — LEVETIRACETAM 1500 MG: 750 TABLET ORAL at 10:03

## 2021-01-01 RX ADMIN — HEPARIN SODIUM 5000 UNITS: 5000 INJECTION INTRAVENOUS; SUBCUTANEOUS at 11:03

## 2021-01-01 RX ADMIN — DEXTROSE MONOHYDRATE 25 G: 25 INJECTION, SOLUTION INTRAVENOUS at 10:08

## 2021-01-01 RX ADMIN — LACTULOSE 15 G: 10 SOLUTION ORAL at 02:07

## 2021-01-01 RX ADMIN — POTASSIUM CHLORIDE 20 MEQ: 1500 TABLET, EXTENDED RELEASE ORAL at 11:07

## 2021-01-01 RX ADMIN — FUROSEMIDE 20 MG/HR: 10 INJECTION, SOLUTION INTRAMUSCULAR; INTRAVENOUS at 10:07

## 2021-01-01 RX ADMIN — LEVETIRACETAM 1500 MG: 500 TABLET, FILM COATED ORAL at 10:07

## 2021-01-01 RX ADMIN — INSULIN ASPART 3 UNITS: 100 INJECTION, SOLUTION INTRAVENOUS; SUBCUTANEOUS at 04:07

## 2021-01-01 RX ADMIN — LACOSAMIDE 200 MG: 50 TABLET, FILM COATED ORAL at 08:04

## 2021-01-01 RX ADMIN — HYDRALAZINE HYDROCHLORIDE 50 MG: 50 TABLET ORAL at 08:07

## 2021-01-01 RX ADMIN — FUROSEMIDE 80 MG: 80 TABLET ORAL at 10:07

## 2021-01-01 RX ADMIN — FUROSEMIDE 80 MG: 10 INJECTION, SOLUTION INTRAMUSCULAR; INTRAVENOUS at 02:07

## 2021-01-01 RX ADMIN — LACOSAMIDE 200 MG: 50 TABLET, FILM COATED ORAL at 12:03

## 2021-01-01 RX ADMIN — FUROSEMIDE 30 MG/HR: 10 INJECTION, SOLUTION INTRAMUSCULAR; INTRAVENOUS at 09:07

## 2021-01-01 RX ADMIN — HYDRALAZINE HYDROCHLORIDE AND ISOSORBIDE DINITRATE 1 TABLET: 37.5; 2 TABLET, FILM COATED ORAL at 01:03

## 2021-01-01 RX ADMIN — FERROUS SULFATE TAB EC 325 MG (65 MG FE EQUIVALENT) 325 MG: 325 (65 FE) TABLET DELAYED RESPONSE at 08:04

## 2021-01-01 RX ADMIN — FUROSEMIDE 20 MG/HR: 10 INJECTION, SOLUTION INTRAMUSCULAR; INTRAVENOUS at 11:07

## 2021-01-01 RX ADMIN — APIXABAN 5 MG: 5 TABLET, FILM COATED ORAL at 10:07

## 2021-01-01 RX ADMIN — TICAGRELOR 90 MG: 90 TABLET ORAL at 10:03

## 2021-01-01 RX ADMIN — CEPHALEXIN 500 MG: 500 CAPSULE ORAL at 09:07

## 2021-01-01 RX ADMIN — FUROSEMIDE 80 MG: 80 TABLET ORAL at 04:07

## 2021-01-01 RX ADMIN — ASPIRIN 81 MG CHEWABLE TABLET 81 MG: 81 TABLET CHEWABLE at 08:03

## 2021-01-01 RX ADMIN — HYDRALAZINE HYDROCHLORIDE 100 MG: 50 TABLET, FILM COATED ORAL at 10:08

## 2021-01-01 RX ADMIN — EPINEPHRINE 1 MG: 0.1 INJECTION, SOLUTION ENDOTRACHEAL; INTRACARDIAC; INTRAVENOUS at 12:08

## 2021-01-01 RX ADMIN — HYDRALAZINE HYDROCHLORIDE 100 MG: 50 TABLET, FILM COATED ORAL at 02:08

## 2021-01-01 RX ADMIN — SEVELAMER CARBONATE 800 MG: 800 TABLET, FILM COATED ORAL at 11:08

## 2021-01-01 RX ADMIN — FUROSEMIDE 120 MG: 10 INJECTION, SOLUTION INTRAMUSCULAR; INTRAVENOUS at 06:03

## 2021-01-01 RX ADMIN — METOLAZONE 10 MG: 5 TABLET ORAL at 09:08

## 2021-01-01 RX ADMIN — SODIUM CHLORIDE: 0.9 INJECTION, SOLUTION INTRAVENOUS at 03:07

## 2021-01-01 RX ADMIN — CARVEDILOL 12.5 MG: 12.5 TABLET, FILM COATED ORAL at 08:04

## 2021-01-01 RX ADMIN — FUROSEMIDE 80 MG: 80 TABLET ORAL at 05:07

## 2021-01-01 RX ADMIN — ISOSORBIDE MONONITRATE 60 MG: 60 TABLET, EXTENDED RELEASE ORAL at 08:08

## 2021-01-01 RX ADMIN — AMPICILLIN 500 MG: 500 CAPSULE ORAL at 11:07

## 2021-01-01 RX ADMIN — FUROSEMIDE 120 MG: 10 INJECTION, SOLUTION INTRAMUSCULAR; INTRAVENOUS at 07:03

## 2021-01-01 RX ADMIN — ATORVASTATIN CALCIUM 40 MG: 20 TABLET, FILM COATED ORAL at 08:04

## 2021-01-01 RX ADMIN — SEVELAMER CARBONATE 800 MG: 800 TABLET, FILM COATED ORAL at 04:08

## 2021-01-01 RX ADMIN — LEVETIRACETAM 1500 MG: 500 TABLET, FILM COATED ORAL at 09:04

## 2021-01-01 RX ADMIN — LEVETIRACETAM 1000 MG: 500 TABLET ORAL at 08:07

## 2021-01-01 RX ADMIN — LEVOTHYROXINE SODIUM 200 MCG: 0.2 TABLET ORAL at 06:04

## 2021-01-01 RX ADMIN — INSULIN ASPART 2 UNITS: 100 INJECTION, SOLUTION INTRAVENOUS; SUBCUTANEOUS at 11:07

## 2021-01-01 RX ADMIN — HEPARIN SODIUM 5000 UNITS: 5000 INJECTION INTRAVENOUS; SUBCUTANEOUS at 03:03

## 2021-01-01 RX ADMIN — FUROSEMIDE 20 MG/HR: 10 INJECTION, SOLUTION INTRAMUSCULAR; INTRAVENOUS at 09:07

## 2021-01-01 RX ADMIN — ISOSORBIDE MONONITRATE 30 MG: 30 TABLET, EXTENDED RELEASE ORAL at 10:07

## 2021-01-01 RX ADMIN — FUROSEMIDE 30 MG/HR: 10 INJECTION, SOLUTION INTRAMUSCULAR; INTRAVENOUS at 01:07

## 2021-01-01 RX ADMIN — ATORVASTATIN CALCIUM 40 MG: 20 TABLET, FILM COATED ORAL at 09:08

## 2021-01-01 RX ADMIN — NIFEDIPINE 90 MG: 30 TABLET, FILM COATED, EXTENDED RELEASE ORAL at 09:03

## 2021-01-01 RX ADMIN — METHOCARBAMOL 1000 MG: 500 TABLET ORAL at 09:04

## 2021-01-01 RX ADMIN — ACETAMINOPHEN 1000 MG: 500 TABLET ORAL at 07:08

## 2021-01-01 RX ADMIN — HYDRALAZINE HYDROCHLORIDE 50 MG: 50 TABLET ORAL at 03:07

## 2021-01-01 RX ADMIN — HEPARIN SODIUM 5000 UNITS: 5000 INJECTION INTRAVENOUS; SUBCUTANEOUS at 09:03

## 2021-01-01 RX ADMIN — FUROSEMIDE 30 MG/HR: 10 INJECTION, SOLUTION INTRAMUSCULAR; INTRAVENOUS at 10:07

## 2021-01-01 RX ADMIN — CALCITRIOL CAPSULES 0.25 MCG 0.25 MCG: 0.25 CAPSULE ORAL at 10:07

## 2021-01-01 RX ADMIN — CALCITRIOL CAPSULES 0.25 MCG 0.25 MCG: 0.25 CAPSULE ORAL at 08:08

## 2021-01-01 RX ADMIN — SODIUM CHLORIDE 250 ML: 0.9 INJECTION, SOLUTION INTRAVENOUS at 09:08

## 2021-01-01 RX ADMIN — INSULIN ASPART 2 UNITS: 100 INJECTION, SOLUTION INTRAVENOUS; SUBCUTANEOUS at 05:07

## 2021-01-01 RX ADMIN — CHOLECALCIFEROL (VITAMIN D3) 25 MCG (1,000 UNIT) TABLET 1000 UNITS: TABLET at 12:08

## 2021-01-01 RX ADMIN — SODIUM CHLORIDE, SODIUM LACTATE, POTASSIUM CHLORIDE, AND CALCIUM CHLORIDE: .6; .31; .03; .02 INJECTION, SOLUTION INTRAVENOUS at 09:03

## 2021-01-01 RX ADMIN — ASPIRIN 81 MG CHEWABLE TABLET 81 MG: 81 TABLET CHEWABLE at 10:03

## 2021-01-01 RX ADMIN — NIFEDIPINE 90 MG: 60 TABLET, FILM COATED, EXTENDED RELEASE ORAL at 10:07

## 2021-01-01 RX ADMIN — FUROSEMIDE 20 MG/HR: 10 INJECTION, SOLUTION INTRAMUSCULAR; INTRAVENOUS at 12:07

## 2021-01-01 RX ADMIN — CHLOROTHIAZIDE SODIUM 250 MG: 500 INJECTION, POWDER, LYOPHILIZED, FOR SOLUTION INTRAVENOUS at 01:07

## 2021-01-01 RX ADMIN — LEVOTHYROXINE SODIUM 200 MCG: 100 TABLET ORAL at 05:08

## 2021-01-01 RX ADMIN — SEVELAMER CARBONATE 800 MG: 800 TABLET, FILM COATED ORAL at 10:07

## 2021-01-01 RX ADMIN — ISOSORBIDE MONONITRATE 30 MG: 30 TABLET, EXTENDED RELEASE ORAL at 01:07

## 2021-01-01 RX ADMIN — AMLODIPINE BESYLATE 10 MG: 10 TABLET ORAL at 10:03

## 2021-01-01 RX ADMIN — FERROUS SULFATE TAB EC 325 MG (65 MG FE EQUIVALENT) 325 MG: 325 (65 FE) TABLET DELAYED RESPONSE at 10:03

## 2021-01-01 RX ADMIN — POTASSIUM CHLORIDE 40 MEQ: 1500 TABLET, EXTENDED RELEASE ORAL at 06:07

## 2021-01-01 RX ADMIN — TICAGRELOR 90 MG: 90 TABLET ORAL at 09:07

## 2021-01-01 RX ADMIN — ASPIRIN 81 MG CHEWABLE TABLET 81 MG: 81 TABLET CHEWABLE at 10:07

## 2021-01-01 RX ADMIN — POTASSIUM CHLORIDE 40 MEQ: 1500 TABLET, EXTENDED RELEASE ORAL at 11:07

## 2021-01-01 RX ADMIN — ALBUMIN (HUMAN) 12.5 G: 12.5 SOLUTION INTRAVENOUS at 02:07

## 2021-01-01 RX ADMIN — FUROSEMIDE 80 MG: 80 TABLET ORAL at 03:07

## 2021-01-01 RX ADMIN — FUROSEMIDE 30 MG/HR: 10 INJECTION, SOLUTION INTRAMUSCULAR; INTRAVENOUS at 03:07

## 2021-01-01 RX ADMIN — FENTANYL CITRATE 50 MCG: 50 INJECTION, SOLUTION INTRAMUSCULAR; INTRAVENOUS at 06:07

## 2021-01-01 RX ADMIN — SODIUM CHLORIDE: 0.9 INJECTION, SOLUTION INTRAVENOUS at 11:02

## 2021-01-01 RX ADMIN — HYDRALAZINE HYDROCHLORIDE 100 MG: 50 TABLET, FILM COATED ORAL at 05:08

## 2021-01-01 RX ADMIN — ACETAMINOPHEN 650 MG: 325 TABLET ORAL at 10:03

## 2021-01-01 RX ADMIN — DOCUSATE SODIUM 50 MG AND SENNOSIDES 8.6 MG 1 TABLET: 8.6; 5 TABLET, FILM COATED ORAL at 08:08

## 2021-01-01 RX ADMIN — SODIUM CHLORIDE 100 ML/HR: 0.9 INJECTION, SOLUTION INTRAVENOUS at 03:07

## 2021-01-01 RX ADMIN — MORPHINE SULFATE 15 MG: 15 TABLET ORAL at 10:03

## 2021-01-01 RX ADMIN — BISACODYL 5 MG: 5 TABLET, COATED ORAL at 11:07

## 2021-01-01 RX ADMIN — FUROSEMIDE 30 MG/HR: 10 INJECTION, SOLUTION INTRAMUSCULAR; INTRAVENOUS at 11:07

## 2021-01-01 RX ADMIN — POLYETHYLENE GLYCOL 3350 17 G: 17 POWDER, FOR SOLUTION ORAL at 08:03

## 2021-01-01 RX ADMIN — FERRIC CARBOXYMALTOSE INJECTION 750 MG: 50 INJECTION, SOLUTION INTRAVENOUS at 11:02

## 2021-01-01 RX ADMIN — FUROSEMIDE 80 MG: 80 TABLET ORAL at 02:07

## 2021-01-01 RX ADMIN — AMPICILLIN 500 MG: 500 CAPSULE ORAL at 04:07

## 2021-01-01 RX ADMIN — NITROGLYCERIN 2 INCH: 20 OINTMENT TOPICAL at 06:03

## 2021-01-01 RX ADMIN — NITROGLYCERIN 2 INCH: 20 OINTMENT TOPICAL at 09:03

## 2021-01-01 RX ADMIN — FUROSEMIDE 80 MG: 80 TABLET ORAL at 01:07

## 2021-01-01 RX ADMIN — FUROSEMIDE 20 MG/HR: 10 INJECTION, SOLUTION INTRAMUSCULAR; INTRAVENOUS at 03:07

## 2021-01-01 RX ADMIN — FUROSEMIDE 80 MG: 80 TABLET ORAL at 03:08

## 2021-01-01 RX ADMIN — FUROSEMIDE 120 MG: 10 INJECTION, SOLUTION INTRAMUSCULAR; INTRAVENOUS at 03:07

## 2021-01-01 RX ADMIN — CEFAZOLIN SODIUM 1 G: 1 SOLUTION INTRAVENOUS at 06:07

## 2021-01-01 RX ADMIN — LEVETIRACETAM 1000 MG: 500 TABLET ORAL at 09:07

## 2021-01-01 RX ADMIN — Medication 1000 UNITS: at 09:04

## 2021-01-01 RX ADMIN — LEVOTHYROXINE SODIUM 200 MCG: 100 TABLET ORAL at 04:07

## 2021-01-01 RX ADMIN — CALCITRIOL CAPSULES 0.25 MCG 0.25 MCG: 0.25 CAPSULE ORAL at 10:03

## 2021-01-01 RX ADMIN — CALCITRIOL CAPSULES 0.25 MCG 0.25 MCG: 0.25 CAPSULE ORAL at 01:07

## 2021-01-01 RX ADMIN — APIXABAN 5 MG: 5 TABLET, FILM COATED ORAL at 10:08

## 2021-01-01 RX ADMIN — ACETAMINOPHEN 1000 MG: 500 TABLET ORAL at 04:03

## 2021-01-01 RX ADMIN — CARVEDILOL 12.5 MG: 12.5 TABLET, FILM COATED ORAL at 01:07

## 2021-01-01 RX ADMIN — HYDRALAZINE HYDROCHLORIDE 100 MG: 50 TABLET, FILM COATED ORAL at 10:07

## 2021-01-01 RX ADMIN — ACETAMINOPHEN 650 MG: 325 TABLET ORAL at 09:03

## 2021-01-01 RX ADMIN — INSULIN ASPART 3 UNITS: 100 INJECTION, SOLUTION INTRAVENOUS; SUBCUTANEOUS at 01:04

## 2021-01-01 RX ADMIN — FUROSEMIDE 80 MG: 80 TABLET ORAL at 10:08

## 2021-01-01 RX ADMIN — FUROSEMIDE 30 MG/HR: 10 INJECTION, SOLUTION INTRAMUSCULAR; INTRAVENOUS at 07:07

## 2021-01-01 RX ADMIN — PANTOPRAZOLE SODIUM 40 MG: 40 INJECTION, POWDER, LYOPHILIZED, FOR SOLUTION INTRAVENOUS at 09:08

## 2021-01-01 RX ADMIN — INSULIN ASPART 1 UNITS: 100 INJECTION, SOLUTION INTRAVENOUS; SUBCUTANEOUS at 08:03

## 2021-01-01 RX ADMIN — HYDRALAZINE HYDROCHLORIDE 50 MG: 50 TABLET, FILM COATED ORAL at 09:07

## 2021-01-01 RX ADMIN — LEVETIRACETAM 1000 MG: 500 TABLET ORAL at 05:07

## 2021-01-01 RX ADMIN — HEPARIN SODIUM 1000 UNITS: 1000 INJECTION INTRAVENOUS; SUBCUTANEOUS at 12:08

## 2021-01-01 RX ADMIN — NIFEDIPINE 90 MG: 60 TABLET, FILM COATED, EXTENDED RELEASE ORAL at 01:07

## 2021-01-01 RX ADMIN — LEVETIRACETAM 500 MG: 250 TABLET ORAL at 09:07

## 2021-01-01 RX ADMIN — HYDRALAZINE HYDROCHLORIDE 25 MG: 25 TABLET ORAL at 12:07

## 2021-01-01 RX ADMIN — FUROSEMIDE 30 MG/HR: 10 INJECTION, SOLUTION INTRAMUSCULAR; INTRAVENOUS at 05:07

## 2021-01-01 ASSESSMENT — ROUTINE ASSESSMENT OF PATIENT INDEX DATA (RAPID3)
TOTAL RAPID3 SCORE: 3.28
FATIGUE SCORE: 0
AM STIFFNESS SCORE: 0, NO
MDHAQ FUNCTION SCORE: 1.3
PAIN SCORE: 0
PSYCHOLOGICAL DISTRESS SCORE: 0
PATIENT GLOBAL ASSESSMENT SCORE: 5.5

## 2021-01-07 PROBLEM — E83.39 HYPERPHOSPHATEMIA: Status: ACTIVE | Noted: 2021-01-01

## 2021-02-06 PROBLEM — N18.4 ANEMIA IN STAGE 4 CHRONIC KIDNEY DISEASE: Status: ACTIVE | Noted: 2021-01-01

## 2021-02-06 PROBLEM — D63.1 ANEMIA IN STAGE 4 CHRONIC KIDNEY DISEASE: Status: ACTIVE | Noted: 2021-01-01

## 2021-02-06 PROBLEM — D50.9 IRON DEFICIENCY ANEMIA: Status: ACTIVE | Noted: 2021-01-01

## 2021-03-21 PROBLEM — I50.33 ACUTE ON CHRONIC DIASTOLIC CONGESTIVE HEART FAILURE: Status: ACTIVE | Noted: 2021-01-01

## 2021-03-27 PROBLEM — I50.33 ACUTE ON CHRONIC DIASTOLIC CONGESTIVE HEART FAILURE: Status: RESOLVED | Noted: 2021-01-01 | Resolved: 2021-01-01

## 2021-04-05 PROBLEM — R31.0 GROSS HEMATURIA: Status: ACTIVE | Noted: 2021-01-01

## 2021-04-05 PROBLEM — Z85.850 HISTORY OF THYROID CANCER: Status: RESOLVED | Noted: 2017-01-13 | Resolved: 2021-01-01

## 2021-04-05 PROBLEM — N18.4 CKD (CHRONIC KIDNEY DISEASE), STAGE IV: Status: ACTIVE | Noted: 2021-01-01

## 2021-04-15 PROBLEM — N17.9 ACUTE RENAL INJURY: Status: RESOLVED | Noted: 2020-01-01 | Resolved: 2021-01-01

## 2021-04-15 PROBLEM — H43.11 VITREOUS HEMORRHAGE OF RIGHT EYE: Status: RESOLVED | Noted: 2018-10-07 | Resolved: 2021-01-01

## 2021-04-18 PROBLEM — N18.30 CHRONIC KIDNEY DISEASE, STAGE III (MODERATE): Status: RESOLVED | Noted: 2018-06-28 | Resolved: 2021-01-01

## 2021-05-12 PROBLEM — I34.0 MITRAL VALVE INSUFFICIENCY: Status: ACTIVE | Noted: 2021-01-01

## 2021-05-12 PROBLEM — R06.09 DOE (DYSPNEA ON EXERTION): Status: ACTIVE | Noted: 2021-01-01

## 2021-05-12 PROBLEM — I35.1 AORTIC VALVE REGURGITATION: Status: ACTIVE | Noted: 2021-01-01

## 2021-05-24 PROBLEM — I15.2 HYPERTENSION ASSOCIATED WITH DIABETES: Status: ACTIVE | Noted: 2017-08-30

## 2021-05-24 PROBLEM — E11.59 HYPERTENSION ASSOCIATED WITH DIABETES: Status: ACTIVE | Noted: 2017-08-30

## 2021-05-29 PROBLEM — E87.70 HYPERVOLEMIA: Status: ACTIVE | Noted: 2021-01-01

## 2021-07-01 PROBLEM — I74.4: Status: ACTIVE | Noted: 2021-01-01

## 2021-07-08 PROBLEM — R06.09 DOE (DYSPNEA ON EXERTION): Status: RESOLVED | Noted: 2021-01-01 | Resolved: 2021-01-01

## 2021-07-08 PROBLEM — R06.02 SOB (SHORTNESS OF BREATH): Status: ACTIVE | Noted: 2021-01-01

## 2021-07-08 PROBLEM — I67.1 CEREBRAL ANEURYSM WITHOUT RUPTURE: Chronic | Status: ACTIVE | Noted: 2020-01-01

## 2021-07-08 PROBLEM — I15.2 HYPERTENSION ASSOCIATED WITH DIABETES: Chronic | Status: ACTIVE | Noted: 2017-08-30

## 2021-07-08 PROBLEM — E87.70 HYPERVOLEMIA: Status: RESOLVED | Noted: 2021-01-01 | Resolved: 2021-01-01

## 2021-07-08 PROBLEM — N40.0 BENIGN PROSTATIC HYPERPLASIA WITHOUT LOWER URINARY TRACT SYMPTOMS: Chronic | Status: ACTIVE | Noted: 2017-09-14

## 2021-07-08 PROBLEM — Z86.718 HISTORY OF DVT (DEEP VEIN THROMBOSIS): Chronic | Status: ACTIVE | Noted: 2020-01-01

## 2021-07-08 PROBLEM — A49.9 BACTERIAL UTI: Status: ACTIVE | Noted: 2021-01-01

## 2021-07-08 PROBLEM — I65.23 BILATERAL CAROTID ARTERY STENOSIS: Chronic | Status: ACTIVE | Noted: 2019-09-26

## 2021-07-08 PROBLEM — I50.33 ACUTE ON CHRONIC HEART FAILURE WITH PRESERVED EJECTION FRACTION: Status: ACTIVE | Noted: 2018-04-27

## 2021-07-08 PROBLEM — N39.0 BACTERIAL UTI: Status: ACTIVE | Noted: 2021-01-01

## 2021-07-08 PROBLEM — H35.033 HYPERTENSIVE RETINOPATHY, BILATERAL: Chronic | Status: ACTIVE | Noted: 2018-10-16

## 2021-07-08 PROBLEM — E11.59 HYPERTENSION ASSOCIATED WITH DIABETES: Chronic | Status: ACTIVE | Noted: 2017-08-30

## 2021-07-08 PROBLEM — G40.209 COMPLEX PARTIAL SEIZURES EVOLVING TO GENERALIZED TONIC-CLONIC SEIZURES: Chronic | Status: ACTIVE | Noted: 2017-11-15

## 2021-07-08 PROBLEM — I48.20 CHRONIC ATRIAL FIBRILLATION: Chronic | Status: ACTIVE | Noted: 2019-09-25

## 2021-07-08 PROBLEM — Z95.828 S/P IVC FILTER: Chronic | Status: ACTIVE | Noted: 2020-01-01

## 2021-07-08 PROBLEM — N18.4 CKD (CHRONIC KIDNEY DISEASE) STAGE 4, GFR 15-29 ML/MIN: Chronic | Status: ACTIVE | Noted: 2021-01-01

## 2021-07-13 PROBLEM — R31.0 GROSS HEMATURIA: Status: RESOLVED | Noted: 2021-01-01 | Resolved: 2021-01-01

## 2021-07-13 PROBLEM — B95.2 ENTEROCOCCUS UTI: Status: ACTIVE | Noted: 2021-01-01

## 2021-07-13 PROBLEM — N17.9 ACUTE RENAL FAILURE SUPERIMPOSED ON STAGE 4 CHRONIC KIDNEY DISEASE: Status: ACTIVE | Noted: 2021-01-01

## 2021-07-20 PROBLEM — B95.2 ENTEROCOCCUS UTI: Status: RESOLVED | Noted: 2021-01-01 | Resolved: 2021-01-01

## 2021-07-20 PROBLEM — N39.0 ENTEROCOCCUS UTI: Status: RESOLVED | Noted: 2021-01-01 | Resolved: 2021-01-01

## 2021-07-30 PROBLEM — R06.02 SHORTNESS OF BREATH: Status: ACTIVE | Noted: 2021-01-01

## 2021-08-06 PROBLEM — J69.0 ASPIRATION PNEUMONIA: Status: ACTIVE | Noted: 2021-01-01

## 2021-08-06 PROBLEM — K92.2 ACUTE UPPER GI BLEED: Status: ACTIVE | Noted: 2021-01-01

## 2021-08-07 LAB — LEVETIRACETAM SERPL-MCNC: 34.1 UG/ML (ref 3–60)

## 2021-08-09 ENCOUNTER — TELEPHONE (OUTPATIENT)
Dept: PRIMARY CARE CLINIC | Facility: CLINIC | Age: 66
End: 2021-08-09

## 2022-07-26 NOTE — TELEPHONE ENCOUNTER
Please refer to attached ultrasound report for doctor's evaluation of the clinical information obtained by vital signs, ultrasound, and/or non-stress test along with management recommendation. Spoke with pt's wife. Letter is printed and waiting for to pick it up.

## 2023-01-23 NOTE — PROGRESS NOTES
Mr. Christy is a patient of Dr. Camacho and was last seen in [facility] 9/25/2019.      Subjective:   Patient ID:  Alfa Christy III is a 64 y.o. male who presents for follow-up of Chronic atrial fibrillation (3 months fu)    Problems:  HFpEF (EF 60%)   paroxysmal atrial fibrillation   CVA (stroke January 2016; episode of aphasia 10/2017 with no signs of acute stroke)   Carotid artery disease, <50% bilaterally in 2016  HTN   HLD  DM II    HPI  Mr. Christy is in clinic today for hospital follow up.  He was admitted overnight and received 3 doses of lasix.  They did not adjust his home diuretic regimen.  He is going about 5-6 times a day to urinate.  Reports eating a low salt diet.  Patient denies chest pain with exertion or at rest, palpitations, SOB, AYALA, dizziness, syncope, edema, orthopnea, PND, or claudication.  Reports routine exercise, walking and resistance training 4-6 days a week for about an hour each time.  His blood pressures at home are running mostly in the 140-160s.     Review of Systems   Constitution: Negative for decreased appetite, diaphoresis, malaise/fatigue, weight gain and weight loss.   Eyes: Negative for visual disturbance.   Cardiovascular: Positive for leg swelling. Negative for chest pain, claudication, dyspnea on exertion, irregular heartbeat, near-syncope, orthopnea, palpitations, paroxysmal nocturnal dyspnea and syncope.        Denies chest pressure   Respiratory: Negative for cough, hemoptysis, shortness of breath, sleep disturbances due to breathing and snoring.    Endocrine: Negative for cold intolerance and heat intolerance.   Hematologic/Lymphatic: Negative for bleeding problem. Does not bruise/bleed easily.   Musculoskeletal: Negative for myalgias.   Gastrointestinal: Negative for bloating, abdominal pain, anorexia, change in bowel habit, constipation, diarrhea, nausea and vomiting.   Neurological: Negative for difficulty with concentration, disturbances in coordination,  Update History & Physical    The Patient's History and Physical of January 19, 2023 was reviewed with the patient and I examined the patient. There was no change. The surgical site was confirmed by the patient and me. Plan:  The risk, benefits, expected outcome, and alternative to the recommended procedure have been discussed with the patient. Patient understands and wants to proceed with the procedure.     Electronically signed by Briana Hooker MD on 1/23/2023 at 7:12 AM excessive daytime sleepiness, dizziness, headaches, light-headedness, loss of balance, numbness and weakness.   Psychiatric/Behavioral: The patient does not have insomnia.        Allergies and current medications updated and reviewed:  Review of patient's allergies indicates:   Allergen Reactions    Cough syrup [guaifenesin] Other (See Comments)     weakness     Current Outpatient Medications   Medication Sig    acetaminophen (TYLENOL) 500 MG tablet Take 1 tablet (500 mg total) by mouth every 6 (six) hours as needed for Pain.    amLODIPine (NORVASC) 5 MG tablet Take 1 tablet (5 mg total) by mouth once daily.    aspirin 81 MG Chew Take 1 tablet (81 mg total) by mouth once daily.    atorvastatin (LIPITOR) 40 MG tablet Take 1 tablet (40 mg total) by mouth once daily.    betamethasone dipropionate (DIPROLENE) 0.05 % cream Apply topically 2 (two) times daily.    blood sugar diagnostic (ONETOUCH VERIO) Strp Check blood glucose readings 3 times a day.    blood sugar diagnostic Strp To check BG 3 times daily, to use with insurance preferred meter    blood-glucose meter kit To check BG 3 times daily - please provide insurance preferred meter    carvedilol (COREG) 6.25 MG tablet Take 1 tablet (6.25 mg total) by mouth 2 (two) times daily.    ciclopirox (PENLAC) 8 % Soln Apply topically nightly.    clotrimazole-betamethasone 1-0.05% (LOTRISONE) cream Apply topically 2 (two) times daily. To area of rash for 2-4 weeks    ELIQUIS 5 mg Tab TAKE 1 TABLET BY MOUTH TWICE A DAY    fluocinonide (LIDEX) 0.05 % ointment AAA bid    folic acid (FOLVITE) 400 MCG tablet Take 1,000 mcg by mouth once daily.     furosemide (LASIX) 40 MG tablet Take 1.5 tablets (60 mg total) by mouth 2 (two) times daily.    GLUCAGON EMERGENCY KIT, HUMAN, 1 mg injection INJECT 1 MG INTO THE MUSCLE AS NEEDED    insulin aspart U-100 (NOVOLOG FLEXPEN U-100 INSULIN) 100 unit/mL (3 mL) InPn pen INJECT 7 UNITS WITH MEALS PLUS SLIDING SCALE WITH MAX OF  "35 UNITS PER DAY    insulin detemir U-100 (LEVEMIR FLEXTOUCH U-100 INSULN) 100 unit/mL (3 mL) SubQ InPn pen Inject 13 units at night.    lacosamide (VIMPAT) 200 mg Tab tablet Take 1 tablet (200 mg total) by mouth every 12 (twelve) hours.    lancets Misc To check BG 3 times daily, to use with insurance preferred meter    lancing device Misc 1 Device by Misc.(Non-Drug; Combo Route) route 3 (three) times daily.    levETIRAcetam (KEPPRA) 750 MG Tab Take 2 tablets (1,500 mg total) by mouth 2 (two) times daily.    levothyroxine (SYNTHROID) 200 MCG tablet TAKE 1 TABLET BY MOUTH EVERY DAY BEFORE BREAKFAST    levothyroxine (SYNTHROID) 50 MCG tablet Take 1 tablet (50 mcg total) by mouth every 7 days. Pt will be taking only on Monday.    magnesium 30 mg Tab Take 1 tablet by mouth twice a week. Mon ,,    multivitamin capsule Take 1 capsule by mouth every Mon, Wed, Fri.     pen needle, diabetic (BD ULTRA-FINE MINI PEN NEEDLE) 31 gauge x 3/16" Ndle To use with insulin pens 4  times daily    SAW PALMETTO ORAL Take by mouth. mon , wed, fri    sildenafil (VIAGRA) 100 MG tablet Take 1/2 to 1 tablet daily as needed.  Take on an empty stomach or with a light meal.    tamsulosin (FLOMAX) 0.4 mg Cap TAKE 1 CAPSULE (0.4 MG TOTAL) BY MOUTH EVERY EVENING.    TRULICITY 0.75 mg/0.5 mL PnIj INJECT 1 PEN UNDER THE SKIN EVERY 7 DAYS    valsartan (DIOVAN) 320 MG tablet Take 1 tablet (320 mg total) by mouth once daily.    vitamin D 1000 units Tab Take 1,000 Units by mouth every Mon, Wed, Fri.    flu vacc [id number] up,PF (FLUZONE HIGH-DOSE , PF,) 180 mcg/0.5 mL Syrg Inject 0.5 ml into the muscle once for 1 dose. (Patient not taking: Reported on 2020)     No current facility-administered medications for this visit.        Objective:     Right Arm BP - Sittin/73 (20 1337)  Left Arm BP - Sittin/74 (20 1337)    BP (!) 171/74 (BP Location: Left arm, Patient Position: Sitting, BP " "Method: X-Large (Automatic))   Pulse 72   Ht 5' 10" (1.778 m)   Wt 103.8 kg (228 lb 13.4 oz)   SpO2 97%   BMI 32.83 kg/m²       Physical Exam   Constitutional: He is oriented to person, place, and time. Vital signs are normal. He appears well-developed and well-nourished. He is active. No distress.   HENT:   Head: Normocephalic and atraumatic.   Eyes: Conjunctivae and lids are normal. No scleral icterus.   Neck: Neck supple. JVD present. Normal carotid pulses and no hepatojugular reflux present. Carotid bruit is not present.   Cardiovascular: Normal rate, regular rhythm, S1 normal, S2 normal and intact distal pulses. PMI is not displaced. Exam reveals no gallop and no friction rub.   No murmur heard.  Pulses:       Carotid pulses are 2+ on the right side, and 2+ on the left side.       Radial pulses are 2+ on the right side, and 2+ on the left side.        Dorsalis pedis pulses are 2+ on the right side, and 2+ on the left side.        Posterior tibial pulses are 1+ on the right side, and 1+ on the left side.   Pulmonary/Chest: Effort normal and breath sounds normal. No respiratory distress. He has no decreased breath sounds. He has no wheezes. He has no rhonchi. He has no rales. He exhibits no tenderness.   Abdominal: Soft. Normal appearance and bowel sounds are normal. He exhibits no distension, no fluid wave, no abdominal bruit, no ascites and no pulsatile midline mass. There is no hepatosplenomegaly. There is no tenderness.   Musculoskeletal: He exhibits edema (BLE +2 pitting to knees).   Neurological: He is alert and oriented to person, place, and time. Gait normal.   Skin: Skin is warm, dry and intact. No rash noted. He is not diaphoretic. Nails show no clubbing.   Psychiatric: He has a normal mood and affect. His speech is normal and behavior is normal. Judgment and thought content normal. Cognition and memory are normal.   Nursing note and vitals reviewed.      Chemistry        Component Value Date/Time "     01/31/2020 1442    K 4.6 01/31/2020 1442     01/31/2020 1442    CO2 22 (L) 01/31/2020 1442    BUN 30 (H) 01/31/2020 1442    CREATININE 2.2 (H) 01/31/2020 1442     (H) 01/31/2020 1442        Component Value Date/Time    CALCIUM 8.3 (L) 01/31/2020 1442    ALKPHOS 119 10/11/2019 0912    AST 28 10/11/2019 0912    ALT 22 10/11/2019 0912    BILITOT 0.8 10/11/2019 0912    ESTGFRAFRICA 34.8 (A) 01/31/2020 1442    EGFRNONAA 30.1 (A) 01/31/2020 1442        Lab Results   Component Value Date    HGBA1C 5.1 10/11/2019     Recent Labs   Lab 06/27/18  2021  10/03/19  1045  10/11/19  0912 01/31/20  1442   WBC 11.82   < > 8.67   < > 7.06  --    Hemoglobin 10.2 L   < > 11.6 L   < > 11.5 L  --    Hematocrit 28.3 L   < > 32.7 L   < > 33.8 L  --    Mean Corpuscular Volume 77 L   < > 79 L   < > 83  --    Platelets 247   < > 253   < > 249  --     H  --  426 H  --   --  479 H   TSH  --    < >  --   --  2.703  --    Cholesterol  --    < >  --   --  114 L  --    HDL  --    < >  --   --  36 L  --    LDL Cholesterol  --    < >  --   --  62.0 L  --    Triglycerides  --    < >  --   --  80  --    Hdl/Cholesterol Ratio  --    < >  --   --  31.6  --     < > = values in this interval not displayed.       Recent Labs   Lab 10/04/17  2118 03/23/18  1317 04/26/18  2355 05/31/18  1248   INR 1.0 1.0 1.0 1.0        Test(s) Reviewed  I have reviewed the following in detail:  [] Stress test   [] Angiography   [x] Echocardiogram   [x] Labs   [] Other:         Assessment/Plan:   1. Chronic diastolic heart failure  Mildy hypervolemic by exam.  Will increase his furosemide to 80 mg BID.  Repeat bmp and BNP today and BMP in 1-2 weeks.     - Basic metabolic panel; Future  - Brain natriuretic peptide; Future  - furosemide (LASIX) 80 MG tablet; Take 1 tablet (80 mg total) by mouth 2 (two) times daily.  Dispense: 110 tablet; Refill: 3    2. Essential hypertension  BP not at goal <130/80. Increase carvedilol to 12.5 mg BID.  Requested 2  week bp log.       3. Bilateral carotid artery stenosis  No bruit on exam.     4. Hypercholesterolemia  LDL at goal <70. No changes      5. Chronic atrial fibrillation    - carvediloL (COREG) 12.5 MG tablet; Take 1 tablet (12.5 mg total) by mouth 2 (two) times daily.  Dispense: 180 tablet; Refill: 3    6. Chronic anticoagulation - eliquis      7. Chronic kidney disease, stage III (moderate)      8. History of CVA (cerebrovascular accident)      9. History of thyroid cancer      10. Hypertensive kidney disease with stage 3 chronic kidney disease      11. Obesity (BMI 30.0-34.9)  BMI 32.8 Encouraged increased CV exercise to 30 minutes a day for 5 days a week.       12. Type 2 diabetes mellitus with stage 3 chronic kidney disease, with long-term current use of insulin  A1C at goal <7. F/U with PCP as planned       The patient was discussed and examined by Dr. Camacho    Follow up in about 3 months (around 4/30/2020).

## 2023-08-14 NOTE — DISCHARGE SUMMARY
"[facility] Hospital Medicine  Discharge Summary      Patient Name: Alfa Christy III  MRN: [id number]  Admission Date: 3/23/2018  Hospital Length of Stay: 0 days  Discharge Date and Time: No discharge date for patient encounter.  Attending Physician: Bailee Drummond MD   Discharging Provider: Dorothy Parks PA-C  Primary Care Provider: Tyler Jasmine MD  Hospital Medicine Team: [facility] MED F Dorothy Parks PA-C    HPI:   Mr. Christy is a 63 yo male with a PMHx of HTN, HLD, DM2, PAF on Eliquis, HFpEF, CKDIII, thyroid cancer s/p surgery and radiation (2009), previous L sided CVA (2016) resulting in R homonymous hemianopsia, and complex partial seizures presenting to the ED with wife at bedside c/o confusion and difficulty speaking. Wife at bedside to provide the history. She states this morning at ~8am patient was getting dressed and was c/o minor dizziness. She noted he had not taken his keppra from the previous night, in addition to all of his "night time medications." Pt was able to take all morning medications. At around 10am, pt was sitting down to "go over his finances and pay bills", she noted something was "off". She asked him his name, which he could provide for her but he could not remember the word for "shoes" when she asked him what he was wearing on his feet. She did not witness convulsions, jerking movements, LOC, tongue biting, or incontinence. Wife states patient was able to ambulate and continue to go about "normal" activities. Wife brought patient to the ED due to concern for aphasia. Of note, wife reports pt is non-compliant with hydralazine and ASA. Per wife, this was a similar presentation to his last admission for asphasia suspected to be secondary to seizures.     In the ED, HDS; noted to be hypertensive to 190s/90s. Stroke code was called; CTH and MRI without evidence of acute infarct. Vascular neurology assessed; low suspicion for acute CVA. Remainder of labs and imaging " were unremarkable.    * No surgery found *      Hospital Course:   Pt admitted to observation for aphasia in setting of hx of CVA and seizures. CTH and MRI negative for acute infarct. Labs entirely unremarkable (TSH and lactate WNL). No s/s infection; afebrile without leukocytosis (CXR and UA without infection). Neurology consulted and suspects breakthrough seizure with prolonged post-ictus. EEG shows slowing consistent with hx of L sided CVA; no epileptic activity. Pt discharged home to follow up with epilepsy as outpatient. Educated on importance of compliance with home medications.     Consults:   Consults         Status Ordering Provider     Inpatient consult to Neurology  Once     Provider:  (Not yet assigned)    Completed URIAH PALM     Inpatient consult to Vascular (Stroke) Neurology  Once     Provider:  (Not yet assigned)    Completed POOJA WHITMORE          * Aphasia    - CTH and MRI negative for acute infarct  - HDS; hypertensive on admit; suspect 2/2 noncompliance  - Labs entirely unremarkable (TSH and lactate WNL)  - No s/s infection; afebrile without leukocytosis (CXR and UA without infection)  - Evaluated by [facility] neuro in the ED who have low suspicion for acute infarct; continue home stroke prevention regimen  - Inconsistent findings on exam with fluctuating aphasia  - Similar presentation for admission 10/2017 for aphasia, suspected to be 2/2 seizures (EEG did not capture seizure activity); of note, aphasia sx noted to fluctuate and in setting of financial stressors  - Wife reports missed Keppra PM dose; ?noncompliance issues  3/24: Improved aphasia. Neurology suspects breakthrough seizure with prolonged post-ictus. EEG pending to rule out subtle NCSE and increase LEV.  3/25: EEG shows slowing consistent with hx of L sided CVA; no epileptic activity  - Pt discharged home to follow up with epilepsy as outpatient. Educated on importance of compliance with home medications.        Complex partial  "seizures evolving to generalized tonic-clonic seizures    S/p CVA  - Similar presentation for admission 10/2017 for aphasia; suspect to be 2/2 seizures  - Wife reports missed Keppra PM dose; ?noncompliance issues  - Keppra level pending  - See above        History of CVA (cerebrovascular accident)    - See above  - CTH and MRI negative for acute infarct  - On appropriate stroke prevention regimen  - Wife endorses noncompliance with ASA and "some" antihypertensives  - Educated on importance of medication compliance        Essential hypertension    - Uncontrolled on admit  - Wife endorses pt noncompliant with some medications  - BP improved with resumed home regimen        Type 2 diabetes mellitus with hyperglycemia    - Last A1c 6.0 in 1/2018  - A1c 4.9  - Cardiac/DM diet  - BGs stable during admission        CKD (chronic kidney disease), stage III    Anemia  - Cr 1.8 on admit; improved from baseline (~2.1)  - Hgb 11.8 on admit; consistent with baseline  - Follows with nephrology        Chronic diastolic heart failure    - Stable and chronic  - No s/s exacerbation  - Follows with cardiology as outpatient  - Continue home BB, ARB, and diuretics        Paroxysmal atrial fibrillation    - Afib on admission; rate controlled  - Continue home BB and Eliquis        Hypercholesterolemia    - Continue home ASA and lipitor        Postsurgical hypothyroidism    Hx of thyroid cancer s/p surgery and radiation in 2009  - TSH WNL  - Continue home synthroid           Final Active Diagnoses:    Diagnosis Date Noted POA    PRINCIPAL PROBLEM:  Aphasia [R47.01]  Yes    Complex partial seizures evolving to generalized tonic-clonic seizures [G40.209] 11/15/2017 Yes    History of CVA (cerebrovascular accident) [Z86.73] 07/25/2016 Not Applicable    Essential hypertension [I10] 08/30/2017 Yes    Type 2 diabetes mellitus with hyperglycemia [E11.65] 06/28/2017 Yes    CKD (chronic kidney disease), stage III [N18.3] 09/23/2013 Yes     " Chronic    Chronic diastolic heart failure [I50.32] 11/20/2016 Yes    Paroxysmal atrial fibrillation [I48.0] 09/23/2013 Yes     Chronic    Hypercholesterolemia [E78.00] 08/01/2012 Yes    Postsurgical hypothyroidism [E89.0] 11/27/2013 Yes    Altered mental state [R41.82] 03/24/2018 Yes    Microcytic anemia [D50.9] 11/27/2013 Yes      Problems Resolved During this Admission:    Diagnosis Date Noted Date Resolved POA       Discharged Condition: stable    Disposition: Home or Self Care    Follow Up:  Follow-up Information     Call Keara Santiago MD.    Specialty:  Neurology  Contact information:  [id number]  [facility] [address]  [phone]                 Patient Instructions:     Activity as tolerated     Notify your health care provider if you experience any of the following:  temperature >100.4     Notify your health care provider if you experience any of the following:  persistent dizziness, light-headedness, or visual disturbances     Notify your health care provider if you experience any of the following:  increased confusion or weakness         Significant Diagnostic Studies: Radiology: X-Ray: CXR  MRI: Brain  CT scan: CT Head    Pending Diagnostic Studies:     Procedure Component Value Units Date/Time    Levetiracetam level [[id number]] Collected:  03/23/18 1317    Order Status:  Sent Lab Status:  In process Updated:  03/23/18 1338    Specimen:  Blood from Blood          Medications:  Reconciled Home Medications:   Current Discharge Medication List      CONTINUE these medications which have NOT CHANGED    Details   acetaminophen (TYLENOL) 500 MG tablet Take 1 tablet (500 mg total) by mouth every 6 (six) hours as needed for Pain.  Refills: 0    Associated Diagnoses: Lumbar sprain, initial encounter      amLODIPine (NORVASC) 10 MG tablet Take 1 tablet (10 mg total) by mouth once daily.  Qty: 90 tablet, Refills: 3      aspirin 81 MG Chew Take 1 tablet (81 mg total) by mouth once daily.  Refills: 0     "  atorvastatin (LIPITOR) 40 MG tablet Take 1 tablet (40 mg total) by mouth once daily.  Qty: 90 tablet, Refills: 3    Associated Diagnoses: Hypercholesterolemia      BD INSULIN PEN NEEDLE UF MINI 31 gauge x 3/16" Ndle To use with insulin pens 4  times daily  Qty: 360 each, Refills: 3      carvedilol (COREG) 3.125 MG tablet Take 1 tablet (3.125 mg total) by mouth 2 (two) times daily.  Qty: 60 tablet, Refills: 11      dulaglutide (TRULICITY) 0.75 mg/0.5 mL PnIj Inject 0.5 mLs (0.75 mg total) into the skin every 7 days.  Qty: 4 Syringe, Refills: 6      flash glucose scanning reader (FREESTYLE ANGELLA READER) Misc 1 each by Misc.(Non-Drug; Combo Route) route once daily.  Qty: 1 each, Refills: 0    Associated Diagnoses: Uncontrolled type 2 diabetes mellitus with other specified complication, with long-term current use of insulin      flash glucose sensor (FREESTYLE ANGELLA SENSOR) Kit 3 each by Misc.(Non-Drug; Combo Route) route every 30 days.  Qty: 3 kit, Refills: 11    Associated Diagnoses: Uncontrolled type 2 diabetes mellitus with other specified complication, with long-term current use of insulin      folic acid (FOLVITE) 400 MCG tablet Take 400 mcg by mouth once daily.      furosemide (LASIX) 40 MG tablet Take 1 tablet (40 mg total) by mouth 2 (two) times daily.  Qty: 90 tablet, Refills: 4    Associated Diagnoses: Chronic diastolic heart failure      GLUCAGON EMERGENCY KIT, HUMAN, 1 mg injection INJECT 1 MG INTO THE MUSCLE AS NEEDED  Qty: 1 kit, Refills: 1      hydrALAZINE (APRESOLINE) 25 MG tablet Take 1 tablet (25 mg total) by mouth every 8 (eight) hours.  Qty: 90 tablet, Refills: 11      insulin aspart (NOVOLOG) 100 unit/mL InPn pen Inject 7 units w/ meals plus scale 150-200+1, 201-250+2, 251-300+3, 301-350+4.  Qty: 1 Box, Refills: 6      insulin detemir (LEVEMIR FLEXPEN) 100 unit/mL (3 mL) SubQ InPn pen Inject 14 Units into the skin every evening.  Qty: 3 Box, Refills: 3      levetiracetam (KEPPRA) 750 MG Tab Take " Detail Level: Zone 2 tablets (1,500 mg total) by mouth 2 (two) times daily.  Qty: 120 tablet, Refills: 11      levothyroxine (SYNTHROID) 200 MCG tablet Take 1 tablet (200 mcg total) by mouth before breakfast.  Qty: 90 tablet, Refills: 2    Associated Diagnoses: Postoperative hypothyroidism      losartan (COZAAR) 100 MG tablet Take 1 tablet (100 mg total) by mouth once daily.  Qty: 90 tablet, Refills: 3    Associated Diagnoses: CKD (chronic kidney disease), stage III      magnesium 30 mg Tab Take 1 tablet by mouth twice a week.       multivitamin capsule Take 1 capsule by mouth every Mon, Wed, Fri.       ONETOUCH VERIO Strp USE TO TEST BLOOD SUGAR THREE TIMES DAILY  Qty: 100 strip, Refills: 11      sildenafil (VIAGRA) 100 MG tablet Take 1 tablet (100 mg total) by mouth as needed for Erectile Dysfunction.  Qty: 6 tablet, Refills: 12    Associated Diagnoses: Erectile dysfunction due to arterial insufficiency      vitamin D 1000 units Tab Take 1,000 Units by mouth every Mon, Wed, Fri.      apixaban 5 mg Tab Take 1 tablet (5 mg total) by mouth 2 (two) times daily.  Qty: 60 tablet, Refills: 11    Associated Diagnoses: Paroxysmal atrial fibrillation             Indwelling Lines/Drains at time of discharge:   Lines/Drains/Airways          No matching active lines, drains, or airways          Time spent on the discharge of patient: 30 minutes  Patient was seen and examined on the date of discharge and determined to be suitable for discharge.         Dorothy Parks PA-C  Department of Hospital Medicine  [facility]  Discussed with staff: Dr. Drummond

## 2023-09-19 NOTE — ASSESSMENT & PLAN NOTE
62 y.o. male with significant past medical history of DM II, HTN, recent hx of URI symptoms and recently switched from Pradaxa to Eliquis presented to hospital complaining of acute onset aphasia.  The patient's aphasia seems to fluctuate.  Imaging negative for acute stroke.    Antiplatelets: On Eliquis  Statins: Continue Atorvastatin       Is This A New Presentation, Or A Follow-Up?: Skin Lesion Additional History: Patient reports a pimple like lesion on nose that is not healing. He reports that the lesion scabs and bleeds frequently. Reports it is his first time at a dermatology clinic.

## 2023-10-03 NOTE — ED NOTES
REASSESSMENT  Pt continues to slowly improved;     He is now oriented to person, place, month, year, president and circumstances of coming to ER;  Without complaints at this time;   Continues to [place]; stood up on side of bed to urinate, no difficulties encounter;  Thought processes are still slow compared to his baseline;  Spouse remains at bedside;    no

## 2024-02-19 NOTE — PROGRESS NOTES
"[facility]  Cardiac Electrophysiology  [address]  [place] [address]   Office: [phone]  Fax: [phone]     Cardiac Electrophysiology Consultation    Patient: Xenia Hearn   : 1939     Referring Provider: Chelsea Cao PA-C  Primary Care Provider: No Ref-Primary, Physician    CHIEF COMPLAINT/REASON FOR VISIT  Frequent premature atrial contractions and episodes of nonsustained atrial tachycardia    Assessment/Recommendations   Xenia Hearn is a 84 year old female with HTN, T2DM, pancreatic insufficiency, anxiety referred by Chelsea Cao PA-C for consultation regarding PACs, non-sustained supraventricular tachycardia.    Frequent premature atrial contractions and episodes of nonsustained atrial tachycardia - unlikely related to her symptoms.  At risk of progression to AF  - TTE  - start metoprolol XL 25mg daily  - continue to monitor BP - if low, may reduce/stop amlodipine    Single brief and slow episode of NSVT - likely low clinical significance  - TTE as above    Follow up: no planned EP follow-up         History of Present Illness   Xenia Hearn is a 84 year old female with HTN, T2DM, pancreatic insufficiency, anxiety referred by Chelsea Cao PA-C for consultation regarding PACs, non-sustained supraventricular tachycardia.    Mrs. Hearn noted intermittent \"burning sensations\" in her head with associated nausea and chills lasting for <10s - these have been occurring multiple times per day.  She underwent Zio monitoring as noted below with frequent PACs, 120 episodes of nonsustained AT, 1 brief and slow episode of NSVT.    She denies chest pain, syncope.       Physical Examination  Review of Systems   VITALS: /52 (BP Location: Left arm, Patient Position: Sitting, Cuff Size: Adult Regular)   Pulse 74   Resp 16   Wt 55.3 kg (122 lb)   Wt Readings from Last 3 Encounters:   23 56.6 kg (124 lb 12.8 oz)   23 56.7 kg (125 lb) " Subjective:       Patient ID: Alfa Christy III is a 65 y.o. male.    Chief Complaint: Follow-up and Annual Exam    HPI  66 y/o man with h/o CVA (11/2016) with residual deficits, h/o seizures, DM2, CKD, HTN, h/o thyroid cancer with postsurgical hypothyroidism, paroxysmal atrial fibrillation, chronic diastolic HF, sickle cell trait + retinopathy, BPH with obstruction, elevated PSA, multiple other medical issues here for annual exam.  Here with his wife Rut.    Less active than usual in recent months.  Able to walk about 30 min on treadmill usually but hasn't been going to gym recently  Outside, usually walks around his block but is concerned re: uneven surfaces and safety.     DM2 - dx in 1990s, on insulin since 2010.   Following with Endocrine (for this as well as thyroid cancer), due this month but not scheduled  Meds - levemir 12u qHS, novolog on a scale qAC, trulicity 0.75mg weekly  Reports being able to feel when his sugar is low  Eye exam done 10/23/2019  Foot exam due - planning to see podiatrist  Follows with nephrology    CKD stage 4, +proteinuria - follows with Dr Gold with nephrology, seen 7/2020, next due at 3 months. Has had decline in kidney function on recent labs.  Low vitamin D, elevated parathyroid hormone - started on calcitriol 8/2020    History of papillary thyroid cancer treated with surgery, RAIA. Post-surgical hypothyroidism, on levothyroxine. Follows with Endocrine  Most recent TG undetectable. TSH goal <2.5  Planned for yearly TG levels, surveillance ultrasound; last ultrasound 2/2020    HFpEF, HTN, atrial fibrillation - follows with Cardiology  On ASA, apixaban 5mg BID, lasix 80mg BID, amlodipine 5mg, coreg 12.5mg BID, valsartan 320mg, atorvastatin 40mg  Continues to have leg swelling. Wears compression stockings sometimes.   Tries to sit with his legs elevated   Of note, does snore; doesn't clearly note daytime sleepiness.     H/o CVA 2016, residual R vision field deficit, some cognitive      CONSTITUTIONAL: well nourished, comfortable, no distress  EYES:  Conjunctivae pink, sclerae clear.    E/N/T:  Oral mucosa pink  RESPIRATORY:  Respiratory effort is normal  CARDIOVASCULAR:  normal S1 and S2  GASTROINTESTINAL:  Abdomen without masses or tenderness  EXTREMITIES:  No clubbing or cyanosis.    MUSCULOSKELETAL:  Overall grossly normal muscle strength  SKIN:  Overall, skin warm and dry, no lesions.  NEURO/PSYCH:  Oriented x 3 with normal affect.   Constitutional:  No weight loss or loss of appetite    Eyes:  No difficulty with vision, no double vision, no dry eyes  ENT:  No sore throat, difficulty swallowing; changes in hearing or tinnitus  Cardiovascular: As detailed above  Respiratory:  No cough  Musculoskeletal  No joint pain, muscle aches  Neurologic:  No syncope, lightheadedness, fainting spells   Hematologic: No easy bruising, excessive bleeding tendency   Gastrointestinal:  No jaundice, abdominal pain or abdominal bloating  Genitourinary: No changes in urinary habits, no trouble urinating    Psychiatric: No anxiety or depression      Medical History  Surgical History   No past medical history on file. No past surgical history on file.      Family History Social History   No family history on file.            Medications  Allergies     Current Outpatient Medications:     acetaminophen (TYLENOL) 325 MG tablet, Take 975 mg by mouth 3 times daily, Disp: , Rfl:     alendronate (FOSAMAX) 70 MG tablet, Take 70 mg by mouth every 7 days, Disp: , Rfl:     amLODIPine (NORVASC) 10 MG tablet, Take 10 mg by mouth daily, Disp: , Rfl:     citalopram (CELEXA) 20 MG tablet, Take 20 mg by mouth daily, Disp: , Rfl:     gabapentin (NEURONTIN) 100 MG capsule, Take 100 mg by mouth 3 times daily, Disp: , Rfl:     lidocaine (LIDODERM) 5 % patch, Place 1 patch onto the skin every 24 hours To prevent lidocaine toxicity, patient should be patch free for 12 hrs daily., Disp: , Rfl:     lipase-protease-amylase (CREON)  and balance effects - L occipital stroke  Episode of aphasia 10/2017 without e/o stroke  Uses cane or assistance from another person    Seizures - taking keppra and vimpat BID  Follows with Dr Santiago in epilepsy clinic, seen 5/2020; next due for follow up at 1 year    BPH, elevated PSA - follows with Urology, last visit 7/2019 at which point his PSA was noted to be 16 and he was recommended for a biopsy. Has not yet followed up in that clinic. Has appt scheduled for next month.  No major changes in urination that he has noted    Eczema - multiple patches especially on low back, chronic. Saw Dermatology for this 12/2018. Biopsy c/w with chronic eczematous dermatitis / nummular eczema. Treated with lidex; needs refill.    Sickle cell trait, noted on previous eye exam to have sickle cell retinopathy - recommended for follow up with Heme/Onc but has not seen Dr Johansen since 2017      Umbilical hernia - stable, not painful. They have looked for a hernia belt.     Saw Dr Fischer for positive RF on labs from nephrology; other serologic tests were negative. Recommended for follow up at 6 months    PSA as noted above  Colonoscopy done 9/2019, normal, recommended for repeat at 5 years  Completed pneumonia vaccine series  Has had zostavax, but not shingrix     Review of Systems   Constitutional: Positive for activity change. Negative for fatigue, fever and unexpected weight change.   HENT: Negative.    Eyes: Negative for pain and visual disturbance (no acute changes).   Respiratory: Negative for cough and shortness of breath.    Cardiovascular: Positive for leg swelling (chronic). Negative for chest pain and palpitations.   Gastrointestinal: Negative for abdominal pain, blood in stool, constipation and diarrhea.   Endocrine: Negative.    Genitourinary: Negative for difficulty urinating.   Musculoskeletal: Positive for gait problem. Negative for back pain.   Skin: Positive for rash.        No new rash; eczema as noted  "  Neurological: Negative for seizures, weakness, numbness and headaches.        Balance problem since stroke   Hematological: Does not bruise/bleed easily.   Psychiatric/Behavioral: Negative for dysphoric mood. The patient is not nervous/anxious.          Past medical history, surgical history, and family medical history reviewed and updated as appropriate.    Medications and allergies reviewed.     Objective:          Vitals:    08/14/20 1330 08/14/20 1414   BP: (!) 140/80 134/80   BP Location: Left arm    Patient Position: Sitting    BP Method: Large (Manual)    Pulse: 71    SpO2: 96%    Weight: 104.2 kg (229 lb 11.5 oz)    Height: 5' 10" (1.778 m)      Body mass index is 32.96 kg/m².  Physical Exam  Vitals signs reviewed.   Constitutional:       General: He is not in acute distress.     Appearance: Normal appearance. He is well-developed. He is not ill-appearing.   HENT:      Head: Normocephalic and atraumatic.      Mouth/Throat:      Mouth: Mucous membranes are moist.   Eyes:      General: No scleral icterus.     Extraocular Movements: Extraocular movements intact.      Conjunctiva/sclera: Conjunctivae normal.      Pupils: Pupils are equal, round, and reactive to light.   Neck:      Musculoskeletal: Neck supple.   Cardiovascular:      Rate and Rhythm: Normal rate. Rhythm irregularly irregular.      Pulses: Normal pulses.      Heart sounds: Normal heart sounds. No murmur.   Pulmonary:      Effort: Pulmonary effort is normal. No respiratory distress.      Breath sounds: Normal breath sounds.   Abdominal:      General: Bowel sounds are normal. There is no distension.      Palpations: Abdomen is soft.      Tenderness: There is no abdominal tenderness.      Hernia: A hernia (hernia reducible, nontender ) is present.   Musculoskeletal:         General: No tenderness.      Right lower leg: Edema present.      Left lower leg: Edema present.   Lymphadenopathy:      Cervical: No cervical adenopathy.   Skin:     General: " "[id number] units CPEP, Take 1 capsule by mouth daily, Disp: , Rfl:     lipase-protease-amylase (CREON) [id number] units CPEP, Take 3 capsules by mouth 3 times daily (with meals), Disp: , Rfl:     oxyCODONE (ROXICODONE) 5 MG tablet, Take 5 mg by mouth every 6 hours as needed for severe pain, Disp: , Rfl:     polyethylene glycol (MIRALAX) 17 g packet, Take 17 g by mouth daily, Disp: , Rfl:     sennosides (SENOKOT) 8.6 MG tablet, Take 2 tablets by mouth 2 times daily, Disp: , Rfl:     vitamin D3 (CHOLECALCIFEROL) 50 mcg (2000 units) tablet, Take 1 tablet by mouth daily, Disp: , Rfl:      Allergies   Allergen Reactions    Bromfenac Anaphylaxis    Chlorthalidone Swelling     Other reaction(s): swelling in the gums    Bromfenac Sodium (Once-Daily)      Other reaction(s): breathing    Lisinopril      Other reaction(s): Cough    No Clinical Screening - See Comments Hives    Thiopental      Other reaction(s): Hives          Lab Results    Chemistry CBC Cardiac Enzymes/BNP/TSH/INR   Recent Labs   Lab Test 12/12/23  1046      POTASSIUM 3.6   CHLORIDE 103   CO2 28   *   BUN 15.0   CR 0.83   GFRESTIMATED 68   NGHIA 8.9     Recent Labs   Lab Test 12/12/23  1046 09/14/23  0919 05/26/23  1728   CR 0.83 0.98* 0.98*          Recent Labs   Lab Test 06/13/23  1528   WBC 6.2   HGB 11.4*   HCT 36.5   MCV 96        Recent Labs   Lab Test 06/13/23  1528 05/26/23  1728 01/25/23  1021   HGB 11.4* 11.1* 11.0*    No results for input(s): \"TROPONINI\" in the last 80974 hours.  No results for input(s): \"BNP\", \"NTBNPI\", \"NTBNP\" in the last 13437 hours.  Recent Labs   Lab Test 12/12/23  1046   TSH 5.19*     No results for input(s): \"INR\" in the last 01784 hours.      Data Review      Zio monitoring from 12/12/2023 to 12/19/2023 (duration 6d 22h) (independently reviewed)  Predominant underlying rhythm was sinus rhythm, 53 to 124bpm, average 72bpm.  1 episode of NSVT - 5 beats, avg 106bpm.  120 episodes NS-AT, longest " 20 beats, fastest 167bpm.  No sustained tachyarrhythmias.  No atrial fibrillation.  There were no pauses of greater than 3 seconds.  Frequent supraventricular ectopic beats (isolated 8.7%, couplet 7.9%).  Rare premature ventricular contractions (isolated <1%).  Symptom triggers (1) correlated to SR with PACs.       Cc: Chelsea Hoffman MD  2/19/2024  8:42 AM       Skin is warm and dry.      Comments: +multiple macular hyperpigmented skin lesions on back with irregular border, slight scaling  +two 3-5cm flat raised nodular lesions, flesh-colored, on upper back - no significant change   Neurological:      Mental Status: He is alert. Mental status is at baseline.      Cranial Nerves: No cranial nerve deficit.      Gait: Gait normal.      Comments: Oriented to self, place, situation. Sometimes slow responses but appropriate. Some short-term memory difficulty.   Psychiatric:         Mood and Affect: Mood and affect normal.         Behavior: Behavior normal.         Lab Results   Component Value Date    WBC 8.43 08/11/2020    HGB 10.4 (L) 08/11/2020    HCT 29.8 (L) 08/11/2020     08/11/2020    CHOL 118 (L) 06/25/2020    TRIG 117 06/25/2020    HDL 39 (L) 06/25/2020    ALT 18 06/25/2020    AST 25 06/25/2020     08/11/2020    K 5.1 08/11/2020     08/11/2020    CREATININE 2.6 (H) 08/11/2020    BUN 35 (H) 08/11/2020    CO2 25 08/11/2020    TSH 1.897 06/25/2020    PSA 7.2 (H) 10/28/2015    INR 1.0 05/31/2018    GLUF 106 11/05/2008    HGBA1C 5.1 06/25/2020       Assessment:       1. Annual physical exam    2. Elevated PSA    3. Type 2 diabetes mellitus with stage 4 chronic kidney disease, with long-term current use of insulin    4. Hypertensive kidney disease with stage 4 chronic kidney disease    5. History of thyroid cancer    6. Postsurgical hypothyroidism    7. Essential hypertension    8. Chronic diastolic heart failure    9. Chronic atrial fibrillation    10. Chronic anticoagulation - eliquis    11. History of CVA (cerebrovascular accident)    12. Complex partial seizures evolving to generalized tonic-clonic seizures    13. Sickle cell trait    14. Vitamin D deficiency    15. Secondary hyperparathyroidism    16. Chronic eczema    17. Eczema, unspecified type        Plan:   Alfa was seen today for follow-up and annual exam.    Diagnoses and all orders for this  visit:    Annual physical exam - Overall stable, doing well. Good home/family support. Following covid19 precautions carefully.   Reviewed chronic and preventive health concerns.  Reviewed recent lab results and specialist notes as well as past specialist notes for recommendations on follow up / plans.     Elevated PSA  -     PSA, total and free; Future  Overdue for follow up with urology but does have this scheduled. Adding PSA to next lab appt    Type 2 diabetes mellitus with stage 4 chronic kidney disease, with long-term current use of insulin  Continue to follow with Endocrine, no changes to medication at present though noting that if kidney function continues to decline will need close monitoring and likely decrease of insulin dosing  Schedule Endocrine appt for DM2 and thyroid follow up at checkout today    Hypertensive kidney disease with stage 4 chronic kidney disease  Progression of CKD on last labs, continue to follow closely with nephrology. Monitor BP at home, bring log to next nephrology visit. If consistently above goal, could increase amlodipine  Counseled re: watching sodium in diet    History of thyroid cancer  Postsurgical hypothyroidism  Clinically euthyroid; follow up with endocrine as noted  No medication changes    Essential hypertension - above goal on initial check, close to goal on repeat. Monitor at home, bring cuff and log to next visit    Chronic diastolic heart failure  Chronic atrial fibrillation  Chronic anticoagulation - eliquis - follow with cardiology. Medications reviewed. Stable.  Due to risks associated with untreated NEDRA, recommended watch closely for snoring and apnea, if noting any apnea, frequent awakenings, or daytime drowsiness would consider sleep clinic eval / sleep study    History of CVA (cerebrovascular accident) - focus on managing risk factors; continue ASA, statin.    Complex partial seizures evolving to generalized tonic-clonic seizures - continue to follow with  neurology.  No changes to seizure medications    Sickle cell trait - Hgb stable; reported retinopathy, follow with eye exams    Vitamin D deficiency  Secondary hyperparathyroidism  Now on calcitriol; follow with nephrology    Chronic eczema  Continue lidex BID prn  -     fluocinonide (LIDEX) 0.05 % ointment; Apply to area of rash twice daily    Other orders  -     aspirin 81 MG Chew; Take 1 tablet (81 mg total) by mouth once daily.    Discussed current situation and recommendations for mask-wearing, physical distancing, cleaning/sanitizing, testing. Reviewed symptoms to watch for and what to do if developing concerning viral symptoms including calling hotline, scheduling a virtual visit, or being seen in person for urgent care visit. Reviewed ER precautions and current testing guidelines. Discussed that these recommendations are being updated frequently; directed to reliable sources for most updated information through [facility], [facility] [place][note 2], [facility].     Reminded to schedule at checkout with Endocrine, Cardiology, and Podiatry.  Health maintenance reviewed with patient.   Reminded to get flu vaccine in the early fall, also recommended look into shingrix. He defers shingrix for now but will consider.    Follow up in about 6 months (around 2/14/2021) for coordination of care.    Tyler Jasmine MD  Internal Medicine  [facility]  8/14/2020

## 2024-09-18 NOTE — ASSESSMENT & PLAN NOTE
Health Maintenance       Pneumococcal Vaccine 0-64 (1 of 2 - PCV)  Overdue since 8/1/2005    DTaP/Tdap/Td Vaccine (7 - Td or Tdap)  Overdue since 10/14/2020    COVID-19 Vaccine (5 - 2023-24 season)  Overdue since 9/1/2024    Influenza Vaccine (1)  Due since 9/1/2024           Following review of the above:  Patient wishes to discuss with clinician: COVID-19 and Influenza  Patient is not proceeding with: Dtap/Tdap/Td and Pneumococcal    Note: Refer to final orders and clinician documentation.         Recent PHQ 2/9 Score    PHQ 2:  PHQ 2 Score Adult PHQ 2 Score Adult PHQ 2 Interpretation Little interest or pleasure in activity?   9/18/2024  10:57 AM 1 No further screening needed 0       PHQ 9:       Review Flowsheet  More data exists         9/18/2024   PHQ 2/9 Score   Adult PHQ 2 Score 1   Adult PHQ 2 Interpretation No further screening needed   Little interest or pleasure in activity? Not at all   Feeling down, depressed or hopeless? Several days      Details                   Verbal consent obtained from patient to use LEA during patients office visit: yes     There is no height or weight on file to calculate BMI.  -- encouraged dietary and lifestyle modifications   -- emphasized weight loss goals

## (undated) DEVICE — EVACUATOR BLADDER UROVAC ADPT

## (undated) DEVICE — BAG URINARY DRN 2000ML

## (undated) DEVICE — SOL IRR WATER STRL 3000 ML

## (undated) DEVICE — BRUSH SCRUB SURGICALW/BETADINE

## (undated) DEVICE — SET CYSTO IRRIGATION UNIV SPIK

## (undated) DEVICE — Device

## (undated) DEVICE — SCRUB 10% POVIDONE IODINE 4OZ

## (undated) DEVICE — CATH SWAN GANZ STND 7FR

## (undated) DEVICE — VALVE ENDOSCOPIC(SURESEAL II)

## (undated) DEVICE — SEE L#120831

## (undated) DEVICE — SYR 30CC LUER LOCK

## (undated) DEVICE — SOL 9P NACL IRR PIC IL

## (undated) DEVICE — SET BASIN 48X48IN 6000ML RING

## (undated) DEVICE — SOL .9 NACL 50 ML

## (undated) DEVICE — SET IRR URLGY 2LINE UNIV SPIKE

## (undated) DEVICE — TRAY DRY SKIN SCRUB PREP

## (undated) DEVICE — SEE MEDLINE ITEM 156894

## (undated) DEVICE — DRESSING TRANS 4X4 TEGADERM

## (undated) DEVICE — WIRE GD LUB ANG 3CM .038 150CM

## (undated) DEVICE — GLOVE BIOGEL SKINSENSE PI 7.0

## (undated) DEVICE — GOWN SMARTGOWN LVL4 X-LONG XL

## (undated) DEVICE — CATH POLLACK OPEN-END FLEXI-TI

## (undated) DEVICE — SOL IRR NACL .9% 3000ML

## (undated) DEVICE — CATH CONE TIP

## (undated) DEVICE — SOL PVP-I SCRUB 7.5% 4OZ

## (undated) DEVICE — TRAY CYSTO BASIN

## (undated) DEVICE — KIT ASPIRATION TUBING FOR SP-2

## (undated) DEVICE — SHEATH INTRODUCER 7FR 11CM

## (undated) DEVICE — SET MICROPUNCTURE 5FR 501NT

## (undated) DEVICE — KIT PROBE COVER WITH GEL

## (undated) DEVICE — SYR 10CC LUER LOCK

## (undated) DEVICE — SET TUR BLADDER IRRIG Y TYPE

## (undated) DEVICE — PACK CYSTO